# Patient Record
Sex: MALE | Race: WHITE | NOT HISPANIC OR LATINO | URBAN - METROPOLITAN AREA
[De-identification: names, ages, dates, MRNs, and addresses within clinical notes are randomized per-mention and may not be internally consistent; named-entity substitution may affect disease eponyms.]

---

## 2018-09-18 ENCOUNTER — EMERGENCY (EMERGENCY)
Facility: HOSPITAL | Age: 83
LOS: 1 days | Discharge: HOME | End: 2018-09-18
Attending: EMERGENCY MEDICINE | Admitting: EMERGENCY MEDICINE
Payer: MEDICARE

## 2018-09-18 VITALS
SYSTOLIC BLOOD PRESSURE: 151 MMHG | OXYGEN SATURATION: 98 % | HEART RATE: 100 BPM | RESPIRATION RATE: 18 BRPM | DIASTOLIC BLOOD PRESSURE: 87 MMHG | TEMPERATURE: 98 F

## 2018-09-18 LAB
ALBUMIN SERPL ELPH-MCNC: 3.4 G/DL — SIGNIFICANT CHANGE UP (ref 3.3–5)
ALP SERPL-CCNC: 74 U/L — SIGNIFICANT CHANGE UP (ref 40–120)
ALT FLD-CCNC: 18 U/L — SIGNIFICANT CHANGE UP (ref 4–41)
APTT BLD: 28.9 SEC — SIGNIFICANT CHANGE UP (ref 27.5–37.4)
AST SERPL-CCNC: 13 U/L — SIGNIFICANT CHANGE UP (ref 4–40)
BASOPHILS # BLD AUTO: 0.06 K/UL — SIGNIFICANT CHANGE UP (ref 0–0.2)
BASOPHILS NFR BLD AUTO: 0.6 % — SIGNIFICANT CHANGE UP (ref 0–2)
BILIRUB SERPL-MCNC: 0.7 MG/DL — SIGNIFICANT CHANGE UP (ref 0.2–1.2)
BUN SERPL-MCNC: 21 MG/DL — SIGNIFICANT CHANGE UP (ref 7–23)
CALCIUM SERPL-MCNC: 8.8 MG/DL — SIGNIFICANT CHANGE UP (ref 8.4–10.5)
CHLORIDE SERPL-SCNC: 100 MMOL/L — SIGNIFICANT CHANGE UP (ref 98–107)
CO2 SERPL-SCNC: 24 MMOL/L — SIGNIFICANT CHANGE UP (ref 22–31)
CREAT SERPL-MCNC: 1.01 MG/DL — SIGNIFICANT CHANGE UP (ref 0.5–1.3)
EOSINOPHIL # BLD AUTO: 0.28 K/UL — SIGNIFICANT CHANGE UP (ref 0–0.5)
EOSINOPHIL NFR BLD AUTO: 2.8 % — SIGNIFICANT CHANGE UP (ref 0–6)
GLUCOSE SERPL-MCNC: 318 MG/DL — HIGH (ref 70–99)
HBA1C BLD-MCNC: 8.2 % — HIGH (ref 4–5.6)
HCT VFR BLD CALC: 42.2 % — SIGNIFICANT CHANGE UP (ref 39–50)
HGB BLD-MCNC: 14.7 G/DL — SIGNIFICANT CHANGE UP (ref 13–17)
IMM GRANULOCYTES # BLD AUTO: 0.04 # — SIGNIFICANT CHANGE UP
IMM GRANULOCYTES NFR BLD AUTO: 0.4 % — SIGNIFICANT CHANGE UP (ref 0–1.5)
INR BLD: 1.1 — SIGNIFICANT CHANGE UP (ref 0.88–1.17)
LYMPHOCYTES # BLD AUTO: 1.76 K/UL — SIGNIFICANT CHANGE UP (ref 1–3.3)
LYMPHOCYTES # BLD AUTO: 17.4 % — SIGNIFICANT CHANGE UP (ref 13–44)
MCHC RBC-ENTMCNC: 31.1 PG — SIGNIFICANT CHANGE UP (ref 27–34)
MCHC RBC-ENTMCNC: 34.8 % — SIGNIFICANT CHANGE UP (ref 32–36)
MCV RBC AUTO: 89.4 FL — SIGNIFICANT CHANGE UP (ref 80–100)
MONOCYTES # BLD AUTO: 0.99 K/UL — HIGH (ref 0–0.9)
MONOCYTES NFR BLD AUTO: 9.8 % — SIGNIFICANT CHANGE UP (ref 2–14)
NEUTROPHILS # BLD AUTO: 7.01 K/UL — SIGNIFICANT CHANGE UP (ref 1.8–7.4)
NEUTROPHILS NFR BLD AUTO: 69 % — SIGNIFICANT CHANGE UP (ref 43–77)
NRBC # FLD: 0 — SIGNIFICANT CHANGE UP
PLATELET # BLD AUTO: 188 K/UL — SIGNIFICANT CHANGE UP (ref 150–400)
PMV BLD: 10.6 FL — SIGNIFICANT CHANGE UP (ref 7–13)
POTASSIUM SERPL-MCNC: 4.2 MMOL/L — SIGNIFICANT CHANGE UP (ref 3.5–5.3)
POTASSIUM SERPL-SCNC: 4.2 MMOL/L — SIGNIFICANT CHANGE UP (ref 3.5–5.3)
PROT SERPL-MCNC: 6.7 G/DL — SIGNIFICANT CHANGE UP (ref 6–8.3)
PROTHROM AB SERPL-ACNC: 12.2 SEC — SIGNIFICANT CHANGE UP (ref 9.8–13.1)
RBC # BLD: 4.72 M/UL — SIGNIFICANT CHANGE UP (ref 4.2–5.8)
RBC # FLD: 12 % — SIGNIFICANT CHANGE UP (ref 10.3–14.5)
SODIUM SERPL-SCNC: 138 MMOL/L — SIGNIFICANT CHANGE UP (ref 135–145)
TROPONIN T, HIGH SENSITIVITY: 26 NG/L — SIGNIFICANT CHANGE UP (ref ?–14)
WBC # BLD: 10.14 K/UL — SIGNIFICANT CHANGE UP (ref 3.8–10.5)
WBC # FLD AUTO: 10.14 K/UL — SIGNIFICANT CHANGE UP (ref 3.8–10.5)

## 2018-09-18 PROCEDURE — 99285 EMERGENCY DEPT VISIT HI MDM: CPT | Mod: GC

## 2018-09-18 PROCEDURE — 99220: CPT | Mod: 25,GC

## 2018-09-18 PROCEDURE — 70450 CT HEAD/BRAIN W/O DYE: CPT | Mod: 26

## 2018-09-18 PROCEDURE — 93010 ELECTROCARDIOGRAM REPORT: CPT | Mod: 59

## 2018-09-18 RX ORDER — ASPIRIN/CALCIUM CARB/MAGNESIUM 324 MG
325 TABLET ORAL ONCE
Qty: 0 | Refills: 0 | Status: COMPLETED | OUTPATIENT
Start: 2018-09-18 | End: 2018-09-18

## 2018-09-18 RX ADMIN — Medication 325 MILLIGRAM(S): at 21:20

## 2018-09-18 NOTE — ED CDU PROVIDER INITIAL DAY NOTE - ATTENDING CONTRIBUTION TO CARE
I, Jennifer Cabot, MD, have performed a history and physical exam of the patient and discussed their management with the ACP.  I reviewed the PA's note and agree with the documented findings and plan of care. My medical decision making and observations are found above.    Cabot: 82M with PMH of CAD, MI, and 4 stents who comes in with 45 minutes of word finding difficulty, RUE tingling, and gait abnormality.  Symptoms resolved while in the ED.  Denies HA, CP, SOB, palps, F/C/N/V/D/urinary sx.  Code stroke was called.  CT head neg.  MRI/MRA recommended.  Exam normal.  Will admit to CDU for monitoring, serial trops, MRI/MRA.

## 2018-09-18 NOTE — ED PROVIDER NOTE - PHYSICAL EXAMINATION
HDS, NAD, MMM, eyes clear, lungs CTAB, heart sounds normal, abd soft, NT, ND, no CVAT, LEs without edema, wwp, skin normal temperature and color, AAOx3, PERRLA, EOMIB, CN 2-12 intact, 5/5 strength, SILT, normal gait, no drift

## 2018-09-18 NOTE — ED CDU PROVIDER INITIAL DAY NOTE - CONSTITUTIONAL, MLM
normal... Well appearing, well nourished, awake, alert, oriented to person, place, time/situation and in no apparent distress.  Pt. verbalizing in full, clear, effortless sentences.  Pt. ambulates with stable gait.

## 2018-09-18 NOTE — ED CDU PROVIDER INITIAL DAY NOTE - MUSCULOSKELETAL, MLM
Range of motion is not limited, no edema to lower extremities; LE symmetrical in appearance and size.

## 2018-09-18 NOTE — ED CDU PROVIDER INITIAL DAY NOTE - PRIOR EKG STATUS
artifact on EKG (unable to obtain EKG without artifact); EKG appears unchanged from prior; no acute issues noted (EKG reviewed w/ attending)

## 2018-09-18 NOTE — ED CDU PROVIDER INITIAL DAY NOTE - OBJECTIVE STATEMENT
82M with PMH of CAD, MI, and 4 stents who comes in with 45 minutes of word finding difficulty, RUE tingling, and gait abnormality.  Symptoms resolved while in the ED.  Denies HA, CP, SOB, palps, F/C/N/V/D/urinary sx.  Code stroke was called.  CT head neg.  MRI/MRA recommended.    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  Pt. presented to the ED s/p episode of slurred speech, word-finding difficulty, and right hand NUMBNESS (denies tingling/cramping/pain) which resolved.  Pt. was evaluated in the ED; CT head did not show any acute pathology; Neuro was consulted, and pt was dispo'd to CDU for plan:  Cardiac tele monitoring, neuro checks, MRI/MRA per orders, echo, Neuro team reassessment, general observation care and monitoring.  On CDU arrival, pt states he has not had any recurrence of above symptoms; currently asymptomatic and pt denies any pain/dizziness/dyspnea/other c/o.   Of incidental note, on ED labs, serum glucose was 318; HbA1c was added; result: 8.2.  Pt. denies past hx/o diagnosis of diabetes, but does state that his card/PMD (Dr. Reginaldo Syed) told him recently that his blood glucose level was in the 170's (pt states he was never prescribed any meds for hyperglycemia).  [Case discussed with Endocrine fellow Dr. Marcus; recs given to start weight-based regimen using 0.3 conversion; advised Lantus 9 units tonight, Humalog 3 units TID with meals and low sliding scale AC/HS, and gentle hydration with IV NS @ 75 mL/hr.]  CDU care plan in full was discussed with pt who verbalizes agreement with plan. 82M with PMH of CAD, MI, and 4 stents who comes in with 45 minutes of word finding difficulty, RUE tingling, and gait abnormality.  Symptoms resolved while in the ED.  Denies HA, CP, SOB, palps, F/C/N/V/D/urinary sx.  Code stroke was called.  CT head neg.  MRI/MRA recommended.    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  Pt. presented to the ED s/p episode of slurred speech, word-finding difficulty, and right hand NUMBNESS (denies tingling/cramping/pain) which resolved.  Pt. was evaluated in the ED; CT head did not show any acute pathology; Neuro was consulted, and pt was dispo'd to CDU for plan:  Cardiac tele monitoring, neuro checks, MRI/MRA per orders, echo, Neuro team reassessment, general observation care and monitoring.  On CDU arrival, pt states he has not had any recurrence of above symptoms; currently asymptomatic and pt denies any pain/dizziness/dyspnea/other c/o.   Of incidental note, on ED labs, serum glucose was 318; HbA1c was added; result: 8.2.  Pt. denies past hx/o diagnosis of diabetes, but does state that his card/PMD (Dr. Reginaldo Syed) told him recently that his blood glucose level was in the 170's (pt states he was never prescribed any meds for hyperglycemia).  [Case discussed with Endocrine fellow Dr. Marcus; recs given to start weight-based regimen using 0.3 conversion; advised Lantus 9 units tonight, Humalog 3 units TID with meals and low sliding scale AC/HS, and gentle hydration with IV NS @ 75 mL/hr.]  CDU care plan in full was discussed with pt who verbalizes agreement with plan.  Pt. denies hx/o recent illness

## 2018-09-18 NOTE — ED PROVIDER NOTE - OBJECTIVE STATEMENT
82M with PMH of CAD, MI, and 4 stents who comes in with 45 minutes of word finding difficulty, RUE tingling, and gait abnormality.  Symptoms resolved while in the ED.  Denies HA, CP, SOB, palps, F/C/N/V/D/urinary sx.  Code stroke was called.  CT head neg.  MRI/MRA recommended.

## 2018-09-18 NOTE — ED ADULT TRIAGE NOTE - CHIEF COMPLAINT QUOTE
A&Ox3 was sitting on cough when he started experiencing slurred speech and right hand numbness pt states he was having troubling making sentences out, occured about 30 minutes ago as per wife no facial droop or drooling  noted PMH MI (2003) has 4 stents pt took Advil and ASA before arriving to Ed denies n/v/ blurry vision headache FIT MD Andrea called A&Ox3 was sitting on cough when he started experiencing slurred speech and right hand numbness pt states he was having troubling making sentences out, occured about 30 minutes ago as per wife no facial droop or drooling  noted PMH MI (2003) has 4 stents pt took Advil and ASA before arriving to Ed denies n/v/ blurry vision headache FIT MD Andrea called CODE STROKE called pt sent to ct scan

## 2018-09-18 NOTE — ED CDU PROVIDER INITIAL DAY NOTE - CROS ED NEURO POS
feeling slightly "off" with gait (no hx/o fall), word-finding difficulty, speech felt "off", right hand numbness (see HPI)

## 2018-09-18 NOTE — ED CDU PROVIDER INITIAL DAY NOTE - PMH
CAD (coronary artery disease) CAD (coronary artery disease)  (4 stents, non-adherent to aspirin)  Diabetes mellitus, new onset  (diagnosed during 9/18/18 ED visit; HbA1c was 8.2)  Single kidney  (hx/o LEFT nephrectomy at age 16) Arthritis    CAD (coronary artery disease)  (4 stents, non-adherent to aspirin)  Diabetes mellitus, new onset  (diagnosed during 9/18/18 ED visit; HbA1c was 8.2)  Hard of hearing    Myocardial infarction  (2003)  Single kidney  (hx/o LEFT nephrectomy at age 16; pt states due to a ureter"blockage" causing "infection"; pt denies hx/o renal dysfunction)

## 2018-09-18 NOTE — ED CDU PROVIDER INITIAL DAY NOTE - PROGRESS NOTE DETAILS
Cabot: 82M with PMH of CAD, MI, and 4 stents who comes in with 45 minutes of word finding difficulty, RUE tingling, and gait abnormality.  Symptoms resolved while in the ED.  Denies HA, CP, SOB, palps, F/C/N/V/D/urinary sx.  Code stroke was called.  CT head neg.  MRI/MRA recommended.  Exam normal.  Will admit to CDU for monitoring, serial trops, MRI/MRA. Addendum to above plan of care (as in MDM):  CDU ROMERO Sen Addendum-----  On ED labs, serum glucose was 318; HbA1c 8.2.  CDU plan expanded to include Endocrine consult and Diabetic Education as per usual process; initial recs discussed with Endo fellow (see Consult section).

## 2018-09-18 NOTE — ED CDU PROVIDER INITIAL DAY NOTE - NOTES
Case discussed with Endocrine fellow Dr. Marcus; recs given to start weight-based regimen using 0.3 conversion; advised Lantus 9 units tonight, Humalog 3 units TID with meals and low sliding scale AC/HS, and gentle hydration with IV NS @ 75 mL/hr.

## 2018-09-18 NOTE — CONSULT NOTE ADULT - SUBJECTIVE AND OBJECTIVE BOX
HPI:    82 Y White Male presented to ED for evaluation of word finding difficulties. His last seen normal was 18:30 pm. He was with his wife. Wife noticed that he had word finding difficulties and patient also felt the same. In addition, patient also had right arm tingling and gait abnormality. His symptoms resolved after he arrived to ED. tPA was not given as he had no deficits. His nihss is 0 and MRS is 0. He denied any weakness, numbness, change in his speech and vision, vertigo, headache currently.         MEDICATIONS  (STANDING):  aspirin 325 milliGRAM(s) Oral Once    MEDICATIONS  (PRN):      PAST MEDICAL & SURGICAL HISTORY:  CAD (coronary artery disease)      FAMILY HISTORY:      Allergies    penicillin (Rash)    Intolerances          SHx - No smoking, No ETOH, No drug abuse      Review of Systems:  CONSTITUTIONAL:  No weight loss, fever, chills, weakness or fatigue.  HEENT:  Eyes:  No visual loss, blurred vision, double vision or yellow sclerae. Ears, Nose, Throat:  No hearing loss, sneezing, congestion, runny nose or sore throat.  SKIN:  No rash or itching.  CARDIOVASCULAR:  No chest pain, chest pressure or chest discomfort. No palpitations or edema.  RESPIRATORY:  No shortness of breath, cough or sputum.  GASTROINTESTINAL:  No anorexia, nausea, vomiting or diarrhea. No abdominal pain or blood.  GENITOURINARY:  NO Burning on urination.   NEUROLOGICAL: See HPI  MUSCULOSKELETAL:  No muscle, back pain, joint pain or stiffness.  HEMATOLOGIC:  No anemia, bleeding or bruising.  LYMPHATICS:  No enlarged nodes. No history of splenectomy.  PSYCHIATRIC:  No history of depression or anxiety.  ENDOCRINOLOGIC:  No reports of sweating, cold or heat intolerance. No polyuria or polydipsia.  ALLERGIES:  No history of asthma, hives, eczema or rhinitis.        Vital Signs Last 24 Hrs  T(C): 36.7 (18 Sep 2018 19:39), Max: 36.7 (18 Sep 2018 19:39)  T(F): 98 (18 Sep 2018 19:39), Max: 98 (18 Sep 2018 19:39)  HR: 100 (18 Sep 2018 19:39) (100 - 100)  BP: 151/87 (18 Sep 2018 19:39) (151/87 - 151/87)  BP(mean): --  RR: 18 (18 Sep 2018 19:39) (18 - 18)  SpO2: 98% (18 Sep 2018 19:39) (98% - 98%)    General Exam:   General appearance: No acute distress                   Neurological Exam:    Mental Status: Orientated to self, date and place.  Attention intact.  No dysarthria, aphasia or neglect.  Cranial Nerves: PERRL, EOMI, CN V1-3 intact to light touch and pinprick.  No facial asymmetry, Tongue, uvula and palate midline.    Motor:   Tone: normal.                  Strength:  no drifts in all four extremities              Dysmetria: None to finger-nose-finger or heel-shin-heel  No truncal ataxia.    Tremor: No resting, postural or action tremor.  No myoclonus.  Sensation: intact to light touch  Gait: normal and stable.      Other:                                      14.7   10.14 )-----------( 188      ( 18 Sep 2018 19:50 )             42.2       Radiology    CT head     < from: CT Brain Stroke Protocol (09.18.18 @ 20:02) >  No CT evidence of acute intracranial hemorrhage, brain edema, mass effect   or acute territorial infarct. Follow-up MRI can be obtained as clinically   warranted, provided there are no MRI contraindications.    < end of copied text >

## 2018-09-18 NOTE — ED CDU PROVIDER INITIAL DAY NOTE - PSH
History of nephrectomy, unilateral  (hx/o LEFT nephrectomy at age 16)  Stented coronary artery  (4 stents)

## 2018-09-18 NOTE — ED CDU PROVIDER INITIAL DAY NOTE - MEDICAL DECISION MAKING DETAILS
Cabot: 82M with PMH of CAD, MI, and 4 stents who comes in with 45 minutes of word finding difficulty, RUE tingling, and gait abnormality.  Symptoms resolved while in the ED.  Denies HA, CP, SOB, palps, F/C/N/V/D/urinary sx.  Code stroke was called.  CT head neg.  MRI/MRA recommended.  Exam normal.  Will admit to CDU for monitoring, serial trops, MRI/MRA. Cabot: 82M with PMH of CAD, MI, and 4 stents who comes in with 45 minutes of word finding difficulty, RUE tingling, and gait abnormality.  Symptoms resolved while in the ED.  Denies HA, CP, SOB, palps, F/C/N/V/D/urinary sx.  Code stroke was called.  CT head neg.  MRI/MRA recommended.  Exam normal.  Will admit to CDU for monitoring, serial trops, MRI/MRA.    CDU PA Addendum-----  On ED labs, serum glucose was 318; HbA1c 8.2.  CDU plan expanded to include Endocrine consult and Diabetic Education as per usual process; initial recs discussed with Endo fellow (see Consult section). Cabot: 82M with PMH of CAD, MI, and 4 stents who comes in with 45 minutes of word finding difficulty, RUE tingling, and gait abnormality.  Symptoms resolved while in the ED.  Denies HA, CP, SOB, palps, F/C/N/V/D/urinary sx.  Code stroke was called.  CT head neg.  MRI/MRA recommended.  Exam normal.  Will admit to CDU for monitoring, serial trops, MRI/MRA.  CDU ROMERO Sen Addendum-----  On ED labs, serum glucose was 318; HbA1c 8.2.  CDU plan expanded to include Endocrine consult and Diabetic Education as per usual process; initial recs discussed with Endo fellow (see Consult section).

## 2018-09-18 NOTE — ED ADULT NURSE REASSESSMENT NOTE - NS ED NURSE REASSESS COMMENT FT1
received rpt from domi nguyen pt comes to ED for slurred speech pt was a code stroke. pt states slurred speech is better at this time. pt resting comfortably VSS NAD Will continue to monitor the pt

## 2018-09-18 NOTE — ED PROVIDER NOTE - PROGRESS NOTE DETAILS
cabot: Patient's PMD is Dr. Reginaldo Syed, who does not admit to [facility].  Will admit to CDU for MRI/MRA. cabot: Patient's PMD is Dr. Reginaldo Syed, who does not admit to [facility].  Will admit to CDU for MRI/MRA.  Patient's PMD's team is aware and agrees with the plan.

## 2018-09-18 NOTE — ED PROVIDER NOTE - MEDICAL DECISION MAKING DETAILS
Cabot: 82M with PMH of CAD, MI, and 4 stents who comes in with 45 minutes of word finding difficulty, RUE tingling, and gait abnormality.  Symptoms resolved while in the ED.  Denies HA, CP, SOB, palps, F/C/N/V/D/urinary sx.  Code stroke was called.  CT head neg.  MRI/MRA recommended.  Exam normal.  Will admit to CDU for MRI/MRA, monitoring.

## 2018-09-18 NOTE — ED CDU PROVIDER INITIAL DAY NOTE - ENMT NEGATIVE STATEMENT, MLM
Ears: no ear pain and no new hearing problems. Nose: no nasal congestion and no nasal drainage. Mouth/Throat: no dysphagia, no hoarseness and no throat pain.Neck: no lumps, no pain, no stiffness and no swollen glands

## 2018-09-19 VITALS
TEMPERATURE: 99 F | DIASTOLIC BLOOD PRESSURE: 78 MMHG | OXYGEN SATURATION: 98 % | SYSTOLIC BLOOD PRESSURE: 167 MMHG | RESPIRATION RATE: 16 BRPM | HEART RATE: 79 BPM

## 2018-09-19 DIAGNOSIS — E11.9 TYPE 2 DIABETES MELLITUS WITHOUT COMPLICATIONS: ICD-10-CM

## 2018-09-19 DIAGNOSIS — I25.118 ATHEROSCLEROTIC HEART DISEASE OF NATIVE CORONARY ARTERY WITH OTHER FORMS OF ANGINA PECTORIS: ICD-10-CM

## 2018-09-19 DIAGNOSIS — Z95.5 PRESENCE OF CORONARY ANGIOPLASTY IMPLANT AND GRAFT: Chronic | ICD-10-CM

## 2018-09-19 DIAGNOSIS — Z90.5 ACQUIRED ABSENCE OF KIDNEY: Chronic | ICD-10-CM

## 2018-09-19 LAB
BASOPHILS # BLD AUTO: 0.03 K/UL — SIGNIFICANT CHANGE UP (ref 0–0.2)
BASOPHILS NFR BLD AUTO: 0.4 % — SIGNIFICANT CHANGE UP (ref 0–2)
BUN SERPL-MCNC: 18 MG/DL — SIGNIFICANT CHANGE UP (ref 7–23)
CALCIUM SERPL-MCNC: 8.6 MG/DL — SIGNIFICANT CHANGE UP (ref 8.4–10.5)
CHLORIDE SERPL-SCNC: 99 MMOL/L — SIGNIFICANT CHANGE UP (ref 98–107)
CHOLEST SERPL-MCNC: 143 MG/DL — SIGNIFICANT CHANGE UP (ref 120–199)
CO2 SERPL-SCNC: 27 MMOL/L — SIGNIFICANT CHANGE UP (ref 22–31)
CREAT SERPL-MCNC: 0.84 MG/DL — SIGNIFICANT CHANGE UP (ref 0.5–1.3)
EOSINOPHIL # BLD AUTO: 0.27 K/UL — SIGNIFICANT CHANGE UP (ref 0–0.5)
EOSINOPHIL NFR BLD AUTO: 3.3 % — SIGNIFICANT CHANGE UP (ref 0–6)
GLUCOSE SERPL-MCNC: 256 MG/DL — HIGH (ref 70–99)
HCT VFR BLD CALC: 40.2 % — SIGNIFICANT CHANGE UP (ref 39–50)
HDLC SERPL-MCNC: 40 MG/DL — SIGNIFICANT CHANGE UP (ref 35–55)
HGB BLD-MCNC: 14 G/DL — SIGNIFICANT CHANGE UP (ref 13–17)
IMM GRANULOCYTES # BLD AUTO: 0.02 # — SIGNIFICANT CHANGE UP
IMM GRANULOCYTES NFR BLD AUTO: 0.2 % — SIGNIFICANT CHANGE UP (ref 0–1.5)
LIPID PNL WITH DIRECT LDL SERPL: 89 MG/DL — SIGNIFICANT CHANGE UP
LYMPHOCYTES # BLD AUTO: 1.07 K/UL — SIGNIFICANT CHANGE UP (ref 1–3.3)
LYMPHOCYTES # BLD AUTO: 13.1 % — SIGNIFICANT CHANGE UP (ref 13–44)
MCHC RBC-ENTMCNC: 30.2 PG — SIGNIFICANT CHANGE UP (ref 27–34)
MCHC RBC-ENTMCNC: 34.8 % — SIGNIFICANT CHANGE UP (ref 32–36)
MCV RBC AUTO: 86.8 FL — SIGNIFICANT CHANGE UP (ref 80–100)
MONOCYTES # BLD AUTO: 0.79 K/UL — SIGNIFICANT CHANGE UP (ref 0–0.9)
MONOCYTES NFR BLD AUTO: 9.6 % — SIGNIFICANT CHANGE UP (ref 2–14)
NEUTROPHILS # BLD AUTO: 6.01 K/UL — SIGNIFICANT CHANGE UP (ref 1.8–7.4)
NEUTROPHILS NFR BLD AUTO: 73.4 % — SIGNIFICANT CHANGE UP (ref 43–77)
NRBC # FLD: 0 — SIGNIFICANT CHANGE UP
PLATELET # BLD AUTO: 161 K/UL — SIGNIFICANT CHANGE UP (ref 150–400)
PMV BLD: 10.4 FL — SIGNIFICANT CHANGE UP (ref 7–13)
POTASSIUM SERPL-MCNC: 4.1 MMOL/L — SIGNIFICANT CHANGE UP (ref 3.5–5.3)
POTASSIUM SERPL-SCNC: 4.1 MMOL/L — SIGNIFICANT CHANGE UP (ref 3.5–5.3)
RBC # BLD: 4.63 M/UL — SIGNIFICANT CHANGE UP (ref 4.2–5.8)
RBC # FLD: 12 % — SIGNIFICANT CHANGE UP (ref 10.3–14.5)
SODIUM SERPL-SCNC: 137 MMOL/L — SIGNIFICANT CHANGE UP (ref 135–145)
TRIGL SERPL-MCNC: 140 MG/DL — SIGNIFICANT CHANGE UP (ref 10–149)
TROPONIN T, HIGH SENSITIVITY: 21 NG/L — SIGNIFICANT CHANGE UP (ref ?–14)
WBC # BLD: 8.19 K/UL — SIGNIFICANT CHANGE UP (ref 3.8–10.5)
WBC # FLD AUTO: 8.19 K/UL — SIGNIFICANT CHANGE UP (ref 3.8–10.5)

## 2018-09-19 PROCEDURE — 70551 MRI BRAIN STEM W/O DYE: CPT | Mod: 26

## 2018-09-19 PROCEDURE — 70548 MR ANGIOGRAPHY NECK W/DYE: CPT | Mod: 26

## 2018-09-19 PROCEDURE — 70544 MR ANGIOGRAPHY HEAD W/O DYE: CPT | Mod: 26,59

## 2018-09-19 PROCEDURE — 93306 TTE W/DOPPLER COMPLETE: CPT | Mod: 26

## 2018-09-19 PROCEDURE — 99282 EMERGENCY DEPT VISIT SF MDM: CPT

## 2018-09-19 PROCEDURE — 99217: CPT | Mod: GC

## 2018-09-19 RX ORDER — SODIUM CHLORIDE 9 MG/ML
1000 INJECTION, SOLUTION INTRAVENOUS
Qty: 0 | Refills: 0 | Status: DISCONTINUED | OUTPATIENT
Start: 2018-09-19 | End: 2018-09-22

## 2018-09-19 RX ORDER — ATORVASTATIN CALCIUM 80 MG/1
80 TABLET, FILM COATED ORAL DAILY
Qty: 0 | Refills: 0 | Status: DISCONTINUED | OUTPATIENT
Start: 2018-09-19 | End: 2018-09-22

## 2018-09-19 RX ORDER — SODIUM CHLORIDE 9 MG/ML
1000 INJECTION INTRAMUSCULAR; INTRAVENOUS; SUBCUTANEOUS
Qty: 0 | Refills: 0 | Status: DISCONTINUED | OUTPATIENT
Start: 2018-09-19 | End: 2018-09-22

## 2018-09-19 RX ORDER — DEXTROSE 50 % IN WATER 50 %
12.5 SYRINGE (ML) INTRAVENOUS ONCE
Qty: 0 | Refills: 0 | Status: DISCONTINUED | OUTPATIENT
Start: 2018-09-19 | End: 2018-09-22

## 2018-09-19 RX ORDER — INSULIN LISPRO 100/ML
3 VIAL (ML) SUBCUTANEOUS
Qty: 0 | Refills: 0 | Status: DISCONTINUED | OUTPATIENT
Start: 2018-09-19 | End: 2018-09-22

## 2018-09-19 RX ORDER — INSULIN LISPRO 100/ML
VIAL (ML) SUBCUTANEOUS AT BEDTIME
Qty: 0 | Refills: 0 | Status: DISCONTINUED | OUTPATIENT
Start: 2018-09-19 | End: 2018-09-22

## 2018-09-19 RX ORDER — INSULIN GLARGINE 100 [IU]/ML
9 INJECTION, SOLUTION SUBCUTANEOUS ONCE
Qty: 0 | Refills: 0 | Status: COMPLETED | OUTPATIENT
Start: 2018-09-19 | End: 2018-09-19

## 2018-09-19 RX ORDER — DEXTROSE 50 % IN WATER 50 %
25 SYRINGE (ML) INTRAVENOUS ONCE
Qty: 0 | Refills: 0 | Status: DISCONTINUED | OUTPATIENT
Start: 2018-09-19 | End: 2018-09-22

## 2018-09-19 RX ORDER — INSULIN LISPRO 100/ML
VIAL (ML) SUBCUTANEOUS
Qty: 0 | Refills: 0 | Status: DISCONTINUED | OUTPATIENT
Start: 2018-09-19 | End: 2018-09-22

## 2018-09-19 RX ORDER — ASPIRIN/CALCIUM CARB/MAGNESIUM 324 MG
81 TABLET ORAL DAILY
Qty: 0 | Refills: 0 | Status: DISCONTINUED | OUTPATIENT
Start: 2018-09-19 | End: 2018-09-22

## 2018-09-19 RX ORDER — GLUCAGON INJECTION, SOLUTION 0.5 MG/.1ML
1 INJECTION, SOLUTION SUBCUTANEOUS ONCE
Qty: 0 | Refills: 0 | Status: DISCONTINUED | OUTPATIENT
Start: 2018-09-19 | End: 2018-09-22

## 2018-09-19 RX ORDER — DEXTROSE 50 % IN WATER 50 %
15 SYRINGE (ML) INTRAVENOUS ONCE
Qty: 0 | Refills: 0 | Status: DISCONTINUED | OUTPATIENT
Start: 2018-09-19 | End: 2018-09-22

## 2018-09-19 RX ORDER — METFORMIN HYDROCHLORIDE 850 MG/1
1 TABLET ORAL
Qty: 60 | Refills: 0
Start: 2018-09-19 | End: 2018-10-18

## 2018-09-19 RX ADMIN — Medication 1: at 13:26

## 2018-09-19 RX ADMIN — SODIUM CHLORIDE 75 MILLILITER(S): 9 INJECTION INTRAMUSCULAR; INTRAVENOUS; SUBCUTANEOUS at 00:40

## 2018-09-19 RX ADMIN — Medication 81 MILLIGRAM(S): at 13:25

## 2018-09-19 RX ADMIN — Medication 3 UNIT(S): at 13:27

## 2018-09-19 RX ADMIN — Medication 3 UNIT(S): at 09:45

## 2018-09-19 RX ADMIN — ATORVASTATIN CALCIUM 80 MILLIGRAM(S): 80 TABLET, FILM COATED ORAL at 13:25

## 2018-09-19 RX ADMIN — Medication 2: at 09:46

## 2018-09-19 RX ADMIN — INSULIN GLARGINE 9 UNIT(S): 100 INJECTION, SOLUTION SUBCUTANEOUS at 01:12

## 2018-09-19 NOTE — ED CDU PROVIDER SUBSEQUENT DAY NOTE - HISTORY
82 year old male with a PMhx of CAD s/p 4 stents, pre DM (newly diagnosed as DM with an A1c of 8.2) pw slurred speech, ataphasia and right hand numbness that occurred for 45 minutes which resolved before coming to the ED. Pt had negative CT. Sent to the CDU for MRI, TTE, carotid duplex and endocrine consult.

## 2018-09-19 NOTE — ED CDU PROVIDER DISPOSITION NOTE - CLINICAL COURSE
Roman: 82 yr old male with hx of cad stent x 4, solitary kidney presents to ed for slurred speech, RUE tingling and weakness that since has resolved upon arrival to ed.  sent to CDU for MRI, and echo.  pt was also found to be hyperglycemic. MRI no acute abnormality and endocrine saw you which they started you on metformin to control DM.  you remained neurologically intact    dx tia. continue with meds. Roman: 82 yr old male with hx of cad stent x 4, solitary kidney presents to ed for slurred speech, RUE tingling and weakness that since has resolved upon arrival to ed.  sent to CDU for MRI, and echo.  pt was also found to be hyperglycemic. MRI no acute abnormality (mild narrowing of the proximal left internal carotid artery) and endocrine saw you which they started you on metformin to control DM.  you remained neurologically intact    dx tia. continue with meds. started on metformin.  dietary changes.

## 2018-09-19 NOTE — ED CDU PROVIDER SUBSEQUENT DAY NOTE - PMH
Arthritis    CAD (coronary artery disease)  (4 stents, non-adherent to aspirin)  Diabetes mellitus, new onset  (diagnosed during 9/18/18 ED visit; HbA1c was 8.2)  Hard of hearing    Myocardial infarction  (2003)  Single kidney  (hx/o LEFT nephrectomy at age 16; pt states due to a ureter"blockage" causing "infection"; pt denies hx/o renal dysfunction)

## 2018-09-19 NOTE — ED ADULT NURSE NOTE - CHIEF COMPLAINT QUOTE
A&Ox3 was sitting on cough when he started experiencing slurred speech and right hand numbness pt states he was having troubling making sentences out, occured about 30 minutes ago as per wife no facial droop or drooling  noted PMH MI (2003) has 4 stents pt took Advil and ASA before arriving to Ed denies n/v/ blurry vision headache FIT MD Andrea called CODE STROKE called pt sent to ct scan

## 2018-09-19 NOTE — CONSULT NOTE ADULT - ATTENDING COMMENTS
Impression: Left hemispheric TIA  I spoke with the patient in detail regarding MRI brain that does not show any evidence of acute ischemia or infarction. He will get an echocardiogram and carotid Doppler performed during this admission.  Aggressive risk factor modification should be continued for secondary stroke prevention, consisting of antithrombotic therapy , intensive blood pressure control and lipid lowering therapy.I encouraged the patient to discuss these important issues with her primary care doctor.I have reviewed the goals of stroke risk factor modification. I counseled the patient on measures to reduce stroke risk, including the importance of medication compliance, risk factor control, exercise, healthy diet and avoidance of smoking. I reviewed stroke warning signs and symptoms and appropriate actions to take if such occur.
Agree with assessment stated above.  The patient's A1c is not completely uncontrolled and his target A1c is 7-8%.  For now, if the patient stays in the hospital he can stay on Lantus 9 and humalog 3 TID with meals. For discharge metformin 500 mg BID for one week then titrate up to 1000 mg BID.  Continue with statin for CAD

## 2018-09-19 NOTE — ED CDU PROVIDER SUBSEQUENT DAY NOTE - ATTENDING CONTRIBUTION TO CARE
Shared visit with CDU PA    82 yr old male with hx of cad stent x 4, solitary kidney presents to ed for slurred speech, RUE tingling and weakness that since has resolved upon arrival to ed.  sent to CDU for MRI, and echo.  pt was also found to be hyperglycemic.  pending MRI, echo, endocrine.  pt had no overnight events.    *GEN:   comfortable, in no apparent distress, AOx3  *EYES:   PERRL, extra-occular movements intact  *HEENT:   airway patent, moist mucosal membranes, uvula midline  *CV:   regular rate and rhythm, normal S1/S2, no murmur  *RESP:   clear to auscultation bilaterally, non-labored, speaking in full sentences  *ABD:   soft, non tender, no guarding  *MSK:   no musculoskeletal tenderness, 5/5 strength, moving all extremity  *SKIN:   dry, intact, no rash  *NEURO:   AOx3, no focal weakness or loss of sensation, gait normal, GCS 15    a/p: possible TIA vs hyperglycemia vs arrhythmia.  pending MRI, echo and endocrine.  neuro will see again and give recs.  pending dispo

## 2018-09-19 NOTE — ED CDU PROVIDER SUBSEQUENT DAY NOTE - PROGRESS NOTE DETAILS
Pt objectively noted to be resting comfortably in the interim; no issues or c/o thus far.  MRI/A performed; awaiting results.  Will continue to monitor.  Pt. will be signed out to day CDU ROMERO Richard and attending Dr. Roman at 0700 hrs. ROMERO Richard: pt NAD, VSS, no acute complaints. No events on tele. PE: cardiac: RRR, Lungs: CTA, neuro - CN1-12 WNL, strength 5/5 in all extremities, sensation WNL. Discussed the results of the labs and imaging with the patient. MRI - mild narrowing of right internal carotid otherwise unremarkable - pt cleared by neurology. Pending Echo results. Will continue to monitor.

## 2018-09-19 NOTE — ED CDU PROVIDER DISPOSITION NOTE - PLAN OF CARE
Take metformin 500 mg twice daily for one week then titrate up to 1000 mg twice daily. Follow up with your primary care physician or at endocrine clinic (refer to list provided) within 1-2 weeks discharge - Repeat A1c in 3 months and adjust medications accordingly. Follow up with neurology Dr. Mosqueda - list provided within1 week for further evaluation. Continue taking your home medications as prescribed. Return to the Emergency Department for any new, worsening or concerning symptoms.

## 2018-09-19 NOTE — CONSULT NOTE ADULT - ASSESSMENT
82 Y White Male presented to ED for evaluation of word finding difficulties. His last seen normal was 18:30 pm. He was with his wife. Wife noticed that he had word finding difficulties and patient also felt the same. In addition, patient also had right arm tingling and gait abnormality. His symptoms resolved after he arrived to ED. tPA was not given as he had no deficits. His nihss is 0 and MRS is 0. He denied any weakness, numbness, change in his speech and vision, vertigo, headache currently. Neurological examination was unremarkable. CT head was unremarkable.     Impression     left hemispheric dysfunction from TIA vs infarction     Plan     Permissive HTN x 24hrs goal 220/120  MRI brain w/o cont  MRA brain w/o cont   MRA neck w/ cont  HgA1c  Lipid profile  ASA 81mg  Atorvastatin 80   TTE   Telemetry monitoring  NPO until passes dysphagia screen  no PT/OT indicated at this point. (gait is normal)
83 yo M with a PMHx of prediabetes, CAD s/p 4 stents in 2003 (self discontinued Aspirin last year), Left nephrectomy at age 16, arthritis, who presented with c/o difficulty finding words at home, admitted for TIA vs. (Stroke work up NIHSS 0 and MRS 0 with normal MRI normal), now found to have new-onset diabetes with A1c 8.2.

## 2018-09-19 NOTE — CONSULT NOTE ADULT - SUBJECTIVE AND OBJECTIVE BOX
HPI:      PAST MEDICAL & SURGICAL HISTORY:  Hard of hearing  Myocardial infarction: (2003)  Arthritis  Single kidney: (hx/o LEFT nephrectomy at age 16; pt states due to a ureter&quot;blockage&quot; causing &quot;infection&quot;; pt denies hx/o renal dysfunction)  Diabetes mellitus, new onset: (diagnosed during 9/18/18 ED visit; HbA1c was 8.2)  CAD (coronary artery disease): (4 stents, non-adherent to aspirin)  Stented coronary artery: (4 stents)  History of nephrectomy, unilateral: (hx/o LEFT nephrectomy at age 16)      FAMILY HISTORY:  Family history of diabetes mellitus in mother (Mother): (Pt states his mother developed DM in her 70&#x27;s)      Social History:    Outpatient Medications:    MEDICATIONS  (STANDING):  aspirin enteric coated 81 milliGRAM(s) Oral daily  atorvastatin 80 milliGRAM(s) Oral daily  dextrose 5%. 1000 milliLiter(s) (50 mL/Hr) IV Continuous <Continuous>  dextrose 50% Injectable 12.5 Gram(s) IV Push once  dextrose 50% Injectable 25 Gram(s) IV Push once  dextrose 50% Injectable 25 Gram(s) IV Push once  insulin lispro (HumaLOG) corrective regimen sliding scale   SubCutaneous three times a day before meals  insulin lispro (HumaLOG) corrective regimen sliding scale   SubCutaneous at bedtime  insulin lispro Injectable (HumaLOG) 3 Unit(s) SubCutaneous three times a day before meals  sodium chloride 0.9%. 1000 milliLiter(s) (75 mL/Hr) IV Continuous <Continuous>    MEDICATIONS  (PRN):  dextrose 40% Gel 15 Gram(s) Oral once PRN Blood Glucose LESS THAN 70 milliGRAM(s)/deciliter  glucagon  Injectable 1 milliGRAM(s) IntraMuscular once PRN Glucose LESS THAN 70 milligrams/deciliter    Home Medications:  aspirin: (Pt states intermittent adherence to aspirin.) (19 Sep 2018 03:56)  simvastatin: (Unknown dosage), once a day, taken in the morning-time. (19 Sep 2018 03:56)    Allergies    penicillin (Rash)    Intolerances      Review of Systems:  Constitutional: No fever  Eyes: No blurry vision  Neuro: No tremors  HEENT: No pain  Cardiovascular: No chest pain, palpitations  Respiratory: No SOB, no cough  GI: No nausea, vomiting, abdominal pain  : No dysuria  Skin: no rash  Psych: no depression  Endocrine: no polyuria, polydipsia  Hem/lymph: no swelling  Osteoporosis: no fractures    ALL OTHER SYSTEMS REVIEWED AND NEGATIVE    UNABLE TO OBTAIN    PHYSICAL EXAM:  VITALS: T(C): 36.5 (09-19-18 @ 09:38)  T(F): 97.7 (09-19-18 @ 09:38), Max: 98.3 (09-19-18 @ 06:25)  HR: 79 (09-19-18 @ 09:38) (75 - 100)  BP: 166/71 (09-19-18 @ 09:38) (151/87 - 177/86)  RR:  (18 - 18)  SpO2:  (97% - 99%)  Wt(kg): --  GENERAL: NAD, well-groomed, well-developed  EYES: No proptosis, no lid lag, anicteric  HEENT:  Atraumatic, Normocephalic, moist mucous membranes  THYROID: Normal size, no palpable nodules  RESPIRATORY: Clear to auscultation bilaterally; No rales, rhonchi, wheezing  CARDIOVASCULAR: Regular rate and rhythm; No murmurs; no peripheral edema  GI: Soft, nontender, non distended, normal bowel sounds  SKIN: Dry, intact, No rashes or lesions  MUSCULOSKELETAL: Full range of motion, normal strength  NEURO: sensation intact, extraocular movements intact, no tremor  PSYCH: Alert and oriented x 3, normal affect, normal mood  CUSHING'S SIGNS: no striae      CAPILLARY BLOOD GLUCOSE    POCT Blood Glucose.: 220 mg/dL (19 Sep 2018 09:42)  POCT Blood Glucose.: 215 mg/dL (19 Sep 2018 00:23)                            14.0   8.19  )-----------( 161      ( 19 Sep 2018 09:03 )             40.2       09-19    137  |  99  |  18  ----------------------------<  256<H>  4.1   |  27  |  0.84    EGFR if : 95  EGFR if non : 82    Ca    8.6      09-19    TPro  6.7  /  Alb  3.4  /  TBili  0.7  /  DBili  x   /  AST  13  /  ALT  18  /  AlkPhos  74  09-18      < from: CT Brain Stroke Protocol (09.18.18 @ 20:02) >  IMPRESSION:     No CT evidence of acute intracranial hemorrhage, brain edema, mass effect   or acute territorial infarct. Follow-up MRI can be obtained as clinically   warranted, provided there are no MRI contraindications.    Findings were discussed with Dr. Yu by Dr. Edmond on 9/18/2018 at 8:08 PM   with read back.       < end of copied text >  Thyroid Function Tests:      Hemoglobin A1C, Whole Blood: 8.2 % <H> [4.0 - 5.6] (09-18-18 @ 19:56)      09-19 Chol 143 LDL 89 HDL 40 Trig 140    Radiology: HPI:   81 yo M with a PMHx CAD s/p 4 stents in 2003 (self discontinued Aspirin last year), Left nephrectomy at age 16, arthritis, who presented with c/o difficulty finding words at home that had resolved by his arrival to ED, admitted for TIA vs. Stroke work up. NIHSS 0 and MRS 0. MRI normal.   We were consulted because is Hgb A1c on admission was 8.2. Per pt, he was told by his PMD that his fasting glucose was elevated in the 170s. At the time he advised to change his lifestyle but not started on any anti-diabetic agent. He reports cutting down on desserts and exercising 3 times a week, but has been eating out a lot lately and eating a lot of bread. Strong family hx with Mother and daughter with T2DM.     He denies CP, SOB, HA, dizziness. He also denies polyuria, polydipsia, or dysuria, but endorsed more feeling more fatigue in past few weeks and some intermittent vision changes in the past week. Denies hx of HTN.     PAST MEDICAL & SURGICAL HISTORY:  Hard of hearing  Myocardial infarction: (2003)  Arthritis  Single kidney: (hx/o LEFT nephrectomy at age 16; pt states due to a ureter&quot;blockage&quot; causing &quot;infection&quot;; pt denies hx/o renal dysfunction)  Diabetes mellitus, new onset: (diagnosed during 9/18/18 ED visit; HbA1c was 8.2)  CAD (coronary artery disease): (4 stents, non-adherent to aspirin)  Stented coronary artery: (4 stents)  History of nephrectomy, unilateral: (hx/o LEFT nephrectomy at age 16)      FAMILY HISTORY:  Family history of diabetes mellitus in mother (Mother): (Pt states his mother developed DM in her 70&#x27;s)  Family history of diabetes mellitus in daugther     Social History: Retired, used to work for [employer]. Part-time  for his grandkids    Home Medications:  aspirin: (Pt states intermittent adherence to aspirin.) (19 Sep 2018 03:56)  simvastatin: 80mg, once a day, taken in the morning-time. (19 Sep 2018 03:56)    MEDICATIONS  (STANDING):  aspirin enteric coated 81 milliGRAM(s) Oral daily  atorvastatin 80 milliGRAM(s) Oral daily  dextrose 5%. 1000 milliLiter(s) (50 mL/Hr) IV Continuous <Continuous>  dextrose 50% Injectable 12.5 Gram(s) IV Push once  dextrose 50% Injectable 25 Gram(s) IV Push once  dextrose 50% Injectable 25 Gram(s) IV Push once  insulin lispro (HumaLOG) corrective regimen sliding scale   SubCutaneous three times a day before meals  insulin lispro (HumaLOG) corrective regimen sliding scale   SubCutaneous at bedtime  insulin lispro Injectable (HumaLOG) 3 Unit(s) SubCutaneous three times a day before meals  sodium chloride 0.9%. 1000 milliLiter(s) (75 mL/Hr) IV Continuous <Continuous>    MEDICATIONS  (PRN):  dextrose 40% Gel 15 Gram(s) Oral once PRN Blood Glucose LESS THAN 70 milliGRAM(s)/deciliter  glucagon  Injectable 1 milliGRAM(s) IntraMuscular once PRN Glucose LESS THAN 70 milligrams/deciliter    Allergies: penicillin (Rash    Review of Systems:  Constitutional: No fever  Eyes: No blurry vision  Neuro: No tremors  HEENT: No pain  Cardiovascular: No chest pain, palpitations  Respiratory: No SOB, no cough  GI: No nausea, vomiting, abdominal pain  : No dysuria  Skin: no rash  Psych: no depression  Endocrine: no polyuria, polydipsia  Hem/lymph: no swelling  Osteoporosis: no fractures    ALL OTHER SYSTEMS REVIEWED AND NEGATIVE    PHYSICAL EXAM:  VITALS: T(C): 36.5 (09-19-18 @ 09:38)  T(F): 97.7 (09-19-18 @ 09:38), Max: 98.3 (09-19-18 @ 06:25)  HR: 79 (09-19-18 @ 09:38) (75 - 100)  BP: 166/71 (09-19-18 @ 09:38) (151/87 - 177/86)  RR:  (18 - 18)  SpO2:  (97% - 99%)    GENERAL: NAD, well-groomed, well-developed  EYES: No proptosis, no lid lag, anicteric  HEENT:  Atraumatic, Normocephalic, moist mucous membranes  RESPIRATORY: Clear to auscultation bilaterally; No rales, rhonchi, wheezing  CARDIOVASCULAR: Irregular rhythm, normal rate; No murmurs; no peripheral edema  GI: Soft, nontender, non distended, normal bowel sounds  SKIN: Dry, intact, No rashes or lesions  MUSCULOSKELETAL: Full range of motion, normal strength  NEURO: sensation intact, extraocular movements intact, no tremor  PSYCH: Alert and oriented x 3, normal affect, normal mood      CAPILLARY BLOOD GLUCOSE    POCT Blood Glucose.: 220 mg/dL (19 Sep 2018 09:42)  POCT Blood Glucose.: 215 mg/dL (19 Sep 2018 00:23)                         14.0   8.19  )-----------( 161      ( 19 Sep 2018 09:03 )             40.2       09-19    137  |  99  |  18  ----------------------------<  256<H>  4.1   |  27  |  0.84    EGFR if : 95  EGFR if non : 82    Ca    8.6      09-19    TPro  6.7  /  Alb  3.4  /  TBili  0.7  /  DBili  x   /  AST  13  /  ALT  18  /  AlkPhos  74  09-18      Radiology:   < from: CT Brain Stroke Protocol (09.18.18 @ 20:02) >  IMPRESSION:     No CT evidence of acute intracranial hemorrhage, brain edema, mass effect   or acute territorial infarct. Follow-up MRI can be obtained as clinically   warranted, provided there are no MRI contraindications.    Findings were discussed with Dr. Yu by Dr. Edmond on 9/18/2018 at 8:08 PM   with read back.       < end of copied text >  Thyroid Function Tests:      Hemoglobin A1C, Whole Blood: 8.2 % <H> [4.0 - 5.6] (09-18-18 @ 19:56)      09-19 Chol 143 LDL 89 HDL 40 Trig 140 HPI:   83 yo M with a PMHx CAD s/p 4 stents in 2003 (self discontinued Aspirin last year), Left nephrectomy at age 16, arthritis, who presented with c/o difficulty finding words at home that had resolved by his arrival to ED, admitted for TIA vs. Stroke work up. NIHSS 0 and MRS 0. MRI normal.   We were consulted because is Hgb A1c on admission was 8.2. Per pt, he was told by his PMD that his fasting glucose was elevated in the 170s. At the time he advised to change his lifestyle but not started on any anti-diabetic agent. He reports cutting down on desserts and exercising 3 times a week, but has been eating out a lot lately and eating a lot of bread. Strong family hx with Mother and daughter with T2DM.     He denies CP, SOB, HA, dizziness. He also denies polyuria, polydipsia, or dysuria, but endorsed more feeling more fatigue in past few weeks and some intermittent vision changes in the past week. Denies hx of HTN.     PAST MEDICAL & SURGICAL HISTORY:  Hard of hearing  Myocardial infarction: (2003)  Arthritis  Single kidney: (hx/o LEFT nephrectomy at age 16; pt states due to a ureter&quot;blockage&quot; causing &quot;infection&quot;; pt denies hx/o renal dysfunction)  Diabetes mellitus, new onset: (diagnosed during 9/18/18 ED visit; HbA1c was 8.2)  CAD (coronary artery disease): (4 stents, non-adherent to aspirin)  Stented coronary artery: (4 stents)  History of nephrectomy, unilateral: (hx/o LEFT nephrectomy at age 16)      FAMILY HISTORY:  Family history of diabetes mellitus in mother (Mother): (Pt states his mother developed DM in her 70&#x27;s)  Family history of diabetes mellitus in daugther     Social History:   Retired, used to work for [employer]. Part-time  for his grandkids  Glass of red wine daily  Non-smoker    Home Medications:  aspirin: (Pt states intermittent adherence to aspirin.) (19 Sep 2018 03:56)  simvastatin: 80mg, once a day, taken in the morning-time. (19 Sep 2018 03:56)    MEDICATIONS  (STANDING):  aspirin enteric coated 81 milliGRAM(s) Oral daily  atorvastatin 80 milliGRAM(s) Oral daily  dextrose 5%. 1000 milliLiter(s) (50 mL/Hr) IV Continuous <Continuous>  dextrose 50% Injectable 12.5 Gram(s) IV Push once  dextrose 50% Injectable 25 Gram(s) IV Push once  dextrose 50% Injectable 25 Gram(s) IV Push once  insulin lispro (HumaLOG) corrective regimen sliding scale   SubCutaneous three times a day before meals  insulin lispro (HumaLOG) corrective regimen sliding scale   SubCutaneous at bedtime  insulin lispro Injectable (HumaLOG) 3 Unit(s) SubCutaneous three times a day before meals  sodium chloride 0.9%. 1000 milliLiter(s) (75 mL/Hr) IV Continuous <Continuous>    MEDICATIONS  (PRN):  dextrose 40% Gel 15 Gram(s) Oral once PRN Blood Glucose LESS THAN 70 milliGRAM(s)/deciliter  glucagon  Injectable 1 milliGRAM(s) IntraMuscular once PRN Glucose LESS THAN 70 milligrams/deciliter    Allergies: penicillin (Rash    Review of Systems:  Constitutional: No fever  Eyes: No blurry vision  Neuro: No tremors  HEENT: No pain  Cardiovascular: No chest pain, palpitations  Respiratory: No SOB, no cough  GI: No nausea, vomiting, abdominal pain  : No dysuria  Skin: no rash  Psych: no depression  Endocrine: no polyuria, polydipsia  Hem/lymph: no swelling  Osteoporosis: no fractures  ALL OTHER SYSTEMS REVIEWED AND NEGATIVE    PHYSICAL EXAM:  VITALS: T(C): 36.5 (09-19-18 @ 09:38)  T(F): 97.7 (09-19-18 @ 09:38), Max: 98.3 (09-19-18 @ 06:25)  HR: 79 (09-19-18 @ 09:38) (75 - 100)  BP: 166/71 (09-19-18 @ 09:38) (151/87 - 177/86)  RR:  (18 - 18)  SpO2:  (97% - 99%)    GENERAL: NAD, well-groomed, well-developed  EYES: No proptosis, no lid lag, anicteric  HEENT:  Atraumatic, Normocephalic, moist mucous membranes  RESPIRATORY: Clear to auscultation bilaterally; No rales, rhonchi, wheezing  CARDIOVASCULAR: Irregular rhythm, normal rate; No murmurs; no peripheral edema  GI: Soft, nontender, non distended, normal bowel sounds  SKIN: Dry, intact, No rashes or lesions  MUSCULOSKELETAL: Full range of motion, normal strength  NEURO: sensation intact, extraocular movements intact, no tremor  PSYCH: Alert and oriented x 3, normal affect, normal mood      CAPILLARY BLOOD GLUCOSE  POCT 186  Humalog 3+1  POCT Blood Glucose.: 220 mg/dL (19 Sep 2018 09:42)  Humalog 3+2  POCT Blood Glucose.: 215 mg/dL (19 Sep 2018 00:23)  Lantus 9                         14.0   8.19  )-----------( 161      ( 19 Sep 2018 09:03 )             40.2       09-19    137  |  99  |  18  ----------------------------<  256<H>  4.1   |  27  |  0.84    EGFR if : 95  EGFR if non : 82    Ca    8.6      09-19    TPro  6.7  /  Alb  3.4  /  TBili  0.7  /  DBili  x   /  AST  13  /  ALT  18  /  AlkPhos  74  09-18      Radiology:   < from: CT Brain Stroke Protocol (09.18.18 @ 20:02) >  IMPRESSION:     No CT evidence of acute intracranial hemorrhage, brain edema, mass effect   or acute territorial infarct. Follow-up MRI can be obtained as clinically   warranted, provided there are no MRI contraindications.    Findings were discussed with Dr. Yu by Dr. Edmond on 9/18/2018 at 8:08 PM   with read back.   Hemoglobin A1C, Whole Blood: 8.2 % <H> [4.0 - 5.6] (09-18-18 @ 19:56)    09-19 Chol 143 LDL 89 HDL 40 Trig 140

## 2018-09-19 NOTE — CONSULT NOTE ADULT - PROBLEM SELECTOR RECOMMENDATION 9
Patient has been pre-diabetic for at least a year and has failed lifestyle modification. He has normal renal function and can be started on Metformin.   - can start on low ISS while inpatient  - can discharge on  Metformin 500mg qd and titrate up to 500mg BID after one week if tolerated  - Encourage lifestyle modifications and recommend diabetic counseling   - Close follow up with internist or at endocrine clinic within 1-2 weeks discharge  - Repeat A1c in 3 months and adjust medications accordingly Patient has been pre-diabetic for at least a year and has not had success with behavioral modifications  - can start on low ISS while inpatient  - can discharge on Metformin 500 mg BID after one week then optimize to 1000 mg BID  - Encourage lifestyle modifications and recommend diabetic counseling   - Close follow up with internist or at endocrine clinic within 1-2 weeks discharge  - Repeat A1c in 3 months and adjust medications accordingly  -patient requires glucometer teaching and will need Rx for supplies such as meter and testing strip  -if the patient stays and is not discharged, recommend Lantus 9 and humalog 3 TID with meals

## 2018-09-19 NOTE — ED CDU PROVIDER SUBSEQUENT DAY NOTE - MEDICAL DECISION MAKING DETAILS
82 year old male with a PMhx of CAD s/p 4 stents, pre DM (newly diagnosed as DM with an A1c of 8.2) pw slurred speech, ataphasia and right hand numbness that occurred for 45 minutes which resolved before coming to the ED.   Plan: MRI, TTE, carotid doppler, endo consult

## 2020-09-09 PROBLEM — Z90.5 ACQUIRED ABSENCE OF KIDNEY: Chronic | Status: ACTIVE | Noted: 2018-09-19

## 2020-09-09 PROBLEM — M19.90 UNSPECIFIED OSTEOARTHRITIS, UNSPECIFIED SITE: Chronic | Status: ACTIVE | Noted: 2018-09-19

## 2020-09-09 PROBLEM — H91.90 UNSPECIFIED HEARING LOSS, UNSPECIFIED EAR: Chronic | Status: ACTIVE | Noted: 2018-09-19

## 2020-09-09 PROBLEM — I21.9 ACUTE MYOCARDIAL INFARCTION, UNSPECIFIED: Chronic | Status: ACTIVE | Noted: 2018-09-19

## 2020-09-22 ENCOUNTER — APPOINTMENT (OUTPATIENT)
Dept: UROLOGY | Facility: CLINIC | Age: 85
End: 2020-09-22
Payer: MEDICARE

## 2020-09-22 DIAGNOSIS — I63.9 CEREBRAL INFARCTION, UNSPECIFIED: ICD-10-CM

## 2020-09-22 DIAGNOSIS — Z87.39 PERSONAL HISTORY OF OTHER DISEASES OF THE MUSCULOSKELETAL SYSTEM AND CONNECTIVE TISSUE: ICD-10-CM

## 2020-09-22 DIAGNOSIS — Z87.891 PERSONAL HISTORY OF NICOTINE DEPENDENCE: ICD-10-CM

## 2020-09-22 DIAGNOSIS — Z80.52 FAMILY HISTORY OF MALIGNANT NEOPLASM OF BLADDER: ICD-10-CM

## 2020-09-22 PROCEDURE — 99203 OFFICE O/P NEW LOW 30 MIN: CPT

## 2020-09-22 NOTE — REVIEW OF SYSTEMS
[Recent Weight Gain (___ Lbs)] : recent [unfilled] ~Ulb weight gain [Dry Eyes] : dryness of the eyes [Palpitations] : palpitations [Shortness Of Breath] : shortness of breath [Diarrhea] : diarrhea [No erections] : no erections [Blood in urine that you can see] : blood visible in urine [Told you have blood in urine on a urine test] : told blood was present in a urine test [Wake up at night to urinate  How many times?  ___] : wakes up to urinate [unfilled] times during the night [Wait a long time to urinate] : waits a long time to urinate [Slow urine stream] : slow urine stream [Interrupted urine stream] : interrupted urine stream [Joint Pain] : joint pain [Joint Swelling] : joint swelling [Skin Lesions] : skin lesion [Difficulty Walking] : difficulty walking [Easy Bleeding] : a tendency for easy bleeding [Easy Bruising] : a tendency for easy bruising [Negative] : Endocrine

## 2020-09-23 PROBLEM — Z87.891 FORMER SMOKER: Status: ACTIVE | Noted: 2020-09-23

## 2020-09-23 PROBLEM — Z80.52 FAMILY HISTORY OF MALIGNANT NEOPLASM OF URINARY BLADDER: Status: ACTIVE | Noted: 2020-09-23

## 2020-09-23 LAB
ANION GAP SERPL CALC-SCNC: 11 MMOL/L
APPEARANCE: CLEAR
BACTERIA: ABNORMAL
BILIRUBIN URINE: NEGATIVE
BLOOD URINE: NEGATIVE
BUN SERPL-MCNC: 27 MG/DL
CALCIUM SERPL-MCNC: 9.3 MG/DL
CHLORIDE SERPL-SCNC: 105 MMOL/L
CO2 SERPL-SCNC: 26 MMOL/L
COLOR: YELLOW
CREAT SERPL-MCNC: 1.05 MG/DL
GLUCOSE QUALITATIVE U: ABNORMAL
GLUCOSE SERPL-MCNC: 149 MG/DL
HYALINE CASTS: 0 /LPF
KETONES URINE: NEGATIVE
LEUKOCYTE ESTERASE URINE: NEGATIVE
MICROSCOPIC-UA: NORMAL
NITRITE URINE: NEGATIVE
PH URINE: 6
POTASSIUM SERPL-SCNC: 4.6 MMOL/L
PROTEIN URINE: ABNORMAL
RED BLOOD CELLS URINE: 0 /HPF
SODIUM SERPL-SCNC: 142 MMOL/L
SPECIFIC GRAVITY URINE: >=1.03
SQUAMOUS EPITHELIAL CELLS: 0 /HPF
UROBILINOGEN URINE: NORMAL
WHITE BLOOD CELLS URINE: 1 /HPF

## 2020-09-23 RX ORDER — CEPHALEXIN 500 MG/1
500 CAPSULE ORAL
Qty: 40 | Refills: 0 | Status: COMPLETED | COMMUNITY
Start: 2020-06-09

## 2020-09-23 RX ORDER — METHYLPREDNISOLONE 4 MG/1
4 TABLET ORAL
Qty: 21 | Refills: 0 | Status: COMPLETED | COMMUNITY
Start: 2020-06-11

## 2020-09-23 NOTE — HISTORY OF PRESENT ILLNESS
[FreeTextEntry1] : Here for evaluation of hematuria.\par He noted dark - almost coffee colored urine for a day. No redness or clots. Saw his PCP who checked urine and told him he had significant microscopic hematuria. No associated back or flank pain. Likewise no dysuria, increased frequency or urgency or other voiding symptoms. 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 \par

## 2020-09-29 LAB
BACTERIA UR CULT: NORMAL
PSA SERPL-MCNC: 2.14 NG/ML
URINE CYTOLOGY: NORMAL

## 2020-10-02 ENCOUNTER — APPOINTMENT (OUTPATIENT)
Dept: CT IMAGING | Facility: IMAGING CENTER | Age: 85
End: 2020-10-02
Payer: MEDICARE

## 2020-10-02 ENCOUNTER — OUTPATIENT (OUTPATIENT)
Dept: OUTPATIENT SERVICES | Facility: HOSPITAL | Age: 85
LOS: 1 days | End: 2020-10-02
Payer: MEDICARE

## 2020-10-02 DIAGNOSIS — Z90.5 ACQUIRED ABSENCE OF KIDNEY: Chronic | ICD-10-CM

## 2020-10-02 DIAGNOSIS — Z95.5 PRESENCE OF CORONARY ANGIOPLASTY IMPLANT AND GRAFT: Chronic | ICD-10-CM

## 2020-10-02 DIAGNOSIS — R31.0 GROSS HEMATURIA: ICD-10-CM

## 2020-10-02 DIAGNOSIS — Q60.0 RENAL AGENESIS, UNILATERAL: ICD-10-CM

## 2020-10-02 PROCEDURE — 74178 CT ABD&PLV WO CNTR FLWD CNTR: CPT | Mod: 26

## 2020-10-02 PROCEDURE — 74178 CT ABD&PLV WO CNTR FLWD CNTR: CPT

## 2020-10-09 ENCOUNTER — APPOINTMENT (OUTPATIENT)
Dept: UROLOGY | Facility: CLINIC | Age: 85
End: 2020-10-09
Payer: MEDICARE

## 2020-10-09 ENCOUNTER — OUTPATIENT (OUTPATIENT)
Dept: OUTPATIENT SERVICES | Facility: HOSPITAL | Age: 85
LOS: 1 days | End: 2020-10-09
Payer: MEDICARE

## 2020-10-09 VITALS — DIASTOLIC BLOOD PRESSURE: 78 MMHG | HEART RATE: 68 BPM | SYSTOLIC BLOOD PRESSURE: 127 MMHG | RESPIRATION RATE: 16 BRPM

## 2020-10-09 VITALS — TEMPERATURE: 96.6 F

## 2020-10-09 DIAGNOSIS — Z95.5 PRESENCE OF CORONARY ANGIOPLASTY IMPLANT AND GRAFT: Chronic | ICD-10-CM

## 2020-10-09 DIAGNOSIS — R35.0 FREQUENCY OF MICTURITION: ICD-10-CM

## 2020-10-09 DIAGNOSIS — Z90.5 ACQUIRED ABSENCE OF KIDNEY: Chronic | ICD-10-CM

## 2020-10-09 PROCEDURE — 52000 CYSTOURETHROSCOPY: CPT

## 2020-10-14 DIAGNOSIS — R31.0 GROSS HEMATURIA: ICD-10-CM

## 2020-10-14 DIAGNOSIS — D49.4 NEOPLASM OF UNSPECIFIED BEHAVIOR OF BLADDER: ICD-10-CM

## 2020-10-29 ENCOUNTER — APPOINTMENT (OUTPATIENT)
Dept: UROLOGY | Facility: HOSPITAL | Age: 85
End: 2020-10-29

## 2020-11-30 ENCOUNTER — APPOINTMENT (OUTPATIENT)
Dept: UROLOGY | Facility: CLINIC | Age: 85
End: 2020-11-30
Payer: MEDICARE

## 2020-11-30 PROCEDURE — 99214 OFFICE O/P EST MOD 30 MIN: CPT

## 2020-12-01 LAB
APPEARANCE: CLEAR
BACTERIA UR CULT: NORMAL
BACTERIA: NEGATIVE
BILIRUBIN URINE: NEGATIVE
BLOOD URINE: ABNORMAL
COLOR: NORMAL
GLUCOSE QUALITATIVE U: NEGATIVE
HYALINE CASTS: 0 /LPF
KETONES URINE: NEGATIVE
LEUKOCYTE ESTERASE URINE: NEGATIVE
MICROSCOPIC-UA: NORMAL
NITRITE URINE: NEGATIVE
PH URINE: 6
PROTEIN URINE: ABNORMAL
RED BLOOD CELLS URINE: 10 /HPF
SPECIFIC GRAVITY URINE: 1.02
SQUAMOUS EPITHELIAL CELLS: 5 /HPF
UROBILINOGEN URINE: NORMAL
WHITE BLOOD CELLS URINE: 7 /HPF

## 2020-12-04 ENCOUNTER — OUTPATIENT (OUTPATIENT)
Dept: OUTPATIENT SERVICES | Facility: HOSPITAL | Age: 85
LOS: 1 days | End: 2020-12-04

## 2020-12-04 VITALS
HEART RATE: 66 BPM | RESPIRATION RATE: 18 BRPM | SYSTOLIC BLOOD PRESSURE: 132 MMHG | DIASTOLIC BLOOD PRESSURE: 88 MMHG | OXYGEN SATURATION: 99 % | HEIGHT: 66.5 IN | TEMPERATURE: 97 F | WEIGHT: 130.07 LBS

## 2020-12-04 DIAGNOSIS — C67.9 MALIGNANT NEOPLASM OF BLADDER, UNSPECIFIED: ICD-10-CM

## 2020-12-04 DIAGNOSIS — Z95.5 PRESENCE OF CORONARY ANGIOPLASTY IMPLANT AND GRAFT: Chronic | ICD-10-CM

## 2020-12-04 DIAGNOSIS — D49.4 NEOPLASM OF UNSPECIFIED BEHAVIOR OF BLADDER: ICD-10-CM

## 2020-12-04 DIAGNOSIS — Z90.5 ACQUIRED ABSENCE OF KIDNEY: Chronic | ICD-10-CM

## 2020-12-04 DIAGNOSIS — Z98.41 CATARACT EXTRACTION STATUS, RIGHT EYE: Chronic | ICD-10-CM

## 2020-12-04 DIAGNOSIS — E78.5 HYPERLIPIDEMIA, UNSPECIFIED: ICD-10-CM

## 2020-12-04 DIAGNOSIS — I48.91 UNSPECIFIED ATRIAL FIBRILLATION: ICD-10-CM

## 2020-12-04 DIAGNOSIS — I10 ESSENTIAL (PRIMARY) HYPERTENSION: ICD-10-CM

## 2020-12-04 DIAGNOSIS — E11.9 TYPE 2 DIABETES MELLITUS WITHOUT COMPLICATIONS: ICD-10-CM

## 2020-12-04 LAB — HBA1C BLD-MCNC: 7.3 % — HIGH (ref 4–5.6)

## 2020-12-04 RX ORDER — SIMVASTATIN 20 MG/1
0 TABLET, FILM COATED ORAL
Qty: 0 | Refills: 0 | DISCHARGE

## 2020-12-04 RX ORDER — ASPIRIN/CALCIUM CARB/MAGNESIUM 324 MG
0 TABLET ORAL
Qty: 0 | Refills: 0 | DISCHARGE

## 2020-12-04 NOTE — H&P PST ADULT - NEGATIVE OPHTHALMOLOGIC SYMPTOMS
no pain L/no diplopia/no blurred vision L/wears reading glasses/no blurred vision R/no pain R/no photophobia

## 2020-12-04 NOTE — H&P PST ADULT - RS GEN PE MLT RESP DETAILS PC
no wheezes/airway patent/respirations non-labored/clear to auscultation bilaterally/breath sounds equal/good air movement

## 2020-12-04 NOTE — H&P PST ADULT - NSICDXPASTMEDICALHX_GEN_ALL_CORE_FT
PAST MEDICAL HISTORY:  Arthritis     Atrial fibrillation     CAD (coronary artery disease) (4 stents, non-adherent to aspirin)    Diabetes mellitus, new onset     Hard of hearing     Hyperlipidemia     Hypertension     Myocardial infarction (2003)    Single kidney (hx/o LEFT nephrectomy at age 16; pt states due to a ureter"blockage" causing "infection"; pt denies hx/o renal dysfunction)     PAST MEDICAL HISTORY:  Arthritis     Atrial fibrillation     CAD (coronary artery disease) (4 stents, non-adherent to aspirin)    Diabetes mellitus, new onset     Hard of hearing     History of bradycardia     History of TIAs Last 2018    Hyperlipidemia     Hypertension     Myocardial infarction (2003)    Single kidney (hx/o LEFT nephrectomy at age 16; pt states due to a ureter"blockage" causing "infection"; pt denies hx/o renal dysfunction)

## 2020-12-04 NOTE — H&P PST ADULT - NSANTHOSAYNRD_GEN_A_CORE
No. VIC screening performed.  STOP BANG Legend: 0-2 = LOW Risk; 3-4 = INTERMEDIATE Risk; 5-8 = HIGH Risk

## 2020-12-04 NOTE — H&P PST ADULT - NSICDXPASTSURGICALHX_GEN_ALL_CORE_FT
PAST SURGICAL HISTORY:  History of nephrectomy, unilateral (hx/o LEFT nephrectomy at age 16)    S/P bilateral cataract extraction     Stented coronary artery (4 stents)

## 2020-12-04 NOTE — H&P PST ADULT - NSICDXPROBLEM_GEN_ALL_CORE_FT
PROBLEM DIAGNOSES  Problem: Neoplasm of bladder  Assessment and Plan:  Scheduled for transurethral resection of bladder tumor with Dr. Lim on 12/10/2020.  Verbal and written pre-op instructions provided to patient. Patient verbalized understanding and will call surgeons office for revised instructions if surgery is rescheduled.   Pepcid for GI prophylaxis provided.   Patient aware of need for COVID testing prior to  procedure and advised to coordinate with surgeon.     Problem: Hyperlipidemia  Assessment and Plan: Pt. instructed to continue medications as prescribed.     Problem: HTN (hypertension)  Assessment and Plan: Pt. instructed to continue medications as prescribed.     Problem: Type 2 diabetes mellitus  Assessment and Plan: Pt. instructed to hold Metformin the night before and morning of procedure. Accucheck DOS. OR booking notified of DM2   Hold Repaglinide AM of surgery.     Problem: Atrial fibrillation  Assessment and Plan: Last dose of Eliquis 12/7.  Cardiac eval in chart.

## 2020-12-04 NOTE — H&P PST ADULT - ASSESSMENT
83 yo male with history of Afib -on Eliquis, CAD, HTN, DM2, HLD presents to PST unit with pre-op diagnosis of neoplasm of unspecified behavior of bladder scheduled for transurethral resection of bladder tumor with Dr. Lim.

## 2020-12-04 NOTE — H&P PST ADULT - NEGATIVE ENMT SYMPTOMS
no nasal congestion/no dysphagia/no vertigo/no sinus symptoms/no ear pain/no tinnitus/no hearing difficulty/no throat pain

## 2020-12-07 ENCOUNTER — APPOINTMENT (OUTPATIENT)
Dept: DISASTER EMERGENCY | Facility: CLINIC | Age: 85
End: 2020-12-07

## 2020-12-08 LAB — SARS-COV-2 N GENE NPH QL NAA+PROBE: NOT DETECTED

## 2020-12-09 ENCOUNTER — TRANSCRIPTION ENCOUNTER (OUTPATIENT)
Age: 85
End: 2020-12-09

## 2020-12-09 NOTE — ASU PATIENT PROFILE, ADULT - PMH
Arthritis    Atrial fibrillation    CAD (coronary artery disease)  (4 stents, non-adherent to aspirin)  Diabetes mellitus, new onset    Hard of hearing    History of bradycardia    History of TIAs  Last 2018  Hyperlipidemia    Hypertension    Myocardial infarction  (2003)  Single kidney  (hx/o LEFT nephrectomy at age 16; pt states due to a ureter"blockage" causing "infection"; pt denies hx/o renal dysfunction)

## 2020-12-09 NOTE — ASU PATIENT PROFILE, ADULT - PSH
History of nephrectomy, unilateral  (hx/o LEFT nephrectomy at age 16)  S/P bilateral cataract extraction    Stented coronary artery  (4 stents)

## 2020-12-10 ENCOUNTER — INPATIENT (INPATIENT)
Facility: HOSPITAL | Age: 85
LOS: 0 days | Discharge: HOME | End: 2020-12-10
Attending: SPECIALIST | Admitting: SPECIALIST
Payer: MEDICARE

## 2020-12-10 ENCOUNTER — NON-APPOINTMENT (OUTPATIENT)
Age: 85
End: 2020-12-10

## 2020-12-10 ENCOUNTER — RESULT REVIEW (OUTPATIENT)
Age: 85
End: 2020-12-10

## 2020-12-10 ENCOUNTER — APPOINTMENT (OUTPATIENT)
Dept: UROLOGY | Facility: HOSPITAL | Age: 85
End: 2020-12-10

## 2020-12-10 VITALS
HEIGHT: 66.5 IN | TEMPERATURE: 98 F | WEIGHT: 130.07 LBS | DIASTOLIC BLOOD PRESSURE: 78 MMHG | RESPIRATION RATE: 16 BRPM | HEART RATE: 95 BPM | OXYGEN SATURATION: 99 % | SYSTOLIC BLOOD PRESSURE: 151 MMHG

## 2020-12-10 VITALS
OXYGEN SATURATION: 100 % | SYSTOLIC BLOOD PRESSURE: 154 MMHG | DIASTOLIC BLOOD PRESSURE: 65 MMHG | HEART RATE: 77 BPM | RESPIRATION RATE: 16 BRPM

## 2020-12-10 DIAGNOSIS — Z95.5 PRESENCE OF CORONARY ANGIOPLASTY IMPLANT AND GRAFT: Chronic | ICD-10-CM

## 2020-12-10 DIAGNOSIS — D49.4 NEOPLASM OF UNSPECIFIED BEHAVIOR OF BLADDER: ICD-10-CM

## 2020-12-10 DIAGNOSIS — Z98.41 CATARACT EXTRACTION STATUS, RIGHT EYE: Chronic | ICD-10-CM

## 2020-12-10 DIAGNOSIS — Z90.5 ACQUIRED ABSENCE OF KIDNEY: Chronic | ICD-10-CM

## 2020-12-10 LAB — GLUCOSE BLDC GLUCOMTR-MCNC: 158 MG/DL — HIGH (ref 70–99)

## 2020-12-10 PROCEDURE — 88307 TISSUE EXAM BY PATHOLOGIST: CPT | Mod: 26

## 2020-12-10 PROCEDURE — 52224 CYSTOSCOPY AND TREATMENT: CPT

## 2020-12-10 RX ORDER — FENTANYL CITRATE 50 UG/ML
25 INJECTION INTRAVENOUS
Refills: 0 | Status: ACTIVE | OUTPATIENT
Start: 2020-12-10 | End: 2020-12-17

## 2020-12-10 RX ORDER — SODIUM CHLORIDE 9 MG/ML
1000 INJECTION, SOLUTION INTRAVENOUS
Refills: 0 | Status: DISCONTINUED | OUTPATIENT
Start: 2020-12-10 | End: 2020-12-10

## 2020-12-10 RX ORDER — ONDANSETRON 8 MG/1
4 TABLET, FILM COATED ORAL ONCE
Refills: 0 | Status: ACTIVE | OUTPATIENT
Start: 2020-12-10 | End: 2021-11-08

## 2020-12-10 RX ORDER — SODIUM CHLORIDE 9 MG/ML
1000 INJECTION, SOLUTION INTRAVENOUS
Refills: 0 | Status: ACTIVE | OUTPATIENT
Start: 2020-12-10 | End: 2021-11-08

## 2020-12-10 RX ORDER — AZTREONAM 2 G
1 VIAL (EA) INJECTION
Qty: 6 | Refills: 0
Start: 2020-12-10 | End: 2020-12-12

## 2020-12-10 NOTE — ASU DISCHARGE PLAN (ADULT/PEDIATRIC) - ASU DC SPECIAL INSTRUCTIONSFT
Please follow-up with Dr. Lim in ~4 weeks.    We have sent bactrim to your pharmacy - please take as prescribed. Please follow-up with Dr. Lim in ~4 weeks.    We have sent bactrim to your pharmacy - please take as prescribed.    Resume your Eliquis in 2 days

## 2020-12-10 NOTE — ASU DISCHARGE PLAN (ADULT/PEDIATRIC) - CALL YOUR DOCTOR IF YOU HAVE ANY OF THE FOLLOWING:
Fever greater than (need to indicate Fahrenheit or Celsius)/Bleeding that does not stop Inability to tolerate liquids or foods/Nausea and vomiting that does not stop/Bleeding that does not stop/Pain not relieved by Medications/Fever greater than (need to indicate Fahrenheit or Celsius)/Unable to urinate

## 2020-12-10 NOTE — ASU DISCHARGE PLAN (ADULT/PEDIATRIC) - CARE PROVIDER_API CALL
Shahriar Lim  Urology  [address] [place] [address]  Phone: [phone]  Fax: [phone]  Follow Up Time:

## 2020-12-10 NOTE — ASU PREOP CHECKLIST - SITE MARKED BY ANESTHESIOLOGIST
Spoke with pt today when he called to tell me he is doing very well and his whole stay/surgery at our facility was perfect !  He is feeling fine, expects to follow up with Dr. Samantha Tobin who will review all in a week or two. Pt stating that Dr. Vallejo plans to call Dr. Tobin and update her on pt's status so she will be informed at clinic visit. Plan is for pt to RTC with Dr. Vallejo only if Dr. Tobin thinks this is necessary.  I explained to pt that in 4 - 6 weeks we can look at him being listed on ACTIVE status if all goes well. Instructed pt to call me again then. Pt expressed good understanding of all and was in good agreement with the plan.   
n/a

## 2020-12-10 NOTE — ASU PREOP CHECKLIST - NS PREOP CHK MONITOR ANESTHESIA CONSENT
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)  Unstable  With somewhat low blood sugars 1st thing in the morning  Decrease Lantus to 18 units every evening and continue to follow blood sugars  Continue Humalog 10 units with meals  Lymphadenopathy, retroperitoneal  Concerning for possible new or recurrent cancer  I called and left information for Dr Rosalie Smith at [facility] to reach me and discuss options  As WBC is resolved and creatinine low, may consider CT with contrast now  CHIEF COMPLAINT:  Seen today for follow up at [facility]:  Patient today reports cough, postnasal drip, and sore throat for the last 2 or 3 days  She denies fevers or chills  She reports that overall she has been doing well with a good appetite  She does report some loose stool  She denies shortness of breath or abdominal pain  REVIEW OF SYSTEMS:    Per history of present illness, all other systems reviewed and negative  HISTORY:  Past Medical History:   Diagnosis Date    Cancer [facility])     Breast; Last Assessed: 8/29/2016    Carpal tunnel syndrome     unspecified laterality;  Onset: 4/23/2012    Cellulitis     Last Assessesd: 8/30/2012    Diabetes mellitus out of control ([facility] [address] )     Last Assessed: 11/3/2014    Fatigue     Last Assessed: 2/11/014    Fracture of toe     Last Assessed: 11/17/2014    Ovarian cancer [facility])     Last Assessed: 1/7/2016    Pancreatic insufficiency 2/21/2019    Rectal cancer ([facility] [address] )      Family History   Problem Relation Age of Onset    Melanoma Mother     Stroke Father      Social History     Socioeconomic History    Marital status:      Spouse name: None    Number of children: None    Years of education: None    Highest education level: None   Occupational History    None   Social Needs    Financial resource strain: None    Food insecurity:     Worry: None     Inability: None    Transportation needs:     Medical: None     Non-medical: None   Tobacco Use    Smoking status: Never Smoker    Smokeless tobacco: Never Used   Substance and Sexual Activity    Alcohol use: No    Drug use: No    Sexual activity: None   Lifestyle    Physical activity:     Days per week: None     Minutes per session: None    Stress: None   Relationships    Social connections:     Talks on phone: None     Gets together: None     Attends Voodoo service: None     Active member of club or organization: None     Attends meetings of clubs or organizations: None     Relationship status: None    Intimate partner violence:     Fear of current or ex partner: None     Emotionally abused: None     Physically abused: None     Forced sexual activity: None   Other Topics Concern    None   Social History Narrative    Always uses seat belt    Feels safe at home       ALLERGIES:  Allergies   Allergen Reactions    Gadolinium Derivatives Anaphylaxis     MRI dye- hot flashes/ flushing    Iodine Anaphylaxis     ? ??SHORTNESS OF BREATH    Acetaminophen-Codeine Edema    Aspirin Hives     RASH    Cephalexin      Annotation - [id number]: rash    Nickel Hives    Penicillins Hives    Sulfa Antibiotics Hives    Sulfamethoxazole-Trimethoprim Diarrhea       PHYSICAL EXAM:  Vital Signs: as below  General: NAD, bitemporal wasting  Eye Exam: anicteric sclera, EOMI, PERRL  Oral Exam: moist mucous membranes  Neck Exam: no anterior cervical lymphadenopathy noted, neck supple  Cardiovascular:  Irregularly regular rate and rhythm, no murmurs, rubs, or gallops  Pulmonary: clear to auscultation bilaterally  Abdominal: soft, nontender, nondistended, bowel sounds audible, drain present with small amount of fluid present in the bulb  Extremities and skin:  2+ pitting edema of lower extremities bilaterally, no rashes  Neurological: alert and responsive, moving all 4 extremities symmetrically  Psychiatric: euthymic, with intact insight and judgement    PERTINENT LABS/IMAGING DATA:  02/25/2019:  Hemoglobin 8 2, WBC 6 8, platelet 749, glucose 107, creatinine 0 86, potassium 4 5, alk-phos 593, albumin 2 1, protein 4 7, AST 22, ALT 22    CURRENT MEDICATIONS:  All medications reviewed and updated in [address] [place] [address]      Josselyn Wilkerson MD MPH done

## 2020-12-11 ENCOUNTER — TRANSCRIPTION ENCOUNTER (OUTPATIENT)
Age: 85
End: 2020-12-11

## 2020-12-12 ENCOUNTER — NON-APPOINTMENT (OUTPATIENT)
Age: 85
End: 2020-12-12

## 2020-12-12 ENCOUNTER — INPATIENT (INPATIENT)
Facility: HOSPITAL | Age: 85
LOS: 3 days | Discharge: HOME HEALTH | End: 2020-12-16
Attending: SPECIALIST | Admitting: SPECIALIST
Payer: MEDICARE

## 2020-12-12 VITALS
TEMPERATURE: 100 F | HEIGHT: 66.5 IN | RESPIRATION RATE: 16 BRPM | SYSTOLIC BLOOD PRESSURE: 151 MMHG | DIASTOLIC BLOOD PRESSURE: 63 MMHG | OXYGEN SATURATION: 99 % | HEART RATE: 80 BPM

## 2020-12-12 DIAGNOSIS — Z95.5 PRESENCE OF CORONARY ANGIOPLASTY IMPLANT AND GRAFT: Chronic | ICD-10-CM

## 2020-12-12 DIAGNOSIS — N13.30 UNSPECIFIED HYDRONEPHROSIS: ICD-10-CM

## 2020-12-12 DIAGNOSIS — Z90.5 ACQUIRED ABSENCE OF KIDNEY: Chronic | ICD-10-CM

## 2020-12-12 DIAGNOSIS — Z98.41 CATARACT EXTRACTION STATUS, RIGHT EYE: Chronic | ICD-10-CM

## 2020-12-12 LAB
-  AMIKACIN: SIGNIFICANT CHANGE UP
-  AMOXICILLIN/CLAVULANIC ACID: SIGNIFICANT CHANGE UP
-  AMPICILLIN/SULBACTAM: SIGNIFICANT CHANGE UP
-  AMPICILLIN: SIGNIFICANT CHANGE UP
-  AZTREONAM: SIGNIFICANT CHANGE UP
-  CEFAZOLIN: SIGNIFICANT CHANGE UP
-  CEFEPIME: SIGNIFICANT CHANGE UP
-  CEFOXITIN: SIGNIFICANT CHANGE UP
-  CEFTRIAXONE: SIGNIFICANT CHANGE UP
-  CIPROFLOXACIN: SIGNIFICANT CHANGE UP
-  ERTAPENEM: SIGNIFICANT CHANGE UP
-  GENTAMICIN: SIGNIFICANT CHANGE UP
-  IMIPENEM: SIGNIFICANT CHANGE UP
-  LEVOFLOXACIN: SIGNIFICANT CHANGE UP
-  MEROPENEM: SIGNIFICANT CHANGE UP
-  NITROFURANTOIN: SIGNIFICANT CHANGE UP
-  PIPERACILLIN/TAZOBACTAM: SIGNIFICANT CHANGE UP
-  TIGECYCLINE: SIGNIFICANT CHANGE UP
-  TOBRAMYCIN: SIGNIFICANT CHANGE UP
-  TRIMETHOPRIM/SULFAMETHOXAZOLE: SIGNIFICANT CHANGE UP
ALBUMIN SERPL ELPH-MCNC: 2.9 G/DL — LOW (ref 3.3–5)
ALP SERPL-CCNC: 85 U/L — SIGNIFICANT CHANGE UP (ref 40–120)
ALT FLD-CCNC: 35 U/L — SIGNIFICANT CHANGE UP (ref 4–41)
ANION GAP SERPL CALC-SCNC: 17 MMOL/L — HIGH (ref 7–14)
APPEARANCE UR: ABNORMAL
APTT BLD: 31.4 SEC — SIGNIFICANT CHANGE UP (ref 27–36.3)
AST SERPL-CCNC: 33 U/L — SIGNIFICANT CHANGE UP (ref 4–40)
BASOPHILS # BLD AUTO: 0 K/UL — SIGNIFICANT CHANGE UP (ref 0–0.2)
BASOPHILS NFR BLD AUTO: 0 % — SIGNIFICANT CHANGE UP (ref 0–2)
BILIRUB SERPL-MCNC: 0.2 MG/DL — SIGNIFICANT CHANGE UP (ref 0.2–1.2)
BILIRUB UR-MCNC: NEGATIVE — SIGNIFICANT CHANGE UP
BLD GP AB SCN SERPL QL: NEGATIVE — SIGNIFICANT CHANGE UP
BUN SERPL-MCNC: 63 MG/DL — HIGH (ref 7–23)
CALCIUM SERPL-MCNC: 8.3 MG/DL — LOW (ref 8.4–10.5)
CHLORIDE SERPL-SCNC: 90 MMOL/L — LOW (ref 98–107)
CO2 SERPL-SCNC: 14 MMOL/L — LOW (ref 22–31)
COLOR SPEC: COLORLESS — SIGNIFICANT CHANGE UP
CREAT SERPL-MCNC: 4.9 MG/DL — HIGH (ref 0.5–1.3)
CULTURE RESULTS: SIGNIFICANT CHANGE UP
DIFF PNL FLD: ABNORMAL
EOSINOPHIL # BLD AUTO: 0 K/UL — SIGNIFICANT CHANGE UP (ref 0–0.5)
EOSINOPHIL NFR BLD AUTO: 0 % — SIGNIFICANT CHANGE UP (ref 0–6)
GLUCOSE SERPL-MCNC: 67 MG/DL — LOW (ref 70–99)
GLUCOSE UR QL: NEGATIVE — SIGNIFICANT CHANGE UP
HCT VFR BLD CALC: 30.6 % — LOW (ref 39–50)
HGB BLD-MCNC: 9.9 G/DL — LOW (ref 13–17)
IANC: 12.9 K/UL — HIGH (ref 1.5–8.5)
INR BLD: 1.31 RATIO — HIGH (ref 0.88–1.17)
KETONES UR-MCNC: NEGATIVE — SIGNIFICANT CHANGE UP
LEUKOCYTE ESTERASE UR-ACNC: ABNORMAL
LYMPHOCYTES # BLD AUTO: 0.28 K/UL — LOW (ref 1–3.3)
LYMPHOCYTES # BLD AUTO: 1.8 % — LOW (ref 13–44)
MCHC RBC-ENTMCNC: 29.7 PG — SIGNIFICANT CHANGE UP (ref 27–34)
MCHC RBC-ENTMCNC: 32.4 GM/DL — SIGNIFICANT CHANGE UP (ref 32–36)
MCV RBC AUTO: 91.9 FL — SIGNIFICANT CHANGE UP (ref 80–100)
METHOD TYPE: SIGNIFICANT CHANGE UP
MONOCYTES # BLD AUTO: 1.51 K/UL — HIGH (ref 0–0.9)
MONOCYTES NFR BLD AUTO: 9.6 % — SIGNIFICANT CHANGE UP (ref 2–14)
NEUTROPHILS # BLD AUTO: 13.62 K/UL — HIGH (ref 1.8–7.4)
NEUTROPHILS NFR BLD AUTO: 86.8 % — HIGH (ref 43–77)
NITRITE UR-MCNC: NEGATIVE — SIGNIFICANT CHANGE UP
ORGANISM # SPEC MICROSCOPIC CNT: SIGNIFICANT CHANGE UP
ORGANISM # SPEC MICROSCOPIC CNT: SIGNIFICANT CHANGE UP
PH UR: 6.5 — SIGNIFICANT CHANGE UP (ref 5–8)
PLATELET # BLD AUTO: 152 K/UL — SIGNIFICANT CHANGE UP (ref 150–400)
POTASSIUM SERPL-MCNC: 5.3 MMOL/L — SIGNIFICANT CHANGE UP (ref 3.5–5.3)
POTASSIUM SERPL-SCNC: 5.3 MMOL/L — SIGNIFICANT CHANGE UP (ref 3.5–5.3)
PROT SERPL-MCNC: 6.3 G/DL — SIGNIFICANT CHANGE UP (ref 6–8.3)
PROT UR-MCNC: ABNORMAL
PROTHROM AB SERPL-ACNC: 14.9 SEC — HIGH (ref 9.8–13.1)
RBC # BLD: 3.33 M/UL — LOW (ref 4.2–5.8)
RBC # FLD: 12.7 % — SIGNIFICANT CHANGE UP (ref 10.3–14.5)
RH IG SCN BLD-IMP: POSITIVE — SIGNIFICANT CHANGE UP
SODIUM SERPL-SCNC: 121 MMOL/L — LOW (ref 135–145)
SP GR SPEC: 1.01 — LOW (ref 1.01–1.02)
SPECIMEN SOURCE: SIGNIFICANT CHANGE UP
UROBILINOGEN FLD QL: SIGNIFICANT CHANGE UP
WBC # BLD: 15.69 K/UL — HIGH (ref 3.8–10.5)
WBC # FLD AUTO: 15.69 K/UL — HIGH (ref 3.8–10.5)

## 2020-12-12 PROCEDURE — 74176 CT ABD & PELVIS W/O CONTRAST: CPT | Mod: 26

## 2020-12-12 PROCEDURE — 99285 EMERGENCY DEPT VISIT HI MDM: CPT

## 2020-12-12 RX ORDER — PIPERACILLIN AND TAZOBACTAM 4; .5 G/20ML; G/20ML
3.38 INJECTION, POWDER, LYOPHILIZED, FOR SOLUTION INTRAVENOUS ONCE
Refills: 0 | Status: COMPLETED | OUTPATIENT
Start: 2020-12-12 | End: 2020-12-12

## 2020-12-12 RX ORDER — ATORVASTATIN CALCIUM 80 MG/1
80 TABLET, FILM COATED ORAL AT BEDTIME
Refills: 0 | Status: DISCONTINUED | OUTPATIENT
Start: 2020-12-12 | End: 2020-12-16

## 2020-12-12 RX ORDER — INSULIN LISPRO 100/ML
VIAL (ML) SUBCUTANEOUS
Refills: 0 | Status: DISCONTINUED | OUTPATIENT
Start: 2020-12-12 | End: 2020-12-16

## 2020-12-12 RX ORDER — METOPROLOL TARTRATE 50 MG
25 TABLET ORAL DAILY
Refills: 0 | Status: DISCONTINUED | OUTPATIENT
Start: 2020-12-12 | End: 2020-12-16

## 2020-12-12 RX ORDER — SODIUM CHLORIDE 9 MG/ML
1000 INJECTION, SOLUTION INTRAVENOUS ONCE
Refills: 0 | Status: COMPLETED | OUTPATIENT
Start: 2020-12-12 | End: 2020-12-12

## 2020-12-12 RX ORDER — PIPERACILLIN AND TAZOBACTAM 4; .5 G/20ML; G/20ML
3.38 INJECTION, POWDER, LYOPHILIZED, FOR SOLUTION INTRAVENOUS ONCE
Refills: 0 | Status: DISCONTINUED | OUTPATIENT
Start: 2020-12-12 | End: 2020-12-12

## 2020-12-12 RX ORDER — LOSARTAN POTASSIUM 100 MG/1
50 TABLET, FILM COATED ORAL DAILY
Refills: 0 | Status: DISCONTINUED | OUTPATIENT
Start: 2020-12-12 | End: 2020-12-13

## 2020-12-12 RX ORDER — PIPERACILLIN AND TAZOBACTAM 4; .5 G/20ML; G/20ML
3.38 INJECTION, POWDER, LYOPHILIZED, FOR SOLUTION INTRAVENOUS EVERY 12 HOURS
Refills: 0 | Status: DISCONTINUED | OUTPATIENT
Start: 2020-12-13 | End: 2020-12-14

## 2020-12-12 RX ORDER — HEPARIN SODIUM 5000 [USP'U]/ML
5000 INJECTION INTRAVENOUS; SUBCUTANEOUS EVERY 8 HOURS
Refills: 0 | Status: DISCONTINUED | OUTPATIENT
Start: 2020-12-12 | End: 2020-12-16

## 2020-12-12 RX ORDER — INSULIN LISPRO 100/ML
VIAL (ML) SUBCUTANEOUS AT BEDTIME
Refills: 0 | Status: DISCONTINUED | OUTPATIENT
Start: 2020-12-12 | End: 2020-12-16

## 2020-12-12 RX ORDER — VANCOMYCIN HCL 1 G
1000 VIAL (EA) INTRAVENOUS ONCE
Refills: 0 | Status: COMPLETED | OUTPATIENT
Start: 2020-12-12 | End: 2020-12-12

## 2020-12-12 RX ADMIN — Medication 250 MILLIGRAM(S): at 23:49

## 2020-12-12 RX ADMIN — SODIUM CHLORIDE 1000 MILLILITER(S): 9 INJECTION, SOLUTION INTRAVENOUS at 22:26

## 2020-12-12 RX ADMIN — PIPERACILLIN AND TAZOBACTAM 200 GRAM(S): 4; .5 INJECTION, POWDER, LYOPHILIZED, FOR SOLUTION INTRAVENOUS at 22:26

## 2020-12-12 NOTE — ED PROVIDER NOTE - PHYSICAL EXAMINATION
Vital signs reviewed  GENERAL: Patient nontoxic appearing, NAD  HEAD: NCAT  EYES: Anicteric  ENT: MMM  NECK: Supple, non tender  RESPIRATORY: Normal respiratory effort. CTA B/L. No wheezing, rales, rhonchi  CARDIOVASCULAR: Regular rate and rhythm  ABDOMEN: Soft. Nontender. No guarding or rebound. No CVA tenderness. mildly distended suprapubic   MUSCULOSKELETAL/EXTREMITIES: Brisk cap refill. 2+ radial pulses. No leg edema.  SKIN:  Warm and dry  NEURO: AAOx3. No gross FND.  PSYCHIATRIC: Cooperative. Affect appropriate.

## 2020-12-12 NOTE — ED ADULT TRIAGE NOTE - CHIEF COMPLAINT QUOTE
Pt arrives c/o unable to urinate since yesterday morning. States he had bladder surgery on 12/10 d/t tumors found in bladder. +Fever yesterday & hematuria on Friday s/p surgery. +blood thinner use.  Denies abdominal pain, N/V/D. PMHx CAD 2 stents, A. Fib, MI, HTN, DM

## 2020-12-12 NOTE — ED PROVIDER NOTE - CARE PLAN
Principal Discharge DX:	Hydronephrosis   Principal Discharge DX:	Hydronephrosis  Goal:	R  Secondary Diagnosis:	Ureteral stone  Secondary Diagnosis:	Acute kidney injury

## 2020-12-12 NOTE — H&P ADULT - NSICDXPASTMEDICALHX_GEN_ALL_CORE_FT
PAST MEDICAL HISTORY:  Arthritis     Atrial fibrillation     CAD (coronary artery disease) (4 stents, non-adherent to aspirin)    Diabetes mellitus, new onset     Hard of hearing     History of bradycardia     History of TIAs Last 2018    Hyperlipidemia     Hypertension     Myocardial infarction (2003)    Single kidney (hx/o LEFT nephrectomy at age 16; pt states due to a ureter"blockage" causing "infection"; pt denies hx/o renal dysfunction)

## 2020-12-12 NOTE — ED ADULT NURSE NOTE - OBJECTIVE STATEMENT
Receive pt. in ER room 12 alert and oriented x 4, presenting to the ER with complaints of inability to urinate. Pt. have a history of DM, HTN, A-fib. Pt. stated " I had bladder surgery on December10th for bladder tumors and I have not pee since yesterday, I only pee once yesterday". No c/o pain , bladder with distension, waiting to be seen by ER attending.

## 2020-12-12 NOTE — ED PROVIDER NOTE - ATTENDING CONTRIBUTION TO CARE
Attending MD Gaines.  Agree with above.  Pt is an 83 yo male with pmhx of bradycardia, TIA's, HLD, HTN, afib, MI, arthritis and single R kidney s/p nephrectomy of L kidney remotely, CAD and bladder tumor for which he had a transurethral resection 2 days ago.  Pt states that he has now passed no urine x 24 hrs despite recurrent feeling of need to void.  Pt endorses a small amount of 'thick' fluid that came out yesterday prior to onset of presumed retention.  Pt denies suprapubic discomfort, flank pain or abdominal distention/pain.  Denies fevers though states he may have had a transient temp elevation last night.  Denies n/v/d/SOB/CP.  No sig urine noted on bladder scan in Ed.  No CVA TTP.  Abdomen soft, non-tender.  Planned labs, urology consult 2/2 post-op complication.  Pt hemodynamically stable. Attending MD Gaines.  Agree with above.  Pt is an 83 yo male with pmhx of bradycardia, TIA's, HLD, HTN, afib, MI, arthritis and single R kidney s/p nephrectomy of L kidney remotely, CAD and bladder tumor for which he had a transurethral resection 2 days ago.  Pt states that he has now passed no urine x 24 hrs despite recurrent feeling of need to void.  Pt endorses a small amount of 'thick' fluid that came out yesterday prior to onset of presumed retention.  Pt denies suprapubic discomfort, flank pain or abdominal distention/pain.  Denies fevers though states he may have had a transient temp elevation last night.  Denies n/v/d/SOB/CP.  No sig urine noted on bladder scan in Ed.  No CVA TTP.  Abdomen soft, non-tender.  Planned labs, urology consult 2/2 post-op complication.  Pt hemodynamically stable.    Pt's CT c/w obstructive R prox ureteral stone with moderate hydro.  Pt's Cr elevated.  Urology accepting to their service for probable stenting.  Hemodynamically stable for admission to urology.

## 2020-12-12 NOTE — H&P ADULT - NSICDXFAMILYHX_GEN_ALL_CORE_FT
FAMILY HISTORY:  Mother  Still living? Unknown  Family history of diabetes mellitus in mother, Age at diagnosis: Age Unknown

## 2020-12-12 NOTE — ED PROVIDER NOTE - OBJECTIVE STATEMENT
84M presenting w/ feelings of urinary retention. Had bladder transurethral procedure for bladder tumor w/ Dr Lim 2d ago, Has had some chills no measured fever. Denies hx of retaining urine. Had small amt of "thick" fluid that came out yesterday, but since then has not urinated any thing at all; has an urge to urinate. Denies n/v/d, cp/sob.

## 2020-12-12 NOTE — ED PROVIDER NOTE - CLINICAL SUMMARY MEDICAL DECISION MAKING FREE TEXT BOX
84M presenting w/ feelings of urinary retention. vitals wnl, on exam mildly distended, will consult uro about recs. no hematuria, dysuria reported. otherwise well.

## 2020-12-12 NOTE — ED PROVIDER NOTE - PROGRESS NOTE DETAILS
Spoke with urology, urinary cath placed, no urine drained. Nephro consulted, CT positive for obstructing stone 7mm with hydro. urology will be admitting under Dr morelos. Pt resting comfortably, no acute distress, no complaints besides not urinating

## 2020-12-12 NOTE — H&P ADULT - NSHPPHYSICALEXAM_GEN_ALL_CORE
Gen NAD  Abd soft NT ND  Pulm no accessory muscle use  CV no edema   pickering draining scant thick urine, no CVAT  Extrem moving without difficulty

## 2020-12-12 NOTE — H&P ADULT - HISTORY OF PRESENT ILLNESS
84 year old man with history of HTN, HLD, DM, CAD (MI 2003, 4 stents), Afib on eliquis, h/o L nephrectomy in distant past (unclear etiology but not malignancy), recently found to have a small bladder mass discovered on gross hematuria workup, s/p cystoscopic biopsy on 12/10, returned to ED today after not urinating over the past 24 hours despite conscious oral hydration. Otherwise feeling well, denying pain, dysuria, frequency, urgency. Slightly blood tinged urine the day after biopsy. Has been taking bactrim since surgery for Enterococcus faecalis.  Found to be in acute renal failure with SCr 4.9 and Na 121.  Temple placed with minimal output.  CT A/P showing 7mm proximal ureteral stone with moderate hydronephrosis.

## 2020-12-12 NOTE — H&P ADULT - ASSESSMENT
84M with obstructing 4mm stone in a solitary right kidney.    - NPO/IV fluids  - Home meds started, holding eliquis for now given renal failure  - S/p Zosyn x1 in ED  - Added on for emergent right ureteral stent placement

## 2020-12-12 NOTE — ED PROVIDER NOTE - NS ED ROS FT
Constitutional: No fever, chills.  Eyes:  No visual changes  ENMT:  No neck pain  Cardiac:  No chest pain  Respiratory:  No cough, SOB  GI:  No nausea, vomiting, diarrhea, abdominal pain.  :  No dysuria, hematuria  MS:  No back pain.  Neuro:  No headache or lightheadedness  Skin:  No skin rash  Endocrine: No history of thyroid disease or diabetes.  Except as documented in the HPI,  all other systems are negative.

## 2020-12-12 NOTE — H&P ADULT - NSHPLABSRESULTS_GEN_ALL_CORE
Vital Signs Last 24 Hrs  T(C): 37.2 (12 Dec 2020 23:15), Max: 37.6 (12 Dec 2020 19:00)  T(F): 99 (12 Dec 2020 23:15), Max: 99.6 (12 Dec 2020 19:00)  HR: 87 (12 Dec 2020 23:15) (72 - 87)  BP: 149/58 (12 Dec 2020 23:15) (149/58 - 157/56)  BP(mean): --  RR: 21 (12 Dec 2020 23:15) (16 - 21)  SpO2: 98% (12 Dec 2020 23:15) (98% - 100%)    I&O's Detail                            9.9    15.69 )-----------( 152      ( 12 Dec 2020 20:31 )             30.6       12-12    121<L>  |  90<L>  |  63<H>  ----------------------------<  67<L>  5.3   |  14<L>  |  4.90<H>    Ca    8.3<L>      12 Dec 2020 20:31    TPro  6.3  /  Alb  2.9<L>  /  TBili  0.2  /  DBili  x   /  AST  33  /  ALT  35  /  AlkPhos  85  12-12      CAPILLARY BLOOD GLUCOSE      POCT Blood Glucose.: 106 mg/dL (12 Dec 2020 18:18)      LIVER FUNCTIONS - ( 12 Dec 2020 20:31 )  Alb: 2.9 g/dL / Pro: 6.3 g/dL / ALK PHOS: 85 U/L / ALT: 35 U/L / AST: 33 U/L / GGT: x             PT/INR - ( 12 Dec 2020 22:22 )   PT: 14.9 sec;   INR: 1.31 ratio         PTT - ( 12 Dec 2020 22:22 )  PTT:31.4 sec

## 2020-12-13 DIAGNOSIS — E11.9 TYPE 2 DIABETES MELLITUS WITHOUT COMPLICATIONS: ICD-10-CM

## 2020-12-13 DIAGNOSIS — I48.91 UNSPECIFIED ATRIAL FIBRILLATION: ICD-10-CM

## 2020-12-13 DIAGNOSIS — E78.5 HYPERLIPIDEMIA, UNSPECIFIED: ICD-10-CM

## 2020-12-13 DIAGNOSIS — I25.10 ATHEROSCLEROTIC HEART DISEASE OF NATIVE CORONARY ARTERY WITHOUT ANGINA PECTORIS: ICD-10-CM

## 2020-12-13 DIAGNOSIS — E87.5 HYPERKALEMIA: ICD-10-CM

## 2020-12-13 DIAGNOSIS — E87.1 HYPO-OSMOLALITY AND HYPONATREMIA: ICD-10-CM

## 2020-12-13 DIAGNOSIS — I10 ESSENTIAL (PRIMARY) HYPERTENSION: ICD-10-CM

## 2020-12-13 DIAGNOSIS — N17.9 ACUTE KIDNEY FAILURE, UNSPECIFIED: ICD-10-CM

## 2020-12-13 DIAGNOSIS — N20.1 CALCULUS OF URETER: ICD-10-CM

## 2020-12-13 PROBLEM — Z87.898 PERSONAL HISTORY OF OTHER SPECIFIED CONDITIONS: Chronic | Status: ACTIVE | Noted: 2020-12-04

## 2020-12-13 PROBLEM — Z86.73 PERSONAL HISTORY OF TRANSIENT ISCHEMIC ATTACK (TIA), AND CEREBRAL INFARCTION WITHOUT RESIDUAL DEFICITS: Chronic | Status: ACTIVE | Noted: 2020-12-04

## 2020-12-13 LAB
ANION GAP SERPL CALC-SCNC: 14 MMOL/L — SIGNIFICANT CHANGE UP (ref 7–14)
ANION GAP SERPL CALC-SCNC: 15 MMOL/L — HIGH (ref 7–14)
ANION GAP SERPL CALC-SCNC: 15 MMOL/L — HIGH (ref 7–14)
ANION GAP SERPL CALC-SCNC: 19 MMOL/L — HIGH (ref 7–14)
B PERT DNA SPEC QL NAA+PROBE: SIGNIFICANT CHANGE UP
BUN SERPL-MCNC: 60 MG/DL — HIGH (ref 7–23)
BUN SERPL-MCNC: 60 MG/DL — HIGH (ref 7–23)
BUN SERPL-MCNC: 61 MG/DL — HIGH (ref 7–23)
BUN SERPL-MCNC: 62 MG/DL — HIGH (ref 7–23)
C PNEUM DNA SPEC QL NAA+PROBE: SIGNIFICANT CHANGE UP
CALCIUM SERPL-MCNC: 7.8 MG/DL — LOW (ref 8.4–10.5)
CALCIUM SERPL-MCNC: 8.1 MG/DL — LOW (ref 8.4–10.5)
CALCIUM SERPL-MCNC: 8.1 MG/DL — LOW (ref 8.4–10.5)
CALCIUM SERPL-MCNC: 8.5 MG/DL — SIGNIFICANT CHANGE UP (ref 8.4–10.5)
CHLORIDE SERPL-SCNC: 100 MMOL/L — SIGNIFICANT CHANGE UP (ref 98–107)
CHLORIDE SERPL-SCNC: 88 MMOL/L — LOW (ref 98–107)
CHLORIDE SERPL-SCNC: 92 MMOL/L — LOW (ref 98–107)
CHLORIDE SERPL-SCNC: 94 MMOL/L — LOW (ref 98–107)
CO2 SERPL-SCNC: 14 MMOL/L — LOW (ref 22–31)
CO2 SERPL-SCNC: 15 MMOL/L — LOW (ref 22–31)
CO2 SERPL-SCNC: 16 MMOL/L — LOW (ref 22–31)
CO2 SERPL-SCNC: 16 MMOL/L — LOW (ref 22–31)
CREAT ?TM UR-MCNC: 18 MG/DL — SIGNIFICANT CHANGE UP
CREAT SERPL-MCNC: 3.76 MG/DL — HIGH (ref 0.5–1.3)
CREAT SERPL-MCNC: 4.04 MG/DL — HIGH (ref 0.5–1.3)
CREAT SERPL-MCNC: 4.56 MG/DL — HIGH (ref 0.5–1.3)
CREAT SERPL-MCNC: 4.85 MG/DL — HIGH (ref 0.5–1.3)
CULTURE RESULTS: SIGNIFICANT CHANGE UP
FLUAV H1 2009 PAND RNA SPEC QL NAA+PROBE: SIGNIFICANT CHANGE UP
FLUAV H1 RNA SPEC QL NAA+PROBE: SIGNIFICANT CHANGE UP
FLUAV H3 RNA SPEC QL NAA+PROBE: SIGNIFICANT CHANGE UP
FLUAV SUBTYP SPEC NAA+PROBE: SIGNIFICANT CHANGE UP
FLUBV RNA SPEC QL NAA+PROBE: SIGNIFICANT CHANGE UP
GLUCOSE BLDC GLUCOMTR-MCNC: 129 MG/DL — HIGH (ref 70–99)
GLUCOSE BLDC GLUCOMTR-MCNC: 152 MG/DL — HIGH (ref 70–99)
GLUCOSE BLDC GLUCOMTR-MCNC: 197 MG/DL — HIGH (ref 70–99)
GLUCOSE BLDC GLUCOMTR-MCNC: 204 MG/DL — HIGH (ref 70–99)
GLUCOSE BLDC GLUCOMTR-MCNC: 72 MG/DL — SIGNIFICANT CHANGE UP (ref 70–99)
GLUCOSE BLDC GLUCOMTR-MCNC: 73 MG/DL — SIGNIFICANT CHANGE UP (ref 70–99)
GLUCOSE SERPL-MCNC: 106 MG/DL — HIGH (ref 70–99)
GLUCOSE SERPL-MCNC: 148 MG/DL — HIGH (ref 70–99)
GLUCOSE SERPL-MCNC: 174 MG/DL — HIGH (ref 70–99)
GLUCOSE SERPL-MCNC: 65 MG/DL — LOW (ref 70–99)
HADV DNA SPEC QL NAA+PROBE: SIGNIFICANT CHANGE UP
HCOV PNL SPEC NAA+PROBE: SIGNIFICANT CHANGE UP
HCT VFR BLD CALC: 28 % — LOW (ref 39–50)
HGB BLD-MCNC: 9.4 G/DL — LOW (ref 13–17)
HMPV RNA SPEC QL NAA+PROBE: SIGNIFICANT CHANGE UP
HPIV1 RNA SPEC QL NAA+PROBE: SIGNIFICANT CHANGE UP
HPIV2 RNA SPEC QL NAA+PROBE: SIGNIFICANT CHANGE UP
HPIV3 RNA SPEC QL NAA+PROBE: SIGNIFICANT CHANGE UP
HPIV4 RNA SPEC QL NAA+PROBE: SIGNIFICANT CHANGE UP
MCHC RBC-ENTMCNC: 29.8 PG — SIGNIFICANT CHANGE UP (ref 27–34)
MCHC RBC-ENTMCNC: 33.6 GM/DL — SIGNIFICANT CHANGE UP (ref 32–36)
MCV RBC AUTO: 88.9 FL — SIGNIFICANT CHANGE UP (ref 80–100)
NRBC # BLD: 0 /100 WBCS — SIGNIFICANT CHANGE UP
NRBC # FLD: 0 K/UL — SIGNIFICANT CHANGE UP
OSMOLALITY UR: 169 MOSM/KG — SIGNIFICANT CHANGE UP (ref 50–1200)
PLATELET # BLD AUTO: 138 K/UL — LOW (ref 150–400)
POTASSIUM SERPL-MCNC: 4.9 MMOL/L — SIGNIFICANT CHANGE UP (ref 3.5–5.3)
POTASSIUM SERPL-MCNC: 5 MMOL/L — SIGNIFICANT CHANGE UP (ref 3.5–5.3)
POTASSIUM SERPL-MCNC: 5.4 MMOL/L — HIGH (ref 3.5–5.3)
POTASSIUM SERPL-MCNC: 5.5 MMOL/L — HIGH (ref 3.5–5.3)
POTASSIUM SERPL-SCNC: 4.9 MMOL/L — SIGNIFICANT CHANGE UP (ref 3.5–5.3)
POTASSIUM SERPL-SCNC: 5 MMOL/L — SIGNIFICANT CHANGE UP (ref 3.5–5.3)
POTASSIUM SERPL-SCNC: 5.4 MMOL/L — HIGH (ref 3.5–5.3)
POTASSIUM SERPL-SCNC: 5.5 MMOL/L — HIGH (ref 3.5–5.3)
RAPID RVP RESULT: SIGNIFICANT CHANGE UP
RBC # BLD: 3.15 M/UL — LOW (ref 4.2–5.8)
RBC # FLD: 12.7 % — SIGNIFICANT CHANGE UP (ref 10.3–14.5)
RSV RNA SPEC QL NAA+PROBE: SIGNIFICANT CHANGE UP
RV+EV RNA SPEC QL NAA+PROBE: SIGNIFICANT CHANGE UP
SARS-COV-2 RNA SPEC QL NAA+PROBE: SIGNIFICANT CHANGE UP
SARS-COV-2 RNA SPEC QL NAA+PROBE: SIGNIFICANT CHANGE UP
SODIUM SERPL-SCNC: 121 MMOL/L — LOW (ref 135–145)
SODIUM SERPL-SCNC: 122 MMOL/L — LOW (ref 135–145)
SODIUM SERPL-SCNC: 125 MMOL/L — LOW (ref 135–145)
SODIUM SERPL-SCNC: 130 MMOL/L — LOW (ref 135–145)
SODIUM UR-SCNC: 37 MMOL/L — SIGNIFICANT CHANGE UP
SPECIMEN SOURCE: SIGNIFICANT CHANGE UP
WBC # BLD: 12.63 K/UL — HIGH (ref 3.8–10.5)
WBC # FLD AUTO: 12.63 K/UL — HIGH (ref 3.8–10.5)

## 2020-12-13 PROCEDURE — 99223 1ST HOSP IP/OBS HIGH 75: CPT | Mod: GC

## 2020-12-13 PROCEDURE — 52332 CYSTOSCOPY AND TREATMENT: CPT | Mod: RT

## 2020-12-13 PROCEDURE — 99223 1ST HOSP IP/OBS HIGH 75: CPT

## 2020-12-13 RX ORDER — SODIUM ZIRCONIUM CYCLOSILICATE 10 G/10G
10 POWDER, FOR SUSPENSION ORAL THREE TIMES A DAY
Refills: 0 | Status: DISCONTINUED | OUTPATIENT
Start: 2020-12-13 | End: 2020-12-14

## 2020-12-13 RX ORDER — SODIUM CHLORIDE 9 MG/ML
1000 INJECTION INTRAMUSCULAR; INTRAVENOUS; SUBCUTANEOUS
Refills: 0 | Status: DISCONTINUED | OUTPATIENT
Start: 2020-12-13 | End: 2020-12-13

## 2020-12-13 RX ORDER — ACETAMINOPHEN 500 MG
650 TABLET ORAL EVERY 6 HOURS
Refills: 0 | Status: DISCONTINUED | OUTPATIENT
Start: 2020-12-13 | End: 2020-12-16

## 2020-12-13 RX ORDER — ONDANSETRON 8 MG/1
4 TABLET, FILM COATED ORAL ONCE
Refills: 0 | Status: DISCONTINUED | OUTPATIENT
Start: 2020-12-13 | End: 2020-12-13

## 2020-12-13 RX ORDER — OXYCODONE HYDROCHLORIDE 5 MG/1
5 TABLET ORAL ONCE
Refills: 0 | Status: DISCONTINUED | OUTPATIENT
Start: 2020-12-13 | End: 2020-12-13

## 2020-12-13 RX ORDER — SODIUM CHLORIDE 9 MG/ML
1000 INJECTION, SOLUTION INTRAVENOUS
Refills: 0 | Status: DISCONTINUED | OUTPATIENT
Start: 2020-12-13 | End: 2020-12-13

## 2020-12-13 RX ORDER — FENTANYL CITRATE 50 UG/ML
25 INJECTION INTRAVENOUS
Refills: 0 | Status: DISCONTINUED | OUTPATIENT
Start: 2020-12-13 | End: 2020-12-13

## 2020-12-13 RX ADMIN — Medication 25 MILLIGRAM(S): at 05:36

## 2020-12-13 RX ADMIN — HEPARIN SODIUM 5000 UNIT(S): 5000 INJECTION INTRAVENOUS; SUBCUTANEOUS at 22:34

## 2020-12-13 RX ADMIN — Medication 1 TABLET(S): at 13:07

## 2020-12-13 RX ADMIN — ATORVASTATIN CALCIUM 80 MILLIGRAM(S): 80 TABLET, FILM COATED ORAL at 22:34

## 2020-12-13 RX ADMIN — PIPERACILLIN AND TAZOBACTAM 25 GRAM(S): 4; .5 INJECTION, POWDER, LYOPHILIZED, FOR SOLUTION INTRAVENOUS at 10:05

## 2020-12-13 RX ADMIN — LOSARTAN POTASSIUM 50 MILLIGRAM(S): 100 TABLET, FILM COATED ORAL at 05:36

## 2020-12-13 RX ADMIN — Medication 2: at 12:40

## 2020-12-13 RX ADMIN — HEPARIN SODIUM 5000 UNIT(S): 5000 INJECTION INTRAVENOUS; SUBCUTANEOUS at 05:36

## 2020-12-13 RX ADMIN — HEPARIN SODIUM 5000 UNIT(S): 5000 INJECTION INTRAVENOUS; SUBCUTANEOUS at 13:07

## 2020-12-13 RX ADMIN — PIPERACILLIN AND TAZOBACTAM 25 GRAM(S): 4; .5 INJECTION, POWDER, LYOPHILIZED, FOR SOLUTION INTRAVENOUS at 22:35

## 2020-12-13 RX ADMIN — Medication 1: at 17:43

## 2020-12-13 NOTE — CONSULT NOTE ADULT - PROBLEM SELECTOR RECOMMENDATION 9
-Post-obstructive uropathy. S/p ureteral stent.   -Trend labs. Avoid nephrotoxins.  -Holding losartan for now. -Stable. Statin.

## 2020-12-13 NOTE — CONSULT NOTE ADULT - SUBJECTIVE AND OBJECTIVE BOX
HPI  This is a 84M with PMH of HTN, HLD, DM2, CAD s/p stents x4, AF on Eliquis, remote hx of left nephrectomy (per chart - due to infection related to ureteral obstruction), s/p recent cysto (12/10) during w/u for hematuria who originally came due to urination x24h. He was found to have an obstructing right ureteral stone requiring emergent stent placement. The patient feels well. He denies any f/c, NV, SOB, CP, abd pain. His chronic conditions have remained stable. No recent changes to meds.     ROS:  All systems negative except as noted above.     PAST MEDICAL & SURGICAL HISTORY:  History of bradycardia    History of TIAs  Last 2018    Hyperlipidemia    Hypertension    Atrial fibrillation    Hard of hearing    Myocardial infarction  (2003)    Arthritis    Single kidney  (hx/o LEFT nephrectomy at age 16; pt states due to a ureter&quot;blockage&quot; causing &quot;infection&quot;; pt denies hx/o renal dysfunction)    Diabetes mellitus, new onset    CAD (coronary artery disease)  (4 stents, non-adherent to aspirin)    S/P bilateral cataract extraction    Stented coronary artery  (4 stents)    History of nephrectomy, unilateral  (hx/o LEFT nephrectomy at age 16)          Allergies:   ciprofloxacin (Joint Pain)  penicillin (Rash)        Meds:  MEDICATIONS  (STANDING):  atorvastatin 80 milliGRAM(s) Oral at bedtime  heparin   Injectable 5000 Unit(s) SubCutaneous every 8 hours  insulin lispro (ADMELOG) corrective regimen sliding scale   SubCutaneous three times a day before meals  insulin lispro (ADMELOG) corrective regimen sliding scale   SubCutaneous at bedtime  losartan 50 milliGRAM(s) Oral daily  metoprolol tartrate 25 milliGRAM(s) Oral daily  multivitamin 1 Tablet(s) Oral daily  piperacillin/tazobactam IVPB.. 3.375 Gram(s) IV Intermittent every 12 hours  sodium chloride 0.9%. 1000 milliLiter(s) (50 mL/Hr) IV Continuous <Continuous>    MEDICATIONS  (PRN):  acetaminophen   Tablet .. 650 milliGRAM(s) Oral every 6 hours PRN Mild Pain (1 - 3), Moderate Pain (4 - 6)    Home Medications:  Eliquis 2.5 mg oral tablet: once/day (10 Dec 2020 06:16)  losartan 50 mg oral tablet: 1 tab(s) orally once a day (10 Dec 2020 06:16)  metFORMIN 500 mg oral tablet: 1 tab(s) orally 2 times a day (10 Dec 2020 06:16)  metoprolol tartrate 25 mg oral tablet: once/day (10 Dec 2020 06:16)  multivitamin one tablet orally daily: last dose 12-4-20 (10 Dec 2020 06:16)  OTC tumeric one tablet orally daily: last dose 12-4-20 (10 Dec 2020 06:16)  repaglinide 0.5 mg oral tablet: once/day (10 Dec 2020 06:16)  rosuvastatin 40 mg oral tablet: 1 tab(s) orally once a day (10 Dec 2020 06:16)  vitamin d one tablet orallu bk;u:  (10 Dec 2020 06:16)    SHx;   Remote tobacco use hx.  ETOH - social use.   No drugs.   Lives w/ wife.     FHx:  HTN/DM.    Physical Exam:  Vital Signs Last 24 Hrs  T(C): 36.9 (13 Dec 2020 13:06), Max: 37.6 (12 Dec 2020 19:00)  T(F): 98.5 (13 Dec 2020 13:06), Max: 99.6 (12 Dec 2020 19:00)  HR: 80 (13 Dec 2020 13:06) (68 - 87)  BP: 114/54 (13 Dec 2020 13:06) (104/52 - 157/56)  BP(mean): 58 (13 Dec 2020 04:00) (58 - 77)  RR: 16 (13 Dec 2020 13:06) (16 - 22)  SpO2: 98% (13 Dec 2020 13:06) (95% - 100%)    Gen- In bed, comfortable, NAD  Eyes- EOMI, PERRLA, nonicteric.  EMNT- Fair dentition. MMM. No tonsilar exudates. No posterior pharynx erythema.  Neck- Supple. No masses. No tracheal deviation.  Resp- CTAB, good effort. No r/r/w. No accessory muscle use.  CVS- RRR, S1S2, no g/r/m. No LE edema.  GI- Soft abd, NT, ND, +BSx4. No HSM.  MSK- No C/C. ROM intact. No crepitus.  Neuro- CN II-XII intact. Speech fluent/face symmetric. Sensation intact.  Skin- No rashes/ulcers. Warm/moist.  Psych- AAOx3. Appropriate mood/affect.      Labs:  CBC Full  -  ( 13 Dec 2020 08:37 )  WBC Count : 12.63 K/uL  RBC Count : 3.15 M/uL  Hemoglobin : 9.4 g/dL  Hematocrit : 28.0 %  Platelet Count - Automated : 138 K/uL  Mean Cell Volume : 88.9 fL  Mean Cell Hemoglobin : 29.8 pg  Mean Cell Hemoglobin Concentration : 33.6 gm/dL  Auto Neutrophil # : x  Auto Lymphocyte # : x  Auto Monocyte # : x  Auto Eosinophil # : x  Auto Basophil # : x  Auto Neutrophil % : x  Auto Lymphocyte % : x  Auto Monocyte % : x  Auto Eosinophil % : x  Auto Basophil % : x    12-13    122<L>  |  92<L>  |  60<H>  ----------------------------<  106<H>  5.5<H>   |  15<L>  |  4.56<H>    Ca    8.1<L>      13 Dec 2020 08:37    TPro  6.3  /  Alb  2.9<L>  /  TBili  0.2  /  DBili  x   /  AST  33  /  ALT  35  /  AlkPhos  85  12-12    PT/INR - ( 12 Dec 2020 22:22 )   PT: 14.9 sec;   INR: 1.31 ratio         PTT - ( 12 Dec 2020 22:22 )  PTT:31.4 sec          Imaging:  CT A/P reviewed. Right hydro and stone appreciated.

## 2020-12-13 NOTE — CONSULT NOTE ADULT - SUBJECTIVE AND OBJECTIVE BOX
[facility] DIVISION OF KIDNEY DISEASES AND HYPERTENSION -- [phone]  -- INITIAL CONSULT NOTE  --------------------------------------------------------------------------------  HPI: 84 year old man with history of solitary right kidney, HTN, DM, CAD s/p PCI, and Atrial fibrillation admitted to [facility] on 12/12/20 after presenting with inability to urinate over the past 24 hours. Admission labs relevant for acute renal failure with an elevated Scr of 4.90 for which Nephrology consultation was requested. Of note, patient underwent an outpatient cystoscopy with biopsy on 12/10 for hematuria and has had trouble urinating since the procedure. Pt. has a history of left nephrectomy in the setting of ? urosepsis secondary to an obstructive stone at the age of 16. Pt. does not have underlying history of CKD. Upon review of labs on [facility] Scr was WNL at 1.05 on 9/22/20. CT abd and pelvis obtained in the ER showed a 7mm proximal ureteral stone with moderate right hydronephrosis. Pt. subsequently underwent emergent cystoscopy with stent placement. Pt. with good UOP since stent placement with ~1.6L in 12 hours.    Pt. was seen and examined at bedside today. Pt. was having breakfast and denied any complaints. Pt. denied any dysuria, pain, SOB, N/V or diarrhea.     PAST HISTORY  --------------------------------------------------------------------------------  PAST MEDICAL & SURGICAL HISTORY:  History of bradycardia  History of TIAs  Last 2018  Hyperlipidemia  Hypertension  Atrial fibrillation  Hard of hearing  Myocardial infarction  Arthritis  (hx/o LEFT nephrectomy at age 16; pt states due to an infection)  Diabetes mellitus, new onset  CAD (coronary artery disease)  S/P bilateral cataract extraction  Stented coronary artery  History of nephrectomy, unilateral    FAMILY HISTORY:  Family history of diabetes mellitus in mother (Mother)  (Pt states his mother developed DM in her 70&#x27;s)    PAST SOCIAL HISTORY: Denies current alcohol use    ALLERGIES & MEDICATIONS  --------------------------------------------------------------------------------  Allergies    ciprofloxacin (Joint Pain)  penicillin (Rash)    Intolerances    Standing Inpatient Medications  atorvastatin 80 milliGRAM(s) Oral at bedtime  heparin   Injectable 5000 Unit(s) SubCutaneous every 8 hours  insulin lispro (ADMELOG) corrective regimen sliding scale   SubCutaneous three times a day before meals  insulin lispro (ADMELOG) corrective regimen sliding scale   SubCutaneous at bedtime  metoprolol tartrate 25 milliGRAM(s) Oral daily  multivitamin 1 Tablet(s) Oral daily  piperacillin/tazobactam IVPB.. 3.375 Gram(s) IV Intermittent every 12 hours  sodium zirconium cyclosilicate 10 Gram(s) Oral three times a day    REVIEW OF SYSTEMS  --------------------------------------------------------------------------------  Gen: + fatigue  Respiratory: No dyspnea  CV: No chest pain  GI: No abdominal pain  MSK: No LE edema  Neuro: No dizziness    All other systems were reviewed and are negative, except as noted.    VITALS/PHYSICAL EXAM  --------------------------------------------------------------------------------  T(C): 36.9 (12-13-20 @ 13:06), Max: 37.6 (12-12-20 @ 19:00)  HR: 80 (12-13-20 @ 13:06) (68 - 87)  BP: 114/54 (12-13-20 @ 13:06) (104/52 - 157/56)  RR: 16 (12-13-20 @ 13:06) (16 - 22)  SpO2: 98% (12-13-20 @ 13:06) (95% - 100%)  Wt(kg): --  Height (cm): 175.3 (12-12-20 @ 23:49)  Weight (kg): 56.2 (12-12-20 @ 23:49)  BMI (kg/m2): 18.3 (12-12-20 @ 23:49)  BSA (m2): 1.69 (12-12-20 @ 23:49)    12-12-20 @ 07:01  -  12-13-20 @ 07:00  --------------------------------------------------------  IN: 830 mL / OUT: 800 mL / NET: 30 mL    12-13-20 @ 07:01  -  12-13-20 @ 14:58  --------------------------------------------------------  IN: 0 mL / OUT: 1600 mL / NET: -1600 mL    Physical Exam:  	Gen: Elderly male in NAD  	HEENT: No JVD  	Pulm: CTA B/L  	CV: S1S2  	Abd: Soft, +BS   	Ext: No LE edema B/L  	Neuro: Awake  	Skin: Warm and dry    LABS/STUDIES  --------------------------------------------------------------------------------              9.4    12.63 >-----------<  138      [12-13-20 @ 08:37]              28.0     122  |  92  |  60  ----------------------------<  106      [12-13-20 @ 08:37]  5.5   |  15  |  4.56        Ca     8.1     [12-13-20 @ 08:37]    Creatinine Trend:  SCr 4.56 [12-13 @ 08:37]  SCr 4.85 [12-13 @ 02:11]  SCr 4.90 [12-12 @ 20:31]    Urinalysis - [12-12-20 @ 23:07]      Color Colorless / Appearance Slightly Turbid / SG 1.006 / pH 6.5      Gluc Negative / Ketone Negative  / Bili Negative / Urobili <2 mg/dL       Blood Moderate / Protein Trace / Leuk Est Large / Nitrite Negative      RBC 10-15 / WBC >50 / Hyaline 1 / Gran  / Sq Epi  / Non Sq Epi 0 / Bacteria Negative [facility] DIVISION OF KIDNEY DISEASES AND HYPERTENSION -- [phone]  -- INITIAL CONSULT NOTE  --------------------------------------------------------------------------------  HPI: 84 year old man with history of solitary right kidney, HTN, DM, CAD s/p PCI, and Atrial fibrillation admitted to [facility] on 12/12/20 after presenting with inability to urinate over the past 24 hours. Admission labs relevant for acute renal failure with an elevated Scr of 4.90 for which Nephrology consultation was requested. Of note, patient underwent an outpatient cystoscopy with biopsy on 12/10 for hematuria and has had trouble urinating since the procedure. Pt. has a history of left nephrectomy in the setting of ? urosepsis secondary to an obstructive stone at the age of 16. Pt. does not have underlying history of CKD. Upon review of labs on [facility] Scr was WNL at 1.05 on 9/22/20. CT abd and pelvis obtained in the ER showed a 7mm proximal ureteral stone with moderate right hydronephrosis. Pt. subsequently underwent emergent cystoscopy with stent placement. Pt. with good UOP since stent placement with ~1.6L in 12 hours.    Pt. was seen and examined at bedside today. Pt. was having breakfast and denied any complaints. Pt. denied any dysuria, pain, SOB, N/V or diarrhea.     PAST HISTORY  --------------------------------------------------------------------------------  PAST MEDICAL & SURGICAL HISTORY:  History of bradycardia  History of TIAs  Last 2018  Hyperlipidemia  Hypertension  Atrial fibrillation  Hard of hearing  Myocardial infarction  Arthritis  (hx/o LEFT nephrectomy at age 16; pt states due to an infection)  Diabetes mellitus, new onset  CAD (coronary artery disease)  S/P bilateral cataract extraction  Stented coronary artery  History of nephrectomy, unilateral    FAMILY HISTORY:  Family history of diabetes mellitus in mother (Mother)  (Pt states his mother developed DM in her 70&#x27;s)    PAST SOCIAL HISTORY: Denies current alcohol use    ALLERGIES & MEDICATIONS  --------------------------------------------------------------------------------  Allergies    ciprofloxacin (Joint Pain)  penicillin (Rash)    Intolerances    Standing Inpatient Medications  atorvastatin 80 milliGRAM(s) Oral at bedtime  heparin   Injectable 5000 Unit(s) SubCutaneous every 8 hours  insulin lispro (ADMELOG) corrective regimen sliding scale   SubCutaneous three times a day before meals  insulin lispro (ADMELOG) corrective regimen sliding scale   SubCutaneous at bedtime  metoprolol tartrate 25 milliGRAM(s) Oral daily  multivitamin 1 Tablet(s) Oral daily  piperacillin/tazobactam IVPB.. 3.375 Gram(s) IV Intermittent every 12 hours  sodium zirconium cyclosilicate 10 Gram(s) Oral three times a day    REVIEW OF SYSTEMS  --------------------------------------------------------------------------------  Gen: + fatigue  Respiratory: No dyspnea  CV: No chest pain  GI: No abdominal pain  MSK: No LE edema  Neuro: No dizziness    All other systems were reviewed and are negative, except as noted.    VITALS/PHYSICAL EXAM  --------------------------------------------------------------------------------  T(C): 36.9 (12-13-20 @ 13:06), Max: 37.6 (12-12-20 @ 19:00)  HR: 80 (12-13-20 @ 13:06) (68 - 87)  BP: 114/54 (12-13-20 @ 13:06) (104/52 - 157/56)  RR: 16 (12-13-20 @ 13:06) (16 - 22)  SpO2: 98% (12-13-20 @ 13:06) (95% - 100%)  Wt(kg): --  Height (cm): 175.3 (12-12-20 @ 23:49)  Weight (kg): 56.2 (12-12-20 @ 23:49)  BMI (kg/m2): 18.3 (12-12-20 @ 23:49)  BSA (m2): 1.69 (12-12-20 @ 23:49)    12-12-20 @ 07:01  -  12-13-20 @ 07:00  --------------------------------------------------------  IN: 830 mL / OUT: 800 mL / NET: 30 mL    12-13-20 @ 07:01  -  12-13-20 @ 14:58  --------------------------------------------------------  IN: 0 mL / OUT: 1600 mL / NET: -1600 mL    Physical Exam:  	Gen: Elderly male in NAD  	HEENT: No JVD  	Pulm: CTA B/L  	CV: S1S2  	Abd: Soft, +BS   	Ext: No LE edema B/L  	Neuro: Awake  	Skin: Warm and dry    LABS/STUDIES  --------------------------------------------------------------------------------              9.4    12.63 >-----------<  138      [12-13-20 @ 08:37]              28.0     122  |  92  |  60  ----------------------------<  106      [12-13-20 @ 08:37]  5.5   |  15  |  4.56        Ca     8.1     [12-13-20 @ 08:37]    12-13    125<L>  |  94<L>  |  60<H>  ----------------------------<  174<H>  5.0   |  16<L>  |  4.04<H>    Ca    8.1<L>      13 Dec 2020 15:13    TPro  6.3  /  Alb  2.9<L>  /  TBili  0.2  /  DBili  x   /  AST  33  /  ALT  35  /  AlkPhos  85  12-12    Creatinine Trend:  SCr 4.56 [12-13 @ 08:37]  SCr 4.85 [12-13 @ 02:11]  SCr 4.90 [12-12 @ 20:31]    Urinalysis - [12-12-20 @ 23:07]      Color Colorless / Appearance Slightly Turbid / SG 1.006 / pH 6.5      Gluc Negative / Ketone Negative  / Bili Negative / Urobili <2 mg/dL       Blood Moderate / Protein Trace / Leuk Est Large / Nitrite Negative      RBC 10-15 / WBC >50 / Hyaline 1 / Gran  / Sq Epi  / Non Sq Epi 0 / Bacteria Negative

## 2020-12-13 NOTE — CONSULT NOTE ADULT - PROBLEM SELECTOR RECOMMENDATION 9
Pt. with LILIAN in the setting of obstructive hydronephrosis and UTI. Scr elevated at 4.90 on admission with CT showing a right sided obstructing stone. Pt. also has history of solitary right kidney and underwent left nephrectomy at age 16 for ? sepsis related to kidney stones. Pt. s/p emergent cystoscopy and stent placement on 12/13/20. UOP since procedure ~ 1.6L. Scr elevated/stable at 4.56 today. Pt. with mild hyperkalemia at 5.5 on labs today in the setting of LILIAN. Also noted asymptomatic hyponatremia at 122 likely secondary to impaired free water clearance and inappropriate ADH release. Recommend close monitoring of UOP. Pt. likely with post-obstructive diuresis. Recommend one dose of Lokelma 10GM. Start IVF with 1/2NS at 75 cc/hr. Please obtain repeat BMP at 1700 today to monitor Scr, SNa and serum potassium. Dose medications and ABx as per eGR.    If you have any questions, please feel free to contact me  El Ho  Nephrology Fellow  [phone]  (After 5pm or on weekends please page the on-call fellow) Pt. with LILIAN in the setting of obstructive uropathy/hydronephrosis and UTI. Scr elevated at 4.90 on admission with CT scan showing a right sided hydronephrosis from stone located in right proximal ureter. Pt. 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 Pt. underwent emergent cystoscopy and stent placement on 12/13/20. UOP since procedure ~ 1.6L. Scr decreased to 4.04 today. Pt. with mild hyperkalemia at 5.5 on AM labs done today. Serum potassium WNL at 5 on PM labs done today. Pt. also noted to have  asymptomatic hyponatremia (SNa 122 on AM labs) likely secondary to impaired free water clearance and inappropriate ADH release. SNa improved to 125 on PM labs today. Monitor labs and urine output (for post-obstructive diuresis). Avoid any potential nephrotoxins. Dose medications as per eGFR.     If you have any questions, please feel free to contact me  El Ho  Nephrology Fellow  [phone]  (After 5pm or on weekends please page the on-call fellow)

## 2020-12-13 NOTE — BRIEF OPERATIVE NOTE - NSICDXBRIEFPROCEDURE_GEN_ALL_CORE_FT
PROCEDURES:  Cystoscopy with retrograde pyelography with insertion of right ureteral stent 13-Dec-2020 01:09:14 no retrograde Papito Sarmiento

## 2020-12-13 NOTE — PROGRESS NOTE ADULT - SUBJECTIVE AND OBJECTIVE BOX
Interval Events:    S/p stent  High UOP overnight    S: Patient doing well, denies fevers, chills, nausea, emesis, chest pain, SOB.  Pain is well controlled. Tolerating PO w/o N/V.  +/+ F/BM.    O: Vital Signs Last 24 Hrs  T(C): 36.7 (13 Dec 2020 05:21), Max: 37.6 (12 Dec 2020 19:00)  T(F): 98 (13 Dec 2020 05:21), Max: 99.6 (12 Dec 2020 19:00)  HR: 76 (13 Dec 2020 05:21) (68 - 87)  BP: 131/50 (13 Dec 2020 05:21) (104/52 - 157/56)  BP(mean): 58 (13 Dec 2020 04:00) (58 - 77)  RR: 17 (13 Dec 2020 05:21) (16 - 22)  SpO2: 99% (13 Dec 2020 05:21) (95% - 100%)      12 Dec 2020 07:01  -  13 Dec 2020 07:00  --------------------------------------------------------  IN:    Oral Fluid: 700 mL    sodium chloride 0.9%: 130 mL  Total IN: 830 mL    OUT:    Indwelling Catheter - Urethral (mL): 800 mL  Total OUT: 800 mL    Total NET: 30 mL          Physical Exam:    Gen: Well-developed, well-nourished in no acute distress  Resp: No additional work of breathing   GI: Soft, non-tender, non-distended, with normoactive bowel sounds.  No masses.  : Temple with clear yellow urine  MSK: Moves all extremities equally  Skin: No rashes    Labs:                        9.9    15.69 )-----------( 152      ( 12 Dec 2020 20:31 )             30.6     13 Dec 2020 02:11    121    |  88     |  62     ----------------------------<  65     5.4     |  14     |  4.85     Ca    7.8        13 Dec 2020 02:11    TPro  6.3    /  Alb  2.9    /  TBili  0.2    /  DBili  x      /  AST  33     /  ALT  35     /  AlkPhos  85     12 Dec 2020 20:31    PT/INR - ( 12 Dec 2020 22:22 )   PT: 14.9 sec;   INR: 1.31 ratio         PTT - ( 12 Dec 2020 22:22 )  PTT:31.4 sec  CAPILLARY BLOOD GLUCOSE      POCT Blood Glucose.: 129 mg/dL (13 Dec 2020 07:27)  POCT Blood Glucose.: 72 mg/dL (13 Dec 2020 04:01)  POCT Blood Glucose.: 73 mg/dL (13 Dec 2020 02:02)  POCT Blood Glucose.: 106 mg/dL (12 Dec 2020 18:18)        LIVER FUNCTIONS - ( 12 Dec 2020 20:31 )  Alb: 2.9 g/dL / Pro: 6.3 g/dL / ALK PHOS: 85 U/L / ALT: 35 U/L / AST: 33 U/L / GGT: x             Culture - Urine (collected 10 Dec 2020 11:07)  Source: .Urine cup  Final Report (12 Dec 2020 17:01):    >100,000 CFU/ml Enterobacter cloacae complex  Organism: Enterobacter cloacae complex (12 Dec 2020 17:01)  Organism: Enterobacter cloacae complex (12 Dec 2020 17:01)      Urinalysis Basic - ( 12 Dec 2020 23:07 )    Color: Colorless / Appearance: Slightly Turbid / S.006 / pH: x  Gluc: x / Ketone: Negative  / Bili: Negative / Urobili: <2 mg/dL   Blood: x / Protein: Trace / Nitrite: Negative   Leuk Esterase: Large / RBC: 10-15 /HPF / WBC >50 /HPF   Sq Epi: x / Non Sq Epi: 0 /HPF / Bacteria: Negative        MEDICATIONS  (STANDING):  atorvastatin 80 milliGRAM(s) Oral at bedtime  heparin   Injectable 5000 Unit(s) SubCutaneous every 8 hours  insulin lispro (ADMELOG) corrective regimen sliding scale   SubCutaneous three times a day before meals  insulin lispro (ADMELOG) corrective regimen sliding scale   SubCutaneous at bedtime  losartan 50 milliGRAM(s) Oral daily  metoprolol tartrate 25 milliGRAM(s) Oral daily  multivitamin 1 Tablet(s) Oral daily  piperacillin/tazobactam IVPB.. 3.375 Gram(s) IV Intermittent every 12 hours  sodium chloride 0.9%. 1000 milliLiter(s) (50 mL/Hr) IV Continuous <Continuous>    MEDICATIONS  (PRN):  acetaminophen   Tablet .. 650 milliGRAM(s) Oral every 6 hours PRN Mild Pain (1 - 3), Moderate Pain (4 - 6)

## 2020-12-13 NOTE — CONSULT NOTE ADULT - ASSESSMENT
This is a 84M with PMH of HTN, HLD, DM2, CAD s/p stents x4, AF on Eliquis, remote hx of left nephrectomy (per chart - due to infection related to ureteral obstruction), s/p recent cysto (12/10) during w/u for hematuria who originally came due to urination x24h. He was found to have an obstructing right ureteral stone requiring emergent stent placement. The patient has pickering placed and increased UOP was noted. He has been admitted for further management.

## 2020-12-13 NOTE — CONSULT NOTE ADULT - PROBLEM SELECTOR RECOMMENDATION 2
-Likely hypovolemic hyponatremia further exacerbated by post-obstructive diuresis.  -Pt is asymptomatic.   -Will check serum osm, repeat BMP now, urine electrolytes.  -Drink to thirst. Would expect Na to correct.

## 2020-12-14 LAB
ANION GAP SERPL CALC-SCNC: 15 MMOL/L — HIGH (ref 7–14)
BUN SERPL-MCNC: 57 MG/DL — HIGH (ref 7–23)
CALCIUM SERPL-MCNC: 8.6 MG/DL — SIGNIFICANT CHANGE UP (ref 8.4–10.5)
CHLORIDE SERPL-SCNC: 104 MMOL/L — SIGNIFICANT CHANGE UP (ref 98–107)
CO2 SERPL-SCNC: 16 MMOL/L — LOW (ref 22–31)
CREAT SERPL-MCNC: 3.48 MG/DL — HIGH (ref 0.5–1.3)
GLUCOSE BLDC GLUCOMTR-MCNC: 115 MG/DL — HIGH (ref 70–99)
GLUCOSE BLDC GLUCOMTR-MCNC: 127 MG/DL — HIGH (ref 70–99)
GLUCOSE BLDC GLUCOMTR-MCNC: 154 MG/DL — HIGH (ref 70–99)
GLUCOSE BLDC GLUCOMTR-MCNC: 168 MG/DL — HIGH (ref 70–99)
GLUCOSE SERPL-MCNC: 124 MG/DL — HIGH (ref 70–99)
POTASSIUM SERPL-MCNC: 4.5 MMOL/L — SIGNIFICANT CHANGE UP (ref 3.5–5.3)
POTASSIUM SERPL-SCNC: 4.5 MMOL/L — SIGNIFICANT CHANGE UP (ref 3.5–5.3)
SODIUM SERPL-SCNC: 135 MMOL/L — SIGNIFICANT CHANGE UP (ref 135–145)

## 2020-12-14 PROCEDURE — 99233 SBSQ HOSP IP/OBS HIGH 50: CPT

## 2020-12-14 PROCEDURE — 99232 SBSQ HOSP IP/OBS MODERATE 35: CPT | Mod: GC

## 2020-12-14 PROCEDURE — 99231 SBSQ HOSP IP/OBS SF/LOW 25: CPT

## 2020-12-14 RX ORDER — THIAMINE MONONITRATE (VIT B1) 100 MG
500 TABLET ORAL THREE TIMES A DAY
Refills: 0 | Status: DISCONTINUED | OUTPATIENT
Start: 2020-12-14 | End: 2020-12-15

## 2020-12-14 RX ADMIN — PIPERACILLIN AND TAZOBACTAM 25 GRAM(S): 4; .5 INJECTION, POWDER, LYOPHILIZED, FOR SOLUTION INTRAVENOUS at 10:36

## 2020-12-14 RX ADMIN — Medication 1 TABLET(S): at 14:41

## 2020-12-14 RX ADMIN — HEPARIN SODIUM 5000 UNIT(S): 5000 INJECTION INTRAVENOUS; SUBCUTANEOUS at 05:35

## 2020-12-14 RX ADMIN — Medication 1: at 12:02

## 2020-12-14 RX ADMIN — HEPARIN SODIUM 5000 UNIT(S): 5000 INJECTION INTRAVENOUS; SUBCUTANEOUS at 14:41

## 2020-12-14 RX ADMIN — SODIUM ZIRCONIUM CYCLOSILICATE 10 GRAM(S): 10 POWDER, FOR SUSPENSION ORAL at 00:17

## 2020-12-14 RX ADMIN — Medication 105 MILLIGRAM(S): at 22:27

## 2020-12-14 RX ADMIN — HEPARIN SODIUM 5000 UNIT(S): 5000 INJECTION INTRAVENOUS; SUBCUTANEOUS at 22:27

## 2020-12-14 RX ADMIN — Medication 1: at 17:38

## 2020-12-14 RX ADMIN — ATORVASTATIN CALCIUM 80 MILLIGRAM(S): 80 TABLET, FILM COATED ORAL at 22:27

## 2020-12-14 RX ADMIN — SODIUM ZIRCONIUM CYCLOSILICATE 10 GRAM(S): 10 POWDER, FOR SUSPENSION ORAL at 07:37

## 2020-12-14 RX ADMIN — Medication 25 MILLIGRAM(S): at 05:35

## 2020-12-14 RX ADMIN — Medication 105 MILLIGRAM(S): at 14:41

## 2020-12-14 NOTE — PROGRESS NOTE ADULT - PROBLEM SELECTOR PLAN 1
Pt. with LILIAN in the setting of obstructive uropathy/hydronephrosis and UTI. Scr elevated at 4.90 on admission with CT scan showing a right sided hydronephrosis from stone located in right proximal ureter. Pt. 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 Pt. underwent emergent cystoscopy and stent placement on 12/13/20. Scr has improved to 3.84 today. UOP 5L in last 24 hours. Encourage PO intake. Monitor labs and urine output. Avoid any potential nephrotoxins. Dose medications as per eGFR.    If any questions, please feel free to contact me  Maximus Hobson   Nephrology Fellow  [phone]  (After 5 pm or on weekends please page the on-call fellow)

## 2020-12-14 NOTE — PROGRESS NOTE ADULT - SUBJECTIVE AND OBJECTIVE BOX
ANESTHESIA POSTOP CHECK    84y Male POSTOP DAY 1 S/P   [x ] General Anesthesia  [ ] Emmanuel Anesthesia  [ ] MAC    Vital Signs Last 24 Hrs  T(C): 36.2 (14 Dec 2020 09:45), Max: 37.2 (14 Dec 2020 01:34)  T(F): 97.1 (14 Dec 2020 09:45), Max: 98.9 (14 Dec 2020 01:34)  HR: 92 (14 Dec 2020 09:45) (68 - 92)  BP: 130/47 (14 Dec 2020 09:45) (111/56 - 130/47)  BP(mean): --  RR: 18 (14 Dec 2020 09:45) (16 - 18)  SpO2: 98% (14 Dec 2020 09:45) (98% - 100%)  I&O's Summary    13 Dec 2020 07:01  -  14 Dec 2020 07:00  --------------------------------------------------------  IN: 0 mL / OUT: 5450 mL / NET: -5450 mL    14 Dec 2020 07:01  -  14 Dec 2020 10:00  --------------------------------------------------------  IN: 0 mL / OUT: 700 mL / NET: -700 mL        [x ] NO APPARENT ANESTHESIA COMPLICATIONS      Comments:

## 2020-12-14 NOTE — PROGRESS NOTE ADULT - SUBJECTIVE AND OBJECTIVE BOX
CHIEF COMPLAINT: f/u     SUBJECTIVE / OVERNIGHT EVENTS: Patient seen and examined. No acute events overnight. Pain well controlled and patient without any complaints.    MEDICATIONS  (STANDING):  atorvastatin 80 milliGRAM(s) Oral at bedtime  heparin   Injectable 5000 Unit(s) SubCutaneous every 8 hours  insulin lispro (ADMELOG) corrective regimen sliding scale   SubCutaneous three times a day before meals  insulin lispro (ADMELOG) corrective regimen sliding scale   SubCutaneous at bedtime  metoprolol tartrate 25 milliGRAM(s) Oral daily  multivitamin 1 Tablet(s) Oral daily  piperacillin/tazobactam IVPB.. 3.375 Gram(s) IV Intermittent every 12 hours  sodium zirconium cyclosilicate 10 Gram(s) Oral three times a day    MEDICATIONS  (PRN):  acetaminophen   Tablet .. 650 milliGRAM(s) Oral every 6 hours PRN Mild Pain (1 - 3), Moderate Pain (4 - 6)      VITALS:  T(F): 98.7 (20 @ 05:31), Max: 98.9 (20 @ 01:34)  HR: 88 (20 @ 05:31) (68 - 91)  BP: 111/56 (20 @ 05:31) (111/56 - 126/52)  RR: 17 (20 @ 05:31) (16 - 17)  SpO2: 100% (20 @ 05:31)  Wt(kg): --      CAPILLARY BLOOD GLUCOSE      PHYSICAL EXAM:  GENERAL: NAD, well-developed  HEAD:  Atraumatic, Normocephalic  EYES: EOMI, PERRLA, conjunctiva and sclera clear  NECK: Supple, No JVD  CHEST/LUNG: Clear to auscultation bilaterally; No wheeze  HEART: Regular rate and rhythm; No murmurs, rubs, or gallops  ABDOMEN: Soft, Nontender, Nondistended; Bowel sounds present  EXTREMITIES:  2+ Peripheral Pulses, No clubbing, cyanosis, or edema  PSYCH: AAOx3  NEUROLOGY: non-focal  SKIN: No rashes or lesions    LABS:              x                    135  | 16   | 57           x     >-----------< x       ------------------------< 124                   x                    4.5  | 104  | 3.48                                         Ca 8.6   Mg x     Ph x           TPro  6.3  /  Alb  2.9      TBili  0.2  /  DBili  x         AST  33  /  ALT  35            AlkPhos  85      INR: 1.31 ratio<H>;    PT: 14.9 sec<H>;    PTT: 31.4 sec        Urinalysis Basic - ( 12 Dec 2020 23:07 )    Color: Colorless / Appearance: Slightly Turbid / S.006 / pH: x  Gluc: x / Ketone: Negative  / Bili: Negative / Urobili: <2 mg/dL   Blood: x / Protein: Trace / Nitrite: Negative   Leuk Esterase: Large / RBC: 10-15 /HPF / WBC >50 /HPF   Sq Epi: x / Non Sq Epi: 0 /HPF / Bacteria: Negative            MICROBIOLOGY:        RADIOLOGY & ADDITIONAL TESTS:  Imaging Personally Reviewed: [ ] Yes    [ ] Consultant(s) Notes Reviewed:  [x] Care Discussed with Consultants/Other Providers: Urology PA - discussed   CHIEF COMPLAINT: f/u     SUBJECTIVE / OVERNIGHT EVENTS: Patient seen and examined. No acute events overnight.   Denies pain, but reporting visual hallucinations that began this morning, states seeing a polar bear on the ceiling and a reyna tree waving out side his window.   Otherwise is alert and oriented x2, thinks its 12/15/2020.     MEDICATIONS  (STANDING):  atorvastatin 80 milliGRAM(s) Oral at bedtime  heparin   Injectable 5000 Unit(s) SubCutaneous every 8 hours  insulin lispro (ADMELOG) corrective regimen sliding scale   SubCutaneous three times a day before meals  insulin lispro (ADMELOG) corrective regimen sliding scale   SubCutaneous at bedtime  metoprolol tartrate 25 milliGRAM(s) Oral daily  multivitamin 1 Tablet(s) Oral daily  piperacillin/tazobactam IVPB.. 3.375 Gram(s) IV Intermittent every 12 hours  sodium zirconium cyclosilicate 10 Gram(s) Oral three times a day    MEDICATIONS  (PRN):  acetaminophen   Tablet .. 650 milliGRAM(s) Oral every 6 hours PRN Mild Pain (1 - 3), Moderate Pain (4 - 6)      VITALS:  T(F): 98.7 (20 @ 05:31), Max: 98.9 (20 @ 01:34)  HR: 88 (20 @ 05:31) (68 - 91)  BP: 111/56 (20 @ 05:31) (111/56 - 126/52)  RR: 17 (20 @ 05:31) (16 - 17)  SpO2: 100% (20 @ 05:31)  Wt(kg): --      CAPILLARY BLOOD GLUCOSE      PHYSICAL EXAM:  GENERAL: NAD, well-developed  HEAD:  Atraumatic, Normocephalic  EYES: EOMI, conjunctiva and sclera clear, gross visual fields intact   NECK: Supple  CHEST/LUNG: Clear to auscultation bilaterally; No wheeze  HEART: Regular rate and rhythm; No murmurs, rubs, or gallops  ABDOMEN: Soft, Nontender, Nondistended; Bowel sounds present  : Temple w/clear urine   EXTREMITIES:  2+ Peripheral Pulses, No clubbing, cyanosis, or edema  PSYCH: AAOx2-3, thinks it's 12/15/2020, calm, cooperative   NEUROLOGY: +visual hallucinations, no vision loss, non-focal exam otherwise, sensation intact   SKIN: No rashes or lesions    LABS:              x                    135  | 16   | 57           x     >-----------< x       ------------------------< 124                   x                    4.5  | 104  | 3.48                                         Ca 8.6   Mg x     Ph x           TPro  6.3  /  Alb  2.9      TBili  0.2  /  DBili  x         AST  33  /  ALT  35            AlkPhos  85      INR: 1.31 ratio<H>;    PT: 14.9 sec<H>;    PTT: 31.4 sec        Urinalysis Basic - ( 12 Dec 2020 23:07 )    Color: Colorless / Appearance: Slightly Turbid / S.006 / pH: x  Gluc: x / Ketone: Negative  / Bili: Negative / Urobili: <2 mg/dL   Blood: x / Protein: Trace / Nitrite: Negative   Leuk Esterase: Large / RBC: 10-15 /HPF / WBC >50 /HPF   Sq Epi: x / Non Sq Epi: 0 /HPF / Bacteria: Negative            MICROBIOLOGY:        RADIOLOGY & ADDITIONAL TESTS:  Imaging Personally Reviewed: [ ] Yes    [ ] Consultant(s) Notes Reviewed:  [x] Care Discussed with Consultants/Other Providers: Urology PA - discussed

## 2020-12-14 NOTE — PROGRESS NOTE ADULT - SUBJECTIVE AND OBJECTIVE BOX
[facility] DIVISION OF KIDNEY DISEASES AND HYPERTENSION -- FOLLOW UP NOTE  --------------------------------------------------------------------------------  HPI: 84-year-old man with history of solitary right kidney, HTN, DM, CAD s/p PCI, and Atrial fibrillation admitted to [facility] on 12/12/20 after presenting with inability to urinate over the past 24 hours. Nephrology consulted for elevated serum creatinine. Of note, patient underwent an outpatient cystoscopy with biopsy on 12/10 for hematuria and has had trouble urinating since the procedure. Pt. has a history of left nephrectomy in the setting of ? urosepsis secondary to an obstructive stone at the age of 16. CT abd and pelvis obtained in the ER showed a 7mm proximal ureteral stone with moderate right hydronephrosis. Pt. subsequently underwent emergent cystoscopy with stent placement. Upon review of labs on [facility] HIE/[employer] Scr was WNL at 1.05 on 9/22/20. Scr on admission was 4.90. Scr has improved to 3.48 today. Pt. with good UOP since stent placement with ~5L in 12 hours.    Pt. was seen and examined at bedside today. Laying comfortably, in no acute distress.    PAST HISTORY  --------------------------------------------------------------------------------  No significant changes to PMH, PSH, FHx, SHx, unless otherwise noted    ALLERGIES & MEDICATIONS  --------------------------------------------------------------------------------  Allergies    ciprofloxacin (Joint Pain)  penicillin (Rash)    Intolerances    Standing Inpatient Medications  atorvastatin 80 milliGRAM(s) Oral at bedtime  heparin   Injectable 5000 Unit(s) SubCutaneous every 8 hours  insulin lispro (ADMELOG) corrective regimen sliding scale   SubCutaneous three times a day before meals  insulin lispro (ADMELOG) corrective regimen sliding scale   SubCutaneous at bedtime  metoprolol tartrate 25 milliGRAM(s) Oral daily  multivitamin 1 Tablet(s) Oral daily  thiamine IVPB 500 milliGRAM(s) IV Intermittent three times a day  trimethoprim   80 mG/sulfamethoxazole 400 mG 1 Tablet(s) Oral daily    REVIEW OF SYSTEMS  --------------------------------------------------------------------------------  Gen: no fatigue  Respiratory: No dyspnea  CV: No chest pain  GI: No abdominal pain  MSK: no LE edema  Neuro: No dizziness  Heme: No bleeding    All other systems were reviewed and are negative, except as noted.    VITALS/PHYSICAL EXAM  --------------------------------------------------------------------------------  T(C): 36.2 (12-14-20 @ 09:45), Max: 37.2 (12-14-20 @ 01:34)  HR: 92 (12-14-20 @ 09:45) (68 - 92)  BP: 130/47 (12-14-20 @ 09:45) (111/56 - 130/47)  RR: 18 (12-14-20 @ 09:45) (16 - 18)  SpO2: 98% (12-14-20 @ 09:45) (98% - 100%)  Wt(kg): --  Height (cm): 175.3 (12-12-20 @ 23:49)  Weight (kg): 56.2 (12-12-20 @ 23:49)  BMI (kg/m2): 18.3 (12-12-20 @ 23:49)  BSA (m2): 1.69 (12-12-20 @ 23:49)    12-13-20 @ 07:01  -  12-14-20 @ 07:00  --------------------------------------------------------  IN: 0 mL / OUT: 5450 mL / NET: -5450 mL    12-14-20 @ 07:01  -  12-14-20 @ 13:03  --------------------------------------------------------  IN: 0 mL / OUT: 700 mL / NET: -700 mL    Physical Exam:  	Gen: Elderly male in NAD  	HEENT: No JVD  	Pulm: CTA B/L  	CV: S1S2  	Abd: Soft, +BS   	Ext: No LE edema B/L  	Neuro: Awake  	Skin: Warm and dry    LABS/STUDIES  --------------------------------------------------------------------------------              9.4    12.63 >-----------<  138      [12-13-20 @ 08:37]              28.0     135  |  104  |  57  ----------------------------<  124      [12-14-20 @ 06:28]  4.5   |  16  |  3.48        Ca     8.6     [12-14-20 @ 06:28]    Creatinine Trend:  SCr 3.48 [12-14 @ 06:28]  SCr 3.76 [12-13 @ 21:18]  SCr 4.04 [12-13 @ 15:13]  SCr 4.56 [12-13 @ 08:37]  SCr 4.85 [12-13 @ 02:11]    Urinalysis - [12-12-20 @ 23:07]      Color Colorless / Appearance Slightly Turbid / SG 1.006 / pH 6.5      Gluc Negative / Ketone Negative  / Bili Negative / Urobili <2 mg/dL       Blood Moderate / Protein Trace / Leuk Est Large / Nitrite Negative      RBC 10-15 / WBC >50 / Hyaline 1 / Gran  / Sq Epi  / Non Sq Epi 0 / Bacteria Negative    Urine Creatinine 18      [12-13-20 @ 14:57]  Urine Sodium 37      [12-13-20 @ 14:57]  Urine Osmolality 169      [12-13-20 @ 14:57]    HbA1c 8.2      [09-18-18 @ 19:56]   [facility] DIVISION OF KIDNEY DISEASES AND HYPERTENSION -- FOLLOW UP NOTE  --------------------------------------------------------------------------------  HPI: 84-year-old man with history of solitary right kidney, HTN, DM, CAD s/p PCI, and Atrial fibrillation admitted to [facility] on 12/12/20 after presenting with inability to urinate over the past 24 hours. Nephrology consulted for elevated serum creatinine. Of note, patient underwent an outpatient cystoscopy with biopsy on 12/10 for hematuria and has had trouble urinating since the procedure. Pt. has a history of left nephrectomy in the setting of ? urosepsis secondary to an obstructive stone at the age of 16. CT abd and pelvis obtained in the ER showed a 7mm proximal ureteral stone with moderate right hydronephrosis. Pt. subsequently underwent emergent cystoscopy with stent placement. Upon review of labs on [facility] HIE/[employer] Scr was WNL at 1.05 on 9/22/20. Scr on admission was 4.90. Scr has improved to 3.48 today. Pt. with good UOP since stent placement with ~5L in 12 hours.    Pt. was seen and examined at bedside today. Laying comfortably, in no acute distress.    PAST HISTORY  --------------------------------------------------------------------------------  No significant changes to PMH, PSH, FHx, SHx, unless otherwise noted    ALLERGIES & MEDICATIONS  --------------------------------------------------------------------------------  Allergies    ciprofloxacin (Joint Pain)  penicillin (Rash)    Intolerances    Standing Inpatient Medications  atorvastatin 80 milliGRAM(s) Oral at bedtime  heparin   Injectable 5000 Unit(s) SubCutaneous every 8 hours  insulin lispro (ADMELOG) corrective regimen sliding scale   SubCutaneous three times a day before meals  insulin lispro (ADMELOG) corrective regimen sliding scale   SubCutaneous at bedtime  metoprolol tartrate 25 milliGRAM(s) Oral daily  multivitamin 1 Tablet(s) Oral daily  thiamine IVPB 500 milliGRAM(s) IV Intermittent three times a day  trimethoprim   80 mG/sulfamethoxazole 400 mG 1 Tablet(s) Oral daily    REVIEW OF SYSTEMS  --------------------------------------------------------------------------------  Gen: no fatigue  Respiratory: No dyspnea  CV: No chest pain  GI: No abdominal pain  MSK: no LE edema  Neuro: No dizziness  Heme: No bleeding    All other systems were reviewed and are negative, except as noted.    VITALS/PHYSICAL EXAM  --------------------------------------------------------------------------------  T(C): 36.2 (12-14-20 @ 09:45), Max: 37.2 (12-14-20 @ 01:34)  HR: 92 (12-14-20 @ 09:45) (68 - 92)  BP: 130/47 (12-14-20 @ 09:45) (111/56 - 130/47)  RR: 18 (12-14-20 @ 09:45) (16 - 18)  SpO2: 98% (12-14-20 @ 09:45) (98% - 100%)  Wt(kg): --  Height (cm): 175.3 (12-12-20 @ 23:49)  Weight (kg): 56.2 (12-12-20 @ 23:49)  BMI (kg/m2): 18.3 (12-12-20 @ 23:49)  BSA (m2): 1.69 (12-12-20 @ 23:49)    12-13-20 @ 07:01  -  12-14-20 @ 07:00  --------------------------------------------------------  IN: 0 mL / OUT: 5450 mL / NET: -5450 mL    12-14-20 @ 07:01  -  12-14-20 @ 13:03  --------------------------------------------------------  IN: 0 mL / OUT: 700 mL / NET: -700 mL    Physical Exam:  	Gen: Elderly male in NAD  	HEENT: No JVD  	Pulm: CTA B/L  	CV: S1S2  	Abd: Soft, +BS   	Ext: No LE edema B/L  	Neuro: Awake  	Skin: Warm and dry    LABS/STUDIES  --------------------------------------------------------------------------------              9.4    12.63 >-----------<  138      [12-13-20 @ 08:37]              28.0     135  |  104  |  57  ----------------------------<  124      [12-14-20 @ 06:28]  4.5   |  16  |  3.48        Ca     8.6     [12-14-20 @ 06:28]    Creatinine Trend:  SCr 3.48 [12-14 @ 06:28]  SCr 3.76 [12-13 @ 21:18]  SCr 4.04 [12-13 @ 15:13]  SCr 4.56 [12-13 @ 08:37]  SCr 4.85 [12-13 @ 02:11]    Urinalysis - [12-12-20 @ 23:07]      Color Colorless / Appearance Slightly Turbid / SG 1.006 / pH 6.5      Gluc Negative / Ketone Negative  / Bili Negative / Urobili <2 mg/dL       Blood Moderate / Protein Trace / Leuk Est Large / Nitrite Negative      RBC 10-15 / WBC >50 / Hyaline 1 / Gran  / Sq Epi  / Non Sq Epi 0 / Bacteria Negative    Urine Creatinine 18      [12-13-20 @ 14:57]  Urine Sodium 37      [12-13-20 @ 14:57]  Urine Osmolality 169      [12-13-20 @ 14:57]

## 2020-12-15 LAB
ANION GAP SERPL CALC-SCNC: 12 MMOL/L — SIGNIFICANT CHANGE UP (ref 7–14)
BUN SERPL-MCNC: 47 MG/DL — HIGH (ref 7–23)
CALCIUM SERPL-MCNC: 8.8 MG/DL — SIGNIFICANT CHANGE UP (ref 8.4–10.5)
CHLORIDE SERPL-SCNC: 108 MMOL/L — HIGH (ref 98–107)
CO2 SERPL-SCNC: 18 MMOL/L — LOW (ref 22–31)
CREAT SERPL-MCNC: 2.52 MG/DL — HIGH (ref 0.5–1.3)
GLUCOSE BLDC GLUCOMTR-MCNC: 120 MG/DL — HIGH (ref 70–99)
GLUCOSE BLDC GLUCOMTR-MCNC: 131 MG/DL — HIGH (ref 70–99)
GLUCOSE BLDC GLUCOMTR-MCNC: 138 MG/DL — HIGH (ref 70–99)
GLUCOSE BLDC GLUCOMTR-MCNC: 143 MG/DL — HIGH (ref 70–99)
GLUCOSE SERPL-MCNC: 99 MG/DL — SIGNIFICANT CHANGE UP (ref 70–99)
POTASSIUM SERPL-MCNC: 3.7 MMOL/L — SIGNIFICANT CHANGE UP (ref 3.5–5.3)
POTASSIUM SERPL-SCNC: 3.7 MMOL/L — SIGNIFICANT CHANGE UP (ref 3.5–5.3)
SODIUM SERPL-SCNC: 138 MMOL/L — SIGNIFICANT CHANGE UP (ref 135–145)
SURGICAL PATHOLOGY STUDY: SIGNIFICANT CHANGE UP

## 2020-12-15 PROCEDURE — 99231 SBSQ HOSP IP/OBS SF/LOW 25: CPT

## 2020-12-15 PROCEDURE — 99233 SBSQ HOSP IP/OBS HIGH 50: CPT

## 2020-12-15 RX ADMIN — HEPARIN SODIUM 5000 UNIT(S): 5000 INJECTION INTRAVENOUS; SUBCUTANEOUS at 21:14

## 2020-12-15 RX ADMIN — ATORVASTATIN CALCIUM 80 MILLIGRAM(S): 80 TABLET, FILM COATED ORAL at 21:14

## 2020-12-15 RX ADMIN — Medication 650 MILLIGRAM(S): at 21:19

## 2020-12-15 RX ADMIN — Medication 1 TABLET(S): at 14:20

## 2020-12-15 RX ADMIN — HEPARIN SODIUM 5000 UNIT(S): 5000 INJECTION INTRAVENOUS; SUBCUTANEOUS at 14:20

## 2020-12-15 RX ADMIN — Medication 650 MILLIGRAM(S): at 22:00

## 2020-12-15 RX ADMIN — Medication 105 MILLIGRAM(S): at 06:03

## 2020-12-15 RX ADMIN — HEPARIN SODIUM 5000 UNIT(S): 5000 INJECTION INTRAVENOUS; SUBCUTANEOUS at 06:03

## 2020-12-15 RX ADMIN — Medication 25 MILLIGRAM(S): at 06:03

## 2020-12-15 NOTE — PROVIDER CONTACT NOTE (OTHER) - ASSESSMENT
VS stable except HR with varying findings. On NIBP, . HR auscultated 58, apical pulse irregular. Noted hx of afib. Pt received AM dose of lopressor. Denies palpitations. Pt does not appear in distress.

## 2020-12-15 NOTE — PROVIDER CONTACT NOTE (OTHER) - ACTION/TREATMENT ORDERED:
PA notified. States will come to assess pt. No interventions at this time. Will continue to monitor.

## 2020-12-15 NOTE — PHYSICAL THERAPY INITIAL EVALUATION ADULT - PERTINENT HX OF CURRENT PROBLEM, REHAB EVAL
Pt is a 84 year old male s/p Cystoscopy with retrograde pyelography with insertion of right ureteral stent. PMH: arthritis, single kidney, HTN, atrial fibrillation, TIA.

## 2020-12-15 NOTE — PROVIDER CONTACT NOTE (OTHER) - BACKGROUND
children: N/A    Years of education: N/A     Occupational History    Not on file. Social History Main Topics    Smoking status: Never Smoker    Smokeless tobacco: Never Used    Alcohol use 0.0 oz/week      Comment: Socially    Drug use: No    Sexual activity: Yes     Partners: Male     Birth control/ protection: Surgical      Comment: TL     Other Topics Concern    Not on file     Social History Narrative    No narrative on file       Current Medications:  Current Outpatient Prescriptions   Medication Sig Dispense Refill    Liraglutide (VICTOZA) 18 MG/3ML SOPN SC injection Inject 1.8 mg into the skin daily Dose increased 10/3/2018 18 pen 3    Insulin Pen Needle 31G X 5 MM MISC 1 each by Does not apply route daily TO BE USED with VICTOZA, DAILY 100 each 0    Lancets 30G MISC 1 each by Does not apply route daily 100 each 3    Alcohol Swabs PADS Please dispense according to patients insurance/device. Testing once a day 100 each 3    blood glucose monitor kit and supplies 1 kit by Other route daily Test 1 time a day fasting & as needed for symptoms of irregular blood glucose. 1 kit 0    blood glucose test strips (ASCENSIA AUTODISC VI;ONE TOUCH ULTRA TEST VI) strip Please supply patient with testing strips that insurance will cover. With testing daily and PRN. 100 strip 3     No current facility-administered medications for this visit. Vital Signs:  /72 (Site: Left Upper Arm, Position: Sitting, Cuff Size: Large Adult)   Pulse 74   Resp 20   Ht 5' (1.524 m)   Wt 194 lb 6.4 oz (88.2 kg)   BMI 37.97 kg/m²     BMI/Height/Weight:  Body mass index is 37.97 kg/m². Waist Circumference 40\"          Review of Systems - A review of systems was performed. All was negative unless otherwise documented in HPI. Constitutional: Negative for fever, chills and diaphoresis. HENT: Negative for hearing loss and trouble swallowing. Eyes: Negative for photophobia and visual disturbance. Pt admitted with cysto & right ureter stent placement POD #2

## 2020-12-15 NOTE — PROGRESS NOTE ADULT - SUBJECTIVE AND OBJECTIVE BOX
POD #3  Afeb 113/64 86 98%RA    Pt has no c/o; A&O X 3; no hallucinations  Abd- soft NT ND   Temple 600

## 2020-12-15 NOTE — PROGRESS NOTE ADULT - SUBJECTIVE AND OBJECTIVE BOX
CHIEF COMPLAINT: f/u     SUBJECTIVE / OVERNIGHT EVENTS: Patient seen and examined. No acute events overnight. Pain well controlled and patient without any complaints.  Denies hallucinations today, overall feels better     MEDICATIONS  (STANDING):  atorvastatin 80 milliGRAM(s) Oral at bedtime  heparin   Injectable 5000 Unit(s) SubCutaneous every 8 hours  insulin lispro (ADMELOG) corrective regimen sliding scale   SubCutaneous three times a day before meals  insulin lispro (ADMELOG) corrective regimen sliding scale   SubCutaneous at bedtime  metoprolol tartrate 25 milliGRAM(s) Oral daily  multivitamin 1 Tablet(s) Oral daily  trimethoprim   80 mG/sulfamethoxazole 400 mG 1 Tablet(s) Oral daily    MEDICATIONS  (PRN):  acetaminophen   Tablet .. 650 milliGRAM(s) Oral every 6 hours PRN Mild Pain (1 - 3), Moderate Pain (4 - 6)      VITALS:  T(F): 98.8 (12-15-20 @ 09:36), Max: 98.8 (12-14-20 @ 14:30)  HR: 88 (12-15-20 @ 09:36) (86 - 96)  BP: 132/63 (12-15-20 @ 09:36) (113/64 - 138/69)  RR: 16 (12-15-20 @ 09:36) (14 - 17)  SpO2: 96% (12-15-20 @ 09:36)  Wt(kg): --      CAPILLARY BLOOD GLUCOSE      PHYSICAL EXAM:  GENERAL: NAD, elderly   EYES: EOMI, conjunctiva and sclera clear  NECK: Supple  CHEST/LUNG: Clear to auscultation bilaterally; No wheeze  HEART: Regular rate and rhythm; No murmurs, rubs, or gallops  ABDOMEN: Soft, Nontender, Nondistended; Bowel sounds present  : no pickering   EXTREMITIES: wwp, No clubbing, cyanosis, or edema  PSYCH: calm, cooperative, no hallucinations   NEUROLOGY: alert and oriented x3, nonfocal exam   SKIN: No rashes or lesions    LABS:              x                    138  | 18   | 47           x     >-----------< x       ------------------------< 99                    x                    3.7  | 108  | 2.52                                         Ca 8.8   Mg x     Ph x                          MICROBIOLOGY:        RADIOLOGY & ADDITIONAL TESTS:  Imaging Personally Reviewed: [ ] Yes    [ ] Consultant(s) Notes Reviewed:  [x] Care Discussed with Consultants/Other Providers: Urology PA - discussed

## 2020-12-15 NOTE — PHYSICAL THERAPY INITIAL EVALUATION ADULT - MANUAL MUSCLE TESTING RESULTS, REHAB EVAL
Bilateral UE muscle strength grossly 3/5 Throughout; right LE strength grossly 3-/5 throughout, Left LE muscle strength grossly 3/5 Throughout.

## 2020-12-15 NOTE — PHYSICAL THERAPY INITIAL EVALUATION ADULT - ADDITIONAL COMMENTS
Pt. left supine in bed with all tubes/lines intact, call bell in reach and in NAD.     Pt. reports he has steps to negotiate at home however, he has 1st floor living accommodations

## 2020-12-16 ENCOUNTER — TRANSCRIPTION ENCOUNTER (OUTPATIENT)
Age: 85
End: 2020-12-16

## 2020-12-16 VITALS
SYSTOLIC BLOOD PRESSURE: 138 MMHG | OXYGEN SATURATION: 97 % | HEART RATE: 95 BPM | RESPIRATION RATE: 18 BRPM | DIASTOLIC BLOOD PRESSURE: 69 MMHG | TEMPERATURE: 99 F

## 2020-12-16 LAB
ANION GAP SERPL CALC-SCNC: 14 MMOL/L — SIGNIFICANT CHANGE UP (ref 7–14)
BUN SERPL-MCNC: 38 MG/DL — HIGH (ref 7–23)
CALCIUM SERPL-MCNC: 8.7 MG/DL — SIGNIFICANT CHANGE UP (ref 8.4–10.5)
CHLORIDE SERPL-SCNC: 104 MMOL/L — SIGNIFICANT CHANGE UP (ref 98–107)
CO2 SERPL-SCNC: 18 MMOL/L — LOW (ref 22–31)
CREAT SERPL-MCNC: 1.82 MG/DL — HIGH (ref 0.5–1.3)
GLUCOSE BLDC GLUCOMTR-MCNC: 121 MG/DL — HIGH (ref 70–99)
GLUCOSE SERPL-MCNC: 110 MG/DL — HIGH (ref 70–99)
POTASSIUM SERPL-MCNC: 3.4 MMOL/L — LOW (ref 3.5–5.3)
POTASSIUM SERPL-SCNC: 3.4 MMOL/L — LOW (ref 3.5–5.3)
SODIUM SERPL-SCNC: 136 MMOL/L — SIGNIFICANT CHANGE UP (ref 135–145)

## 2020-12-16 PROCEDURE — 99232 SBSQ HOSP IP/OBS MODERATE 35: CPT

## 2020-12-16 PROCEDURE — 99233 SBSQ HOSP IP/OBS HIGH 50: CPT

## 2020-12-16 RX ORDER — ACETAMINOPHEN 500 MG
2 TABLET ORAL
Qty: 0 | Refills: 0 | DISCHARGE
Start: 2020-12-16

## 2020-12-16 RX ADMIN — HEPARIN SODIUM 5000 UNIT(S): 5000 INJECTION INTRAVENOUS; SUBCUTANEOUS at 05:31

## 2020-12-16 RX ADMIN — Medication 25 MILLIGRAM(S): at 05:31

## 2020-12-16 NOTE — DISCHARGE NOTE PROVIDER - NSDCCPCAREPLAN_GEN_ALL_CORE_FT
PRINCIPAL DISCHARGE DIAGNOSIS  Diagnosis: Hydronephrosis  Assessment and Plan of Treatment: Drink plenty of fluids; f/u with Dr. Lim to have stone and stent removed; f/u with nephrology for kidney function  Call office for fever over 101      SECONDARY DISCHARGE DIAGNOSES  Diagnosis: Acute kidney injury  Assessment and Plan of Treatment:     Diagnosis: Ureteral stone  Assessment and Plan of Treatment:

## 2020-12-16 NOTE — PROGRESS NOTE ADULT - SUBJECTIVE AND OBJECTIVE BOX
CHIEF COMPLAINT: f/u     SUBJECTIVE / OVERNIGHT EVENTS: Patient seen and examined. No acute events overnight. Pain well controlled and patient without any complaints.  Says he feels weak d/t chronic knee pains, but ok with going home.     MEDICATIONS  (STANDING):  atorvastatin 80 milliGRAM(s) Oral at bedtime  heparin   Injectable 5000 Unit(s) SubCutaneous every 8 hours  insulin lispro (ADMELOG) corrective regimen sliding scale   SubCutaneous three times a day before meals  insulin lispro (ADMELOG) corrective regimen sliding scale   SubCutaneous at bedtime  metoprolol tartrate 25 milliGRAM(s) Oral daily  multivitamin 1 Tablet(s) Oral daily  trimethoprim   80 mG/sulfamethoxazole 400 mG 1 Tablet(s) Oral daily    MEDICATIONS  (PRN):  acetaminophen   Tablet .. 650 milliGRAM(s) Oral every 6 hours PRN Mild Pain (1 - 3), Moderate Pain (4 - 6)      VITALS:  T(F): 98.9 (12-16-20 @ 09:34), Max: 99 (12-15-20 @ 14:16)  HR: 95 (12-16-20 @ 09:34) (58 - 107)  BP: 138/69 (12-16-20 @ 09:34) (132/79 - 146/65)  RR: 18 (12-16-20 @ 09:34) (16 - 18)  SpO2: 97% (12-16-20 @ 09:34)  Wt(kg): --      CAPILLARY BLOOD GLUCOSE    PHYSICAL EXAM:  GENERAL: NAD, elderly   EYES: EOMI, conjunctiva and sclera clear  NECK: Supple  CHEST/LUNG: Clear to auscultation bilaterally; No wheeze  HEART: Regular rate and rhythm; No murmurs, rubs, or gallops  ABDOMEN: Soft, Nontender, Nondistended; Bowel sounds present  : no pickering   EXTREMITIES: wwp, No clubbing, cyanosis, or edema  PSYCH: calm, cooperative, no hallucinations   NEUROLOGY: alert and oriented x3, nonfocal exam   SKIN: No rashes or lesions    LABS:              x                    136  | 18   | 38           x     >-----------< x       ------------------------< 110                   x                    3.4  | 104  | 1.82                                         Ca 8.7   Mg x     Ph x                          MICROBIOLOGY:        RADIOLOGY & ADDITIONAL TESTS:  Imaging Personally Reviewed: [ ] Yes    [ ] Consultant(s) Notes Reviewed:  [x] Care Discussed with Consultants/Other Providers: Urology PA - discussed

## 2020-12-16 NOTE — DISCHARGE NOTE PROVIDER - CARE PROVIDER_API CALL
Shahriar Lim  Urology  [address] [place] [address], [place] [address]  Phone: [phone]  Fax: [phone]  Follow Up Time:     Edvin Arroyo  NEPHROLOGY  [address] [place] [address], [address] [place] [address]  Phone: [phone]  Fax: [phone]  Follow Up Time: 2 weeks

## 2020-12-16 NOTE — DISCHARGE NOTE PROVIDER - CARE PROVIDERS DIRECT ADDRESSES
,[email or web address]

## 2020-12-16 NOTE — PROGRESS NOTE ADULT - SUBJECTIVE AND OBJECTIVE BOX
[facility] DIVISION OF KIDNEY DISEASES AND HYPERTENSION -- FOLLOW UP NOTE  --------------------------------------------------------------------------------  HPI: 84-year-old man with history of solitary right kidney, HTN, DM, CAD s/p PCI, and Atrial fibrillation admitted to [facility] on 12/12/20 after presenting with inability to urinate over the past 24 hours. Nephrology consulted for elevated serum creatinine. Of note, patient underwent an outpatient cystoscopy with biopsy on 12/10 for hematuria and has had trouble urinating since the procedure. Pt. has a history of left nephrectomy in the setting of ? urosepsis secondary to an obstructive stone at the age of 16. CT abd and pelvis obtained in the ER showed a 7mm proximal ureteral stone with moderate right hydronephrosis. Pt. subsequently underwent emergent cystoscopy with stent placement. Upon review of labs on [facility] HIE/[employer] Scr was WNL at 1.05 on 9/22/20. Scr on admission was 4.90. Scr has improved to 1.82 today. Pt. with good UOP since stent placement with ~1.4L in last 24 hours.    Pt. was seen and examined at bedside today. Laying comfortably, in no acute distress.    PAST HISTORY  --------------------------------------------------------------------------------  No significant changes to PMH, PSH, FHx, SHx, unless otherwise noted    ALLERGIES & MEDICATIONS  --------------------------------------------------------------------------------  Allergies    ciprofloxacin (Joint Pain)  penicillin (Rash)    Intolerances    Standing Inpatient Medications  atorvastatin 80 milliGRAM(s) Oral at bedtime  heparin   Injectable 5000 Unit(s) SubCutaneous every 8 hours  insulin lispro (ADMELOG) corrective regimen sliding scale   SubCutaneous three times a day before meals  insulin lispro (ADMELOG) corrective regimen sliding scale   SubCutaneous at bedtime  metoprolol tartrate 25 milliGRAM(s) Oral daily  multivitamin 1 Tablet(s) Oral daily  trimethoprim   80 mG/sulfamethoxazole 400 mG 1 Tablet(s) Oral daily    REVIEW OF SYSTEMS  --------------------------------------------------------------------------------  Gen: weakness +  Respiratory: No dyspnea  CV: No chest pain  GI: No abdominal pain  MSK: no LE edema  Neuro: No dizziness  Heme: No bleeding    All other systems were reviewed and are negative, except as noted.    VITALS/PHYSICAL EXAM  --------------------------------------------------------------------------------  T(C): 37.2 (12-16-20 @ 09:34), Max: 37.2 (12-15-20 @ 14:16)  HR: 95 (12-16-20 @ 09:34) (58 - 107)  BP: 138/69 (12-16-20 @ 09:34) (132/79 - 146/65)  RR: 18 (12-16-20 @ 09:34) (16 - 18)  SpO2: 97% (12-16-20 @ 09:34) (97% - 100%)  Wt(kg): --    12-15-20 @ 07:01  -  12-16-20 @ 07:00  --------------------------------------------------------  IN: 0 mL / OUT: 1475 mL / NET: -1475 mL    12-16-20 @ 07:01  -  12-16-20 @ 09:53  --------------------------------------------------------  IN: 0 mL / OUT: 150 mL / NET: -150 mL    Physical Exam:  	Gen: Elderly male in NAD  	HEENT: No JVD  	Pulm: CTA B/L  	CV: S1S2  	Abd: Soft, +BS   	Ext: No LE edema B/L  	Neuro: Awake  	Skin: Warm and dry    LABS/STUDIES  --------------------------------------------------------------------------------    136  |  104  |  38  ----------------------------<  110      [12-16-20 @ 06:58]  3.4   |  18  |  1.82        Ca     8.7     [12-16-20 @ 06:58]    Creatinine Trend:  SCr 1.82 [12-16 @ 06:58]  SCr 2.52 [12-15 @ 05:59]  SCr 3.48 [12-14 @ 06:28]  SCr 3.76 [12-13 @ 21:18]  SCr 4.04 [12-13 @ 15:13]

## 2020-12-16 NOTE — DISCHARGE NOTE NURSING/CASE MANAGEMENT/SOCIAL WORK - NSDCPNINST_GEN_ALL_CORE
Call MD with any signs of infection ie. fever/shaking chills, cloudy foul-smelling urine, or with signs of bleeding, or persistent nausea, or with increased unrelieved flank pain. Continue to drink plenty of fluids and avoid straining as well as constipation which may be a side effect from taking narcotic pain meds. Follow-up with MD in office for post-op check as well as with PMD as advised for continuity of care.  Schedule follow-up appointment(s) as instructed for stent removal in office. Notify physician for signs/symptoms of infection, including chills and/or fever greater than 101 deg F; nausea, vomiting, and/or diarrhea; increased pain and/or pain not relieved by medications  You may have intermittent blood tinged urine and slight flank pain when you urinate. This is normal and due to the stent in your ureter. If your urine becomes bright red or with clots, please call the office. No heavy lifting or straining for 2 to 4 weeks. Drink plenty of fluids and take over-the-counter stool softeners to prevent constipation, which can be a side effect of some pain medications. You may have some leakage from your back for the next few days, change bandage daily as needed.  Continue Diabetes management, diet and glucose monitoring, know your A1C blood level and follow up with PMD for continuity of care. Remember hand hygiene, skin inspection for prevention of infection.

## 2020-12-16 NOTE — DISCHARGE NOTE NURSING/CASE MANAGEMENT/SOCIAL WORK - NSDCPECAREGIVERED_GEN_ALL_CORE
Medline and carenotes for surgical procedure Cysto, stent placement, diabetes, as well as DC Medications and side effects literature for patient reference./Yes

## 2020-12-16 NOTE — DISCHARGE NOTE NURSING/CASE MANAGEMENT/SOCIAL WORK - NSDPDISTO_GEN_ALL_CORE
Pt  with incisions CDI steri strips to scope sites, VS stable Afebrile. pt with positive bowel sounds tyesha po diet. voiding. Seen by MD and cleared for dc to home with family as per safe Dc plan./Home

## 2020-12-16 NOTE — DISCHARGE NOTE PROVIDER - NSDCMRMEDTOKEN_GEN_ALL_CORE_FT
acetaminophen 325 mg oral tablet: 2 tab(s) orally every 6 hours, As needed, Mild Pain (1 - 3), Moderate Pain (4 - 6)  Bactrim  mg-160 mg oral tablet: 1 milligram(s) orally 2 times a day   Eliquis 2.5 mg oral tablet: once/day  losartan 50 mg oral tablet: 1 tab(s) orally once a day  metFORMIN 500 mg oral tablet: 1 tab(s) orally 2 times a day  metoprolol tartrate 25 mg oral tablet: once/day  Multiple Vitamins oral tablet: 1 tab(s) orally once a day  multivitamin one tablet orally daily: last dose 12-4-20  OTC tumeric one tablet orally daily: last dose 12-4-20  repaglinide 0.5 mg oral tablet: once/day  rosuvastatin 40 mg oral tablet: 1 tab(s) orally once a day  vitamin d one tablet orallu bk;u:    acetaminophen 325 mg oral tablet: 2 tab(s) orally every 6 hours, As needed, Mild Pain (1 - 3), Moderate Pain (4 - 6)  Bactrim 400 mg-80 mg oral tablet: 160 milligram(s) orally every 12 hours   Eliquis 2.5 mg oral tablet: once/day  losartan 50 mg oral tablet: 1 tab(s) orally once a day  metFORMIN 500 mg oral tablet: 1 tab(s) orally 2 times a day  metoprolol tartrate 25 mg oral tablet: once/day  Multiple Vitamins oral tablet: 1 tab(s) orally once a day  multivitamin one tablet orally daily: last dose 12-4-20  OTC tumeric one tablet orally daily: last dose 12-4-20  repaglinide 0.5 mg oral tablet: once/day  rosuvastatin 40 mg oral tablet: 1 tab(s) orally once a day  vitamin d one tablet orallu bk;u:

## 2020-12-16 NOTE — PROGRESS NOTE ADULT - REASON FOR ADMISSION
right ureteral stone, acute renal failure

## 2020-12-16 NOTE — PROGRESS NOTE ADULT - ASSESSMENT
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    #LILIAN 2/2 post-obstructive uropathy 2/2 ureteral stone: s/p ureteral stent on 12/13  -Renal function continues to improve, no longer in postobstructive diuresis UOP appropriate  -Primary team to check with pharmacy for appropriate Bactrim dosing   -Would hold losartan for now and resume as outpt, discussed with pt     #Afib: c/w bb, renal function improved, can c/w Eliquis     #HTN: c/w bb, monitor BP, hold ARB upon dc, should f/u with PCP for repeat BMP prior to resuming arb.     #DM2: hold metformin upon dc as renal clearance borderline, can f/u with PCP to resume     #Visual hallucinations: likely 2/2 general anesthesia, now resolved 
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    #LILIAN 2/2 post-obstructive uropathy: s/p ureteral stent on 12/13  -C/w Bactrim per last UCx +Enterobactor, may need dose adjustment if Cr continues to improve.   -Would hold losartan for now and resume as outpt     #Ureteral stone: s/p stent with urology, s/p Zosyn, now on Bactrim     #Afib: c/w bb, LILIAN resolving, Eliquis held in setting of LILIAN (calculated CrCl 17 today), would check with renal if ok to resume prior to DC and confirm dosing with   pts cardiologist/PCP.     #HTN: c/w bb, monitor BP, hold ARB upon dc, should f/u with PCP for repeat BMP prior to resuming arb.     #Visual hallucinations: likely 2/2 general anesthesia, now resolved       
84M with obstructing 4mm stone in a solitary right kidney.  High UOP, likely post-obstructive diuresis.    -Reg diet/IVF  - Drink to thirst  - Home meds started, holding eliquis for now given renal failure  - Continue zosyn  - Keep pickering for now  
84M with obstructing 4mm stone in a solitary right kidney.  High UOP, likely post-obstructive diuresis.  Plan:  -Reg diet/IVF  - Drink to thirst  - Home meds started, holding eliquis for now given renal failure  - Continue zosyn  - Keep pickering for now  - neph f/u  - labs  
84M with obstructing 4mm stone in a solitary right kidney; stent placed;   High UOP, likely post-obstructive diuresis.; now urine output stabilized  Plan:  - d/c pickering  - monitor creat  - P.T. eval  - Drink to thirst  - Home meds started, holding eliquis for now given renal failure  - Bactrim  - neph/ med  f/u  
84M with obstructing 4mm stone in a solitary right kidney; stent placed;   High UOP, likely post-obstructive diuresis.; now urine output stabilized; creat improved  Plan:  - trend creat for eliquis plan  - home today  
Pt. with LILIAN in setting of obstructive uropathy.
Pt. with LILIAN in setting of obstructive uropathy. 
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    #Visual hallucinations: started 12/14, overall alert and oriented, neurologically intact  -Although no hx of adverse effects from anesthesia in the past, suspect 2/2 anesthesia as pt received general on 12/13 and previously 12/10 vs LILIAN vs infection?   -Pt reports daily etoh use, but last drink was last Tuesday per pt and confirmed by wife, no hx of withdrawals and out of window for alcoholic hallucinosis or DTs. Can do MVI and IV Thiamine for now.   -Monitor closely, discussed with pt and wife     #LILIAN 2/2 Post-obstructive uropathy, s/p ureteral stent on 12/13  -s/p Zosyn per urology, transitioned to Bactrim   -UCx neg, last cx w/Enterobactor   -Holding losartan for now.    #Electrolyte abnormalities 2/2 LILIAN: resolved w/improving renal function   #Ureteral stone: s/p stent with urology, s/p Zosyn, now on Bactrim   #Afib: c/w bb, holding Eliquis in setting of LILIAN  #HTN: c/w bb, monitor BP, hold Arb

## 2020-12-16 NOTE — PROGRESS NOTE ADULT - PROBLEM SELECTOR PLAN 1
Pt. with LILIAN in the setting of obstructive uropathy/hydronephrosis and UTI. Scr elevated at 4.90 on admission with CT scan showing a right sided hydronephrosis from stone located in right proximal ureter. Pt. 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 Pt. underwent emergent cystoscopy and stent placement on 12/13/20. Scr has improved to 1.89 today. UOP 1.4L in last 24 hours. Encourage PO intake. Monitor labs and urine output. Avoid any potential nephrotoxins. Dose medications as per eGFR.    Please make follow up appointment with Dr. Colvin ([facility]) @ [address] [place] [address] ([phone]).    Will sign-off pt. at this time. Please reconsult as needed.  If any questions, please feel free to contact me  Maximus Hobson   Nephrology Fellow  [phone]  (After 5 pm or on weekends please page the on-call fellow)

## 2020-12-16 NOTE — DISCHARGE NOTE PROVIDER - HOSPITAL COURSE
Pt admitted with R. prox stone in solitary kidney; high creatinine; stent placed, creat down to 1.8; may restart eliquis; home on bactrim; f/u renal and urology; pain is controlled; will have home P.T. (refuses rehab).

## 2020-12-16 NOTE — DISCHARGE NOTE NURSING/CASE MANAGEMENT/SOCIAL WORK - PATIENT PORTAL LINK FT
You can access the FollowMyHealth Patient Portal offered by [facility] by registering at the following website: [email or web address]. By joining [employer]’s FollowMyHealth portal, you will also be able to view your health information using other applications (apps) compatible with our system.

## 2020-12-18 LAB
CULTURE RESULTS: SIGNIFICANT CHANGE UP
CULTURE RESULTS: SIGNIFICANT CHANGE UP
SPECIMEN SOURCE: SIGNIFICANT CHANGE UP
SPECIMEN SOURCE: SIGNIFICANT CHANGE UP

## 2020-12-20 ENCOUNTER — EMERGENCY (EMERGENCY)
Facility: HOSPITAL | Age: 85
LOS: 1 days | Discharge: HOME | End: 2020-12-20
Attending: EMERGENCY MEDICINE | Admitting: EMERGENCY MEDICINE
Payer: MEDICARE

## 2020-12-20 ENCOUNTER — NON-APPOINTMENT (OUTPATIENT)
Age: 85
End: 2020-12-20

## 2020-12-20 VITALS
HEART RATE: 96 BPM | RESPIRATION RATE: 16 BRPM | SYSTOLIC BLOOD PRESSURE: 152 MMHG | DIASTOLIC BLOOD PRESSURE: 61 MMHG | OXYGEN SATURATION: 100 %

## 2020-12-20 VITALS
DIASTOLIC BLOOD PRESSURE: 78 MMHG | OXYGEN SATURATION: 100 % | TEMPERATURE: 98 F | HEART RATE: 112 BPM | RESPIRATION RATE: 16 BRPM | HEIGHT: 69 IN | SYSTOLIC BLOOD PRESSURE: 154 MMHG

## 2020-12-20 DIAGNOSIS — Z90.5 ACQUIRED ABSENCE OF KIDNEY: Chronic | ICD-10-CM

## 2020-12-20 DIAGNOSIS — Z95.5 PRESENCE OF CORONARY ANGIOPLASTY IMPLANT AND GRAFT: Chronic | ICD-10-CM

## 2020-12-20 DIAGNOSIS — Z98.41 CATARACT EXTRACTION STATUS, RIGHT EYE: Chronic | ICD-10-CM

## 2020-12-20 LAB
ANION GAP SERPL CALC-SCNC: 14 MMOL/L — SIGNIFICANT CHANGE UP (ref 7–14)
APPEARANCE UR: ABNORMAL
BACTERIA # UR AUTO: ABNORMAL
BASOPHILS # BLD AUTO: 0.03 K/UL — SIGNIFICANT CHANGE UP (ref 0–0.2)
BASOPHILS NFR BLD AUTO: 0.3 % — SIGNIFICANT CHANGE UP (ref 0–2)
BILIRUB UR-MCNC: NEGATIVE — SIGNIFICANT CHANGE UP
BUN SERPL-MCNC: 37 MG/DL — HIGH (ref 7–23)
CALCIUM SERPL-MCNC: 8.9 MG/DL — SIGNIFICANT CHANGE UP (ref 8.4–10.5)
CHLORIDE SERPL-SCNC: 107 MMOL/L — SIGNIFICANT CHANGE UP (ref 98–107)
CO2 SERPL-SCNC: 17 MMOL/L — LOW (ref 22–31)
COLOR SPEC: ABNORMAL
CREAT SERPL-MCNC: 1.69 MG/DL — HIGH (ref 0.5–1.3)
DIFF PNL FLD: ABNORMAL
EOSINOPHIL # BLD AUTO: 0.09 K/UL — SIGNIFICANT CHANGE UP (ref 0–0.5)
EOSINOPHIL NFR BLD AUTO: 0.8 % — SIGNIFICANT CHANGE UP (ref 0–6)
EPI CELLS # UR: 2 /HPF — SIGNIFICANT CHANGE UP (ref 0–5)
GLUCOSE SERPL-MCNC: 136 MG/DL — HIGH (ref 70–99)
GLUCOSE UR QL: NEGATIVE — SIGNIFICANT CHANGE UP
HCT VFR BLD CALC: 32 % — LOW (ref 39–50)
HGB BLD-MCNC: 10.1 G/DL — LOW (ref 13–17)
HYALINE CASTS # UR AUTO: 2 /LPF — SIGNIFICANT CHANGE UP (ref 0–7)
IANC: 8.57 K/UL — HIGH (ref 1.5–8.5)
IMM GRANULOCYTES NFR BLD AUTO: 1.3 % — SIGNIFICANT CHANGE UP (ref 0–1.5)
KETONES UR-MCNC: NEGATIVE — SIGNIFICANT CHANGE UP
LEUKOCYTE ESTERASE UR-ACNC: NEGATIVE — SIGNIFICANT CHANGE UP
LYMPHOCYTES # BLD AUTO: 1.5 K/UL — SIGNIFICANT CHANGE UP (ref 1–3.3)
LYMPHOCYTES # BLD AUTO: 13.5 % — SIGNIFICANT CHANGE UP (ref 13–44)
MCHC RBC-ENTMCNC: 29.7 PG — SIGNIFICANT CHANGE UP (ref 27–34)
MCHC RBC-ENTMCNC: 31.6 GM/DL — LOW (ref 32–36)
MCV RBC AUTO: 94.1 FL — SIGNIFICANT CHANGE UP (ref 80–100)
MONOCYTES # BLD AUTO: 0.76 K/UL — SIGNIFICANT CHANGE UP (ref 0–0.9)
MONOCYTES NFR BLD AUTO: 6.9 % — SIGNIFICANT CHANGE UP (ref 2–14)
NEUTROPHILS # BLD AUTO: 8.57 K/UL — HIGH (ref 1.8–7.4)
NEUTROPHILS NFR BLD AUTO: 77.2 % — HIGH (ref 43–77)
NITRITE UR-MCNC: NEGATIVE — SIGNIFICANT CHANGE UP
NRBC # BLD: 0 /100 WBCS — SIGNIFICANT CHANGE UP
NRBC # FLD: 0 K/UL — SIGNIFICANT CHANGE UP
PH UR: 6 — SIGNIFICANT CHANGE UP (ref 5–8)
PLATELET # BLD AUTO: 331 K/UL — SIGNIFICANT CHANGE UP (ref 150–400)
POTASSIUM SERPL-MCNC: 4.4 MMOL/L — SIGNIFICANT CHANGE UP (ref 3.5–5.3)
POTASSIUM SERPL-SCNC: 4.4 MMOL/L — SIGNIFICANT CHANGE UP (ref 3.5–5.3)
PROT UR-MCNC: ABNORMAL
RBC # BLD: 3.4 M/UL — LOW (ref 4.2–5.8)
RBC # FLD: 13.3 % — SIGNIFICANT CHANGE UP (ref 10.3–14.5)
RBC CASTS # UR COMP ASSIST: >720 /HPF — HIGH (ref 0–4)
SODIUM SERPL-SCNC: 138 MMOL/L — SIGNIFICANT CHANGE UP (ref 135–145)
SP GR SPEC: 1.01 — SIGNIFICANT CHANGE UP (ref 1.01–1.02)
UROBILINOGEN FLD QL: SIGNIFICANT CHANGE UP
WBC # BLD: 11.09 K/UL — HIGH (ref 3.8–10.5)
WBC # FLD AUTO: 11.09 K/UL — HIGH (ref 3.8–10.5)
WBC UR QL: 13 /HPF — HIGH (ref 0–5)

## 2020-12-20 PROCEDURE — 99283 EMERGENCY DEPT VISIT LOW MDM: CPT | Mod: 25

## 2020-12-20 RX ORDER — SODIUM CHLORIDE 9 MG/ML
1000 INJECTION INTRAMUSCULAR; INTRAVENOUS; SUBCUTANEOUS ONCE
Refills: 0 | Status: COMPLETED | OUTPATIENT
Start: 2020-12-20 | End: 2020-12-20

## 2020-12-20 RX ADMIN — SODIUM CHLORIDE 1000 MILLILITER(S): 9 INJECTION INTRAMUSCULAR; INTRAVENOUS; SUBCUTANEOUS at 12:27

## 2020-12-20 NOTE — ED PROVIDER NOTE - BIRTH SEX
COVID-19 Screening:    Does the patient or patient’s household members have any of the following symptoms?    • Temperature: Fever >100.4°F or >38.0°C?    • Respiratory Symptoms: New or worsening cough, shortness of breath, or sore throat?   • GI Symptoms: New onset of nausea, vomiting or diarrhea?    • Miscellaneous: New onset of loss of taste or smell?    Chills, repeated shaking with chills, muscle pain, and headache?    Have you or a household member tested positive for COVID-19 in the last 14 days?      Patient answered “NO” to all questions.    Brianne Feldman, RTT  
Male

## 2020-12-20 NOTE — ED PROCEDURE NOTE - ATTENDING CONTRIBUTION TO CARE
TOÑO Pimentel: I have personally performed a face to face bedside history and physical examination of this patient. I have discussed the history, examination, review of systems, assessment and plan of management with the resident. I have reviewed the electronic medical record and amended it to reflect my history, review of systems, physical exam, assessment and plan.
I personally supervised this procedure performed by the resident and I agree with the above documentation.

## 2020-12-20 NOTE — ED PROVIDER NOTE - PROGRESS NOTE DETAILS
hr normalized, now asymptomatic, feeling better, cr continued to trend down, d/w urology-they will f/u with him in office in next few d.

## 2020-12-20 NOTE — ED PROVIDER NOTE - NSFOLLOWUPINSTRUCTIONS_ED_ALL_ED_FT
Please follow up with Dr. Lim in 3-5 days.    Return immediately if you develop fevers, chills, flank pain, notice blood in leg bag, or develop any new or concerning symptoms.    Please review pickering catheter care instructions.

## 2020-12-20 NOTE — ED PROVIDER NOTE - OBJECTIVE STATEMENT
84 yo male with a PMHx of HTN, HLD, CAD (s/p 4 stents), afib (on eliquis), T2DM (on oral agents), left nephrectomy, and recently found bladder mass s/p bx 12/10 presenting for urinary retention x 1 day. Of note, patient recently admitted with urinary retention found to have right moderate hydronephrosis iso 7mm right ureteral stone s/p stenting. 86 yo male with a PMHx of HTN, HLD, CAD (s/p 4 stents), afib (on eliquis), T2DM (on oral agents), s/p left nephrectomy, and recently found bladder mass s/p bx 12/10 presenting for urinary retention x 1 day. Last urinated yesterday morning. Denies any pain, hematuria, dysuria, fevers. Of note, patient recently admitted with urinary retention found to have right moderate hydronephrosis 2/2 to 7mm right ureteral stone s/p stenting.

## 2020-12-20 NOTE — ED PROVIDER NOTE - ATTENDING CONTRIBUTION TO CARE
agree with above hpi  on my exam  vital signs: T(C): 36.7 (12-20-20 @ 11:19), Max: 36.7 (12-20-20 @ 11:19)  HR: 112 (12-20-20 @ 11:19) (112 - 112)  BP: 154/78 (12-20-20 @ 11:19) (154/78 - 154/78)  BP(mean): --  RR: 16 (12-20-20 @ 11:19) (16 - 16)  SpO2: 100% (12-20-20 @ 11:19) (100% - 100%)  GEN - NAD; well appearing; A+O x3   HEAD - NC/AT   EYES- PERRL, EOMI  ENT: Airway patent, mmm, Oral cavity and pharynx normal. No inflammation, swelling, exudate, or lesions.  NECK: Neck supple, non-tender without lymphadenopathy, no masses.  PULMONARY - CTA b/l, symmetric breath sounds.   CARDIAC -s1s2, RRR, no M,G,R  ABDOMEN - +BS, +suprapubic distention, NT, soft, no guarding, no rebound, no masses   BACK - no CVA tenderness, Normal  spine   EXTREMITIES - FROM, symmetric pulses, capillary refill < 2 seconds, no edema   SKIN - no rash or bruising   NEUROLOGIC - alert, speech clear, no focal deficits  PSYCH -nl mood/affect, nl insight.  Patient presents to the ed with urinary retention since yesterday, last week had stent placed for hydro 2/2 kidney stone, no hematuria/fevers/chill/cp/sob/abd pain/vomiting, diarrhea/dysuria. States bladder feels bloated. Noted urine in bladder on pocus bedside us-pickering to be placed for retention, reass vs when pickering placed as no other complaints.

## 2020-12-20 NOTE — ED ADULT NURSE NOTE - OBJECTIVE STATEMENT
Pt received AOX4, ambulatory with assistance at baseline presents to the ED with chief complaint of difficulty urination since yesterday. PMHX of HTN, DM, Afib (Eliquis), left nephrectomy, CAD s/p stents. Pt was admitted at this hospital on 12/12 and underwent a cystoscopy and stent placement due do an obstructive urethral stone. Pt denies chills, fever, cough, NVD, flank pain at the moment. Pt breathing even and unlabored, saturating 100% on RA. Pt received AOX4, ambulatory with assistance at baseline presents to the ED with chief complaint of difficulty urination since yesterday. PMHX of HTN, DM, Afib (Eliquis), left nephrectomy, CAD s/p stents. Pt was admitted at this hospital on 12/12 and underwent a cystoscopy and stent placement due do an obstructive urethral stone. Pt denies chills, fever, cough, NVD, flank pain at the moment. Pt breathing even and unlabored, saturating 100% on RA. Vs as noted. 20G RAC. Nacl infusion in progress.

## 2020-12-20 NOTE — ED PROVIDER NOTE - CLINICAL SUMMARY MEDICAL DECISION MAKING FREE TEXT BOX
DO Dina PGY-2: 84 y/o M with PMH of HTN, HLD, DM, CAD s/p stent, afib on eliquis, recently admitted for obstructing kidney stone of solitary r kidney s/p stenting on 12/13. Patient comfortable, no pain. POCUS showing appx 250 cc of urine, will place pickering. Uro c/s for further recs given recent stenting

## 2020-12-20 NOTE — ED PROVIDER NOTE - NS ED ROS FT
CONSTITUTIONAL: No fevers, no chills, no lightheadedness, no dizziness  Eyes: no visual changes  Ears: no ear drainage, no ear pain  Nose: no nasal congestion  Mouth/Throat: no sore throat  CV: No chest pain, no palpitations  PULM: No SOB, no cough  GI: No n/v/d, no abd pain  : +retention, no dysuria, no hematuria  SKIN: no rashes.  NEURO: no headache, no focal weakness or numbness  LYMPH/VASC: no LE swelling

## 2020-12-20 NOTE — ED PROCEDURE NOTE - PROCEDURE ADDITIONAL DETAILS
Emergency Department Focused Ultrasound performed at patient's bedside for educational purposes. The study will have a follow up study performed or was performed in the direct supervision of an ultrasound trained attending. Emergency Department Focused Ultrasound performed at patient's bedside for educational purposes.

## 2020-12-20 NOTE — ED PROVIDER NOTE - CARE PROVIDER_API CALL
Shahriar Lim  Urology  [address] [place] [address]  Phone: [phone]  Fax: [phone]  Follow Up Time: 4-6 Days

## 2020-12-20 NOTE — ED ADULT TRIAGE NOTE - CHIEF COMPLAINT QUOTE
states urinating small amts only since 1 pm yesterday. denies pains. d/lashawn lij bladder tumor.stents,kidney stones

## 2020-12-20 NOTE — ED PROVIDER NOTE - PATIENT PORTAL LINK FT
You can access the FollowMyHealth Patient Portal offered by [facility] by registering at the following website: [email or web address]. By joining [employer]’s FollowMyHealth portal, you will also be able to view your health information using other applications (apps) compatible with our system.

## 2020-12-20 NOTE — ED PROVIDER NOTE - PHYSICAL EXAMINATION
gen: well appearing  Mentation: AAO x 3  psych: mood appropriate  ENT: airway patent  Eyes: conjunctivae clear bilaterally  Cardio: RRR, no m/r/g  Resp: normal BS b/l  GI: s/nt  : no CVA tenderness  Neuro: sensation and motor function intact  Skin: +large left flank scar  MSK: normal movement of all extremities  Lymph/Vasc: no LE edema

## 2020-12-20 NOTE — ED ADULT NURSE NOTE - INTERVENTIONS DEFINITIONS
Christy Gore contacted Barry on 09/20/19 and left a message. If patient calls back please schedule appointment as soon as possible med check.      .Christy MONIQUE    [facility]       [place] to call system/Call bell, personal items and telephone within reach/Instruct patient to call for assistance/Room bathroom lighting operational/Non-slip footwear when patient is off stretcher/Physically safe environment: no spills, clutter or unnecessary equipment/Stretcher in lowest position, wheels locked, appropriate side rails in place/Monitor gait and stability/Monitor for mental status changes and reorient to person, place, and time/Review medications for side effects contributing to fall risk/Reinforce activity limits and safety measures with patient and family

## 2020-12-21 ENCOUNTER — NON-APPOINTMENT (OUTPATIENT)
Age: 85
End: 2020-12-21

## 2020-12-21 LAB
CULTURE RESULTS: NO GROWTH — SIGNIFICANT CHANGE UP
SPECIMEN SOURCE: SIGNIFICANT CHANGE UP

## 2020-12-23 ENCOUNTER — APPOINTMENT (OUTPATIENT)
Dept: UROLOGY | Facility: CLINIC | Age: 85
End: 2020-12-23
Payer: MEDICARE

## 2020-12-23 VITALS
DIASTOLIC BLOOD PRESSURE: 76 MMHG | TEMPERATURE: 97 F | SYSTOLIC BLOOD PRESSURE: 146 MMHG | HEART RATE: 109 BPM | RESPIRATION RATE: 16 BRPM

## 2020-12-23 DIAGNOSIS — R33.9 RETENTION OF URINE, UNSPECIFIED: ICD-10-CM

## 2020-12-23 PROCEDURE — 99213 OFFICE O/P EST LOW 20 MIN: CPT

## 2020-12-23 NOTE — ASSESSMENT
[FreeTextEntry1] : passed TOV\par take flomax until after the ureteroscopy and stent removal next week

## 2020-12-23 NOTE — HISTORY OF PRESENT ILLNESS
[FreeTextEntry1] : Here for evaluation of hematuria.\par He noted dark - almost coffee colored urine for a day. No redness or clots. Saw his PCP who checked urine and told him he had significant microscopic hematuria. No associated back or flank pain. Likewise no dysuria, increased frequency or urgency or other voiding symptoms. 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 \par \par S/p BT - complicated by returning anuric. Noted to have a stone in mid urter so stented emergently. Once his obstructive uropathy resolved went home. Dis ok for a few days but ended back in ER in retention - 300cc PVR.\par no retention before. \par

## 2021-01-01 ENCOUNTER — APPOINTMENT (OUTPATIENT)
Dept: DISASTER EMERGENCY | Facility: CLINIC | Age: 86
End: 2021-01-01

## 2021-01-02 LAB — SARS-COV-2 N GENE NPH QL NAA+PROBE: NOT DETECTED

## 2021-01-03 ENCOUNTER — TRANSCRIPTION ENCOUNTER (OUTPATIENT)
Age: 86
End: 2021-01-03

## 2021-01-04 ENCOUNTER — OUTPATIENT (OUTPATIENT)
Dept: OUTPATIENT SERVICES | Facility: HOSPITAL | Age: 86
LOS: 1 days | Discharge: HOME | End: 2021-01-04
Payer: MEDICARE

## 2021-01-04 ENCOUNTER — APPOINTMENT (OUTPATIENT)
Dept: UROLOGY | Facility: HOSPITAL | Age: 86
End: 2021-01-04

## 2021-01-04 VITALS
RESPIRATION RATE: 16 BRPM | TEMPERATURE: 98 F | WEIGHT: 130.07 LBS | DIASTOLIC BLOOD PRESSURE: 92 MMHG | SYSTOLIC BLOOD PRESSURE: 146 MMHG | OXYGEN SATURATION: 100 % | HEART RATE: 108 BPM | HEIGHT: 66.5 IN

## 2021-01-04 VITALS — TEMPERATURE: 98 F

## 2021-01-04 DIAGNOSIS — Z98.41 CATARACT EXTRACTION STATUS, RIGHT EYE: Chronic | ICD-10-CM

## 2021-01-04 DIAGNOSIS — N20.1 CALCULUS OF URETER: ICD-10-CM

## 2021-01-04 DIAGNOSIS — Z90.5 ACQUIRED ABSENCE OF KIDNEY: Chronic | ICD-10-CM

## 2021-01-04 DIAGNOSIS — Z95.5 PRESENCE OF CORONARY ANGIOPLASTY IMPLANT AND GRAFT: Chronic | ICD-10-CM

## 2021-01-04 PROCEDURE — 52356 CYSTO/URETERO W/LITHOTRIPSY: CPT | Mod: RT

## 2021-01-04 RX ORDER — SODIUM CHLORIDE 9 MG/ML
1000 INJECTION, SOLUTION INTRAVENOUS
Refills: 0 | Status: DISCONTINUED | OUTPATIENT
Start: 2021-01-04 | End: 2021-01-19

## 2021-01-04 RX ORDER — ONDANSETRON 8 MG/1
4 TABLET, FILM COATED ORAL ONCE
Refills: 0 | Status: DISCONTINUED | OUTPATIENT
Start: 2021-01-04 | End: 2021-01-19

## 2021-01-04 RX ORDER — APIXABAN 2.5 MG/1
0 TABLET, FILM COATED ORAL
Qty: 0 | Refills: 0 | DISCHARGE

## 2021-01-04 RX ORDER — CEFTRIAXONE 500 MG/1
1000 INJECTION, POWDER, FOR SOLUTION INTRAMUSCULAR; INTRAVENOUS ONCE
Refills: 0 | Status: COMPLETED | OUTPATIENT
Start: 2021-01-04 | End: 2021-01-04

## 2021-01-04 RX ORDER — AZTREONAM 2 G
1 VIAL (EA) INJECTION
Qty: 6 | Refills: 0
Start: 2021-01-04 | End: 2021-01-06

## 2021-01-04 RX ORDER — HYDROMORPHONE HYDROCHLORIDE 2 MG/ML
0.25 INJECTION INTRAMUSCULAR; INTRAVENOUS; SUBCUTANEOUS
Refills: 0 | Status: DISCONTINUED | OUTPATIENT
Start: 2021-01-04 | End: 2021-01-04

## 2021-01-04 RX ADMIN — HYDROMORPHONE HYDROCHLORIDE 0.25 MILLIGRAM(S): 2 INJECTION INTRAMUSCULAR; INTRAVENOUS; SUBCUTANEOUS at 18:30

## 2021-01-04 RX ADMIN — HYDROMORPHONE HYDROCHLORIDE 0.25 MILLIGRAM(S): 2 INJECTION INTRAMUSCULAR; INTRAVENOUS; SUBCUTANEOUS at 18:45

## 2021-01-04 RX ADMIN — SODIUM CHLORIDE 30 MILLILITER(S): 9 INJECTION, SOLUTION INTRAVENOUS at 15:43

## 2021-01-04 NOTE — BRIEF OPERATIVE NOTE - NSICDXBRIEFPROCEDURE_GEN_ALL_CORE_FT
PROCEDURES:  Cystourethroscopy with right ureteroscopy with lithotripsy using holmium laser 04-Jan-2021 17:44:52  Perez Gage

## 2021-01-04 NOTE — ASU DISCHARGE PLAN (ADULT/PEDIATRIC) - FOLLOW UP APPOINTMENTS
911 or go to the nearest Emergency Room may also call Recovery Room (PACU) 24/7 @ [phone]/[facility] ASU (Adult):

## 2021-01-04 NOTE — ASU DISCHARGE PLAN (ADULT/PEDIATRIC) - CALL YOUR DOCTOR IF YOU HAVE ANY OF THE FOLLOWING:
Bleeding that does not stop/Pain not relieved by Medications/Fever greater than (need to indicate Fahrenheit or Celsius)/Unable to urinate Bleeding that does not stop/Pain not relieved by Medications/Fever greater than (need to indicate Fahrenheit or Celsius)/Wound/Surgical Site with redness, or foul smelling discharge or pus/Nausea and vomiting that does not stop/Unable to urinate

## 2021-01-04 NOTE — PROVIDER CONTACT NOTE (OTHER) - ACTION/TREATMENT ORDERED:
As per ROMERO Mcnulty, pt will resume xarleto when instructed to by Dr Lim when patient follows up to have stent resumed

## 2021-01-04 NOTE — ASU DISCHARGE PLAN (ADULT/PEDIATRIC) - CARE PROVIDER_API CALL
Shahriar Lim)  Urology  [address] [place] [address]  Phone: [phone]  Fax: [phone]  Follow Up Time:

## 2021-01-04 NOTE — ASU DISCHARGE PLAN (ADULT/PEDIATRIC) - ASU DC SPECIAL INSTRUCTIONSFT
You may take over the counter pain medicine as needed for pain.     Please complete course of antibiotics as prescribed.    You may resume eliquis on Wednesday.    You may have intermittent blood tinged urine and slight flank pain when you urinate.  This is normal and due to the stent in your ureter.   If your urine becomes bright red or with clots, please call the office.     Please followup with Dr. Lim on Friday for stent removal. Please call to schedule an appointment.

## 2021-01-04 NOTE — ASU DISCHARGE PLAN (ADULT/PEDIATRIC) - NURSING INSTRUCTIONS
Your urine color should improve from bloody color to a lighter cranberry as you increase your fluids.  If it doesn't call MD.  If you don't have any urine output for more than 1 hour, drink 2 glasses of fluids and attempt void.  If unable, call MD.  Do not pull on or manipulate string attached to penis  DO NOT take any Tylenol (Acetaminophen) or narcotics containing Tylenol until after  11pm on 1/4/21. You received Tylenol during your operation and it can cause damage to your liver if too much is taken within a 24 hour time period.

## 2021-01-04 NOTE — H&P ADULT - HISTORY OF PRESENT ILLNESS
HPI  86yo M hx of f HTN, HLD, DM, CAD (MI 2003, 4 stents), Afib on eliquis, h/o L nephrectomy in distant past (unclear etiology) now w/ solitary kidney here for R URS, laser lithotripsy.       PAST MEDICAL & SURGICAL HISTORY:  History of bradycardia    History of TIAs  Last 2018    Hyperlipidemia    Hypertension    Atrial fibrillation    Hard of hearing    Myocardial infarction  (2003)    Arthritis    Single kidney  (hx/o LEFT nephrectomy at age 16; pt states due to a ureter&quot;blockage&quot; causing &quot;infection&quot;; pt denies hx/o renal dysfunction)    Diabetes mellitus, new onset    CAD (coronary artery disease)  (4 stents, non-adherent to aspirin)    S/P bilateral cataract extraction    Stented coronary artery  (4 stents)    History of nephrectomy, unilateral  (hx/o LEFT nephrectomy at age 16)        MEDICATIONS  (STANDING):    MEDICATIONS  (PRN):      FAMILY HISTORY:  Family history of diabetes mellitus in mother (Mother)  (Pt states his mother developed DM in her 70&#x27;s)        Allergies    ciprofloxacin (Joint Pain)  penicillin (Rash)    Intolerances        SOCIAL HISTORY:    REVIEW OF SYSTEMS: Otherwise negative as stated in HPI    Physical Exam  Vital signs  T(C): --  HR: --  BP: --  SpO2: --  Wt(kg): --    Output      Gen:  NAD    Pulm:  No respiratory distress  	  CV:  RRR    GI:  S/ND/NT

## 2021-01-05 LAB — GLUCOSE BLDC GLUCOMTR-MCNC: 95 MG/DL — SIGNIFICANT CHANGE UP (ref 70–99)

## 2021-01-06 LAB
CULTURE RESULTS: SIGNIFICANT CHANGE UP
SPECIMEN SOURCE: SIGNIFICANT CHANGE UP

## 2021-01-08 ENCOUNTER — APPOINTMENT (OUTPATIENT)
Dept: UROLOGY | Facility: CLINIC | Age: 86
End: 2021-01-08
Payer: MEDICARE

## 2021-01-08 DIAGNOSIS — Q60.0 RENAL AGENESIS, UNILATERAL: ICD-10-CM

## 2021-01-08 PROCEDURE — 99212 OFFICE O/P EST SF 10 MIN: CPT

## 2021-01-09 NOTE — HISTORY OF PRESENT ILLNESS
[FreeTextEntry1] : Here for evaluation of hematuria.\par He noted dark - almost coffee colored urine for a day. No redness or clots. Saw his PCP who checked urine and told him he had significant microscopic hematuria. No associated back or flank pain. Likewise no dysuria, increased frequency or urgency or other voiding symptoms. 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 \par \par S/p BT - complicated by returning anuric. Noted to have a stone in mid ureter so stented emergently. Once his obstructive uropathy resolved went home. Did ok for a few days but ended back in ER in retention - 300cc PVR.\par no retention before. had him stay on tamsulosin and passed TOV.\par \par now s/p ureteroscopy laser lithotripsy of the stone - stent on string.\par voiding baseline on the Flomax. \par

## 2021-03-16 ENCOUNTER — APPOINTMENT (OUTPATIENT)
Dept: UROLOGY | Facility: CLINIC | Age: 86
End: 2021-03-16
Payer: MEDICARE

## 2021-03-16 ENCOUNTER — OUTPATIENT (OUTPATIENT)
Dept: OUTPATIENT SERVICES | Facility: HOSPITAL | Age: 86
LOS: 1 days | End: 2021-03-16
Payer: MEDICARE

## 2021-03-16 DIAGNOSIS — Z87.448 PERSONAL HISTORY OF OTHER DISEASES OF URINARY SYSTEM: ICD-10-CM

## 2021-03-16 DIAGNOSIS — Z95.5 PRESENCE OF CORONARY ANGIOPLASTY IMPLANT AND GRAFT: Chronic | ICD-10-CM

## 2021-03-16 DIAGNOSIS — Z98.41 CATARACT EXTRACTION STATUS, RIGHT EYE: Chronic | ICD-10-CM

## 2021-03-16 DIAGNOSIS — N20.1 CALCULUS OF URETER: ICD-10-CM

## 2021-03-16 DIAGNOSIS — Z90.5 ACQUIRED ABSENCE OF KIDNEY: Chronic | ICD-10-CM

## 2021-03-16 DIAGNOSIS — R35.0 FREQUENCY OF MICTURITION: ICD-10-CM

## 2021-03-16 PROCEDURE — 99212 OFFICE O/P EST SF 10 MIN: CPT | Mod: 25

## 2021-03-16 PROCEDURE — 76775 US EXAM ABDO BACK WALL LIM: CPT | Mod: 26

## 2021-03-16 PROCEDURE — 76775 US EXAM ABDO BACK WALL LIM: CPT

## 2021-03-16 NOTE — HISTORY OF PRESENT ILLNESS
[FreeTextEntry1] : Seen initially for evaluation of hematuria. He noted dark - almost coffee colored urine for a day. No redness or clots. Saw his PCP who checked urine and told him he had significant microscopic hematuria. No associated back or flank pain. Likewise no dysuria, increased frequency or urgency or other voiding symptoms. 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 \par \par S/p BT on small tumor. - complicated by returning anuric. Noted to have a stone in mid ureter so stented emergently. Once his obstructive uropathy resolved went home. Did ok for a few days but ended back in ER in retention - 300cc PVR.\par no retention before. had him stay on tamsulosin and passed TOV.\par \par now s/p ureteroscopy laser lithotripsy of the stone - stent on string.\par voiding baseline on the Flomax so stopped.\par doing well - in fact voiding better than better.\par ULS today no stones or hydro \par

## 2021-03-17 DIAGNOSIS — Z87.448 PERSONAL HISTORY OF OTHER DISEASES OF URINARY SYSTEM: ICD-10-CM

## 2021-03-17 DIAGNOSIS — D49.4 NEOPLASM OF UNSPECIFIED BEHAVIOR OF BLADDER: ICD-10-CM

## 2021-03-17 DIAGNOSIS — N20.1 CALCULUS OF URETER: ICD-10-CM

## 2021-03-17 LAB — BACTERIA UR CULT: NORMAL

## 2021-06-04 ENCOUNTER — APPOINTMENT (OUTPATIENT)
Dept: UROLOGY | Facility: CLINIC | Age: 86
End: 2021-06-04

## 2021-08-13 ENCOUNTER — INPATIENT (INPATIENT)
Facility: HOSPITAL | Age: 86
LOS: 3 days | Discharge: HOME | End: 2021-08-17
Attending: STUDENT IN AN ORGANIZED HEALTH CARE EDUCATION/TRAINING PROGRAM | Admitting: STUDENT IN AN ORGANIZED HEALTH CARE EDUCATION/TRAINING PROGRAM
Payer: MEDICARE

## 2021-08-13 VITALS
SYSTOLIC BLOOD PRESSURE: 136 MMHG | RESPIRATION RATE: 16 BRPM | HEART RATE: 103 BPM | OXYGEN SATURATION: 100 % | TEMPERATURE: 103 F | DIASTOLIC BLOOD PRESSURE: 88 MMHG | HEIGHT: 66.5 IN

## 2021-08-13 DIAGNOSIS — Z95.5 PRESENCE OF CORONARY ANGIOPLASTY IMPLANT AND GRAFT: Chronic | ICD-10-CM

## 2021-08-13 DIAGNOSIS — Z98.41 CATARACT EXTRACTION STATUS, RIGHT EYE: Chronic | ICD-10-CM

## 2021-08-13 DIAGNOSIS — Z90.5 ACQUIRED ABSENCE OF KIDNEY: Chronic | ICD-10-CM

## 2021-08-13 LAB
ALBUMIN SERPL ELPH-MCNC: 3.8 G/DL — SIGNIFICANT CHANGE UP (ref 3.3–5)
ALP SERPL-CCNC: 82 U/L — SIGNIFICANT CHANGE UP (ref 40–120)
ALT FLD-CCNC: 35 U/L — SIGNIFICANT CHANGE UP (ref 4–41)
ANION GAP SERPL CALC-SCNC: 13 MMOL/L — SIGNIFICANT CHANGE UP (ref 7–14)
APPEARANCE UR: ABNORMAL
AST SERPL-CCNC: 30 U/L — SIGNIFICANT CHANGE UP (ref 4–40)
BASOPHILS # BLD AUTO: 0.04 K/UL — SIGNIFICANT CHANGE UP (ref 0–0.2)
BASOPHILS NFR BLD AUTO: 0.4 % — SIGNIFICANT CHANGE UP (ref 0–2)
BILIRUB SERPL-MCNC: 0.5 MG/DL — SIGNIFICANT CHANGE UP (ref 0.2–1.2)
BILIRUB UR-MCNC: NEGATIVE — SIGNIFICANT CHANGE UP
BUN SERPL-MCNC: 53 MG/DL — HIGH (ref 7–23)
CALCIUM SERPL-MCNC: 9.4 MG/DL — SIGNIFICANT CHANGE UP (ref 8.4–10.5)
CHLORIDE SERPL-SCNC: 107 MMOL/L — SIGNIFICANT CHANGE UP (ref 98–107)
CO2 SERPL-SCNC: 16 MMOL/L — LOW (ref 22–31)
COLOR SPEC: YELLOW — SIGNIFICANT CHANGE UP
CREAT SERPL-MCNC: 2.63 MG/DL — HIGH (ref 0.5–1.3)
DIFF PNL FLD: ABNORMAL
EOSINOPHIL # BLD AUTO: 0.26 K/UL — SIGNIFICANT CHANGE UP (ref 0–0.5)
EOSINOPHIL NFR BLD AUTO: 2.5 % — SIGNIFICANT CHANGE UP (ref 0–6)
GLUCOSE SERPL-MCNC: 159 MG/DL — HIGH (ref 70–99)
GLUCOSE UR QL: NEGATIVE — SIGNIFICANT CHANGE UP
HCT VFR BLD CALC: 33.3 % — LOW (ref 39–50)
HGB BLD-MCNC: 11.1 G/DL — LOW (ref 13–17)
IANC: 8.27 K/UL — SIGNIFICANT CHANGE UP (ref 1.5–8.5)
IMM GRANULOCYTES NFR BLD AUTO: 0.6 % — SIGNIFICANT CHANGE UP (ref 0–1.5)
INR BLD: 1.61 RATIO — HIGH (ref 0.88–1.16)
KETONES UR-MCNC: NEGATIVE — SIGNIFICANT CHANGE UP
LACTATE BLDV-MCNC: 0.7 MMOL/L — SIGNIFICANT CHANGE UP (ref 0.5–2)
LEUKOCYTE ESTERASE UR-ACNC: ABNORMAL
LYMPHOCYTES # BLD AUTO: 0.55 K/UL — LOW (ref 1–3.3)
LYMPHOCYTES # BLD AUTO: 5.2 % — LOW (ref 13–44)
MCHC RBC-ENTMCNC: 30.9 PG — SIGNIFICANT CHANGE UP (ref 27–34)
MCHC RBC-ENTMCNC: 33.3 GM/DL — SIGNIFICANT CHANGE UP (ref 32–36)
MCV RBC AUTO: 92.8 FL — SIGNIFICANT CHANGE UP (ref 80–100)
MONOCYTES # BLD AUTO: 1.31 K/UL — HIGH (ref 0–0.9)
MONOCYTES NFR BLD AUTO: 12.5 % — SIGNIFICANT CHANGE UP (ref 2–14)
NEUTROPHILS # BLD AUTO: 8.27 K/UL — HIGH (ref 1.8–7.4)
NEUTROPHILS NFR BLD AUTO: 78.8 % — HIGH (ref 43–77)
NITRITE UR-MCNC: NEGATIVE — SIGNIFICANT CHANGE UP
NRBC # BLD: 0 /100 WBCS — SIGNIFICANT CHANGE UP
NRBC # FLD: 0 K/UL — SIGNIFICANT CHANGE UP
PH UR: 6 — SIGNIFICANT CHANGE UP (ref 5–8)
PLATELET # BLD AUTO: 134 K/UL — LOW (ref 150–400)
POTASSIUM SERPL-MCNC: 4.4 MMOL/L — SIGNIFICANT CHANGE UP (ref 3.5–5.3)
POTASSIUM SERPL-SCNC: 4.4 MMOL/L — SIGNIFICANT CHANGE UP (ref 3.5–5.3)
PROT SERPL-MCNC: 7.3 G/DL — SIGNIFICANT CHANGE UP (ref 6–8.3)
PROT UR-MCNC: ABNORMAL
PROTHROM AB SERPL-ACNC: 17.9 SEC — HIGH (ref 10.6–13.6)
RBC # BLD: 3.59 M/UL — LOW (ref 4.2–5.8)
RBC # FLD: 12.1 % — SIGNIFICANT CHANGE UP (ref 10.3–14.5)
SARS-COV-2 RNA SPEC QL NAA+PROBE: SIGNIFICANT CHANGE UP
SODIUM SERPL-SCNC: 136 MMOL/L — SIGNIFICANT CHANGE UP (ref 135–145)
SP GR SPEC: 1.02 — SIGNIFICANT CHANGE UP (ref 1.01–1.02)
UROBILINOGEN FLD QL: SIGNIFICANT CHANGE UP
WBC # BLD: 10.49 K/UL — SIGNIFICANT CHANGE UP (ref 3.8–10.5)
WBC # FLD AUTO: 10.49 K/UL — SIGNIFICANT CHANGE UP (ref 3.8–10.5)

## 2021-08-13 PROCEDURE — 74176 CT ABD & PELVIS W/O CONTRAST: CPT | Mod: 26

## 2021-08-13 PROCEDURE — 99285 EMERGENCY DEPT VISIT HI MDM: CPT | Mod: CS,GC

## 2021-08-13 PROCEDURE — 71045 X-RAY EXAM CHEST 1 VIEW: CPT | Mod: 26

## 2021-08-13 RX ORDER — PIPERACILLIN AND TAZOBACTAM 4; .5 G/20ML; G/20ML
3.38 INJECTION, POWDER, LYOPHILIZED, FOR SOLUTION INTRAVENOUS ONCE
Refills: 0 | Status: DISCONTINUED | OUTPATIENT
Start: 2021-08-13 | End: 2021-08-13

## 2021-08-13 RX ORDER — VANCOMYCIN HCL 1 G
1000 VIAL (EA) INTRAVENOUS ONCE
Refills: 0 | Status: COMPLETED | OUTPATIENT
Start: 2021-08-13 | End: 2021-08-13

## 2021-08-13 RX ORDER — PIPERACILLIN AND TAZOBACTAM 4; .5 G/20ML; G/20ML
3.38 INJECTION, POWDER, LYOPHILIZED, FOR SOLUTION INTRAVENOUS EVERY 8 HOURS
Refills: 0 | Status: DISCONTINUED | OUTPATIENT
Start: 2021-08-13 | End: 2021-08-13

## 2021-08-13 RX ORDER — CEFEPIME 1 G/1
1000 INJECTION, POWDER, FOR SOLUTION INTRAMUSCULAR; INTRAVENOUS ONCE
Refills: 0 | Status: COMPLETED | OUTPATIENT
Start: 2021-08-13 | End: 2021-08-13

## 2021-08-13 RX ORDER — ACETAMINOPHEN 500 MG
650 TABLET ORAL ONCE
Refills: 0 | Status: COMPLETED | OUTPATIENT
Start: 2021-08-13 | End: 2021-08-13

## 2021-08-13 RX ORDER — SODIUM CHLORIDE 9 MG/ML
2000 INJECTION, SOLUTION INTRAVENOUS ONCE
Refills: 0 | Status: COMPLETED | OUTPATIENT
Start: 2021-08-13 | End: 2021-08-13

## 2021-08-13 RX ADMIN — CEFEPIME 100 MILLIGRAM(S): 1 INJECTION, POWDER, FOR SOLUTION INTRAMUSCULAR; INTRAVENOUS at 19:39

## 2021-08-13 RX ADMIN — Medication 650 MILLIGRAM(S): at 19:39

## 2021-08-13 RX ADMIN — Medication 250 MILLIGRAM(S): at 21:04

## 2021-08-13 RX ADMIN — SODIUM CHLORIDE 2000 MILLILITER(S): 9 INJECTION, SOLUTION INTRAVENOUS at 19:29

## 2021-08-13 NOTE — ED ADULT TRIAGE NOTE - CHIEF COMPLAINT QUOTE
Pt presents to ED for fevers/ chills since this morning and dysuria and urinary frequency x 3months. Pt states "I have been given 3 rounds of antibiotics but I keep having these problems. Sometimes I also see blood in my urine." Pmhx of AFIB, HTN, CAD on eliquis, left nephrectomy at age 16.

## 2021-08-13 NOTE — ED PROVIDER NOTE - OBJECTIVE STATEMENT
Alexys: In 12/20, had renal stone. Had ureteral stent; placed b/c not making urine; removed b/c stone was no longer there. On Cefdinir x 1 month for persistent infection. Supposed to have cystoscopy Monday. P/w F after stopping Cefdinir a few days ago. No N/V/D.    Had allergic reaction to Bactrim. Alexys: In 12/20, had renal stone. Had ureteral stent; placed b/c not making urine; removed b/c stone was no longer there. On Cefdinir x 1 month for persistent infection. Supposed to have cystoscopy Monday. P/w F after stopping Cefdinir a few days ago. No N/V/D.    Had allergic reaction to Bactrim.    Meds:  Metop  Losartan  Eliquis  Repaglinide  Statin

## 2021-08-13 NOTE — ED PROVIDER NOTE - CLINICAL SUMMARY MEDICAL DECISION MAKING FREE TEXT BOX
Alexys: Has only a R kidney (unclear why L was removed). In 12/20, had 7 mm stone in the proximal right ureter with associated moderate hydronephrosis and mild perinephric stranding. Recurrent UTI (requiring IV Abx). Was on Abx recently; when stopped, developed F and dysuria. No CVAT. Concern for infected hardware. Check Cx, lab. Give IVF, Abx. CT abd (? infected stone). Admit.

## 2021-08-13 NOTE — ED PROVIDER NOTE - PHYSICAL EXAMINATION
Well appearing, well nourished, awake, alert, oriented to person, place, time/situation and in no apparent distress.    Airway patent    Eyes without scleral injection. No jaundice.    Strong pulse.    Respirations unlabored.    Abdomen soft, non-tender, no guarding.    Spine appears normal, range of motion is not limited, no muscle or joint tenderness. No CVAT.    Alert and oriented, no gross motor or sensory deficits.    Skin normal color for race, warm, dry and intact. No evidence of rash.    No SI/HI.

## 2021-08-13 NOTE — ED PROVIDER NOTE - PROGRESS NOTE DETAILS
Discussed CT scan results and plan for admission with patient, expressed understanding and agrees with the plan.

## 2021-08-13 NOTE — ED PROVIDER NOTE - ATTENDING CONTRIBUTION TO CARE
I performed a face-to-face evaluation of the patient and performed a history and physical examination. I agree with the history and physical examination.    Alexys: Has only a R kidney (unclear why L was removed). In 12/20, had 7 mm stone in the proximal right ureter with associated moderate hydronephrosis and mild perinephric stranding. Recurrent UTI (requiring IV Abx). Was on Abx recently; when stopped, developed F and dysuria. No CVAT. Concern for infected hardware. Check Cx, lab. Give IVF, Abx. CT abd (? infected stone). Admit.

## 2021-08-13 NOTE — ED ADULT NURSE NOTE - OBJECTIVE STATEMENT
Receive report from RN, pt is alert and oriented x3 c/o fever, dysuria, chills.  Pt with recurring UTI's.  Denies chest pain, sob, dizziness.  Pt is vaccinated.  Resp even and unlabored.  Skin intact. V 20 G RFA placed. Labs sent.  LR in progress.  Meds initiated

## 2021-08-13 NOTE — ED ADULT NURSE REASSESSMENT NOTE - NS ED NURSE REASSESS COMMENT FT1
Patient received from day RN. Patient resting quietly in bed, breathing even and nonlabored. No acute distress. Cardiac monitor in place- sinus rhythm. Pulsox 100% on RA. Patient reports he recently urinated and no longer had pain. Patient to be medicated as ordered. Safety maintained. Patient stable upon exiting the room.

## 2021-08-14 DIAGNOSIS — Z29.9 ENCOUNTER FOR PROPHYLACTIC MEASURES, UNSPECIFIED: ICD-10-CM

## 2021-08-14 DIAGNOSIS — N17.9 ACUTE KIDNEY FAILURE, UNSPECIFIED: ICD-10-CM

## 2021-08-14 DIAGNOSIS — E11.9 TYPE 2 DIABETES MELLITUS WITHOUT COMPLICATIONS: ICD-10-CM

## 2021-08-14 DIAGNOSIS — E78.5 HYPERLIPIDEMIA, UNSPECIFIED: ICD-10-CM

## 2021-08-14 DIAGNOSIS — I10 ESSENTIAL (PRIMARY) HYPERTENSION: ICD-10-CM

## 2021-08-14 DIAGNOSIS — I48.91 UNSPECIFIED ATRIAL FIBRILLATION: ICD-10-CM

## 2021-08-14 DIAGNOSIS — N39.0 URINARY TRACT INFECTION, SITE NOT SPECIFIED: ICD-10-CM

## 2021-08-14 DIAGNOSIS — A41.9 SEPSIS, UNSPECIFIED ORGANISM: ICD-10-CM

## 2021-08-14 LAB
A1C WITH ESTIMATED AVERAGE GLUCOSE RESULT: 5.9 % — HIGH (ref 4–5.6)
ANION GAP SERPL CALC-SCNC: 13 MMOL/L — SIGNIFICANT CHANGE UP (ref 7–14)
BUN SERPL-MCNC: 43 MG/DL — HIGH (ref 7–23)
CALCIUM SERPL-MCNC: 9.3 MG/DL — SIGNIFICANT CHANGE UP (ref 8.4–10.5)
CHLORIDE SERPL-SCNC: 103 MMOL/L — SIGNIFICANT CHANGE UP (ref 98–107)
CO2 SERPL-SCNC: 17 MMOL/L — LOW (ref 22–31)
CREAT SERPL-MCNC: 2.19 MG/DL — HIGH (ref 0.5–1.3)
ESTIMATED AVERAGE GLUCOSE: 123 — SIGNIFICANT CHANGE UP
GLUCOSE BLDC GLUCOMTR-MCNC: 130 MG/DL — HIGH (ref 70–99)
GLUCOSE BLDC GLUCOMTR-MCNC: 152 MG/DL — HIGH (ref 70–99)
GLUCOSE BLDC GLUCOMTR-MCNC: 169 MG/DL — HIGH (ref 70–99)
GLUCOSE BLDC GLUCOMTR-MCNC: 176 MG/DL — HIGH (ref 70–99)
GLUCOSE BLDC GLUCOMTR-MCNC: 178 MG/DL — HIGH (ref 70–99)
GLUCOSE SERPL-MCNC: 174 MG/DL — HIGH (ref 70–99)
MAGNESIUM SERPL-MCNC: 1.4 MG/DL — LOW (ref 1.6–2.6)
OSMOLALITY SERPL: 294 MOSM/KG — SIGNIFICANT CHANGE UP (ref 275–295)
PHOSPHATE SERPL-MCNC: 2.3 MG/DL — LOW (ref 2.5–4.5)
POTASSIUM SERPL-MCNC: 3.9 MMOL/L — SIGNIFICANT CHANGE UP (ref 3.5–5.3)
POTASSIUM SERPL-SCNC: 3.9 MMOL/L — SIGNIFICANT CHANGE UP (ref 3.5–5.3)
SODIUM SERPL-SCNC: 133 MMOL/L — LOW (ref 135–145)
SODIUM UR-SCNC: 81 MMOL/L — SIGNIFICANT CHANGE UP

## 2021-08-14 PROCEDURE — 12345: CPT | Mod: NC

## 2021-08-14 PROCEDURE — 99223 1ST HOSP IP/OBS HIGH 75: CPT

## 2021-08-14 PROCEDURE — 93010 ELECTROCARDIOGRAM REPORT: CPT

## 2021-08-14 RX ORDER — INSULIN LISPRO 100/ML
VIAL (ML) SUBCUTANEOUS
Refills: 0 | Status: DISCONTINUED | OUTPATIENT
Start: 2021-08-14 | End: 2021-08-15

## 2021-08-14 RX ORDER — METFORMIN HYDROCHLORIDE 850 MG/1
1 TABLET ORAL
Qty: 0 | Refills: 0 | DISCHARGE

## 2021-08-14 RX ORDER — SODIUM CHLORIDE 9 MG/ML
1000 INJECTION, SOLUTION INTRAVENOUS
Refills: 0 | Status: DISCONTINUED | OUTPATIENT
Start: 2021-08-14 | End: 2021-08-15

## 2021-08-14 RX ORDER — ACETAMINOPHEN 500 MG
650 TABLET ORAL EVERY 6 HOURS
Refills: 0 | Status: DISCONTINUED | OUTPATIENT
Start: 2021-08-14 | End: 2021-08-17

## 2021-08-14 RX ORDER — SODIUM,POTASSIUM PHOSPHATES 278-250MG
1 POWDER IN PACKET (EA) ORAL THREE TIMES A DAY
Refills: 0 | Status: COMPLETED | OUTPATIENT
Start: 2021-08-14 | End: 2021-08-15

## 2021-08-14 RX ORDER — ONDANSETRON 8 MG/1
4 TABLET, FILM COATED ORAL EVERY 8 HOURS
Refills: 0 | Status: DISCONTINUED | OUTPATIENT
Start: 2021-08-14 | End: 2021-08-15

## 2021-08-14 RX ORDER — CEFEPIME 1 G/1
INJECTION, POWDER, FOR SOLUTION INTRAMUSCULAR; INTRAVENOUS
Refills: 0 | Status: DISCONTINUED | OUTPATIENT
Start: 2021-08-14 | End: 2021-08-14

## 2021-08-14 RX ORDER — LANOLIN ALCOHOL/MO/W.PET/CERES
3 CREAM (GRAM) TOPICAL AT BEDTIME
Refills: 0 | Status: DISCONTINUED | OUTPATIENT
Start: 2021-08-14 | End: 2021-08-17

## 2021-08-14 RX ORDER — GLUCAGON INJECTION, SOLUTION 0.5 MG/.1ML
1 INJECTION, SOLUTION SUBCUTANEOUS ONCE
Refills: 0 | Status: DISCONTINUED | OUTPATIENT
Start: 2021-08-14 | End: 2021-08-15

## 2021-08-14 RX ORDER — DEXTROSE 50 % IN WATER 50 %
25 SYRINGE (ML) INTRAVENOUS ONCE
Refills: 0 | Status: DISCONTINUED | OUTPATIENT
Start: 2021-08-14 | End: 2021-08-15

## 2021-08-14 RX ORDER — METOPROLOL TARTRATE 50 MG
0 TABLET ORAL
Qty: 0 | Refills: 0 | DISCHARGE

## 2021-08-14 RX ORDER — METOPROLOL TARTRATE 50 MG
25 TABLET ORAL DAILY
Refills: 0 | Status: DISCONTINUED | OUTPATIENT
Start: 2021-08-14 | End: 2021-08-17

## 2021-08-14 RX ORDER — ATORVASTATIN CALCIUM 80 MG/1
80 TABLET, FILM COATED ORAL AT BEDTIME
Refills: 0 | Status: DISCONTINUED | OUTPATIENT
Start: 2021-08-14 | End: 2021-08-17

## 2021-08-14 RX ORDER — APIXABAN 2.5 MG/1
2.5 TABLET, FILM COATED ORAL
Refills: 0 | Status: DISCONTINUED | OUTPATIENT
Start: 2021-08-14 | End: 2021-08-17

## 2021-08-14 RX ORDER — MAGNESIUM SULFATE 500 MG/ML
2 VIAL (ML) INJECTION ONCE
Refills: 0 | Status: COMPLETED | OUTPATIENT
Start: 2021-08-14 | End: 2021-08-14

## 2021-08-14 RX ORDER — HEPARIN SODIUM 5000 [USP'U]/ML
5000 INJECTION INTRAVENOUS; SUBCUTANEOUS EVERY 12 HOURS
Refills: 0 | Status: DISCONTINUED | OUTPATIENT
Start: 2021-08-14 | End: 2021-08-14

## 2021-08-14 RX ORDER — CEFEPIME 1 G/1
1000 INJECTION, POWDER, FOR SOLUTION INTRAMUSCULAR; INTRAVENOUS EVERY 24 HOURS
Refills: 0 | Status: DISCONTINUED | OUTPATIENT
Start: 2021-08-14 | End: 2021-08-17

## 2021-08-14 RX ORDER — DEXTROSE 50 % IN WATER 50 %
15 SYRINGE (ML) INTRAVENOUS ONCE
Refills: 0 | Status: DISCONTINUED | OUTPATIENT
Start: 2021-08-14 | End: 2021-08-15

## 2021-08-14 RX ADMIN — ATORVASTATIN CALCIUM 80 MILLIGRAM(S): 80 TABLET, FILM COATED ORAL at 21:26

## 2021-08-14 RX ADMIN — APIXABAN 2.5 MILLIGRAM(S): 2.5 TABLET, FILM COATED ORAL at 18:09

## 2021-08-14 RX ADMIN — Medication 50 GRAM(S): at 15:37

## 2021-08-14 RX ADMIN — Medication 1: at 08:03

## 2021-08-14 RX ADMIN — Medication 25 MILLIGRAM(S): at 06:22

## 2021-08-14 RX ADMIN — Medication 1 PACKET(S): at 15:37

## 2021-08-14 RX ADMIN — Medication 1: at 12:34

## 2021-08-14 RX ADMIN — CEFEPIME 100 MILLIGRAM(S): 1 INJECTION, POWDER, FOR SOLUTION INTRAMUSCULAR; INTRAVENOUS at 18:10

## 2021-08-14 RX ADMIN — Medication 1 PACKET(S): at 21:26

## 2021-08-14 RX ADMIN — APIXABAN 2.5 MILLIGRAM(S): 2.5 TABLET, FILM COATED ORAL at 06:22

## 2021-08-14 NOTE — PROGRESS NOTE ADULT - PROBLEM SELECTOR PLAN 2
BUN/Cr 53/2.63 on admission. Baseline ~1.8 in 12/2020. Likely in the setting of pyelo vs obstruction.   -CT AP: Asymmetric urinary bladder wall thickening which could be due to a cystitis; however, an underlying mass is not excluded. Nonspecific right perinephric fat stranding.   -Trend BMP, Cr improving   -c/w abx   -Hold home losartan   -Avoid nephrotoxic agents  -Renally dose meds    #Hypomag/hypophos- repletion ordered

## 2021-08-14 NOTE — ED ADULT NURSE REASSESSMENT NOTE - NS ED NURSE REASSESS COMMENT FT1
Patient received back from break RN. Patient resting quietly in bed, breathing even and nonlabored. No acute distress. Cardiac monitor in place- sinus rhythm. Pulsox 100% on RA. Safety maintained. Patient stable upon exiting the room.

## 2021-08-14 NOTE — H&P ADULT - ASSESSMENT
85-year-old male former smoker HTN, HLD, T2DM, CAD (MI 2003, s/p 4 stents), Afib on eliquis, h/o R nephrolithiasis s/p ureteral stent (removed 1/2021), recurrent UTIs now presenting with fevers/chills x 1d associated with dysuria and increased urinary frequency x 2d. A/w sepsis in the setting of pyelo and LILIAN.

## 2021-08-14 NOTE — PROGRESS NOTE ADULT - ASSESSMENT
85M w/HTN, HLD, T2DM, CAD (MI 2003, s/p 4 stents), Afib on eliquis, h/o R nephrolithiasis s/p ureteral stent (removed 1/2021), recurrent UTIs now presenting with fevers/chills x 1d associated with dysuria and increased urinary frequency x 2d. A/w sepsis in the setting of pyelo and LILIAN.

## 2021-08-14 NOTE — H&P ADULT - HISTORY OF PRESENT ILLNESS
85-year-old male former smoker HTN, HLD, T2DM, CAD (MI 2003, s/p 4 stents), Afib on eliquis, h/o R nephrolithiasis s/p ureteral stent (removed 1/2021), recurrent UTIs now presenting with fevers/chills x 1d. Pt states that since having the ureteral stent removed in January, he's been having recurrent UTI's. Most recently, he was prescribed cefdinir 3 weeks ago and took his last dose yesterday. However, 2 days prior he started to have dysuria and increased frequency of urination. Yesterday he developed chills. Pt was planned to have a cystoscopy on Monday. He denies lightheadedness/dizziness, CP, palpitations, SOB, n/v/d, abdominal pain, back pain.     In the ED, vitals Tmax 102.7, HR , //56, RR 16 (satting 100% on RA). Labs significant for BUN/Cr 53/2.63 (baseline ~1.8 in 12/2020). UA +bacteria. COVID neg. CXR clear lungs. CT AP with R renal cyst, perinephric fat strandings, mild R hydronephrosis, no obstructing stone, asymmetric bladder wall thickening.

## 2021-08-14 NOTE — H&P ADULT - PROBLEM SELECTOR PLAN 2
BUN/Cr 53/2.63 on admission. Baseline ~1.8 in 12/2020. Likely in the setting of pyelo vs obstruction.   -CT AP: Asymmetric urinary bladder wall thickening which could be due to a cystitis; however, an underlying mass is not excluded. Nonspecific right perinephric fat stranding.   -Mgmt of pyelo as above   -S/p 2L LR in ED   -PVR bladder scan  -Urine studies ordered  -Hold home losartan   -Avoid nephrotoxic agents  -Renally dose meds

## 2021-08-14 NOTE — PROGRESS NOTE ADULT - PROBLEM SELECTOR PLAN 1
severe sepsis 2/2 pyelonephritis c/b LILIAN. Pt with recurrent UTI's since removal of ureteral stent in January 2021. M/r completed 3 week course of Cefdinir yesterday but developed dysuria/urinary frequency prior to completing course indicating failed treatment.    -Obtain records from outpatient Urologist   -CT AP: Asymmetric urinary bladder wall thickening which could be due to a cystitis; however, an underlying mass is not excluded. Nonspecific right perinephric fat stranding.   -f/u Bcx/Ucx   -c/w Cefepime

## 2021-08-14 NOTE — H&P ADULT - ATTENDING COMMENTS
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 He started having dysuria 1 month ago and saw his urologist who gave him 1 month of cefdinir. 3 days ago, he finished his antibiotics and started having fever again. In the ED, fever to 102.7 . Labs with mild leukocytosis and LILIAN with cr 2.6 (baseline 1.7). CT shows mild hydronephrosis with fat stranding, bladder thickened. EKG pending. Physical: NAD. CV/lung: irregularly irregular, no wheezing. No lower extremity edema. : mild discomfort with RUQ deep percussion.  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 For sepsis, since patient has recently been on Cefdinir, has acute renal failure, penicillin allergy and considering age, will try Cefepime for now. Levaquin, Zosyn, Bactrim are not good options at this point. If patient spikes fever or clinically decomp, will escalate to Merem/Ertapenem as patient does not have pseudomonas risk factors. For post-obstructive renal failure, will consult urology. Continue Eliquis for Afib unless urology wants to do procedures. Bladder scan pending. Hold losartan  Rest per PGY2 above

## 2021-08-14 NOTE — H&P ADULT - PROBLEM SELECTOR PLAN 4
Pt was previously on Metformin, recently discontinued. Now on repaglinide 0.5mg qday.   -A1c in AM   -ISS for now   -CC diet

## 2021-08-14 NOTE — H&P ADULT - NSHPPHYSICALEXAM_GEN_ALL_CORE
Vital Signs Last 24 Hrs  T(C): 37.3 (14 Aug 2021 02:56), Max: 39.3 (13 Aug 2021 17:51)  T(F): 99.2 (14 Aug 2021 02:56), Max: 102.7 (13 Aug 2021 17:51)  HR: 105 (14 Aug 2021 02:56) (85 - 105)  BP: 164/72 (14 Aug 2021 02:56) (136/88 - 164/72)  RR: 18 (14 Aug 2021 02:56) (16 - 18)  SpO2: 95% (14 Aug 2021 02:56) (95% - 100%)    CONSTITUTIONAL: Well-groomed elderly male, in no apparent distress  EYES: No conjunctival or scleral injection, non-icteric; PERRLA and symmetric  NECK: Trachea midline without palpable neck mass  RESPIRATORY: Breathing comfortably; lungs CTA without wheeze/rhonchi/rales  CARDIOVASCULAR: +S1S2, RRR, no M/G/R; pedal pulses full and symmetric; no lower extremity edema  GASTROINTESTINAL: No palpable masses or tenderness, +BS throughout, no rebound/guarding; no hepatosplenomegaly  MUSCULOSKELETAL: normal strength and tone of extremities  SKIN: No rashes or ulcers noted; no subcutaneous nodules or induration palpable  NEUROLOGIC: CN II-XII intact; sensation intact in LEs b/l to light touch  PSYCHIATRIC: A+O x 3; mood and affect appropriate; appropriate insight and judgment

## 2021-08-14 NOTE — PROGRESS NOTE ADULT - SUBJECTIVE AND OBJECTIVE BOX
[facility] Division of Hospital Medicine  Jasminnormadaphnie YuDO  Pager (M-F, 8A-5P): [phone]      Patient is a 85y old  Male who presents with a chief complaint of Sepsis/Pyelo (14 Aug 2021 09:36)      SUBJECTIVE / OVERNIGHT EVENTS: Pt seen at bedside, comfortable in bed, reports frequency, but improving.   ADDITIONAL REVIEW OF SYSTEMS: no fevers, chills, cp/sob     MEDICATIONS  (STANDING):  apixaban 2.5 milliGRAM(s) Oral two times a day  atorvastatin 80 milliGRAM(s) Oral at bedtime  cefepime   IVPB 1000 milliGRAM(s) IV Intermittent every 24 hours  dextrose 40% Gel 15 Gram(s) Oral once  dextrose 5%. 1000 milliLiter(s) (50 mL/Hr) IV Continuous <Continuous>  dextrose 50% Injectable 25 Gram(s) IV Push once  glucagon  Injectable 1 milliGRAM(s) IntraMuscular once  insulin lispro (ADMELOG) corrective regimen sliding scale   SubCutaneous three times a day before meals  metoprolol succinate ER 25 milliGRAM(s) Oral daily    MEDICATIONS  (PRN):  acetaminophen   Tablet .. 650 milliGRAM(s) Oral every 6 hours PRN Temp greater or equal to 38.5C (101.3F), Mild Pain (1 - 3)  aluminum hydroxide/magnesium hydroxide/simethicone Suspension 30 milliLiter(s) Oral every 4 hours PRN Dyspepsia  melatonin 3 milliGRAM(s) Oral at bedtime PRN Insomnia  ondansetron Injectable 4 milliGRAM(s) IV Push every 8 hours PRN Nausea and/or Vomiting      CAPILLARY BLOOD GLUCOSE      POCT Blood Glucose.: 169 mg/dL (14 Aug 2021 12:21)  POCT Blood Glucose.: 178 mg/dL (14 Aug 2021 07:56)  POCT Blood Glucose.: 176 mg/dL (14 Aug 2021 06:11)    I&O's Summary    13 Aug 2021 07:01  -  14 Aug 2021 07:00  --------------------------------------------------------  IN: 360 mL / OUT: 550 mL / NET: -190 mL    14 Aug 2021 07:01  -  14 Aug 2021 13:58  --------------------------------------------------------  IN: 250 mL / OUT: 300 mL / NET: -50 mL        PHYSICAL EXAM:  Vital Signs Last 24 Hrs  T(C): 36.9 (14 Aug 2021 10:00), Max: 39.3 (13 Aug 2021 17:51)  T(F): 98.4 (14 Aug 2021 10:00), Max: 102.7 (13 Aug 2021 17:51)  HR: 78 (14 Aug 2021 10:00) (78 - 105)  BP: 139/59 (14 Aug 2021 10:00) (136/88 - 164/72)  BP(mean): --  RR: 20 (14 Aug 2021 10:00) (16 - 20)  SpO2: 100% (14 Aug 2021 10:00) (95% - 100%)  CONSTITUTIONAL: NAD, elderly, frail, but well appearing   EYES: EOMI; conjunctiva and sclera clear  ENMT: Moist oral mucosa; normal dentition  NECK: Supple  RESPIRATORY: Normal respiratory effort; lungs are clear to auscultation bilaterally  CARDIOVASCULAR: Regular rate and rhythm, normal S1 and S2, no murmur/rub/gallop  ABDOMEN: soft, Nontender to palpation, normoactive bowel sounds  MUSKULOSKELETAL:  no clubbing or cyanosis of digits; no joint swelling or tenderness to palpation  PSYCH: calm, cooperative, affect appropriate  NEUROLOGY: CN 2-12 are intact and symmetric; no gross sensory deficits  SKIN: No rashes; no palpable lesions    LABS:                        11.1   10.49 )-----------( 134      ( 13 Aug 2021 19:29 )             33.3     08-14    133<L>  |  103  |  43<H>  ----------------------------<  174<H>  3.9   |  17<L>  |  2.19<H>    Ca    9.3      14 Aug 2021 07:05  Phos  2.3     08-14  Mg     1.40     08-14    TPro  7.3  /  Alb  3.8  /  TBili  0.5  /  DBili  x   /  AST  30  /  ALT  35  /  AlkPhos  82  08-13    PT/INR - ( 13 Aug 2021 19:29 )   PT: 17.9 sec;   INR: 1.61 ratio               Urinalysis Basic - ( 13 Aug 2021 19:29 )    Color: Yellow / Appearance: Turbid / S.017 / pH: x  Gluc: x / Ketone: Negative  / Bili: Negative / Urobili: <2 mg/dL   Blood: x / Protein: 300 mg/dL / Nitrite: Negative   Leuk Esterase: Large / RBC: >10 /HPF / WBC MANY /HPF   Sq Epi: x / Non Sq Epi: x / Bacteria: Occasional          RADIOLOGY & ADDITIONAL TESTS:  Results Reviewed:   Imaging Personally Reviewed:  Electrocardiogram Personally Reviewed:    COORDINATION OF CARE:  Care Discussed with Consultants/Other Providers [Y/N]: Y  Prior or Outpatient Records Reviewed [Y/N]: Y

## 2021-08-14 NOTE — H&P ADULT - NSHPLABSRESULTS_GEN_ALL_CORE
Labs:                         11.1   10.49 )-----------( 134      ( 13 Aug 2021 19:29 )             33.3           136  |  107  |  53<H>  ----------------------------<  159<H>  4.4   |  16<L>  |  2.63<H>    Ca    9.4      13 Aug 2021 19:29    TPro  7.3  /  Alb  3.8  /  TBili  0.5  /  DBili  x   /  AST  30  /  ALT  35  /  AlkPhos  82            Urinalysis Basic - ( 13 Aug 2021 19:29 )    Color: Yellow / Appearance: Turbid / S.017 / pH: x  Gluc: x / Ketone: Negative  / Bili: Negative / Urobili: <2 mg/dL   Blood: x / Protein: 300 mg/dL / Nitrite: Negative   Leuk Esterase: Large / RBC: >10 /HPF / WBC MANY /HPF   Sq Epi: x / Non Sq Epi: x / Bacteria: Occasional      PT/INR - ( 13 Aug 2021 19:29 )   PT: 17.9 sec;   INR: 1.61 ratio      < from: CT Abdomen and Pelvis No Cont (21 @ 21:53) >    IMPRESSION:  1. Asymmetric urinary bladder wall thickening which could be due to a cystitis; however, an underlying mass is not excluded. Correlate with urinalysis.  2. Nonspecific right perinephric fat stranding. Evaluation for pyelonephritis is limited without intravenous contrast. Mild right hydronephrosis without an obstructing calculus.    < end of copied text >    < from: Xray Chest 1 View- PORTABLE-Urgent (21 @ 18:37) >    ******PRELIMINARY REPORT******            INTERPRETATION:  clear lungs    < end of copied text >

## 2021-08-14 NOTE — PATIENT PROFILE ADULT - FALL HARM RISK TYPE OF ASSESSMENT
Verbal shift change report given to Génesis Hilton RN (oncoming nurse) by Marty Dupree RN and Esthela Gillis (student) (offgoing nurse). Report included the following information SBAR, ED Summary, MAR and Recent Results. admission

## 2021-08-14 NOTE — PROGRESS NOTE ADULT - SUBJECTIVE AND OBJECTIVE BOX
Call received from pharmacy regarding cefepime dosing. based on renal clearance and age of patient recommendation is for cefepime 1000 mg daily  Dose adjusted based on recommendations

## 2021-08-14 NOTE — H&P ADULT - PROBLEM SELECTOR PLAN 1
Fevers/chills and tachycardia on admission. Sepsis likely in setting of pyelonephritis. Pt with recurrent UTI's since removal of ureteral stent in January 2021. M/r completed 3 week course of Cefdinir yesterday but developed dysuria/urinary frequency prior to completing course indicating failed treatment.    -Obtain records from outpatient Urologist   -Urology consult in AM   -CT AP: Asymmetric urinary bladder wall thickening which could be due to a cystitis; however, an underlying mass is not excluded. Nonspecific right perinephric fat stranding.   -Bcx/Ucx collected  -S/p Vanc/cefepime in ED   -C/w Zosyn for now Fevers/chills and tachycardia on admission. Sepsis likely in setting of pyelonephritis. Pt with recurrent UTI's since removal of ureteral stent in January 2021. M/r completed 3 week course of Cefdinir yesterday but developed dysuria/urinary frequency prior to completing course indicating failed treatment.    -Obtain records from outpatient Urologist   -Urology consult in AM   -CT AP: Asymmetric urinary bladder wall thickening which could be due to a cystitis; however, an underlying mass is not excluded. Nonspecific right perinephric fat stranding.   -Bcx/Ucx collected  -S/p Vanc/cefepime in ED   -C/w Cefepime for now.

## 2021-08-14 NOTE — H&P ADULT - NSHPSOCIALHISTORY_GEN_ALL_CORE
Lives with wife. Independent with ADL/IADLs. Ambulates without assistance. 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 Former smoker from age [age]. Drinks 1 glass wine daily. Denies illicit drugs.

## 2021-08-14 NOTE — H&P ADULT - NSHPREVIEWOFSYSTEMS_GEN_ALL_CORE
Review of Systems:     Constitutional: No weakness +fever/chills     Eyes: No blindness, no eye pain    ENT: No throat pain, no rhinorrhea     Neck: No pain or stiffness     Respiratory: No cough, no shortness of breath     Cardiovascular: No chest pain, no palpitations     Gastrointestinal: No abdominal or epigastric pain. No nausea, vomiting, or diarrhea     Genitourinary: No hematuria +dysuria, increased frequency     Neurological: No numbness or weakness      Skin: No itching, burning, rashes, or lesions     Psych: No depression or anxiety

## 2021-08-15 LAB
ANION GAP SERPL CALC-SCNC: 15 MMOL/L — HIGH (ref 7–14)
BUN SERPL-MCNC: 43 MG/DL — HIGH (ref 7–23)
CALCIUM SERPL-MCNC: 9.4 MG/DL — SIGNIFICANT CHANGE UP (ref 8.4–10.5)
CHLORIDE SERPL-SCNC: 103 MMOL/L — SIGNIFICANT CHANGE UP (ref 98–107)
CO2 SERPL-SCNC: 17 MMOL/L — LOW (ref 22–31)
COVID-19 SPIKE DOMAIN AB INTERP: POSITIVE
COVID-19 SPIKE DOMAIN ANTIBODY RESULT: >250 U/ML — HIGH
CREAT SERPL-MCNC: 2.05 MG/DL — HIGH (ref 0.5–1.3)
CULTURE RESULTS: NO GROWTH — SIGNIFICANT CHANGE UP
GLUCOSE BLDC GLUCOMTR-MCNC: 157 MG/DL — HIGH (ref 70–99)
GLUCOSE BLDC GLUCOMTR-MCNC: 185 MG/DL — HIGH (ref 70–99)
GLUCOSE BLDC GLUCOMTR-MCNC: 196 MG/DL — HIGH (ref 70–99)
GLUCOSE SERPL-MCNC: 148 MG/DL — HIGH (ref 70–99)
HCT VFR BLD CALC: 32.8 % — LOW (ref 39–50)
HGB BLD-MCNC: 11 G/DL — LOW (ref 13–17)
MAGNESIUM SERPL-MCNC: 2 MG/DL — SIGNIFICANT CHANGE UP (ref 1.6–2.6)
MCHC RBC-ENTMCNC: 31.6 PG — SIGNIFICANT CHANGE UP (ref 27–34)
MCHC RBC-ENTMCNC: 33.5 GM/DL — SIGNIFICANT CHANGE UP (ref 32–36)
MCV RBC AUTO: 94.3 FL — SIGNIFICANT CHANGE UP (ref 80–100)
NRBC # BLD: 0 /100 WBCS — SIGNIFICANT CHANGE UP
NRBC # FLD: 0 K/UL — SIGNIFICANT CHANGE UP
PHOSPHATE SERPL-MCNC: 3.3 MG/DL — SIGNIFICANT CHANGE UP (ref 2.5–4.5)
PLATELET # BLD AUTO: 132 K/UL — LOW (ref 150–400)
POTASSIUM SERPL-MCNC: 3.7 MMOL/L — SIGNIFICANT CHANGE UP (ref 3.5–5.3)
POTASSIUM SERPL-SCNC: 3.7 MMOL/L — SIGNIFICANT CHANGE UP (ref 3.5–5.3)
RBC # BLD: 3.48 M/UL — LOW (ref 4.2–5.8)
RBC # FLD: 12 % — SIGNIFICANT CHANGE UP (ref 10.3–14.5)
SARS-COV-2 IGG+IGM SERPL QL IA: >250 U/ML — HIGH
SARS-COV-2 IGG+IGM SERPL QL IA: POSITIVE
SODIUM SERPL-SCNC: 135 MMOL/L — SIGNIFICANT CHANGE UP (ref 135–145)
SPECIMEN SOURCE: SIGNIFICANT CHANGE UP
WBC # BLD: 8.33 K/UL — SIGNIFICANT CHANGE UP (ref 3.8–10.5)
WBC # FLD AUTO: 8.33 K/UL — SIGNIFICANT CHANGE UP (ref 3.8–10.5)

## 2021-08-15 PROCEDURE — 99233 SBSQ HOSP IP/OBS HIGH 50: CPT

## 2021-08-15 RX ADMIN — Medication 25 MILLIGRAM(S): at 06:13

## 2021-08-15 RX ADMIN — CEFEPIME 100 MILLIGRAM(S): 1 INJECTION, POWDER, FOR SOLUTION INTRAMUSCULAR; INTRAVENOUS at 17:45

## 2021-08-15 RX ADMIN — APIXABAN 2.5 MILLIGRAM(S): 2.5 TABLET, FILM COATED ORAL at 17:45

## 2021-08-15 RX ADMIN — Medication 1 PACKET(S): at 06:13

## 2021-08-15 RX ADMIN — APIXABAN 2.5 MILLIGRAM(S): 2.5 TABLET, FILM COATED ORAL at 06:13

## 2021-08-15 RX ADMIN — ATORVASTATIN CALCIUM 80 MILLIGRAM(S): 80 TABLET, FILM COATED ORAL at 21:51

## 2021-08-15 RX ADMIN — Medication 1: at 07:55

## 2021-08-15 NOTE — PROGRESS NOTE ADULT - PROBLEM SELECTOR PLAN 1
severe sepsis 2/2 pyelonephritis c/b LILIAN. Pt with recurrent UTI's since removal of ureteral stent in January 2021. M/r completed 3 week course of Cefdinir yesterday but developed dysuria/urinary frequency prior to completing course indicating failed treatment.    -Obtain records from outpatient Urologist   -CT AP: Asymmetric urinary bladder wall thickening which could be due to a cystitis; however, an underlying mass is not excluded. Nonspecific right perinephric fat stranding.   -Bld Cx NGTD  -UCx NGTD (pt was on PO abx prior)   -c/w Cefepime severe sepsis 2/2 pyelonephritis c/b LILIAN. Pt with recurrent UTI's since removal of ureteral stent in January 2021. M/r completed 3 week course of Cefdinir yesterday but developed dysuria/urinary frequency prior to completing course indicating failed treatment.    -Obtain collateral from Dr. Douglas Maria's office regarding cysto? and previous urine culture. (Pt previously used to follow with Dr. Lim, however family does not want to see him anymore)   -CT AP: Asymmetric urinary bladder wall thickening which could be due to a cystitis; however, an underlying mass is not excluded. Nonspecific right perinephric fat stranding.   -Bld Cx NGTD  -UCx NGTD (pt was on PO abx prior)   -c/w Cefepime

## 2021-08-15 NOTE — PROGRESS NOTE ADULT - PROBLEM SELECTOR PLAN 2
BUN/Cr 53/2.63 on admission. Baseline ~1.8 in 12/2020. Likely in the setting of pyelo vs obstruction.   -CT AP: Asymmetric urinary bladder wall thickening which could be due to a cystitis; however, an underlying mass is not excluded. Nonspecific right perinephric fat stranding.   -Cr 2.0, almost at baseline   -c/w abx   -Hold home losartan   -Avoid nephrotoxic agents  -Renally dose meds

## 2021-08-15 NOTE — PHYSICAL THERAPY INITIAL EVALUATION ADULT - PERTINENT HX OF CURRENT PROBLEM, REHAB EVAL
Patient is 85 year old male admitted with history of HTN, HLD, T2DM, CAD (MI 2003, s/p 4 stents), Afib on eliquis, right nephrolithiasis s/p ureteral stent (removed 1/2021), recurrent UTIs now presenting with fevers/chills

## 2021-08-15 NOTE — PHYSICAL THERAPY INITIAL EVALUATION ADULT - ADDITIONAL COMMENTS
Patient report lives with wife in house, 1 flight of stairs, ambulated with out assistive device. Patient sates he owns a walker and cane.

## 2021-08-15 NOTE — PROGRESS NOTE ADULT - SUBJECTIVE AND OBJECTIVE BOX
[facility]  Gonsalo Yu DO  Pager (M-F, 8A-5P): [phone]      Patient is a 85y old  Male who presents with a chief complaint of Sepsis/Pyelo (14 Aug 2021 13:57)      SUBJECTIVE / OVERNIGHT EVENTS:  ADDITIONAL REVIEW OF SYSTEMS:    MEDICATIONS  (STANDING):  apixaban 2.5 milliGRAM(s) Oral two times a day  atorvastatin 80 milliGRAM(s) Oral at bedtime  cefepime   IVPB 1000 milliGRAM(s) IV Intermittent every 24 hours  dextrose 40% Gel 15 Gram(s) Oral once  dextrose 5%. 1000 milliLiter(s) (50 mL/Hr) IV Continuous <Continuous>  dextrose 50% Injectable 25 Gram(s) IV Push once  glucagon  Injectable 1 milliGRAM(s) IntraMuscular once  insulin lispro (ADMELOG) corrective regimen sliding scale   SubCutaneous three times a day before meals  metoprolol succinate ER 25 milliGRAM(s) Oral daily    MEDICATIONS  (PRN):  acetaminophen   Tablet .. 650 milliGRAM(s) Oral every 6 hours PRN Temp greater or equal to 38.5C (101.3F), Mild Pain (1 - 3)  aluminum hydroxide/magnesium hydroxide/simethicone Suspension 30 milliLiter(s) Oral every 4 hours PRN Dyspepsia  melatonin 3 milliGRAM(s) Oral at bedtime PRN Insomnia  ondansetron Injectable 4 milliGRAM(s) IV Push every 8 hours PRN Nausea and/or Vomiting      CAPILLARY BLOOD GLUCOSE      POCT Blood Glucose.: 157 mg/dL (15 Aug 2021 07:36)  POCT Blood Glucose.: 152 mg/dL (14 Aug 2021 22:03)  POCT Blood Glucose.: 130 mg/dL (14 Aug 2021 17:27)  POCT Blood Glucose.: 169 mg/dL (14 Aug 2021 12:21)    I&O's Summary    14 Aug 2021 07:01  -  15 Aug 2021 07:00  --------------------------------------------------------  IN: 850 mL / OUT: 2300 mL / NET: -1450 mL        PHYSICAL EXAM:  Vital Signs Last 24 Hrs  T(C): 36.6 (15 Aug 2021 05:00), Max: 36.9 (14 Aug 2021 10:00)  T(F): 97.9 (15 Aug 2021 05:00), Max: 98.5 (14 Aug 2021 22:02)  HR: 75 (15 Aug 2021 05:00) (74 - 80)  BP: 111/50 (15 Aug 2021 05:00) (111/50 - 147/63)  BP(mean): --  RR: 18 (15 Aug 2021 05:) (18 - 20)  SpO2: 98% (15 Aug 2021 05:00) (98% - 100%)  CONSTITUTIONAL: NAD, well-developed, well-groomed  EYES: EOMI; conjunctiva and sclera clear  ENMT: Moist oral mucosa, no pharyngeal injection or exudates; normal dentition  NECK: Supple, no palpable masses; no thyromegaly  RESPIRATORY: Normal respiratory effort; lungs are clear to auscultation bilaterally  CARDIOVASCULAR: Regular rate and rhythm, normal S1 and S2, no murmur/rub/gallop; No lower extremity edema; Peripheral pulses are 2+ bilaterally  ABDOMEN: Nontender to palpation, normoactive bowel sounds, no rebound/guarding; No hepatosplenomegaly  MUSKULOSKELETAL:  no clubbing or cyanosis of digits; no joint swelling or tenderness to palpation  PSYCH: A+O to person, place, and time; affect appropriate  NEUROLOGY: CN 2-12 are intact and symmetric; no gross sensory deficits;   SKIN: No rashes; no palpable lesions    LABS:                        11.0   8.33  )-----------( 132      ( 15 Aug 2021 07:13 )             32.8     08-15    135  |  103  |  43<H>  ----------------------------<  148<H>  3.7   |  17<L>  |  2.05<H>    Ca    9.4      15 Aug 2021 07:13  Phos  2.3     08-14  Mg     1.40     08-14    TPro  7.3  /  Alb  3.8  /  TBili  0.5  /  DBili  x   /  AST  30  /  ALT  35  /  AlkPhos  82  08-13    PT/INR - ( 13 Aug 2021 19:29 )   PT: 17.9 sec;   INR: 1.61 ratio               Urinalysis Basic - ( 13 Aug 2021 19:29 )    Color: Yellow / Appearance: Turbid / S.017 / pH: x  Gluc: x / Ketone: Negative  / Bili: Negative / Urobili: <2 mg/dL   Blood: x / Protein: 300 mg/dL / Nitrite: Negative   Leuk Esterase: Large / RBC: >10 /HPF / WBC MANY /HPF   Sq Epi: x / Non Sq Epi: x / Bacteria: Occasional        Culture - Blood (collected 14 Aug 2021 02:46)  Source: .Blood Blood-Peripheral 2 bactec bottles recieved.  Preliminary Report (15 Aug 2021 03:01):    No growth to date.    Culture - Blood (collected 14 Aug 2021 02:40)  Source: .Blood Blood-Peripheral  Preliminary Report (15 Aug 2021 03:01):    No growth to date.    Culture - Urine (collected 13 Aug 2021 23:11)  Source: Clean Catch Clean Catch (Midstream)  Final Report (15 Aug 2021 03:19):    No growth        RADIOLOGY & ADDITIONAL TESTS:  Results Reviewed:   Imaging Personally Reviewed:  Electrocardiogram Personally Reviewed:    COORDINATION OF CARE:  Care Discussed with Consultants/Other Providers [Y/N]: Y  Prior or Outpatient Records Reviewed [Y/N]: Y   [facility] Division of Hospital Medicine  Jasminabhinav MontemayorelDO  Pager (M-F, 8A-5P): [phone]      Patient is a 85y old  Male who presents with a chief complaint of Sepsis/Pyelo (14 Aug 2021 13:57)      SUBJECTIVE / OVERNIGHT EVENTS: Pt seen at bedside, wife and son present.   Overall, feels well, has been afebrile, has some frequency, but less today.   ADDITIONAL REVIEW OF SYSTEMS: no cp/sob     MEDICATIONS  (STANDING):  apixaban 2.5 milliGRAM(s) Oral two times a day  atorvastatin 80 milliGRAM(s) Oral at bedtime  cefepime   IVPB 1000 milliGRAM(s) IV Intermittent every 24 hours  dextrose 40% Gel 15 Gram(s) Oral once  dextrose 5%. 1000 milliLiter(s) (50 mL/Hr) IV Continuous <Continuous>  dextrose 50% Injectable 25 Gram(s) IV Push once  glucagon  Injectable 1 milliGRAM(s) IntraMuscular once  insulin lispro (ADMELOG) corrective regimen sliding scale   SubCutaneous three times a day before meals  metoprolol succinate ER 25 milliGRAM(s) Oral daily    MEDICATIONS  (PRN):  acetaminophen   Tablet .. 650 milliGRAM(s) Oral every 6 hours PRN Temp greater or equal to 38.5C (101.3F), Mild Pain (1 - 3)  aluminum hydroxide/magnesium hydroxide/simethicone Suspension 30 milliLiter(s) Oral every 4 hours PRN Dyspepsia  melatonin 3 milliGRAM(s) Oral at bedtime PRN Insomnia  ondansetron Injectable 4 milliGRAM(s) IV Push every 8 hours PRN Nausea and/or Vomiting      CAPILLARY BLOOD GLUCOSE      POCT Blood Glucose.: 157 mg/dL (15 Aug 2021 07:36)  POCT Blood Glucose.: 152 mg/dL (14 Aug 2021 22:03)  POCT Blood Glucose.: 130 mg/dL (14 Aug 2021 17:27)  POCT Blood Glucose.: 169 mg/dL (14 Aug 2021 12:21)    I&O's Summary    14 Aug 2021 07:01  -  15 Aug 2021 07:00  --------------------------------------------------------  IN: 850 mL / OUT: 2300 mL / NET: -1450 mL        PHYSICAL EXAM:  Vital Signs Last 24 Hrs  T(C): 36.6 (15 Aug 2021 05:00), Max: 36.9 (14 Aug 2021 10:00)  T(F): 97.9 (15 Aug 2021 05:00), Max: 98.5 (14 Aug 2021 22:02)  HR: 75 (15 Aug 2021 05:00) (74 - 80)  BP: 111/50 (15 Aug 2021 05:00) (111/50 - 147/63)  BP(mean): --  RR: 18 (15 Aug 2021 05:00) (18 - 20)  SpO2: 98% (15 Aug 2021 05:00) (98% - 100%)  CONSTITUTIONAL: NAD, elderly, well appearing   EYES: EOMI; conjunctiva and sclera clear  ENMT: Moist oral mucosa; normal dentition  NECK: Supple  RESPIRATORY: Normal respiratory effort; lungs are clear to auscultation bilaterally  CARDIOVASCULAR: Regular rate and rhythm, normal S1 and S2, no murmur/rub/gallop  ABDOMEN: soft, Nontender to palpation, normoactive bowel sounds  MUSKULOSKELETAL:  no clubbing or cyanosis of digits; no joint swelling or tenderness to palpation  PSYCH: calm, cooperative, affect appropriate  NEUROLOGY: CN 2-12 are intact and symmetric; no gross sensory deficits  SKIN: No rashes; no palpable lesions  LABS:                        11.0   8.33  )-----------( 132      ( 15 Aug 2021 07:13 )             32.8     08-15    135  |  103  |  43<H>  ----------------------------<  148<H>  3.7   |  17<L>  |  2.05<H>    Ca    9.4      15 Aug 2021 07:13  Phos  2.3     08-14  Mg     1.40     08-14    TPro  7.3  /  Alb  3.8  /  TBili  0.5  /  DBili  x   /  AST  30  /  ALT  35  /  AlkPhos  82  08-13    PT/INR - ( 13 Aug 2021 19:29 )   PT: 17.9 sec;   INR: 1.61 ratio               Urinalysis Basic - ( 13 Aug 2021 19:29 )    Color: Yellow / Appearance: Turbid / S.017 / pH: x  Gluc: x / Ketone: Negative  / Bili: Negative / Urobili: <2 mg/dL   Blood: x / Protein: 300 mg/dL / Nitrite: Negative   Leuk Esterase: Large / RBC: >10 /HPF / WBC MANY /HPF   Sq Epi: x / Non Sq Epi: x / Bacteria: Occasional        Culture - Blood (collected 14 Aug 2021 02:46)  Source: .Blood Blood-Peripheral 2 bactec bottles recieved.  Preliminary Report (15 Aug 2021 03:01):    No growth to date.    Culture - Blood (collected 14 Aug 2021 02:40)  Source: .Blood Blood-Peripheral  Preliminary Report (15 Aug 2021 03:01):    No growth to date.    Culture - Urine (collected 13 Aug 2021 23:11)  Source: Clean Catch Clean Catch (Midstream)  Final Report (15 Aug 2021 03:19):    No growth        RADIOLOGY & ADDITIONAL TESTS:  Results Reviewed:   Imaging Personally Reviewed:  Electrocardiogram Personally Reviewed:    COORDINATION OF CARE:  Care Discussed with Consultants/Other Providers [Y/N]: Y  Prior or Outpatient Records Reviewed [Y/N]: Y

## 2021-08-16 ENCOUNTER — TRANSCRIPTION ENCOUNTER (OUTPATIENT)
Age: 86
End: 2021-08-16

## 2021-08-16 LAB
ANION GAP SERPL CALC-SCNC: 14 MMOL/L — SIGNIFICANT CHANGE UP (ref 7–14)
BUN SERPL-MCNC: 51 MG/DL — HIGH (ref 7–23)
CALCIUM SERPL-MCNC: 9.2 MG/DL — SIGNIFICANT CHANGE UP (ref 8.4–10.5)
CHLORIDE SERPL-SCNC: 104 MMOL/L — SIGNIFICANT CHANGE UP (ref 98–107)
CO2 SERPL-SCNC: 18 MMOL/L — LOW (ref 22–31)
CREAT SERPL-MCNC: 1.98 MG/DL — HIGH (ref 0.5–1.3)
GLUCOSE BLDC GLUCOMTR-MCNC: 174 MG/DL — HIGH (ref 70–99)
GLUCOSE SERPL-MCNC: 171 MG/DL — HIGH (ref 70–99)
HCT VFR BLD CALC: 32.7 % — LOW (ref 39–50)
HGB BLD-MCNC: 10.7 G/DL — LOW (ref 13–17)
MAGNESIUM SERPL-MCNC: 2 MG/DL — SIGNIFICANT CHANGE UP (ref 1.6–2.6)
MCHC RBC-ENTMCNC: 30.7 PG — SIGNIFICANT CHANGE UP (ref 27–34)
MCHC RBC-ENTMCNC: 32.7 GM/DL — SIGNIFICANT CHANGE UP (ref 32–36)
MCV RBC AUTO: 93.7 FL — SIGNIFICANT CHANGE UP (ref 80–100)
NRBC # BLD: 0 /100 WBCS — SIGNIFICANT CHANGE UP
NRBC # FLD: 0 K/UL — SIGNIFICANT CHANGE UP
PHOSPHATE SERPL-MCNC: 3.9 MG/DL — SIGNIFICANT CHANGE UP (ref 2.5–4.5)
PLATELET # BLD AUTO: 162 K/UL — SIGNIFICANT CHANGE UP (ref 150–400)
POTASSIUM SERPL-MCNC: 3.9 MMOL/L — SIGNIFICANT CHANGE UP (ref 3.5–5.3)
POTASSIUM SERPL-SCNC: 3.9 MMOL/L — SIGNIFICANT CHANGE UP (ref 3.5–5.3)
RBC # BLD: 3.49 M/UL — LOW (ref 4.2–5.8)
RBC # FLD: 12 % — SIGNIFICANT CHANGE UP (ref 10.3–14.5)
SODIUM SERPL-SCNC: 136 MMOL/L — SIGNIFICANT CHANGE UP (ref 135–145)
WBC # BLD: 7.56 K/UL — SIGNIFICANT CHANGE UP (ref 3.8–10.5)
WBC # FLD AUTO: 7.56 K/UL — SIGNIFICANT CHANGE UP (ref 3.8–10.5)

## 2021-08-16 PROCEDURE — 99233 SBSQ HOSP IP/OBS HIGH 50: CPT

## 2021-08-16 RX ORDER — SODIUM CHLORIDE 9 MG/ML
1000 INJECTION, SOLUTION INTRAVENOUS
Refills: 0 | Status: DISCONTINUED | OUTPATIENT
Start: 2021-08-16 | End: 2021-08-17

## 2021-08-16 RX ADMIN — APIXABAN 2.5 MILLIGRAM(S): 2.5 TABLET, FILM COATED ORAL at 06:18

## 2021-08-16 RX ADMIN — SODIUM CHLORIDE 75 MILLILITER(S): 9 INJECTION, SOLUTION INTRAVENOUS at 16:42

## 2021-08-16 RX ADMIN — APIXABAN 2.5 MILLIGRAM(S): 2.5 TABLET, FILM COATED ORAL at 17:51

## 2021-08-16 RX ADMIN — Medication 25 MILLIGRAM(S): at 06:19

## 2021-08-16 RX ADMIN — ATORVASTATIN CALCIUM 80 MILLIGRAM(S): 80 TABLET, FILM COATED ORAL at 21:50

## 2021-08-16 RX ADMIN — CEFEPIME 100 MILLIGRAM(S): 1 INJECTION, POWDER, FOR SOLUTION INTRAMUSCULAR; INTRAVENOUS at 17:51

## 2021-08-16 NOTE — DISCHARGE NOTE PROVIDER - HOSPITAL COURSE
85M w/HTN, HLD, T2DM, CAD ([place] 2003, s/p 4 stents), Afib on eliquis, h/o R nephrolithiasis s/p ureteral stent (removed 1/2021), recurrent UTIs now presenting with fevers/chills x 1d associated with dysuria and increased urinary frequency x 2d. A/w sepsis in the setting of pyelo and LILIAN.      Problem/Plan - 1:  ·  Problem: Sepsis due to urinary tract infection.  Plan: severe sepsis 2/2 pyelonephritis c/b LILIAN. Pt with recurrent UTI's since removal of ureteral stent in January 2021. M/r completed 3 week course of Cefdinir yesterday but developed dysuria/urinary frequency prior to completing course indicating failed treatment.    -Obtain collateral from Dr. Douglas Maria's office regarding cysto? and previous urine culture. (Pt previously used to follow with Dr. Lim, however family does not want to see him anymore)   -CT AP: Asymmetric urinary bladder wall thickening which could be due to a cystitis; however, an underlying mass is not excluded. Nonspecific right perinephric fat stranding.   -Bld Cx NGTD  -UCx NGTD (pt was on PO abx prior)   -c/w Cefepime.       Problem/Plan - 2:  ·  Problem: LILIAN (acute kidney injury).  Plan: BUN/Cr 53/2.63 on admission. Baseline ~1.8 in 12/2020. Likely in the setting of pyelo vs obstruction.   -CT AP: Asymmetric urinary bladder wall thickening which could be due to a cystitis; however, an underlying mass is not excluded. Nonspecific right perinephric fat stranding.   -Cr 2.0, almost at baseline   -c/w abx   -Hold home losartan   -Avoid nephrotoxic agents  -Renally dose meds.     Atrial fibrillation.  Plan: Chronic, stable  -C/w home Toprol 25mg qday and Eliquis 2.5mg BID.     Type II diabetes mellitus.  Plan: Pt was previously on Metformin, recently discontinued. Now on repaglinide 0.5mg qday.   -A1c 5.9   -FS <180, discontinued ISS   -CC diet    \Hypertension.  Plan: BP stable   -C/w home Toprol 25mg qday   -Hold home losartan 50mg in setting of LILIAN, restart as tolerated.     HLD (hyperlipidemia).  - home statin continued  - Pt to follow up with PCP for further management as outpatient      Problem/Plan - 7:  ·  Problem: Prophylactic measure.  Plan: -DVT ppx: Eliquis  -Diet: CC/DASH  -Dispo: pending clinical improvement and abx duration   PT eval pending.     On_________, discussed with __________, patient is medically cleared and optimized for discharge today. All medications were reviewed with attending, and sent to mutually agreed upon pharmacy.   85M w/HTN, HLD, T2DM, CAD ([place] 2003, s/p 4 stents), Afib on eliquis, h/o R nephrolithiasis s/p ureteral stent (removed 1/2021), recurrent UTIs now presenting with fevers/chills x 1d associated with dysuria and increased urinary frequency x 2d. A/w sepsis in the setting of pyelo and LILIAN.     Severe sepsis 2/2 pyelonephritis c/b LILIAN.  -  Pt with recurrent UTI's since removal of ureteral stent in January 2021.   - M/r completed 3 week course of Cefdinir yesterday but developed dysuria/urinary frequency prior to completing course indicating failed treatment.    - Collateral obtained from Dr. Maria's office, >>>>>>>>>>>>>>>  - CT AP: Asymmetric urinary bladder wall thickening which could be due to a cystitis; however, an underlying mass is not excluded. Nonspecific right perinephric fat stranding.   - Bld Cx NGTD  - UCx NGTD (pt was on PO abx prior)   - Cefepime continued  - Pt to follow up with Dr. Maria for further monitoring     LILIAN (acute kidney injury).  - BUN/Cr 53/2.63 on admission. Baseline ~1.8 in 12/2020. Likely in the setting of pyelo vs obstruction.   - CT AP: Asymmetric urinary bladder wall thickening which could be due to a cystitis; however, an underlying mass is not excluded. Nonspecific right perinephric fat stranding.   -Cr 2.0, almost at baseline   - Antibiotics continued   - Home losartan held  - Nephrotoxic agents avoided  - Medications renally dosed  - Pt to follow up with  PCP as outpatient for further monitoring of kidney function as outpatient     Atrial fibrillation.    - Chronic, stable  -  home Toprol 25mg qday and Eliquis 2.5mg BID continued     Type II diabetes mellitus.    - Pt was previously on Metformin, recently discontinued. Now on repaglinide 0.5mg qday.   -A1c 5.9   -FS <180, discontinued ISS   -CC diet  - Pt to follow up with PCP as outpatient for further management    Hypertension.  Plan: BP stable   -C/w home Toprol 25mg qday   -Hold home losartan 50mg in setting of LILIAN, restart as tolerated.     HLD (hyperlipidemia).  - home statin continued  - Pt to follow up with PCP for further management as outpatient     Prophylactic measure.    -DVT ppx: Eliquis  -Diet: CC/DASH  PT home no needs    On_________, discussed with __________, patient is medically cleared and optimized for discharge today. All medications were reviewed with attending, and sent to mutually agreed upon pharmacy.   85M w/HTN, HLD, T2DM, CAD (MI 2003, s/p 4 stents), Afib on eliquis, h/o R nephrolithiasis s/p ureteral stent (removed 1/2021), recurrent UTIs now presenting with fevers/chills x 1d associated with dysuria and increased urinary frequency x 2d. A/w sepsis in the setting of pyelo and LILIAN.     Severe sepsis 2/2 pyelonephritis c/b LILIAN.  -  Pt with recurrent UTI's since removal of ureteral stent in January 2021.   - M/r completed 3 week course of Cefdinir yesterday but developed dysuria/urinary frequency prior to completing course indicating failed treatment.    - Collateral obtained from Dr. Maria's office,  patient last Ucx on 8/2 with negative growth, he had recurrent urinary symptoms. NGS from urine was +Pseudomonas and Staph aureus.   - CT AP: Asymmetric urinary bladder wall thickening which could be due to a cystitis; however, an underlying mass is not excluded. Nonspecific right perinephric fat stranding.   - Bld Cx NGTD  - UCx NGTD (pt was on PO abx prior)   - Cefepime continued  - Pt to continue with levaquin 250mg x 3 more days as outpatient until 8/20   - Pt to follow up with Dr. Maria for further management    LILIAN (acute kidney injury).  - BUN/Cr 53/2.63 on admission. Baseline ~1.8 in 12/2020. Likely in the setting of pyelo vs obstruction.   - CT AP: Asymmetric urinary bladder wall thickening which could be due to a cystitis; however, an underlying mass is not excluded. Nonspecific right perinephric fat stranding.   -Cr 2.0, almost at baseline   - Antibiotics continued   - Home losartan held  - Nephrotoxic agents avoided  - Medications renally dosed  - Pt to follow up with  PCP as outpatient for further monitoring of kidney function as outpatient     Atrial fibrillation.    - Chronic, stable  -  home Toprol 25mg qday and Eliquis 2.5mg BID continued     Type II diabetes mellitus.    - Pt was previously on Metformin, recently discontinued. Now on repaglinide 0.5mg qday.   -A1c 5.9   -FS <180, discontinued ISS   -CC diet  - Pt to follow up with PCP as outpatient for further management    Hypertension.  Plan: BP stable   -C/w home Toprol 25mg qday   -Hold home losartan 50mg in setting of LILIAN, restart as tolerated.     HLD (hyperlipidemia).  - home statin continued  - Pt to follow up with PCP for further management as outpatient     Prophylactic measure.    -DVT ppx: Eliquis  -Diet: CC/DASH  PT home no needs    On 8/17/2021, discussed with Dr. Dick, patient is medically cleared and optimized for discharge today. All medications were reviewed with attending, and sent to mutually agreed upon pharmacy.   85M w/HTN, HLD, T2DM, CAD (MI 2003, s/p 4 stents), Afib on eliquis, h/o R nephrolithiasis s/p ureteral stent (removed 1/2021), recurrent UTIs now presenting with fevers/chills x 1d associated with dysuria and increased urinary frequency x 2d. A/w sepsis in the setting of pyelo and LILIAN.     Severe sepsis 2/2 pyelonephritis c/b LILIAN.  -  Pt with recurrent UTI's since removal of ureteral stent in January 2021.   - M/r completed 3 week course of Cefdinir yesterday but developed dysuria/urinary frequency prior to completing course indicating failed treatment.    - Collateral obtained from Dr. Maria's office,  patient last Ucx on 8/2 with negative growth, he had recurrent urinary symptoms. NGS from urine was +Pseudomonas and Staph aureus.   - CT AP: Asymmetric urinary bladder wall thickening which could be due to a cystitis; however, an underlying mass is not excluded. Nonspecific right perinephric fat stranding.   - Bld Cx NGTD  - UCx NGTD (pt was on PO abx prior)   - Cefepime continued  - Pt to continue with cefepime x 3 more days as outpatient until 8/20   - Pt to follow up with Dr. Maria for further management    LILIAN (acute kidney injury).  - BUN/Cr 53/2.63 on admission. Baseline ~1.8 in 12/2020. Likely in the setting of pyelo vs obstruction.   - CT AP: Asymmetric urinary bladder wall thickening which could be due to a cystitis; however, an underlying mass is not excluded. Nonspecific right perinephric fat stranding.   -Cr 2.0, almost at baseline   - Antibiotics continued   - Home losartan held  - Nephrotoxic agents avoided  - Medications renally dosed  - Pt to follow up with  PCP as outpatient for further monitoring of kidney function as outpatient     Atrial fibrillation.    - Chronic, stable  -  home Toprol 25mg qday and Eliquis 2.5mg BID continued     Type II diabetes mellitus.    - Pt was previously on Metformin, recently discontinued. Now on repaglinide 0.5mg qday.   -A1c 5.9   -FS <180, discontinued ISS   -CC diet  - Pt to follow up with PCP as outpatient for further management    Hypertension.  Plan: BP stable   -C/w home Toprol 25mg qday   -Hold home losartan 50mg in setting of LILIAN, restart as tolerated.     HLD (hyperlipidemia).  - home statin continued  - Pt to follow up with PCP for further management as outpatient     Prophylactic measure.    -DVT ppx: Eliquis  -Diet: CC/DASH  PT home no needs    On 8/17/2021, discussed with Dr. Dick, patient is medically cleared and optimized for discharge today. All medications were reviewed with attending, and sent to mutually agreed upon pharmacy.   85M w/HTN, HLD, T2DM, CAD (MI 2003, s/p 4 stents), Afib on eliquis, h/o R nephrolithiasis s/p ureteral stent (removed 1/2021), recurrent UTIs now presenting with fevers/chills x 1d associated with dysuria and increased urinary frequency x 2d. A/w sepsis in the setting of pyelo and LILIAN.     Severe sepsis 2/2 pyelonephritis c/b LILIAN.  -  Pt with recurrent UTI's since removal of ureteral stent in January 2021.   - M/r completed 3 week course of Cefdinir yesterday but developed dysuria/urinary frequency prior to completing course indicating failed treatment.    - Collateral obtained from Dr. Maria's office,  patient last Ucx on 8/2 with negative growth, he had recurrent urinary symptoms. NGS from urine was +Pseudomonas and Staph aureus.   - CT AP: Asymmetric urinary bladder wall thickening which could be due to a cystitis; however, an underlying mass is not excluded. Nonspecific right perinephric fat stranding.   - Bld Cx NGTD  - UCx NGTD (pt was on PO abx prior)   - Cefepime continued  - Pt to continue with cefepime x 3 more days as outpatient until 8/23  - Pt to follow up with Dr. Maria for further management    LILIAN (acute kidney injury).  - BUN/Cr 53/2.63 on admission. Baseline ~1.8 in 12/2020. Likely in the setting of pyelo vs obstruction.   - CT AP: Asymmetric urinary bladder wall thickening which could be due to a cystitis; however, an underlying mass is not excluded. Nonspecific right perinephric fat stranding.   -Cr 2.0, almost at baseline   - Antibiotics continued   - Home losartan held  - Nephrotoxic agents avoided  - Medications renally dosed  - Pt to follow up with  PCP as outpatient for further monitoring of kidney function as outpatient     Atrial fibrillation.    - Chronic, stable  -  home Toprol 25mg qday and Eliquis 2.5mg BID continued     Type II diabetes mellitus.    - Pt was previously on Metformin, recently discontinued. Now on repaglinide 0.5mg qday.   -A1c 5.9   -FS <180, discontinued ISS   -CC diet  - Pt to follow up with PCP as outpatient for further management    Hypertension.  Plan: BP stable   -C/w home Toprol 25mg qday   -Hold home losartan 50mg in setting of LILIAN, restart as tolerated.     HLD (hyperlipidemia).  - home statin continued  - Pt to follow up with PCP for further management as outpatient     Prophylactic measure.    -DVT ppx: Eliquis  -Diet: CC/DASH  PT home no needs    On 8/17/2021, discussed with Dr. Dick, patient is medically cleared and optimized for discharge today. All medications were reviewed with attending, and sent to mutually agreed upon pharmacy.   85M w/HTN, HLD, T2DM, CAD (MI 2003, s/p 4 stents), Afib on eliquis, h/o R nephrolithiasis s/p ureteral stent (removed 1/2021), recurrent UTIs now presenting with fevers/chills x 1d associated with dysuria and increased urinary frequency x 2d. A/w sepsis in the setting of pyelo and LILIAN.     Severe sepsis 2/2 pyelonephritis c/b LILIAN.  -  Pt with recurrent UTI's since removal of ureteral stent in January 2021.   - M/r completed 3 week course of Cefdinir yesterday but developed dysuria/urinary frequency prior to completing course indicating failed treatment.    - Collateral obtained from Dr. Maria's office,  patient last Ucx on 8/2 with negative growth, he had recurrent urinary symptoms. NGS from urine was +Pseudomonas and Staph aureus.   - CT AP: Asymmetric urinary bladder wall thickening which could be due to a cystitis; however, an underlying mass is not excluded. Nonspecific right perinephric fat stranding.   - Bld Cx NGTD  - UCx NGTD (pt was on PO abx prior)   - Cefepime continued  - Pt to continue with cefepime as outpatient until 8/23  - Pt to follow up with Dr. Maria for further management    LILIAN (acute kidney injury).  - BUN/Cr 53/2.63 on admission. Baseline ~1.8 in 12/2020. Likely in the setting of pyelo vs obstruction.   - CT AP: Asymmetric urinary bladder wall thickening which could be due to a cystitis; however, an underlying mass is not excluded. Nonspecific right perinephric fat stranding.   -Cr 2.0, almost at baseline   - Antibiotics continued   - Home losartan held  - Nephrotoxic agents avoided  - Medications renally dosed  - Pt to follow up with  PCP as outpatient for further monitoring of kidney function as outpatient     Atrial fibrillation.    - Chronic, stable  -  home Toprol 25mg qday and Eliquis 2.5mg BID continued     Type II diabetes mellitus.    - Pt was previously on Metformin, recently discontinued. Now on repaglinide 0.5mg qday.   -A1c 5.9   -FS <180, discontinued ISS   -CC diet  - Pt to follow up with PCP as outpatient for further management    Hypertension.  Plan: BP stable   -C/w home Toprol 25mg qday   -Hold home losartan 50mg in setting of LILIAN, restart as tolerated.     HLD (hyperlipidemia).  - home statin continued  - Pt to follow up with PCP for further management as outpatient     Prophylactic measure.    -DVT ppx: Eliquis  -Diet: CC/DASH  PT home no needs    On 8/17/2021, discussed with Dr. Dick, patient is medically cleared and optimized for discharge today. All medications were reviewed with attending, and sent to mutually agreed upon pharmacy.

## 2021-08-16 NOTE — DISCHARGE NOTE PROVIDER - NSDCCPCAREPLAN_GEN_ALL_CORE_FT
PRINCIPAL DISCHARGE DIAGNOSIS  Diagnosis: Sepsis due to urinary tract infection  Assessment and Plan of Treatment: You were found to have Urinary tract infection. You were treated with IV antibiotics. You are to follow up with Dr. Maria for further monitoring.      SECONDARY DISCHARGE DIAGNOSES  Diagnosis: LILIAN (acute kidney injury)  Assessment and Plan of Treatment: Your kidney function was found to be abnormal. You were treated with anitbiotics. You are to follow up with  PCP as outpatient for further monitoring of kidney function as outpatient.    Diagnosis: HLD (hyperlipidemia)  Assessment and Plan of Treatment: Low fat diet, exercise daily and continue current medications. Follow up with primary care physician and cardiologist for management.      Diagnosis: Type II diabetes mellitus  Assessment and Plan of Treatment: A1c 5.8. Monitor finger sticks pre-meal and bedtime, low salt, fat and carbohydrate diet, minimize glucose intake.  Exercise daily for at least 30 minutes and weight loss.  Follow up with primary care physician and endocrinologist for routine Hemoglobin A1C checks and management.  Follow up with your ophthalmologist for routine yearly vision exams.    Diagnosis: Hypertension  Assessment and Plan of Treatment: Low sodium and fat diet, continue anti-hypertensive medications, and follow up with primary care physician. HOLD HOME LOSARTAN?    Diagnosis: Atrial fibrillation  Assessment and Plan of Treatment: Your  home Toprol 25mg qday and Eliquis 2.5mg BID was continued.     PRINCIPAL DISCHARGE DIAGNOSIS  Diagnosis: Sepsis due to urinary tract infection  Assessment and Plan of Treatment: You were found to have Urinary tract infection. You were treated with IV antibiotics. You are to follow up with Dr. Maria for further monitoring.      SECONDARY DISCHARGE DIAGNOSES  Diagnosis: LILIAN (acute kidney injury)  Assessment and Plan of Treatment: Your kidney function was found to be abnormal. You were treated with anitbiotics. You are to follow up with  PCP as outpatient for further monitoring of kidney function as outpatient.    Diagnosis: HLD (hyperlipidemia)  Assessment and Plan of Treatment: Low fat diet, exercise daily and continue current medications. Follow up with primary care physician and cardiologist for management.      Diagnosis: Type II diabetes mellitus  Assessment and Plan of Treatment: A1c 5.8. Monitor finger sticks pre-meal and bedtime, low salt, fat and carbohydrate diet, minimize glucose intake.  Exercise daily for at least 30 minutes and weight loss.  Follow up with primary care physician and endocrinologist for routine Hemoglobin A1C checks and management.  Follow up with your ophthalmologist for routine yearly vision exams.    Diagnosis: Hypertension  Assessment and Plan of Treatment: Low sodium and fat diet, continue anti-hypertensive medications, and follow up with primary care physician. HOLD HOME LOSARATAN UNTIL FOLLOW UP WITH PCP    Diagnosis: Atrial fibrillation  Assessment and Plan of Treatment: Your  home Toprol 25mg qday and Eliquis 2.5mg BID was continued.

## 2021-08-16 NOTE — PROGRESS NOTE ADULT - PROBLEM SELECTOR PLAN 1
severe sepsis 2/2 pyelonephritis c/b LILIAN. Pt with recurrent UTI's since removal of ureteral stent in January 2021. M/r completed 3 week course of Cefdinir yesterday but developed dysuria/urinary frequency prior to completing course indicating failed treatment.    -Obtain collateral from Dr. Douglas Maria's office regarding cysto? and previous urine culture. (Pt previously used to follow with Dr. Lim, however family does not want to see him anymore)   -CT AP: Asymmetric urinary bladder wall thickening which could be due to a cystitis; however, an underlying mass is not excluded. Nonspecific right perinephric fat stranding.   -Bld Cx NGTD  -UCx NGTD (pt was on PO abx prior)   -c/w Cefepime, likely transition to PO Levaquin on discharge. severe sepsis 2/2 pyelonephritis c/b LILIAN. Pt with recurrent UTI's since removal of ureteral stent in January 2021. M/r completed 3 week course of Cefdinir yesterday but developed dysuria/urinary frequency prior to completing course indicating failed treatment.    -Obtain collateral from Dr. Douglas Maria's office regarding cysto? and previous urine culture. (Pt previously used to follow with Dr. Lim, however family does not want to see him anymore)   -CT AP: Asymmetric urinary bladder wall thickening which could be due to a cystitis; however, an underlying mass is not excluded. Nonspecific right perinephric fat stranding.   -Bld Cx NGTD  -UCx NGTD (pt was on PO abx prior)   -c/w Cefepime, likely transition to PO Levaquin on discharge.  - per  at Dr. Maria office, patient last Ucx on 8/2 with negative growth, he had recurrent urinary symptomats. NGS from urine was +Pseudomonas and Staph aureus. will fax over reports.

## 2021-08-16 NOTE — DISCHARGE NOTE PROVIDER - NSDCFUADDAPPT_GEN_ALL_CORE_FT
Follow up with your primary care physician for further monitoring in 1-2 weeks. Please call to arrange appointment.  Follow up with urology within 1-2 weeks of discharge.

## 2021-08-16 NOTE — PROGRESS NOTE ADULT - SUBJECTIVE AND OBJECTIVE BOX
[facility] Division of Hospital Medicine  Douglas Dick MD  In House Pager [phone]    Patient is a 85y old  Male who presents with a chief complaint of Sepsis/Pyelo (16 Aug 2021 13:46)      SUBJECTIVE / OVERNIGHT EVENTS:  No overnight events. Labs and vitals reviewed.  Patient seen and examined at bedside, no acute complaints. reports his urinary urgency and dysuria has resolved.   No fever, no chills, no SOB, no CP, no n/v/d, no abd pain, no dysuria      MEDICATIONS  (STANDING):  apixaban 2.5 milliGRAM(s) Oral two times a day  atorvastatin 80 milliGRAM(s) Oral at bedtime  cefepime   IVPB 1000 milliGRAM(s) IV Intermittent every 24 hours  metoprolol succinate ER 25 milliGRAM(s) Oral daily    MEDICATIONS  (PRN):  acetaminophen   Tablet .. 650 milliGRAM(s) Oral every 6 hours PRN Temp greater or equal to 38.5C (101.3F), Mild Pain (1 - 3)  aluminum hydroxide/magnesium hydroxide/simethicone Suspension 30 milliLiter(s) Oral every 4 hours PRN Dyspepsia  melatonin 3 milliGRAM(s) Oral at bedtime PRN Insomnia    CAPILLARY BLOOD GLUCOSE      POCT Blood Glucose.: 174 mg/dL (16 Aug 2021 07:00)  POCT Blood Glucose.: 196 mg/dL (15 Aug 2021 16:44)    I&O's Summary    15 Aug 2021 07:01  -  16 Aug 2021 07:00  --------------------------------------------------------  IN: 850 mL / OUT: 450 mL / NET: 400 mL    16 Aug 2021 07:01  -  16 Aug 2021 15:21  --------------------------------------------------------  IN: 0 mL / OUT: 550 mL / NET: -550 mL        PHYSICAL EXAM:  Vital Signs Last 24 Hrs  T(C): 36.5 (16 Aug 2021 10:17), Max: 37 (16 Aug 2021 06:18)  T(F): 97.7 (16 Aug 2021 10:17), Max: 98.6 (16 Aug 2021 06:18)  HR: 63 (16 Aug 2021 10:17) (60 - 75)  BP: 106/49 (16 Aug 2021 10:17) (105/67 - 133/59)  BP(mean): --  RR: 17 (16 Aug 2021 10:17) (17 - 18)  SpO2: 100% (16 Aug 2021 10:17) (97% - 100%)    Gen: NAD; resting in bed.  Pulm: no respiratory distress; CTA b/l; no wheezing  Cards: RRR, nl S1/S2; no obvious murmurs  Abd: soft; NT on exam  Ext: no cyanosis; no edema  Skin: no rash; no cyanosis    LABS:                        10.7   7.56  )-----------( 162      ( 16 Aug 2021 07:26 )             32.7     08-16    136  |  104  |  51<H>  ----------------------------<  171<H>  3.9   |  18<L>  |  1.98<H>    Ca    9.2      16 Aug 2021 07:26  Phos  3.9     08-16  Mg     2.00     08-16                Culture - Blood (collected 14 Aug 2021 02:46)  Source: .Blood Blood-Peripheral 2 bactec bottles recieved.  Preliminary Report (15 Aug 2021 03:01):    No growth to date.    Culture - Blood (collected 14 Aug 2021 02:40)  Source: .Blood Blood-Peripheral  Preliminary Report (15 Aug 2021 03:01):    No growth to date.    Culture - Urine (collected 13 Aug 2021 23:11)  Source: Clean Catch Clean Catch (Midstream)  Final Report (15 Aug 2021 03:19):    No growth        RADIOLOGY & ADDITIONAL TESTS:  Results Reviewed: Y  Imaging Personally Reviewed: Y  Electrocardiogram Personally Reviewed: Y    COORDINATION OF CARE:  Care Discussed with Consultants/Other Providers [Y/N]: Y  Prior or Outpatient Records Reviewed [Y/N]: LE

## 2021-08-16 NOTE — DISCHARGE NOTE PROVIDER - NSDCMRMEDTOKEN_GEN_ALL_CORE_FT
Eliquis 2.5 mg oral tablet: 1 tab(s) orally 2 times a day  losartan 50 mg oral tablet: 1 tab(s) orally once a day  metoprolol succinate 25 mg oral tablet, extended release: 1 tab(s) orally once a day  repaglinide 0.5 mg oral tablet: once/day  rosuvastatin 40 mg oral tablet: 1 tab(s) orally once a day   Eliquis 2.5 mg oral tablet: 1 tab(s) orally 2 times a day  metoprolol succinate 25 mg oral tablet, extended release: 1 tab(s) orally once a day  repaglinide 0.5 mg oral tablet: once/day  rosuvastatin 40 mg oral tablet: 1 tab(s) orally once a day   cefepime 1 g intravenous injection: 1 gram(s) intravenous every 24 hours  Eliquis 2.5 mg oral tablet: 1 tab(s) orally 2 times a day  metoprolol succinate 25 mg oral tablet, extended release: 1 tab(s) orally once a day  repaglinide 0.5 mg oral tablet: once/day  rosuvastatin 40 mg oral tablet: 1 tab(s) orally once a day

## 2021-08-16 NOTE — PROGRESS NOTE ADULT - PROBLEM SELECTOR PLAN 2
BUN/Cr 53/2.63 on admission. Baseline ~1.8 in 12/2020. Likely in the setting of pyelo vs obstruction.   -CT AP: Asymmetric urinary bladder wall thickening which could be due to a cystitis; however, an underlying mass is not excluded. Nonspecific right perinephric fat stranding.   -Cr 2.0, almost at baseline   -c/w abx   -Hold home losartan   -Avoid nephrotoxic agents  -Renally dose meds BUN/Cr 53/2.63 on admission. Baseline ~1.8 in 12/2020. Likely in the setting of pyelo vs obstruction.   -CT AP: Asymmetric urinary bladder wall thickening which could be due to a cystitis; however, an underlying mass is not excluded. Nonspecific right perinephric fat stranding.   -Cr 2.0, stable but was prior to admission for prior LILIAN and obsturction.   -c/w abx   -Hold home losartan   -Avoid nephrotoxic agents  -Renally dose meds  - bladder scan to rule-out retention. gentle IVF.

## 2021-08-16 NOTE — PROGRESS NOTE ADULT - ASSESSMENT
85M w/HTN, HLD, T2DM, CAD (MI 2003, s/p 4 stents), Afib on eliquis, h/o R nephrolithiasis s/p ureteral stent (removed 1/2021), recurrent UTIs now presenting with fevers/chills x 1d associated with dysuria and increased urinary frequency x 2d. A/w sepsis in the setting of pyelo and LILIAN. Clinically improving with abx.

## 2021-08-17 ENCOUNTER — TRANSCRIPTION ENCOUNTER (OUTPATIENT)
Age: 86
End: 2021-08-17

## 2021-08-17 VITALS
DIASTOLIC BLOOD PRESSURE: 63 MMHG | OXYGEN SATURATION: 100 % | HEART RATE: 72 BPM | TEMPERATURE: 98 F | SYSTOLIC BLOOD PRESSURE: 155 MMHG | RESPIRATION RATE: 18 BRPM

## 2021-08-17 LAB
ANION GAP SERPL CALC-SCNC: 12 MMOL/L — SIGNIFICANT CHANGE UP (ref 7–14)
BUN SERPL-MCNC: 46 MG/DL — HIGH (ref 7–23)
CALCIUM SERPL-MCNC: 9.2 MG/DL — SIGNIFICANT CHANGE UP (ref 8.4–10.5)
CHLORIDE SERPL-SCNC: 106 MMOL/L — SIGNIFICANT CHANGE UP (ref 98–107)
CO2 SERPL-SCNC: 19 MMOL/L — LOW (ref 22–31)
CREAT SERPL-MCNC: 1.83 MG/DL — HIGH (ref 0.5–1.3)
GLUCOSE SERPL-MCNC: 179 MG/DL — HIGH (ref 70–99)
HCT VFR BLD CALC: 33 % — LOW (ref 39–50)
HGB BLD-MCNC: 10.8 G/DL — LOW (ref 13–17)
MAGNESIUM SERPL-MCNC: 1.9 MG/DL — SIGNIFICANT CHANGE UP (ref 1.6–2.6)
MCHC RBC-ENTMCNC: 31 PG — SIGNIFICANT CHANGE UP (ref 27–34)
MCHC RBC-ENTMCNC: 32.7 GM/DL — SIGNIFICANT CHANGE UP (ref 32–36)
MCV RBC AUTO: 94.8 FL — SIGNIFICANT CHANGE UP (ref 80–100)
NRBC # BLD: 0 /100 WBCS — SIGNIFICANT CHANGE UP
NRBC # FLD: 0 K/UL — SIGNIFICANT CHANGE UP
PHOSPHATE SERPL-MCNC: 3.3 MG/DL — SIGNIFICANT CHANGE UP (ref 2.5–4.5)
PLATELET # BLD AUTO: 177 K/UL — SIGNIFICANT CHANGE UP (ref 150–400)
POTASSIUM SERPL-MCNC: 3.7 MMOL/L — SIGNIFICANT CHANGE UP (ref 3.5–5.3)
POTASSIUM SERPL-SCNC: 3.7 MMOL/L — SIGNIFICANT CHANGE UP (ref 3.5–5.3)
RBC # BLD: 3.48 M/UL — LOW (ref 4.2–5.8)
RBC # FLD: 11.9 % — SIGNIFICANT CHANGE UP (ref 10.3–14.5)
SODIUM SERPL-SCNC: 137 MMOL/L — SIGNIFICANT CHANGE UP (ref 135–145)
WBC # BLD: 7.23 K/UL — SIGNIFICANT CHANGE UP (ref 3.8–10.5)
WBC # FLD AUTO: 7.23 K/UL — SIGNIFICANT CHANGE UP (ref 3.8–10.5)

## 2021-08-17 PROCEDURE — 99239 HOSP IP/OBS DSCHRG MGMT >30: CPT

## 2021-08-17 RX ORDER — CEFEPIME 1 G/1
1 INJECTION, POWDER, FOR SOLUTION INTRAMUSCULAR; INTRAVENOUS
Qty: 3 | Refills: 0
Start: 2021-08-17 | End: 2021-08-19

## 2021-08-17 RX ORDER — LOSARTAN POTASSIUM 100 MG/1
1 TABLET, FILM COATED ORAL
Qty: 0 | Refills: 0 | DISCHARGE

## 2021-08-17 RX ORDER — CEFEPIME 1 G/1
1 INJECTION, POWDER, FOR SOLUTION INTRAMUSCULAR; INTRAVENOUS
Qty: 6 | Refills: 0
Start: 2021-08-17 | End: 2021-08-22

## 2021-08-17 RX ADMIN — Medication 25 MILLIGRAM(S): at 06:10

## 2021-08-17 RX ADMIN — CEFEPIME 100 MILLIGRAM(S): 1 INJECTION, POWDER, FOR SOLUTION INTRAMUSCULAR; INTRAVENOUS at 17:31

## 2021-08-17 RX ADMIN — APIXABAN 2.5 MILLIGRAM(S): 2.5 TABLET, FILM COATED ORAL at 06:10

## 2021-08-17 RX ADMIN — APIXABAN 2.5 MILLIGRAM(S): 2.5 TABLET, FILM COATED ORAL at 17:31

## 2021-08-17 NOTE — PROVIDER CONTACT NOTE (OTHER) - ACTION/TREATMENT ORDERED:
benefits outweigh risks. patients INR and platelets are good. LILIAN in setting of pyelo no concern for HD.

## 2021-08-17 NOTE — PROGRESS NOTE ADULT - PROBLEM SELECTOR PLAN 1
severe sepsis 2/2 pyelonephritis c/b LILIAN. Pt with recurrent UTI's since removal of ureteral stent in January 2021. - per  at Dr. Maria office, patient last Ucx on 8/2 with negative growth, he had recurrent urinary symptomats. NGS from urine was +Pseudomonas and Staph aureus. will fax over reports.  inpatient cultures were non-yielding.   had adverse reaction to cipro outpatient  - will need to complete 10 days of cefepime. midline placement for home infusion, wife (sukumar) agrees with plan.

## 2021-08-17 NOTE — DISCHARGE NOTE NURSING/CASE MANAGEMENT/SOCIAL WORK - PATIENT PORTAL LINK FT
You can access the FollowMyHealth Patient Portal offered by [facility] by registering at the following website: [email or web address]. By joining "[employer]’s FollowMyHealth portal, you will also be able to view your health information using other applications (apps) compatible with our system.

## 2021-08-17 NOTE — PROGRESS NOTE ADULT - PROBLEM SELECTOR PROBLEM 2
LILIAN (acute kidney injury)

## 2021-08-17 NOTE — PROGRESS NOTE ADULT - PROBLEM SELECTOR PLAN 4
Pt was previously on Metformin, recently discontinued. Now on repaglinide 0.5mg qday.   -A1c 5.9   -FS <180, discontinued ISS   -CC diet  -Resumed home meds upon DC
Pt was previously on Metformin, recently discontinued. Now on repaglinide 0.5mg qday.   -A1c 5.9   -FS <180, discontinued ISS   -CC diet  -Resumed home meds upon DC
Pt was previously on Metformin, recently discontinued. Now on repaglinide 0.5mg qday.   -A1c 5.9   -ISS for now   -CC diet
Pt was previously on Metformin, recently discontinued. Now on repaglinide 0.5mg qday.   -A1c 5.9   -FS <180, discontinued ISS   -CC diet  -Resumed home meds upon DC

## 2021-08-17 NOTE — ADVANCED PRACTICE NURSE CONSULT - ASSESSMENT
Patient is aware and alert. Midline insertion along with risks, benefits, possible complications and infection prevention explained to patient who verbalized understanding. All questions addressed and patient gave verbal consent to place midline. Left arm cleansed with CHG. Using sterile technique under ultra sound guidance, placed PowerGlide Pro Midline 20G /10cm into Left Basilic vein. Brisk blood return and flushed with 20Mls of normal saline. Minimal blood loss and patient tolerated procedure well. CHG dressing placed. All sharps accounted for. Report given to district RN and ordering provider. LOT#: [id number], REF#: [id number]

## 2021-08-17 NOTE — PROGRESS NOTE ADULT - PROBLEM SELECTOR PLAN 3
Chronic, stable  -C/w home Toprol 25mg qday and Eliquis 2.5mg BID

## 2021-08-17 NOTE — PROGRESS NOTE ADULT - PROBLEM SELECTOR PLAN 5
-C/w home Toprol 25mg qday   -Hold home losartan 50mg in setting of LILIAN, restart as tolerated.
BP stable   -C/w home Toprol 25mg qday   -Hold home losartan 50mg in setting of LILIAN, restart as tolerated.
No

## 2021-08-17 NOTE — PROGRESS NOTE ADULT - PROBLEM SELECTOR PLAN 2
BUN/Cr 53/2.63 on admission. Baseline ~1.8 in 12/2020. Likely in the setting of pyelo vs obstruction.   -CT AP: Asymmetric urinary bladder wall thickening which could be due to a cystitis; however, an underlying mass is not excluded. Nonspecific right perinephric fat stranding.   -Cr 2.0, stable but was prior to admission for prior LILIAN and obsturction, bladder scan w/o retention.   -c/t Hold home losartan on discharge.  -Avoid nephrotoxic agents  -Renally dose meds

## 2021-08-17 NOTE — PROGRESS NOTE ADULT - ASSESSMENT
85M w/HTN, HLD, T2DM, CAD (MI 2003, s/p 4 stents), Afib on eliquis, h/o R nephrolithiasis s/p ureteral stent (removed 1/2021), recurrent UTIs now presenting with fevers/chills x 1d associated with dysuria and increased urinary frequency x 2d. A/w sepsis in the setting of pyelo and LILIAN. Clinically improving with abx. Will need coverage for pseudomonas given failed cefdinir and outpatient urine study with pseudomonas. Unfortunately, due to AE from cipro (achilles tendinitis) will need IV cefepime for total of 10 days course.  85M w/HTN, HLD, T2DM, CAD (MI 2003, s/p 4 stents), Afib on eliquis, h/o R nephrolithiasis s/p ureteral stent (removed 1/2021), recurrent UTIs now presenting with fevers/chills x 1d associated with dysuria and increased urinary frequency x 2d. A/w sepsis in the setting of pyelo and LILIAN. Clinically improving with abx. Will need coverage for pseudomonas given failed cefdinir and outpatient urine study with pseudomonas. Unfortunately, due to AE from cipro (achilles tendinitis) will need IV cefepime for total of 10 days course.     65 minutes spent coordinating discharge

## 2021-08-17 NOTE — PROGRESS NOTE ADULT - SUBJECTIVE AND OBJECTIVE BOX
[facility] Division of Hospital Medicine  Douglas Dick MD  In House Pager [phone]    Patient is a 85y old  Male who presents with a chief complaint of Sepsis/Pyelo (16 Aug 2021 15:17)      SUBJECTIVE / OVERNIGHT EVENTS:  No overnight events. Labs and vitals reviewed.   Patient seen and examined at bedside, no acute complaints.   No fever, no chills, no SOB, no CP, no n/v/d, no abd pain, no dysuria      MEDICATIONS  (STANDING):  apixaban 2.5 milliGRAM(s) Oral two times a day  atorvastatin 80 milliGRAM(s) Oral at bedtime  cefepime   IVPB 1000 milliGRAM(s) IV Intermittent every 24 hours  lactated ringers. 1000 milliLiter(s) (75 mL/Hr) IV Continuous <Continuous>  metoprolol succinate ER 25 milliGRAM(s) Oral daily    MEDICATIONS  (PRN):  acetaminophen   Tablet .. 650 milliGRAM(s) Oral every 6 hours PRN Temp greater or equal to 38.5C (101.3F), Mild Pain (1 - 3)  aluminum hydroxide/magnesium hydroxide/simethicone Suspension 30 milliLiter(s) Oral every 4 hours PRN Dyspepsia  melatonin 3 milliGRAM(s) Oral at bedtime PRN Insomnia    CAPILLARY BLOOD GLUCOSE        I&O's Summary    16 Aug 2021 07:01  -  17 Aug 2021 07:00  --------------------------------------------------------  IN: 0 mL / OUT: 1750 mL / NET: -1750 mL    17 Aug 2021 07:01  -  17 Aug 2021 14:58  --------------------------------------------------------  IN: 0 mL / OUT: 700 mL / NET: -700 mL        PHYSICAL EXAM:  Vital Signs Last 24 Hrs  T(C): 36.7 (17 Aug 2021 10:40), Max: 36.7 (17 Aug 2021 10:40)  T(F): 98 (17 Aug 2021 10:40), Max: 98 (17 Aug 2021 10:40)  HR: 75 (17 Aug 2021 10:40) (65 - 77)  BP: 126/62 (17 Aug 2021 10:40) (124/59 - 143/63)  BP(mean): --  RR: 18 (17 Aug 2021 10:40) (16 - 18)  SpO2: 100% (17 Aug 2021 10:40) (96% - 100%)    Gen: NAD; resting in bed.  Pulm: no respiratory distress; CTA b/l; no wheezing  Cards: RRR, nl S1/S2; no obvious murmurs  Abd: soft; NT on exam  Ext: no cyanosis; no edema  Skin: no rash; no cyanosis    LABS:                        10.8   7.23  )-----------( 177      ( 17 Aug 2021 07:00 )             33.0     08-17    137  |  106  |  46<H>  ----------------------------<  179<H>  3.7   |  19<L>  |  1.83<H>    Ca    9.2      17 Aug 2021 07:00  Phos  3.3     08-17  Mg     1.90     08-17                  RADIOLOGY & ADDITIONAL TESTS:  Results Reviewed: Y  Imaging Personally Reviewed: Y  Electrocardiogram Personally Reviewed: Y    COORDINATION OF CARE:  Care Discussed with Consultants/Other Providers [Y/N]: Y  Prior or Outpatient Records Reviewed [Y/N]: LE

## 2021-09-08 ENCOUNTER — OUTPATIENT (OUTPATIENT)
Dept: OUTPATIENT SERVICES | Facility: HOSPITAL | Age: 86
LOS: 1 days | End: 2021-09-08
Payer: MEDICARE

## 2021-09-08 VITALS
RESPIRATION RATE: 14 BRPM | TEMPERATURE: 97 F | SYSTOLIC BLOOD PRESSURE: 157 MMHG | OXYGEN SATURATION: 99 % | HEIGHT: 67 IN | HEART RATE: 59 BPM | WEIGHT: 126.99 LBS | DIASTOLIC BLOOD PRESSURE: 89 MMHG

## 2021-09-08 DIAGNOSIS — C67.9 MALIGNANT NEOPLASM OF BLADDER, UNSPECIFIED: ICD-10-CM

## 2021-09-08 DIAGNOSIS — Z95.5 PRESENCE OF CORONARY ANGIOPLASTY IMPLANT AND GRAFT: Chronic | ICD-10-CM

## 2021-09-08 DIAGNOSIS — Z01.818 ENCOUNTER FOR OTHER PREPROCEDURAL EXAMINATION: ICD-10-CM

## 2021-09-08 DIAGNOSIS — E11.9 TYPE 2 DIABETES MELLITUS WITHOUT COMPLICATIONS: ICD-10-CM

## 2021-09-08 DIAGNOSIS — I48.20 CHRONIC ATRIAL FIBRILLATION, UNSPECIFIED: ICD-10-CM

## 2021-09-08 DIAGNOSIS — Z98.41 CATARACT EXTRACTION STATUS, RIGHT EYE: Chronic | ICD-10-CM

## 2021-09-08 DIAGNOSIS — Z90.5 ACQUIRED ABSENCE OF KIDNEY: Chronic | ICD-10-CM

## 2021-09-08 LAB
ANION GAP SERPL CALC-SCNC: 15 MMOL/L — SIGNIFICANT CHANGE UP (ref 5–17)
BUN SERPL-MCNC: 71 MG/DL — HIGH (ref 7–23)
CALCIUM SERPL-MCNC: 9.7 MG/DL — SIGNIFICANT CHANGE UP (ref 8.4–10.5)
CHLORIDE SERPL-SCNC: 108 MMOL/L — SIGNIFICANT CHANGE UP (ref 96–108)
CO2 SERPL-SCNC: 19 MMOL/L — LOW (ref 22–31)
CREAT SERPL-MCNC: 2.8 MG/DL — HIGH (ref 0.5–1.3)
GLUCOSE SERPL-MCNC: 194 MG/DL — HIGH (ref 70–99)
HCT VFR BLD CALC: 33.6 % — LOW (ref 39–50)
HGB BLD-MCNC: 11.1 G/DL — LOW (ref 13–17)
MCHC RBC-ENTMCNC: 31.3 PG — SIGNIFICANT CHANGE UP (ref 27–34)
MCHC RBC-ENTMCNC: 33 GM/DL — SIGNIFICANT CHANGE UP (ref 32–36)
MCV RBC AUTO: 94.6 FL — SIGNIFICANT CHANGE UP (ref 80–100)
NRBC # BLD: 0 /100 WBCS — SIGNIFICANT CHANGE UP (ref 0–0)
PLATELET # BLD AUTO: 188 K/UL — SIGNIFICANT CHANGE UP (ref 150–400)
POTASSIUM SERPL-MCNC: 4.6 MMOL/L — SIGNIFICANT CHANGE UP (ref 3.5–5.3)
POTASSIUM SERPL-SCNC: 4.6 MMOL/L — SIGNIFICANT CHANGE UP (ref 3.5–5.3)
RBC # BLD: 3.55 M/UL — LOW (ref 4.2–5.8)
RBC # FLD: 11.9 % — SIGNIFICANT CHANGE UP (ref 10.3–14.5)
SODIUM SERPL-SCNC: 142 MMOL/L — SIGNIFICANT CHANGE UP (ref 135–145)
WBC # BLD: 6.11 K/UL — SIGNIFICANT CHANGE UP (ref 3.8–10.5)
WBC # FLD AUTO: 6.11 K/UL — SIGNIFICANT CHANGE UP (ref 3.8–10.5)

## 2021-09-08 PROCEDURE — 87086 URINE CULTURE/COLONY COUNT: CPT

## 2021-09-08 PROCEDURE — 80048 BASIC METABOLIC PNL TOTAL CA: CPT

## 2021-09-08 PROCEDURE — G0463: CPT

## 2021-09-08 PROCEDURE — 85027 COMPLETE CBC AUTOMATED: CPT

## 2021-09-08 RX ORDER — METOPROLOL TARTRATE 50 MG
1 TABLET ORAL
Qty: 0 | Refills: 0 | DISCHARGE

## 2021-09-08 RX ORDER — SODIUM CHLORIDE 9 MG/ML
3 INJECTION INTRAMUSCULAR; INTRAVENOUS; SUBCUTANEOUS EVERY 8 HOURS
Refills: 0 | Status: DISCONTINUED | OUTPATIENT
Start: 2021-09-13 | End: 2021-09-28

## 2021-09-08 RX ORDER — LIDOCAINE HCL 20 MG/ML
0.2 VIAL (ML) INJECTION ONCE
Refills: 0 | Status: DISCONTINUED | OUTPATIENT
Start: 2021-09-13 | End: 2021-09-28

## 2021-09-08 NOTE — H&P PST ADULT - PROBLEM SELECTOR PLAN 1
Scheduled for cystoscopy, bilateral retrogrades, bladder biopsy on 9/13/2021  Preop instructions given to continue BP medications, will take DOS, no Eliquis X48hrs, no DM medication DOS  CBC, BMP and urine cx pending results.  Medical/Cardio eval done today 9/8 by Dr. Syed

## 2021-09-08 NOTE — H&P PST ADULT - HISTORY OF PRESENT ILLNESS
Mr. Caballero is an 85 year old man with PMH CAD s/p stents, Afib on Eliquis, HTN, HLD, T2DM, [place] uses hearing aides, TIA 2018, OA, former smoker, solitary kidney s/p nephrectomy at age 16 who had a recent admission to [facility] in August of this year with UTI, pyelonephritis and LILIAN imaging noted thickening of bladder concerning for malignancy and is now scheduled for cystoscopy, bilateral retrogrades and bladder biopsy on 9/13/2021.    Denies s/s of COVID, was noted to have COVID antibodies during recent admission in August but COVID PCR at that time was negative.  COVID vaccine Pfizer done 4/2021    COVID PCR scheduled for 9/10/2021.

## 2021-09-08 NOTE — H&P PST ADULT - ENMT COMMENTS
able to hear finger rub bilaterally without hearing aides in place, no redness/inflammation of mouth/pharynx has hearing aides

## 2021-09-08 NOTE — H&P PST ADULT - PROBLEM SELECTOR PLAN 2
To stop Eliquis x48 hours, last day am dose on Saturday 9/11.  Had medical/cardio eval today, will call for note and recent studies.

## 2021-09-08 NOTE — H&P PST ADULT - ATTENDING COMMENTS
OR today for random bladder biopsies, retrograde pyelograms.    Roney Coe MD  [facility]  [address] [address], [place] [address]  Office: [phone] Fax: [phone]

## 2021-09-08 NOTE — H&P PST ADULT - PROBLEM SELECTOR PLAN 3
a1c done 8/14/2021 was 5.9  FS upon admission to [facility]  BMP drawn at [facility], pending results

## 2021-09-10 ENCOUNTER — APPOINTMENT (OUTPATIENT)
Dept: DISASTER EMERGENCY | Facility: CLINIC | Age: 86
End: 2021-09-10

## 2021-09-10 LAB
CULTURE RESULTS: NO GROWTH — SIGNIFICANT CHANGE UP
SPECIMEN SOURCE: SIGNIFICANT CHANGE UP

## 2021-09-11 LAB — SARS-COV-2 N GENE NPH QL NAA+PROBE: NOT DETECTED

## 2021-09-12 ENCOUNTER — TRANSCRIPTION ENCOUNTER (OUTPATIENT)
Age: 86
End: 2021-09-12

## 2021-09-13 ENCOUNTER — OUTPATIENT (OUTPATIENT)
Dept: OUTPATIENT SERVICES | Facility: HOSPITAL | Age: 86
LOS: 1 days | End: 2021-09-13
Payer: MEDICARE

## 2021-09-13 ENCOUNTER — RESULT REVIEW (OUTPATIENT)
Age: 86
End: 2021-09-13

## 2021-09-13 VITALS
HEART RATE: 67 BPM | HEIGHT: 67 IN | SYSTOLIC BLOOD PRESSURE: 135 MMHG | TEMPERATURE: 97 F | DIASTOLIC BLOOD PRESSURE: 68 MMHG | RESPIRATION RATE: 16 BRPM | OXYGEN SATURATION: 99 % | WEIGHT: 126.99 LBS

## 2021-09-13 VITALS
RESPIRATION RATE: 16 BRPM | HEART RATE: 62 BPM | SYSTOLIC BLOOD PRESSURE: 147 MMHG | DIASTOLIC BLOOD PRESSURE: 65 MMHG | OXYGEN SATURATION: 99 % | TEMPERATURE: 98 F

## 2021-09-13 DIAGNOSIS — C67.9 MALIGNANT NEOPLASM OF BLADDER, UNSPECIFIED: ICD-10-CM

## 2021-09-13 DIAGNOSIS — R82.81 PYURIA: ICD-10-CM

## 2021-09-13 DIAGNOSIS — Z95.5 PRESENCE OF CORONARY ANGIOPLASTY IMPLANT AND GRAFT: Chronic | ICD-10-CM

## 2021-09-13 DIAGNOSIS — Z98.41 CATARACT EXTRACTION STATUS, RIGHT EYE: Chronic | ICD-10-CM

## 2021-09-13 DIAGNOSIS — Z90.5 ACQUIRED ABSENCE OF KIDNEY: Chronic | ICD-10-CM

## 2021-09-13 LAB — GLUCOSE BLDC GLUCOMTR-MCNC: 109 MG/DL — HIGH (ref 70–99)

## 2021-09-13 PROCEDURE — 52354 CYSTOURETERO W/BIOPSY: CPT | Mod: 50

## 2021-09-13 PROCEDURE — 52224 CYSTOSCOPY AND TREATMENT: CPT

## 2021-09-13 PROCEDURE — 82962 GLUCOSE BLOOD TEST: CPT

## 2021-09-13 PROCEDURE — C1769: CPT

## 2021-09-13 PROCEDURE — C1758: CPT

## 2021-09-13 PROCEDURE — 88305 TISSUE EXAM BY PATHOLOGIST: CPT

## 2021-09-13 PROCEDURE — 88305 TISSUE EXAM BY PATHOLOGIST: CPT | Mod: 26

## 2021-09-13 PROCEDURE — 76000 FLUOROSCOPY <1 HR PHYS/QHP: CPT

## 2021-09-13 RX ORDER — ONDANSETRON 8 MG/1
4 TABLET, FILM COATED ORAL ONCE
Refills: 0 | Status: DISCONTINUED | OUTPATIENT
Start: 2021-09-13 | End: 2021-09-28

## 2021-09-13 RX ORDER — ROSUVASTATIN CALCIUM 5 MG/1
1 TABLET ORAL
Qty: 0 | Refills: 0 | DISCHARGE

## 2021-09-13 RX ORDER — REPAGLINIDE 1 MG/1
0 TABLET ORAL
Qty: 0 | Refills: 0 | DISCHARGE

## 2021-09-13 RX ORDER — LOSARTAN POTASSIUM 100 MG/1
1 TABLET, FILM COATED ORAL
Qty: 0 | Refills: 0 | DISCHARGE

## 2021-09-13 RX ORDER — APIXABAN 2.5 MG/1
1 TABLET, FILM COATED ORAL
Qty: 0 | Refills: 0 | DISCHARGE

## 2021-09-13 RX ORDER — METOPROLOL TARTRATE 50 MG
0.5 TABLET ORAL
Qty: 0 | Refills: 0 | DISCHARGE

## 2021-09-13 RX ORDER — SODIUM CHLORIDE 9 MG/ML
1000 INJECTION, SOLUTION INTRAVENOUS
Refills: 0 | Status: DISCONTINUED | OUTPATIENT
Start: 2021-09-13 | End: 2021-09-28

## 2021-09-13 RX ADMIN — SODIUM CHLORIDE 3 MILLILITER(S): 9 INJECTION INTRAMUSCULAR; INTRAVENOUS; SUBCUTANEOUS at 13:57

## 2021-09-13 NOTE — ASU DISCHARGE PLAN (ADULT/PEDIATRIC) - PROCEDURE
cystoscopy, bilateral retrograde pyelogram, bilateral ureteroscopy with biopsies, bladder biopsies and fulguration, urethral dilation

## 2021-09-13 NOTE — PRE-ANESTHESIA EVALUATION ADULT - NSANTHPMHFT_GEN_ALL_CORE
no recent CP, SOB or syncope  visits regularly with cardiologist  medical eval in chart  one episode of lightheadedness attributed to not eating  Hb 11.1

## 2021-09-13 NOTE — BRIEF OPERATIVE NOTE - SPECIMENS
left ureteral biopsy, right ureteral biopsy, anterior, posterior, right and left lateral bladder walls, bladder dome and prostatic urethra

## 2021-09-13 NOTE — PRE-ANESTHESIA EVALUATION ADULT - NSANTHDISPORD_GEN_ALL_CORE
HPI patient says he is had some soreness in his right ear for several weeks. Has been washing his ear thinking that the symptoms were due to excessive earwax. He presents emergency room today complaining of 24 hours of worsening pain and swelling in the. He is also noticed a slight drainage from the ear. Additionally, patient requests referral to a spine clinic or spine physician. He says he has a past history of lumbar disc disease and feels like symptoms have been getting worse over the past 6 to 8 months and would like a follow-up for referral to this. No other complaints given at this time. Past Medical History:   Diagnosis Date    Mood disorder ([facility] [place] [address]     depression with history of psychosis       History reviewed. No pertinent surgical history.       Family History:   Problem Relation Age of Onset    Hypertension Mother     High Cholesterol Father        Social History     Socioeconomic History    Marital status: SINGLE     Spouse name: Not on file    Number of children: Not on file    Years of education: Not on file    Highest education level: Not on file   Occupational History    Not on file   Social Needs    Financial resource strain: Not on file    Food insecurity     Worry: Not on file     Inability: Not on file    Transportation needs     Medical: Not on file     Non-medical: Not on file   Tobacco Use    Smoking status: Never Smoker    Smokeless tobacco: Never Used   Substance and Sexual Activity    Alcohol use: No    Drug use: No    Sexual activity: Never   Lifestyle    Physical activity     Days per week: Not on file     Minutes per session: Not on file    Stress: Not on file   Relationships    Social connections     Talks on phone: Not on file     Gets together: Not on file     Attends Christian service: Not on file     Active member of club or organization: Not on file     Attends meetings of clubs or organizations: Not on file     Relationship status: Not on file   Isma Intimate partner violence     Fear of current or ex partner: Not on file     Emotionally abused: Not on file     Physically abused: Not on file     Forced sexual activity: Not on file   Other Topics Concern    Not on file   Social History Narrative    Not on file         ALLERGIES: Patient has no known allergies. Review of Systems   Constitutional: Negative. HENT: Positive for ear discharge and ear pain. Eyes: Negative. Respiratory: Negative. Cardiovascular: Negative. Gastrointestinal: Negative. Genitourinary: Negative. Skin: Negative. Neurological: Negative. Psychiatric/Behavioral: Negative. Vitals:    04/22/20 0954   BP: 128/90   Pulse: 88   Resp: 18   Temp: 98.4 °F (36.9 °C)   SpO2: 100%            Physical Exam  Vitals signs and nursing note reviewed. Constitutional:       Appearance: He is well-developed. HENT:      Head: Normocephalic and atraumatic. Left Ear: Tympanic membrane and ear canal normal.      Ears:      Comments: Right tympanic membrane is normal with no effusion. The right external ear canal is a wet white fluffy discharge consistent with otitis externa. Eyes:      Pupils: Pupils are equal, round, and reactive to light. Neck:      Musculoskeletal: Neck supple. Cardiovascular:      Rate and Rhythm: Normal rate and regular rhythm. Pulmonary:      Effort: Pulmonary effort is normal.      Breath sounds: Normal breath sounds. Musculoskeletal: Normal range of motion. Skin:     General: Skin is warm and dry. Neurological:      Mental Status: He is alert and oriented to person, place, and time. MDM   Patient understands and agrees with the disposition and follow-up plan.   Jess Hines MD 10:29 AM    Procedures PACU

## 2021-09-13 NOTE — ASU PATIENT PROFILE, ADULT - TEACHING/LEARNING FACTORS IMPACT ABILITY TO LEARN
Called to reschedule patient for ER f/u. Patient was scheduled on Dr. Perez schedule. Patient denies appointment then hung up phone.  
anxiety

## 2021-09-13 NOTE — BRIEF OPERATIVE NOTE - NSICDXBRIEFPROCEDURE_GEN_ALL_CORE_FT
PROCEDURES:  Cystoscopy with ureteroscopy of both ureters with biopsy 13-Sep-2021 16:57:02  Roney Coe  Cystoscopy with retrograde pyelography 13-Sep-2021 16:58:48  Roney Coe  Cystoscopy, with biopsy and fulguration, bladder 13-Sep-2021 16:59:04  Roney Coe

## 2021-09-13 NOTE — ASU DISCHARGE PLAN (ADULT/PEDIATRIC) - CARE PROVIDER_API CALL
Roney Coe (MD)  Urology  [address], [address]  [place] [address]  Phone: [phone]  Fax: [phone]  Established Patient  Scheduled Appointment: 09/15/2021 08:15 AM

## 2021-09-13 NOTE — ASU DISCHARGE PLAN (ADULT/PEDIATRIC) - NURSING INSTRUCTIONS
You may start taking tylenol after 915 pm tonight as needed for pain.  Follow instructions for pickering catheter care if necessary.

## 2021-09-13 NOTE — BRIEF OPERATIVE NOTE - OPERATION/FINDINGS
left ureteral filling defect, edematous friable tissue extending length of left ureter. Ureteroscopy performed to biopsy. Right hydronephrosis, ureteroscopy performed.  Significant debris within bladder

## 2021-09-13 NOTE — ASU DISCHARGE PLAN (ADULT/PEDIATRIC) - PROVIDER TOKENS
PROVIDER:[TOKEN:[[id number]],SCHEDULEDAPPT:[09/15/2021],SCHEDULEDAPPTTIME:[08:15 AM],ESTABLISHEDPATIENT:[T]]

## 2021-09-16 LAB — SURGICAL PATHOLOGY STUDY: SIGNIFICANT CHANGE UP

## 2021-11-09 ENCOUNTER — INPATIENT (INPATIENT)
Facility: HOSPITAL | Age: 86
LOS: 6 days | Discharge: HOME HEALTH | End: 2021-11-16
Attending: STUDENT IN AN ORGANIZED HEALTH CARE EDUCATION/TRAINING PROGRAM | Admitting: STUDENT IN AN ORGANIZED HEALTH CARE EDUCATION/TRAINING PROGRAM
Payer: MEDICARE

## 2021-11-09 VITALS
HEART RATE: 105 BPM | RESPIRATION RATE: 18 BRPM | TEMPERATURE: 98 F | HEIGHT: 67 IN | OXYGEN SATURATION: 98 % | DIASTOLIC BLOOD PRESSURE: 60 MMHG | SYSTOLIC BLOOD PRESSURE: 96 MMHG

## 2021-11-09 DIAGNOSIS — Z95.5 PRESENCE OF CORONARY ANGIOPLASTY IMPLANT AND GRAFT: Chronic | ICD-10-CM

## 2021-11-09 DIAGNOSIS — Z98.41 CATARACT EXTRACTION STATUS, RIGHT EYE: Chronic | ICD-10-CM

## 2021-11-09 DIAGNOSIS — R74.01 ELEVATION OF LEVELS OF LIVER TRANSAMINASE LEVELS: ICD-10-CM

## 2021-11-09 DIAGNOSIS — E11.9 TYPE 2 DIABETES MELLITUS WITHOUT COMPLICATIONS: ICD-10-CM

## 2021-11-09 DIAGNOSIS — I48.91 UNSPECIFIED ATRIAL FIBRILLATION: ICD-10-CM

## 2021-11-09 DIAGNOSIS — Z29.9 ENCOUNTER FOR PROPHYLACTIC MEASURES, UNSPECIFIED: ICD-10-CM

## 2021-11-09 DIAGNOSIS — E78.5 HYPERLIPIDEMIA, UNSPECIFIED: ICD-10-CM

## 2021-11-09 DIAGNOSIS — I50.9 HEART FAILURE, UNSPECIFIED: ICD-10-CM

## 2021-11-09 DIAGNOSIS — N17.9 ACUTE KIDNEY FAILURE, UNSPECIFIED: ICD-10-CM

## 2021-11-09 DIAGNOSIS — I25.10 ATHEROSCLEROTIC HEART DISEASE OF NATIVE CORONARY ARTERY WITHOUT ANGINA PECTORIS: ICD-10-CM

## 2021-11-09 DIAGNOSIS — Z90.5 ACQUIRED ABSENCE OF KIDNEY: Chronic | ICD-10-CM

## 2021-11-09 LAB
ALBUMIN SERPL ELPH-MCNC: 3.6 G/DL — SIGNIFICANT CHANGE UP (ref 3.3–5)
ALP SERPL-CCNC: 172 U/L — HIGH (ref 40–120)
ALT FLD-CCNC: 234 U/L — HIGH (ref 4–41)
ANION GAP SERPL CALC-SCNC: 18 MMOL/L — HIGH (ref 7–14)
APPEARANCE UR: ABNORMAL
AST SERPL-CCNC: 117 U/L — HIGH (ref 4–40)
B PERT DNA SPEC QL NAA+PROBE: SIGNIFICANT CHANGE UP
B PERT+PARAPERT DNA PNL SPEC NAA+PROBE: SIGNIFICANT CHANGE UP
BASOPHILS # BLD AUTO: 0.08 K/UL — SIGNIFICANT CHANGE UP (ref 0–0.2)
BASOPHILS NFR BLD AUTO: 1 % — SIGNIFICANT CHANGE UP (ref 0–2)
BILIRUB SERPL-MCNC: 0.5 MG/DL — SIGNIFICANT CHANGE UP (ref 0.2–1.2)
BILIRUB UR-MCNC: NEGATIVE — SIGNIFICANT CHANGE UP
BLOOD GAS VENOUS COMPREHENSIVE RESULT: SIGNIFICANT CHANGE UP
BORDETELLA PARAPERTUSSIS (RAPRVP): SIGNIFICANT CHANGE UP
BUN SERPL-MCNC: 125 MG/DL — HIGH (ref 7–23)
C PNEUM DNA SPEC QL NAA+PROBE: SIGNIFICANT CHANGE UP
CALCIUM SERPL-MCNC: 9.2 MG/DL — SIGNIFICANT CHANGE UP (ref 8.4–10.5)
CHLORIDE SERPL-SCNC: 100 MMOL/L — SIGNIFICANT CHANGE UP (ref 98–107)
CO2 SERPL-SCNC: 18 MMOL/L — LOW (ref 22–31)
COLOR SPEC: SIGNIFICANT CHANGE UP
CREAT ?TM UR-MCNC: 60 MG/DL — SIGNIFICANT CHANGE UP
CREAT SERPL-MCNC: 5.33 MG/DL — HIGH (ref 0.5–1.3)
DIFF PNL FLD: ABNORMAL
EOSINOPHIL # BLD AUTO: 0 K/UL — SIGNIFICANT CHANGE UP (ref 0–0.5)
EOSINOPHIL NFR BLD AUTO: 0 % — SIGNIFICANT CHANGE UP (ref 0–6)
FLUAV SUBTYP SPEC NAA+PROBE: SIGNIFICANT CHANGE UP
FLUBV RNA SPEC QL NAA+PROBE: SIGNIFICANT CHANGE UP
GLUCOSE BLDC GLUCOMTR-MCNC: 161 MG/DL — HIGH (ref 70–99)
GLUCOSE SERPL-MCNC: 234 MG/DL — HIGH (ref 70–99)
GLUCOSE UR QL: NEGATIVE — SIGNIFICANT CHANGE UP
HADV DNA SPEC QL NAA+PROBE: SIGNIFICANT CHANGE UP
HCOV 229E RNA SPEC QL NAA+PROBE: SIGNIFICANT CHANGE UP
HCOV HKU1 RNA SPEC QL NAA+PROBE: SIGNIFICANT CHANGE UP
HCOV NL63 RNA SPEC QL NAA+PROBE: SIGNIFICANT CHANGE UP
HCOV OC43 RNA SPEC QL NAA+PROBE: SIGNIFICANT CHANGE UP
HCT VFR BLD CALC: 27.8 % — LOW (ref 39–50)
HGB BLD-MCNC: 9.3 G/DL — LOW (ref 13–17)
HMPV RNA SPEC QL NAA+PROBE: SIGNIFICANT CHANGE UP
HPIV1 RNA SPEC QL NAA+PROBE: SIGNIFICANT CHANGE UP
HPIV2 RNA SPEC QL NAA+PROBE: SIGNIFICANT CHANGE UP
HPIV3 RNA SPEC QL NAA+PROBE: SIGNIFICANT CHANGE UP
HPIV4 RNA SPEC QL NAA+PROBE: SIGNIFICANT CHANGE UP
IANC: 5.22 K/UL — SIGNIFICANT CHANGE UP (ref 1.5–8.5)
KETONES UR-MCNC: NEGATIVE — SIGNIFICANT CHANGE UP
LEUKOCYTE ESTERASE UR-ACNC: ABNORMAL
LYMPHOCYTES # BLD AUTO: 0.5 K/UL — LOW (ref 1–3.3)
LYMPHOCYTES # BLD AUTO: 6 % — LOW (ref 13–44)
M PNEUMO DNA SPEC QL NAA+PROBE: SIGNIFICANT CHANGE UP
MAGNESIUM SERPL-MCNC: 2.6 MG/DL — SIGNIFICANT CHANGE UP (ref 1.6–2.6)
MCHC RBC-ENTMCNC: 31.1 PG — SIGNIFICANT CHANGE UP (ref 27–34)
MCHC RBC-ENTMCNC: 33.5 GM/DL — SIGNIFICANT CHANGE UP (ref 32–36)
MCV RBC AUTO: 93 FL — SIGNIFICANT CHANGE UP (ref 80–100)
MONOCYTES # BLD AUTO: 0.83 K/UL — SIGNIFICANT CHANGE UP (ref 0–0.9)
MONOCYTES NFR BLD AUTO: 10 % — SIGNIFICANT CHANGE UP (ref 2–14)
NEUTROPHILS # BLD AUTO: 6.68 K/UL — SIGNIFICANT CHANGE UP (ref 1.8–7.4)
NEUTROPHILS NFR BLD AUTO: 80 % — HIGH (ref 43–77)
NITRITE UR-MCNC: NEGATIVE — SIGNIFICANT CHANGE UP
NT-PROBNP SERPL-SCNC: HIGH PG/ML
PH UR: 6 — SIGNIFICANT CHANGE UP (ref 5–8)
PHOSPHATE SERPL-MCNC: 3.7 MG/DL — SIGNIFICANT CHANGE UP (ref 2.5–4.5)
PLATELET # BLD AUTO: 469 K/UL — HIGH (ref 150–400)
POTASSIUM SERPL-MCNC: 4.2 MMOL/L — SIGNIFICANT CHANGE UP (ref 3.5–5.3)
POTASSIUM SERPL-SCNC: 4.2 MMOL/L — SIGNIFICANT CHANGE UP (ref 3.5–5.3)
PROT SERPL-MCNC: 7.8 G/DL — SIGNIFICANT CHANGE UP (ref 6–8.3)
PROT UR-MCNC: ABNORMAL
RAPID RVP RESULT: SIGNIFICANT CHANGE UP
RBC # BLD: 2.99 M/UL — LOW (ref 4.2–5.8)
RBC # FLD: 12.3 % — SIGNIFICANT CHANGE UP (ref 10.3–14.5)
RSV RNA SPEC QL NAA+PROBE: SIGNIFICANT CHANGE UP
RV+EV RNA SPEC QL NAA+PROBE: SIGNIFICANT CHANGE UP
SARS-COV-2 RNA SPEC QL NAA+PROBE: SIGNIFICANT CHANGE UP
SODIUM SERPL-SCNC: 136 MMOL/L — SIGNIFICANT CHANGE UP (ref 135–145)
SODIUM UR-SCNC: 63 MMOL/L — SIGNIFICANT CHANGE UP
SP GR SPEC: 1.01 — SIGNIFICANT CHANGE UP (ref 1–1.05)
TROPONIN T, HIGH SENSITIVITY RESULT: 85 NG/L — CRITICAL HIGH
TROPONIN T, HIGH SENSITIVITY RESULT: 97 NG/L — CRITICAL HIGH
UROBILINOGEN FLD QL: SIGNIFICANT CHANGE UP
UUN UR-MCNC: 577 MG/DL — SIGNIFICANT CHANGE UP
WBC # BLD: 8.25 K/UL — SIGNIFICANT CHANGE UP (ref 3.8–10.5)
WBC # FLD AUTO: 8.25 K/UL — SIGNIFICANT CHANGE UP (ref 3.8–10.5)

## 2021-11-09 PROCEDURE — 71045 X-RAY EXAM CHEST 1 VIEW: CPT | Mod: 26

## 2021-11-09 PROCEDURE — 99291 CRITICAL CARE FIRST HOUR: CPT

## 2021-11-09 PROCEDURE — 99223 1ST HOSP IP/OBS HIGH 75: CPT | Mod: GC

## 2021-11-09 PROCEDURE — 99233 SBSQ HOSP IP/OBS HIGH 50: CPT

## 2021-11-09 RX ORDER — DEXTROSE 50 % IN WATER 50 %
25 SYRINGE (ML) INTRAVENOUS ONCE
Refills: 0 | Status: DISCONTINUED | OUTPATIENT
Start: 2021-11-09 | End: 2021-11-16

## 2021-11-09 RX ORDER — APIXABAN 2.5 MG/1
2.5 TABLET, FILM COATED ORAL
Refills: 0 | Status: DISCONTINUED | OUTPATIENT
Start: 2021-11-09 | End: 2021-11-09

## 2021-11-09 RX ORDER — DEXTROSE 50 % IN WATER 50 %
12.5 SYRINGE (ML) INTRAVENOUS ONCE
Refills: 0 | Status: DISCONTINUED | OUTPATIENT
Start: 2021-11-09 | End: 2021-11-16

## 2021-11-09 RX ORDER — SODIUM CHLORIDE 9 MG/ML
1000 INJECTION, SOLUTION INTRAVENOUS
Refills: 0 | Status: DISCONTINUED | OUTPATIENT
Start: 2021-11-09 | End: 2021-11-16

## 2021-11-09 RX ORDER — HEPARIN SODIUM 5000 [USP'U]/ML
INJECTION INTRAVENOUS; SUBCUTANEOUS
Qty: 25000 | Refills: 0 | Status: DISCONTINUED | OUTPATIENT
Start: 2021-11-09 | End: 2021-11-10

## 2021-11-09 RX ORDER — TAMSULOSIN HYDROCHLORIDE 0.4 MG/1
0.4 CAPSULE ORAL AT BEDTIME
Refills: 0 | Status: DISCONTINUED | OUTPATIENT
Start: 2021-11-09 | End: 2021-11-16

## 2021-11-09 RX ORDER — HEPARIN SODIUM 5000 [USP'U]/ML
2000 INJECTION INTRAVENOUS; SUBCUTANEOUS EVERY 6 HOURS
Refills: 0 | Status: DISCONTINUED | OUTPATIENT
Start: 2021-11-09 | End: 2021-11-10

## 2021-11-09 RX ORDER — METOPROLOL TARTRATE 50 MG
25 TABLET ORAL
Refills: 0 | Status: DISCONTINUED | OUTPATIENT
Start: 2021-11-09 | End: 2021-11-10

## 2021-11-09 RX ORDER — SODIUM CHLORIDE 9 MG/ML
500 INJECTION INTRAMUSCULAR; INTRAVENOUS; SUBCUTANEOUS ONCE
Refills: 0 | Status: COMPLETED | OUTPATIENT
Start: 2021-11-09 | End: 2021-11-09

## 2021-11-09 RX ORDER — DEXTROSE 50 % IN WATER 50 %
15 SYRINGE (ML) INTRAVENOUS ONCE
Refills: 0 | Status: DISCONTINUED | OUTPATIENT
Start: 2021-11-09 | End: 2021-11-16

## 2021-11-09 RX ORDER — INSULIN LISPRO 100/ML
VIAL (ML) SUBCUTANEOUS
Refills: 0 | Status: DISCONTINUED | OUTPATIENT
Start: 2021-11-09 | End: 2021-11-16

## 2021-11-09 RX ORDER — METOPROLOL TARTRATE 50 MG
5 TABLET ORAL ONCE
Refills: 0 | Status: COMPLETED | OUTPATIENT
Start: 2021-11-09 | End: 2021-11-09

## 2021-11-09 RX ORDER — CHLORHEXIDINE GLUCONATE 213 G/1000ML
1 SOLUTION TOPICAL
Refills: 0 | Status: DISCONTINUED | OUTPATIENT
Start: 2021-11-09 | End: 2021-11-16

## 2021-11-09 RX ORDER — REPAGLINIDE 1 MG/1
1 TABLET ORAL
Qty: 0 | Refills: 0 | DISCHARGE

## 2021-11-09 RX ORDER — METOPROLOL TARTRATE 50 MG
5 TABLET ORAL ONCE
Refills: 0 | Status: DISCONTINUED | OUTPATIENT
Start: 2021-11-09 | End: 2021-11-09

## 2021-11-09 RX ORDER — AMIODARONE HYDROCHLORIDE 400 MG/1
150 TABLET ORAL ONCE
Refills: 0 | Status: COMPLETED | OUTPATIENT
Start: 2021-11-09 | End: 2021-11-09

## 2021-11-09 RX ORDER — FUROSEMIDE 40 MG
40 TABLET ORAL ONCE
Refills: 0 | Status: COMPLETED | OUTPATIENT
Start: 2021-11-09 | End: 2021-11-09

## 2021-11-09 RX ORDER — DILTIAZEM HCL 120 MG
10 CAPSULE, EXT RELEASE 24 HR ORAL ONCE
Refills: 0 | Status: COMPLETED | OUTPATIENT
Start: 2021-11-09 | End: 2021-11-09

## 2021-11-09 RX ORDER — GLUCAGON INJECTION, SOLUTION 0.5 MG/.1ML
1 INJECTION, SOLUTION SUBCUTANEOUS ONCE
Refills: 0 | Status: DISCONTINUED | OUTPATIENT
Start: 2021-11-09 | End: 2021-11-16

## 2021-11-09 RX ORDER — HEPARIN SODIUM 5000 [USP'U]/ML
4500 INJECTION INTRAVENOUS; SUBCUTANEOUS EVERY 6 HOURS
Refills: 0 | Status: DISCONTINUED | OUTPATIENT
Start: 2021-11-09 | End: 2021-11-10

## 2021-11-09 RX ORDER — INSULIN LISPRO 100/ML
VIAL (ML) SUBCUTANEOUS AT BEDTIME
Refills: 0 | Status: DISCONTINUED | OUTPATIENT
Start: 2021-11-09 | End: 2021-11-16

## 2021-11-09 RX ADMIN — HEPARIN SODIUM 1100 UNIT(S)/HR: 5000 INJECTION INTRAVENOUS; SUBCUTANEOUS at 18:59

## 2021-11-09 RX ADMIN — Medication 5 MILLIGRAM(S): at 17:02

## 2021-11-09 RX ADMIN — CHLORHEXIDINE GLUCONATE 1 APPLICATION(S): 213 SOLUTION TOPICAL at 22:16

## 2021-11-09 RX ADMIN — Medication 10 MILLIGRAM(S): at 15:51

## 2021-11-09 RX ADMIN — TAMSULOSIN HYDROCHLORIDE 0.4 MILLIGRAM(S): 0.4 CAPSULE ORAL at 22:24

## 2021-11-09 RX ADMIN — SODIUM CHLORIDE 500 MILLILITER(S): 9 INJECTION INTRAMUSCULAR; INTRAVENOUS; SUBCUTANEOUS at 16:59

## 2021-11-09 RX ADMIN — SODIUM CHLORIDE 500 MILLILITER(S): 9 INJECTION INTRAMUSCULAR; INTRAVENOUS; SUBCUTANEOUS at 15:52

## 2021-11-09 RX ADMIN — AMIODARONE HYDROCHLORIDE 618 MILLIGRAM(S): 400 TABLET ORAL at 20:39

## 2021-11-09 NOTE — H&P ADULT - PROBLEM SELECTOR PLAN 6
Last A1C in September 5.9% suggestive of well controlled DMT2  -Low ISS for premeal and bedtime  -Consistent carb, renal, DASH diet

## 2021-11-09 NOTE — H&P ADULT - NSHPPHYSICALEXAM_GEN_ALL_CORE
PHYSICAL EXAM:  GENERAL: NAD, lying in bed comfortably  HEAD:  Atraumatic, Normocephalic  EYES: EOMI, PERRLA, conjunctiva and sclera clear  ENT: Moist mucous membranes  NECK: Supple, No JVD  CHEST/LUNG: Clear to auscultation bilaterally; No rales, rhonchi, wheezing, or rubs. Unlabored respirations  HEART: Regular rate and rhythm; No murmurs, rubs, or gallops  ABDOMEN: Bowel sounds present; Soft, Nontender, Nondistended. No hepatomegally  EXTREMITIES:  2+ Peripheral Pulses, brisk capillary refill. No clubbing, cyanosis, or edema  NERVOUS SYSTEM:  Alert & Oriented X3, speech clear. No deficits   MSK: FROM all 4 extremities, full and equal strength  SKIN: No rashes or lesions PHYSICAL EXAM:  GENERAL: NAD, lying in bed comfortably  HEAD:  Atraumatic, Normocephalic  EYES: Sclera clear  ENT: Moist mucous membranes  NECK: No JVD  CHEST/LUNG: Clear to auscultation bilaterally; No rales, rhonchi, wheezing, or rubs. Unlabored respirations  HEART: irregular irregular rate and rhythm; No murmurs, rubs, or gallops  ABDOMEN: Soft, Nontender, Nondistended  EXTREMITIES:  +2 LE pitting edema up to knees  NERVOUS SYSTEM:  Alert & Oriented X3, speech clear   SKIN: No rashes or lesions

## 2021-11-09 NOTE — CONSULT NOTE ADULT - ASSESSMENT
86 y/o M w/ PMH HTN, HLD, T2DM, CAD (MI 2003, s/p 4 stents), Afib on Eliquis, h/o R nephrolithiasis s/p ureteral stent (removed 1/2021) sent in by cardiologist Dr. Reginaldo Syed for AF w/ RVR. Telemetry shows atrial fibrillation with VR as high as 170s in the setting of decompensated HF with pro BNP >67900. He received Cardizem 10mg IV x1 and Lopressor 5mg IV x1. He is hypotensive with SBP 80s. Hb/Hct 9.3/27.8. He is on apixaban for anticoagulation. [id number] score=8; Age, HF, HTN, DM, TIA, CAD  - Continue to monitor on telemetry  - Maintain K+~4.0 and Mg++~2.0  - Start metoprolol 25 mg BID. Hold for SBP <90 and HR <50  - Continue apixaban 2.5 mg BID for anticoagulation if not contraindicated  - CCU consult as patient is hypotensive in rapid afib and decompensated HF  - Recommend Nephrology consult and HF consult  - Will follow up

## 2021-11-09 NOTE — CHART NOTE - NSCHARTNOTEFT_GEN_A_CORE
Reason for CCU Consult:   Acute decompensated HF in setting of AFIB with RVR    HISTORY OF PRESENT ILLNESS:  Patient is a 85y old  Male who presents with a chief complaint of Afib (2021 17:36)  86 y/o M w/ PMH HTN, HLD, T2DM, CAD (MI , s/p 4 stents), Afib on Eliquis, h/o R nephrolithiasis s/p ureteral stent (removed 2021) sent in by cardiologist Dr. Reginaldo Syed for AF w/ RVR. Patient reports going to the cardiologist's office for a regular visit today. He underwent stress test at the doctor's office and developed atrial fibrillation with RVR. Echocardiogram reported to have revealed severe LV dysfunction with EF 25% while in afib with RVR. EKG in ED revealed atrial fibrillation with VR 160s- 170s. Patient received Cardizem 10 mg IV x1 and VR down to 120s. Lab work shows elevated LFTs, elevated pro BNP to >20,000 and creatinine >5.3. He reports being admitted to [facility] last week with fever and URI symptoms. Denies history of AF but reported in prior documentation. Patient is on apixaban for anticoagulation. He is hypotensive with VR 160s again and received Lopressor 5mg IV x1. Telemetry shows atrial fibrillation with VR 130s-160s and SBP 80s. Currently patient denies any chest pain, SOB, palpitations or dizziness. He has c/o chills and denies any fever. WBC normal    PAST MEDICAL & SURGICAL HISTORY:  CAD (coronary artery disease)  (4 stents, non-adherent to aspirin)  Diabetes mellitus, new onset  Single kidney  (hx/o LEFT nephrectomy at age 16; pt states due to a ureter&quot;blockage&quot; causing &quot;infection&quot;; pt denies hx/o renal dysfunction)  Arthritis  Myocardial infarction  ()  Hard of hearing  Atrial fibrillation  Hypertension  Hyperlipidemia  History of TIAs  Last 2018  History of bradycardia  History of nephrectomy, unilateral  (hx/o LEFT nephrectomy at age 16)  Stented coronary artery  (4 stents)  S/P bilateral cataract extraction    Allergies: PCN and CIPRO      MEDICATIONS  (STANDING):  dextrose 40% Gel 15 Gram(s) Oral once  dextrose 5%. 1000 milliLiter(s) (50 mL/Hr) IV Continuous <Continuous>  dextrose 5%. 1000 milliLiter(s) (100 mL/Hr) IV Continuous <Continuous>  dextrose 50% Injectable 25 Gram(s) IV Push once  dextrose 50% Injectable 12.5 Gram(s) IV Push once  dextrose 50% Injectable 25 Gram(s) IV Push once  furosemide   Injectable 40 milliGRAM(s) IV Push Once  glucagon  Injectable 1 milliGRAM(s) IntraMuscular once  heparin  Infusion.  Unit(s)/Hr (11 mL/Hr) IV Continuous <Continuous>  insulin lispro (ADMELOG) corrective regimen sliding scale   SubCutaneous three times a day before meals  insulin lispro (ADMELOG) corrective regimen sliding scale   SubCutaneous at bedtime  metoprolol tartrate 25 milliGRAM(s) Oral two times a day  tamsulosin 0.4 milliGRAM(s) Oral at bedtime    MEDICATIONS  (PRN):  heparin   Injectable 4500 Unit(s) IV Push every 6 hours PRN For aPTT less than 40  heparin   Injectable 2000 Unit(s) IV Push every 6 hours PRN For aPTT between 40 - 57      Drug Dosing Weight  Height (cm): 170.2 (2021 15:12)  Weight (kg): 57 (2021 17:32)  BMI (kg/m2): 19.7 (2021 17:32)  BSA (m2): 1.66 (2021 17:32)    FAMILY HISTORY:  Family history of diabetes mellitus in mother (Mother)  (Pt states his mother developed DM in her 70&#x27;s)      ADVANCE DIRECTIVES:    CONSTITUTIONAL: endorses weakness  EYES: No vision changes   ENT: No congestion, No ear pain, No sore throat.  NECK: No pain, No stiffness  RESPIRATORY: endorses shortness of breath  CARDIOVASCULAR: endorses chest pain and palpitations  GASTROINTESTINAL: No abdominal pain, No nausea, No vomiting, No hematemesis, No diarrhea No constipation. No melena  GENITOURINARY: No dysuria, No frequency, No incontinence, No hematuria  NEUROLOGICAL: No dizziness, No lightheadedness, No syncope, No LOC, No headache, No numbness or weakness  MUSCULOSKELETAL: No Edema, No joint pain, No joint swelling.  PSYCHIATRIC: No anxiety, No depression  DERMATOLOGY: No diaphoresis. No itching, No rashes, No pressure ulcers  HEME/LYMPH: No easy bruising, or bleeding gums    All other review of systems is negative unless indicated above.    PHYSICAL EXAM:    GENERAL: NAD, well-developed  HEAD:  Atraumatic, Normocephalic  EYES: EOMI, PERRLA, conjunctiva and sclera clear  NECK: Supple, No JVD  CHEST/LUNG: crackles bibasilar  HEART: irregular rate and rhythm  ABDOMEN: Soft, Nontender, Nondistended; Normoactive bowel sounds  EXTREMITIES:  2+ Peripheral Pulses, No clubbing, cyanosis, or edema  PSYCH: A&Ox3  NEUROLOGY: non-focal  SKIN: No rashes or lesions  ICU Vital Signs Last 24 Hrs  T(C): 36.4 (2021 15:19), Max: 36.5 (2021 15:12)  T(F): 97.6 (2021 15:19), Max: 97.7 (2021 15:12)  HR: 123 (2021 17:32) (105 - 167)  BP: 92/55 (2021 17:32) (80/62 - 119/76)  BP(mean): --  ABP: --  ABP(mean): --  RR: 16 (2021 17:32) (16 - 18)  SpO2: 100% (2021 17:32) (98% - 100%)          LABS:  CBC Full  -  ( 2021 15:37 )  WBC Count : 8.25 K/uL  RBC Count : 2.99 M/uL  Hemoglobin : 9.3 g/dL  Hematocrit : 27.8 %  Platelet Count - Automated : 469 K/uL  Mean Cell Volume : 93.0 fL  Mean Cell Hemoglobin : 31.1 pg  Mean Cell Hemoglobin Concentration : 33.5 gm/dL  Auto Neutrophil # : 6.68 K/uL  Auto Lymphocyte # : 0.50 K/uL  Auto Monocyte # : 0.83 K/uL  Auto Eosinophil # : 0.00 K/uL  Auto Basophil # : 0.08 K/uL  Auto Neutrophil % : 80.0 %  Auto Lymphocyte % : 6.0 %  Auto Monocyte % : 10.0 %  Auto Eosinophil % : 0.0 %  Auto Basophil % : 1.0 %        136  |  100  |  125<H>  ----------------------------<  234<H>  4.2   |  18<L>  |  5.33<H>    Ca    9.2      2021 15:37  Phos  3.7       Mg     2.60         TPro  7.8  /  Alb  3.6  /  TBili  0.5  /  DBili  x   /  AST  117<H>  /  ALT  234<H>  /  AlkPhos  172<H>          CAPILLARY BLOOD GLUCOSE          Urinalysis Basic - ( 2021 18:06 )    Color: Light Yellow / Appearance: Slightly Turbid / S.014 / pH: x  Gluc: x / Ketone: Negative  / Bili: Negative / Urobili: <2 mg/dL   Blood: x / Protein: 100 mg/dL / Nitrite: Negative   Leuk Esterase: Large / RBC: 2 /HPF /  /HPF   Sq Epi: x / Non Sq Epi: 0 /HPF / Bacteria: Negative  < from: Transthoracic Echocardiogram (18 @ 08:39) >      Patient name: RACHEL COMBS  YOB: 1935   Age: 82 (M)   MR#: [id number]  Study Date: 2018  Location: O/PSonographer: JOSSIE Lester  Study quality: Technically good  Referring Physician: Not Available Doctor, MD  Blood Pressure: 178/82 mmHg  Height: 175 cm  Weight: 68 kg  BSA: 1.8 m2  ------------------------------------------------------------------------  PROCEDURE: Transthoracic echocardiogram with 2-D, M-Mode  and complete spectral and color flow Doppler.  INDICATION: Cerebral infarction, unspecified (I63.9)  ------------------------------------------------------------------------  DIMENSIONS:  Dimensions:     Normal Values:  LA:     3.4 cm    2.0 - 4.0 cm  Ao:     3.4 cm    2.0 - 3.8 cm  SEPTUM: 0.9 cm    0.6- 1.2 cm  PWT:    0.9 cm    0.6 - 1.1 cm  LVIDd:  4.8 cm    3.0 - 5.6 cm  LVIDs:  3.5 cm    1.8 - 4.0 cm  Derived Variables:  LVMI: 81 g/m2  RWT: 0.37  Fractional short: 27 %  Ejection Fraction (Teicholtz): 53 %  ------------------------------------------------------------------------  OBSERVATIONS:  Mitral Valve: Mitral annular calcification, otherwise  normal mitral valve. Minimal mitral regurgitation.  Aortic Root: Normal aortic root.  Aortic Valve: Calcified trileaflet aortic valve with normal  opening. Minimal aortic regurgitation.  Left Atrium: Normal left atrium.  LA volume index = 20  cc/m2.  Left Ventricle: Normal left ventricular systolic function.  No segmental wall motion abnormalities. Normal left  ventricular internal dimensionsand wall thicknesses.  Right Heart: Normal right atrium. The right ventricle is  not well visualized. Normal tricuspid valve. No tricuspid  regurgitation. Normal pulmonic valve.  Pericardium/PleuraNormal pericardium with no pericardial  effusion.  ------------------------------------------------------------------------  CONCLUSIONS:  1. Mitral annular calcification, otherwise normal mitral  valve. Minimal mitral regurgitation.  2. Calcified trileaflet aortic valve with normal opening.  Minimal aortic regurgitation.  3. Normal left ventricular systolic function. No segmental  wall motion abnormalities.  4. The right ventricle is not well visualized.  ------------------------------------------------------------------------  Confirmed on  2018 - 15:55:00 by Lb Pacheco M.D. RPVI  ------------------------------------------------------------------------      ASSESSMENT  86 y/o M w/ PMH HTN, HLD, T2DM, CAD (MI , s/p 4 stents), Afib on Eliquis, h/o R nephrolithiasis s/p ureteral stent (removed 2021) sent in by cardiologist Dr. Reginaldo Syed for AF w/ RVR. Patient reports going to the cardiologist's office for a regular visit today. He underwent stress test at the doctor's office and developed atrial fibrillation with RVR. Echocardiogram reported to have revealed severe LV dysfunction with EF 25% while in afib with RVR. EKG in ED revealed atrial fibrillation with VR 160s- 170s. Patient received Cardizem 10 mg IV x1 and VR down to 120s. Lab work shows elevated LFTs, elevated pro BNP to >20,000 and creatinine >5.3. He reports being admitted to [facility] last week with fever and URI symptoms. Denies history of AF but reported in prior documentation. Patient is on apixaban for anticoagulation. He is hypotensive with VR 160s again and received Lopressor 5mg IV x1. Telemetry shows atrial fibrillation with VR 130s-160s and SBP 80s. Currently patient denies any chest pain, SOB, palpitations or dizziness. He has c/o chills and denies any fever. WBC normal      PLAN: Admit to CCU for acute decompensated heart failure managament and optimization in setting of AFIB with RVR  NEURO:  Alert and oriented x 3  RESP:  SOB:  -continue BIPAP  CARDS:  AFIB W/RVR  -Continue to monitor on telemetry  - Maintain K+~4.0 and Mg++~2.0  - Start metoprolol 25 mg BID. Hold for SBP <90 and HR <50  -initiate heparin gtt  - hold home dose of apixaban 2.5 mg BID, resume prior to dc  -EP is following    CAD:  -troponin slightly elevated, likeley in setting of demand on the heart  -s/p stents in   -unlikely ACS at this time, will continue to closely monitor EKGS and treat underlying HF    ISCHEMIC CM:  -likely 2/2 to AFIB with RVR    GI:  :  LILIAN on CKD:  -monitor urine output  -consult nephrology, may need urgent HD   ENDO:  -HGBA1C 5.9  -may not need FS and ISS    ID:        Case discussed with Cardiology fellow

## 2021-11-09 NOTE — PHARMACOTHERAPY INTERVENTION NOTE - COMMENTS
Medication history is complete. Medication list updated in Outpatient Medication Record (OM[facility]). Please call spectra [phone] if you have any questions.

## 2021-11-09 NOTE — H&P ADULT - PROBLEM SELECTOR PLAN 2
EKG indicates afib w/ RVR; Pro BNP ~28601 with unclear baseline; troponin elevated  -admit to floor with telemetry  -CTM vital signs q8hrs  - trend troponin  - Cardiology consulted and is following; appreciate recs

## 2021-11-09 NOTE — H&P ADULT - NSHPREVIEWOFSYSTEMS_GEN_ALL_CORE
REVIEW OF SYSTEMS: As indicated above; otherwise negative    CONSTITUTIONAL: No weakness, fevers or chills  EYES/ENT: No visual changes; no vertigo or throat pain   NECK: No pain or stiffness  RESPIRATORY: No cough, wheezing, hemoptysis; No shortness of breath  CARDIOVASCULAR: No chest pain or palpitations  GASTROINTESTINAL: No abdominal or epigastric pain. No nausea, vomiting, or hematemesis; no diarrhea or constipation; no melena or hematochezia.  GENITOURINARY: No dysuria, frequency or hematuria  NEUROLOGICAL: No numbness or weakness  SKIN: No itching, rashes

## 2021-11-09 NOTE — H&P ADULT - PROBLEM SELECTOR PLAN 1
Decompensated HF with pro BNP >66800 in setting of Afib RVR. S/p Cardizem 10mg IV x1 and Lopressor 5mg IV x1. He is hypotensive with SBP 90s. IMHTs2hmyt score=8; Age, HF, HTN, DM, TIA, CAD  - Continue to monitor on telemetry  - Maintain K+~4.0 and Mg++~2.0  - Start metoprolol 25 mg BID. Hold for SBP <90 and HR <50  - Stopped his home apaxiban and started heparin gtt  - CCU consult as patient is hypotensive in rapid afib and decompensated HF  - Consider HF

## 2021-11-09 NOTE — H&P ADULT - PROBLEM SELECTOR PLAN 5
On heparin infusion  -will attempt to retrieve results of last nuclear stress and echo from patient's cardiologist (refer HPI)

## 2021-11-09 NOTE — H&P ADULT - HISTORY OF PRESENT ILLNESS
86 y/o M w/ PMH HTN, HLD, T2DM, CAD (MI 2003, s/p 4 stents), Afib on Eliquis h/o R nephrolithiasis s/p ureteral stent (removed 1/2021) sent in by cardiologist Dr. Reginaldo Syed for atrial fibrillation w/ RVR onset during nuclear stress testing today. However, he says that he did not feel any palpitations during his stress testing. Pt reports that he believes he completed most of stress testing today, and feels just "not right". Currently, he reports absence of any chest pain, palpitations, lightheadedness/fainting, dyspnea, abd pain, nausea, vomiting, diarrhea, bloody stools, melena, or any urinary symptoms (hematuria or dysuria). Of note, Pt shares that he was hospitalized for four days at [place] recently discharged yesterday for fever and chills, but states these have resolved, and that no etiology was found to account for his symptoms.

## 2021-11-09 NOTE — H&P ADULT - PROBLEM SELECTOR PLAN 3
SCr 5.63 which is markedly increased from baseline SCr~2; likely rep-renal LILIAN in setting of likely HF exacerbation   -trend BMP  -gentle hydration with diuretic   -avoid nephrotoxic medications  -consider nephrology consult

## 2021-11-09 NOTE — CONSULT NOTE ADULT - SUBJECTIVE AND OBJECTIVE BOX
CHIEF COMPLAINT:  Called to evaluate patient presented for atrial fibrillation with RVR    HISTORY OF PRESENT ILLNESS:    PAST MEDICAL & SURGICAL HISTORY:  CAD (coronary artery disease)  (4 stents, non-adherent to aspirin)  Diabetes mellitus, new onset  Single kidney  (hx/o LEFT nephrectomy at age 16; pt states due to a ureter&quot;blockage&quot; causing &quot;infection&quot;; pt denies hx/o renal dysfunction)  Arthritis  Myocardial infarction  (2003)  Hard of hearing  Atrial fibrillation  Hypertension  Hyperlipidemia  History of TIAs  Last 2018  History of bradycardia  History of nephrectomy, unilateral  (hx/o LEFT nephrectomy at age 16)  Stented coronary artery  (4 stents)  S/P bilateral cataract extraction    PREVIOUS DIAGNOSTIC TESTING:    Echocardiogram:  pending  Echo at cardiologist's office showed EF 25 % when in afib w/RVR  Stress Test:     MEDICATIONS:  Metoprolol  Apixaban    sodium chloride 0.9% Bolus 500 milliLiter(s) IV Bolus once      FAMILY HISTORY:  Family history of diabetes mellitus in mother (Mother)  (Pt states his mother developed DM in her 70&#x27;s)        SOCIAL HISTORY:    Lives with wife    [-] Smoker  [-] Alcohol  [-] Drugs    Allergies    penicillin (Rash)    Intolerances    Cipro (Joint Pain)  	    REVIEW OF SYSTEMS:  CONSTITUTIONAL: No fever, weight loss, or fatigue  EYES: No eye pain, visual disturbances, or discharge  ENMT:  No difficulty hearing, tinnitus, vertigo; No sinus or throat pain  NECK: No pain or stiffness  RESPIRATORY: No cough, wheezing, chills or hemoptysis; No Shortness of Breath  CARDIOVASCULAR: No chest pain, palpitations, passing out, dizziness, or leg swelling  GASTROINTESTINAL: No abdominal or epigastric pain. No nausea, vomiting, or hematemesis; No diarrhea or constipation. No melena or hematochezia.  GENITOURINARY: No dysuria, frequency, hematuria, or incontinence  NEUROLOGICAL: No headaches, memory loss, loss of strength, numbness, or tremors  SKIN: No itching, burning, rashes, or lesions   LYMPH Nodes: No enlarged glands  ENDOCRINE: No heat or cold intolerance; No hair loss  MUSCULOSKELETAL: No joint pain or swelling; No muscle, back, or extremity pain  PSYCHIATRIC: No depression, anxiety, mood swings, or difficulty sleeping  HEME/LYMPH: No easy bruising, or bleeding gums  ALLERY AND IMMUNOLOGIC: No hives or eczema	    [X] All others negative	  [ ] Unable to obtain    PHYSICAL EXAM:  T(C): 36.4 (11-09-21 @ 15:19), Max: 36.5 (11-09-21 @ 15:12)  HR: 139 (11-09-21 @ 15:52) (105 - 167)  BP: 105/92 (11-09-21 @ 15:52) (80/62 - 119/76)  RR: 18 (11-09-21 @ 15:52) (18 - 18)  SpO2: 100% (11-09-21 @ 15:52) (98% - 100%)  Wt(kg): --  I&O's Summary      Appearance: Normal	  HEENT:   Normal oral mucosa, PERRL, EOMI	  Lymphatic: No lymphadenopathy  Cardiovascular: Irregular rate and rhythm,  Respiratory: Lungs clear to auscultation	  Psychiatry: A & O x 3, Mood & affect appropriate  Gastrointestinal:  Soft, Non-tender, + BS	  Skin: No rashes, No ecchymoses, No cyanosis	  Neurologic: Non-focal  Extremities: Normal range of motion, No clubbing, cyanosis or edema  Vascular: Peripheral pulses palpable 2+ bilaterally    TELEMETRY: 	Atrial fibrillation with VR 130s-160s    ECG:  Atrial fibrillation with  bpm	  RADIOLOGY:  OTHER: 	  	  LABS:	 	    CARDIAC MARKERS:                          9.3    8.25  )-----------( 469      ( 09 Nov 2021 15:37 )             27.8     11-09    136  |  100  |  125<H>  ----------------------------<  234<H>  4.2   |  18<L>  |  5.33<H>    Ca    9.2      09 Nov 2021 15:37  Phos  3.7     11-09  Mg     2.60     11-09    TPro  7.8  /  Alb  3.6  /  TBili  0.5  /  DBili  x   /  AST  117<H>  /  ALT  234<H>  /  AlkPhos  172<H>  11-09    proBNP: Serum Pro-Brain Natriuretic Peptide: 85395 pg/mL (11-09 @ 15:37)    TSH: pending         CHIEF COMPLAINT:  Called to evaluate patient presented for atrial fibrillation with RVR    HISTORY OF PRESENT ILLNESS:  84 y/o M w/ PMH HTN, HLD, T2DM, CAD (MI 2003, s/p 4 stents), Afib on Eliquis, h/o R nephrolithiasis s/p ureteral stent (removed 1/2021) sent in by cardiologist Dr. Reginaldo Syed for AF w/ RVR. Patient reports going to the cardiologist's office for a regular visit today. He underwent stress test at the doctor's office and developed atrial fibrillation with RVR. Echocardiogram reported to have revealed severe LV dysfunction with EF 25% while in afib with RVR. EKG in ED revealed atrial fibrillation with VR 160s- 170s. Patient received Cardizem 10 mg IV x1 and VR down to 120s. Lab work shows elevated LFTs, elevated pro BNP to >20,000 and creatinine >5.3. He reports being admitted to [facility] last week with fever and URI symptoms. Denies history of AF but reported in prior documentation. Patient is on apixaban for anticoagulation. He is hypotensive with VR 160s again and received Lopressor 5mg IV x1. Telemetry shows atrial fibrillation with VR 130s-160s and SBP 80s. Currently patient denies any chest pain, SOB, palpitations or dizziness. He has c/o chills and denies any fever. WBC normal  PAST MEDICAL & SURGICAL HISTORY:  CAD (coronary artery disease)  (4 stents, non-adherent to aspirin)  Diabetes mellitus, new onset  Single kidney  (hx/o LEFT nephrectomy at age 16; pt states due to a ureter&quot;blockage&quot; causing &quot;infection&quot;; pt denies hx/o renal dysfunction)  Arthritis  Myocardial infarction  (2003)  Hard of hearing  Atrial fibrillation  Hypertension  Hyperlipidemia  History of TIAs  Last 2018  History of bradycardia  History of nephrectomy, unilateral  (hx/o LEFT nephrectomy at age 16)  Stented coronary artery  (4 stents)  S/P bilateral cataract extraction    PREVIOUS DIAGNOSTIC TESTING:    Echocardiogram:  pending  Echo at cardiologist's office showed EF 25 % when in afib w/RVR      MEDICATIONS:  Metoprolol  Apixaban    sodium chloride 0.9% Bolus 500 milliLiter(s) IV Bolus once      FAMILY HISTORY:  Family history of diabetes mellitus in mother (Mother)  (Pt states his mother developed DM in her 70&#x27;s)        SOCIAL HISTORY:    Lives with wife    [-] Smoker  [-] Alcohol  [-] Drugs    Allergies    penicillin (Rash)    Intolerances    Cipro (Joint Pain)  	    REVIEW OF SYSTEMS:  CONSTITUTIONAL: No fever, weight loss, or fatigue  EYES: No eye pain, visual disturbances, or discharge  ENMT:  No difficulty hearing, tinnitus, vertigo; No sinus or throat pain  NECK: No pain or stiffness  RESPIRATORY: No cough, wheezing, chills or hemoptysis; No Shortness of Breath  CARDIOVASCULAR: No chest pain, palpitations, passing out, dizziness, or leg swelling  GASTROINTESTINAL: No abdominal or epigastric pain. No nausea, vomiting, or hematemesis; No diarrhea or constipation. No melena or hematochezia.  GENITOURINARY: No dysuria, frequency, hematuria, or incontinence  NEUROLOGICAL: No headaches, memory loss, loss of strength, numbness, or tremors  SKIN: No itching, burning, rashes, or lesions   LYMPH Nodes: No enlarged glands  ENDOCRINE: No heat or cold intolerance; No hair loss  MUSCULOSKELETAL: No joint pain or swelling; No muscle, back, or extremity pain  PSYCHIATRIC: No depression, anxiety, mood swings, or difficulty sleeping  HEME/LYMPH: No easy bruising, or bleeding gums  ALLERY AND IMMUNOLOGIC: No hives or eczema	    [X] All others negative	  [ ] Unable to obtain    PHYSICAL EXAM:  T(C): 36.4 (11-09-21 @ 15:19), Max: 36.5 (11-09-21 @ 15:12)  HR: 139 (11-09-21 @ 15:52) (105 - 167)  BP: 105/92 (11-09-21 @ 15:52) (80/62 - 119/76)  RR: 18 (11-09-21 @ 15:52) (18 - 18)  SpO2: 100% (11-09-21 @ 15:52) (98% - 100%)  Wt(kg): --  I&O's Summary      Appearance: Normal	  HEENT:   Normal oral mucosa, PERRL, EOMI	  Lymphatic: No lymphadenopathy  Cardiovascular: Irregular rate and rhythm,  Respiratory: Lungs clear to auscultation	  Psychiatry: A & O x 3, Mood & affect appropriate  Gastrointestinal:  Soft, Non-tender, + BS	  Skin: No rashes, No ecchymoses, No cyanosis	  Neurologic: Non-focal  Extremities: Normal range of motion, No clubbing, cyanosis or edema  Vascular: Peripheral pulses palpable 2+ bilaterally    TELEMETRY: 	Atrial fibrillation with VR 130s-160s    ECG:  Atrial fibrillation with  bpm	  RADIOLOGY:  OTHER: 	  	  LABS:	 	    CARDIAC MARKERS:                          9.3    8.25  )-----------( 469      ( 09 Nov 2021 15:37 )             27.8     11-09    136  |  100  |  125<H>  ----------------------------<  234<H>  4.2   |  18<L>  |  5.33<H>    Ca    9.2      09 Nov 2021 15:37  Phos  3.7     11-09  Mg     2.60     11-09    TPro  7.8  /  Alb  3.6  /  TBili  0.5  /  DBili  x   /  AST  117<H>  /  ALT  234<H>  /  AlkPhos  172<H>  11-09    proBNP: Serum Pro-Brain Natriuretic Peptide: 97598 pg/mL (11-09 @ 15:37)    TSH: pending

## 2021-11-09 NOTE — ED PROVIDER NOTE - NS ED ROS FT
Constitutional: no fever and no chills, feels "not right"  Eyes: no discharge, no pain, no visual changes  ENMT: no ear pain, no dysphagia or throat pain  Neck: no pain, no stiffness  CV: no chest pain, no palpitations, no edema  Resp: no cough, no shortness of breath  Abd: no abdominal pain, no nausea or vomiting, no diarrhea  : no dysuria, no hematuria  MSK: no back pain, no neck pain, no joint pain  Neuro: no LOC, no gait abnormality, no headache, no sensory deficits, no weakness  Skin: no rashes, no lacerations

## 2021-11-09 NOTE — H&P ADULT - ATTENDING COMMENTS
85M CAD Afib on eliquis DM2 sent by cardiologist for afib with RVR that started during stress test, with LILIAN  --appreciate cardiology recs...BP soft and requires close monitoring for rate control and BP management  --plan is accept to CCU 85M CAD Afib on eliquis DM2 CKD3 sent by cardiologist for afib with RVR that started during stress test, with LILIAN on CKD  --appreciate cardiology recs...BP tenuous and requires close monitoring for rate control, BP management, workup for LILIAN likely cardiorenal  --plan is accept to CCU

## 2021-11-09 NOTE — ED PROVIDER NOTE - OBJECTIVE STATEMENT
86 y/o M w/ PMH HTN, HLD, T2DM, CAD (MI 2003, s/p 4 stents), Afib on eliquis, h/o R nephrolithiasis s/p ureteral stent (removed 1/2021) sent in by cardiologist Dr. Reginaldo Syed for AF w/ RVR onset during stress testing today. Pt reports he believes he completed most of stress testing today, and feels just "not right" but denies any chest pain, palpitations, lightheadedness/fainting, SOB, abd pain, n/v. Denies history of AF but reported in prior documentation. Pt notes he was hospitalized at [facility] recently discharged yesterday for fever and URI sxs, but states these have resolved.

## 2021-11-09 NOTE — ED PROVIDER NOTE - CLINICAL SUMMARY MEDICAL DECISION MAKING FREE TEXT BOX
86 y/o M w/ PMH HTN, HLD, T2DM, CAD (MI 2003, s/p 4 stents), Afib on eliquis, h/o R nephrolithiasis s/p ureteral stent (removed 1/2021) sent in by cardiologist Dr. Reginaldo Syed for AF w/ RVR onset during stress testing today. HR 150s-160s on arrival, BP 100s/60s, mentating well. Completed stress test today. WIll obtain basic labs, trop, VBG, EKG, CXR, proceed w/ dilt 10mg IV x1, speak with Dr. Syed and reassess.

## 2021-11-09 NOTE — H&P ADULT - NSHPLABSRESULTS_GEN_ALL_CORE
LABS:                        9.3    8.25  )-----------( 469      ( 09 Nov 2021 15:37 )             27.8     11-09    136  |  100  |  125<H>  ----------------------------<  234<H>  4.2   |  18<L>  |  5.33<H>    Ca    9.2      09 Nov 2021 15:37  Phos  3.7     11-09  Mg     2.60     11-09    TPro  7.8  /  Alb  3.6  /  TBili  0.5  /  DBili  x   /  AST  117<H>  /  ALT  234<H>  /  AlkPhos  172<H>  11-09

## 2021-11-09 NOTE — H&P ADULT - PROBLEM SELECTOR PLAN 4
/  /  which is elevated from baseline unremarkable liver enzymes  -trend CMP daily  -holding Crestor

## 2021-11-09 NOTE — ED ADULT NURSE NOTE - OBJECTIVE STATEMENT
Patient presenting to tr-c. Pt AAOX4.History of bladder CA, atrial fib DM, and hypertension. Patient sent by PCP for further work up of SOB and tachycardia. Patient explains that he wasn't "feeling like himself." HR found to be in the 160s. Patient denies chest pain. Breathing even and unlabored. Lung sounds clear on auscultation. Oxygen saturating at 100%. No presence of fever, rectal temperature 97.1. #20g started to right wrist and #18g started to left forearm. Labs drawn and sent as per MD order. Medications administered as per MD order. V/S being monitored as per hospital policy. Patient placed on zoll and cardiac monitor. Will continue to monitor.

## 2021-11-09 NOTE — ED PROVIDER NOTE - PROGRESS NOTE DETAILS
Matilde EM/IM PGY4: Spoke with cardiologist Dr. Syed who reports pt completed stress test today, noted ot be in AF w/ RVR, echo in office EF appeared 25% but likely falsely lower due to HR. Recommends EP consult w/ Dr. Pan and admission to hospitalist. EP consulted, will see patient in the ER. Fama EM/IM PGY4: Pt seen by cards, agree w/ beta blocker. HR improved to 100s-110s after lorpessor 5mg IV, BP soft 90s/60s but mentating well. Endorsed to hospitalist for admission on tele. Endorsed case to MAR.

## 2021-11-09 NOTE — ED PROVIDER NOTE - ATTENDING CONTRIBUTION TO CARE
Attending note:   After face to face evaluation of this patient, I concur with above noted hx, pe, and care plan for this patient.  Arroyo: 85 yom with recent admission to [facility] for fever for four days and discharged yesterday. Pt has hx of Afib on Elequis as per chart but pt initially denied. Pt was at cards office getting stress test when he started to "not feel well." States "I feel off" but denies cp, sob, palpitations, nausea, lightheaded, sweating. Pt noted to have rapid afib. Initially BP 120s, HR 160s afib on EKG, clear lungs, no JVD, trace pitting edema, no murmurs heard, abd soft and non tender, and pt does not appear to be in distress.   Cardizem given initially with bolus of fluids but pt's BP decreased to 90s and HR to 130s.   Pt is on home beta blocker so will try that after BP improved slightly. EP consult requested and pt may require higher level of care. Amiodarone and Dig also options for rate control if needed and beta blocker is unsuccessful. If BP drops and mental status change, discussed cardioversion with patient.

## 2021-11-09 NOTE — ED PROVIDER NOTE - PHYSICAL EXAMINATION
PHYSICAL EXAM:  GENERAL: Sitting comfortable in bed, in no acute distress  HENMT: Atraumatic, moist mucous membranes  EYES: Clear bilaterally, PERRL, EOMs intact b/l  HEART: tachycardic, irregular rhythm, nl S1/S2, no murmurs noted  RESPIRATORY: Clear to auscultation bilaterally, no wheezes/rhonchi/rales  ABDOMEN: +BS, soft, nontender, nondistended  EXTREMITIES: Trace pitting edema b/l, +2 radial pulses b/l  NEURO:  A&Ox4, no focal motor deficits or sensory deficits   Heme/LYMPH: No ecchymosis or bruising, no anterior/posterior cervical or supraclavicular LAD  SKIN:  Skin warm, dry and intact. No evidence of rash.

## 2021-11-09 NOTE — H&P ADULT - NSHPSOCIALHISTORY_GEN_ALL_CORE
Stopped smoking at age 59; consumes 1 cup daily of red wine. Reports absence of EtOH binging. 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 Lives with wife.

## 2021-11-09 NOTE — ED ADULT NURSE NOTE - NSIMPLEMENTINTERV_GEN_ALL_ED
Implemented All Universal Safety Interventions:  [place] to call system. Call bell, personal items and telephone within reach. Instruct patient to call for assistance. Room bathroom lighting operational. Non-slip footwear when patient is off stretcher. Physically safe environment: no spills, clutter or unnecessary equipment. Stretcher in lowest position, wheels locked, appropriate side rails in place.

## 2021-11-10 LAB
ALBUMIN SERPL ELPH-MCNC: 2.8 G/DL — LOW (ref 3.3–5)
ALP SERPL-CCNC: 137 U/L — HIGH (ref 40–120)
ALT FLD-CCNC: 178 U/L — HIGH (ref 4–41)
ANION GAP SERPL CALC-SCNC: 13 MMOL/L — SIGNIFICANT CHANGE UP (ref 7–14)
APTT BLD: 51.1 SEC — HIGH (ref 27–36.3)
APTT BLD: 62.9 SEC — HIGH (ref 27–36.3)
APTT BLD: 79.4 SEC — HIGH (ref 27–36.3)
AST SERPL-CCNC: 82 U/L — HIGH (ref 4–40)
BASE EXCESS BLDV CALC-SCNC: -3.3 MMOL/L — LOW (ref -2–3)
BILIRUB SERPL-MCNC: 0.6 MG/DL — SIGNIFICANT CHANGE UP (ref 0.2–1.2)
BLD GP AB SCN SERPL QL: NEGATIVE — SIGNIFICANT CHANGE UP
BLOOD GAS VENOUS COMPREHENSIVE RESULT: SIGNIFICANT CHANGE UP
BUN SERPL-MCNC: 112 MG/DL — HIGH (ref 7–23)
CALCIUM SERPL-MCNC: 8.2 MG/DL — LOW (ref 8.4–10.5)
CHLORIDE BLDV-SCNC: 108 MMOL/L — SIGNIFICANT CHANGE UP (ref 96–108)
CHLORIDE SERPL-SCNC: 104 MMOL/L — SIGNIFICANT CHANGE UP (ref 98–107)
CO2 BLDV-SCNC: 21.6 MMOL/L — LOW (ref 22–26)
CO2 SERPL-SCNC: 20 MMOL/L — LOW (ref 22–31)
COVID-19 NUCLEOCAPSID GAM AB INTERP: NEGATIVE — SIGNIFICANT CHANGE UP
COVID-19 NUCLEOCAPSID TOTAL GAM ANTIBODY RESULT: 0.09 INDEX — SIGNIFICANT CHANGE UP
COVID-19 SPIKE DOMAIN AB INTERP: POSITIVE
COVID-19 SPIKE DOMAIN ANTIBODY RESULT: >250 U/ML — HIGH
CREAT SERPL-MCNC: 4.75 MG/DL — HIGH (ref 0.5–1.3)
GAS PNL BLDV: 135 MMOL/L — LOW (ref 136–145)
GAS PNL BLDV: SIGNIFICANT CHANGE UP
GLUCOSE BLDC GLUCOMTR-MCNC: 127 MG/DL — HIGH (ref 70–99)
GLUCOSE BLDC GLUCOMTR-MCNC: 145 MG/DL — HIGH (ref 70–99)
GLUCOSE BLDC GLUCOMTR-MCNC: 150 MG/DL — HIGH (ref 70–99)
GLUCOSE BLDC GLUCOMTR-MCNC: 252 MG/DL — HIGH (ref 70–99)
GLUCOSE BLDC GLUCOMTR-MCNC: 284 MG/DL — HIGH (ref 70–99)
GLUCOSE BLDV-MCNC: 207 MG/DL — HIGH (ref 70–99)
GLUCOSE SERPL-MCNC: 213 MG/DL — HIGH (ref 70–99)
HCO3 BLDV-SCNC: 21 MMOL/L — LOW (ref 22–29)
HCT VFR BLD CALC: 22 % — LOW (ref 39–50)
HCT VFR BLD CALC: 23.8 % — LOW (ref 39–50)
HCT VFR BLD CALC: 25 % — LOW (ref 39–50)
HCT VFR BLDA CALC: 22 % — LOW (ref 39–51)
HGB BLD CALC-MCNC: 7.2 G/DL — LOW (ref 13–17)
HGB BLD-MCNC: 7.2 G/DL — LOW (ref 13–17)
HGB BLD-MCNC: 7.8 G/DL — LOW (ref 13–17)
HGB BLD-MCNC: 8.2 G/DL — LOW (ref 13–17)
LACTATE BLDV-MCNC: 0.9 MMOL/L — SIGNIFICANT CHANGE UP (ref 0.5–2)
MAGNESIUM SERPL-MCNC: 2.3 MG/DL — SIGNIFICANT CHANGE UP (ref 1.6–2.6)
MCHC RBC-ENTMCNC: 30.6 PG — SIGNIFICANT CHANGE UP (ref 27–34)
MCHC RBC-ENTMCNC: 30.7 PG — SIGNIFICANT CHANGE UP (ref 27–34)
MCHC RBC-ENTMCNC: 31 PG — SIGNIFICANT CHANGE UP (ref 27–34)
MCHC RBC-ENTMCNC: 32.7 GM/DL — SIGNIFICANT CHANGE UP (ref 32–36)
MCHC RBC-ENTMCNC: 32.8 GM/DL — SIGNIFICANT CHANGE UP (ref 32–36)
MCHC RBC-ENTMCNC: 32.8 GM/DL — SIGNIFICANT CHANGE UP (ref 32–36)
MCV RBC AUTO: 93.6 FL — SIGNIFICANT CHANGE UP (ref 80–100)
MCV RBC AUTO: 93.6 FL — SIGNIFICANT CHANGE UP (ref 80–100)
MCV RBC AUTO: 94.4 FL — SIGNIFICANT CHANGE UP (ref 80–100)
NRBC # BLD: 0 /100 WBCS — SIGNIFICANT CHANGE UP
NRBC # BLD: 0 /100 WBCS — SIGNIFICANT CHANGE UP
NRBC # BLD: 1 /100 WBCS — SIGNIFICANT CHANGE UP
NRBC # FLD: 0.02 K/UL — HIGH
NRBC # FLD: 0.03 K/UL — HIGH
NRBC # FLD: 0.1 K/UL — HIGH
PCO2 BLDV: 31 MMHG — LOW (ref 42–55)
PH BLDV: 7.43 — SIGNIFICANT CHANGE UP (ref 7.32–7.43)
PHOSPHATE SERPL-MCNC: 3.5 MG/DL — SIGNIFICANT CHANGE UP (ref 2.5–4.5)
PLATELET # BLD AUTO: 412 K/UL — HIGH (ref 150–400)
PLATELET # BLD AUTO: 451 K/UL — HIGH (ref 150–400)
PLATELET # BLD AUTO: 498 K/UL — HIGH (ref 150–400)
PO2 BLDV: 120 MMHG — SIGNIFICANT CHANGE UP
POTASSIUM BLDV-SCNC: 3.7 MMOL/L — SIGNIFICANT CHANGE UP (ref 3.5–5.1)
POTASSIUM SERPL-MCNC: 3.8 MMOL/L — SIGNIFICANT CHANGE UP (ref 3.5–5.3)
POTASSIUM SERPL-SCNC: 3.8 MMOL/L — SIGNIFICANT CHANGE UP (ref 3.5–5.3)
PROT SERPL-MCNC: 5.9 G/DL — LOW (ref 6–8.3)
RBC # BLD: 2.35 M/UL — LOW (ref 4.2–5.8)
RBC # BLD: 2.52 M/UL — LOW (ref 4.2–5.8)
RBC # BLD: 2.67 M/UL — LOW (ref 4.2–5.8)
RBC # FLD: 12.3 % — SIGNIFICANT CHANGE UP (ref 10.3–14.5)
RH IG SCN BLD-IMP: POSITIVE — SIGNIFICANT CHANGE UP
SAO2 % BLDV: 98.2 % — SIGNIFICANT CHANGE UP
SARS-COV-2 IGG+IGM SERPL QL IA: 0.09 INDEX — SIGNIFICANT CHANGE UP
SARS-COV-2 IGG+IGM SERPL QL IA: >250 U/ML — HIGH
SARS-COV-2 IGG+IGM SERPL QL IA: NEGATIVE — SIGNIFICANT CHANGE UP
SARS-COV-2 IGG+IGM SERPL QL IA: POSITIVE
SODIUM SERPL-SCNC: 137 MMOL/L — SIGNIFICANT CHANGE UP (ref 135–145)
WBC # BLD: 5.96 K/UL — SIGNIFICANT CHANGE UP (ref 3.8–10.5)
WBC # BLD: 7.05 K/UL — SIGNIFICANT CHANGE UP (ref 3.8–10.5)
WBC # BLD: 8.35 K/UL — SIGNIFICANT CHANGE UP (ref 3.8–10.5)
WBC # FLD AUTO: 5.96 K/UL — SIGNIFICANT CHANGE UP (ref 3.8–10.5)
WBC # FLD AUTO: 7.05 K/UL — SIGNIFICANT CHANGE UP (ref 3.8–10.5)
WBC # FLD AUTO: 8.35 K/UL — SIGNIFICANT CHANGE UP (ref 3.8–10.5)

## 2021-11-10 PROCEDURE — 99232 SBSQ HOSP IP/OBS MODERATE 35: CPT

## 2021-11-10 PROCEDURE — 93306 TTE W/DOPPLER COMPLETE: CPT | Mod: 26

## 2021-11-10 PROCEDURE — 99223 1ST HOSP IP/OBS HIGH 75: CPT

## 2021-11-10 RX ORDER — POTASSIUM CHLORIDE 20 MEQ
40 PACKET (EA) ORAL ONCE
Refills: 0 | Status: COMPLETED | OUTPATIENT
Start: 2021-11-10 | End: 2021-11-10

## 2021-11-10 RX ORDER — DILTIAZEM HCL 120 MG
5 CAPSULE, EXT RELEASE 24 HR ORAL
Qty: 125 | Refills: 0 | Status: DISCONTINUED | OUTPATIENT
Start: 2021-11-10 | End: 2021-11-11

## 2021-11-10 RX ORDER — HEPARIN SODIUM 5000 [USP'U]/ML
1200 INJECTION INTRAVENOUS; SUBCUTANEOUS
Qty: 25000 | Refills: 0 | Status: DISCONTINUED | OUTPATIENT
Start: 2021-11-10 | End: 2021-11-13

## 2021-11-10 RX ORDER — HEPARIN SODIUM 5000 [USP'U]/ML
1100 INJECTION INTRAVENOUS; SUBCUTANEOUS
Qty: 25000 | Refills: 0 | Status: DISCONTINUED | OUTPATIENT
Start: 2021-11-10 | End: 2021-11-10

## 2021-11-10 RX ORDER — DILTIAZEM HCL 120 MG
10 CAPSULE, EXT RELEASE 24 HR ORAL ONCE
Refills: 0 | Status: COMPLETED | OUTPATIENT
Start: 2021-11-10 | End: 2021-11-10

## 2021-11-10 RX ORDER — METOPROLOL TARTRATE 50 MG
25 TABLET ORAL THREE TIMES A DAY
Refills: 0 | Status: DISCONTINUED | OUTPATIENT
Start: 2021-11-10 | End: 2021-11-12

## 2021-11-10 RX ADMIN — CHLORHEXIDINE GLUCONATE 1 APPLICATION(S): 213 SOLUTION TOPICAL at 08:28

## 2021-11-10 RX ADMIN — Medication 10 MILLIGRAM(S): at 14:19

## 2021-11-10 RX ADMIN — TAMSULOSIN HYDROCHLORIDE 0.4 MILLIGRAM(S): 0.4 CAPSULE ORAL at 21:44

## 2021-11-10 RX ADMIN — Medication 5 MG/HR: at 14:30

## 2021-11-10 RX ADMIN — HEPARIN SODIUM 12 UNIT(S)/HR: 5000 INJECTION INTRAVENOUS; SUBCUTANEOUS at 13:04

## 2021-11-10 RX ADMIN — Medication 40 MILLIEQUIVALENT(S): at 05:45

## 2021-11-10 RX ADMIN — Medication 5 MG/HR: at 20:39

## 2021-11-10 RX ADMIN — Medication 3: at 11:54

## 2021-11-10 RX ADMIN — HEPARIN SODIUM 12 UNIT(S)/HR: 5000 INJECTION INTRAVENOUS; SUBCUTANEOUS at 20:39

## 2021-11-10 RX ADMIN — Medication 25 MILLIGRAM(S): at 05:44

## 2021-11-10 RX ADMIN — HEPARIN SODIUM 1200 UNIT(S)/HR: 5000 INJECTION INTRAVENOUS; SUBCUTANEOUS at 02:02

## 2021-11-10 RX ADMIN — Medication 25 MILLIGRAM(S): at 14:10

## 2021-11-10 NOTE — PROGRESS NOTE ADULT - SUBJECTIVE AND OBJECTIVE BOX
PATIENT: RACHEL COMBS, MRN: [id number]    CHIEF COMPLAINT: Patient is a 85y old  Male who presents with a chief complaint of Afib (2021 17:36)      INTERVAL HISTORY/OVERNIGHT EVENTS: No overnight events. At bedside, patient has been sleeping and eating well. Denies N/V/D/C. Last BM x1 regular yesterday. Denies abdominal pain. Denies chest pain or SOB, cough. Has been ambulating with assistance. Oriented to person, place, and time. Breathing comfortably on room air.      MEDICATIONS:  MEDICATIONS  (STANDING):  chlorhexidine 4% Liquid 1 Application(s) Topical <User Schedule>  dextrose 40% Gel 15 Gram(s) Oral once  dextrose 5%. 1000 milliLiter(s) (50 mL/Hr) IV Continuous <Continuous>  dextrose 5%. 1000 milliLiter(s) (100 mL/Hr) IV Continuous <Continuous>  dextrose 50% Injectable 25 Gram(s) IV Push once  dextrose 50% Injectable 12.5 Gram(s) IV Push once  dextrose 50% Injectable 25 Gram(s) IV Push once  glucagon  Injectable 1 milliGRAM(s) IntraMuscular once  heparin  Infusion.  Unit(s)/Hr (11 mL/Hr) IV Continuous <Continuous>  insulin lispro (ADMELOG) corrective regimen sliding scale   SubCutaneous three times a day before meals  insulin lispro (ADMELOG) corrective regimen sliding scale   SubCutaneous at bedtime  metoprolol tartrate 25 milliGRAM(s) Oral two times a day  tamsulosin 0.4 milliGRAM(s) Oral at bedtime    MEDICATIONS  (PRN):  heparin   Injectable 4500 Unit(s) IV Push every 6 hours PRN For aPTT less than 40  heparin   Injectable 2000 Unit(s) IV Push every 6 hours PRN For aPTT between 40 - 57      ALLERGIES: Allergies    penicillin (Rash)    Intolerances    Cipro (Joint Pain)      OBJECTIVE:  ICU Vital Signs Last 24 Hrs  T(C): 36.5 (10 Nov 2021 05:00), Max: 36.7 (2021 21:50)  T(F): 97.7 (10 Nov 2021 05:00), Max: 98 (2021 21:50)  HR: 123 (10 Nov 2021 07:00) (105 - 167)  BP: 96/63 (10 Nov 2021 07:00) (80/62 - 119/76)  BP(mean): 73 (10 Nov 2021 07:00) (70 - 87)  RR: 20 (10 Nov 2021 07:00) (14 - 23)  SpO2: 99% (10 Nov 2021 07:00) (98% - 100%)    POCT Blood Glucose.: 161 mg/dL (2021 22:26)    CAPILLARY BLOOD GLUCOSE    POCT Blood Glucose.: 161 mg/dL (2021 22:26)    I&O's Summary    2021 07:01  -  10 Nov 2021 07:00  --------------------------------------------------------  IN: 424 mL / OUT: 500 mL / NET: -76 mL    Daily Height in cm: 170.2 (2021 21:50)    Daily Weight in k (10 Nov 2021 04:00)    PHYSICAL EXAMINATION:  General: Comfortable, no acute distress, cooperative with exam.  HEENT: PERRLA, EOMI, moist mucous membranes.  Respiratory: bibasilar crackles  CV: irregularly irregular   Abdominal: Soft, nontender, nondistended, no rebound or guarding, normal bowel sounds.  Neurology: AOx3, no focal neuro defects, ARANGO x 4.  Extremities: No pitting edema, + Peripheral pulses.  Incisions:   Tubes:    LABS:                          7.8    7.05  )-----------( 451      ( 10 Nov 2021 05:20 )             23.8     11-10    137  |  104  |  112<H>  ----------------------------<  213<H>  3.8   |  20<L>  |  4.75<H>    Ca    8.2<L>      10 Nov 2021 01:32  Phos  3.5     11-10  Mg     2.30     -10    TPro  5.9<L>  /  Alb  2.8<L>  /  TBili  0.6  /  DBili  x   /  AST  82<H>  /  ALT  178<H>  /  AlkPhos  137<H>  11-10    LIVER FUNCTIONS - ( 10 Nov 2021 01:32 )  Alb: 2.8 g/dL / Pro: 5.9 g/dL / ALK PHOS: 137 U/L / ALT: 178 U/L / AST: 82 U/L / GGT: x           PTT - ( 10 Nov 2021 01:32 )  PTT:51.1 sec        Urinalysis Basic - ( 2021 18:06 )    Color: Light Yellow / Appearance: Slightly Turbid / S.014 / pH: x  Gluc: x / Ketone: Negative  / Bili: Negative / Urobili: <2 mg/dL   Blood: x / Protein: 100 mg/dL / Nitrite: Negative   Leuk Esterase: Large / RBC: 2 /HPF /  /HPF   Sq Epi: x / Non Sq Epi: 0 /HPF / Bacteria: Negative        TELEMETRY:     EKG:     IMAGING:

## 2021-11-10 NOTE — PROGRESS NOTE ADULT - ATTENDING COMMENTS
84 y/o M w/ PMH HTN, HLD, T2DM, CAD (MI 2003, s/p 4 stents), Afib on Eliquis, h/o R nephrolithiasis s/p ureteral stent (removed 1/2021) sent in by cardiologist Dr. Reginaldo Syed for AF w/ RVR. Telemetry shows atrial fibrillation with VR as high as 140s in the setting of decompensated HF. He is on low dose BB at this time. On IV Heparin drip for anticoagulation. Hb 7.2-7.8 in the setting of LILIAN on CKD down from 9.3 yesterday and creatinine trending down. FU TTE and H/H. If stable, consider MARCELLA/DCCV. Will follow.

## 2021-11-10 NOTE — PROGRESS NOTE ADULT - ATTENDING COMMENTS
85 year old man with history of CAD (MI in 2003), AF on Eliquis who presented with AF with RVR during routine stress testing. Now with elevated Cr.    Meds:  Heparin gtt  Metoprolol 25 mg BID    #Neuro- No active issues  #Pulm- No active issues  #CV- AF with RVR  Continue Metoprolol--can spread to TID  Check TTE  Continue Heparin gtt  EP followup  #Renal- Nephro consult  Monitor Cr

## 2021-11-10 NOTE — CONSULT NOTE ADULT - ASSESSMENT
86 y/o M w/ PMH HTN, HLD, T2DM, CAD (MI 2003, s/p 4 stents), Afib on Eliquis h/o R nephrolithiasis s/p ureteral stent (removed 1/2021) sent in by cardiologist and found to have rapid afib with hypotension   LILIAN that is all hemodynamic in nature   Hepatitis   CKD stage 4 at baseline and reduced nephron mass     1 Renal- At present goal should be to help control the heart rate which will improve hemodynamics and resultant BP  Renal sono when able   2 CVS-Echo was just done and he will eventually have heart rate control (per CCU)  3 -Bladder instillation of chemo as an outpt to resume   4 Anemia-Check iron stores;  No retacrit for now     Sayed [facility]   [phone]

## 2021-11-10 NOTE — PROGRESS NOTE ADULT - PROBLEM SELECTOR PLAN 2
EKG indicates afib w/ RVR; Pro BNP ~74706 with unclear baseline; troponin elevated  -admit to floor with telemetry  -CTM vital signs q8hrs  - trend troponin  - Cardiology consulted and is following; appreciate recs

## 2021-11-10 NOTE — PROGRESS NOTE ADULT - ASSESSMENT
86 y/o M w/ PMH HTN, HLD, T2DM, CAD (MI 2003, s/p 4 stents), Afib on Eliquis, h/o R nephrolithiasis s/p ureteral stent (removed 1/2021) found to be in AF w/ RVR (asymptomatic) with a Cr of 4.75.    NEURO:  Alert and oriented x 3    RESP:  SOB:  -continue BIPAP    CARDS:  AFIB W/RVR  -Continue to monitor on telemetry  - Maintain K+~4.0 and Mg++~2.0  - Start metoprolol 25 mg BID. Hold for SBP <90 and HR <50  -initiate heparin gtt  - hold home dose of apixaban 2.5 mg BID, resume prior to dc  -EP is following    CAD:  -troponin slightly elevated, likely in setting of demand on the heart  -s/p stents in 2003  -unlikely ACS at this time, will continue to closely monitor EKGS and treat underlying HF    ISCHEMIC CM:  -likely 2/2 to AFIB with RVR    GI: no active issues  :  LILIAN on CKD:  -monitor urine output  -consult nephrology, may need urgent HD     ENDO:  -HGBA1C 5.9  -may not need FS and ISS    ID: no active issues

## 2021-11-10 NOTE — PROGRESS NOTE ADULT - ASSESSMENT
84 y/o M w/ PMH HTN, HLD, T2DM, CAD (MI 2003, s/p 4 stents), Afib on Eliquis, h/o R nephrolithiasis s/p ureteral stent (removed 1/2021) sent in by cardiologist Dr. Reginaldo Syed for AF w/ RVR. Telemetry shows atrial fibrillation with VR as high as 140s in the setting of decompensated HF. He is on low dose BB at this time. On IV Heparin drip for anticoagulation. Hb 7.2-7.8 in the setting of LILIAN on CKD down from 9.3 yesterday and creatinine trending down  . [id number] score=8; Age, HF, HTN, DM, TIA, CAD  - Continue to monitor on telemetry  - Maintain K+~4.0 and Mg++~2.0  - Continue metoprolol  and increase dose as BP tolerates. Hold for SBP <90 and HR <50  - Continue anticoagulation of no contraindications  - Recommend Nephrology consult and HF consult  - Rhythm control strategy: MARCELLA/DCCV when patient is euvolemic and can be on uninterrupted AC for at least 30 days. Risks/benefits and alternatives of DCCV discussed with patient and all questions answered. He consented for the procedure  - Will follow up

## 2021-11-10 NOTE — PROGRESS NOTE ADULT - SUBJECTIVE AND OBJECTIVE BOX
Patient is seen and examined. Denies any chest pain, SIOB, palpitations or dizziness. No acute distress noted   PAST MEDICAL & SURGICAL HISTORY:  CAD (coronary artery disease)  (4 stents, non-adherent to aspirin)    Diabetes mellitus, new onset    Single kidney  (hx/o LEFT nephrectomy at age 16; pt states due to a ureter&quot;blockage&quot; causing &quot;infection&quot;; pt denies hx/o renal dysfunction)    Arthritis    Myocardial infarction  ()    Hard of hearing    Atrial fibrillation    Hypertension    Hyperlipidemia    History of TIAs  Last 2018    History of bradycardia    History of nephrectomy, unilateral  (hx/o LEFT nephrectomy at age 16)    Stented coronary artery  (4 stents)    S/P bilateral cataract extraction        MEDICATIONS  (STANDING):  chlorhexidine 4% Liquid 1 Application(s) Topical <User Schedule>  dextrose 40% Gel 15 Gram(s) Oral once  dextrose 5%. 1000 milliLiter(s) (50 mL/Hr) IV Continuous <Continuous>  dextrose 5%. 1000 milliLiter(s) (100 mL/Hr) IV Continuous <Continuous>  dextrose 50% Injectable 25 Gram(s) IV Push once  dextrose 50% Injectable 12.5 Gram(s) IV Push once  dextrose 50% Injectable 25 Gram(s) IV Push once  glucagon  Injectable 1 milliGRAM(s) IntraMuscular once  heparin  Infusion 1200 Unit(s)/Hr (12 mL/Hr) IV Continuous <Continuous>  insulin lispro (ADMELOG) corrective regimen sliding scale   SubCutaneous three times a day before meals  insulin lispro (ADMELOG) corrective regimen sliding scale   SubCutaneous at bedtime  metoprolol tartrate 25 milliGRAM(s) Oral three times a day  tamsulosin 0.4 milliGRAM(s) Oral at bedtime    MEDICATIONS  (PRN):  Vital Signs Last 24 Hrs  T(C): 36.2 (10 Nov 2021 08:00), Max: 36.7 (2021 21:50)  T(F): 97.1 (10 Nov 2021 08:00), Max: 98 (2021 21:50)  HR: 144 (10 Nov 2021 10:00) (105 - 167)  BP: 94/61 (10 Nov 2021 10:00) (80/62 - 119/76)  BP(mean): 67 (10 Nov 2021 10:00) (67 - 87)  RR: 20 (10 Nov 2021 10:00) (14 - 23)  SpO2: 100% (10 Nov 2021 10:00) (98% - 100%)    INTERPRETATION OF TELEMETRY: Atrial fibrillation with VR 120s-140s occ. PVCs    LABS:                        7.8    7.05  )-----------( 451      ( 10 Nov 2021 05:20 )             23.8     11-10    137  |  104  |  112<H>  ----------------------------<  213<H>  3.8   |  20<L>  |  4.75<H>    Ca    8.2<L>      10 Nov 2021 01:32  Phos  3.5     11-10  Mg     2.30     11-10    TPro  5.9<L>  /  Alb  2.8<L>  /  TBili  0.6  /  DBili  x   /  AST  82<H>  /  ALT  178<H>  /  AlkPhos  137<H>  1110        PTT - ( 10 Nov 2021 08:53 )  PTT:79.4 sec  Urinalysis Basic - ( 2021 18:06 )    Color: Light Yellow / Appearance: Slightly Turbid / S.014 / pH: x  Gluc: x / Ketone: Negative  / Bili: Negative / Urobili: <2 mg/dL   Blood: x / Protein: 100 mg/dL / Nitrite: Negative   Leuk Esterase: Large / RBC: 2 /HPF /  /HPF   Sq Epi: x / Non Sq Epi: 0 /HPF / Bacteria: Negative      I&O's Summary    2021 07:  -  10 Nov 2021 07:00  --------------------------------------------------------  IN: 424 mL / OUT: 500 mL / NET: -76 mL    10 Nov 2021 07:01  -  10 Nov 2021 11:01  --------------------------------------------------------  IN: 276 mL / OUT: 100 mL / NET: 176 mL      BNPSerum Pro-Brain Natriuretic Peptide: 91842 pg/mL ( @ 15:37)    RADIOLOGY & ADDITIONAL STUDIES:      PHYSICAL EXAM:    GENERAL: In no apparent distress, well nourished, and hydrated.  HEART: Irregular rate and rhythm; No murmurs, rubs, or gallops.  PULMONARY: Clear to auscultation and percussion.  No rales, wheezing, or rhonchi bilaterally.  ABDOMEN: Soft, Nontender, Nondistended; Bowel sounds present  EXTREMITIES:  2+ Peripheral Pulses, No clubbing, cyanosis, or edema

## 2021-11-10 NOTE — PROGRESS NOTE ADULT - ASSESSMENT
85 year-old male PMH T2DM, CAD (MI 2003, s/p 4 stents), Afib on Eliquis presenting with asymptomatic atrial fibrillation w/ RVR with onset during nuclear stress testing today. Physical exam with LE edema. Hypotensive SBP 90's. Labs significant for mild transaminitis, mildly elevated troponin, elevated BNP w/o known baseline; EKG c/w Afib RVR. Ddx most likely worsening HF exacerbation.

## 2021-11-10 NOTE — PROGRESS NOTE ADULT - PROBLEM SELECTOR PLAN 1
Decompensated HF with pro BNP >81087 in setting of Afib RVR. S/p Cardizem 10mg IV x1 and Lopressor 5mg IV x1. He is hypotensive with SBP 90s. [id number] score=8; Age, HF, HTN, DM, TIA, CAD  - Continue to monitor on telemetry  - Maintain K+~4.0 and Mg++~2.0  - Start metoprolol 25 mg BID. Hold for SBP <90 and HR <50  - Stopped his home apaxiban and started heparin gtt  - CCU consult as patient is hypotensive in rapid afib and decompensated HF  - Consider HF

## 2021-11-10 NOTE — CONSULT NOTE ADULT - SUBJECTIVE AND OBJECTIVE BOX
NEPHROLOGY - NSN    Patient seen and examined.    HPI:  84 y/o M w/ PMH HTN, HLD, T2DM, CAD ([place] , s/p 4 stents), Afib on Eliquis h/o R nephrolithiasis s/p ureteral stent (removed 2021) sent in by cardiologist Dr. Reginaldo Syed for atrial fibrillation w/ RVR onset during nuclear stress testing today. However, he says that he did not feel any palpitations during his stress testing. Pt reports that he believes he completed most of stress testing today, and feels just "not right". Currently, he reports absence of any chest pain, palpitations, lightheadedness/fainting, dyspnea, abd pain, nausea, vomiting, diarrhea, bloody stools, melena, or any urinary symptoms (hematuria or dysuria). Of note, Pt shares that he was hospitalized for four days at [place] recently discharged yesterday for fever and chills, but states these have resolved, and that no etiology was found to account for his symptoms.    (2021 17:36)  At present in the CCU for afib   Chemo recent x 3 for bladder cancer   There is no hematuria or bubbles in the urine.  No history of NSAIDS or nephrolithisis.  The patient urinates once or twice in the night and there is no incontinence.  No family hx or renal disease or back pain.    No recent abx use.  No alleviating or aggravating factors with respect to the kidneys.   Baseline creatinine       PAST MEDICAL & SURGICAL HISTORY:  CAD (coronary artery disease)  (4 stents, non-adherent to aspirin)    Diabetes mellitus, new onset    Single kidney  (hx/o LEFT nephrectomy at age 16; pt states due to a ureter&quot;blockage&quot; causing &quot;infection&quot;; pt denies hx/o renal dysfunction)    Arthritis    Myocardial infarction  ()    Hard of hearing    Atrial fibrillation    Hypertension    Hyperlipidemia    History of TIAs  Last 2018    History of bradycardia    History of nephrectomy, unilateral  (hx/o LEFT nephrectomy at age 16)    Stented coronary artery  (4 stents)    S/P bilateral cataract extraction        MEDICATIONS  (STANDING):  chlorhexidine 4% Liquid 1 Application(s) Topical <User Schedule>  dextrose 40% Gel 15 Gram(s) Oral once  dextrose 5%. 1000 milliLiter(s) (50 mL/Hr) IV Continuous <Continuous>  dextrose 5%. 1000 milliLiter(s) (100 mL/Hr) IV Continuous <Continuous>  dextrose 50% Injectable 25 Gram(s) IV Push once  dextrose 50% Injectable 12.5 Gram(s) IV Push once  dextrose 50% Injectable 25 Gram(s) IV Push once  glucagon  Injectable 1 milliGRAM(s) IntraMuscular once  heparin  Infusion 1200 Unit(s)/Hr (12 mL/Hr) IV Continuous <Continuous>  insulin lispro (ADMELOG) corrective regimen sliding scale   SubCutaneous three times a day before meals  insulin lispro (ADMELOG) corrective regimen sliding scale   SubCutaneous at bedtime  metoprolol tartrate 25 milliGRAM(s) Oral three times a day  tamsulosin 0.4 milliGRAM(s) Oral at bedtime      Allergies    penicillin (Rash)    Intolerances    Cipro (Joint Pain)      SOCIAL HISTORY:  Denies alcohol abuse, drug abuse or tobacco usage.     FAMILY HISTORY:  Family history of diabetes mellitus in mother (Mother)  (Pt states his mother developed DM in her 70&#x27;s)        VITALS:  T(C): 36.2 (11-10-21 @ 08:00), Max: 36.7 (21 @ 21:50)  HR: 161 (11-10-21 @ 11:00) (105 - 167)  BP: 91/68 (11-10-21 @ 11:00) (80/62 - 119/76)  RR: 21 (11-10-21 @ 11:00) (14 - 23)  SpO2: 99% (11-10-21 @ 11:00) (98% - 100%)    REVIEW OF SYSTEMS:  Denies any nausea, vomiting, diarrhea, fever or chills. Denies chest pain, SOB, focal weakness, hematuria or dysuria. Good oral intake and denies fatigue or weakness. All other pertinent systems are reviewed and are negative.    PHYSICAL EXAM:  Constitutional: NAD  HEENT: EOMI  Neck:  No JVD, supple   Respiratory: CTA B/L  Cardiovascular: S1 and S2, RRR  Gastrointestinal: + BS, soft, NT, ND  Extremities: No peripheral edema, + peripheral pulses  Neurological: A/O x 3, CN2-12 intact  Psychiatric: Normal mood, normal affect  : No Temple  Skin: No rashes, C/D/I  Access: Not applicable    I and O's:     @ 07:01  -  11-10 @ 07:00  --------------------------------------------------------  IN: 424 mL / OUT: 500 mL / NET: -76 mL    11-10 @ 07:01  -  11-10 @ 11:25  --------------------------------------------------------  IN: 276 mL / OUT: 100 mL / NET: 176 mL      Height (cm): 170.2 ( 21:50)  Weight (kg): 55.4 ( 21:50)  BMI (kg/m2): 19.1 ( 21:50)  BSA (m2): 1.64 ( 21:50)    LABS:                        7.8    7.05  )-----------( 451      ( 10 Nov 2021 05:20 )             23.8     11-10    137  |  104  |  112<H>  ----------------------------<  213<H>  3.8   |  20<L>  |  4.75<H>    Ca    8.2<L>      10 Nov 2021 01:32  Phos  3.5     11-10  Mg     2.30     11-10    TPro  5.9<L>  /  Alb  2.8<L>  /  TBili  0.6  /  DBili  x   /  AST  82<H>  /  ALT  178<H>  /  AlkPhos  137<H>  11-10      URINE:  Urinalysis Basic - ( 2021 18:06 )    Color: Light Yellow / Appearance: Slightly Turbid / S.014 / pH: x  Gluc: x / Ketone: Negative  / Bili: Negative / Urobili: <2 mg/dL   Blood: x / Protein: 100 mg/dL / Nitrite: Negative   Leuk Esterase: Large / RBC: 2 /HPF /  /HPF   Sq Epi: x / Non Sq Epi: 0 /HPF / Bacteria: Negative      Sodium, Random Urine: 63 mmol/L ( @ 18:06)  Creatinine, Random Urine: 60 mg/dL ( @ 18:06)    RADIOLOGY & ADDITIONAL STUDIES:     NEPHROLOGY - NSN    Patient seen and examined.    HPI:  86 y/o M w/ PMH HTN, HLD, T2DM, CAD (MI , s/p 4 stents), Afib on Eliquis h/o R nephrolithiasis s/p ureteral stent (removed 2021) sent in by cardiologist Dr. Calvin Syed for atrial fibrillation w/ RVR onset during nuclear stress testing today. However, he says that he did not feel any palpitations during his stress testing. Pt reports that he believes he completed most of stress testing today, and feels just "not right". Currently, he reports absence of any chest pain, palpitations, lightheadedness/fainting, dyspnea, abd pain, nausea, vomiting, diarrhea, bloody stools, melena, or any urinary symptoms (hematuria or dysuria). Of note, Pt shares that he was hospitalized for four days at [place] recently discharged yesterday for fever and chills, but states these have resolved, and that no etiology was found to account for his symptoms.    (2021 17:36)  At present in the CCU for afib   Chemo recent x 3 for bladder cancer   There is no hematuria or bubbles in the urine.  No history of NSAIDS or nephrolithisis.  The patient urinates once or twice in the night and there is no incontinence.  No family hx or renal disease or back pain.    No recent abx use.  No alleviating or aggravating factors with respect to the kidneys.   Baseline creatinine       PAST MEDICAL & SURGICAL HISTORY:  CAD (coronary artery disease)  (4 stents, non-adherent to aspirin)    Diabetes mellitus, new onset    Single kidney  (hx/o LEFT nephrectomy at age 16; pt states due to a ureter&quot;blockage&quot; causing &quot;infection&quot;; pt denies hx/o renal dysfunction)    Arthritis    Myocardial infarction  ()    Hard of hearing    Atrial fibrillation    Hypertension    Hyperlipidemia    History of TIAs  Last 2018    History of bradycardia    History of nephrectomy, unilateral  (hx/o LEFT nephrectomy at age 16)    Stented coronary artery  (4 stents)    S/P bilateral cataract extraction        MEDICATIONS  (STANDING):  chlorhexidine 4% Liquid 1 Application(s) Topical <User Schedule>  dextrose 40% Gel 15 Gram(s) Oral once  dextrose 5%. 1000 milliLiter(s) (50 mL/Hr) IV Continuous <Continuous>  dextrose 5%. 1000 milliLiter(s) (100 mL/Hr) IV Continuous <Continuous>  dextrose 50% Injectable 25 Gram(s) IV Push once  dextrose 50% Injectable 12.5 Gram(s) IV Push once  dextrose 50% Injectable 25 Gram(s) IV Push once  glucagon  Injectable 1 milliGRAM(s) IntraMuscular once  heparin  Infusion 1200 Unit(s)/Hr (12 mL/Hr) IV Continuous <Continuous>  insulin lispro (ADMELOG) corrective regimen sliding scale   SubCutaneous three times a day before meals  insulin lispro (ADMELOG) corrective regimen sliding scale   SubCutaneous at bedtime  metoprolol tartrate 25 milliGRAM(s) Oral three times a day  tamsulosin 0.4 milliGRAM(s) Oral at bedtime      Allergies    penicillin (Rash)    Intolerances    Cipro (Joint Pain)      SOCIAL HISTORY:  Denies alcohol abuse, drug abuse or tobacco usage.     FAMILY HISTORY:  Family history of diabetes mellitus in mother (Mother)  (Pt states his mother developed DM in her 70&#x27;s)        VITALS:  T(C): 36.2 (11-10-21 @ 08:00), Max: 36.7 (21 @ 21:50)  HR: 161 (11-10-21 @ 11:00) (105 - 167)  BP: 91/68 (11-10-21 @ 11:00) (80/62 - 119/76)  RR: 21 (11-10-21 @ 11:00) (14 - 23)  SpO2: 99% (11-10-21 @ 11:00) (98% - 100%)    REVIEW OF SYSTEMS:  Denies any nausea, vomiting, diarrhea, fever or chills. Denies chest pain, SOB, focal weakness, hematuria or dysuria. Good oral intake and denies fatigue or weakness. All other pertinent systems are reviewed and are negative.    PHYSICAL EXAM:  Constitutional: NAD  HEENT: EOMI  Neck:  No JVD, supple   Respiratory: CTA B/L  Cardiovascular: S1 and S2, RRR tachycardic   Gastrointestinal: + BS, soft, NT, ND  Extremities: No peripheral edema, + peripheral pulses  Neurological: A/O x 3, CN2-12 intact  Psychiatric: Normal mood, normal affect  : No Temple  Skin: No rashes, C/D/I  Access: Not applicable    I and O's:     @ 07:01  -  11-10 @ 07:00  --------------------------------------------------------  IN: 424 mL / OUT: 500 mL / NET: -76 mL    11-10 @ 07:01  -  11-10 @ 11:25  --------------------------------------------------------  IN: 276 mL / OUT: 100 mL / NET: 176 mL      Height (cm): 170.2 ( 21:50)  Weight (kg): 55.4 ( 21:50)  BMI (kg/m2): 19.1 ( 21:50)  BSA (m2): 1.64 ( 21:50)    LABS:                        7.8    7.05  )-----------( 451      ( 10 Nov 2021 05:20 )             23.8     11-10    137  |  104  |  112<H>  ----------------------------<  213<H>  3.8   |  20<L>  |  4.75<H>    Ca    8.2<L>      10 Nov 2021 01:32  Phos  3.5     11-10  Mg     2.30     11-10    TPro  5.9<L>  /  Alb  2.8<L>  /  TBili  0.6  /  DBili  x   /  AST  82<H>  /  ALT  178<H>  /  AlkPhos  137<H>  11-10      URINE:  Urinalysis Basic - ( 2021 18:06 )    Color: Light Yellow / Appearance: Slightly Turbid / S.014 / pH: x  Gluc: x / Ketone: Negative  / Bili: Negative / Urobili: <2 mg/dL   Blood: x / Protein: 100 mg/dL / Nitrite: Negative   Leuk Esterase: Large / RBC: 2 /HPF /  /HPF   Sq Epi: x / Non Sq Epi: 0 /HPF / Bacteria: Negative      Sodium, Random Urine: 63 mmol/L ( @ 18:06)  Creatinine, Random Urine: 60 mg/dL ( @ 18:06)    RADIOLOGY & ADDITIONAL STUDIES:    < from: Xray Chest 1 View- PORTABLE-Urgent (21 @ 16:51) >    EXAM:  XR CHEST PORTABLE URGENT 1V        PROCEDURE DATE:  2021         INTERPRETATION:  CLINICAL INFORMATION: Chest pain.    TECHNIQUE: Frontal radiograph of the chest.    COMPARISON: Chest x-ray 2021.    FINDINGS:    LUNGS: The lungs are clear.    PLEURA: No pleural effusion or pneumothorax.    HEART AND MEDIASTINUM: Cardiomediastinal silhouette size is within normal limits.    SKELETON: No acute abnormality.      IMPRESSION:    Clear lungs.    --- End of Report ---            TANO VALENZUELA MD; Resident Radiology  This document has been electronically signed.  CALVIN HUSAIN MD; Attending Radiologist  This document has been electronically signed. 2021  7:14P    < end of copied text >  < from: CT Abdomen and Pelvis No Cont (21 @ 21:53) >    EXAM:  CT ABDOMEN AND PELVIS        PROCEDURE DATE:  Aug 13 2021         INTERPRETATION:  CLINICAL INFORMATION: Flank pain. Urinary issues. Evaluate for pyonephritis.    COMPARISON: CT abdomen and pelvis 2020.    CONTRAST/COMPLICATIONS:  IV Contrast: None  Evaluation of the visceral organs is limited without intravenous contrast  Oral Contrast: None  Complications: None reported.    PROCEDURE:  CT of the Abdomen and Pelvis was performed.  Sagittal and coronal reformats were performed.    FINDINGS:  LOWER CHEST: Atherosclerotic and coronary artery calcifications. Right lower lobe and lingular linear atelectasis. Mild bilateral gynecomastia.    LIVER: Course parenchymal calcifications likely due to prior granulomatous disease.  BILE DUCTS: Normal caliber.  GALLBLADDER: Contracted, cholelithiasis.  SPLEEN: Within normal limits.  PANCREAS: Nonspecific calcification in the pancreatic tail.  ADRENAL: Normal.  KIDNEYS/URETERS: Left nephrectomy. Right-sided renal cyst. Nonspecific right perinephric fat stranding. Mild right hydronephrosis without an obstructing calculus.    BLADDER: Asymmetric urinary bladder wall thickening.  REPRODUCTIVE ORGANS: Prostate is enlarged measuring up to 5.7 cm.    BOWEL: No bowel obstruction or inflammation. Appendix is normal.  PERITONEUM: No ascites.  VESSELS: Within normal limits.  RETROPERITONEUM/LYMPH NODES: Redemonstration of posterior mediastinal lymphadenopathy adjacent to the upper thoracic aorta measuring up to 1.5 cm x 1.2 cm.  ABDOMINAL WALL: Tiny fat-containing umbilical hernia. Small bilateral fat-containing inguinal hernias.  BONES: Degenerative changes of the spine. Unchanged sacral sclerotic lesion likely representing bone island. Resection of the posterior left 12th rib.    IMPRESSION:  1. Asymmetric urinary bladder wall thickening which could be due to a cystitis; however, an underlying mass is not excluded. Correlate with urinalysis.  2. Nonspecific right perinephric fat stranding. Evaluation for pyelonephritis is limited without intravenous contrast. Mild right hydronephrosis without an obstructing calculus.    --- End of Report ---            TANO VALENZUELA MD; Resident Radiology  This document has been electronically signed.  EVIE MUÑIZ MD; Attending Radiologist  This document has been electronically signed. Aug 13 2021 11:44PM    < end of copied text >

## 2021-11-11 LAB
ALBUMIN SERPL ELPH-MCNC: 2.9 G/DL — LOW (ref 3.3–5)
ALP SERPL-CCNC: 150 U/L — HIGH (ref 40–120)
ALT FLD-CCNC: 165 U/L — HIGH (ref 4–41)
ANION GAP SERPL CALC-SCNC: 13 MMOL/L — SIGNIFICANT CHANGE UP (ref 7–14)
APTT BLD: 93.5 SEC — HIGH (ref 27–36.3)
AST SERPL-CCNC: 82 U/L — HIGH (ref 4–40)
BILIRUB SERPL-MCNC: 0.8 MG/DL — SIGNIFICANT CHANGE UP (ref 0.2–1.2)
BUN SERPL-MCNC: 97 MG/DL — HIGH (ref 7–23)
CALCIUM SERPL-MCNC: 8.8 MG/DL — SIGNIFICANT CHANGE UP (ref 8.4–10.5)
CHLORIDE SERPL-SCNC: 102 MMOL/L — SIGNIFICANT CHANGE UP (ref 98–107)
CO2 SERPL-SCNC: 20 MMOL/L — LOW (ref 22–31)
CREAT SERPL-MCNC: 3.82 MG/DL — HIGH (ref 0.5–1.3)
FERRITIN SERPL-MCNC: 2318 NG/ML — HIGH (ref 30–400)
FOLATE SERPL-MCNC: 12.2 NG/ML — SIGNIFICANT CHANGE UP (ref 3.1–17.5)
GLUCOSE BLDC GLUCOMTR-MCNC: 185 MG/DL — HIGH (ref 70–99)
GLUCOSE BLDC GLUCOMTR-MCNC: 188 MG/DL — HIGH (ref 70–99)
GLUCOSE BLDC GLUCOMTR-MCNC: 215 MG/DL — HIGH (ref 70–99)
GLUCOSE BLDC GLUCOMTR-MCNC: 274 MG/DL — HIGH (ref 70–99)
GLUCOSE SERPL-MCNC: 212 MG/DL — HIGH (ref 70–99)
HCT VFR BLD CALC: 23 % — LOW (ref 39–50)
HGB BLD-MCNC: 7.6 G/DL — LOW (ref 13–17)
IRON SATN MFR SERPL: 31 % — SIGNIFICANT CHANGE UP (ref 14–50)
IRON SATN MFR SERPL: 53 UG/DL — SIGNIFICANT CHANGE UP (ref 45–165)
MAGNESIUM SERPL-MCNC: 2 MG/DL — SIGNIFICANT CHANGE UP (ref 1.6–2.6)
MCHC RBC-ENTMCNC: 31.7 PG — SIGNIFICANT CHANGE UP (ref 27–34)
MCHC RBC-ENTMCNC: 33 GM/DL — SIGNIFICANT CHANGE UP (ref 32–36)
MCV RBC AUTO: 95.8 FL — SIGNIFICANT CHANGE UP (ref 80–100)
NRBC # BLD: 0 /100 WBCS — SIGNIFICANT CHANGE UP
NRBC # FLD: 0.04 K/UL — HIGH
PHOSPHATE SERPL-MCNC: 3.4 MG/DL — SIGNIFICANT CHANGE UP (ref 2.5–4.5)
PLATELET # BLD AUTO: 540 K/UL — HIGH (ref 150–400)
POTASSIUM SERPL-MCNC: 4.2 MMOL/L — SIGNIFICANT CHANGE UP (ref 3.5–5.3)
POTASSIUM SERPL-SCNC: 4.2 MMOL/L — SIGNIFICANT CHANGE UP (ref 3.5–5.3)
PROT SERPL-MCNC: 6.3 G/DL — SIGNIFICANT CHANGE UP (ref 6–8.3)
RBC # BLD: 2.4 M/UL — LOW (ref 4.2–5.8)
RBC # BLD: 2.53 M/UL — LOW (ref 4.2–5.8)
RBC # FLD: 12.5 % — SIGNIFICANT CHANGE UP (ref 10.3–14.5)
RETICS #: 49.6 K/UL — SIGNIFICANT CHANGE UP (ref 25–125)
RETICS/RBC NFR: 2 % — SIGNIFICANT CHANGE UP (ref 0.5–2.5)
SODIUM SERPL-SCNC: 135 MMOL/L — SIGNIFICANT CHANGE UP (ref 135–145)
TIBC SERPL-MCNC: 170 UG/DL — LOW (ref 220–430)
UIBC SERPL-MCNC: 117 UG/DL — SIGNIFICANT CHANGE UP (ref 110–370)
VIT B12 SERPL-MCNC: 1342 PG/ML — HIGH (ref 200–900)
WBC # BLD: 8.56 K/UL — SIGNIFICANT CHANGE UP (ref 3.8–10.5)
WBC # FLD AUTO: 8.56 K/UL — SIGNIFICANT CHANGE UP (ref 3.8–10.5)

## 2021-11-11 PROCEDURE — 99232 SBSQ HOSP IP/OBS MODERATE 35: CPT

## 2021-11-11 PROCEDURE — 99222 1ST HOSP IP/OBS MODERATE 55: CPT | Mod: GC

## 2021-11-11 PROCEDURE — 99233 SBSQ HOSP IP/OBS HIGH 50: CPT

## 2021-11-11 RX ORDER — AMIODARONE HYDROCHLORIDE 400 MG/1
400 TABLET ORAL EVERY 8 HOURS
Refills: 0 | Status: COMPLETED | OUTPATIENT
Start: 2021-11-11 | End: 2021-11-15

## 2021-11-11 RX ORDER — PANTOPRAZOLE SODIUM 20 MG/1
40 TABLET, DELAYED RELEASE ORAL
Refills: 0 | Status: DISCONTINUED | OUTPATIENT
Start: 2021-11-11 | End: 2021-11-16

## 2021-11-11 RX ORDER — AMIODARONE HYDROCHLORIDE 400 MG/1
TABLET ORAL
Refills: 0 | Status: DISCONTINUED | OUTPATIENT
Start: 2021-11-11 | End: 2021-11-16

## 2021-11-11 RX ORDER — POLYETHYLENE GLYCOL 3350 17 G/17G
17 POWDER, FOR SOLUTION ORAL DAILY
Refills: 0 | Status: DISCONTINUED | OUTPATIENT
Start: 2021-11-11 | End: 2021-11-16

## 2021-11-11 RX ORDER — AMIODARONE HYDROCHLORIDE 400 MG/1
200 TABLET ORAL DAILY
Refills: 0 | Status: DISCONTINUED | OUTPATIENT
Start: 2021-11-15 | End: 2021-11-16

## 2021-11-11 RX ORDER — POLYETHYLENE GLYCOL 3350 17 G/17G
17 POWDER, FOR SOLUTION ORAL ONCE
Refills: 0 | Status: COMPLETED | OUTPATIENT
Start: 2021-11-11 | End: 2021-11-11

## 2021-11-11 RX ADMIN — AMIODARONE HYDROCHLORIDE 400 MILLIGRAM(S): 400 TABLET ORAL at 13:44

## 2021-11-11 RX ADMIN — Medication 25 MILLIGRAM(S): at 13:44

## 2021-11-11 RX ADMIN — POLYETHYLENE GLYCOL 3350 17 GRAM(S): 17 POWDER, FOR SOLUTION ORAL at 12:00

## 2021-11-11 RX ADMIN — Medication 1: at 08:15

## 2021-11-11 RX ADMIN — Medication 25 MILLIGRAM(S): at 05:28

## 2021-11-11 RX ADMIN — Medication 5 MG/HR: at 04:28

## 2021-11-11 RX ADMIN — AMIODARONE HYDROCHLORIDE 400 MILLIGRAM(S): 400 TABLET ORAL at 21:08

## 2021-11-11 RX ADMIN — Medication 25 MILLIGRAM(S): at 21:08

## 2021-11-11 RX ADMIN — Medication 1: at 11:57

## 2021-11-11 RX ADMIN — CHLORHEXIDINE GLUCONATE 1 APPLICATION(S): 213 SOLUTION TOPICAL at 08:17

## 2021-11-11 RX ADMIN — Medication 3: at 16:54

## 2021-11-11 RX ADMIN — TAMSULOSIN HYDROCHLORIDE 0.4 MILLIGRAM(S): 0.4 CAPSULE ORAL at 21:08

## 2021-11-11 NOTE — PHYSICAL THERAPY INITIAL EVALUATION ADULT - PERTINENT HX OF CURRENT PROBLEM, REHAB EVAL
This is an 84 y/o M w/ PMH Afib, h/o R nephrolithiasis s/p ureteral stent found to be in AF w/ RVR  with a Cr of 4.75.

## 2021-11-11 NOTE — CONSULT NOTE ADULT - SUBJECTIVE AND OBJECTIVE BOX
Chief Complaint:  Patient is a 85y old  Male who presents with a chief complaint of Afib (11 Nov 2021 12:26)      HPI:RACHEL COMBS is a 85y Male    PMHX/PSHX:  CAD (coronary artery disease)    Diabetes mellitus, new onset    Single kidney    Arthritis    Myocardial infarction    Hard of hearing    Atrial fibrillation    Hypertension    Hyperlipidemia    History of TIAs    History of bradycardia    History of nephrectomy, unilateral    Stented coronary artery    S/P bilateral cataract extraction      Allergies:  Cipro (Joint Pain)  penicillin (Rash)      Home Medications: reviewed  Hospital Medications:  aMIOdarone    Tablet   Oral   aMIOdarone    Tablet 400 milliGRAM(s) Oral every 8 hours  chlorhexidine 4% Liquid 1 Application(s) Topical <User Schedule>  dextrose 40% Gel 15 Gram(s) Oral once  dextrose 5%. 1000 milliLiter(s) IV Continuous <Continuous>  dextrose 5%. 1000 milliLiter(s) IV Continuous <Continuous>  dextrose 50% Injectable 25 Gram(s) IV Push once  dextrose 50% Injectable 12.5 Gram(s) IV Push once  dextrose 50% Injectable 25 Gram(s) IV Push once  diltiazem Infusion 5 mG/Hr IV Continuous <Continuous>  glucagon  Injectable 1 milliGRAM(s) IntraMuscular once  heparin  Infusion 1200 Unit(s)/Hr IV Continuous <Continuous>  insulin lispro (ADMELOG) corrective regimen sliding scale   SubCutaneous three times a day before meals  insulin lispro (ADMELOG) corrective regimen sliding scale   SubCutaneous at bedtime  metoprolol tartrate 25 milliGRAM(s) Oral three times a day  tamsulosin 0.4 milliGRAM(s) Oral at bedtime      Social History:   Tob: Denies  EtOH: Denies  Illicit Drugs: Denies    Family history:  Family history of diabetes mellitus in mother (Mother)      Denies family history of colon cancer/polyps, stomach cancer/polyps, pancreatic cancer/masses, liver cancer/disease, ovarian cancer and endometrial cancer.    ROS:   General:  No  fevers, chills, night sweats, fatigue  Eyes:  Good vision, no reported pain  ENT:  No sore throat, pain, runny nose  CV:  No pain, palpitations  Pulm:  No dyspnea, cough  GI:  See HPI, otherwise negative  :  No  incontinence, nocturia  Muscle:  No pain, weakness  Neuro:  No memory problems  Psych:  No insomnia, mood problems, depression  Endocrine:  No polyuria, polydipsia, cold/heat intolerance  Heme:  No petechiae, ecchymosis, easy bruisability  Skin:  No rash    PHYSICAL EXAM:   Vital Signs:  Vital Signs Last 24 Hrs  T(C): 36.8 (11 Nov 2021 12:00), Max: 36.8 (10 Nov 2021 16:00)  T(F): 98.2 (11 Nov 2021 12:00), Max: 98.2 (10 Nov 2021 16:00)  HR: 92 (11 Nov 2021 14:30) (75 - 140)  BP: 80/55 (11 Nov 2021 14:00) (75/51 - 107/73)  BP(mean): 64 (11 Nov 2021 14:00) (57 - 89)  RR: 18 (11 Nov 2021 14:30) (11 - 21)  SpO2: 100% (11 Nov 2021 14:30) (92% - 100%)  Daily     Daily     GENERAL: no acute distress  NEURO: alert  HEENT: anicteric sclera, no conjunctival pallor appreciated  CHEST: no respiratory distress, no accessory muscle use  CARDIAC: regular rate, rhythm  ABDOMEN: soft, non-tender, non-distended, no rebound or guarding  EXTREMITIES: warm, well perfused, no edema  SKIN: no lesions noted    LABS: reviewed                        7.6    8.56  )-----------( 540      ( 11 Nov 2021 05:06 )             23.0     11-11    135  |  102  |  97<H>  ----------------------------<  212<H>  4.2   |  20<L>  |  3.82<H>    Ca    8.8      11 Nov 2021 05:06  Phos  3.4     11-11  Mg     2.00     11-11    TPro  6.3  /  Alb  2.9<L>  /  TBili  0.8  /  DBili  x   /  AST  82<H>  /  ALT  165<H>  /  AlkPhos  150<H>  11-11    LIVER FUNCTIONS - ( 11 Nov 2021 05:06 )  Alb: 2.9 g/dL / Pro: 6.3 g/dL / ALK PHOS: 150 U/L / ALT: 165 U/L / AST: 82 U/L / GGT: x               Diagnostic Studies: see sunrise for full report         Chief Complaint:  Patient is a 85y old  Male who presents with a chief complaint of Afib (11 Nov 2021 12:26)      HPI:RACHEL COMBS is a 85y Male with HTN, HLD, T2DM, CAD (MI 2003, s/p 4 stents), Afib on Eliquis h/o R nephrolithiasis s/p ureteral stent (removed 1/2021), bladder cancer on chemo (most recent dose ~2 weeks PTA), who initially presented from his cardiologist after onset of afib/rvr during a planned nuclear stress testing, noted to have Hgb 9.3 on admission, dropped to 7.2 after hep started, GI consulted for anemia in setting of cardiac evaluation with plans for at least 30 days of AC after MARCELLA/DCCV.    Patient denying any h/o bloody/black stools, vomiting, no abdominal pain, or discomfort. No diarrhea, but complaining of constipation for 3-5 days. No NSAID use, drugs, or alcohol. No FHx of GI disease or fhx of malignancy. No h/o EGD. Says last colonoscopy about 10 years prior, says it was fine. Says he intermittently has blood in his urine. Of note, he was recently hospitalized at [place] [facility] and discharged 1 day PTA for fevers/chills, denies any clear reason for sx.     Patient with afib/rvr with SBP in 70s-80s on exam, HR 90s-120s and resting comfortably in chair, unable to ambulate significantly due to concern of BP/HR. Hgb 7.6 from 8.2 (11.1 on 9/8/21).  TIBC 170, ferritin 2318 and B12 1342, iron and percent saturation/TIBC wnl.  BUN 97/Cr 3.82 from  11/9 and 71 on 9/8. Cr 9/8 was 2.8 and Cr 11/9 was 5.3. Patient remains on hep gtt. Cards planning for MARCELLA/DCCV if able to be on AC for 30 days after. GI consulted for further evaluation. COREY with yellow stool, no BRB/black stools.       PMHX/PSHX:  CAD (coronary artery disease)    Diabetes mellitus, new onset    Single kidney    Arthritis    Myocardial infarction    Hard of hearing    Atrial fibrillation    Hypertension    Hyperlipidemia    History of TIAs    History of bradycardia    History of nephrectomy, unilateral    Stented coronary artery    S/P bilateral cataract extraction      Allergies:  Cipro (Joint Pain)  penicillin (Rash)      Home Medications: reviewed  Hospital Medications:  aMIOdarone    Tablet   Oral   aMIOdarone    Tablet 400 milliGRAM(s) Oral every 8 hours  chlorhexidine 4% Liquid 1 Application(s) Topical <User Schedule>  dextrose 40% Gel 15 Gram(s) Oral once  dextrose 5%. 1000 milliLiter(s) IV Continuous <Continuous>  dextrose 5%. 1000 milliLiter(s) IV Continuous <Continuous>  dextrose 50% Injectable 25 Gram(s) IV Push once  dextrose 50% Injectable 12.5 Gram(s) IV Push once  dextrose 50% Injectable 25 Gram(s) IV Push once  diltiazem Infusion 5 mG/Hr IV Continuous <Continuous>  glucagon  Injectable 1 milliGRAM(s) IntraMuscular once  heparin  Infusion 1200 Unit(s)/Hr IV Continuous <Continuous>  insulin lispro (ADMELOG) corrective regimen sliding scale   SubCutaneous three times a day before meals  insulin lispro (ADMELOG) corrective regimen sliding scale   SubCutaneous at bedtime  metoprolol tartrate 25 milliGRAM(s) Oral three times a day  tamsulosin 0.4 milliGRAM(s) Oral at bedtime      Social History:   Tob: Denies  EtOH: Denies  Illicit Drugs: Denies    Family history:  Family history of diabetes mellitus in mother (Mother)      Denies family history of colon cancer/polyps, stomach cancer/polyps, pancreatic cancer/masses, liver cancer/disease, ovarian cancer and endometrial cancer.    ROS:       PHYSICAL EXAM:   Vital Signs:  Vital Signs Last 24 Hrs  T(C): 36.8 (11 Nov 2021 12:00), Max: 36.8 (10 Nov 2021 16:00)  T(F): 98.2 (11 Nov 2021 12:00), Max: 98.2 (10 Nov 2021 16:00)  HR: 92 (11 Nov 2021 14:30) (75 - 140)  BP: 80/55 (11 Nov 2021 14:00) (75/51 - 107/73)  BP(mean): 64 (11 Nov 2021 14:00) (57 - 89)  RR: 18 (11 Nov 2021 14:30) (11 - 21)  SpO2: 100% (11 Nov 2021 14:30) (92% - 100%)  Daily     Daily     GENERAL: no acute distress  NEURO: alert  HEENT: anicteric sclera, no conjunctival pallor appreciated  CHEST: no respiratory distress, no accessory muscle use  CARDIAC: regular rate, rhythm  ABDOMEN: soft, non-tender, non-distended, no rebound or guarding  COREY: yellow stool, non bloody/non black  EXTREMITIES: warm, well perfused, no edema  SKIN: no lesions noted    LABS: reviewed                        7.6    8.56  )-----------( 540      ( 11 Nov 2021 05:06 )             23.0     11-11    135  |  102  |  97<H>  ----------------------------<  212<H>  4.2   |  20<L>  |  3.82<H>    Ca    8.8      11 Nov 2021 05:06  Phos  3.4     11-11  Mg     2.00     11-11    TPro  6.3  /  Alb  2.9<L>  /  TBili  0.8  /  DBili  x   /  AST  82<H>  /  ALT  165<H>  /  AlkPhos  150<H>  11-11    LIVER FUNCTIONS - ( 11 Nov 2021 05:06 )  Alb: 2.9 g/dL / Pro: 6.3 g/dL / ALK PHOS: 150 U/L / ALT: 165 U/L / AST: 82 U/L / GGT: x               Diagnostic Studies: see sunrise for full report         Chief Complaint:  Patient is a 85y old  Male who presents with a chief complaint of Afib (11 Nov 2021 12:26)      HPI:RACHEL COMBS is a 85y Male with HTN, HLD, T2DM, CAD (MI 2003, s/p 4 stents), Afib on Eliquis h/o R nephrolithiasis s/p ureteral stent (removed 1/2021), bladder cancer on chemo (most recent dose ~2 weeks PTA), who initially presented from his cardiologist after onset of afib/rvr during a planned nuclear stress testing, noted to have Hgb 9.3 on admission, dropped to 7.2 after hep started, GI consulted for anemia in setting of cardiac evaluation with plans for at least 30 days of AC after MARCELLA/DCCV.    Patient denying any h/o bloody/black stools, vomiting, no abdominal pain, or discomfort. No diarrhea, but complaining of constipation for 3-5 days. No NSAID use, drugs, or alcohol. No FHx of GI disease or fhx of malignancy. No h/o EGD. Says last colonoscopy about 10 years prior, says it was fine. Says he intermittently has blood in his urine. Of note, he was recently hospitalized at [place] and discharged 1 day PTA for fevers/chills, denies any clear reason for sx.     Patient with afib/rvr with SBP in 70s-80s on exam, HR 90s-120s and resting comfortably in chair, unable to ambulate significantly due to concern of BP/HR. Hgb 7.6 from 8.2 (11.1 on 9/8/21).  TIBC 170, ferritin 2318 and B12 1342, iron and percent saturationwnl.  BUN 97/Cr 3.82 from  11/9 and 71 on 9/8. Cr 9/8 was 2.8 and Cr 11/9 was 5.3.  from 137. AST 82, , T bili 0.8. Patient remains on hep gtt. Cards planning for MARCELLA/DCCV if able to be on AC for 30 days after. GI consulted for further evaluation. COREY with yellow stool, no BRB/black stools.    PMHX/PSHX:  CAD (coronary artery disease)    Diabetes mellitus, new onset    Single kidney    Arthritis    Myocardial infarction    Hard of hearing    Atrial fibrillation    Hypertension    Hyperlipidemia    History of TIAs    History of bradycardia    History of nephrectomy, unilateral    Stented coronary artery    S/P bilateral cataract extraction      Allergies:  Cipro (Joint Pain)  penicillin (Rash)      Home Medications: reviewed  Hospital Medications:  aMIOdarone    Tablet   Oral   aMIOdarone    Tablet 400 milliGRAM(s) Oral every 8 hours  chlorhexidine 4% Liquid 1 Application(s) Topical <User Schedule>  dextrose 40% Gel 15 Gram(s) Oral once  dextrose 5%. 1000 milliLiter(s) IV Continuous <Continuous>  dextrose 5%. 1000 milliLiter(s) IV Continuous <Continuous>  dextrose 50% Injectable 25 Gram(s) IV Push once  dextrose 50% Injectable 12.5 Gram(s) IV Push once  dextrose 50% Injectable 25 Gram(s) IV Push once  diltiazem Infusion 5 mG/Hr IV Continuous <Continuous>  glucagon  Injectable 1 milliGRAM(s) IntraMuscular once  heparin  Infusion 1200 Unit(s)/Hr IV Continuous <Continuous>  insulin lispro (ADMELOG) corrective regimen sliding scale   SubCutaneous three times a day before meals  insulin lispro (ADMELOG) corrective regimen sliding scale   SubCutaneous at bedtime  metoprolol tartrate 25 milliGRAM(s) Oral three times a day  tamsulosin 0.4 milliGRAM(s) Oral at bedtime      Social History:   Tob: Denies  EtOH: Denies  Illicit Drugs: Denies    Family history:  Family history of diabetes mellitus in mother (Mother)      Denies family history of colon cancer/polyps, stomach cancer/polyps, pancreatic cancer/masses, liver cancer/disease, ovarian cancer and endometrial cancer.    ROS:       PHYSICAL EXAM:   Vital Signs:  Vital Signs Last 24 Hrs  T(C): 36.8 (11 Nov 2021 12:00), Max: 36.8 (10 Nov 2021 16:00)  T(F): 98.2 (11 Nov 2021 12:00), Max: 98.2 (10 Nov 2021 16:00)  HR: 92 (11 Nov 2021 14:30) (75 - 140)  BP: 80/55 (11 Nov 2021 14:00) (75/51 - 107/73)  BP(mean): 64 (11 Nov 2021 14:00) (57 - 89)  RR: 18 (11 Nov 2021 14:30) (11 - 21)  SpO2: 100% (11 Nov 2021 14:30) (92% - 100%)  Daily     Daily     GENERAL: no acute distress  NEURO: alert  HEENT: anicteric sclera, no conjunctival pallor appreciated  CHEST: no respiratory distress, no accessory muscle use  CARDIAC: regular rate, rhythm  ABDOMEN: soft, non-tender, non-distended, no rebound or guarding  COREY: yellow stool, non bloody/non black  EXTREMITIES: warm, well perfused, no edema  SKIN: no lesions noted    LABS: reviewed                        7.6    8.56  )-----------( 540      ( 11 Nov 2021 05:06 )             23.0     11-11    135  |  102  |  97<H>  ----------------------------<  212<H>  4.2   |  20<L>  |  3.82<H>    Ca    8.8      11 Nov 2021 05:06  Phos  3.4     11-11  Mg     2.00     11-11    TPro  6.3  /  Alb  2.9<L>  /  TBili  0.8  /  DBili  x   /  AST  82<H>  /  ALT  165<H>  /  AlkPhos  150<H>  11-11    LIVER FUNCTIONS - ( 11 Nov 2021 05:06 )  Alb: 2.9 g/dL / Pro: 6.3 g/dL / ALK PHOS: 150 U/L / ALT: 165 U/L / AST: 82 U/L / GGT: x               Diagnostic Studies: see sunrise for full report

## 2021-11-11 NOTE — PROGRESS NOTE ADULT - SUBJECTIVE AND OBJECTIVE BOX
NEPHROLOGY-[facility] [phone]        Patient seen and examined in the chair. On cardizem gtt at present         MEDICATIONS  (STANDING):  aMIOdarone    Tablet   Oral   aMIOdarone    Tablet 400 milliGRAM(s) Oral every 8 hours  chlorhexidine 4% Liquid 1 Application(s) Topical <User Schedule>  dextrose 40% Gel 15 Gram(s) Oral once  dextrose 5%. 1000 milliLiter(s) (50 mL/Hr) IV Continuous <Continuous>  dextrose 5%. 1000 milliLiter(s) (100 mL/Hr) IV Continuous <Continuous>  dextrose 50% Injectable 25 Gram(s) IV Push once  dextrose 50% Injectable 12.5 Gram(s) IV Push once  dextrose 50% Injectable 25 Gram(s) IV Push once  diltiazem Infusion 5 mG/Hr (5 mL/Hr) IV Continuous <Continuous>  glucagon  Injectable 1 milliGRAM(s) IntraMuscular once  heparin  Infusion 1200 Unit(s)/Hr (12 mL/Hr) IV Continuous <Continuous>  insulin lispro (ADMELOG) corrective regimen sliding scale   SubCutaneous three times a day before meals  insulin lispro (ADMELOG) corrective regimen sliding scale   SubCutaneous at bedtime  metoprolol tartrate 25 milliGRAM(s) Oral three times a day  tamsulosin 0.4 milliGRAM(s) Oral at bedtime      VITAL:  T(C): , Max: 36.8 (11-10-21 @ 16:00)  T(F): , Max: 98.2 (11-10-21 @ 16:00)  HR: 120 (21 @ 12:00)  BP: 87/58 (21 @ 12:00)  BP(mean): 70 (21 @ 12:00)  RR: 18 (21 @ 12:00)  SpO2: 96% (21 @ 12:00)  Wt(kg): --    I and O's:    11-10 @ 07:01  -   @ 07:00  --------------------------------------------------------  IN: 1158 mL / OUT: 1275 mL / NET: -117 mL     @ 07:01  -   @ 12:26  --------------------------------------------------------  IN: 85 mL / OUT: 400 mL / NET: -315 mL          PHYSICAL EXAM:    Constitutional: NAD  Neck:  No JVD  Respiratory: CTAB/L  Cardiovascular: S1 and S2 tachycardia   Gastrointestinal: BS+, soft, NT/ND  Extremities: No peripheral edema  Neurological: A/O x 3, no focal deficits  Psychiatric: Normal mood, normal affect  : No [place]  Skin: No rashes  Access: Not applicable    LABS:                        7.6    8.56  )-----------( 540      ( 2021 05:06 )             23.0         135  |  102  |  97<H>  ----------------------------<  212<H>  4.2   |  20<L>  |  3.82<H>    Ca    8.8      2021 05:06  Phos  3.4       Mg     2.00         TPro  6.3  /  Alb  2.9<L>  /  TBili  0.8  /  DBili  x   /  AST  82<H>  /  ALT  165<H>  /  AlkPhos  150<H>            Urine Studies:  Urinalysis Basic - ( 2021 18:06 )    Color: Light Yellow / Appearance: Slightly Turbid / S.014 / pH: x  Gluc: x / Ketone: Negative  / Bili: Negative / Urobili: <2 mg/dL   Blood: x / Protein: 100 mg/dL / Nitrite: Negative   Leuk Esterase: Large / RBC: 2 /HPF /  /HPF   Sq Epi: x / Non Sq Epi: 0 /HPF / Bacteria: Negative      Sodium, Random Urine: 63 mmol/L ( @ 18:06)  Creatinine, Random Urine: 60 mg/dL ( @ 18:06)        RADIOLOGY & ADDITIONAL STUDIES:

## 2021-11-11 NOTE — PROGRESS NOTE ADULT - ASSESSMENT
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 Reginaldo Syed for AF w/ RVR with CM. Telemetry shows atrial fibrillation with VR as high as 140s in the setting of decompensated HF. Noted down trending H/H on Heparin gtt.  Patient remains borderline hypotensive on AV mike agents for rate control.  Amiodarone 5 gm load ordered this am.  Echo done on this admission showed mildly reduced LVEF.    -IV Heparin drip for anticoagulation for now ([id number] score=8)  -Discussed with Dr. Pantoja, concerning potential interruption of AC given his low H/H, will postpone MARCELLA/DCCV today until GI clearance for AC therapy  -Continue rate control with metoprolol and Amiodarone load, consider d/c Diltiazem   -Follow up Nephrology team  -Will follow up for MARCELLA/DCCV  -Continue care per CCU

## 2021-11-11 NOTE — CONSULT NOTE ADULT - ASSESSMENT
RACHEL COMBS is a 85y Male with HTN, HLD, T2DM, CAD (MI 2003, s/p 4 stents), Afib on Eliquis h/o R nephrolithiasis s/p ureteral stent (removed 1/2021), bladder cancer on chemo (most recent dose ~2 weeks PTA), who initially presented from his cardiologist after onset of afib/rvr during a planned nuclear stress testing, noted to have Hgb 9.3 on admission, dropped to 7.2 after hep started, GI consulted for anemia in setting of cardiac evaluation with plans for at least 30 days of AC after MARCELLA/DCCV.    #. Anemia, unclear etiology c/b bladder cancer and intermittent hematuria - patient may benefit from endoscopic evaluation for further evaluation, however, he is not medically optimized and is not certain that he even wants to proceed with endoscopic evaluation and would like to think about it first. No overt bleeding has been noted inpatient/outpatient and COREY revealed yellow stool.   #. Constipation  #. Transaminemia    Recommendations:  - Not medically optimized for non-emergent endoscopic evaluation and patient is undecided   - Okay to c/w hep gtt w/o evidence of active GIB  - Trend Hgb, transfuse if Hgb < 8 in setting of active cardiac issues  - Maintain active type and screen  - PPI IV BID  - Miralax daily  - Consider SMOG enema  - Consider CTAP to evaluate for signs of bleeding  - Check hepatitis panel    Thank you for involving us in this patient's care.    Sharyn Reyes MD  Gastroenterology/Hepatology Fellow, PGY-V    NON-URGENT CONSULTS:  Please email [email or web address]  [email or web address]  AT NIGHT AND ON WEEKENDS:  Contact on-call GI fellow via answering service ([phone]) from 5pm-8am and on weekends/holidays  MONDAY-FRIDAY 8AM-5PM:  Pager# [phone] ([facility])  GI Phone# [phone] ([facility])     RACHEL COMBS is a 85y Male with HTN, HLD, T2DM, CAD (MI 2003, s/p 4 stents), Afib on Eliquis h/o R nephrolithiasis s/p ureteral stent (removed 1/2021), bladder cancer on chemo (most recent dose ~2 weeks PTA), who initially presented from his cardiologist after onset of afib/rvr during a planned nuclear stress testing, noted to have Hgb 9.3 on admission, dropped to 7.2 after hep started, GI consulted for anemia in setting of cardiac evaluation with plans for at least 30 days of AC after MARCELLA/DCCV.    #. Anemia, unclear etiology c/b bladder cancer and intermittent hematuria - patient may benefit from endoscopic evaluation for further evaluation, however, he is not medically optimized with afib/rvr, soft BP, labile HR, and physical condition and is not certain that he even wants to proceed with endoscopic evaluation and would like to think about it first. No overt bleeding has been noted inpatient/outpatient and COREY revealed yellow stool. Has not had overt bleeding while on therapeutic hep gtt and necessitating cardioversion. Risk vs benefit may be in patient's favor to proceed with cardioversion/AC and if develops bleeding after AC, GI will reassess for endoscopic investigation.  #. Constipation  #. Transaminemia    Recommendations:  - Not medically optimized for non-emergent endoscopic evaluation and patient is undecided   - Consider proceeding with medically necessary cardioversion/AC and contact GI if develops overt bleeding for reassessment   - Okay to c/w hep gtt w/o evidence of active GIB  - Trend Hgb, transfuse if Hgb < 8 in setting of active cardiac issues  - Maintain active type and screen  - PPI IV BID  - Miralax daily  - Consider SMOG enema  - RUQ abdominal US  - Check hepatitis panel  - Monitor liver chemistries  - Check INR and monitor    Thank you for involving us in this patient's care. Please contact GI with any further questions or concerns.     Case discussed with Attending, Dr. José Miguel Braden.     Sharyn Reyes MD  Gastroenterology/Hepatology Fellow, PGY-V    NON-URGENT CONSULTS:  Please email [email or web address]  AT NIGHT AND ON WEEKENDS:  Contact on-call GI fellow via answering service ([phone]) from 5pm-8am and on weekends/holidays  MONDAY-FRIDAY 8AM-5PM:  Pager# [phone] ([facility])  GI Phone# [phone] ([facility])

## 2021-11-11 NOTE — PROGRESS NOTE ADULT - ATTENDING COMMENTS
85 year old man with history of CAD (MI in 2003), AF on Eliquis who presented with AF with RVR during routine stress testing. Now with elevated Cr.  Started on Diltiazem 5 mg/hr and HR better  EP saw and ?MARCELLA/DCCV    TTE: 11/10/21  1. Mitral annular calcification, otherwise normal mitral  valve. Mild-moderate mitral regurgitation.  2. Moderately dilated left atrium.  LA volume index = 48  cc/m2  3. Normal left ventricular internal dimensions and wall  thicknesses.  4. Mild global left ventricular systolic dysfunction.  5. The right ventricle is not well visualized; grossly  normal right ventricular systolic function.    Meds:  Heparin gtt  Metoprolol 25 mg TID  Diltiazem 5 mg/hr    #Neuro- No active issues  #Pulm- No active issues  #CV- AF with RVR  Continue Metoprolol  Diltiazem gtt at 5 mg/hr  Continue Heparin gtt  EP followup  #GI- Will call consult per GI  #Renal- Nephro consult appreciated  Monitor Cr- currently downtrending

## 2021-11-11 NOTE — PROGRESS NOTE ADULT - SUBJECTIVE AND OBJECTIVE BOX
Patient is a 85y old  Male who presents with a chief complaint of Afib (2021 08:20)  Patient denies CP, SOB or palpitations.  Denies dizziness with SBP in low 80's this am.  Denies hematuria.      PAST MEDICAL & SURGICAL HISTORY:  CAD (coronary artery disease)  (4 stents, non-adherent to aspirin)    Diabetes mellitus, new onset    Single kidney  (hx/o LEFT nephrectomy at age 16; pt states due to a ureter&quot;blockage&quot; causing &quot;infection&quot;; pt denies hx/o renal dysfunction)    Arthritis    Myocardial infarction  ()    Hard of hearing    Atrial fibrillation    Hypertension    Hyperlipidemia    History of TIAs  Last 2018    History of bradycardia    History of nephrectomy, unilateral  (hx/o LEFT nephrectomy at age 16)    Stented coronary artery  (4 stents)    S/P bilateral cataract extraction        MEDICATIONS  (STANDING):  aMIOdarone    Tablet   Oral   aMIOdarone    Tablet 400 milliGRAM(s) Oral every 8 hours  chlorhexidine 4% Liquid 1 Application(s) Topical <User Schedule>  dextrose 40% Gel 15 Gram(s) Oral once  dextrose 5%. 1000 milliLiter(s) (50 mL/Hr) IV Continuous <Continuous>  dextrose 5%. 1000 milliLiter(s) (100 mL/Hr) IV Continuous <Continuous>  dextrose 50% Injectable 25 Gram(s) IV Push once  dextrose 50% Injectable 12.5 Gram(s) IV Push once  dextrose 50% Injectable 25 Gram(s) IV Push once  diltiazem Infusion 5 mG/Hr (5 mL/Hr) IV Continuous <Continuous>  glucagon  Injectable 1 milliGRAM(s) IntraMuscular once  heparin  Infusion 1200 Unit(s)/Hr (12 mL/Hr) IV Continuous <Continuous>  insulin lispro (ADMELOG) corrective regimen sliding scale   SubCutaneous three times a day before meals  insulin lispro (ADMELOG) corrective regimen sliding scale   SubCutaneous at bedtime  metoprolol tartrate 25 milliGRAM(s) Oral three times a day  polyethylene glycol 3350 17 Gram(s) Oral once  tamsulosin 0.4 milliGRAM(s) Oral at bedtime    MEDICATIONS  (PRN):            Vital Signs Last 24 Hrs  T(C): 36.7 (2021 08:00), Max: 37 (10 Nov 2021 12:05)  T(F): 98 (2021 08:00), Max: 98.6 (10 Nov 2021 12:05)  HR: 110 (2021 11:00) (75 - 140)  BP: 103/49 (2021 11:00) (75/51 - 108/55)  BP(mean): 66 (2021 11:00) (57 - 89)  RR: 21 (2021 11:00) (11 - 22)  SpO2: 100% (2021 11:00) (92% - 100%)            INTERPRETATION OF TELEMETRY:  HL69-025 's bpm; occasional PVC's/V couplets    ECG:        LABS:                        7.6    8.56  )-----------( 540      ( 2021 05:06 )             23.0         135  |  102  |  97<H>  ----------------------------<  212<H>  4.2   |  20<L>  |  3.82<H>    Ca    8.8      2021 05:06  Phos  3.4       Mg     2.00         TPro  6.3  /  Alb  2.9<L>  /  TBili  0.8  /  DBili  x   /  AST  82<H>  /  ALT  165<H>  /  AlkPhos  150<H>          PTT - ( 2021 05:06 )  PTT:93.5 sec  Urinalysis Basic - ( 2021 18:06 )    Color: Light Yellow / Appearance: Slightly Turbid / S.014 / pH: x  Gluc: x / Ketone: Negative  / Bili: Negative / Urobili: <2 mg/dL   Blood: x / Protein: 100 mg/dL / Nitrite: Negative   Leuk Esterase: Large / RBC: 2 /HPF /  /HPF   Sq Epi: x / Non Sq Epi: 0 /HPF / Bacteria: Negative        BNP  RADIOLOGY & ADDITIONAL STUDIES:    DIMENSIONS:  Dimensions:     Normal Values:  LA:     3.4 cm    2.0 - 4.0 cm  Ao:     3.0 cm    2.0 - 3.8 cm  SEPTUM: 0.7 cm    0.6 - 1.2 cm  PWT:    0.7 cm    0.6 - 1.1 cm  LVIDd:  5.0 cm    3.0 - 5.6 cm  LVIDs:  3.7 cm    1.8 - 4.0 cm  Derived Variables:  LVMI: 70 g/m2  RWT: 0.28  Fractional short: 26 %  Ejection Fraction (Teicholtz): 51 %  ------------------------------------------------------------------------  OBSERVATIONS:  Mitral Valve: Mitral annular calcification, otherwise  normal mitral valve. Mild-moderate mitral regurgitation.  Aortic Root: Normal aortic root.  Aortic Valve: Aortic valve leaflet morphology not well  visualized.  Left Atrium: Moderately dilated left atrium.  LA volume  index = 48 cc/m2.  Left Ventricle: Mild global left ventricular systolic  dysfunction. Normal left ventricular internal dimensions  and wall thicknesses. (DT:222 ms).  Right Heart: Normal right atrium. The right ventricle is  not well visualized; grossly normal right ventricular  systolic function. Normal tricuspid valve. Minimal  tricuspid regurgitation. Normal pulmonic valve.  Pericardium/PleuraNormal pericardium with no pericardial  effusion.  ------------------------------------------------------------------------  CONCLUSIONS:  1. Mitral annular calcification, otherwise normal mitral  valve. Mild-moderate mitral regurgitation.  2. Moderately dilated left atrium.  LA volume index = 48  cc/m2.  3. Normal left ventricular internal dimensions and wall  thicknesses.  4. Mild global left ventricular systolic dysfunction.  5. The right ventricle is not well visualized; grossly  normal right ventricular systolic function.  ------------------------------------------------------------------------  Confirmed on  11/10/2021 - 14:00:46 by Bernardino Lauren M.D.,      PHYSICAL EXAM:    GENERAL: In no apparent distress, well nourished, and hydrated.  NECK: Supple and normal thyroid.  No JVD or carotid bruit.  Carotid pulse is 2+ bilaterally.  HEART: S1S2 irregularly irregular tachy; No murmurs, rubs, or gallops.  PULMONARY: Clear to auscultation and perfusion.  No rales, wheezing, or rhonchi bilaterally.  ABDOMEN: Soft, Nontender, Nondistended; Bowel sounds present  EXTREMITIES:  2+ Peripheral Pulses, No clubbing, cyanosis, or edema  NEUROLOGICAL: Grossly nonfocal

## 2021-11-11 NOTE — PROGRESS NOTE ADULT - ASSESSMENT
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  Is the ferritin an acute phase reactant?  4 GI-Trend LFT's     Sayed Ali   [facility]   [phone]

## 2021-11-11 NOTE — PROGRESS NOTE ADULT - ASSESSMENT
86 y/o M w/ PMH HTN, HLD, T2DM, CAD (MI 2003, s/p 4 stents), Afib on Eliquis, h/o R nephrolithiasis s/p ureteral stent (removed 1/2021) found to be in AF w/ RVR (asymptomatic) with a Cr of 4.75.    NEURO:  Alert and oriented x 3    RESP:  Breathing well on RA    CARDS:  AFIB W/RVR  -Continue to monitor on telemetry  - Maintain K+~4.0 and Mg++~2.0  - c/w metoprolol 25 mg TID. Hold for SBP <90 and HR <50  - continue to titrate diltiazem drip  - hold home dose of apixaban 2.5 mg BID, resume prior to dc      CAD:  -troponin slightly elevated, likely in setting of demand on the heart  -s/p stents in 2003  -unlikely ACS at this time, will continue to closely monitor EKGS and treat underlying HF    ISCHEMIC CM:  -likely 2/2 to AFIB with RVR    GI: no active issues  :   -     ENDO:  -HGBA1C 5.9  -may not need FS and ISS    ID: no active issues     86 y/o M w/ PMH HTN, HLD, T2DM, CAD (MI 2003, s/p 4 stents), Afib on Eliquis, h/o R nephrolithiasis s/p ureteral stent (removed 1/2021) found to be in AF w/ RVR (asymptomatic) with a Cr of 4.75.    NEURO:  Alert and oriented x 3    RESP:  Breathing well on RA    CARDS:  AFIB W/RVR  -Continue to monitor on telemetry  - Maintain K+~4.0 and Mg++~2.0  - c/w metoprolol 25 mg TID. Hold for SBP <90 and HR <50  - continue to titrate diltiazem drip  - hold home dose of apixaban 2.5 mg BID, resume prior to dc      CAD:  -troponin slightly elevated, likely in setting of demand on the heart  -s/p stents in 2003  -unlikely ACS at this time, will continue to closely monitor EKGS and treat underlying HF    ISCHEMIC CM:  -likely 2/2 to AFIB with RVR    GI: last BM normal on 11/9. Started miralax 11/11   - waiting for FO test.   :   - no active issues    ENDO:  -HGBA1C 5.9  -may not need FS and ISS    ID: no active issues    Heme:   -Hb 7.6 this am. 9.3 -> 7.2 ->  7.8 -> 8.2   - f/u iron studies  - f/u fo test

## 2021-11-11 NOTE — PHYSICAL THERAPY INITIAL EVALUATION ADULT - GENERAL OBSERVATIONS, REHAB EVAL
Patient received seated OOB in a chair , (+) tele , (+) IV ,(+) [place] , pt in no apparent distress.

## 2021-11-11 NOTE — PROGRESS NOTE ADULT - SUBJECTIVE AND OBJECTIVE BOX
PATIENT: RACHEL COMBS, MRN: [id number]    CHIEF COMPLAINT: Patient is a 85y old  Male who presents with a chief complaint of Afib (10 Nov 2021 11:24)      INTERVAL HISTORY/OVERNIGHT EVENTS: No overnight events. At bedside, patient has been sleeping and eating well. Denies N/V/D/C. Last BM x1 regular yesterday. Denies abdominal pain. Denies chest pain or SOB, cough. Has been ambulating with assistance. Oriented to person, place, and time. Breathing comfortably on room air.      MEDICATIONS:  MEDICATIONS  (STANDING):  chlorhexidine 4% Liquid 1 Application(s) Topical <User Schedule>  dextrose 40% Gel 15 Gram(s) Oral once  dextrose 5%. 1000 milliLiter(s) (50 mL/Hr) IV Continuous <Continuous>  dextrose 5%. 1000 milliLiter(s) (100 mL/Hr) IV Continuous <Continuous>  dextrose 50% Injectable 25 Gram(s) IV Push once  dextrose 50% Injectable 12.5 Gram(s) IV Push once  dextrose 50% Injectable 25 Gram(s) IV Push once  diltiazem Infusion 5 mG/Hr (5 mL/Hr) IV Continuous <Continuous>  glucagon  Injectable 1 milliGRAM(s) IntraMuscular once  heparin  Infusion 1200 Unit(s)/Hr (12 mL/Hr) IV Continuous <Continuous>  insulin lispro (ADMELOG) corrective regimen sliding scale   SubCutaneous three times a day before meals  insulin lispro (ADMELOG) corrective regimen sliding scale   SubCutaneous at bedtime  metoprolol tartrate 25 milliGRAM(s) Oral three times a day  tamsulosin 0.4 milliGRAM(s) Oral at bedtime    MEDICATIONS  (PRN):      ALLERGIES: Allergies    penicillin (Rash)    Intolerances    Cipro (Joint Pain)      OBJECTIVE:  ICU Vital Signs Last 24 Hrs  T(C): 36.7 (2021 08:00), Max: 37 (10 Nov 2021 12:05)  T(F): 98 (2021 08:00), Max: 98.6 (10 Nov 2021 12:05)  HR: 110 (2021 06:30) (92 - 161)  BP: 84/45 (2021 06:30) (81/46 - 108/55)  BP(mean): 59 (2021 06:30) (57 - 89)  ABP: --  ABP(mean): --  RR: 15 (2021 06:30) (11 - 22)  SpO2: 100% (2021 06:30) (92% - 100%)      Adult Advanced Hemodynamics Last 24 Hrs  CVP(mm Hg): --  CVP(cm H2O): --  CO: --  CI: --  PA: --  PA(mean): --  PCWP: --  SVR: --  SVRI: --  PVR: --  PVRI: --  CAPILLARY BLOOD GLUCOSE      POCT Blood Glucose.: 188 mg/dL (2021 07:29)  POCT Blood Glucose.: 145 mg/dL (10 Nov 2021 21:43)  POCT Blood Glucose.: 127 mg/dL (10 Nov 2021 16:53)  POCT Blood Glucose.: 284 mg/dL (10 Nov 2021 11:51)  POCT Blood Glucose.: 252 mg/dL (10 Nov 2021 11:48)    CAPILLARY BLOOD GLUCOSE      POCT Blood Glucose.: 188 mg/dL (2021 07:29)    I&O's Summary    10 Nov 2021 07:01  -  2021 07:00  --------------------------------------------------------  IN: 1153 mL / OUT: 1275 mL / NET: -122 mL      Daily     Daily     PHYSICAL EXAMINATION:  General: Comfortable, no acute distress, cooperative with exam.  HEENT: PERRLA, EOMI, moist mucous membranes.  Respiratory: CTAB, normal respiratory effort, no coughing, wheezes, crackles, or rales.  CV: RRR, S1S2, no murmurs, rubs or gallops. No JVD. Distal pulses intact.  Abdominal: Soft, nontender, nondistended, no rebound or guarding, normal bowel sounds.  Neurology: AOx3, no focal neuro defects, ARANGO x 4.  Extremities: No pitting edema, + Peripheral pulses.  Incisions:   Tubes:    LABS:                          7.6    8.56  )-----------( 540      ( 2021 05:06 )             23.0         135  |  102  |  97<H>  ----------------------------<  212<H>  4.2   |  20<L>  |  3.82<H>    Ca    8.8      2021 05:06  Phos  3.4       Mg     2.00         TPro  6.3  /  Alb  2.9<L>  /  TBili  0.8  /  DBili  x   /  AST  82<H>  /  ALT  165<H>  /  AlkPhos  150<H>      LIVER FUNCTIONS - ( 2021 05:06 )  Alb: 2.9 g/dL / Pro: 6.3 g/dL / ALK PHOS: 150 U/L / ALT: 165 U/L / AST: 82 U/L / GGT: x           PTT - ( 2021 05:06 )  PTT:93.5 sec        Urinalysis Basic - ( 2021 18:06 )    Color: Light Yellow / Appearance: Slightly Turbid / S.014 / pH: x  Gluc: x / Ketone: Negative  / Bili: Negative / Urobili: <2 mg/dL   Blood: x / Protein: 100 mg/dL / Nitrite: Negative   Leuk Esterase: Large / RBC: 2 /HPF /  /HPF   Sq Epi: x / Non Sq Epi: 0 /HPF / Bacteria: Negative        TELEMETRY:     EKG:     IMAGING:  PATIENT: RACHEL COMBS, MRN: [id number]    CHIEF COMPLAINT: Patient is a 85y old  Male who presents with a chief complaint of Afib (10 Nov 2021 11:24)      INTERVAL HISTORY/OVERNIGHT EVENTS: No overnight events. At bedside, patient has been sleeping and eating well. Denies N/V/D/C. Last BM x1 regular . Denies abdominal pain. Denies chest pain or SOB, cough. Oriented to person, place, and time. Breathing comfortably on room air. Patient would like to start moving around today.      MEDICATIONS:  MEDICATIONS  (STANDING):  chlorhexidine 4% Liquid 1 Application(s) Topical <User Schedule>  dextrose 40% Gel 15 Gram(s) Oral once  dextrose 5%. 1000 milliLiter(s) (50 mL/Hr) IV Continuous <Continuous>  dextrose 5%. 1000 milliLiter(s) (100 mL/Hr) IV Continuous <Continuous>  dextrose 50% Injectable 25 Gram(s) IV Push once  dextrose 50% Injectable 12.5 Gram(s) IV Push once  dextrose 50% Injectable 25 Gram(s) IV Push once  diltiazem Infusion 5 mG/Hr (5 mL/Hr) IV Continuous <Continuous>  glucagon  Injectable 1 milliGRAM(s) IntraMuscular once  heparin  Infusion 1200 Unit(s)/Hr (12 mL/Hr) IV Continuous <Continuous>  insulin lispro (ADMELOG) corrective regimen sliding scale   SubCutaneous three times a day before meals  insulin lispro (ADMELOG) corrective regimen sliding scale   SubCutaneous at bedtime  metoprolol tartrate 25 milliGRAM(s) Oral three times a day  tamsulosin 0.4 milliGRAM(s) Oral at bedtime    MEDICATIONS  (PRN):      ALLERGIES: Allergies    penicillin (Rash)    Intolerances    Cipro (Joint Pain)      OBJECTIVE:  ICU Vital Signs Last 24 Hrs  T(C): 36.7 (2021 08:00), Max: 37 (10 Nov 2021 12:05)  T(F): 98 (2021 08:00), Max: 98.6 (10 Nov 2021 12:05)  HR: 110 (2021 06:30) (92 - 161)  BP: 84/45 (2021 06:30) (81/46 - 108/55)  BP(mean): 59 (2021 06:30) (57 - 89)  ABP: --  ABP(mean): --  RR: 15 (2021 06:30) (11 - 22)  SpO2: 100% (2021 06:30) (92% - 100%)      Adult Advanced Hemodynamics Last 24 Hrs  CVP(mm Hg): --  CVP(cm H2O): --  CO: --  CI: --  PA: --  PA(mean): --  PCWP: --  SVR: --  SVRI: --  PVR: --  PVRI: --  CAPILLARY BLOOD GLUCOSE      POCT Blood Glucose.: 188 mg/dL (2021 07:29)  POCT Blood Glucose.: 145 mg/dL (10 Nov 2021 21:43)  POCT Blood Glucose.: 127 mg/dL (10 Nov 2021 16:53)  POCT Blood Glucose.: 284 mg/dL (10 Nov 2021 11:51)  POCT Blood Glucose.: 252 mg/dL (10 Nov 2021 11:48)    CAPILLARY BLOOD GLUCOSE      POCT Blood Glucose.: 188 mg/dL (2021 07:29)    I&O's Summary    10 Nov 2021 07:01  -  2021 07:00  --------------------------------------------------------  IN: 1153 mL / OUT: 1275 mL / NET: -122 mL      Daily     Daily     PHYSICAL EXAMINATION:  General: Comfortable, no acute distress, cooperative with exam.  HEENT: PERRLA, EOMI, moist mucous membranes.  Respiratory: CTAB, normal respiratory effort, no coughing, wheezes, crackles, or rales.  CV: irregularly irregular, no murmurs appreciated   Abdominal: Soft, nontender, nondistended, no rebound or guarding, normal bowel sounds.  Neurology: AOx3, no focal neuro defects, ARANGO x 4.  Extremities: No pitting edema, + Peripheral pulses.  Incisions:   Tubes:    LABS:                          7.6    8.56  )-----------( 540      ( 2021 05:06 )             23.0         135  |  102  |  97<H>  ----------------------------<  212<H>  4.2   |  20<L>  |  3.82<H>    Ca    8.8      2021 05:06  Phos  3.4       Mg     2.00         TPro  6.3  /  Alb  2.9<L>  /  TBili  0.8  /  DBili  x   /  AST  82<H>  /  ALT  165<H>  /  AlkPhos  150<H>      LIVER FUNCTIONS - ( 2021 05:06 )  Alb: 2.9 g/dL / Pro: 6.3 g/dL / ALK PHOS: 150 U/L / ALT: 165 U/L / AST: 82 U/L / GGT: x           PTT - ( 2021 05:06 )  PTT:93.5 sec        Urinalysis Basic - ( 2021 18:06 )    Color: Light Yellow / Appearance: Slightly Turbid / S.014 / pH: x  Gluc: x / Ketone: Negative  / Bili: Negative / Urobili: <2 mg/dL   Blood: x / Protein: 100 mg/dL / Nitrite: Negative   Leuk Esterase: Large / RBC: 2 /HPF /  /HPF   Sq Epi: x / Non Sq Epi: 0 /HPF / Bacteria: Negative        TELEMETRY:     EKG:     IMAGING:

## 2021-11-11 NOTE — PROGRESS NOTE ADULT - ATTENDING COMMENTS
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 Reginaldo Syed for AF w/ RVR with CM. Telemetry shows atrial fibrillation with VR as high as 140s in the setting of decompensated HF. Noted down trending H/H on Heparin gtt.  Patient remains borderline hypotensive on AV mike agents for rate control.  Amiodarone 5 gm load ordered this am.  Echo done on this admission showed mildly reduced LVEF.    -IV Heparin drip for anticoagulation for now ([id number] score=8). Plan for amio for rate control, GI carl for low H/H. Will follow.

## 2021-11-11 NOTE — PHYSICAL THERAPY INITIAL EVALUATION ADULT - PATIENT PROFILE REVIEW, REHAB EVAL
activity order -OOB to chair , increase as tolerated, ambulate with assistance , ok to perform OOB activities/ ambulation as per JAMAICA Carpenter/yes

## 2021-11-12 ENCOUNTER — TRANSCRIPTION ENCOUNTER (OUTPATIENT)
Age: 86
End: 2021-11-12

## 2021-11-12 LAB
ALBUMIN SERPL ELPH-MCNC: 2.8 G/DL — LOW (ref 3.3–5)
ALP SERPL-CCNC: 158 U/L — HIGH (ref 40–120)
ALT FLD-CCNC: 163 U/L — HIGH (ref 4–41)
ANION GAP SERPL CALC-SCNC: 14 MMOL/L — SIGNIFICANT CHANGE UP (ref 7–14)
APTT BLD: 68.7 SEC — HIGH (ref 27–36.3)
AST SERPL-CCNC: 87 U/L — HIGH (ref 4–40)
BILIRUB SERPL-MCNC: 0.7 MG/DL — SIGNIFICANT CHANGE UP (ref 0.2–1.2)
BLD GP AB SCN SERPL QL: NEGATIVE — SIGNIFICANT CHANGE UP
BUN SERPL-MCNC: 93 MG/DL — HIGH (ref 7–23)
CALCIUM SERPL-MCNC: 8.7 MG/DL — SIGNIFICANT CHANGE UP (ref 8.4–10.5)
CHLORIDE SERPL-SCNC: 101 MMOL/L — SIGNIFICANT CHANGE UP (ref 98–107)
CO2 SERPL-SCNC: 20 MMOL/L — LOW (ref 22–31)
CREAT SERPL-MCNC: 3.59 MG/DL — HIGH (ref 0.5–1.3)
GLUCOSE BLDC GLUCOMTR-MCNC: 134 MG/DL — HIGH (ref 70–99)
GLUCOSE BLDC GLUCOMTR-MCNC: 184 MG/DL — HIGH (ref 70–99)
GLUCOSE BLDC GLUCOMTR-MCNC: 203 MG/DL — HIGH (ref 70–99)
GLUCOSE BLDC GLUCOMTR-MCNC: 228 MG/DL — HIGH (ref 70–99)
GLUCOSE BLDC GLUCOMTR-MCNC: 249 MG/DL — HIGH (ref 70–99)
GLUCOSE SERPL-MCNC: 168 MG/DL — HIGH (ref 70–99)
HCT VFR BLD CALC: 21.9 % — LOW (ref 39–50)
HGB BLD-MCNC: 7.1 G/DL — LOW (ref 13–17)
MAGNESIUM SERPL-MCNC: 1.7 MG/DL — SIGNIFICANT CHANGE UP (ref 1.6–2.6)
MCHC RBC-ENTMCNC: 30.7 PG — SIGNIFICANT CHANGE UP (ref 27–34)
MCHC RBC-ENTMCNC: 32.4 GM/DL — SIGNIFICANT CHANGE UP (ref 32–36)
MCV RBC AUTO: 94.8 FL — SIGNIFICANT CHANGE UP (ref 80–100)
NRBC # BLD: 0 /100 WBCS — SIGNIFICANT CHANGE UP
NRBC # FLD: 0.05 K/UL — HIGH
OB PNL STL: POSITIVE
PHOSPHATE SERPL-MCNC: 3.7 MG/DL — SIGNIFICANT CHANGE UP (ref 2.5–4.5)
PLATELET # BLD AUTO: 615 K/UL — HIGH (ref 150–400)
POTASSIUM SERPL-MCNC: 4.2 MMOL/L — SIGNIFICANT CHANGE UP (ref 3.5–5.3)
POTASSIUM SERPL-SCNC: 4.2 MMOL/L — SIGNIFICANT CHANGE UP (ref 3.5–5.3)
PROT SERPL-MCNC: 6.2 G/DL — SIGNIFICANT CHANGE UP (ref 6–8.3)
RBC # BLD: 2.31 M/UL — LOW (ref 4.2–5.8)
RBC # FLD: 12.6 % — SIGNIFICANT CHANGE UP (ref 10.3–14.5)
RH IG SCN BLD-IMP: POSITIVE — SIGNIFICANT CHANGE UP
SODIUM SERPL-SCNC: 135 MMOL/L — SIGNIFICANT CHANGE UP (ref 135–145)
WBC # BLD: 11.42 K/UL — HIGH (ref 3.8–10.5)
WBC # FLD AUTO: 11.42 K/UL — HIGH (ref 3.8–10.5)

## 2021-11-12 PROCEDURE — 99222 1ST HOSP IP/OBS MODERATE 55: CPT | Mod: GC

## 2021-11-12 PROCEDURE — 99232 SBSQ HOSP IP/OBS MODERATE 35: CPT

## 2021-11-12 PROCEDURE — 99233 SBSQ HOSP IP/OBS HIGH 50: CPT

## 2021-11-12 PROCEDURE — 99232 SBSQ HOSP IP/OBS MODERATE 35: CPT | Mod: GC

## 2021-11-12 PROCEDURE — 76705 ECHO EXAM OF ABDOMEN: CPT | Mod: 26

## 2021-11-12 RX ORDER — METOPROLOL TARTRATE 50 MG
25 TABLET ORAL
Refills: 0 | Status: DISCONTINUED | OUTPATIENT
Start: 2021-11-12 | End: 2021-11-16

## 2021-11-12 RX ORDER — MAGNESIUM SULFATE 500 MG/ML
2 VIAL (ML) INJECTION ONCE
Refills: 0 | Status: COMPLETED | OUTPATIENT
Start: 2021-11-12 | End: 2021-11-12

## 2021-11-12 RX ADMIN — PANTOPRAZOLE SODIUM 40 MILLIGRAM(S): 20 TABLET, DELAYED RELEASE ORAL at 18:14

## 2021-11-12 RX ADMIN — HEPARIN SODIUM 12 UNIT(S)/HR: 5000 INJECTION INTRAVENOUS; SUBCUTANEOUS at 04:54

## 2021-11-12 RX ADMIN — TAMSULOSIN HYDROCHLORIDE 0.4 MILLIGRAM(S): 0.4 CAPSULE ORAL at 22:14

## 2021-11-12 RX ADMIN — AMIODARONE HYDROCHLORIDE 400 MILLIGRAM(S): 400 TABLET ORAL at 14:09

## 2021-11-12 RX ADMIN — Medication 2: at 12:00

## 2021-11-12 RX ADMIN — CHLORHEXIDINE GLUCONATE 1 APPLICATION(S): 213 SOLUTION TOPICAL at 06:13

## 2021-11-12 RX ADMIN — Medication 25 MILLIGRAM(S): at 17:45

## 2021-11-12 RX ADMIN — Medication 1: at 08:25

## 2021-11-12 RX ADMIN — Medication 50 GRAM(S): at 06:34

## 2021-11-12 RX ADMIN — AMIODARONE HYDROCHLORIDE 400 MILLIGRAM(S): 400 TABLET ORAL at 05:01

## 2021-11-12 RX ADMIN — AMIODARONE HYDROCHLORIDE 400 MILLIGRAM(S): 400 TABLET ORAL at 22:14

## 2021-11-12 RX ADMIN — Medication 25 MILLIGRAM(S): at 05:02

## 2021-11-12 RX ADMIN — HEPARIN SODIUM 12 UNIT(S)/HR: 5000 INJECTION INTRAVENOUS; SUBCUTANEOUS at 22:13

## 2021-11-12 RX ADMIN — POLYETHYLENE GLYCOL 3350 17 GRAM(S): 17 POWDER, FOR SOLUTION ORAL at 12:00

## 2021-11-12 RX ADMIN — PANTOPRAZOLE SODIUM 40 MILLIGRAM(S): 20 TABLET, DELAYED RELEASE ORAL at 05:02

## 2021-11-12 NOTE — PROGRESS NOTE ADULT - ASSESSMENT
84 y/o M w/ PMH HTN, HLD, T2DM, CAD (MI 2003, s/p 4 stents), Afib on Eliquis, hx of L nephrectomy at age of 16, h/o R nephrolithiasis s/p ureteral stent (removed 1/2021), LILIAN on CKD and UTI in August  sent in by cardiologist Dr. Reginaldo Syed for AF w/ RVR with CM. Telemetry shows atrial fibrillation with VR as high as 140s in the setting of decompensated HF. Noted down trending H/H on Heparin gtt.  Patient was noted hypotensive on CCB for AF rate control.  Patient self converted from PAF to SR on Amiodarone 5 gm load started on 11/11 (so far 3/12 doses).  Echo done on this admission showed mildly reduced LVEF 51%.   -IV Heparin drip for anticoagulation for now ([id number] score=8)  -Canceled MARCELLA/DCCV now as patient self converted to SR   -Continue metoprolol and Amiodarone 400mg Q8 x 12 doses then 200mg daily, rec monitor LFT/TFT's Q3 -6 months and PFT/ ophthalmology exam yearly on Amio therapy   -GI following for anemic workups, PRBC today   -Continue care per CCU/nephology

## 2021-11-12 NOTE — CHART NOTE - NSCHARTNOTEFT_GEN_A_CORE
Spoke with [facility]rologist at [facility], coworker of Dr. Douglas Maria. Patient was receiving intravesicular chemotherapy. Should not have any effect systemically.

## 2021-11-12 NOTE — PROGRESS NOTE ADULT - SUBJECTIVE AND OBJECTIVE BOX
Patient is a 85y old  Male who presents with a chief complaint of Afib (12 Nov 2021 09:06)  Endorses  "fatigue".  Denies lightheadedness or dizziness or palpitations.  Low H/H with pending PRBC.   Tele:  AF self converted to SR with stable BP.      PAST MEDICAL & SURGICAL HISTORY:  CAD (coronary artery disease)  (4 stents, non-adherent to aspirin)    Diabetes mellitus, new onset    Single kidney  (hx/o LEFT nephrectomy at age 16; pt states due to a ureter&quot;blockage&quot; causing &quot;infection&quot;; pt denies hx/o renal dysfunction)    Arthritis    Myocardial infarction  (2003)    Hard of hearing    Atrial fibrillation    Hypertension    Hyperlipidemia    History of TIAs  Last 2018    History of bradycardia    History of nephrectomy, unilateral  (hx/o LEFT nephrectomy at age 16)    Stented coronary artery  (4 stents)    S/P bilateral cataract extraction        MEDICATIONS  (STANDING):  aMIOdarone    Tablet   Oral   aMIOdarone    Tablet 400 milliGRAM(s) Oral every 8 hours  chlorhexidine 4% Liquid 1 Application(s) Topical <User Schedule>  dextrose 40% Gel 15 Gram(s) Oral once  dextrose 5%. 1000 milliLiter(s) (50 mL/Hr) IV Continuous <Continuous>  dextrose 5%. 1000 milliLiter(s) (100 mL/Hr) IV Continuous <Continuous>  dextrose 50% Injectable 25 Gram(s) IV Push once  dextrose 50% Injectable 12.5 Gram(s) IV Push once  dextrose 50% Injectable 25 Gram(s) IV Push once  glucagon  Injectable 1 milliGRAM(s) IntraMuscular once  heparin  Infusion 1200 Unit(s)/Hr (12 mL/Hr) IV Continuous <Continuous>  insulin lispro (ADMELOG) corrective regimen sliding scale   SubCutaneous three times a day before meals  insulin lispro (ADMELOG) corrective regimen sliding scale   SubCutaneous at bedtime  metoprolol tartrate 25 milliGRAM(s) Oral two times a day  pantoprazole  Injectable 40 milliGRAM(s) IV Push two times a day  polyethylene glycol 3350 17 Gram(s) Oral daily  tamsulosin 0.4 milliGRAM(s) Oral at bedtime    MEDICATIONS  (PRN):            Vital Signs Last 24 Hrs  T(C): 36.4 (12 Nov 2021 09:00), Max: 36.8 (11 Nov 2021 12:00)  T(F): 97.5 (12 Nov 2021 09:00), Max: 98.3 (11 Nov 2021 16:05)  HR: 68 (12 Nov 2021 10:00) (64 - 128)  BP: 96/49 (12 Nov 2021 10:00) (78/58 - 126/102)  BP(mean): 64 (12 Nov 2021 10:00) (63 - 110)  RR: 14 (12 Nov 2021 10:00) (14 - 26)  SpO2: 100% (12 Nov 2021 10:00) (96% - 100%)            INTERPRETATION OF TELEMETRY:  PAF-->self converted to SR 60's -80's with occasional PVC's overnight     ECG:        LABS:                        7.1    11.42 )-----------( 615      ( 12 Nov 2021 04:28 )             21.9     11-12    135  |  101  |  93<H>  ----------------------------<  168<H>  4.2   |  20<L>  |  3.59<H>    Ca    8.7      12 Nov 2021 04:28  Phos  3.7     11-12  Mg     1.70     11-12    TPro  6.2  /  Alb  2.8<L>  /  TBili  0.7  /  DBili  x   /  AST  87<H>  /  ALT  163<H>  /  AlkPhos  158<H>  11-12        PTT - ( 12 Nov 2021 04:28 )  PTT:68.7 sec      BNP  RADIOLOGY & ADDITIONAL STUDIES:  CONCLUSIONS:  1. Mitral annular calcification, otherwise normal mitral  valve. Mild-moderate mitral regurgitation.  2. Moderately dilated left atrium.  LA volume index = 48  cc/m2.  3. Normal left ventricular internal dimensions and wall  thicknesses.  4. Mild global left ventricular systolic dysfunction.  5. The right ventricle is not well visualized; grossly  normal right ventricular systolic function.  ------------------------------------------------------------------------  Confirmed on  11/10/2021 - 14:00:46 by Bernardino Lauren M.D.,  [facility], [facility]        PHYSICAL EXAM:    GENERAL: In no apparent distress, well nourished, and hydrated.  NECK: Supple and normal thyroid.  No JVD or carotid bruit.  Carotid pulse is 2+ bilaterally.  HEART: Regular rate and rhythm; No murmurs, rubs, or gallops.  PULMONARY: Clear to auscultation and perfusion.  No rales, wheezing, or rhonchi bilaterally.  ABDOMEN: Soft, Nontender, Nondistended; Bowel sounds present  EXTREMITIES:  2+ Peripheral Pulses, No clubbing, cyanosis, or edema  NEUROLOGICAL: Grossly nonfocal

## 2021-11-12 NOTE — DISCHARGE NOTE PROVIDER - PROVIDER TOKENS
PROVIDER:[TOKEN:[[id number]]],PROVIDER:[TOKEN:[[id number]]],PROVIDER:[TOKEN:[[id number]]],PROVIDER:[TOKEN:[[id number]]]

## 2021-11-12 NOTE — DISCHARGE NOTE PROVIDER - DETAILS OF MALNUTRITION DIAGNOSIS/DIAGNOSES
This patient has been assessed with a concern for Malnutrition and was treated during this hospitalization for the following Nutrition diagnosis/diagnoses:     -  11/16/2021: Moderate protein-calorie malnutrition

## 2021-11-12 NOTE — DISCHARGE NOTE PROVIDER - CARE PROVIDERS DIRECT ADDRESSES
,DirectAddress_Unknown,DirectAddress_Unknown,[email or web address]

## 2021-11-12 NOTE — PROGRESS NOTE ADULT - ASSESSMENT
84 y/o M w/ PMH HTN, HLD, T2DM, CAD (MI 2003, s/p 4 stents), Afib on Eliquis, h/o R nephrolithiasis s/p ureteral stent (removed 1/2021) found to be in AF w/ RVR (asymptomatic) with a Cr of 4.75. Now found to be anemic with Hb of 7.1    NEURO:  Alert and oriented x 3    RESP:  Breathing well on RA    CARDS:  AFIB W/RVR  -Continue to monitor on telemetry  - Maintain K+~4.0 and Mg++~2.0  - c/w metoprolol 25 mg TID. 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 Started miralax 11/11   - FO positive.     Appreciate GI recs   - PPI IV BID   - RUQ US  :   - no active issues    ENDO:  -HGBA1C 5.9  -ISS    ID: no active issues    Heme:   -Hb 7.1 this am. will give 1u pRBC         84 y/o M w/ PMH HTN, HLD, T2DM, CAD (MI 2003, s/p 4 stents), Afib on Eliquis, h/o R nephrolithiasis s/p ureteral stent (removed 1/2021) found to be in AF w/ RVR (asymptomatic) with a Cr of 4.75. Now found to be anemic with Hb of 7.1    NEURO:  Alert and oriented x 3    RESP:  Breathing well on RA    CARDS:  AFIB W/RVR  -Continue to monitor on telemetry  - Maintain K+~4.0 and Mg++~2.0  - c/w metoprolol 25 mg TID. 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 Started miralax 11/11   - FO positive.     Appreciate GI recs   - PPI IV BID   - RUQ US  :   - no active issues    ENDO:  -HGBA1C 5.9  -ISS    ID: no active issues    Heme:   -Hb 7.1 this am. will give 1u pRBC   - F/u EPO   - F/u heme recs

## 2021-11-12 NOTE — CONSULT NOTE ADULT - ATTENDING COMMENTS
86 y/o M w/ PMH HTN, HLD, T2DM, CAD (MI 2003, s/p 4 stents), Afib on Eliquis, h/o R nephrolithiasis s/p ureteral stent (removed 1/2021) sent in by cardiologist Dr. Reginaldo Syed for AF w/ RVR. Telemetry shows atrial fibrillation with VR as high as 170s in the setting of decompensated HF with pro BNP >62732. He received Cardizem 10mg IV x1 and Lopressor 5mg IV x1. He is hypotensive with SBP 80s. Hb/Hct 9.3/27.8. He is on apixaban for anticoagulation. Start metop and lasix. HF and Renal consult. Will follow for potential MARCELLA/DCCV when euvolemic.
85 year old man w/ PMH HTN, HLD, T2DM, CAD (MI 2003, s/p 4 stents), Afib on Eliquis   h/o R nephrolithiasis s/p ureteral stent (removed 1/2021)   admitted for atrial fibrillation w/ RVR onset during nuclear stress testing   hematology consulted for worsening anemia and thrombocytosis in the setting of possible GIB.   Hematology consulted for anemia and thrombocytosis.  iron studies consistent with anemia of chronic disease superimposed now with potential GIB.  rec to check LDH, hapto, retic, Kasandra  FOBT positive, pending GI eval  peripheral smear reviewed - transfused RBCs and spherocytes  Plts Normal in 09/2021  Potentially reactive in the setting of anemia and positive FOBT; GI recs appreciate  FU JAK2 test  rec to check Daily CBC
As above    Impression:    #1.  Consulted for normocytic anemia with yellow stool on digital rectal exam, without overt GI bleeding    #2.  Atrial fibrillation with RVR and hypotension, on heparin gtt     #3.  Abnormal LFTs, with mild transaminitis.    #4.  Bilateral venous thrombosis    Recommendations:    #1.  Follow CBC    #2.  May continue therapeutic anticoagulation with heparin gtt and observe for GI bleeding.    #3.  Ultrasound of the liver with dopplers    #4.  Cardiology test/intervention as needed.

## 2021-11-12 NOTE — CONSULT NOTE ADULT - ASSESSMENT
85 year old man w/ PMH HTN, HLD, T2DM, CAD (MI 2003, s/p 4 stents), Afib on Eliquis h/o R nephrolithiasis s/p ureteral stent (removed 1/2021) sent in by cardiologist Dr. Reginaldo Syed for atrial fibrillation w/ RVR onset during nuclear stress testing today. Pt with worsening anemia and thrombocytosis in the setting of possible GIB. Hematology consulted for anemia and thrombocytosis.    #Anemia  -Baseline appears to be 9-11s  -Potentially anemia of chronic disease superimposed now with GIB.  -Iron WNL, Ferritin elevated, TIBC low, Vit B12 and Folate WNL  -Please obtain LDH, hapto, and retic,  -FOBT positive, pending GI eval  -Will review smear  Transfuse hgb <7    #thrombocytosis  -Potentially reactive in the setting of anemia and positive FOBT  -GI recs appreciate  -Daily CBC  -transfuse for plts <10K and <20K with fevers    Please page with questions or concerns. Will Follow with you.      Manuel Galvan M.D.  Hematology/Oncology Fellow PGY4  Pager [phone]  After 5pm, please contact on-call team.   85 year old man w/ PMH HTN, HLD, T2DM, CAD (MI 2003, s/p 4 stents), Afib on Eliquis h/o R nephrolithiasis s/p ureteral stent (removed 1/2021) sent in by cardiologist Dr. Reginaldo Syed for atrial fibrillation w/ RVR onset during nuclear stress testing today. Pt with worsening anemia and thrombocytosis in the setting of possible GIB. Hematology consulted for anemia and thrombocytosis.    #Anemia  -Baseline appears to be 9-11s  -Potentially anemia of chronic disease superimposed now with GIB.  -Iron WNL, Ferritin elevated, TIBC low, Vit B12 and Folate WNL  -Please obtain LDH, hapto, retic, Kasandra  -FOBT positive, pending GI eval  -Smear shows transfused RBCs and spherocytes  Transfuse hgb <7    #thrombocytosis  -Potentially reactive in the setting of anemia and positive FOBT  -GI recs appreciate  -Daily CBC  -transfuse for plts <10K and <20K with fevers    Please page with questions or concerns. Will Follow with you.      Manuel Galvan M.D.  Hematology/Oncology Fellow PGY4  Pager [phone]  After 5pm, please contact on-call team.   85 year old man w/ PMH HTN, HLD, T2DM, CAD (MI 2003, s/p 4 stents), Afib on Eliquis h/o R nephrolithiasis s/p ureteral stent (removed 1/2021) sent in by cardiologist Dr. Reginaldo Syed for atrial fibrillation w/ RVR onset during nuclear stress testing today. Pt with worsening anemia and thrombocytosis in the setting of possible GIB. Hematology consulted for anemia and thrombocytosis.    #Anemia  -Baseline appears to be 9-11s  -Potentially anemia of chronic disease superimposed now with GIB.  -Iron WNL, Ferritin elevated, TIBC low, Vit B12 and Folate WNL  -Please obtain LDH, hapto, retic, Kasandra  -FOBT positive, pending GI eval  -Smear shows transfused RBCs and spherocytes  Transfuse hgb <7    #thrombocytosis  -Normal in 09/2021  -Potentially reactive in the setting of anemia and positive FOBT  -GI recs appreciate  -Daily CBC  -f/u JAK2  -transfuse for plts <10K and <20K with fevers    Please page with questions or concerns. Will Follow with you.      Manuel Galvan M.D.  Hematology/Oncology Fellow PGY4  Pager [phone]  After 5pm, please contact on-call team.   85 year old man w/ PMH HTN, HLD, T2DM, CAD (MI 2003, s/p 4 stents), Afib on Eliquis h/o R nephrolithiasis s/p ureteral stent (removed 1/2021) sent in by cardiologist Dr. Reginaldo Syed for atrial fibrillation w/ RVR onset during nuclear stress testing today. Pt with worsening anemia and thrombocytosis in the setting of possible GIB. Hematology consulted for anemia and thrombocytosis.    #Anemia  -Baseline appears to be 9-11s  -Potentially anemia of chronic disease superimposed now with potential GIB.  -Iron WNL, Ferritin elevated, TIBC low, Vit B12 and Folate WNL  -Please obtain LDH, hapto, retic, Kasandra  -FOBT positive, pending GI eval  -Smear shows transfused RBCs and spherocytes  Transfuse hgb <7    #thrombocytosis  -Normal in 09/2021  -Potentially reactive in the setting of anemia and positive FOBT  -GI recs appreciate  -Daily CBC  -f/u JAK2  -transfuse for plts <10K and <20K with fevers    Please page with questions or concerns. Will Follow with you.      Manuel Galvan M.D.  Hematology/Oncology Fellow PGY4  Pager [phone]  After 5pm, please contact on-call team.

## 2021-11-12 NOTE — PROGRESS NOTE ADULT - ATTENDING COMMENTS
85 year old man with history of CAD (MI in 2003), AF on Eliquis who presented with AF with RVR during routine stress testing. Now with elevated Cr.  Started on Diltiazem 5 mg/hr and HR better  EP saw and ?MARCELLA/DCCV    TTE: 11/10/21  1. Mitral annular calcification, otherwise normal mitral  valve. Mild-moderate mitral regurgitation.  2. Moderately dilated left atrium.  LA volume index = 48  cc/m2  3. Normal left ventricular internal dimensions and wall  thicknesses.  4. Mild global left ventricular systolic dysfunction.  5. The right ventricle is not well visualized; grossly  normal right ventricular systolic function.    Meds:  Heparin gtt  Metoprolol 25 mg BID  Amio 400 mg TID    #Neuro- No active issues  #Pulm- No active issues  #CV- AF with RVR  Continue Metoprolol  Amiodarone 400 mg TID  Continue Heparin gtt  EP followup  #GI- Input appreciated; will followup recommendations  #Renal- Nephro consult appreciated  Monitor Cr- currently [id number] year old man with history of CAD (MI in 2003), AF on Eliquis who presented with AF with RVR during routine stress testing. Now with elevated Cr.  Started on Diltiazem 5 mg/hr and HR better  EP saw and ?MARCELLA/DCCV    TTE: 11/10/21  1. Mitral annular calcification, otherwise normal mitral  valve. Mild-moderate mitral regurgitation.  2. Moderately dilated left atrium.  LA volume index = 48  cc/m2  3. Normal left ventricular internal dimensions and wall  thicknesses.  4. Mild global left ventricular systolic dysfunction.  5. The right ventricle is not well visualized; grossly  normal right ventricular systolic function.    Meds:  Heparin gtt  Metoprolol 25 mg BID  Amio 400 mg TID    #Neuro- No active issues  #Pulm- No active issues  #CV- AF with RVR  Continue Metoprolol  Amiodarone 400 mg TID  Continue Heparin gtt  EP followup  #GI- Input appreciated; will followup recommendations  #Renal- Nephro consult appreciated  Monitor Cr- currently downtrending

## 2021-11-12 NOTE — PROGRESS NOTE ADULT - SUBJECTIVE AND OBJECTIVE BOX
Chief Complaint:  Patient is a 85y old  Male who presents with a chief complaint of Afib (12 Nov 2021 10:46)      Reason for consult:    Interval Events:     Hospital Medications:  aMIOdarone    Tablet   Oral   aMIOdarone    Tablet 400 milliGRAM(s) Oral every 8 hours  chlorhexidine 4% Liquid 1 Application(s) Topical <User Schedule>  dextrose 40% Gel 15 Gram(s) Oral once  dextrose 5%. 1000 milliLiter(s) IV Continuous <Continuous>  dextrose 5%. 1000 milliLiter(s) IV Continuous <Continuous>  dextrose 50% Injectable 25 Gram(s) IV Push once  dextrose 50% Injectable 12.5 Gram(s) IV Push once  dextrose 50% Injectable 25 Gram(s) IV Push once  glucagon  Injectable 1 milliGRAM(s) IntraMuscular once  heparin  Infusion 1200 Unit(s)/Hr IV Continuous <Continuous>  insulin lispro (ADMELOG) corrective regimen sliding scale   SubCutaneous three times a day before meals  insulin lispro (ADMELOG) corrective regimen sliding scale   SubCutaneous at bedtime  metoprolol tartrate 25 milliGRAM(s) Oral two times a day  pantoprazole  Injectable 40 milliGRAM(s) IV Push two times a day  polyethylene glycol 3350 17 Gram(s) Oral daily  tamsulosin 0.4 milliGRAM(s) Oral at bedtime      ROS:   General:  No  fevers, chills, night sweats, fatigue  Eyes:  Good vision, no reported pain  ENT:  No sore throat, pain, runny nose  CV:  No pain, palpitations  Pulm:  No dyspnea, cough  GI:  See HPI, otherwise negative  :  No  incontinence, nocturia  Muscle:  No pain, weakness  Neuro:  No memory problems  Psych:  No insomnia, mood problems, depression  Endocrine:  No polyuria, polydipsia, cold/heat intolerance  Heme:  No petechiae, ecchymosis, easy bruisability  Skin:  No rash    PHYSICAL EXAM:   Vital Signs:  Vital Signs Last 24 Hrs  T(C): 36.6 (12 Nov 2021 12:00), Max: 36.8 (11 Nov 2021 16:05)  T(F): 97.9 (12 Nov 2021 12:00), Max: 98.3 (11 Nov 2021 16:05)  HR: 72 (12 Nov 2021 11:00) (64 - 128)  BP: 96/63 (12 Nov 2021 11:00) (78/58 - 126/102)  BP(mean): 70 (12 Nov 2021 11:00) (63 - 110)  RR: 16 (12 Nov 2021 11:00) (14 - 26)  SpO2: 100% (12 Nov 2021 11:00) (98% - 100%)  Daily     Daily     GENERAL: no acute distress  NEURO: alert  HEENT: anicteric sclera, no conjunctival pallor appreciated  CHEST: no respiratory distress, no accessory muscle use  CARDIAC: regular rate, rhythm  ABDOMEN: soft, non-tender, non-distended, no rebound or guarding  EXTREMITIES: warm, well perfused, no edema  SKIN: no lesions noted    LABS: reviewed                        7.1    11.42 )-----------( 615      ( 12 Nov 2021 04:28 )             21.9     11-12    135  |  101  |  93<H>  ----------------------------<  168<H>  4.2   |  20<L>  |  3.59<H>    Ca    8.7      12 Nov 2021 04:28  Phos  3.7     11-12  Mg     1.70     11-12    TPro  6.2  /  Alb  2.8<L>  /  TBili  0.7  /  DBili  x   /  AST  87<H>  /  ALT  163<H>  /  AlkPhos  158<H>  11-12    LIVER FUNCTIONS - ( 12 Nov 2021 04:28 )  Alb: 2.8 g/dL / Pro: 6.2 g/dL / ALK PHOS: 158 U/L / ALT: 163 U/L / AST: 87 U/L / GGT: x             Interval Diagnostic Studies: see sunrise for full report   Chief Complaint:  Patient is a 85y old  Male who presents with a chief complaint of Afib (12 Nov 2021 10:46)    Interval Events:   No plans for cardioversion as patient self-converted to SR  Hgb 7.1 from 7.6    Hospital Medications:  aMIOdarone    Tablet   Oral   aMIOdarone    Tablet 400 milliGRAM(s) Oral every 8 hours  chlorhexidine 4% Liquid 1 Application(s) Topical <User Schedule>  dextrose 40% Gel 15 Gram(s) Oral once  dextrose 5%. 1000 milliLiter(s) IV Continuous <Continuous>  dextrose 5%. 1000 milliLiter(s) IV Continuous <Continuous>  dextrose 50% Injectable 25 Gram(s) IV Push once  dextrose 50% Injectable 12.5 Gram(s) IV Push once  dextrose 50% Injectable 25 Gram(s) IV Push once  glucagon  Injectable 1 milliGRAM(s) IntraMuscular once  heparin  Infusion 1200 Unit(s)/Hr IV Continuous <Continuous>  insulin lispro (ADMELOG) corrective regimen sliding scale   SubCutaneous three times a day before meals  insulin lispro (ADMELOG) corrective regimen sliding scale   SubCutaneous at bedtime  metoprolol tartrate 25 milliGRAM(s) Oral two times a day  pantoprazole  Injectable 40 milliGRAM(s) IV Push two times a day  polyethylene glycol 3350 17 Gram(s) Oral daily  tamsulosin 0.4 milliGRAM(s) Oral at bedtime      ROS:   Complete and normal except as mentioned above.    PHYSICAL EXAM:   Vital Signs:  Vital Signs Last 24 Hrs  T(C): 36.6 (12 Nov 2021 12:00), Max: 36.8 (11 Nov 2021 16:05)  T(F): 97.9 (12 Nov 2021 12:00), Max: 98.3 (11 Nov 2021 16:05)  HR: 72 (12 Nov 2021 11:00) (64 - 128)  BP: 96/63 (12 Nov 2021 11:00) (78/58 - 126/102)  BP(mean): 70 (12 Nov 2021 11:00) (63 - 110)  RR: 16 (12 Nov 2021 11:00) (14 - 26)  SpO2: 100% (12 Nov 2021 11:00) (98% - 100%)  Daily     Daily     GENERAL: no acute distress  NEURO: alert  HEENT: anicteric sclera, no conjunctival pallor appreciated  CHEST: no respiratory distress, no accessory muscle use  CARDIAC: regular rate, rhythm  ABDOMEN: soft, non-tender, non-distended, no rebound or guarding  EXTREMITIES: warm, well perfused, no edema  SKIN: no lesions noted    LABS: reviewed                        7.1    11.42 )-----------( 615      ( 12 Nov 2021 04:28 )             21.9     11-12    135  |  101  |  93<H>  ----------------------------<  168<H>  4.2   |  20<L>  |  3.59<H>    Ca    8.7      12 Nov 2021 04:28  Phos  3.7     11-12  Mg     1.70     11-12    TPro  6.2  /  Alb  2.8<L>  /  TBili  0.7  /  DBili  x   /  AST  87<H>  /  ALT  163<H>  /  AlkPhos  158<H>  11-12    LIVER FUNCTIONS - ( 12 Nov 2021 04:28 )  Alb: 2.8 g/dL / Pro: 6.2 g/dL / ALK PHOS: 158 U/L / ALT: 163 U/L / AST: 87 U/L / GGT: x             Interval Diagnostic Studies: see sunrise for full report

## 2021-11-12 NOTE — DISCHARGE NOTE PROVIDER - HOSPITAL COURSE
84 y/o M w/ PMH HTN, HLD, T2DM, CAD (MI 2003, s/p 4 stents), Afib on Eliquis h/o R nephrolithiasis s/p ureteral stent (removed 1/2021) sent in by cardiologist Dr. Reginaldo Syed for atrial fibrillation w/ RVR onset during nuclear stress testing today. However, he says that he did not feel any palpitations during his stress testing. Pt reports that he believes he completed most of stress testing today, and feels just "not right". Currently, he reports absence of any chest pain, palpitations, lightheadedness/fainting, dyspnea, abd pain, nausea, vomiting, diarrhea, bloody stools, melena, or any urinary symptoms (hematuria or dysuria). Of note, Pt shares that he was hospitalized for four days at [place] recently discharged yesterday for fever and chills, but states these have resolved, and that no etiology was found to account for his symptoms.     Patient was transferred to the CCU for rate control of his Afib. He was started on a diltiazem drip and metoprolol was increased to three times a day. After rate was controlled, patient was started on amiodarone and titrated off diltiazem. Patient is now off diltiazem and is rate and rhythm controlled with a HR of 75-85. Of note, Hospital stay complicated by anemia. Patients Hb was 9.3 on admission, from baseline of 11 in august. Hb now down to 7.1. 86 y/o M w/ PMH HTN, HLD, T2DM, CAD (MI 2003, s/p 4 stents), Afib on Eliquis h/o R nephrolithiasis s/p ureteral stent (removed 1/2021) sent in by cardiologist Dr. Reginaldo Syed for atrial fibrillation w/ RVR onset during nuclear stress testing today. However, he says that he did not feel any palpitations during his stress testing. Pt reports that he believes he completed most of stress testing today, and feels just "not right". Currently, he reports absence of any chest pain, palpitations, lightheadedness/fainting, dyspnea, abd pain, nausea, vomiting, diarrhea, bloody stools, melena, or any urinary symptoms (hematuria or dysuria). Of note, Pt shares that he was hospitalized for four days at [place] recently discharged yesterday for fever and chills, but states these have resolved, and that no etiology was found to account for his symptoms.     Atrial fibrillation.   - on amio and metoprolol, currently rate controlled  - Hgb stable 8.9, change to eliquis today.    LILIAN (acute kidney injury).   - nephro following  - elevated WBC in urine, likely ATN 2/2 hypotension 2/2 afib RVR  - Cr improving with management of RVR  - c/w management for RVR  - continues to downtrend  - avoid nephrotoxins.    CAD (coronary artery disease).   - c/w metoprolol  - Hgb stable, can restart ASA on d/c  - holding ACE ISO LILIAN on CKD, reinstate PRN  - echo: < from: Transthoracic Echocardiogram (11.10.21 @ 10:42) >  1. Mitral annular calcification, otherwise normal mitral  valve. Mild-moderate mitral regurgitation.  2. Moderately dilated left atrium.  LA volume index = 48  cc/m2.  3. Normal left ventricular internal dimensions and wall  thicknesses.  4. Mild global left ventricular systolic dysfunction    Anemia of chronic disease.    - s/p 1 pRBC for hb 7.1  - maintain hgb >7, currently improved to 8s  - checking labs per heme/onc, LDH, david, reticulocyte. David + retic slightly elevated will have to touch base w/ heme/onc  - FOBT +: f/u gastroenterology recs regarding need for endoscopic workup inpatient vs. outpatient     #Leukcoytosis  - neutrophil predominant w/ thrombocytosis, no clear infectious source identified, no symptoms at this time, will send flow cytometry in AM and trend.     Type 2 diabetes mellitus.   ·  Plan: - a1c 5.9 at goal  - FS premeal and qHS  - AISS PRN  - added lantus 5 for moderate hyperglycemia; adjust PRN.    DVT  -on eliquis     PT recommended Rehab: Pt and family refused : CM setup home with home care     On 11/16/2021, discussed with   patient is medically cleared and optimized for discharge today. All medications were reviewed with attending, and sent to mutually agreed upon pharmacy.

## 2021-11-12 NOTE — PROGRESS NOTE ADULT - ASSESSMENT
86 y/o M w/ PMH HTN, HLD, T2DM, CAD (MI 2003, s/p 4 stents), Afib on Eliquis h/o R nephrolithiasis s/p ureteral stent (removed 1/2021) sent in by cardiologist and found to have rapid afib with hypotension   LILIAN that is all hemodynamic in nature   Hepatitis   CKD stage 4 at baseline and reduced nephron mass     1 Renal- At present goal should be to help control the heart rate which will improve hemodynamics and resultant BP;  Renal function improving   Renal sono when able   2 CVS-Amio loaded at present;  Trend LFT's    3 -Bladder instillation of chemo as an outpt to resume.  Is the ferritin an acute phase reactant?  4 Anemia- Would blood xfusion help?    Sayed [facility]   [phone]

## 2021-11-12 NOTE — PROGRESS NOTE ADULT - SUBJECTIVE AND OBJECTIVE BOX
PATIENT: RACHEL COMBS, MRN: [id number]    CHIEF COMPLAINT: Patient is a 85y old  Male who presents with a chief complaint of Afib (12 Nov 2021 07:53)      INTERVAL HISTORY/OVERNIGHT EVENTS: No overnight events. At bedside, patient has been sleeping and eating well. Feels a bit more tired this morning. Denies N/V/D/C.  Denies abdominal pain. Denies chest pain or SOB, cough. Has been ambulating with assistance and has been OOB to chair. Oriented to person, place, and time. Breathing comfortably on room air.      MEDICATIONS:  MEDICATIONS  (STANDING):  aMIOdarone    Tablet   Oral   aMIOdarone    Tablet 400 milliGRAM(s) Oral every 8 hours  chlorhexidine 4% Liquid 1 Application(s) Topical <User Schedule>  dextrose 40% Gel 15 Gram(s) Oral once  dextrose 5%. 1000 milliLiter(s) (50 mL/Hr) IV Continuous <Continuous>  dextrose 5%. 1000 milliLiter(s) (100 mL/Hr) IV Continuous <Continuous>  dextrose 50% Injectable 25 Gram(s) IV Push once  dextrose 50% Injectable 12.5 Gram(s) IV Push once  dextrose 50% Injectable 25 Gram(s) IV Push once  glucagon  Injectable 1 milliGRAM(s) IntraMuscular once  heparin  Infusion 1200 Unit(s)/Hr (12 mL/Hr) IV Continuous <Continuous>  insulin lispro (ADMELOG) corrective regimen sliding scale   SubCutaneous three times a day before meals  insulin lispro (ADMELOG) corrective regimen sliding scale   SubCutaneous at bedtime  metoprolol tartrate 25 milliGRAM(s) Oral three times a day  pantoprazole  Injectable 40 milliGRAM(s) IV Push two times a day  polyethylene glycol 3350 17 Gram(s) Oral daily  tamsulosin 0.4 milliGRAM(s) Oral at bedtime    MEDICATIONS  (PRN):      ALLERGIES: Allergies    penicillin (Rash)    Intolerances    Cipro (Joint Pain)      OBJECTIVE:  ICU Vital Signs Last 24 Hrs  T(C): 36.6 (12 Nov 2021 07:53), Max: 36.8 (11 Nov 2021 12:00)  T(F): 97.8 (12 Nov 2021 07:53), Max: 98.3 (11 Nov 2021 16:05)  HR: 64 (12 Nov 2021 07:00) (64 - 128)  BP: 98/55 (12 Nov 2021 07:00) (75/51 - 104/66)  BP(mean): 67 (12 Nov 2021 07:00) (61 - 79)  ABP: --  ABP(mean): --  RR: 18 (12 Nov 2021 07:00) (14 - 26)  SpO2: 100% (12 Nov 2021 07:00) (96% - 100%)      Adult Advanced Hemodynamics Last 24 Hrs    CAPILLARY BLOOD GLUCOSE      POCT Blood Glucose.: 184 mg/dL (12 Nov 2021 07:57)  POCT Blood Glucose.: 215 mg/dL (11 Nov 2021 21:13)  POCT Blood Glucose.: 274 mg/dL (11 Nov 2021 16:50)  POCT Blood Glucose.: 185 mg/dL (11 Nov 2021 11:27)    CAPILLARY BLOOD GLUCOSE      POCT Blood Glucose.: 184 mg/dL (12 Nov 2021 07:57)    I&O's Summary    11 Nov 2021 07:01  -  12 Nov 2021 07:00  --------------------------------------------------------  IN: 316 mL / OUT: 1100 mL / NET: -784 mL      Daily     Daily     PHYSICAL EXAMINATION:  General: Comfortable, no acute distress, cooperative with exam.  HEENT: PERRLA, EOMI, moist mucous membranes.  Respiratory: CTAB, normal respiratory effort, no coughing, wheezes, crackles, or rales.  CV: RRR, S1S2, no murmurs, rubs or gallops. No JVD. Distal pulses intact.  Abdominal: Soft, nontender, nondistended, no rebound or guarding, normal bowel sounds.  Neurology: AOx3, no focal neuro defects, ARANGO x 4.  Extremities: No pitting edema, + Peripheral pulses.  Incisions:   Tubes:    LABS:                          7.1    11.42 )-----------( 615      ( 12 Nov 2021 04:28 )             21.9     11-12    135  |  101  |  93<H>  ----------------------------<  168<H>  4.2   |  20<L>  |  3.59<H>    Ca    8.7      12 Nov 2021 04:28  Phos  3.7     11-12  Mg     1.70     11-12    TPro  6.2  /  Alb  2.8<L>  /  TBili  0.7  /  DBili  x   /  AST  87<H>  /  ALT  163<H>  /  AlkPhos  158<H>  11-12    LIVER FUNCTIONS - ( 12 Nov 2021 04:28 )  Alb: 2.8 g/dL / Pro: 6.2 g/dL / ALK PHOS: 158 U/L / ALT: 163 U/L / AST: 87 U/L / GGT: x           PTT - ( 12 Nov 2021 04:28 )  PTT:68.7 sec    TELEMETRY:     EKG:     IMAGING:

## 2021-11-12 NOTE — PROGRESS NOTE ADULT - ATTENDING COMMENTS
As above    Impression:    #1.  Following for normocytic anemia with yellow stool on digital rectal exam, without overt GI bleeding    #2.  Atrial fibrillation with RVR and hypotension, on heparin gtt     #3.  Abnormal LFTs, with mild transaminitis.    #4.  Bilateral venous thrombosis    Recommendations:    #1.  Follow CBC    #2.  May continue therapeutic anticoagulation with heparin gtt and observe for GI bleeding.    #3.  Ultrasound of the liver with dopplers    #4.  Cardiology test/intervention as needed.    #5.  Appreciate hematology input. As above    Impression:    #1.  Following for normocytic anemia with yellow stool on digital rectal exam, without overt GI bleeding.  FOBT positive.    #2.  Atrial fibrillation with RVR and hypotension, on heparin gtt, spontaneously converted to NSR.    #3.  Abnormal LFTs, with mild transaminitis.    #4.  Bilateral venous thrombosis    Recommendations:    #1.  Follow CBC    #2.  May continue therapeutic anticoagulation with heparin gtt and observe for GI bleeding.    #3.  Ultrasound of the liver with dopplers    #4.  Cardiology test/intervention as needed.    #5.  Will reevaluate for EGD/colonoscopy after above.    #5.  Appreciate hematology input. As above    Pt seen/examined on 11/12/21 in the mid afternoon.    Impression:    #1.  Following for normocytic anemia with yellow stool on digital rectal exam, without overt GI bleeding.  FOBT positive.    #2.  Atrial fibrillation with RVR and hypotension, on heparin gtt, spontaneously converted to NSR.    #3.  Abnormal LFTs, with mild transaminitis.    #4.  Bilateral venous thrombosis    Recommendations:    #1.  Follow CBC    #2.  May continue therapeutic anticoagulation with heparin gtt and observe for GI bleeding.    #3.  Ultrasound of the liver with dopplers    #4.  Cardiology test/intervention as needed.    #5.  Will reevaluate for EGD/colonoscopy after above.    #5.  Appreciate hematology input.

## 2021-11-12 NOTE — DISCHARGE NOTE PROVIDER - NSDCMRMEDTOKEN_GEN_ALL_CORE_FT
apixaban 2.5 mg oral tablet: 1 tab(s) orally 2 times a day  Crestor 40 mg oral tablet: 1 tab(s) orally once a day  Flomax 0.4 mg oral capsule: 1 cap(s) orally once a day  losartan 50 mg oral tablet: 1 tab(s) orally once a day  Metoprolol Succinate ER 25 mg oral tablet, extended release: 1 tab(s) orally once a day   amiodarone 200 mg oral tablet: 1 tab(s) orally once a day   apixaban 2.5 mg oral tablet: 1 tab(s) orally 2 times a day  Aspirin Enteric Coated 81 mg oral delayed release tablet: 1 tab(s) orally once a day   Flomax 0.4 mg oral capsule: 1 cap(s) orally once a day  magnesium oxide 400 mg oral tablet: 1 tab(s) orally 2 times a day (with meals)  Metoprolol Succinate ER 25 mg oral tablet, extended release: 1 tab(s) orally once a day  polyethylene glycol 3350 oral powder for reconstitution: 17 gram(s) orally once a day

## 2021-11-12 NOTE — DISCHARGE NOTE PROVIDER - NSDCCPCAREPLAN_GEN_ALL_CORE_FT
PRINCIPAL DISCHARGE DIAGNOSIS  Diagnosis: Atrial fibrillation with rapid ventricular response  Assessment and Plan of Treatment: Please follow up with your pcp/cardiologist/electrophysiologist  within 1 week of dishcarge.  Please call to make an appointment within 1 week of discharge. You are status post amiodorone load.  continue standing amiodorone as prescribed. Continue eliquis as prescribed.   -Continue maintenance dose Amiodarone  200mg daily, recommend monitor liver function test and throid function test  every 3 -6 months and Pulmonary function test/ ophthalmology exam yearly on Amiodorone therapy         SECONDARY DISCHARGE DIAGNOSES  Diagnosis: Transaminitis  Assessment and Plan of Treatment: Please follow up with your pcp within 1 week of discharge.  Please call to make an appointment within 1 week of discharge.  Please repeat liver function test within 1 week of discharge.  You were evaluated by gastroenterology while you were admitted to the hospital. You had an ultrasound of your abdomen which showed - RUQ abdominal US:Cholelithiasis without sonographic evidence of acute cholecystitis.. Acute hepatitis panel is negative.        Diagnosis: CAD (coronary artery disease)  Assessment and Plan of Treatment: Please follow up with your pcp within 1 week of discharge.  Please call to make an appointment within 1 week of discharge.   - continue your medications as prescribed.     - Hgb stable  - holding ACE ISO LILIAN on CKD, reinstate PRN  - You had a sonogram of your heart which showed   Mild-moderate mitral regurgitation.  Mild global left ventricular systolic dysfunction    Diagnosis: Hyperlipidemia  Assessment and Plan of Treatment: Your cholesterol medication was held secondary to elevated liver function test.   Continue DASH diet. Follow up with your PCP within 1 week of discharge for further management and monitoring of lipid and cholesterol panels.    Diagnosis: Type 2 diabetes mellitus  Assessment and Plan of Treatment: Continue consistent carbohydrate diet (Meaning eating the same amount of carbohydrates at the same time each day). Monitor blood glucose levels throughout the day before meals and at bedtime. Record blood sugars and bring to outpatient providers appointment in order to be reviewed by your doctor for management modifications. If your sugars are more than 400 or less than 70 you should contact your PCP immediately. Monitor for signs/symptoms of low blood glucose, such as, dizziness, altered mental status, or cool/clammy skin. In addition, monitor for signs/symptoms of high blood glucose, such as, feeling hot, dry, fatigued, or with increased thirst/urination. Make regular podiatry appointments in order to have feet checked for wounds and uncontrolled toe nail growth to prevent infections, as well as, appointments with an ophthalmologist to monitor your vision.    Diagnosis: LILIAN (acute kidney injury)  Assessment and Plan of Treatment: Please follow up with your pcp and nephrology within 1 week of discharge.  Please call to make an appointment within 1 week of discharge.     -You were seen and evaluated by the kidney doctrors while you were admitted ot the hospital.    - avoid nephrotoxins.  Please continue to moniotr your creatinine as an outpatient    Diagnosis: Anemia of chronic disease  Assessment and Plan of Treatment: You were diagnosed with anemia and thrombocytosis with positive occult blood No overt Gastrointestinal bleed plan for outpatient endoscopy.  Iron studies normal, Ferritin elevated to 2318 in the setting of anemia of chroinc disease. vitamin B12 and folate normal. Kasandra positive but eluate was negative and hemolysis labs were also negative therefore hemolytic process is unlikely.   No objection from heme standpoint for discharge, please have pt repeat CBC with his PCP or cardiologist within a week.       Diagnosis: Occult blood positive stool  Assessment and Plan of Treatment: Please follow up with your pcp within 1 week of discharge.  Please call to make an appointment within 1 week of discharge.  Follow-up with your outpatient provider for further care/recommendations. Monitor for signs/symptoms indicating worsening of disease, such as, easy bleeding/bruising, pale skin, fatigue, dizziness, increased heart rate, or chest pain. of chronic disease.    -You received 1 unit packed red blood cells while you were admitted tot he hospital.   you were seen and evaluated by hematology.  -Please follow up with your gastroenterologist for endoscopy/colonoscopy.    Diagnosis: Leukocytosis  Assessment and Plan of Treatment: Given leukocytosis in the absence of infectious signs or symptoms, flow cytometry was sent and resulted today- negative. Therefore unlikely for an underlying heme process to be causing the leukocytosis.   Please follow up with your pcp within 1 week of discharge.  Please call to make an appointment within 1 week of discharge.

## 2021-11-12 NOTE — DISCHARGE NOTE PROVIDER - CARE PROVIDER_API CALL
Reginaldo Syed  CARDIOVASCULAR DISEASE  [address], [place] [id number]  Phone: [phone]  Fax: [phone]  Follow Up Time:     ROSALINA SEGOVIA  Internal Medicine  [address] [place] [address]  Phone: [phone]  Fax: [phone]  Follow Up Time:     Ryan Pan)  Cardiac Electrophysiology; Cardiovascular Disease; Internal Medicine  [address] [place] [address], [address]  [place] [address]  Phone: [phone]  Fax: [phone]  Follow Up Time:     Monroe Maria)  Gastroenterology; Internal Medicine  [address] [place] [address]  Phone: [phone]  Fax: [phone]  Follow Up Time:

## 2021-11-12 NOTE — PROGRESS NOTE ADULT - ATTENDING COMMENTS
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 Reginaldo Syed for AF w/ RVR with CM. Telemetry shows atrial fibrillation with VR as high as 140s in the setting of decompensated HF. Noted down trending H/H on Heparin gtt.  Patient remains borderline hypotensive on AV mike agents for rate control.  Amiodarone 5 gm load ordered this am.  Echo done on this admission showed mildly reduced LVEF.    -IV Heparin drip for anticoagulation for now ([id number] score=8). Plan for amio for rate control, GI carl for low H/H. Will follow.

## 2021-11-12 NOTE — CHART NOTE - NSCHARTNOTEFT_GEN_A_CORE
MICU Transfer Note    Transfer from: CCU  Transfer to:  ( X) Medicine    (  ) Telemetry    (  ) RCU    (  ) Palliative    (  ) Stroke Unit    (  ) _______________  Accepting physician: Dr. Hdez    MEDICATIONS:  STANDING MEDICATIONS  aMIOdarone    Tablet   Oral   aMIOdarone    Tablet 400 milliGRAM(s) Oral every 8 hours  chlorhexidine 4% Liquid 1 Application(s) Topical <User Schedule>  dextrose 40% Gel 15 Gram(s) Oral once  dextrose 5%. 1000 milliLiter(s) IV Continuous <Continuous>  dextrose 5%. 1000 milliLiter(s) IV Continuous <Continuous>  dextrose 50% Injectable 25 Gram(s) IV Push once  dextrose 50% Injectable 12.5 Gram(s) IV Push once  dextrose 50% Injectable 25 Gram(s) IV Push once  glucagon  Injectable 1 milliGRAM(s) IntraMuscular once  heparin  Infusion 1200 Unit(s)/Hr IV Continuous <Continuous>  insulin lispro (ADMELOG) corrective regimen sliding scale   SubCutaneous three times a day before meals  insulin lispro (ADMELOG) corrective regimen sliding scale   SubCutaneous at bedtime  metoprolol tartrate 25 milliGRAM(s) Oral two times a day  pantoprazole  Injectable 40 milliGRAM(s) IV Push two times a day  polyethylene glycol 3350 17 Gram(s) Oral daily  tamsulosin 0.4 milliGRAM(s) Oral at bedtime    PRN MEDICATIONS      VITAL SIGNS: Last 24 Hours  T(C): 36.4 (12 Nov 2021 15:55), Max: 36.8 (11 Nov 2021 20:00)  T(F): 97.5 (12 Nov 2021 15:55), Max: 98.2 (11 Nov 2021 20:00)  HR: 77 (12 Nov 2021 17:00) (64 - 123)  BP: 108/62 (12 Nov 2021 17:00) (94/53 - 126/102)  BP(mean): 76 (12 Nov 2021 17:00) (63 - 110)  RR: 18 (12 Nov 2021 17:00) (14 - 26)  SpO2: 100% (12 Nov 2021 17:00) (98% - 100%)    LABS:                        7.1    11.42 )-----------( 615      ( 12 Nov 2021 04:28 )             21.9     11-12    135  |  101  |  93<H>  ----------------------------<  168<H>  4.2   |  20<L>  |  3.59<H>    Ca    8.7      12 Nov 2021 04:28  Phos  3.7     11-12  Mg     1.70     11-12    TPro  6.2  /  Alb  2.8<L>  /  TBili  0.7  /  DBili  x   /  AST  87<H>  /  ALT  163<H>  /  AlkPhos  158<H>  11-12    PTT - ( 12 Nov 2021 04:28 )  PTT:68.7 sec        Culture - Blood (collected 11 Nov 2021 06:44)  Source: .Blood Blood-Peripheral  Preliminary Report (12 Nov 2021 07:02):    No growth to date.          CCU COURSE:   patient was admitted to the CCU with afib w/RVR. His metoprolol was increased to TID and he was started on diltiazem drip. His rate was controlled. He was then started on amnio and weaned off the diltiazem drip. his stay was complicated by normocytic anemia of unknown cause. His heart rate was less than 120 and sinus on transfer 11/12 evening.             ASSESSMENT & PLAN:     86 y/o M w/ PMH HTN, HLD, T2DM, CAD (MI 2003, s/p 4 stents), Afib on Eliquis, h/o R nephrolithiasis s/p ureteral stent (removed 1/2021) found to be in AF w/ RVR (asymptomatic) with a Cr of 4.75. Now found to be anemic with Hb of 7.1    NEURO:  Alert and oriented x 3    RESP:  Breathing well on RA    CARDS:  AFIB W/RVR  -Continue to monitor on telemetry  - Maintain K+~4.0 and Mg++~2.0  - c/w metoprolol 25 mg TID. 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 Started miralax 11/11   - FO positive.     Appreciate GI recs   - PPI IV BID   - RUQ US  :   - no active issues    ENDO:  -HGBA1C 5.9  -ISS    ID: no active issues    Heme:   -Hb 7.1 this am. will give 1u pRBC   - F/u EPO   - F/u heme recs      For Follow-Up:    Todos:  [ ] f/u anemia labs  [ ] Post transfusion CBC MICU Transfer Note    Transfer from: CCU  Transfer to:  ( X) Medicine    (  ) Telemetry    (  ) RCU    (  ) Palliative    (  ) Stroke Unit    (  ) _______________  Accepting physician: Dr. Hdez    MEDICATIONS:  STANDING MEDICATIONS  aMIOdarone    Tablet   Oral   aMIOdarone    Tablet 400 milliGRAM(s) Oral every 8 hours  chlorhexidine 4% Liquid 1 Application(s) Topical <User Schedule>  dextrose 40% Gel 15 Gram(s) Oral once  dextrose 5%. 1000 milliLiter(s) IV Continuous <Continuous>  dextrose 5%. 1000 milliLiter(s) IV Continuous <Continuous>  dextrose 50% Injectable 25 Gram(s) IV Push once  dextrose 50% Injectable 12.5 Gram(s) IV Push once  dextrose 50% Injectable 25 Gram(s) IV Push once  glucagon  Injectable 1 milliGRAM(s) IntraMuscular once  heparin  Infusion 1200 Unit(s)/Hr IV Continuous <Continuous>  insulin lispro (ADMELOG) corrective regimen sliding scale   SubCutaneous three times a day before meals  insulin lispro (ADMELOG) corrective regimen sliding scale   SubCutaneous at bedtime  metoprolol tartrate 25 milliGRAM(s) Oral two times a day  pantoprazole  Injectable 40 milliGRAM(s) IV Push two times a day  polyethylene glycol 3350 17 Gram(s) Oral daily  tamsulosin 0.4 milliGRAM(s) Oral at bedtime    PRN MEDICATIONS      VITAL SIGNS: Last 24 Hours  T(C): 36.4 (12 Nov 2021 15:55), Max: 36.8 (11 Nov 2021 20:00)  T(F): 97.5 (12 Nov 2021 15:55), Max: 98.2 (11 Nov 2021 20:00)  HR: 77 (12 Nov 2021 17:00) (64 - 123)  BP: 108/62 (12 Nov 2021 17:00) (94/53 - 126/102)  BP(mean): 76 (12 Nov 2021 17:00) (63 - 110)  RR: 18 (12 Nov 2021 17:00) (14 - 26)  SpO2: 100% (12 Nov 2021 17:00) (98% - 100%)    LABS:                        7.1    11.42 )-----------( 615      ( 12 Nov 2021 04:28 )             21.9     11-12    135  |  101  |  93<H>  ----------------------------<  168<H>  4.2   |  20<L>  |  3.59<H>    Ca    8.7      12 Nov 2021 04:28  Phos  3.7     11-12  Mg     1.70     11-12    TPro  6.2  /  Alb  2.8<L>  /  TBili  0.7  /  DBili  x   /  AST  87<H>  /  ALT  163<H>  /  AlkPhos  158<H>  11-12    PTT - ( 12 Nov 2021 04:28 )  PTT:68.7 sec        Culture - Blood (collected 11 Nov 2021 06:44)  Source: .Blood Blood-Peripheral  Preliminary Report (12 Nov 2021 07:02):    No growth to date.          CCU COURSE:   patient was admitted to the CCU with afib w/RVR. His metoprolol was increased to TID and he was started on diltiazem drip. His rate was controlled. He was then started on amnio and weaned off the diltiazem drip. his stay was complicated by normocytic anemia of unknown cause. His heart rate was less than 120 and sinus on transfer 11/12 evening.             ASSESSMENT & PLAN:     84 y/o M w/ PMH HTN, HLD, T2DM, CAD (MI 2003, s/p 4 stents), Afib on Eliquis, h/o R nephrolithiasis s/p ureteral stent (removed 1/2021) found to be in AF w/ RVR (asymptomatic) with a Cr of 4.75. Now found to be anemic with Hb of 7.1    NEURO:  Alert and oriented x 3    RESP:  Breathing well on RA    CARDS:  AFIB W/RVR  -Continue to monitor on telemetry  - Maintain K+~4.0 and Mg++~2.0  - c/w metoprolol 25 mg TID. 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 Started miralax 11/11   - FO positive.     Appreciate GI recs   - PPI IV BID   - RUQ US  :   - no active issues    ENDO:  -HGBA1C 5.9  -ISS    ID: no active issues    Heme:   -Hb 7.1 this am. will give 1u pRBC   - F/u EPO   - F/u heme recs      For Follow-Up:    Todos:  [ ] f/u anemia labs  [ ] Post transfusion CBC  [ ] Wife would like second opinion on bladder cancer currently treated by Dr. Douglas Maria.

## 2021-11-12 NOTE — CONSULT NOTE ADULT - SUBJECTIVE AND OBJECTIVE BOX
Hematology/Oncology Consult Note    HPI:  86 y/o M w/ PMH HTN, HLD, T2DM, CAD (MI 2003, s/p 4 stents), Afib on Eliquis h/o R nephrolithiasis s/p ureteral stent (removed 1/2021) sent in by cardiologist Dr. Reginaldo Syed for atrial fibrillation w/ RVR onset during nuclear stress testing today. However, he says that he did not feel any palpitations during his stress testing. Pt reports that he believes he completed most of stress testing today, and feels just "not right". Currently, he reports absence of any chest pain, palpitations, lightheadedness/fainting, dyspnea, abd pain, nausea, vomiting, diarrhea, bloody stools, melena, or any urinary symptoms (hematuria or dysuria). Of note, Pt shares that he was hospitalized for four days at [place] recently discharged yesterday for fever and chills, but states these have resolved, and that no etiology was found to account for his symptoms.    (09 Nov 2021 17:36)      Allergies    penicillin (Rash)    Intolerances    Cipro (Joint Pain)      MEDICATIONS  (STANDING):  aMIOdarone    Tablet   Oral   aMIOdarone    Tablet 400 milliGRAM(s) Oral every 8 hours  chlorhexidine 4% Liquid 1 Application(s) Topical <User Schedule>  dextrose 40% Gel 15 Gram(s) Oral once  dextrose 5%. 1000 milliLiter(s) (50 mL/Hr) IV Continuous <Continuous>  dextrose 5%. 1000 milliLiter(s) (100 mL/Hr) IV Continuous <Continuous>  dextrose 50% Injectable 25 Gram(s) IV Push once  dextrose 50% Injectable 12.5 Gram(s) IV Push once  dextrose 50% Injectable 25 Gram(s) IV Push once  glucagon  Injectable 1 milliGRAM(s) IntraMuscular once  heparin  Infusion 1200 Unit(s)/Hr (12 mL/Hr) IV Continuous <Continuous>  insulin lispro (ADMELOG) corrective regimen sliding scale   SubCutaneous three times a day before meals  insulin lispro (ADMELOG) corrective regimen sliding scale   SubCutaneous at bedtime  metoprolol tartrate 25 milliGRAM(s) Oral two times a day  pantoprazole  Injectable 40 milliGRAM(s) IV Push two times a day  polyethylene glycol 3350 17 Gram(s) Oral daily  tamsulosin 0.4 milliGRAM(s) Oral at bedtime    MEDICATIONS  (PRN):      PAST MEDICAL & SURGICAL HISTORY:  CAD (coronary artery disease)  (4 stents, non-adherent to aspirin)    Diabetes mellitus, new onset    Single kidney  (hx/o LEFT nephrectomy at age 16; pt states due to a ureter&quot;blockage&quot; causing &quot;infection&quot;; pt denies hx/o renal dysfunction)    Arthritis    Myocardial infarction  (2003)    Hard of hearing    Atrial fibrillation    Hypertension    Hyperlipidemia    History of TIAs  Last 2018    History of bradycardia    History of nephrectomy, unilateral  (hx/o LEFT nephrectomy at age 16)    Stented coronary artery  (4 stents)    S/P bilateral cataract extraction        FAMILY HISTORY:  Family history of diabetes mellitus in mother (Mother)  (Pt states his mother developed DM in her 70&#x27;s)        SOCIAL HISTORY: No EtOH, no tobacco    REVIEW OF SYSTEMS:    CONSTITUTIONAL: No weakness, fevers or chills  EYES/ENT: No visual changes;  No vertigo or throat pain   NECK: No pain or stiffness  RESPIRATORY: No cough, wheezing, hemoptysis; No shortness of breath  CARDIOVASCULAR: No chest pain or palpitations  GASTROINTESTINAL: No abdominal or epigastric pain. No nausea, vomiting, or hematemesis; No diarrhea or constipation. No melena or hematochezia.  GENITOURINARY: No dysuria, frequency or hematuria  NEUROLOGICAL: No numbness or weakness  SKIN: No itching, burning, rashes, or lesions   All other review of systems is negative unless indicated above.        T(F): 97.5 (11-12-21 @ 09:00), Max: 98.3 (11-11-21 @ 16:05)  HR: 68 (11-12-21 @ 10:00) (64 - 128)  BP: 96/49 (11-12-21 @ 10:00)  RR: 14 (11-12-21 @ 10:00)  SpO2: 100% (11-12-21 @ 10:00) (96% - 100%)    Physical Exam  GENERAL: NAD, well-developed  HEAD:  Atraumatic, Normocephalic  EYES: EOMI, PERRLA, conjunctiva and sclera clear  NECK: Supple, No JVD  CHEST/LUNG: Clear to auscultation bilaterally; No wheeze  HEART: Regular rate and rhythm; No murmurs, rubs, or gallops  ABDOMEN: Soft, Nontender, Nondistended; Bowel sounds present  EXTREMITIES:  2+ Peripheral Pulses, No clubbing, cyanosis, or edema  NEUROLOGY: non-focal  SKIN: No rashes or lesions                          7.1    11.42 )-----------( 615      ( 12 Nov 2021 04:28 )             21.9       11-12    135  |  101  |  93<H>  ----------------------------<  168<H>  4.2   |  20<L>  |  3.59<H>    Ca    8.7      12 Nov 2021 04:28  Phos  3.7     11-12  Mg     1.70     11-12    TPro  6.2  /  Alb  2.8<L>  /  TBili  0.7  /  DBili  x   /  AST  87<H>  /  ALT  163<H>  /  AlkPhos  158<H>  11-12      Magnesium, Serum: 1.70 mg/dL (11-12 @ 04:28)  Phosphorus Level, Serum: 3.7 mg/dL (11-12 @ 04:28)      Iron Total, Serum: 53 ug/dL (11-11-21 @ 10:54)  Unsaturated Iron Binding Capacity: 117 ug/dL (11-11-21 @ 10:54)  Iron - Total Binding Capacity.: 170 ug/dL (11-11-21 @ 10:54)  % Saturation, Iron: 31 % (11-11-21 @ 10:54)  Ferritin, Serum: 2318 ng/mL (11-11-21 @ 10:54)  Vitamin B12, Serum: 1342 pg/mL (11-11-21 @ 10:54)  Folate, Serum: 12.2 ng/mL (11-11-21 @ 10:54)      Imaging: Hematology/Oncology Consult Note  Consult for anemia and thrombocytosis    HPI:  84 y/o M w/ PMH HTN, HLD, T2DM, CAD (MI 2003, s/p 4 stents), Afib on Eliquis h/o R nephrolithiasis s/p ureteral stent (removed 1/2021) sent in by cardiologist Dr. Reginaldo Syed for atrial fibrillation w/ RVR onset during nuclear stress testing today. However, he says that he did not feel any palpitations during his stress testing. Pt reports that he believes he completed most of stress testing today, and feels just "not right". Currently, he reports absence of any chest pain, palpitations, lightheadedness/fainting, dyspnea, abd pain, nausea, vomiting, diarrhea, bloody stools, melena, or any urinary symptoms (hematuria or dysuria). Of note, Pt shares that he was hospitalized for four days at [place] recently discharged yesterday for fever and chills, but states these have resolved, and that no etiology was found to account for his symptoms.    (09 Nov 2021 17:36)      Allergies    penicillin (Rash)    Intolerances    Cipro (Joint Pain)      MEDICATIONS  (STANDING):  aMIOdarone    Tablet   Oral   aMIOdarone    Tablet 400 milliGRAM(s) Oral every 8 hours  chlorhexidine 4% Liquid 1 Application(s) Topical <User Schedule>  dextrose 40% Gel 15 Gram(s) Oral once  dextrose 5%. 1000 milliLiter(s) (50 mL/Hr) IV Continuous <Continuous>  dextrose 5%. 1000 milliLiter(s) (100 mL/Hr) IV Continuous <Continuous>  dextrose 50% Injectable 25 Gram(s) IV Push once  dextrose 50% Injectable 12.5 Gram(s) IV Push once  dextrose 50% Injectable 25 Gram(s) IV Push once  glucagon  Injectable 1 milliGRAM(s) IntraMuscular once  heparin  Infusion 1200 Unit(s)/Hr (12 mL/Hr) IV Continuous <Continuous>  insulin lispro (ADMELOG) corrective regimen sliding scale   SubCutaneous three times a day before meals  insulin lispro (ADMELOG) corrective regimen sliding scale   SubCutaneous at bedtime  metoprolol tartrate 25 milliGRAM(s) Oral two times a day  pantoprazole  Injectable 40 milliGRAM(s) IV Push two times a day  polyethylene glycol 3350 17 Gram(s) Oral daily  tamsulosin 0.4 milliGRAM(s) Oral at bedtime    MEDICATIONS  (PRN):      PAST MEDICAL & SURGICAL HISTORY:  CAD (coronary artery disease)  (4 stents, non-adherent to aspirin)    Diabetes mellitus, new onset    Single kidney  (hx/o LEFT nephrectomy at age 16; pt states due to a ureter&quot;blockage&quot; causing &quot;infection&quot;; pt denies hx/o renal dysfunction)    Arthritis    Myocardial infarction  (2003)    Hard of hearing    Atrial fibrillation    Hypertension    Hyperlipidemia    History of TIAs  Last 2018    History of bradycardia    History of nephrectomy, unilateral  (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 LEFT nephrectomy at age 16)    Stented coronary artery  (4 stents)    S/P bilateral cataract extraction        FAMILY HISTORY:  Family history of diabetes mellitus in mother (Mother)  (Pt states his mother developed DM in her 70&#x27;s)        SOCIAL HISTORY: No EtOH, no tobacco    REVIEW OF SYSTEMS:    CONSTITUTIONAL: No weakness, fevers or chills  EYES/ENT: No visual changes;  No vertigo or throat pain   NECK: No pain or stiffness  RESPIRATORY: No cough, wheezing, hemoptysis; No shortness of breath  CARDIOVASCULAR: No chest pain or palpitations  GASTROINTESTINAL: No abdominal or epigastric pain. No nausea, vomiting, or hematemesis; No diarrhea or constipation. No melena or hematochezia.  GENITOURINARY: No dysuria, frequency or hematuria  NEUROLOGICAL: No numbness or weakness  SKIN: No itching, burning, rashes, or lesions   All other review of systems is negative unless indicated above.        T(F): 97.5 (11-12-21 @ 09:00), Max: 98.3 (11-11-21 @ 16:05)  HR: 68 (11-12-21 @ 10:00) (64 - 128)  BP: 96/49 (11-12-21 @ 10:00)  RR: 14 (11-12-21 @ 10:00)  SpO2: 100% (11-12-21 @ 10:00) (96% - 100%)    Physical Exam  GENERAL: NAD, well-developed  HEAD:  Atraumatic, Normocephalic  EYES: EOMI, PERRLA, conjunctiva and sclera clear  NECK: Supple, No JVD  CHEST/LUNG: Clear to auscultation bilaterally; No wheeze  HEART: Regular rate and rhythm; No murmurs, rubs, or gallops  ABDOMEN: Soft, Nontender, Nondistended; Bowel sounds present  EXTREMITIES:  2+ Peripheral Pulses, No clubbing, cyanosis, or edema  NEUROLOGY: non-focal  SKIN: No rashes or lesions                          7.1    11.42 )-----------( 615      ( 12 Nov 2021 04:28 )             21.9       11-12    135  |  101  |  93<H>  ----------------------------<  168<H>  4.2   |  20<L>  |  3.59<H>    Ca    8.7      12 Nov 2021 04:28  Phos  3.7     11-12  Mg     1.70     11-12    TPro  6.2  /  Alb  2.8<L>  /  TBili  0.7  /  DBili  x   /  AST  87<H>  /  ALT  163<H>  /  AlkPhos  158<H>  11-12      Magnesium, Serum: 1.70 mg/dL (11-12 @ 04:28)  Phosphorus Level, Serum: 3.7 mg/dL (11-12 @ 04:28)      Iron Total, Serum: 53 ug/dL (11-11-21 @ 10:54)  Unsaturated Iron Binding Capacity: 117 ug/dL (11-11-21 @ 10:54)  Iron - Total Binding Capacity.: 170 ug/dL (11-11-21 @ 10:54)  % Saturation, Iron: 31 % (11-11-21 @ 10:54)  Ferritin, Serum: 2318 ng/mL (11-11-21 @ 10:54)  Vitamin B12, Serum: 1342 pg/mL (11-11-21 @ 10:54)  Folate, Serum: 12.2 ng/mL (11-11-21 @ 10:54)      Imaging:  < from: US Abdomen Upper Quadrant Right (11.12.21 @ 15:00) >  IMPRESSION:    Cholelithiasis without sonographic evidence of acute cholecystitis.    < end of copied text >  < from: Xray Chest 1 View- PORTABLE-Urgent (11.09.21 @ 16:51) >  IMPRESSION:    Clear lungs.    < end of copied text >

## 2021-11-12 NOTE — CHART NOTE - NSCHARTNOTEFT_GEN_A_CORE
HPI:  86 y/o M w/ PMH HTN, HLD, T2DM, CAD (MI 2003, s/p 4 stents), Afib on Eliquis h/o R nephrolithiasis s/p ureteral stent (removed 1/2021) sent in by cardiologist Dr. Reginaldo Syed for atrial fibrillation w/ RVR onset during nuclear stress testing today. However, he says that he did not feel any palpitations during his stress testing. Pt reports that he believes he completed most of stress testing today, and feels just "not right". Currently, he reports absence of any chest pain, palpitations, lightheadedness/fainting, dyspnea, abd pain, nausea, vomiting, diarrhea, bloody stools, melena, or any urinary symptoms (hematuria or dysuria). Of note, Pt shares that he was hospitalized for four days at [place] recently discharged yesterday for fever and chills, but states these have resolved, and that no etiology was found to account for his symptoms.    (09 Nov 2021 17:36)    CCU COURSE:  Patient was admitted to the CCU with afib w/RVR. His metoprolol was increased to TID and he was started on diltiazem drip. His rate was controlled. He was then started on amnio and weaned off the diltiazem drip. his stay was complicated by normocytic anemia of unknown cause. His heart rate was less than 120 and sinus on transfer 11/12 evening.     MEDICATIONS:  STANDING MEDICATIONS  aMIOdarone    Tablet   Oral   aMIOdarone    Tablet 400 milliGRAM(s) Oral every 8 hours  chlorhexidine 4% Liquid 1 Application(s) Topical <User Schedule>  dextrose 40% Gel 15 Gram(s) Oral once  dextrose 5%. 1000 milliLiter(s) IV Continuous <Continuous>  dextrose 5%. 1000 milliLiter(s) IV Continuous <Continuous>  dextrose 50% Injectable 25 Gram(s) IV Push once  dextrose 50% Injectable 12.5 Gram(s) IV Push once  dextrose 50% Injectable 25 Gram(s) IV Push once  glucagon  Injectable 1 milliGRAM(s) IntraMuscular once  heparin  Infusion 1200 Unit(s)/Hr IV Continuous <Continuous>  insulin lispro (ADMELOG) corrective regimen sliding scale   SubCutaneous three times a day before meals  insulin lispro (ADMELOG) corrective regimen sliding scale   SubCutaneous at bedtime  metoprolol tartrate 25 milliGRAM(s) Oral two times a day  pantoprazole  Injectable 40 milliGRAM(s) IV Push two times a day  polyethylene glycol 3350 17 Gram(s) Oral daily  tamsulosin 0.4 milliGRAM(s) Oral at bedtime    PRN MEDICATIONS      VITAL SIGNS: Last 24 Hours  T(C): 36.4 (12 Nov 2021 15:55), Max: 36.8 (11 Nov 2021 20:00)  T(F): 97.5 (12 Nov 2021 15:55), Max: 98.2 (11 Nov 2021 20:00)  HR: 77 (12 Nov 2021 17:00) (64 - 123)  BP: 108/62 (12 Nov 2021 17:00) (94/53 - 126/102)  BP(mean): 76 (12 Nov 2021 17:00) (63 - 110)  RR: 18 (12 Nov 2021 17:00) (14 - 26)  SpO2: 100% (12 Nov 2021 17:00) (98% - 100%)    LABS:                        7.1    11.42 )-----------( 615      ( 12 Nov 2021 04:28 )             21.9     11-12    135  |  101  |  93<H>  ----------------------------<  168<H>  4.2   |  20<L>  |  3.59<H>    Ca    8.7      12 Nov 2021 04:28  Phos  3.7     11-12  Mg     1.70     11-12    TPro  6.2  /  Alb  2.8<L>  /  TBili  0.7  /  DBili  x   /  AST  87<H>  /  ALT  163<H>  /  AlkPhos  158<H>  11-12    PTT - ( 12 Nov 2021 04:28 )  PTT:68.7 sec    Culture - Blood (collected 11 Nov 2021 06:44)  Source: .Blood Blood-Peripheral  Preliminary Report (12 Nov 2021 07:02):    No growth to date.    ASSESSMENT & PLAN:     86 y/o M w/ PMH HTN, HLD, T2DM, CAD (MI 2003, s/p 4 stents), Afib on Eliquis, h/o R nephrolithiasis s/p ureteral stent (removed 1/2021) found to be in AF w/ RVR (asymptomatic) with a Cr of 4.75. Now found to be anemic with Hb of 7.1    NEURO:  Alert and oriented x 3    RESP:  Breathing well on RA    CARDS:  AFIB W/RVR  -Continue to monitor on telemetry  - Maintain K+~4.0 and Mg++~2.0  - c/w metoprolol 25 mg TID. 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 Started miralax 11/11   - FO positive.     Appreciate GI recs   - PPI IV BID   - RUQ US  :   - no active issues    ENDO:  -HGBA1C 5.9  -ISS    ID: no active issues    Heme:   -Hb 7.1 this am. will give 1u pRBC   - F/u EPO   - F/u heme recs    For Follow-Up:    Todos:  [ ] f/u anemia labs  [ ] f/u post transfusion CBC, transfuse hgb > 7  [ ] f/u GI, heme recs  [ ] c/w amiodarone per cardiology  [ ] c/w heparin gtt, will need appropriate oral AC when appropriate

## 2021-11-12 NOTE — PROGRESS NOTE ADULT - ASSESSMENT
RACHEL COMBS is a 85y Male with HTN, HLD, T2DM, CAD (MI 2003, s/p 4 stents), Afib on Eliquis h/o R nephrolithiasis s/p ureteral stent (removed 1/2021), bladder cancer on chemo (most recent dose ~2 weeks PTA), who initially presented from his cardiologist after onset of afib/rvr during a planned nuclear stress testing, noted to have Hgb 9.3 on admission, dropped to 7.2 after hep started, GI consulted for anemia in setting of cardiac evaluation with plans for at least 30 days of AC after MARCELLA/DCCV.    #. Anemia, unclear etiology c/b bladder cancer and intermittent hematuria - emergent endoscopy is not warranted at this time and patient w/o overt GIB on hep gtt. May consider outpatient evaluation of anemia after medically optimized with improved condition.  #. Constipation  #. Transaminemia    Recommendations:  - Consider outpatient EGD/colon to evaluate anemia  - Follow up GI outpatient  - Okay to c/w hep gtt w/o evidence of active GIB  - AC per primary/cards  - Monitor Hgb, transfuse if Hgb < 8 in setting of active cardiac issue  - PPI IV BID, transition to po upon discharge  - Miralax daily  - Consider SMOG enema  - RUQ abdominal US  - Check hepatitis panel  - Check INR and monitor  - Please provide patient with Gastroenterology Clinic to confirm appointment/for outpatient follow up; [phone] ([facility] at [address] [place] [address]) or [phone] ([facility] at [address] [place] [address])     Thank you for involving us in this patient's care. Please contact GI with any further questions or concerns. Signing off.    Case discussed with Attending, Dr. José Miguel Braden.     Sharyn Reyes MD  Gastroenterology/Hepatology Fellow, PGY-V    NON-URGENT CONSULTS:  Please email [email or web address] OR  [email or web address]  AT NIGHT AND ON WEEKENDS:  Contact on-call GI fellow via answering service ([phone]) from 5pm-8am and on weekends/holidays  MONDAY-FRIDAY 8AM-5PM:  Pager# [phone] ([facility])  GI Phone# [phone] ([facility])     RACHEL COMBS is a 85y Male with HTN, HLD, T2DM, CAD (MI 2003, s/p 4 stents), Afib on Eliquis h/o R nephrolithiasis s/p ureteral stent (removed 1/2021), bladder cancer on chemo (most recent dose ~2 weeks PTA), who initially presented from his cardiologist after onset of afib/rvr during a planned nuclear stress testing, noted to have Hgb 9.3 on admission, dropped to 7.2 after hep started, GI consulted for anemia in setting of cardiac evaluation with plans for at least 30 days of AC after MARCELLA/DCCV.    #. Anemia, unclear etiology c/b bladder cancer and intermittent hematuria - emergent endoscopy is not warranted at this time and patient w/o overt GIB on hep gtt.    #. Constipation  #. Transaminemia    Recommendations:  - Will monitor Hgb over the weekend and re-evaluate for endoscopic evaluation  - No overt GIB noted  - Consider evaluation for other sources of anemia  - Heme on board  - Okay to c/w hep gtt w/o evidence of active GIB  - AC per primary/cards  - Monitor Hgb, transfuse if Hgb < 8 in setting of active cardiac issue  - PPI IV BID, transition to po upon discharge  - Miralax daily  - Consider SMOG enema  - RUQ abdominal US  - Check hepatitis panel  - Check INR and monitor    Thank you for involving us in this patient's care. Please contact GI with any further questions or concerns.    Case discussed with Attending, Dr. José Miguel Braden.     Sharyn Reyes MD  Gastroenterology/Hepatology Fellow, PGY-V    NON-URGENT CONSULTS:  Please email [email or web address]  AT NIGHT AND ON WEEKENDS:  Contact on-call GI fellow via answering service ([phone]) from 5pm-8am and on weekends/holidays  MONDAY-FRIDAY 8AM-5PM:  Pager# [phone] ([facility])  GI Phone# [phone] ([facility])

## 2021-11-12 NOTE — PROGRESS NOTE ADULT - SUBJECTIVE AND OBJECTIVE BOX
NEPHROLOGY-NSN [phone]        Patient seen and examined in bed.  He was in good spirits         MEDICATIONS  (STANDING):  aMIOdarone    Tablet   Oral   aMIOdarone    Tablet 400 milliGRAM(s) Oral every 8 hours  chlorhexidine 4% Liquid 1 Application(s) Topical <User Schedule>  dextrose 40% Gel 15 Gram(s) Oral once  dextrose 5%. 1000 milliLiter(s) (50 mL/Hr) IV Continuous <Continuous>  dextrose 5%. 1000 milliLiter(s) (100 mL/Hr) IV Continuous <Continuous>  dextrose 50% Injectable 25 Gram(s) IV Push once  dextrose 50% Injectable 12.5 Gram(s) IV Push once  dextrose 50% Injectable 25 Gram(s) IV Push once  glucagon  Injectable 1 milliGRAM(s) IntraMuscular once  heparin  Infusion 1200 Unit(s)/Hr (12 mL/Hr) IV Continuous <Continuous>  insulin lispro (ADMELOG) corrective regimen sliding scale   SubCutaneous three times a day before meals  insulin lispro (ADMELOG) corrective regimen sliding scale   SubCutaneous at bedtime  metoprolol tartrate 25 milliGRAM(s) Oral three times a day  pantoprazole  Injectable 40 milliGRAM(s) IV Push two times a day  polyethylene glycol 3350 17 Gram(s) Oral daily  tamsulosin 0.4 milliGRAM(s) Oral at bedtime      VITAL:  T(C): , Max: 36.8 (11-11-21 @ 12:00)  T(F): , Max: 98.3 (11-11-21 @ 16:05)  HR: 64 (11-12-21 @ 07:00)  BP: 98/55 (11-12-21 @ 07:00)  BP(mean): 67 (11-12-21 @ 07:00)  RR: 18 (11-12-21 @ 07:00)  SpO2: 100% (11-12-21 @ 07:00)  Wt(kg): --    I and O's:    11-11 @ 07:01  -  11-12 @ 07:00  --------------------------------------------------------  IN: 316 mL / OUT: 1100 mL / NET: -784 mL          PHYSICAL EXAM:    Constitutional: NAD  Neck:  No JVD  Respiratory: CTAB/L  Cardiovascular: S1 and S2  Gastrointestinal: BS+, soft, NT/ND  Extremities: No peripheral edema  Neurological: A/O x 3, no focal deficits  Psychiatric: Normal mood, normal affect  : No [place]  Skin: No rashes  Access: Not applicable    LABS:                        7.1    11.42 )-----------( 615      ( 12 Nov 2021 04:28 )             21.9     11-12    135  |  101  |  93<H>  ----------------------------<  168<H>  4.2   |  20<L>  |  3.59<H>    Ca    8.7      12 Nov 2021 04:28  Phos  3.7     11-12  Mg     1.70     11-12    TPro  6.2  /  Alb  2.8<L>  /  TBili  0.7  /  DBili  x   /  AST  87<H>  /  ALT  163<H>  /  AlkPhos  158<H>  11-12          Urine Studies:          RADIOLOGY & ADDITIONAL STUDIES:

## 2021-11-13 DIAGNOSIS — D63.8 ANEMIA IN OTHER CHRONIC DISEASES CLASSIFIED ELSEWHERE: ICD-10-CM

## 2021-11-13 LAB
ANION GAP SERPL CALC-SCNC: 14 MMOL/L — SIGNIFICANT CHANGE UP (ref 7–14)
APTT BLD: >200 SEC — CRITICAL HIGH (ref 27–36.3)
BUN SERPL-MCNC: 72 MG/DL — HIGH (ref 7–23)
CALCIUM SERPL-MCNC: 8.6 MG/DL — SIGNIFICANT CHANGE UP (ref 8.4–10.5)
CHLORIDE SERPL-SCNC: 105 MMOL/L — SIGNIFICANT CHANGE UP (ref 98–107)
CO2 SERPL-SCNC: 19 MMOL/L — LOW (ref 22–31)
CREAT SERPL-MCNC: 3.13 MG/DL — HIGH (ref 0.5–1.3)
GLUCOSE BLDC GLUCOMTR-MCNC: 152 MG/DL — HIGH (ref 70–99)
GLUCOSE BLDC GLUCOMTR-MCNC: 169 MG/DL — HIGH (ref 70–99)
GLUCOSE BLDC GLUCOMTR-MCNC: 194 MG/DL — HIGH (ref 70–99)
GLUCOSE BLDC GLUCOMTR-MCNC: 231 MG/DL — HIGH (ref 70–99)
GLUCOSE BLDC GLUCOMTR-MCNC: 265 MG/DL — HIGH (ref 70–99)
GLUCOSE BLDC GLUCOMTR-MCNC: 365 MG/DL — HIGH (ref 70–99)
GLUCOSE SERPL-MCNC: 150 MG/DL — HIGH (ref 70–99)
HCT VFR BLD CALC: 27.8 % — LOW (ref 39–50)
HGB BLD-MCNC: 8.8 G/DL — LOW (ref 13–17)
INR BLD: 1.49 RATIO — HIGH (ref 0.88–1.16)
MAGNESIUM SERPL-MCNC: 2 MG/DL — SIGNIFICANT CHANGE UP (ref 1.6–2.6)
MCHC RBC-ENTMCNC: 31.3 PG — SIGNIFICANT CHANGE UP (ref 27–34)
MCHC RBC-ENTMCNC: 31.7 GM/DL — LOW (ref 32–36)
MCV RBC AUTO: 98.9 FL — SIGNIFICANT CHANGE UP (ref 80–100)
NRBC # BLD: 0 /100 WBCS — SIGNIFICANT CHANGE UP
NRBC # FLD: 0.03 K/UL — HIGH
PHOSPHATE SERPL-MCNC: 3.4 MG/DL — SIGNIFICANT CHANGE UP (ref 2.5–4.5)
PLATELET # BLD AUTO: 574 K/UL — HIGH (ref 150–400)
POTASSIUM SERPL-MCNC: 3.9 MMOL/L — SIGNIFICANT CHANGE UP (ref 3.5–5.3)
POTASSIUM SERPL-SCNC: 3.9 MMOL/L — SIGNIFICANT CHANGE UP (ref 3.5–5.3)
PROTHROM AB SERPL-ACNC: 16.8 SEC — HIGH (ref 10.6–13.6)
RBC # BLD: 2.81 M/UL — LOW (ref 4.2–5.8)
RBC # FLD: 13.2 % — SIGNIFICANT CHANGE UP (ref 10.3–14.5)
SODIUM SERPL-SCNC: 138 MMOL/L — SIGNIFICANT CHANGE UP (ref 135–145)
WBC # BLD: 15.3 K/UL — HIGH (ref 3.8–10.5)
WBC # FLD AUTO: 15.3 K/UL — HIGH (ref 3.8–10.5)

## 2021-11-13 PROCEDURE — 99232 SBSQ HOSP IP/OBS MODERATE 35: CPT

## 2021-11-13 PROCEDURE — 99232 SBSQ HOSP IP/OBS MODERATE 35: CPT | Mod: GC

## 2021-11-13 RX ORDER — INSULIN GLARGINE 100 [IU]/ML
5 INJECTION, SOLUTION SUBCUTANEOUS AT BEDTIME
Refills: 0 | Status: DISCONTINUED | OUTPATIENT
Start: 2021-11-13 | End: 2021-11-16

## 2021-11-13 RX ORDER — HEPARIN SODIUM 5000 [USP'U]/ML
1000 INJECTION INTRAVENOUS; SUBCUTANEOUS
Qty: 25000 | Refills: 0 | Status: DISCONTINUED | OUTPATIENT
Start: 2021-11-13 | End: 2021-11-14

## 2021-11-13 RX ADMIN — INSULIN GLARGINE 5 UNIT(S): 100 INJECTION, SOLUTION SUBCUTANEOUS at 21:20

## 2021-11-13 RX ADMIN — Medication 1: at 08:44

## 2021-11-13 RX ADMIN — AMIODARONE HYDROCHLORIDE 400 MILLIGRAM(S): 400 TABLET ORAL at 13:40

## 2021-11-13 RX ADMIN — AMIODARONE HYDROCHLORIDE 400 MILLIGRAM(S): 400 TABLET ORAL at 05:39

## 2021-11-13 RX ADMIN — HEPARIN SODIUM 12 UNIT(S)/HR: 5000 INJECTION INTRAVENOUS; SUBCUTANEOUS at 08:34

## 2021-11-13 RX ADMIN — CHLORHEXIDINE GLUCONATE 1 APPLICATION(S): 213 SOLUTION TOPICAL at 06:40

## 2021-11-13 RX ADMIN — Medication 25 MILLIGRAM(S): at 17:13

## 2021-11-13 RX ADMIN — Medication 2: at 11:34

## 2021-11-13 RX ADMIN — HEPARIN SODIUM 1000 UNIT(S)/HR: 5000 INJECTION INTRAVENOUS; SUBCUTANEOUS at 19:34

## 2021-11-13 RX ADMIN — AMIODARONE HYDROCHLORIDE 400 MILLIGRAM(S): 400 TABLET ORAL at 21:21

## 2021-11-13 RX ADMIN — Medication 25 MILLIGRAM(S): at 05:40

## 2021-11-13 RX ADMIN — Medication 3: at 17:13

## 2021-11-13 RX ADMIN — TAMSULOSIN HYDROCHLORIDE 0.4 MILLIGRAM(S): 0.4 CAPSULE ORAL at 21:21

## 2021-11-13 RX ADMIN — HEPARIN SODIUM 12 UNIT(S)/HR: 5000 INJECTION INTRAVENOUS; SUBCUTANEOUS at 11:10

## 2021-11-13 RX ADMIN — PANTOPRAZOLE SODIUM 40 MILLIGRAM(S): 20 TABLET, DELAYED RELEASE ORAL at 17:13

## 2021-11-13 RX ADMIN — PANTOPRAZOLE SODIUM 40 MILLIGRAM(S): 20 TABLET, DELAYED RELEASE ORAL at 05:39

## 2021-11-13 NOTE — PROGRESS NOTE ADULT - SUBJECTIVE AND OBJECTIVE BOX
Guanakito Rudd MD Hospitalist  Pager: [phone]  After 7: Night Team pager    Patient is a 85y old  Male who presents with a chief complaint of Afib (13 Nov 2021 07:41)      SUBJECTIVE / OVERNIGHT EVENTS: Overnight, no acute events. Patient transferred from CCU to medicine floors. Patient seen and examined at bedside this AM.    MEDICATIONS  (STANDING):  aMIOdarone    Tablet   Oral   aMIOdarone    Tablet 400 milliGRAM(s) Oral every 8 hours  chlorhexidine 4% Liquid 1 Application(s) Topical <User Schedule>  dextrose 40% Gel 15 Gram(s) Oral once  dextrose 5%. 1000 milliLiter(s) (50 mL/Hr) IV Continuous <Continuous>  dextrose 5%. 1000 milliLiter(s) (100 mL/Hr) IV Continuous <Continuous>  dextrose 50% Injectable 25 Gram(s) IV Push once  dextrose 50% Injectable 12.5 Gram(s) IV Push once  dextrose 50% Injectable 25 Gram(s) IV Push once  glucagon  Injectable 1 milliGRAM(s) IntraMuscular once  heparin  Infusion 1200 Unit(s)/Hr (12 mL/Hr) IV Continuous <Continuous>  insulin lispro (ADMELOG) corrective regimen sliding scale   SubCutaneous three times a day before meals  insulin lispro (ADMELOG) corrective regimen sliding scale   SubCutaneous at bedtime  metoprolol tartrate 25 milliGRAM(s) Oral two times a day  pantoprazole  Injectable 40 milliGRAM(s) IV Push two times a day  polyethylene glycol 3350 17 Gram(s) Oral daily  tamsulosin 0.4 milliGRAM(s) Oral at bedtime    MEDICATIONS  (PRN):      Vital Signs Last 24 Hrs  T(C): 36.6 (13 Nov 2021 05:00), Max: 36.7 (12 Nov 2021 13:00)  T(F): 97.8 (13 Nov 2021 05:00), Max: 98.1 (12 Nov 2021 20:00)  HR: 64 (13 Nov 2021 05:00) (62 - 77)  BP: 128/58 (13 Nov 2021 05:00) (96/49 - 128/58)  BP(mean): 67 (12 Nov 2021 18:00) (64 - 76)  RR: 18 (13 Nov 2021 05:00) (12 - 22)  SpO2: 99% (13 Nov 2021 05:00) (99% - 100%)  CAPILLARY BLOOD GLUCOSE      POCT Blood Glucose.: 152 mg/dL (13 Nov 2021 08:37)  POCT Blood Glucose.: 203 mg/dL (12 Nov 2021 22:19)  POCT Blood Glucose.: 228 mg/dL (12 Nov 2021 21:12)  POCT Blood Glucose.: 134 mg/dL (12 Nov 2021 17:08)  POCT Blood Glucose.: 249 mg/dL (12 Nov 2021 11:56)    I&O's Summary    12 Nov 2021 07:01  -  13 Nov 2021 07:00  --------------------------------------------------------  IN: 922 mL / OUT: 800 mL / NET: 122 mL    13 Nov 2021 07:01  -  13 Nov 2021 10:52  --------------------------------------------------------  IN: 276 mL / OUT: 300 mL / NET: -24 mL        PHYSICAL EXAM:  GENERAL: NAD  EYES: EOMI, PERRLA, conjunctiva and sclera clear  NECK:  No JVD  CHEST/LUNG: CTABL ; No wheeze  HEART: RRR; No murmurs  ABDOMEN: Soft, Nontender, Nondistended; Bowel sounds present  : No Temple  EXTREMITIES:  2+ Peripheral Pulses, No edema  PSYCH: AAOx3  NEUROLOGY: alert and oriented  SKIN: No rashes or lesions. No sacral ulcer    LABS:                        8.8    15.30 )-----------( 574      ( 13 Nov 2021 07:48 )             27.8     11-13    138  |  105  |  72<H>  ----------------------------<  150<H>  3.9   |  19<L>  |  3.13<H>    Ca    8.6      13 Nov 2021 07:48  Phos  3.4     11-13  Mg     2.00     11-13    TPro  6.2  /  Alb  2.8<L>  /  TBili  0.7  /  DBili  x   /  AST  87<H>  /  ALT  163<H>  /  AlkPhos  158<H>  11-12    PT/INR - ( 13 Nov 2021 09:39 )   PT: 16.8 sec;   INR: 1.49 ratio         PTT - ( 13 Nov 2021 09:39 )  PTT:>200.0 sec          Microbiology;        RADIOLOGY & ADDITIONAL TESTS:    Imaging Personally Reviewed:          Consultant(s) Notes Reviewed:  cardiology/CCU, nephrology, heme/onc, GI    Care Discussed with Consultants/Other Providers: caridology/CCU, nephrology, heme/onc, GI

## 2021-11-13 NOTE — PROGRESS NOTE ADULT - ATTENDING COMMENTS
No overt bleeding. WBC rising and Hgb stable/increasing.   On heparin drip with PTT > 200.   Will continue to monitor CBC and clinical course to determine appropriate timing of endoscopic evaluation.

## 2021-11-13 NOTE — PROGRESS NOTE ADULT - ASSESSMENT
84 y/o M w/ PMH HTN, HLD, T2DM, CAD (MI 2003, s/p 4 stents), Afib on Eliquis, h/o R nephrolithiasis s/p ureteral stent (removed 1/2021) found to be in AF w/ RVR (asymptomatic) with a Cr of 4.75. Now found to be anemic with Hb of 7.1 with +FOBT.

## 2021-11-13 NOTE — PROGRESS NOTE ADULT - PROBLEM SELECTOR PLAN 6
- DVT: hep gtt for A.FIB  - diet: CC with renal restrictions  - dispo: pending further stabilization PT recs rehab

## 2021-11-13 NOTE — PROGRESS NOTE ADULT - PROBLEM SELECTOR PLAN 1
- initially afib RVR initially c/b hypotension requiring CCU level of care now improved s/p dilt gtt amio gtt now transitioned to oral amio load and taper  - c/w metoprolol 25 BID, amio load and subsequent maintenance dosing  - CCU/cardiology following  - c/w hep gtt for AC while patient is anemic with potential GIB  - f/u echo

## 2021-11-13 NOTE — PROGRESS NOTE ADULT - PROBLEM SELECTOR PLAN 3
- c/w metoprolol  - no ASA in setting of possible GIB c/b anemia reassess for possible need for ASA  - holding ACE ISO LILIAN on CKD, reinstate PRN  - check echo

## 2021-11-13 NOTE — PROGRESS NOTE ADULT - PROBLEM SELECTOR PLAN 4
- s/p 1 pRBC for hb 7.1  - maintain hgb >7, currently improved to 8s  - checking labs per heme/onc, LDH, david, reticulocyte

## 2021-11-13 NOTE — PROGRESS NOTE ADULT - SUBJECTIVE AND OBJECTIVE BOX
*incomplete note, full to follow*      Interval Events:   No plans for cardioversion as patient self-converted to SR  Hgb 7.1 from 7.6    Hospital Medications:  aMIOdarone    Tablet   Oral   aMIOdarone    Tablet 400 milliGRAM(s) Oral every 8 hours  chlorhexidine 4% Liquid 1 Application(s) Topical <User Schedule>  dextrose 40% Gel 15 Gram(s) Oral once  dextrose 5%. 1000 milliLiter(s) IV Continuous <Continuous>  dextrose 5%. 1000 milliLiter(s) IV Continuous <Continuous>  dextrose 50% Injectable 25 Gram(s) IV Push once  dextrose 50% Injectable 12.5 Gram(s) IV Push once  dextrose 50% Injectable 25 Gram(s) IV Push once  glucagon  Injectable 1 milliGRAM(s) IntraMuscular once  heparin  Infusion 1200 Unit(s)/Hr IV Continuous <Continuous>  insulin lispro (ADMELOG) corrective regimen sliding scale   SubCutaneous three times a day before meals  insulin lispro (ADMELOG) corrective regimen sliding scale   SubCutaneous at bedtime  metoprolol tartrate 25 milliGRAM(s) Oral two times a day  pantoprazole  Injectable 40 milliGRAM(s) IV Push two times a day  polyethylene glycol 3350 17 Gram(s) Oral daily  tamsulosin 0.4 milliGRAM(s) Oral at bedtime      ROS:   Complete and normal except as mentioned above.    PHYSICAL EXAM:   Vital Signs Last 24 Hrs  T(C): 36.6 (13 Nov 2021 05:00), Max: 36.7 (12 Nov 2021 13:00)  T(F): 97.8 (13 Nov 2021 05:00), Max: 98.1 (12 Nov 2021 20:00)  HR: 64 (13 Nov 2021 05:00) (62 - 80)  BP: 128/58 (13 Nov 2021 05:00) (96/49 - 128/58)  BP(mean): 67 (12 Nov 2021 18:00) (64 - 110)  RR: 18 (13 Nov 2021 05:00) (12 - 22)  SpO2: 99% (13 Nov 2021 05:00) (99% - 100%)    GENERAL: no acute distress  NEURO: alert  HEENT: anicteric sclera, no conjunctival pallor appreciated  CHEST: no respiratory distress, no accessory muscle use  CARDIAC: regular rate, rhythm  ABDOMEN: soft, non-tender, non-distended, no rebound or guarding  EXTREMITIES: warm, well perfused, no edema  SKIN: no lesions noted    LABS: reviewed                        7.1    11.42 )-----------( 615      ( 12 Nov 2021 04:28 )             21.9     11-12    135  |  101  |  93<H>  ----------------------------<  168<H>  4.2   |  20<L>  |  3.59<H>    Ca    8.7      12 Nov 2021 04:28  Phos  3.7     11-12  Mg     1.70     11-12    TPro  6.2  /  Alb  2.8<L>  /  TBili  0.7  /  DBili  x   /  AST  87<H>  /  ALT  163<H>  /  AlkPhos  158<H>  11-12    LIVER FUNCTIONS - ( 12 Nov 2021 04:28 )  Alb: 2.8 g/dL / Pro: 6.2 g/dL / ALK PHOS: 158 U/L / ALT: 163 U/L / AST: 87 U/L / GGT: x             Interval Diagnostic Studies: see sunrise for full report   *incomplete note, full to follow*      Interval Events:   No plans for cardioversion as patient self-converted to SR  Hgb 8.8 from 7.1    Hospital Medications:  aMIOdarone    Tablet   Oral   aMIOdarone    Tablet 400 milliGRAM(s) Oral every 8 hours  chlorhexidine 4% Liquid 1 Application(s) Topical <User Schedule>  dextrose 40% Gel 15 Gram(s) Oral once  dextrose 5%. 1000 milliLiter(s) IV Continuous <Continuous>  dextrose 5%. 1000 milliLiter(s) IV Continuous <Continuous>  dextrose 50% Injectable 25 Gram(s) IV Push once  dextrose 50% Injectable 12.5 Gram(s) IV Push once  dextrose 50% Injectable 25 Gram(s) IV Push once  glucagon  Injectable 1 milliGRAM(s) IntraMuscular once  heparin  Infusion 1200 Unit(s)/Hr IV Continuous <Continuous>  insulin lispro (ADMELOG) corrective regimen sliding scale   SubCutaneous three times a day before meals  insulin lispro (ADMELOG) corrective regimen sliding scale   SubCutaneous at bedtime  metoprolol tartrate 25 milliGRAM(s) Oral two times a day  pantoprazole  Injectable 40 milliGRAM(s) IV Push two times a day  polyethylene glycol 3350 17 Gram(s) Oral daily  tamsulosin 0.4 milliGRAM(s) Oral at bedtime      ROS:   Complete and normal except as mentioned above.    PHYSICAL EXAM:   Vital Signs Last 24 Hrs  T(C): 36.6 (13 Nov 2021 05:00), Max: 36.7 (12 Nov 2021 13:00)  T(F): 97.8 (13 Nov 2021 05:00), Max: 98.1 (12 Nov 2021 20:00)  HR: 64 (13 Nov 2021 05:00) (62 - 80)  BP: 128/58 (13 Nov 2021 05:00) (96/49 - 128/58)  BP(mean): 67 (12 Nov 2021 18:00) (64 - 110)  RR: 18 (13 Nov 2021 05:00) (12 - 22)  SpO2: 99% (13 Nov 2021 05:00) (99% - 100%)    GENERAL: no acute distress  NEURO: alert  HEENT: anicteric sclera, no conjunctival pallor appreciated  CHEST: no respiratory distress, no accessory muscle use  CARDIAC: regular rate, rhythm  ABDOMEN: soft, non-tender, non-distended, no rebound or guarding  EXTREMITIES: warm, well perfused, no edema  SKIN: no lesions noted    LABS: reviewed                        7.1    11.42 )-----------( 615      ( 12 Nov 2021 04:28 )             21.9     11-12    135  |  101  |  93<H>  ----------------------------<  168<H>  4.2   |  20<L>  |  3.59<H>    Ca    8.7      12 Nov 2021 04:28  Phos  3.7     11-12  Mg     1.70     11-12    TPro  6.2  /  Alb  2.8<L>  /  TBili  0.7  /  DBili  x   /  AST  87<H>  /  ALT  163<H>  /  AlkPhos  158<H>  11-12    LIVER FUNCTIONS - ( 12 Nov 2021 04:28 )  Alb: 2.8 g/dL / Pro: 6.2 g/dL / ALK PHOS: 158 U/L / ALT: 163 U/L / AST: 87 U/L / GGT: x             Interval Diagnostic Studies: see sunrise for full report       Interval Events:   No plans for cardioversion as patient self-converted to SR  Hgb 8.8 from 7.1  WBC rising to 15.3 <-- 11.4  No melena, hematochezia, hematemesis seen      Hospital Medications:  aMIOdarone    Tablet   Oral   aMIOdarone    Tablet 400 milliGRAM(s) Oral every 8 hours  chlorhexidine 4% Liquid 1 Application(s) Topical <User Schedule>  dextrose 40% Gel 15 Gram(s) Oral once  dextrose 5%. 1000 milliLiter(s) IV Continuous <Continuous>  dextrose 5%. 1000 milliLiter(s) IV Continuous <Continuous>  dextrose 50% Injectable 25 Gram(s) IV Push once  dextrose 50% Injectable 12.5 Gram(s) IV Push once  dextrose 50% Injectable 25 Gram(s) IV Push once  glucagon  Injectable 1 milliGRAM(s) IntraMuscular once  heparin  Infusion 1200 Unit(s)/Hr IV Continuous <Continuous>  insulin lispro (ADMELOG) corrective regimen sliding scale   SubCutaneous three times a day before meals  insulin lispro (ADMELOG) corrective regimen sliding scale   SubCutaneous at bedtime  metoprolol tartrate 25 milliGRAM(s) Oral two times a day  pantoprazole  Injectable 40 milliGRAM(s) IV Push two times a day  polyethylene glycol 3350 17 Gram(s) Oral daily  tamsulosin 0.4 milliGRAM(s) Oral at bedtime      ROS:   Complete and normal except as mentioned above.    PHYSICAL EXAM:   Vital Signs Last 24 Hrs  T(C): 36.6 (13 Nov 2021 05:00), Max: 36.7 (12 Nov 2021 13:00)  T(F): 97.8 (13 Nov 2021 05:00), Max: 98.1 (12 Nov 2021 20:00)  HR: 64 (13 Nov 2021 05:00) (62 - 80)  BP: 128/58 (13 Nov 2021 05:00) (96/49 - 128/58)  BP(mean): 67 (12 Nov 2021 18:00) (64 - 110)  RR: 18 (13 Nov 2021 05:00) (12 - 22)  SpO2: 99% (13 Nov 2021 05:00) (99% - 100%)    GENERAL: no acute distress  NEURO: alert  HEENT: anicteric sclera, no conjunctival pallor appreciated  CHEST: no respiratory distress, no accessory muscle use  CARDIAC: regular rate, rhythm  ABDOMEN: soft, non-tender, non-distended, no rebound or guarding  EXTREMITIES: warm, well perfused, no edema  SKIN: no lesions noted    LABS: reviewed                        7.1    11.42 )-----------( 615      ( 12 Nov 2021 04:28 )             21.9     11-12    135  |  101  |  93<H>  ----------------------------<  168<H>  4.2   |  20<L>  |  3.59<H>    Ca    8.7      12 Nov 2021 04:28  Phos  3.7     11-12  Mg     1.70     11-12    TPro  6.2  /  Alb  2.8<L>  /  TBili  0.7  /  DBili  x   /  AST  87<H>  /  ALT  163<H>  /  AlkPhos  158<H>  11-12    LIVER FUNCTIONS - ( 12 Nov 2021 04:28 )  Alb: 2.8 g/dL / Pro: 6.2 g/dL / ALK PHOS: 158 U/L / ALT: 163 U/L / AST: 87 U/L / GGT: x             Interval Diagnostic Studies: see sunrise for full report

## 2021-11-13 NOTE — PROGRESS NOTE ADULT - PROBLEM SELECTOR PLAN 2
- nephro following  - elevated WBC in urine, likely ATN 2/2 hypotension 2/2 afib RVR  - Cr improving with management of RVR  - c/w management for RVR  - trend Cr dialy  - avoid nephrotoxins

## 2021-11-13 NOTE — PROGRESS NOTE ADULT - PROBLEM SELECTOR PLAN 5
- a1c 5.9 at goal  - FS premeal and qHS  - AISS PRN - a1c 5.9 at goal  - FS premeal and qHS  - AISS PRN  - added lantus 5 for moderate hyperglycemia; adjust PRN

## 2021-11-13 NOTE — PROGRESS NOTE ADULT - ASSESSMENT
No pain, no shortness of breath    Review of systems: All 10 points ROS was obtained except as above.     aMIOdarone    Tablet   Oral   aMIOdarone    Tablet 400 milliGRAM(s) Oral every 8 hours  chlorhexidine 4% Liquid 1 Application(s) Topical <User Schedule>  dextrose 40% Gel 15 Gram(s) Oral once  dextrose 5%. 1000 milliLiter(s) IV Continuous <Continuous>  dextrose 5%. 1000 milliLiter(s) IV Continuous <Continuous>  dextrose 50% Injectable 25 Gram(s) IV Push once  dextrose 50% Injectable 12.5 Gram(s) IV Push once  dextrose 50% Injectable 25 Gram(s) IV Push once  glucagon  Injectable 1 milliGRAM(s) IntraMuscular once  heparin  Infusion 1200 Unit(s)/Hr IV Continuous <Continuous>  insulin lispro (ADMELOG) corrective regimen sliding scale   SubCutaneous three times a day before meals  insulin lispro (ADMELOG) corrective regimen sliding scale   SubCutaneous at bedtime  metoprolol tartrate 25 milliGRAM(s) Oral two times a day  pantoprazole  Injectable 40 milliGRAM(s) IV Push two times a day  polyethylene glycol 3350 17 Gram(s) Oral daily  tamsulosin 0.4 milliGRAM(s) Oral at bedtime      VITAL:  T(C): , Max: 36.9 (11-13-21 @ 13:38)  T(F): , Max: 98.4 (11-13-21 @ 13:38)  HR: 68 (11-13-21 @ 13:38)  BP: 113/58 (11-13-21 @ 13:38)  BP(mean): 67 (11-12-21 @ 18:00)  RR: 17 (11-13-21 @ 13:38)  SpO2: 98% (11-13-21 @ 13:38)  Wt(kg): --    11-12-21 @ 07:01  -  11-13-21 @ 07:00  --------------------------------------------------------  IN: 922 mL / OUT: 800 mL / NET: 122 mL    11-13-21 @ 07:01  -  11-13-21 @ 14:34  --------------------------------------------------------  IN: 276 mL / OUT: 300 mL / NET: -24 mL        PHYSICAL EXAM:  Constitutional: NAD  Neck:  No JVD  Respiratory: CTAB/L  Cardiovascular: S1 and S2  Gastrointestinal: BS+, soft, NT/ND  Extremities: No peripheral edema  Neurological: A/O x 3, no focal deficits  Psychiatric: Normal mood, normal affect  : No Temple  Skin: No rashes  Access: Not applicable    LABS:                          8.8    15.30 )-----------( 574      ( 13 Nov 2021 07:48 )             27.8     Na(138)/K(3.9)/Cl(105)/HCO3(19)/BUN(72)/Cr(3.13)Glu(150)/Ca(8.6)/Mg(2.00)/PO4(3.4)    11-13 @ 07:48  Na(135)/K(4.2)/Cl(101)/HCO3(20)/BUN(93)/Cr(3.59)Glu(168)/Ca(8.7)/Mg(1.70)/PO4(3.7)    11-12 @ 04:28  Na(135)/K(4.2)/Cl(102)/HCO3(20)/BUN(97)/Cr(3.82)Glu(212)/Ca(8.8)/Mg(2.00)/PO4(3.4)    11-11 @ 05:06            ASSESSMENT/PLAN  Assessment:  86 y/o M w/ PMH HTN, HLD, T2DM, CAD (MI 2003, s/p 4 stents), Afib on Eliquis h/o R nephrolithiasis s/p ureteral stent (removed 1/2021) sent in by cardiologist and found to have rapid afib with hypotension   LILIAN that is all hemodynamic in nature   Hepatitis   CKD stage 4 at baseline and reduced nephron mass     1 Renal-  renal fxn improving, HR controlled at present  Renal sono when able   2 CVS- HR controlled on Amio at present . BP controlled at present as well ;  Elevated LFTS;  Trend LFT's    3 -Bladder instillation of chemo as an outpt to resume.  Is the ferritin an acute phase reactant?  4 Anemia- hgb improving s/p 1 unit prbcs yesterday    Edward Amaya NP-BC  [employer]  [phone]   on room air  afebrile  denies complaints    aMIOdarone    Tablet   Oral   aMIOdarone    Tablet 400 milliGRAM(s) Oral every 8 hours  chlorhexidine 4% Liquid 1 Application(s) Topical <User Schedule>  dextrose 40% Gel 15 Gram(s) Oral once  dextrose 5%. 1000 milliLiter(s) IV Continuous <Continuous>  dextrose 5%. 1000 milliLiter(s) IV Continuous <Continuous>  dextrose 50% Injectable 25 Gram(s) IV Push once  dextrose 50% Injectable 12.5 Gram(s) IV Push once  dextrose 50% Injectable 25 Gram(s) IV Push once  glucagon  Injectable 1 milliGRAM(s) IntraMuscular once  heparin  Infusion 1200 Unit(s)/Hr IV Continuous <Continuous>  insulin lispro (ADMELOG) corrective regimen sliding scale   SubCutaneous three times a day before meals  insulin lispro (ADMELOG) corrective regimen sliding scale   SubCutaneous at bedtime  metoprolol tartrate 25 milliGRAM(s) Oral two times a day  pantoprazole  Injectable 40 milliGRAM(s) IV Push two times a day  polyethylene glycol 3350 17 Gram(s) Oral daily  tamsulosin 0.4 milliGRAM(s) Oral at bedtime      VITAL:  T(C): , Max: 36.9 (11-13-21 @ 13:38)  T(F): , Max: 98.4 (11-13-21 @ 13:38)  HR: 68 (11-13-21 @ 13:38)  BP: 113/58 (11-13-21 @ 13:38)  BP(mean): 67 (11-12-21 @ 18:00)  RR: 17 (11-13-21 @ 13:38)  SpO2: 98% (11-13-21 @ 13:38)  Wt(kg): --    11-12-21 @ 07:01  -  11-13-21 @ 07:00  --------------------------------------------------------  IN: 922 mL / OUT: 800 mL / NET: 122 mL    11-13-21 @ 07:01  -  11-13-21 @ 14:34  --------------------------------------------------------  IN: 276 mL / OUT: 300 mL / NET: -24 mL        PHYSICAL EXAM:  Constitutional: NAD  Neck:  No JVD  Respiratory: CTAB/L  Cardiovascular: S1 and S2  Gastrointestinal: BS+, soft, NT/ND  Extremities: No peripheral edema  Neurological: A/O x 3, no focal deficits  Psychiatric: Normal mood, normal affect  : No Temple  Skin: No rashes  Access: Not applicable    LABS:                          8.8    15.30 )-----------( 574      ( 13 Nov 2021 07:48 )             27.8     Na(138)/K(3.9)/Cl(105)/HCO3(19)/BUN(72)/Cr(3.13)Glu(150)/Ca(8.6)/Mg(2.00)/PO4(3.4)    11-13 @ 07:48  Na(135)/K(4.2)/Cl(101)/HCO3(20)/BUN(93)/Cr(3.59)Glu(168)/Ca(8.7)/Mg(1.70)/PO4(3.7)    11-12 @ 04:28  Na(135)/K(4.2)/Cl(102)/HCO3(20)/BUN(97)/Cr(3.82)Glu(212)/Ca(8.8)/Mg(2.00)/PO4(3.4)    11-11 @ 05:06            ASSESSMENT/PLAN  Assessment:  86 y/o M w/ PMH HTN, HLD, T2DM, CAD (MI 2003, s/p 4 stents), Afib on Eliquis h/o R nephrolithiasis s/p ureteral stent (removed 1/2021) sent in by cardiologist and found to have rapid afib with hypotension   LILIAN that is all hemodynamic in nature   Hepatitis   CKD stage 4 at baseline and reduced nephron mass     1 Renal-  renal fxn improving, HR controlled at present  Renal sono when able   2 CVS- HR controlled on Amio at present . BP controlled at present as well ;  Elevated LFTS;  Trend LFT's    3 -Bladder instillation of chemo as an outpt to resume.  Is the ferritin an acute phase reactant?  4 Anemia- hgb improving s/p 1 unit prbcs yesterday    Edward Amaya NP-BC  [employer]  [phone]

## 2021-11-13 NOTE — CHART NOTE - NSCHARTNOTEFT_GEN_A_CORE
Brief EP fellow note:  Telemetry reviewed, patient remains in sinus rhythm with infrequent PVCs  - Continue metoprolol and amiodarone 400mg q8h x 12 doses then 200mg daily  - Continue to monitor on telemetry    Allyn Braxton MD  Cardiology Fellow  [phone]  All Cardiology service information can be found 24/7 on [email or web address], password: [email or web address]

## 2021-11-13 NOTE — PROGRESS NOTE ADULT - ASSESSMENT
RACHEL COMBS is a 85y Male with HTN, HLD, T2DM, CAD (MI 2003, s/p 4 stents), Afib on Eliquis h/o R nephrolithiasis s/p ureteral stent (removed 1/2021), bladder cancer on chemo (most recent dose ~2 weeks PTA), who initially presented from his cardiologist after onset of afib/rvr during a planned nuclear stress testing, noted to have Hgb 9.3 on admission, dropped to 7.2 after hep started, GI consulted for anemia in setting of cardiac evaluation with plans for at least 30 days of AC after MARCELLA/DCCV.    #. Anemia, unclear etiology c/b bladder cancer and intermittent hematuria - emergent endoscopy is not warranted at this time and patient w/o overt GIB on hep gtt.    #. Constipation  #. Transaminemia    Recommendations:  - Will monitor Hgb over the weekend and re-evaluate for endoscopic evaluation  - No overt GIB noted  - Consider evaluation for other sources of anemia  - Heme on board  - Okay to c/w hep gtt w/o evidence of active GIB  - AC per primary/cards  - Monitor Hgb, transfuse if Hgb < 8 in setting of active cardiac issue  - PPI IV BID, transition to po upon discharge  - Miralax daily  - Consider SMOG enema  - RUQ abdominal US  - Check hepatitis panel  - Check INR and monitor           85y Male with HTN, HLD, T2DM, CAD (MI 2003, s/p 4 stents), Afib on Eliquis h/o R nephrolithiasis s/p ureteral stent (removed 1/2021), bladder cancer on chemo (most recent dose ~2 weeks PTA), who initially presented from his cardiologist after onset of afib/rvr during a planned nuclear stress testing, noted to have Hgb 9.3 on admission, dropped to 7.2 after hep started, GI consulted for anemia in setting of cardiac evaluation with plans for at least 30 days of AC after MARCELLA/DCCV.    #. Anemia, unclear etiology c/b bladder cancer and intermittent hematuria - emergent endoscopy is not warranted at this time and patient w/o overt GIB on hep gtt.    #. Constipation  #. Transaminemia    Recommendations:  - Pt currently with supratherapeutic INR (increases the likelihood of bleeding)   - Will monitor Hgb over the weekend and re-evaluate for endoscopic evaluation  - No overt GIB noted  - Consider evaluation for other sources of anemia  - Heme on board  - Okay to c/w hep gtt w/o evidence of active GIB  - AC per primary/cards  - Monitor Hgb, transfuse if Hgb < 8 in setting of active cardiac issue  - PPI IV BID, transition to po upon discharge  - Miralax daily  - Consider SMOG enema  - RUQ abdominal US  - Check hepatitis panel  - Check INR and monitor      Thank you for involving us in the care of this patient, please reach out if any further questions.     Smith Khan MD  Gastroenterology Fellow, PGY5    Available on [employer] Teams  [phone] ([facility])  [id number] ([facility])  Please contact on call fellow weekdays after 5pm-7am and weekends: [phone]

## 2021-11-14 LAB
ALBUMIN SERPL ELPH-MCNC: 2.8 G/DL — LOW (ref 3.3–5)
ALP SERPL-CCNC: 169 U/L — HIGH (ref 40–120)
ALT FLD-CCNC: 114 U/L — HIGH (ref 4–41)
ANION GAP SERPL CALC-SCNC: 15 MMOL/L — HIGH (ref 7–14)
APTT BLD: 87.6 SEC — HIGH (ref 27–36.3)
APTT BLD: >200 SEC — CRITICAL HIGH (ref 27–36.3)
APTT BLD: >200 SEC — SIGNIFICANT CHANGE UP (ref 27–36.3)
AST SERPL-CCNC: 60 U/L — HIGH (ref 4–40)
BILIRUB SERPL-MCNC: 0.6 MG/DL — SIGNIFICANT CHANGE UP (ref 0.2–1.2)
BUN SERPL-MCNC: 70 MG/DL — HIGH (ref 7–23)
CALCIUM SERPL-MCNC: 8.4 MG/DL — SIGNIFICANT CHANGE UP (ref 8.4–10.5)
CHLORIDE SERPL-SCNC: 106 MMOL/L — SIGNIFICANT CHANGE UP (ref 98–107)
CO2 SERPL-SCNC: 19 MMOL/L — LOW (ref 22–31)
CREAT SERPL-MCNC: 3.09 MG/DL — HIGH (ref 0.5–1.3)
DAT POLY-SP REAG RBC QL: POSITIVE — SIGNIFICANT CHANGE UP
GLUCOSE BLDC GLUCOMTR-MCNC: 142 MG/DL — HIGH (ref 70–99)
GLUCOSE BLDC GLUCOMTR-MCNC: 167 MG/DL — HIGH (ref 70–99)
GLUCOSE BLDC GLUCOMTR-MCNC: 173 MG/DL — HIGH (ref 70–99)
GLUCOSE BLDC GLUCOMTR-MCNC: 244 MG/DL — HIGH (ref 70–99)
GLUCOSE SERPL-MCNC: 138 MG/DL — HIGH (ref 70–99)
HAPTOGLOB SERPL-MCNC: 144 MG/DL — SIGNIFICANT CHANGE UP (ref 34–200)
HCT VFR BLD CALC: 26.6 % — LOW (ref 39–50)
HCT VFR BLD CALC: 26.7 % — LOW (ref 39–50)
HGB BLD-MCNC: 8.6 G/DL — LOW (ref 13–17)
HGB BLD-MCNC: 9 G/DL — LOW (ref 13–17)
LDH SERPL L TO P-CCNC: 237 U/L — HIGH (ref 135–225)
MAGNESIUM SERPL-MCNC: 1.8 MG/DL — SIGNIFICANT CHANGE UP (ref 1.6–2.6)
MCHC RBC-ENTMCNC: 31 PG — SIGNIFICANT CHANGE UP (ref 27–34)
MCHC RBC-ENTMCNC: 32 PG — SIGNIFICANT CHANGE UP (ref 27–34)
MCHC RBC-ENTMCNC: 32.2 GM/DL — SIGNIFICANT CHANGE UP (ref 32–36)
MCHC RBC-ENTMCNC: 33.8 GM/DL — SIGNIFICANT CHANGE UP (ref 32–36)
MCV RBC AUTO: 94.7 FL — SIGNIFICANT CHANGE UP (ref 80–100)
MCV RBC AUTO: 96.4 FL — SIGNIFICANT CHANGE UP (ref 80–100)
NRBC # BLD: 0 /100 WBCS — SIGNIFICANT CHANGE UP
NRBC # BLD: 0 /100 WBCS — SIGNIFICANT CHANGE UP
NRBC # FLD: 0 K/UL — SIGNIFICANT CHANGE UP
NRBC # FLD: 0 K/UL — SIGNIFICANT CHANGE UP
PHOSPHATE SERPL-MCNC: 3.7 MG/DL — SIGNIFICANT CHANGE UP (ref 2.5–4.5)
PLATELET # BLD AUTO: 580 K/UL — HIGH (ref 150–400)
PLATELET # BLD AUTO: 581 K/UL — HIGH (ref 150–400)
POTASSIUM SERPL-MCNC: 3.9 MMOL/L — SIGNIFICANT CHANGE UP (ref 3.5–5.3)
POTASSIUM SERPL-SCNC: 3.9 MMOL/L — SIGNIFICANT CHANGE UP (ref 3.5–5.3)
PROT SERPL-MCNC: 6.3 G/DL — SIGNIFICANT CHANGE UP (ref 6–8.3)
RBC # BLD: 2.77 M/UL — LOW (ref 4.2–5.8)
RBC # BLD: 2.81 M/UL — LOW (ref 4.2–5.8)
RBC # BLD: 2.81 M/UL — LOW (ref 4.2–5.8)
RBC # FLD: 13.6 % — SIGNIFICANT CHANGE UP (ref 10.3–14.5)
RBC # FLD: 13.9 % — SIGNIFICANT CHANGE UP (ref 10.3–14.5)
RETICS #: 96.4 K/UL — SIGNIFICANT CHANGE UP (ref 25–125)
RETICS/RBC NFR: 3.4 % — HIGH (ref 0.5–2.5)
SODIUM SERPL-SCNC: 140 MMOL/L — SIGNIFICANT CHANGE UP (ref 135–145)
WBC # BLD: 19.68 K/UL — HIGH (ref 3.8–10.5)
WBC # BLD: 19.72 K/UL — HIGH (ref 3.8–10.5)
WBC # FLD AUTO: 19.68 K/UL — HIGH (ref 3.8–10.5)
WBC # FLD AUTO: 19.72 K/UL — HIGH (ref 3.8–10.5)

## 2021-11-14 PROCEDURE — 86077 PHYS BLOOD BANK SERV XMATCH: CPT

## 2021-11-14 PROCEDURE — 99232 SBSQ HOSP IP/OBS MODERATE 35: CPT

## 2021-11-14 RX ORDER — HEPARIN SODIUM 5000 [USP'U]/ML
500 INJECTION INTRAVENOUS; SUBCUTANEOUS
Qty: 25000 | Refills: 0 | Status: DISCONTINUED | OUTPATIENT
Start: 2021-11-14 | End: 2021-11-15

## 2021-11-14 RX ADMIN — TAMSULOSIN HYDROCHLORIDE 0.4 MILLIGRAM(S): 0.4 CAPSULE ORAL at 21:41

## 2021-11-14 RX ADMIN — HEPARIN SODIUM 700 UNIT(S)/HR: 5000 INJECTION INTRAVENOUS; SUBCUTANEOUS at 12:04

## 2021-11-14 RX ADMIN — AMIODARONE HYDROCHLORIDE 400 MILLIGRAM(S): 400 TABLET ORAL at 21:41

## 2021-11-14 RX ADMIN — HEPARIN SODIUM 0 UNIT(S)/HR: 5000 INJECTION INTRAVENOUS; SUBCUTANEOUS at 10:59

## 2021-11-14 RX ADMIN — CHLORHEXIDINE GLUCONATE 1 APPLICATION(S): 213 SOLUTION TOPICAL at 07:34

## 2021-11-14 RX ADMIN — INSULIN GLARGINE 5 UNIT(S): 100 INJECTION, SOLUTION SUBCUTANEOUS at 21:39

## 2021-11-14 RX ADMIN — AMIODARONE HYDROCHLORIDE 400 MILLIGRAM(S): 400 TABLET ORAL at 05:02

## 2021-11-14 RX ADMIN — PANTOPRAZOLE SODIUM 40 MILLIGRAM(S): 20 TABLET, DELAYED RELEASE ORAL at 05:01

## 2021-11-14 RX ADMIN — HEPARIN SODIUM 5 UNIT(S)/HR: 5000 INJECTION INTRAVENOUS; SUBCUTANEOUS at 13:37

## 2021-11-14 RX ADMIN — Medication 1: at 12:04

## 2021-11-14 RX ADMIN — Medication 2: at 17:25

## 2021-11-14 RX ADMIN — PANTOPRAZOLE SODIUM 40 MILLIGRAM(S): 20 TABLET, DELAYED RELEASE ORAL at 17:28

## 2021-11-14 RX ADMIN — Medication 25 MILLIGRAM(S): at 05:02

## 2021-11-14 RX ADMIN — AMIODARONE HYDROCHLORIDE 400 MILLIGRAM(S): 400 TABLET ORAL at 15:01

## 2021-11-14 RX ADMIN — HEPARIN SODIUM 0 UNIT(S)/HR: 5000 INJECTION INTRAVENOUS; SUBCUTANEOUS at 03:25

## 2021-11-14 RX ADMIN — HEPARIN SODIUM 850 UNIT(S)/HR: 5000 INJECTION INTRAVENOUS; SUBCUTANEOUS at 04:31

## 2021-11-14 RX ADMIN — Medication 25 MILLIGRAM(S): at 17:28

## 2021-11-14 NOTE — PROGRESS NOTE ADULT - PROBLEM SELECTOR PLAN 6
- DVT: hep gtt for A.FIB  - diet: CC with renal restrictions  - dispo: pending further stabilization PT recs rehab - DVT: hep gtt for A.FIB in setting of LILIAN, anemia, and possible GI bleed  - diet: CC with renal restrictions  - dispo: pending further stabilization PT recs rehab

## 2021-11-14 NOTE — CHART NOTE - NSCHARTNOTEFT_GEN_A_CORE
Brief EP fellow note:  Telemetry reviewed, patient remains in sinus rhythm with infrequent PVCs  - Continue metoprolol and amiodarone 400mg q8h x 12 doses then 200mg daily  - Continue to monitor on telemetry    Allyn Braxton MD  Cardiology Fellow  [phone]  All Cardiology service information can be found 24/7 on [email or web address], password: [email or web address].

## 2021-11-14 NOTE — CHART NOTE - NSCHARTNOTEFT_GEN_A_CORE
Reviewed elevated wbc with Dr. Rudd pt with no source of infection.  Will continue to trend . pt hemodynamically stable

## 2021-11-14 NOTE — PROGRESS NOTE ADULT - PROBLEM SELECTOR PLAN 5
- a1c 5.9 at goal  - FS premeal and qHS  - AISS PRN  - added lantus 5 for moderate hyperglycemia; adjust PRN

## 2021-11-14 NOTE — CHART NOTE - NSCHARTNOTEFT_GEN_A_CORE
ACP MEDICINE COVERAGE - Medicine Subsequent Hospital Care Note    CC:  HPI/Subjective:    ROS:  Denies fever, chills, diaphoresis, malaise, night sweats, generalized weakness, headache, changes in vision, dizziness, cough, sputum production, wheezing, hemoptysis, chest pain, palpitations, shortness of breath, dyspnea on exertion, PND, dysphagia, new abdominal pain, nausea, vomiting, diarrhea, constipation, melena, hematochezia, dysuria, increased urinary frequency/urgency, hematuria, nocturia, numbness/weakness/tingling, any other complaints.    [  ] I spoke with the attending, nurse, and family to obtain the history.  [  ] Unable to obtain history due to ___________.    Vital Signs Last 24 Hrs  T(C): 36.9 (21 @ 14:50), Max: 36.9 (21 @ 22:19)  T(F): 98.5 (21 @ 14:50), Max: 98.5 (21 @ 22:19)  HR: 65 (21 @ 17:25) (65 - 79)  BP: 107/51 (21 @ 17:25) (107/51 - 121/49)  RR: 16 (21 @ 14:50) (16 - 18)  SpO2: 99% (21 @ 14:50) (98% - 100%) on (O2)    PHYSICAL EXAM:  Constitutional: NAD, well-developed, well-nourished  Ears, nose, Mouth, and Throat: normal external ears and nose, normal hearing, moist oral mucosa  Eyes: normal conjunctiva, EOMI, PEERL  Neck: supple, no JVD  Respiratory: Clear to auscultation bilaterally. No wheezes, rales or rhonchi. Normal respiratory effort  Cardiovascular: regular rate and rhythm, S1 and S2, no murmurs, rubs or gallops, no edema, 2+ peripheral pulses  Gastrointestinal: soft, nontender, nondistended, +bowel sounds, no hernia  Skin: warm, dry, no rash  Neurologic: sensation grossly intact, CN grossly intact, non-focal exam  Musculoskeletal: no clubbing, no cyanosis, no joint swelling  Psychiatric: A&Ox3, appropriate mood, affect    LABS:                        9.0    19.72 )-----------( 581      ( 2021 07:31 )             26.6         140  |  106  |  70<H>  ----------------------------<  138<H>  3.9   |  19<L>  |  3.09<H>    Ca    8.4      2021 07:31  Phos  3.7       Mg     1.80         TPro  6.3  /  Alb  2.8<L>  /  TBili  0.6  /  DBili  x   /  AST  60<H>  /  ALT  114<H>  /  AlkPhos  169<H>      PT/INR: PT: x | INR: x (21 @ 10:22)  PTT: >200.0 sec (21 @ 10:22)  PT/INR: PT: x | INR: x (21 @ 02:37)  PTT: >200.0 sec (21 @ 02:37)  PT/INR: PT: x | INR: x (21 @ 18:41)  PTT: >200.0 sec (21 @ 18:41)  PT/INR: PT: 16.8 sec | INR: 1.49 ratio (21 @ 09:39)  PTT: >200.0 sec (21 @ 09:39)  PT/INR: PT: x | INR: x (21 @ 07:48)  PTT: >200.0 sec (21 @ 07:48)  PT/INR: PT: x | INR: x (21 @ 04:28)  PTT: 68.7 sec (21 @ 04:28)  PT/INR: PT: x | INR: x (21 @ 05:06)  PTT: 93.5 sec (21 @ 05:06)  PT/INR: PT: x | INR: x (11-10-21 @ 16:52)  PTT: 62.9 sec (11-10-21 @ 16:52)  PT/INR: PT: x | INR: x (11-10-21 @ 08:53)  PTT: 79.4 sec (11-10-21 @ 08:53)  PT/INR: PT: x | INR: x (11-10-21 @ 01:32)  PTT: 51.1 sec (11-10-21 @ 01:32)    CARDIAC ENZYMES            Serum Pro-Brain Natriuretic Peptide: 83267 pg/mL (21 @ 15:37)    D-Dimer Assay:     Urinanalysis Basic (21 @ 18:35):  Color: Light Yellow / Appearance: Slightly Turbid / S.014 / pH: --  Gluc: -- / Ketone: Negative / Bili: Negative / Urobili: <2 mg/dL  Blood: -- / Protein: 100 mg/dL / Nitrite: Negative  Leuk Esterase: Large / RBC: 2 / WBC: 444  Sq Epi: -- / Non Sq Epi: -- / Bacteria: Negative      Blood Gas Venous (21 @ 18:35):  pH: 7.43 | HCO3: 21 | pCO2: 31 | pO2: 120 | Lactate: 0.9  pH: 7.35 | HCO3: 21 | pCO2: 38 | pO2: 40 | Lactate: 2.1      Blood Gas Arterial (21 @ 18:35):      CAPILLARY BLOOD GLUCOSE:  POCT Blood Glucose: 244 mg/dL (21 @ 17:12)  POCT Blood Glucose: 173 mg/dL (21 @ 11:21)  POCT Blood Glucose: 142 mg/dL (21 @ 07:40)      COVID PCR:      RADIOLOGY:    MEDICATIONS  (STANDING):  aMIOdarone    Tablet   Oral   aMIOdarone    Tablet 400 milliGRAM(s) Oral every 8 hours  chlorhexidine 4% Liquid 1 Application(s) Topical <User Schedule>  dextrose 40% Gel 15 Gram(s) Oral once  dextrose 5%. 1000 milliLiter(s) (50 mL/Hr) IV Continuous <Continuous>  dextrose 5%. 1000 milliLiter(s) (100 mL/Hr) IV Continuous <Continuous>  dextrose 50% Injectable 25 Gram(s) IV Push once  dextrose 50% Injectable 12.5 Gram(s) IV Push once  dextrose 50% Injectable 25 Gram(s) IV Push once  glucagon  Injectable 1 milliGRAM(s) IntraMuscular once  heparin  Infusion 500 Unit(s)/Hr (5 mL/Hr) IV Continuous <Continuous>  insulin glargine Injectable (LANTUS) 5 Unit(s) SubCutaneous at bedtime  insulin lispro (ADMELOG) corrective regimen sliding scale   SubCutaneous three times a day before meals  insulin lispro (ADMELOG) corrective regimen sliding scale   SubCutaneous at bedtime  metoprolol tartrate 25 milliGRAM(s) Oral two times a day  pantoprazole  Injectable 40 milliGRAM(s) IV Push two times a day  polyethylene glycol 3350 17 Gram(s) Oral daily  tamsulosin 0.4 milliGRAM(s) Oral at bedtime    MEDICATIONS  (PRN):    I&O's Summary    2021 07:  -  2021 07:00  --------------------------------------------------------  IN: 1527.5 mL / OUT: 1550 mL / NET: -22.5 mL    2021 07:  -  2021 18:35  --------------------------------------------------------  IN: 480 mL / OUT: 450 mL / NET: 30 mL      I reviewed the above labs, radiology, medications, tests, telemetry, and EKG interpretation.    ASSESSMENT/PLAN:    - Clinical findings, labs, tests, telemetry, and ekg reviewed with attending. Will monitor patient closely.       Low complexity/risk (30min)  afib cards following: amio load then 200mg daily: moniotr on telemetry   -heparin gtt: consistently over 200: switched to pt specific decreased to 500 u/hr : next PTT 730pm ***********    -elevated lfts: f/u acute hepatitis panel: gi following :- Check INR and monitor  anion gap: monitor----------------  wbc 19: trend   anemia: gi following: poss egd *****************heme following: f/u heme labs: called heme fellow on board will f/u recs   -monitor bm: ?consider smog enema: pt with pos bm   d/w Dr. Rudd medical attending

## 2021-11-14 NOTE — PROGRESS NOTE ADULT - ASSESSMENT
No pain, no shortness of breath    Review of systems: All 10 points ROS was obtained except as above.     aMIOdarone    Tablet   Oral   aMIOdarone    Tablet 400 milliGRAM(s) Oral every 8 hours  chlorhexidine 4% Liquid 1 Application(s) Topical <User Schedule>  dextrose 40% Gel 15 Gram(s) Oral once  dextrose 5%. 1000 milliLiter(s) IV Continuous <Continuous>  dextrose 5%. 1000 milliLiter(s) IV Continuous <Continuous>  dextrose 50% Injectable 25 Gram(s) IV Push once  dextrose 50% Injectable 12.5 Gram(s) IV Push once  dextrose 50% Injectable 25 Gram(s) IV Push once  glucagon  Injectable 1 milliGRAM(s) IntraMuscular once  heparin  Infusion. 1000 Unit(s)/Hr IV Continuous <Continuous>  insulin glargine Injectable (LANTUS) 5 Unit(s) SubCutaneous at bedtime  insulin lispro (ADMELOG) corrective regimen sliding scale   SubCutaneous three times a day before meals  insulin lispro (ADMELOG) corrective regimen sliding scale   SubCutaneous at bedtime  metoprolol tartrate 25 milliGRAM(s) Oral two times a day  pantoprazole  Injectable 40 milliGRAM(s) IV Push two times a day  polyethylene glycol 3350 17 Gram(s) Oral daily  tamsulosin 0.4 milliGRAM(s) Oral at bedtime      VITAL:  T(C): , Max: 36.9 (11-13-21 @ 13:38)  T(F): , Max: 98.5 (11-13-21 @ 22:19)  HR: 72 (11-14-21 @ 06:00)  BP: 121/49 (11-14-21 @ 06:00)  BP(mean): --  RR: 18 (11-14-21 @ 06:00)  SpO2: 100% (11-14-21 @ 06:00)  Wt(kg): --    11-13-21 @ 07:01  -  11-14-21 @ 07:00  --------------------------------------------------------  IN: 1527.5 mL / OUT: 1550 mL / NET: -22.5 mL    11-14-21 @ 07:01  -  11-14-21 @ 09:28  --------------------------------------------------------  IN: 120 mL / OUT: 100 mL / NET: 20 mL        PHYSICAL EXAM:   Constitutional: NAD  Neck:  No JVD  Respiratory: CTAB/L  Cardiovascular: S1 and S2  Gastrointestinal: BS+, soft, NT/ND  Extremities: No peripheral edema  Neurological: A/O x 3, no focal deficits  Psychiatric: Normal mood, normal affect  : No Temple  Skin: No rashes  Access: Not applicable    LABS:                          9.0    19.72 )-----------( 581      ( 14 Nov 2021 07:31 )             26.6     Na(138)/K(3.9)/Cl(105)/HCO3(19)/BUN(72)/Cr(3.13)Glu(150)/Ca(8.6)/Mg(2.00)/PO4(3.4)    11-13 @ 07:48  Na(135)/K(4.2)/Cl(101)/HCO3(20)/BUN(93)/Cr(3.59)Glu(168)/Ca(8.7)/Mg(1.70)/PO4(3.7)    11-12 @ 04:28    Imaging      EXAM:  US ABDOMEN RT UPR QUADRANT    PROCEDURE DATE:  Nov 12 2021     INTERPRETATION:  CLINICAL INFORMATION: Elevated LFTs.  COMPARISON: CT abdomen pelvis 8/13/2021.  TECHNIQUE: Sonography of the right upper quadrant.  IMPRESSION:  Cholelithiasis without sonographic evidence of acute cholecystitis.        ASSESSMENT/PLAN  Assessment:  84 y/o M w/ PMH HTN, HLD, T2DM, CAD (MI 2003, s/p 4 stents), Afib on Eliquis h/o R nephrolithiasis s/p ureteral stent (removed 1/2021) sent in by cardiologist and found to have rapid afib with hypotension   LILIAN that is all hemodynamic in nature   Hepatitis   CKD stage 4 at baseline and reduced nephron mass     1 Renal-  renal fxn improving, HR controlled at present  2 CVS- HR controlled on Amio at present . BP controlled at present as well ;  Elevated LFTS;  Trend LFT's    3 -Bladder instillation of chemo as an outpt to resume.  Is the ferritin an acute phase reactant?  4 Anemia- hgb improving s/p 1 unit prbcs yesterday      Edward Amaya NP-BC  [facility]  [phone]   OOB to chair   on room air  afebrile  denies complaints    aMIOdarone    Tablet   Oral   aMIOdarone    Tablet 400 milliGRAM(s) Oral every 8 hours  chlorhexidine 4% Liquid 1 Application(s) Topical <User Schedule>  dextrose 40% Gel 15 Gram(s) Oral once  dextrose 5%. 1000 milliLiter(s) IV Continuous <Continuous>  dextrose 5%. 1000 milliLiter(s) IV Continuous <Continuous>  dextrose 50% Injectable 25 Gram(s) IV Push once  dextrose 50% Injectable 12.5 Gram(s) IV Push once  dextrose 50% Injectable 25 Gram(s) IV Push once  glucagon  Injectable 1 milliGRAM(s) IntraMuscular once  heparin  Infusion. 1000 Unit(s)/Hr IV Continuous <Continuous>  insulin glargine Injectable (LANTUS) 5 Unit(s) SubCutaneous at bedtime  insulin lispro (ADMELOG) corrective regimen sliding scale   SubCutaneous three times a day before meals  insulin lispro (ADMELOG) corrective regimen sliding scale   SubCutaneous at bedtime  metoprolol tartrate 25 milliGRAM(s) Oral two times a day  pantoprazole  Injectable 40 milliGRAM(s) IV Push two times a day  polyethylene glycol 3350 17 Gram(s) Oral daily  tamsulosin 0.4 milliGRAM(s) Oral at bedtime      VITAL:  T(C): , Max: 36.9 (11-13-21 @ 13:38)  T(F): , Max: 98.5 (11-13-21 @ 22:19)  HR: 72 (11-14-21 @ 06:00)  BP: 121/49 (11-14-21 @ 06:00)  BP(mean): --  RR: 18 (11-14-21 @ 06:00)  SpO2: 100% (11-14-21 @ 06:00)  Wt(kg): --    11-13-21 @ 07:01  -  11-14-21 @ 07:00  --------------------------------------------------------  IN: 1527.5 mL / OUT: 1550 mL / NET: -22.5 mL    11-14-21 @ 07:01  -  11-14-21 @ 09:28  --------------------------------------------------------  IN: 120 mL / OUT: 100 mL / NET: 20 mL        PHYSICAL EXAM:   Constitutional: NAD  Neck:  No JVD  Respiratory: CTAB/L  Cardiovascular: S1 and S2  Gastrointestinal: BS+, soft, NT/ND  Extremities: No peripheral edema  Neurological: A/O x 3, no focal deficits  Psychiatric: Normal mood, normal affect  : No Temple  Skin: No rashes  Access: Not applicable    LABS:                          9.0    19.72 )-----------( 581      ( 14 Nov 2021 07:31 )             26.6     Na(138)/K(3.9)/Cl(105)/HCO3(19)/BUN(72)/Cr(3.13)Glu(150)/Ca(8.6)/Mg(2.00)/PO4(3.4)    11-13 @ 07:48  Na(135)/K(4.2)/Cl(101)/HCO3(20)/BUN(93)/Cr(3.59)Glu(168)/Ca(8.7)/Mg(1.70)/PO4(3.7)    11-12 @ 04:28    Imaging      EXAM:  US ABDOMEN RT UPR QUADRANT    PROCEDURE DATE:  Nov 12 2021     INTERPRETATION:  CLINICAL INFORMATION: Elevated LFTs.  COMPARISON: CT abdomen pelvis 8/13/2021.  TECHNIQUE: Sonography of the right upper quadrant.  IMPRESSION:  Cholelithiasis without sonographic evidence of acute cholecystitis.        ASSESSMENT/PLAN  Assessment:  86 y/o M w/ PMH HTN, HLD, T2DM, CAD (MI 2003, s/p 4 stents), Afib on Eliquis h/o R nephrolithiasis s/p ureteral stent (removed 1/2021) sent in by cardiologist and found to have rapid afib with hypotension   LILIAN that is all hemodynamic in nature   Hepatitis   CKD stage 4 at baseline and reduced nephron mass     1 Renal-  renal fxn improving, HR controlled at present  2 CVS- HR controlled on Amio at present . BP controlled at present as well ;  Alkaline phos trending up, AST/ALT trending down;  Trend LFT's    3 -Bladder instillation of chemo as an outpt to resume.  Is the ferritin an acute phase reactant?  4 Anemia- hgb improving s/p 1 unit prbcs       Edward Amaya NP-BC  [facility]  [phone]

## 2021-11-14 NOTE — PROGRESS NOTE ADULT - PROBLEM SELECTOR PLAN 4
- s/p 1 pRBC for hb 7.1  - maintain hgb >7, currently improved to 8s  - checking labs per heme/onc, LDH, david, reticulocyte. David + retic slightly elevated will have to touch base w/ heme/onc - s/p 1 pRBC for hb 7.1  - maintain hgb >7, currently improved to 8s  - checking labs per heme/onc, LDH, david, reticulocyte. David + retic slightly elevated will have to touch base w/ heme/onc  - FOBT +: f/u gastroenterology recs regarding need for endoscopic workup inpatient vs. outpatient `

## 2021-11-14 NOTE — PROGRESS NOTE ADULT - PROBLEM SELECTOR PLAN 3
- c/w metoprolol  - no ASA in setting of possible GIB c/b anemia reassess for possible need for ASA  - holding ACE ISO LLIIAN on CKD, reinstate PRN  - check echo - c/w metoprolol  - no ASA in setting of possible GIB c/b anemia reassess for possible need for ASA  - holding ACE ISO LILIAN on CKD, reinstate PRN  - echo: < from: Transthoracic Echocardiogram (11.10.21 @ 10:42) >  1. Mitral annular calcification, otherwise normal mitral  valve. Mild-moderate mitral regurgitation.  2. Moderately dilated left atrium.  LA volume index = 48  cc/m2.  3. Normal left ventricular internal dimensions and wall  thicknesses.  4. Mild global left ventricular systolic dysfunction  - f/u cards/EP recs

## 2021-11-14 NOTE — PROGRESS NOTE ADULT - SUBJECTIVE AND OBJECTIVE BOX
Guanakito Rudd MD Hospitalist  Pager: [phone]  After 7: Night Team pager    Patient is a 85y old  Male who presents with a chief complaint of Afib (14 Nov 2021 09:27)      SUBJECTIVE / OVERNIGHT EVENTS: Overnight, no acute events. Patient seen and examined at bedside this AM.    MEDICATIONS  (STANDING):  aMIOdarone    Tablet   Oral   aMIOdarone    Tablet 400 milliGRAM(s) Oral every 8 hours  chlorhexidine 4% Liquid 1 Application(s) Topical <User Schedule>  dextrose 40% Gel 15 Gram(s) Oral once  dextrose 5%. 1000 milliLiter(s) (50 mL/Hr) IV Continuous <Continuous>  dextrose 5%. 1000 milliLiter(s) (100 mL/Hr) IV Continuous <Continuous>  dextrose 50% Injectable 25 Gram(s) IV Push once  dextrose 50% Injectable 12.5 Gram(s) IV Push once  dextrose 50% Injectable 25 Gram(s) IV Push once  glucagon  Injectable 1 milliGRAM(s) IntraMuscular once  heparin  Infusion. 1000 Unit(s)/Hr (10 mL/Hr) IV Continuous <Continuous>  insulin glargine Injectable (LANTUS) 5 Unit(s) SubCutaneous at bedtime  insulin lispro (ADMELOG) corrective regimen sliding scale   SubCutaneous three times a day before meals  insulin lispro (ADMELOG) corrective regimen sliding scale   SubCutaneous at bedtime  metoprolol tartrate 25 milliGRAM(s) Oral two times a day  pantoprazole  Injectable 40 milliGRAM(s) IV Push two times a day  polyethylene glycol 3350 17 Gram(s) Oral daily  tamsulosin 0.4 milliGRAM(s) Oral at bedtime    MEDICATIONS  (PRN):      Vital Signs Last 24 Hrs  T(C): 36.8 (14 Nov 2021 06:00), Max: 36.9 (13 Nov 2021 13:38)  T(F): 98.3 (14 Nov 2021 06:00), Max: 98.5 (13 Nov 2021 22:19)  HR: 72 (14 Nov 2021 06:00) (68 - 79)  BP: 121/49 (14 Nov 2021 06:00) (113/58 - 130/56)  BP(mean): --  RR: 18 (14 Nov 2021 06:00) (17 - 18)  SpO2: 100% (14 Nov 2021 06:00) (98% - 100%)  CAPILLARY BLOOD GLUCOSE      POCT Blood Glucose.: 142 mg/dL (14 Nov 2021 07:40)  POCT Blood Glucose.: 169 mg/dL (13 Nov 2021 23:13)  POCT Blood Glucose.: 365 mg/dL (13 Nov 2021 21:44)  POCT Blood Glucose.: 194 mg/dL (13 Nov 2021 21:18)  POCT Blood Glucose.: 265 mg/dL (13 Nov 2021 16:51)  POCT Blood Glucose.: 231 mg/dL (13 Nov 2021 11:23)    I&O's Summary    13 Nov 2021 07:01  -  14 Nov 2021 07:00  --------------------------------------------------------  IN: 1527.5 mL / OUT: 1550 mL / NET: -22.5 mL    14 Nov 2021 07:01  -  14 Nov 2021 09:50  --------------------------------------------------------  IN: 120 mL / OUT: 250 mL / NET: -130 mL        PHYSICAL EXAM:  GENERAL: NAD  EYES: EOMI, PERRLA, conjunctiva and sclera clear  NECK:  No JVD  CHEST/LUNG: CTABL ; No wheeze  HEART: RRR; No murmurs  ABDOMEN: Soft, Nontender, Nondistended; Bowel sounds present  : No Temple  EXTREMITIES:  2+ Peripheral Pulses, No edema  PSYCH: AAOx3  NEUROLOGY: alert and oriented  SKIN: No rashes or lesions. No sacral ulcer    LABS:                        9.0    19.72 )-----------( 581      ( 14 Nov 2021 07:31 )             26.6     11-14    140  |  106  |  70<H>  ----------------------------<  138<H>  3.9   |  19<L>  |  3.09<H>    Ca    8.4      14 Nov 2021 07:31  Phos  3.7     11-14  Mg     1.80     11-14    TPro  6.3  /  Alb  2.8<L>  /  TBili  0.6  /  DBili  x   /  AST  60<H>  /  ALT  114<H>  /  AlkPhos  169<H>  11-14    PT/INR - ( 13 Nov 2021 09:39 )   PT: 16.8 sec;   INR: 1.49 ratio         PTT - ( 14 Nov 2021 02:37 )  PTT:>200.0 sec          Microbiology;        RADIOLOGY & ADDITIONAL TESTS:    Imaging Personally Reviewed:          Consultant(s) Notes Reviewed: cardiology, nephrology    Care Discussed with Consultants/Other Providers: cardiology, nephrology

## 2021-11-14 NOTE — PROGRESS NOTE ADULT - PROBLEM SELECTOR PLAN 1
- initially afib RVR initially c/b hypotension requiring CCU level of care now improved s/p dilt gtt amio gtt now transitioned to oral amio load and taper  - c/w metoprolol 25 BID, amio load and subsequent maintenance dosing  - CCU/cardiology following  - c/w hep gtt for AC while patient is anemic with potential GIB  - f/u echo - initially afib RVR initially c/b hypotension requiring CCU level of care now improved s/p dilt gtt amio gtt now transitioned to oral amio load and taper  - c/w metoprolol 25 BID, amio load and subsequent maintenance dosing  - CCU/cardiology following  - c/w hep gtt for AC while patient is anemic with potential GIB/anemia; ultimately transition to longer term AC  - f/u echo 11/10/21: mild LV dysfunction, MR, dilated LA  - f/u cards/CCU recs: currently ordered for [facility] - re-assess necessity with EP

## 2021-11-14 NOTE — PROGRESS NOTE ADULT - ASSESSMENT
86 y/o M w/ PMH HTN, HLD, T2DM, CAD (MI 2003, s/p 4 stents), Afib on Eliquis, h/o R nephrolithiasis s/p ureteral stent (removed 1/2021) found to be in AF w/ RVR (asymptomatic) with a Cr of 4.75. Now found to be anemic with Hb of 7.1 with +FOBT.

## 2021-11-14 NOTE — PROGRESS NOTE ADULT - PROBLEM SELECTOR PLAN 2
- nephro following  - elevated WBC in urine, likely ATN 2/2 hypotension 2/2 afib RVR  - Cr improving with management of RVR  - c/w management for RVR  - trend Cr dialy  - avoid nephrotoxins - nephro following  - elevated WBC in urine, likely ATN 2/2 hypotension 2/2 afib RVR  - Cr improving with management of RVR  - c/w management for RVR  - trend Cr daily: stable in 3s  - avoid nephrotoxins

## 2021-11-14 NOTE — PROVIDER CONTACT NOTE (CRITICAL VALUE NOTIFICATION) - BACKGROUND
85 year old male admitted with AFIB. PMH of Bradycardia, TIA, HTN, DM, MI, and CAD.
Patient came in for new onset Atrial Fibrillation

## 2021-11-15 LAB
ALBUMIN SERPL ELPH-MCNC: 3 G/DL — LOW (ref 3.3–5)
ALP SERPL-CCNC: 173 U/L — HIGH (ref 40–120)
ALT FLD-CCNC: 97 U/L — HIGH (ref 4–41)
ANION GAP SERPL CALC-SCNC: 12 MMOL/L — SIGNIFICANT CHANGE UP (ref 7–14)
ANISOCYTOSIS BLD QL: SLIGHT — SIGNIFICANT CHANGE UP
APTT BLD: 79.4 SEC — HIGH (ref 27–36.3)
APTT BLD: 85.3 SEC — HIGH (ref 27–36.3)
AST SERPL-CCNC: 48 U/L — HIGH (ref 4–40)
BASOPHILS # BLD AUTO: 0.37 K/UL — HIGH (ref 0–0.2)
BASOPHILS NFR BLD AUTO: 1.8 % — SIGNIFICANT CHANGE UP (ref 0–2)
BILIRUB SERPL-MCNC: 0.6 MG/DL — SIGNIFICANT CHANGE UP (ref 0.2–1.2)
BUN SERPL-MCNC: 62 MG/DL — HIGH (ref 7–23)
CALCIUM SERPL-MCNC: 8.4 MG/DL — SIGNIFICANT CHANGE UP (ref 8.4–10.5)
CHLORIDE SERPL-SCNC: 105 MMOL/L — SIGNIFICANT CHANGE UP (ref 98–107)
CO2 SERPL-SCNC: 20 MMOL/L — LOW (ref 22–31)
CREAT SERPL-MCNC: 2.97 MG/DL — HIGH (ref 0.5–1.3)
EOSINOPHIL # BLD AUTO: 0.54 K/UL — HIGH (ref 0–0.5)
EOSINOPHIL NFR BLD AUTO: 2.6 % — SIGNIFICANT CHANGE UP (ref 0–6)
GLUCOSE BLDC GLUCOMTR-MCNC: 130 MG/DL — HIGH (ref 70–99)
GLUCOSE BLDC GLUCOMTR-MCNC: 190 MG/DL — HIGH (ref 70–99)
GLUCOSE BLDC GLUCOMTR-MCNC: 293 MG/DL — HIGH (ref 70–99)
GLUCOSE BLDC GLUCOMTR-MCNC: 297 MG/DL — HIGH (ref 70–99)
GLUCOSE SERPL-MCNC: 121 MG/DL — HIGH (ref 70–99)
HAPTOGLOB SERPL-MCNC: 139 MG/DL — SIGNIFICANT CHANGE UP (ref 34–200)
HAV IGM SER-ACNC: SIGNIFICANT CHANGE UP
HBV CORE IGM SER-ACNC: SIGNIFICANT CHANGE UP
HBV SURFACE AG SER-ACNC: SIGNIFICANT CHANGE UP
HCT VFR BLD CALC: 27 % — LOW (ref 39–50)
HCV AB S/CO SERPL IA: 0.19 S/CO — SIGNIFICANT CHANGE UP (ref 0–0.99)
HCV AB SERPL-IMP: SIGNIFICANT CHANGE UP
HGB BLD-MCNC: 8.9 G/DL — LOW (ref 13–17)
IANC: 13.79 K/UL — HIGH (ref 1.5–8.5)
INR BLD: 1.29 RATIO — HIGH (ref 0.88–1.16)
LDH SERPL L TO P-CCNC: 203 U/L — SIGNIFICANT CHANGE UP (ref 135–225)
LYMPHOCYTES # BLD AUTO: 1.82 K/UL — SIGNIFICANT CHANGE UP (ref 1–3.3)
LYMPHOCYTES # BLD AUTO: 8.8 % — LOW (ref 13–44)
MACROCYTES BLD QL: SLIGHT — SIGNIFICANT CHANGE UP
MAGNESIUM SERPL-MCNC: 1.5 MG/DL — LOW (ref 1.6–2.6)
MCHC RBC-ENTMCNC: 32.7 PG — SIGNIFICANT CHANGE UP (ref 27–34)
MCHC RBC-ENTMCNC: 33 GM/DL — SIGNIFICANT CHANGE UP (ref 32–36)
MCV RBC AUTO: 99.3 FL — SIGNIFICANT CHANGE UP (ref 80–100)
METAMYELOCYTES # FLD: 0.9 % — SIGNIFICANT CHANGE UP (ref 0–1)
MONOCYTES # BLD AUTO: 1.47 K/UL — HIGH (ref 0–0.9)
MONOCYTES NFR BLD AUTO: 7.1 % — SIGNIFICANT CHANGE UP (ref 2–14)
MYELOCYTES NFR BLD: 4.4 % — HIGH (ref 0–0)
NEUTROPHILS # BLD AUTO: 15.02 K/UL — HIGH (ref 1.8–7.4)
NEUTROPHILS NFR BLD AUTO: 71.7 % — SIGNIFICANT CHANGE UP (ref 43–77)
NEUTS BAND # BLD: 0.9 % — SIGNIFICANT CHANGE UP (ref 0–6)
NRBC # BLD: 0 /100 WBCS — SIGNIFICANT CHANGE UP
NRBC # FLD: 0 K/UL — SIGNIFICANT CHANGE UP
OVALOCYTES BLD QL SMEAR: SIGNIFICANT CHANGE UP
PHOSPHATE SERPL-MCNC: 3.6 MG/DL — SIGNIFICANT CHANGE UP (ref 2.5–4.5)
PLAT MORPH BLD: NORMAL — SIGNIFICANT CHANGE UP
PLATELET # BLD AUTO: 558 K/UL — HIGH (ref 150–400)
PLATELET COUNT - ESTIMATE: ABNORMAL
POIKILOCYTOSIS BLD QL AUTO: SLIGHT — SIGNIFICANT CHANGE UP
POLYCHROMASIA BLD QL SMEAR: SLIGHT — SIGNIFICANT CHANGE UP
POTASSIUM SERPL-MCNC: 3.9 MMOL/L — SIGNIFICANT CHANGE UP (ref 3.5–5.3)
POTASSIUM SERPL-SCNC: 3.9 MMOL/L — SIGNIFICANT CHANGE UP (ref 3.5–5.3)
PROT SERPL-MCNC: 6.4 G/DL — SIGNIFICANT CHANGE UP (ref 6–8.3)
PROTHROM AB SERPL-ACNC: 14.7 SEC — HIGH (ref 10.6–13.6)
RBC # BLD: 2.72 M/UL — LOW (ref 4.2–5.8)
RBC # FLD: 14.7 % — HIGH (ref 10.3–14.5)
RBC BLD AUTO: ABNORMAL
SODIUM SERPL-SCNC: 137 MMOL/L — SIGNIFICANT CHANGE UP (ref 135–145)
VARIANT LYMPHS # BLD: 1.8 % — SIGNIFICANT CHANGE UP (ref 0–6)
WBC # BLD: 20.69 K/UL — HIGH (ref 3.8–10.5)
WBC # FLD AUTO: 20.69 K/UL — HIGH (ref 3.8–10.5)

## 2021-11-15 PROCEDURE — 99232 SBSQ HOSP IP/OBS MODERATE 35: CPT | Mod: GC

## 2021-11-15 PROCEDURE — 99232 SBSQ HOSP IP/OBS MODERATE 35: CPT

## 2021-11-15 PROCEDURE — G0452: CPT | Mod: 26

## 2021-11-15 RX ORDER — APIXABAN 2.5 MG/1
2.5 TABLET, FILM COATED ORAL EVERY 12 HOURS
Refills: 0 | Status: DISCONTINUED | OUTPATIENT
Start: 2021-11-15 | End: 2021-11-16

## 2021-11-15 RX ORDER — MAGNESIUM SULFATE 500 MG/ML
1 VIAL (ML) INJECTION ONCE
Refills: 0 | Status: COMPLETED | OUTPATIENT
Start: 2021-11-15 | End: 2021-11-15

## 2021-11-15 RX ORDER — MAGNESIUM OXIDE 400 MG ORAL TABLET 241.3 MG
400 TABLET ORAL
Refills: 0 | Status: DISCONTINUED | OUTPATIENT
Start: 2021-11-15 | End: 2021-11-16

## 2021-11-15 RX ADMIN — APIXABAN 2.5 MILLIGRAM(S): 2.5 TABLET, FILM COATED ORAL at 17:16

## 2021-11-15 RX ADMIN — MAGNESIUM OXIDE 400 MG ORAL TABLET 400 MILLIGRAM(S): 241.3 TABLET ORAL at 17:22

## 2021-11-15 RX ADMIN — PANTOPRAZOLE SODIUM 40 MILLIGRAM(S): 20 TABLET, DELAYED RELEASE ORAL at 05:57

## 2021-11-15 RX ADMIN — CHLORHEXIDINE GLUCONATE 1 APPLICATION(S): 213 SOLUTION TOPICAL at 06:47

## 2021-11-15 RX ADMIN — PANTOPRAZOLE SODIUM 40 MILLIGRAM(S): 20 TABLET, DELAYED RELEASE ORAL at 17:17

## 2021-11-15 RX ADMIN — Medication 25 MILLIGRAM(S): at 06:01

## 2021-11-15 RX ADMIN — HEPARIN SODIUM 5 UNIT(S)/HR: 5000 INJECTION INTRAVENOUS; SUBCUTANEOUS at 03:31

## 2021-11-15 RX ADMIN — Medication 1: at 12:25

## 2021-11-15 RX ADMIN — Medication 100 GRAM(S): at 12:26

## 2021-11-15 RX ADMIN — TAMSULOSIN HYDROCHLORIDE 0.4 MILLIGRAM(S): 0.4 CAPSULE ORAL at 21:10

## 2021-11-15 RX ADMIN — INSULIN GLARGINE 5 UNIT(S): 100 INJECTION, SOLUTION SUBCUTANEOUS at 21:12

## 2021-11-15 RX ADMIN — Medication 3: at 17:28

## 2021-11-15 RX ADMIN — AMIODARONE HYDROCHLORIDE 400 MILLIGRAM(S): 400 TABLET ORAL at 05:57

## 2021-11-15 RX ADMIN — Medication 25 MILLIGRAM(S): at 21:10

## 2021-11-15 RX ADMIN — Medication 1: at 21:16

## 2021-11-15 NOTE — PROGRESS NOTE ADULT - SUBJECTIVE AND OBJECTIVE BOX
Chief Complaint:  Patient is a 85y old  Male who presents with a chief complaint of Afib (15 Nov 2021 09:53)      Interval Events:   Hgb stable while on Hep gtt w/o bleeding  Leukocytosis    Hospital Medications:  aMIOdarone    Tablet   Oral   aMIOdarone    Tablet 200 milliGRAM(s) Oral daily  chlorhexidine 4% Liquid 1 Application(s) Topical <User Schedule>  dextrose 40% Gel 15 Gram(s) Oral once  dextrose 5%. 1000 milliLiter(s) IV Continuous <Continuous>  dextrose 5%. 1000 milliLiter(s) IV Continuous <Continuous>  dextrose 50% Injectable 25 Gram(s) IV Push once  dextrose 50% Injectable 12.5 Gram(s) IV Push once  dextrose 50% Injectable 25 Gram(s) IV Push once  glucagon  Injectable 1 milliGRAM(s) IntraMuscular once  heparin  Infusion 500 Unit(s)/Hr IV Continuous <Continuous>  insulin glargine Injectable (LANTUS) 5 Unit(s) SubCutaneous at bedtime  insulin lispro (ADMELOG) corrective regimen sliding scale   SubCutaneous three times a day before meals  insulin lispro (ADMELOG) corrective regimen sliding scale   SubCutaneous at bedtime  magnesium oxide 400 milliGRAM(s) Oral two times a day with meals  metoprolol tartrate 25 milliGRAM(s) Oral two times a day  pantoprazole  Injectable 40 milliGRAM(s) IV Push two times a day  polyethylene glycol 3350 17 Gram(s) Oral daily  tamsulosin 0.4 milliGRAM(s) Oral at bedtime      ROS:   Complete and normal except as mentioned above.    PHYSICAL EXAM:   Vital Signs:  Vital Signs Last 24 Hrs  T(C): 36.8 (15 Nov 2021 06:00), Max: 36.9 (14 Nov 2021 14:50)  T(F): 98.3 (15 Nov 2021 06:00), Max: 98.5 (14 Nov 2021 14:50)  HR: 63 (15 Nov 2021 06:00) (63 - 70)  BP: 114/60 (15 Nov 2021 06:00) (107/51 - 119/59)  BP(mean): --  RR: 17 (15 Nov 2021 06:00) (16 - 17)  SpO2: 99% (15 Nov 2021 06:00) (99% - 99%)  Daily     Daily     GENERAL: no acute distress  NEURO: alert  HEENT: anicteric sclera, no conjunctival pallor appreciated  CHEST: no respiratory distress, no accessory muscle use  CARDIAC: regular rate, rhythm  ABDOMEN: soft, non-tender, non-distended, no rebound or guarding  EXTREMITIES: warm, well perfused, no edema  SKIN: no lesions noted    LABS: reviewed                        8.9    20.69 )-----------( 558      ( 15 Nov 2021 06:44 )             27.0     11-15    137  |  105  |  62<H>  ----------------------------<  121<H>  3.9   |  20<L>  |  2.97<H>    Ca    8.4      15 Nov 2021 06:44  Phos  3.6     11-15  Mg     1.50     11-15    TPro  6.4  /  Alb  3.0<L>  /  TBili  0.6  /  DBili  x   /  AST  48<H>  /  ALT  97<H>  /  AlkPhos  173<H>  11-15    LIVER FUNCTIONS - ( 15 Nov 2021 06:44 )  Alb: 3.0 g/dL / Pro: 6.4 g/dL / ALK PHOS: 173 U/L / ALT: 97 U/L / AST: 48 U/L / GGT: x             Interval Diagnostic Studies: see sunrise for full report   Interval Events:   Hgb stable while on Hep gtt w/o bleeding  Leukocytosis    Hospital Medications:  aMIOdarone    Tablet   Oral   aMIOdarone    Tablet 200 milliGRAM(s) Oral daily  chlorhexidine 4% Liquid 1 Application(s) Topical <User Schedule>  dextrose 40% Gel 15 Gram(s) Oral once  dextrose 5%. 1000 milliLiter(s) IV Continuous <Continuous>  dextrose 5%. 1000 milliLiter(s) IV Continuous <Continuous>  dextrose 50% Injectable 25 Gram(s) IV Push once  dextrose 50% Injectable 12.5 Gram(s) IV Push once  dextrose 50% Injectable 25 Gram(s) IV Push once  glucagon  Injectable 1 milliGRAM(s) IntraMuscular once  heparin  Infusion 500 Unit(s)/Hr IV Continuous <Continuous>  insulin glargine Injectable (LANTUS) 5 Unit(s) SubCutaneous at bedtime  insulin lispro (ADMELOG) corrective regimen sliding scale   SubCutaneous three times a day before meals  insulin lispro (ADMELOG) corrective regimen sliding scale   SubCutaneous at bedtime  magnesium oxide 400 milliGRAM(s) Oral two times a day with meals  metoprolol tartrate 25 milliGRAM(s) Oral two times a day  pantoprazole  Injectable 40 milliGRAM(s) IV Push two times a day  polyethylene glycol 3350 17 Gram(s) Oral daily  tamsulosin 0.4 milliGRAM(s) Oral at bedtime      ROS:   Complete and normal except as mentioned above.    PHYSICAL EXAM:   Vital Signs:  Vital Signs Last 24 Hrs  T(C): 36.8 (15 Nov 2021 06:00), Max: 36.9 (14 Nov 2021 14:50)  T(F): 98.3 (15 Nov 2021 06:00), Max: 98.5 (14 Nov 2021 14:50)  HR: 63 (15 Nov 2021 06:00) (63 - 70)  BP: 114/60 (15 Nov 2021 06:00) (107/51 - 119/59)  BP(mean): --  RR: 17 (15 Nov 2021 06:00) (16 - 17)  SpO2: 99% (15 Nov 2021 06:00) (99% - 99%)  Daily     Daily     GENERAL: no acute distress  NEURO: alert  HEENT: anicteric sclera, no conjunctival pallor appreciated  CHEST: no respiratory distress, no accessory muscle use  CARDIAC: regular rate, rhythm  ABDOMEN: soft, non-tender, non-distended, no rebound or guarding  EXTREMITIES: warm, well perfused, no edema  SKIN: no lesions noted    LABS: reviewed                        8.9    20.69 )-----------( 558      ( 15 Nov 2021 06:44 )             27.0     11-15    137  |  105  |  62<H>  ----------------------------<  121<H>  3.9   |  20<L>  |  2.97<H>    Ca    8.4      15 Nov 2021 06:44  Phos  3.6     11-15  Mg     1.50     11-15    TPro  6.4  /  Alb  3.0<L>  /  TBili  0.6  /  DBili  x   /  AST  48<H>  /  ALT  97<H>  /  AlkPhos  173<H>  11-15    LIVER FUNCTIONS - ( 15 Nov 2021 06:44 )  Alb: 3.0 g/dL / Pro: 6.4 g/dL / ALK PHOS: 173 U/L / ALT: 97 U/L / AST: 48 U/L / GGT: x             Interval Diagnostic Studies: see sunrise for full report

## 2021-11-15 NOTE — PROGRESS NOTE ADULT - ASSESSMENT
86 y/o M w/ PMH HTN, HLD, T2DM, CAD (MI 2003, s/p 4 stents), Afib on Eliquis h/o R nephrolithiasis s/p ureteral stent (removed 1/2021) sent in by cardiologist and found to have rapid afib with hypotension   LILIAN that is all hemodynamic in nature   Hepatitis   CKD stage 4 at baseline and reduced nephron mass   Hypomagnesemia     1 Renal-  renal fxn improving, HR controlled at present and on Amio  Start Mag oxide 400mg po bid x 2 days   2 CVS- HR controlled on Amio at present . BP controlled at present as well ;  Alkaline phos trending up, AST/ALT trending down;  Trend LFT's    3 -Bladder instillation of chemo as an outpt to resume.    4 ID-WBC elevated from ?    [facility]   [phone]

## 2021-11-15 NOTE — CHART NOTE - NSCHARTNOTEFT_GEN_A_CORE
Called by primary team for leukocytosis which has been gradually worsening since 11/11. Unclear etiology, diff on CBC shows neutrophil predominance. Pt does not appear toxic and is afebrile.    Will send flow cytometry today, does not need wait for results but would recommend having pt stay overnight and repeat another CBC in the morning, and have pt followup with PCP with CBC in a week.     Alexandra Carlson MD  Hematology Oncology Fellow, PGY-6  [facility] Pager: [phone]/ [facility] Pager: [phone]

## 2021-11-15 NOTE — PROGRESS NOTE ADULT - SUBJECTIVE AND OBJECTIVE BOX
NEPHROLOGY-NSN [phone]        Patient seen and examined in bed.  He was on a heparin gtt at preswent         MEDICATIONS  (STANDING):  aMIOdarone    Tablet   Oral   aMIOdarone    Tablet 200 milliGRAM(s) Oral daily  chlorhexidine 4% Liquid 1 Application(s) Topical <User Schedule>  dextrose 40% Gel 15 Gram(s) Oral once  dextrose 5%. 1000 milliLiter(s) (50 mL/Hr) IV Continuous <Continuous>  dextrose 5%. 1000 milliLiter(s) (100 mL/Hr) IV Continuous <Continuous>  dextrose 50% Injectable 25 Gram(s) IV Push once  dextrose 50% Injectable 12.5 Gram(s) IV Push once  dextrose 50% Injectable 25 Gram(s) IV Push once  glucagon  Injectable 1 milliGRAM(s) IntraMuscular once  heparin  Infusion 500 Unit(s)/Hr (5 mL/Hr) IV Continuous <Continuous>  insulin glargine Injectable (LANTUS) 5 Unit(s) SubCutaneous at bedtime  insulin lispro (ADMELOG) corrective regimen sliding scale   SubCutaneous three times a day before meals  insulin lispro (ADMELOG) corrective regimen sliding scale   SubCutaneous at bedtime  metoprolol tartrate 25 milliGRAM(s) Oral two times a day  pantoprazole  Injectable 40 milliGRAM(s) IV Push two times a day  polyethylene glycol 3350 17 Gram(s) Oral daily  tamsulosin 0.4 milliGRAM(s) Oral at bedtime      VITAL:  T(C): , Max: 36.9 (11-14-21 @ 14:50)  T(F): , Max: 98.5 (11-14-21 @ 14:50)  HR: 63 (11-15-21 @ 06:00)  BP: 114/60 (11-15-21 @ 06:00)  BP(mean): --  RR: 17 (11-15-21 @ 06:00)  SpO2: 99% (11-15-21 @ 06:00)  Wt(kg): --    I and O's:    11-14 @ 07:01  -  11-15 @ 07:00  --------------------------------------------------------  IN: 480 mL / OUT: 450 mL / NET: 30 mL          PHYSICAL EXAM:    Constitutional: NAD; small  frame male   Neck:  No JVD  Respiratory: CTAB/L  Cardiovascular: S1 and S2  Gastrointestinal: BS+, soft, NT/ND  Extremities: No peripheral edema  Neurological: A/O x 3, no focal deficits  Psychiatric: Normal mood, normal affect  : No [place]  Skin: No rashes  Access: Not applicable    LABS:                        8.9    20.69 )-----------( 558      ( 15 Nov 2021 06:44 )             27.0     11-15    137  |  105  |  62<H>  ----------------------------<  121<H>  3.9   |  20<L>  |  2.97<H>    Ca    8.4      15 Nov 2021 06:44  Phos  3.6     11-15  Mg     1.50     11-15    TPro  6.4  /  Alb  3.0<L>  /  TBili  0.6  /  DBili  x   /  AST  48<H>  /  ALT  97<H>  /  AlkPhos  173<H>  11-15          Urine Studies:          RADIOLOGY & ADDITIONAL STUDIES:

## 2021-11-15 NOTE — PROGRESS NOTE ADULT - PROBLEM SELECTOR PLAN 2
- nephro following  - elevated WBC in urine, likely ATN 2/2 hypotension 2/2 afib RVR  - Cr improving with management of RVR  - c/w management for RVR  - continues to downtrend  - avoid nephrotoxins

## 2021-11-15 NOTE — PROGRESS NOTE ADULT - PROBLEM SELECTOR PLAN 6
- DVT: hep gtt for A.FIB in setting of LILIAN, anemia, and possible GI bleed  - diet: CC with renal restrictions  - dispo: pending further stabilization PT recs rehab

## 2021-11-15 NOTE — PROGRESS NOTE ADULT - PROBLEM SELECTOR PLAN 4
- s/p 1 pRBC for hb 7.1  - maintain hgb >7, currently improved to 8s  - checking labs per heme/onc, LDH, david, reticulocyte. David + retic slightly elevated will have to touch base w/ heme/onc  - FOBT +: f/u gastroenterology recs regarding need for endoscopic workup inpatient vs. outpatient     #Leukcoytosis  - neutrophil predominant w/ thrombocytosis, no clear infectious source identified, no symptoms at this time, will send flow cytometry in AM and trend.

## 2021-11-15 NOTE — PROGRESS NOTE ADULT - ATTENDING COMMENTS
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 Reginaldo Syed for AF w/ RVR with CM. Telemetry shows atrial fibrillation with VR as high as 140s in the setting of decompensated HF. Noted down trending H/H on Heparin gtt.  Patient was noted hypotensive on CCB for AF rate control.  No MARCELLA/DCCV done as patient self converted from PAF to SR on Amiodarone 5 gm load started on 11/11.  Echo done on this admission showed mildly reduced LVEF 51%.  Patient remains in SR s/p 5 gm Amiodarone load.     -On Heparin drip for anticoagulation, consider to switch to Direct Oral anticoagulant if no contraindication  per GI ([id number] score=8)  -Continue maintenance dose Amiodarone  200mg daily, rec monitor LFT/TFT's Q3 -6 months and PFT/ ophthalmology exam yearly on Amio therapy   -GI following, H/H stable so far on Heparin gtt, s/p PRBC last week  -No contraindication for EP standpoint for discharge, follow up with  his cardiologist  -Continue care nephology

## 2021-11-15 NOTE — PROGRESS NOTE ADULT - PROBLEM SELECTOR PLAN 3
- c/w metoprolol  - Hgb stable, can restart ASA on d/c  - holding ACE ISO LILIAN on CKD, reinstate PRN  - echo: < from: Transthoracic Echocardiogram (11.10.21 @ 10:42) >  1. Mitral annular calcification, otherwise normal mitral  valve. Mild-moderate mitral regurgitation.  2. Moderately dilated left atrium.  LA volume index = 48  cc/m2.  3. Normal left ventricular internal dimensions and wall  thicknesses.  4. Mild global left ventricular systolic dysfunction  - f/u cards/EP recs

## 2021-11-15 NOTE — PROGRESS NOTE ADULT - ASSESSMENT
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 Reginaldo Syed for AF w/ RVR with CM. Telemetry shows atrial fibrillation with VR as high as 140s in the setting of decompensated HF. Noted down trending H/H on Heparin gtt.  Patient was noted hypotensive on CCB for AF rate control.  No MARCELLA/DCCV done as patient self converted from PAF to SR on Amiodarone 5 gm load started on 11/11.  Echo done on this admission showed mildly reduced LVEF 51%.  Patient remains in SR s/p 5 gm Amiodarone load.     -On Heparin drip for anticoagulation, consider to switch to Direct Oral anticoagulant if no contraindication  per GI ([id number] score=8)  -Continue maintenance dose Amiodarone  200mg daily, rec monitor LFT/TFT's Q3 -6 months and PFT/ ophthalmology exam yearly on Amio therapy   -GI following, H/H stable so far on Heparin gtt, s/p PRBC last week  -No contraindication for EP standpoint for discharge, follow up with  his cardiologist  -Continue care nephology

## 2021-11-15 NOTE — PROGRESS NOTE ADULT - ATTENDING COMMENTS
No overt bleeding and Hgb remains stable despite A/C.   Patient would prefer outpatient evaluation if needed as he is eager to go home.   As patient has been stable on A/C, would defer inpatient endoscopic evaluation. Can readdress if significant drop and/or overt bleeding.  Reasonable to continue empiric PPI, as above.

## 2021-11-15 NOTE — PROGRESS NOTE ADULT - SUBJECTIVE AND OBJECTIVE BOX
Patient is a 85y old  Male who presents with a chief complaint of Afib (15 Nov 2021 09:53)  Patient denies CP, SOB or palpitations or lightheadedness or dizziness.  Expresses  "like go home"    PAST MEDICAL & SURGICAL HISTORY:  CAD (coronary artery disease)  (4 stents, non-adherent to aspirin)    Diabetes mellitus, new onset    Single kidney  (hx/o LEFT nephrectomy at age 16; pt states due to a ureter&quot;blockage&quot; causing &quot;infection&quot;; pt denies hx/o renal dysfunction)    Arthritis    Myocardial infarction  (2003)    Hard of hearing    Atrial fibrillation    Hypertension    Hyperlipidemia    History of TIAs  Last 2018    History of bradycardia    History of nephrectomy, unilateral  (hx/o LEFT nephrectomy at age 16)    Stented coronary artery  (4 stents)    S/P bilateral cataract extraction        MEDICATIONS  (STANDING):  aMIOdarone    Tablet   Oral   aMIOdarone    Tablet 200 milliGRAM(s) Oral daily  chlorhexidine 4% Liquid 1 Application(s) Topical <User Schedule>  dextrose 40% Gel 15 Gram(s) Oral once  dextrose 5%. 1000 milliLiter(s) (50 mL/Hr) IV Continuous <Continuous>  dextrose 5%. 1000 milliLiter(s) (100 mL/Hr) IV Continuous <Continuous>  dextrose 50% Injectable 25 Gram(s) IV Push once  dextrose 50% Injectable 12.5 Gram(s) IV Push once  dextrose 50% Injectable 25 Gram(s) IV Push once  glucagon  Injectable 1 milliGRAM(s) IntraMuscular once  heparin  Infusion 500 Unit(s)/Hr (5 mL/Hr) IV Continuous <Continuous>  insulin glargine Injectable (LANTUS) 5 Unit(s) SubCutaneous at bedtime  insulin lispro (ADMELOG) corrective regimen sliding scale   SubCutaneous three times a day before meals  insulin lispro (ADMELOG) corrective regimen sliding scale   SubCutaneous at bedtime  magnesium oxide 400 milliGRAM(s) Oral two times a day with meals  magnesium sulfate  IVPB 1 Gram(s) IV Intermittent once  metoprolol tartrate 25 milliGRAM(s) Oral two times a day  pantoprazole  Injectable 40 milliGRAM(s) IV Push two times a day  polyethylene glycol 3350 17 Gram(s) Oral daily  tamsulosin 0.4 milliGRAM(s) Oral at bedtime    MEDICATIONS  (PRN):            Vital Signs Last 24 Hrs  T(C): 36.8 (15 Nov 2021 06:00), Max: 36.9 (14 Nov 2021 14:50)  T(F): 98.3 (15 Nov 2021 06:00), Max: 98.5 (14 Nov 2021 14:50)  HR: 63 (15 Nov 2021 06:00) (63 - 70)  BP: 114/60 (15 Nov 2021 06:00) (107/51 - 119/59)  BP(mean): --  RR: 17 (15 Nov 2021 06:00) (16 - 17)  SpO2: 99% (15 Nov 2021 06:00) (99% - 99%)            INTERPRETATION OF TELEMETRY: SR 60's -80's bpm, occasional PVC's, transient yassine in 40's at times    ECG:        LABS:                        8.9    20.69 )-----------( 558      ( 15 Nov 2021 06:44 )             27.0     11-15    137  |  105  |  62<H>  ----------------------------<  121<H>  3.9   |  20<L>  |  2.97<H>    Ca    8.4      15 Nov 2021 06:44  Phos  3.6     11-15  Mg     1.50     11-15    TPro  6.4  /  Alb  3.0<L>  /  TBili  0.6  /  DBili  x   /  AST  48<H>  /  ALT  97<H>  /  AlkPhos  173<H>  11-15        PT/INR - ( 15 Nov 2021 06:44 )   PT: 14.7 sec;   INR: 1.29 ratio         PTT - ( 15 Nov 2021 08:56 )  PTT:85.3 sec      BNP  RADIOLOGY & ADDITIONAL STUDIES:      PHYSICAL EXAM:    GENERAL: In no apparent distress, well nourished, and hydrated.  NECK: Supple and normal thyroid.  No JVD or carotid bruit.  Carotid pulse is 2+ bilaterally.  HEART: Regular rate and rhythm;  murmurs, rubs, or gallops.  PULMONARY: Clear to auscultation and perfusion.  No rales, wheezing, or rhonchi bilaterally.  ABDOMEN: Soft, Nontender, Nondistended; Bowel sounds present  EXTREMITIES:  2+ Peripheral Pulses, No clubbing, cyanosis, or edema  NEUROLOGICAL: Grossly nonfocal

## 2021-11-15 NOTE — PROGRESS NOTE ADULT - SUBJECTIVE AND OBJECTIVE BOX
Patient is a 85y old  Male who presents with a chief complaint of Afib (15 Nov 2021 11:31)      SUBJECTIVE / OVERNIGHT EVENTS:    Patient seen and examined at bedside. No CP/dyspnea overnight, No bleeding events, No BRBPR/melena, no CP/dyspnea.     MEDICATIONS  (STANDING):  aMIOdarone    Tablet   Oral   aMIOdarone    Tablet 200 milliGRAM(s) Oral daily  apixaban 2.5 milliGRAM(s) Oral every 12 hours  chlorhexidine 4% Liquid 1 Application(s) Topical <User Schedule>  dextrose 40% Gel 15 Gram(s) Oral once  dextrose 5%. 1000 milliLiter(s) (50 mL/Hr) IV Continuous <Continuous>  dextrose 5%. 1000 milliLiter(s) (100 mL/Hr) IV Continuous <Continuous>  dextrose 50% Injectable 25 Gram(s) IV Push once  dextrose 50% Injectable 12.5 Gram(s) IV Push once  dextrose 50% Injectable 25 Gram(s) IV Push once  glucagon  Injectable 1 milliGRAM(s) IntraMuscular once  insulin glargine Injectable (LANTUS) 5 Unit(s) SubCutaneous at bedtime  insulin lispro (ADMELOG) corrective regimen sliding scale   SubCutaneous three times a day before meals  insulin lispro (ADMELOG) corrective regimen sliding scale   SubCutaneous at bedtime  magnesium oxide 400 milliGRAM(s) Oral two times a day with meals  metoprolol tartrate 25 milliGRAM(s) Oral two times a day  pantoprazole  Injectable 40 milliGRAM(s) IV Push two times a day  polyethylene glycol 3350 17 Gram(s) Oral daily  tamsulosin 0.4 milliGRAM(s) Oral at bedtime    MEDICATIONS  (PRN):      Vital Signs Last 24 Hrs  T(C): 36.5 (15 Nov 2021 12:24), Max: 36.8 (14 Nov 2021 21:40)  T(F): 97.7 (15 Nov 2021 12:24), Max: 98.3 (14 Nov 2021 21:40)  HR: 62 (15 Nov 2021 12:24) (62 - 67)  BP: 121/53 (15 Nov 2021 12:24) (107/51 - 121/53)  BP(mean): --  RR: 18 (15 Nov 2021 12:24) (17 - 18)  SpO2: 100% (15 Nov 2021 12:24) (99% - 100%)  CAPILLARY BLOOD GLUCOSE      POCT Blood Glucose.: 190 mg/dL (15 Nov 2021 12:06)  POCT Blood Glucose.: 130 mg/dL (15 Nov 2021 08:30)  POCT Blood Glucose.: 167 mg/dL (14 Nov 2021 21:36)  POCT Blood Glucose.: 244 mg/dL (14 Nov 2021 17:12)    I&O's Summary    14 Nov 2021 07:01  -  15 Nov 2021 07:00  --------------------------------------------------------  IN: 480 mL / OUT: 450 mL / NET: 30 mL    15 Nov 2021 07:01  -  15 Nov 2021 16:55  --------------------------------------------------------  IN: 380 mL / OUT: 650 mL / NET: -270 mL        PHYSICAL EXAM:  Vital Signs Last 24 Hrs  T(C): 36.5 (11-15-21 @ 12:24)  T(F): 97.7 (11-15-21 @ 12:24), Max: 98.3 (11-14-21 @ 21:40)  HR: 62 (11-15-21 @ 12:24) (62 - 67)  BP: 121/53 (11-15-21 @ 12:24)  BP(mean): --  RR: 18 (11-15-21 @ 12:24) (17 - 18)  SpO2: 100% (11-15-21 @ 12:24) (99% - 100%)  Wt(kg): --    11-14 @ 07:01  -  11-15 @ 07:00  --------------------------------------------------------  IN: 480 mL / OUT: 450 mL / NET: 30 mL    11-15 @ 07:01  -  11-15 @ 16:55  --------------------------------------------------------  IN: 380 mL / OUT: 650 mL / NET: -270 mL      Constitutional: NAD, awake and alert  EYES: EOMI  ENT:  Normal Hearing, no tonsillar exudates   Neck: Soft and supple , no thyromegaly   Respiratory: Breath sounds are clear bilaterally, No wheezing, rales or rhonchi  Cardiovascular: S1 and S2, regular rate and rhythm, no Murmurs, gallops or rubs, no JVD,    Gastrointestinal: Bowel Sounds present, soft, nontender, nondistended, no guarding, no rebound  Extremities: No cyanosis or clubbing; warm to touch  Vascular: 2+ peripheral pulses lower ex  Neurological: No focal deficits, CN II-XII intact bilaterally, sensation to light touch intact in all extremities. Pupils are equally reactive to light and symmetrical in size.   Musculoskeletal: 5/5 strength b/l upper and lower extremities; no joint swelling.  Skin: No rashes  Psych: no depression or anhedonia, AAOx3  HEME: no bruises, no nose bleeds      LABS:                        8.9    20.69 )-----------( 558      ( 15 Nov 2021 06:44 )             27.0     11-15    137  |  105  |  62<H>  ----------------------------<  121<H>  3.9   |  20<L>  |  2.97<H>    Ca    8.4      15 Nov 2021 06:44  Phos  3.6     11-15  Mg     1.50     11-15    TPro  6.4  /  Alb  3.0<L>  /  TBili  0.6  /  DBili  x   /  AST  48<H>  /  ALT  97<H>  /  AlkPhos  173<H>  11-15    PT/INR - ( 15 Nov 2021 06:44 )   PT: 14.7 sec;   INR: 1.29 ratio         PTT - ( 15 Nov 2021 08:56 )  PTT:85.3 sec          RADIOLOGY & ADDITIONAL TESTS:    Imaging Personally Reviewed:    Consultant(s) Notes Reviewed:      Care Discussed with Consultants/Other Providers:

## 2021-11-15 NOTE — PROGRESS NOTE ADULT - ASSESSMENT
85y Male with HTN, HLD, T2DM, CAD (MI 2003, s/p 4 stents), Afib on Eliquis h/o R nephrolithiasis s/p ureteral stent (removed 1/2021), bladder cancer on chemo (most recent dose ~2 weeks PTA), who initially presented from his cardiologist after onset of afib/rvr during a planned nuclear stress testing, noted to have Hgb 9.3 on admission, dropped to 7.2 after hep started, GI consulted for anemia in setting of cardiac evaluation with plans for at least 30 days of AC after MARCELLA/DCCV.    #. Anemia, unclear etiology c/b bladder cancer and intermittent hematuria - emergent endoscopy is not warranted at this time and patient w/o overt GIB while on hep gtt, Hgb stable. May obtain endoscopic evaluation outpatient.  #. Constipation   #. Transaminemia  #. Leukocytosis     Recommendations:  - No plan for inpatient endoscopic evaluation unless patient develops overt GIB  - No overt GIB noted  - Consider evaluation for other sources of anemia  - Heme on board  - Okay to c/w hep gtt w/o evidence of active GIB  - AC per primary/cards  - Monitor coags  - Monitor Hgb, transfuse if Hgb < 8 in setting of active cardiac issue  - PPI IV BID, transition to po upon discharge  - Maintain bowel regimen  - Check hepatitis panel  - Rest of care per primary  - Please provide patient with Gastroenterology Clinic for outpatient follow up; [phone] ([facility] at [address] [place] [address])     Thank you for involving us in this patient's care.    Case discussed with Attending, Dr. Monroe Maria.    Sharyn Reyes MD  Gastroenterology/Hepatology Fellow, PGY-V    NON-URGENT CONSULTS:  Please email [email or web address]  AT NIGHT AND ON WEEKENDS:  Contact on-call GI fellow via answering service ([phone]) from 5pm-8am and on weekends/holidays  MONDAY-FRIDAY 8AM-5PM:  Pager# [phone] ([facility])  GI Phone# [phone] ([facility])

## 2021-11-16 ENCOUNTER — TRANSCRIPTION ENCOUNTER (OUTPATIENT)
Age: 86
End: 2021-11-16

## 2021-11-16 VITALS — WEIGHT: 139.99 LBS

## 2021-11-16 LAB
ALBUMIN SERPL ELPH-MCNC: 2.8 G/DL — LOW (ref 3.3–5)
ALP SERPL-CCNC: 177 U/L — HIGH (ref 40–120)
ALT FLD-CCNC: 83 U/L — HIGH (ref 4–41)
ANION GAP SERPL CALC-SCNC: 14 MMOL/L — SIGNIFICANT CHANGE UP (ref 7–14)
APTT BLD: 33.3 SEC — SIGNIFICANT CHANGE UP (ref 27–36.3)
AST SERPL-CCNC: 42 U/L — HIGH (ref 4–40)
BILIRUB SERPL-MCNC: 0.5 MG/DL — SIGNIFICANT CHANGE UP (ref 0.2–1.2)
BUN SERPL-MCNC: 57 MG/DL — HIGH (ref 7–23)
CALCIUM SERPL-MCNC: 8.4 MG/DL — SIGNIFICANT CHANGE UP (ref 8.4–10.5)
CHLORIDE SERPL-SCNC: 106 MMOL/L — SIGNIFICANT CHANGE UP (ref 98–107)
CO2 SERPL-SCNC: 20 MMOL/L — LOW (ref 22–31)
CREAT SERPL-MCNC: 2.94 MG/DL — HIGH (ref 0.5–1.3)
CULTURE RESULTS: SIGNIFICANT CHANGE UP
EPO SERPL-MCNC: 16 MIU/ML — SIGNIFICANT CHANGE UP (ref 2.6–18.5)
GLUCOSE BLDC GLUCOMTR-MCNC: 117 MG/DL — HIGH (ref 70–99)
GLUCOSE BLDC GLUCOMTR-MCNC: 203 MG/DL — HIGH (ref 70–99)
GLUCOSE SERPL-MCNC: 104 MG/DL — HIGH (ref 70–99)
HCT VFR BLD CALC: 28.1 % — LOW (ref 39–50)
HGB BLD-MCNC: 9.1 G/DL — LOW (ref 13–17)
INR BLD: 1.47 RATIO — HIGH (ref 0.88–1.16)
MAGNESIUM SERPL-MCNC: 1.7 MG/DL — SIGNIFICANT CHANGE UP (ref 1.6–2.6)
MCHC RBC-ENTMCNC: 31.8 PG — SIGNIFICANT CHANGE UP (ref 27–34)
MCHC RBC-ENTMCNC: 32.4 GM/DL — SIGNIFICANT CHANGE UP (ref 32–36)
MCV RBC AUTO: 98.3 FL — SIGNIFICANT CHANGE UP (ref 80–100)
NRBC # BLD: 0 /100 WBCS — SIGNIFICANT CHANGE UP
NRBC # FLD: 0 K/UL — SIGNIFICANT CHANGE UP
PHOSPHATE SERPL-MCNC: 3.9 MG/DL — SIGNIFICANT CHANGE UP (ref 2.5–4.5)
PLATELET # BLD AUTO: 534 K/UL — HIGH (ref 150–400)
POTASSIUM SERPL-MCNC: 4.3 MMOL/L — SIGNIFICANT CHANGE UP (ref 3.5–5.3)
POTASSIUM SERPL-SCNC: 4.3 MMOL/L — SIGNIFICANT CHANGE UP (ref 3.5–5.3)
PROT SERPL-MCNC: 6.2 G/DL — SIGNIFICANT CHANGE UP (ref 6–8.3)
PROTHROM AB SERPL-ACNC: 16.5 SEC — HIGH (ref 10.6–13.6)
RBC # BLD: 2.86 M/UL — LOW (ref 4.2–5.8)
RBC # FLD: 15.2 % — HIGH (ref 10.3–14.5)
SARS-COV-2 RNA SPEC QL NAA+PROBE: SIGNIFICANT CHANGE UP
SODIUM SERPL-SCNC: 140 MMOL/L — SIGNIFICANT CHANGE UP (ref 135–145)
SPECIMEN SOURCE: SIGNIFICANT CHANGE UP
TM INTERPRETATION: SIGNIFICANT CHANGE UP
WBC # BLD: 19.9 K/UL — HIGH (ref 3.8–10.5)
WBC # FLD AUTO: 19.9 K/UL — HIGH (ref 3.8–10.5)

## 2021-11-16 PROCEDURE — 99239 HOSP IP/OBS DSCHRG MGMT >30: CPT

## 2021-11-16 RX ORDER — ROSUVASTATIN CALCIUM 5 MG/1
1 TABLET ORAL
Qty: 0 | Refills: 0 | DISCHARGE

## 2021-11-16 RX ORDER — ASPIRIN/CALCIUM CARB/MAGNESIUM 324 MG
1 TABLET ORAL
Qty: 30 | Refills: 0
Start: 2021-11-16 | End: 2021-12-15

## 2021-11-16 RX ORDER — POLYETHYLENE GLYCOL 3350 17 G/17G
17 POWDER, FOR SOLUTION ORAL
Qty: 30 | Refills: 0
Start: 2021-11-16 | End: 2021-12-15

## 2021-11-16 RX ORDER — MAGNESIUM OXIDE 400 MG ORAL TABLET 241.3 MG
1 TABLET ORAL
Qty: 4 | Refills: 0
Start: 2021-11-16 | End: 2021-11-17

## 2021-11-16 RX ORDER — LOSARTAN POTASSIUM 100 MG/1
1 TABLET, FILM COATED ORAL
Qty: 0 | Refills: 0 | DISCHARGE

## 2021-11-16 RX ORDER — AMIODARONE HYDROCHLORIDE 400 MG/1
1 TABLET ORAL
Qty: 30 | Refills: 0
Start: 2021-11-16 | End: 2021-12-15

## 2021-11-16 RX ADMIN — APIXABAN 2.5 MILLIGRAM(S): 2.5 TABLET, FILM COATED ORAL at 05:22

## 2021-11-16 RX ADMIN — Medication 2: at 12:03

## 2021-11-16 RX ADMIN — Medication 25 MILLIGRAM(S): at 12:32

## 2021-11-16 RX ADMIN — MAGNESIUM OXIDE 400 MG ORAL TABLET 400 MILLIGRAM(S): 241.3 TABLET ORAL at 08:23

## 2021-11-16 RX ADMIN — AMIODARONE HYDROCHLORIDE 200 MILLIGRAM(S): 400 TABLET ORAL at 05:22

## 2021-11-16 RX ADMIN — PANTOPRAZOLE SODIUM 40 MILLIGRAM(S): 20 TABLET, DELAYED RELEASE ORAL at 05:22

## 2021-11-16 NOTE — PROGRESS NOTE ADULT - SUBJECTIVE AND OBJECTIVE BOX
NEPHROLOGY-[facility] [phone]        Patient seen and examined in bed.  He was in great spirits         MEDICATIONS  (STANDING):  aMIOdarone    Tablet   Oral   aMIOdarone    Tablet 200 milliGRAM(s) Oral daily  apixaban 2.5 milliGRAM(s) Oral every 12 hours  dextrose 40% Gel 15 Gram(s) Oral once  dextrose 5%. 1000 milliLiter(s) (50 mL/Hr) IV Continuous <Continuous>  dextrose 5%. 1000 milliLiter(s) (100 mL/Hr) IV Continuous <Continuous>  dextrose 50% Injectable 25 Gram(s) IV Push once  dextrose 50% Injectable 12.5 Gram(s) IV Push once  dextrose 50% Injectable 25 Gram(s) IV Push once  glucagon  Injectable 1 milliGRAM(s) IntraMuscular once  insulin glargine Injectable (LANTUS) 5 Unit(s) SubCutaneous at bedtime  insulin lispro (ADMELOG) corrective regimen sliding scale   SubCutaneous three times a day before meals  insulin lispro (ADMELOG) corrective regimen sliding scale   SubCutaneous at bedtime  magnesium oxide 400 milliGRAM(s) Oral two times a day with meals  metoprolol tartrate 25 milliGRAM(s) Oral two times a day  polyethylene glycol 3350 17 Gram(s) Oral daily  tamsulosin 0.4 milliGRAM(s) Oral at bedtime      VITAL:  T(C): , Max: 36.7 (11-16-21 @ 05:20)  T(F): , Max: 98 (11-16-21 @ 05:20)  HR: 76 (11-16-21 @ 12:24)  BP: 117/56 (11-16-21 @ 05:20)  BP(mean): --  RR: 17 (11-16-21 @ 05:20)  SpO2: 100% (11-16-21 @ 05:20)  Wt(kg): --    I and O's:    11-15 @ 07:01  -  11-16 @ 07:00  --------------------------------------------------------  IN: 861 mL / OUT: 1650 mL / NET: -789 mL    11-16 @ 07:01  -  11-16 @ 12:49  --------------------------------------------------------  IN: 120 mL / OUT: 0 mL / NET: 120 mL          PHYSICAL EXAM:    Constitutional: NAD  Neck:  No JVD  Respiratory: CTAB/L  Cardiovascular: S1 and S2  Gastrointestinal: BS+, soft, NT/ND  Extremities: No peripheral edema  Neurological: A/O x 3, no focal deficits  Psychiatric: Normal mood, normal affect  : No Temple  Skin: No rashes  Access: Not applicable    LABS:                        9.1    19.90 )-----------( 534      ( 16 Nov 2021 08:03 )             28.1     11-16    140  |  106  |  57<H>  ----------------------------<  104<H>  4.3   |  20<L>  |  2.94<H>    Ca    8.4      16 Nov 2021 08:03  Phos  3.9     11-16  Mg     1.70     11-16    TPro  6.2  /  Alb  2.8<L>  /  TBili  0.5  /  DBili  x   /  AST  42<H>  /  ALT  83<H>  /  AlkPhos  177<H>  11-16          Urine Studies:          RADIOLOGY & ADDITIONAL STUDIES:

## 2021-11-16 NOTE — PROGRESS NOTE ADULT - PROBLEM SELECTOR PROBLEM 2
LILIAN (acute kidney injury)
Atrial fibrillation

## 2021-11-16 NOTE — PROGRESS NOTE ADULT - REASON FOR ADMISSION
Afib

## 2021-11-16 NOTE — DIETITIAN INITIAL EVALUATION ADULT. - OTHER INFO
Pt 84 yo male with PMHx of HTN, HLD, T2DM, CAD (MI 2003, s/p 4 stents), Afib, h/o R nephrolithiasis s/p ureteral stent (removed 1/2021); Pt with A-Fib - per chart review.     At time of visit, Pt appears alert, oriented. Per Pt his appetite not that well lately; but this morning Pt ate well for breakfast. Pt also, stated he lost weight ~15# in ~2 years 2/2 his sickness. Food preferences discussed; RDN offered PO supplement: Glucerna Shake, but Pt declined. Pt's spouse brings food "sometimes" reported. Pt "fussy eater" reported as well. No report of chewing or swallowing difficulty; no report of nausea, vomiting or diarrhea @ this time. +BM (11/15) - per flow sheet.    Per Pt his height: 67" & his weight: ~140# "two years ago"; Pt's weight PTA: 125#, per Pt. Pt appears thin at time of visit. Nutrition focused physical exam conducted - Pt found with mild subcutaneous fat loss & mild muscle wasting. Will recommend to add appetite stimulant to boost Pt's PO intake/appetite. RDN answered questions/concerns related to diet. RDN remains available, Pt made aware.

## 2021-11-16 NOTE — PROGRESS NOTE ADULT - NUTRITIONAL ASSESSMENT
This patient has been assessed with a concern for Malnutrition and has been determined to have a diagnosis/diagnoses of Moderate protein-calorie malnutrition.    This patient is being managed with:   Diet Consistent Carbohydrate Renal/No Snacks-  DASH/TLC {Sodium & Cholesterol Restricted} (DASH)  Entered: Nov 9 2021  5:41PM

## 2021-11-16 NOTE — PROGRESS NOTE ADULT - PROBLEM SELECTOR PROBLEM 3
LILIAN (acute kidney injury)
CAD (coronary artery disease)

## 2021-11-16 NOTE — PROGRESS NOTE ADULT - PROVIDER SPECIALTY LIST ADULT
CCU
Electrophysiology
Gastroenterology
Gastroenterology
Electrophysiology
Gastroenterology
Nephrology
Nephrology
CCU
CCU
Nephrology
Electrophysiology
Electrophysiology
Hospitalist
Internal Medicine
Hospitalist

## 2021-11-16 NOTE — DIETITIAN INITIAL EVALUATION ADULT. - ORAL INTAKE PTA/DIET HISTORY
Pt avoids regular sugar, Pt avoids salt reported. Pt's spouse is a retired nurse; Pt's spouse watches his diet at home reported

## 2021-11-16 NOTE — PROGRESS NOTE ADULT - PROBLEM SELECTOR PROBLEM 5
Type 2 diabetes mellitus
CAD (coronary artery disease)

## 2021-11-16 NOTE — DIETITIAN NUTRITION RISK NOTIFICATION - TREATMENT: THE FOLLOWING DIET HAS BEEN RECOMMENDED
Diet, Consistent Carbohydrate Renal/No Snacks:   DASH/TLC {Sodium & Cholesterol Restricted} (DASH) (11-09-21 @ 17:44) [Active]

## 2021-11-16 NOTE — PROGRESS NOTE ADULT - ASSESSMENT
84 y/o M w/ PMH HTN, HLD, T2DM, CAD (MI 2003, s/p 4 stents), Afib on Eliquis h/o R nephrolithiasis s/p ureteral stent (removed 1/2021) sent in by cardiologist and found to have rapid afib with hypotension   LILIAN that is all hemodynamic in nature   Hepatitis   CKD stage 4 at baseline and reduced nephron mass   Hypomagnesemia     1 Renal-  renal fxn improving, HR controlled at present and on Amio  2 CVS- HR controlled on Amio at present . BP controlled at present as well   3 -Bladder instillation of chemo as an outpt to resume.    4 Heme-Outpt follow up    DW Wife     Sayed Ali   [facility]   [phone]

## 2021-11-16 NOTE — CHART NOTE - NSCHARTNOTEFT_GEN_A_CORE
Hematology was consulted for anemia in this 85 M w/ PMH HTN, HLD, T2DM, CAD (MI 2003, s/p 4 stents), Afib on Eliquis h/o R nephrolithiasis s/p ureteral stent (removed 1/2021) sent in by cardiologist Dr. Reginaldo Syed for atrial fibrillation w/ RVR onset during nuclear stress testing.     Pt with anemia and thrombocytosis with positive FOBT. No overt GIB, plan for outpatient endoscopy.    Iron studies normal, Ferritin elevated to 2318 in the setting of AOCD, vitamin B12 and folate normal. PS showing partial population of transfused rbcs, some spherocytes- Kasandra positive but eluate was negative and hemolysis labs were also negative therefore hemolytic process is unlikely.     Given leukocytosis in the absence of infectious signs or symptoms, flow cytometry was sent and resulted today- negative. Therefore unlikely for an underlying heme process to be causing the leukocytosis.     No objection from heme standpoint for discharge, please have pt repeat CBC with his PCP or cardiologist within a week.     Heme to sign off as no active heme issue going on.     Alexandra Carlson MD  Hematology Oncology Fellow, PGY-6  [facility] Pager: [phone]/ [facility] Pager: [phone] Hematology was consulted for anemia in this 85 M w/ PMH HTN, HLD, T2DM, CAD (MI 2003, s/p 4 stents), Afib on Eliquis h/o R nephrolithiasis s/p ureteral stent (removed 1/2021) sent in by cardiologist Dr. Reginaldo Syed for atrial fibrillation w/ RVR onset during nuclear stress testing.     Pt with anemia and thrombocytosis with positive FOBT (thrombocytosis likely reactive to anemia). No overt GIB, plan for outpatient endoscopy.    Iron studies normal, Ferritin elevated to 2318 in the setting of AOCD, vitamin B12 and folate normal. PS showing partial population of transfused rbcs, some spherocytes- Kasandra positive but eluate was negative and hemolysis labs were also negative therefore hemolytic process is unlikely.     Given leukocytosis in the absence of infectious signs or symptoms, flow cytometry was sent and resulted today- negative. Therefore unlikely for an underlying heme process to be causing the leukocytosis.     No objection from heme standpoint for discharge, please have pt repeat CBC with his PCP or cardiologist within a week.     Heme to sign off as no active heme issue going on.     Alexandra Carlson MD  Hematology Oncology Fellow, PGY-6  [facility] Pager: [phone]/ [facility] Pager: [phone]

## 2021-11-16 NOTE — DISCHARGE NOTE NURSING/CASE MANAGEMENT/SOCIAL WORK - NSDCPEPTCAREGIVEDUMATLIST _GEN_ALL_CORE
Heart Failure/Diabetes/Influenza Vaccination Heart Failure/Diabetes/Influenza Vaccination/Apixaban/Eliquis

## 2021-11-16 NOTE — PROGRESS NOTE ADULT - PROBLEM SELECTOR PLAN 4
- s/p 1 pRBC for hb 7.1  - maintain hgb >7, currently improved to 8s  - checking labs per heme/onc, LDH, david, reticulocyte. David + retic slightly elevated will have to touch base w/ heme/onc  - FOBT +: f/u gastroenterology recs outpatient f/u    #Leukcoytosis  - stable, flow cytometry pending, outpatient f/u for repeat CBC

## 2021-11-16 NOTE — PROGRESS NOTE ADULT - PROBLEM SELECTOR PLAN 2
- nephro following  - elevated WBC in urine, likely ATN 2/2 hypotension 2/2 afib RVR  - Cr improving with management of RVR  - c/w management for RVR  - continues to downtrend, outpatient nephro f/u  - avoid nephrotoxins

## 2021-11-16 NOTE — DIETITIAN INITIAL EVALUATION ADULT. - ADD RECOMMEND
1. Encourage & assist Pt with meals; Monitor PO diet tolerance; Honor food preferences;     2. Recommend to add appetite stimulant to boost Pt's PO intake/appetite;     3. Add Multivitamins with minerals 1 tab daily for micronutrient coverage;     4. Monitor labs, hydration status;

## 2021-11-16 NOTE — PROGRESS NOTE ADULT - PROBLEM SELECTOR PROBLEM 1
Atrial fibrillation
New onset of congestive heart failure

## 2021-11-16 NOTE — PROGRESS NOTE ADULT - SUBJECTIVE AND OBJECTIVE BOX
Patient is a 85y old  Male who presents with a chief complaint of Afib (16 Nov 2021 12:49)      SUBJECTIVE / OVERNIGHT EVENTS:    Patient seen and examined at bedside. No dyspnea/CP, wants to go home. Cr stable, needs outpatient f/u. Tolerating po intake.     MEDICATIONS  (STANDING):  aMIOdarone    Tablet   Oral   aMIOdarone    Tablet 200 milliGRAM(s) Oral daily  apixaban 2.5 milliGRAM(s) Oral every 12 hours  dextrose 40% Gel 15 Gram(s) Oral once  dextrose 5%. 1000 milliLiter(s) (50 mL/Hr) IV Continuous <Continuous>  dextrose 5%. 1000 milliLiter(s) (100 mL/Hr) IV Continuous <Continuous>  dextrose 50% Injectable 25 Gram(s) IV Push once  dextrose 50% Injectable 12.5 Gram(s) IV Push once  dextrose 50% Injectable 25 Gram(s) IV Push once  glucagon  Injectable 1 milliGRAM(s) IntraMuscular once  insulin glargine Injectable (LANTUS) 5 Unit(s) SubCutaneous at bedtime  insulin lispro (ADMELOG) corrective regimen sliding scale   SubCutaneous three times a day before meals  insulin lispro (ADMELOG) corrective regimen sliding scale   SubCutaneous at bedtime  magnesium oxide 400 milliGRAM(s) Oral two times a day with meals  metoprolol tartrate 25 milliGRAM(s) Oral two times a day  polyethylene glycol 3350 17 Gram(s) Oral daily  tamsulosin 0.4 milliGRAM(s) Oral at bedtime    MEDICATIONS  (PRN):      Vital Signs Last 24 Hrs  T(C): 36.6 (16 Nov 2021 12:24), Max: 36.7 (16 Nov 2021 05:20)  T(F): 97.8 (16 Nov 2021 12:24), Max: 98 (16 Nov 2021 05:20)  HR: 76 (16 Nov 2021 12:24) (62 - 76)  BP: 127/45 (16 Nov 2021 12:24) (104/45 - 128/61)  BP(mean): --  RR: 18 (16 Nov 2021 12:24) (17 - 18)  SpO2: 100% (16 Nov 2021 12:24) (100% - 100%)  CAPILLARY BLOOD GLUCOSE      POCT Blood Glucose.: 203 mg/dL (16 Nov 2021 11:23)  POCT Blood Glucose.: 117 mg/dL (16 Nov 2021 07:43)  POCT Blood Glucose.: 293 mg/dL (15 Nov 2021 20:33)  POCT Blood Glucose.: 297 mg/dL (15 Nov 2021 17:27)    I&O's Summary    15 Nov 2021 07:01  -  16 Nov 2021 07:00  --------------------------------------------------------  IN: 861 mL / OUT: 1650 mL / NET: -789 mL    16 Nov 2021 07:01  -  16 Nov 2021 17:06  --------------------------------------------------------  IN: 120 mL / OUT: 0 mL / NET: 120 mL        PHYSICAL EXAM:  Vital Signs Last 24 Hrs  T(C): 36.6 (11-16-21 @ 12:24)  T(F): 97.8 (11-16-21 @ 12:24), Max: 98 (11-16-21 @ 05:20)  HR: 76 (11-16-21 @ 12:24) (62 - 76)  BP: 127/45 (11-16-21 @ 12:24)  BP(mean): --  RR: 18 (11-16-21 @ 12:24) (17 - 18)  SpO2: 100% (11-16-21 @ 12:24) (100% - 100%)  Wt(kg): --    11-15 @ 07:01  -  11-16 @ 07:00  --------------------------------------------------------  IN: 861 mL / OUT: 1650 mL / NET: -789 mL    11-16 @ 07:01  -  11-16 @ 17:06  --------------------------------------------------------  IN: 120 mL / OUT: 0 mL / NET: 120 mL      Constitutional: NAD, awake and alert  EYES: EOMI  ENT:  Normal Hearing, no tonsillar exudates   Neck: Soft and supple , no thyromegaly   Respiratory: Breath sounds are clear bilaterally, No wheezing, rales or rhonchi  Cardiovascular: S1 and S2, regular rate and rhythm, no Murmurs, gallops or rubs, no JVD,    Gastrointestinal: Bowel Sounds present, soft, nontender, nondistended, no guarding, no rebound  Extremities: No cyanosis or clubbing; warm to touch  Vascular: 2+ peripheral pulses lower ex  Neurological: No focal deficits, CN II-XII intact bilaterally, sensation to light touch intact in all extremities, gait intact. Pupils are equally reactive to light and symmetrical in size.   Musculoskeletal: 5/5 strength b/l upper and lower extremities; no joint swelling.  Skin: No rashes  Psych: no depression or anhedonia, AAOx3  HEME: no bruises, no nose bleeds      LABS:                        9.1    19.90 )-----------( 534      ( 16 Nov 2021 08:03 )             28.1     11-16    140  |  106  |  57<H>  ----------------------------<  104<H>  4.3   |  20<L>  |  2.94<H>    Ca    8.4      16 Nov 2021 08:03  Phos  3.9     11-16  Mg     1.70     11-16    TPro  6.2  /  Alb  2.8<L>  /  TBili  0.5  /  DBili  x   /  AST  42<H>  /  ALT  83<H>  /  AlkPhos  177<H>  11-16    PT/INR - ( 16 Nov 2021 08:03 )   PT: 16.5 sec;   INR: 1.47 ratio         PTT - ( 16 Nov 2021 08:03 )  PTT:33.3 sec          RADIOLOGY & ADDITIONAL TESTS:    Imaging Personally Reviewed:    Consultant(s) Notes Reviewed:      Care Discussed with Consultants/Other Providers:

## 2021-11-16 NOTE — PROGRESS NOTE ADULT - PROBLEM SELECTOR PROBLEM 6
Type 2 diabetes mellitus
Prophylactic measure

## 2021-11-16 NOTE — PROGRESS NOTE ADULT - PROBLEM SELECTOR PLAN 6
- DVT:eliquis  - diet: CC with renal restrictions  - dispo: pending further stabilization PT recs rehab

## 2021-11-16 NOTE — DIETITIAN INITIAL EVALUATION ADULT. - PERTINENT LABORATORY DATA
(11/16) WBC 19.90 H, H/H 9.1/28.1 L,  H, Albumin 2.8 L, AST 42 H,  H, ALT 83 H, BUN 57 H, Creat 2.94 H, Glu 104 H

## 2021-11-16 NOTE — DISCHARGE NOTE NURSING/CASE MANAGEMENT/SOCIAL WORK - PATIENT PORTAL LINK FT
You can access the FollowMyHealth Patient Portal offered by [facility] by registering at the following website: [email or web address]. By joining [facility]’s FollowMyHealth portal, you will also be able to view your health information using other applications (apps) compatible with our system.

## 2021-11-16 NOTE — PROGRESS NOTE ADULT - PROBLEM SELECTOR PROBLEM 4
Anemia of chronic disease
Anemia of chronic disease
Transaminitis
Anemia of chronic disease
Anemia of chronic disease

## 2021-11-17 NOTE — CHART NOTE - NSCHARTNOTEFT_GEN_A_CORE
Patient unable to self- propel to move about in the home. Wheelchair is needed for transportation to and from doctor's appointments. Care giver available, willing and able to provide assistance with wheelchair. Patient unable to perform ADLs with a walker , cane or crutches therefore patient requires wheelchaire. Patient unable to self- propel to move about in the home. Wheelchair is needed for transportation to and from doctor's appointments. Care giver available, willing and able to provide assistance with wheelchair. Patient unable to perform ADLs with a walker , cane or crutches due to debility, deconditioning and weakness (ICD 10 code R53.81). Patient unable to self- propel to move about in the home. Wheelchair is needed for transportation to and from doctor's appointments. Care giver available, willing and able to provide assistance with wheelchair. Patient unable to perform ADLs with a walker , cane or crutches due to debility, deconditioning and weakness. Pt has a diagnosis of Chronic Anemia, CAD, and DM.

## 2021-11-17 NOTE — CHART NOTE - NSCHARTNOTESELECT_GEN_ALL_CORE
Event Note
CCU ACCEPT NOTE/Event Note
Chart note
Event Note
Event Note
Hematology/Event Note
Hematology/Off Service Note
MAR accept/Event Note
Telemetry/Event Note
Telemetry/Event Note
Transfer Note

## 2021-11-18 LAB — CHROM ANALY INTERPHASE BLD FISH-IMP: SIGNIFICANT CHANGE UP

## 2021-11-23 LAB
BCR/ABL BY RT - PCR QUANTITATIVE: SIGNIFICANT CHANGE UP
JAK2 P.V617F BLD/T QL: SIGNIFICANT CHANGE UP

## 2021-12-02 LAB — CHROM ANALY OVERALL INTERP SPEC-IMP: SIGNIFICANT CHANGE UP

## 2021-12-13 ENCOUNTER — APPOINTMENT (OUTPATIENT)
Dept: ELECTROPHYSIOLOGY | Facility: CLINIC | Age: 86
End: 2021-12-13

## 2021-12-17 ENCOUNTER — NON-APPOINTMENT (OUTPATIENT)
Age: 86
End: 2021-12-17

## 2021-12-21 ENCOUNTER — NON-APPOINTMENT (OUTPATIENT)
Age: 86
End: 2021-12-21

## 2022-01-04 ENCOUNTER — APPOINTMENT (OUTPATIENT)
Dept: UROLOGY | Facility: CLINIC | Age: 87
End: 2022-01-04
Payer: MEDICARE

## 2022-01-04 VITALS
DIASTOLIC BLOOD PRESSURE: 70 MMHG | WEIGHT: 119 LBS | HEART RATE: 61 BPM | BODY MASS INDEX: 18.68 KG/M2 | SYSTOLIC BLOOD PRESSURE: 166 MMHG | HEIGHT: 67 IN | TEMPERATURE: 98 F | RESPIRATION RATE: 17 BRPM

## 2022-01-04 PROCEDURE — 99214 OFFICE O/P EST MOD 30 MIN: CPT

## 2022-01-04 RX ORDER — LEVOFLOXACIN 500 MG/1
500 TABLET, FILM COATED ORAL DAILY
Qty: 6 | Refills: 0 | Status: DISCONTINUED | COMMUNITY
Start: 2022-01-04 | End: 2022-01-04

## 2022-01-04 NOTE — ASSESSMENT
[FreeTextEntry1] : 86 year old male presents for bladder cancer\par \par Initially seen for Hematuria by Dr. Lim.  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 \par Noted to have stone in mid ureter so stented emergently Dec 2020.\par Went back into retention and needed to go to ER. 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 \par \par s/p URS and LL of stone Jan 2021\par Repeat sonogram no stones or hydro March 2021.\par \par Last seen by Dr. Lim March 2021\par Wanted a surveillance cysto 6 months afterwards.\par \par Transferred care to Dr. Maria/Saravanan after Dr. 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  Rest of biopsies negative.  b/l RGP, and b/l URS.  \par \par s/p Intravesical Chemotherapy (Gemcitabine) 3 doses, ended up with Afib and Kidney condition? in Nov 2021\par Was in CCU for 3 days, left hospital after 7 days.\par Needs BCG for bladder cancer, Dr. Maria unable to acquire.\par \par -- Will start BCG induction, will need follow up 6 weeks afterwards for cystoscopy.\par -- Urine culture today\par -- Will Rx Levaquin for once after BCG treatment

## 2022-01-04 NOTE — HISTORY OF PRESENT ILLNESS
[FreeTextEntry1] : 86 year old male presents for bladder cancer\par \par Initially seen for Hematuria by Dr. Lim.  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 \par Noted to have stone in mid ureter so stented emergently Dec 2020.\par Went back into retention and needed to go to ER. Stayed on Flomax and passed TOV.\par \par s/p URS and LL of stone Jan 2021\par Repeat sonogram no stones or hydro March 2021.\par \par Last seen by Dr. Lim March 2021\par Wanted a surveillance cysto 6 months afterwards.\par \par Transferred care to Dr. Maria/Saravanan after Dr. 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  Rest of biopsies negative.  b/l RGP, and b/l URS.  \par \par First TURBT 12/2020 -> LG TCC suspicious for T1 invasion. \par \par s/p Intravesical Chemotherapy (Gemcitabine) 3 doses, ended up with Afib and Kidney condition? in Nov 2021\par Was in CCU for 3 days, left hospital after 7 days.\par Needs BCG for bladder cancer, Dr. Maria unable to acquire.\par  [Urinary Incontinence] : no urinary incontinence [Urinary Retention] : no urinary retention [Urinary Urgency] : no urinary urgency [Urinary Frequency] : no urinary frequency [Nocturia] : no nocturia [Straining] : no straining [Dysuria] : no dysuria [Hematuria - Gross] : no gross hematuria [Fever] : no fever [Fatigue] : no fatigue

## 2022-01-04 NOTE — PHYSICAL EXAM
[General Appearance - Well Developed] : well developed [General Appearance - Well Nourished] : well nourished [Normal Appearance] : normal appearance [Well Groomed] : well groomed [General Appearance - In No Acute Distress] : no acute distress [Abdomen Soft] : soft [Abdomen Mass (___ Cm)] : no abdominal mass palpated [Heart Rate And Rhythm] : Heart rate and rhythm were normal [Edema] : no peripheral edema [] : no respiratory distress [Exaggerated Use Of Accessory Muscles For Inspiration] : no accessory muscle use [Oriented To Time, Place, And Person] : oriented to person, place, and time [Normal Station and Gait] : the gait and station were normal for the patient's age [No Focal Deficits] : no focal deficits

## 2022-01-05 LAB — BACTERIA UR CULT: NORMAL

## 2022-01-12 ENCOUNTER — APPOINTMENT (OUTPATIENT)
Dept: UROLOGY | Facility: CLINIC | Age: 87
End: 2022-01-12
Payer: MEDICARE

## 2022-01-12 ENCOUNTER — OUTPATIENT (OUTPATIENT)
Dept: OUTPATIENT SERVICES | Facility: HOSPITAL | Age: 87
LOS: 1 days | End: 2022-01-12
Payer: MEDICARE

## 2022-01-12 DIAGNOSIS — Z98.41 CATARACT EXTRACTION STATUS, RIGHT EYE: Chronic | ICD-10-CM

## 2022-01-12 DIAGNOSIS — R35.0 FREQUENCY OF MICTURITION: ICD-10-CM

## 2022-01-12 DIAGNOSIS — Z95.5 PRESENCE OF CORONARY ANGIOPLASTY IMPLANT AND GRAFT: Chronic | ICD-10-CM

## 2022-01-12 DIAGNOSIS — D49.4 NEOPLASM OF UNSPECIFIED BEHAVIOR OF BLADDER: ICD-10-CM

## 2022-01-12 DIAGNOSIS — Z90.5 ACQUIRED ABSENCE OF KIDNEY: Chronic | ICD-10-CM

## 2022-01-12 PROCEDURE — 51720 TREATMENT OF BLADDER LESION: CPT

## 2022-01-12 RX ORDER — BACILLUS CALMETTE-GUERIN 50 MG/50ML
50 POWDER, FOR SUSPENSION INTRAVESICAL
Qty: 0 | Refills: 0 | Status: COMPLETED | OUTPATIENT
Start: 2022-01-12

## 2022-01-12 RX ORDER — BACILLUS CALMETTE-GUERIN 50 MG/50ML
50 POWDER, FOR SUSPENSION INTRAVESICAL
Qty: 1 | Refills: 0 | Status: COMPLETED | OUTPATIENT
Start: 2022-01-11 | End: 2022-01-12

## 2022-01-12 RX ADMIN — BACILLUS CALMETTE-GUERIN 0 MG: 50 POWDER, FOR SUSPENSION INTRAVESICAL at 00:00

## 2022-01-16 ENCOUNTER — INPATIENT (INPATIENT)
Facility: HOSPITAL | Age: 87
LOS: 3 days | Discharge: HOME HEALTH | End: 2022-01-20
Attending: INTERNAL MEDICINE | Admitting: INTERNAL MEDICINE
Payer: MEDICARE

## 2022-01-16 VITALS
HEIGHT: 67 IN | RESPIRATION RATE: 18 BRPM | DIASTOLIC BLOOD PRESSURE: 60 MMHG | TEMPERATURE: 98 F | HEART RATE: 66 BPM | OXYGEN SATURATION: 100 % | SYSTOLIC BLOOD PRESSURE: 170 MMHG

## 2022-01-16 DIAGNOSIS — Z95.5 PRESENCE OF CORONARY ANGIOPLASTY IMPLANT AND GRAFT: Chronic | ICD-10-CM

## 2022-01-16 DIAGNOSIS — E87.5 HYPERKALEMIA: ICD-10-CM

## 2022-01-16 DIAGNOSIS — Z98.41 CATARACT EXTRACTION STATUS, RIGHT EYE: Chronic | ICD-10-CM

## 2022-01-16 DIAGNOSIS — Z90.5 ACQUIRED ABSENCE OF KIDNEY: Chronic | ICD-10-CM

## 2022-01-16 LAB
ALBUMIN SERPL ELPH-MCNC: 3.9 G/DL — SIGNIFICANT CHANGE UP (ref 3.3–5)
ALP SERPL-CCNC: 86 U/L — SIGNIFICANT CHANGE UP (ref 40–120)
ALT FLD-CCNC: 25 U/L — SIGNIFICANT CHANGE UP (ref 4–41)
ANION GAP SERPL CALC-SCNC: 10 MMOL/L — SIGNIFICANT CHANGE UP (ref 7–14)
ANION GAP SERPL CALC-SCNC: 10 MMOL/L — SIGNIFICANT CHANGE UP (ref 7–14)
APPEARANCE UR: ABNORMAL
APTT BLD: 36.3 SEC — SIGNIFICANT CHANGE UP (ref 27–36.3)
AST SERPL-CCNC: 21 U/L — SIGNIFICANT CHANGE UP (ref 4–40)
BASOPHILS # BLD AUTO: 0.04 K/UL — SIGNIFICANT CHANGE UP (ref 0–0.2)
BASOPHILS NFR BLD AUTO: 0.7 % — SIGNIFICANT CHANGE UP (ref 0–2)
BILIRUB SERPL-MCNC: 0.3 MG/DL — SIGNIFICANT CHANGE UP (ref 0.2–1.2)
BILIRUB UR-MCNC: NEGATIVE — SIGNIFICANT CHANGE UP
BLOOD GAS VENOUS COMPREHENSIVE RESULT: SIGNIFICANT CHANGE UP
BUN SERPL-MCNC: 55 MG/DL — HIGH (ref 7–23)
BUN SERPL-MCNC: 61 MG/DL — HIGH (ref 7–23)
CALCIUM SERPL-MCNC: 9.4 MG/DL — SIGNIFICANT CHANGE UP (ref 8.4–10.5)
CALCIUM SERPL-MCNC: 9.4 MG/DL — SIGNIFICANT CHANGE UP (ref 8.4–10.5)
CHLORIDE SERPL-SCNC: 109 MMOL/L — HIGH (ref 98–107)
CHLORIDE SERPL-SCNC: 113 MMOL/L — HIGH (ref 98–107)
CO2 SERPL-SCNC: 14 MMOL/L — LOW (ref 22–31)
CO2 SERPL-SCNC: 14 MMOL/L — LOW (ref 22–31)
COLOR SPEC: YELLOW — SIGNIFICANT CHANGE UP
CREAT SERPL-MCNC: 1.95 MG/DL — HIGH (ref 0.5–1.3)
CREAT SERPL-MCNC: 2.1 MG/DL — HIGH (ref 0.5–1.3)
DIFF PNL FLD: ABNORMAL
EOSINOPHIL # BLD AUTO: 0.07 K/UL — SIGNIFICANT CHANGE UP (ref 0–0.5)
EOSINOPHIL NFR BLD AUTO: 1.2 % — SIGNIFICANT CHANGE UP (ref 0–6)
GLUCOSE BLDC GLUCOMTR-MCNC: 208 MG/DL — HIGH (ref 70–99)
GLUCOSE SERPL-MCNC: 161 MG/DL — HIGH (ref 70–99)
GLUCOSE SERPL-MCNC: 246 MG/DL — HIGH (ref 70–99)
GLUCOSE UR QL: NEGATIVE — SIGNIFICANT CHANGE UP
HCT VFR BLD CALC: 36.4 % — LOW (ref 39–50)
HGB BLD-MCNC: 11.8 G/DL — LOW (ref 13–17)
IANC: 4.59 K/UL — SIGNIFICANT CHANGE UP (ref 1.5–8.5)
IMM GRANULOCYTES NFR BLD AUTO: 0.5 % — SIGNIFICANT CHANGE UP (ref 0–1.5)
INR BLD: 1.51 RATIO — HIGH (ref 0.88–1.16)
KETONES UR-MCNC: NEGATIVE — SIGNIFICANT CHANGE UP
LEUKOCYTE ESTERASE UR-ACNC: ABNORMAL
LYMPHOCYTES # BLD AUTO: 0.61 K/UL — LOW (ref 1–3.3)
LYMPHOCYTES # BLD AUTO: 10.1 % — LOW (ref 13–44)
MCHC RBC-ENTMCNC: 32.1 PG — SIGNIFICANT CHANGE UP (ref 27–34)
MCHC RBC-ENTMCNC: 32.4 GM/DL — SIGNIFICANT CHANGE UP (ref 32–36)
MCV RBC AUTO: 98.9 FL — SIGNIFICANT CHANGE UP (ref 80–100)
MONOCYTES # BLD AUTO: 0.68 K/UL — SIGNIFICANT CHANGE UP (ref 0–0.9)
MONOCYTES NFR BLD AUTO: 11.3 % — SIGNIFICANT CHANGE UP (ref 2–14)
NEUTROPHILS # BLD AUTO: 4.59 K/UL — SIGNIFICANT CHANGE UP (ref 1.8–7.4)
NEUTROPHILS NFR BLD AUTO: 76.2 % — SIGNIFICANT CHANGE UP (ref 43–77)
NITRITE UR-MCNC: NEGATIVE — SIGNIFICANT CHANGE UP
NRBC # BLD: 0 /100 WBCS — SIGNIFICANT CHANGE UP
NRBC # FLD: 0 K/UL — SIGNIFICANT CHANGE UP
PH UR: 6 — SIGNIFICANT CHANGE UP (ref 5–8)
PLATELET # BLD AUTO: 161 K/UL — SIGNIFICANT CHANGE UP (ref 150–400)
POTASSIUM SERPL-MCNC: 5.6 MMOL/L — HIGH (ref 3.5–5.3)
POTASSIUM SERPL-MCNC: 7.2 MMOL/L — CRITICAL HIGH (ref 3.5–5.3)
POTASSIUM SERPL-SCNC: 5.6 MMOL/L — HIGH (ref 3.5–5.3)
POTASSIUM SERPL-SCNC: 7.2 MMOL/L — CRITICAL HIGH (ref 3.5–5.3)
PROT SERPL-MCNC: 6.9 G/DL — SIGNIFICANT CHANGE UP (ref 6–8.3)
PROT UR-MCNC: ABNORMAL
PROTHROM AB SERPL-ACNC: 17 SEC — HIGH (ref 10.6–13.6)
RBC # BLD: 3.68 M/UL — LOW (ref 4.2–5.8)
RBC # FLD: 13.9 % — SIGNIFICANT CHANGE UP (ref 10.3–14.5)
SARS-COV-2 RNA SPEC QL NAA+PROBE: SIGNIFICANT CHANGE UP
SODIUM SERPL-SCNC: 133 MMOL/L — LOW (ref 135–145)
SODIUM SERPL-SCNC: 137 MMOL/L — SIGNIFICANT CHANGE UP (ref 135–145)
SP GR SPEC: 1.01 — SIGNIFICANT CHANGE UP (ref 1–1.05)
TROPONIN T, HIGH SENSITIVITY RESULT: 36 NG/L — SIGNIFICANT CHANGE UP
TROPONIN T, HIGH SENSITIVITY RESULT: 40 NG/L — SIGNIFICANT CHANGE UP
UROBILINOGEN FLD QL: SIGNIFICANT CHANGE UP
WBC # BLD: 6.02 K/UL — SIGNIFICANT CHANGE UP (ref 3.8–10.5)
WBC # FLD AUTO: 6.02 K/UL — SIGNIFICANT CHANGE UP (ref 3.8–10.5)

## 2022-01-16 PROCEDURE — 71045 X-RAY EXAM CHEST 1 VIEW: CPT | Mod: 26

## 2022-01-16 PROCEDURE — 70450 CT HEAD/BRAIN W/O DYE: CPT | Mod: 26,MA

## 2022-01-16 PROCEDURE — 99223 1ST HOSP IP/OBS HIGH 75: CPT

## 2022-01-16 PROCEDURE — 99291 CRITICAL CARE FIRST HOUR: CPT

## 2022-01-16 RX ORDER — DEXTROSE 50 % IN WATER 50 %
50 SYRINGE (ML) INTRAVENOUS ONCE
Refills: 0 | Status: COMPLETED | OUTPATIENT
Start: 2022-01-16 | End: 2022-01-16

## 2022-01-16 RX ORDER — SODIUM CHLORIDE 9 MG/ML
1000 INJECTION INTRAMUSCULAR; INTRAVENOUS; SUBCUTANEOUS ONCE
Refills: 0 | Status: COMPLETED | OUTPATIENT
Start: 2022-01-16 | End: 2022-01-16

## 2022-01-16 RX ORDER — CEFTRIAXONE 500 MG/1
1000 INJECTION, POWDER, FOR SOLUTION INTRAMUSCULAR; INTRAVENOUS ONCE
Refills: 0 | Status: COMPLETED | OUTPATIENT
Start: 2022-01-16 | End: 2022-01-16

## 2022-01-16 RX ORDER — ACETAMINOPHEN 500 MG
1000 TABLET ORAL ONCE
Refills: 0 | Status: COMPLETED | OUTPATIENT
Start: 2022-01-16 | End: 2022-01-16

## 2022-01-16 RX ORDER — SODIUM ZIRCONIUM CYCLOSILICATE 10 G/10G
10 POWDER, FOR SUSPENSION ORAL ONCE
Refills: 0 | Status: COMPLETED | OUTPATIENT
Start: 2022-01-16 | End: 2022-01-16

## 2022-01-16 RX ORDER — ALBUTEROL 90 UG/1
10 AEROSOL, METERED ORAL ONCE
Refills: 0 | Status: COMPLETED | OUTPATIENT
Start: 2022-01-16 | End: 2022-01-16

## 2022-01-16 RX ORDER — CALCIUM GLUCONATE 100 MG/ML
2 VIAL (ML) INTRAVENOUS ONCE
Refills: 0 | Status: COMPLETED | OUTPATIENT
Start: 2022-01-16 | End: 2022-01-16

## 2022-01-16 RX ORDER — INSULIN HUMAN 100 [IU]/ML
5 INJECTION, SOLUTION SUBCUTANEOUS ONCE
Refills: 0 | Status: COMPLETED | OUTPATIENT
Start: 2022-01-16 | End: 2022-01-16

## 2022-01-16 RX ADMIN — Medication 200 GRAM(S): at 16:36

## 2022-01-16 RX ADMIN — INSULIN HUMAN 5 UNIT(S): 100 INJECTION, SOLUTION SUBCUTANEOUS at 16:25

## 2022-01-16 RX ADMIN — Medication 1000 MILLIGRAM(S): at 23:48

## 2022-01-16 RX ADMIN — ALBUTEROL 10 MILLIGRAM(S): 90 AEROSOL, METERED ORAL at 16:19

## 2022-01-16 RX ADMIN — CEFTRIAXONE 100 MILLIGRAM(S): 500 INJECTION, POWDER, FOR SOLUTION INTRAMUSCULAR; INTRAVENOUS at 17:06

## 2022-01-16 RX ADMIN — SODIUM ZIRCONIUM CYCLOSILICATE 10 GRAM(S): 10 POWDER, FOR SUSPENSION ORAL at 16:19

## 2022-01-16 RX ADMIN — SODIUM CHLORIDE 1000 MILLILITER(S): 9 INJECTION INTRAMUSCULAR; INTRAVENOUS; SUBCUTANEOUS at 15:27

## 2022-01-16 RX ADMIN — Medication 1000 MILLIGRAM(S): at 23:11

## 2022-01-16 RX ADMIN — Medication 50 MILLILITER(S): at 16:20

## 2022-01-16 NOTE — H&P ADULT - HISTORY OF PRESENT ILLNESS
This is an 86M with history of bladder cancer (s/p TURBT 12/2020, s/p intravescicular chemotherapy with gemcitabine c/b AFib with RVR and LILIAN, now s/p BCG instillation 1/12/22 with Dr. Ramsey), s/p L Nephrectomy at age 16, DM2 (not on medications), HTN, HLD, CAD s/p 4 stents (last stent >5 years ago), and AFib on Eliquis who presents to the hospital with complaints of not feeling and generalized shaking. Said that his symptoms initially started on Friday (3 days PTA) when he started to feel weak and noted that he was SOB when taking out the garbage. On Saturday he noted that he was having generalized body shaking and felt unsteady on his feet (though never had syncope, fall, dizziness, chest pain, or palpations) and this continued into Sunday and therefore came to the hospital for evaluation. Said that currently he still feels "off" but is improved from before. Has chronic nocturia, dysuria, and intermittent hematuria 2/2 his bladder tumor but that has not changed. Has a chronic rhinorrhea with chronic post nasal drip/cough that is also unchanged, chronic sputum production (yellow) also unchanged, no hemoptysis, no GI complaints, denies any other episodes of SOB/MEJIA other than the 1 episode on Friday. Had chills at home but no fevers/diaphoresis. Appetite is poor but that is baseline for him.    On arrival to the ED, his vitals were T 98.4, P 66, /60, RR 18, O2 sat 100% RA. His lab work showed anemia (improved from prior), LILIAN vs CKD, hyperkalemia, metabolic acidosis, and pyuria/hematuria but chronic from prior. His imaging did not show any acute findings. His trops were indeterminant but stable. He was given the hyperkalemia medications with good response to his potassium and he was admitted to medicine on telemetry.

## 2022-01-16 NOTE — H&P ADULT - PROBLEM SELECTOR PLAN 5
- c/w amiodarone, eliquis - s/p recent BCG instillation  - Urology eval in AM vs outpatient - s/p recent BCG instillation  - Urology eval in AM vs outpatient  - Concern for possible tumor lysis syndrome in ED given his hyperkalemia but patient with what seems like localized bladder cancer, TLS labs not suggestive of TLS as no signfiicant hyperphasphatemia, hyperurecemia, or hypocalcemia, LDH also wnl, would repeat in AM but low suspicion for TLS

## 2022-01-16 NOTE — H&P ADULT - NSHPLABSRESULTS_GEN_ALL_CORE
LABS and ADDITIONAL STUDIES:                        11.8   6.02  )-----------( 161      ( 2022 15:02 )             36.4       -    137  |  113<H>  |  55<H>  ----------------------------<  246<H>  5.6<H>   |  14<L>  |  1.95<H>    Ca    9.4      2022 19:07  Phos  2.5       Mg     1.80         TPro  6.9  /  Alb  3.9  /  TBili  0.3  /  DBili  x   /  AST  21  /  ALT  25  /  AlkPhos  86              LIVER FUNCTIONS - ( 2022 15:02 )  Alb: 3.9 g/dL / Pro: 6.9 g/dL / ALK PHOS: 86 U/L / ALT: 25 U/L / AST: 21 U/L / GGT: x             PT/INR - ( 2022 15:02 )   PT: 17.0 sec;   INR: 1.51 ratio         PTT - ( 2022 15:02 )  PTT:36.3 sec    Urinalysis Basic - ( 2022 15:22 )    Color: Yellow / Appearance: Turbid / S.010 / pH: x  Gluc: x / Ketone: Negative  / Bili: Negative / Urobili: <2 mg/dL   Blood: x / Protein: 100 mg/dL / Nitrite: Negative   Leuk Esterase: Large / RBC: 5 /HPF / WBC >50 /HPF   Sq Epi: x / Non Sq Epi: 0 /HPF / Bacteria: Negative    < from: CT Head No Cont (22 @ 15:01) >    IMPRESSION:  No CT evidence of acute intracranial hemorrhage, mass effect, or midline   shift. Age-relatedinvolutional and microvascular changes.    < end of copied text >    < from: Xray Chest 1 View- PORTABLE-Urgent (Xray Chest 1 View- PORTABLE-Urgent .) (22 @ 15:35) >    IMPRESSION:  Clear lungs.    < end of copied text > LABS and ADDITIONAL STUDIES:                        11.8   6.02  )-----------( 161      ( 2022 15:02 )             36.4     -    137  |  113<H>  |  55<H>  ----------------------------<  246<H>  5.6<H>   |  14<L>  |  1.95<H>    Ca    9.4      2022 19:07  Phos  2.5       Mg     1.80         TPro  6.9  /  Alb  3.9  /  TBili  0.3  /  DBili  x   /  AST  21  /  ALT  25  /  AlkPhos  86      LIVER FUNCTIONS - ( 2022 15:02 )  Alb: 3.9 g/dL / Pro: 6.9 g/dL / ALK PHOS: 86 U/L / ALT: 25 U/L / AST: 21 U/L / GGT: x           PT/INR - ( 2022 15:02 )   PT: 17.0 sec;   INR: 1.51 ratio    PTT - ( 2022 15:02 )  PTT:36.3 sec    Troponin T, High Sensitivity (22 @ 15:02)   Troponin T, High Sensitivity Result: 40 ng/L   Troponin T, High Sensitivity (22 @ 17:04)   Troponin T, High Sensitivity Result: 36 ng/L     Urinalysis Basic - ( 2022 15:22 )  Color: Yellow / Appearance: Turbid / S.010 / pH: x  Gluc: x / Ketone: Negative  / Bili: Negative / Urobili: <2 mg/dL   Blood: x / Protein: 100 mg/dL / Nitrite: Negative   Leuk Esterase: Large / RBC: 5 /HPF / WBC >50 /HPF   Sq Epi: x / Non Sq Epi: 0 /HPF / Bacteria: Negative    Blood Gas Venous - Lactate (22 @ 20:55)   Blood Gas Venous - Lactate: 3.3    < from: CT Head No Cont (22 @ 15:01) >  IMPRESSION:  No CT evidence of acute intracranial hemorrhage, mass effect, or midline   shift. Age-related involutional and microvascular changes.  < end of copied text >    < from: Xray Chest 1 View- PORTABLE-Urgent (Xray Chest 1 View- PORTABLE-Urgent .) (22 @ 15:35) >  IMPRESSION:  Clear lungs.  < end of copied text >    EKG - NSR at 61, QTc 386, TWI aVL (chronic) otherwise no significant ST-T wave changes

## 2022-01-16 NOTE — H&P ADULT - PROBLEM SELECTOR PLAN 9
DVT ppx - on Eliquis  Diet - DASH/CC regular diet  Activity - Ambulate with assistance - Off meds at home, will place on ISS

## 2022-01-16 NOTE — H&P ADULT - PROBLEM SELECTOR PLAN 1
- Patient with sepsis criteria given temp to 100.5, HR in 90s, and concern for  infection though UA looks close to prior UA  - Given recent BCG instillation int he past week concern for possible BCG related sepsis, spoke with urology overnight who said that BCG related sepsis usually occurs much more acutely (with in 12-24 hrs) and treatment would be deferred to ID, spoke with ID overnight who said that they would check urine for AFBs but would not initialy any antimycobacterial medications yet  - For now would c/w empiric ceftriaxone but patient would likely need official ID and Urology evaluations in AM to assess for possible BCG related sepsis  - Would check BCx x2, UCx in lab  - Monitor fever curve  - Lactate elevated to 3.3, will give additional gentle IVF, recheck lactate

## 2022-01-16 NOTE — ED PROVIDER NOTE - ATTENDING CONTRIBUTION TO CARE
GEN - NAD; A+O x3   HEAD - NC/AT   EYES- PERRL, EOMI  ENT: Airway patent, dry mm, Oral cavity and pharynx normal. No inflammation, swelling, exudate, or lesions.  NECK: Neck supple, non-tender without lymphadenopathy, no masses.  PULMONARY - CTA b/l, symmetric breath sounds.   CARDIAC -s1s2, RRR, no M,G,R  ABDOMEN - +BS, ND, NT, soft, no guarding, no rebound, no masses   BACK - no CVA tenderness, Normal  spine   EXTREMITIES - FROM, symmetric pulses, capillary refill < 2 seconds, no edema   SKIN - thin skin, multiple bruises/hematomas in various stages of healing  NEUROLOGIC - passive and intention tremor present, alert, speech clear, cn2-12 intact, f-n nl, 5/5 strength b/l ue and le, sensation grossly intact, did not assess gait as states he feels off balance/fall risk.  86m presents to the ed with few d of feeling off balance and unsteady gait as well as GEN - NAD; A+O x3   HEAD - NC/AT   EYES- PERRL, EOMI  ENT: Airway patent, dry mm, Oral cavity and pharynx normal. No inflammation, swelling, exudate, or lesions.  NECK: Neck supple, non-tender without lymphadenopathy, no masses.  PULMONARY - CTA b/l, symmetric breath sounds.   CARDIAC -s1s2, RRR, no M,G,R  ABDOMEN - +BS, ND, NT, soft, no guarding, no rebound, no masses   BACK - no CVA tenderness  EXTREMITIES - FROM, symmetric pulses, capillary refill < 2 seconds, no edema   SKIN - thin skin, multiple bruises/hematomas in various stages of healing  NEUROLOGIC - passive and intention tremor present, alert, speech clear, cn2-12 intact, f-n nl, 5/5 strength b/l ue and le, sensation grossly intact, did not assess gait as states he feels off balance/fall risk.  86m presents to the ed with 3 d of feeling off balance and unsteady gait as well as fatigue and episode of dyspnea on exertion few d ago which has since improved.  Has baseline dec appetite and urinary frequency, dysuria and hematuria-none of which is different than usual. NO fevers, ha, cp, no current sob, no abd pain, no vomiting or diarrhea, no edema. Exam with mild tremor noted, otherwise no focal findings, will check labs, ua/cx, ct brain-eval for anemia, elec disturbances, organ dysfunction, mets, monitor, and reass.
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
## 2022-01-16 NOTE — H&P ADULT - PROBLEM SELECTOR PLAN 2
- Unknown baseline recent renal function, had elevated BUN/Cr in November in setting of AFib with RVR and ADHF, was improving but unclear to what level, pt states that he has a follow up appointment with nephrology later this month so doesn't know his baseline renal function  - Currently BUN/Cr elevated but improved from prior, suspect patient likely has some degree of LILIAN on CKD, would c/w IVF for now, monitor BUN/Cr  - Potassium elevated, given patient only with 1 kidney likely elevated 2/2 reduced renal function in the single kidney, improving with medical management, EKG without acute changes, would place on IVF, trend potassium - Unknown baseline recent renal function, had elevated BUN/Cr in November in setting of AFib with RVR and ADHF, was improving but unclear to what level, pt states that he has a follow up appointment with nephrology later this month so doesn't know his baseline renal function  - Currently BUN/Cr elevated but improved from prior, suspect patient likely has some degree of LILIAN on CKD, would c/w IVF for now, monitor BUN/Cr  - Potassium elevated, given patient only with 1 kidney likely elevated 2/2 reduced renal function in the single kidney, improving with medical management, EKG without acute changes, would place on IVF, trend potassium    ADDENDUM:  - Patient repeat BMP overnight showed him to be hyperkalemic to 6.6, not hemolyzed  - Given medical management again, Insulin/D50, Lokelma 10g, Alb neb, spoke with nephrology on call for Dr. Gusman (Dr Alex on call), recommended to start patient on bicarb gtt for 8 hrs and to give lasix 20mg IVP x1. Repeat BMP in 4 hours. Patient reports he is still urinating, no acute changes. - Unknown baseline recent renal function, had elevated BUN/Cr in November in setting of AFib with RVR and ADHF, was improving but unclear to what level, pt states that he has a follow up appointment with nephrology later this month so doesn't know his baseline renal function  - Currently BUN/Cr elevated but improved from prior, suspect patient likely has some degree of LILIAN on CKD, would c/w IVF for now, monitor BUN/Cr  - Potassium elevated, given patient only with 1 kidney likely elevated 2/2 reduced renal function in the single kidney, improving with medical management, EKG without acute changes, would place on IVF, trend potassium    ADDENDUM:  - Patient repeat BMP overnight showed him to be hyperkalemic to 6.6, not hemolyzed  - Given medical management again, Insulin/D50, Lokelma 10g, Alb neb, spoke with nephrology on call for Dr. Gusman (Dr Alex on call), recommended to start patient on bicarb gtt for 8 hrs and to give lasix 20mg IVP x1. Repeat BMP in 4 hours. Patient reports he is still urinating, no acute changes.  - EKG without peaked T waves, ?new onset RBBB though review of prior EKG from Nov 2021 shows he had some RBBB type changes (RsR' in V2, slightly slurring of S wave), so likely chronic but would check repeat trop (add on to 1AM BMP), repeat trop in AM

## 2022-01-16 NOTE — H&P ADULT - NSHPPHYSICALEXAM_GEN_ALL_CORE
Vital Signs Last 24 Hrs  T(C): 37.9 (16 Jan 2022 23:47), Max: 38.1 (16 Jan 2022 23:05)  T(F): 100.3 (16 Jan 2022 23:47), Max: 100.5 (16 Jan 2022 23:05)  HR: 96 (16 Jan 2022 23:47) (66 - 96)  BP: 134/46 (16 Jan 2022 23:47) (134/46 - 184/61)  BP(mean): --  RR: 18 (16 Jan 2022 23:47) (18 - 20)  SpO2: 99% (16 Jan 2022 23:47) (99% - 100%)    GENERAL: No acute distress, well-developed  EYES: EOMI, PERRL, conjunctiva and sclera clear  ENT: Neck supple, No JVD, moist mucosa  CHEST/LUNG: Clear to auscultation bilaterally; No wheeze, equal breath sounds bilaterally   BACK: No spinal tenderness, no CVA TTP  HEART: Regular rate and rhythm; No murmurs, rubs, or gallops  ABDOMEN: Soft, Nontender, Nondistended; Bowel sounds present, no TTP over bladder  EXTREMITIES: +DP/PT/Radial pulses, No LE edema/asymmetry  PSYCH: Nl behavior, nl affect  NEUROLOGY: AAOx3, non-focal, cranial nerves intact  SKIN: Normal color, No rashes or lesions

## 2022-01-16 NOTE — ED ADULT TRIAGE NOTE - CHIEF COMPLAINT QUOTE
tremors  to hands x past wk ,dizziness  x past 5 days. states increased yesterday with loss of balance. denies fever, ch pains. takes eliquis- denies falls  fs 145. states  treated for bladder ca 1st treatment wed..

## 2022-01-16 NOTE — H&P ADULT - PROBLEM SELECTOR PLAN 3
- Likely 2/2 infection, low suspicion for ACS as patient with stable trops and non-ischemic EKG  - Monitor symptoms

## 2022-01-16 NOTE — H&P ADULT - NSHPSOCIALHISTORY_GEN_ALL_CORE
Lives with wife  Ind Ambulator  Former tobacco user, quit around age 59  Social EtOH, drinks 1/2 - 1 glass of wine a day, last use Tuesday ~6 days PTA  No illicit substance use

## 2022-01-16 NOTE — ED PROVIDER NOTE - PROGRESS NOTE DETAILS
patient with elevated potassium (not hemolyzed), h/o ckd, cr-better than previous, ekg in progress, meds ordered to start lowering. patient with elevated potassium (not hemolyzed), h/o ckd, cr-better than previous, ekg in progress, meds ordered to start lowering. pt feels well. Lucks-PGY3: pt received at sign-out, seen and evaluated at bedside.  Pt sitting comfortably in NAD. Potassium downtrending 7.2 -> 5.6 after insulin/dextrose/albuterol/lokelma. Repeat fingerstick ordered showing no evidence of hypoglycemia. Pt reports feeling mildly improved, reports persistent tremulousness. Added TLS labs as pt with creatinine around baseline with hyperkalemia 4 days after intravesicular chemo. EKG NSR, no QRS prolongation, no T wave changes. Will admit for further evaluation/management. Aziza-PGY3: pt received at sign-out, seen and evaluated at bedside.  Pt sitting comfortably in NAD. Potassium downtrending 7.2 -> 5.6 after insulin/dextrose/albuterol/lokelma. Repeat fingerstick ordered showing no evidence of hypoglycemia. Pt reports feeling mildly improved, reports persistent tremulousness. Added TLS labs as pt with creatinine around baseline with hyperkalemia 4 days after intravesicular chemo. EKG NSR, no QRS prolongation, no T wave changes. Bicarb 14 on BMP, ordered VBG to assess for acidosis, anticipate admission for further evaluation/management. Aziza-PGY3: spoke to covering nephrologist at [facility] (covering for Dr. Gusman), discussed lab results, will see pt in AM. Spoke to Dr. Sudheer Arroyo, accepted for admission. Text page was sent to the Tele PA to inform them of the patient's admission. The page included the patient's name, MR number, admitting doctor, as well as diagnosis. My call back number was included for any follow up questions. patient with elevated potassium (not hemolyzed), h/o ckd, cr-better than previous, ekg in progress, meds ordered to start lowering. pt feels well. abx given for ua res

## 2022-01-16 NOTE — H&P ADULT - NSHPADDITIONALINFOADULT_GEN_ALL_CORE
Patient seen and examined on 1/16/22 Patient seen and examined on 1/16/22, Dr. Arroyo or his associates to assume care of patient in AM

## 2022-01-16 NOTE — H&P ADULT - PROBLEM SELECTOR PLAN 4
- s/p recent BCG instillation  - Urology eval in AM vs outpatient - Repeat EKG overnight for hyperkalemia  shows new onset RBBB though review of prior EKG from Nov 2021 shows he had some RBBB type changes (RsR' in V2, slightly slurring of S wave), so likely chronic but would check repeat trop (add on to 1AM BMP), repeat trop in AM, check TTE  - Cards consult as per primary team

## 2022-01-16 NOTE — H&P ADULT - NSHPREVIEWOFSYSTEMS_GEN_ALL_CORE
REVIEW OF SYSTEMS:    CONSTITUTIONAL: +generalized weakness, +chills, +generalized shaking, no fevers/diaphoresis  EYES: No visual changes or eye discharge  ENT: Chronic rhinorrhea with chronic post nasal gtt and cough with yellow sputum production (unchanged), no sore throat  NECK: No pain or stiffness  RESPIRATORY: No cough, wheezing, hemoptysis; No shortness of breath  CARDIOVASCULAR: No chest pain or palpitations; No lower extremity edema  GASTROINTESTINAL: No abdominal or epigastric pain. No nausea, vomiting, or hematemesis; No diarrhea or constipation. No melena or hematochezia.  BACK: No back pain, no flank pain  GENITOURINARY: +Chronic dysuria, chronic intermittent hematuria (none currently), chronic nocturia, no increased frequency  NEUROLOGICAL: No numbness or weakness  SKIN: No itching, burning, rashes, or lesions

## 2022-01-16 NOTE — ED PROVIDER NOTE - OBJECTIVE STATEMENT
pt with h/o HTN HLD T2DM CAD s/p 4 stents had his first BCG infusion for bladder cancer at [place] on 1/12. also notes 2w of new onset B hand tremors and 2d of feeling off balance. no falls no head trauma. denies room spinning sensation fever chills n/v/d headache vision changes numbness/tingling/weakness.

## 2022-01-16 NOTE — ED PROVIDER NOTE - CLINICAL SUMMARY MEDICAL DECISION MAKING FREE TEXT BOX
elderly male with new onset neurologic findings and imbalance without fall or infectious sx. heart failure v elec imbalance v anemia v intracranial process. cardiac monitor labs CTH reassess.

## 2022-01-16 NOTE — H&P ADULT - NSICDXPASTMEDICALHX_GEN_ALL_CORE_FT
PAST MEDICAL HISTORY:  Arthritis     Atrial fibrillation     CAD (coronary artery disease) (4 stents, non-adherent to aspirin)    Diabetes mellitus, new onset     Hard of hearing     History of bradycardia     History of TIAs Last 2018    Hyperlipidemia     Hypertension     Myocardial infarction (2003)    Single kidney (hx/o LEFT nephrectomy at age 16; pt states due to a ureter"blockage" causing "infection"; pt denies hx/o renal dysfunction)    
intact

## 2022-01-16 NOTE — H&P ADULT - NSICDXFAMILYHX_GEN_ALL_CORE_FT
FAMILY HISTORY:  Mother  Still living? Unknown  Family history of diabetes mellitus in mother, Age at diagnosis: Age Unknown    Sibling  Still living? Unknown  FH: bladder cancer, Age at diagnosis: Age Unknown

## 2022-01-16 NOTE — H&P ADULT - ASSESSMENT
This is an 86M with history as above who presents to the hospital with sepsis likely 2/2  etiology.

## 2022-01-16 NOTE — ED PROVIDER NOTE - NS ED ROS FT
Constitutional: no fevers, chills  HEENT: no HA, vision changes, rhinorrhea, sore throat  Cardiac: no chest pain, palpitations  Respiratory: +Harding +chronic cough at baseline no hemoptysis  GI: no n/v/d/c, abd pain, bloody or dark stools  : no dysuria, frequency, +hematuria  MSK: no joint pain, neck pain or back pain  Skin: no rashes, jaundice, pruritis  Neuro: no numbness/tingling, weakness, transient feeling off balance   ros otherwise neg except per hpi

## 2022-01-17 DIAGNOSIS — I45.10 UNSPECIFIED RIGHT BUNDLE-BRANCH BLOCK: ICD-10-CM

## 2022-01-17 DIAGNOSIS — C67.9 MALIGNANT NEOPLASM OF BLADDER, UNSPECIFIED: ICD-10-CM

## 2022-01-17 DIAGNOSIS — Z29.9 ENCOUNTER FOR PROPHYLACTIC MEASURES, UNSPECIFIED: ICD-10-CM

## 2022-01-17 DIAGNOSIS — E11.9 TYPE 2 DIABETES MELLITUS WITHOUT COMPLICATIONS: ICD-10-CM

## 2022-01-17 DIAGNOSIS — A41.9 SEPSIS, UNSPECIFIED ORGANISM: ICD-10-CM

## 2022-01-17 DIAGNOSIS — I10 ESSENTIAL (PRIMARY) HYPERTENSION: ICD-10-CM

## 2022-01-17 DIAGNOSIS — I48.91 UNSPECIFIED ATRIAL FIBRILLATION: ICD-10-CM

## 2022-01-17 DIAGNOSIS — I25.10 ATHEROSCLEROTIC HEART DISEASE OF NATIVE CORONARY ARTERY WITHOUT ANGINA PECTORIS: ICD-10-CM

## 2022-01-17 DIAGNOSIS — R53.1 WEAKNESS: ICD-10-CM

## 2022-01-17 DIAGNOSIS — N17.9 ACUTE KIDNEY FAILURE, UNSPECIFIED: ICD-10-CM

## 2022-01-17 LAB
A1C WITH ESTIMATED AVERAGE GLUCOSE RESULT: 5.4 % — SIGNIFICANT CHANGE UP (ref 4–5.6)
ALBUMIN SERPL ELPH-MCNC: 3.1 G/DL — LOW (ref 3.3–5)
ALP SERPL-CCNC: 63 U/L — SIGNIFICANT CHANGE UP (ref 40–120)
ALT FLD-CCNC: 21 U/L — SIGNIFICANT CHANGE UP (ref 4–41)
ANION GAP SERPL CALC-SCNC: 10 MMOL/L — SIGNIFICANT CHANGE UP (ref 7–14)
ANION GAP SERPL CALC-SCNC: 11 MMOL/L — SIGNIFICANT CHANGE UP (ref 7–14)
ANION GAP SERPL CALC-SCNC: 11 MMOL/L — SIGNIFICANT CHANGE UP (ref 7–14)
ANION GAP SERPL CALC-SCNC: 8 MMOL/L — SIGNIFICANT CHANGE UP (ref 7–14)
ANION GAP SERPL CALC-SCNC: 8 MMOL/L — SIGNIFICANT CHANGE UP (ref 7–14)
AST SERPL-CCNC: 18 U/L — SIGNIFICANT CHANGE UP (ref 4–40)
BASOPHILS # BLD AUTO: 0.02 K/UL — SIGNIFICANT CHANGE UP (ref 0–0.2)
BASOPHILS NFR BLD AUTO: 0.1 % — SIGNIFICANT CHANGE UP (ref 0–2)
BILIRUB SERPL-MCNC: 0.4 MG/DL — SIGNIFICANT CHANGE UP (ref 0.2–1.2)
BLOOD GAS VENOUS COMPREHENSIVE RESULT: SIGNIFICANT CHANGE UP
BLOOD GAS VENOUS COMPREHENSIVE RESULT: SIGNIFICANT CHANGE UP
BUN SERPL-MCNC: 50 MG/DL — HIGH (ref 7–23)
BUN SERPL-MCNC: 53 MG/DL — HIGH (ref 7–23)
BUN SERPL-MCNC: 53 MG/DL — HIGH (ref 7–23)
BUN SERPL-MCNC: 54 MG/DL — HIGH (ref 7–23)
BUN SERPL-MCNC: 55 MG/DL — HIGH (ref 7–23)
CALCIUM SERPL-MCNC: 8.8 MG/DL — SIGNIFICANT CHANGE UP (ref 8.4–10.5)
CALCIUM SERPL-MCNC: 8.8 MG/DL — SIGNIFICANT CHANGE UP (ref 8.4–10.5)
CALCIUM SERPL-MCNC: 8.9 MG/DL — SIGNIFICANT CHANGE UP (ref 8.4–10.5)
CALCIUM SERPL-MCNC: 9 MG/DL — SIGNIFICANT CHANGE UP (ref 8.4–10.5)
CALCIUM SERPL-MCNC: 9 MG/DL — SIGNIFICANT CHANGE UP (ref 8.4–10.5)
CHLORIDE SERPL-SCNC: 108 MMOL/L — HIGH (ref 98–107)
CHLORIDE SERPL-SCNC: 108 MMOL/L — HIGH (ref 98–107)
CHLORIDE SERPL-SCNC: 109 MMOL/L — HIGH (ref 98–107)
CHLORIDE SERPL-SCNC: 109 MMOL/L — HIGH (ref 98–107)
CHLORIDE SERPL-SCNC: 110 MMOL/L — HIGH (ref 98–107)
CHOLEST SERPL-MCNC: 133 MG/DL — SIGNIFICANT CHANGE UP
CO2 SERPL-SCNC: 15 MMOL/L — LOW (ref 22–31)
CO2 SERPL-SCNC: 16 MMOL/L — LOW (ref 22–31)
CO2 SERPL-SCNC: 18 MMOL/L — LOW (ref 22–31)
CO2 SERPL-SCNC: 19 MMOL/L — LOW (ref 22–31)
CO2 SERPL-SCNC: 19 MMOL/L — LOW (ref 22–31)
CREAT SERPL-MCNC: 1.81 MG/DL — HIGH (ref 0.5–1.3)
CREAT SERPL-MCNC: 1.99 MG/DL — HIGH (ref 0.5–1.3)
CREAT SERPL-MCNC: 2.01 MG/DL — HIGH (ref 0.5–1.3)
CREAT SERPL-MCNC: 2.07 MG/DL — HIGH (ref 0.5–1.3)
CREAT SERPL-MCNC: 2.24 MG/DL — HIGH (ref 0.5–1.3)
CULTURE RESULTS: SIGNIFICANT CHANGE UP
EOSINOPHIL # BLD AUTO: 0.02 K/UL — SIGNIFICANT CHANGE UP (ref 0–0.5)
EOSINOPHIL NFR BLD AUTO: 0.1 % — SIGNIFICANT CHANGE UP (ref 0–6)
ESTIMATED AVERAGE GLUCOSE: 108 — SIGNIFICANT CHANGE UP
GLUCOSE BLDC GLUCOMTR-MCNC: 128 MG/DL — HIGH (ref 70–99)
GLUCOSE BLDC GLUCOMTR-MCNC: 154 MG/DL — HIGH (ref 70–99)
GLUCOSE BLDC GLUCOMTR-MCNC: 162 MG/DL — HIGH (ref 70–99)
GLUCOSE BLDC GLUCOMTR-MCNC: 171 MG/DL — HIGH (ref 70–99)
GLUCOSE BLDC GLUCOMTR-MCNC: 181 MG/DL — HIGH (ref 70–99)
GLUCOSE BLDC GLUCOMTR-MCNC: 204 MG/DL — HIGH (ref 70–99)
GLUCOSE SERPL-MCNC: 150 MG/DL — HIGH (ref 70–99)
GLUCOSE SERPL-MCNC: 165 MG/DL — HIGH (ref 70–99)
GLUCOSE SERPL-MCNC: 168 MG/DL — HIGH (ref 70–99)
GLUCOSE SERPL-MCNC: 209 MG/DL — HIGH (ref 70–99)
GLUCOSE SERPL-MCNC: 257 MG/DL — HIGH (ref 70–99)
HCT VFR BLD CALC: 31.7 % — LOW (ref 39–50)
HDLC SERPL-MCNC: 55 MG/DL — SIGNIFICANT CHANGE UP
HGB BLD-MCNC: 10.1 G/DL — LOW (ref 13–17)
IANC: 12.72 K/UL — HIGH (ref 1.5–8.5)
IMM GRANULOCYTES NFR BLD AUTO: 0.4 % — SIGNIFICANT CHANGE UP (ref 0–1.5)
LACTATE SERPL-SCNC: 2.5 MMOL/L — HIGH (ref 0.5–2)
LDH SERPL L TO P-CCNC: 144 U/L — SIGNIFICANT CHANGE UP (ref 135–225)
LIPID PNL WITH DIRECT LDL SERPL: 69 MG/DL — SIGNIFICANT CHANGE UP
LYMPHOCYTES # BLD AUTO: 0.24 K/UL — LOW (ref 1–3.3)
LYMPHOCYTES # BLD AUTO: 1.7 % — LOW (ref 13–44)
MAGNESIUM SERPL-MCNC: 1.5 MG/DL — LOW (ref 1.6–2.6)
MAGNESIUM SERPL-MCNC: 1.6 MG/DL — SIGNIFICANT CHANGE UP (ref 1.6–2.6)
MAGNESIUM SERPL-MCNC: 1.6 MG/DL — SIGNIFICANT CHANGE UP (ref 1.6–2.6)
MAGNESIUM SERPL-MCNC: 2 MG/DL — SIGNIFICANT CHANGE UP (ref 1.6–2.6)
MCHC RBC-ENTMCNC: 31.9 GM/DL — LOW (ref 32–36)
MCHC RBC-ENTMCNC: 32.1 PG — SIGNIFICANT CHANGE UP (ref 27–34)
MCV RBC AUTO: 100.6 FL — HIGH (ref 80–100)
MONOCYTES # BLD AUTO: 0.93 K/UL — HIGH (ref 0–0.9)
MONOCYTES NFR BLD AUTO: 6.6 % — SIGNIFICANT CHANGE UP (ref 2–14)
NEUTROPHILS # BLD AUTO: 12.72 K/UL — HIGH (ref 1.8–7.4)
NEUTROPHILS NFR BLD AUTO: 91.1 % — HIGH (ref 43–77)
NON HDL CHOLESTEROL: 78 MG/DL — SIGNIFICANT CHANGE UP
NRBC # BLD: 0 /100 WBCS — SIGNIFICANT CHANGE UP
NRBC # FLD: 0 K/UL — SIGNIFICANT CHANGE UP
PHOSPHATE SERPL-MCNC: 2.7 MG/DL — SIGNIFICANT CHANGE UP (ref 2.5–4.5)
PHOSPHATE SERPL-MCNC: 3 MG/DL — SIGNIFICANT CHANGE UP (ref 2.5–4.5)
PHOSPHATE SERPL-MCNC: 3.1 MG/DL — SIGNIFICANT CHANGE UP (ref 2.5–4.5)
PHOSPHATE SERPL-MCNC: 3.3 MG/DL — SIGNIFICANT CHANGE UP (ref 2.5–4.5)
PLATELET # BLD AUTO: 141 K/UL — LOW (ref 150–400)
POTASSIUM SERPL-MCNC: 4.9 MMOL/L — SIGNIFICANT CHANGE UP (ref 3.5–5.3)
POTASSIUM SERPL-MCNC: 5.1 MMOL/L — SIGNIFICANT CHANGE UP (ref 3.5–5.3)
POTASSIUM SERPL-MCNC: 5.6 MMOL/L — HIGH (ref 3.5–5.3)
POTASSIUM SERPL-MCNC: 5.9 MMOL/L — HIGH (ref 3.5–5.3)
POTASSIUM SERPL-MCNC: 6 MMOL/L — HIGH (ref 3.5–5.3)
POTASSIUM SERPL-MCNC: 6.6 MMOL/L — CRITICAL HIGH (ref 3.5–5.3)
POTASSIUM SERPL-SCNC: 4.9 MMOL/L — SIGNIFICANT CHANGE UP (ref 3.5–5.3)
POTASSIUM SERPL-SCNC: 5.1 MMOL/L — SIGNIFICANT CHANGE UP (ref 3.5–5.3)
POTASSIUM SERPL-SCNC: 5.6 MMOL/L — HIGH (ref 3.5–5.3)
POTASSIUM SERPL-SCNC: 5.9 MMOL/L — HIGH (ref 3.5–5.3)
POTASSIUM SERPL-SCNC: 6 MMOL/L — HIGH (ref 3.5–5.3)
POTASSIUM SERPL-SCNC: 6.6 MMOL/L — CRITICAL HIGH (ref 3.5–5.3)
PROT SERPL-MCNC: 6.1 G/DL — SIGNIFICANT CHANGE UP (ref 6–8.3)
RBC # BLD: 3.15 M/UL — LOW (ref 4.2–5.8)
RBC # FLD: 14.1 % — SIGNIFICANT CHANGE UP (ref 10.3–14.5)
SODIUM SERPL-SCNC: 135 MMOL/L — SIGNIFICANT CHANGE UP (ref 135–145)
SODIUM SERPL-SCNC: 136 MMOL/L — SIGNIFICANT CHANGE UP (ref 135–145)
SPECIMEN SOURCE: SIGNIFICANT CHANGE UP
TRIGL SERPL-MCNC: 47 MG/DL — SIGNIFICANT CHANGE UP
TROPONIN T, HIGH SENSITIVITY RESULT: 54 NG/L — CRITICAL HIGH
TROPONIN T, HIGH SENSITIVITY RESULT: 57 NG/L — CRITICAL HIGH
TSH SERPL-MCNC: 1.59 UIU/ML — SIGNIFICANT CHANGE UP (ref 0.27–4.2)
URATE SERPL-MCNC: 6 MG/DL — SIGNIFICANT CHANGE UP (ref 3.4–8.8)
WBC # BLD: 13.99 K/UL — HIGH (ref 3.8–10.5)
WBC # FLD AUTO: 13.99 K/UL — HIGH (ref 3.8–10.5)

## 2022-01-17 PROCEDURE — 99223 1ST HOSP IP/OBS HIGH 75: CPT

## 2022-01-17 RX ORDER — DEXTROSE 50 % IN WATER 50 %
25 SYRINGE (ML) INTRAVENOUS ONCE
Refills: 0 | Status: DISCONTINUED | OUTPATIENT
Start: 2022-01-17 | End: 2022-01-20

## 2022-01-17 RX ORDER — ONDANSETRON 8 MG/1
4 TABLET, FILM COATED ORAL EVERY 8 HOURS
Refills: 0 | Status: DISCONTINUED | OUTPATIENT
Start: 2022-01-17 | End: 2022-01-20

## 2022-01-17 RX ORDER — AMIODARONE HYDROCHLORIDE 400 MG/1
200 TABLET ORAL DAILY
Refills: 0 | Status: DISCONTINUED | OUTPATIENT
Start: 2022-01-17 | End: 2022-01-20

## 2022-01-17 RX ORDER — SODIUM ZIRCONIUM CYCLOSILICATE 10 G/10G
10 POWDER, FOR SUSPENSION ORAL ONCE
Refills: 0 | Status: COMPLETED | OUTPATIENT
Start: 2022-01-17 | End: 2022-01-17

## 2022-01-17 RX ORDER — GLUCAGON INJECTION, SOLUTION 0.5 MG/.1ML
1 INJECTION, SOLUTION SUBCUTANEOUS ONCE
Refills: 0 | Status: DISCONTINUED | OUTPATIENT
Start: 2022-01-17 | End: 2022-01-20

## 2022-01-17 RX ORDER — MAGNESIUM SULFATE 500 MG/ML
2 VIAL (ML) INJECTION ONCE
Refills: 0 | Status: COMPLETED | OUTPATIENT
Start: 2022-01-17 | End: 2022-01-17

## 2022-01-17 RX ORDER — SODIUM CHLORIDE 9 MG/ML
1000 INJECTION, SOLUTION INTRAVENOUS
Refills: 0 | Status: DISCONTINUED | OUTPATIENT
Start: 2022-01-17 | End: 2022-01-18

## 2022-01-17 RX ORDER — CEFTRIAXONE 500 MG/1
1000 INJECTION, POWDER, FOR SOLUTION INTRAMUSCULAR; INTRAVENOUS EVERY 24 HOURS
Refills: 0 | Status: DISCONTINUED | OUTPATIENT
Start: 2022-01-17 | End: 2022-01-20

## 2022-01-17 RX ORDER — INSULIN LISPRO 100/ML
VIAL (ML) SUBCUTANEOUS AT BEDTIME
Refills: 0 | Status: DISCONTINUED | OUTPATIENT
Start: 2022-01-17 | End: 2022-01-20

## 2022-01-17 RX ORDER — LANOLIN ALCOHOL/MO/W.PET/CERES
3 CREAM (GRAM) TOPICAL AT BEDTIME
Refills: 0 | Status: DISCONTINUED | OUTPATIENT
Start: 2022-01-17 | End: 2022-01-20

## 2022-01-17 RX ORDER — CALCIUM GLUCONATE 100 MG/ML
2 VIAL (ML) INTRAVENOUS ONCE
Refills: 0 | Status: DISCONTINUED | OUTPATIENT
Start: 2022-01-17 | End: 2022-01-17

## 2022-01-17 RX ORDER — ALBUTEROL 90 UG/1
10 AEROSOL, METERED ORAL ONCE
Refills: 0 | Status: COMPLETED | OUTPATIENT
Start: 2022-01-17 | End: 2022-01-17

## 2022-01-17 RX ORDER — TAMSULOSIN HYDROCHLORIDE 0.4 MG/1
0.4 CAPSULE ORAL AT BEDTIME
Refills: 0 | Status: DISCONTINUED | OUTPATIENT
Start: 2022-01-17 | End: 2022-01-20

## 2022-01-17 RX ORDER — DEXTROSE 50 % IN WATER 50 %
50 SYRINGE (ML) INTRAVENOUS ONCE
Refills: 0 | Status: COMPLETED | OUTPATIENT
Start: 2022-01-17 | End: 2022-01-17

## 2022-01-17 RX ORDER — SODIUM ZIRCONIUM CYCLOSILICATE 10 G/10G
10 POWDER, FOR SUSPENSION ORAL EVERY 8 HOURS
Refills: 0 | Status: DISCONTINUED | OUTPATIENT
Start: 2022-01-17 | End: 2022-01-17

## 2022-01-17 RX ORDER — INSULIN HUMAN 100 [IU]/ML
5 INJECTION, SOLUTION SUBCUTANEOUS ONCE
Refills: 0 | Status: COMPLETED | OUTPATIENT
Start: 2022-01-17 | End: 2022-01-17

## 2022-01-17 RX ORDER — INSULIN LISPRO 100/ML
VIAL (ML) SUBCUTANEOUS
Refills: 0 | Status: DISCONTINUED | OUTPATIENT
Start: 2022-01-17 | End: 2022-01-20

## 2022-01-17 RX ORDER — SODIUM BICARBONATE 1 MEQ/ML
650 SYRINGE (ML) INTRAVENOUS
Refills: 0 | Status: DISCONTINUED | OUTPATIENT
Start: 2022-01-17 | End: 2022-01-20

## 2022-01-17 RX ORDER — DEXTROSE 50 % IN WATER 50 %
12.5 SYRINGE (ML) INTRAVENOUS ONCE
Refills: 0 | Status: DISCONTINUED | OUTPATIENT
Start: 2022-01-17 | End: 2022-01-20

## 2022-01-17 RX ORDER — DEXTROSE 50 % IN WATER 50 %
15 SYRINGE (ML) INTRAVENOUS ONCE
Refills: 0 | Status: DISCONTINUED | OUTPATIENT
Start: 2022-01-17 | End: 2022-01-20

## 2022-01-17 RX ORDER — SODIUM CHLORIDE 9 MG/ML
1000 INJECTION INTRAMUSCULAR; INTRAVENOUS; SUBCUTANEOUS
Refills: 0 | Status: DISCONTINUED | OUTPATIENT
Start: 2022-01-17 | End: 2022-01-17

## 2022-01-17 RX ORDER — CALCIUM GLUCONATE 100 MG/ML
1 VIAL (ML) INTRAVENOUS ONCE
Refills: 0 | Status: COMPLETED | OUTPATIENT
Start: 2022-01-17 | End: 2022-01-17

## 2022-01-17 RX ORDER — ASPIRIN/CALCIUM CARB/MAGNESIUM 324 MG
81 TABLET ORAL DAILY
Refills: 0 | Status: DISCONTINUED | OUTPATIENT
Start: 2022-01-17 | End: 2022-01-20

## 2022-01-17 RX ORDER — ALBUTEROL 90 UG/1
10 AEROSOL, METERED ORAL ONCE
Refills: 0 | Status: DISCONTINUED | OUTPATIENT
Start: 2022-01-17 | End: 2022-01-17

## 2022-01-17 RX ORDER — METOPROLOL TARTRATE 50 MG
1 TABLET ORAL
Qty: 0 | Refills: 0 | DISCHARGE

## 2022-01-17 RX ORDER — SODIUM CHLORIDE 9 MG/ML
1000 INJECTION, SOLUTION INTRAVENOUS
Refills: 0 | Status: DISCONTINUED | OUTPATIENT
Start: 2022-01-17 | End: 2022-01-20

## 2022-01-17 RX ORDER — ACETAMINOPHEN 500 MG
650 TABLET ORAL EVERY 6 HOURS
Refills: 0 | Status: DISCONTINUED | OUTPATIENT
Start: 2022-01-17 | End: 2022-01-20

## 2022-01-17 RX ORDER — FUROSEMIDE 40 MG
20 TABLET ORAL ONCE
Refills: 0 | Status: COMPLETED | OUTPATIENT
Start: 2022-01-17 | End: 2022-01-17

## 2022-01-17 RX ORDER — APIXABAN 2.5 MG/1
2.5 TABLET, FILM COATED ORAL
Refills: 0 | Status: DISCONTINUED | OUTPATIENT
Start: 2022-01-17 | End: 2022-01-20

## 2022-01-17 RX ADMIN — APIXABAN 2.5 MILLIGRAM(S): 2.5 TABLET, FILM COATED ORAL at 05:04

## 2022-01-17 RX ADMIN — ALBUTEROL 10 MILLIGRAM(S): 90 AEROSOL, METERED ORAL at 02:50

## 2022-01-17 RX ADMIN — Medication 1: at 08:04

## 2022-01-17 RX ADMIN — Medication 1: at 11:31

## 2022-01-17 RX ADMIN — ALBUTEROL 10 MILLIGRAM(S): 90 AEROSOL, METERED ORAL at 09:59

## 2022-01-17 RX ADMIN — TAMSULOSIN HYDROCHLORIDE 0.4 MILLIGRAM(S): 0.4 CAPSULE ORAL at 21:50

## 2022-01-17 RX ADMIN — INSULIN HUMAN 5 UNIT(S): 100 INJECTION, SOLUTION SUBCUTANEOUS at 01:53

## 2022-01-17 RX ADMIN — SODIUM ZIRCONIUM CYCLOSILICATE 10 GRAM(S): 10 POWDER, FOR SUSPENSION ORAL at 18:26

## 2022-01-17 RX ADMIN — Medication 50 MILLILITER(S): at 01:54

## 2022-01-17 RX ADMIN — Medication 100 GRAM(S): at 01:54

## 2022-01-17 RX ADMIN — AMIODARONE HYDROCHLORIDE 200 MILLIGRAM(S): 400 TABLET ORAL at 05:04

## 2022-01-17 RX ADMIN — Medication 81 MILLIGRAM(S): at 12:30

## 2022-01-17 RX ADMIN — SODIUM ZIRCONIUM CYCLOSILICATE 10 GRAM(S): 10 POWDER, FOR SUSPENSION ORAL at 01:54

## 2022-01-17 RX ADMIN — CEFTRIAXONE 100 MILLIGRAM(S): 500 INJECTION, POWDER, FOR SOLUTION INTRAMUSCULAR; INTRAVENOUS at 17:56

## 2022-01-17 RX ADMIN — APIXABAN 2.5 MILLIGRAM(S): 2.5 TABLET, FILM COATED ORAL at 18:26

## 2022-01-17 RX ADMIN — SODIUM ZIRCONIUM CYCLOSILICATE 10 GRAM(S): 10 POWDER, FOR SUSPENSION ORAL at 09:58

## 2022-01-17 RX ADMIN — Medication 20 MILLIGRAM(S): at 07:38

## 2022-01-17 RX ADMIN — SODIUM CHLORIDE 50 MILLILITER(S): 9 INJECTION INTRAMUSCULAR; INTRAVENOUS; SUBCUTANEOUS at 00:45

## 2022-01-17 RX ADMIN — Medication 25 GRAM(S): at 14:50

## 2022-01-17 RX ADMIN — Medication 650 MILLIGRAM(S): at 18:27

## 2022-01-17 RX ADMIN — SODIUM CHLORIDE 75 MILLILITER(S): 9 INJECTION, SOLUTION INTRAVENOUS at 05:24

## 2022-01-17 NOTE — ED ADULT NURSE REASSESSMENT NOTE - NS ED NURSE REASSESS COMMENT FT1
Break RN: Pt is AOX4, critical lab value of 6.6 non-hemolyzed, notified ACP. Medications given as ordered. Pt offers no complaints at this time, rpt EKG in progress.
MD Lopez notified of patients /41. Per MD hold IVP Lasix at this time.
Report given to cdu rn. Pt A&Ox4, denies any complaints at this time. Respirations even and unlabored, no accessory muscle use. 20G iv to r arm intact. Pt moved to cdu bed 4
Report received from day shift RN: Pt A&Ox4, resting in bed. Pt has tremors to bilateral hands, states they started 2 days ago. Pt denies chest pain, sob, abd pain, ha, dizziness, n/v/d, fevers/chills at this time. Respirations even and unlabored, no accessory muscle use. 20G to RAC intact.

## 2022-01-17 NOTE — CONSULT NOTE ADULT - SUBJECTIVE AND OBJECTIVE BOX
"HPI:  This is an 86M with history of bladder cancer (s/p TURBT 2020, s/p intravescicular chemotherapy with gemcitabine c/b AFib with RVR and LILIAN, now s/p BCG instillation 22 with Dr. Ramsey), s/p L Nephrectomy at age 16, DM2 (not on medications), HTN, HLD, CAD s/p 4 stents (last stent >5 years ago), and AFib on Eliquis who presents to the hospital with complaints of not feeling and generalized shaking. Said that his symptoms initially started on Friday (3 days PTA) when he started to feel weak and noted that he was SOB when taking out the garbage. On Saturday he noted that he was having generalized body shaking and felt unsteady on his feet (though never had syncope, fall, dizziness, chest pain, or palpations) and this continued into  and therefore came to the hospital for evaluation. Said that currently he still feels "off" but is improved from before. Has chronic nocturia, dysuria, and intermittent hematuria 2/2 his bladder tumor but that has not changed. Has a chronic rhinorrhea with chronic post nasal drip/cough that is also unchanged, chronic sputum production (yellow) also unchanged, no hemoptysis, no GI complaints, denies any other episodes of SOB/MEJIA other than the 1 episode on Friday. Had chills at home but no fevers/diaphoresis. Appetite is poor but that is baseline for him.    On arrival to the ED, his vitals were T 98.4, P 66, /60, RR 18, O2 sat 100% RA. His lab work showed anemia (improved from prior), LILIAN vs CKD, hyperkalemia, metabolic acidosis, and pyuria/hematuria but chronic from prior. His imaging did not show any acute findings. His trops were indeterminant but stable. He was given the hyperkalemia medications with good response to his potassium and he was admitted to medicine on telemetry.  (2022 23:09)"    85 yo M with bladder CA on intravesicular chemo, BCG, initially with shaking and weakness. Here with fever. Prior had dysuria, which he states has improved since admission. No chest pain, no shortness of breath. No abd pain, no diarrhea. Although he states has had dysuria, he also notes this symptom has been present for prolonged period of time. ID called for further eval.    PAST MEDICAL & SURGICAL HISTORY:  CAD (coronary artery disease)  (4 stents, non-adherent to aspirin)    Diabetes mellitus, new onset    Single kidney  (hx/o LEFT nephrectomy at age 16; pt states due to a ureter&quot;blockage&quot; causing &quot;infection&quot;; pt denies hx/o renal dysfunction)    Arthritis    Myocardial infarction  ()    Hard of hearing    Atrial fibrillation    Hypertension    Hyperlipidemia    History of TIAs  Last 2018    History of bradycardia    History of nephrectomy, unilateral  (hx/o LEFT nephrectomy at age 16)    Stented coronary artery  (4 stents)    S/P bilateral cataract extraction    Allergies    penicillin (Rash)    Intolerances    Cipro (Joint Pain)      ANTIMICROBIALS:  cefTRIAXone   IVPB 1000 every 24 hours    OTHER MEDS:  acetaminophen     Tablet .. 650 milliGRAM(s) Oral every 6 hours PRN  aluminum hydroxide/magnesium hydroxide/simethicone Suspension 30 milliLiter(s) Oral every 4 hours PRN  aMIOdarone    Tablet 200 milliGRAM(s) Oral daily  apixaban 2.5 milliGRAM(s) Oral two times a day  aspirin enteric coated 81 milliGRAM(s) Oral daily  dextrose 40% Gel 15 Gram(s) Oral once  dextrose 5%. 1000 milliLiter(s) IV Continuous <Continuous>  dextrose 5%. 1000 milliLiter(s) IV Continuous <Continuous>  dextrose 50% Injectable 25 Gram(s) IV Push once  dextrose 50% Injectable 12.5 Gram(s) IV Push once  dextrose 50% Injectable 25 Gram(s) IV Push once  glucagon  Injectable 1 milliGRAM(s) IntraMuscular once  insulin lispro (ADMELOG) corrective regimen sliding scale   SubCutaneous three times a day before meals  insulin lispro (ADMELOG) corrective regimen sliding scale   SubCutaneous at bedtime  melatonin 3 milliGRAM(s) Oral at bedtime PRN  ondansetron Injectable 4 milliGRAM(s) IV Push every 8 hours PRN  sodium bicarbonate 650 milliGRAM(s) Oral two times a day  sodium chloride 0.45% 1000 milliLiter(s) IV Continuous <Continuous>  sodium zirconium cyclosilicate 10 Gram(s) Oral every 8 hours  tamsulosin 0.4 milliGRAM(s) Oral at bedtime    SOCIAL HISTORY: No tobacco, no alcohol, no illicit drugs    FAMILY HISTORY:  Family history of diabetes mellitus in mother (Mother)  (Pt states his mother developed DM in her 70&#x27;s)    FH: bladder cancer (Sibling)    Drug Dosing Weight  Height (cm): 170.2 (2022 13:35)  Weight (kg): 54.4 (2022 00:16)  BMI (kg/m2): 18.8 (2022 00:16)  BSA (m2): 1.63 (2022 00:16)    PE:    Vital Signs Last 24 Hrs  T(C): 37.2 (2022 09:47), Max: 38.4 (2022 01:05)  T(F): 99 (2022 09:47), Max: 101.1 (2022 01:05)  HR: 66 (2022 09:47) (66 - 96)  BP: 111/45 (2022 09:47) (103/41 - 184/61)  BP(mean): 60 (2022 03:12) (60 - 60)  RR: 22 (2022 09:47) (18 - 22)  SpO2: 100% (2022 09:47) (99% - 100%)    Gen: AOx3, NAD, non-toxic  CV: S1+S2 normal, nontachycardic  Resp: Clear bilat, no resp distress, no crackles/wheezes  Abd: Soft, nontender, +BS  Ext: No LE edema, no wounds  : No [place]  IV/Skin: No thrombophlebitis  Msk: No low back pain, no arthralgias, no joint swelling  Neuro: No sensory deficits, no motor deficits    LABS:                        10.1   13.99 )-----------( 141      ( 2022 05:28 )             31.7     01-17    136  |  110<H>  |  55<H>  ----------------------------<  168<H>  5.9<H>   |  15<L>  |  2.24<H>    Ca    9.0      2022 05:28  Phos  2.7     -  Mg     1.60     17    TPro  6.1  /  Alb  3.1<L>  /  TBili  0.4  /  DBili  x   /  AST  18  /  ALT  21  /  AlkPhos  63  01-17    Urinalysis Basic - ( 2022 15:22 )    Color: Yellow / Appearance: Turbid / S.010 / pH: x  Gluc: x / Ketone: Negative  / Bili: Negative / Urobili: <2 mg/dL   Blood: x / Protein: 100 mg/dL / Nitrite: Negative   Leuk Esterase: Large / RBC: 5 /HPF / WBC >50 /HPF   Sq Epi: x / Non Sq Epi: 0 /HPF / Bacteria: Negative    (otherwise reviewed)    MICROBIOLOGY:    COVID neg    RADIOLOGY:     XR:  FINDINGS:    The heart is normal in size.  The lungs are clear.  There is no pneumothorax or pleural effusion.    IMPRESSION:  Clear lungs.

## 2022-01-17 NOTE — PATIENT PROFILE ADULT - FALL HARM RISK - HARM RISK INTERVENTIONS

## 2022-01-17 NOTE — CONSULT NOTE ADULT - COMMENTS
[X] All other systems negative aside from above.  [  ] ROS unobtainable because: PAST SURGICAL HISTORY:  No significant past surgical history

## 2022-01-17 NOTE — PROGRESS NOTE ADULT - NSPROGADDITIONALINFOA_GEN_ALL_CORE
discussed with patient in detail, expresses understanding of treatment plans.  discussed with covering ACP discussed with patient in detail, expresses understanding of treatment plans.  discussed with covering ACP  encounter 50 min

## 2022-01-17 NOTE — CONSULT NOTE ADULT - SUBJECTIVE AND OBJECTIVE BOX
HPI: Mr. Caballero is an 86 year-old man with history multiple medical issues including hypertension, type 2 diabetes mellitus, coronary artery disease, solitary kidney s/p nephrectomy at age 16, and bladder cancer s/p TURBT 2020 + intravesicular chemo with gemcitabine and recent BCG instillation. He presented to the [facility] ER on 22 with generalized weakness, unsteadiness on his feet, and generalized shaking/tremors, for 1-2 days. He was noted on admission to have azotemia with creatinine of 2.1mg/dL, and hyperkalemia with K of 7.2meq/L. He was treated medically for the hyperkalemia, with Lokelma, Albuterol, D50/Insulin, and IVF with HCO3. Additionally, in light of the azotemia and hyperkalemia, a renal consultation was requested.      PAST MEDICAL & SURGICAL HISTORY:  HTN  HLD  DM2  TIAs  CAD (coronary artery disease) - 4 stents  AFib  Bladder cancer - TURBT 2020/(+)gemcitabine  Single kidney - left nephrectomy age 16 - in setting of ureteral blockage  Arthritis  Hard of hearing  History of bradycardia  S/P bilateral cataract extraction    Allergies/Intolerances  penicillin (Rash)  Cipro (Joint Pain)    SOCIAL HISTORY:  Denies ETOh,Smoking,     FAMILY HISTORY:  Family history of diabetes mellitus in mother (Mother)  FH: bladder cancer (Sibling)    REVIEW OF SYSTEMS:  CONSTITUTIONAL: (+)generalized weakness  EYES/ENT: (+)rhinorrhea (chronic)  NECK: No pain or stiffness  RESPIRATORY: (+)cough (chronic)  CARDIOVASCULAR: No chest pain or palpitations  GASTROINTESTINAL: No abdominal or epigastric pain. No nausea, vomiting, or hematemesis; No diarrhea or constipation. No melena or hematochezia.  GENITOURINARY: (+)nocturia, (+)dysuria, (+)gross hematuria (chronic)  NEUROLOGICAL: (+)shaking  SKIN: No itching, burning, rashes, or lesions   All other review of systems is negative unless indicated above.    VITAL:  T(C): , Max: 38.4 (22 @ 01:05)  T(F): , Max: 101.1 (22 @ 01:05)  HR: 88 (22 @ 05:19)  BP: 108/42 (22 @ 05:19)  BP(mean): 60 (22 @ 03:12)  RR: 18 (22 @ 05:19)  SpO2: 99% (22 @ 05:19)    PHYSICAL EXAM:  Constitutional: NAD, Alert  HEENT: NCAT, MMM  Neck: Supple, No JVD  Respiratory: CTA-b/l  Cardiovascular: RRR s1s2, no m/r/g  Gastrointestinal: BS+, soft, NT/ND  Extremities: No peripheral edema b/l  Neurological: no focal deficits; strength grossly intact  Back: no CVAT b/l  Skin: No rashes, no nevi    LABS:                        10.1   13.99 )-----------( 141      ( 2022 05:28 )             31.7     Na(136)/K(5.9)/Cl(110)/HCO3(15)/BUN(55)/Cr(2.24)Glu(168)/Ca(9.0)/Mg(1.60)/PO4(2.7)     @ 05:28  Na(--)/K(6.0)/Cl(--)/HCO3(--)/BUN(--)/Cr(--)Glu(--)/Ca(--)/Mg(--)/PO4(--)     @ 04:41  Na(136)/K(6.6)/Cl(109)/HCO3(16)/BUN(54)/Cr(1.99)Glu(209)/Ca(9.0)/Mg(--)/PO4(--)     @ 01:19  Na(--)/K(--)/Cl(--)/HCO3(--)/BUN(--)/Cr(--)Glu(--)/Ca(--)/Mg(1.80)/PO4(2.5)     @ 19:11  Na(137)/K(5.6)/Cl(113)/HCO3(14)/BUN(55)/Cr(1.95)Glu(246)/Ca(9.4)/Mg(--)/PO4(--)     @ 19:07  Na(133)/K(7.2)/Cl(109)/HCO3(14)/BUN(61)/Cr(2.10)Glu(161)/Ca(9.4)/Mg(--)/PO4(--)     @ 15:02    Urinalysis Basic - ( 2022 15:22 )  Color: Yellow / Appearance: Turbid / S.010 / pH: x  Gluc: x / Ketone: Negative  / Bili: Negative / Urobili: <2 mg/dL   Blood: x / Protein: 100 mg/dL / Nitrite: Negative   Leuk Esterase: Large / RBC: 5 /HPF / WBC >50 /HPF   Sq Epi: x / Non Sq Epi: 0 /HPF / Bacteria: Negative    (21) - BUN/creatinine: 57/2.94; K 4.3; HCO3 20  (21) - BUN/creatinine: 125/5.33, K 4.2, HCO3 18      IMAGING:  < from: CT Head No Cont (22 @ 15:01) >  No CT evidence of acute intracranial hemorrhage, mass effect, or midline   shift. Age-relatedinvolutional and microvascular changes.      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 Presumably at/near baseline GFR of ~25-30ml/min    (2)Hyperkalemia - surprising level of hyperkalemia here upon admission, given that the renal function appears to be near baseline, and given that he is not on medications that would be expected to drive up the potassium level. K+ is high, but rapidly improving, with medical therapy.    (3)Metabolic acidosis - renally mediated     (4)ID - presumed UTI - on IV Rocephin      RECOMMEND:  (1)Low-K diet  (2)Lokelma 10gm po today  (3)Add NaHCO3 650mg po bid for now  (4)BMP daily; Lokelma 10gm po prn K 5.5 or higher; D50/Insulin/IV Calcium/Albuterol prn K 6.0 or higher  (5)Meds for GFR 25-30ml/min    Thank you for involving [facility] in this patient's care.    With warm regards,    MD [facility]  Office: [phone]  Cell: [phone]               HPI: Mr. Caballero is an 86 year-old man with history multiple medical issues including hypertension, type 2 diabetes mellitus, coronary artery disease, solitary kidney s/p nephrectomy at age 16, and bladder cancer s/p TURBT 2020 + intravesicular chemo with gemcitabine and recent BCG instillation. He presented to the [facility] ER on 22 with generalized weakness, unsteadiness on his feet, and generalized shaking/tremors, for 1-2 days. He was noted on admission to have azotemia with creatinine of 2.1mg/dL, and hyperkalemia with K of 7.2meq/L. He was treated medically for the hyperkalemia, with Lokelma, Albuterol, D50/Insulin, and IVF with HCO3. Additionally, in light of the azotemia and hyperkalemia, a renal consultation was requested.    Mr. Caballero denies NSAID use. He denies taking antihypertensives and/or diuretics at home. He denies notable medication changes. He denies dietary changes of late; he denies significant banana, potato, tomato, or orange juice intake. He denies notable urinary changes. He shares that he was last told that he had a high potassium level ~6years ago by his PCP Dr. Syed.       PAST MEDICAL & SURGICAL HISTORY:  HTN  HLD  DM2  TIAs  CAD (coronary artery disease) - 4 stents  AFib  Bladder cancer - TURBT 2020/(+)gemcitabine  Single kidney - left nephrectomy age 16 - in setting of ureteral blockage  Arthritis  Hard of hearing  History of bradycardia  S/P bilateral cataract extraction    Allergies/Intolerances  penicillin (Rash)  Cipro (Joint Pain)    SOCIAL HISTORY:  Denies ETOh,Smoking,     FAMILY HISTORY:  Family history of diabetes mellitus in mother (Mother)  FH: bladder cancer (Sibling)    REVIEW OF SYSTEMS:  CONSTITUTIONAL: (+)generalized weakness  EYES/ENT: (+)rhinorrhea (chronic)  NECK: No pain or stiffness  RESPIRATORY: (+)cough (chronic)  CARDIOVASCULAR: No chest pain or palpitations  GASTROINTESTINAL: No abdominal or epigastric pain. No nausea, vomiting, or hematemesis; No diarrhea or constipation. No melena or hematochezia.  GENITOURINARY: (+)nocturia, (+)dysuria, (+)gross hematuria (chronic)  NEUROLOGICAL: (+)shaking  SKIN: No itching, burning, rashes, or lesions   All other review of systems is negative unless indicated above.    VITAL:  T(C): , Max: 38.4 (22 @ 01:05)  T(F): , Max: 101.1 (22 @ 01:05)  HR: 88 (22 @ 05:19)  BP: 108/42 (22 @ 05:19)  BP(mean): 60 (22 @ 03:12)  RR: 18 (22 @ 05:19)  SpO2: 99% (22 @ 05:19)    PHYSICAL EXAM:  Constitutional: NAD, frail  HEENT: NCAT, DMM  Neck: Supple, No JVD  Respiratory: CTA-b/l  Cardiovascular: RRR s1s2, no m/r/g  Gastrointestinal: BS+, soft, NT/ND  Extremities: No peripheral edema b/l  Neurological: no focal deficits; strength grossly intact  Back: no CVAT b/l  Skin: No rashes, no nevi    LABS:                        10.1   13.99 )-----------( 141      ( 2022 05:28 )             31.7     Na(136)/K(5.9)/Cl(110)/HCO3(15)/BUN(55)/Cr(2.24)Glu(168)/Ca(9.0)/Mg(1.60)/PO4(2.7)     @ 05:28  Na(--)/K(6.0)/Cl(--)/HCO3(--)/BUN(--)/Cr(--)Glu(--)/Ca(--)/Mg(--)/PO4(--)     @ 04:41  Na(136)/K(6.6)/Cl(109)/HCO3(16)/BUN(54)/Cr(1.99)Glu(209)/Ca(9.0)/Mg(--)/PO4(--)     @ 01:19  Na(--)/K(--)/Cl(--)/HCO3(--)/BUN(--)/Cr(--)Glu(--)/Ca(--)/Mg(1.80)/PO4(2.5)     @ 19:11  Na(137)/K(5.6)/Cl(113)/HCO3(14)/BUN(55)/Cr(1.95)Glu(246)/Ca(9.4)/Mg(--)/PO4(--)     @ 19:07  Na(133)/K(7.2)/Cl(109)/HCO3(14)/BUN(61)/Cr(2.10)Glu(161)/Ca(9.4)/Mg(--)/PO4(--)     @ 15:02    Urinalysis Basic - ( 2022 15:22 )  Color: Yellow / Appearance: Turbid / S.010 / pH: x  Gluc: x / Ketone: Negative  / Bili: Negative / Urobili: <2 mg/dL   Blood: x / Protein: 100 mg/dL / Nitrite: Negative   Leuk Esterase: Large / RBC: 5 /HPF / WBC >50 /HPF   Sq Epi: x / Non Sq Epi: 0 /HPF / Bacteria: Negative    (21) - BUN/creatinine: 57/2.94; K 4.3; HCO3 20  (21) - BUN/creatinine: 125/5.33, K 4.2, HCO3 18      IMAGING:  < from: CT Head No Cont (22 @ 15:01) >  No CT evidence of acute intracranial hemorrhage, mass effect, or midline   shift. Age-relatedinvolutional and microvascular changes.      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 Presumably at/near baseline GFR of ~25-30ml/min    (2)Hyperkalemia - surprising level of hyperkalemia here upon admission, given that the renal function appears to be near baseline, and given that he is not on medications that would be expected to drive up the potassium level. K+ is high, but rapidly improving, with medical therapy.    (3)Metabolic acidosis - renally mediated     (4)ID - presumed UTI - on IV Rocephin      RECOMMEND:  (1)Low-K diet - counseled  (2)Lokelma 10gm po today  (3)Add NaHCO3 650mg po bid for now  (4)BMP daily; Lokelma 10gm po prn K 5.5 or higher; D50/Insulin/IV Calcium/Albuterol prn K 6.0 or higher  (5)Meds for GFR 25-30ml/min    Thank you for involving [facility] in this patient's care.    With warm regards,    Ronnell Bull MD   [facility]  Office: [phone]  Cell: [phone]

## 2022-01-17 NOTE — PROGRESS NOTE ADULT - PROBLEM SELECTOR PLAN 1
likely urinary source  will defer recommendations to ID  continue ceftriaxone IV for now  urine culture and blood cultures pending  doubt BCG bacteremia

## 2022-01-18 LAB
ANION GAP SERPL CALC-SCNC: 10 MMOL/L — SIGNIFICANT CHANGE UP (ref 7–14)
BUN SERPL-MCNC: 44 MG/DL — HIGH (ref 7–23)
CALCIUM SERPL-MCNC: 8.7 MG/DL — SIGNIFICANT CHANGE UP (ref 8.4–10.5)
CHLORIDE SERPL-SCNC: 108 MMOL/L — HIGH (ref 98–107)
CO2 SERPL-SCNC: 17 MMOL/L — LOW (ref 22–31)
CREAT SERPL-MCNC: 1.76 MG/DL — HIGH (ref 0.5–1.3)
GLUCOSE BLDC GLUCOMTR-MCNC: 117 MG/DL — HIGH (ref 70–99)
GLUCOSE BLDC GLUCOMTR-MCNC: 118 MG/DL — HIGH (ref 70–99)
GLUCOSE BLDC GLUCOMTR-MCNC: 165 MG/DL — HIGH (ref 70–99)
GLUCOSE BLDC GLUCOMTR-MCNC: 221 MG/DL — HIGH (ref 70–99)
GLUCOSE SERPL-MCNC: 123 MG/DL — HIGH (ref 70–99)
HCT VFR BLD CALC: 30.7 % — LOW (ref 39–50)
HGB BLD-MCNC: 10.1 G/DL — LOW (ref 13–17)
MAGNESIUM SERPL-MCNC: 1.8 MG/DL — SIGNIFICANT CHANGE UP (ref 1.6–2.6)
MCHC RBC-ENTMCNC: 32.3 PG — SIGNIFICANT CHANGE UP (ref 27–34)
MCHC RBC-ENTMCNC: 32.9 GM/DL — SIGNIFICANT CHANGE UP (ref 32–36)
MCV RBC AUTO: 98.1 FL — SIGNIFICANT CHANGE UP (ref 80–100)
NIGHT BLUE STAIN TISS: SIGNIFICANT CHANGE UP
NRBC # BLD: 0 /100 WBCS — SIGNIFICANT CHANGE UP
NRBC # FLD: 0 K/UL — SIGNIFICANT CHANGE UP
PHOSPHATE SERPL-MCNC: 3.1 MG/DL — SIGNIFICANT CHANGE UP (ref 2.5–4.5)
PLATELET # BLD AUTO: 126 K/UL — LOW (ref 150–400)
POTASSIUM SERPL-MCNC: 4.8 MMOL/L — SIGNIFICANT CHANGE UP (ref 3.5–5.3)
POTASSIUM SERPL-SCNC: 4.8 MMOL/L — SIGNIFICANT CHANGE UP (ref 3.5–5.3)
RBC # BLD: 3.13 M/UL — LOW (ref 4.2–5.8)
RBC # FLD: 14.1 % — SIGNIFICANT CHANGE UP (ref 10.3–14.5)
SODIUM SERPL-SCNC: 135 MMOL/L — SIGNIFICANT CHANGE UP (ref 135–145)
SPECIMEN SOURCE: SIGNIFICANT CHANGE UP
WBC # BLD: 9.31 K/UL — SIGNIFICANT CHANGE UP (ref 3.8–10.5)
WBC # FLD AUTO: 9.31 K/UL — SIGNIFICANT CHANGE UP (ref 3.8–10.5)

## 2022-01-18 PROCEDURE — 93010 ELECTROCARDIOGRAM REPORT: CPT

## 2022-01-18 PROCEDURE — 99232 SBSQ HOSP IP/OBS MODERATE 35: CPT

## 2022-01-18 PROCEDURE — 93306 TTE W/DOPPLER COMPLETE: CPT | Mod: 26

## 2022-01-18 RX ADMIN — APIXABAN 2.5 MILLIGRAM(S): 2.5 TABLET, FILM COATED ORAL at 06:26

## 2022-01-18 RX ADMIN — APIXABAN 2.5 MILLIGRAM(S): 2.5 TABLET, FILM COATED ORAL at 17:18

## 2022-01-18 RX ADMIN — CEFTRIAXONE 100 MILLIGRAM(S): 500 INJECTION, POWDER, FOR SOLUTION INTRAMUSCULAR; INTRAVENOUS at 17:55

## 2022-01-18 RX ADMIN — Medication 650 MILLIGRAM(S): at 17:18

## 2022-01-18 RX ADMIN — Medication 650 MILLIGRAM(S): at 06:17

## 2022-01-18 RX ADMIN — Medication 81 MILLIGRAM(S): at 11:31

## 2022-01-18 RX ADMIN — TAMSULOSIN HYDROCHLORIDE 0.4 MILLIGRAM(S): 0.4 CAPSULE ORAL at 21:22

## 2022-01-18 RX ADMIN — AMIODARONE HYDROCHLORIDE 200 MILLIGRAM(S): 400 TABLET ORAL at 06:26

## 2022-01-18 RX ADMIN — Medication 1: at 11:57

## 2022-01-18 NOTE — PROGRESS NOTE ADULT - SUBJECTIVE AND OBJECTIVE BOX
CC: F/U for UTI    Saw/spoke to patient. No fevers, no chills. Prior symptoms of rigors improved.    Allergies  penicillin (Rash)    ANTIMICROBIALS:  cefTRIAXone   IVPB 1000 every 24 hours    PE:    Vital Signs Last 24 Hrs  T(C): 36.6 (2022 11:47), Max: 36.7 (2022 18:30)  T(F): 97.9 (2022 11:47), Max: 98 (2022 18:30)  HR: 70 (2022 11:47) (67 - 75)  BP: 110/48 (2022 11:47) (103/40 - 130/41)  RR: 18 (2022 11:47) (18 - 18)  SpO2: 99% (2022 11:47) (99% - 100%)    Gen: AOx3, NAD, non-toxic  CV: S1+S2 normal, nontachycardic  Resp: Clear bilat, no resp distress, no crackles/wheezes  Abd: Soft, nontender, +BS  Ext: No LE edema, no wounds    LABS:                        10.1   9.31  )-----------( 126      ( 2022 06:15 )             30.7     -18    135  |  108<H>  |  44<H>  ----------------------------<  123<H>  4.8   |  17<L>  |  1.76<H>    Ca    8.7      2022 06:15  Phos  3.1       Mg     1.80         TPro  6.1  /  Alb  3.1<L>  /  TBili  0.4  /  DBili  x   /  AST  18  /  ALT  21  /  AlkPhos  63      Urinalysis Basic - ( 2022 15:22 )    Color: Yellow / Appearance: Turbid / S.010 / pH: x  Gluc: x / Ketone: Negative  / Bili: Negative / Urobili: <2 mg/dL   Blood: x / Protein: 100 mg/dL / Nitrite: Negative   Leuk Esterase: Large / RBC: 5 /HPF / WBC >50 /HPF   Sq Epi: x / Non Sq Epi: 0 /HPF / Bacteria: Negative    MICROBIOLOGY:    .Urine Urine  22 --  --  --    .Blood Blood-Peripheral  22   No growth to date.  --  --    .Blood Blood-Venous  22   No growth to date.  --  --    Clean Catch Clean Catch (Midstream)  22   <10,000 CFU/mL Normal Urogenital Jeannine  --  --    (otherwise reviewed)    RADIOLOGY:     XR:    FINDINGS:    The heart is normal in size.  The lungs are clear.  There is no pneumothorax or pleural effusion.    IMPRESSION:  Clear lungs.

## 2022-01-18 NOTE — PROGRESS NOTE ADULT - SUBJECTIVE AND OBJECTIVE BOX
CC: no events    TELEMETRY:     PHYSICAL EXAM:    T(C): 36.6 (01-18-22 @ 11:47), Max: 37 (01-17-22 @ 14:08)  HR: 70 (01-18-22 @ 11:47) (65 - 75)  BP: 110/48 (01-18-22 @ 11:47) (103/40 - 130/41)  RR: 18 (01-18-22 @ 11:47) (17 - 18)  SpO2: 99% (01-18-22 @ 11:47) (99% - 100%)  Wt(kg): --  I&O's Summary    17 Jan 2022 07:01  -  18 Jan 2022 07:00  --------------------------------------------------------  IN: 0 mL / OUT: 200 mL / NET: -200 mL        Appearance: Normal	  Cardiovascular: Normal S1 S2,RRR, No JVD, No murmurs  Respiratory: Lungs clear to auscultation	  Gastrointestinal:  Soft, Non-tender, + BS	  Extremities: Normal range of motion, No clubbing, cyanosis or edema  Vascular: Peripheral pulses palpable 2+ bilaterally     LABS:	 	                          10.1   9.31  )-----------( 126      ( 18 Jan 2022 06:15 )             30.7     01-18    135  |  108<H>  |  44<H>  ----------------------------<  123<H>  4.8   |  17<L>  |  1.76<H>    Ca    8.7      18 Jan 2022 06:15  Phos  3.1     01-18  Mg     1.80     01-18    TPro  6.1  /  Alb  3.1<L>  /  TBili  0.4  /  DBili  x   /  AST  18  /  ALT  21  /  AlkPhos  63  01-17      PT/INR - ( 16 Jan 2022 15:02 )   PT: 17.0 sec;   INR: 1.51 ratio         PTT - ( 16 Jan 2022 15:02 )  PTT:36.3 sec    CARDIAC MARKERS:

## 2022-01-18 NOTE — PROGRESS NOTE ADULT - ASSESSMENT
This is an 86M with history of bladder cancer (s/p TURBT 12/2020, s/p intravescicular chemotherapy with gemcitabine c/b AFib with RVR and LILIAN, now s/p BCG instillation 1/12/22 with Dr. Ramsey), s/p L Nephrectomy at age 16, DM2 (not on medications), HTN, HLD, CAD s/p 4 stents (last stent >5 years ago), and AFib on Eliquis who presents to the hospital with complaints of not feeling and generalized shaking.       #Sepsis  -possible urosepsis given recent  intervention  -IV abx per med/ID  -followup cultures    #Troponinemia  -elevated due to demand ischemia in setting of sepsis and underlying CAD  -type II MI  -cont asa    #Afib  -cont amio  -cont eliquis

## 2022-01-18 NOTE — DISCHARGE NOTE NURSING/CASE MANAGEMENT/SOCIAL WORK - NSDCFUADDAPPT_GEN_ALL_CORE_FT
Pt Coreg was increased to 6.25 QD. Call this in to [facility] in [place]     
Follow-up with Dr. Lim in the office as instructed for post-op check as well as with PMD for continuity of care.

## 2022-01-18 NOTE — PHYSICAL THERAPY INITIAL EVALUATION ADULT - PERTINENT HX OF CURRENT PROBLEM, REHAB EVAL
Patient is 86 year old male admitted with history of bladder cancer (s/p TURBT 12/2020, s/p intravescicular chemotherapy with gemcitabine c/b AFib with RVR and LILIAN, now s/p BCG instillation 1/12/22 with Dr. Ramsey), s/p L Nephrectomy at age 16, DM2 (not on medications), HTN, HLD, CAD s/p 4 stents (last stent >5 years ago), and AFib on Eliquis who presents to the hospital with complaints of not feeling and generalized shaking.

## 2022-01-18 NOTE — PROGRESS NOTE ADULT - SUBJECTIVE AND OBJECTIVE BOX
Patient is a 86y old  Male who presents with a chief complaint of Weakness, generalized shaking (2022 13:52)      DATE OF SERVICE: 22 @ 11:25    SUBJECTIVE / OVERNIGHT EVENTS: overnight events noted    ROS:  Resp: No cough no sputum production  CVS: No chest pain no palpitations no orthopnea  GI: no N/V/D  : no dysuria, no hematuria  Neuro: no weakness no paresthesias          MEDICATIONS  (STANDING):  aMIOdarone    Tablet 200 milliGRAM(s) Oral daily  apixaban 2.5 milliGRAM(s) Oral two times a day  aspirin enteric coated 81 milliGRAM(s) Oral daily  cefTRIAXone   IVPB 1000 milliGRAM(s) IV Intermittent every 24 hours  dextrose 40% Gel 15 Gram(s) Oral once  dextrose 5%. 1000 milliLiter(s) (50 mL/Hr) IV Continuous <Continuous>  dextrose 5%. 1000 milliLiter(s) (100 mL/Hr) IV Continuous <Continuous>  dextrose 50% Injectable 25 Gram(s) IV Push once  dextrose 50% Injectable 12.5 Gram(s) IV Push once  dextrose 50% Injectable 25 Gram(s) IV Push once  glucagon  Injectable 1 milliGRAM(s) IntraMuscular once  insulin lispro (ADMELOG) corrective regimen sliding scale   SubCutaneous three times a day before meals  insulin lispro (ADMELOG) corrective regimen sliding scale   SubCutaneous at bedtime  sodium bicarbonate 650 milliGRAM(s) Oral two times a day  sodium chloride 0.45% 1000 milliLiter(s) (75 mL/Hr) IV Continuous <Continuous>  tamsulosin 0.4 milliGRAM(s) Oral at bedtime    MEDICATIONS  (PRN):  acetaminophen     Tablet .. 650 milliGRAM(s) Oral every 6 hours PRN Temp greater or equal to 38C (100.4F), Mild Pain (1 - 3)  aluminum hydroxide/magnesium hydroxide/simethicone Suspension 30 milliLiter(s) Oral every 4 hours PRN Dyspepsia  melatonin 3 milliGRAM(s) Oral at bedtime PRN Insomnia  ondansetron Injectable 4 milliGRAM(s) IV Push every 8 hours PRN Nausea and/or Vomiting        CAPILLARY BLOOD GLUCOSE      POCT Blood Glucose.: 118 mg/dL (2022 07:53)  POCT Blood Glucose.: 181 mg/dL (2022 21:28)  POCT Blood Glucose.: 128 mg/dL (2022 17:31)    I&O's Summary    2022 07:01  -  2022 07:00  --------------------------------------------------------  IN: 0 mL / OUT: 200 mL / NET: -200 mL        Vital Signs Last 24 Hrs  T(C): 36.7 (2022 06:17), Max: 37 (2022 14:08)  T(F): 98 (2022 06:17), Max: 98.6 (2022 14:08)  HR: 75 (2022 06:17) (65 - 75)  BP: 103/40 (2022 06:17) (103/40 - 130/41)  BP(mean): --  RR: 18 (2022 06:17) (17 - 18)  SpO2: 100% (2022 06:17) (100% - 100%)    PHYSICAL EXAM: looks much more comfortable   GENERAL: in no apparent distress  EYES: EOMI, PERRLA, sclera clear  NECK: Supple, No JVD  CHEST/LUNG: clear  HEART: S1 S2; ejection systolic murmur +   ABDOMEN: Soft, Nontender, Bowel sounds present  EXTREMITIES: no edema  NEUROLOGY: AO x 3 non-focal  SKIN: No rashes or lesions    LABS:                        10.1   9.31  )-----------( 126      ( 2022 06:15 )             30.7     -18    135  |  108<H>  |  44<H>  ----------------------------<  123<H>  4.8   |  17<L>  |  1.76<H>    Ca    8.7      2022 06:15  Phos  3.1     -18  Mg     1.80     18    TPro  6.1  /  Alb  3.1<L>  /  TBili  0.4  /  DBili  x   /  AST  18  /  ALT  21  /  AlkPhos  63  17    PT/INR - ( 2022 15:02 )   PT: 17.0 sec;   INR: 1.51 ratio         PTT - ( 2022 15:02 )  PTT:36.3 sec      Urinalysis Basic - ( 2022 15:22 )    Color: Yellow / Appearance: Turbid / S.010 / pH: x  Gluc: x / Ketone: Negative  / Bili: Negative / Urobili: <2 mg/dL   Blood: x / Protein: 100 mg/dL / Nitrite: Negative   Leuk Esterase: Large / RBC: 5 /HPF / WBC >50 /HPF   Sq Epi: x / Non Sq Epi: 0 /HPF / Bacteria: Negative          All consultant(s) notes reviewed and care discussed with other providers        Contact Number, Dr Arroyo [phone]

## 2022-01-18 NOTE — PROGRESS NOTE ADULT - PROBLEM SELECTOR PLAN 1
likely urinary source  ID recommendations noted  bacterial urine culture not sent from Emergency Department   continue ceftriaxone IV for now  blood cultures negative likely urinary source  ID recommendations noted  continue ceftriaxone IV for now  urine culture and blood cultures negative so far  will defer recommendations to ID

## 2022-01-18 NOTE — PROGRESS NOTE ADULT - SUBJECTIVE AND OBJECTIVE BOX
Overnight events noted      VITAL:  T(C): , Max: 37 (22 @ 14:08)  T(F): , Max: 98.6 (22 @ 14:08)  HR: 70 (22 @ 11:47)  BP: 110/48 (22 @ 11:47)  BP(mean): --  RR: 18 (22 @ 11:47)  SpO2: 99% (22 @ 11:47)      PHYSICAL EXAM:  Constitutional: NAD, frail  HEENT: NCAT, DMM  Neck: Supple, No JVD  Respiratory: CTA-b/l  Cardiovascular: RRR s1s2, no m/r/g  Gastrointestinal: BS+, soft, NT/ND  Extremities: No peripheral edema b/l  Neurological: no focal deficits; strength grossly intact  Back: no CVAT b/l    LABS:                        10.1   9.31  )-----------( 126      ( 2022 06:15 )             30.7     Na(135)/K(4.8)/Cl(108)/HCO3(17)/BUN(44)/Cr(1.76)Glu(123)/Ca(8.7)/Mg(1.80)/PO4(3.1)     @ 06:15  Na(136)/K(4.9)/Cl(108)/HCO3(18)/BUN(50)/Cr(1.81)Glu(257)/Ca(8.8)/Mg(2.00)/PO4(3.0)     @ 20:20  Na(136)/K(5.1)/Cl(109)/HCO3(19)/BUN(53)/Cr(2.01)Glu(150)/Ca(8.8)/Mg(1.60)/PO4(3.3)     @ 14:38  Na(135)/K(5.6)/Cl(108)/HCO3(19)/BUN(53)/Cr(2.07)Glu(165)/Ca(8.9)/Mg(1.50)/PO4(3.1)     @ 11:25  Na(136)/K(5.9)/Cl(110)/HCO3(15)/BUN(55)/Cr(2.24)Glu(168)/Ca(9.0)/Mg(1.60)/PO4(2.7)     @ 05:28  Na(--)/K(6.0)/Cl(--)/HCO3(--)/BUN(--)/Cr(--)Glu(--)/Ca(--)/Mg(--)/PO4(--)     @ 04:41  Na(136)/K(6.6)/Cl(109)/HCO3(16)/BUN(54)/Cr(1.99)Glu(209)/Ca(9.0)/Mg(--)/PO4(--)     @ 01:19  Na(--)/K(--)/Cl(--)/HCO3(--)/BUN(--)/Cr(--)Glu(--)/Ca(--)/Mg(1.80)/PO4(2.5)     @ 19:11  Na(137)/K(5.6)/Cl(113)/HCO3(14)/BUN(55)/Cr(1.95)Glu(246)/Ca(9.4)/Mg(--)/PO4(--)     @ 19:07  Na(133)/K(7.2)/Cl(109)/HCO3(14)/BUN(61)/Cr(2.10)Glu(161)/Ca(9.4)/Mg(--)/PO4(--)     @ 15:02    Urinalysis Basic - ( 2022 15:22 )  Color: Yellow / Appearance: Turbid / S.010 / pH: x  Gluc: x / Ketone: Negative  / Bili: Negative / Urobili: <2 mg/dL   Blood: x / Protein: 100 mg/dL / Nitrite: Negative   Leuk Esterase: Large / RBC: 5 /HPF / WBC >50 /HPF   Sq Epi: x / Non Sq Epi: 0 /HPF / Bacteria: Negative      IMPRESSION: 86M w/ HTN, DM2, CAD, solitary kidney s/p nephrectomy, and bladder CA s/p TURBT/recently on chemo; 22 p/w hyperkalemia/metabolic acidosis    (1)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 Presumably at/near baseline GFR of ~25-30ml/min. Resolving/resolved mild prerenally mediated LILIAN    (2)Hyperkalemia - much improved relative to admission, with Lokelma, PO NaHCO3, and low-K diet; addition of Torsemide here could help as well.    (3)Metabolic acidosis - renally mediated     (4)ID - presumed UTI - on IV Rocephin      RECOMMEND:  (1)Add Torsemide 5mg po qd  (2)Continue PO NaHCO3 as ordered  (3)Continue low-K diet  (4)No further Lokelma for now; would readminister prn K 5.5 or higher  (5)BMP daily while admitted  (6)Meds for GFR 30-35ml/min  (7)If for discharge, would have repeat BMP performed within 1 week of discharge; would have patient f/u with my partner Dr. Emily Gusman within 2 weeks of discharge          Ronnell Bull MD  [facility]  Office: [phone]  Cell: [phone]       No pain, no sob      VITAL:  T(C): , Max: 37 (22 @ 14:08)  T(F): , Max: 98.6 (22 @ 14:08)  HR: 70 (22 @ 11:47)  BP: 110/48 (22 @ 11:47)  BP(mean): --  RR: 18 (22 @ 11:47)  SpO2: 99% (22 @ 11:47)      PHYSICAL EXAM:  Constitutional: NAD, frail  HEENT: NCAT, DMM  Neck: Supple, No JVD  Respiratory: CTA-b/l  Cardiovascular: RRR s1s2, no m/r/g  Gastrointestinal: BS+, soft, NT/ND  Extremities: No peripheral edema b/l  Neurological: no focal deficits; strength grossly intact  Back: no CVAT b/l  Derm: (+)Scattered ecchymoses    LABS:                        10.1   9.31  )-----------( 126      ( 2022 06:15 )             30.7     Na(135)/K(4.8)/Cl(108)/HCO3(17)/BUN(44)/Cr(1.76)Glu(123)/Ca(8.7)/Mg(1.80)/PO4(3.1)     @ 06:15  Na(136)/K(4.9)/Cl(108)/HCO3(18)/BUN(50)/Cr(1.81)Glu(257)/Ca(8.8)/Mg(2.00)/PO4(3.0)     @ 20:20  Na(136)/K(5.1)/Cl(109)/HCO3(19)/BUN(53)/Cr(2.01)Glu(150)/Ca(8.8)/Mg(1.60)/PO4(3.3)     @ 14:38  Na(135)/K(5.6)/Cl(108)/HCO3(19)/BUN(53)/Cr(2.07)Glu(165)/Ca(8.9)/Mg(1.50)/PO4(3.1)     @ 11:25  Na(136)/K(5.9)/Cl(110)/HCO3(15)/BUN(55)/Cr(2.24)Glu(168)/Ca(9.0)/Mg(1.60)/PO4(2.7)     @ 05:28  Na(--)/K(6.0)/Cl(--)/HCO3(--)/BUN(--)/Cr(--)Glu(--)/Ca(--)/Mg(--)/PO4(--)     @ 04:41  Na(136)/K(6.6)/Cl(109)/HCO3(16)/BUN(54)/Cr(1.99)Glu(209)/Ca(9.0)/Mg(--)/PO4(--)     @ 01:19  Na(--)/K(--)/Cl(--)/HCO3(--)/BUN(--)/Cr(--)Glu(--)/Ca(--)/Mg(1.80)/PO4(2.5)     @ 19:11  Na(137)/K(5.6)/Cl(113)/HCO3(14)/BUN(55)/Cr(1.95)Glu(246)/Ca(9.4)/Mg(--)/PO4(--)     @ 19:07  Na(133)/K(7.2)/Cl(109)/HCO3(14)/BUN(61)/Cr(2.10)Glu(161)/Ca(9.4)/Mg(--)/PO4(--)     @ 15:02    Urinalysis Basic - ( 2022 15:22 )  Color: Yellow / Appearance: Turbid / S.010 / pH: x  Gluc: x / Ketone: Negative  / Bili: Negative / Urobili: <2 mg/dL   Blood: x / Protein: 100 mg/dL / Nitrite: Negative   Leuk Esterase: Large / RBC: 5 /HPF / WBC >50 /HPF   Sq Epi: x / Non Sq Epi: 0 /HPF / Bacteria: Negative      IMPRESSION: 86M w/ HTN, DM2, CAD, solitary kidney s/p nephrectomy, and bladder CA s/p TURBT/recently on chemo; 22 p/w hyperkalemia/metabolic acidosis    (1)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 Presumably at/near baseline GFR of ~25-30ml/min. Resolving/resolved mild prerenally mediated LILIAN    (2)Hyperkalemia - much improved relative to admission, with Lokelma, PO NaHCO3, and low-K diet; addition of Torsemide here could help as well.    (3)Metabolic acidosis - renally mediated     (4)ID - presumed UTI - on IV Rocephin      RECOMMEND:  (1)Add Torsemide 5mg po qd  (2)Continue PO NaHCO3 as ordered  (3)Continue low-K diet  (4)No further Lokelma for now; would readminister prn K 5.5 or higher  (5)BMP daily while admitted  (6)Meds for GFR 30-35ml/min  (7)If for discharge, would have repeat BMP performed within 1 week of discharge; would have patient f/u with my partner Dr. Emily Gusman within 2 weeks of discharge          Ronnell uBll MD  [facility]  Office: [phone]  Cell: [phone]

## 2022-01-18 NOTE — PHYSICAL THERAPY INITIAL EVALUATION ADULT - ADDITIONAL COMMENTS
Patient report lives with wife in house, 1 step to enter, ambulates with a narrow base quad cane and owns a walker.

## 2022-01-18 NOTE — PROGRESS NOTE ADULT - ASSESSMENT
87 yo M with bladder CA on intravesicular chemo, BCG, initially with shaking and weakness  Fever, leukocytosis  CXR clear  UA positive  History dysuria  Clinically improving on current treatment? Note use of BCG on 1/12, some consideration for disseminated BCG although standard bacterial pathogens still more likely  Acid fast BCX and UCX pending--will take prolonged period of time  Clinically improved today  Overall,  1) Fever  - More acute presentation suspicious for acute bacterial process; also note patient improvement with current therapies  - F/U BCXs--NGTD  2) UTI  - Note prior use of BCG  - Ceftriaxone 1g q 24  - F/U UCX--NGTD  - AFB cultures will take extended period of time, if patient remains well, anticipate would discharge on PO Vantin 200mg q 12 to complete 7 day course  - Follow up in ID clinic in 3-4 weeks after discharge to follow up on cultures, [phone]  3) Leukocytosis  - Trend CBCs    My colleagues will be covering this patient on 1/19/22, I will return 1/20. Please call [phone] or on call fellow with any questions or change in status.     Douglas Marte MD  Pager [phone]  From 5pm-9am, and on weekends call [phone]

## 2022-01-19 ENCOUNTER — APPOINTMENT (OUTPATIENT)
Dept: UROLOGY | Facility: CLINIC | Age: 87
End: 2022-01-19

## 2022-01-19 ENCOUNTER — TRANSCRIPTION ENCOUNTER (OUTPATIENT)
Age: 87
End: 2022-01-19

## 2022-01-19 LAB
ANION GAP SERPL CALC-SCNC: 10 MMOL/L — SIGNIFICANT CHANGE UP (ref 7–14)
BUN SERPL-MCNC: 46 MG/DL — HIGH (ref 7–23)
CALCIUM SERPL-MCNC: 8.6 MG/DL — SIGNIFICANT CHANGE UP (ref 8.4–10.5)
CHLORIDE SERPL-SCNC: 106 MMOL/L — SIGNIFICANT CHANGE UP (ref 98–107)
CO2 SERPL-SCNC: 19 MMOL/L — LOW (ref 22–31)
CREAT SERPL-MCNC: 1.77 MG/DL — HIGH (ref 0.5–1.3)
GLUCOSE BLDC GLUCOMTR-MCNC: 121 MG/DL — HIGH (ref 70–99)
GLUCOSE BLDC GLUCOMTR-MCNC: 141 MG/DL — HIGH (ref 70–99)
GLUCOSE BLDC GLUCOMTR-MCNC: 165 MG/DL — HIGH (ref 70–99)
GLUCOSE BLDC GLUCOMTR-MCNC: 217 MG/DL — HIGH (ref 70–99)
GLUCOSE SERPL-MCNC: 132 MG/DL — HIGH (ref 70–99)
HCT VFR BLD CALC: 31.7 % — LOW (ref 39–50)
HGB BLD-MCNC: 10.7 G/DL — LOW (ref 13–17)
MAGNESIUM SERPL-MCNC: 1.7 MG/DL — SIGNIFICANT CHANGE UP (ref 1.6–2.6)
MCHC RBC-ENTMCNC: 33 PG — SIGNIFICANT CHANGE UP (ref 27–34)
MCHC RBC-ENTMCNC: 33.8 GM/DL — SIGNIFICANT CHANGE UP (ref 32–36)
MCV RBC AUTO: 97.8 FL — SIGNIFICANT CHANGE UP (ref 80–100)
NRBC # BLD: 0 /100 WBCS — SIGNIFICANT CHANGE UP
NRBC # FLD: 0 K/UL — SIGNIFICANT CHANGE UP
PHOSPHATE SERPL-MCNC: 3.1 MG/DL — SIGNIFICANT CHANGE UP (ref 2.5–4.5)
PLATELET # BLD AUTO: 144 K/UL — LOW (ref 150–400)
POTASSIUM SERPL-MCNC: 4.2 MMOL/L — SIGNIFICANT CHANGE UP (ref 3.5–5.3)
POTASSIUM SERPL-SCNC: 4.2 MMOL/L — SIGNIFICANT CHANGE UP (ref 3.5–5.3)
RBC # BLD: 3.24 M/UL — LOW (ref 4.2–5.8)
RBC # FLD: 14 % — SIGNIFICANT CHANGE UP (ref 10.3–14.5)
SODIUM SERPL-SCNC: 135 MMOL/L — SIGNIFICANT CHANGE UP (ref 135–145)
WBC # BLD: 8.68 K/UL — SIGNIFICANT CHANGE UP (ref 3.8–10.5)
WBC # FLD AUTO: 8.68 K/UL — SIGNIFICANT CHANGE UP (ref 3.8–10.5)

## 2022-01-19 RX ORDER — MAGNESIUM SULFATE 500 MG/ML
1 VIAL (ML) INJECTION ONCE
Refills: 0 | Status: COMPLETED | OUTPATIENT
Start: 2022-01-19 | End: 2022-01-19

## 2022-01-19 RX ADMIN — APIXABAN 2.5 MILLIGRAM(S): 2.5 TABLET, FILM COATED ORAL at 05:14

## 2022-01-19 RX ADMIN — Medication 100 GRAM(S): at 12:11

## 2022-01-19 RX ADMIN — Medication 2: at 12:11

## 2022-01-19 RX ADMIN — AMIODARONE HYDROCHLORIDE 200 MILLIGRAM(S): 400 TABLET ORAL at 05:14

## 2022-01-19 RX ADMIN — Medication 5 MILLIGRAM(S): at 12:12

## 2022-01-19 RX ADMIN — APIXABAN 2.5 MILLIGRAM(S): 2.5 TABLET, FILM COATED ORAL at 17:14

## 2022-01-19 RX ADMIN — Medication 81 MILLIGRAM(S): at 17:14

## 2022-01-19 RX ADMIN — CEFTRIAXONE 100 MILLIGRAM(S): 500 INJECTION, POWDER, FOR SOLUTION INTRAMUSCULAR; INTRAVENOUS at 17:15

## 2022-01-19 RX ADMIN — Medication 650 MILLIGRAM(S): at 05:15

## 2022-01-19 RX ADMIN — TAMSULOSIN HYDROCHLORIDE 0.4 MILLIGRAM(S): 0.4 CAPSULE ORAL at 22:15

## 2022-01-19 RX ADMIN — Medication 650 MILLIGRAM(S): at 17:13

## 2022-01-19 NOTE — PROGRESS NOTE ADULT - SUBJECTIVE AND OBJECTIVE BOX
NEPHROLOGY[facility] [phone]        Patient seen and examined resting on room air no new complaints.         MEDICATIONS  (STANDING):  aMIOdarone    Tablet 200 milliGRAM(s) Oral daily  apixaban 2.5 milliGRAM(s) Oral two times a day  aspirin enteric coated 81 milliGRAM(s) Oral daily  cefTRIAXone   IVPB 1000 milliGRAM(s) IV Intermittent every 24 hours  dextrose 40% Gel 15 Gram(s) Oral once  dextrose 5%. 1000 milliLiter(s) (50 mL/Hr) IV Continuous <Continuous>  dextrose 5%. 1000 milliLiter(s) (100 mL/Hr) IV Continuous <Continuous>  dextrose 50% Injectable 25 Gram(s) IV Push once  dextrose 50% Injectable 12.5 Gram(s) IV Push once  dextrose 50% Injectable 25 Gram(s) IV Push once  glucagon  Injectable 1 milliGRAM(s) IntraMuscular once  insulin lispro (ADMELOG) corrective regimen sliding scale   SubCutaneous three times a day before meals  insulin lispro (ADMELOG) corrective regimen sliding scale   SubCutaneous at bedtime  sodium bicarbonate 650 milliGRAM(s) Oral two times a day  tamsulosin 0.4 milliGRAM(s) Oral at bedtime  torsemide 5 milliGRAM(s) Oral daily      VITAL:  T(C): , Max: 36.9 (01-19-22 @ 05:43)  T(F): , Max: 98.4 (01-19-22 @ 05:43)  HR: 77 (01-19-22 @ 05:43)  BP: 104/47 (01-19-22 @ 05:43)  BP(mean): --  RR: 18 (01-19-22 @ 05:43)  SpO2: 100% (01-19-22 @ 05:43)  Wt(kg): --    I and O's:    01-18 @ 07:01  -  01-19 @ 07:00  --------------------------------------------------------  IN: 236 mL / OUT: 1300 mL / NET: -1064 mL          PHYSICAL EXAM:    Constitutional: NAD, frail   Neck:  No JVD  Respiratory: CTAB/L  Cardiovascular: S1 and S2  Gastrointestinal: BS+, soft, NT/ND  Extremities: No peripheral edema  Neurological: A/O x 3, no focal deficits  Psychiatric: Normal mood, normal affect  : No Temple  Skin: (+)Scattered ecchymoses      LABS:                        10.7   8.68  )-----------( 144      ( 19 Jan 2022 07:39 )             31.7     01-19    135  |  106  |  46<H>  ----------------------------<  132<H>  4.2   |  19<L>  |  1.77<H>    Ca    8.6      19 Jan 2022 07:35  Phos  3.1     01-19  Mg     1.70     01-19        RADIOLOGY & ADDITIONAL STUDIES:    < from: Xray Chest 1 View- PORTABLE-Urgent (Xray Chest 1 View- PORTABLE-Urgent .) (01.16.22 @ 15:35) >    ACC: [id number] EXAM:  XR CHEST PORTABLE URGENT 1V                          PROCEDURE DATE:  01/16/2022          INTERPRETATION:  EXAMINATION: XR CHEST URGENT    CLINICAL INDICATION: weakness    TECHNIQUE: Single frontal, portable view of the chest was obtained.    COMPARISON: Chest x-ray 11/9/2021.    FINDINGS:    The heart is normal in size.  The lungs are clear.  There is no pneumothorax or pleural effusion.    IMPRESSION:  Clear lungs.    --- End of Report ---        < end of copied text >

## 2022-01-19 NOTE — DIETITIAN INITIAL EVALUATION ADULT. - FACTORS AFF FOOD INTAKE
Attempted to see pt x2 today. In the am, pt with PT/OT and in pm pt with dialysis. Will attempt swallow re-assessment tomorrow. Pt currently NPO with tube feeding.   none

## 2022-01-19 NOTE — DIETITIAN INITIAL EVALUATION ADULT. - PROBLEM SELECTOR PLAN 5
- s/p recent BCG instillation  - Urology eval in AM vs outpatient  - Concern for possible tumor lysis syndrome in ED given his hyperkalemia but patient with what seems like localized bladder cancer, TLS labs not suggestive of TLS as no signfiicant hyperphasphatemia, hyperurecemia, or hypocalcemia, LDH also wnl, would repeat in AM but low suspicion for TLS

## 2022-01-19 NOTE — CONSULT NOTE ADULT - SUBJECTIVE AND OBJECTIVE BOX
86M with history of bladder cancer (s/p TURBT 12/2020, s/p intravescicular chemotherapy with gemcitabine c/b AFib with RVR and LILIAN, now s/p BCG instillation 1/12/22 with Dr. Ramsey  , s/p L Nephrectomy at age 16, DM2 (not on medications), HTN, HLD, CAD s/p 4 stents (last stent >5 years ago), and AFib on Eliquis who presents to the hospital with complaints of not feeling and generalized shaking.     Said that his symptoms initially started on Friday (3 days PTA) when he started to feel weak and noted that he was SOB when taking out the garbage. On Saturday he noted that he was having generalized body shaking and felt unsteady on his feet (though never had syncope, fall, dizziness, chest pain, or palpations) and this continued into Sunday and therefore came to the hospital for evaluation. Said that currently he still feels "off" but is improved from before. Has chronic nocturia, dysuria, and intermittent hematuria 2/2 his bladder tumor but that has not changed. H    On arrival to the ED, his vitals were T 98.4, P 66, /60, RR 18, O2 sat 100% RA. His lab work showed anemia (improved from prior), LILIAN vs CKD, hyperkalemia, metabolic acidosis, and pyuria/hematuria but chronic from prior. His imaging did not show any acute findings. His trops were indeterminant but stable. He was given the hyperkalemia medications with good response to his potassium and he was admitted to medicine on telemetry.     UCx NGTD.  BCx NGTD x 2.  AFB pending Urine.    PAST MEDICAL & SURGICAL HISTORY:  CAD (coronary artery disease)  (4 stents, non-adherent to aspirin)    Diabetes mellitus, new onset    Single kidney  (hx/o LEFT nephrectomy at age 16;)  Arthritis    Myocardial infarction  (2003)    Hard of hearing    Atrial fibrillation    Hypertension    Hyperlipidemia    History of TIAs  Last 2018    History of bradycardia    History of nephrectomy, unilateral  (hx/o LEFT nephrectomy at age 16)    Stented coronary artery  (4 stents)    S/P bilateral cataract extraction        MEDICATIONS  (STANDING):  aMIOdarone    Tablet 200 milliGRAM(s) Oral daily  apixaban 2.5 milliGRAM(s) Oral two times a day  aspirin enteric coated 81 milliGRAM(s) Oral daily  cefTRIAXone   IVPB 1000 milliGRAM(s) IV Intermittent every 24 hours  dextrose 40% Gel 15 Gram(s) Oral once  dextrose 5%. 1000 milliLiter(s) (50 mL/Hr) IV Continuous <Continuous>  dextrose 5%. 1000 milliLiter(s) (100 mL/Hr) IV Continuous <Continuous>  dextrose 50% Injectable 25 Gram(s) IV Push once  dextrose 50% Injectable 12.5 Gram(s) IV Push once  dextrose 50% Injectable 25 Gram(s) IV Push once  glucagon  Injectable 1 milliGRAM(s) IntraMuscular once  insulin lispro (ADMELOG) corrective regimen sliding scale   SubCutaneous three times a day before meals  insulin lispro (ADMELOG) corrective regimen sliding scale   SubCutaneous at bedtime  sodium bicarbonate 650 milliGRAM(s) Oral two times a day  tamsulosin 0.4 milliGRAM(s) Oral at bedtime  torsemide 5 milliGRAM(s) Oral daily    MEDICATIONS  (PRN):  acetaminophen     Tablet .. 650 milliGRAM(s) Oral every 6 hours PRN Temp greater or equal to 38C (100.4F), Mild Pain (1 - 3)  aluminum hydroxide/magnesium hydroxide/simethicone Suspension 30 milliLiter(s) Oral every 4 hours PRN Dyspepsia  melatonin 3 milliGRAM(s) Oral at bedtime PRN Insomnia  ondansetron Injectable 4 milliGRAM(s) IV Push every 8 hours PRN Nausea and/or Vomiting      FAMILY HISTORY:  Family history of diabetes mellitus in mother (Mother)  (Pt states his mother developed DM in her 70&#x27;s)    FH: bladder cancer (Sibling)      Allergies    penicillin (Rash)    Intolerances    Cipro (Joint Pain)      SOCIAL HISTORY:    REVIEW OF SYSTEMS: Otherwise negative as stated in HPI    Physical Exam  Vital signs  T(C): 37.4 (01-19-22 @ 12:01), Max: 37.4 (01-19-22 @ 12:01)  HR: 72 (01-19-22 @ 12:01)  BP: 100/48 (01-19-22 @ 12:01)  SpO2: 100% (01-19-22 @ 12:01)  Wt(kg): --    Output    OUT:    Voided (mL): 1300 mL  Total OUT: 1300 mL    Total NET: -1300 mL    Physical Exam  Gen:  NAD    Pulm:  No respiratory distress  	  CV:  RRR    GI:  S/ND/NT    :  No suprapubic tenderness or fullness  Urine coca cola colored by the bedside,     LABS:  01-19 @ 07:39  WBC 8.68  / Hct 31.7  / SCr --       01-19 @ 07:35  WBC --    / Hct --    / SCr 1.77     01-19    135  |  106  |  46<H>  ----------------------------<  132<H>  4.2   |  19<L>  |  1.77<H>    Ca    8.6      19 Jan 2022 07:35  Phos  3.1     01-19  Mg     1.70     01-19    Urine Cx: Culture - Urine (01.16.22 @ 19:10)    Specimen Source: Clean Catch Clean Catch (Midstream)    Culture Results:   <10,000 CFU/mL Normal Urogenital Jeannine      Blood Cx: Culture - Blood (01.17.22 @ 06:08)    Specimen Source: .Blood Blood-Peripheral    Culture Results:   No growth to date.

## 2022-01-19 NOTE — DISCHARGE NOTE PROVIDER - HOSPITAL COURSE
85 YO M with PMHx of Bladder CA s/p TURBT 12/2020 and intravesicular chemotherapy with Gemcitabine c/b AFIB with RVR and LILIAN, now s/p BCG Instillation (1/12/22) with Dr. Ramsey, s/p L Nephrectomy (1951), DM2 (not on medications), HTN, HLD, CAD s/p 4 JOSE (last stent >5 years ago), CKD, and AFIB on Eliquis who presented to the hospital with Urosepsis and LILIAN with mild metabolic acidosis. s/p Bicarb GTT with improvement, started on Rocephin and cultures sent. BCx and UCx NGTD, but WBC improved on ABX. 87 YO M with PMHx of Bladder CA s/p TURBT 12/2020 and intravesicular chemotherapy with Gemcitabine c/b AFIB with RVR and LILIAN, now s/p BCG Instillation (1/12/22) with Dr. Ramsey, s/p L Nephrectomy (1951), DM2 (not on medications), HTN, HLD, CAD s/p 4 JOSE (last stent >5 years ago), CKD, and AFIB on Eliquis who presented to the hospital with Urosepsis and LILIAN with mild metabolic acidosis. s/p Bicarb GTT with improvement, started on Rocephin and cultures sent. BCx and UCx NGTD, but WBC improved on ABX. For discharge sending patient home with Vantin to complete total course    On 1/20/22, case was discussed with Dr. Arroyo, patient is medically cleared and optimized for discharge today. All medications were reviewed with attending, and sent to mutually agreed upon pharmacy. 85 YO M with PMHx of Bladder CA s/p TURBT 12/2020 and intravesicular chemotherapy with Gemcitabine c/b AFIB with RVR and LILIAN, now s/p BCG Instillation (1/12/22) with Dr. Ramsey, s/p L Nephrectomy (1951), DM2 (not on medications), HTN, HLD, CAD s/p 4 JOSE (last stent >5 years ago), CKD, and AFIB on Eliquis who presented to the hospital with Urosepsis and LILIAN with mild metabolic acidosis. s/p Bicarb GTT with improvement, started on Rocephin and cultures sent. BCx and UCx NGTD, but WBC improved on ABX. For discharge sending patient home with Vantin to complete total course    On 1/20/22, case was discussed with Dr. Arroyo, patient is medically cleared and optimized for discharge today. All medications were reviewed with attending, and sent to mutually agreed upon pharmacy.

## 2022-01-19 NOTE — DISCHARGE NOTE PROVIDER - CARE PROVIDER_API CALL
Emily Gusman)  Internal Medicine; Nephrology  [address], [address]  [place] [address]  Phone: [phone]  Fax: [phone]  Follow Up Time:     Ranjan Ramsey)  Urology  [address], [address]  [place] [address]  Phone: [phone]  Fax: [phone]  Follow Up Time:

## 2022-01-19 NOTE — DIETITIAN INITIAL EVALUATION ADULT. - PROBLEM SELECTOR PLAN 2
- Unknown baseline recent renal function, had elevated BUN/Cr in November in setting of AFib with RVR and ADHF, was improving but unclear to what level, pt states that he has a follow up appointment with nephrology later this month so doesn't know his baseline renal function  - Currently BUN/Cr elevated but improved from prior, suspect patient likely has some degree of LILIAN on CKD, would c/w IVF for now, monitor BUN/Cr  - Potassium elevated, given patient only with 1 kidney likely elevated 2/2 reduced renal function in the single kidney, improving with medical management, EKG without acute changes, would place on IVF, trend potassium    ADDENDUM:  - Patient repeat BMP overnight showed him to be hyperkalemic to 6.6, not hemolyzed  - Given medical management again, Insulin/D50, Lokelma 10g, Alb neb, spoke with nephrology on call for Dr. Gusman (Dr Alex on call), recommended to start patient on bicarb gtt for 8 hrs and to give lasix 20mg IVP x1. Repeat BMP in 4 hours. Patient reports he is still urinating, no acute changes.  - EKG without peaked T waves, ?new onset RBBB though review of prior EKG from Nov 2021 shows he had some RBBB type changes (RsR' in V2, slightly slurring of S wave), so likely chronic but would check repeat trop (add on to 1AM BMP), repeat trop in AM

## 2022-01-19 NOTE — PROGRESS NOTE ADULT - SUBJECTIVE AND OBJECTIVE BOX
Patient is a 86y old  Male who presents with a chief complaint of Weakness, generalized shaking (19 Jan 2022 11:31)      DATE OF SERVICE: 01-19-22 @ 15:11    SUBJECTIVE / OVERNIGHT EVENTS: overnight events noted  "I feel much better"     ROS:  Resp: No cough no sputum production  CVS: No chest pain no palpitations no orthopnea  GI: no N/V/D  : no dysuria, no hematuria  Neuro: no weakness no paresthesias  Heme: No petechiae no easy bruising  Msk: No joint pain no swelling  Skin: No rash no itching        MEDICATIONS  (STANDING):  aMIOdarone    Tablet 200 milliGRAM(s) Oral daily  apixaban 2.5 milliGRAM(s) Oral two times a day  aspirin enteric coated 81 milliGRAM(s) Oral daily  cefTRIAXone   IVPB 1000 milliGRAM(s) IV Intermittent every 24 hours  dextrose 40% Gel 15 Gram(s) Oral once  dextrose 5%. 1000 milliLiter(s) (50 mL/Hr) IV Continuous <Continuous>  dextrose 5%. 1000 milliLiter(s) (100 mL/Hr) IV Continuous <Continuous>  dextrose 50% Injectable 25 Gram(s) IV Push once  dextrose 50% Injectable 12.5 Gram(s) IV Push once  dextrose 50% Injectable 25 Gram(s) IV Push once  glucagon  Injectable 1 milliGRAM(s) IntraMuscular once  insulin lispro (ADMELOG) corrective regimen sliding scale   SubCutaneous three times a day before meals  insulin lispro (ADMELOG) corrective regimen sliding scale   SubCutaneous at bedtime  sodium bicarbonate 650 milliGRAM(s) Oral two times a day  tamsulosin 0.4 milliGRAM(s) Oral at bedtime  torsemide 5 milliGRAM(s) Oral daily    MEDICATIONS  (PRN):  acetaminophen     Tablet .. 650 milliGRAM(s) Oral every 6 hours PRN Temp greater or equal to 38C (100.4F), Mild Pain (1 - 3)  aluminum hydroxide/magnesium hydroxide/simethicone Suspension 30 milliLiter(s) Oral every 4 hours PRN Dyspepsia  melatonin 3 milliGRAM(s) Oral at bedtime PRN Insomnia  ondansetron Injectable 4 milliGRAM(s) IV Push every 8 hours PRN Nausea and/or Vomiting        CAPILLARY BLOOD GLUCOSE      POCT Blood Glucose.: 217 mg/dL (19 Jan 2022 11:25)  POCT Blood Glucose.: 121 mg/dL (19 Jan 2022 07:46)  POCT Blood Glucose.: 221 mg/dL (18 Jan 2022 20:27)  POCT Blood Glucose.: 117 mg/dL (18 Jan 2022 16:57)    I&O's Summary    18 Jan 2022 07:01  -  19 Jan 2022 07:00  --------------------------------------------------------  IN: 236 mL / OUT: 1300 mL / NET: -1064 mL        Vital Signs Last 24 Hrs  T(C): 37.4 (19 Jan 2022 12:01), Max: 37.4 (19 Jan 2022 12:01)  T(F): 99.3 (19 Jan 2022 12:01), Max: 99.3 (19 Jan 2022 12:01)  HR: 72 (19 Jan 2022 12:01) (70 - 77)  BP: 100/48 (19 Jan 2022 12:01) (100/48 - 104/59)  BP(mean): --  RR: 18 (19 Jan 2022 12:01) (18 - 18)  SpO2: 100% (19 Jan 2022 12:01) (100% - 100%)    PHYSICAL EXAM:   GENERAL: in no distress  EYES: EOMI, PERRLA  NECK: Supple, No JVD  CHEST/LUNG: clear  HEART: S1 S2; systolic murmur +   ABDOMEN: Soft, Nontender  EXTREMITIES: no edema  NEUROLOGY: AO x 3 non-focal  SKIN: No rashes or lesions    LABS:                        10.7   8.68  )-----------( 144      ( 19 Jan 2022 07:39 )             31.7     01-19    135  |  106  |  46<H>  ----------------------------<  132<H>  4.2   |  19<L>  |  1.77<H>    Ca    8.6      19 Jan 2022 07:35  Phos  3.1     01-19  Mg     1.70     01-19                  All consultant(s) notes reviewed and care discussed with other providers        Contact Number, Dr Arroyo [phone]

## 2022-01-19 NOTE — DISCHARGE NOTE PROVIDER - NSDCMRMEDTOKEN_GEN_ALL_CORE_FT
amiodarone 200 mg oral tablet: 1 tab(s) orally once a day   apixaban 2.5 mg oral tablet: 1 tab(s) orally 2 times a day  Aspirin Enteric Coated 81 mg oral delayed release tablet: 1 tab(s) orally once a day   Flomax 0.4 mg oral capsule: 1 cap(s) orally once a day   amiodarone 200 mg oral tablet: 1 tab(s) orally once a day   apixaban 2.5 mg oral tablet: 1 tab(s) orally 2 times a day  Aspirin Enteric Coated 81 mg oral delayed release tablet: 1 tab(s) orally once a day   Flomax 0.4 mg oral capsule: 1 cap(s) orally once a day  sodium bicarbonate 650 mg oral tablet: 1 tab(s) orally 2 times a day  torsemide 5 mg oral tablet: 1 tab(s) orally once a day   amiodarone 200 mg oral tablet: 1 tab(s) orally once a day   apixaban 2.5 mg oral tablet: 1 tab(s) orally 2 times a day  Aspirin Enteric Coated 81 mg oral delayed release tablet: 1 tab(s) orally once a day   cefpodoxime 200 mg oral tablet: 1 tab(s) orally every 12 hours   Flomax 0.4 mg oral capsule: 1 cap(s) orally once a day  sodium bicarbonate 650 mg oral tablet: 1 tab(s) orally 2 times a day  torsemide 5 mg oral tablet: 1 tab(s) orally once a day

## 2022-01-19 NOTE — DISCHARGE NOTE PROVIDER - NSDCFUSCHEDAPPT_GEN_ALL_CORE_FT
RACHEL COMBS ; 01/19/2022 ; [facility]  RACHEL COMBS ; 01/25/2022 ; [facility] [address] RACHEL Hood ; 01/26/2022 ; [facility] [address] [facility]  RACHEL COMBS ; 01/26/2022 ; [facility] [address] [facility]  RACHEL COMBS ; 02/02/2022 ; [facility] [address] [facility]  RACHEL COMBS ; 02/02/2022 ; [facility] [address] [facility]  RACHEL COMBS ; 02/09/2022 ; [facility] [address] [facility]  RACHEL COMBS ; 02/09/2022 ; [facility] Urology [address] [place] [address]  RACHEL COMBS ; 02/15/2022 ; [facility] [address] RACHEL Hood ; 02/16/2022 ; [facility] [address] [facility]  RACHEL COMBS ; 02/16/2022 ; [facility] [address] [facility]  RACHEL COMBS ; 02/23/2022 ; [facility] [address] [facility]  RACHEL COMBS ; 02/23/2022 ; [facility] [address] [facility] RACHEL COMBS ; 01/25/2022 ; [facility] D[facility] [address] RACHEL Hood ; 01/26/2022 ; [facility] U[facility] [address] [place] [address]  RACHEL COMBS ; 01/26/2022 ; [facility] [address] [place] [address]  RACHEL COMBS ; 02/02/2022 ; [facility] [address] [place] [address]  RACHEL COMBS ; 02/02/2022 ; [facility] [address] [place] [address]  RACHEL COMBS ; 02/09/2022 ; [facility] [address] [place] [address]  RACHEL COMBS ; 02/09/2022 ; [facility] [address] [place] [address]  RACHEL COMBS ; 02/15/2022 ; [facility] [address] RACHEL Hood ; 02/16/2022 ; [facility] [address] [place] [address]  RACHEL COMBS ; 02/16/2022 ; [facility] [address] [place] [address]  RACHEL COMBS ; 02/23/2022 ; [facility] [address] [place] [address]  RACHEL COMBS ; 02/23/2022 ; [facility] [address] [place] [address]

## 2022-01-19 NOTE — DIETITIAN INITIAL EVALUATION ADULT. - ORAL INTAKE PTA/DIET HISTORY
Patient seen for assessment. Reports fair appetite PTA, states appetite usually fluctuates day to day. Reports wife use to be a nurse and helps with watching his diet/cooks meals.

## 2022-01-19 NOTE — DIETITIAN INITIAL EVALUATION ADULT. - PERTINENT MEDS FT
MEDICATIONS  (STANDING):  aMIOdarone    Tablet 200 milliGRAM(s) Oral daily  apixaban 2.5 milliGRAM(s) Oral two times a day  aspirin enteric coated 81 milliGRAM(s) Oral daily  cefTRIAXone   IVPB 1000 milliGRAM(s) IV Intermittent every 24 hours  dextrose 40% Gel 15 Gram(s) Oral once  dextrose 5%. 1000 milliLiter(s) (50 mL/Hr) IV Continuous <Continuous>  dextrose 5%. 1000 milliLiter(s) (100 mL/Hr) IV Continuous <Continuous>  dextrose 50% Injectable 25 Gram(s) IV Push once  dextrose 50% Injectable 12.5 Gram(s) IV Push once  dextrose 50% Injectable 25 Gram(s) IV Push once  glucagon  Injectable 1 milliGRAM(s) IntraMuscular once  insulin lispro (ADMELOG) corrective regimen sliding scale   SubCutaneous three times a day before meals  insulin lispro (ADMELOG) corrective regimen sliding scale   SubCutaneous at bedtime  sodium bicarbonate 650 milliGRAM(s) Oral two times a day  tamsulosin 0.4 milliGRAM(s) Oral at bedtime  torsemide 5 milliGRAM(s) Oral daily

## 2022-01-19 NOTE — DIETITIAN INITIAL EVALUATION ADULT. - REASON INDICATOR FOR ASSESSMENT
What Type Of Note Output Would You Prefer (Optional)?: Standard Output Hpi Title: Evaluation of Skin Lesions Assessment/Education

## 2022-01-19 NOTE — DISCHARGE NOTE PROVIDER - DETAILS OF MALNUTRITION DIAGNOSIS/DIAGNOSES
This patient has been assessed with a concern for Malnutrition and was treated during this hospitalization for the following Nutrition diagnosis/diagnoses:     -  01/19/2022: Moderate protein-calorie malnutrition   -  01/19/2022: Underweight (BMI < 19)

## 2022-01-19 NOTE — DIETITIAN INITIAL EVALUATION ADULT. - PROBLEM SELECTOR PLAN 4
- Repeat EKG overnight for hyperkalemia  shows new onset RBBB though review of prior EKG from Nov 2021 shows he had some RBBB type changes (RsR' in V2, slightly slurring of S wave), so likely chronic but would check repeat trop (add on to 1AM BMP), repeat trop in AM, check TTE  - Cards consult as per primary team

## 2022-01-19 NOTE — DIETITIAN INITIAL EVALUATION ADULT. - OTHER INFO
86 year old male with a PMH of bladder cancer, HTN, HLD, CAD, s/p left nephrectomy presents to the hospital with sepsis likely 2/2  etiology per chart.    Patient reports appetite is good in house, noted with 1 intake % per RN flow sheet. Patient amenable to try ensure plant based (180 kcal, 20 g pro) to help meet nutritional needs. Reports no GI distress or chewing/swallowing difficulties. Has no food allergies. Reports weighing himself daily and has recently hovered between 116-118 lbs. ABW is 124.3 lbs. (1/17) and 119.5 lbs. (1/19) per bed scale observation which is more consistent with patients stated UBW. Per previous RD note (11/16/21), patient with weight 122 lbs. indicating a -2.1% wt. loss x 2 months, likely in setting of variable PO intake and catabolic illness. No edema or pressure injuries noted per RN flow sheet.    Patient with questions regarding low potassium diet, reviewed food groups known to be high in potassium and alternative choices to choose. Discussed trying small frequent meals, focusing on protein rich foods and calorie dense snacks to meet nutritional needs and prevent weight loss. Provided patient low potassium educational handout.

## 2022-01-19 NOTE — DISCHARGE NOTE PROVIDER - NSDCFUADDAPPT_GEN_ALL_CORE_FT
- Follow up with your PCP in 1-2 weeks from discharge. - Follow up with Nephro Dr. Emily Gusman within 2 weeks from discharge and repeat BMP (lab/ blood work) within 1 week from discharge. - Follow up with Dr. Ramsey at the [facility] for Urology [phone].  WIll determine if safe to continue BCG treatments given this risk of infection and previous history of UTI's

## 2022-01-19 NOTE — DIETITIAN INITIAL EVALUATION ADULT. - ADD RECOMMEND
Consider d/c DASH/TLC given normal lipid labs and variable appetite. Follow up on nutrition education as needed. Obtain weekly weight and document PO intake to monitor trend. Consider liberalizing diet from DASH/TLC to low sodium. Follow up on nutrition education as needed. Obtain weekly weight and document PO intake to monitor trend.

## 2022-01-19 NOTE — DIETITIAN INITIAL EVALUATION ADULT. - PERTINENT LABORATORY DATA
01-19 Na 135 mmol/L Glu 132 mg/dL<H> K+ 4.2 mmol/L Cr 1.77 mg/dL<H> BUN 46 mg/dL<H> Phos 3.1 mg/dL  01-19 @ 11:25 POCT 217 mg/dL  01-19 @ 07:46 POCT 121 mg/dL  01-18 @ 20:27 POCT 221 mg/dL  01-18 @ 16:57 POCT 117 mg/dL  A1C with Estimated Average Glucose (01.17.22 @ 05:28), A1C with Estimated Average Glucose Result: 5.4

## 2022-01-19 NOTE — PROGRESS NOTE ADULT - ASSESSMENT
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 Presumably at/near baseline GFR of ~25-30ml/min. Resolving/resolved mild prerenally mediated LILIAN    (2)Hyperkalemia - much improved relative to admission, with Lokelma, PO NaHCO3, and low-K diet; addition of Torsemide here could help as well. K+ stable     (3)Metabolic acidosis - renally mediated, HCO3 improving     (4)ID - presumed UTI - on IV Rocephin      RECOMMEND:  (1)Continue Torsemide 5mg po qd  (2)Continue PO NaHCO3 as ordered  (3)Continue low-K diet  (4)No further Lokelma for now; would readminister prn K 5.5 or higher  (5)BMP daily while admitted  (6)Meds for GFR 30-35ml/min  (7)If for discharge, would have repeat BMP performed within 1 week of discharge; would have patient f/u with Dr. 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 Presumably at/near baseline GFR of ~25-30ml/min. Resolving/resolved mild prerenally mediated LILIAN    (2)Hyperkalemia - much improved relative to admission, with Lokelma, PO NaHCO3, and low-K diet; Torsemide also added. K+ stable today     (3)Metabolic acidosis - renally mediated, HCO3 improving     (4)ID - presumed UTI - on IV Rocephin      RECOMMEND:  (1)Continue Torsemide 5mg po qd  (2)Continue PO NaHCO3 as ordered  (3)Continue low-K diet  (4)No further Lokelma for now; would readminister prn K 5.5 or higher  (5)BMP daily while admitted  (6)Meds for GFR 30-35ml/min  (7)If for discharge, would have repeat BMP performed within 1 week of discharge; would have patient f/u with Dr. Emily Gusman within 2 weeks of discharge          Yuliya Vallecillo NP  [facility]  Office: [phone]

## 2022-01-19 NOTE — PROGRESS NOTE ADULT - PROBLEM SELECTOR PLAN 1
likely urinary source even though urine culture negative secondary to rapidity of improvement with antibiotics   ID recommendations noted for after discharge   continue ceftriaxone IV for now  likely discharge home tomorrow

## 2022-01-19 NOTE — DISCHARGE NOTE PROVIDER - CARE PROVIDERS DIRECT ADDRESSES
[email or web address]

## 2022-01-19 NOTE — CONSULT NOTE ADULT - ASSESSMENT
86M with history of bladder cancer (s/p TURBT 12/2020, s/p intravescicular chemotherapy with gemcitabine c/b AFib with RVR and LILIAN, now s/p BCG instillation 1/12/22 with Dr. Ramsey  , s/p L Nephrectomy at age 16, DM2 (not on medications), HTN, HLD, CAD s/p 4 stents (last stent >5 years ago), and AFib on Eliquis who presents to the hospital with complaints of not feeling and generalized shaking.     UCx, BCx all negative.  Urine AFB pending.  ID on board, doesn't appear to be BCG sepsis given quick response.  Likely a large component of dehydration and maybe UTI after BCG instillation.    Feeling much better now    -- Continue Abx per primary team  -- When discharged will need to follow up with Dr. Ramsey at the [facility] for Urology [phone].  WIll determine if safe to continue BCG treatments given this risk of infection and previous history of UTI's  -- Encouraged PO hydration  -- Seen & examined w/ Dr. Ramsey
This is an 86M with history of bladder cancer (s/p TURBT 12/2020, s/p intravescicular chemotherapy with gemcitabine c/b AFib with RVR and LILIAN, now s/p BCG instillation 1/12/22 with Dr. Ramsey), s/p L Nephrectomy at age 16, DM2 (not on medications), HTN, HLD, CAD s/p 4 stents (last stent >5 years ago), and AFib on Eliquis who presents to the hospital with complaints of not feeling and generalized shaking.       #Sepsis  -possible urosepsis given recent  intervention  -IV abx per med/ID  -followup cultures    #Troponinemia  -elevated due to demand ischemia in setting of sepsis and underlying CAD  -type II MI  -cont asa    #Afib  -cont amio  -cont eliquis  
87 yo M with bladder CA on intravesicular chemo, BCG, initially with shaking and weakness  Fever, leukocytosis  CXR clear  UA positive  History dysuria  Clinically improving on current treatment? Note use of BCG on 1/12, some consideration for disseminated BCG although standard bacterial pathogens still more likely  Overall,  1) Fever  - More acute presentation suspicious for acute bacterial process; also note patient improvement with current therapies  - F/U BCXs  - Check acid fast bacilli BCX  2) UTI  - Note prior use of BCG  - Ceftriaxone 1g q 24 (low threshold to escalate to meropenem if clinical worsening)  - F/U UCX  - F/U UCX AFB  3) Leukocytosis  - Trend CBCs    Douglas Marte MD  Pager [phone]  From 5pm-9am, and on weekends call [phone]

## 2022-01-19 NOTE — DIETITIAN NUTRITION RISK NOTIFICATION - TREATMENT: THE FOLLOWING DIET HAS BEEN RECOMMENDED
Diet, Regular:   Consistent Carbohydrate {Evening Snack} (CSTCHOSN)  DASH/TLC {Sodium & Cholesterol Restricted} (DASH)  No Concentrated Potassium (01-17-22 @ 08:38) [Active]

## 2022-01-19 NOTE — CONSULT NOTE ADULT - REASON FOR ADMISSION
Weakness, generalized shaking

## 2022-01-19 NOTE — PROGRESS NOTE ADULT - NSPROGADDITIONALINFOA_GEN_ALL_CORE
discussed with patient in detail, expresses understanding of treatment plans.  likely discharge home tomorrow with home PT

## 2022-01-19 NOTE — DISCHARGE NOTE PROVIDER - NSDCCPCAREPLAN_GEN_ALL_CORE_FT
PRINCIPAL DISCHARGE DIAGNOSIS  Diagnosis: Hyperkalemia  Assessment and Plan of Treatment: - Hyperkalemia (high K) seen in setting of renal failure with urinary tract infection. You received IV fluid hydration and antibiotics with improvement.   - Continue on treatment for your UTI as below.   - Follow up with your PCP in 1-2 weeks from discharge.  - Follow up with Nephro Dr. Emily Gusman within 2 weeks from discharge and repeat BMP (lab/ blood work) within 1 week from discharge.      SECONDARY DISCHARGE DIAGNOSES  Diagnosis: Sepsis, unspecified organism  Assessment and Plan of Treatment: - Continue on   - Follow up with your PCP in 1-2 weeks from discharge.  - Follow up with Nephro Dr. Emily Gusman within 2 weeks from discharge and repeat BMP (lab/ blood work) within 1 week from discharge.    Diagnosis: Malignant tumor of urinary bladder  Assessment and Plan of Treatment: - Follow up with Dr. Ramsey at the [facility] for Urology [phone].  WIll determine if safe to continue BCG treatments given this risk of infection and previous history of UTI's

## 2022-01-19 NOTE — PROGRESS NOTE ADULT - SUBJECTIVE AND OBJECTIVE BOX
CC: no events    TELEMETRY:     PHYSICAL EXAM:    T(C): 36.9 (01-19-22 @ 05:43), Max: 36.9 (01-19-22 @ 05:43)  HR: 77 (01-19-22 @ 05:43) (70 - 77)  BP: 104/47 (01-19-22 @ 05:43) (104/47 - 110/48)  RR: 18 (01-19-22 @ 05:43) (18 - 18)  SpO2: 100% (01-19-22 @ 05:43) (99% - 100%)  Wt(kg): --  I&O's Summary    18 Jan 2022 07:01  -  19 Jan 2022 07:00  --------------------------------------------------------  IN: 236 mL / OUT: 1300 mL / NET: -1064 mL        Appearance: Normal	  Cardiovascular: Normal S1 S2,RRR, No JVD, No murmurs  Respiratory: Lungs clear to auscultation	  Gastrointestinal:  Soft, Non-tender, + BS	  Extremities: Normal range of motion, No clubbing, cyanosis or edema  Vascular: Peripheral pulses palpable 2+ bilaterally     LABS:	 	                          10.7   8.68  )-----------( 144      ( 19 Jan 2022 07:39 )             31.7     01-19    135  |  106  |  46<H>  ----------------------------<  132<H>  4.2   |  19<L>  |  1.77<H>    Ca    8.6      19 Jan 2022 07:35  Phos  3.1     01-19  Mg     1.70     01-19            CARDIAC MARKERS:         Hyperlipidemia, unspecified hyperlipidemia type

## 2022-01-20 ENCOUNTER — TRANSCRIPTION ENCOUNTER (OUTPATIENT)
Age: 87
End: 2022-01-20

## 2022-01-20 VITALS
DIASTOLIC BLOOD PRESSURE: 56 MMHG | RESPIRATION RATE: 17 BRPM | TEMPERATURE: 98 F | OXYGEN SATURATION: 98 % | HEART RATE: 76 BPM | SYSTOLIC BLOOD PRESSURE: 112 MMHG

## 2022-01-20 LAB
ANION GAP SERPL CALC-SCNC: 16 MMOL/L — HIGH (ref 7–14)
BUN SERPL-MCNC: 55 MG/DL — HIGH (ref 7–23)
CALCIUM SERPL-MCNC: 8.9 MG/DL — SIGNIFICANT CHANGE UP (ref 8.4–10.5)
CHLORIDE SERPL-SCNC: 106 MMOL/L — SIGNIFICANT CHANGE UP (ref 98–107)
CO2 SERPL-SCNC: 17 MMOL/L — LOW (ref 22–31)
CREAT SERPL-MCNC: 2.06 MG/DL — HIGH (ref 0.5–1.3)
GLUCOSE BLDC GLUCOMTR-MCNC: 150 MG/DL — HIGH (ref 70–99)
GLUCOSE BLDC GLUCOMTR-MCNC: 183 MG/DL — HIGH (ref 70–99)
GLUCOSE BLDC GLUCOMTR-MCNC: 218 MG/DL — HIGH (ref 70–99)
GLUCOSE SERPL-MCNC: 159 MG/DL — HIGH (ref 70–99)
HCT VFR BLD CALC: 32.9 % — LOW (ref 39–50)
HGB BLD-MCNC: 10.9 G/DL — LOW (ref 13–17)
MAGNESIUM SERPL-MCNC: 1.8 MG/DL — SIGNIFICANT CHANGE UP (ref 1.6–2.6)
MCHC RBC-ENTMCNC: 32.4 PG — SIGNIFICANT CHANGE UP (ref 27–34)
MCHC RBC-ENTMCNC: 33.1 GM/DL — SIGNIFICANT CHANGE UP (ref 32–36)
MCV RBC AUTO: 97.9 FL — SIGNIFICANT CHANGE UP (ref 80–100)
NRBC # BLD: 0 /100 WBCS — SIGNIFICANT CHANGE UP
NRBC # FLD: 0 K/UL — SIGNIFICANT CHANGE UP
PHOSPHATE SERPL-MCNC: 3.2 MG/DL — SIGNIFICANT CHANGE UP (ref 2.5–4.5)
PLATELET # BLD AUTO: 158 K/UL — SIGNIFICANT CHANGE UP (ref 150–400)
POTASSIUM SERPL-MCNC: 4.4 MMOL/L — SIGNIFICANT CHANGE UP (ref 3.5–5.3)
POTASSIUM SERPL-SCNC: 4.4 MMOL/L — SIGNIFICANT CHANGE UP (ref 3.5–5.3)
RBC # BLD: 3.36 M/UL — LOW (ref 4.2–5.8)
RBC # FLD: 13.7 % — SIGNIFICANT CHANGE UP (ref 10.3–14.5)
SODIUM SERPL-SCNC: 139 MMOL/L — SIGNIFICANT CHANGE UP (ref 135–145)
WBC # BLD: 8.48 K/UL — SIGNIFICANT CHANGE UP (ref 3.8–10.5)
WBC # FLD AUTO: 8.48 K/UL — SIGNIFICANT CHANGE UP (ref 3.8–10.5)

## 2022-01-20 PROCEDURE — 99232 SBSQ HOSP IP/OBS MODERATE 35: CPT

## 2022-01-20 RX ORDER — CEFPODOXIME PROXETIL 100 MG
1 TABLET ORAL
Qty: 4 | Refills: 0
Start: 2022-01-20 | End: 2022-01-21

## 2022-01-20 RX ORDER — CEFPODOXIME PROXETIL 100 MG
1 TABLET ORAL
Qty: 8 | Refills: 0
Start: 2022-01-20 | End: 2022-01-23

## 2022-01-20 RX ORDER — SODIUM BICARBONATE 1 MEQ/ML
1 SYRINGE (ML) INTRAVENOUS
Qty: 0 | Refills: 0 | DISCHARGE
Start: 2022-01-20

## 2022-01-20 RX ADMIN — Medication 5 MILLIGRAM(S): at 06:49

## 2022-01-20 RX ADMIN — APIXABAN 2.5 MILLIGRAM(S): 2.5 TABLET, FILM COATED ORAL at 05:40

## 2022-01-20 RX ADMIN — Medication 650 MILLIGRAM(S): at 05:40

## 2022-01-20 RX ADMIN — Medication 1: at 12:22

## 2022-01-20 RX ADMIN — AMIODARONE HYDROCHLORIDE 200 MILLIGRAM(S): 400 TABLET ORAL at 05:41

## 2022-01-20 NOTE — PROGRESS NOTE ADULT - SUBJECTIVE AND OBJECTIVE BOX
CC: no events    TELEMETRY:     PHYSICAL EXAM:    T(C): 37.3 (01-20-22 @ 05:36), Max: 37.4 (01-19-22 @ 12:01)  HR: 84 (01-20-22 @ 06:45) (68 - 84)  BP: 113/50 (01-20-22 @ 06:45) (100/48 - 117/57)  RR: 17 (01-20-22 @ 06:45) (17 - 18)  SpO2: 99% (01-20-22 @ 06:45) (99% - 100%)  Wt(kg): --  I&O's Summary    19 Jan 2022 07:01  -  20 Jan 2022 07:00  --------------------------------------------------------  IN: 440 mL / OUT: 1500 mL / NET: -1060 mL        Appearance: Normal	  Cardiovascular: Normal S1 S2,RRR, No JVD, No murmurs  Respiratory: Lungs clear to auscultation	  Gastrointestinal:  Soft, Non-tender, + BS	  Extremities: Normal range of motion, No clubbing, cyanosis or edema  Vascular: Peripheral pulses palpable 2+ bilaterally     LABS:	 	                          10.9   8.48  )-----------( 158      ( 20 Jan 2022 08:08 )             32.9     01-20    139  |  106  |  55<H>  ----------------------------<  159<H>  4.4   |  17<L>  |  2.06<H>    Ca    8.9      20 Jan 2022 08:04  Phos  3.2     01-20  Mg     1.80     01-20            CARDIAC MARKERS:

## 2022-01-20 NOTE — PROGRESS NOTE ADULT - ASSESSMENT
85 yo M with bladder CA on intravesicular chemo, BCG, initially with shaking and weakness  Fever, leukocytosis  CXR clear  UA positive  History dysuria  Clinically improving on current treatment? Note use of BCG on 1/12, some consideration for disseminated BCG although standard bacterial pathogens still more likely  Acid fast BCX and UCX pending--will take prolonged period of time  Clinically improved today  Overall,  1) Fever  - More acute presentation suspicious for acute bacterial process; also note patient improvement with current therapies  - F/U BCXs--NGTD  2) UTI  - Note prior use of BCG  - Ceftriaxone 1g q 24  - F/U UCX--NGTD  - AFB cultures will take extended period of time, if patient remains well, anticipate would discharge on PO Vantin 200mg q 12 to complete 7 day course  - Follow up in ID clinic in 3-4 weeks after discharge to follow up on cultures, [phone]  3) Leukocytosis  - Trend CBCs    Douglas Marte MD  Pager [phone]  From 5pm-9am, and on weekends call [phone]

## 2022-01-20 NOTE — PROGRESS NOTE ADULT - PROBLEM SELECTOR PLAN 5
urology evaluation  outpatient follow up after
urology evaluation  outpatient follow up after
follow up urology after discharge
follow up urology after discharge

## 2022-01-20 NOTE — PROGRESS NOTE ADULT - ASSESSMENT
This is an 86M with history of bladder cancer (s/p TURBT 12/2020, s/p intravescicular chemotherapy with gemcitabine c/b AFib with RVR and LILIAN, now s/p BCG instillation 1/12/22 with Dr. Ramsey), s/p L Nephrectomy at age 16, DM2 (not on medications), HTN, HLD, CAD s/p 4 stents (last stent >5 years ago), and AFib on Eliquis who presents to the hospital with complaints of not feeling and generalized shaking.       #Sepsis  -possible urosepsis given recent  intervention  -IV abx per med/ID  -followup cultures    #Troponinemia  -elevated due to demand ischemia in setting of sepsis and underlying CAD  -type II MI  -cont asa  -echo grossly unchanged mild LV dysfunction    #Afib  -cont amio  -cont eliquis    DCP

## 2022-01-20 NOTE — PROGRESS NOTE ADULT - PROBLEM SELECTOR PLAN 1
resolved   continue ceftriaxone IV day 5/7 of antibiotics   likely discharge home today on Vantin 200 mg q 12 x 2 more days

## 2022-01-20 NOTE — PROGRESS NOTE ADULT - PROVIDER SPECIALTY LIST ADULT
Cardiology
Infectious Disease
Infectious Disease
Nephrology
Cardiology
Cardiology
Internal Medicine

## 2022-01-20 NOTE — PROGRESS NOTE ADULT - REASON FOR ADMISSION
Weakness, generalized shaking

## 2022-01-20 NOTE — PROGRESS NOTE ADULT - ASSESSMENT
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 Presumably at/near baseline GFR of ~25-30ml/min. Azotemia is higher today     (2)Hyperkalemia - much improved relative to admission, with Lokelma, PO NaHCO3, and low-K diet; Torsemide also added. K+ controlled     (3)Metabolic acidosis - renally mediated, HCO3 lower      (4)ID - presumed UTI - on IV Rocephin      RECOMMEND:  (1)Continue Torsemide 5mg po qd  (2)Continue PO NaHCO3 as ordered  (3)Continue low-K diet  (4)No further Lokelma for now; would readminister prn K 5.5 or higher  (5)BMP daily while admitted  (6)Meds for GFR 30-35ml/min  (7)No renal objection to discharge, patient should have BMP performed within 1 week of discharge; would have patient f/u with Dr. Emily Gusman within 2 weeks of discharge          Yuliya Vallecillo NP  [facility]  Office: [phone]     IMPRESSION: 86M w/ HTN, DM2, CAD, solitary kidney s/p nephrectomy, and bladder CA s/p TURBT/recently on chemo; 1/16/22 p/w hyperkalemia/metabolic acidosis    (1)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 Presumably at/near baseline GFR of ~25-30ml/min. Azotemia is higher today     (2)Hyperkalemia - much improved relative to admission, with Lokelma, PO NaHCO3, and low-K diet; Torsemide also added. K+ controlled     (3)Metabolic acidosis - renally mediated, HCO3 lower      (4)ID - presumed UTI - on IV Rocephin      RECOMMEND:  (1)Continue Torsemide 5mg po qd  (2)Continue PO NaHCO3 as ordered  (3)Continue low-K diet  (4)No further Lokelma for now; would readminister prn K 5.5 or higher  (5)BMP daily while admitted  (6)Meds for GFR 30-35ml/min.  (7)No renal objection to discharge, patient should have BMP performed within 1 week of discharge; would have patient f/u with Dr. Emily Gusman within 2 weeks of discharge          Yuliya Vallecillo NP  [facility]  Office: [phone]

## 2022-01-20 NOTE — PROGRESS NOTE ADULT - PROBLEM SELECTOR PLAN 3
grossly nonfocal  likely generalized weakness secondary to infection, low suspicion for ACS as patient with stable trops and non-ischemic EKG
resolved
grossly nonfocal  likely secondary to sepsis from UTI
resolved

## 2022-01-20 NOTE — PROGRESS NOTE ADULT - NUTRITIONAL ASSESSMENT
This patient has been assessed with a concern for Malnutrition and has been determined to have a diagnosis/diagnoses of Moderate protein-calorie malnutrition and Underweight (BMI < 19).    This patient is being managed with:   Diet Regular-  Consistent Carbohydrate {Evening Snack} (CSTCHOSN)  DASH/TLC {Sodium & Cholesterol Restricted} (DASH)  No Concentrated Potassium  Supplement Feeding Modality:  Oral  Ensure Plant-Based Cans or Servings Per Day:  1       Frequency:  Daily  Entered: Jan 19 2022  4:24PM

## 2022-01-20 NOTE — DISCHARGE NOTE NURSING/CASE MANAGEMENT/SOCIAL WORK - PATIENT PORTAL LINK FT
You can access the FollowMyHealth Patient Portal offered by [facility] by registering at the following website: [email or web address]. By joining [facility]’s FollowMyHealth portal, you will also be able to view your health information using other applications (apps) compatible with our system.

## 2022-01-20 NOTE — PROGRESS NOTE ADULT - PROBLEM SELECTOR PLAN 4
clinically stable  continue to monitor for now  echocardiogram noted EF 45 % but patient is euvolemic  consistent with chronic diastolic heart failure
clinically stable  continue to monitor for now
clinically stable  will CT chest/abdomen/pelvis mfo rnow  after hyperkalemia is fully resolved  will repeat EKG
clinically stable  continue to monitor for now

## 2022-01-20 NOTE — PROGRESS NOTE ADULT - SUBJECTIVE AND OBJECTIVE BOX
Patient is a 86y old  Male who presents with a chief complaint of Weakness, generalized shaking (20 Jan 2022 11:28)      DATE OF SERVICE: 01-20-22 @ 11:36    SUBJECTIVE / OVERNIGHT EVENTS: overnight events noted    ROS:  Resp: No cough no sputum production  CVS: No chest pain no palpitations no orthopnea  GI: no N/V/D  : no dysuria, no hematuria      MEDICATIONS  (STANDING):  aMIOdarone    Tablet 200 milliGRAM(s) Oral daily  apixaban 2.5 milliGRAM(s) Oral two times a day  aspirin enteric coated 81 milliGRAM(s) Oral daily  cefTRIAXone   IVPB 1000 milliGRAM(s) IV Intermittent every 24 hours  dextrose 40% Gel 15 Gram(s) Oral once  dextrose 5%. 1000 milliLiter(s) (50 mL/Hr) IV Continuous <Continuous>  dextrose 5%. 1000 milliLiter(s) (100 mL/Hr) IV Continuous <Continuous>  dextrose 50% Injectable 25 Gram(s) IV Push once  dextrose 50% Injectable 12.5 Gram(s) IV Push once  dextrose 50% Injectable 25 Gram(s) IV Push once  glucagon  Injectable 1 milliGRAM(s) IntraMuscular once  insulin lispro (ADMELOG) corrective regimen sliding scale   SubCutaneous three times a day before meals  insulin lispro (ADMELOG) corrective regimen sliding scale   SubCutaneous at bedtime  sodium bicarbonate 650 milliGRAM(s) Oral two times a day  tamsulosin 0.4 milliGRAM(s) Oral at bedtime  torsemide 5 milliGRAM(s) Oral daily    MEDICATIONS  (PRN):  acetaminophen     Tablet .. 650 milliGRAM(s) Oral every 6 hours PRN Temp greater or equal to 38C (100.4F), Mild Pain (1 - 3)  aluminum hydroxide/magnesium hydroxide/simethicone Suspension 30 milliLiter(s) Oral every 4 hours PRN Dyspepsia  melatonin 3 milliGRAM(s) Oral at bedtime PRN Insomnia  ondansetron Injectable 4 milliGRAM(s) IV Push every 8 hours PRN Nausea and/or Vomiting        CAPILLARY BLOOD GLUCOSE      POCT Blood Glucose.: 150 mg/dL (20 Jan 2022 07:42)  POCT Blood Glucose.: 165 mg/dL (19 Jan 2022 22:00)  POCT Blood Glucose.: 141 mg/dL (19 Jan 2022 16:53)    I&O's Summary    19 Jan 2022 07:01  -  20 Jan 2022 07:00  --------------------------------------------------------  IN: 440 mL / OUT: 1500 mL / NET: -1060 mL        Vital Signs Last 24 Hrs  T(C): 37.3 (20 Jan 2022 05:36), Max: 37.4 (19 Jan 2022 12:01)  T(F): 99.1 (20 Jan 2022 05:36), Max: 99.3 (19 Jan 2022 12:01)  HR: 84 (20 Jan 2022 06:45) (68 - 84)  BP: 113/50 (20 Jan 2022 06:45) (100/48 - 117/57)  BP(mean): --  RR: 17 (20 Jan 2022 06:45) (17 - 18)  SpO2: 99% (20 Jan 2022 06:45) (99% - 100%)    PHYSICAL EXAM:   GENERAL: in no distress  EYES: EOMI, PERRLA  NECK: Supple, No JVD  CHEST/LUNG: clear  HEART: S1 S2; systolic murmur +   ABDOMEN: Soft, Nontender  EXTREMITIES: no edema  NEUROLOGY: AO x 3 non-focal  SKIN: No rashes or lesions    LABS:                        10.9   8.48  )-----------( 158      ( 20 Jan 2022 08:08 )             32.9     01-20    139  |  106  |  55<H>  ----------------------------<  159<H>  4.4   |  17<L>  |  2.06<H>    Ca    8.9      20 Jan 2022 08:04  Phos  3.2     01-20  Mg     1.80     01-20                  All consultant(s) notes reviewed and care discussed with other providers        Contact Number, Dr Arroyo [phone]

## 2022-01-20 NOTE — PROGRESS NOTE ADULT - PROBLEM SELECTOR PLAN 9
continue finger sticks with short acting insulin sliding scale  HbA1C normal 5.4  no intervention required at this time  not on any meds
continue finger sticks with short acting insulin sliding scale  HbA1C normal 5.4  no intervention required at this time  not on any meds
continue finger sticks with short acting insulin sliding scale  HbA1C
continue finger sticks with short acting insulin sliding scale  HbA1C normal 5.4  no intervention required at this time

## 2022-01-20 NOTE — PROGRESS NOTE ADULT - SUBJECTIVE AND OBJECTIVE BOX
CC: F/U for UTI    Saw/spoke to patient. No fevers, no chills. No new complaints.    Allergies  penicillin (Rash)    ANTIMICROBIALS:  cefTRIAXone   IVPB 1000 every 24 hours    PE:    Vital Signs Last 24 Hrs  T(C): 37 (20 Jan 2022 12:19), Max: 37.3 (20 Jan 2022 05:36)  T(F): 98.6 (20 Jan 2022 12:19), Max: 99.1 (20 Jan 2022 05:36)  HR: 82 (20 Jan 2022 12:19) (68 - 84)  BP: 108/52 (20 Jan 2022 12:19) (108/52 - 117/57)  RR: 17 (20 Jan 2022 12:19) (17 - 18)  SpO2: 96% (20 Jan 2022 12:19) (96% - 99%)    Gen: AOx3, NAD, non-toxic, pleasant  CV: S1+S2 normal, nontachycardic  Resp: Clear bilat, no resp distress, no crackles/wheezes  Abd: Soft, nontender, +BS  Ext: No LE edema, no wounds    LABS:                        10.9   8.48  )-----------( 158      ( 20 Jan 2022 08:08 )             32.9     01-20    139  |  106  |  55<H>  ----------------------------<  159<H>  4.4   |  17<L>  |  2.06<H>    Ca    8.9      20 Jan 2022 08:04  Phos  3.2     01-20  Mg     1.80     01-20    MICROBIOLOGY:    .Urine Urine  01-17-22   Culture is being performed.  --  --    .Blood Blood-Peripheral  01-17-22   No growth to date.     .Blood Blood-Venous  01-17-22   No growth to date.    Clean Catch Clean Catch (Midstream)  01-16-22   <10,000 CFU/mL Normal Urogenital Jeannine     (otherwise reviewed)    RADIOLOGY:    1/16 XR:    FINDINGS:    The heart is normal in size.  The lungs are clear.  There is no pneumothorax or pleural effusion.    IMPRESSION:  Clear lungs.

## 2022-01-20 NOTE — PROGRESS NOTE ADULT - PROBLEM SELECTOR PROBLEM 8
OB?  Yes,  11w6d  Pharmacy Verified? Yes   Reason for Call: pregnancy, Patient has questions regarding medications  Best form of Contact:      Cell Phone:   Telephone Information:   Mobile [phone]     Okay to leave a detailed message regarding call? Yes      
Spoke with patient who reports that she was prescribed progesterone earlier in her pregnancy. She was advised at the FOB visit that she can stop at 12 weeks.  Patient wanted to verify that this is still okay.  Discussed okay to stop as of tomorrow at 12 weeks.  She agreed with plan and denied additional questions or concerns.   
CAD (coronary artery disease)

## 2022-01-20 NOTE — PROGRESS NOTE ADULT - PROBLEM SELECTOR PLAN 2
resolving  hyperkalemia resolved   renal help appreciated   will continue to follow recommendations
at baseline   hyperkalemia resolved
mild bump to 2 but this is well within his baseline  discussed with renal  likely still can be discharge if cleared by Dr Gusman
with severe hyperkalemia   renal help appreciated   will continue to follow recommendations  no acute EKG changes

## 2022-01-20 NOTE — PROGRESS NOTE ADULT - PROBLEM SELECTOR PLAN 8
continue ASA  chest pain free

## 2022-01-20 NOTE — PROGRESS NOTE ADULT - PROBLEM SELECTOR PROBLEM 4
Right bundle-branch block

## 2022-01-20 NOTE — DISCHARGE NOTE NURSING/CASE MANAGEMENT/SOCIAL WORK - NSDCPEFALRISK_GEN_ALL_CORE
For information on Fall & Injury Prevention, visit: [email or web address] OR  [email or web address] OR  https://www.cdc.gov/steadi/patient.html

## 2022-01-20 NOTE — PROGRESS NOTE ADULT - SUBJECTIVE AND OBJECTIVE BOX
NEPHROLOGY[facility] [phone]        Patient seen and examined on room air, no new complaints.         MEDICATIONS  (STANDING):  aMIOdarone    Tablet 200 milliGRAM(s) Oral daily  apixaban 2.5 milliGRAM(s) Oral two times a day  aspirin enteric coated 81 milliGRAM(s) Oral daily  cefTRIAXone   IVPB 1000 milliGRAM(s) IV Intermittent every 24 hours  dextrose 40% Gel 15 Gram(s) Oral once  dextrose 5%. 1000 milliLiter(s) (50 mL/Hr) IV Continuous <Continuous>  dextrose 5%. 1000 milliLiter(s) (100 mL/Hr) IV Continuous <Continuous>  dextrose 50% Injectable 25 Gram(s) IV Push once  dextrose 50% Injectable 12.5 Gram(s) IV Push once  dextrose 50% Injectable 25 Gram(s) IV Push once  glucagon  Injectable 1 milliGRAM(s) IntraMuscular once  insulin lispro (ADMELOG) corrective regimen sliding scale   SubCutaneous three times a day before meals  insulin lispro (ADMELOG) corrective regimen sliding scale   SubCutaneous at bedtime  sodium bicarbonate 650 milliGRAM(s) Oral two times a day  tamsulosin 0.4 milliGRAM(s) Oral at bedtime  torsemide 5 milliGRAM(s) Oral daily      VITAL:  T(C): , Max: 37.4 (01-19-22 @ 12:01)  T(F): , Max: 99.3 (01-19-22 @ 12:01)  HR: 84 (01-20-22 @ 06:45)  BP: 113/50 (01-20-22 @ 06:45)  BP(mean): --  RR: 17 (01-20-22 @ 06:45)  SpO2: 99% (01-20-22 @ 06:45)  Wt(kg): --    I and O's:    01-19 @ 07:01  -  01-20 @ 07:00  --------------------------------------------------------  IN: 440 mL / OUT: 1500 mL / NET: -1060 mL          PHYSICAL EXAM:    Constitutional: NAD  Neck:  No JVD  Respiratory: CTAB/L  Cardiovascular: S1 and S2  Gastrointestinal: BS+, soft, NT/ND  Extremities: No peripheral edema  Neurological: A/O x 3, no focal deficits  Psychiatric: Normal mood, normal affect  : No Temple  Skin: No rashes  Access: Not applicable    LABS:                        10.9   8.48  )-----------( 158      ( 20 Jan 2022 08:08 )             32.9     01-20    139  |  106  |  55<H>  ----------------------------<  159<H>  4.4   |  17<L>  |  2.06<H>    Ca    8.9      20 Jan 2022 08:04  Phos  3.2     01-20  Mg     1.80     01-20                         [facility]ROLOGY[facility] [phone]        Patient seen and examined on room air, no new complaints.         MEDICATIONS  (STANDING):  aMIOdarone    Tablet 200 milliGRAM(s) Oral daily.  apixaban 2.5 milliGRAM(s) Oral two times a day  aspirin enteric coated 81 milliGRAM(s) Oral daily  cefTRIAXone   IVPB 1000 milliGRAM(s) IV Intermittent every 24 hours  dextrose 40% Gel 15 Gram(s) Oral once  dextrose 5%. 1000 milliLiter(s) (50 mL/Hr) IV Continuous <Continuous>  dextrose 5%. 1000 milliLiter(s) (100 mL/Hr) IV Continuous <Continuous>  dextrose 50% Injectable 25 Gram(s) IV Push once  dextrose 50% Injectable 12.5 Gram(s) IV Push once  dextrose 50% Injectable 25 Gram(s) IV Push once  glucagon  Injectable 1 milliGRAM(s) IntraMuscular once  insulin lispro (ADMELOG) corrective regimen sliding scale   SubCutaneous three times a day before meals  insulin lispro (ADMELOG) corrective regimen sliding scale   SubCutaneous at bedtime  sodium bicarbonate 650 milliGRAM(s) Oral two times a day  tamsulosin 0.4 milliGRAM(s) Oral at bedtime  torsemide 5 milliGRAM(s) Oral daily      VITAL:  T(C): , Max: 37.4 (01-19-22 @ 12:01)  T(F): , Max: 99.3 (01-19-22 @ 12:01)  HR: 84 (01-20-22 @ 06:45)  BP: 113/50 (01-20-22 @ 06:45)  BP(mean): --  RR: 17 (01-20-22 @ 06:45)  SpO2: 99% (01-20-22 @ 06:45)  Wt(kg): --    I and O's:    01-19 @ 07:01  -  01-20 @ 07:00  --------------------------------------------------------  IN: 440 mL / OUT: 1500 mL / NET: -1060 mL          PHYSICAL EXAM:    Constitutional: NAD  Neck:  No JVD  Respiratory: CTAB/L.  Cardiovascular: S1 and S2  Gastrointestinal: BS+, soft, NT/ND  Extremities: No peripheral edema  Neurological: A/O x 3, no focal deficits  Psychiatric: Normal mood, normal affect  : No Temple  Skin: No rashes  Access: Not applicable    LABS:                        10.9   8.48  )-----------( 158      ( 20 Jan 2022 08:08 )             32.9     01-20    139  |  106  |  55<H>  ----------------------------<  159<H>  4.4   |  17<L>  |  2.06<H>    Ca    8.9      20 Jan 2022 08:04  Phos  3.2     01-20  Mg     1.80     01-20

## 2022-01-20 NOTE — PROGRESS NOTE ADULT - ATTENDING COMMENTS
Renal attd    -Seen with NP and the note was adjusted   -DC planning and outpt follow up   -Torsemide at a low dose and may need to change  to QOD as outpt    Sayed [facility]   [phone]

## 2022-01-20 NOTE — PROGRESS NOTE ADULT - NSPROGADDITIONALINFOA_GEN_ALL_CORE
discussed with patient in detail, expresses understanding of treatment plans.  discussed with covering ACP   discussed with renal  discussed with cardiology  encounter 32 min

## 2022-01-21 ENCOUNTER — INPATIENT (INPATIENT)
Facility: HOSPITAL | Age: 87
LOS: 2 days | Discharge: HOME HEALTH | End: 2022-01-24
Attending: INTERNAL MEDICINE | Admitting: INTERNAL MEDICINE
Payer: MEDICARE

## 2022-01-21 VITALS
SYSTOLIC BLOOD PRESSURE: 118 MMHG | OXYGEN SATURATION: 100 % | HEART RATE: 100 BPM | TEMPERATURE: 98 F | RESPIRATION RATE: 18 BRPM | HEIGHT: 67 IN | DIASTOLIC BLOOD PRESSURE: 61 MMHG

## 2022-01-21 DIAGNOSIS — N17.9 ACUTE KIDNEY FAILURE, UNSPECIFIED: ICD-10-CM

## 2022-01-21 DIAGNOSIS — C67.9 MALIGNANT NEOPLASM OF BLADDER, UNSPECIFIED: ICD-10-CM

## 2022-01-21 DIAGNOSIS — Z95.5 PRESENCE OF CORONARY ANGIOPLASTY IMPLANT AND GRAFT: Chronic | ICD-10-CM

## 2022-01-21 DIAGNOSIS — I48.91 UNSPECIFIED ATRIAL FIBRILLATION: ICD-10-CM

## 2022-01-21 DIAGNOSIS — A41.9 SEPSIS, UNSPECIFIED ORGANISM: ICD-10-CM

## 2022-01-21 DIAGNOSIS — Z98.41 CATARACT EXTRACTION STATUS, RIGHT EYE: Chronic | ICD-10-CM

## 2022-01-21 DIAGNOSIS — Z29.9 ENCOUNTER FOR PROPHYLACTIC MEASURES, UNSPECIFIED: ICD-10-CM

## 2022-01-21 DIAGNOSIS — Z90.5 ACQUIRED ABSENCE OF KIDNEY: Chronic | ICD-10-CM

## 2022-01-21 LAB
ALBUMIN SERPL ELPH-MCNC: 2.8 G/DL — LOW (ref 3.3–5)
ALP SERPL-CCNC: 158 U/L — HIGH (ref 40–120)
ALT FLD-CCNC: 39 U/L — SIGNIFICANT CHANGE UP (ref 4–41)
ANION GAP SERPL CALC-SCNC: 15 MMOL/L — HIGH (ref 7–14)
APPEARANCE UR: ABNORMAL
APPEARANCE UR: ABNORMAL
AST SERPL-CCNC: 40 U/L — SIGNIFICANT CHANGE UP (ref 4–40)
BACTERIA # UR AUTO: ABNORMAL
BACTERIA # UR AUTO: NEGATIVE — SIGNIFICANT CHANGE UP
BASE EXCESS BLDV CALC-SCNC: -7.4 MMOL/L — LOW (ref -2–3)
BASOPHILS # BLD AUTO: 0.02 K/UL — SIGNIFICANT CHANGE UP (ref 0–0.2)
BASOPHILS NFR BLD AUTO: 0.2 % — SIGNIFICANT CHANGE UP (ref 0–2)
BILIRUB SERPL-MCNC: 0.4 MG/DL — SIGNIFICANT CHANGE UP (ref 0.2–1.2)
BILIRUB UR-MCNC: NEGATIVE — SIGNIFICANT CHANGE UP
BILIRUB UR-MCNC: NEGATIVE — SIGNIFICANT CHANGE UP
BLOOD GAS VENOUS COMPREHENSIVE RESULT: SIGNIFICANT CHANGE UP
BUN SERPL-MCNC: 74 MG/DL — HIGH (ref 7–23)
CALCIUM SERPL-MCNC: 8.3 MG/DL — LOW (ref 8.4–10.5)
CHLORIDE BLDV-SCNC: 102 MMOL/L — SIGNIFICANT CHANGE UP (ref 96–108)
CHLORIDE SERPL-SCNC: 100 MMOL/L — SIGNIFICANT CHANGE UP (ref 98–107)
CO2 BLDV-SCNC: 20 MMOL/L — LOW (ref 22–26)
CO2 SERPL-SCNC: 16 MMOL/L — LOW (ref 22–31)
COLOR SPEC: SIGNIFICANT CHANGE UP
COLOR SPEC: SIGNIFICANT CHANGE UP
CREAT SERPL-MCNC: 2.61 MG/DL — HIGH (ref 0.5–1.3)
DIFF PNL FLD: ABNORMAL
DIFF PNL FLD: ABNORMAL
EOSINOPHIL # BLD AUTO: 0.23 K/UL — SIGNIFICANT CHANGE UP (ref 0–0.5)
EOSINOPHIL NFR BLD AUTO: 2.4 % — SIGNIFICANT CHANGE UP (ref 0–6)
EPI CELLS # UR: 0 /HPF — SIGNIFICANT CHANGE UP (ref 0–5)
EPI CELLS # UR: 0 /HPF — SIGNIFICANT CHANGE UP (ref 0–5)
GAS PNL BLDV: 128 MMOL/L — LOW (ref 136–145)
GLUCOSE BLDV-MCNC: 136 MG/DL — HIGH (ref 70–99)
GLUCOSE SERPL-MCNC: 136 MG/DL — HIGH (ref 70–99)
GLUCOSE UR QL: NEGATIVE — SIGNIFICANT CHANGE UP
GLUCOSE UR QL: NEGATIVE — SIGNIFICANT CHANGE UP
HCO3 BLDV-SCNC: 19 MMOL/L — LOW (ref 22–29)
HCT VFR BLD CALC: 30.7 % — LOW (ref 39–50)
HCT VFR BLDA CALC: 30 % — LOW (ref 39–51)
HGB BLD CALC-MCNC: 10.1 G/DL — LOW (ref 13–17)
HGB BLD-MCNC: 10.3 G/DL — LOW (ref 13–17)
HYALINE CASTS # UR AUTO: 0 /LPF — SIGNIFICANT CHANGE UP (ref 0–7)
HYALINE CASTS # UR AUTO: 1 /LPF — SIGNIFICANT CHANGE UP (ref 0–7)
IANC: 7.78 K/UL — SIGNIFICANT CHANGE UP (ref 1.5–8.5)
IMM GRANULOCYTES NFR BLD AUTO: 0.5 % — SIGNIFICANT CHANGE UP (ref 0–1.5)
KETONES UR-MCNC: NEGATIVE — SIGNIFICANT CHANGE UP
KETONES UR-MCNC: NEGATIVE — SIGNIFICANT CHANGE UP
LACTATE BLDV-MCNC: 1.1 MMOL/L — SIGNIFICANT CHANGE UP (ref 0.5–2)
LEUKOCYTE ESTERASE UR-ACNC: ABNORMAL
LEUKOCYTE ESTERASE UR-ACNC: ABNORMAL
LYMPHOCYTES # BLD AUTO: 0.66 K/UL — LOW (ref 1–3.3)
LYMPHOCYTES # BLD AUTO: 6.8 % — LOW (ref 13–44)
MCHC RBC-ENTMCNC: 32.9 PG — SIGNIFICANT CHANGE UP (ref 27–34)
MCHC RBC-ENTMCNC: 33.6 GM/DL — SIGNIFICANT CHANGE UP (ref 32–36)
MCV RBC AUTO: 98.1 FL — SIGNIFICANT CHANGE UP (ref 80–100)
MONOCYTES # BLD AUTO: 0.95 K/UL — HIGH (ref 0–0.9)
MONOCYTES NFR BLD AUTO: 9.8 % — SIGNIFICANT CHANGE UP (ref 2–14)
NEUTROPHILS # BLD AUTO: 7.78 K/UL — HIGH (ref 1.8–7.4)
NEUTROPHILS NFR BLD AUTO: 80.3 % — HIGH (ref 43–77)
NITRITE UR-MCNC: NEGATIVE — SIGNIFICANT CHANGE UP
NITRITE UR-MCNC: NEGATIVE — SIGNIFICANT CHANGE UP
NRBC # BLD: 0 /100 WBCS — SIGNIFICANT CHANGE UP
NRBC # FLD: 0 K/UL — SIGNIFICANT CHANGE UP
PCO2 BLDV: 40 MMHG — LOW (ref 42–55)
PH BLDV: 7.28 — LOW (ref 7.32–7.43)
PH UR: 6 — SIGNIFICANT CHANGE UP (ref 5–8)
PH UR: 6 — SIGNIFICANT CHANGE UP (ref 5–8)
PLATELET # BLD AUTO: 169 K/UL — SIGNIFICANT CHANGE UP (ref 150–400)
PO2 BLDV: 36 MMHG — SIGNIFICANT CHANGE UP
POTASSIUM BLDV-SCNC: SIGNIFICANT CHANGE UP MMOL/L (ref 3.5–5.1)
POTASSIUM SERPL-MCNC: 4.5 MMOL/L — SIGNIFICANT CHANGE UP (ref 3.5–5.3)
POTASSIUM SERPL-SCNC: 4.5 MMOL/L — SIGNIFICANT CHANGE UP (ref 3.5–5.3)
PROT SERPL-MCNC: 6.4 G/DL — SIGNIFICANT CHANGE UP (ref 6–8.3)
PROT UR-MCNC: ABNORMAL
PROT UR-MCNC: ABNORMAL
RBC # BLD: 3.13 M/UL — LOW (ref 4.2–5.8)
RBC # FLD: 13.3 % — SIGNIFICANT CHANGE UP (ref 10.3–14.5)
RBC CASTS # UR COMP ASSIST: 1 /HPF — SIGNIFICANT CHANGE UP (ref 0–4)
RBC CASTS # UR COMP ASSIST: 2 /HPF — SIGNIFICANT CHANGE UP (ref 0–4)
SAO2 % BLDV: 66.3 % — SIGNIFICANT CHANGE UP
SODIUM SERPL-SCNC: 131 MMOL/L — LOW (ref 135–145)
SP GR SPEC: 1.01 — SIGNIFICANT CHANGE UP (ref 1–1.05)
SP GR SPEC: 1.01 — SIGNIFICANT CHANGE UP (ref 1–1.05)
UROBILINOGEN FLD QL: SIGNIFICANT CHANGE UP
UROBILINOGEN FLD QL: SIGNIFICANT CHANGE UP
WBC # BLD: 9.69 K/UL — SIGNIFICANT CHANGE UP (ref 3.8–10.5)
WBC # FLD AUTO: 9.69 K/UL — SIGNIFICANT CHANGE UP (ref 3.8–10.5)
WBC UR QL: 202 /HPF — HIGH (ref 0–5)
WBC UR QL: 417 /HPF — HIGH (ref 0–5)

## 2022-01-21 PROCEDURE — 99285 EMERGENCY DEPT VISIT HI MDM: CPT | Mod: FS

## 2022-01-21 PROCEDURE — 99223 1ST HOSP IP/OBS HIGH 75: CPT

## 2022-01-21 RX ORDER — SODIUM BICARBONATE 1 MEQ/ML
0.09 SYRINGE (ML) INTRAVENOUS
Qty: 75 | Refills: 0 | Status: DISCONTINUED | OUTPATIENT
Start: 2022-01-21 | End: 2022-01-22

## 2022-01-21 RX ORDER — CEFTRIAXONE 500 MG/1
1000 INJECTION, POWDER, FOR SOLUTION INTRAMUSCULAR; INTRAVENOUS ONCE
Refills: 0 | Status: COMPLETED | OUTPATIENT
Start: 2022-01-21 | End: 2022-01-21

## 2022-01-21 RX ORDER — AMIODARONE HYDROCHLORIDE 400 MG/1
200 TABLET ORAL DAILY
Refills: 0 | Status: DISCONTINUED | OUTPATIENT
Start: 2022-01-21 | End: 2022-01-24

## 2022-01-21 RX ORDER — ASPIRIN/CALCIUM CARB/MAGNESIUM 324 MG
81 TABLET ORAL DAILY
Refills: 0 | Status: DISCONTINUED | OUTPATIENT
Start: 2022-01-21 | End: 2022-01-24

## 2022-01-21 RX ORDER — SODIUM CHLORIDE 9 MG/ML
1000 INJECTION INTRAMUSCULAR; INTRAVENOUS; SUBCUTANEOUS ONCE
Refills: 0 | Status: COMPLETED | OUTPATIENT
Start: 2022-01-21 | End: 2022-01-21

## 2022-01-21 RX ORDER — CEFTRIAXONE 500 MG/1
1000 INJECTION, POWDER, FOR SOLUTION INTRAMUSCULAR; INTRAVENOUS EVERY 24 HOURS
Refills: 0 | Status: COMPLETED | OUTPATIENT
Start: 2022-01-21 | End: 2022-01-23

## 2022-01-21 RX ORDER — APIXABAN 2.5 MG/1
2.5 TABLET, FILM COATED ORAL
Refills: 0 | Status: DISCONTINUED | OUTPATIENT
Start: 2022-01-21 | End: 2022-01-24

## 2022-01-21 RX ORDER — SODIUM BICARBONATE 1 MEQ/ML
1 SYRINGE (ML) INTRAVENOUS
Qty: 40 | Refills: 0
Start: 2022-01-21 | End: 2022-02-09

## 2022-01-21 RX ADMIN — CEFTRIAXONE 100 MILLIGRAM(S): 500 INJECTION, POWDER, FOR SOLUTION INTRAMUSCULAR; INTRAVENOUS at 23:16

## 2022-01-21 RX ADMIN — SODIUM CHLORIDE 1000 MILLILITER(S): 9 INJECTION INTRAMUSCULAR; INTRAVENOUS; SUBCUTANEOUS at 19:10

## 2022-01-21 RX ADMIN — CEFTRIAXONE 100 MILLIGRAM(S): 500 INJECTION, POWDER, FOR SOLUTION INTRAMUSCULAR; INTRAVENOUS at 19:09

## 2022-01-21 NOTE — ED ADULT NURSE NOTE - OBJECTIVE STATEMENT
pt a&o x3 states he was brought here at the suggestion of his visiting nurse and pmd for afib. pt has a known hx of afib on a/c. was also told there is a concern for electrolyte balance. pt offers no medical complaints. no c/o chest pain, ha or dizziness. arrives with left ac 20g from ems. nad noted. md assessed.

## 2022-01-21 NOTE — H&P ADULT - PROBLEM SELECTOR PLAN 4
Assessment:  - hx of CKD with one kidney  - now has LILIAN with Cr of 2.61, however his Cr tends to fluctuate  - patient with associated metabolic acidosis likely from CKD and sepsis    Plan:  - will get CT abd/pelvis to evaluate for kidney disease  - nephrology consult  - bicarb drip for metabolic acidosis  - hold diuretics as LILIAN can be pre-renally caused

## 2022-01-21 NOTE — ED PROVIDER NOTE - ATTENDING CONTRIBUTION TO CARE
dolly: pt here with episode of hypotension and tachycardia at home.  bp in 70 range, hr 116 irreg.  has hx afib.  recent dc for urosepsis pt with one kidney,. of note also increased creatinine.  here still with soft bp, afebrile, but concerned as ususally hypertensive in past except for last day of recent admission.  will draw cultures, treating with abx as likely source is urine.  pt here looks well, denies complaints, soft abd  I performed a history and physical exam of the patient and discussed their management with the resident and /or advanced care provider. I reviewed the resident and /or ACP's note and agree with the documented findings and plan of care. My medical decison making and observations are found above.

## 2022-01-21 NOTE — H&P ADULT - PROBLEM SELECTOR PLAN 2
Assessment:  - hx of bladder Ca received BCG injection 1 week ago, likely to have caused him to have urosepsis last admission    Plan:  - monitor for now

## 2022-01-21 NOTE — ED PROVIDER NOTE - CLINICAL SUMMARY MEDICAL DECISION MAKING FREE TEXT BOX
85 y/o male w/ recent admission for urosepsis/gama - sent to ER by visiting nurse for low bp/tachycardia   -r/o worsening gama electrolyet abnormalities or infection  -labs vbg   -iv fluids  -reassess 87 y/o male w/ recent admission for urosepsis/gama - sent to ER by visiting nurse for low bp/tachycardia   -r/o worsening gama electrolyet abnormalities or infection  -labs vbg   -iv fluids  -reassess    dolly: pt here with episode of hypotension and tachycardia at home.  bp in 70 range, hr 116 irreg.  has hx afib.  recent dc for urosepsis pt with one kidney,. of note also increased creatinine.  here still with soft bp, afebrile, but concerned as ususally hypertensive in past except for last day of recent admission.  will draw cultures, treating with abx as likely source is urine.  pt here looks well, denies complaints, soft abd

## 2022-01-21 NOTE — H&P ADULT - ASSESSMENT
87 y/o M with pmhx of Bladder CA s/p TURBT 12/2020 and intravesicular chemotherapy with Gemcitabine c/b AFIB with RVR and LILIAN, now s/p BCG Instillation (1/12/22) with Dr. Ramsey, s/p L Nephrectomy (1951), DM2 (not on medications), HTN, HLD, CAD s/p 4 JOSE (last stent >5 years ago), CKD, and AFIB on Eliquis s/p recent admission to [facility] for urosepsis/LILIAN/metabolic acidosis presents to ER for "increased heart rate". The patient had hx of urosepsis and was admitted last week. Concern for unresolving infection although the patient is back to baseline. Admit for UTI, pylonephritis, sepsis work up.

## 2022-01-21 NOTE — H&P ADULT - NSHPREVIEWOFSYSTEMS_GEN_ALL_CORE
REVIEW OF SYSTEMS:    CONSTITUTIONAL: No weakness, fevers or chills  EYES/ENT: No visual changes;  No dysphagia; No sore throat; No rhinorrhea; No sinus pain/pressure  NECK: No pain or stiffness  RESPIRATORY: No cough, wheezing, hemoptysis; No shortness of breath  CARDIOVASCULAR: No chest pain or palpitations; No lower extremity edema  GASTROINTESTINAL: No abdominal or epigastric pain. No nausea, vomiting, or hematemesis; No diarrhea or constipation. No melena or hematochezia.  GENITOURINARY: + dysuria, frequency, no hematuria  NEUROLOGICAL: No numbness or weakness  MSK: ambulates with cane  SKIN: No itching, burning, rashes, or lesions   All other review of systems is negative unless indicated above.

## 2022-01-21 NOTE — H&P ADULT - PROBLEM SELECTOR PLAN 3
Assessment:  - patient currently on amiodarone for afib  - afib RVR likely in the setting of dehydration/sepsis  - now controlled <100 on tele monitor    Plan:  - tele monitoring  - c./w amiodarone home dose  - manage sepsis  - IV fluids for resuscitation  - consider starting metoprolol for rate control if still not controlled  - cardiology consult

## 2022-01-21 NOTE — ED PROVIDER NOTE - OBJECTIVE STATEMENT
Report given to EUGENIE Bell.   87 YO M with PMHx of Bladder CA s/p TURBT 12/2020 and intravesicular chemotherapy with Gemcitabine c/b AFIB with RVR and LILIAN, now s/p BCG Instillation (1/12/22) with Dr. Ramsey, s/p L Nephrectomy (1951), DM2 (not on medications), HTN, HLD, CAD s/p 4 JOSE (last stent >5 years ago), CKD, and AFIB on Eliquis s/p recent admission to [facility] for urosepsis/LILIAN/metabolic acidosis presents to ER - sent in by VNS for unclear etiology. Pt. feeling well and has no complaints at this time - states was just discharged from ER yesterday and today VNS came - states was found to be in afib (pt has known history) and sent to ER for further evaluation. pt. not sure why he was sent in. [facility] nurse called his PMD Dr. Reginaldo Li and recommened he be evaluated in hospital.

## 2022-01-21 NOTE — H&P ADULT - NSHPLABSRESULTS_GEN_ALL_CORE
10.3   9.69  )-----------( 169      ( 2022 17:41 )             30.7           131<L>  |  100  |  74<H>  ----------------------------<  136<H>  4.5   |  16<L>  |  2.61<H>    Ca    8.3<L>      2022 17:41  Phos  3.2       Mg     1.80         TPro  6.4  /  Alb  2.8<L>  /  TBili  0.4  /  DBili  x   /  AST  40  /  ALT  39  /  AlkPhos  158<H>                  17:41 - VBG - pH: 7.28  | pCO2: 40    | pO2: 36    | Lactate: 1.1        Urinalysis Basic - ( 2022 21:39 )    Color: Light Yellow / Appearance: Slightly Turbid / S.007 / pH: x  Gluc: x / Ketone: Negative  / Bili: Negative / Urobili: <2 mg/dL   Blood: x / Protein: Trace / Nitrite: Negative   Leuk Esterase: Large / RBC: 1 /HPF /  /HPF   Sq Epi: x / Non Sq Epi: 0 /HPF / Bacteria: Negative

## 2022-01-21 NOTE — H&P ADULT - HISTORY OF PRESENT ILLNESS
This is a 85 y/o M with pmhx of Bladder CA s/p TURBT 12/2020 and intravesicular chemotherapy with Gemcitabine c/b AFIB with RVR and LILIAN, now s/p BCG Instillation (1/12/22) with Dr. Ramsey, s/p L Nephrectomy (1951), DM2 (not on medications), HTN, HLD, CAD s/p 4 JOSE (last stent >5 years ago), CKD, and AFIB on Eliquis s/p recent admission to [facility] for urosepsis/LILIAN/metabolic acidosis presents to ER for "increased heart rate. The patient was recently discharged from the ED yesterday for urosepsis which significantly improved with abx. The patient states that he feels completely fine and denies all complaints. Denies hx of fevers since he left the hospital. The patient does not know why he needs to come back to the hospital. Visiting RN told the patient that he was tachycardic in the 110s and his BP dropped to low 100s systolic when his average was 120s systolic. Patient denies palpitations or feelsing of heart racing at that time. The wife was concerned so he was sent back to the ED. The patient only has mild dysuria but he had this for months. Denies any changes to his dysuria. Of note, he states he has increased urinary frequency. Otherwise ROS negative.

## 2022-01-21 NOTE — H&P ADULT - PROBLEM SELECTOR PLAN 1
Assessment:  - patient recently treated for urosepsis which was resolved and discharged yesterday, now returns to the ED for  concerns of hypotension and tachycardia  - family member concern for unresolved infection, however patient himself states he feels fine and denies fevers  - UA significant for pyuria, no leukocytosis, had one episode of hypothermia and low BP but has been at baseline BP since then    Plan:  - obtain CT abd/pelvis no contrast for evaluation of pyelonephritis/abscess  - will empirically treat due to evidence of hypotension and hypothermia - continue ceftriaxone for 3 more days  - blood cultures, urine cultures  - ID consult

## 2022-01-21 NOTE — ED PROVIDER NOTE - CARE PLAN
1 Principal Discharge DX:	LILIAN (acute kidney injury)   Principal Discharge DX:	LILIAN (acute kidney injury)  Secondary Diagnosis:	Hypotension

## 2022-01-21 NOTE — H&P ADULT - NSHPPHYSICALEXAM_GEN_ALL_CORE
PHYSICAL EXAM:  VITALS: Vital Signs Last 24 Hrs  T(C): 36.3 (21 Jan 2022 21:49), Max: 36.7 (21 Jan 2022 16:03)  T(F): 97.4 (21 Jan 2022 21:49), Max: 98 (21 Jan 2022 16:03)  HR: 99 (21 Jan 2022 21:49) (95 - 100)  BP: 102/58 (21 Jan 2022 21:49) (98/60 - 118/79)  BP(mean): --  RR: 20 (21 Jan 2022 21:49) (17 - 22)  SpO2: 100% (21 Jan 2022 21:49) (96% - 100%)  GENERAL: NAD, well-developed, well-nourished  HEAD:  Atraumatic, Normocephalic  EYES: EOMI, PERRL, conjunctiva and sclera clear  ENT: Moist Mucus Membranes present, no ulcers appreciated  NECK: Supple, No JVD  CHEST/LUNG: Clear to auscultation bilaterally; No wheezes, rales or rhonchi, no accessory muscle use  HEART: irregular rate and rhythm, mild tachycardia; No murmurs, rubs, or gallops, (+)S1, S2  ABDOMEN: Soft, Nontender, Nondistended; Normal Bowel sounds, no suprapubic tenderness  EXTREMITIES:  2+ Peripheral Pulses, No clubbing, cyanosis, or edema  PSYCH: normal mood and affect  NEUROLOGY: AAOx3, non-focal  SKIN: No rashes or lesions

## 2022-01-21 NOTE — ED ADULT TRIAGE NOTE - CHIEF COMPLAINT QUOTE
Patient brought to ER by EMS from home. Pt discharged from [facility] yesterday after a stay for Urosepsis. Evaluated by VNS today and sent back for Afib (which he has a history of) and possible electrolyte imbalance. As per, PMD patient's labs are out of balance when he is sick. No S/s or C/O. Pt has a 20 gauge to left antecubital with NS infusing. Patient brought to ER by EMS from home. Pt discharged from [facility] yesterday after a stay for Urosepsis. Evaluated by VNS today and sent back for Afib (which he has a history of) and possible electrolyte imbalance. As per, PMD patient's labs are out of balance when he is sick. No S/s or C/O. Pt has a 20 gauge to left antecubital with NS infusing. Type 2 DM: FS: 94

## 2022-01-21 NOTE — ED ADULT NURSE NOTE - CHIEF COMPLAINT QUOTE
Patient brought to ER by EMS from home. Pt discharged from [facility] yesterday after a stay for Urosepsis. Evaluated by VNS today and sent back for Afib (which he has a history of) and possible electrolyte imbalance. As per, PMD patient's labs are out of balance when he is sick. No S/s or C/O. Pt has a 20 gauge to left antecubital with NS infusing. Type 2 DM: FS: 94

## 2022-01-22 LAB
ALBUMIN SERPL ELPH-MCNC: 2.7 G/DL — LOW (ref 3.3–5)
ALP SERPL-CCNC: 153 U/L — HIGH (ref 40–120)
ALT FLD-CCNC: 41 U/L — SIGNIFICANT CHANGE UP (ref 4–41)
ANION GAP SERPL CALC-SCNC: 13 MMOL/L — SIGNIFICANT CHANGE UP (ref 7–14)
APPEARANCE UR: ABNORMAL
AST SERPL-CCNC: 30 U/L — SIGNIFICANT CHANGE UP (ref 4–40)
BACTERIA # UR AUTO: ABNORMAL
BASOPHILS # BLD AUTO: 0.03 K/UL — SIGNIFICANT CHANGE UP (ref 0–0.2)
BASOPHILS NFR BLD AUTO: 0.3 % — SIGNIFICANT CHANGE UP (ref 0–2)
BILIRUB SERPL-MCNC: 0.5 MG/DL — SIGNIFICANT CHANGE UP (ref 0.2–1.2)
BILIRUB UR-MCNC: NEGATIVE — SIGNIFICANT CHANGE UP
BUN SERPL-MCNC: 62 MG/DL — HIGH (ref 7–23)
CALCIUM SERPL-MCNC: 8.6 MG/DL — SIGNIFICANT CHANGE UP (ref 8.4–10.5)
CHLORIDE SERPL-SCNC: 106 MMOL/L — SIGNIFICANT CHANGE UP (ref 98–107)
CO2 SERPL-SCNC: 18 MMOL/L — LOW (ref 22–31)
COLOR SPEC: YELLOW — SIGNIFICANT CHANGE UP
CREAT ?TM UR-MCNC: 96 MG/DL — SIGNIFICANT CHANGE UP
CREAT SERPL-MCNC: 2.14 MG/DL — HIGH (ref 0.5–1.3)
CULTURE RESULTS: NO GROWTH — SIGNIFICANT CHANGE UP
CULTURE RESULTS: SIGNIFICANT CHANGE UP
DIFF PNL FLD: ABNORMAL
EOSINOPHIL # BLD AUTO: 0.25 K/UL — SIGNIFICANT CHANGE UP (ref 0–0.5)
EOSINOPHIL NFR BLD AUTO: 2.5 % — SIGNIFICANT CHANGE UP (ref 0–6)
EPI CELLS # UR: 0 /HPF — SIGNIFICANT CHANGE UP (ref 0–5)
GLUCOSE SERPL-MCNC: 135 MG/DL — HIGH (ref 70–99)
GLUCOSE UR QL: NEGATIVE — SIGNIFICANT CHANGE UP
HCT VFR BLD CALC: 33.2 % — LOW (ref 39–50)
HGB BLD-MCNC: 10.8 G/DL — LOW (ref 13–17)
IANC: 8.32 K/UL — SIGNIFICANT CHANGE UP (ref 1.5–8.5)
IMM GRANULOCYTES NFR BLD AUTO: 0.4 % — SIGNIFICANT CHANGE UP (ref 0–1.5)
KETONES UR-MCNC: NEGATIVE — SIGNIFICANT CHANGE UP
LEUKOCYTE ESTERASE UR-ACNC: ABNORMAL
LYMPHOCYTES # BLD AUTO: 0.43 K/UL — LOW (ref 1–3.3)
LYMPHOCYTES # BLD AUTO: 4.3 % — LOW (ref 13–44)
MAGNESIUM SERPL-MCNC: 1.7 MG/DL — SIGNIFICANT CHANGE UP (ref 1.6–2.6)
MCHC RBC-ENTMCNC: 32 PG — SIGNIFICANT CHANGE UP (ref 27–34)
MCHC RBC-ENTMCNC: 32.5 GM/DL — SIGNIFICANT CHANGE UP (ref 32–36)
MCV RBC AUTO: 98.2 FL — SIGNIFICANT CHANGE UP (ref 80–100)
MONOCYTES # BLD AUTO: 0.88 K/UL — SIGNIFICANT CHANGE UP (ref 0–0.9)
MONOCYTES NFR BLD AUTO: 8.8 % — SIGNIFICANT CHANGE UP (ref 2–14)
NEUTROPHILS # BLD AUTO: 8.32 K/UL — HIGH (ref 1.8–7.4)
NEUTROPHILS NFR BLD AUTO: 83.7 % — HIGH (ref 43–77)
NITRITE UR-MCNC: NEGATIVE — SIGNIFICANT CHANGE UP
NRBC # BLD: 0 /100 WBCS — SIGNIFICANT CHANGE UP
NRBC # FLD: 0 K/UL — SIGNIFICANT CHANGE UP
PH UR: 5.5 — SIGNIFICANT CHANGE UP (ref 5–8)
PHOSPHATE SERPL-MCNC: 3.3 MG/DL — SIGNIFICANT CHANGE UP (ref 2.5–4.5)
PLATELET # BLD AUTO: 178 K/UL — SIGNIFICANT CHANGE UP (ref 150–400)
POTASSIUM SERPL-MCNC: 4.3 MMOL/L — SIGNIFICANT CHANGE UP (ref 3.5–5.3)
POTASSIUM SERPL-SCNC: 4.3 MMOL/L — SIGNIFICANT CHANGE UP (ref 3.5–5.3)
PROT ?TM UR-MCNC: 146 MG/DL — SIGNIFICANT CHANGE UP
PROT SERPL-MCNC: 6.2 G/DL — SIGNIFICANT CHANGE UP (ref 6–8.3)
PROT UR-MCNC: ABNORMAL
PROT/CREAT UR-RTO: 1.5 RATIO — HIGH (ref 0–0.2)
RBC # BLD: 3.38 M/UL — LOW (ref 4.2–5.8)
RBC # FLD: 13.2 % — SIGNIFICANT CHANGE UP (ref 10.3–14.5)
RBC CASTS # UR COMP ASSIST: 83 /HPF — HIGH (ref 0–4)
SARS-COV-2 RNA SPEC QL NAA+PROBE: SIGNIFICANT CHANGE UP
SODIUM SERPL-SCNC: 137 MMOL/L — SIGNIFICANT CHANGE UP (ref 135–145)
SODIUM UR-SCNC: 48 MMOL/L — SIGNIFICANT CHANGE UP
SP GR SPEC: 1.01 — SIGNIFICANT CHANGE UP (ref 1–1.05)
SPECIMEN SOURCE: SIGNIFICANT CHANGE UP
UROBILINOGEN FLD QL: SIGNIFICANT CHANGE UP
WBC # BLD: 9.95 K/UL — SIGNIFICANT CHANGE UP (ref 3.8–10.5)
WBC # FLD AUTO: 9.95 K/UL — SIGNIFICANT CHANGE UP (ref 3.8–10.5)
WBC UR QL: 603 /HPF — HIGH (ref 0–5)

## 2022-01-22 PROCEDURE — 74176 CT ABD & PELVIS W/O CONTRAST: CPT | Mod: 26

## 2022-01-22 PROCEDURE — 99223 1ST HOSP IP/OBS HIGH 75: CPT

## 2022-01-22 RX ORDER — SODIUM CHLORIDE 9 MG/ML
500 INJECTION INTRAMUSCULAR; INTRAVENOUS; SUBCUTANEOUS ONCE
Refills: 0 | Status: COMPLETED | OUTPATIENT
Start: 2022-01-22 | End: 2022-01-22

## 2022-01-22 RX ORDER — METOPROLOL TARTRATE 50 MG
12.5 TABLET ORAL EVERY 12 HOURS
Refills: 0 | Status: DISCONTINUED | OUTPATIENT
Start: 2022-01-22 | End: 2022-01-23

## 2022-01-22 RX ORDER — MIDODRINE HYDROCHLORIDE 2.5 MG/1
5 TABLET ORAL THREE TIMES A DAY
Refills: 0 | Status: DISCONTINUED | OUTPATIENT
Start: 2022-01-22 | End: 2022-01-24

## 2022-01-22 RX ORDER — SODIUM CHLORIDE 9 MG/ML
1000 INJECTION, SOLUTION INTRAVENOUS
Refills: 0 | Status: DISCONTINUED | OUTPATIENT
Start: 2022-01-22 | End: 2022-01-23

## 2022-01-22 RX ORDER — TAMSULOSIN HYDROCHLORIDE 0.4 MG/1
1 CAPSULE ORAL
Qty: 0 | Refills: 0 | DISCHARGE

## 2022-01-22 RX ADMIN — Medication 75 MEQ/KG/HR: at 15:34

## 2022-01-22 RX ADMIN — Medication 12.5 MILLIGRAM(S): at 17:23

## 2022-01-22 RX ADMIN — Medication 81 MILLIGRAM(S): at 17:23

## 2022-01-22 RX ADMIN — MIDODRINE HYDROCHLORIDE 5 MILLIGRAM(S): 2.5 TABLET ORAL at 14:13

## 2022-01-22 RX ADMIN — APIXABAN 2.5 MILLIGRAM(S): 2.5 TABLET, FILM COATED ORAL at 17:23

## 2022-01-22 RX ADMIN — Medication 12.5 MILLIGRAM(S): at 06:30

## 2022-01-22 RX ADMIN — SODIUM CHLORIDE 75 MILLILITER(S): 9 INJECTION, SOLUTION INTRAVENOUS at 19:33

## 2022-01-22 RX ADMIN — Medication 75 MEQ/KG/HR: at 00:11

## 2022-01-22 RX ADMIN — APIXABAN 2.5 MILLIGRAM(S): 2.5 TABLET, FILM COATED ORAL at 05:28

## 2022-01-22 RX ADMIN — SODIUM CHLORIDE 1000 MILLILITER(S): 9 INJECTION INTRAMUSCULAR; INTRAVENOUS; SUBCUTANEOUS at 06:27

## 2022-01-22 RX ADMIN — AMIODARONE HYDROCHLORIDE 200 MILLIGRAM(S): 400 TABLET ORAL at 05:28

## 2022-01-22 NOTE — PHARMACOTHERAPY INTERVENTION NOTE - COMMENTS
Medication history is complete and was verified with patient's spouse and [facility]. Medication list updated in Outpatient Medication Record (OMR). Please call spectra [phone] if you have any questions.

## 2022-01-22 NOTE — CONSULT NOTE ADULT - ASSESSMENT
This is an 86M with history of bladder cancer (s/p TURBT 12/2020, s/p intravescicular chemotherapy with gemcitabine c/b AFib with RVR and LILIAN, now s/p BCG instillation 1/12/22 with Dr. Ramsey), s/p L Nephrectomy at age 16, DM2 (not on medications), HTN, HLD, CAD s/p 4 stents (last stent >5 years ago), and AFib on Eliquis who presents to the hospital with complaints of not feeling and generalized shaking.       #Sepsis  -appreciate ID eval  -abx stopped      #Afib  -rates elevated  -recent echo revealed stable mild LV dysfunction  -cont amio  -cont eliquis  -start midodrine 5mg PO TID  -increase metoprolol to 25mg PO BID as blood pressure improves

## 2022-01-22 NOTE — CONSULT NOTE ADULT - ATTENDING COMMENTS
Admitted for tachycardia. Known Afib.   Clinically not infected.   Can complete prior antibiotic course as planned but I don't appreciate anything else to treat currently.     Nicholas Dick MD   Infectious Disease   Pager [phone]   After 5PM and on weekends please page fellow on call or call [phone] Admitted for tachycardia. Known Afib.   Clinically not infected.   Can complete prior antibiotic course as planned but I don't appreciate anything else to treat currently.   His urinary findings/symptoms could be related to his cancer and therapy for it.   f/u cultures from last night.     Nicholas Dick MD   Infectious Disease   Pager [phone]   After 5PM and on weekends please page fellow on call or call [phone]

## 2022-01-22 NOTE — PROGRESS NOTE ADULT - SUBJECTIVE AND OBJECTIVE BOX
DATE OF SERVICE: 01-22-22 @ 13:54  CHIEF COMPLAINT:Patient is a 86y old  Male who presents with a chief complaint of electrolyte abnormalities (22 Jan 2022 10:50)    	        PAST MEDICAL & SURGICAL HISTORY:  CAD (coronary artery disease)  (4 stents, non-adherent to aspirin)    Diabetes mellitus, new onset    Single kidney  (hx/o LEFT nephrectomy at age 16; pt states due to a ureter&quot;blockage&quot; causing &quot;infection&quot;; pt denies hx/o renal dysfunction)    Arthritis    Myocardial infarction  (2003)    Hard of hearing    Atrial fibrillation    Hypertension    Hyperlipidemia    History of TIAs  Last 2018    History of bradycardia    History of nephrectomy, unilateral  (hx/o LEFT nephrectomy at age 16)    Stented coronary artery  (4 stents)    S/P bilateral cataract extraction            REVIEW OF SYSTEMS:  CONSTITUTIONAL: No fever, weight loss, or fatigueEYES: No eye pain, visual disturbances, or discharge  NECK: No pain or stiffness  RESPIRATORY: No cough, wheezing, chills or hemoptysis; No Shortness of Breath  CARDIOVASCULAR: No chest pain, palpitations, passing out, dizziness, or leg swelling  GASTROINTESTINAL: No abdominal or epigastric pain. No nausea, vomiting, or hematemesis; No diarrhea or constipation.  GENITOURINARY: No dysuria, frequency, hematuria, or incontinence  NEUROLOGICAL: No headaches,   MUSCULOSKELETAL: No joint pain or swelling; No muscle, back, or extremity pain    Medications:  MEDICATIONS  (STANDING):  aMIOdarone    Tablet 200 milliGRAM(s) Oral daily  apixaban 2.5 milliGRAM(s) Oral two times a day  aspirin enteric coated 81 milliGRAM(s) Oral daily  cefTRIAXone   IVPB 1000 milliGRAM(s) IV Intermittent every 24 hours  metoprolol tartrate 12.5 milliGRAM(s) Oral every 12 hours  midodrine. 5 milliGRAM(s) Oral three times a day  sodium bicarbonate  Infusion 0.094 mEq/kG/Hr (75 mL/Hr) IV Continuous <Continuous>    MEDICATIONS  (PRN):    	    PHYSICAL EXAM:  T(C): 37.1 (01-22-22 @ 05:25), Max: 37.1 (01-22-22 @ 05:25)  HR: 117 (01-22-22 @ 05:25) (95 - 117)  BP: 96/58 (01-22-22 @ 06:25) (96/58 - 118/79)  RR: 16 (01-22-22 @ 05:25) (16 - 22)  SpO2: 97% (01-22-22 @ 05:25) (96% - 100%)  Wt(kg): --  I&O's Summary      Appearance: Normal	  HEENT:   Normal oral mucosa, PERRL, EOMI	  Lymphatic: No lymphadenopathy  Cardiovascular: reg irreg  Respiratory: Lungs clear to auscultation	  Psychiatry: A & O x  Gastrointestinal:  Soft, Non-tender, + BS	  Skin: No rashes, No ecchymoses, No cyanosis	  Neurologic: Non-focal  Extremities: Normal range of motion, No clubbing, cyanosis or edema  Vascular: Peripheral pulses palpable 2+ bilaterally    TELEMETRY: 	    ECG:  	  RADIOLOGY:  OTHER: 	  	  LABS:	 	    CARDIAC MARKERS:                                10.8   9.95  )-----------( 178      ( 22 Jan 2022 06:40 )             33.2     01-22    137  |  106  |  62<H>  ----------------------------<  135<H>  4.3   |  18<L>  |  2.14<H>    Ca    8.6      22 Jan 2022 06:40  Phos  3.3     01-22  Mg     1.70     01-22    TPro  6.2  /  Alb  2.7<L>  /  TBili  0.5  /  DBili  x   /  AST  30  /  ALT  41  /  AlkPhos  153<H>  01-22    proBNP:   Lipid Profile:   HgA1c:   TSH:

## 2022-01-22 NOTE — CONSULT NOTE ADULT - SUBJECTIVE AND OBJECTIVE BOX
HPI:  This is a 87 y/o M with pmhx of Bladder CA s/p TURBT 2020 and intravesicular chemotherapy with Gemcitabine c/b AFIB with RVR and LILIAN, now s/p BCG Instillation (22) with Dr. Ramsey, s/p L Nephrectomy (), DM2 (not on medications), HTN, HLD, CAD s/p 4 JOSE (last stent >5 years ago), CKD, and AFIB on Eliquis s/p recent admission to [facility] for urosepsis/LILIAN/metabolic acidosis presents to ER for "increased heart rate. The patient was recently discharged from the ED for urosepsis which significantly improved with abx. The patient states that he feels completely fine and denies all complaints. Denies hx of fevers since he left the hospital. He is once again noted to have an elevated serum creatinine and so a renal evaluation was requested. He reports no NSAID use or urinary complaints.    PAST MEDICAL & SURGICAL HISTORY:  CAD (coronary artery disease)  (4 stents, non-adherent to aspirin)  Diabetes mellitus, new onset  Single kidney  (hx/o LEFT nephrectomy at age 16; pt states due to a ureter&quot;blockage&quot; causing &quot;infection&quot;; pt denies hx/o renal dysfunction)  Arthrits  Myocardial infarction  ()  Hard of hearing  Atrial fibrillation  Hypertension  Hyperlipidemia  History of TIAs  Last   History of bradycardia  History of nephrectomy, unilateral  (hx/o LEFT nephrectomy at age 16)  Stented coronary artery  (4 stents)  S/P bilateral cataract extraction    MEDICATIONS  (STANDING):  aMIOdarone    Tablet 200 milliGRAM(s) Oral daily  apixaban 2.5 milliGRAM(s) Oral two times a day  aspirin enteric coated 81 milliGRAM(s) Oral daily  cefTRIAXone   IVPB 1000 milliGRAM(s) IV Intermittent every 24 hours  metoprolol tartrate 12.5 milliGRAM(s) Oral every 12 hours  midodrine. 5 milliGRAM(s) Oral three times a day  sodium bicarbonate  Infusion 0.094 mEq/kG/Hr (75 mL/Hr) IV Continuous <Continuous>    Allergies  penicillin (Rash)  Intolerances  Cipro (Joint Pain)    SOCIAL HISTORY:  Denies EtOH, Smoking.    FAMILY HISTORY:  Family history of diabetes mellitus in mother (Mother)  (Pt states his mother developed DM in her 70&#x27;s)  FH: bladder cancer (Sibling)    REVIEW OF SYSTEMS:  Denies any nausea, vomiting, diarrhea, fevers or chills. Denies chest pain, SOB, focal weakness, hematuria or dysuria.  Good oral intake and denies fatigue or weakness.  All other pertinent systems are reviewed and are negative.    VITALS:  T(F): 98 (22 @ 14:17), Max: 98.8 (22 @ 05:25)  HR: 117 (22 @ 05:25) (99 - 117)  BP: 96/58 (22 @ 06:25) (96/58 - 118/79)  SpO2: 100% (22 @ 14:17) (96% - 100%)    PHYSICAL EXAM:  General:  alert, cooperative and no distress  HEENT: no trauma  Lungs: clear to auscultation bilaterally  Heart: normal S1/S2  Abdomen: soft, non-tender not distended, + BS  Extremities: no clubbing, cyanosis or edema  Urologic: no pickering  Neurologic: Grossly normal  Skin: no rashes    LABS:                        10.8   9.95  )-----------( 178      ( 2022 06:40 )             33.2         137  |  106  |  62<H>  ----------------------------<  135<H>  4.3   |  18<L>  |  2.14<H>    Ca    8.6      2022 06:40  Phos  3.3       Mg     1.70         TPro  6.2  /  Alb  2.7<L>  /  TBili  0.5  /  DBili  x   /  AST  30  /  ALT  41  /  AlkPhos  153<H>        Urine Studies:  Urinalysis Basic - ( 2022 06:40 )  Color: Yellow / Appearance: Slightly Turbid / S.013 / pH: x  Blood: x / Protein: 30 mg/dL / Nitrite: Negative   Leuk Esterase: Large / RBC: 83 /HPF /  /HPF     ASSESSMENT:  Patient is a 86y Male with CAD, Afib, HTN, DM, bladder CA, solitary R kidney and CKD p/w tachycardia and acute on chronic kidney injury.  - CKD: stage 3 with baseline creatinine ~1.7  - LILIAN: nonoliguric. Likely prerenal due to volume depletion. Improving.  - solitary R kidney: s/p L nephrectomy  - metabolic acidosis (non AG): due to CKD and recent LILIAN    RECOMMENDATIONS:  - check urine Na+, creatinine and protein  - continue 1/2 NS + 75 NaHCO3 @ 75 cc/hr  - hold oral sodium bicarbonate and torsemide for now  - please dose any new medications for a CrCl of ~30 cc/min  - avoid NSAIDs/nephrotoxins as able    Thank you for the courtesy of this consultation.    Sergey oDwning M.D.  [facility], [place] [id number]  [place] [address] [phone]

## 2022-01-22 NOTE — CONSULT NOTE ADULT - ASSESSMENT
85 y/o M PMHx bladder CA (s/p TURBT 12/2020 and intravesicular chemotherapy with gemcitabine, now s/p BCG instillation on 1/12/22), L nephrectomy (1951), T2DM, HTN, CKD, CAD, Afib (on Eliquis), and recent admission for metabolic acidosis/LILIAN and possible UTI (s/p empiric course of ceftriaxone, cultures negative), now sent in by VNS for tachycardia. ID consulted to assess for unresolved urinary infection.    Afebrile  WBC 10K  Cr 2.6->2.1 since admission  Urinalyses: +pyuria (>200 WBCs)  CT Abd/Pelv:  mild R perinephric stranding, diffuse bladder wall thickening    Impression:  #Pyuria     incomplete note 85 y/o M PMHx bladder CA (s/p TURBT 12/2020 and intravesicular chemotherapy with gemcitabine, now s/p BCG instillation on 1/12/22), L nephrectomy (1951), T2DM, HTN, CKD, CAD, Afib (on Eliquis), and recent admission for metabolic acidosis/LILIAN and possible UTI (s/p empiric course of ceftriaxone, cultures negative), now sent in by VNS for tachycardia. ID consulted to assess for unresolved urinary infection.    Afebrile  WBC 10K  Cr 2.6->2.1 since admission  Urinalyses: +pyuria (>200 WBCs)  CT Abd/Pelv:  mild R perinephric stranding, diffuse bladder wall thickening    Impression:  #Pyuria - was being treated for possible UTI, completes 7 days of antibiotic therapy today. Will not extend course further as patient appears non-toxic with no evidence of active infection. History of BCG instillation (1/12/22), urine AFB culture sent on last admission.   #Tachycardia - in setting of primary afib w/ RVR, unlikely to be due to infection    Recs:  - d/c ceftriaxone and monitor off antibiotics, low suspicion for active infection  - f/u urine culture, blood cultures  - ID clinic appt in 3-4 weeks to follow up on urine AFB culture sent from last admission ([phone])   87 y/o M PMHx bladder CA (s/p TURBT 12/2020 and intravesicular chemotherapy with gemcitabine, now s/p BCG instillation on 1/12/22), L nephrectomy (1951), T2DM, HTN, CKD, CAD, Afib (on Eliquis), and recent admission for metabolic acidosis/LILIAN and possible UTI (s/p empiric course of ceftriaxone, cultures negative), now sent in by VNS for tachycardia. ID consulted to assess for unresolved urinary infection.    Afebrile  WBC 10K  Cr 2.6->2.1 since admission  Urinalyses: +pyuria (>200 WBCs)  CT Abd/Pelv:  mild R perinephric stranding, diffuse bladder wall thickening    Impression:  #Pyuria - was being treated for possible UTI, completes 7 days of antibiotic therapy today. Will not extend course further as patient appears non-toxic with no evidence of active infection. History of BCG instillation (1/12/22), urine AFB culture sent on last admission.   #Tachycardia - in setting of primary afib w/ RVR, unlikely to be due to infection    Recs:  - d/c ceftriaxone and monitor off antibiotics, low suspicion for active infection  - f/u urine culture, blood cultures  - ID clinic appt in 3-4 weeks to follow up on urine AFB culture sent from last admission ([phone])    Plan discussed with ACP    Dakota Marroquin MD PGY-4  Infectious Disease Fellow  Available on [employer] Teams  Pager: [phone]  Before 9AM or after 5PM: [phone]

## 2022-01-22 NOTE — PROGRESS NOTE ADULT - ASSESSMENT
85 y/o M with pmhx of Bladder CA s/p TURBT 12/2020 and intravesicular chemotherapy with Gemcitabine c/b AFIB with RVR and LILIAN, now s/p BCG Instillation (1/12/22) with Dr. Ramsey, s/p L Nephrectomy (1951), DM2 (not on medications), HTN, HLD, CAD s/p 4 JOSE (last stent >5 years ago), CKD, and AFIB on Eliquis s/p recent admission to [facility] for urosepsis/LILIAN/metabolic acidosis presents to ER for "increased heart rate". The patient had hx of urosepsis and was admitted last week. Concern for unresolving infection     Problem/Plan - 1:  ·  Problem: Sepsis secondary to UTI.     - patient recently treated for urosepsis which was resolved and discharged yesterday, now returns to the ED for  concerns of hypotension and tachycardia  id eval noted  no further abs now     CT abd/pelvis no contrast for evaluation of pyelonephritis/abscess    - blood cultures, urine cultures  - ID consult.noted     Problem/Plan - 2:  ·  Problem: Bladder cancer.     - hx of bladder Ca received BCG injection 1 week ago, likely to have caused him to have urosepsis last admission    - monitor for now.     Problem/Plan - 3:  ·  Problem: Atrial fibrillation with rapid ventricular response.   - patient currently on amiodarone for afib  - afib RVR likely in the setting of dehydration/sepsis    - tele monitoring  - c./w amiodarone home dose  - IV fluids for resuscitation  - cardiology consult.noted     Problem/Plan - 4:  ·  Problem: Acute kidney injury superimposed on CKD.   - hx of CKD with one kidney  - patient with associated metabolic acidosis likely from CKD and sepsis  - will get CT abd/pelvis to evaluate for kidney disease  - nephrology consult       Problem/Plan - 5:  ·  Problem: Prophylactic measure.   ·  Plan: c/w apixaban for DVT ppx.

## 2022-01-22 NOTE — CONSULT NOTE ADULT - SUBJECTIVE AND OBJECTIVE BOX
CHIEF COMPLAINT:    HPI:  This is a 87 y/o M with pmhx of Bladder CA s/p TURBT 12/2020 and intravesicular chemotherapy with Gemcitabine c/b AFIB with RVR and LILIAN, now s/p BCG Instillation (1/12/22) with Dr. Ramsey, s/p L Nephrectomy (1951), DM2 (not on medications), HTN, HLD, CAD s/p 4 JOSE (last stent >5 years ago), CKD, and AFIB on Eliquis s/p recent admission to [facility] for urosepsis/LILIAN/metabolic acidosis presents to ER for "increased heart rate. The patient was recently discharged from the ED yesterday for urosepsis which significantly improved with abx. The patient states that he feels completely fine and denies all complaints. Denies hx of fevers since he left the hospital. The patient does not know why he needs to come back to the hospital. Visiting RN told the patient that he was tachycardic in the 110s and his BP dropped to low 100s systolic when his average was 120s systolic. Patient denies palpitations or feelsing of heart racing at that time. The wife was concerned so he was sent back to the ED. The patient only has mild dysuria but he had this for months. Denies any changes to his dysuria. Of note, he states he has increased urinary frequency. Otherwise ROS negative. (21 Jan 2022 21:53)      PAST MEDICAL & SURGICAL HISTORY:  CAD (coronary artery disease)  (4 stents, non-adherent to aspirin)    Diabetes mellitus, new onset    Single kidney  (hx/o LEFT nephrectomy at age 16; pt states due to a ureter&quot;blockage&quot; causing &quot;infection&quot;; pt denies hx/o renal dysfunction)    Arthritis    Myocardial infarction  (2003)    Hard of hearing    Atrial fibrillation    Hypertension    Hyperlipidemia    History of TIAs  Last 2018    History of bradycardia    History of nephrectomy, unilateral  (hx/o LEFT nephrectomy at age 16)    Stented coronary artery  (4 stents)    S/P bilateral cataract extraction            PREVIOUS DIAGNOSTIC TESTING:    [ ] Echocardiogram:  [ ]  Catheterization:  [ ] Stress Test:  	    MEDICATIONS:  MEDICATIONS  (STANDING):  aMIOdarone    Tablet 200 milliGRAM(s) Oral daily  apixaban 2.5 milliGRAM(s) Oral two times a day  aspirin enteric coated 81 milliGRAM(s) Oral daily  cefTRIAXone   IVPB 1000 milliGRAM(s) IV Intermittent every 24 hours  metoprolol tartrate 12.5 milliGRAM(s) Oral every 12 hours  sodium bicarbonate  Infusion 0.094 mEq/kG/Hr (75 mL/Hr) IV Continuous <Continuous>      FAMILY HISTORY:  Family history of diabetes mellitus in mother (Mother)  (Pt states his mother developed DM in her 70&#x27;s)    FH: bladder cancer (Sibling)        SOCIAL HISTORY:    [ ] Non-smoker  [ ] Smoker  [ ] Alcohol    Allergies    penicillin (Rash)    Intolerances    Cipro (Joint Pain)  	    REVIEW OF SYSTEMS:  CONSTITUTIONAL: No fever, weight loss, or fatigue  EYES: No eye pain, visual disturbances, or discharge  ENMT:  No difficulty hearing, tinnitus, vertigo; No sinus or throat pain  NECK: No pain or stiffness  RESPIRATORY: No cough, wheezing, chills or hemoptysis; No Shortness of Breath  CARDIOVASCULAR: No chest pain, palpitations, passing out, dizziness, or leg swelling  GASTROINTESTINAL: No abdominal or epigastric pain. No nausea, vomiting, or hematemesis; No diarrhea or constipation. No melena or hematochezia.  GENITOURINARY: No dysuria, frequency, hematuria, or incontinence  NEUROLOGICAL: No headaches, memory loss, loss of strength, numbness, or tremors  SKIN: No itching, burning, rashes, or lesions   	    [ ] All others negative	  [ ] Unable to obtain    PHYSICAL EXAM:  T(C): 37.1 (01-22-22 @ 05:25), Max: 37.1 (01-22-22 @ 05:25)  HR: 117 (01-22-22 @ 05:25) (95 - 117)  BP: 96/58 (01-22-22 @ 06:25) (96/58 - 118/79)  RR: 16 (01-22-22 @ 05:25) (16 - 22)  SpO2: 97% (01-22-22 @ 05:25) (96% - 100%)  Wt(kg): --  I&O's Summary      Appearance: Normal	  Psychiatry: A & O x 3, Mood & affect appropriate  HEENT:   Normal oral mucosa, PERRL, EOMI	  Lymphatic: No lymphadenopathy  Cardiovascular: Normal S1 S2,RRR, No JVD, No murmurs  Respiratory: Lungs clear to auscultation	  Gastrointestinal:  Soft, Non-tender, + BS	  Skin: No rashes, No ecchymoses, No cyanosis	  Neurologic: Non-focal  Extremities: Normal range of motion, No clubbing, cyanosis or edema  Vascular: Peripheral pulses palpable 2+ bilaterally    TELEMETRY: 	    ECG:  	  RADIOLOGY:  OTHER: 	  	  LABS:	 	    CARDIAC MARKERS:                                  10.8   9.95  )-----------( 178      ( 22 Jan 2022 06:40 )             33.2     01-22    137  |  106  |  62<H>  ----------------------------<  135<H>  4.3   |  18<L>  |  2.14<H>    Ca    8.6      22 Jan 2022 06:40  Phos  3.3     01-22  Mg     1.70     01-22    TPro  6.2  /  Alb  2.7<L>  /  TBili  0.5  /  DBili  x   /  AST  30  /  ALT  41  /  AlkPhos  153<H>  01-22      proBNP:   Lipid Profile:   HgA1c:   TSH:     ASSESSMENT/PLAN:

## 2022-01-22 NOTE — CONSULT NOTE ADULT - SUBJECTIVE AND OBJECTIVE BOX
Patient is a 86y old  Male who presents with a chief complaint of electrolyte abnormalities (2022 21:53)    HPI:  This is a 85 y/o M with pmhx of Bladder CA s/p TURBT 2020 and intravesicular chemotherapy with Gemcitabine c/b AFIB with RVR and LILIAN, now s/p BCG Instillation (22) with Dr. Ramsey, s/p L Nephrectomy (), DM2 (not on medications), HTN, HLD, CAD s/p 4 JOSE (last stent >5 years ago), CKD, and AFIB on Eliquis s/p recent admission to [facility] for urosepsis/LILIAN/metabolic acidosis presents to ER for "increased heart rate. The patient was recently discharged from the ED yesterday for urosepsis which significantly improved with abx. The patient states that he feels completely fine and denies all complaints. Denies hx of fevers since he left the hospital. The patient does not know why he needs to come back to the hospital. Visiting RN told the patient that he was tachycardic in the 110s and his BP dropped to low 100s systolic when his average was 120s systolic. Patient denies palpitations or feelsing of heart racing at that time. The wife was concerned so he was sent back to the ED. The patient only has mild dysuria but he had this for months. Denies any changes to his dysuria. Of note, he states he has increased urinary frequency. Otherwise ROS negative.     ID consulted for possible infection. Seen by ID on last admission, was treated empirically with ceftriaxone while inpatient and switched to Vantin on discharge to complete 7 day course. No fever/chills or urinary symptoms.       REVIEW OF SYSTEMS  [  ] ROS unobtainable because:    [ X ] All other systems negative except as noted below    Constitutional:  [ ] fever [ ] chills  [ ] weight loss  [ ]night sweat  [ ]poor appetite/PO intake [ ]fatigue   Skin:  [ ] rash [ ] phlebitis	  Eyes: [ ] icterus [ ] pain  [ ] discharge	  ENMT: [ ] sore throat  [ ] thrush [ ] ulcers [ ] exudates [ ]anosmia  Respiratory: [ ] dyspnea [ ] hemoptysis [ ] cough [ ] sputum	  Cardiovascular:  [ ] chest pain [ ] palpitations [ ] edema	  Gastrointestinal:  [ ] nausea [ ] vomiting [ ] diarrhea [ ] constipation [ ] pain	  Genitourinary:  [ ] dysuria [ ] frequency [ ] hematuria [ ] discharge [ ] flank pain  [ ] incontinence  Musculoskeletal:  [ ] myalgias [ ] arthralgias [ ] arthritis  [ ] back pain  Neurological:  [ ] headache [ ] weakness [ ] seizures  [ ] confusion/altered mental status    prior hospital charts reviewed [V]  primary team notes reviewed [V]  other consultant notes reviewed [V]    PAST MEDICAL & SURGICAL HISTORY:  CAD (coronary artery disease)  (4 stents, non-adherent to aspirin)  Diabetes mellitus, new onset  Single kidney  (hx/o LEFT nephrectomy at age 16; pt states due to a ureter&quot;blockage&quot; causing &quot;infection&quot;; pt denies hx/o renal dysfunction)  Arthritis  Myocardial infarction  ()  Hard of hearing  Atrial fibrillation  Hypertension  Hyperlipidemia  History of TIAs  History of bradycardia  History of nephrectomy, unilateral  (hx/o LEFT nephrectomy at age 16)  S/P bilateral cataract extraction        FAMILY HISTORY:  Family history of diabetes mellitus in mother (Mother)  FH: bladder cancer (Sibling)    SOCIAL HISTORY:    Allergies  penicillin (Rash)      ANTIMICROBIALS:  cefTRIAXone   IVPB 1000 every 24 hours      ANTIMICROBIALS (past 90 days):   MEDICATIONS  (STANDING):  cefTRIAXone   IVPB   100 mL/Hr IV Intermittent (22 @ 19:09)   100 mL/Hr IV Intermittent (22 @ 23:16)      MEDICATIONS  (STANDING):  aMIOdarone    Tablet 200 daily  apixaban 2.5 two times a day  aspirin enteric coated 81 daily  metoprolol tartrate 12.5 every 12 hours      VITALS:  Vital Signs Last 24 Hrs  T(F): 98.8 (22 @ 05:25), Max: 101.1 (22 @ 01:05)  Vital Signs Last 24 Hrs  HR: 117 (22 @ 05:25) (95 - 117)  BP: 96/58 (22 @ 06:25) (96/58 - 118/79)  RR: 16 (22 @ 05:25)  SpO2: 97% (22 @ 05:25) (96% - 100%)  Wt(kg): --    PHYSICAL EXAM:        Labs:                        10.8   9.95  )-----------( 178      ( 2022 06:40 )             33.2         137  |  106  |  62<H>  ----------------------------<  135<H>  4.3   |  18<L>  |  2.14<H>    Ca    8.6      2022 06:40  Phos  3.3       Mg     1.70         TPro  6.2  /  Alb  2.7<L>  /  TBili  0.5  /  DBili  x   /  AST  30  /  ALT  41  /  AlkPhos  153<H>        WBC Trend:  WBC Count: 9.95 (22 @ 06:40)  WBC Count: 9.69 (22 @ 17:41)  WBC Count: 8.48 (22 @ 08:08)  WBC Count: 8.68 (22 @ 07:39)    Auto Neutrophil #: 8.32 K/uL (22 @ 06:40)  Auto Neutrophil #: 7.78 K/uL (22 @ 17:41)  Auto Neutrophil #: 12.72 K/uL (22 @ 05:28)  Auto Neutrophil #: 4.59 K/uL (22 @ 15:02)  Auto Neutrophil #: 15.02 K/uL (11-15-21 @ 06:44)    Auto Eosinophil %: 2.5 % (22 @ 06:40)  Auto Eosinophil %: 2.4 % (22 @ 17:41)    Urinalysis Basic - ( 2022 06:40 )    Color: Yellow / Appearance: Slightly Turbid / S.013 / pH: x  Gluc: x / Ketone: Negative  / Bili: Negative / Urobili: <2 mg/dL   Blood: x / Protein: 30 mg/dL / Nitrite: Negative   Leuk Esterase: Large / RBC: 83 /HPF /  /HPF   Sq Epi: x / Non Sq Epi: 0 /HPF / Bacteria: Occasional      MICROBIOLOGY:  Culture - Acid Fast - Urine (collected 2022 10:58)  Source: .Urine Urine  Preliminary Report:    Culture is being performed.    Culture - Blood (collected 2022 00:41)  Source: .Blood Blood-Peripheral  Final Report:    No Growth Final    Culture - Blood (collected 2022 00:05)  Source: .Blood Blood-Venous  Final Report:    No Growth Final    Culture - Urine (collected 2022 19:10)  Source: Clean Catch Clean Catch (Midstream)  Final Report:    <10,000 CFU/mL Normal Urogenital Jeannine    Culture - Blood (collected 2021 05:00)  Source: .Blood Blood-Peripheral  Final Report:    No Growth Final    Culture - Urine (collected 09 Sep 2021 00:42)  Source: Clean Catch Clean Catch (Midstream)  Final Report:    No growth    Culture - Urine (collected 13 Aug 2021 23:11)  Source: Clean Catch Clean Catch (Midstream)  Final Report:    No growth    Culture - Blood (collected 13 Aug 2021 22:48)  Source: .Blood Blood-Peripheral  Final Report:    No Growth Final    Culture - Blood (collected 13 Aug 2021 22:48)  Source: .Blood Blood-Peripheral 2 bactec bottles recieved.  Final Report:    No Growth Final    Culture - Urine (collected 2021 17:21)  Source: OR Collect BLADDER URINE  Final Report:    Few Yeast-like cells, presumptively not Candida albicans    "Susceptibilities not performed"    COVID-19 PCR: NotDetec (22 @ 20:33)  COVID-19 PCR: NotDetec (22 @ 15:46)    COVID-19 Holland Domain AB Interp: Positive (11-10-21 @ 09:29)  COVID-19 Nucleocapsid SOFIA AB Interp: Negative (11-10-21 @ 09:29)  COVID-19 Holland Domain AB Interp: Positive (08-15-21 @ 10:31)      RADIOLOGY:  imaging below personally reviewed     Patient is a 86y old  Male who presents with a chief complaint of electrolyte abnormalities (2022 21:53)    HPI:  85 y/o M with pmhx of Bladder CA s/p TURBT 2020 and intravesicular chemotherapy with Gemcitabine c/b AFIB with RVR and LILIAN, now s/p BCG Instillation (22) with Dr. Ramsey, s/p L Nephrectomy (), DM2 (not on medications), HTN, HLD, CAD s/p 4 JOSE (last stent >5 years ago), CKD, and AFIB on Eliquis s/p recent admission to [facility] for urosepsis/LILIAN/metabolic acidosis presents to ER for "increased heart rate. The patient was recently discharged from the ED yesterday for urosepsis which significantly improved with abx. The patient states that he feels completely fine and denies all complaints. Denies hx of fevers since he left the hospital. The patient does not know why he needs to come back to the hospital. Visiting RN told the patient that he was tachycardic in the 110s and his BP dropped to low 100s systolic when his average was 120s systolic. Patient denies palpitations or feelsing of heart racing at that time. The wife was concerned so he was sent back to the ED. The patient only has mild dysuria but he had this for months. Denies any changes to his dysuria. Of note, he states he has increased urinary frequency. Otherwise ROS negative.     ID consulted for possible infection. Seen by ID on last admission, was treated empirically with ceftriaxone while inpatient and switched to Vantin on discharge to complete 7 day course. No fever/chills, abdominal pain, flank pain, or urinary symptoms. Patient felt fine at home, was referred to ED because his visiting nurse noted his heart rate was elevated.       REVIEW OF SYSTEMS  [  ] ROS unobtainable because:    [ X ] All other systems negative except as noted below    Constitutional:  [ ] fever [ ] chills  [ ] weight loss  [ ]night sweat  [ ]poor appetite/PO intake [ ]fatigue   Skin:  [ ] rash [ ] phlebitis	  Eyes: [ ] icterus [ ] pain  [ ] discharge	  ENMT: [ ] sore throat  [ ] thrush [ ] ulcers [ ] exudates [ ]anosmia  Respiratory: [ ] dyspnea [ ] hemoptysis [ ] cough [ ] sputum	  Cardiovascular:  [ ] chest pain [ ] palpitations [ ] edema	  Gastrointestinal:  [ ] nausea [ ] vomiting [ ] diarrhea [ ] constipation [ ] pain	  Genitourinary:  [ ] dysuria [ ] frequency [ ] hematuria [ ] discharge [ ] flank pain  [ ] incontinence  Musculoskeletal:  [ ] myalgias [ ] arthralgias [ ] arthritis  [ ] back pain  Neurological:  [ ] headache [ ] weakness [ ] seizures  [ ] confusion/altered mental status    prior hospital charts reviewed [V]  primary team notes reviewed [V]  other consultant notes reviewed [V]    PAST MEDICAL & SURGICAL HISTORY:  CAD (coronary artery disease)  (4 stents, non-adherent to aspirin)  Diabetes mellitus, new onset  Single kidney  (hx/o LEFT nephrectomy at age 16; pt states due to a ureter&quot;blockage&quot; causing &quot;infection&quot;; pt denies hx/o renal dysfunction)  Arthritis  Myocardial infarction  ()  Hard of hearing  Atrial fibrillation  Hypertension  Hyperlipidemia  History of TIAs  History of bradycardia  History of nephrectomy, unilateral  (hx/o LEFT nephrectomy at age 16)  S/P bilateral cataract extraction        FAMILY HISTORY:  Family history of diabetes mellitus in mother (Mother)  FH: bladder cancer (Sibling)    SOCIAL HISTORY:  Lives with wife in private home    Allergies  penicillin (Rash)      ANTIMICROBIALS:  cefTRIAXone   IVPB 1000 every 24 hours      ANTIMICROBIALS (past 90 days):   MEDICATIONS  (STANDING):  cefTRIAXone   IVPB   100 mL/Hr IV Intermittent (22 @ 19:09)   100 mL/Hr IV Intermittent (22 @ 23:16)      MEDICATIONS  (STANDING):  aMIOdarone    Tablet 200 daily  apixaban 2.5 two times a day  aspirin enteric coated 81 daily  metoprolol tartrate 12.5 every 12 hours      VITALS:  Vital Signs Last 24 Hrs  T(F): 98.8 (22 @ 05:25), Max: 101.1 (22 @ 01:05)  Vital Signs Last 24 Hrs  HR: 117 (22 @ 05:25) (95 - 117)  BP: 96/58 (22 @ 06:25) (96/58 - 118/79)  RR: 16 (22 @ 05:25)  SpO2: 97% (22 @ 05:25) (96% - 100%)  Wt(kg): --    PHYSICAL EXAM:  Constitutional: non-toxic, no distress  HEAD/EYES: anicteric  ENT:  supple, no mucositis  Cardiovascular:   +S1, S2  Respiratory:  clear BS bilaterally  GI:  soft, non-tender, normal bowel sounds  :  no pickering, no CVA tenderness  Musculoskeletal:  no joint swelling  Neurologic: awake and alert, ARANGO, no focal findings  Skin:  no rash, no erythema  Psychiatric:  calm, cooperative       Labs:                        10.8   9.95  )-----------( 178      ( 2022 06:40 )             33.2         137  |  106  |  62<H>  ----------------------------<  135<H>  4.3   |  18<L>  |  2.14<H>    Ca    8.6      2022 06:40  Phos  3.3       Mg     1.70         TPro  6.2  /  Alb  2.7<L>  /  TBili  0.5  /  DBili  x   /  AST  30  /  ALT  41  /  AlkPhos  153<H>        WBC Trend:  WBC Count: 9.95 (22 @ 06:40)  WBC Count: 9.69 (22 @ 17:41)  WBC Count: 8.48 (22 @ 08:08)  WBC Count: 8.68 (22 @ 07:39)    Auto Neutrophil #: 8.32 K/uL (22 @ 06:40)  Auto Neutrophil #: 7.78 K/uL (22 @ 17:41)  Auto Neutrophil #: 12.72 K/uL (22 @ 05:28)  Auto Neutrophil #: 4.59 K/uL (22 @ 15:02)  Auto Neutrophil #: 15.02 K/uL (11-15-21 @ 06:44)    Auto Eosinophil %: 2.5 % (22 @ 06:40)  Auto Eosinophil %: 2.4 % (22 @ 17:41)    Urinalysis Basic - ( 2022 06:40 )    Color: Yellow / Appearance: Slightly Turbid / S.013 / pH: x  Gluc: x / Ketone: Negative  / Bili: Negative / Urobili: <2 mg/dL   Blood: x / Protein: 30 mg/dL / Nitrite: Negative   Leuk Esterase: Large / RBC: 83 /HPF /  /HPF   Sq Epi: x / Non Sq Epi: 0 /HPF / Bacteria: Occasional      MICROBIOLOGY:  Culture - Acid Fast - Urine (collected 2022 10:58)  Source: .Urine Urine  Preliminary Report:    Culture is being performed.    Culture - Blood (collected 2022 00:41)  Source: .Blood Blood-Peripheral  Final Report:    No Growth Final    Culture - Blood (collected 2022 00:05)  Source: .Blood Blood-Venous  Final Report:    No Growth Final    Culture - Urine (collected 2022 19:10)  Source: Clean Catch Clean Catch (Midstream)  Final Report:    <10,000 CFU/mL Normal Urogenital Jeannine    Culture - Blood (collected 2021 05:00)  Source: .Blood Blood-Peripheral  Final Report:    No Growth Final    Culture - Urine (collected 09 Sep 2021 00:42)  Source: Clean Catch Clean Catch (Midstream)  Final Report:    No growth    Culture - Urine (collected 13 Aug 2021 23:11)  Source: Clean Catch Clean Catch (Midstream)  Final Report:    No growth    Culture - Blood (collected 13 Aug 2021 22:48)  Source: .Blood Blood-Peripheral  Final Report:    No Growth Final    Culture - Blood (collected 13 Aug 2021 22:48)  Source: .Blood Blood-Peripheral 2 bactec bottles recieved.  Final Report:    No Growth Final    Culture - Urine (collected 2021 17:21)  Source: OR Collect BLADDER URINE  Final Report:    Few Yeast-like cells, presumptively not Candida albicans    "Susceptibilities not performed"    COVID-19 PCR: NotDetec (22 @ 20:33)  COVID-19 PCR: NotDetec (22 @ 15:46)    COVID-19 Holland Domain AB Interp: Positive (11-10-21 @ 09:29)  COVID-19 Nucleocapsid SOFIA AB Interp: Negative (11-10-21 @ 09:29)  COVID-19 Holland Domain AB Interp: Positive (08-15-21 @ 10:31)      RADIOLOGY:  imaging below personally reviewed    < from: CT Abdomen and Pelvis No Cont (01.22.22 @ 00:51) >  IMPRESSION:  Unable to assess for pyelonephritis without intravenous contrast.  Diffuse bladder wall thickening with perivesicular haziness, suspicious   for cystitis. Stable nonspecific urothelial thickening and mild   distention of the right renal pelvis. Nonspecific mild right perinephric   stranding. Recommend correlation with urinalysis.  < end of copied text >

## 2022-01-22 NOTE — PATIENT PROFILE ADULT - FALL HARM RISK - HARM RISK INTERVENTIONS

## 2022-01-23 LAB
ALBUMIN SERPL ELPH-MCNC: 2.6 G/DL — LOW (ref 3.3–5)
ALP SERPL-CCNC: 181 U/L — HIGH (ref 40–120)
ALT FLD-CCNC: 42 U/L — HIGH (ref 4–41)
ANION GAP SERPL CALC-SCNC: 11 MMOL/L — SIGNIFICANT CHANGE UP (ref 7–14)
AST SERPL-CCNC: 24 U/L — SIGNIFICANT CHANGE UP (ref 4–40)
BASOPHILS # BLD AUTO: 0.03 K/UL — SIGNIFICANT CHANGE UP (ref 0–0.2)
BASOPHILS NFR BLD AUTO: 0.4 % — SIGNIFICANT CHANGE UP (ref 0–2)
BILIRUB SERPL-MCNC: 0.3 MG/DL — SIGNIFICANT CHANGE UP (ref 0.2–1.2)
BUN SERPL-MCNC: 62 MG/DL — HIGH (ref 7–23)
CALCIUM SERPL-MCNC: 8.3 MG/DL — LOW (ref 8.4–10.5)
CHLORIDE SERPL-SCNC: 102 MMOL/L — SIGNIFICANT CHANGE UP (ref 98–107)
CO2 SERPL-SCNC: 21 MMOL/L — LOW (ref 22–31)
CREAT SERPL-MCNC: 2.03 MG/DL — HIGH (ref 0.5–1.3)
EOSINOPHIL # BLD AUTO: 0.36 K/UL — SIGNIFICANT CHANGE UP (ref 0–0.5)
EOSINOPHIL NFR BLD AUTO: 4.7 % — SIGNIFICANT CHANGE UP (ref 0–6)
GLUCOSE SERPL-MCNC: 150 MG/DL — HIGH (ref 70–99)
HCT VFR BLD CALC: 29.4 % — LOW (ref 39–50)
HGB BLD-MCNC: 10 G/DL — LOW (ref 13–17)
IANC: 5.81 K/UL — SIGNIFICANT CHANGE UP (ref 1.5–8.5)
IMM GRANULOCYTES NFR BLD AUTO: 0.5 % — SIGNIFICANT CHANGE UP (ref 0–1.5)
LYMPHOCYTES # BLD AUTO: 0.65 K/UL — LOW (ref 1–3.3)
LYMPHOCYTES # BLD AUTO: 8.5 % — LOW (ref 13–44)
MAGNESIUM SERPL-MCNC: 1.8 MG/DL — SIGNIFICANT CHANGE UP (ref 1.6–2.6)
MCHC RBC-ENTMCNC: 33 PG — SIGNIFICANT CHANGE UP (ref 27–34)
MCHC RBC-ENTMCNC: 34 GM/DL — SIGNIFICANT CHANGE UP (ref 32–36)
MCV RBC AUTO: 97 FL — SIGNIFICANT CHANGE UP (ref 80–100)
MONOCYTES # BLD AUTO: 0.79 K/UL — SIGNIFICANT CHANGE UP (ref 0–0.9)
MONOCYTES NFR BLD AUTO: 10.3 % — SIGNIFICANT CHANGE UP (ref 2–14)
NEUTROPHILS # BLD AUTO: 5.81 K/UL — SIGNIFICANT CHANGE UP (ref 1.8–7.4)
NEUTROPHILS NFR BLD AUTO: 75.6 % — SIGNIFICANT CHANGE UP (ref 43–77)
NRBC # BLD: 0 /100 WBCS — SIGNIFICANT CHANGE UP
NRBC # FLD: 0 K/UL — SIGNIFICANT CHANGE UP
PHOSPHATE SERPL-MCNC: 3 MG/DL — SIGNIFICANT CHANGE UP (ref 2.5–4.5)
PLATELET # BLD AUTO: 174 K/UL — SIGNIFICANT CHANGE UP (ref 150–400)
POTASSIUM SERPL-MCNC: 3.8 MMOL/L — SIGNIFICANT CHANGE UP (ref 3.5–5.3)
POTASSIUM SERPL-SCNC: 3.8 MMOL/L — SIGNIFICANT CHANGE UP (ref 3.5–5.3)
PROT SERPL-MCNC: 5.8 G/DL — LOW (ref 6–8.3)
RBC # BLD: 3.03 M/UL — LOW (ref 4.2–5.8)
RBC # FLD: 13.1 % — SIGNIFICANT CHANGE UP (ref 10.3–14.5)
SODIUM SERPL-SCNC: 134 MMOL/L — LOW (ref 135–145)
WBC # BLD: 7.68 K/UL — SIGNIFICANT CHANGE UP (ref 3.8–10.5)
WBC # FLD AUTO: 7.68 K/UL — SIGNIFICANT CHANGE UP (ref 3.8–10.5)

## 2022-01-23 PROCEDURE — 99231 SBSQ HOSP IP/OBS SF/LOW 25: CPT

## 2022-01-23 RX ORDER — METOPROLOL TARTRATE 50 MG
25 TABLET ORAL
Refills: 0 | Status: DISCONTINUED | OUTPATIENT
Start: 2022-01-23 | End: 2022-01-24

## 2022-01-23 RX ORDER — METOPROLOL TARTRATE 50 MG
12.5 TABLET ORAL ONCE
Refills: 0 | Status: COMPLETED | OUTPATIENT
Start: 2022-01-23 | End: 2022-01-23

## 2022-01-23 RX ORDER — SODIUM CHLORIDE 9 MG/ML
1000 INJECTION, SOLUTION INTRAVENOUS
Refills: 0 | Status: DISCONTINUED | OUTPATIENT
Start: 2022-01-23 | End: 2022-01-24

## 2022-01-23 RX ORDER — MAGNESIUM SULFATE 500 MG/ML
1 VIAL (ML) INJECTION ONCE
Refills: 0 | Status: COMPLETED | OUTPATIENT
Start: 2022-01-23 | End: 2022-01-23

## 2022-01-23 RX ORDER — POTASSIUM CHLORIDE 20 MEQ
20 PACKET (EA) ORAL ONCE
Refills: 0 | Status: COMPLETED | OUTPATIENT
Start: 2022-01-23 | End: 2022-01-23

## 2022-01-23 RX ADMIN — MIDODRINE HYDROCHLORIDE 5 MILLIGRAM(S): 2.5 TABLET ORAL at 00:53

## 2022-01-23 RX ADMIN — MIDODRINE HYDROCHLORIDE 5 MILLIGRAM(S): 2.5 TABLET ORAL at 13:30

## 2022-01-23 RX ADMIN — Medication 25 MILLIGRAM(S): at 17:41

## 2022-01-23 RX ADMIN — APIXABAN 2.5 MILLIGRAM(S): 2.5 TABLET, FILM COATED ORAL at 05:28

## 2022-01-23 RX ADMIN — AMIODARONE HYDROCHLORIDE 200 MILLIGRAM(S): 400 TABLET ORAL at 05:28

## 2022-01-23 RX ADMIN — Medication 100 GRAM(S): at 23:33

## 2022-01-23 RX ADMIN — MIDODRINE HYDROCHLORIDE 5 MILLIGRAM(S): 2.5 TABLET ORAL at 05:27

## 2022-01-23 RX ADMIN — MIDODRINE HYDROCHLORIDE 5 MILLIGRAM(S): 2.5 TABLET ORAL at 22:03

## 2022-01-23 RX ADMIN — Medication 12.5 MILLIGRAM(S): at 14:36

## 2022-01-23 RX ADMIN — SODIUM CHLORIDE 75 MILLILITER(S): 9 INJECTION, SOLUTION INTRAVENOUS at 17:40

## 2022-01-23 RX ADMIN — Medication 12.5 MILLIGRAM(S): at 05:28

## 2022-01-23 RX ADMIN — Medication 81 MILLIGRAM(S): at 17:41

## 2022-01-23 RX ADMIN — APIXABAN 2.5 MILLIGRAM(S): 2.5 TABLET, FILM COATED ORAL at 17:40

## 2022-01-23 RX ADMIN — Medication 20 MILLIEQUIVALENT(S): at 23:32

## 2022-01-23 RX ADMIN — CEFTRIAXONE 100 MILLIGRAM(S): 500 INJECTION, POWDER, FOR SOLUTION INTRAMUSCULAR; INTRAVENOUS at 00:52

## 2022-01-23 NOTE — PROGRESS NOTE ADULT - ASSESSMENT
87 y/o M with pmhx of Bladder CA s/p TURBT 12/2020 and intravesicular chemotherapy with Gemcitabine c/b AFIB with RVR and LILIAN, now s/p BCG Instillation (1/12/22) with Dr. Ramsey, s/p L Nephrectomy (1951), DM2 (not on medications), HTN, HLD, CAD s/p 4 JOSE (last stent >5 years ago), CKD, and AFIB on Eliquis s/p recent admission to [facility] for urosepsis/LILIAN/metabolic acidosis presents to ER for "increased heart rate". The patient had hx of urosepsis and was admitted last week. Concern for unresolving infection although the patient is back to baseline. Admit for UTI, pylonephritis, sepsis work up.

## 2022-01-23 NOTE — PROGRESS NOTE ADULT - ASSESSMENT
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   Here 1/21 for asymptomatic tachycardia.   Treated for UTI the week prior in the setting of fever, dysuria and pyuria.   Nothing grew from blood or urine then or now.   He looks and feels well, asking to go home.   Suspect his chronic unchanged dysuria and pyuria as well as CT findings are related to his cancer rather than infection.   Disseminated BCG is a consideration but so far AFB urine culture negative.     Suggest  -monitor off antibiotics, completed day 7 of planned course overnight   -monitor cultures   -f/u AFB urine cultures     Will sign off, call back if needed    Nicholas Dick MD   Infectious Disease   Pager [phone]   After 5PM and on weekends please page fellow on call or call [phone]

## 2022-01-23 NOTE — PROGRESS NOTE ADULT - PROBLEM SELECTOR PLAN 3
Assessment:  - patient currently on amiodarone for afib  - afib RVR likely in the setting of dehydration/sepsis  - now controlled <100 on tele monitor    Plan:  - tele monitoring  - c./w amiodarone home dose  - manage sepsis  - IV fluids for resuscitation  - consider starting metoprolol for rate control if still not controlled  - cardiology consult outpatient follow up with urology  no intervention required at this time

## 2022-01-23 NOTE — PROGRESS NOTE ADULT - PROBLEM SELECTOR PLAN 4
Assessment:  - hx of CKD with one kidney  - now has LILIAN with Cr of 2.61, however his Cr tends to fluctuate  - patient with associated metabolic acidosis likely from CKD and sepsis    Plan:  - will get CT abd/pelvis to evaluate for kidney disease  - nephrology consult  - bicarb drip for metabolic acidosis  - hold diuretics as LILIAN can be pre-renally caused continue to monitor   still heart rate is ~ 90 - 110  increase metoprolol to 25 BID with hold parameters

## 2022-01-23 NOTE — PROGRESS NOTE ADULT - PROBLEM SELECTOR PLAN 2
Assessment:  - hx of bladder Ca received BCG injection 1 week ago, likely to have caused him to have urosepsis last admission    Plan:  - monitor for now ruled out after admission  CT abdomen/pelvis noted for possible cystitis but urine culture and blood cultures remain negative  recently completed full course of antibiotics for likely UTI  admission tachy and low BP likely secondary to pre-renal state  will continue to monitor off antibiotics

## 2022-01-23 NOTE — PHYSICAL THERAPY INITIAL EVALUATION ADULT - PHYSICAL ASSIST/NONPHYSICAL ASSIST: SIT/SUPINE, REHAB EVAL
CHIEF COMPLAINT:  Medicare annual wellness visit, subsequent.      HISTORY OF PRESENT ILLNESS:  Agustin is a 73-year-old gentleman who came in today with his mom (who is 98 years old) for her wellness visit as well.  He retired in 2014 and enjoys his time off but is bothered by the current political situation.    Overall, Agustin is doing well. He is concerned about the Covid vaccine and whether it is worth it to get it. We had a lengthy discussion of the vaccine and treatment options of Covid. He reluctantly was wearing a mask, but it hung from his ear throughout our visit.      He has a diagnosis of prediabetes--though he's likely diabetic. His at-home sugars are averaging 135 in the morning and 155 at night. His last A1c was 6.9 on 7/14/2020. Blood pressure at home (he brought in a number of readings) averages 134/83.  Last LDL was 99. He is fasting today.  He is a nonsmoker.  It has been recommended that he takes aspirin 81 mg daily but he discontinued it in the past.       MEDICARE QUESTIONS:  No problems with activities of daily living.  He is active but he feels his energy is not as good as it could be. He rides a motorcycle. No problems caring for his home or for himself.  He has had no falls at home in the last year.  No symptoms of depression.  He has slight memory concerns. He occasionally forgets names but does not get lost.     HEALTHCARE MAINTENANCE:  He wears glasses.  Eye care is up-to-date, history of cataract surgery. He purchased hearing aids and is not happy about them.  He is considering returning for a new hearing test and hearing aid adjustment. Dental care is up-to-date.  Immunizations up-to-date.  Tetanus shot due in 2022.  He has had both pneumonia shots. He got his flu shot in January. He has not received the Covid vaccine series. He is due for Shingles shot. Blood pressure averaging 134/83 . Body mass index is 28.11 kg/m ,  From a lab standpoint, we will be checking a lipid panel, metabolic  "panel, TSH, and  A1c.  For colon cancer screening purposes, he will do the Cologuard test. PSA is up-to-date, and he is following with urology.      OBJECTIVE:  Agustin is in no distress.  He is alert and oriented x3.  Partially wearing a mask.  /83 (BP Location: Left arm, Patient Position: Sitting, Cuff Size: Adult Regular)   Pulse 62   Temp 97.1  F (36.2  C) (Skin)   Resp 15   Ht 1.772 m (5' 9.75\")   Wt 88.2 kg (194 lb 8 oz)   SpO2 95%   BMI 28.11 kg/m    HEENT:  Head is normocephalic, atraumatic.  Ears are free of any significant cerumen.  The TMs look fine.  There is no pain with palpation of the frontal or maxillary sinuses.  Eyes are grossly normal.   NECK:  Supple without any anterior or posterior chain adenopathy.  No visible or palpable thyromegaly.  No carotid bruits.   BACK:  Smooth and straight.   LUNGS:  Clear to auscultation bilaterally.   HEART:  Regular rate and rhythm without murmur.   EXTREMITIES:  Ankles are free of any edema. Neuropathy noted in most toes, bottoms > tops.  (This has been noted previously.)     ASSESSMENT AND PLAN:   1. Medicare annual wellness visit, up-to-date with most healthcare maintenance strategies.   2. Recommend Shingles vaccine at pharmacy.  3. Type 2 diabetes without complication, without the long-term current use of insulin.  Labs are pending today. This will help us determine where he is in terms of care. Start Metformin 500 mg. Recheck A1c in 3 months.  4.  Hyperlipidemia with a goal of an LDL less than 100.  Currently on pravastatin 40 mg daily. Lipid panel pending.  6.  Screening for colon cancer with a Cologuard test.  7.  Continue aspirin 81 mg daily.  8.  History of GE reflux, on omeprazole. Refill sent in.  9. Peripheral neuropathy, unchanged.  Educated about looking at feet closely.  8.  Call with any problems or questions. I look forward to seeing him back in approximately 3-4 months for his diabetic check (primarily his A1c).  " verbal cues

## 2022-01-23 NOTE — PHYSICAL THERAPY INITIAL EVALUATION ADULT - PERTINENT HX OF CURRENT PROBLEM, REHAB EVAL
Patient is 86 year old male admitted with history of Bladder CA s/p TURBT 12/2020 and intravesicular chemotherapy with Gemcitabine c/b AFIB with RVR and LILIAN, , s/p L Nephrectomy (1951), DM2 (not on medications), HTN, HLD, CAD s/p 4 JOSE (last stent >5 years ago), CKD, and AFIB on Eliquis  presents to ER for "increased heart rate.

## 2022-01-23 NOTE — PROGRESS NOTE ADULT - SUBJECTIVE AND OBJECTIVE BOX
Follow Up:  Pyuria     Interval History/ROS: Chronic dysuria, unchanged for months, no suprapubic pain or fevers. No diarrhea. Feels fine, wants to go home.     Allergies  penicillin (Rash)        ANTIMICROBIALS:      OTHER MEDS:  aMIOdarone    Tablet 200 milliGRAM(s) Oral daily  apixaban 2.5 milliGRAM(s) Oral two times a day  aspirin enteric coated 81 milliGRAM(s) Oral daily  metoprolol tartrate 12.5 milliGRAM(s) Oral every 12 hours  midodrine. 5 milliGRAM(s) Oral three times a day  sodium chloride 0.45% 1000 milliLiter(s) IV Continuous <Continuous>      Vital Signs Last 24 Hrs  T(C): 36.6 (2022 05:30), Max: 36.7 (2022 14:17)  T(F): 97.9 (2022 05:30), Max: 98.1 (2022 20:53)  HR: 90 (2022 05:30) (89 - 95)  BP: 103/64 (2022 05:30) (103/64 - 121/96)  BP(mean): --  RR: 17 (2022 05:30) (17 - 18)  SpO2: 98% (2022 05:30) (98% - 100%)    Physical Exam:  General: non toxic  Cardio: regular rate   Respiratory: nonlabored   abd: nondistended  : no suprapubic tenderness   Neurologic: no focal deficit  psych: normal affect                          10.0   7.68  )-----------( 174      ( 2022 07:25 )             29.4           134<L>  |  102  |  62<H>  ----------------------------<  150<H>  3.8   |  21<L>  |  2.03<H>    Ca    8.3<L>      2022 07:25  Phos  3.0       Mg     1.80         TPro  5.8<L>  /  Alb  2.6<L>  /  TBili  0.3  /  DBili  x   /  AST  24  /  ALT  42<H>  /  AlkPhos  181<H>        Urinalysis Basic - ( 2022 06:40 )    Color: Yellow / Appearance: Slightly Turbid / S.013 / pH: x  Gluc: x / Ketone: Negative  / Bili: Negative / Urobili: <2 mg/dL   Blood: x / Protein: 30 mg/dL / Nitrite: Negative   Leuk Esterase: Large / RBC: 83 /HPF /  /HPF   Sq Epi: x / Non Sq Epi: 0 /HPF / Bacteria: Occasional        MICROBIOLOGY:  Culture - Blood (collected 22 @ 02:18)  Source: .Blood Blood-Peripheral  Preliminary Report (22 @ 03:01):    No growth to date.    Culture - Blood (collected 22 @ 02:18)  Source: .Blood Blood-Peripheral  Preliminary Report (22 @ 03:01):    No growth to date.    Culture - Urine (collected 22 @ 21:43)  Source: Clean Catch Clean Catch (Midstream)  Final Report (22 @ 20:43):    No growth    Culture - Urine (collected 22 @ 19:12)  Source: Clean Catch Clean Catch (Midstream)  Final Report (22 @ 20:05):    <10,000 CFU/mL Normal Urogenital Jeannine    Culture - Acid Fast - Urine (collected 22 @ 10:58)  Source: .Urine Urine  Preliminary Report (22 @ 15:04):    Culture is being performed.    Culture - Blood (collected 22 @ 00:41)  Source: .Blood Blood-Peripheral  Final Report (22 @ 07:01):    No Growth Final    Culture - Blood (collected 22 @ 00:05)  Source: .Blood Blood-Venous  Final Report (22 @ 07:01):    No Growth Final    Culture - Urine (collected 22 @ 19:10)  Source: Clean Catch Clean Catch (Midstream)  Final Report (22 @ 16:17):    <10,000 CFU/mL Normal Urogenital Jeannine    RADIOLOGY:  Images below reviewed personally  CT Abdomen and Pelvis No Cont (22 @ 00:51)   Unable to assess for pyelonephritis without intravenous contrast.  Diffuse bladder wall thickening with perivesicular haziness, suspicious   for cystitis. Stable nonspecific urothelial thickening and mild   distention of the right renal pelvis. Nonspecific mild right perinephric   stranding. Recommend correlation with urinalysis.

## 2022-01-23 NOTE — PROGRESS NOTE ADULT - SUBJECTIVE AND OBJECTIVE BOX
Patient is a 86y old  Male who presents with a chief complaint of electrolyte abnormalities (2022 10:44)      DATE OF SERVICE: 22 @ 11:15    SUBJECTIVE / OVERNIGHT EVENTS: overnight events noted  "I feel like a million bucks!"    ROS:  Resp: No cough no sputum production  CVS: No chest pain no palpitations no orthopnea  GI: no N/V/D  : no dysuria, no hematuria  Neuro: no weakness no paresthesias  Heme: No petechiae no easy bruising  Msk: No joint pain no swelling  Skin: No rash no itching        MEDICATIONS  (STANDING):  aMIOdarone    Tablet 200 milliGRAM(s) Oral daily  apixaban 2.5 milliGRAM(s) Oral two times a day  aspirin enteric coated 81 milliGRAM(s) Oral daily  metoprolol tartrate 12.5 milliGRAM(s) Oral every 12 hours  midodrine. 5 milliGRAM(s) Oral three times a day  sodium chloride 0.45% 1000 milliLiter(s) (75 mL/Hr) IV Continuous <Continuous>    MEDICATIONS  (PRN):        CAPILLARY BLOOD GLUCOSE        I&O's Summary      Vital Signs Last 24 Hrs  T(C): 36.6 (2022 05:30), Max: 36.7 (2022 14:17)  T(F): 97.9 (2022 05:30), Max: 98.1 (2022 20:53)  HR: 90 (2022 05:30) (89 - 95)  BP: 103/64 (2022 05:30) (103/64 - 121/96)  BP(mean): --  RR: 17 (2022 05:30) (17 - 18)  SpO2: 98% (2022 05:30) (98% - 100%)    PHYSICAL EXAM:   GENERAL: in no distress  EYES: EOMI, PERRLA  NECK: Supple, No JVD  CHEST/LUNG: clear  HEART: S1 S2; systolic murmur +   ABDOMEN: Soft, Nontender  EXTREMITIES: no edema  NEUROLOGY: AO x 3 non-focal  SKIN: No rashes or lesions    LABS:                        10.0   7.68  )-----------( 174      ( 2022 07:25 )             29.4         134<L>  |  102  |  62<H>  ----------------------------<  150<H>  3.8   |  21<L>  |  2.03<H>    Ca    8.3<L>      2022 07:25  Phos  3.0       Mg     1.80         TPro  5.8<L>  /  Alb  2.6<L>  /  TBili  0.3  /  DBili  x   /  AST  24  /  ALT  42<H>  /  AlkPhos  181<H>            Urinalysis Basic - ( 2022 06:40 )    Color: Yellow / Appearance: Slightly Turbid / S.013 / pH: x  Gluc: x / Ketone: Negative  / Bili: Negative / Urobili: <2 mg/dL   Blood: x / Protein: 30 mg/dL / Nitrite: Negative   Leuk Esterase: Large / RBC: 83 /HPF /  /HPF   Sq Epi: x / Non Sq Epi: 0 /HPF / Bacteria: Occasional          All consultant(s) notes reviewed and care discussed with other providers        Contact Number, Dr Arroyo [phone]

## 2022-01-23 NOTE — PROGRESS NOTE ADULT - PROBLEM SELECTOR PLAN 1
ruled out after admission  blood cultures and urine culture negative  admission ta resolving   renal help appreciated  will continue to monitor

## 2022-01-23 NOTE — PROGRESS NOTE ADULT - SUBJECTIVE AND OBJECTIVE BOX
CC: no events    TELEMETRY:     PHYSICAL EXAM:    T(C): 36.6 (01-23-22 @ 05:30), Max: 36.7 (01-22-22 @ 14:17)  HR: 90 (01-23-22 @ 05:30) (89 - 95)  BP: 103/64 (01-23-22 @ 05:30) (103/64 - 121/96)  RR: 17 (01-23-22 @ 05:30) (17 - 18)  SpO2: 98% (01-23-22 @ 05:30) (98% - 100%)  Wt(kg): --  I&O's Summary      Appearance: Normal	  Cardiovascular: Normal S1 S2,RRR, No JVD, No murmurs  Respiratory: Lungs clear to auscultation	  Gastrointestinal:  Soft, Non-tender, + BS	  Extremities: Normal range of motion, No clubbing, cyanosis or edema  Vascular: Peripheral pulses palpable 2+ bilaterally     LABS:	 	                          10.0   7.68  )-----------( 174      ( 23 Jan 2022 07:25 )             29.4     01-23    134<L>  |  102  |  62<H>  ----------------------------<  150<H>  3.8   |  21<L>  |  2.03<H>    Ca    8.3<L>      23 Jan 2022 07:25  Phos  3.0     01-23  Mg     1.80     01-23    TPro  5.8<L>  /  Alb  2.6<L>  /  TBili  0.3  /  DBili  x   /  AST  24  /  ALT  42<H>  /  AlkPhos  181<H>  01-23          CARDIAC MARKERS:

## 2022-01-23 NOTE — PROGRESS NOTE ADULT - ASSESSMENT
on room air  afebrile  charles  denies complaints    aMIOdarone    Tablet 200 milliGRAM(s) Oral daily  apixaban 2.5 milliGRAM(s) Oral two times a day  aspirin enteric coated 81 milliGRAM(s) Oral daily  metoprolol tartrate 25 milliGRAM(s) Oral two times a day  metoprolol tartrate 12.5 milliGRAM(s) Oral once  midodrine. 5 milliGRAM(s) Oral three times a day  sodium chloride 0.45% 1000 milliLiter(s) IV Continuous <Continuous>      VITAL:  T(C): , Max: 36.7 (22 @ 20:53)  T(F): , Max: 98.1 (22 @ 20:53)  HR: 100 (22 @ 13:25)  BP: 100/68 (22 @ 13:25)  BP(mean): --  RR: 16 (22 @ 13:25)  SpO2: 96% (22 @ 13:25)  Wt(kg): --      PHYSICAL EXAM:  General:  alert, cooperative and no distress  HEENT: no trauma  Lungs: clear to auscultation bilaterally  Heart: normal S1/S2  Abdomen: soft, non-tender not distended, + BS  Extremities: no clubbing, cyanosis or edema  Urologic: no pickering  Neurologic: Grossly normal  Skin: no rashes      LABS:                          10.0   7.68  )-----------( 174      ( 2022 07:25 )             29.4     Na(134)/K(3.8)/Cl(102)/HCO3(21)/BUN(62)/Cr(2.03)Glu(150)/Ca(8.3)/Mg(1.80)/PO4(3.0)     @ 07:25  Na(137)/K(4.3)/Cl(106)/HCO3(18)/BUN(62)/Cr(2.14)Glu(135)/Ca(8.6)/Mg(1.70)/PO4(3.3)     @ 06:40  Na(131)/K(4.5)/Cl(100)/HCO3(16)/BUN(74)/Cr(2.61)Glu(136)/Ca(8.3)/Mg(--)/PO4(--)     @ 17:41    Urinalysis Basic - ( 2022 06:40 )    Color: Yellow / Appearance: Slightly Turbid / S.013 / pH: x  Gluc: x / Ketone: Negative  / Bili: Negative / Urobili: <2 mg/dL   Blood: x / Protein: 30 mg/dL / Nitrite: Negative   Leuk Esterase: Large / RBC: 83 /HPF /  /HPF   Sq Epi: x / Non Sq Epi: 0 /HPF / Bacteria: Occasional      Sodium, Random Urine: 48 mmol/L ( @ 20:08)  Creatinine, Random Urine: 96 mg/dL ( @ 20:08)  Protein/Creatinine Ratio Calculation: 1.5 Ratio ( @ 20:08)        ASSESSMENT/PLAN  ASSESSMENT:  Patient is a 86y Male with CAD, Afib, HTN, DM, bladder CA, solitary R kidney and CKD p/w tachycardia and acute on chronic kidney injury.  - CKD: stage 3 with baseline creatinine ~1.7  - LILIAN: nonoliguric. Likely prerenal due to volume depletion. renal fxn improving  - Hyponatremia-mild  - Elevated protein/creatinine  ratio  - solitary R kidney: s/p L nephrectomy  - metabolic acidosis (non AG): due to CKD and recent LILIAN - improving on 1/2 NS + 75 NaHCO3 @ 75 cc/hr    RECOMMENDATIONS:  - continue 1/2 NS + 75 NaHCO3 @ 75 cc/hr  - hold oral sodium bicarbonate and torsemide for now  - please dose any new medications for a CrCl of ~30 cc/min  - avoid NSAIDs/nephrotoxins as able          Edward Amaya NP-BC  [email or web address]  [phone]

## 2022-01-24 ENCOUNTER — TRANSCRIPTION ENCOUNTER (OUTPATIENT)
Age: 87
End: 2022-01-24

## 2022-01-24 VITALS
SYSTOLIC BLOOD PRESSURE: 124 MMHG | DIASTOLIC BLOOD PRESSURE: 68 MMHG | OXYGEN SATURATION: 99 % | RESPIRATION RATE: 18 BRPM | HEART RATE: 94 BPM | TEMPERATURE: 98 F

## 2022-01-24 LAB
ALBUMIN SERPL ELPH-MCNC: 2.7 G/DL — LOW (ref 3.3–5)
ALP SERPL-CCNC: 173 U/L — HIGH (ref 40–120)
ALT FLD-CCNC: 50 U/L — HIGH (ref 4–41)
ANION GAP SERPL CALC-SCNC: 12 MMOL/L — SIGNIFICANT CHANGE UP (ref 7–14)
AST SERPL-CCNC: 28 U/L — SIGNIFICANT CHANGE UP (ref 4–40)
BASOPHILS # BLD AUTO: 0.03 K/UL — SIGNIFICANT CHANGE UP (ref 0–0.2)
BASOPHILS NFR BLD AUTO: 0.4 % — SIGNIFICANT CHANGE UP (ref 0–2)
BILIRUB SERPL-MCNC: 0.2 MG/DL — SIGNIFICANT CHANGE UP (ref 0.2–1.2)
BUN SERPL-MCNC: 58 MG/DL — HIGH (ref 7–23)
CALCIUM SERPL-MCNC: 8.2 MG/DL — LOW (ref 8.4–10.5)
CHLORIDE SERPL-SCNC: 103 MMOL/L — SIGNIFICANT CHANGE UP (ref 98–107)
CO2 SERPL-SCNC: 19 MMOL/L — LOW (ref 22–31)
CREAT SERPL-MCNC: 1.96 MG/DL — HIGH (ref 0.5–1.3)
EOSINOPHIL # BLD AUTO: 0.3 K/UL — SIGNIFICANT CHANGE UP (ref 0–0.5)
EOSINOPHIL NFR BLD AUTO: 4.1 % — SIGNIFICANT CHANGE UP (ref 0–6)
GLUCOSE SERPL-MCNC: 202 MG/DL — HIGH (ref 70–99)
HCT VFR BLD CALC: 29.6 % — LOW (ref 39–50)
HGB BLD-MCNC: 9.3 G/DL — LOW (ref 13–17)
IANC: 5.46 K/UL — SIGNIFICANT CHANGE UP (ref 1.5–8.5)
IMM GRANULOCYTES NFR BLD AUTO: 0.6 % — SIGNIFICANT CHANGE UP (ref 0–1.5)
LYMPHOCYTES # BLD AUTO: 0.76 K/UL — LOW (ref 1–3.3)
LYMPHOCYTES # BLD AUTO: 10.5 % — LOW (ref 13–44)
MAGNESIUM SERPL-MCNC: 2 MG/DL — SIGNIFICANT CHANGE UP (ref 1.6–2.6)
MCHC RBC-ENTMCNC: 31.4 GM/DL — LOW (ref 32–36)
MCHC RBC-ENTMCNC: 32 PG — SIGNIFICANT CHANGE UP (ref 27–34)
MCV RBC AUTO: 101.7 FL — HIGH (ref 80–100)
MONOCYTES # BLD AUTO: 0.68 K/UL — SIGNIFICANT CHANGE UP (ref 0–0.9)
MONOCYTES NFR BLD AUTO: 9.4 % — SIGNIFICANT CHANGE UP (ref 2–14)
NEUTROPHILS # BLD AUTO: 5.46 K/UL — SIGNIFICANT CHANGE UP (ref 1.8–7.4)
NEUTROPHILS NFR BLD AUTO: 75 % — SIGNIFICANT CHANGE UP (ref 43–77)
NRBC # BLD: 0 /100 WBCS — SIGNIFICANT CHANGE UP
NRBC # FLD: 0 K/UL — SIGNIFICANT CHANGE UP
PHOSPHATE SERPL-MCNC: 3.1 MG/DL — SIGNIFICANT CHANGE UP (ref 2.5–4.5)
PLATELET # BLD AUTO: 191 K/UL — SIGNIFICANT CHANGE UP (ref 150–400)
POTASSIUM SERPL-MCNC: 4.4 MMOL/L — SIGNIFICANT CHANGE UP (ref 3.5–5.3)
POTASSIUM SERPL-SCNC: 4.4 MMOL/L — SIGNIFICANT CHANGE UP (ref 3.5–5.3)
PROT SERPL-MCNC: 5.8 G/DL — LOW (ref 6–8.3)
RBC # BLD: 2.91 M/UL — LOW (ref 4.2–5.8)
RBC # FLD: 13.1 % — SIGNIFICANT CHANGE UP (ref 10.3–14.5)
SODIUM SERPL-SCNC: 134 MMOL/L — LOW (ref 135–145)
WBC # BLD: 7.27 K/UL — SIGNIFICANT CHANGE UP (ref 3.8–10.5)
WBC # FLD AUTO: 7.27 K/UL — SIGNIFICANT CHANGE UP (ref 3.8–10.5)

## 2022-01-24 RX ORDER — MIDODRINE HYDROCHLORIDE 2.5 MG/1
1 TABLET ORAL
Qty: 90 | Refills: 0
Start: 2022-01-24 | End: 2022-02-22

## 2022-01-24 RX ORDER — MAGNESIUM OXIDE 400 MG ORAL TABLET 241.3 MG
1 TABLET ORAL
Qty: 0 | Refills: 0 | DISCHARGE

## 2022-01-24 RX ORDER — SODIUM BICARBONATE 1 MEQ/ML
650 SYRINGE (ML) INTRAVENOUS
Refills: 0 | Status: DISCONTINUED | OUTPATIENT
Start: 2022-01-24 | End: 2022-01-24

## 2022-01-24 RX ORDER — AMIODARONE HYDROCHLORIDE 400 MG/1
1 TABLET ORAL
Qty: 30 | Refills: 0
Start: 2022-01-24 | End: 2022-02-22

## 2022-01-24 RX ORDER — APIXABAN 2.5 MG/1
1 TABLET, FILM COATED ORAL
Qty: 60 | Refills: 0
Start: 2022-01-24 | End: 2022-02-22

## 2022-01-24 RX ORDER — ASPIRIN/CALCIUM CARB/MAGNESIUM 324 MG
1 TABLET ORAL
Qty: 30 | Refills: 0
Start: 2022-01-24 | End: 2022-02-22

## 2022-01-24 RX ORDER — SODIUM BICARBONATE 1 MEQ/ML
1 SYRINGE (ML) INTRAVENOUS
Qty: 60 | Refills: 0
Start: 2022-01-24 | End: 2022-02-22

## 2022-01-24 RX ORDER — ROSUVASTATIN CALCIUM 5 MG/1
1 TABLET ORAL
Qty: 0 | Refills: 0 | DISCHARGE

## 2022-01-24 RX ORDER — APIXABAN 2.5 MG/1
1 TABLET, FILM COATED ORAL
Qty: 0 | Refills: 0 | DISCHARGE

## 2022-01-24 RX ORDER — METOPROLOL TARTRATE 50 MG
1 TABLET ORAL
Qty: 60 | Refills: 0
Start: 2022-01-24 | End: 2022-02-22

## 2022-01-24 RX ADMIN — Medication 81 MILLIGRAM(S): at 17:48

## 2022-01-24 RX ADMIN — Medication 25 MILLIGRAM(S): at 06:16

## 2022-01-24 RX ADMIN — APIXABAN 2.5 MILLIGRAM(S): 2.5 TABLET, FILM COATED ORAL at 06:15

## 2022-01-24 RX ADMIN — Medication 25 MILLIGRAM(S): at 17:48

## 2022-01-24 RX ADMIN — MIDODRINE HYDROCHLORIDE 5 MILLIGRAM(S): 2.5 TABLET ORAL at 13:54

## 2022-01-24 RX ADMIN — MIDODRINE HYDROCHLORIDE 5 MILLIGRAM(S): 2.5 TABLET ORAL at 06:17

## 2022-01-24 RX ADMIN — AMIODARONE HYDROCHLORIDE 200 MILLIGRAM(S): 400 TABLET ORAL at 06:15

## 2022-01-24 RX ADMIN — APIXABAN 2.5 MILLIGRAM(S): 2.5 TABLET, FILM COATED ORAL at 17:48

## 2022-01-24 NOTE — PROGRESS NOTE ADULT - ATTENDING COMMENTS
Renal attd    -Seen with NP and the above note was adjusted  -Resume Torsemide 5mg po 3 x a week  -DC planning      DW Dr Arroyo and ACP    [facility]ayed [facility]   [phone]

## 2022-01-24 NOTE — DISCHARGE NOTE PROVIDER - NSDCCPCAREPLAN_GEN_ALL_CORE_FT
PRINCIPAL DISCHARGE DIAGNOSIS  Diagnosis: LILIAN (acute kidney injury)  Assessment and Plan of Treatment: Your creatinine (measurement of your kidney function) was higher than normal upon discharge likely due to volume depletion. Your Torsemide was on hold but your creatinine improved so you will be discharged back on your Torsemide 5mg. However instead of taking Torsemide daily please only take it Monday, Wednesday, and Friday ****. Please continue your sodium bicarbonate pills. Your creatinine upon discharge is 1.96. Please follow up with your Nephrologist.      SECONDARY DISCHARGE DIAGNOSES  Diagnosis: Bladder cancer  Assessment and Plan of Treatment: There was an initial concern for urinary tract infection given your symptoms upon admission however your blood culture and urine culture came back negative. Infectious disease was consulted and stated no concern for infeciton and instead suspect your urinary findings/symptoms could be related to your cancer and therapy for it. Please follow up with ID clinic in 3-4 weeks to follow up on urine AFB culture sent from last admission ([phone]).    Diagnosis: Hypotension  Assessment and Plan of Treatment: Your blood pressure was running on the low side therefore you were started on Midodrine. Please follow up with your PCP.    Diagnosis: Atrial fibrillation with rapid ventricular response  Assessment and Plan of Treatment: Your heart rate was high therefore you were started on Metoprolol 25mg twice a day. Your heart rate is now improved. Please follow up with your Cardiologist.

## 2022-01-24 NOTE — PROGRESS NOTE ADULT - SUBJECTIVE AND OBJECTIVE BOX
CARDIOLOGY FOLLOW UP - Dr. Vega  Date of Service: 1/24/22  CC: oob to chair  denies cp, sob, palpitations / dizziness     Review of Systems:  Constitutional: No fever, weight loss, or fatigue  Respiratory: No cough, wheezing, or hemoptysis, no shortness of breath  Cardiovascular: No chest pain, palpitations, passing out, dizziness, or leg swelling  Gastrointestinal: No abd or epigastric pain.  No nausea, vomiting, or hematemesis; no diarrhea or constipation, no melena or hematochezia  Vascular: no edema       PHYSICAL EXAM:  T(C): 36.7 (01-24-22 @ 13:45), Max: 36.7 (01-23-22 @ 22:00)  HR: 98 (01-24-22 @ 13:45) (93 - 100)  BP: 105/60 (01-24-22 @ 13:45) (102/61 - 105/69)  RR: 19 (01-24-22 @ 13:45) (16 - 19)  SpO2: 100% (01-24-22 @ 13:45) (97% - 100%)  Wt(kg): --  I&O's Summary    23 Jan 2022 07:01  -  24 Jan 2022 07:00  --------------------------------------------------------  IN: 0 mL / OUT: 100 mL / NET: -100 mL        Appearance: Normal	  Cardiovascular: irreg  No JVD, No murmurs  Respiratory: Lungs clear to auscultation	  Gastrointestinal:  Soft, Non-tender, + BS	  Extremities: Normal range of motion, No clubbing, cyanosis or edema      Home Medications:  apixaban 2.5 mg oral tablet: 1 tab(s) orally 2 times a day (22 Jan 2022 13:26)  magnesium oxide 400 mg oral tablet: 1 tab(s) orally 2 times a day (22 Jan 2022 13:26)  rosuvastatin 40 mg oral tablet: 1 tab(s) orally once a day    **Per pharmacy, rx last filled 10/9/21 x 90 day supply (22 Jan 2022 13:26)  tamsulosin 0.4 mg oral capsule: 1 cap(s) orally once a day (22 Jan 2022 13:26)      MEDICATIONS  (STANDING):  aMIOdarone    Tablet 200 milliGRAM(s) Oral daily  apixaban 2.5 milliGRAM(s) Oral two times a day  aspirin enteric coated 81 milliGRAM(s) Oral daily  metoprolol tartrate 25 milliGRAM(s) Oral two times a day  midodrine. 5 milliGRAM(s) Oral three times a day  sodium bicarbonate 650 milliGRAM(s) Oral two times a day      TELEMETRY: afib 90-110s 	    ECG:  	  RADIOLOGY:   DIAGNOSTIC TESTING:  [ ] Echocardiogram:  [ ]  Catheterization:  [ ] Stress Test:    OTHER: 	    LABS:	 	                            9.3    7.27  )-----------( 191      ( 24 Jan 2022 06:47 )             29.6     01-24    134<L>  |  103  |  58<H>  ----------------------------<  202<H>  4.4   |  19<L>  |  1.96<H>    Ca    8.2<L>      24 Jan 2022 06:47  Phos  3.1     01-24  Mg     2.00     01-24    TPro  5.8<L>  /  Alb  2.7<L>  /  TBili  0.2  /  DBili  x   /  AST  28  /  ALT  50<H>  /  AlkPhos  173<H>  01-24

## 2022-01-24 NOTE — PROGRESS NOTE ADULT - NSPROGADDITIONALINFOA_GEN_ALL_CORE
discussed with patient in detail, expresses understanding of treatment plans.
discussed with patient in detail, expresses understanding of treatment plans.  disposition per PT eval likely tomorrow

## 2022-01-24 NOTE — PROVIDER CONTACT NOTE (OTHER) - SITUATION
Patient's orthostatics are negative
Patient HR ranging from , asymptomatic
Pt BP 97/56,  on tele monitor.
Pt had 4 beats of VTach,rapid fib 130s didn't sustain

## 2022-01-24 NOTE — PROGRESS NOTE ADULT - SUBJECTIVE AND OBJECTIVE BOX
NEPHROLOGY-[facility] [phone]        Patient seen and examined resting in chair on room air no new complaints. hr 80-90's on telemetry.         MEDICATIONS  (STANDING):  aMIOdarone    Tablet 200 milliGRAM(s) Oral daily  apixaban 2.5 milliGRAM(s) Oral two times a day  aspirin enteric coated 81 milliGRAM(s) Oral daily  metoprolol tartrate 25 milliGRAM(s) Oral two times a day  midodrine. 5 milliGRAM(s) Oral three times a day  sodium bicarbonate 650 milliGRAM(s) Oral two times a day  sodium chloride 0.45% 1000 milliLiter(s) (75 mL/Hr) IV Continuous <Continuous>      VITAL:  T(C): , Max: 36.7 (01-23-22 @ 22:00)  T(F): , Max: 98 (01-23-22 @ 22:00)  HR: 98 (01-24-22 @ 13:45)  BP: 105/60 (01-24-22 @ 13:45)  BP(mean): --  RR: 19 (01-24-22 @ 13:45)  SpO2: 100% (01-24-22 @ 13:45)  Wt(kg): --    I and O's:    01-23 @ 07:01  -  01-24 @ 07:00  --------------------------------------------------------  IN: 0 mL / OUT: 100 mL / NET: -100 mL          PHYSICAL EXAM:    Constitutional: NAD  Neck:  No JVD  Respiratory: CTAB/L  Cardiovascular: irregular, afib  Gastrointestinal: BS+, soft, NT/ND  Extremities: trace peripheral edema  Neurological: A/O x 3, no focal deficits  Psychiatric: Normal mood, normal affect  : No Temple  Skin: No rashes  Access: Not applicable    LABS:                        9.3    7.27  )-----------( 191      ( 24 Jan 2022 06:47 )             29.6     01-24    134<L>  |  103  |  58<H>  ----------------------------<  202<H>  4.4   |  19<L>  |  1.96<H>    Ca    8.2<L>      24 Jan 2022 06:47  Phos  3.1     01-24  Mg     2.00     01-24    TPro  5.8<L>  /  Alb  2.7<L>  /  TBili  0.2  /  DBili  x   /  AST  28  /  ALT  50<H>  /  AlkPhos  173<H>  01-24          Urine Studies:    Sodium, Random Urine: 48 mmol/L (01-22 @ 20:08)  Creatinine, Random Urine: 96 mg/dL (01-22 @ 20:08)  Protein/Creatinine Ratio Calculation: 1.5 Ratio (01-22 @ 20:08)    FENa 0.8%    RADIOLOGY & ADDITIONAL STUDIES:    < from: CT Abdomen and Pelvis No Cont (01.22.22 @ 00:51) >    PROCEDURE DATE:  01/22/2022          INTERPRETATION:  CLINICAL INFORMATION: LILIAN, evaluate for pyelonephritis.    COMPARISON: CT abdomen pelvis 8/13/2021.    CONTRAST/COMPLICATIONS:  IV Contrast: None  Oral Contrast: None  Complications: None    PROCEDURE:  CT of the Abdomen and Pelvis was performed.  Sagittal and coronal reformats were performed.    FINDINGS:  Evaluation of solid organs and vascular structures is limited without   intravenous contrast.    LOWER CHEST: Mild bibasilar linear and dependent atelectasis. Coronary   calcifications.    LIVER: Nonspecific punctate/linear calcifications in the right hepatic   lobe, possibly vascular in etiology and unchanged.  .  BILE DUCTS: Normal caliber.  GALLBLADDER: Cholelithiasis.  SPLEEN: Within normal limits.  PANCREAS: Punctate opacification pancreatic body may reflect sequela of   chronic pancreatitis.  ADRENALS: Within normal limits.  KIDNEYS/URETERS: Left nephrectomy. Right upper pole renal cyst. Mild   right perinephric stranding. Urothelial thickening and mild distention of   the right renal pelvis, unchanged from prior. No obstructing stone.   Multiple renal calcifications likely vascular in etiology.    BLADDER: Diffuse bladder wall thickening with adjacent fatty stranding.  REPRODUCTIVE ORGANS: Prostate is enlarged.    BOWEL: No bowel obstruction. Appendix is normal. Mild distention of the   rectum with stool.  PERITONEUM: Trace ascites.  VESSELS: Atherosclerotic changes.  RETROPERITONEUM/LYMPH NODES: No mesenteric or pelvic lymphadenopathy.  ABDOMINAL WALL: Small fat-containing right inguinal hernia.  BONES: Degenerative changes. Chronic left posterior 11th rib fracture.   Stable 6 mm sclerotic lesion in the left sacrum.    IMPRESSION:  Unable to assess for pyelonephritis without intravenous contrast.  Diffuse bladder wall thickening with perivesicular haziness, suspicious   for cystitis. Stable nonspecific urothelial thickening and mild   distention of the right renal pelvis. Nonspecific mild right perinephric   stranding. Recommend correlation with urinalysis.    < end of copied text >           NEPHROLOGY-[facility] [phone]        Patient seen and examined resting in chair on room air no new complaints. hr 80-90's on telemetry.         MEDICATIONS  (STANDING):  aMIOdarone    Tablet 200 milliGRAM(s) Oral daily  apixaban 2.5 milliGRAM(s) Oral two times a day  aspirin enteric coated 81 milliGRAM(s) Oral daily  metoprolol tartrate 25 milliGRAM(s) Oral two times a day  midodrine. 5 milliGRAM(s) Oral three times a day  sodium bicarbonate 650 milliGRAM(s) Oral two times a day  sodium chloride 0.45% 1000 milliLiter(s) (75 mL/Hr) IV Continuous <Continuous>      VITAL:  T(C): , Max: 36.7 (01-23-22 @ 22:00)  T(F): , Max: 98 (01-23-22 @ 22:00)  HR: 98 (01-24-22 @ 13:45)  BP: 105/60 (01-24-22 @ 13:45)  BP(mean): --  RR: 19 (01-24-22 @ 13:45)  SpO2: 100% (01-24-22 @ 13:45)  Wt(kg): --    I and O's:    01-23 @ 07:01  -  01-24 @ 07:00  --------------------------------------------------------  IN: 0 mL / OUT: 100 mL / NET: -100 mL.          PHYSICAL EXAM:    Constitutional: NAD  Neck:  No JVD  Respiratory: CTAB/L  Cardiovascular: irregular, afib  Gastrointestinal: BS+, soft, NT/ND  Extremities: trace peripheral edema  Neurological: A/O x 3, no focal deficits  Psychiatric: Normal mood, normal affect  : No Temple  Skin: No rashes  Access: Not applicable    LABS:                        9.3    7.27  )-----------( 191      ( 24 Jan 2022 06:47 )             29.6     01-24    134<L>  |  103  |  58<H>  ----------------------------<  202<H>  4.4   |  19<L>  |  1.96<H>    Ca    8.2<L>      24 Jan 2022 06:47  Phos  3.1     01-24  Mg     2.00     01-24    TPro  5.8<L>  /  Alb  2.7<L>  /  TBili  0.2  /  DBili  x   /  AST  28  /  ALT  50<H>  /  AlkPhos  173<H>  01-24          Urine Studies:    Sodium, Random Urine: 48 mmol/L (01-22 @ 20:08)  Creatinine, Random Urine: 96 mg/dL (01-22 @ 20:08)  Protein/Creatinine Ratio Calculation: 1.5 Ratio (01-22 @ 20:08)    FENa 0.8%    RADIOLOGY & ADDITIONAL STUDIES:    < from: CT Abdomen and Pelvis No Cont (01.22.22 @ 00:51) >    PROCEDURE DATE:  01/22/2022          INTERPRETATION:  CLINICAL INFORMATION: LILIAN, evaluate for pyelonephritis.    COMPARISON: CT abdomen pelvis 8/13/2021.    CONTRAST/COMPLICATIONS:  IV Contrast: None  Oral Contrast: None  Complications: None    PROCEDURE:  CT of the Abdomen and Pelvis was performed.  Sagittal and coronal reformats were performed.    FINDINGS:  Evaluation of solid organs and vascular structures is limited without   intravenous contrast.    LOWER CHEST: Mild bibasilar linear and dependent atelectasis. Coronary   calcifications.    LIVER: Nonspecific punctate/linear calcifications in the right hepatic   lobe, possibly vascular in etiology and unchanged.  .  BILE DUCTS: Normal caliber.  GALLBLADDER: Cholelithiasis.  SPLEEN: Within normal limits.  PANCREAS: Punctate opacification pancreatic body may reflect sequela of   chronic pancreatitis.  ADRENALS: Within normal limits.  KIDNEYS/URETERS: Left nephrectomy. Right upper pole renal cyst. Mild   right perinephric stranding. Urothelial thickening and mild distention of   the right renal pelvis, unchanged from prior. No obstructing stone.   Multiple renal calcifications likely vascular in etiology.    BLADDER: Diffuse bladder wall thickening with adjacent fatty stranding.  REPRODUCTIVE ORGANS: Prostate is enlarged.    BOWEL: No bowel obstruction. Appendix is normal. Mild distention of the   rectum with stool.  PERITONEUM: Trace ascites.  VESSELS: Atherosclerotic changes.  RETROPERITONEUM/LYMPH NODES: No mesenteric or pelvic lymphadenopathy.  ABDOMINAL WALL: Small fat-containing right inguinal hernia.  BONES: Degenerative changes. Chronic left posterior 11th rib fracture.   Stable 6 mm sclerotic lesion in the left sacrum.    IMPRESSION:  Unable to assess for pyelonephritis without intravenous contrast.  Diffuse bladder wall thickening with perivesicular haziness, suspicious   for cystitis. Stable nonspecific urothelial thickening and mild   distention of the right renal pelvis. Nonspecific mild right perinephric   stranding. Recommend correlation with urinalysis.    < end of copied text >

## 2022-01-24 NOTE — PROGRESS NOTE ADULT - ASSESSMENT
This is an 86M with history of bladder cancer (s/p TURBT 12/2020, s/p intravescicular chemotherapy with gemcitabine c/b AFib with RVR and LILIAN, now s/p BCG instillation 1/12/22 with Dr. Ramsey), s/p L Nephrectomy at age 16, DM2 (not on medications), HTN, HLD, CAD s/p 4 stents (last stent >5 years ago), and AFib on Eliquis who presents to the hospital with complaints of not feeling and generalized shaking.     #Sepsis  -appreciate ID eval  -abx stopped    #Afib  -rates improving   -recent echo revealed stable mild LV dysfunction  -cont amio  -cont eliquis  -cont bb  -cont mido to augment bp    plan discussed with ACP  no cv objection for DC  pt to f/u Dr. Syed upon dc

## 2022-01-24 NOTE — DISCHARGE NOTE PROVIDER - PROVIDER TOKENS
PROVIDER:[TOKEN:[[id number]]],PROVIDER:[TOKEN:[[id number]]],PROVIDER:[TOKEN:[[id number]]]

## 2022-01-24 NOTE — DISCHARGE NOTE PROVIDER - NSDCFUSCHEDAPPT_GEN_ALL_CORE_FT
RACHEL COMBS ; 01/25/2022 ; NPP Derm [address] RACHEL Hood ; 01/26/2022 ; [facility] Urology [address] [place] [address]  RACHEL COMBS ; 01/26/2022 ; [facility] [address] [place] [address]  RACHEL COMBS ; 02/02/2022 ; [facility] [address] [place] [address]  RACHEL COMBS ; 02/02/2022 ; [facility] [address] [place] [address]  RACHEL COMBS ; 02/09/2022 ; [facility] [address] [place] [address]  RACHEL COMBS ; 02/09/2022 ; [facility] [address] [place] [address]  RACHEL COMBS ; 02/15/2022 ; N[facility] Derm [address] RACHEL Hood ; 02/16/2022 ; [facility] U[facility] [address] [place] [address]  RACHEL COMBS ; 02/16/2022 ; [facility] [address] [place] [address]  RACHEL COMBS ; 02/23/2022 ; [facility] [address] [place] [address]  RACHEL COMBS ; 02/23/2022 ; [facility] [address] [place] [address]

## 2022-01-24 NOTE — DISCHARGE NOTE PROVIDER - HOSPITAL COURSE
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 ~1.7), and AFIB on Eliquis s/p recent admission to [facility] for urosepsis/LILIAN/metabolic acidosis p/w "increased heart rate". There was an initial concern for unresolving UTI infection although the patient is back to baseline. UA significant for pyuria, no leukocytosis, had one episode of hypothermia and low BP but has been at baseline BP since then. CT abd non con- Unable to assess for pyelonephritis without intravenous contrast. Diffuse bladder wall thickening with perivesicular haziness, suspicious for cystitis. Stable nonspecific urothelial thickening and mild distention of the right renal pelvis. Nonspecific mild right perinephric stranding. Bcx 1/22, ucx neg. ID was consulted who stated Clinically not infected therefore abx DC'ed 1/22. His urinary findings/symptoms could be related to his cancer and therapy for it. Pt will follow up with ID clinic in 3-4 weeks to follow up on urine AFB culture sent from last admission ([phone])  Course c/b LILIAN on CKD likely prerenal due to volume depletion. Diuretics were on hold, pt was treated with IVF, LILIAN now resolved therefore pt will be discharged on Torsemide 5mg MWF (discussed this with Dr. Gusman upon DC). Pt will c/w oral soidum bicarb.  Course c/b Afib with RVR. Pt was started on midodrine 5 TID for better BP control and Metoprolol was increased.    Case discussed with Dr. Arroyo on 1/24, pt medically stable for discharge home. DC plan discussed in detail with wife. Refills sent to pharmacy per wifes request.

## 2022-01-24 NOTE — DISCHARGE NOTE PROVIDER - NSDCMRMEDTOKEN_GEN_ALL_CORE_FT
amiodarone 200 mg oral tablet: 1 tab(s) orally once a day   apixaban 2.5 mg oral tablet: 1 tab(s) orally 2 times a day  Aspirin Enteric Coated 81 mg oral delayed release tablet: 1 tab(s) orally once a day   metoprolol tartrate 25 mg oral tablet: 1 tab(s) orally 2 times a day  midodrine 5 mg oral tablet: 1 tab(s) orally 3 times a day   sodium bicarbonate 650 mg oral tablet: 1 tab(s) orally 2 times a day  tamsulosin 0.4 mg oral capsule: 1 cap(s) orally once a day  torsemide 5 mg oral tablet: 1 tab(s) orally 3 times a week  Monday, Wednesday, Friday

## 2022-01-24 NOTE — DISCHARGE NOTE NURSING/CASE MANAGEMENT/SOCIAL WORK - NSDCPEFALRISK_GEN_ALL_CORE
For information on Fall & Injury Prevention, visit: [email or web address] OR  [email or web address] OR  https://www.cdc.gov/steadi/patient.html

## 2022-01-24 NOTE — PROVIDER CONTACT NOTE (OTHER) - BACKGROUND
Patient admitted with acute kidney injury. Hx of bladder cancer, a-fib, HTN, HLD, CAD, and having a single kidney
Pt admitted with acute renal failure with history of AFib.
86 year old male admitted for acute renal failure
Pt admitted for chest pain. PMHx of CAD, saeizure.

## 2022-01-24 NOTE — PROVIDER CONTACT NOTE (OTHER) - RECOMMENDATIONS
Awaiting further recommendation
As per provider Ross Mccurdy (#[id number]), RN may administer midodrine 5mg at this time as ordered. RN will continue to monitor.
PA made aware.
Will continue to monitor pt and VS.

## 2022-01-24 NOTE — PROVIDER CONTACT NOTE (OTHER) - ACTION/TREATMENT ORDERED:
Administer fluid bolus and metoprolol. Will continue to monitor pt and VS. Tele monitor in place.
As per provider Ross Mccurdy (#[id number]), RN may administer midodrine 5mg at this time as ordered. RN will continue to monitor.
Awaiting orders
Awaiting further recommendation

## 2022-01-24 NOTE — DISCHARGE NOTE PROVIDER - CARE PROVIDERS DIRECT ADDRESSES
,DirectAddress_Unknown,[email or web address],DirectAddress_Unknown

## 2022-01-24 NOTE — PROGRESS NOTE ADULT - ASSESSMENT
ASSESSMENT:  Patient is a 86y Male with CAD, Afib, HTN, DM, bladder CA, solitary R kidney and CKD p/w tachycardia and acute on chronic kidney injury.  - CKD: stage 3 with baseline creatinine ~1.7  - LILIAN: nonoliguric. Likely prerenal due to volume depletion. renal fxn improving  - solitary R kidney: s/p L nephrectomy  - metabolic acidosis (non AG): due to CKD and recent LILIAN, s/p IVF. HCO3 slightly lower today     RECOMMENDATIONS:  - stop bicarb gtt and resume sodium bicarbonate 650mg po bid  - continue to hold torsemide for now  - please dose any new medications for a CrCl of ~30 cc/min  - avoid NSAIDs/nephrotoxins as able        Yuliya Vallecillo NP  [place], [employer]  [phone]   ASSESSMENT:  Patient is a 86y Male with CAD, Afib, HTN, DM, bladder CA, solitary R kidney and CKD p/w tachycardia and acute on chronic kidney injury.  - CKD: stage 3 with baseline creatinine ~1.7  - LILIAN: nonoliguric. Likely prerenal due to volume depletion. renal fxn improving  - solitary R kidney: s/p L nephrectomy  - metabolic acidosis (non AG): due to CKD and recent LILIAN, s/p IVF. HCO3 slightly lower today     RECOMMENDATIONS:  - stop bicarb gtt and resume sodium bicarbonate 650mg po bid.  - continue to hold torsemide for now  - please dose any new medications for a CrCl of ~30 cc/min  - avoid NSAIDs/nephrotoxins as able        Yuliya Vallecillo NP  [employer], [employer]  [phone]

## 2022-01-24 NOTE — PROGRESS NOTE ADULT - PROVIDER SPECIALTY LIST ADULT
Cardiology
Infectious Disease
Cardiology
Nephrology
Internal Medicine
Nephrology
Internal Medicine
Internal Medicine

## 2022-01-24 NOTE — PROVIDER CONTACT NOTE (OTHER) - REASON
Patient's orthostatics are negative
Pt BP 97/56, .
Pt had 4 beats of VTach,rapid fib 130s didn't sustain
Patient HR ranging from , asymptomatic

## 2022-01-24 NOTE — PROVIDER CONTACT NOTE (OTHER) - ASSESSMENT
Pt resting. pt denies chest pain. No SOB. /65
Pt is A&Ox4. Pt denies palpitations, chest pain or pressure, SOB. or H/A.
Patient is A&Ox4, Patient denies pain, chest pain, shortness of breath, nausea, vomiting or dizziness.
VS stable as per flowsheet. HR ranges from  not sustaining and patient is asymptomatic

## 2022-01-24 NOTE — DISCHARGE NOTE NURSING/CASE MANAGEMENT/SOCIAL WORK - PATIENT PORTAL LINK FT
You can access the FollowMyHealth Patient Portal offered by [facility] by registering at the following website: [email or web address]. By joining [employer]’s FollowMyHealth portal, you will also be able to view your health information using other applications (apps) compatible with our system.

## 2022-01-24 NOTE — PROGRESS NOTE ADULT - SUBJECTIVE AND OBJECTIVE BOX
Patient is a 86y old  Male who presents with a chief complaint of electrolyte abnormalities (23 Jan 2022 14:26)      DATE OF SERVICE: 01-24-22 @ 11:45    SUBJECTIVE / OVERNIGHT EVENTS: overnight events noted  'I feel fine'    ROS:  Resp: No cough no sputum production  CVS: No chest pain no palpitations no orthopnea  GI: no N/V/D  : no dysuria, no hematuria  Neuro: no weakness no paresthesias  Heme: No petechiae no easy bruising  Msk: No joint pain no swelling  Skin: No rash no itching        MEDICATIONS  (STANDING):  aMIOdarone    Tablet 200 milliGRAM(s) Oral daily  apixaban 2.5 milliGRAM(s) Oral two times a day  aspirin enteric coated 81 milliGRAM(s) Oral daily  metoprolol tartrate 25 milliGRAM(s) Oral two times a day  midodrine. 5 milliGRAM(s) Oral three times a day  sodium chloride 0.45% 1000 milliLiter(s) (75 mL/Hr) IV Continuous <Continuous>    MEDICATIONS  (PRN):        CAPILLARY BLOOD GLUCOSE        I&O's Summary    23 Jan 2022 07:01  -  24 Jan 2022 07:00  --------------------------------------------------------  IN: 0 mL / OUT: 100 mL / NET: -100 mL        Vital Signs Last 24 Hrs  T(C): 36.6 (24 Jan 2022 06:00), Max: 36.7 (23 Jan 2022 22:00)  T(F): 97.9 (24 Jan 2022 06:00), Max: 98 (23 Jan 2022 22:00)  HR: 93 (24 Jan 2022 06:00) (93 - 100)  BP: 102/61 (24 Jan 2022 06:00) (100/68 - 105/69)  BP(mean): --  RR: 16 (24 Jan 2022 06:00) (16 - 18)  SpO2: 98% (24 Jan 2022 06:00) (95% - 98%)    PHYSICAL EXAM:   GENERAL: in no distress  EYES: EOMI, PERRLA  NECK: Supple, No JVD  CHEST/LUNG: clear  HEART: S1 S2; systolic murmur +   ABDOMEN: Soft, Nontender  EXTREMITIES: no edema  NEUROLOGY: AO x 3 non-focal  SKIN: No rashes or lesions    LABS:                        9.3    7.27  )-----------( 191      ( 24 Jan 2022 06:47 )             29.6     01-24    134<L>  |  103  |  58<H>  ----------------------------<  202<H>  4.4   |  19<L>  |  1.96<H>    Ca    8.2<L>      24 Jan 2022 06:47  Phos  3.1     01-24  Mg     2.00     01-24    TPro  5.8<L>  /  Alb  2.7<L>  /  TBili  0.2  /  DBili  x   /  AST  28  /  ALT  50<H>  /  AlkPhos  173<H>  01-24                All consultant(s) notes reviewed and care discussed with other providers        Contact Number, Dr Arroyo [phone]

## 2022-01-24 NOTE — PROGRESS NOTE ADULT - ASSESSMENT
85 y/o M with pmhx of Bladder CA s/p TURBT 12/2020 and intravesicular chemotherapy with Gemcitabine c/b AFIB with RVR and LILIAN, now s/p BCG Instillation (1/12/22) with Dr. Ramsey, s/p L Nephrectomy (1951), DM2 (not on medications), HTN, HLD, CAD s/p 4 JOSE (last stent >5 years ago), CKD, and AFIB on Eliquis s/p recent admission to [facility] for urosepsis/LILIAN/metabolic acidosis presents to Emergency Department for LILIAN

## 2022-01-24 NOTE — DISCHARGE NOTE PROVIDER - NSDCFUADDAPPT_GEN_ALL_CORE_FT
Please follow up with ID clinic in 3-4 weeks to follow up on urine AFB culture sent from last admission ([phone]).  Please follow up with your Cardiologist.  Please follow up with your Nephrologist.

## 2022-01-24 NOTE — DISCHARGE NOTE PROVIDER - CARE PROVIDER_API CALL
Nicholas Dick)  Internal Medicine  [address], [place] [id number]  Phone: [phone]  Fax: [phone]  Follow Up Time:     Emily Gusman)  Internal Medicine; Nephrology  [address], [address]  [place] [address]  Phone: [phone]  Fax: [phone]  Follow Up Time:     Yuri Vega)  Cardiology  [address], [address]  [place] [address]  Phone: [phone]  Fax: [phone]  Follow Up Time:

## 2022-01-26 ENCOUNTER — APPOINTMENT (OUTPATIENT)
Dept: UROLOGY | Facility: CLINIC | Age: 87
End: 2022-01-26

## 2022-01-26 ENCOUNTER — INPATIENT (INPATIENT)
Facility: HOSPITAL | Age: 87
LOS: 1 days | Discharge: HOME HEALTH | End: 2022-01-28
Attending: INTERNAL MEDICINE | Admitting: INTERNAL MEDICINE
Payer: MEDICARE

## 2022-01-26 VITALS
DIASTOLIC BLOOD PRESSURE: 81 MMHG | OXYGEN SATURATION: 90 % | HEART RATE: 81 BPM | HEIGHT: 67 IN | SYSTOLIC BLOOD PRESSURE: 157 MMHG | RESPIRATION RATE: 22 BRPM | TEMPERATURE: 98 F

## 2022-01-26 DIAGNOSIS — I48.91 UNSPECIFIED ATRIAL FIBRILLATION: ICD-10-CM

## 2022-01-26 DIAGNOSIS — Z90.5 ACQUIRED ABSENCE OF KIDNEY: Chronic | ICD-10-CM

## 2022-01-26 DIAGNOSIS — I25.10 ATHEROSCLEROTIC HEART DISEASE OF NATIVE CORONARY ARTERY WITHOUT ANGINA PECTORIS: ICD-10-CM

## 2022-01-26 DIAGNOSIS — I50.33 ACUTE ON CHRONIC DIASTOLIC (CONGESTIVE) HEART FAILURE: ICD-10-CM

## 2022-01-26 DIAGNOSIS — Z95.5 PRESENCE OF CORONARY ANGIOPLASTY IMPLANT AND GRAFT: Chronic | ICD-10-CM

## 2022-01-26 DIAGNOSIS — E78.5 HYPERLIPIDEMIA, UNSPECIFIED: ICD-10-CM

## 2022-01-26 DIAGNOSIS — N18.4 CHRONIC KIDNEY DISEASE, STAGE 4 (SEVERE): ICD-10-CM

## 2022-01-26 DIAGNOSIS — M19.90 UNSPECIFIED OSTEOARTHRITIS, UNSPECIFIED SITE: ICD-10-CM

## 2022-01-26 DIAGNOSIS — D72.829 ELEVATED WHITE BLOOD CELL COUNT, UNSPECIFIED: ICD-10-CM

## 2022-01-26 DIAGNOSIS — J96.02 ACUTE RESPIRATORY FAILURE WITH HYPERCAPNIA: ICD-10-CM

## 2022-01-26 DIAGNOSIS — Z98.41 CATARACT EXTRACTION STATUS, RIGHT EYE: Chronic | ICD-10-CM

## 2022-01-26 LAB
ALBUMIN SERPL ELPH-MCNC: 2.6 G/DL — LOW (ref 3.3–5)
ALBUMIN SERPL ELPH-MCNC: 3.1 G/DL — LOW (ref 3.3–5)
ALP SERPL-CCNC: 186 U/L — HIGH (ref 40–120)
ALP SERPL-CCNC: 244 U/L — HIGH (ref 40–120)
ALT FLD-CCNC: 48 U/L — HIGH (ref 4–41)
ALT FLD-CCNC: 59 U/L — HIGH (ref 4–41)
ANION GAP SERPL CALC-SCNC: 10 MMOL/L — SIGNIFICANT CHANGE UP (ref 7–14)
ANION GAP SERPL CALC-SCNC: 11 MMOL/L — SIGNIFICANT CHANGE UP (ref 7–14)
AST SERPL-CCNC: 26 U/L — SIGNIFICANT CHANGE UP (ref 4–40)
AST SERPL-CCNC: 38 U/L — SIGNIFICANT CHANGE UP (ref 4–40)
B PERT DNA SPEC QL NAA+PROBE: SIGNIFICANT CHANGE UP
B PERT+PARAPERT DNA PNL SPEC NAA+PROBE: SIGNIFICANT CHANGE UP
BASE EXCESS BLDV CALC-SCNC: -3.8 MMOL/L — LOW (ref -2–3)
BASE EXCESS BLDV CALC-SCNC: 1 MMOL/L — SIGNIFICANT CHANGE UP (ref -2–3)
BASOPHILS # BLD AUTO: 0.1 K/UL — SIGNIFICANT CHANGE UP (ref 0–0.2)
BASOPHILS NFR BLD AUTO: 0.5 % — SIGNIFICANT CHANGE UP (ref 0–2)
BILIRUB SERPL-MCNC: 0.3 MG/DL — SIGNIFICANT CHANGE UP (ref 0.2–1.2)
BILIRUB SERPL-MCNC: 0.4 MG/DL — SIGNIFICANT CHANGE UP (ref 0.2–1.2)
BLOOD GAS VENOUS COMPREHENSIVE RESULT: SIGNIFICANT CHANGE UP
BORDETELLA PARAPERTUSSIS (RAPRVP): SIGNIFICANT CHANGE UP
BUN SERPL-MCNC: 63 MG/DL — HIGH (ref 7–23)
BUN SERPL-MCNC: 64 MG/DL — HIGH (ref 7–23)
C PNEUM DNA SPEC QL NAA+PROBE: SIGNIFICANT CHANGE UP
CA-I SERPL-SCNC: 1.14 MMOL/L — LOW (ref 1.15–1.33)
CALCIUM SERPL-MCNC: 8.1 MG/DL — LOW (ref 8.4–10.5)
CALCIUM SERPL-MCNC: 8.5 MG/DL — SIGNIFICANT CHANGE UP (ref 8.4–10.5)
CHLORIDE BLDV-SCNC: 100 MMOL/L — SIGNIFICANT CHANGE UP (ref 96–108)
CHLORIDE BLDV-SCNC: 101 MMOL/L — SIGNIFICANT CHANGE UP (ref 96–108)
CHLORIDE SERPL-SCNC: 100 MMOL/L — SIGNIFICANT CHANGE UP (ref 98–107)
CHLORIDE SERPL-SCNC: 97 MMOL/L — LOW (ref 98–107)
CO2 BLDV-SCNC: 26.4 MMOL/L — HIGH (ref 22–26)
CO2 BLDV-SCNC: 27.3 MMOL/L — HIGH (ref 22–26)
CO2 SERPL-SCNC: 24 MMOL/L — SIGNIFICANT CHANGE UP (ref 22–31)
CO2 SERPL-SCNC: 26 MMOL/L — SIGNIFICANT CHANGE UP (ref 22–31)
CREAT SERPL-MCNC: 2.08 MG/DL — HIGH (ref 0.5–1.3)
CREAT SERPL-MCNC: 2.15 MG/DL — HIGH (ref 0.5–1.3)
EOSINOPHIL # BLD AUTO: 0.51 K/UL — HIGH (ref 0–0.5)
EOSINOPHIL NFR BLD AUTO: 2.3 % — SIGNIFICANT CHANGE UP (ref 0–6)
FLUAV SUBTYP SPEC NAA+PROBE: SIGNIFICANT CHANGE UP
FLUBV RNA SPEC QL NAA+PROBE: SIGNIFICANT CHANGE UP
GAS PNL BLDV: 130 MMOL/L — LOW (ref 136–145)
GAS PNL BLDV: 135 MMOL/L — LOW (ref 136–145)
GAS PNL BLDV: SIGNIFICANT CHANGE UP
GLUCOSE BLDC GLUCOMTR-MCNC: 214 MG/DL — HIGH (ref 70–99)
GLUCOSE BLDC GLUCOMTR-MCNC: 233 MG/DL — HIGH (ref 70–99)
GLUCOSE BLDC GLUCOMTR-MCNC: 253 MG/DL — HIGH (ref 70–99)
GLUCOSE BLDV-MCNC: 199 MG/DL — HIGH (ref 70–99)
GLUCOSE BLDV-MCNC: 256 MG/DL — HIGH (ref 70–99)
GLUCOSE SERPL-MCNC: 219 MG/DL — HIGH (ref 70–99)
GLUCOSE SERPL-MCNC: 257 MG/DL — HIGH (ref 70–99)
HADV DNA SPEC QL NAA+PROBE: SIGNIFICANT CHANGE UP
HCO3 BLDV-SCNC: 25 MMOL/L — SIGNIFICANT CHANGE UP (ref 22–29)
HCO3 BLDV-SCNC: 26 MMOL/L — SIGNIFICANT CHANGE UP (ref 22–29)
HCOV 229E RNA SPEC QL NAA+PROBE: SIGNIFICANT CHANGE UP
HCOV HKU1 RNA SPEC QL NAA+PROBE: SIGNIFICANT CHANGE UP
HCOV NL63 RNA SPEC QL NAA+PROBE: SIGNIFICANT CHANGE UP
HCOV OC43 RNA SPEC QL NAA+PROBE: SIGNIFICANT CHANGE UP
HCT VFR BLD CALC: 31.7 % — LOW (ref 39–50)
HCT VFR BLD CALC: 37.4 % — LOW (ref 39–50)
HCT VFR BLDA CALC: 31 % — LOW (ref 39–51)
HCT VFR BLDA CALC: 34 % — LOW (ref 39–51)
HGB BLD CALC-MCNC: 10.2 G/DL — LOW (ref 13–17)
HGB BLD CALC-MCNC: 11.2 G/DL — LOW (ref 13–17)
HGB BLD-MCNC: 10.4 G/DL — LOW (ref 13–17)
HGB BLD-MCNC: 11.6 G/DL — LOW (ref 13–17)
HMPV RNA SPEC QL NAA+PROBE: SIGNIFICANT CHANGE UP
HPIV1 RNA SPEC QL NAA+PROBE: SIGNIFICANT CHANGE UP
HPIV2 RNA SPEC QL NAA+PROBE: SIGNIFICANT CHANGE UP
HPIV3 RNA SPEC QL NAA+PROBE: SIGNIFICANT CHANGE UP
HPIV4 RNA SPEC QL NAA+PROBE: SIGNIFICANT CHANGE UP
IANC: 18.05 K/UL — HIGH (ref 1.5–8.5)
IMM GRANULOCYTES NFR BLD AUTO: 1.2 % — SIGNIFICANT CHANGE UP (ref 0–1.5)
LACTATE BLDV-MCNC: 1.5 MMOL/L — SIGNIFICANT CHANGE UP (ref 0.5–2)
LACTATE BLDV-MCNC: 2.5 MMOL/L — HIGH (ref 0.5–2)
LYMPHOCYTES # BLD AUTO: 1.57 K/UL — SIGNIFICANT CHANGE UP (ref 1–3.3)
LYMPHOCYTES # BLD AUTO: 7.2 % — LOW (ref 13–44)
M PNEUMO DNA SPEC QL NAA+PROBE: SIGNIFICANT CHANGE UP
MAGNESIUM SERPL-MCNC: 1.8 MG/DL — SIGNIFICANT CHANGE UP (ref 1.6–2.6)
MCHC RBC-ENTMCNC: 31 GM/DL — LOW (ref 32–36)
MCHC RBC-ENTMCNC: 32.2 PG — SIGNIFICANT CHANGE UP (ref 27–34)
MCHC RBC-ENTMCNC: 32.4 PG — SIGNIFICANT CHANGE UP (ref 27–34)
MCHC RBC-ENTMCNC: 32.8 GM/DL — SIGNIFICANT CHANGE UP (ref 32–36)
MCV RBC AUTO: 104.5 FL — HIGH (ref 80–100)
MCV RBC AUTO: 98.1 FL — SIGNIFICANT CHANGE UP (ref 80–100)
MONOCYTES # BLD AUTO: 1.26 K/UL — HIGH (ref 0–0.9)
MONOCYTES NFR BLD AUTO: 5.8 % — SIGNIFICANT CHANGE UP (ref 2–14)
NEUTROPHILS # BLD AUTO: 18.05 K/UL — HIGH (ref 1.8–7.4)
NEUTROPHILS NFR BLD AUTO: 83 % — HIGH (ref 43–77)
NRBC # BLD: 0 /100 WBCS — SIGNIFICANT CHANGE UP
NRBC # BLD: 0 /100 WBCS — SIGNIFICANT CHANGE UP
NRBC # FLD: 0 K/UL — SIGNIFICANT CHANGE UP
NRBC # FLD: 0 K/UL — SIGNIFICANT CHANGE UP
NT-PROBNP SERPL-SCNC: 8774 PG/ML — HIGH
PCO2 BLDV: 42 MMHG — SIGNIFICANT CHANGE UP (ref 42–55)
PCO2 BLDV: 60 MMHG — HIGH (ref 42–55)
PH BLDV: 7.22 — LOW (ref 7.32–7.43)
PH BLDV: 7.4 — SIGNIFICANT CHANGE UP (ref 7.32–7.43)
PHOSPHATE SERPL-MCNC: 4.1 MG/DL — SIGNIFICANT CHANGE UP (ref 2.5–4.5)
PLATELET # BLD AUTO: 230 K/UL — SIGNIFICANT CHANGE UP (ref 150–400)
PLATELET # BLD AUTO: 311 K/UL — SIGNIFICANT CHANGE UP (ref 150–400)
PO2 BLDV: 29 MMHG — SIGNIFICANT CHANGE UP
PO2 BLDV: 31 MMHG — SIGNIFICANT CHANGE UP
POTASSIUM BLDV-SCNC: 4.7 MMOL/L — SIGNIFICANT CHANGE UP (ref 3.5–5.1)
POTASSIUM BLDV-SCNC: 5.3 MMOL/L — HIGH (ref 3.5–5.1)
POTASSIUM SERPL-MCNC: 4.6 MMOL/L — SIGNIFICANT CHANGE UP (ref 3.5–5.3)
POTASSIUM SERPL-MCNC: 5.2 MMOL/L — SIGNIFICANT CHANGE UP (ref 3.5–5.3)
POTASSIUM SERPL-SCNC: 4.6 MMOL/L — SIGNIFICANT CHANGE UP (ref 3.5–5.3)
POTASSIUM SERPL-SCNC: 5.2 MMOL/L — SIGNIFICANT CHANGE UP (ref 3.5–5.3)
PROT SERPL-MCNC: 6.1 G/DL — SIGNIFICANT CHANGE UP (ref 6–8.3)
PROT SERPL-MCNC: 7 G/DL — SIGNIFICANT CHANGE UP (ref 6–8.3)
RAPID RVP RESULT: SIGNIFICANT CHANGE UP
RBC # BLD: 3.23 M/UL — LOW (ref 4.2–5.8)
RBC # BLD: 3.58 M/UL — LOW (ref 4.2–5.8)
RBC # FLD: 13 % — SIGNIFICANT CHANGE UP (ref 10.3–14.5)
RBC # FLD: 13.1 % — SIGNIFICANT CHANGE UP (ref 10.3–14.5)
RSV RNA SPEC QL NAA+PROBE: SIGNIFICANT CHANGE UP
RV+EV RNA SPEC QL NAA+PROBE: SIGNIFICANT CHANGE UP
SAO2 % BLDV: 42.1 % — SIGNIFICANT CHANGE UP
SAO2 % BLDV: 48.3 % — SIGNIFICANT CHANGE UP
SARS-COV-2 RNA SPEC QL NAA+PROBE: SIGNIFICANT CHANGE UP
SODIUM SERPL-SCNC: 132 MMOL/L — LOW (ref 135–145)
SODIUM SERPL-SCNC: 136 MMOL/L — SIGNIFICANT CHANGE UP (ref 135–145)
TROPONIN T, HIGH SENSITIVITY RESULT: 52 NG/L — HIGH
WBC # BLD: 12.78 K/UL — HIGH (ref 3.8–10.5)
WBC # BLD: 21.76 K/UL — HIGH (ref 3.8–10.5)
WBC # FLD AUTO: 12.78 K/UL — HIGH (ref 3.8–10.5)
WBC # FLD AUTO: 21.76 K/UL — HIGH (ref 3.8–10.5)

## 2022-01-26 PROCEDURE — 71045 X-RAY EXAM CHEST 1 VIEW: CPT | Mod: 26

## 2022-01-26 PROCEDURE — 71250 CT THORAX DX C-: CPT | Mod: 26

## 2022-01-26 PROCEDURE — 99291 CRITICAL CARE FIRST HOUR: CPT | Mod: GC

## 2022-01-26 RX ORDER — SODIUM CHLORIDE 9 MG/ML
1000 INJECTION, SOLUTION INTRAVENOUS
Refills: 0 | Status: DISCONTINUED | OUTPATIENT
Start: 2022-01-26 | End: 2022-01-28

## 2022-01-26 RX ORDER — INSULIN LISPRO 100/ML
VIAL (ML) SUBCUTANEOUS
Refills: 0 | Status: DISCONTINUED | OUTPATIENT
Start: 2022-01-26 | End: 2022-01-28

## 2022-01-26 RX ORDER — DEXTROSE 50 % IN WATER 50 %
15 SYRINGE (ML) INTRAVENOUS ONCE
Refills: 0 | Status: DISCONTINUED | OUTPATIENT
Start: 2022-01-26 | End: 2022-01-28

## 2022-01-26 RX ORDER — DEXTROSE 50 % IN WATER 50 %
12.5 SYRINGE (ML) INTRAVENOUS ONCE
Refills: 0 | Status: DISCONTINUED | OUTPATIENT
Start: 2022-01-26 | End: 2022-01-28

## 2022-01-26 RX ORDER — DEXTROSE 50 % IN WATER 50 %
25 SYRINGE (ML) INTRAVENOUS ONCE
Refills: 0 | Status: DISCONTINUED | OUTPATIENT
Start: 2022-01-26 | End: 2022-01-28

## 2022-01-26 RX ORDER — FUROSEMIDE 40 MG
40 TABLET ORAL ONCE
Refills: 0 | Status: COMPLETED | OUTPATIENT
Start: 2022-01-26 | End: 2022-01-26

## 2022-01-26 RX ORDER — INSULIN LISPRO 100/ML
VIAL (ML) SUBCUTANEOUS AT BEDTIME
Refills: 0 | Status: DISCONTINUED | OUTPATIENT
Start: 2022-01-26 | End: 2022-01-28

## 2022-01-26 RX ORDER — APIXABAN 2.5 MG/1
2.5 TABLET, FILM COATED ORAL EVERY 12 HOURS
Refills: 0 | Status: DISCONTINUED | OUTPATIENT
Start: 2022-01-26 | End: 2022-01-28

## 2022-01-26 RX ORDER — FUROSEMIDE 40 MG
40 TABLET ORAL
Refills: 0 | Status: DISCONTINUED | OUTPATIENT
Start: 2022-01-26 | End: 2022-01-28

## 2022-01-26 RX ORDER — ASPIRIN/CALCIUM CARB/MAGNESIUM 324 MG
81 TABLET ORAL DAILY
Refills: 0 | Status: DISCONTINUED | OUTPATIENT
Start: 2022-01-26 | End: 2022-01-28

## 2022-01-26 RX ORDER — TAMSULOSIN HYDROCHLORIDE 0.4 MG/1
0.4 CAPSULE ORAL AT BEDTIME
Refills: 0 | Status: DISCONTINUED | OUTPATIENT
Start: 2022-01-26 | End: 2022-01-28

## 2022-01-26 RX ORDER — METOPROLOL TARTRATE 50 MG
25 TABLET ORAL
Refills: 0 | Status: DISCONTINUED | OUTPATIENT
Start: 2022-01-26 | End: 2022-01-28

## 2022-01-26 RX ORDER — GLUCAGON INJECTION, SOLUTION 0.5 MG/.1ML
1 INJECTION, SOLUTION SUBCUTANEOUS ONCE
Refills: 0 | Status: DISCONTINUED | OUTPATIENT
Start: 2022-01-26 | End: 2022-01-28

## 2022-01-26 RX ORDER — AMIODARONE HYDROCHLORIDE 400 MG/1
200 TABLET ORAL DAILY
Refills: 0 | Status: DISCONTINUED | OUTPATIENT
Start: 2022-01-26 | End: 2022-01-28

## 2022-01-26 RX ORDER — SODIUM BICARBONATE 1 MEQ/ML
650 SYRINGE (ML) INTRAVENOUS
Refills: 0 | Status: DISCONTINUED | OUTPATIENT
Start: 2022-01-26 | End: 2022-01-28

## 2022-01-26 RX ORDER — CEFEPIME 1 G/1
2000 INJECTION, POWDER, FOR SOLUTION INTRAMUSCULAR; INTRAVENOUS ONCE
Refills: 0 | Status: COMPLETED | OUTPATIENT
Start: 2022-01-26 | End: 2022-01-26

## 2022-01-26 RX ORDER — MIDODRINE HYDROCHLORIDE 2.5 MG/1
5 TABLET ORAL THREE TIMES A DAY
Refills: 0 | Status: DISCONTINUED | OUTPATIENT
Start: 2022-01-26 | End: 2022-01-28

## 2022-01-26 RX ADMIN — Medication 25 MILLIGRAM(S): at 17:49

## 2022-01-26 RX ADMIN — MIDODRINE HYDROCHLORIDE 5 MILLIGRAM(S): 2.5 TABLET ORAL at 17:49

## 2022-01-26 RX ADMIN — APIXABAN 2.5 MILLIGRAM(S): 2.5 TABLET, FILM COATED ORAL at 17:48

## 2022-01-26 RX ADMIN — Medication 81 MILLIGRAM(S): at 17:48

## 2022-01-26 RX ADMIN — Medication 40 MILLIGRAM(S): at 17:49

## 2022-01-26 RX ADMIN — Medication 1: at 21:50

## 2022-01-26 RX ADMIN — CEFEPIME 100 MILLIGRAM(S): 1 INJECTION, POWDER, FOR SOLUTION INTRAMUSCULAR; INTRAVENOUS at 04:05

## 2022-01-26 RX ADMIN — AMIODARONE HYDROCHLORIDE 200 MILLIGRAM(S): 400 TABLET ORAL at 17:48

## 2022-01-26 RX ADMIN — Medication 650 MILLIGRAM(S): at 17:48

## 2022-01-26 RX ADMIN — TAMSULOSIN HYDROCHLORIDE 0.4 MILLIGRAM(S): 0.4 CAPSULE ORAL at 21:10

## 2022-01-26 RX ADMIN — Medication 40 MILLIGRAM(S): at 01:42

## 2022-01-26 NOTE — PATIENT PROFILE ADULT - FALL HARM RISK - HARM RISK INTERVENTIONS

## 2022-01-26 NOTE — ED PROVIDER NOTE - OBJECTIVE STATEMENT
87 yo M with hx of Bladder CA s/p TURBT 12/2020 and intravesicular chemotherapy with Gemcitabine c/b AFIB with RVR and LILIAN, now s/p BCG Instillation (1/12/22) with Dr. 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 ~1.7), and AFIB on Eliquis p/w shortness of breath starting last night. Patient recently discharged following admission for urosepsis c/b LILIAN on CKD. Per wife, was instructed at discharge to try to keep him well hydrated. He was drinking and eating throughout the day, later in the even because short of breath with ambulation. Denies chest pain, palpitations, f/c, n/v/d/c, leg swelling. Wife noticed today that he was urinating less so she gave him a dose of his diuretic.

## 2022-01-26 NOTE — ED PROVIDER NOTE - PHYSICAL EXAMINATION
Gen: NAD, AAOx3, moderate respiratory distress.   HEENT: NCAT, normal conjunctiva, oral mucosa moist  Lung: tachypneic, speaking in short sentences, diffuse crackles.   CV: regular rate and rhythm. cap refill <2x. peripheral pulses 2+bilaterally. 1+pitting bilateral LE edema.   Abd: soft, ND, NT.  MSK: no visible deformities.  Neuro: No focal deficits.  Skin: Intact.  Psych: normal affect.

## 2022-01-26 NOTE — ED PROVIDER NOTE - CARE PLAN
Principal Discharge DX:	Acute respiratory failure with hypercapnia  Secondary Diagnosis:	Pulmonary edema, acute   1

## 2022-01-26 NOTE — ED PROVIDER NOTE - ATTENDING CONTRIBUTION TO CARE
85 yo w bladder ca, afib, nephrectomy, dm, htn, ckd presenting w SOB that started suddenly last night.  Has been constant since starting.  symptoms are severe.  Wife gave a dose of Torsemide tonight.  worse w exertion.  No CP.  Was recently admitted for LILIAN and was given IVF discharged yesterday.  EMS found patient hypoxic and placed on NRB with improvement in sats    Gen: Ill appearing in moderate resp distress  Head: NC/AT  Neck: trachea midline  Resp:  Mod distress increased WOB with crackles in all lung fields  Ext: no deformities  Neuro:  A&O appears non focal  Skin:  Warm and dry as visualized  Psych:  Normal affect and mood     85 yo with increasing SOB and increased WOB with tachypnea.  Lung findings concerning for pulmonary edema.  With WOB will place on BiPap to help with symptoms and diuresis.  Thought to be related initially to over hydration during hospital stay however WBC elevated significantly and lung findings so need to also consider infection and HCAP in the differential.  has also had problems with UTIs in the past.  Given broad spectrum ABx and cultures drawn.  Did not get IVF bolus due to already being in fluid overload on BiPap.  MICU consulted for new BiPap.  Was able to wean O2 to 40% from 50%.  Discussed with Dr Arroyo who will admit the patient.

## 2022-01-26 NOTE — ED ADULT TRIAGE NOTE - RESPIRATORY RATE (BREATHS/MIN)
"              After Visit Summary   7/22/2017    Tracy Galloway    MRN: [id number]           Patient Information     Date Of Birth          1944        Visit Information        Provider Department      7/22/2017 9:10 AM Anoop Olvera MD [facility]        Today's Diagnoses     UTI symptoms    -  1      Care Instructions       * BLADDER INFECTION,Female (Adult)    A bladder infection (\"cystitis\" or \"UTI\") usually causes a constant urge to urinate and a burning when passing urine. Urine may be cloudy, smelly or dark. There may be pain in the lower abdomen. A bladder infection occurs when bacteria from the vaginal area enter the bladder opening (urethra). This can occur from sexual intercourse, wearing tight clothing, dehydration and other factors.  HOME CARE:  1. Drink lots of fluids (at least 6-8 glasses a day, unless you must restrict fluids for other medical reasons). This will force the medicine into your urinary system and flush the bacteria out of your body. Cranberry juice has been shown to help clear out the bacteria.  2. Avoid sexual intercourse until your symptoms are gone.  3. A bladder infection is treated with antibiotics. You may also be given Pyridium (generic = phenazopyridine) to reduce the burning sensation. This medicine will cause your urine to become a bright orange color. The orange urine may stain clothing. You may wear a pad or panty-liner to protect clothing.  PREVENTING FUTURE INFECTIONS:  1. Always wipe from front to back after a bowel movement.  2. Keep the genital area clean and dry.  3. Drink plenty of fluids each day to avoid dehydration.  4. Urinate right after intercourse to flush out the bladder.  5. Wear cotton underwear and cotton-lined panty hose; avoid tight-fitting pants.  6. If you are on birth control pills and are having frequent bladder infections, discuss with your doctor.  FOLLOW UP: Return to this facility or see your " doctor if ALL symptoms are not gone after three days of treatment.  GET PROMPT MEDICAL ATTENTION if any of the following occur:    Fever over 101 F (38.3 C)    No improvement by the third day of treatment    Increasing back or abdominal pain    Repeated vomiting; unable to keep medicine down    Weakness, dizziness or fainting    Vaginal discharge    Pain, redness or swelling in the labia (outer vaginal area)    [id number] [employer], [address] [place] [address], [place] [address]. All rights reserved. This information is not intended as a substitute for professional medical care. Always follow your healthcare professional's instructions.            Follow-ups after your visit        Your next 10 appointments already scheduled     Aug 22, 2017  3:20 PM CDT   SHORT with Gwendolyn Solis MD   [facility]    [address] [place] [address] [phone]              Who to contact     If you have questions or need follow up information about today's clinic visit or your schedule please contact [facility] directly at [phone].  Normal or non-critical lab and imaging results will be communicated to you by bluebottlebizhart, letter or phone within 4 business days after the clinic has received the results. If you do not hear from us within 7 days, please contact the clinic through ACTV8t or phone. If you have a critical or abnormal lab result, we will notify you by phone as soon as possible.  Submit refill requests through MADS or call your pharmacy and they will forward the refill request to us. Please allow 3 business days for your refill to be completed.          Additional Information About Your Visit        MADS Information     MADS gives you secure access to your electronic health record. If you see a primary care provider, you can also send messages to your care team and make appointments. If you have  questions, please call your primary care clinic.  If you do not have a primary care provider, please call [phone] and they will assist you.        Care EveryWhere ID     This is your Care EveryWhere ID. This could be used by other organizations to access your [place] medical records  [id number]        Your Vitals Were     Pulse Temperature Respirations BMI (Body Mass Index)          59 96.4  F (35.8  C) (Tympanic) 18 22.86 kg/m2         Blood Pressure from Last 3 Encounters:   07/22/17 128/53   07/18/17 126/58   07/15/17 159/70    Weight from Last 3 Encounters:   07/22/17 119 lb (54 kg)   07/18/17 119 lb 3.2 oz (54.1 kg)   05/26/17 120 lb (54.4 kg)              We Performed the Following     *UA reflex to Microscopic and Culture ([place] and [facility] (except [place] and [place])     Urine Microscopic          Today's Medication Changes          These changes are accurate as of: 7/22/17  9:41 AM.  If you have any questions, ask your nurse or doctor.               Start taking these medicines.        Dose/Directions    ciprofloxacin 500 MG tablet   Commonly known as:  CIPRO   Used for:  UTI symptoms   Started by:  Anoop Olvera MD        Dose:  500 mg   Take 1 tablet (500 mg) by mouth 2 times daily   Quantity:  14 tablet   Refills:  0            Where to get your medicines      These medications were sent to [id number] [address] [facility]  [address] [facility] [address]    Hours:  Closed 10-16-08 business to [facility] Phone:  [phone]     ciprofloxacin 500 MG tablet                Primary Care Provider Office Phone # Fax #    Gwendolyn Solis [phone]       [facility] [address] [facility] [address]        Equal Access to Services     MARY SUNG AH: Germaine De, tim verma, sergei matthews, elian cote. So [facility]  [phone].    ATENCIÓN: Si ashkan juarez, tiene a garcia disposición servicios gratuitos de asistencia lingüística. Nydia matias [phone].    We comply with applicable federal civil rights laws and [place] laws. We do not discriminate on the basis of race, color, national origin, age, disability sex, sexual orientation or gender identity.            Thank you!     Thank you for choosing [facility]  for your care. Our goal is always to provide you with excellent care. Hearing back from our patients is one way we can continue to improve our services. Please take a few minutes to complete the written survey that you may receive in the mail after your visit with us. Thank you!             Your Updated Medication List - Protect others around you: Learn how to safely use, store and throw away your medicines at www.disposemymeds.org.          This list is accurate as of: 7/22/17  9:41 AM.  Always use your most recent med list.                   Brand Name Dispense Instructions for use Diagnosis    aspirin 81 MG EC tablet     90 tablet    Take 1 tablet (81 mg) by mouth daily        baclofen 10 MG tablet    LIORESAL    90 tablet    Take 1 tablet (10 mg) by mouth 2 times daily    Chronic pain syndrome       ciprofloxacin 500 MG tablet    CIPRO    14 tablet    Take 1 tablet (500 mg) by mouth 2 times daily    UTI symptoms       citalopram 40 MG tablet    celeXA    90 tablet    Take 1 tablet (40 mg) by mouth daily    Anxiety state       DILT- MG 24 hr capsule   Generic drug:  diltiazem     90 capsule    TAKE ONE CAPSULE BY MOUTH ONE TIME DAILY    Essential hypertension with goal blood pressure less than 140/90       DOCUSATE SODIUM PO      Take 100 mg by mouth daily as needed        fluticasone 50 MCG/ACT spray    FLONASE    1 Bottle    Spray 1-2 sprays into both nostrils daily    Viral upper respiratory tract infection       HYDROcodone-acetaminophen  MG per tablet   Start taking  on:  7/26/2017    NORCO    60 tablet    Take 1 tablet by mouth 2 times daily maximum 2 tablet(s) per day    Narcotic dependence (H), Chronic pain syndrome       ibuprofen 600 MG tablet    ADVIL/MOTRIN    90 tablet    Take 1 tablet (600 mg) by mouth every 6 hours as needed for moderate pain    Facial pain       lisinopril 20 MG tablet    PRINIVIL/ZESTRIL    90 tablet    Take 1 tablet (20 mg) by mouth daily    Essential hypertension with goal blood pressure less than 140/90       loratadine 10 MG tablet    CLARITIN     Take 10 mg by mouth daily as needed for allergies        methylPREDNISolone 4 MG tablet    MEDROL DOSEPAK    21 tablet    Follow package instructions    Chronic left-sided low back pain with left-sided sciatica       ranitidine 150 MG tablet    ZANTAC    180 tablet    Take 1 tablet (150 mg) by mouth 2 times daily    Gastroesophageal reflux disease without esophagitis       valACYclovir 1000 mg tablet    VALTREX    21 tablet    As needed fo rbreak outs    Herpes zoster without complication       zolpidem 10 MG tablet    AMBIEN    30 tablet    Take  by mouth nightly as needed for sleep. 1/2 -1 TABLET    Insomnia, unspecified          22

## 2022-01-26 NOTE — ED ADULT NURSE NOTE - CHIEF COMPLAINT QUOTE
arrives from home, as per EMS pt developed difficulty breathing around 11pm this evening while ambulating to the bathroom, EMS found pt  to be hypoxic to 60s on room air,  Pt arrived with non-rebreather 15L of O2,. Pt was seen here and DC at [facility] yesterday for electrolyte imbalance. Denies fever and chest pain. PMHX HTN, A.Fib

## 2022-01-26 NOTE — H&P ADULT - HISTORY OF PRESENT ILLNESS
85 yo M with hx of Bladder CA s/p TURBT 12/2020 and intravesicular chemotherapy with Gemcitabine c/b AFIB with RVR and LILIAN, now s/p BCG Instillation (1/12/22) with Dr. 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 ~1.7), and AFIB on Eliquis p/w shortness of breath starting last night. Patient recently discharged following admission for urosepsis c/b LILIAN on CKD. The patient was drinking and eating throughout the day, later in the even because short of breath with ambulation. Denies chest pain, palpitations, f/c, n/v/d/c, leg swelling. Wife noticed today that he was urinating less so she gave him a dose of his diuretic

## 2022-01-26 NOTE — H&P ADULT - NSHPPHYSICALEXAM_GEN_ALL_CORE
PHYSICAL EXAM: vital signs noted on Sunrise  in no apparent distress  HEENT: RAQUEL EOMI  Neck: Supple, no JVD, no thyromegaly  Lungs: bilateral basal crackles  CVS: S1 S2 no M/R/G  Abdomen: no tenderness, no organomegaly, BS present  Neuro: AO x 3 no focal weakness, no sensory abnormalities  Psych: appropriate affect  Skin: warm, dry  Ext: no cyanosis or clubbing, 1 + edema  Msk: no joint swelling or deformities  Back: no CVA tenderness, no kyphosis/scoliosis

## 2022-01-26 NOTE — H&P ADULT - NSHPREVIEWOFSYSTEMS_GEN_ALL_CORE
Gen: no loss of wt no loss of appetite  ENT: no dizziness no hearing loss  Ophth: no blurring of vision no loss of vision  Resp: No cough no sputum production  CVS: No chest pain no palpitations no orthopnea  GI: no nausea, vomiting or diarrhea   : no dysuria, hematuria see above HPI   Endo: no polyuria no excessive sweating  Neuro: no weakness no paresthesias  Heme: No petechiae no easy bruising  Msk: No joint pain no swelling  Skin: No rash no itching

## 2022-01-26 NOTE — CONSULT NOTE ADULT - SUBJECTIVE AND OBJECTIVE BOX
NEPHROLOGY - NSN    Patient seen and examined.    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 ~1.7), and AFIB on Eliquis p/w shortness of breath starting last night. Patient recently discharged following admission for urosepsis c/b LILIAN on CKD. The patient was drinking and eating throughout the day, later in the even because short of breath with ambulation. Denies chest pain, palpitations, f/c, n/v/d/c, leg swelling. Wife noticed today that he was urinating less so she gave him a dose of his diuretic (26 Jan 2022 12:05)  He went home and drank "a lot" and the was SOB  He arrived on Bipap but now comfortable on nasal canula   There is no hematuria or bubbles in the urine.  No history of NSAIDS or nephrolithisis.  The patient urinates once or twice in the night and there is no incontinence.  No family hx or renal disease or back pain.    No recent abx use.  No alleviating or aggravating factors with respect to the kidneys.         PAST MEDICAL & SURGICAL HISTORY:  CAD (coronary artery disease)  (4 stents, non-adherent to aspirin)    Diabetes mellitus, new onset    Single kidney  (hx/o LEFT nephrectomy at age 16; pt states due to a ureter&quot;blockage&quot; causing &quot;infection&quot;; pt denies hx/o renal dysfunction)    Arthritis    Myocardial infarction  (2003)    Hard of hearing    Atrial fibrillation    Hypertension    Hyperlipidemia    History of TIAs  Last 2018    History of bradycardia    History of nephrectomy, unilateral  (hx/o LEFT nephrectomy at age 16)    Stented coronary artery  (4 stents)    S/P bilateral cataract extraction        MEDICATIONS  (STANDING):  aMIOdarone    Tablet 200 milliGRAM(s) Oral daily  apixaban 2.5 milliGRAM(s) Oral every 12 hours  aspirin enteric coated 81 milliGRAM(s) Oral daily  metoprolol tartrate 25 milliGRAM(s) Oral two times a day  midodrine. 5 milliGRAM(s) Oral three times a day  sodium bicarbonate 650 milliGRAM(s) Oral two times a day  tamsulosin 0.4 milliGRAM(s) Oral at bedtime      Allergies    penicillin (Rash)    Intolerances    Cipro (Joint Pain)      SOCIAL HISTORY:  Denies alcohol abuse, drug abuse or tobacco usage.     FAMILY HISTORY:  Family history of diabetes mellitus in mother (Mother)  (Pt states his mother developed DM in her 70&#x27;s)    FH: bladder cancer (Sibling)        VITALS:  T(C): 36.7 (01-26-22 @ 12:04), Max: 37.1 (01-26-22 @ 02:26)  HR: 64 (01-26-22 @ 12:04) (60 - 81)  BP: 111/62 (01-26-22 @ 12:04) (108/57 - 157/81)  RR: 18 (01-26-22 @ 12:04) (14 - 22)  SpO2: 99% (01-26-22 @ 12:04) (90% - 100%)    REVIEW OF SYSTEMS:  Denies any nausea, vomiting, diarrhea, fever or chills+  fatigue or weakness. All other pertinent systems are reviewed and are negative.    PHYSICAL EXAM:  Constitutional: NAD  HEENT: EOMI  Neck:  +  JVD, supple   Respiratory: CTA B/L  Cardiovascular: S1 and S2, RRR  Gastrointestinal: + BS, soft, NT, ND  Extremities: No peripheral edema, + peripheral pulses  Neurological: A/O x 3, CN2-12 intact  Psychiatric: Normal mood, normal affect  : No Temple  Skin: No rashes, C/D/I  Access: Not applicable    I and O's:    01-26 @ 07:01  -  01-26 @ 12:46  --------------------------------------------------------  IN: 0 mL / OUT: 700 mL / NET: -700 mL      Height (cm): 170.2 (01-26 @ 00:38)    LABS:                        10.4   12.78 )-----------( 230      ( 26 Jan 2022 11:09 )             31.7     01-26    136  |  100  |  63<H>  ----------------------------<  219<H>  4.6   |  26  |  2.08<H>    Ca    8.1<L>      26 Jan 2022 11:09  Phos  4.1     01-26  Mg     1.80     01-26    TPro  6.1  /  Alb  2.6<L>  /  TBili  0.3  /  DBili  x   /  AST  26  /  ALT  48<H>  /  AlkPhos  186<H>  01-26      URINE:      RADIOLOGY & ADDITIONAL STUDIES:    < from: CT Chest No Cont (01.26.22 @ 09:10) >    ACC: [id number] EXAM:  CT CHEST                          PROCEDURE DATE:  01/26/2022          INTERPRETATION:  CLINICAL INFORMATION: Shortness of breath, decreased   urination, history of bladder cancer. Evaluate for pulmonary edema.    COMPARISON: CT abdomen pelvis 1/22/2022.    CONTRAST/COMPLICATIONS:  IV Contrast: NONE  Oral Contrast: NONE  Complications: None reported at time of study completion    PROCEDURE:  CT scan of the chest was obtained without intravenous contrast.    FINDINGS:    LYMPH NODES: A few small mediastinal lymph nodes, for example a 0.9 cm   pretracheal and 0.8 cm right paratracheal lymph node.    HEART/VASCULATURE: The heart size is normal. No pericardial effusion.   Aortic valve and coronary artery calcifications.    AIRWAYS/LUNGS/PLEURA: Secretions are seen within the distal trachea.   Centrally predominant bilateral lung opacities, interlobular septal   thickening, and small bilateral pleural effusions, new since 1/22/2022.    UPPER ABDOMEN: Right renal cyst. Left nephrectomy. Punctate pancreatic   calcification.    BONES/SOFT TISSUES: Subcentimeter hypodense left thyroid nodule. The   esophagus is normal. Symmetric bilateral gynecomastia. Degenerative   changes of the spine.    IMPRESSION:    1.  Centrally predominant bilateral lung opacities, interlobular septal   thickening, and small bilateral pleural effusions new from 1/22/2022   likely represent pulmonary edema rather than pneumonia.  2.  Secretions are seen in the distal trachea.        --- End of Report ---      < from: Transthoracic Echocardiogram (01.18.22 @ 10:40) >    Patient name: RACHEL COMBS  YOB: 1935   Age: 86 (M)   MR#: [id number]  Study Date: 1/18/2022  Location: [address] [place], [place] [address]Sonographer: JOSSIE Morris  Study quality: Technically good  Referring Physician: Sudheer Weiss MD  Blood Pressure: 103/40 mmHg  Height: 170 cm  Weight: 53 kg  BSA: 1.6 m2  ------------------------------------------------------------------------  PROCEDURE: Transthoracic echocardiogram with 2-D, M-Mode  and complete spectral and color flow Doppler.  INDICATION: Abnormal electrocardiogram (ECG) (EKG) (R94.31)  ------------------------------------------------------------------------  DIMENSIONS:  Dimensions:     Normal Values:  LA:     4.0 cm    2.0 - 4.0 cm  Ao:     2.8 cm    2.0 - 3.8 cm  SEPTUM: 0.5 cm    0.6 - 1.2 cm  PWT:    0.6 cm    0.6 - 1.1 cm  LVIDd:  5.2 cm    3.0 - 5.6 cm  LVIDs:    ---     1.8 - 4.0 cm  Derived Variables:  LVMI: 57 g/m2  RWT: 0.23  Ejection Fraction (Visual Estimate): 45 %  ------------------------------------------------------------------------  OBSERVATIONS:  Mitral Valve: Mitral annular calcification, otherwise  normal mitral valve. Mild mitral regurgitation.  Aortic Root: Normal aortic root.  Aortic Valve: Normal trileaflet aortic valve.  Left Atrium: Moderately dilated left atrium.  LA volume  index = 48 cc/m2.  Left Ventricle: Mild to moderate segmental left ventricular  systolic dysfunction. Inferolateral wall hypokinesis.  (DT:177 ms).  Right Heart: Normal right atrium. The right ventricle is  not wellvisualized. Normal tricuspid valve. Normal  pulmonic valve.  Pericardium/PleuraNormal pericardium with no pericardial  effusion.  ------------------------------------------------------------------------  CONCLUSIONS:  1. Mitral annular calcification,otherwise normal mitral  valve. Mild mitral regurgitation.  2. Moderately dilated left atrium.  LA volume index = 48  cc/m2.  3. Mild to moderate segmental left ventricular systolic  dysfunction. Inferolateral wall hypokinesis.  4. The right ventricle is not well visualized.  ------------------------------------------------------------------------  Confirmed on  1/18/2022 - 17:35:40 by Lb Pacheco M.D. RPVI  ------------------------------------------------------------------------    < end of copied text >       BELLO POTTER MD; Resident Radiology  This document has been electronically signed.  DYLAN WEISS MD; Attending Radiologist  This document has been electronically signed. Jan 26 2022 11:04AM    < end of copied text >

## 2022-01-26 NOTE — H&P ADULT - ASSESSMENT
87 yo M with hx of Bladder CA s/p TURBT 12/2020 and intravesicular chemotherapy with Gemcitabine c/b AFIB with RVR and LILIAN, now s/p BCG Instillation (1/12/22) with Dr. 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 ~1.7), and AFIB on Eliquis p/w shortness of breath starting last night clinical presentation consistent with acute on chronic diastolic heart failure

## 2022-01-26 NOTE — CONSULT NOTE ADULT - ASSESSMENT
87 yo M with hx of Bladder CA s/p TURBT 12/2020 and intravesicular chemotherapy with Gemcitabine c/b AFIB with RVR and LILIAN, now s/p BCG Instillation (1/12/22) with Dr. 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 ~1.7), and AFIB on Eliquis p/w shortness of breath   EF 45% at present   CKD stage 4     1 CVS-Pt was told the relationship of water intake and his reduced GFR  Lasix 40mg po bid   Tele for 24 hours to rule out arrythmia;  Echo was just done   2 Renal-Renal parameters are stable.  No need for renal sono  3 Pulm-Nasal canula at present   4 GI-Low salt diet at present     [facility]   [phone]    87 yo M with hx of Bladder CA s/p TURBT 12/2020 and intravesicular chemotherapy with Gemcitabine c/b AFIB with RVR and LILIAN, now s/p BCG Instillation (1/12/22) with Dr. 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 ~1.7), and AFIB on Eliquis p/w shortness of breath   EF 45% at present.  Acute decompensated systolic heart failure  CKD stage 4     1 CVS-Pt was told the relationship of water intake and his reduced GFR  Lasix 40mg po bid   Tele for 24 hours to rule out arrythmia;  Echo was just done   2 Renal-Renal parameters are stable.  No need for renal sono  3 Pulm-Nasal canula at present   4 GI-Low salt diet at present     [facility]   [phone]

## 2022-01-26 NOTE — H&P ADULT - PROBLEM SELECTOR PLAN 1
with acute hypoxic respiratory failure   now resolving  down to 6L nasal cannula   continue to monitor closely  s/p furosemide 40 mg IV  defer further diuresis to renal

## 2022-01-26 NOTE — ED ADULT NURSE NOTE - OBJECTIVE STATEMENT
pt received in  bed, pt aox4, c/o SOB, pt came form home by EMS c/o SOB since yesterday 11PM, Pt arrived with non-rebreather 15L of O2,. Pt was seen here and PT'S wife reported that he was DC at [facility] yesterday . Denies fever and chest pain. As per order blood work done, cardiac monitor on, safety precaution in placed, Nursing care ongoing.

## 2022-01-26 NOTE — H&P ADULT - PROBLEM SELECTOR PLAN 2
unclear etiology  likely reactive   CT chest negative for pneumonia  consistent with pulmonary edema

## 2022-01-26 NOTE — ED ADULT TRIAGE NOTE - CHIEF COMPLAINT QUOTE
arrives from home, as per EMS pt developed difficulty breathing around 11pm this evening while ambulating to the bathroom, EMS found pt  to be hypoxic to 60s on room air,  Pt arrived with non-rebreather 15L of O2,. Pt was seen here and DC at [facility] yesterday for electrolyte imbalance. Denies fever and chest pain. PMHX HTN, A.Fib

## 2022-01-26 NOTE — ED ADULT NURSE REASSESSMENT NOTE - NS ED NURSE REASSESS COMMENT FT1
BREAK RN: pt resting comfortably, A&04, on BiPAP, crackles noted. Bilateral swelling to lower extremities. pt denies of chest pain, headache, nausea, dizziness, vomiting  SOB, fever, chills verbalized. Awaiting further orders.

## 2022-01-27 ENCOUNTER — TRANSCRIPTION ENCOUNTER (OUTPATIENT)
Age: 87
End: 2022-01-27

## 2022-01-27 LAB
ALBUMIN SERPL ELPH-MCNC: 2.4 G/DL — LOW (ref 3.3–5)
ALP SERPL-CCNC: 147 U/L — HIGH (ref 40–120)
ALT FLD-CCNC: 37 U/L — SIGNIFICANT CHANGE UP (ref 4–41)
ANION GAP SERPL CALC-SCNC: 8 MMOL/L — SIGNIFICANT CHANGE UP (ref 7–14)
AST SERPL-CCNC: 24 U/L — SIGNIFICANT CHANGE UP (ref 4–40)
BILIRUB SERPL-MCNC: 0.3 MG/DL — SIGNIFICANT CHANGE UP (ref 0.2–1.2)
BUN SERPL-MCNC: 65 MG/DL — HIGH (ref 7–23)
CALCIUM SERPL-MCNC: 8.2 MG/DL — LOW (ref 8.4–10.5)
CHLORIDE SERPL-SCNC: 104 MMOL/L — SIGNIFICANT CHANGE UP (ref 98–107)
CO2 SERPL-SCNC: 26 MMOL/L — SIGNIFICANT CHANGE UP (ref 22–31)
CREAT SERPL-MCNC: 2.01 MG/DL — HIGH (ref 0.5–1.3)
CULTURE RESULTS: SIGNIFICANT CHANGE UP
CULTURE RESULTS: SIGNIFICANT CHANGE UP
GLUCOSE BLDC GLUCOMTR-MCNC: 127 MG/DL — HIGH (ref 70–99)
GLUCOSE BLDC GLUCOMTR-MCNC: 151 MG/DL — HIGH (ref 70–99)
GLUCOSE BLDC GLUCOMTR-MCNC: 245 MG/DL — HIGH (ref 70–99)
GLUCOSE BLDC GLUCOMTR-MCNC: 253 MG/DL — HIGH (ref 70–99)
GLUCOSE BLDC GLUCOMTR-MCNC: 266 MG/DL — HIGH (ref 70–99)
GLUCOSE SERPL-MCNC: 128 MG/DL — HIGH (ref 70–99)
HCT VFR BLD CALC: 29.1 % — LOW (ref 39–50)
HGB BLD-MCNC: 9.3 G/DL — LOW (ref 13–17)
MCHC RBC-ENTMCNC: 31.7 PG — SIGNIFICANT CHANGE UP (ref 27–34)
MCHC RBC-ENTMCNC: 32 GM/DL — SIGNIFICANT CHANGE UP (ref 32–36)
MCV RBC AUTO: 99.3 FL — SIGNIFICANT CHANGE UP (ref 80–100)
NRBC # BLD: 0 /100 WBCS — SIGNIFICANT CHANGE UP
NRBC # FLD: 0 K/UL — SIGNIFICANT CHANGE UP
PLATELET # BLD AUTO: 217 K/UL — SIGNIFICANT CHANGE UP (ref 150–400)
POTASSIUM SERPL-MCNC: 4.1 MMOL/L — SIGNIFICANT CHANGE UP (ref 3.5–5.3)
POTASSIUM SERPL-SCNC: 4.1 MMOL/L — SIGNIFICANT CHANGE UP (ref 3.5–5.3)
PROT SERPL-MCNC: 5.6 G/DL — LOW (ref 6–8.3)
RBC # BLD: 2.93 M/UL — LOW (ref 4.2–5.8)
RBC # FLD: 13.1 % — SIGNIFICANT CHANGE UP (ref 10.3–14.5)
SODIUM SERPL-SCNC: 138 MMOL/L — SIGNIFICANT CHANGE UP (ref 135–145)
SPECIMEN SOURCE: SIGNIFICANT CHANGE UP
SPECIMEN SOURCE: SIGNIFICANT CHANGE UP
WBC # BLD: 8.46 K/UL — SIGNIFICANT CHANGE UP (ref 3.8–10.5)
WBC # FLD AUTO: 8.46 K/UL — SIGNIFICANT CHANGE UP (ref 3.8–10.5)

## 2022-01-27 RX ADMIN — APIXABAN 2.5 MILLIGRAM(S): 2.5 TABLET, FILM COATED ORAL at 05:25

## 2022-01-27 RX ADMIN — MIDODRINE HYDROCHLORIDE 5 MILLIGRAM(S): 2.5 TABLET ORAL at 17:48

## 2022-01-27 RX ADMIN — Medication 81 MILLIGRAM(S): at 11:26

## 2022-01-27 RX ADMIN — Medication 3: at 17:47

## 2022-01-27 RX ADMIN — Medication 25 MILLIGRAM(S): at 05:25

## 2022-01-27 RX ADMIN — MIDODRINE HYDROCHLORIDE 5 MILLIGRAM(S): 2.5 TABLET ORAL at 08:58

## 2022-01-27 RX ADMIN — Medication 650 MILLIGRAM(S): at 05:25

## 2022-01-27 RX ADMIN — Medication 3: at 14:12

## 2022-01-27 RX ADMIN — Medication 40 MILLIGRAM(S): at 17:48

## 2022-01-27 RX ADMIN — APIXABAN 2.5 MILLIGRAM(S): 2.5 TABLET, FILM COATED ORAL at 17:48

## 2022-01-27 RX ADMIN — Medication 40 MILLIGRAM(S): at 05:25

## 2022-01-27 RX ADMIN — AMIODARONE HYDROCHLORIDE 200 MILLIGRAM(S): 400 TABLET ORAL at 17:49

## 2022-01-27 RX ADMIN — Medication 25 MILLIGRAM(S): at 17:48

## 2022-01-27 RX ADMIN — Medication 650 MILLIGRAM(S): at 17:48

## 2022-01-27 RX ADMIN — MIDODRINE HYDROCHLORIDE 5 MILLIGRAM(S): 2.5 TABLET ORAL at 00:08

## 2022-01-27 RX ADMIN — TAMSULOSIN HYDROCHLORIDE 0.4 MILLIGRAM(S): 0.4 CAPSULE ORAL at 21:07

## 2022-01-27 NOTE — PHYSICAL THERAPY INITIAL EVALUATION ADULT - GENERAL OBSERVATIONS, REHAB EVAL
Consult received, chart reviewed. Patient received in bed, no apparent distress, +tele, +pulse ox, +NC, wife present.

## 2022-01-27 NOTE — DISCHARGE NOTE PROVIDER - HOSPITAL COURSE
87 y/o M with hx of Bladder CA s/p TURBT 12/2020 and intravesicular chemotherapy with Gemcitabine c/b AFIB with RVR and LILIAN, now s/p BCG Instillation (1/12/22) with Dr. 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 ~1.7), and AFIB on Eliquis p/w shortness of breath starting last night clinical presentation consistent with acute on chronic diastolic heart failure. Patient was placed on BIPAP and given a short course of diuresis with improvement in respiratory status and weaned to room air. Noted to have leukocytosis; patient afebrile. Likely reactive. CT chest w/o pneumonia and consistent with pulmonary edema. Leukocytosis resolved.            87 y/o M with hx of Bladder CA s/p TURBT 12/2020 and intravesicular chemotherapy with Gemcitabine c/b AFIB with RVR and LILIAN, now s/p BCG Instillation (1/12/22) with Dr. Ramesy, s/p L Nephrectomy (1951), DM2 (not on medications), HTN, HLD, CAD s/p 4 JOSE (last stent >5 years ago), CKD (baseline Cr. ~1.7), and AFIB on Eliquis p/w shortness of breath starting last night clinical presentation consistent with acute on chronic diastolic heart failure. Patient was placed on BIPAP and given a short course of diuresis with improvement in respiratory status and weaned to room air. Noted to have leukocytosis; patient afebrile. Likely reactive. CT chest w/o pneumonia and consistent with pulmonary edema. Leukocytosis resolved.     Noted to have episodes of desaturation on room air and has qualified for home O2 which was set up by case management prior to discharge.            85 y/o M with hx of Bladder CA s/p TURBT 12/2020 and intravesicular chemotherapy with Gemcitabine c/b AFIB with RVR and LILIAN, now s/p BCG Instillation (1/12/22) with Dr. 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 ~1.7), and AFIB on Eliquis p/w shortness of breath starting last night clinical presentation consistent with acute on chronic diastolic heart failure. Patient was placed on BIPAP and given a short course of diuresis with improvement in respiratory status and weaned to room air. Home diuretic increased to daily dosing. Noted to have leukocytosis; patient afebrile. Likely reactive. CT chest w/o pneumonia and consistent with pulmonary edema. Leukocytosis resolved. Patient's home metoprolol decreased for bradycardia and softer blood pressures.     Noted to have episodes of desaturation on room air and has qualified for home O2 which was set up by case management prior to discharge.     Patient seen and evaluated. Reviewed discharge medications with patient and attending. All new medications requiring new prescriptions were sent to the pharmacy of patient's choice. Reviewed need for prescription for previous home medications and new prescriptions sent if requested. Medically cleared/stable for discharge as per Dr. Arroyo on 1/28/2022 with appropriate follow up. Patient understands and agrees with plan of care.

## 2022-01-27 NOTE — DISCHARGE NOTE PROVIDER - NSDCCPCAREPLAN_GEN_ALL_CORE_FT
PRINCIPAL DISCHARGE DIAGNOSIS  Diagnosis: Acute respiratory failure with hypercapnia  Assessment and Plan of Treatment: You were admitted for shortness of breath likely secondary to your heart failure. You were placed on a breathing machine called BIPAP for a short while and given IV lasix (water pill). Your symptoms has improved however you are still requiring oxygen and are being sent home with oxygen. Continue all medications as prescribed and follow up with your physician Dr. Gusman within 1-2 weeks.       PRINCIPAL DISCHARGE DIAGNOSIS  Diagnosis: Acute respiratory failure with hypercapnia  Assessment and Plan of Treatment: You were admitted for shortness of breath likely secondary to your heart failure. You were placed on a breathing machine called BIPAP for a short while and given IV lasix (water pill). Your symptoms has improved however you are still requiring oxygen and are being sent home with oxygen. Continue all medications as prescribed and follow up with your physician Dr. Gusman within 1-2 weeks. Your water pill was switched to LASIX and you are to take this medication DAILY.      SECONDARY DISCHARGE DIAGNOSES  Diagnosis: Atrial fibrillation  Assessment and Plan of Treatment: Continue your Eliquis (blood thinner) as orderd. Your METOPROLOL dose was decreased to 12.5mg twice daily because your heart rate was slower and your blood pressure can tolerate a lower dose. Continue to follow up closely with your cardiologist. Call your physician or return to the hospital if you develop a fast heart beat that is not resolving.

## 2022-01-27 NOTE — DISCHARGE NOTE PROVIDER - CARE PROVIDER_API CALL
Emily Gusman)  Internal Medicine; Nephrology  [address], [address]  [place] [address]  Phone: [phone]  Fax: [phone]  Follow Up Time:

## 2022-01-27 NOTE — PROGRESS NOTE ADULT - ATTENDING COMMENTS
Renal Attd    -On discharge change the lasix to 40mg po qd.  (as outpt he was taking 3x a week)    [facility]   [phone] Renal Attd    -On discharge change the lasix to 40mg po qd.  (as outpt he was taking 3x a week)  -He is now on 2 liters nasal canula and hopefully will be off by am     [facility]   [phone]

## 2022-01-27 NOTE — DISCHARGE NOTE NURSING/CASE MANAGEMENT/SOCIAL WORK - PATIENT PORTAL LINK FT
You can access the FollowMyHealth Patient Portal offered by [facility] by registering at the following website: [email or web address]. By joining [facility] FollowMyHealth portal, you will also be able to view your health information using other applications (apps) compatible with our system.

## 2022-01-27 NOTE — DISCHARGE NOTE NURSING/CASE MANAGEMENT/SOCIAL WORK - NSDCPEFALRISK_GEN_ALL_CORE
For information on Fall & Injury Prevention, visit: [email or web address] OR  [email or web address] OR  https://www.cdc.gov/steadi/patient.html

## 2022-01-27 NOTE — PHYSICAL THERAPY INITIAL EVALUATION ADULT - ADDITIONAL COMMENTS
Pt. reports owning a straight cane and rollator.     Pt. was left in bed post PT Evaluation, no apparent distress, all lines intact, call bell within reach, wife present. RN made aware.

## 2022-01-27 NOTE — DISCHARGE NOTE PROVIDER - NSDCMRMEDTOKEN_GEN_ALL_CORE_FT
amiodarone 200 mg oral tablet: 1 tab(s) orally once a day   apixaban 2.5 mg oral tablet: 1 tab(s) orally 2 times a day  Aspirin Enteric Coated 81 mg oral delayed release tablet: 1 tab(s) orally once a day   metoprolol tartrate 25 mg oral tablet: 1 tab(s) orally 2 times a day  midodrine 5 mg oral tablet: 1 tab(s) orally 3 times a day   sodium bicarbonate 650 mg oral tablet: 1 tab(s) orally 2 times a day  tamsulosin 0.4 mg oral capsule: 1 cap(s) orally once a day  torsemide 5 mg oral tablet: 1 tab(s) orally 3 times a week  Monday, Wednesday, Friday    amiodarone 200 mg oral tablet: 1 tab(s) orally once a day   apixaban 2.5 mg oral tablet: 1 tab(s) orally 2 times a day  Aspirin Enteric Coated 81 mg oral delayed release tablet: 1 tab(s) orally once a day   furosemide 40 mg oral tablet: 1 tab(s) orally once a day   metoprolol tartrate 25 mg oral tablet: 0.5 tab(s) orally 2 times a day   midodrine 5 mg oral tablet: 1 tab(s) orally 3 times a day   sodium bicarbonate 650 mg oral tablet: 1 tab(s) orally 2 times a day  tamsulosin 0.4 mg oral capsule: 1 cap(s) orally once a day

## 2022-01-27 NOTE — PROGRESS NOTE ADULT - ASSESSMENT
ASSESSMENT/PLAN:   85 yo M with hx of Bladder CA s/p TURBT 12/2020 and intravesicular chemotherapy with Gemcitabine c/b AFIB with RVR and LILIAN, now s/p BCG Instillation (1/12/22) with Dr. 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 ~1.7), and AFIB on Eliquis p/w shortness of breath   EF 45% at present.  Acute decompensated systolic heart failure  CKD stage 4     1 CVS- Lasix 40mg po bid. Pt was told the relationship of water intake and his reduced GFR  Tele for 24 hours to rule out arrythmia;  Echo was just done   2 Renal-Renal parameters are stable.  No need for renal sono  3 Pulm-Nasal canula at present   4 GI-Low salt diet at present     Joi Felipe NP-C  [facility]  [phone]  ASSESSMENT/PLAN:   85 yo M with hx of Bladder CA s/p TURBT 12/2020 and intravesicular chemotherapy with Gemcitabine c/b AFIB with RVR and LILIAN, now s/p BCG Instillation (1/12/22) with Dr. 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 ~1.7), and AFIB on Eliquis p/w shortness of breath   EF 45% at present.  Acute decompensated systolic heart failure  CKD stage 4     1 CVS- Lasix 40mg po bid. Pt was told the relationship of water intake and his reduced GFR  Tele for 24 hours to rule out arrythmia;  Echo was just done   2 Renal-Renal parameters are stable.  No need for renal sono;  On discharge change lasix 40mg po qd   3 Pulm-Nasal canula at present   4 GI-Low salt diet at present     Joi Felipe, NP-C  [facility]  [phone]

## 2022-01-27 NOTE — DISCHARGE NOTE PROVIDER - NSDCFUSCHEDAPPT_GEN_ALL_CORE_FT
RACHEL COMBS ; 02/02/2022 ; NPP Urology [facility]  ARCHEL COMBS ; 02/02/2022 [facility] 02/08/2022 ; NPP Derm [address] RACHEL Hood ; 02/09/2022 ; [facility]  RACHEL COMBS ; 02/09/2022 ; NPP Urology [facility]  RACHEL COMBS ; 02/15/2022 ; NPP Derm [address] RACHEL Hood ; 02/16/2022 ; NPP Urology [facility]  RACHEL COMBS ; 02/16/2022 ; NPP Urology [facility]  RACHEL COMBS ; 02/23/2022 ; NPP Urology [facility]  RACHEL COMBS ; 02/23/2022 ; NPP Urology [facility]  RACHEL COMBS ; 03/02/2022 ; NPP Urology [facility]  RACHEL COMBS ; 03/02/2022 ; NPP Urology [facility]

## 2022-01-28 VITALS — OXYGEN SATURATION: 97 % | HEART RATE: 50 BPM

## 2022-01-28 LAB
CHOLEST SERPL-MCNC: 129 MG/DL — SIGNIFICANT CHANGE UP
GLUCOSE BLDC GLUCOMTR-MCNC: 123 MG/DL — HIGH (ref 70–99)
HDLC SERPL-MCNC: 36 MG/DL — LOW
LIPID PNL WITH DIRECT LDL SERPL: 77 MG/DL — SIGNIFICANT CHANGE UP
NON HDL CHOLESTEROL: 93 MG/DL — SIGNIFICANT CHANGE UP
TRIGL SERPL-MCNC: 82 MG/DL — SIGNIFICANT CHANGE UP

## 2022-01-28 RX ORDER — FUROSEMIDE 40 MG
1 TABLET ORAL
Qty: 30 | Refills: 0
Start: 2022-01-28 | End: 2022-02-26

## 2022-01-28 RX ORDER — METOPROLOL TARTRATE 50 MG
12.5 TABLET ORAL
Refills: 0 | Status: DISCONTINUED | OUTPATIENT
Start: 2022-01-28 | End: 2022-01-28

## 2022-01-28 RX ORDER — METOPROLOL TARTRATE 50 MG
0.5 TABLET ORAL
Qty: 30 | Refills: 0
Start: 2022-01-28 | End: 2022-02-26

## 2022-01-28 RX ADMIN — Medication 650 MILLIGRAM(S): at 05:32

## 2022-01-28 RX ADMIN — APIXABAN 2.5 MILLIGRAM(S): 2.5 TABLET, FILM COATED ORAL at 05:32

## 2022-01-28 RX ADMIN — Medication 40 MILLIGRAM(S): at 05:32

## 2022-01-28 RX ADMIN — Medication 25 MILLIGRAM(S): at 05:33

## 2022-01-28 RX ADMIN — MIDODRINE HYDROCHLORIDE 5 MILLIGRAM(S): 2.5 TABLET ORAL at 05:32

## 2022-01-28 NOTE — PROGRESS NOTE ADULT - PROBLEM SELECTOR PLAN 1
with acute hypoxic respiratory failure   resolved   down to 2L nasal cannula   check RA sat after ambulation  s/p furosemide 40 mg IV  continue po furosemide
with acute hypoxic respiratory failure   resolved   RA sat after ambulation indicates need for home O2  continue po furosemide  cleared for discharge by renal on pre-admission diuretic regimen

## 2022-01-28 NOTE — PROGRESS NOTE ADULT - PROBLEM SELECTOR PLAN 2
unclear etiology  likely reactive   CT chest negative for pneumonia  off antibiotics
unclear etiology  likely reactive   resolved

## 2022-01-28 NOTE — PROGRESS NOTE ADULT - SUBJECTIVE AND OBJECTIVE BOX
Patient is a 86y old  Male who presents with a chief complaint of SOB      DATE OF SERVICE: 01-27-22 @ 12:35    SUBJECTIVE / OVERNIGHT EVENTS: overnight events noted    ROS:  Resp: No cough no sputum production  CVS: No chest pain no palpitations no orthopnea  GI: no N/V/D  : no dysuria, no hematuria  Neuro: no weakness no paresthesias  Heme: No petechiae no easy bruising  Msk: No joint pain no swelling  Skin: No rash no itching        MEDICATIONS  (STANDING):  aMIOdarone    Tablet 200 milliGRAM(s) Oral daily  apixaban 2.5 milliGRAM(s) Oral every 12 hours  aspirin enteric coated 81 milliGRAM(s) Oral daily  dextrose 40% Gel 15 Gram(s) Oral once  dextrose 5%. 1000 milliLiter(s) (50 mL/Hr) IV Continuous <Continuous>  dextrose 5%. 1000 milliLiter(s) (100 mL/Hr) IV Continuous <Continuous>  dextrose 50% Injectable 25 Gram(s) IV Push once  dextrose 50% Injectable 12.5 Gram(s) IV Push once  dextrose 50% Injectable 25 Gram(s) IV Push once  furosemide    Tablet 40 milliGRAM(s) Oral two times a day  glucagon  Injectable 1 milliGRAM(s) IntraMuscular once  insulin lispro (ADMELOG) corrective regimen sliding scale   SubCutaneous three times a day before meals  insulin lispro (ADMELOG) corrective regimen sliding scale   SubCutaneous at bedtime  metoprolol tartrate 25 milliGRAM(s) Oral two times a day  midodrine. 5 milliGRAM(s) Oral three times a day  sodium bicarbonate 650 milliGRAM(s) Oral two times a day  tamsulosin 0.4 milliGRAM(s) Oral at bedtime    MEDICATIONS  (PRN):        CAPILLARY BLOOD GLUCOSE      POCT Blood Glucose.: 127 mg/dL (27 Jan 2022 08:29)  POCT Blood Glucose.: 253 mg/dL (26 Jan 2022 21:30)  POCT Blood Glucose.: 233 mg/dL (26 Jan 2022 20:43)    I&O's Summary    26 Jan 2022 07:01  -  27 Jan 2022 07:00  --------------------------------------------------------  IN: 0 mL / OUT: 1800 mL / NET: -1800 mL    27 Jan 2022 07:01  -  27 Jan 2022 12:35  --------------------------------------------------------  IN: 0 mL / OUT: 500 mL / NET: -500 mL        Vital Signs Last 24 Hrs  T(C): 36.4 (27 Jan 2022 05:20), Max: 36.8 (26 Jan 2022 19:12)  T(F): 97.5 (27 Jan 2022 05:20), Max: 98.2 (26 Jan 2022 19:12)  HR: 61 (27 Jan 2022 11:07) (55 - 67)  BP: 98/50 (27 Jan 2022 08:50) (98/50 - 128/54)  BP(mean): --  RR: 18 (27 Jan 2022 11:20) (13 - 20)  SpO2: 96% (27 Jan 2022 11:20) (86% - 100%)    PHYSICAL EXAM:   HEENT: RAQUEL EOMI  Neck: Supple, no JVD  Lungs: clear  CVS: S1 S2 no M/R/G  Abdomen: no tenderness  Neuro: AO x 3 no focal weakness  Skin: warm, dry  Ext: no edema  Msk: no joint swelling     LABS:                        9.3    8.46  )-----------( 217      ( 27 Jan 2022 05:35 )             29.1     01-27    138  |  104  |  65<H>  ----------------------------<  128<H>  4.1   |  26  |  2.01<H>    Ca    8.2<L>      27 Jan 2022 05:36  Phos  4.1     01-26  Mg     1.80     01-26    TPro  5.6<L>  /  Alb  2.4<L>  /  TBili  0.3  /  DBili  x   /  AST  24  /  ALT  37  /  AlkPhos  147<H>  01-27                All consultant(s) notes reviewed and care discussed with other providers        Contact Number, Dr Arroyo [phone]
NEPHROLOGY    Patient seen and examined sitting on bed. Pt reports feeling much better, breathing improving, less sob, comfortable on 6L NC, currently in no acute distress.     MEDICATIONS  (STANDING):  aMIOdarone    Tablet 200 milliGRAM(s) Oral daily  apixaban 2.5 milliGRAM(s) Oral every 12 hours  aspirin enteric coated 81 milliGRAM(s) Oral daily  dextrose 40% Gel 15 Gram(s) Oral once  dextrose 5%. 1000 milliLiter(s) (50 mL/Hr) IV Continuous <Continuous>  dextrose 5%. 1000 milliLiter(s) (100 mL/Hr) IV Continuous <Continuous>  dextrose 50% Injectable 25 Gram(s) IV Push once  dextrose 50% Injectable 12.5 Gram(s) IV Push once  dextrose 50% Injectable 25 Gram(s) IV Push once  furosemide    Tablet 40 milliGRAM(s) Oral two times a day  glucagon  Injectable 1 milliGRAM(s) IntraMuscular once  insulin lispro (ADMELOG) corrective regimen sliding scale   SubCutaneous three times a day before meals  insulin lispro (ADMELOG) corrective regimen sliding scale   SubCutaneous at bedtime  metoprolol tartrate 25 milliGRAM(s) Oral two times a day  midodrine. 5 milliGRAM(s) Oral three times a day  sodium bicarbonate 650 milliGRAM(s) Oral two times a day  tamsulosin 0.4 milliGRAM(s) Oral at bedtime    VITALS:  T(C): , Max: 36.8 (01-26-22 @ 19:12)  T(F): , Max: 98.2 (01-26-22 @ 19:12)  HR: 60 (01-27-22 @ 08:50)  BP: 98/50 (01-27-22 @ 08:50)  RR: 18 (01-27-22 @ 05:20)  SpO2: 98% (01-27-22 @ 07:05)    I and O's:    01-26 @ 07:01  -  01-27 @ 07:00  --------------------------------------------------------  IN: 0 mL / OUT: 1800 mL / NET: -1800 mL    01-27 @ 07:01 - 01-27 @ 10:46  --------------------------------------------------------  IN: 0 mL / OUT: 500 mL / NET: -500 mL    Height (cm): 170.2 (01-26 @ 20:41)  Weight (kg): 52.2 (01-26 @ 20:41)  BMI (kg/m2): 18 (01-26 @ 20:41)  BSA (m2): 1.6 (01-26 @ 20:41)    PHYSICAL EXAM:  Constitutional: NAD  HEENT: EOMI  Neck:  +  JVD, supple   Respiratory: CTA B/L  Cardiovascular: S1 and S2, RRR  Gastrointestinal: + BS, soft, NT, ND  Extremities: No peripheral edema, + peripheral pulses  Neurological: A/O x 3, CN2-12 intact  Psychiatric: Normal mood, normal affect  : No Temple  Skin: No rashes, C/D/I    LABS:                        9.3    8.46  )-----------( 217      ( 27 Jan 2022 05:35 )             29.1     01-27    138  |  104  |  65<H>  ----------------------------<  128<H>  4.1   |  26  |  2.01<H>    Ca    8.2<L>      27 Jan 2022 05:36  Phos  4.1     01-26  Mg     1.80     01-26    TPro  5.6<L>  /  Alb  2.4<L>  /  TBili  0.3  /  DBili  x   /  AST  24  /  ALT  37  /  AlkPhos  147<H>  01-27    RADIOLOGY & ADDITIONAL STUDIES:    ACC: [id number] EXAM:  CT CHEST                          PROCEDURE DATE:  01/26/2022          INTERPRETATION:  CLINICAL INFORMATION: Shortness of breath, decreased   urination, history of bladder cancer. Evaluate for pulmonary edema.    COMPARISON: CT abdomen pelvis 1/22/2022.    CONTRAST/COMPLICATIONS:  IV Contrast: NONE  Oral Contrast: NONE  Complications: None reported at time of study completion    PROCEDURE:  CT scan of the chest was obtained without intravenous contrast.    FINDINGS:    LYMPH NODES: A few small mediastinal lymph nodes, for example a 0.9 cm   pretracheal and 0.8 cm right paratracheal lymph node.    HEART/VASCULATURE: The heart size is normal. No pericardial effusion.   Aortic valve and coronary artery calcifications.    AIRWAYS/LUNGS/PLEURA: Secretions are seen within the distal trachea.   Centrally predominant bilateral lung opacities, interlobular septal   thickening, and small bilateral pleural effusions, new since 1/22/2022.    UPPER ABDOMEN: Right renal cyst. Left nephrectomy. Punctate pancreatic   calcification.    BONES/SOFT TISSUES: Subcentimeter hypodense left thyroid nodule. The   esophagus is normal. Symmetric bilateral gynecomastia. Degenerative   changes of the spine.    IMPRESSION:    1.  Centrally predominant bilateral lung opacities, interlobular septal   thickening, and small bilateral pleural effusions new from 1/22/2022   likely represent pulmonary edema rather than pneumonia.  2.  Secretions are seen in the distal trachea.      --- End of Report ---     BELLO POTTER MD; Resident Radiology  This document has been electronically signed.  DLYAN WEISS MD; Attending Radiologist  This document has been electronically signed. Jan 26 2022 11:04AM  
NEPHROLOGY[facility] [phone]        Patient seen and examined on 2L nasal cannula, no new complaints.         MEDICATIONS  (STANDING):  aMIOdarone    Tablet 200 milliGRAM(s) Oral daily  apixaban 2.5 milliGRAM(s) Oral every 12 hours  aspirin enteric coated 81 milliGRAM(s) Oral daily  dextrose 40% Gel 15 Gram(s) Oral once  dextrose 5%. 1000 milliLiter(s) (50 mL/Hr) IV Continuous <Continuous>  dextrose 5%. 1000 milliLiter(s) (100 mL/Hr) IV Continuous <Continuous>  dextrose 50% Injectable 25 Gram(s) IV Push once  dextrose 50% Injectable 12.5 Gram(s) IV Push once  dextrose 50% Injectable 25 Gram(s) IV Push once  furosemide    Tablet 40 milliGRAM(s) Oral two times a day  glucagon  Injectable 1 milliGRAM(s) IntraMuscular once  insulin lispro (ADMELOG) corrective regimen sliding scale   SubCutaneous three times a day before meals  insulin lispro (ADMELOG) corrective regimen sliding scale   SubCutaneous at bedtime  metoprolol tartrate 25 milliGRAM(s) Oral two times a day  midodrine. 5 milliGRAM(s) Oral three times a day  sodium bicarbonate 650 milliGRAM(s) Oral two times a day  tamsulosin 0.4 milliGRAM(s) Oral at bedtime      VITAL:  T(C): , Max: 36.6 (01-28-22 @ 05:30)  T(F): , Max: 97.8 (01-28-22 @ 05:30)  HR: 50 (01-28-22 @ 07:08)  BP: 113/56 (01-28-22 @ 05:30)  BP(mean): --  RR: 18 (01-28-22 @ 05:30)  SpO2: 97% (01-28-22 @ 07:08)  Wt(kg): --    I and O's:    01-27 @ 07:01  -  01-28 @ 07:00  --------------------------------------------------------  IN: 400 mL / OUT: 1100 mL / NET: -700 mL          PHYSICAL EXAM:    Constitutional: NAD  Neck:  No JVD  Respiratory: CTAB/L  Cardiovascular: S1 and S2  Gastrointestinal: BS+, soft, NT/ND  Extremities: No peripheral edema  Neurological: A/O x 3, no focal deficits  Psychiatric: Normal mood, normal affect  : No Temple  Skin: No rashes  Access: Not applicable    LABS:                        9.3    8.46  )-----------( 217      ( 27 Jan 2022 05:35 )             29.1     01-27    138  |  104  |  65<H>  ----------------------------<  128<H>  4.1   |  26  |  2.01<H>    Ca    8.2<L>      27 Jan 2022 05:36  Phos  4.1     01-26  Mg     1.80     01-26    TPro  5.6<L>  /  Alb  2.4<L>  /  TBili  0.3  /  DBili  x   /  AST  24  /  ALT  37  /  AlkPhos  147<H>  01-27          RADIOLOGY & ADDITIONAL STUDIES:  < from: CT Chest No Cont (01.26.22 @ 09:10) >    ACC: [id number] EXAM:  CT CHEST                          PROCEDURE DATE:  01/26/2022          INTERPRETATION:  CLINICAL INFORMATION: Shortness of breath, decreased   urination, history of bladder cancer. Evaluate for pulmonary edema.    COMPARISON: CT abdomen pelvis 1/22/2022.    CONTRAST/COMPLICATIONS:  IV Contrast: NONE  Oral Contrast: NONE  Complications: None reported at time of study completion    PROCEDURE:  CT scan of the chest was obtained without intravenous contrast.    FINDINGS:    LYMPH NODES: A few small mediastinal lymph nodes, for example a 0.9 cm   pretracheal and 0.8 cm right paratracheal lymph node.    HEART/VASCULATURE: The heart size is normal. No pericardial effusion.   Aortic valve and coronary artery calcifications.    AIRWAYS/LUNGS/PLEURA: Secretions are seen within the distal trachea.   Centrally predominant bilateral lung opacities, interlobular septal   thickening, and small bilateral pleural effusions, new since 1/22/2022.    UPPER ABDOMEN: Right renal cyst. Left nephrectomy. Punctate pancreatic   calcification.    BONES/SOFT TISSUES: Subcentimeter hypodense left thyroid nodule. The   esophagus is normal. Symmetric bilateral gynecomastia. Degenerative   changes of the spine.    IMPRESSION:    1.  Centrally predominant bilateral lung opacities, interlobular septal   thickening, and small bilateral pleural effusions new from 1/22/2022   likely represent pulmonary edema rather than pneumonia.  2.  Secretions are seen in the distal trachea.    < end of copied text >            
Patient is a 86y old  Male who presents with a chief complaint of shortness of breath (27 Jan 2022 12:34)      DATE OF SERVICE: 01-28-22 @ 13:20    SUBJECTIVE / OVERNIGHT EVENTS: overnight events noted    ROS:  Resp: No cough no sputum production  CVS: No chest pain no palpitations no orthopnea  GI: no N/V/D  : no dysuria, no hematuria          MEDICATIONS  (STANDING):  aMIOdarone    Tablet 200 milliGRAM(s) Oral daily  apixaban 2.5 milliGRAM(s) Oral every 12 hours  aspirin enteric coated 81 milliGRAM(s) Oral daily  dextrose 40% Gel 15 Gram(s) Oral once  dextrose 5%. 1000 milliLiter(s) (50 mL/Hr) IV Continuous <Continuous>  dextrose 5%. 1000 milliLiter(s) (100 mL/Hr) IV Continuous <Continuous>  dextrose 50% Injectable 25 Gram(s) IV Push once  dextrose 50% Injectable 12.5 Gram(s) IV Push once  dextrose 50% Injectable 25 Gram(s) IV Push once  furosemide    Tablet 40 milliGRAM(s) Oral two times a day  glucagon  Injectable 1 milliGRAM(s) IntraMuscular once  insulin lispro (ADMELOG) corrective regimen sliding scale   SubCutaneous three times a day before meals  insulin lispro (ADMELOG) corrective regimen sliding scale   SubCutaneous at bedtime  metoprolol tartrate 12.5 milliGRAM(s) Oral two times a day  midodrine. 5 milliGRAM(s) Oral three times a day  sodium bicarbonate 650 milliGRAM(s) Oral two times a day  tamsulosin 0.4 milliGRAM(s) Oral at bedtime    MEDICATIONS  (PRN):        CAPILLARY BLOOD GLUCOSE      POCT Blood Glucose.: 123 mg/dL (28 Jan 2022 08:08)  POCT Blood Glucose.: 151 mg/dL (27 Jan 2022 21:36)  POCT Blood Glucose.: 253 mg/dL (27 Jan 2022 17:15)  POCT Blood Glucose.: 266 mg/dL (27 Jan 2022 14:09)    I&O's Summary    27 Jan 2022 07:01  -  28 Jan 2022 07:00  --------------------------------------------------------  IN: 400 mL / OUT: 1100 mL / NET: -700 mL    28 Jan 2022 07:01  -  28 Jan 2022 13:20  --------------------------------------------------------  IN: 0 mL / OUT: 700 mL / NET: -700 mL        Vital Signs Last 24 Hrs  T(C): 36.8 (28 Jan 2022 09:56), Max: 36.8 (28 Jan 2022 09:56)  T(F): 98.2 (28 Jan 2022 09:56), Max: 98.2 (28 Jan 2022 09:56)  HR: 50 (28 Jan 2022 11:36) (49 - 53)  BP: 112/62 (28 Jan 2022 09:56) (112/62 - 124/61)  BP(mean): --  RR: 18 (28 Jan 2022 09:56) (18 - 18)  SpO2: 97% (28 Jan 2022 11:36) (96% - 100%)    PHYSICAL EXAM:   HEENT: RAQUEL EOMI  Neck: Supple, no JVD  Lungs: clear  CVS: S1 S2 no M/R/G  Abdomen: no tenderness  Neuro: AO x 3 no focal weakness  Skin: warm, dry  Ext: no edema      LABS:                        9.3    8.46  )-----------( 217      ( 27 Jan 2022 05:35 )             29.1     01-27    138  |  104  |  65<H>  ----------------------------<  128<H>  4.1   |  26  |  2.01<H>    Ca    8.2<L>      27 Jan 2022 05:36    TPro  5.6<L>  /  Alb  2.4<L>  /  TBili  0.3  /  DBili  x   /  AST  24  /  ALT  37  /  AlkPhos  147<H>  01-27                All consultant(s) notes reviewed and care discussed with other providers        Contact Number, Dr Arroyo [phone]

## 2022-01-28 NOTE — PROGRESS NOTE ADULT - ASSESSMENT
85 yo M with hx of Bladder CA s/p TURBT 12/2020 and intravesicular chemotherapy with Gemcitabine c/b AFIB with RVR and LILIAN, now s/p BCG Instillation (1/12/22) with Dr. 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 ~1.7), and AFIB on Eliquis p/w shortness of breath starting last night clinical presentation consistent with acute on chronic diastolic heart failure

## 2022-01-28 NOTE — CHART NOTE - NSCHARTNOTEFT_GEN_A_CORE
SpO2 at rest on room air: 86%  SpO2 at rest with 2L by nasal cannula: 96%     Patient qualifies for home oxygen.   ICD-10: I50.9
Vitals notable for bradycardia in 50s and SBP persistently <120. Will decreased metoprolol from 25mg BID to 12.5mg BID. Discussed with Dr. Arroyo.
87 yo man with CAD, CKD, presenting with acute vol overload and in resp distress with hypoxia and increased WOB, placed on BiPAP and given iv lasix  BiPAP 10/5 60% Vt 415  RR 28 O2 sat 100%  FiO2 decreased to 40% with O2 sat 93%.  Mask fit good.  Pt without nausea, alert and oriented stating feels better and has had increased UOP with lasix  Current BiPAP settings adequate and appropriate.  May wean as tolerated as pt diureses.  He does not require MICU level of care.  D/W ED team

## 2022-01-28 NOTE — PROGRESS NOTE ADULT - PROBLEM SELECTOR PLAN 6
fasting lipid panel noted  no intervention required at this time
not on statin  will check fasting lipid panel

## 2022-01-28 NOTE — PROGRESS NOTE ADULT - PROBLEM SELECTOR PROBLEM 1
Acute on chronic heart failure with preserved ejection fraction (HFpEF)
Acute on chronic heart failure with preserved ejection fraction (HFpEF)

## 2022-01-28 NOTE — PROGRESS NOTE ADULT - ASSESSMENT
ASSESSMENT/PLAN:   87 yo M with hx of Bladder CA s/p TURBT 12/2020 and intravesicular chemotherapy with Gemcitabine c/b AFIB with RVR and LILIAN, now s/p BCG Instillation (1/12/22) with Dr. 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 ~1.7), and AFIB on Eliquis p/w shortness of breath   EF 45% at present.  Acute decompensated systolic heart failure  CKD stage 4     No labs today     1 CVS- Currently on Lasix 40mg po bid. Pt was told the relationship of water intake and his reduced GFR  Tele for 24 hours to rule out arrythmia- currently sinus yassine on tele 50's  2 Renal-Renal parameters are stable.  No need for renal sono;  On discharge change lasix 40mg po qd   3 Pulm- 2L Nasal canula at present, he appears in no distress at this time trial on room air  4 GI-Low salt diet at present     Yuliya Vallecillo NP  [facility]  [phone]

## 2022-01-28 NOTE — PROGRESS NOTE ADULT - PROBLEM SELECTOR PLAN 3
chest pain free  will continue to monitor  continue ASA B Blocker
chest pain free  will continue to monitor  continue ASA B Blocker

## 2022-02-02 ENCOUNTER — OUTPATIENT (OUTPATIENT)
Dept: OUTPATIENT SERVICES | Facility: HOSPITAL | Age: 87
LOS: 1 days | End: 2022-02-02
Payer: MEDICARE

## 2022-02-02 ENCOUNTER — APPOINTMENT (OUTPATIENT)
Dept: UROLOGY | Facility: CLINIC | Age: 87
End: 2022-02-02
Payer: MEDICARE

## 2022-02-02 VITALS
HEIGHT: 67 IN | SYSTOLIC BLOOD PRESSURE: 106 MMHG | HEART RATE: 69 BPM | WEIGHT: 119 LBS | RESPIRATION RATE: 17 BRPM | DIASTOLIC BLOOD PRESSURE: 64 MMHG | BODY MASS INDEX: 18.68 KG/M2 | TEMPERATURE: 98 F

## 2022-02-02 DIAGNOSIS — R35.0 FREQUENCY OF MICTURITION: ICD-10-CM

## 2022-02-02 DIAGNOSIS — Z98.41 CATARACT EXTRACTION STATUS, RIGHT EYE: Chronic | ICD-10-CM

## 2022-02-02 DIAGNOSIS — D49.4 NEOPLASM OF UNSPECIFIED BEHAVIOR OF BLADDER: ICD-10-CM

## 2022-02-02 DIAGNOSIS — Z90.5 ACQUIRED ABSENCE OF KIDNEY: Chronic | ICD-10-CM

## 2022-02-02 DIAGNOSIS — Z95.5 PRESENCE OF CORONARY ANGIOPLASTY IMPLANT AND GRAFT: Chronic | ICD-10-CM

## 2022-02-02 PROCEDURE — 51720 TREATMENT OF BLADDER LESION: CPT

## 2022-02-02 RX ORDER — BACILLUS CALMETTE-GUERIN 50 MG/50ML
50 POWDER, FOR SUSPENSION INTRAVESICAL
Qty: 0 | Refills: 0 | Status: COMPLETED | OUTPATIENT
Start: 2022-02-02

## 2022-02-02 RX ORDER — BACILLUS CALMETTE-GUERIN 50 MG/50ML
50 POWDER, FOR SUSPENSION INTRAVESICAL
Qty: 1 | Refills: 0 | Status: COMPLETED | OUTPATIENT
Start: 2022-02-02 | End: 2022-02-02

## 2022-02-02 RX ADMIN — BACILLUS CALMETTE-GUERIN 0 MG: 50 POWDER, FOR SUSPENSION INTRAVESICAL at 00:00

## 2022-02-03 ENCOUNTER — APPOINTMENT (OUTPATIENT)
Age: 87
End: 2022-02-03
Payer: MEDICARE

## 2022-02-03 DIAGNOSIS — N18.9 CHRONIC KIDNEY DISEASE, UNSPECIFIED: ICD-10-CM

## 2022-02-03 DIAGNOSIS — C61 MALIGNANT NEOPLASM OF PROSTATE: ICD-10-CM

## 2022-02-03 PROCEDURE — 99496 TRANSJ CARE MGMT HIGH F2F 7D: CPT | Mod: 95

## 2022-02-03 RX ORDER — METOPROLOL SUCCINATE 25 MG/1
25 TABLET, EXTENDED RELEASE ORAL
Qty: 90 | Refills: 0 | Status: DISCONTINUED | COMMUNITY
Start: 2020-06-29 | End: 2022-02-03

## 2022-02-03 RX ORDER — LOSARTAN POTASSIUM 50 MG/1
50 TABLET, FILM COATED ORAL
Refills: 0 | Status: DISCONTINUED | COMMUNITY
End: 2022-02-03

## 2022-02-03 RX ORDER — METFORMIN ER 500 MG 500 MG/1
500 TABLET ORAL
Qty: 180 | Refills: 0 | Status: DISCONTINUED | COMMUNITY
Start: 2020-07-14 | End: 2022-02-03

## 2022-02-03 RX ORDER — SULFAMETHOXAZOLE AND TRIMETHOPRIM 800; 160 MG/1; MG/1
800-160 TABLET ORAL
Qty: 6 | Refills: 0 | Status: DISCONTINUED | COMMUNITY
Start: 2022-01-04 | End: 2022-02-03

## 2022-02-03 NOTE — HISTORY OF PRESENT ILLNESS
[FreeTextEntry1] : F/u post hospital discharge STAR Heart Failure  [de-identified] : Mr. Andrea Caballero is a 85 yo male with hx of Bladder CA s/p TURBT 12/2020 and intravesicular chemotherapy with Gemcitabine c/b AFIB with RVR and LILIAN, now s/p BCG Instillation (1/12/22) with Dr. 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 ~1.7), and AFIB on Eliquis presents to [facility] with shortness of breath, presentation consistent with acute on chronic diastolic heart failure and readmitted 1/26-1/28. He was placed on bipap and given short course of diuresis with improvement, Leukocytosis resolved and home metoprolol decreased for bradycardia. He was discharged to home with new O2 due to episodes of desaturation on room air. Of note - he was hospitalized 1/16-1/20 and 1/21-1/24 for acute renal failure. \par \par Mr. Andrea Caballero is being seen for telecommunication video visit, alert & oriented x3 and in no apparent distress, collateral information provided by spouse Katya. \par Since discharge to home states he is feeling well, reports 1 incident yesterday after treatment where he had unsteadiness, denies any fall or trauma. SpO2 93% on RA and 99% on 1.5L O2, reports normally satting upper 90s on RA. Denies any fever/chills, SOB, CP, edema, lightheadedness or any worsening symptoms. \par He followed up with PMD/cards Dr. Reginaldo Rush on 2/1, metoprolol was discontinued and daily BPs sent to PMD office to review. States repeat labs were wnl and next f/u on 2/11. He has resumed weekly BCG treatment at [facility] every Wednesday, currently cycle 2/6. He is currently receiving home care services and referral sent for Physical therapy. Messages were left with Neph Dr. Gusman for appt scheduling. He offers no other complaints at this time.

## 2022-02-03 NOTE — REVIEW OF SYSTEMS
[Fever] : no fever [Chills] : no chills [Fatigue] : no fatigue [Chest Pain] : no chest pain [Lower Ext Edema] : no lower extremity edema [Shortness Of Breath] : no shortness of breath [Wheezing] : no wheezing [Cough] : no cough

## 2022-02-03 NOTE — HEALTH RISK ASSESSMENT
[de-identified] : walker, cane [AdvancecareDate] : 2/3/2022 [FreeTextEntry4] : States he has living will in home and will discuss further with PMD.

## 2022-02-03 NOTE — ASSESSMENT
[FreeTextEntry1] : [employer] TCM STARS teaching: Enforced with patient need for daily weights. Advised to call for an increase of greater than 2 lbs. in a day or 5 pounds in a week. Adhere to low salt diet and educated patient on foods that should be avoided such as processed or fast food. Continue medications as ordered. Follow up with Cardiologist. Contact information given, patient advised to call with any questions/concerns.

## 2022-02-04 ENCOUNTER — TRANSCRIPTION ENCOUNTER (OUTPATIENT)
Age: 87
End: 2022-02-04

## 2022-02-07 ENCOUNTER — NON-APPOINTMENT (OUTPATIENT)
Age: 87
End: 2022-02-07

## 2022-02-07 NOTE — DIETITIAN INITIAL EVALUATION ADULT. - NUTRITION CONSULT
[facility]  [address] [address]  Phone: [phone]  Fax: [phone]    SADIE Gillespie        February 7, 2022     Patient: Tran Moreau   YOB: 1984   Date of Visit: 2/7/2022       To Whom it May Concern:    Tran Moreau was seen in my clinic on 2/7/2022. He may return to work on Please excuse thursday and Friday of last week. .    If you have any questions or concerns, please don't hesitate to call.     Sincerely,         SADIE Gillespie
yes

## 2022-02-09 ENCOUNTER — APPOINTMENT (OUTPATIENT)
Dept: UROLOGY | Facility: CLINIC | Age: 87
End: 2022-02-09

## 2022-02-15 ENCOUNTER — NON-APPOINTMENT (OUTPATIENT)
Age: 87
End: 2022-02-15

## 2022-02-15 ENCOUNTER — APPOINTMENT (OUTPATIENT)
Dept: DERMATOLOGY | Facility: CLINIC | Age: 87
End: 2022-02-15

## 2022-02-16 ENCOUNTER — LABORATORY RESULT (OUTPATIENT)
Age: 87
End: 2022-02-16

## 2022-02-16 ENCOUNTER — APPOINTMENT (OUTPATIENT)
Dept: UROLOGY | Facility: CLINIC | Age: 87
End: 2022-02-16

## 2022-02-16 ENCOUNTER — APPOINTMENT (OUTPATIENT)
Dept: INFECTIOUS DISEASE | Facility: CLINIC | Age: 87
End: 2022-02-16
Payer: MEDICARE

## 2022-02-16 VITALS
BODY MASS INDEX: 17.27 KG/M2 | SYSTOLIC BLOOD PRESSURE: 146 MMHG | RESPIRATION RATE: 17 BRPM | TEMPERATURE: 97.1 F | HEART RATE: 80 BPM | OXYGEN SATURATION: 100 % | DIASTOLIC BLOOD PRESSURE: 70 MMHG | HEIGHT: 67 IN | WEIGHT: 110 LBS

## 2022-02-16 DIAGNOSIS — A31.9 MYCOBACTERIAL INFECTION, UNSPECIFIED: ICD-10-CM

## 2022-02-16 DIAGNOSIS — D49.4 NEOPLASM OF UNSPECIFIED BEHAVIOR OF BLADDER: ICD-10-CM

## 2022-02-16 PROCEDURE — 99214 OFFICE O/P EST MOD 30 MIN: CPT

## 2022-02-18 ENCOUNTER — NON-APPOINTMENT (OUTPATIENT)
Age: 87
End: 2022-02-18

## 2022-02-18 PROBLEM — D49.4 BLADDER TUMOR: Status: ACTIVE | Noted: 2020-10-09

## 2022-02-18 LAB
ALBUMIN SERPL ELPH-MCNC: 4.3 G/DL
ALP BLD-CCNC: 99 U/L
ALT SERPL-CCNC: 35 U/L
ANION GAP SERPL CALC-SCNC: 15 MMOL/L
APPEARANCE: ABNORMAL
AST SERPL-CCNC: 29 U/L
BASOPHILS # BLD AUTO: 0.03 K/UL
BASOPHILS NFR BLD AUTO: 0.4 %
BILIRUB SERPL-MCNC: 0.3 MG/DL
BILIRUBIN URINE: NEGATIVE
BLOOD URINE: ABNORMAL
BUN SERPL-MCNC: 93 MG/DL
CALCIUM SERPL-MCNC: 9.9 MG/DL
CHLORIDE SERPL-SCNC: 96 MMOL/L
CO2 SERPL-SCNC: 26 MMOL/L
COLOR: NORMAL
CREAT SERPL-MCNC: 2.85 MG/DL
EOSINOPHIL # BLD AUTO: 0.09 K/UL
EOSINOPHIL NFR BLD AUTO: 1.2 %
GLUCOSE QUALITATIVE U: NORMAL
GLUCOSE SERPL-MCNC: 203 MG/DL
HCT VFR BLD CALC: 40.1 %
HGB BLD-MCNC: 12.6 G/DL
IMM GRANULOCYTES NFR BLD AUTO: 0.4 %
KETONES URINE: NEGATIVE
LEUKOCYTE ESTERASE URINE: ABNORMAL
LYMPHOCYTES # BLD AUTO: 0.91 K/UL
LYMPHOCYTES NFR BLD AUTO: 12.5 %
MAN DIFF?: NORMAL
MCHC RBC-ENTMCNC: 31.4 GM/DL
MCHC RBC-ENTMCNC: 31.7 PG
MCV RBC AUTO: 100.8 FL
MONOCYTES # BLD AUTO: 0.64 K/UL
MONOCYTES NFR BLD AUTO: 8.8 %
NEUTROPHILS # BLD AUTO: 5.58 K/UL
NEUTROPHILS NFR BLD AUTO: 76.7 %
NITRITE URINE: NEGATIVE
PH URINE: 6
PLATELET # BLD AUTO: 207 K/UL
POTASSIUM SERPL-SCNC: 5.4 MMOL/L
PROT SERPL-MCNC: 7.7 G/DL
PROTEIN URINE: ABNORMAL
RBC # BLD: 3.98 M/UL
RBC # FLD: 12.4 %
SODIUM SERPL-SCNC: 137 MMOL/L
SPECIFIC GRAVITY URINE: 1.02
UROBILINOGEN URINE: NORMAL
WBC # FLD AUTO: 7.28 K/UL

## 2022-02-18 NOTE — ASSESSMENT
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\par However, would still screen for TB to ensure no rare episode of disseminated M TB\par Clinically, if this confirmed as bovis, the concern is whether this is a typical finding after BCG in this patient who has no symptoms, or does he require treatment regardless?\par Presently with minimal symptoms infection\par Overall, Positive culture finding, BCG, bladder tumor\par - Monitor off abx for now\par - Repeat CBC, CMP, UCX, UA, U AFB; also check sputum AFBs x 3\par - Follow up final pending culture from [facility] to ID AFB\par - [facility] aware of findings, will follow up with them for further recommendations\par - RTC 1 month

## 2022-02-18 NOTE — PHYSICAL EXAM
[General Appearance - Alert] : alert [General Appearance - In No Acute Distress] : in no acute distress [General Appearance - Well Nourished] : well nourished [General Appearance - Well-Appearing] : healthy appearing [Outer Ear] : the ears and nose were normal in appearance [Sclera] : the sclera and conjunctiva were normal [Neck Appearance] : the appearance of the neck was normal [Exaggerated Use Of Accessory Muscles For Inspiration] : no accessory muscle use [Edema] : there was no peripheral edema [Abdomen Soft] : soft [Abdomen Tenderness] : non-tender [] : no rash [Oriented To Time, Place, And Person] : oriented to person, place, and time [Affect] : the affect was normal

## 2022-02-18 NOTE — HISTORY OF PRESENT ILLNESS
[FreeTextEntry1] : 87 yo M\par \par Recent hospitalization at [facility], had infusion of BCG, then had fevers, chills, concern for UTI. Bacterial UTI vs A/E related to BCG. Treated with ceftriaxone into Vantin. 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

## 2022-02-22 ENCOUNTER — NON-APPOINTMENT (OUTPATIENT)
Age: 87
End: 2022-02-22

## 2022-02-23 ENCOUNTER — APPOINTMENT (OUTPATIENT)
Dept: UROLOGY | Facility: CLINIC | Age: 87
End: 2022-02-23

## 2022-02-23 LAB
M TB IFN-G BLD-IMP: NEGATIVE
QUANTIFERON TB PLUS MITOGEN MINUS NIL: 0.72 IU/ML
QUANTIFERON TB PLUS NIL: 0 IU/ML
QUANTIFERON TB PLUS TB1 MINUS NIL: 0 IU/ML
QUANTIFERON TB PLUS TB2 MINUS NIL: 0 IU/ML

## 2022-03-02 ENCOUNTER — APPOINTMENT (OUTPATIENT)
Dept: UROLOGY | Facility: CLINIC | Age: 87
End: 2022-03-02

## 2022-03-02 ENCOUNTER — APPOINTMENT (OUTPATIENT)
Dept: DERMATOLOGY | Facility: CLINIC | Age: 87
End: 2022-03-02

## 2022-03-09 LAB
-  ETHAMBUTOL (5.0 MCG/ML): SIGNIFICANT CHANGE UP
-  ISONIAZID (0.1 UG/ML): SIGNIFICANT CHANGE UP
-  RIFAMPIN (1.0 UG/ML): SIGNIFICANT CHANGE UP
METHOD TYPE: SIGNIFICANT CHANGE UP

## 2022-03-10 LAB — -  PYRAZINAMIDE (100.0 UG/ML): SIGNIFICANT CHANGE UP

## 2022-03-18 LAB
-  ETHAMBUTOL: SIGNIFICANT CHANGE UP
-  ETHIONAMIDE: SIGNIFICANT CHANGE UP
-  FLUOROQUINOLONES: SIGNIFICANT CHANGE UP
-  ISONIAZID: SIGNIFICANT CHANGE UP
-  KANAMYCIN/AMIKACIN: SIGNIFICANT CHANGE UP
-  KANAMYCIN: SIGNIFICANT CHANGE UP
-  PYRAZINAMIDE: SIGNIFICANT CHANGE UP
-  RIFAMPIN: SIGNIFICANT CHANGE UP
-  STREPTOMYCIN: SIGNIFICANT CHANGE UP
-  WHOLE GENOME SEQUENCING: SIGNIFICANT CHANGE UP
CULTURE RESULTS: SIGNIFICANT CHANGE UP
METHOD TYPE: SIGNIFICANT CHANGE UP
ORGANISM # SPEC MICROSCOPIC CNT: SIGNIFICANT CHANGE UP
SPECIMEN SOURCE: SIGNIFICANT CHANGE UP

## 2022-04-09 NOTE — ED PROVIDER NOTE - PROGRESS NOTE DETAILS
No Wai, PGY2: MICU consulted for new bipap eval. patient is not a MICU candidate. patient appropriately diuresing at this time, fio2 weaned from 60% to 50%, patient tolerating well.

## 2022-04-13 ENCOUNTER — APPOINTMENT (OUTPATIENT)
Dept: DERMATOLOGY | Facility: CLINIC | Age: 87
End: 2022-04-13
Payer: MEDICARE

## 2022-04-13 DIAGNOSIS — C44.92 SQUAMOUS CELL CARCINOMA OF SKIN, UNSPECIFIED: ICD-10-CM

## 2022-04-13 PROCEDURE — 99204 OFFICE O/P NEW MOD 45 MIN: CPT

## 2022-04-14 PROBLEM — C44.92 SCCA (SQUAMOUS CELL CARCINOMA) OF SKIN: Status: ACTIVE | Noted: 2022-04-13

## 2022-04-14 NOTE — HISTORY OF PRESENT ILLNESS
[FreeTextEntry1] : Consultation for Mohs Micrographic Surgery- SCC/KA R Nasal sidewall [de-identified] : 04/13/2022 \par \par Referred by: Dr. Marroquin\par We had the pleasure of seeing your patient in consultation for Mohs Micrographic Surgery.\par \par Mr. 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 He denies having experienced any symptomatic lesion. His consult appt was pushed back due to 3 hospitalizations since his diagnosis (one for a-fib, one for "fluid overload" and cannot remember other hospitalization)\par \par SH: Wife currently in hospital after breaking arm. Presents with Olive narayan\par \par Upcoming travel plans: none\par \par Pertinent positives noted below\par History of HIV or hepatitis: No\par Blood thinners: Eliquis (has hx of A-fib and 4 cardiac stents), baby asa\par Antibiotic Prophylaxis: None\par Medical implants: None\par The patient's review of systems questionnaire was reviewed. Education needs were identified. There were no barriers to learning.\par

## 2022-04-22 LAB
ACID FAST STN UR: NORMAL
MYCOBACTERIUM BLD CULT: NORMAL

## 2022-05-05 PROCEDURE — 88112 CYTOPATH CELL ENHANCE TECH: CPT | Mod: 26

## 2022-05-06 ENCOUNTER — OUTPATIENT (OUTPATIENT)
Dept: OUTPATIENT SERVICES | Facility: HOSPITAL | Age: 87
LOS: 1 days | End: 2022-05-06
Payer: MEDICARE

## 2022-05-06 ENCOUNTER — APPOINTMENT (OUTPATIENT)
Dept: UROLOGY | Facility: CLINIC | Age: 87
End: 2022-05-06
Payer: MEDICARE

## 2022-05-06 VITALS
HEART RATE: 64 BPM | TEMPERATURE: 98.2 F | RESPIRATION RATE: 16 BRPM | SYSTOLIC BLOOD PRESSURE: 144 MMHG | DIASTOLIC BLOOD PRESSURE: 68 MMHG

## 2022-05-06 DIAGNOSIS — Z95.5 PRESENCE OF CORONARY ANGIOPLASTY IMPLANT AND GRAFT: Chronic | ICD-10-CM

## 2022-05-06 DIAGNOSIS — Z00.00 ENCOUNTER FOR GENERAL ADULT MEDICAL EXAMINATION W/OUT ABNORMAL FINDINGS: ICD-10-CM

## 2022-05-06 DIAGNOSIS — Z90.5 ACQUIRED ABSENCE OF KIDNEY: Chronic | ICD-10-CM

## 2022-05-06 DIAGNOSIS — R35.0 FREQUENCY OF MICTURITION: ICD-10-CM

## 2022-05-06 DIAGNOSIS — Z98.41 CATARACT EXTRACTION STATUS, RIGHT EYE: Chronic | ICD-10-CM

## 2022-05-06 PROCEDURE — 52000 CYSTOURETHROSCOPY: CPT

## 2022-05-08 ENCOUNTER — NON-APPOINTMENT (OUTPATIENT)
Age: 87
End: 2022-05-08

## 2022-05-08 LAB — BACTERIA UR CULT: NORMAL

## 2022-05-10 DIAGNOSIS — C67.9 MALIGNANT NEOPLASM OF BLADDER, UNSPECIFIED: ICD-10-CM

## 2022-05-10 LAB — URINE CYTOLOGY: NORMAL

## 2022-05-25 PROCEDURE — 88112 CYTOPATH CELL ENHANCE TECH: CPT | Mod: 26

## 2022-05-26 ENCOUNTER — NON-APPOINTMENT (OUTPATIENT)
Age: 87
End: 2022-05-26

## 2022-05-26 LAB
APPEARANCE: ABNORMAL
BACTERIA: ABNORMAL
BILIRUBIN URINE: NEGATIVE
BLOOD URINE: ABNORMAL
COLOR: ABNORMAL
GLUCOSE QUALITATIVE U: NEGATIVE
HYALINE CASTS: 2 /LPF
KETONES URINE: NEGATIVE
LEUKOCYTE ESTERASE URINE: ABNORMAL
MICROSCOPIC-UA: NORMAL
NITRITE URINE: POSITIVE
PH URINE: 6.5
PROTEIN URINE: ABNORMAL
RED BLOOD CELLS URINE: >720 /HPF
SPECIFIC GRAVITY URINE: 1.02
SQUAMOUS EPITHELIAL CELLS: 0 /HPF
UROBILINOGEN URINE: NORMAL
WHITE BLOOD CELLS URINE: 281 /HPF

## 2022-05-27 LAB
BACTERIA UR CULT: NORMAL
URINE CYTOLOGY: NORMAL

## 2022-06-20 ENCOUNTER — APPOINTMENT (OUTPATIENT)
Dept: DERMATOLOGY | Facility: CLINIC | Age: 87
End: 2022-06-20

## 2022-06-21 ENCOUNTER — APPOINTMENT (OUTPATIENT)
Dept: DERMATOLOGY | Facility: CLINIC | Age: 87
End: 2022-06-21
Payer: MEDICARE

## 2022-06-21 ENCOUNTER — NON-APPOINTMENT (OUTPATIENT)
Age: 87
End: 2022-06-21

## 2022-06-21 PROCEDURE — 12002 RPR S/N/AX/GEN/TRNK2.6-7.5CM: CPT

## 2022-06-21 PROCEDURE — 17311 MOHS 1 STAGE H/N/HF/G: CPT

## 2022-06-24 ENCOUNTER — APPOINTMENT (OUTPATIENT)
Dept: UROLOGY | Facility: CLINIC | Age: 87
End: 2022-06-24
Payer: MEDICARE

## 2022-06-24 ENCOUNTER — OUTPATIENT (OUTPATIENT)
Dept: OUTPATIENT SERVICES | Facility: HOSPITAL | Age: 87
LOS: 1 days | End: 2022-06-24
Payer: MEDICARE

## 2022-06-24 VITALS — DIASTOLIC BLOOD PRESSURE: 68 MMHG | SYSTOLIC BLOOD PRESSURE: 164 MMHG | HEART RATE: 102 BPM | TEMPERATURE: 98 F

## 2022-06-24 DIAGNOSIS — Z95.5 PRESENCE OF CORONARY ANGIOPLASTY IMPLANT AND GRAFT: Chronic | ICD-10-CM

## 2022-06-24 DIAGNOSIS — R35.0 FREQUENCY OF MICTURITION: ICD-10-CM

## 2022-06-24 DIAGNOSIS — Z98.41 CATARACT EXTRACTION STATUS, RIGHT EYE: Chronic | ICD-10-CM

## 2022-06-24 DIAGNOSIS — Z90.5 ACQUIRED ABSENCE OF KIDNEY: Chronic | ICD-10-CM

## 2022-06-24 DIAGNOSIS — C67.9 MALIGNANT NEOPLASM OF BLADDER, UNSPECIFIED: ICD-10-CM

## 2022-06-24 PROCEDURE — 52000 CYSTOURETHROSCOPY: CPT

## 2022-06-27 DIAGNOSIS — Z01.818 ENCOUNTER FOR OTHER PREPROCEDURAL EXAMINATION: ICD-10-CM

## 2022-06-27 LAB — BACTERIA UR CULT: NORMAL

## 2022-07-11 ENCOUNTER — OUTPATIENT (OUTPATIENT)
Dept: OUTPATIENT SERVICES | Facility: HOSPITAL | Age: 87
LOS: 1 days | End: 2022-07-11
Payer: MEDICARE

## 2022-07-11 VITALS
SYSTOLIC BLOOD PRESSURE: 135 MMHG | HEIGHT: 67 IN | RESPIRATION RATE: 18 BRPM | WEIGHT: 113.1 LBS | DIASTOLIC BLOOD PRESSURE: 66 MMHG | TEMPERATURE: 98 F | OXYGEN SATURATION: 97 % | HEART RATE: 87 BPM

## 2022-07-11 DIAGNOSIS — Z01.818 ENCOUNTER FOR OTHER PREPROCEDURAL EXAMINATION: ICD-10-CM

## 2022-07-11 DIAGNOSIS — Z86.79 PERSONAL HISTORY OF OTHER DISEASES OF THE CIRCULATORY SYSTEM: ICD-10-CM

## 2022-07-11 DIAGNOSIS — C67.9 MALIGNANT NEOPLASM OF BLADDER, UNSPECIFIED: ICD-10-CM

## 2022-07-11 DIAGNOSIS — D49.4 NEOPLASM OF UNSPECIFIED BEHAVIOR OF BLADDER: ICD-10-CM

## 2022-07-11 DIAGNOSIS — I25.10 ATHEROSCLEROTIC HEART DISEASE OF NATIVE CORONARY ARTERY WITHOUT ANGINA PECTORIS: ICD-10-CM

## 2022-07-11 DIAGNOSIS — Z95.5 PRESENCE OF CORONARY ANGIOPLASTY IMPLANT AND GRAFT: Chronic | ICD-10-CM

## 2022-07-11 DIAGNOSIS — Z98.41 CATARACT EXTRACTION STATUS, RIGHT EYE: Chronic | ICD-10-CM

## 2022-07-11 DIAGNOSIS — Z98.890 OTHER SPECIFIED POSTPROCEDURAL STATES: Chronic | ICD-10-CM

## 2022-07-11 DIAGNOSIS — Z90.5 ACQUIRED ABSENCE OF KIDNEY: Chronic | ICD-10-CM

## 2022-07-11 PROCEDURE — G0463: CPT

## 2022-07-11 NOTE — H&P PST ADULT - PROBLEM SELECTOR PLAN 3
Pt. instructed to take his scheduled dose of amiodarone morning of procedure with minimal water  Pt. reports being instructed by Dr. Syed to stop Eliquis 3 days prior to procedure

## 2022-07-11 NOTE — H&P PST ADULT - NSICDXPASTMEDICALHX_GEN_ALL_CORE_FT
PAST MEDICAL HISTORY:  Arthritis     Atrial fibrillation     Bladder cancer     CAD (coronary artery disease) (4 stents,)    Diabetes mellitus, new onset diet controlled    Hard of hearing     History of bradycardia     History of TIAs Last 2018    Hyperlipidemia     Hypertension     Myocardial infarction (2003)    Single kidney (hx/o LEFT nephrectomy at age 16; pt states due to a ureter"blockage" causing "infection"; pt denies hx/o renal dysfunction)     PAST MEDICAL HISTORY:  Arthritis     Atrial fibrillation     Bladder cancer     CAD (coronary artery disease) (4 stents,)    Diabetes mellitus, new onset diet controlled    Hard of hearing     History of bradycardia     History of CHF (congestive heart failure)     History of TIAs Last 2018    Hyperlipidemia     Hypertension     Left carotid bruit     Myocardial infarction (2003)    Single kidney (hx/o LEFT nephrectomy at age 16; pt states due to a ureter"blockage" causing "infection"; pt denies hx/o renal dysfunction)

## 2022-07-11 NOTE — H&P PST ADULT - NSICDXPASTSURGICALHX_GEN_ALL_CORE_FT
PAST SURGICAL HISTORY:  H/O cystoscopy     History of nephrectomy, unilateral (hx/o LEFT nephrectomy at age 16)    S/P bilateral cataract extraction     Stented coronary artery (4 stents)

## 2022-07-11 NOTE — H&P PST ADULT - MUSCULOSKELETAL
details… normal/ROM intact/strength 5/5 bilateral upper extremities/strength 5/5 bilateral lower extremities

## 2022-07-11 NOTE — H&P PST ADULT - HISTORY OF PRESENT ILLNESS
86 year old male presents for Cystoscopy/TURBT. Pt. with a history of Bladder cancer diagnosed in 2021,s/p BCG treatments. Pt. denies dysuria and denies gross hematuria.    Pre procedure COVID swab scheduled for 7/15/22 @ [address] Pt. denies history of COVID infection. 86 year old male presents for Cystoscopy/TURBT. Pt. with a history of Bladder cancer diagnosed in 2021,s/p BCG treatments. Pt. denies dysuria and denies gross hematuria. Pt. with a history of CAD, (3 coronary stents in place) & Atrial fibrillation. Pt. reports being instructed by Dr. Syed to stop Eliquis 3 days prior to surgery & remain on daily aspirin.    Pre procedure COVID swab scheduled for 7/15/22 @ [address] Pt. denies history of COVID infection.

## 2022-07-17 LAB — SARS-COV-2 N GENE NPH QL NAA+PROBE: NOT DETECTED

## 2022-07-18 ENCOUNTER — APPOINTMENT (OUTPATIENT)
Dept: UROLOGY | Facility: HOSPITAL | Age: 87
End: 2022-07-18

## 2022-07-18 PROBLEM — R09.89 OTHER SPECIFIED SYMPTOMS AND SIGNS INVOLVING THE CIRCULATORY AND RESPIRATORY SYSTEMS: Chronic | Status: ACTIVE | Noted: 2022-07-11

## 2022-07-18 PROBLEM — I25.10 ATHEROSCLEROTIC HEART DISEASE OF NATIVE CORONARY ARTERY WITHOUT ANGINA PECTORIS: Chronic | Status: ACTIVE | Noted: 2018-09-18

## 2022-07-18 PROBLEM — C67.9 MALIGNANT NEOPLASM OF BLADDER, UNSPECIFIED: Chronic | Status: ACTIVE | Noted: 2022-07-11

## 2022-07-20 ENCOUNTER — APPOINTMENT (OUTPATIENT)
Dept: DERMATOLOGY | Facility: CLINIC | Age: 87
End: 2022-07-20

## 2022-07-20 DIAGNOSIS — C44.42 SQUAMOUS CELL CARCINOMA OF SKIN OF SCALP AND NECK: ICD-10-CM

## 2022-07-20 PROCEDURE — 99024 POSTOP FOLLOW-UP VISIT: CPT

## 2022-07-24 ENCOUNTER — TRANSCRIPTION ENCOUNTER (OUTPATIENT)
Age: 87
End: 2022-07-24

## 2022-07-24 VITALS — WEIGHT: 113.1 LBS | HEIGHT: 67 IN

## 2022-07-24 NOTE — PRE-ANESTHESIA EVALUATION ADULT - NSANTHPMHFT_GEN_ALL_CORE
86 year old male presents for Cystoscopy/TURBT. Pt. with a history of Bladder cancer diagnosed in 2021,s/p BCG treatments. Pt. denies dysuria and denies gross hematuria. Pt. with a history of CAD, (3 coronary stents in place) & Atrial fibrillation. Pt. reports being instructed by Dr. Syed to stop Eliquis 3 days prior to surgery & remain on daily aspirin.

## 2022-07-25 ENCOUNTER — APPOINTMENT (OUTPATIENT)
Dept: UROLOGY | Facility: HOSPITAL | Age: 87
End: 2022-07-25

## 2022-07-25 ENCOUNTER — TRANSCRIPTION ENCOUNTER (OUTPATIENT)
Age: 87
End: 2022-07-25

## 2022-07-25 ENCOUNTER — OUTPATIENT (OUTPATIENT)
Dept: OUTPATIENT SERVICES | Facility: HOSPITAL | Age: 87
LOS: 1 days | End: 2022-07-25
Payer: MEDICARE

## 2022-07-25 ENCOUNTER — RESULT REVIEW (OUTPATIENT)
Age: 87
End: 2022-07-25

## 2022-07-25 VITALS
RESPIRATION RATE: 16 BRPM | OXYGEN SATURATION: 99 % | TEMPERATURE: 97 F | DIASTOLIC BLOOD PRESSURE: 68 MMHG | SYSTOLIC BLOOD PRESSURE: 151 MMHG | HEART RATE: 68 BPM

## 2022-07-25 DIAGNOSIS — Z95.5 PRESENCE OF CORONARY ANGIOPLASTY IMPLANT AND GRAFT: Chronic | ICD-10-CM

## 2022-07-25 DIAGNOSIS — Z98.890 OTHER SPECIFIED POSTPROCEDURAL STATES: Chronic | ICD-10-CM

## 2022-07-25 DIAGNOSIS — C67.9 MALIGNANT NEOPLASM OF BLADDER, UNSPECIFIED: ICD-10-CM

## 2022-07-25 DIAGNOSIS — Z98.41 CATARACT EXTRACTION STATUS, RIGHT EYE: Chronic | ICD-10-CM

## 2022-07-25 DIAGNOSIS — Z90.5 ACQUIRED ABSENCE OF KIDNEY: Chronic | ICD-10-CM

## 2022-07-25 DIAGNOSIS — D49.4 NEOPLASM OF UNSPECIFIED BEHAVIOR OF BLADDER: ICD-10-CM

## 2022-07-25 LAB — SARS-COV-2 N GENE NPH QL NAA+PROBE: NOT DETECTED

## 2022-07-25 PROCEDURE — 52235 CYSTOSCOPY AND TREATMENT: CPT

## 2022-07-25 PROCEDURE — C9399: CPT

## 2022-07-25 PROCEDURE — 88305 TISSUE EXAM BY PATHOLOGIST: CPT

## 2022-07-25 PROCEDURE — 88305 TISSUE EXAM BY PATHOLOGIST: CPT | Mod: 26

## 2022-07-25 RX ORDER — FENTANYL CITRATE 50 UG/ML
25 INJECTION INTRAVENOUS
Refills: 0 | Status: DISCONTINUED | OUTPATIENT
Start: 2022-07-25 | End: 2022-07-25

## 2022-07-25 RX ORDER — HYDROMORPHONE HYDROCHLORIDE 2 MG/ML
0.25 INJECTION INTRAMUSCULAR; INTRAVENOUS; SUBCUTANEOUS
Refills: 0 | Status: DISCONTINUED | OUTPATIENT
Start: 2022-07-25 | End: 2022-07-25

## 2022-07-25 RX ORDER — ONDANSETRON 8 MG/1
4 TABLET, FILM COATED ORAL ONCE
Refills: 0 | Status: DISCONTINUED | OUTPATIENT
Start: 2022-07-25 | End: 2022-07-25

## 2022-07-25 NOTE — ASU DISCHARGE PLAN (ADULT/PEDIATRIC) - ASU DC SPECIAL INSTRUCTIONSFT
Follow up with Dr Lim within 2 weeks in the office.  Take tylenol as needed for pain control.  No need for antibiotics.  Resume eliquis in 3 days.

## 2022-07-25 NOTE — ASU DISCHARGE PLAN (ADULT/PEDIATRIC) - HAVE YOU HAD A FIRST COVID-19 BOOSTER?
SUBJECTIVE:  Here today for ear pain  Patient ID: Parish Correa is a 21 y.o. female. HPI:   Otalgia    There is pain in both ears. This is a new problem. The current episode started in the past 7 days. The problem has been gradually worsening. The maximum temperature recorded prior to her arrival was 100.4 - 100.9 F. The fever has been present for 1 to 2 days. Associated symptoms include coughing, headaches, hearing loss and a sore throat. Pertinent negatives include no ear discharge. Treatments tried: robitussin, cold and flu Ibuprofen. The treatment provided no relief. There is no history of a chronic ear infection. Pharyngitis   Associated symptoms include coughing, a fever, headaches and a sore throat. Past Medical History:   Diagnosis Date    Anxiety and depression 1/30/2019    Broken arm     MRSA (methicillin resistant staph aureus) culture positive 08/2014    Pericarditis 2000    hx      Prior to Visit Medications    Medication Sig Taking?  Authorizing Provider   Norgestim-Eth Estrad Triphasic (ORTHO TRI-CYCLEN LO) 0.18/0.215/0.25 MG-25 MCG TABS 1 po daily Yes [facility]   amoxicillin-clavulanate (AUGMENTIN) 875-125 MG per tablet Take 1 tablet by mouth 2 times daily for 10 days Yes [facility]   ondansetron (ZOFRAN-ODT) 8 MG TBDP disintegrating tablet 1/2-1 meltaway orally every 8hrs prn nausea and vomiting Yes [facility]   promethazine (PHENERGAN) 25 MG tablet Take 1 tablet by mouth every 6 hours as needed for Nausea Yes [facility]   PARoxetine (PAXIL CR) 25 MG extended release tablet Take 1 tablet by mouth every morning Yes [facility]   fluticasone (FLONASE) 50 MCG/ACT nasal spray 2 sprays by Nasal route daily as needed  Yes Historical Provider, MD   valACYclovir (VALTREX) 1 g tablet  Yes Historical Provider, MD   esomeprazole (NEXIUM) 40 MG delayed release capsule Take 1 capsule by mouth every morning (before breakfast) Yes Apple Babb APRN   dicyclomine (BENTYL) 20 MG tablet Take 1 tablet by mouth 3 times daily as needed (abdominal spasms) Yes [facility]     No Known Allergies    Review of Systems   Constitutional: Positive for fever. HENT: Positive for ear pain, hearing loss and sore throat. Negative for ear discharge. Respiratory: Positive for cough. Neurological: Positive for headaches. OBJECTIVE:    Physical Exam   Constitutional: She is oriented to person, place, and time. She appears well-developed and well-nourished. HENT:   Head: Normocephalic and atraumatic. Right Ear: External ear normal. No tenderness. Tympanic membrane is not injected and not bulging. Left Ear: External ear normal. No tenderness. Tympanic membrane is not injected and not bulging. Nose: Nose normal. No rhinorrhea. Mouth/Throat: Oropharynx is clear and moist. No posterior oropharyngeal edema, posterior oropharyngeal erythema or tonsillar abscesses. Eyes: Conjunctivae are normal.   Neck: Normal range of motion. Neck supple. Cardiovascular: Normal rate, regular rhythm and normal heart sounds. Pulmonary/Chest: Effort normal and breath sounds normal.   Abdominal: Soft. Bowel sounds are normal.   Lymphadenopathy:        Head (right side): Submental, submandibular, tonsillar and preauricular adenopathy present. Head (left side): Submental, submandibular, tonsillar and preauricular adenopathy present. She has no cervical adenopathy. Neurological: She is alert and oriented to person, place, and time. Skin: Skin is warm and dry. Psychiatric: She has a normal mood and affect. /82 (Site: Left Upper Arm, Position: Sitting, Cuff Size: Large Adult)   Pulse 71   Temp 98.3 °F (36.8 °C) (Oral)   Resp 20   Wt 210 lb (95.3 kg)   SpO2 99%   BMI 34.95 kg/m²      ASSESSMENT:      ICD-10-CM    1. Lymphadenopathy of head and neck R59.1 amoxicillin-clavulanate (AUGMENTIN) 875-125 MG per tablet   2.  Encounter for initial prescription of contraceptive pills Z30.011 Norgestim-Eth Estrad Triphasic (ORTHO TRI-CYCLEN LO) 0.18/0.215/0.25 MG-25 MCG TABS       PLAN:    Zoë Ford: Otalgia (bilateral ear pain since Last Monday / swollen lymph nodes ); Pharyngitis (throat is sore , cant swallow); and Contraception (needs refills)    Tylenol for fever  Push fluids. RTC for no improvement. Yes

## 2022-07-25 NOTE — ASU PREOP CHECKLIST - ANTIBIOTIC
Subjective:      Rigoberto Adames is a 51 y.o. female who presents with Medication Refill and Otalgia (left)            Patient here for follow-up of a left sided otalgia, diabetes, hyperlipidemia, hypertension, neuropathy and cardiomyopathy.    Today her blood pressure is elevated due to her not taking her medications in the last 2 weeks. Patient states that she ran out of her medications but did not think of calling the pharmacy to have her medications refilled. She states she knew she had an appointment today and decided to wait until she was seen today to have her medications refilled. She is not having any chest pain, no shortness of breath, no other neurologic changes. She has been advised to go to the emergency room if she should have any of these symptoms or any other changes in her symptoms. She will check her blood pressures at home after she has restarted her medications and keep notes.    Will also have her restart her diabetic medications. A A1c, microalbumin creatinine ratio and a complete metabolic panel have been ordered. Will have her check her feet daily for any skin breakdown or changes in sensation. She has also been reminded that she needs to check her blood sugars daily since she is on insulin. She recently had a diabetic retinal exam, and has a follow-up in 3 months. We'll continue to follow.    Will have her continue to use her cholesterol medications as directed. She is not having any darkening of her urine or unusual muscle aches or pains. Will have her continue to watch her diet and avoid fatty and fried foods. Also encouraged her to increase her fiber intake and exercise as tolerated.  She will also continue to follow-up with her cardiologist for assistance in management of her cardiomyopathy. She will continue to take her medications as previously prescribed. We'll continue to follow.     Current medications, allergies, and problem list reviewed with patient and updated in  "EPIC.        Otalgia    There is pain in the left ear. This is a new problem. The current episode started in the past 7 days. The problem occurs constantly. The problem has been gradually worsening. Maximum temperature: feels feverish. The pain is moderate. Associated symptoms include coughing, rhinorrhea and a sore throat. Pertinent negatives include no abdominal pain, diarrhea, ear discharge, headaches, hearing loss, neck pain, rash or vomiting. Treatments tried: will have her try nasal saline and amoxicillin for worsened sxs, ER precautions given.       Review of Systems   Constitutional: Positive for fever. Negative for chills.   HENT: Positive for congestion, ear pain, rhinorrhea and sore throat. Negative for ear discharge and hearing loss.    Respiratory: Positive for cough. Negative for sputum production and shortness of breath.    Cardiovascular: Negative for chest pain and palpitations.   Gastrointestinal: Negative for abdominal pain, diarrhea and vomiting.   Musculoskeletal: Positive for back pain and joint pain. Negative for neck pain.   Skin: Negative for rash.   Neurological: Positive for tingling and focal weakness. Negative for tremors, sensory change, speech change and headaches.   Psychiatric/Behavioral: Negative for depression, hallucinations, substance abuse and suicidal ideas.          Objective:     Vitals:    01/16/18 1025   BP: (!) 165/100   Pulse: 88   Resp: 16   Temp: 36.5 °C (97.7 °F)   SpO2: 98%   Weight: 54.4 kg (120 lb)   Height: 1.702 m (5' 7\")          Physical Exam   Constitutional: She is oriented to person, place, and time. She appears well-developed and well-nourished.   BMI 18   HENT:   Head: Normocephalic and atraumatic.   Congestion, TM erythematous on L and slight dull on R   Eyes: Conjunctivae and EOM are normal. Pupils are equal, round, and reactive to light.   Neck: Normal range of motion. Neck supple. No thyromegaly present.   Cardiovascular: Normal rate, regular rhythm and " normal heart sounds.  Exam reveals no friction rub.    No murmur heard.  Pulmonary/Chest: Effort normal and breath sounds normal. No respiratory distress. She has no wheezes. She has no rales.   Abdominal: Soft. Bowel sounds are normal. She exhibits no distension. There is no tenderness.   Lymphadenopathy:     She has no cervical adenopathy.   Neurological: She is alert and oriented to person, place, and time.   Weak lower extremities    Skin: Skin is warm and dry.   Psychiatric: She has a normal mood and affect. Her behavior is normal.   Nursing note and vitals reviewed.              Assessment/Plan:     1. HOCM (hypertrophic obstructive cardiomyopathy) (CMS-HCC)  Will have her continue to take her medications as directed by her cardiologist. We'll continue to follow.  - metoprolol (LOPRESSOR) 50 MG Tab; Take 1 Tab by mouth 2 times a day.  Dispense: 180 Tab; Refill: 3    2. Hypertension, benign  Will have her continue to take medications as directed. She has been advised to monitor blood pressure at home and keep notes. If blood pressure elevated or having symptoms of CP, SOB or neurologic changes to go to the er.    - metoprolol (LOPRESSOR) 50 MG Tab; Take 1 Tab by mouth 2 times a day.  Dispense: 180 Tab; Refill: 3    3. DM (diabetes mellitus) with complications (CMS-HCC)  Will have her continue to take her medications as directed. An A1c, microalbumin creatinine ratio and a complete metabolic panel have been ordered. Will have her check her feet daily for any skin breakdown or changes in sensation. We'll continue to follow.  - metformin (GLUCOPHAGE) 500 MG Tab; Take 2 Tabs by mouth 2 times a day, with meals.  Dispense: 360 Tab; Refill: 3    4. DM (diabetes mellitus), secondary, uncontrolled, w/neurologic complic (CMS-HCC)  See above plan.  - insulin glargine (BASAGLAR KWIKPEN) 100 UNIT/ML Solution Pen-injector injection; Inject 25 Units as instructed every evening.  Dispense: 10 PEN; Refill: 3  - Insulin Pen  Needle 33G X 5 MM Misc; 1 Device by Subdermal route every day.  Dispense: 30 Each; Refill: 11  - gabapentin (NEURONTIN) 800 MG tablet; Take 1 Tab by mouth 3 times a day.  Dispense: 90 Tab; Refill: 3    5. Cirrhosis of liver with ascites, unspecified hepatic cirrhosis type (CMS-HCC)  Will have her continue to follow-up with her gastroenterologist as directed. We'll continue to follow.  - gabapentin (NEURONTIN) 800 MG tablet; Take 1 Tab by mouth 3 times a day.  Dispense: 90 Tab; Refill: 3  - spironolactone (ALDACTONE) 25 MG Tab; Take 1 Tab by mouth every day.  Dispense: 30 Tab; Refill: 3    6. Hyperlipidemia associated with type 2 diabetes mellitus (CMS-HCC)  Will have her continues taking her medication as directed. She has been advised to increase the fibers in his diet, avoid fatty/fried foods and try fish oil supplements if not allergic to seafood. Also advised to exercise as tolerated.     - atorvastatin (LIPITOR) 20 MG Tab; Take 1 Tab by mouth every bedtime.  Dispense: 30 Tab; Refill: 3       yes

## 2022-07-25 NOTE — ASU PATIENT PROFILE, ADULT - FALL HARM RISK - HARM RISK INTERVENTIONS

## 2022-07-28 LAB — SURGICAL PATHOLOGY STUDY: SIGNIFICANT CHANGE UP

## 2022-08-09 NOTE — H&P PST ADULT - NSANTHOSAYNRD_GEN_A_CORE
Spontaneous, unlabored and symmetrical No. VIC screening performed.  STOP BANG Legend: 0-2 = LOW Risk; 3-4 = INTERMEDIATE Risk; 5-8 = HIGH Risk

## 2022-10-29 NOTE — PHARMACOTHERAPY INTERVENTION NOTE - INTERVENTION TYPE MED REC
Problem: Pain - Standard  Goal: Alleviation of pain or a reduction in pain to the patient’s comfort goal  Outcome: Progressing     Problem: Altered Physiologic Condition  Goal: Patient physiologically stable as evidenced by normal lochia, palpable uterine involution and vitals within normal limits  Outcome: Progressing     The patient is Stable - Low risk of patient condition declining or worsening    Shift Goals  Clinical Goals: pain control/ maintain firm fundus with scant to light bleeding    Progress made toward(s) clinical / shift goals:  Patient requests to call for pain medication when needed. Patient aware of available prn medication and available nonpharmacologic pain intervention. Patient able to care for self and infant at current pain level.  Fundus firm. Lochia light. Patient educated to call if saturating a pad in more than hour or for large clots.      Patient is not progressing towards the following goals:       Med Rec - Admission

## 2022-11-02 ENCOUNTER — OUTPATIENT (OUTPATIENT)
Dept: OUTPATIENT SERVICES | Facility: HOSPITAL | Age: 87
LOS: 1 days | End: 2022-11-02
Payer: MEDICARE

## 2022-11-02 ENCOUNTER — APPOINTMENT (OUTPATIENT)
Dept: UROLOGY | Facility: CLINIC | Age: 87
End: 2022-11-02

## 2022-11-02 VITALS
WEIGHT: 145 LBS | SYSTOLIC BLOOD PRESSURE: 171 MMHG | BODY MASS INDEX: 22.76 KG/M2 | DIASTOLIC BLOOD PRESSURE: 61 MMHG | HEIGHT: 67 IN | HEART RATE: 69 BPM | TEMPERATURE: 97.3 F

## 2022-11-02 DIAGNOSIS — Z95.5 PRESENCE OF CORONARY ANGIOPLASTY IMPLANT AND GRAFT: Chronic | ICD-10-CM

## 2022-11-02 DIAGNOSIS — Z90.5 ACQUIRED ABSENCE OF KIDNEY: Chronic | ICD-10-CM

## 2022-11-02 DIAGNOSIS — R35.0 FREQUENCY OF MICTURITION: ICD-10-CM

## 2022-11-02 DIAGNOSIS — Z98.41 CATARACT EXTRACTION STATUS, RIGHT EYE: Chronic | ICD-10-CM

## 2022-11-02 DIAGNOSIS — Z98.890 OTHER SPECIFIED POSTPROCEDURAL STATES: Chronic | ICD-10-CM

## 2022-11-02 PROCEDURE — 88112 CYTOPATH CELL ENHANCE TECH: CPT | Mod: 26

## 2022-11-02 PROCEDURE — 52000 CYSTOURETHROSCOPY: CPT

## 2022-11-04 LAB — URINE CYTOLOGY: NORMAL

## 2022-11-09 DIAGNOSIS — C67.9 MALIGNANT NEOPLASM OF BLADDER, UNSPECIFIED: ICD-10-CM

## 2022-11-13 ENCOUNTER — TRANSCRIPTION ENCOUNTER (OUTPATIENT)
Age: 87
End: 2022-11-13

## 2022-11-25 NOTE — CONSULT NOTE ADULT - PROBLEM SELECTOR RECOMMENDATION 4
Rec'd DOS 11/23/2022 final result MRI Brain W/WO IV Contrast. Endorsed to nursing. -S/p stent. Further management as per urology. -S/p stent.   -On Zosyn. Received vanco x1. Further management as per urology.

## 2022-12-08 NOTE — ED PROVIDER NOTE - PRESENTATION SUGGESTIVE OF:
Date of Service: 12/06/2022    SLEEP CENTER FOLLOWUP     A 63-year-old white male with history of moderate sleep-disordered breathing with AHI 21.4, 27.6 supine with oxygen desaturation to 81% based on polysomnogram 06/24/2022.  He has coexistent atrial fib/flutter and underwent ablation in the recent past.    He has completed a trial CPAP range of 5-16 cm of water pressure. Download data 09/29/2022-11/27/2022 reveals nightly usage an average of 8 hours per night.  Delivered pressure ranged from 6 to 8.9 cm of water pressure.  Residual AHI is 0.4.  Mask leak was minimal.      Upon discussion, he indicates that he is feeling much better.  Uncertain how much to attribute to the ablation versus CPAP.  He does raise some concerns about traveling with his CPAP device.    IMPRESSION:    Obstructive sleep apnea.  The patient is compliant with auto CPAP deriving benefit.      PLAN:   1.  Continue current settings of 5-16 cm of water pressure.   2.  Discussion regarding alternative/adjunctive treatments were discussed.  He may consider a dental appliance option for when he is out of town.  He was provided a list of dentists.  3.  Discussion regarding hypoglossal nerve stimulator was held.  He is not interested at this point.    He will see me again in one year or sooner if problems arise.      Dictated By: FLAKITO Cartagena  Signing Provider: MD BAILEY Eddy/wan ([id number])   DD: 12/06/2022 2:05:33 PM TD: 12/08/2022 9:38:01 AM      Copy Sent To:  MD Sina Maya MD Suhail Q. Allaqaband, MD   Bacterial Etiology

## 2022-12-18 ENCOUNTER — NON-APPOINTMENT (OUTPATIENT)
Age: 87
End: 2022-12-18

## 2023-01-01 NOTE — PATIENT PROFILE ADULT - FUNCTIONAL ASSESSMENT - DAILY ACTIVITY 3.
3 = A little assistance I will START or STAY ON the medications listed below when I get home from the hospital:  None

## 2023-01-04 ENCOUNTER — APPOINTMENT (OUTPATIENT)
Dept: DERMATOLOGY | Facility: CLINIC | Age: 88
End: 2023-01-04
Payer: MEDICARE

## 2023-01-04 DIAGNOSIS — C44.42 SQUAMOUS CELL CARCINOMA OF SKIN OF SCALP AND NECK: ICD-10-CM

## 2023-01-04 PROCEDURE — 99214 OFFICE O/P EST MOD 30 MIN: CPT

## 2023-01-22 NOTE — ED ADULT TRIAGE NOTE - MODE OF ARRIVAL
Chief Complaint:    Abdominal pain    Subjective:    The patient is feeling better with less abdominal pain.  He is requiring hydrocodone but it controls his symptoms well.  He is tolerating a diet and having bowel function.    Objective:    Temp:  [97.2 °F (36.2 °C)-97.7 °F (36.5 °C)] 97.7 °F (36.5 °C)  Heart Rate:  [71-81] 71  Resp:  [14-21] 20  BP: (122-133)/(84-88) 122/87    Physical Exam  Constitutional:       Appearance: He is not ill-appearing or toxic-appearing.   Abdominal:      Comments: The patient is up and active so abdominal exam was not performed.   Neurological:      Mental Status: He is alert.   Psychiatric:         Behavior: Behavior is cooperative.         Results:    There are no new labs today.    Assessment/Plan:    The patient has abdominal pain related to a nonspecific enteritis.  He is clinically improving and ready for discharge home from a surgical standpoint.    Raghavendra Menchaca Jr., M.D.   Walk in

## 2023-02-03 ENCOUNTER — APPOINTMENT (OUTPATIENT)
Dept: UROLOGY | Facility: CLINIC | Age: 88
End: 2023-02-03
Payer: MEDICARE

## 2023-02-03 ENCOUNTER — OUTPATIENT (OUTPATIENT)
Dept: OUTPATIENT SERVICES | Facility: HOSPITAL | Age: 88
LOS: 1 days | End: 2023-02-03
Payer: MEDICARE

## 2023-02-03 VITALS — TEMPERATURE: 98 F | SYSTOLIC BLOOD PRESSURE: 171 MMHG | DIASTOLIC BLOOD PRESSURE: 56 MMHG | HEART RATE: 55 BPM

## 2023-02-03 DIAGNOSIS — Z98.41 CATARACT EXTRACTION STATUS, RIGHT EYE: Chronic | ICD-10-CM

## 2023-02-03 DIAGNOSIS — Z98.890 OTHER SPECIFIED POSTPROCEDURAL STATES: Chronic | ICD-10-CM

## 2023-02-03 DIAGNOSIS — Z95.5 PRESENCE OF CORONARY ANGIOPLASTY IMPLANT AND GRAFT: Chronic | ICD-10-CM

## 2023-02-03 DIAGNOSIS — Z90.5 ACQUIRED ABSENCE OF KIDNEY: Chronic | ICD-10-CM

## 2023-02-03 DIAGNOSIS — R35.0 FREQUENCY OF MICTURITION: ICD-10-CM

## 2023-02-03 PROCEDURE — 52000 CYSTOURETHROSCOPY: CPT

## 2023-02-03 PROCEDURE — 88112 CYTOPATH CELL ENHANCE TECH: CPT | Mod: 26

## 2023-02-05 LAB — BACTERIA UR CULT: NORMAL

## 2023-02-06 DIAGNOSIS — C67.9 MALIGNANT NEOPLASM OF BLADDER, UNSPECIFIED: ICD-10-CM

## 2023-02-06 LAB — URINE CYTOLOGY: NORMAL

## 2023-02-07 ENCOUNTER — APPOINTMENT (OUTPATIENT)
Dept: DERMATOLOGY | Facility: CLINIC | Age: 88
End: 2023-02-07
Payer: MEDICARE

## 2023-02-07 ENCOUNTER — NON-APPOINTMENT (OUTPATIENT)
Age: 88
End: 2023-02-07

## 2023-02-07 DIAGNOSIS — C44.329 SQUAMOUS CELL CARCINOMA OF SKIN OF OTHER PARTS OF FACE: ICD-10-CM

## 2023-02-07 PROCEDURE — 13132 CMPLX RPR F/C/C/M/N/AX/G/H/F: CPT

## 2023-02-07 PROCEDURE — 17311 MOHS 1 STAGE H/N/HF/G: CPT

## 2023-02-21 NOTE — CONSULT NOTE ADULT - CONSULT REQUESTED DATE/TIME
17-Jan-2022 08:19
19-Jan-2022 16:00
17-Jan-2022 13:52
17-Jan-2022 10:14
Yes - the patient is able to be screened

## 2023-04-04 ENCOUNTER — OUTPATIENT (OUTPATIENT)
Dept: OUTPATIENT SERVICES | Facility: HOSPITAL | Age: 88
LOS: 1 days | End: 2023-04-04
Payer: MEDICARE

## 2023-04-04 ENCOUNTER — APPOINTMENT (OUTPATIENT)
Dept: UROLOGY | Facility: CLINIC | Age: 88
End: 2023-04-04
Payer: MEDICARE

## 2023-04-04 DIAGNOSIS — Z95.5 PRESENCE OF CORONARY ANGIOPLASTY IMPLANT AND GRAFT: Chronic | ICD-10-CM

## 2023-04-04 DIAGNOSIS — R35.0 FREQUENCY OF MICTURITION: ICD-10-CM

## 2023-04-04 DIAGNOSIS — Z90.5 ACQUIRED ABSENCE OF KIDNEY: Chronic | ICD-10-CM

## 2023-04-04 DIAGNOSIS — Z98.890 OTHER SPECIFIED POSTPROCEDURAL STATES: Chronic | ICD-10-CM

## 2023-04-04 DIAGNOSIS — Z98.41 CATARACT EXTRACTION STATUS, RIGHT EYE: Chronic | ICD-10-CM

## 2023-04-04 PROCEDURE — 52000 CYSTOURETHROSCOPY: CPT

## 2023-04-04 PROCEDURE — 88112 CYTOPATH CELL ENHANCE TECH: CPT | Mod: 26

## 2023-04-05 DIAGNOSIS — C67.0 MALIGNANT NEOPLASM OF TRIGONE OF BLADDER: ICD-10-CM

## 2023-04-06 NOTE — ED ADULT NURSE NOTE - COVID-19 ORDERING FACILITY
Patient: Sarah Santos    Procedure Summary     Date: 04/06/23 Room / Location: [facility] OR 03 /  [facility] OR    Anesthesia Start: 0920 Anesthesia Stop: 1013    Procedure: CARPAL TUNNEL RELEASE RIGHT (Right: Wrist) Diagnosis:       Carpal tunnel syndrome, right      (Carpal tunnel syndrome, right [G56.01])    Surgeons: Chinedu Muniz MD Provider: Bobby Green MD    Anesthesia Type: general ASA Status: 3          Anesthesia Type: general    Vitals  No vitals data found for the desired time range.          Post Anesthesia Care and Evaluation    Patient location during evaluation: PACU  Patient participation: complete - patient participated  Level of consciousness: awake  Pain score: 0  Pain management: adequate    Airway patency: patent  Anesthetic complications: No anesthetic complications  PONV Status: none  Cardiovascular status: acceptable  Respiratory status: acceptable  Hydration status: acceptable      
MOO/NELSON/Penelope

## 2023-04-07 LAB — URINE CYTOLOGY: NORMAL

## 2023-04-14 NOTE — PATIENT PROFILE ADULT - PATIENT'S GENDER IDENTITY
Pt states that yesterday afternoon he had no taste, but the left side of his mouth feels weird \"pulled up\" pt unable to raise his eyebrow.  Pt states that he had an earache last night.  Van negative.    Attending Attestation (For Attendings USE Only)... Male

## 2023-05-22 ENCOUNTER — NON-APPOINTMENT (OUTPATIENT)
Age: 88
End: 2023-05-22

## 2023-06-05 ENCOUNTER — NON-APPOINTMENT (OUTPATIENT)
Age: 88
End: 2023-06-05

## 2023-06-07 DIAGNOSIS — N39.0 URINARY TRACT INFECTION, SITE NOT SPECIFIED: ICD-10-CM

## 2023-06-09 ENCOUNTER — OUTPATIENT (OUTPATIENT)
Dept: OUTPATIENT SERVICES | Facility: HOSPITAL | Age: 88
LOS: 1 days | End: 2023-06-09
Payer: MEDICARE

## 2023-06-09 ENCOUNTER — APPOINTMENT (OUTPATIENT)
Dept: UROLOGY | Facility: CLINIC | Age: 88
End: 2023-06-09
Payer: MEDICARE

## 2023-06-09 DIAGNOSIS — Z90.5 ACQUIRED ABSENCE OF KIDNEY: Chronic | ICD-10-CM

## 2023-06-09 DIAGNOSIS — Z95.5 PRESENCE OF CORONARY ANGIOPLASTY IMPLANT AND GRAFT: Chronic | ICD-10-CM

## 2023-06-09 DIAGNOSIS — Z98.41 CATARACT EXTRACTION STATUS, RIGHT EYE: Chronic | ICD-10-CM

## 2023-06-09 DIAGNOSIS — Z98.890 OTHER SPECIFIED POSTPROCEDURAL STATES: Chronic | ICD-10-CM

## 2023-06-09 DIAGNOSIS — R35.0 FREQUENCY OF MICTURITION: ICD-10-CM

## 2023-06-09 PROCEDURE — 99213 OFFICE O/P EST LOW 20 MIN: CPT

## 2023-06-09 PROCEDURE — 76775 US EXAM ABDO BACK WALL LIM: CPT

## 2023-06-09 PROCEDURE — 76775 US EXAM ABDO BACK WALL LIM: CPT | Mod: 26

## 2023-06-09 NOTE — ASSESSMENT
[FreeTextEntry1] : suspect UTI - will treat empirically pending culture.\par if negative will move up the cystoscoy\par warned him may pass clots next few days.

## 2023-06-09 NOTE — HISTORY OF PRESENT ILLNESS
[FreeTextEntry1] : Seen initially for evaluation of hematuria. He noted dark - almost coffee colored urine for a day. No redness or clots. Saw his PCP who checked urine and told him he had significant microscopic hematuria. No associated back or flank pain. Likewise no dysuria, increased frequency or urgency or other voiding symptoms. 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 \par \par S/p BT on small tumor. - complicated by returning anuric. Noted to have a stone in mid ureter so stented emergently. Once his obstructive uropathy resolved went home. Did ok for a few days but ended back in ER in retention - 300cc PVR.\par no retention before. had him stay on tamsulosin and passed TOV.\par \par now s/p ureteroscopy laser lithotripsy of the stone - stent on string.\par voiding baseline on the Flomax so stopped.\par doing well - in fact voiding better than better.\par ULS today no stones or hydro \par \par 6/23 - had called earlier in week with gross hematuria with clots, starting over the weekend. No back or flank pain UTI symptoms or fevers.\par had dropped off urine for culture but pending \par ULS  - upper tracts Ok - some clot in bladder. \par urine today cloudy - ? UTI

## 2023-06-12 ENCOUNTER — NON-APPOINTMENT (OUTPATIENT)
Age: 88
End: 2023-06-12

## 2023-06-12 DIAGNOSIS — R31.0 GROSS HEMATURIA: ICD-10-CM

## 2023-06-12 LAB — BACTERIA UR CULT: ABNORMAL

## 2023-07-11 NOTE — ASU PREOP CHECKLIST - NOTHING BY MOUTH SINCE
Writer attempted to contact patient to discuss and possibly reschedule their 7/13/2023 appointment with DEBI Leslie. Patient is scheduled for right knee and left hip pain. Gela will only see patient for their right knee. Patient can keep their 7/13/2023 appointment with Gela for right knee pain but will have to schedule with Dr. Colon, Dr. Khan or Non-op for their left hip pain. Patient did not answer and a voice message was left.     Thank you.    25-Jul-2022 06:00

## 2023-08-08 NOTE — ASU PATIENT PROFILE, ADULT - NS PRO ABUSE SCREEN AFRAID ANYONE YN
Quality 130: Documentation Of Current Medications In The Medical Record: Current Medications Documented Detail Level: Detailed Quality 110: Preventive Care And Screening: Influenza Immunization: Influenza Immunization not Administered because Patient Refused. no

## 2023-08-30 NOTE — H&P PST ADULT - NEUROLOGICAL
negative detailed exam Topical Ketoconazole Counseling: Patient counseled that this medication may cause skin irritation or allergic reactions.  In the event of skin irritation, the patient was advised to reduce the amount of the drug applied or use it less frequently.   The patient verbalized understanding of the proper use and possible adverse effects of ketoconazole.  All of the patient's questions and concerns were addressed.

## 2023-10-06 NOTE — PATIENT PROFILE ADULT - FUNCTIONAL ASSESSMENT - DAILY ACTIVITY SECTION LABEL
LMP 8/31/23- 5w1d  Detailed message left on voicemail for patient to call back with any questions or concerns. Termination resources sent by [employer]. .

## 2023-10-11 ENCOUNTER — APPOINTMENT (OUTPATIENT)
Dept: UROLOGY | Facility: CLINIC | Age: 88
End: 2023-10-11
Payer: MEDICARE

## 2023-10-11 ENCOUNTER — OUTPATIENT (OUTPATIENT)
Dept: OUTPATIENT SERVICES | Facility: HOSPITAL | Age: 88
LOS: 1 days | End: 2023-10-11
Payer: MEDICARE

## 2023-10-11 DIAGNOSIS — Z98.890 OTHER SPECIFIED POSTPROCEDURAL STATES: Chronic | ICD-10-CM

## 2023-10-11 DIAGNOSIS — R35.0 FREQUENCY OF MICTURITION: ICD-10-CM

## 2023-10-11 DIAGNOSIS — Z98.41 CATARACT EXTRACTION STATUS, RIGHT EYE: Chronic | ICD-10-CM

## 2023-10-11 DIAGNOSIS — Z95.5 PRESENCE OF CORONARY ANGIOPLASTY IMPLANT AND GRAFT: Chronic | ICD-10-CM

## 2023-10-11 DIAGNOSIS — Z90.5 ACQUIRED ABSENCE OF KIDNEY: Chronic | ICD-10-CM

## 2023-10-11 PROCEDURE — 52000 CYSTOURETHROSCOPY: CPT

## 2023-10-12 DIAGNOSIS — C67.9 MALIGNANT NEOPLASM OF BLADDER, UNSPECIFIED: ICD-10-CM

## 2023-10-12 LAB — URINE CYTOLOGY: NORMAL

## 2023-10-13 LAB — BACTERIA UR CULT: NORMAL

## 2023-10-23 ENCOUNTER — NON-APPOINTMENT (OUTPATIENT)
Age: 88
End: 2023-10-23

## 2023-11-06 ENCOUNTER — NON-APPOINTMENT (OUTPATIENT)
Age: 88
End: 2023-11-06

## 2023-11-09 ENCOUNTER — NON-APPOINTMENT (OUTPATIENT)
Age: 88
End: 2023-11-09

## 2023-12-14 ENCOUNTER — INPATIENT (INPATIENT)
Facility: HOSPITAL | Age: 88
LOS: 3 days | Discharge: HOME | End: 2023-12-18
Attending: INTERNAL MEDICINE | Admitting: INTERNAL MEDICINE
Payer: MEDICARE

## 2023-12-14 VITALS
DIASTOLIC BLOOD PRESSURE: 74 MMHG | SYSTOLIC BLOOD PRESSURE: 145 MMHG | RESPIRATION RATE: 17 BRPM | TEMPERATURE: 98 F | HEART RATE: 75 BPM | OXYGEN SATURATION: 100 %

## 2023-12-14 DIAGNOSIS — Z90.5 ACQUIRED ABSENCE OF KIDNEY: Chronic | ICD-10-CM

## 2023-12-14 DIAGNOSIS — I48.91 UNSPECIFIED ATRIAL FIBRILLATION: ICD-10-CM

## 2023-12-14 DIAGNOSIS — R06.02 SHORTNESS OF BREATH: ICD-10-CM

## 2023-12-14 DIAGNOSIS — I25.10 ATHEROSCLEROTIC HEART DISEASE OF NATIVE CORONARY ARTERY WITHOUT ANGINA PECTORIS: ICD-10-CM

## 2023-12-14 DIAGNOSIS — Z98.890 OTHER SPECIFIED POSTPROCEDURAL STATES: Chronic | ICD-10-CM

## 2023-12-14 DIAGNOSIS — Z98.41 CATARACT EXTRACTION STATUS, RIGHT EYE: Chronic | ICD-10-CM

## 2023-12-14 DIAGNOSIS — E11.9 TYPE 2 DIABETES MELLITUS WITHOUT COMPLICATIONS: ICD-10-CM

## 2023-12-14 DIAGNOSIS — I50.23 ACUTE ON CHRONIC SYSTOLIC (CONGESTIVE) HEART FAILURE: ICD-10-CM

## 2023-12-14 DIAGNOSIS — D64.9 ANEMIA, UNSPECIFIED: ICD-10-CM

## 2023-12-14 DIAGNOSIS — E78.5 HYPERLIPIDEMIA, UNSPECIFIED: ICD-10-CM

## 2023-12-14 DIAGNOSIS — R74.01 ELEVATION OF LEVELS OF LIVER TRANSAMINASE LEVELS: ICD-10-CM

## 2023-12-14 DIAGNOSIS — R82.90 UNSPECIFIED ABNORMAL FINDINGS IN URINE: ICD-10-CM

## 2023-12-14 DIAGNOSIS — N17.9 ACUTE KIDNEY FAILURE, UNSPECIFIED: ICD-10-CM

## 2023-12-14 DIAGNOSIS — Z90.5 ACQUIRED ABSENCE OF KIDNEY: ICD-10-CM

## 2023-12-14 DIAGNOSIS — Z95.5 PRESENCE OF CORONARY ANGIOPLASTY IMPLANT AND GRAFT: Chronic | ICD-10-CM

## 2023-12-14 LAB
ALBUMIN SERPL ELPH-MCNC: 2.7 G/DL — LOW (ref 3.3–5)
ALBUMIN SERPL ELPH-MCNC: 2.7 G/DL — LOW (ref 3.3–5)
ALP SERPL-CCNC: 155 U/L — HIGH (ref 40–120)
ALP SERPL-CCNC: 155 U/L — HIGH (ref 40–120)
ALT FLD-CCNC: 85 U/L — HIGH (ref 4–41)
ALT FLD-CCNC: 85 U/L — HIGH (ref 4–41)
ANION GAP SERPL CALC-SCNC: 12 MMOL/L — SIGNIFICANT CHANGE UP (ref 7–14)
ANION GAP SERPL CALC-SCNC: 12 MMOL/L — SIGNIFICANT CHANGE UP (ref 7–14)
APPEARANCE UR: ABNORMAL
APPEARANCE UR: ABNORMAL
APTT BLD: 35.2 SEC — SIGNIFICANT CHANGE UP (ref 24.5–35.6)
APTT BLD: 35.2 SEC — SIGNIFICANT CHANGE UP (ref 24.5–35.6)
AST SERPL-CCNC: 86 U/L — HIGH (ref 4–40)
AST SERPL-CCNC: 86 U/L — HIGH (ref 4–40)
BACTERIA # UR AUTO: ABNORMAL /HPF
BACTERIA # UR AUTO: ABNORMAL /HPF
BASOPHILS # BLD AUTO: 0.02 K/UL — SIGNIFICANT CHANGE UP (ref 0–0.2)
BASOPHILS # BLD AUTO: 0.02 K/UL — SIGNIFICANT CHANGE UP (ref 0–0.2)
BASOPHILS NFR BLD AUTO: 0.5 % — SIGNIFICANT CHANGE UP (ref 0–2)
BASOPHILS NFR BLD AUTO: 0.5 % — SIGNIFICANT CHANGE UP (ref 0–2)
BILIRUB SERPL-MCNC: 0.3 MG/DL — SIGNIFICANT CHANGE UP (ref 0.2–1.2)
BILIRUB SERPL-MCNC: 0.3 MG/DL — SIGNIFICANT CHANGE UP (ref 0.2–1.2)
BILIRUB UR-MCNC: NEGATIVE — SIGNIFICANT CHANGE UP
BILIRUB UR-MCNC: NEGATIVE — SIGNIFICANT CHANGE UP
BLD GP AB SCN SERPL QL: NEGATIVE — SIGNIFICANT CHANGE UP
BLD GP AB SCN SERPL QL: NEGATIVE — SIGNIFICANT CHANGE UP
BUN SERPL-MCNC: 59 MG/DL — HIGH (ref 7–23)
BUN SERPL-MCNC: 59 MG/DL — HIGH (ref 7–23)
CALCIUM SERPL-MCNC: 8.2 MG/DL — LOW (ref 8.4–10.5)
CALCIUM SERPL-MCNC: 8.2 MG/DL — LOW (ref 8.4–10.5)
CHLORIDE SERPL-SCNC: 106 MMOL/L — SIGNIFICANT CHANGE UP (ref 98–107)
CHLORIDE SERPL-SCNC: 106 MMOL/L — SIGNIFICANT CHANGE UP (ref 98–107)
CO2 SERPL-SCNC: 20 MMOL/L — LOW (ref 22–31)
CO2 SERPL-SCNC: 20 MMOL/L — LOW (ref 22–31)
COLOR SPEC: YELLOW — SIGNIFICANT CHANGE UP
COLOR SPEC: YELLOW — SIGNIFICANT CHANGE UP
CREAT SERPL-MCNC: 3.01 MG/DL — HIGH (ref 0.5–1.3)
CREAT SERPL-MCNC: 3.01 MG/DL — HIGH (ref 0.5–1.3)
DIFF PNL FLD: ABNORMAL
DIFF PNL FLD: ABNORMAL
EGFR: 19 ML/MIN/1.73M2 — LOW
EGFR: 19 ML/MIN/1.73M2 — LOW
EOSINOPHIL # BLD AUTO: 0.1 K/UL — SIGNIFICANT CHANGE UP (ref 0–0.5)
EOSINOPHIL # BLD AUTO: 0.1 K/UL — SIGNIFICANT CHANGE UP (ref 0–0.5)
EOSINOPHIL NFR BLD AUTO: 2.3 % — SIGNIFICANT CHANGE UP (ref 0–6)
EOSINOPHIL NFR BLD AUTO: 2.3 % — SIGNIFICANT CHANGE UP (ref 0–6)
GLUCOSE BLDC GLUCOMTR-MCNC: 135 MG/DL — HIGH (ref 70–99)
GLUCOSE BLDC GLUCOMTR-MCNC: 135 MG/DL — HIGH (ref 70–99)
GLUCOSE BLDC GLUCOMTR-MCNC: 138 MG/DL — HIGH (ref 70–99)
GLUCOSE BLDC GLUCOMTR-MCNC: 138 MG/DL — HIGH (ref 70–99)
GLUCOSE BLDC GLUCOMTR-MCNC: 255 MG/DL — HIGH (ref 70–99)
GLUCOSE BLDC GLUCOMTR-MCNC: 255 MG/DL — HIGH (ref 70–99)
GLUCOSE SERPL-MCNC: 263 MG/DL — HIGH (ref 70–99)
GLUCOSE SERPL-MCNC: 263 MG/DL — HIGH (ref 70–99)
GLUCOSE UR QL: 500 MG/DL
GLUCOSE UR QL: 500 MG/DL
HCT VFR BLD CALC: 23.8 % — LOW (ref 39–50)
HCT VFR BLD CALC: 23.8 % — LOW (ref 39–50)
HGB BLD-MCNC: 7.3 G/DL — LOW (ref 13–17)
HGB BLD-MCNC: 7.3 G/DL — LOW (ref 13–17)
IANC: 3.14 K/UL — SIGNIFICANT CHANGE UP (ref 1.8–7.4)
IANC: 3.14 K/UL — SIGNIFICANT CHANGE UP (ref 1.8–7.4)
IMM GRANULOCYTES NFR BLD AUTO: 0.5 % — SIGNIFICANT CHANGE UP (ref 0–0.9)
IMM GRANULOCYTES NFR BLD AUTO: 0.5 % — SIGNIFICANT CHANGE UP (ref 0–0.9)
INR BLD: 1.11 RATIO — SIGNIFICANT CHANGE UP (ref 0.85–1.18)
INR BLD: 1.11 RATIO — SIGNIFICANT CHANGE UP (ref 0.85–1.18)
KETONES UR-MCNC: NEGATIVE MG/DL — SIGNIFICANT CHANGE UP
KETONES UR-MCNC: NEGATIVE MG/DL — SIGNIFICANT CHANGE UP
LEUKOCYTE ESTERASE UR-ACNC: ABNORMAL
LEUKOCYTE ESTERASE UR-ACNC: ABNORMAL
LYMPHOCYTES # BLD AUTO: 0.52 K/UL — LOW (ref 1–3.3)
LYMPHOCYTES # BLD AUTO: 0.52 K/UL — LOW (ref 1–3.3)
LYMPHOCYTES # BLD AUTO: 12.2 % — LOW (ref 13–44)
LYMPHOCYTES # BLD AUTO: 12.2 % — LOW (ref 13–44)
MCHC RBC-ENTMCNC: 29.2 PG — SIGNIFICANT CHANGE UP (ref 27–34)
MCHC RBC-ENTMCNC: 29.2 PG — SIGNIFICANT CHANGE UP (ref 27–34)
MCHC RBC-ENTMCNC: 30.7 GM/DL — LOW (ref 32–36)
MCHC RBC-ENTMCNC: 30.7 GM/DL — LOW (ref 32–36)
MCV RBC AUTO: 95.2 FL — SIGNIFICANT CHANGE UP (ref 80–100)
MCV RBC AUTO: 95.2 FL — SIGNIFICANT CHANGE UP (ref 80–100)
MONOCYTES # BLD AUTO: 0.46 K/UL — SIGNIFICANT CHANGE UP (ref 0–0.9)
MONOCYTES # BLD AUTO: 0.46 K/UL — SIGNIFICANT CHANGE UP (ref 0–0.9)
MONOCYTES NFR BLD AUTO: 10.8 % — SIGNIFICANT CHANGE UP (ref 2–14)
MONOCYTES NFR BLD AUTO: 10.8 % — SIGNIFICANT CHANGE UP (ref 2–14)
NEUTROPHILS # BLD AUTO: 3.14 K/UL — SIGNIFICANT CHANGE UP (ref 1.8–7.4)
NEUTROPHILS # BLD AUTO: 3.14 K/UL — SIGNIFICANT CHANGE UP (ref 1.8–7.4)
NEUTROPHILS NFR BLD AUTO: 73.7 % — SIGNIFICANT CHANGE UP (ref 43–77)
NEUTROPHILS NFR BLD AUTO: 73.7 % — SIGNIFICANT CHANGE UP (ref 43–77)
NITRITE UR-MCNC: NEGATIVE — SIGNIFICANT CHANGE UP
NITRITE UR-MCNC: NEGATIVE — SIGNIFICANT CHANGE UP
NRBC # BLD: 0 /100 WBCS — SIGNIFICANT CHANGE UP (ref 0–0)
NRBC # BLD: 0 /100 WBCS — SIGNIFICANT CHANGE UP (ref 0–0)
NRBC # FLD: 0 K/UL — SIGNIFICANT CHANGE UP (ref 0–0)
NRBC # FLD: 0 K/UL — SIGNIFICANT CHANGE UP (ref 0–0)
NT-PROBNP SERPL-SCNC: 548 PG/ML — HIGH
NT-PROBNP SERPL-SCNC: 548 PG/ML — HIGH
PH UR: 6 — SIGNIFICANT CHANGE UP (ref 5–8)
PH UR: 6 — SIGNIFICANT CHANGE UP (ref 5–8)
PLATELET # BLD AUTO: 146 K/UL — LOW (ref 150–400)
PLATELET # BLD AUTO: 146 K/UL — LOW (ref 150–400)
POTASSIUM SERPL-MCNC: 4 MMOL/L — SIGNIFICANT CHANGE UP (ref 3.5–5.3)
POTASSIUM SERPL-MCNC: 4 MMOL/L — SIGNIFICANT CHANGE UP (ref 3.5–5.3)
POTASSIUM SERPL-SCNC: 4 MMOL/L — SIGNIFICANT CHANGE UP (ref 3.5–5.3)
POTASSIUM SERPL-SCNC: 4 MMOL/L — SIGNIFICANT CHANGE UP (ref 3.5–5.3)
PROT SERPL-MCNC: 6.1 G/DL — SIGNIFICANT CHANGE UP (ref 6–8.3)
PROT SERPL-MCNC: 6.1 G/DL — SIGNIFICANT CHANGE UP (ref 6–8.3)
PROT UR-MCNC: 300 MG/DL
PROT UR-MCNC: 300 MG/DL
PROTHROM AB SERPL-ACNC: 12.5 SEC — SIGNIFICANT CHANGE UP (ref 9.5–13)
PROTHROM AB SERPL-ACNC: 12.5 SEC — SIGNIFICANT CHANGE UP (ref 9.5–13)
RBC # BLD: 2.5 M/UL — LOW (ref 4.2–5.8)
RBC # BLD: 2.5 M/UL — LOW (ref 4.2–5.8)
RBC # FLD: 15.6 % — HIGH (ref 10.3–14.5)
RBC # FLD: 15.6 % — HIGH (ref 10.3–14.5)
RBC CASTS # UR COMP ASSIST: 15 /HPF — HIGH (ref 0–4)
RBC CASTS # UR COMP ASSIST: 15 /HPF — HIGH (ref 0–4)
RH IG SCN BLD-IMP: POSITIVE — SIGNIFICANT CHANGE UP
RH IG SCN BLD-IMP: POSITIVE — SIGNIFICANT CHANGE UP
SODIUM SERPL-SCNC: 138 MMOL/L — SIGNIFICANT CHANGE UP (ref 135–145)
SODIUM SERPL-SCNC: 138 MMOL/L — SIGNIFICANT CHANGE UP (ref 135–145)
SP GR SPEC: 1.02 — SIGNIFICANT CHANGE UP (ref 1–1.03)
SP GR SPEC: 1.02 — SIGNIFICANT CHANGE UP (ref 1–1.03)
TROPONIN T, HIGH SENSITIVITY RESULT: 81 NG/L — CRITICAL HIGH
TROPONIN T, HIGH SENSITIVITY RESULT: 81 NG/L — CRITICAL HIGH
UROBILINOGEN FLD QL: 0.2 MG/DL — SIGNIFICANT CHANGE UP (ref 0.2–1)
UROBILINOGEN FLD QL: 0.2 MG/DL — SIGNIFICANT CHANGE UP (ref 0.2–1)
WBC # BLD: 4.26 K/UL — SIGNIFICANT CHANGE UP (ref 3.8–10.5)
WBC # BLD: 4.26 K/UL — SIGNIFICANT CHANGE UP (ref 3.8–10.5)
WBC # FLD AUTO: 4.26 K/UL — SIGNIFICANT CHANGE UP (ref 3.8–10.5)
WBC # FLD AUTO: 4.26 K/UL — SIGNIFICANT CHANGE UP (ref 3.8–10.5)
WBC UR QL: >50 /HPF — HIGH (ref 0–5)
WBC UR QL: >50 /HPF — HIGH (ref 0–5)

## 2023-12-14 PROCEDURE — 74176 CT ABD & PELVIS W/O CONTRAST: CPT | Mod: 26,MA

## 2023-12-14 PROCEDURE — 99291 CRITICAL CARE FIRST HOUR: CPT

## 2023-12-14 PROCEDURE — 71046 X-RAY EXAM CHEST 2 VIEWS: CPT | Mod: 26

## 2023-12-14 RX ORDER — SODIUM CHLORIDE 9 MG/ML
1000 INJECTION, SOLUTION INTRAVENOUS
Refills: 0 | Status: DISCONTINUED | OUTPATIENT
Start: 2023-12-14 | End: 2023-12-18

## 2023-12-14 RX ORDER — DEXTROSE 50 % IN WATER 50 %
15 SYRINGE (ML) INTRAVENOUS ONCE
Refills: 0 | Status: DISCONTINUED | OUTPATIENT
Start: 2023-12-14 | End: 2023-12-18

## 2023-12-14 RX ORDER — FUROSEMIDE 40 MG
40 TABLET ORAL DAILY
Refills: 0 | Status: DISCONTINUED | OUTPATIENT
Start: 2023-12-14 | End: 2023-12-16

## 2023-12-14 RX ORDER — DEXTROSE 50 % IN WATER 50 %
25 SYRINGE (ML) INTRAVENOUS ONCE
Refills: 0 | Status: DISCONTINUED | OUTPATIENT
Start: 2023-12-14 | End: 2023-12-18

## 2023-12-14 RX ORDER — INSULIN LISPRO 100/ML
VIAL (ML) SUBCUTANEOUS
Refills: 0 | Status: DISCONTINUED | OUTPATIENT
Start: 2023-12-14 | End: 2023-12-18

## 2023-12-14 RX ORDER — GLUCAGON INJECTION, SOLUTION 0.5 MG/.1ML
1 INJECTION, SOLUTION SUBCUTANEOUS ONCE
Refills: 0 | Status: DISCONTINUED | OUTPATIENT
Start: 2023-12-14 | End: 2023-12-18

## 2023-12-14 RX ORDER — INSULIN GLARGINE 100 [IU]/ML
3 INJECTION, SOLUTION SUBCUTANEOUS AT BEDTIME
Refills: 0 | Status: DISCONTINUED | OUTPATIENT
Start: 2023-12-14 | End: 2023-12-18

## 2023-12-14 RX ORDER — AMIODARONE HYDROCHLORIDE 400 MG/1
200 TABLET ORAL DAILY
Refills: 0 | Status: DISCONTINUED | OUTPATIENT
Start: 2023-12-14 | End: 2023-12-18

## 2023-12-14 RX ORDER — TAMSULOSIN HYDROCHLORIDE 0.4 MG/1
0.4 CAPSULE ORAL AT BEDTIME
Refills: 0 | Status: DISCONTINUED | OUTPATIENT
Start: 2023-12-14 | End: 2023-12-18

## 2023-12-14 RX ORDER — DEXTROSE 50 % IN WATER 50 %
12.5 SYRINGE (ML) INTRAVENOUS ONCE
Refills: 0 | Status: DISCONTINUED | OUTPATIENT
Start: 2023-12-14 | End: 2023-12-18

## 2023-12-14 RX ADMIN — Medication 40 MILLIGRAM(S): at 17:32

## 2023-12-14 RX ADMIN — TAMSULOSIN HYDROCHLORIDE 0.4 MILLIGRAM(S): 0.4 CAPSULE ORAL at 23:00

## 2023-12-14 RX ADMIN — Medication 3: at 17:30

## 2023-12-14 RX ADMIN — INSULIN GLARGINE 3 UNIT(S): 100 INJECTION, SOLUTION SUBCUTANEOUS at 23:00

## 2023-12-14 NOTE — ED ADULT NURSE REASSESSMENT NOTE - NS ED NURSE REASSESS COMMENT FT1
pt urine collected, yellow in color. Pt awaiting first unit of PRBC.  Pts marcy Renae attending made aware, pt endorsed over to oncoming nurse.
report received from break coverage shift RN. Pt denies physical complaints at this time. Airway is patent, respirations are even and unlabored. Plan of care ongoing, safety maintained. Pending transport
Break RN: Pt is A&Ox4, resting in stretcher with no complaints at this time. Respirations even and unlabored, chest rise equal b/l. Sinus rhythm noted on cardiac monitor. VS as noted in flow sheets. Pt denies chest pain, SOB, fever, cough, chills, abdominal pain, N/V/D, h/a, dizziness, numbness/tingling or any urinary symptoms at this time. No acute distress noted. Safety maintained throughout.
Pt resting in room 25 comfortably. VS note post-transfusion reaction. Pt tolerated blood transfusion well. Pt denies physical complaints at this time. Airway is patent, respirations are even and unlabored. Plan of care ongoing, safety maintained.

## 2023-12-14 NOTE — H&P ADULT - NSHPREVIEWOFSYSTEMS_GEN_ALL_CORE
Gen: no loss of wt no loss of appetite  ENT: no dizziness no hearing loss  Ophth: no blurring of vision no loss of vision  Resp: No cough no sputum production see above HPI   CVS: No chest pain no palpitations no orthopnea  GI: no nausea, vomiting or diarrhea   : ? dysuria, + hematuria  Endo: no polyuria no excessive sweating  Neuro: no weakness no paresthesias  Heme: No petechiae no easy bruising  Msk: No joint pain no swelling  Skin: No rash no itching

## 2023-12-14 NOTE — ED ADULT NURSE NOTE - NSFALLRISKINTERV_ED_ALL_ED
Assistance OOB with selected safe patient handling equipment if applicable/Communicate fall risk and risk factors to all staff, patient, and family/Monitor gait and stability/Provide patient with walking aids/Provide visual cue: yellow wristband, yellow gown, etc/Reinforce activity limits and safety measures with patient and family/Call bell, personal items and telephone in reach/Instruct patient to call for assistance before getting out of bed/chair/stretcher/Non-slip footwear applied when patient is off stretcher/[place] to call system/Physically safe environment - no spills, clutter or unnecessary equipment/Purposeful Proactive Rounding/Room/bathroom lighting operational, light cord in reach Assistance OOB with selected safe patient handling equipment if applicable/Communicate fall risk and risk factors to all staff, patient, and family/Monitor gait and stability/Provide patient with walking aids/Provide visual cue: yellow wristband, yellow gown, etc/Reinforce activity limits and safety measures with patient and family/Call bell, personal items and telephone in reach/Instruct patient to call for assistance before getting out of bed/chair/stretcher/Non-slip footwear applied when patient is off stretcher/[place] to call system/Physically safe environment - no spills, clutter or unnecessary equipment/Purposeful Proactive Rounding/Room/bathroom lighting operational, light cord in reach

## 2023-12-14 NOTE — ED ADULT NURSE NOTE - NS ED NURSE DISCH DISPOSITION
Silver Nitrate Text: The wound bed was treated with silver nitrate after the biopsy was performed. Admitted

## 2023-12-14 NOTE — ED ADULT TRIAGE NOTE - NS ED NURSE BANDS TYPE
Outpatient Physical Therapy Ortho Treatment Note  [facility]     Patient Name: Michela Kovacs  : 1999  MRN: [id number]  Today's Date: 3/15/2023      Visit Date: 03/15/2023   Attendance:   Subjective improvement: n/a  Recert: 4/3/23  MD Appointment: TBD      Visit Dx:    ICD-10-CM ICD-9-CM   1. Dizziness  R42 780.4       Patient Active Problem List   Diagnosis   • Palpitations   • SOB (shortness of breath)   • Viral respiratory illness   • Gastroesophageal reflux disease        Past Medical History:   Diagnosis Date   • Allergic    • Constipation    • GERD (gastroesophageal reflux disease)    • Headache    • Heart murmur     9 months   • Ingrown toenail    • Migraine 2022   • Strep throat    • Urinary tract infection 2020        Past Surgical History:   Procedure Laterality Date   • AVULSION TOENAIL PLATE     • ENDOSCOPY N/A 2021    Procedure: ESOPHAGOGASTRODUODENOSCOPY;  Surgeon: Bambi Carmona MD;  Location: [facility] ENDOSCOPY;  Service: Gastroenterology;  Laterality: N/A;        PT Ortho     Row Name 03/15/23 1300       Subjective Pain    Post-Treatment Pain Level 2  -SS          User Key  (r) = Recorded By, (t) = Taken By, (c) = Cosigned By    Initials Name Provider Type    Deven Huntley PT, DPT, CHT Physical Therapist                       Vestibular Eval     Row Name 03/15/23 1300             Positional Testing    Positional Testing --  Deep Head Hang Test - dizziness, no nystagmus  -SS            User Key  (r) = Recorded By, (t) = Taken By, (c) = Cosigned By    Initials Name Provider Type    Deven Huntley PT, DPT, CHT Physical Therapist                   PT Assessment/Plan     Row Name 03/15/23 1300          PT Assessment    Functional Limitations Limitation in home management;Limitations in community activities;Limitations in functional capacity and performance;Performance in leisure activities;Performance in self-care  ADL;Performance in work activities  -EM     Impairments Pain;Joint mobility  -EM     Assessment Comments Pt ana tx well, multiple trigger points noted R>L side with improved muscle integrity noted post manual.  -EM     Rehab Potential Good  -EM     Patient/caregiver participated in establishment of treatment plan and goals Yes  -EM     Patient would benefit from skilled therapy intervention Yes  -EM        PT Plan    PT Frequency 2x/week  -EM     Predicted Duration of Therapy Intervention (PT) 3-4 weeks with further TBD  -EM     PT Plan Comments Cont manual to C-Spine. Add UT, LS, and SCM S. Issue HEP  -EM           User Key  (r) = Recorded By, (t) = Taken By, (c) = Cosigned By    Initials Name Provider Type    EM Earle Mccloud, PTA Physical Therapist Assistant                 Modalities     Row Name 03/15/23 1300             Moist Heat    MH Applied Yes  -EM      Location C-Spine  -EM      PT Moist Heat Minutes 10  -EM      MH Prior to Rx Yes  -EM            User Key  (r) = Recorded By, (t) = Taken By, (c) = Cosigned By    Initials Name Provider Type    EM Earle Mccloud PTA Physical Therapist Assistant               OP Exercises     Row Name 03/15/23 1346 03/15/23 1300          Subjective Comments    Subjective Comments -- Pt reports constant HA that is usually takes place on crown of head, between eyes or back of head. Pt reports she is doing better.  -EM        Subjective Pain    Able to rate subjective pain? -- yes  -EM     Pre-Treatment Pain Level -- 0  -EM     Post-Treatment Pain Level -- 2  -SS        Exercise 1    Exercise Name 1 Deep head hang maneuver  -SS MHP C-Spine  -EM     Cueing 1 Verbal;Tactile  -SS --     Sets 1 1  -SS --     Time 1 -- 10'  -EM        Exercise 2    Exercise Name 2 -- Manual  -EM     Time 2 -- 31'  -EM           User Key  (r) = Recorded By, (t) = Taken By, (c) = Cosigned By    Initials Name Provider Type    EM Earle Mccloud, PTA Physical Therapist Assistant    Deven Huntley  [place], PT, DPT, CHT Physical Therapist                         Manual Rx (last 36 hours)     Manual Treatments     Row Name 03/15/23 1300             Manual Rx 1    Manual Rx 1 Location B SCM, B UT, B Scalenes  -EM      Manual Rx 1 Type STM/TPR  -EM      Manual Rx 1 Duration 31'  -EM            User Key  (r) = Recorded By, (t) = Taken By, (c) = Cosigned By    Initials Name Provider Type    EM Earle Mccloud PTA Physical Therapist Assistant                 PT OP Goals     Row Name 03/15/23 1400 03/15/23 1300       PT Short Term Goals    STG Date to Achieve 04/03/23  -EM --    STG 1 Note  >/= 50% subjective improvement.  -EM --    STG 1 Progress Not Met  -EM --    STG 2 Negative BPPV testing.  -EM --    STG 2 Progress Not Met  -EM --       Long Term Goals    LTG Date to Achieve 05/15/23  -EM --    LTG 1 Independent with HEP/self-management.  -EM --    LTG 1 Progress Not Met  -EM --    LTG 2 Resolution of daily headaches.  -EM --    LTG 2 Progress Not Met  -EM --    LTG 3 Resume PLOF.  -EM --    LTG 3 Progress Not Met  -EM --       Time Calculation    PT Goal Re-Cert Due Date 04/03/23  -EM 04/03/23  -SS          User Key  (r) = Recorded By, (t) = Taken By, (c) = Cosigned By    Initials Name Provider Type    EM Earle Mccloud PTA Physical Therapist Assistant     Deven Andrews, PT, DPT, CHT Physical Therapist                               Time Calculation:   Start Time: 1259  Stop Time: 1347  Time Calculation (min): 48 min  PT Non-Billable Time (min): 10 min  Total Timed Code Minutes- PT: 38 minute(s)  Untimed Charges  PT Moist Heat Minutes: 10  Total Minutes  Untimed Charges Total Minutes: 10   Total Minutes: 10  Therapy Charges for Today     Code Description Service Date Service Provider Modifiers Qty    99974180446 HC PT THER PROC EA 15 MIN 3/15/2023 Earle Mccloud, PTA GP, CQ 2    01641749148 HC PT THER SUPP EA 15 MIN 3/15/2023 Earle Mccloud, PTA GP 1                    Earle Mccloud,  PTA  3/15/2023      Name band;

## 2023-12-14 NOTE — CONSULT NOTE ADULT - ASSESSMENT
87 male PMH of hypertension, hyperlipidemia, A-fib on Eliquis, CHF, CKD, CAD status post stents on aspirin, bladder cancer in remission, diabetes, TIA, left nephrectomy presenting with hematuria and shortness of breath.  Gross hematuria   Acute blood loss anemia   CKD stage 3b and baseline cre 1.7 (solitary kidney)and now close to 3.  Hx of new onset nephrotic syndrome       1 -Official  eval - Dr Lim et al;  I dont think that he has cystitis   2 Renal - No need for renal sono at present;  Trend renal function and start IV lasix for the LE edema   Check TP/cre ratio   3 CVS- Is off A/c at present (hx of afib)   4 Anemia- Blood xfusion in er     Sayed [facility]   [employer] [facility]   [phone]    87 male PMH of hypertension, hyperlipidemia, A-fib on Eliquis, CHF, CKD, CAD status post stents on aspirin, bladder cancer in remission, diabetes, TIA, left nephrectomy presenting with hematuria and shortness of breath.  Gross hematuria   Acute blood loss anemia   CKD stage 3b and baseline cre 1.7 (solitary kidney)and now close to 3.  Hx of new onset nephrotic syndrome       1 -Official  eval - Dr Lim et al;  I dont think that he has cystitis   2 Renal - No need for renal sono at present;  Trend renal function and start IV lasix for the LE edema   Check TP/cre ratio   3 CVS- Is off A/c at present (hx of afib)   4 Anemia- Blood xfusion in er     Sayed [facility]   [employer] [facility]   [phone]

## 2023-12-14 NOTE — H&P ADULT - PROBLEM SELECTOR PROBLEM 9
Appt scheduled with daughter for next available 
Please Triage - Usha Marshall  New Patient-     What is the reason for the patients appointment? Patient's daughter called stating patient has urinary issues   Eduardo Pickett at home came to the house yesterday and they did a straight cath for her  She has a hard time trying to start urinating   She feels her bladder is not emptying  They stated she should follow up with Urology  Imaging/Lab Results:no testing       Do we accept the patient's insurance or is the patient Self-Pay? Provider & Plan: [facility]   Member ID#: Has the patient had any previous urologist(s)?no        Have patient records been requested?no records        Has the patient had any outside testing done?       Does the patient have a personal history of cancer?no       Patient can be reached at :[phone] (S)
Abnormal urinalysis

## 2023-12-14 NOTE — CONSULT NOTE ADULT - SUBJECTIVE AND OBJECTIVE BOX
NEPHROLOGY - NSN    Patient seen and examined.    HPI:  87 male PMH of hypertension, hyperlipidemia, A-fib on Eliquis, CHF, CKD, CAD status post stents on aspirin, bladder cancer in remission, diabetes, TIA, left nephrectomy presenting with hematuria and shortness of breath.  Patient was sent in by his nephrologist Dr. Emily Gusman because his hemoglobin was 8.2 earlier this week.  Patient has been having hematuria since November.  Had a cystoscopy which was unremarkable back in November.  Patient also was diagnosed with a right renal calculus approximately 1 week.  Endorsing dyspnea on exertion since Friday.  Patient takes Lasix as needed for lower extremity edema and wife says that his legs look good and so he has not taken Tylenol.  No fever, chest pain, cough, shortness of breath, abdominal pain, flank pain. (14 Dec 2023 13:30)  He has lost wt and CT of the abd was done and there was no evidence of cancer     PAST MEDICAL & SURGICAL HISTORY:  CAD (coronary artery disease)  (4 stents,)      Diabetes mellitus, new onset  diet controlled      Single kidney  (hx/o LEFT nephrectomy at age 16; pt states due to a ureter"blockage" causing "infection"; pt denies hx/o renal dysfunction)      Arthritis      Myocardial infarction  ()      Hard of hearing      Atrial fibrillation      Hypertension      Hyperlipidemia      History of TIAs  Last       History of bradycardia      Bladder cancer      History of CHF (congestive heart failure)      Left carotid bruit      History of nephrectomy, unilateral  (hx/o LEFT nephrectomy at age 16)      Stented coronary artery  (4 stents)      S/P bilateral cataract extraction      H/O cystoscopy          MEDICATIONS  (STANDING):  aMIOdarone    Tablet 200 milliGRAM(s) Oral daily  dextrose 5%. 1000 milliLiter(s) (100 mL/Hr) IV Continuous <Continuous>  dextrose 5%. 1000 milliLiter(s) (50 mL/Hr) IV Continuous <Continuous>  dextrose 50% Injectable 25 Gram(s) IV Push once  dextrose 50% Injectable 25 Gram(s) IV Push once  dextrose 50% Injectable 12.5 Gram(s) IV Push once  furosemide   Injectable 40 milliGRAM(s) IV Push daily  glucagon  Injectable 1 milliGRAM(s) IntraMuscular once  insulin glargine Injectable (LANTUS) 3 Unit(s) SubCutaneous at bedtime  insulin lispro (ADMELOG) corrective regimen sliding scale   SubCutaneous three times a day before meals  tamsulosin 0.4 milliGRAM(s) Oral at bedtime      Allergies    penicillin (Rash)    Intolerances    Cipro (Joint Pain)      SOCIAL HISTORY:  Denies alcohol abuse, drug abuse or tobacco usage.     FAMILY HISTORY:  Family history of diabetes mellitus in mother (Mother)  (Pt states his mother developed DM in her 70's)    FH: bladder cancer (Sibling)        VITALS:  T(C): 36.4 (23 @ 13:20), Max: 36.7 (23 @ 08:31)  HR: 67 (23 @ 13:20) (67 - 75)  BP: 161/52 (23 @ 13:20) (145/74 - 161/52)  RR: 18 (23 @ 13:20) (17 - 18)  SpO2: 100% (23 @ 13:20) (100% - 100%)    REVIEW OF SYSTEMS:  + SOB. Good oral intake and +  fatigue or weakness. All other pertinent systems are reviewed and are negative.    PHYSICAL EXAM:  Constitutional: NAD  HEENT: EOMI  Neck:  No JVD, supple   Respiratory: CTA B/L  Cardiovascular: S1 and S2, RRR  Gastrointestinal: + BS, soft, NT, ND  Extremities: ++ peripheral edema, + peripheral pulses  Neurological: A/O x 3, CN2-12 intact  Psychiatric: Normal mood, normal affect  : No Temple  Skin: No rashes, C/D/I  Access: Not applicable    I and O's:        LABS:                        7.3    4.26  )-----------( 146      ( 14 Dec 2023 10:09 )             23.8         138  |  106  |  59<H>  ----------------------------<  263<H>  4.0   |  20<L>  |  3.01<H>    Ca    8.2<L>      14 Dec 2023 10:09    TPro  6.1  /  Alb  2.7<L>  /  TBili  0.3  /  DBili  x   /  AST  86<H>  /  ALT  85<H>  /  AlkPhos  155<H>        URINE:  Urinalysis Basic - ( 14 Dec 2023 12:54 )    Color: Yellow / Appearance: Cloudy / S.020 / pH: x  Gluc: x / Ketone: Negative mg/dL  / Bili: Negative / Urobili: 0.2 mg/dL   Blood: x / Protein: 300 mg/dL / Nitrite: Negative   Leuk Esterase: Small / RBC: 15 /HPF / WBC >50 /HPF   Sq Epi: x / Non Sq Epi: x / Bacteria: Few /HPF        RADIOLOGY & ADDITIONAL STUDIES:    < from: Xray Chest 2 Views PA/Lat (23 @ 10:32) >    ACC: [id number] EXAM:  XR CHEST PA LAT 2V   ORDERED BY: JUWAN LAND     PROCEDURE DATE:  2023          INTERPRETATION:  CLINICAL INFORMATION: Dyspnea    TIME OF EXAMINATION: 2023 at 10:29 AM    EXAM: PA and lateral chest    FINDINGS:  The lungs are clear, the heart is not enlarged and there are no effusions   or congestion to suggest CHF or pneumonia.    The bones show no acute findings.        COMPARISON: 2022 when pulmonary edema was present.        IMPRESSION: Clear lungs.    --- End of Report ---            CALVIN HUSAIN MD; Attending Radiologist  This document has been electronically signed. Dec 14 2023 10:37AM    < end of copied text >  < from: CT Abdomen and Pelvis No Cont (23 @ 10:23) >    ACC: [id number] EXAM:  CT ABDOMEN AND PELVIS   ORDERED BY: JUWAN LAND     PROCEDURE DATE:  2023          INTERPRETATION:  CLINICAL INFORMATION: Hematuria, evaluate for right   renal calculus    COMPARISON: CT of abdomen pelvis 2022    CONTRAST/COMPLICATIONS:  IV Contrast: None  Oral Contrast: None  Complications: None reported at time of study completion    PROCEDURE:  CT of the Abdomen and Pelvis was performed.  Sagittal and coronal reformats were performed.    FINDINGS:  LOWER CHEST: Coronary artery and thoracic aortic calcifications. Trace   bilateral pleural effusions. Patchy subpleural reticulations.    LIVER: Hyperdense hepatic parenchyma which can be seen in the setting of   iron deposition or usage of certain medications. Patchy punctate   calcifications.  BILE DUCTS: Normal caliber.  GALLBLADDER: Cholelithiasis. Collapsed gallbladder.  SPLEEN: Within normal limits.  PANCREAS: Punctate calcifications in the body and neck may represent   sequelae of chronic pancreatitis.  ADRENALS: Within normal limits.  KIDNEYS/URETERS: Left nephrectomy. Right upper pole renal cyst.   Urothelial thickening and mild distention of the right renal pelvis,   similar to prior. No obstructing stone. Multiple renal calcifications   likely vascular origin.    BLADDER: Slight right asymmetric thickening.  REPRODUCTIVE ORGANS: Prostate is enlarged.    BOWEL: No bowel obstruction. Appendix is normal.  PERITONEUM: Interval increase in small ascites. Mild mesenteric fat   stranding.  VESSELS: Stable severe calcific stenosis of bilateral superficial femoral   arteries. Atherosclerotic changes of abdominal aortoiliac tree.  RETROPERITONEUM/LYMPH NODES: No lymphadenopathy.  ABDOMINAL WALL: Small fluid containing in bilateral inguinal hernias.  BONES: Degenerative changes.    IMPRESSION:  Slight bladder wall thickening, which may be due to chronic outlet   obstruction versus cystitis. Recommend correlation with urinalysis.  Interval increase in small ascites.  No right renal stone.            --- End of Report ---          TREVER GARCÍA MD; Resident Radiologist  This document has been electronically signed.  MINOR AZUL MD; Attending Radiologist  This document has been electronically signed. Dec 14 2023 11:05AM    < end of copied text >   NEPHROLOGY - NSN    Patient seen and examined.    HPI:  87 male PMH of hypertension, hyperlipidemia, A-fib on Eliquis, CHF, CKD, CAD status post stents on aspirin, bladder cancer in remission, diabetes, TIA, left nephrectomy presenting with hematuria and shortness of breath.  Patient was sent in by his nephrologist Dr. Emily Gusman because his hemoglobin was 8.2 earlier this week.  Patient has been having hematuria since November.  Had a cystoscopy which was unremarkable back in November.  Patient also was diagnosed with a right renal calculus approximately 1 week.  Endorsing dyspnea on exertion since Friday.  Patient takes Lasix as needed for lower extremity edema and wife says that his legs look good and so he has not taken Tylenol.  No fever, chest pain, cough, shortness of breath, abdominal pain, flank pain. (14 Dec 2023 13:30)  He has lost wt and CT of the abd was done and there was no evidence of cancer     PAST MEDICAL & SURGICAL HISTORY:  CAD (coronary artery disease)  (4 stents,)      Diabetes mellitus, new onset  diet controlled      Single kidney  (hx/o LEFT nephrectomy at age 16; pt states due to a ureter"blockage" causing "infection"; pt denies hx/o renal dysfunction)      Arthritis      Myocardial infarction  ()      Hard of hearing      Atrial fibrillation      Hypertension      Hyperlipidemia      History of TIAs  Last       History of bradycardia      Bladder cancer      History of CHF (congestive heart failure)      Left carotid bruit      History of nephrectomy, unilateral  (hx/o LEFT nephrectomy at age 16)      Stented coronary artery  (4 stents)      S/P bilateral cataract extraction      H/O cystoscopy          MEDICATIONS  (STANDING):  aMIOdarone    Tablet 200 milliGRAM(s) Oral daily  dextrose 5%. 1000 milliLiter(s) (100 mL/Hr) IV Continuous <Continuous>  dextrose 5%. 1000 milliLiter(s) (50 mL/Hr) IV Continuous <Continuous>  dextrose 50% Injectable 25 Gram(s) IV Push once  dextrose 50% Injectable 25 Gram(s) IV Push once  dextrose 50% Injectable 12.5 Gram(s) IV Push once  furosemide   Injectable 40 milliGRAM(s) IV Push daily  glucagon  Injectable 1 milliGRAM(s) IntraMuscular once  insulin glargine Injectable (LANTUS) 3 Unit(s) SubCutaneous at bedtime  insulin lispro (ADMELOG) corrective regimen sliding scale   SubCutaneous three times a day before meals  tamsulosin 0.4 milliGRAM(s) Oral at bedtime      Allergies    penicillin (Rash)    Intolerances    Cipro (Joint Pain)      SOCIAL HISTORY:  Denies alcohol abuse, drug abuse or tobacco usage.     FAMILY HISTORY:  Family history of diabetes mellitus in mother (Mother)  (Pt states his mother developed DM in her 70's)    FH: bladder cancer (Sibling)        VITALS:  T(C): 36.4 (23 @ 13:20), Max: 36.7 (23 @ 08:31)  HR: 67 (23 @ 13:20) (67 - 75)  BP: 161/52 (23 @ 13:20) (145/74 - 161/52)  RR: 18 (23 @ 13:20) (17 - 18)  SpO2: 100% (23 @ 13:20) (100% - 100%)    REVIEW OF SYSTEMS:  + SOB. Good oral intake and +  fatigue or weakness. All other pertinent systems are reviewed and are negative.    PHYSICAL EXAM:  Constitutional: NAD  HEENT: EOMI  Neck:  No JVD, supple   Respiratory: CTA B/L  Cardiovascular: S1 and S2, RRR  Gastrointestinal: + BS, soft, NT, ND  Extremities: ++ peripheral edema, + peripheral pulses  Neurological: A/O x 3, CN2-12 intact  Psychiatric: Normal mood, normal affect  : No Temple  Skin: No rashes, C/D/I  Access: Not applicable    I and O's:        LABS:                        7.3    4.26  )-----------( 146      ( 14 Dec 2023 10:09 )             23.8         138  |  106  |  59<H>  ----------------------------<  263<H>  4.0   |  20<L>  |  3.01<H>    Ca    8.2<L>      14 Dec 2023 10:09    TPro  6.1  /  Alb  2.7<L>  /  TBili  0.3  /  DBili  x   /  AST  86<H>  /  ALT  85<H>  /  AlkPhos  155<H>        URINE:  Urinalysis Basic - ( 14 Dec 2023 12:54 )    Color: Yellow / Appearance: Cloudy / S.020 / pH: x  Gluc: x / Ketone: Negative mg/dL  / Bili: Negative / Urobili: 0.2 mg/dL   Blood: x / Protein: 300 mg/dL / Nitrite: Negative   Leuk Esterase: Small / RBC: 15 /HPF / WBC >50 /HPF   Sq Epi: x / Non Sq Epi: x / Bacteria: Few /HPF        RADIOLOGY & ADDITIONAL STUDIES:    < from: Xray Chest 2 Views PA/Lat (23 @ 10:32) >    ACC: [id number] EXAM:  XR CHEST PA LAT 2V   ORDERED BY: JUWAN LAND     PROCEDURE DATE:  2023          INTERPRETATION:  CLINICAL INFORMATION: Dyspnea    TIME OF EXAMINATION: 2023 at 10:29 AM    EXAM: PA and lateral chest    FINDINGS:  The lungs are clear, the heart is not enlarged and there are no effusions   or congestion to suggest CHF or pneumonia.    The bones show no acute findings.        COMPARISON: 2022 when pulmonary edema was present.        IMPRESSION: Clear lungs.    --- End of Report ---            CALVIN HUSAIN MD; Attending Radiologist  This document has been electronically signed. Dec 14 2023 10:37AM    < end of copied text >  < from: CT Abdomen and Pelvis No Cont (23 @ 10:23) >    ACC: [id number] EXAM:  CT ABDOMEN AND PELVIS   ORDERED BY: JUWAN LAND     PROCEDURE DATE:  2023          INTERPRETATION:  CLINICAL INFORMATION: Hematuria, evaluate for right   renal calculus    COMPARISON: CT of abdomen pelvis 2022    CONTRAST/COMPLICATIONS:  IV Contrast: None  Oral Contrast: None  Complications: None reported at time of study completion    PROCEDURE:  CT of the Abdomen and Pelvis was performed.  Sagittal and coronal reformats were performed.    FINDINGS:  LOWER CHEST: Coronary artery and thoracic aortic calcifications. Trace   bilateral pleural effusions. Patchy subpleural reticulations.    LIVER: Hyperdense hepatic parenchyma which can be seen in the setting of   iron deposition or usage of certain medications. Patchy punctate   calcifications.  BILE DUCTS: Normal caliber.  GALLBLADDER: Cholelithiasis. Collapsed gallbladder.  SPLEEN: Within normal limits.  PANCREAS: Punctate calcifications in the body and neck may represent   sequelae of chronic pancreatitis.  ADRENALS: Within normal limits.  KIDNEYS/URETERS: Left nephrectomy. Right upper pole renal cyst.   Urothelial thickening and mild distention of the right renal pelvis,   similar to prior. No obstructing stone. Multiple renal calcifications   likely vascular origin.    BLADDER: Slight right asymmetric thickening.  REPRODUCTIVE ORGANS: Prostate is enlarged.    BOWEL: No bowel obstruction. Appendix is normal.  PERITONEUM: Interval increase in small ascites. Mild mesenteric fat   stranding.  VESSELS: Stable severe calcific stenosis of bilateral superficial femoral   arteries. Atherosclerotic changes of abdominal aortoiliac tree.  RETROPERITONEUM/LYMPH NODES: No lymphadenopathy.  ABDOMINAL WALL: Small fluid containing in bilateral inguinal hernias.  BONES: Degenerative changes.    IMPRESSION:  Slight bladder wall thickening, which may be due to chronic outlet   obstruction versus cystitis. Recommend correlation with urinalysis.  Interval increase in small ascites.  No right renal stone.            --- End of Report ---          TREVER GARCÍA MD; Resident Radiologist  This document has been electronically signed.  MINOR AZUL MD; Attending Radiologist  This document has been electronically signed. Dec 14 2023 11:05AM    < end of copied text >

## 2023-12-14 NOTE — PHARMACOTHERAPY INTERVENTION NOTE - COMMENTS
Medication list updated in Outpatient Medication Record (OMR). OMR verified with [facility] Medication list updated in Outpatient Medication Record (OMR). OMR verified with [facility]

## 2023-12-14 NOTE — ED ADULT TRIAGE NOTE - CHIEF COMPLAINT QUOTE
Pt sent by nephrologist for known kidney stone. Pt denies pain, +hematuria. Pt also c/o feeling "winded" with exertion. Pt 100% on RA breathing unlabored in triage at this time.

## 2023-12-14 NOTE — ED PROVIDER NOTE - ATTENDING CONTRIBUTION TO CARE
87-year-old male past medical history hypertension lipidemia, A-fib on Eliquis, CHF, CAD, bladder cancer, diabetes, remote history left nephrectomy, recent history of right nephrolithiasis presents for dyspnea on exertion for approximately 5 days.  Patient does report having intermittent hematuria no history of black or dark bowel movements.  Did see cardiologist earlier in the week who performed labs, wife reports cardiac workup negative but hemoglobin 8.2 spoke with their nephrologist last night who recommended coming to ED.  Dyspnea with walking upstairs or long period time but no shortness of breath at rest.  Denies chest pain fevers chills cough.  Denies abdominal pain.  Did note hematuria yesterday and earlier this morning but not noting gross hematuria in ED.    Exam as above  EKG normal sinus rhythm 67 bpm right bundle damir block left testicular block left axis deviation no evidence of acute ischemia.  Hemoglobin 7.3.  proBNP downtrending suspect symptoms likely related to anemia over CHF exacerbation versus ACS.  Creatinine has been gradually increasing will speak to nephrology.  Plan: Labs, CT, transfusion admit

## 2023-12-14 NOTE — H&P ADULT - ASSESSMENT
87 male PMH of hypertension, hyperlipidemia, A-fib on Eliquis, CHF, CKD, CAD status post stents on aspirin, bladder cancer in remission, diabetes, TIA, left nephrectomy presenting with hematuria and shortness of breath clinical presentation consistent with acute on chronic systolic heart failure and severe anemia

## 2023-12-14 NOTE — H&P ADULT - HISTORY OF PRESENT ILLNESS
87 male PMH of hypertension, hyperlipidemia, A-fib on Eliquis, CHF, CKD, CAD status post stents on aspirin, bladder cancer in remission, diabetes, TIA, left nephrectomy presenting with hematuria and shortness of breath.  Patient was sent in by his nephrologist Dr. Yonatan Gusman because his hemoglobin was 8.2 earlier this week.  Patient has been having hematuria since November.  Had a cystoscopy which was unremarkable back in November.  Patient also was diagnosed with a right renal calculus approximately 1 week.  Endorsing dyspnea on exertion since Friday.  Patient takes Lasix as needed for lower extremity edema and wife says that his legs look good and so he has not taken Tylenol.  No fever, chest pain, cough, shortness of breath, abdominal pain, flank pain.

## 2023-12-14 NOTE — ED ADULT NURSE NOTE - OBJECTIVE STATEMENT
Pt awake and alert x 4 with co hematuria x several weeks . Pt vin pain ,iv  placed . Pt given call bell instructed on how to call for assistance.

## 2023-12-14 NOTE — H&P ADULT - NSHPADDITIONALINFOADULT_GEN_ALL_CORE
discussed with patient discussed with patient    discussed with renal attending  76 minutes spent on total encounter. The necessity of the time spent during the encounter on this date of service was due to:     detailed physical exam, obtaining and reviewing history, physical examination, explaining the diagnosis, prognosis and treatment plan with the patient/family/caregiver. I also have spent the time ordering studies and testing, interpreting results, medicine reconciliation, and documentation as above

## 2023-12-14 NOTE — H&P ADULT - NSHPLABSRESULTS_GEN_ALL_CORE
noted on [place], including lab results and radiology studies noted on [place], including lab results and radiology studies

## 2023-12-14 NOTE — ED PROVIDER NOTE - PROGRESS NOTE DETAILS
Kandice Burns M.D. (Resident Physician): Hgb 7.3, will transfuse 1u pRBCs. Discussed w/ Dr. Gusman and Dr. Arroyo. Pt's Cr usually 1.5. Will admit pt.

## 2023-12-14 NOTE — PATIENT PROFILE ADULT - FALL HARM RISK - HARM RISK INTERVENTIONS
Assistance with ambulation/Assistance OOB with selected safe patient handling equipment/Communicate Risk of Fall with Harm to all staff/Reinforce activity limits and safety measures with patient and family/Tailored Fall Risk Interventions/Visual Cue: Yellow wristband and red socks/Bed in lowest position, wheels locked, appropriate side rails in place/Call bell, personal items and telephone in reach/Instruct patient to call for assistance before getting out of bed or chair/Non-slip footwear when patient is out of bed/[place] to call system/Physically safe environment - no spills, clutter or unnecessary equipment/Purposeful Proactive Rounding/Room/bathroom lighting operational, light cord in reach Assistance with ambulation/Assistance OOB with selected safe patient handling equipment/Communicate Risk of Fall with Harm to all staff/Reinforce activity limits and safety measures with patient and family/Tailored Fall Risk Interventions/Visual Cue: Yellow wristband and red socks/Bed in lowest position, wheels locked, appropriate side rails in place/Call bell, personal items and telephone in reach/Instruct patient to call for assistance before getting out of bed or chair/Non-slip footwear when patient is out of bed/[place] to call system/Physically safe environment - no spills, clutter or unnecessary equipment/Purposeful Proactive Rounding/Room/bathroom lighting operational, light cord in reach

## 2023-12-14 NOTE — ED PROVIDER NOTE - OBJECTIVE STATEMENT
87 male past medical history hypertension, hyperlipidemia, A-fib on Eliquis, CHF, CAD status post stents on aspirin, bladder cancer in remission, diabetes, TIA, left nephrectomy presenting with hematuria and shortness of breath.  Patient was sent in by his nephrologist Dr. Yonatan Gusman because his hemoglobin was 8.2 earlier this week.  Patient has been having hematuria since November.  Had a cystoscopy which was unremarkable back in November.  Patient also was diagnosed with a right renal calculus approximately 1 week.  Endorsing dyspnea on exertion since Friday.  Patient takes Lasix as needed for lower extremity edema and wife says that his legs look good and so he has not taken Tylenol.  No fever, chest pain, cough, shortness of breath, abdominal pain, flank pain.  Possible dysuria. 87 male past medical history hypertension, hyperlipidemia, A-fib on Eliquis, CHF, CKD, CAD status post stents on aspirin, bladder cancer in remission, diabetes, TIA, left nephrectomy presenting with hematuria and shortness of breath.  Patient was sent in by his nephrologist Dr. Yonatan Gusman because his hemoglobin was 8.2 earlier this week.  Patient has been having hematuria since November.  Had a cystoscopy which was unremarkable back in November.  Patient also was diagnosed with a right renal calculus approximately 1 week.  Endorsing dyspnea on exertion since Friday.  Patient takes Lasix as needed for lower extremity edema and wife says that his legs look good and so he has not taken Tylenol.  No fever, chest pain, cough, shortness of breath, abdominal pain, flank pain.  Possible dysuria.

## 2023-12-14 NOTE — H&P ADULT - NSICDXPASTMEDICALHX_GEN_ALL_CORE_FT
PAST MEDICAL HISTORY:  Arthritis     Atrial fibrillation     Bladder cancer     CAD (coronary artery disease) (4 stents,)    Diabetes mellitus, new onset diet controlled    Hard of hearing     History of bradycardia     History of CHF (congestive heart failure)     History of TIAs Last 2018    Hyperlipidemia     Hypertension     Left carotid bruit     Myocardial infarction (2003)    Single kidney (hx/o LEFT nephrectomy at age 16; pt states due to a ureter"blockage" causing "infection"; pt denies hx/o renal dysfunction)

## 2023-12-14 NOTE — ED PROVIDER NOTE - CLINICAL SUMMARY MEDICAL DECISION MAKING FREE TEXT BOX
87 male past medical history hypertension, hyperlipidemia, A-fib on Eliquis, CHF, CAD status post stents on aspirin, bladder cancer in remission, diabetes, TIA, left nephrectomy presenting with painless hematuria and shortness of breath. DDx includes but not limited to: hematuria 2/2 renal calculus vs anticoagulation vs UTI, MEJIA 2/2 anemia vs CHF exacerbation. Plan: blood work, CXR, CT A/P, UA, ECG. Will re-assess. 87 male past medical history hypertension, hyperlipidemia, A-fib on Eliquis, CHF, CKD, CAD status post stents on aspirin, bladder cancer in remission, diabetes, TIA, left nephrectomy presenting with painless hematuria and shortness of breath. DDx includes but not limited to: hematuria 2/2 renal calculus vs anticoagulation vs UTI, MEJIA 2/2 anemia vs CHF exacerbation. Plan: blood work, CXR, CT A/P, UA, ECG. Will re-assess.

## 2023-12-14 NOTE — H&P ADULT - NSHPPHYSICALEXAM_GEN_ALL_CORE
PHYSICAL EXAM: vital signs noted on [place]  in no apparent distress  HEENT: RAQUEL EOMI  Neck: Supple, no JVD  Lungs: no wheeze, no crackles  CVS: S1 S2 no M/R/G  Abdomen: no tenderness, no organomegaly, BS present  Neuro: AO x 3 no focal weakness, no sensory abnormalities  Psych: appropriate affect  Skin: warm, dry  Ext: no cyanosis or clubbing, + edema  Msk: no joint swelling or deformities  Back: no CVA tenderness, no kyphosis/scoliosis

## 2023-12-15 LAB
A1C WITH ESTIMATED AVERAGE GLUCOSE RESULT: 7.1 % — HIGH (ref 4–5.6)
A1C WITH ESTIMATED AVERAGE GLUCOSE RESULT: 7.1 % — HIGH (ref 4–5.6)
ALBUMIN SERPL ELPH-MCNC: 2.4 G/DL — LOW (ref 3.3–5)
ALBUMIN SERPL ELPH-MCNC: 2.4 G/DL — LOW (ref 3.3–5)
ALP SERPL-CCNC: 145 U/L — HIGH (ref 40–120)
ALP SERPL-CCNC: 145 U/L — HIGH (ref 40–120)
ALT FLD-CCNC: 71 U/L — HIGH (ref 4–41)
ALT FLD-CCNC: 71 U/L — HIGH (ref 4–41)
ANION GAP SERPL CALC-SCNC: 11 MMOL/L — SIGNIFICANT CHANGE UP (ref 7–14)
ANION GAP SERPL CALC-SCNC: 11 MMOL/L — SIGNIFICANT CHANGE UP (ref 7–14)
AST SERPL-CCNC: 67 U/L — HIGH (ref 4–40)
AST SERPL-CCNC: 67 U/L — HIGH (ref 4–40)
BILIRUB SERPL-MCNC: 0.3 MG/DL — SIGNIFICANT CHANGE UP (ref 0.2–1.2)
BILIRUB SERPL-MCNC: 0.3 MG/DL — SIGNIFICANT CHANGE UP (ref 0.2–1.2)
BUN SERPL-MCNC: 57 MG/DL — HIGH (ref 7–23)
BUN SERPL-MCNC: 57 MG/DL — HIGH (ref 7–23)
CALCIUM SERPL-MCNC: 8 MG/DL — LOW (ref 8.4–10.5)
CALCIUM SERPL-MCNC: 8 MG/DL — LOW (ref 8.4–10.5)
CHLORIDE SERPL-SCNC: 111 MMOL/L — HIGH (ref 98–107)
CHLORIDE SERPL-SCNC: 111 MMOL/L — HIGH (ref 98–107)
CO2 SERPL-SCNC: 20 MMOL/L — LOW (ref 22–31)
CO2 SERPL-SCNC: 20 MMOL/L — LOW (ref 22–31)
CREAT SERPL-MCNC: 3.15 MG/DL — HIGH (ref 0.5–1.3)
CREAT SERPL-MCNC: 3.15 MG/DL — HIGH (ref 0.5–1.3)
EGFR: 18 ML/MIN/1.73M2 — LOW
EGFR: 18 ML/MIN/1.73M2 — LOW
ESTIMATED AVERAGE GLUCOSE: 157 — SIGNIFICANT CHANGE UP
ESTIMATED AVERAGE GLUCOSE: 157 — SIGNIFICANT CHANGE UP
FERRITIN SERPL-MCNC: 32 NG/ML — SIGNIFICANT CHANGE UP (ref 30–400)
FERRITIN SERPL-MCNC: 32 NG/ML — SIGNIFICANT CHANGE UP (ref 30–400)
FOLATE SERPL-MCNC: 10.2 NG/ML — SIGNIFICANT CHANGE UP (ref 3.1–17.5)
FOLATE SERPL-MCNC: 10.2 NG/ML — SIGNIFICANT CHANGE UP (ref 3.1–17.5)
GLUCOSE BLDC GLUCOMTR-MCNC: 110 MG/DL — HIGH (ref 70–99)
GLUCOSE BLDC GLUCOMTR-MCNC: 110 MG/DL — HIGH (ref 70–99)
GLUCOSE BLDC GLUCOMTR-MCNC: 248 MG/DL — HIGH (ref 70–99)
GLUCOSE BLDC GLUCOMTR-MCNC: 248 MG/DL — HIGH (ref 70–99)
GLUCOSE BLDC GLUCOMTR-MCNC: 268 MG/DL — HIGH (ref 70–99)
GLUCOSE BLDC GLUCOMTR-MCNC: 268 MG/DL — HIGH (ref 70–99)
GLUCOSE BLDC GLUCOMTR-MCNC: 303 MG/DL — HIGH (ref 70–99)
GLUCOSE BLDC GLUCOMTR-MCNC: 303 MG/DL — HIGH (ref 70–99)
GLUCOSE SERPL-MCNC: 90 MG/DL — SIGNIFICANT CHANGE UP (ref 70–99)
GLUCOSE SERPL-MCNC: 90 MG/DL — SIGNIFICANT CHANGE UP (ref 70–99)
HCT VFR BLD CALC: 26.2 % — LOW (ref 39–50)
HCT VFR BLD CALC: 26.2 % — LOW (ref 39–50)
HGB BLD-MCNC: 8.3 G/DL — LOW (ref 13–17)
HGB BLD-MCNC: 8.3 G/DL — LOW (ref 13–17)
IRON SATN MFR SERPL: 10 % — LOW (ref 14–50)
IRON SATN MFR SERPL: 10 % — LOW (ref 14–50)
IRON SATN MFR SERPL: 27 UG/DL — LOW (ref 45–165)
IRON SATN MFR SERPL: 27 UG/DL — LOW (ref 45–165)
MCHC RBC-ENTMCNC: 28.2 PG — SIGNIFICANT CHANGE UP (ref 27–34)
MCHC RBC-ENTMCNC: 28.2 PG — SIGNIFICANT CHANGE UP (ref 27–34)
MCHC RBC-ENTMCNC: 31.7 GM/DL — LOW (ref 32–36)
MCHC RBC-ENTMCNC: 31.7 GM/DL — LOW (ref 32–36)
MCV RBC AUTO: 89.1 FL — SIGNIFICANT CHANGE UP (ref 80–100)
MCV RBC AUTO: 89.1 FL — SIGNIFICANT CHANGE UP (ref 80–100)
NRBC # BLD: 0 /100 WBCS — SIGNIFICANT CHANGE UP (ref 0–0)
NRBC # BLD: 0 /100 WBCS — SIGNIFICANT CHANGE UP (ref 0–0)
NRBC # FLD: 0 K/UL — SIGNIFICANT CHANGE UP (ref 0–0)
NRBC # FLD: 0 K/UL — SIGNIFICANT CHANGE UP (ref 0–0)
PLATELET # BLD AUTO: 145 K/UL — LOW (ref 150–400)
PLATELET # BLD AUTO: 145 K/UL — LOW (ref 150–400)
POTASSIUM SERPL-MCNC: 3.7 MMOL/L — SIGNIFICANT CHANGE UP (ref 3.5–5.3)
POTASSIUM SERPL-MCNC: 3.7 MMOL/L — SIGNIFICANT CHANGE UP (ref 3.5–5.3)
POTASSIUM SERPL-SCNC: 3.7 MMOL/L — SIGNIFICANT CHANGE UP (ref 3.5–5.3)
POTASSIUM SERPL-SCNC: 3.7 MMOL/L — SIGNIFICANT CHANGE UP (ref 3.5–5.3)
PROT SERPL-MCNC: 5.8 G/DL — LOW (ref 6–8.3)
PROT SERPL-MCNC: 5.8 G/DL — LOW (ref 6–8.3)
RBC # BLD: 2.94 M/UL — LOW (ref 4.2–5.8)
RBC # BLD: 2.94 M/UL — LOW (ref 4.2–5.8)
RBC # FLD: 17.3 % — HIGH (ref 10.3–14.5)
RBC # FLD: 17.3 % — HIGH (ref 10.3–14.5)
SODIUM SERPL-SCNC: 142 MMOL/L — SIGNIFICANT CHANGE UP (ref 135–145)
SODIUM SERPL-SCNC: 142 MMOL/L — SIGNIFICANT CHANGE UP (ref 135–145)
TIBC SERPL-MCNC: 283 UG/DL — SIGNIFICANT CHANGE UP (ref 220–430)
TIBC SERPL-MCNC: 283 UG/DL — SIGNIFICANT CHANGE UP (ref 220–430)
UIBC SERPL-MCNC: 256 UG/DL — SIGNIFICANT CHANGE UP (ref 110–370)
UIBC SERPL-MCNC: 256 UG/DL — SIGNIFICANT CHANGE UP (ref 110–370)
VIT B12 SERPL-MCNC: 840 PG/ML — SIGNIFICANT CHANGE UP (ref 200–900)
VIT B12 SERPL-MCNC: 840 PG/ML — SIGNIFICANT CHANGE UP (ref 200–900)
WBC # BLD: 4.19 K/UL — SIGNIFICANT CHANGE UP (ref 3.8–10.5)
WBC # BLD: 4.19 K/UL — SIGNIFICANT CHANGE UP (ref 3.8–10.5)
WBC # FLD AUTO: 4.19 K/UL — SIGNIFICANT CHANGE UP (ref 3.8–10.5)
WBC # FLD AUTO: 4.19 K/UL — SIGNIFICANT CHANGE UP (ref 3.8–10.5)

## 2023-12-15 RX ORDER — IRON SUCROSE 20 MG/ML
200 INJECTION, SOLUTION INTRAVENOUS
Refills: 0 | Status: DISCONTINUED | OUTPATIENT
Start: 2023-12-17 | End: 2023-12-15

## 2023-12-15 RX ORDER — IRON SUCROSE 20 MG/ML
200 INJECTION, SOLUTION INTRAVENOUS EVERY 24 HOURS
Refills: 0 | Status: DISCONTINUED | OUTPATIENT
Start: 2023-12-15 | End: 2023-12-15

## 2023-12-15 RX ADMIN — IRON SUCROSE 110 MILLIGRAM(S): 20 INJECTION, SOLUTION INTRAVENOUS at 12:44

## 2023-12-15 RX ADMIN — TAMSULOSIN HYDROCHLORIDE 0.4 MILLIGRAM(S): 0.4 CAPSULE ORAL at 22:22

## 2023-12-15 RX ADMIN — Medication 4: at 12:40

## 2023-12-15 RX ADMIN — AMIODARONE HYDROCHLORIDE 200 MILLIGRAM(S): 400 TABLET ORAL at 05:43

## 2023-12-15 RX ADMIN — Medication 40 MILLIGRAM(S): at 05:43

## 2023-12-15 RX ADMIN — Medication 2: at 18:02

## 2023-12-15 RX ADMIN — INSULIN GLARGINE 3 UNIT(S): 100 INJECTION, SOLUTION SUBCUTANEOUS at 22:22

## 2023-12-15 NOTE — PHYSICAL THERAPY INITIAL EVALUATION ADULT - ADDITIONAL COMMENTS
Pt states he lives with his wife in a house with 1 step to enter and remains on the main level. Prior to admission, pt was ambulating independently, however, owns a rolling walker. Pt has a home health aide 1day/week for 6 hours/day.   Post PT evaluation, pt left seated in chair, call bell and remote within reach, all precautions maintained, NAD. RN aware.

## 2023-12-15 NOTE — PROGRESS NOTE ADULT - CONVERSATION DETAILS
detailed discussed with above re all of above options  quite clear in and requesting that all possible treatment modalities be employed at this time, including intubation and/or chest compression     Also requests other non-heroic measures including IVF, antibiotics etc      30 minutes for advanced care planning discussion separate and in addition to the E&M service provided

## 2023-12-15 NOTE — PROGRESS NOTE ADULT - SUBJECTIVE AND OBJECTIVE BOX
Patient is a 87y old  Male who presents with a chief complaint of shortness of breath (14 Dec 2023 15:06)      DATE OF SERVICE: 12-15-23 @ 11:37    SUBJECTIVE / OVERNIGHT EVENTS: overnight events noted    ROS:  Resp: No cough no sputum production  CVS: No chest pain no palpitations no orthopnea  GI: no N/V/D  : no dysuria, no hematuria  "I feel much better"         MEDICATIONS  (STANDING):  aMIOdarone    Tablet 200 milliGRAM(s) Oral daily  dextrose 5%. 1000 milliLiter(s) (50 mL/Hr) IV Continuous <Continuous>  dextrose 5%. 1000 milliLiter(s) (100 mL/Hr) IV Continuous <Continuous>  dextrose 50% Injectable 25 Gram(s) IV Push once  dextrose 50% Injectable 25 Gram(s) IV Push once  dextrose 50% Injectable 12.5 Gram(s) IV Push once  furosemide   Injectable 40 milliGRAM(s) IV Push daily  glucagon  Injectable 1 milliGRAM(s) IntraMuscular once  insulin glargine Injectable (LANTUS) 3 Unit(s) SubCutaneous at bedtime  insulin lispro (ADMELOG) corrective regimen sliding scale   SubCutaneous three times a day before meals  tamsulosin 0.4 milliGRAM(s) Oral at bedtime    MEDICATIONS  (PRN):  dextrose Oral Gel 15 Gram(s) Oral once PRN Blood Glucose LESS THAN 70 milliGRAM(s)/deciliter        CAPILLARY BLOOD GLUCOSE      POCT Blood Glucose.: 110 mg/dL (15 Dec 2023 08:32)  POCT Blood Glucose.: 135 mg/dL (14 Dec 2023 22:56)  POCT Blood Glucose.: 138 mg/dL (14 Dec 2023 20:28)  POCT Blood Glucose.: 255 mg/dL (14 Dec 2023 17:24)    I&O's Summary    14 Dec 2023 07:01  -  15 Dec 2023 07:00  --------------------------------------------------------  IN: 0 mL / OUT: 331 mL / NET: -331 mL        Vital Signs Last 24 Hrs  T(C): 36.7 (15 Dec 2023 05:40), Max: 36.9 (14 Dec 2023 19:13)  T(F): 98.1 (15 Dec 2023 05:40), Max: 98.4 (14 Dec 2023 19:13)  HR: 65 (15 Dec 2023 05:40) (62 - 67)  BP: 129/51 (15 Dec 2023 05:40) (129/51 - 163/58)  BP(mean): --  RR: 18 (15 Dec 2023 05:40) (18 - 18)  SpO2: 100% (15 Dec 2023 05:40) (99% - 100%)    PHYSICAL EXAM:   Neck: Supple, no JVD  Lungs: no wheeze, no crackles  CVS: S1 S2 no M/R/G  Abdomen: no tenderness  Neuro: AO x 3 nonfocal   Ext: resolved edema  Msk: no joint swelling     LABS:                        8.3    4.19  )-----------( 145      ( 15 Dec 2023 07:00 )             26.2     12-15    142  |  111<H>  |  57<H>  ----------------------------<  90  3.7   |  20<L>  |  3.15<H>    Ca    8.0<L>      15 Dec 2023 07:00    TPro  5.8<L>  /  Alb  2.4<L>  /  TBili  0.3  /  DBili  x   /  AST  67<H>  /  ALT  71<H>  /  AlkPhos  145<H>  12-15    PT/INR - ( 14 Dec 2023 10:09 )   PT: 12.5 sec;   INR: 1.11 ratio         PTT - ( 14 Dec 2023 10:09 )  PTT:35.2 sec      Urinalysis Basic - ( 15 Dec 2023 07:00 )    Color: x / Appearance: x / SG: x / pH: x  Gluc: 90 mg/dL / Ketone: x  / Bili: x / Urobili: x   Blood: x / Protein: x / Nitrite: x   Leuk Esterase: x / RBC: x / WBC x   Sq Epi: x / Non Sq Epi: x / Bacteria: x          All consultant(s) notes reviewed and care discussed with other providers        Contact Number, Dr Arroyo [phone] Patient is a 87y old  Male who presents with a chief complaint of shortness of breath (14 Dec 2023 15:06)      DATE OF SERVICE: 12-15-23 @ 11:37    SUBJECTIVE / OVERNIGHT EVENTS: overnight events noted    ROS:  Resp: No cough no sputum production  CVS: No chest pain no palpitations no orthopnea  GI: no N/V/D  : no dysuria, no hematuria  "I feel much better"         MEDICATIONS  (STANDING):  aMIOdarone    Tablet 200 milliGRAM(s) Oral daily  dextrose 5%. 1000 milliLiter(s) (50 mL/Hr) IV Continuous <Continuous>  dextrose 5%. 1000 milliLiter(s) (100 mL/Hr) IV Continuous <Continuous>  dextrose 50% Injectable 25 Gram(s) IV Push once  dextrose 50% Injectable 25 Gram(s) IV Push once  dextrose 50% Injectable 12.5 Gram(s) IV Push once  furosemide   Injectable 40 milliGRAM(s) IV Push daily  glucagon  Injectable 1 milliGRAM(s) IntraMuscular once  insulin glargine Injectable (LANTUS) 3 Unit(s) SubCutaneous at bedtime  insulin lispro (ADMELOG) corrective regimen sliding scale   SubCutaneous three times a day before meals  tamsulosin 0.4 milliGRAM(s) Oral at bedtime    MEDICATIONS  (PRN):  dextrose Oral Gel 15 Gram(s) Oral once PRN Blood Glucose LESS THAN 70 milliGRAM(s)/deciliter        CAPILLARY BLOOD GLUCOSE      POCT Blood Glucose.: 110 mg/dL (15 Dec 2023 08:32)  POCT Blood Glucose.: 135 mg/dL (14 Dec 2023 22:56)  POCT Blood Glucose.: 138 mg/dL (14 Dec 2023 20:28)  POCT Blood Glucose.: 255 mg/dL (14 Dec 2023 17:24)    I&O's Summary    14 Dec 2023 07:01  -  15 Dec 2023 07:00  --------------------------------------------------------  IN: 0 mL / OUT: 331 mL / NET: -331 mL        Vital Signs Last 24 Hrs  T(C): 36.7 (15 Dec 2023 05:40), Max: 36.9 (14 Dec 2023 19:13)  T(F): 98.1 (15 Dec 2023 05:40), Max: 98.4 (14 Dec 2023 19:13)  HR: 65 (15 Dec 2023 05:40) (62 - 67)  BP: 129/51 (15 Dec 2023 05:40) (129/51 - 163/58)  BP(mean): --  RR: 18 (15 Dec 2023 05:40) (18 - 18)  SpO2: 100% (15 Dec 2023 05:40) (99% - 100%)    PHYSICAL EXAM:   Neck: Supple, no JVD  Lungs: no wheeze, no crackles  CVS: S1 S2 no M/R/G  Abdomen: no tenderness  Neuro: AO x 3 nonfocal   Ext: resolved edema  Msk: no joint swelling     LABS:                        8.3    4.19  )-----------( 145      ( 15 Dec 2023 07:00 )             26.2     12-15    142  |  111<H>  |  57<H>  ----------------------------<  90  3.7   |  20<L>  |  3.15<H>    Ca    8.0<L>      15 Dec 2023 07:00    TPro  5.8<L>  /  Alb  2.4<L>  /  TBili  0.3  /  DBili  x   /  AST  67<H>  /  ALT  71<H>  /  AlkPhos  145<H>  12-15    PT/INR - ( 14 Dec 2023 10:09 )   PT: 12.5 sec;   INR: 1.11 ratio         PTT - ( 14 Dec 2023 10:09 )  PTT:35.2 sec      Urinalysis Basic - ( 15 Dec 2023 07:00 )    Color: x / Appearance: x / SG: x / pH: x  Gluc: 90 mg/dL / Ketone: x  / Bili: x / Urobili: x   Blood: x / Protein: x / Nitrite: x   Leuk Esterase: x / RBC: x / WBC x   Sq Epi: x / Non Sq Epi: x / Bacteria: x          All consultant(s) notes reviewed and care discussed with other providers        Contact Number, Dr Arroyo [phone]

## 2023-12-15 NOTE — PROGRESS NOTE ADULT - SUBJECTIVE AND OBJECTIVE BOX
Overnight events noted      VITAL:  T(C): , Max: 36.9 (12-14-23 @ 19:13)  T(F): , Max: 98.4 (12-14-23 @ 19:13)  HR: 65 (12-15-23 @ 05:40)  BP: 129/51 (12-15-23 @ 05:40)  BP(mean): --  RR: 18 (12-15-23 @ 05:40)  SpO2: 100% (12-15-23 @ 05:40)  Wt(kg): --      PHYSICAL EXAM:  General: Alerted, oriented  HEENT: MMM  Lungs:CTA-b/l  Heart: RRR S1S2  Abdomen: Soft, NTND  Ext: no pedal edema b/l  : no pickering      LABS:                        8.3    4.19  )-----------( 145      ( 15 Dec 2023 07:00 )             26.2     Na(142)/K(3.7)/Cl(111)/HCO3(20)/BUN(57)/Cr(3.15)Glu(90)/Ca(8.0)/Mg(--)/PO4(--)    12-15 @ 07:00  Na(138)/K(4.0)/Cl(106)/HCO3(20)/BUN(59)/Cr(3.01)Glu(263)/Ca(8.2)/Mg(--)/PO4(--)    12-14 @ 10:09    Urinalysis Basic - ( 15 Dec 2023 07:00 )    Color: x / Appearance: x / SG: x / pH: x  Gluc: 90 mg/dL / Ketone: x  / Bili: x / Urobili: x   Blood: x / Protein: x / Nitrite: x   Leuk Esterase: x / RBC: x / WBC x   Sq Epi: x / Non Sq Epi: x / Bacteria: x      Assessment and Recommendation:   · Assessment	    87 male PMH of hypertension, hyperlipidemia, A-fib on Eliquis, CHF, CKD, CAD status post stents on aspirin, bladder cancer in remission, diabetes, TIA, left nephrectomy presenting with hematuria and shortness of breath.  Gross hematuria   Acute blood loss anemia   CKD stage 3b and baseline cre 1.7 (solitary kidney)and now close to 3.  Hx of new onset nephrotic syndrome       1 -Official  eval noted   2 Renal - cr stable, cont lasix , monitor I/O  Check TP/cre ratio   3 CVS- Is off A/c at present (hx of afib)   4 Anemia- sp hb stable , low tsat and low ferritin stores , give venofen 200mg x4 doses every other day               [facility]  Office: [phone]         Overnight events noted      VITAL:  T(C): , Max: 36.9 (12-14-23 @ 19:13)  T(F): , Max: 98.4 (12-14-23 @ 19:13)  HR: 65 (12-15-23 @ 05:40)  BP: 129/51 (12-15-23 @ 05:40)  BP(mean): --  RR: 18 (12-15-23 @ 05:40)  SpO2: 100% (12-15-23 @ 05:40)  Wt(kg): --      PHYSICAL EXAM:  General: Alerted, oriented  HEENT: MMM  Lungs:CTA-b/l  Heart: RRR S1S2  Abdomen: Soft, NTND  Ext: no pedal edema b/l  : no pickering      LABS:                        8.3    4.19  )-----------( 145      ( 15 Dec 2023 07:00 )             26.2     Na(142)/K(3.7)/Cl(111)/HCO3(20)/BUN(57)/Cr(3.15)Glu(90)/Ca(8.0)/Mg(--)/PO4(--)    12-15 @ 07:00  Na(138)/K(4.0)/Cl(106)/HCO3(20)/BUN(59)/Cr(3.01)Glu(263)/Ca(8.2)/Mg(--)/PO4(--)    12-14 @ 10:09    Urinalysis Basic - ( 15 Dec 2023 07:00 )    Color: x / Appearance: x / SG: x / pH: x  Gluc: 90 mg/dL / Ketone: x  / Bili: x / Urobili: x   Blood: x / Protein: x / Nitrite: x   Leuk Esterase: x / RBC: x / WBC x   Sq Epi: x / Non Sq Epi: x / Bacteria: x      Assessment and Recommendation:   · Assessment	    87 male PMH of hypertension, hyperlipidemia, A-fib on Eliquis, CHF, CKD, CAD status post stents on aspirin, bladder cancer in remission, diabetes, TIA, left nephrectomy presenting with hematuria and shortness of breath.  Gross hematuria   Acute blood loss anemia   CKD stage 3b and baseline cre 1.7 (solitary kidney)and now close to 3.  Hx of new onset nephrotic syndrome       1 -Official  eval noted   2 Renal - cr stable, cont lasix , monitor I/O  Check TP/cre ratio   3 CVS- Is off A/c at present (hx of afib)   4 Anemia- sp hb stable , low tsat and low ferritin stores , give venofen 200mg x4 doses every other day               [facility]  Office: [phone]         Overnight events noted   seen at bedside ,wife present . concerned about CT results     VITAL:  T(C): , Max: 36.9 (12-14-23 @ 19:13)  T(F): , Max: 98.4 (12-14-23 @ 19:13)  HR: 65 (12-15-23 @ 05:40)  BP: 129/51 (12-15-23 @ 05:40)  BP(mean): --  RR: 18 (12-15-23 @ 05:40)  SpO2: 100% (12-15-23 @ 05:40)  Wt(kg): --      PHYSICAL EXAM:  General: Alerted, oriented  HEENT: MMM  Lungs:CTA-b/l  Heart: RRR S1S2  Abdomen: Soft, NTND  Ext: no pedal edema b/l  : no pickering      LABS:                        8.3    4.19  )-----------( 145      ( 15 Dec 2023 07:00 )             26.2     Na(142)/K(3.7)/Cl(111)/HCO3(20)/BUN(57)/Cr(3.15)Glu(90)/Ca(8.0)/Mg(--)/PO4(--)    12-15 @ 07:00  Na(138)/K(4.0)/Cl(106)/HCO3(20)/BUN(59)/Cr(3.01)Glu(263)/Ca(8.2)/Mg(--)/PO4(--)    12-14 @ 10:09    Urinalysis Basic - ( 15 Dec 2023 07:00 )    Color: x / Appearance: x / SG: x / pH: x  Gluc: 90 mg/dL / Ketone: x  / Bili: x / Urobili: x   Blood: x / Protein: x / Nitrite: x   Leuk Esterase: x / RBC: x / WBC x   Sq Epi: x / Non Sq Epi: x / Bacteria: x    COMPARISON: CT of abdomen pelvis 1/22/2022    CONTRAST/COMPLICATIONS:  IV Contrast: None  Oral Contrast: None  Complications: None reported at time of study completion    PROCEDURE:  CT of the Abdomen and Pelvis was performed.  Sagittal and coronal reformats were performed.    FINDINGS:  LOWER CHEST: Coronary artery and thoracic aortic calcifications. Trace   bilateral pleural effusions. Patchy subpleural reticulations.    LIVER: Hyperdense hepatic parenchyma which can be seen in the setting of   iron deposition or usage of certain medications. Patchy punctate   calcifications.  BILE DUCTS: Normal caliber.  GALLBLADDER: Cholelithiasis. Collapsed gallbladder.  SPLEEN: Within normal limits.  PANCREAS: Punctate calcifications in the body and neck may represent   sequelae of chronic pancreatitis.  ADRENALS: Within normal limits.  KIDNEYS/URETERS: Left nephrectomy. Right upper pole renal cyst.   Urothelial thickening and mild distention of the right renal pelvis,   similar to prior. No obstructing stone. Multiple renal calcifications   likely vascular origin.    BLADDER: Slight right asymmetric thickening.  REPRODUCTIVE ORGANS: Prostate is enlarged.    BOWEL: No bowel obstruction. Appendix is normal.  PERITONEUM: Interval increase in small ascites. Mild mesenteric fat   stranding.  VESSELS: Stable severe calcific stenosis of bilateral superficial femoral   arteries. Atherosclerotic changes of abdominal aortoiliac tree.  RETROPERITONEUM/LYMPH NODES: No lymphadenopathy.  ABDOMINAL WALL: Small fluid containing in bilateral inguinal hernias.  BONES: Degenerative changes.    IMPRESSION:  Slight bladder wall thickening, which may be due to chronic outlet   obstruction versus cystitis. Recommend correlation with urinalysis.  Interval increase in small ascites.  No right renal stone.      Assessment and Recommendation:   · Assessment	    87 male PMH of hypertension, hyperlipidemia, A-fib on Eliquis, CHF, CKD, CAD status post stents on aspirin, bladder cancer in remission, diabetes, TIA, left nephrectomy presenting with hematuria and shortness of breath.  Gross hematuria   Acute blood loss anemia   CKD stage 3b and baseline cre 1.7 (solitary kidney)and now close to 3.  Hx of new onset nephrotic syndrome       1 -Official  eval noted , Ct scan noted   2 Renal - cr stable, cont lasix , monitor I/O  Check TP/cre ratio   3 CVS- Is off A/c at present (hx of afib)   4 Anemia- sp hb stable , low tsat and low ferritin stores , give venofen 200mg x4 doses every other day               [facility]  Office: [phone]         Overnight events noted   seen at bedside ,wife present . concerned about CT results     VITAL:  T(C): , Max: 36.9 (12-14-23 @ 19:13)  T(F): , Max: 98.4 (12-14-23 @ 19:13)  HR: 65 (12-15-23 @ 05:40)  BP: 129/51 (12-15-23 @ 05:40)  BP(mean): --  RR: 18 (12-15-23 @ 05:40)  SpO2: 100% (12-15-23 @ 05:40)  Wt(kg): --      PHYSICAL EXAM:  General: Alerted, oriented  HEENT: MMM  Lungs:CTA-b/l  Heart: RRR S1S2  Abdomen: Soft, NTND  Ext: no pedal edema b/l  : no pickering      LABS:                        8.3    4.19  )-----------( 145      ( 15 Dec 2023 07:00 )             26.2     Na(142)/K(3.7)/Cl(111)/HCO3(20)/BUN(57)/Cr(3.15)Glu(90)/Ca(8.0)/Mg(--)/PO4(--)    12-15 @ 07:00  Na(138)/K(4.0)/Cl(106)/HCO3(20)/BUN(59)/Cr(3.01)Glu(263)/Ca(8.2)/Mg(--)/PO4(--)    12-14 @ 10:09    Urinalysis Basic - ( 15 Dec 2023 07:00 )    Color: x / Appearance: x / SG: x / pH: x  Gluc: 90 mg/dL / Ketone: x  / Bili: x / Urobili: x   Blood: x / Protein: x / Nitrite: x   Leuk Esterase: x / RBC: x / WBC x   Sq Epi: x / Non Sq Epi: x / Bacteria: x    COMPARISON: CT of abdomen pelvis 1/22/2022    CONTRAST/COMPLICATIONS:  IV Contrast: None  Oral Contrast: None  Complications: None reported at time of study completion    PROCEDURE:  CT of the Abdomen and Pelvis was performed.  Sagittal and coronal reformats were performed.    FINDINGS:  LOWER CHEST: Coronary artery and thoracic aortic calcifications. Trace   bilateral pleural effusions. Patchy subpleural reticulations.    LIVER: Hyperdense hepatic parenchyma which can be seen in the setting of   iron deposition or usage of certain medications. Patchy punctate   calcifications.  BILE DUCTS: Normal caliber.  GALLBLADDER: Cholelithiasis. Collapsed gallbladder.  SPLEEN: Within normal limits.  PANCREAS: Punctate calcifications in the body and neck may represent   sequelae of chronic pancreatitis.  ADRENALS: Within normal limits.  KIDNEYS/URETERS: Left nephrectomy. Right upper pole renal cyst.   Urothelial thickening and mild distention of the right renal pelvis,   similar to prior. No obstructing stone. Multiple renal calcifications   likely vascular origin.    BLADDER: Slight right asymmetric thickening.  REPRODUCTIVE ORGANS: Prostate is enlarged.    BOWEL: No bowel obstruction. Appendix is normal.  PERITONEUM: Interval increase in small ascites. Mild mesenteric fat   stranding.  VESSELS: Stable severe calcific stenosis of bilateral superficial femoral   arteries. Atherosclerotic changes of abdominal aortoiliac tree.  RETROPERITONEUM/LYMPH NODES: No lymphadenopathy.  ABDOMINAL WALL: Small fluid containing in bilateral inguinal hernias.  BONES: Degenerative changes.    IMPRESSION:  Slight bladder wall thickening, which may be due to chronic outlet   obstruction versus cystitis. Recommend correlation with urinalysis.  Interval increase in small ascites.  No right renal stone.      Assessment and Recommendation:   · Assessment	    87 male PMH of hypertension, hyperlipidemia, A-fib on Eliquis, CHF, CKD, CAD status post stents on aspirin, bladder cancer in remission, diabetes, TIA, left nephrectomy presenting with hematuria and shortness of breath.  Gross hematuria   Acute blood loss anemia   CKD stage 3b and baseline cre 1.7 (solitary kidney)and now close to 3.  Hx of new onset nephrotic syndrome       1 -Official  eval noted , Ct scan noted   2 Renal - cr stable, cont lasix , monitor I/O  Check TP/cre ratio   3 CVS- Is off A/c at present (hx of afib)   4 Anemia- sp hb stable , low tsat and low ferritin stores , give venofen 200mg x4 doses every other day               [facility]  Office: [phone]

## 2023-12-15 NOTE — PROGRESS NOTE ADULT - NSPROGADDITIONALINFOA_GEN_ALL_CORE
discussed with patient in detail, expresses understanding of treatment plans.  encounter 50 min including PE, progress note, medication adjustment and d/w ACP and/or family

## 2023-12-15 NOTE — PHYSICAL THERAPY INITIAL EVALUATION ADULT - GENERAL OBSERVATIONS, REHAB EVAL
Pt received semi-supine in bed, with all lines intact, NAD, all precautions maintained. BP: 128/47, HR: 62, spO2: 100% on room air

## 2023-12-16 LAB
ALBUMIN SERPL ELPH-MCNC: 2.5 G/DL — LOW (ref 3.3–5)
ALBUMIN SERPL ELPH-MCNC: 2.5 G/DL — LOW (ref 3.3–5)
ALP SERPL-CCNC: 150 U/L — HIGH (ref 40–120)
ALP SERPL-CCNC: 150 U/L — HIGH (ref 40–120)
ALT FLD-CCNC: 64 U/L — HIGH (ref 4–41)
ALT FLD-CCNC: 64 U/L — HIGH (ref 4–41)
ANION GAP SERPL CALC-SCNC: 11 MMOL/L — SIGNIFICANT CHANGE UP (ref 7–14)
ANION GAP SERPL CALC-SCNC: 11 MMOL/L — SIGNIFICANT CHANGE UP (ref 7–14)
AST SERPL-CCNC: 54 U/L — HIGH (ref 4–40)
AST SERPL-CCNC: 54 U/L — HIGH (ref 4–40)
BILIRUB SERPL-MCNC: 0.4 MG/DL — SIGNIFICANT CHANGE UP (ref 0.2–1.2)
BILIRUB SERPL-MCNC: 0.4 MG/DL — SIGNIFICANT CHANGE UP (ref 0.2–1.2)
BUN SERPL-MCNC: 60 MG/DL — HIGH (ref 7–23)
BUN SERPL-MCNC: 60 MG/DL — HIGH (ref 7–23)
CALCIUM SERPL-MCNC: 8 MG/DL — LOW (ref 8.4–10.5)
CALCIUM SERPL-MCNC: 8 MG/DL — LOW (ref 8.4–10.5)
CHLORIDE SERPL-SCNC: 108 MMOL/L — HIGH (ref 98–107)
CHLORIDE SERPL-SCNC: 108 MMOL/L — HIGH (ref 98–107)
CO2 SERPL-SCNC: 19 MMOL/L — LOW (ref 22–31)
CO2 SERPL-SCNC: 19 MMOL/L — LOW (ref 22–31)
CREAT SERPL-MCNC: 3.3 MG/DL — HIGH (ref 0.5–1.3)
CREAT SERPL-MCNC: 3.3 MG/DL — HIGH (ref 0.5–1.3)
EGFR: 17 ML/MIN/1.73M2 — LOW
EGFR: 17 ML/MIN/1.73M2 — LOW
GLUCOSE BLDC GLUCOMTR-MCNC: 137 MG/DL — HIGH (ref 70–99)
GLUCOSE BLDC GLUCOMTR-MCNC: 137 MG/DL — HIGH (ref 70–99)
GLUCOSE BLDC GLUCOMTR-MCNC: 149 MG/DL — HIGH (ref 70–99)
GLUCOSE BLDC GLUCOMTR-MCNC: 149 MG/DL — HIGH (ref 70–99)
GLUCOSE BLDC GLUCOMTR-MCNC: 178 MG/DL — HIGH (ref 70–99)
GLUCOSE BLDC GLUCOMTR-MCNC: 178 MG/DL — HIGH (ref 70–99)
GLUCOSE BLDC GLUCOMTR-MCNC: 222 MG/DL — HIGH (ref 70–99)
GLUCOSE BLDC GLUCOMTR-MCNC: 222 MG/DL — HIGH (ref 70–99)
GLUCOSE SERPL-MCNC: 137 MG/DL — HIGH (ref 70–99)
GLUCOSE SERPL-MCNC: 137 MG/DL — HIGH (ref 70–99)
HCT VFR BLD CALC: 26.1 % — LOW (ref 39–50)
HCT VFR BLD CALC: 26.1 % — LOW (ref 39–50)
HGB BLD-MCNC: 8.3 G/DL — LOW (ref 13–17)
HGB BLD-MCNC: 8.3 G/DL — LOW (ref 13–17)
MAGNESIUM SERPL-MCNC: 2.2 MG/DL — SIGNIFICANT CHANGE UP (ref 1.6–2.6)
MAGNESIUM SERPL-MCNC: 2.2 MG/DL — SIGNIFICANT CHANGE UP (ref 1.6–2.6)
MCHC RBC-ENTMCNC: 28.8 PG — SIGNIFICANT CHANGE UP (ref 27–34)
MCHC RBC-ENTMCNC: 28.8 PG — SIGNIFICANT CHANGE UP (ref 27–34)
MCHC RBC-ENTMCNC: 31.8 GM/DL — LOW (ref 32–36)
MCHC RBC-ENTMCNC: 31.8 GM/DL — LOW (ref 32–36)
MCV RBC AUTO: 90.6 FL — SIGNIFICANT CHANGE UP (ref 80–100)
MCV RBC AUTO: 90.6 FL — SIGNIFICANT CHANGE UP (ref 80–100)
NRBC # BLD: 0 /100 WBCS — SIGNIFICANT CHANGE UP (ref 0–0)
NRBC # BLD: 0 /100 WBCS — SIGNIFICANT CHANGE UP (ref 0–0)
NRBC # FLD: 0 K/UL — SIGNIFICANT CHANGE UP (ref 0–0)
NRBC # FLD: 0 K/UL — SIGNIFICANT CHANGE UP (ref 0–0)
PHOSPHATE SERPL-MCNC: 4.3 MG/DL — SIGNIFICANT CHANGE UP (ref 2.5–4.5)
PHOSPHATE SERPL-MCNC: 4.3 MG/DL — SIGNIFICANT CHANGE UP (ref 2.5–4.5)
PLATELET # BLD AUTO: 148 K/UL — LOW (ref 150–400)
PLATELET # BLD AUTO: 148 K/UL — LOW (ref 150–400)
POTASSIUM SERPL-MCNC: 3.6 MMOL/L — SIGNIFICANT CHANGE UP (ref 3.5–5.3)
POTASSIUM SERPL-MCNC: 3.6 MMOL/L — SIGNIFICANT CHANGE UP (ref 3.5–5.3)
POTASSIUM SERPL-SCNC: 3.6 MMOL/L — SIGNIFICANT CHANGE UP (ref 3.5–5.3)
POTASSIUM SERPL-SCNC: 3.6 MMOL/L — SIGNIFICANT CHANGE UP (ref 3.5–5.3)
PROT SERPL-MCNC: 5.8 G/DL — LOW (ref 6–8.3)
PROT SERPL-MCNC: 5.8 G/DL — LOW (ref 6–8.3)
RBC # BLD: 2.88 M/UL — LOW (ref 4.2–5.8)
RBC # BLD: 2.88 M/UL — LOW (ref 4.2–5.8)
RBC # FLD: 17.1 % — HIGH (ref 10.3–14.5)
RBC # FLD: 17.1 % — HIGH (ref 10.3–14.5)
SODIUM SERPL-SCNC: 138 MMOL/L — SIGNIFICANT CHANGE UP (ref 135–145)
SODIUM SERPL-SCNC: 138 MMOL/L — SIGNIFICANT CHANGE UP (ref 135–145)
WBC # BLD: 4.26 K/UL — SIGNIFICANT CHANGE UP (ref 3.8–10.5)
WBC # BLD: 4.26 K/UL — SIGNIFICANT CHANGE UP (ref 3.8–10.5)
WBC # FLD AUTO: 4.26 K/UL — SIGNIFICANT CHANGE UP (ref 3.8–10.5)
WBC # FLD AUTO: 4.26 K/UL — SIGNIFICANT CHANGE UP (ref 3.8–10.5)

## 2023-12-16 RX ORDER — IRON SUCROSE 20 MG/ML
200 INJECTION, SOLUTION INTRAVENOUS
Refills: 0 | Status: DISCONTINUED | OUTPATIENT
Start: 2023-12-16 | End: 2023-12-18

## 2023-12-16 RX ADMIN — AMIODARONE HYDROCHLORIDE 200 MILLIGRAM(S): 400 TABLET ORAL at 05:08

## 2023-12-16 RX ADMIN — TAMSULOSIN HYDROCHLORIDE 0.4 MILLIGRAM(S): 0.4 CAPSULE ORAL at 22:50

## 2023-12-16 RX ADMIN — INSULIN GLARGINE 3 UNIT(S): 100 INJECTION, SOLUTION SUBCUTANEOUS at 22:51

## 2023-12-16 RX ADMIN — IRON SUCROSE 110 MILLIGRAM(S): 20 INJECTION, SOLUTION INTRAVENOUS at 12:42

## 2023-12-16 RX ADMIN — Medication 2: at 12:22

## 2023-12-16 RX ADMIN — Medication 40 MILLIGRAM(S): at 05:08

## 2023-12-16 NOTE — PROGRESS NOTE ADULT - SUBJECTIVE AND OBJECTIVE BOX
Patient is a 87y old  Male who presents with a chief complaint of shortness of breath (16 Dec 2023 09:56)      DATE OF SERVICE: 12-16-23 @ 10:45    SUBJECTIVE / OVERNIGHT EVENTS: overnight events noted    ROS:  Resp: No cough no sputum production  CVS: No chest pain no palpitations no orthopnea  GI: no N/V/D  "I feel better"         MEDICATIONS  (STANDING):  aMIOdarone    Tablet 200 milliGRAM(s) Oral daily  dextrose 5%. 1000 milliLiter(s) (50 mL/Hr) IV Continuous <Continuous>  dextrose 5%. 1000 milliLiter(s) (100 mL/Hr) IV Continuous <Continuous>  dextrose 50% Injectable 25 Gram(s) IV Push once  dextrose 50% Injectable 12.5 Gram(s) IV Push once  dextrose 50% Injectable 25 Gram(s) IV Push once  furosemide   Injectable 40 milliGRAM(s) IV Push daily  glucagon  Injectable 1 milliGRAM(s) IntraMuscular once  insulin glargine Injectable (LANTUS) 3 Unit(s) SubCutaneous at bedtime  insulin lispro (ADMELOG) corrective regimen sliding scale   SubCutaneous three times a day before meals  tamsulosin 0.4 milliGRAM(s) Oral at bedtime    MEDICATIONS  (PRN):  dextrose Oral Gel 15 Gram(s) Oral once PRN Blood Glucose LESS THAN 70 milliGRAM(s)/deciliter        CAPILLARY BLOOD GLUCOSE      POCT Blood Glucose.: 137 mg/dL (16 Dec 2023 08:33)  POCT Blood Glucose.: 268 mg/dL (15 Dec 2023 22:13)  POCT Blood Glucose.: 248 mg/dL (15 Dec 2023 17:59)  POCT Blood Glucose.: 303 mg/dL (15 Dec 2023 12:36)    I&O's Summary      Vital Signs Last 24 Hrs  T(C): 36.6 (16 Dec 2023 05:08), Max: 36.7 (15 Dec 2023 15:20)  T(F): 97.9 (16 Dec 2023 05:08), Max: 98.1 (15 Dec 2023 15:20)  HR: 68 (16 Dec 2023 05:08) (65 - 68)  BP: 153/59 (16 Dec 2023 05:08) (126/52 - 165/60)  BP(mean): --  RR: 18 (16 Dec 2023 05:08) (18 - 18)  SpO2: 100% (16 Dec 2023 05:08) (100% - 100%)      PHYSICAL EXAM:   Neck: Supple, no JVD  Lungs: no wheeze, no crackles  CVS: S1 S2 no M/R/G  Abdomen: no tenderness  Neuro: AO x 3 nonfocal   Ext: resolved edema  Msk: no joint swelling     LABS:                        8.3    4.26  )-----------( 148      ( 16 Dec 2023 05:06 )             26.1     12-16    138  |  108<H>  |  60<H>  ----------------------------<  137<H>  3.6   |  19<L>  |  3.30<H>    Ca    8.0<L>      16 Dec 2023 05:06  Phos  4.3     12-16  Mg     2.20     12-16    TPro  5.8<L>  /  Alb  2.5<L>  /  TBili  0.4  /  DBili  x   /  AST  54<H>  /  ALT  64<H>  /  AlkPhos  150<H>  12-16          Urinalysis Basic - ( 16 Dec 2023 05:06 )    Color: x / Appearance: x / SG: x / pH: x  Gluc: 137 mg/dL / Ketone: x  / Bili: x / Urobili: x   Blood: x / Protein: x / Nitrite: x   Leuk Esterase: x / RBC: x / WBC x   Sq Epi: x / Non Sq Epi: x / Bacteria: x          All consultant(s) notes reviewed and care discussed with other providers        Contact Number, Dr Arroyo [phone] Patient is a 87y old  Male who presents with a chief complaint of shortness of breath (16 Dec 2023 09:56)      DATE OF SERVICE: 12-16-23 @ 10:45    SUBJECTIVE / OVERNIGHT EVENTS: overnight events noted    ROS:  Resp: No cough no sputum production  CVS: No chest pain no palpitations no orthopnea  GI: no N/V/D  "I feel better"         MEDICATIONS  (STANDING):  aMIOdarone    Tablet 200 milliGRAM(s) Oral daily  dextrose 5%. 1000 milliLiter(s) (50 mL/Hr) IV Continuous <Continuous>  dextrose 5%. 1000 milliLiter(s) (100 mL/Hr) IV Continuous <Continuous>  dextrose 50% Injectable 25 Gram(s) IV Push once  dextrose 50% Injectable 12.5 Gram(s) IV Push once  dextrose 50% Injectable 25 Gram(s) IV Push once  furosemide   Injectable 40 milliGRAM(s) IV Push daily  glucagon  Injectable 1 milliGRAM(s) IntraMuscular once  insulin glargine Injectable (LANTUS) 3 Unit(s) SubCutaneous at bedtime  insulin lispro (ADMELOG) corrective regimen sliding scale   SubCutaneous three times a day before meals  tamsulosin 0.4 milliGRAM(s) Oral at bedtime    MEDICATIONS  (PRN):  dextrose Oral Gel 15 Gram(s) Oral once PRN Blood Glucose LESS THAN 70 milliGRAM(s)/deciliter        CAPILLARY BLOOD GLUCOSE      POCT Blood Glucose.: 137 mg/dL (16 Dec 2023 08:33)  POCT Blood Glucose.: 268 mg/dL (15 Dec 2023 22:13)  POCT Blood Glucose.: 248 mg/dL (15 Dec 2023 17:59)  POCT Blood Glucose.: 303 mg/dL (15 Dec 2023 12:36)    I&O's Summary      Vital Signs Last 24 Hrs  T(C): 36.6 (16 Dec 2023 05:08), Max: 36.7 (15 Dec 2023 15:20)  T(F): 97.9 (16 Dec 2023 05:08), Max: 98.1 (15 Dec 2023 15:20)  HR: 68 (16 Dec 2023 05:08) (65 - 68)  BP: 153/59 (16 Dec 2023 05:08) (126/52 - 165/60)  BP(mean): --  RR: 18 (16 Dec 2023 05:08) (18 - 18)  SpO2: 100% (16 Dec 2023 05:08) (100% - 100%)      PHYSICAL EXAM:   Neck: Supple, no JVD  Lungs: no wheeze, no crackles  CVS: S1 S2 no M/R/G  Abdomen: no tenderness  Neuro: AO x 3 nonfocal   Ext: resolved edema  Msk: no joint swelling     LABS:                        8.3    4.26  )-----------( 148      ( 16 Dec 2023 05:06 )             26.1     12-16    138  |  108<H>  |  60<H>  ----------------------------<  137<H>  3.6   |  19<L>  |  3.30<H>    Ca    8.0<L>      16 Dec 2023 05:06  Phos  4.3     12-16  Mg     2.20     12-16    TPro  5.8<L>  /  Alb  2.5<L>  /  TBili  0.4  /  DBili  x   /  AST  54<H>  /  ALT  64<H>  /  AlkPhos  150<H>  12-16          Urinalysis Basic - ( 16 Dec 2023 05:06 )    Color: x / Appearance: x / SG: x / pH: x  Gluc: 137 mg/dL / Ketone: x  / Bili: x / Urobili: x   Blood: x / Protein: x / Nitrite: x   Leuk Esterase: x / RBC: x / WBC x   Sq Epi: x / Non Sq Epi: x / Bacteria: x          All consultant(s) notes reviewed and care discussed with other providers        Contact Number, Dr Arroyo [phone]

## 2023-12-16 NOTE — PROGRESS NOTE ADULT - SUBJECTIVE AND OBJECTIVE BOX
NEPHROLOGY     Patient seen and examined resting comfortably on room air, reports feeling better, no new complaints, denies pain, in no acute distress.     MEDICATIONS  (STANDING):  aMIOdarone    Tablet 200 milliGRAM(s) Oral daily  dextrose 5%. 1000 milliLiter(s) (100 mL/Hr) IV Continuous <Continuous>  dextrose 5%. 1000 milliLiter(s) (50 mL/Hr) IV Continuous <Continuous>  dextrose 50% Injectable 25 Gram(s) IV Push once  dextrose 50% Injectable 25 Gram(s) IV Push once  dextrose 50% Injectable 12.5 Gram(s) IV Push once  furosemide   Injectable 40 milliGRAM(s) IV Push daily  glucagon  Injectable 1 milliGRAM(s) IntraMuscular once  insulin glargine Injectable (LANTUS) 3 Unit(s) SubCutaneous at bedtime  insulin lispro (ADMELOG) corrective regimen sliding scale   SubCutaneous three times a day before meals  tamsulosin 0.4 milliGRAM(s) Oral at bedtime    VITALS:  T(C): , Max: 36.7 (12-15-23 @ 15:20)  T(F): , Max: 98.1 (12-15-23 @ 15:20)  HR: 68 (12-16-23 @ 05:08)  BP: 153/59 (12-16-23 @ 05:08)  RR: 18 (12-16-23 @ 05:08)  SpO2: 100% (12-16-23 @ 05:08)    PHYSICAL EXAM:  General: Alerted, oriented  HEENT: MMM  Lungs:CTA-b/l  Heart: RRR S1S2  Abdomen: Soft, NTND  Ext: tr lle edema   : no pickering    LABS:                        8.3    4.26  )-----------( 148      ( 16 Dec 2023 05:06 )             26.1     12-16    138  |  108<H>  |  60<H>  ----------------------------<  137<H>  3.6   |  19<L>  |  3.30<H>    Ca    8.0<L>      16 Dec 2023 05:06  Phos  4.3     12-16  Mg     2.20     12-16    TPro  5.8<L>  /  Alb  2.5<L>  /  TBili  0.4  /  DBili  x   /  AST  54<H>  /  ALT  64<H>  /  AlkPhos  150<H>  12-16      ASSESSMENT/PLAN:   87 male PMH of hypertension, hyperlipidemia, A-fib on Eliquis, CHF, CKD, CAD status post stents on aspirin, bladder cancer in remission, diabetes, TIA, left nephrectomy presenting with hematuria and shortness of breath.  Gross hematuria   Acute blood loss anemia   CKD stage 3b and baseline cre 1.7 (solitary kidney)and now close to 3.  Hx of new onset nephrotic syndrome     1 -Official  eval noted, CT scan noted   2 Renal - cr slightly higher on lasix IV, monitor I/O  Check TP/cre ratio   3 CVS- Is off A/c at present (hx of afib)   4 Anemia- sp hb stable , low tsat and low ferritin stores, give venofer 200mg x 4 doses every other day            NEPHROLOGY     Patient seen and examined resting comfortably on room air, reports feeling better, no new complaints, denies pain, in no acute distress.     MEDICATIONS  (STANDING):  aMIOdarone    Tablet 200 milliGRAM(s) Oral daily  dextrose 5%. 1000 milliLiter(s) (100 mL/Hr) IV Continuous <Continuous>  dextrose 5%. 1000 milliLiter(s) (50 mL/Hr) IV Continuous <Continuous>  dextrose 50% Injectable 25 Gram(s) IV Push once  dextrose 50% Injectable 25 Gram(s) IV Push once  dextrose 50% Injectable 12.5 Gram(s) IV Push once  furosemide   Injectable 40 milliGRAM(s) IV Push daily  glucagon  Injectable 1 milliGRAM(s) IntraMuscular once  insulin glargine Injectable (LANTUS) 3 Unit(s) SubCutaneous at bedtime  insulin lispro (ADMELOG) corrective regimen sliding scale   SubCutaneous three times a day before meals  tamsulosin 0.4 milliGRAM(s) Oral at bedtime    VITALS:  T(C): , Max: 36.7 (12-15-23 @ 15:20)  T(F): , Max: 98.1 (12-15-23 @ 15:20)  HR: 68 (12-16-23 @ 05:08)  BP: 153/59 (12-16-23 @ 05:08)  RR: 18 (12-16-23 @ 05:08)  SpO2: 100% (12-16-23 @ 05:08)    PHYSICAL EXAM:  General: Alerted, oriented  HEENT: MMM  Lungs:CTA-b/l  Heart: RRR S1S2  Abdomen: Soft, NTND  Ext: tr lle edema   : no pickering    LABS:                        8.3    4.26  )-----------( 148      ( 16 Dec 2023 05:06 )             26.1     12-16    138  |  108<H>  |  60<H>  ----------------------------<  137<H>  3.6   |  19<L>  |  3.30<H>    Ca    8.0<L>      16 Dec 2023 05:06  Phos  4.3     12-16  Mg     2.20     12-16    TPro  5.8<L>  /  Alb  2.5<L>  /  TBili  0.4  /  DBili  x   /  AST  54<H>  /  ALT  64<H>  /  AlkPhos  150<H>  12-16      ASSESSMENT/PLAN:   87 male PMH of hypertension, hyperlipidemia, A-fib on Eliquis, CHF, CKD, CAD status post stents on aspirin, bladder cancer in remission, diabetes, TIA, left nephrectomy presenting with hematuria and shortness of breath.  Gross hematuria   Acute blood loss anemia   CKD stage 3b and baseline cre 1.7 (solitary kidney)and now close to 3.  Hx of new onset nephrotic syndrome     1 -Official  eval noted, CT scan noted   2 Renal - cr slightly higher on lasix IV, could look to resume po tomorrow, monitor I/Os  Check TP/cre ratio   3 CVS- Is off A/c at present (hx of afib)   4 Anemia- sp hb stable , low tsat and low ferritin stores, give venofer 200mg x 4 doses every other day     D/w Dr. Kylee Felipe, [facility]  [phone]  NEPHROLOGY     Patient seen and examined resting comfortably on room air, reports feeling better, no new complaints, denies pain, in no acute distress.     MEDICATIONS  (STANDING):  aMIOdarone    Tablet 200 milliGRAM(s) Oral daily  dextrose 5%. 1000 milliLiter(s) (100 mL/Hr) IV Continuous <Continuous>  dextrose 5%. 1000 milliLiter(s) (50 mL/Hr) IV Continuous <Continuous>  dextrose 50% Injectable 25 Gram(s) IV Push once  dextrose 50% Injectable 25 Gram(s) IV Push once  dextrose 50% Injectable 12.5 Gram(s) IV Push once  furosemide   Injectable 40 milliGRAM(s) IV Push daily  glucagon  Injectable 1 milliGRAM(s) IntraMuscular once  insulin glargine Injectable (LANTUS) 3 Unit(s) SubCutaneous at bedtime  insulin lispro (ADMELOG) corrective regimen sliding scale   SubCutaneous three times a day before meals  tamsulosin 0.4 milliGRAM(s) Oral at bedtime    VITALS:  T(C): , Max: 36.7 (12-15-23 @ 15:20)  T(F): , Max: 98.1 (12-15-23 @ 15:20)  HR: 68 (12-16-23 @ 05:08)  BP: 153/59 (12-16-23 @ 05:08)  RR: 18 (12-16-23 @ 05:08)  SpO2: 100% (12-16-23 @ 05:08)    PHYSICAL EXAM:  General: Alerted, oriented  HEENT: MMM  Lungs:CTA-b/l  Heart: RRR S1S2  Abdomen: Soft, NTND  Ext: tr lle edema   : no pickering    LABS:                        8.3    4.26  )-----------( 148      ( 16 Dec 2023 05:06 )             26.1     12-16    138  |  108<H>  |  60<H>  ----------------------------<  137<H>  3.6   |  19<L>  |  3.30<H>    Ca    8.0<L>      16 Dec 2023 05:06  Phos  4.3     12-16  Mg     2.20     12-16    TPro  5.8<L>  /  Alb  2.5<L>  /  TBili  0.4  /  DBili  x   /  AST  54<H>  /  ALT  64<H>  /  AlkPhos  150<H>  12-16      ASSESSMENT/PLAN:   87 male PMH of hypertension, hyperlipidemia, A-fib on Eliquis, CHF, CKD, CAD status post stents on aspirin, bladder cancer in remission, diabetes, TIA, left nephrectomy presenting with hematuria and shortness of breath.  Gross hematuria   Acute blood loss anemia   CKD stage 3b and baseline cre 1.7 (solitary kidney)and now close to 3.  Hx of new onset nephrotic syndrome     1 -Official  eval noted, CT scan noted   2 Renal - cr slightly higher on lasix IV, could look to resume po tomorrow, monitor I/Os  Check TP/cre ratio   3 CVS- Is off A/c at present (hx of afib)   4 Anemia- sp hb stable , low tsat and low ferritin stores, give venofer 200mg x 4 doses every other day     D/w Dr. Kylee Felipe, [facility]  [phone]  NEPHROLOGY     Patient seen and examined resting comfortably on room air, reports feeling better, no new complaints, denies pain, in no acute distress.     MEDICATIONS  (STANDING):  aMIOdarone    Tablet 200 milliGRAM(s) Oral daily  dextrose 5%. 1000 milliLiter(s) (100 mL/Hr) IV Continuous <Continuous>  dextrose 5%. 1000 milliLiter(s) (50 mL/Hr) IV Continuous <Continuous>  dextrose 50% Injectable 25 Gram(s) IV Push once  dextrose 50% Injectable 25 Gram(s) IV Push once  dextrose 50% Injectable 12.5 Gram(s) IV Push once  furosemide   Injectable 40 milliGRAM(s) IV Push daily  glucagon  Injectable 1 milliGRAM(s) IntraMuscular once  insulin glargine Injectable (LANTUS) 3 Unit(s) SubCutaneous at bedtime  insulin lispro (ADMELOG) corrective regimen sliding scale   SubCutaneous three times a day before meals  tamsulosin 0.4 milliGRAM(s) Oral at bedtime    VITALS:  T(C): , Max: 36.7 (12-15-23 @ 15:20)  T(F): , Max: 98.1 (12-15-23 @ 15:20)  HR: 68 (12-16-23 @ 05:08)  BP: 153/59 (12-16-23 @ 05:08)  RR: 18 (12-16-23 @ 05:08)  SpO2: 100% (12-16-23 @ 05:08)    PHYSICAL EXAM:  General: Alerted, oriented  HEENT: MMM  Lungs:CTA-b/l  Heart: RRR S1S2  Abdomen: Soft, NTND  Ext: tr lle edema   : no pickering    LABS:                        8.3    4.26  )-----------( 148      ( 16 Dec 2023 05:06 )             26.1     12-16    138  |  108<H>  |  60<H>  ----------------------------<  137<H>  3.6   |  19<L>  |  3.30<H>    Ca    8.0<L>      16 Dec 2023 05:06  Phos  4.3     12-16  Mg     2.20     12-16    TPro  5.8<L>  /  Alb  2.5<L>  /  TBili  0.4  /  DBili  x   /  AST  54<H>  /  ALT  64<H>  /  AlkPhos  150<H>  12-16      ASSESSMENT/PLAN:   87 male PMH of hypertension, hyperlipidemia, A-fib on Eliquis, CHF, CKD, CAD status post stents on aspirin, bladder cancer in remission, diabetes, TIA, left nephrectomy presenting with hematuria and shortness of breath.  Gross hematuria   Acute blood loss anemia   CKD stage 3b and baseline cre 1.7 (solitary kidney)and now close to 3.  Hx of new onset nephrotic syndrome     1 -Official  eval noted, CT scan noted   2 Renal - cr slightly higher on lasix IV, could look to resume po tomorrow, monitor I/Os  Check TP/cre ratio   Check bladder scan  3 CVS- Is off A/c at present (hx of afib)   4 Anemia- sp hb stable , low tsat and low ferritin stores, give venofer 200mg x 4 doses every other day     D/w Dr. Kylee Felipe, [facility]  [phone]  NEPHROLOGY     Patient seen and examined resting comfortably on room air, reports feeling better, no new complaints, denies pain, in no acute distress.     MEDICATIONS  (STANDING):  aMIOdarone    Tablet 200 milliGRAM(s) Oral daily  dextrose 5%. 1000 milliLiter(s) (100 mL/Hr) IV Continuous <Continuous>  dextrose 5%. 1000 milliLiter(s) (50 mL/Hr) IV Continuous <Continuous>  dextrose 50% Injectable 25 Gram(s) IV Push once  dextrose 50% Injectable 25 Gram(s) IV Push once  dextrose 50% Injectable 12.5 Gram(s) IV Push once  furosemide   Injectable 40 milliGRAM(s) IV Push daily  glucagon  Injectable 1 milliGRAM(s) IntraMuscular once  insulin glargine Injectable (LANTUS) 3 Unit(s) SubCutaneous at bedtime  insulin lispro (ADMELOG) corrective regimen sliding scale   SubCutaneous three times a day before meals  tamsulosin 0.4 milliGRAM(s) Oral at bedtime    VITALS:  T(C): , Max: 36.7 (12-15-23 @ 15:20)  T(F): , Max: 98.1 (12-15-23 @ 15:20)  HR: 68 (12-16-23 @ 05:08)  BP: 153/59 (12-16-23 @ 05:08)  RR: 18 (12-16-23 @ 05:08)  SpO2: 100% (12-16-23 @ 05:08)    PHYSICAL EXAM:  General: Alerted, oriented  HEENT: MMM  Lungs:CTA-b/l  Heart: RRR S1S2  Abdomen: Soft, NTND  Ext: tr lle edema   : no pickering    LABS:                        8.3    4.26  )-----------( 148      ( 16 Dec 2023 05:06 )             26.1     12-16    138  |  108<H>  |  60<H>  ----------------------------<  137<H>  3.6   |  19<L>  |  3.30<H>    Ca    8.0<L>      16 Dec 2023 05:06  Phos  4.3     12-16  Mg     2.20     12-16    TPro  5.8<L>  /  Alb  2.5<L>  /  TBili  0.4  /  DBili  x   /  AST  54<H>  /  ALT  64<H>  /  AlkPhos  150<H>  12-16      ASSESSMENT/PLAN:   87 male PMH of hypertension, hyperlipidemia, A-fib on Eliquis, CHF, CKD, CAD status post stents on aspirin, bladder cancer in remission, diabetes, TIA, left nephrectomy presenting with hematuria and shortness of breath.  Gross hematuria   Acute blood loss anemia   CKD stage 3b and baseline cre 1.7 (solitary kidney)and now close to 3.  Hx of new onset nephrotic syndrome     1 -Official  eval noted, CT scan noted   2 Renal - cr slightly higher on lasix IV, could look to resume po tomorrow, monitor I/Os  Check TP/cre ratio   Check bladder scan  3 CVS- Is off A/c at present (hx of afib)   4 Anemia- sp hb stable , low tsat and low ferritin stores, give venofer 200mg x 4 doses every other day     D/w Dr. Kylee Felipe, [facility]  [phone]

## 2023-12-17 LAB
ALBUMIN SERPL ELPH-MCNC: 2.5 G/DL — LOW (ref 3.3–5)
ALBUMIN SERPL ELPH-MCNC: 2.5 G/DL — LOW (ref 3.3–5)
ALP SERPL-CCNC: 133 U/L — HIGH (ref 40–120)
ALP SERPL-CCNC: 133 U/L — HIGH (ref 40–120)
ALT FLD-CCNC: 59 U/L — HIGH (ref 4–41)
ALT FLD-CCNC: 59 U/L — HIGH (ref 4–41)
ANION GAP SERPL CALC-SCNC: 12 MMOL/L — SIGNIFICANT CHANGE UP (ref 7–14)
ANION GAP SERPL CALC-SCNC: 12 MMOL/L — SIGNIFICANT CHANGE UP (ref 7–14)
AST SERPL-CCNC: 49 U/L — HIGH (ref 4–40)
AST SERPL-CCNC: 49 U/L — HIGH (ref 4–40)
BILIRUB SERPL-MCNC: 0.4 MG/DL — SIGNIFICANT CHANGE UP (ref 0.2–1.2)
BILIRUB SERPL-MCNC: 0.4 MG/DL — SIGNIFICANT CHANGE UP (ref 0.2–1.2)
BLD GP AB SCN SERPL QL: NEGATIVE — SIGNIFICANT CHANGE UP
BLD GP AB SCN SERPL QL: NEGATIVE — SIGNIFICANT CHANGE UP
BUN SERPL-MCNC: 51 MG/DL — HIGH (ref 7–23)
BUN SERPL-MCNC: 51 MG/DL — HIGH (ref 7–23)
CALCIUM SERPL-MCNC: 8.2 MG/DL — LOW (ref 8.4–10.5)
CALCIUM SERPL-MCNC: 8.2 MG/DL — LOW (ref 8.4–10.5)
CHLORIDE SERPL-SCNC: 107 MMOL/L — SIGNIFICANT CHANGE UP (ref 98–107)
CHLORIDE SERPL-SCNC: 107 MMOL/L — SIGNIFICANT CHANGE UP (ref 98–107)
CO2 SERPL-SCNC: 21 MMOL/L — LOW (ref 22–31)
CO2 SERPL-SCNC: 21 MMOL/L — LOW (ref 22–31)
CREAT ?TM UR-MCNC: 47 MG/DL — SIGNIFICANT CHANGE UP
CREAT ?TM UR-MCNC: 47 MG/DL — SIGNIFICANT CHANGE UP
CREAT SERPL-MCNC: 3.2 MG/DL — HIGH (ref 0.5–1.3)
CREAT SERPL-MCNC: 3.2 MG/DL — HIGH (ref 0.5–1.3)
EGFR: 18 ML/MIN/1.73M2 — LOW
EGFR: 18 ML/MIN/1.73M2 — LOW
GLUCOSE BLDC GLUCOMTR-MCNC: 110 MG/DL — HIGH (ref 70–99)
GLUCOSE BLDC GLUCOMTR-MCNC: 110 MG/DL — HIGH (ref 70–99)
GLUCOSE BLDC GLUCOMTR-MCNC: 156 MG/DL — HIGH (ref 70–99)
GLUCOSE BLDC GLUCOMTR-MCNC: 156 MG/DL — HIGH (ref 70–99)
GLUCOSE BLDC GLUCOMTR-MCNC: 169 MG/DL — HIGH (ref 70–99)
GLUCOSE BLDC GLUCOMTR-MCNC: 169 MG/DL — HIGH (ref 70–99)
GLUCOSE BLDC GLUCOMTR-MCNC: 310 MG/DL — HIGH (ref 70–99)
GLUCOSE BLDC GLUCOMTR-MCNC: 310 MG/DL — HIGH (ref 70–99)
GLUCOSE SERPL-MCNC: 94 MG/DL — SIGNIFICANT CHANGE UP (ref 70–99)
GLUCOSE SERPL-MCNC: 94 MG/DL — SIGNIFICANT CHANGE UP (ref 70–99)
HCT VFR BLD CALC: 26.2 % — LOW (ref 39–50)
HCT VFR BLD CALC: 26.2 % — LOW (ref 39–50)
HGB BLD-MCNC: 8.4 G/DL — LOW (ref 13–17)
HGB BLD-MCNC: 8.4 G/DL — LOW (ref 13–17)
MAGNESIUM SERPL-MCNC: 2.2 MG/DL — SIGNIFICANT CHANGE UP (ref 1.6–2.6)
MAGNESIUM SERPL-MCNC: 2.2 MG/DL — SIGNIFICANT CHANGE UP (ref 1.6–2.6)
MCHC RBC-ENTMCNC: 28.6 PG — SIGNIFICANT CHANGE UP (ref 27–34)
MCHC RBC-ENTMCNC: 28.6 PG — SIGNIFICANT CHANGE UP (ref 27–34)
MCHC RBC-ENTMCNC: 32.1 GM/DL — SIGNIFICANT CHANGE UP (ref 32–36)
MCHC RBC-ENTMCNC: 32.1 GM/DL — SIGNIFICANT CHANGE UP (ref 32–36)
MCV RBC AUTO: 89.1 FL — SIGNIFICANT CHANGE UP (ref 80–100)
MCV RBC AUTO: 89.1 FL — SIGNIFICANT CHANGE UP (ref 80–100)
NRBC # BLD: 0 /100 WBCS — SIGNIFICANT CHANGE UP (ref 0–0)
NRBC # BLD: 0 /100 WBCS — SIGNIFICANT CHANGE UP (ref 0–0)
NRBC # FLD: 0 K/UL — SIGNIFICANT CHANGE UP (ref 0–0)
NRBC # FLD: 0 K/UL — SIGNIFICANT CHANGE UP (ref 0–0)
PHOSPHATE SERPL-MCNC: 3.9 MG/DL — SIGNIFICANT CHANGE UP (ref 2.5–4.5)
PHOSPHATE SERPL-MCNC: 3.9 MG/DL — SIGNIFICANT CHANGE UP (ref 2.5–4.5)
PLATELET # BLD AUTO: 140 K/UL — LOW (ref 150–400)
PLATELET # BLD AUTO: 140 K/UL — LOW (ref 150–400)
POTASSIUM SERPL-MCNC: 3.4 MMOL/L — LOW (ref 3.5–5.3)
POTASSIUM SERPL-MCNC: 3.4 MMOL/L — LOW (ref 3.5–5.3)
POTASSIUM SERPL-SCNC: 3.4 MMOL/L — LOW (ref 3.5–5.3)
POTASSIUM SERPL-SCNC: 3.4 MMOL/L — LOW (ref 3.5–5.3)
PROT ?TM UR-MCNC: 298 MG/DL — SIGNIFICANT CHANGE UP
PROT ?TM UR-MCNC: 298 MG/DL — SIGNIFICANT CHANGE UP
PROT SERPL-MCNC: 6 G/DL — SIGNIFICANT CHANGE UP (ref 6–8.3)
PROT SERPL-MCNC: 6 G/DL — SIGNIFICANT CHANGE UP (ref 6–8.3)
RBC # BLD: 2.94 M/UL — LOW (ref 4.2–5.8)
RBC # BLD: 2.94 M/UL — LOW (ref 4.2–5.8)
RBC # FLD: 16.7 % — HIGH (ref 10.3–14.5)
RBC # FLD: 16.7 % — HIGH (ref 10.3–14.5)
RH IG SCN BLD-IMP: POSITIVE — SIGNIFICANT CHANGE UP
RH IG SCN BLD-IMP: POSITIVE — SIGNIFICANT CHANGE UP
SODIUM SERPL-SCNC: 140 MMOL/L — SIGNIFICANT CHANGE UP (ref 135–145)
SODIUM SERPL-SCNC: 140 MMOL/L — SIGNIFICANT CHANGE UP (ref 135–145)
WBC # BLD: 4.27 K/UL — SIGNIFICANT CHANGE UP (ref 3.8–10.5)
WBC # BLD: 4.27 K/UL — SIGNIFICANT CHANGE UP (ref 3.8–10.5)
WBC # FLD AUTO: 4.27 K/UL — SIGNIFICANT CHANGE UP (ref 3.8–10.5)
WBC # FLD AUTO: 4.27 K/UL — SIGNIFICANT CHANGE UP (ref 3.8–10.5)

## 2023-12-17 PROCEDURE — 76770 US EXAM ABDO BACK WALL COMP: CPT | Mod: 26

## 2023-12-17 RX ORDER — POTASSIUM CHLORIDE 20 MEQ
40 PACKET (EA) ORAL ONCE
Refills: 0 | Status: COMPLETED | OUTPATIENT
Start: 2023-12-17 | End: 2023-12-17

## 2023-12-17 RX ADMIN — INSULIN GLARGINE 3 UNIT(S): 100 INJECTION, SOLUTION SUBCUTANEOUS at 22:23

## 2023-12-17 RX ADMIN — TAMSULOSIN HYDROCHLORIDE 0.4 MILLIGRAM(S): 0.4 CAPSULE ORAL at 22:23

## 2023-12-17 RX ADMIN — Medication 4: at 12:35

## 2023-12-17 RX ADMIN — Medication 1: at 18:01

## 2023-12-17 RX ADMIN — Medication 40 MILLIEQUIVALENT(S): at 12:36

## 2023-12-17 RX ADMIN — AMIODARONE HYDROCHLORIDE 200 MILLIGRAM(S): 400 TABLET ORAL at 05:48

## 2023-12-17 NOTE — PROGRESS NOTE ADULT - SUBJECTIVE AND OBJECTIVE BOX
NEPHROLOGY     Patient seen and examined resting comfortably on room air, denies sob, reports feeling well, denies pain, in no acute distress.     MEDICATIONS  (STANDING):  aMIOdarone    Tablet 200 milliGRAM(s) Oral daily  dextrose 5%. 1000 milliLiter(s) (100 mL/Hr) IV Continuous <Continuous>  dextrose 5%. 1000 milliLiter(s) (50 mL/Hr) IV Continuous <Continuous>  dextrose 50% Injectable 25 Gram(s) IV Push once  dextrose 50% Injectable 25 Gram(s) IV Push once  dextrose 50% Injectable 12.5 Gram(s) IV Push once  glucagon  Injectable 1 milliGRAM(s) IntraMuscular once  insulin glargine Injectable (LANTUS) 3 Unit(s) SubCutaneous at bedtime  insulin lispro (ADMELOG) corrective regimen sliding scale   SubCutaneous three times a day before meals  iron sucrose IVPB 200 milliGRAM(s) IV Intermittent every 48 hours  tamsulosin 0.4 milliGRAM(s) Oral at bedtime    VITALS:  T(C): , Max: 36.9 (12-17-23 @ 05:48)  T(F): , Max: 98.4 (12-17-23 @ 05:48)  HR: 61 (12-17-23 @ 05:48)  BP: 127/51 (12-17-23 @ 05:48)  RR: 18 (12-17-23 @ 05:48)  SpO2: 98% (12-17-23 @ 05:48)    PHYSICAL EXAM:  General: Alerted, oriented  HEENT: MMM  Lungs:CTA-b/l  Heart: RRR S1S2  Abdomen: Soft, NTND  Ext: no edema   : no pickering    LABS:                        8.4    4.27  )-----------( 140      ( 17 Dec 2023 06:33 )             26.2     12-17    140  |  107  |  51<H>  ----------------------------<  94  3.4<L>   |  21<L>  |  3.20<H>    Ca    8.2<L>      17 Dec 2023 06:33  Phos  3.9     12-17  Mg     2.20     12-17    TPro  6.0  /  Alb  2.5<L>  /  TBili  0.4  /  DBili  x   /  AST  49<H>  /  ALT  59<H>  /  AlkPhos  133<H>  12-17    ASSESSMENT/PLAN:   87 male PMH of hypertension, hyperlipidemia, A-fib on Eliquis, CHF, CKD, CAD status post stents on aspirin, bladder cancer in remission, diabetes, TIA, left nephrectomy presenting with hematuria and shortness of breath.  Gross hematuria   Acute blood loss anemia   CKD stage 3b and baseline cre 1.7 (solitary kidney)and now close to 3.  Hx of new onset nephrotic syndrome   Hypokalemia - mild     1 -Official  eval noted, CT scan noted   2 Renal - cr slightly lower, s/p IV lasix, monitor I/Os  KCl 20 mEq po x 1   Check TP/cre ratio   Check bladder scan  3 CVS- Is off A/c at present (hx of afib)   4 Anemia- sp hb stable, low tsat and low ferritin stores, continue venofer 200mg x 4 doses every other day     Joi Felipe, BRAIN  [facility]  [phone]    NEPHROLOGY     Patient seen and examined resting comfortably on room air, denies sob, reports feeling well, denies pain, in no acute distress.     MEDICATIONS  (STANDING):  aMIOdarone    Tablet 200 milliGRAM(s) Oral daily  dextrose 5%. 1000 milliLiter(s) (100 mL/Hr) IV Continuous <Continuous>  dextrose 5%. 1000 milliLiter(s) (50 mL/Hr) IV Continuous <Continuous>  dextrose 50% Injectable 25 Gram(s) IV Push once  dextrose 50% Injectable 25 Gram(s) IV Push once  dextrose 50% Injectable 12.5 Gram(s) IV Push once  glucagon  Injectable 1 milliGRAM(s) IntraMuscular once  insulin glargine Injectable (LANTUS) 3 Unit(s) SubCutaneous at bedtime  insulin lispro (ADMELOG) corrective regimen sliding scale   SubCutaneous three times a day before meals  iron sucrose IVPB 200 milliGRAM(s) IV Intermittent every 48 hours  tamsulosin 0.4 milliGRAM(s) Oral at bedtime    VITALS:  T(C): , Max: 36.9 (12-17-23 @ 05:48)  T(F): , Max: 98.4 (12-17-23 @ 05:48)  HR: 61 (12-17-23 @ 05:48)  BP: 127/51 (12-17-23 @ 05:48)  RR: 18 (12-17-23 @ 05:48)  SpO2: 98% (12-17-23 @ 05:48)    PHYSICAL EXAM:  General: Alerted, oriented  HEENT: MMM  Lungs:CTA-b/l  Heart: RRR S1S2  Abdomen: Soft, NTND  Ext: no edema   : no pickering    LABS:                        8.4    4.27  )-----------( 140      ( 17 Dec 2023 06:33 )             26.2     12-17    140  |  107  |  51<H>  ----------------------------<  94  3.4<L>   |  21<L>  |  3.20<H>    Ca    8.2<L>      17 Dec 2023 06:33  Phos  3.9     12-17  Mg     2.20     12-17    TPro  6.0  /  Alb  2.5<L>  /  TBili  0.4  /  DBili  x   /  AST  49<H>  /  ALT  59<H>  /  AlkPhos  133<H>  12-17    ASSESSMENT/PLAN:   87 male PMH of hypertension, hyperlipidemia, A-fib on Eliquis, CHF, CKD, CAD status post stents on aspirin, bladder cancer in remission, diabetes, TIA, left nephrectomy presenting with hematuria and shortness of breath.  Gross hematuria   Acute blood loss anemia   CKD stage 3b and baseline cre 1.7 (solitary kidney)and now close to 3.  Hx of new onset nephrotic syndrome   Hypokalemia - mild     1 -Official  eval noted, CT scan noted   2 Renal - cr slightly lower, s/p IV lasix, monitor I/Os  KCl 20 mEq po x 1   Check TP/cre ratio   Check bladder scan  3 CVS- Is off A/c at present (hx of afib)   4 Anemia- sp hb stable, low tsat and low ferritin stores, continue venofer 200mg x 4 doses every other day     Joi Felipe, BRAIN  [facility]  [phone]    NEPHROLOGY     Patient seen and examined resting comfortably on room air, denies sob, reports feeling well, denies pain, in no acute distress.     MEDICATIONS  (STANDING):  aMIOdarone    Tablet 200 milliGRAM(s) Oral daily  dextrose 5%. 1000 milliLiter(s) (100 mL/Hr) IV Continuous <Continuous>  dextrose 5%. 1000 milliLiter(s) (50 mL/Hr) IV Continuous <Continuous>  dextrose 50% Injectable 25 Gram(s) IV Push once  dextrose 50% Injectable 25 Gram(s) IV Push once  dextrose 50% Injectable 12.5 Gram(s) IV Push once  glucagon  Injectable 1 milliGRAM(s) IntraMuscular once  insulin glargine Injectable (LANTUS) 3 Unit(s) SubCutaneous at bedtime  insulin lispro (ADMELOG) corrective regimen sliding scale   SubCutaneous three times a day before meals  iron sucrose IVPB 200 milliGRAM(s) IV Intermittent every 48 hours  tamsulosin 0.4 milliGRAM(s) Oral at bedtime    VITALS:  T(C): , Max: 36.9 (12-17-23 @ 05:48)  T(F): , Max: 98.4 (12-17-23 @ 05:48)  HR: 61 (12-17-23 @ 05:48)  BP: 127/51 (12-17-23 @ 05:48)  RR: 18 (12-17-23 @ 05:48)  SpO2: 98% (12-17-23 @ 05:48)    PHYSICAL EXAM:  General: Alerted, oriented  HEENT: MMM  Lungs:CTA-b/l  Heart: RRR S1S2  Abdomen: Soft, NTND  Ext: no edema   : no pickering    LABS:                        8.4    4.27  )-----------( 140      ( 17 Dec 2023 06:33 )             26.2     12-17    140  |  107  |  51<H>  ----------------------------<  94  3.4<L>   |  21<L>  |  3.20<H>    Ca    8.2<L>      17 Dec 2023 06:33  Phos  3.9     12-17  Mg     2.20     12-17    TPro  6.0  /  Alb  2.5<L>  /  TBili  0.4  /  DBili  x   /  AST  49<H>  /  ALT  59<H>  /  AlkPhos  133<H>  12-17    ASSESSMENT/PLAN:   87 male PMH of hypertension, hyperlipidemia, A-fib on Eliquis, CHF, CKD, CAD status post stents on aspirin, bladder cancer in remission, diabetes, TIA, left nephrectomy presenting with hematuria and shortness of breath.  Gross hematuria   Acute blood loss anemia   CKD stage 3b and baseline cre 1.7 (solitary kidney)and now close to 3.  Hx of new onset nephrotic syndrome   Hypokalemia - mild     1 -Official  eval noted, CT scan noted   2 Renal - cr slightly lower, s/p lasix IV, could look to resume lasix po tomorrow, monitor I/Os  KCl 20 mEq po x 1   Check TP/cre ratio   Check bladder scan  3 CVS- Is off A/c at present (hx of afib)   4 Anemia- sp hb stable, low tsat and low ferritin stores, continue venofer 200mg x 4 doses every other day     D/w Dr. Kylee Felipe, [facility]  [phone]    NEPHROLOGY     Patient seen and examined resting comfortably on room air, denies sob, reports feeling well, denies pain, in no acute distress.     MEDICATIONS  (STANDING):  aMIOdarone    Tablet 200 milliGRAM(s) Oral daily  dextrose 5%. 1000 milliLiter(s) (100 mL/Hr) IV Continuous <Continuous>  dextrose 5%. 1000 milliLiter(s) (50 mL/Hr) IV Continuous <Continuous>  dextrose 50% Injectable 25 Gram(s) IV Push once  dextrose 50% Injectable 25 Gram(s) IV Push once  dextrose 50% Injectable 12.5 Gram(s) IV Push once  glucagon  Injectable 1 milliGRAM(s) IntraMuscular once  insulin glargine Injectable (LANTUS) 3 Unit(s) SubCutaneous at bedtime  insulin lispro (ADMELOG) corrective regimen sliding scale   SubCutaneous three times a day before meals  iron sucrose IVPB 200 milliGRAM(s) IV Intermittent every 48 hours  tamsulosin 0.4 milliGRAM(s) Oral at bedtime    VITALS:  T(C): , Max: 36.9 (12-17-23 @ 05:48)  T(F): , Max: 98.4 (12-17-23 @ 05:48)  HR: 61 (12-17-23 @ 05:48)  BP: 127/51 (12-17-23 @ 05:48)  RR: 18 (12-17-23 @ 05:48)  SpO2: 98% (12-17-23 @ 05:48)    PHYSICAL EXAM:  General: Alerted, oriented  HEENT: MMM  Lungs:CTA-b/l  Heart: RRR S1S2  Abdomen: Soft, NTND  Ext: no edema   : no pickering    LABS:                        8.4    4.27  )-----------( 140      ( 17 Dec 2023 06:33 )             26.2     12-17    140  |  107  |  51<H>  ----------------------------<  94  3.4<L>   |  21<L>  |  3.20<H>    Ca    8.2<L>      17 Dec 2023 06:33  Phos  3.9     12-17  Mg     2.20     12-17    TPro  6.0  /  Alb  2.5<L>  /  TBili  0.4  /  DBili  x   /  AST  49<H>  /  ALT  59<H>  /  AlkPhos  133<H>  12-17    ASSESSMENT/PLAN:   87 male PMH of hypertension, hyperlipidemia, A-fib on Eliquis, CHF, CKD, CAD status post stents on aspirin, bladder cancer in remission, diabetes, TIA, left nephrectomy presenting with hematuria and shortness of breath.  Gross hematuria   Acute blood loss anemia   CKD stage 3b and baseline cre 1.7 (solitary kidney)and now close to 3.  Hx of new onset nephrotic syndrome   Hypokalemia - mild     1 -Official  eval noted, CT scan noted   2 Renal - cr slightly lower, s/p lasix IV, could look to resume lasix po tomorrow, monitor I/Os  KCl 20 mEq po x 1   Check TP/cre ratio   Check bladder scan  3 CVS- Is off A/c at present (hx of afib)   4 Anemia- sp hb stable, low tsat and low ferritin stores, continue venofer 200mg x 4 doses every other day     D/w Dr. Kylee Felipe, [facility]  [phone]

## 2023-12-17 NOTE — CONSULT NOTE ADULT - SUBJECTIVE AND OBJECTIVE BOX
CARDIOLOGY CONSULT - Dr. Vega  Date of Service: 12/17/2023      HPI:  87 male PMH of hypertension, hyperlipidemia, A-fib on Eliquis, CHF, CKD, CAD status post stents on aspirin, bladder cancer in remission, diabetes, TIA, left nephrectomy presenting with hematuria and shortness of breath.  Patient was sent in by his nephrologist Dr. Yonatan Gusman because his hemoglobin was 8.2 earlier this week.  Patient has been having hematuria since November.  Had a cystoscopy which was unremarkable back in November.  Patient also was diagnosed with a right renal calculus approximately 1 week.  Endorsing dyspnea on exertion since Friday.  Patient takes Lasix as needed for lower extremity edema and wife says that his legs look good and so he has not taken Tylenol.  No fever, chest pain, cough, shortness of breath, abdominal pain, flank pain. (14 Dec 2023 13:30)      PAST MEDICAL & SURGICAL HISTORY:  CAD (coronary artery disease)  (4 stents,)      Diabetes mellitus, new onset  diet controlled      Single kidney  (hx/o LEFT nephrectomy at age 16; pt states due to a ureter"blockage" causing "infection"; pt denies hx/o renal dysfunction)      Arthritis      Myocardial infarction  (2003)      Hard of hearing      Atrial fibrillation      Hypertension      Hyperlipidemia      History of TIAs  Last 2018      History of bradycardia      Bladder cancer      History of CHF (congestive heart failure)      Left carotid bruit      History of nephrectomy, unilateral  (hx/o LEFT nephrectomy at age 16)      Stented coronary artery  (4 stents)      S/P bilateral cataract extraction      H/O cystoscopy              PREVIOUS DIAGNOSTIC TESTING:    [ ] Echocardiogram:  [ ]  Catheterization:  [ ] Stress Test:  	    MEDICATIONS:  MEDICATIONS  (STANDING):  aMIOdarone    Tablet 200 milliGRAM(s) Oral daily  dextrose 5%. 1000 milliLiter(s) (100 mL/Hr) IV Continuous <Continuous>  dextrose 5%. 1000 milliLiter(s) (50 mL/Hr) IV Continuous <Continuous>  dextrose 50% Injectable 25 Gram(s) IV Push once  dextrose 50% Injectable 25 Gram(s) IV Push once  dextrose 50% Injectable 12.5 Gram(s) IV Push once  glucagon  Injectable 1 milliGRAM(s) IntraMuscular once  insulin glargine Injectable (LANTUS) 3 Unit(s) SubCutaneous at bedtime  insulin lispro (ADMELOG) corrective regimen sliding scale   SubCutaneous three times a day before meals  iron sucrose IVPB 200 milliGRAM(s) IV Intermittent every 48 hours  tamsulosin 0.4 milliGRAM(s) Oral at bedtime      FAMILY HISTORY:  Family history of diabetes mellitus in mother (Mother)  (Pt states his mother developed DM in her 70's)    FH: bladder cancer (Sibling)        SOCIAL HISTORY:    [ ] Non-smoker  [ ] Smoker  [ ] Alcohol    Allergies    penicillin (Rash)    Intolerances    Cipro (Joint Pain)  	    REVIEW OF SYSTEMS:  CONSTITUTIONAL: No fever, weight loss, or fatigue  EYES: No eye pain, visual disturbances, or discharge  ENMT:  No difficulty hearing, tinnitus, vertigo; No sinus or throat pain  NECK: No pain or stiffness  RESPIRATORY: No cough, wheezing, chills or hemoptysis; No Shortness of Breath  CARDIOVASCULAR: No chest pain, palpitations, passing out, dizziness, or leg swelling  GASTROINTESTINAL: No abdominal or epigastric pain. No nausea, vomiting, or hematemesis; No diarrhea or constipation. No melena or hematochezia.  GENITOURINARY: No dysuria, frequency, hematuria, or incontinence  NEUROLOGICAL: No headaches, memory loss, loss of strength, numbness, or tremors  SKIN: No itching, burning, rashes, or lesions   	    [ ] All others negative	  [ ] Unable to obtain    PHYSICAL EXAM:  T(C): 36.7 (12-17-23 @ 13:21), Max: 36.9 (12-17-23 @ 05:48)  HR: 67 (12-17-23 @ 13:21) (61 - 67)  BP: 112/62 (12-17-23 @ 13:21) (112/62 - 139/60)  RR: 18 (12-17-23 @ 13:21) (18 - 18)  SpO2: 99% (12-17-23 @ 13:21) (98% - 100%)  Wt(kg): --  I&O's Summary      Appearance: Normal	  Psychiatry: A & O x 3, Mood & affect appropriate  HEENT:   Normal oral mucosa, PERRL, EOMI	  Lymphatic: No lymphadenopathy  Cardiovascular: Normal S1 S2,RRR, No JVD, No murmurs  Respiratory: Lungs clear to auscultation	  Gastrointestinal:  Soft, Non-tender, + BS	  Skin: No rashes, No ecchymoses, No cyanosis	  Neurologic: Non-focal  Extremities: Normal range of motion, No clubbing, cyanosis or edema  Vascular: Peripheral pulses palpable 2+ bilaterally    TELEMETRY: 	    ECG:  	  RADIOLOGY:  OTHER: 	  	  LABS:	 	    CARDIAC MARKERS:                                  8.4    4.27  )-----------( 140      ( 17 Dec 2023 06:33 )             26.2     12-17    140  |  107  |  51<H>  ----------------------------<  94  3.4<L>   |  21<L>  |  3.20<H>    Ca    8.2<L>      17 Dec 2023 06:33  Phos  3.9     12-17  Mg     2.20     12-17    TPro  6.0  /  Alb  2.5<L>  /  TBili  0.4  /  DBili  x   /  AST  49<H>  /  ALT  59<H>  /  AlkPhos  133<H>  12-17      proBNP:   Lipid Profile:   HgA1c:   TSH:     ASSESSMENT/PLAN: 	              70 minutes spent on total encounter; more than 50% of the visit was spent counseling and/or coordinating care by the attending physician.

## 2023-12-17 NOTE — PROGRESS NOTE ADULT - SUBJECTIVE AND OBJECTIVE BOX
Patient is a 87y old  Male who presents with a chief complaint of shortness of breath (17 Dec 2023 08:51)      DATE OF SERVICE: 12-17-23 @ 11:26    SUBJECTIVE / OVERNIGHT EVENTS: overnight events noted    ROS:  Resp: No cough no sputum production  CVS: No chest pain no palpitations no orthopnea  GI: no N/V/D  : no dysuria, no hematuria  Neuro: no weakness no paresthesias  Heme: No petechiae no easy bruising  Msk: No joint pain no swelling  Skin: No rash no itching        MEDICATIONS  (STANDING):  aMIOdarone    Tablet 200 milliGRAM(s) Oral daily  dextrose 5%. 1000 milliLiter(s) (50 mL/Hr) IV Continuous <Continuous>  dextrose 5%. 1000 milliLiter(s) (100 mL/Hr) IV Continuous <Continuous>  dextrose 50% Injectable 25 Gram(s) IV Push once  dextrose 50% Injectable 12.5 Gram(s) IV Push once  dextrose 50% Injectable 25 Gram(s) IV Push once  glucagon  Injectable 1 milliGRAM(s) IntraMuscular once  insulin glargine Injectable (LANTUS) 3 Unit(s) SubCutaneous at bedtime  insulin lispro (ADMELOG) corrective regimen sliding scale   SubCutaneous three times a day before meals  iron sucrose IVPB 200 milliGRAM(s) IV Intermittent every 48 hours  potassium chloride    Tablet ER 40 milliEquivalent(s) Oral once  tamsulosin 0.4 milliGRAM(s) Oral at bedtime    MEDICATIONS  (PRN):  dextrose Oral Gel 15 Gram(s) Oral once PRN Blood Glucose LESS THAN 70 milliGRAM(s)/deciliter        CAPILLARY BLOOD GLUCOSE      POCT Blood Glucose.: 110 mg/dL (17 Dec 2023 08:06)  POCT Blood Glucose.: 178 mg/dL (16 Dec 2023 22:16)  POCT Blood Glucose.: 149 mg/dL (16 Dec 2023 17:34)  POCT Blood Glucose.: 222 mg/dL (16 Dec 2023 12:19)    I&O's Summary      Vital Signs Last 24 Hrs  T(C): 36.9 (17 Dec 2023 05:48), Max: 36.9 (17 Dec 2023 05:48)  T(F): 98.4 (17 Dec 2023 05:48), Max: 98.4 (17 Dec 2023 05:48)  HR: 61 (17 Dec 2023 05:48) (61 - 65)  BP: 127/51 (17 Dec 2023 05:48) (127/51 - 144/60)  BP(mean): --  RR: 18 (17 Dec 2023 05:48) (18 - 18)  SpO2: 98% (17 Dec 2023 05:48) (98% - 100%)    PHYSICAL EXAM:   Neck: Supple, no JVD  Lungs: no wheeze, no crackles  CVS: S1 S2 no M/R/G  Abdomen: no tenderness  Neuro: AO x 3 nonfocal   Ext: no edema  Msk: no joint swelling     LABS:                        8.4    4.27  )-----------( 140      ( 17 Dec 2023 06:33 )             26.2     12-17    140  |  107  |  51<H>  ----------------------------<  94  3.4<L>   |  21<L>  |  3.20<H>    Ca    8.2<L>      17 Dec 2023 06:33  Phos  3.9     12-17  Mg     2.20     12-17    TPro  6.0  /  Alb  2.5<L>  /  TBili  0.4  /  DBili  x   /  AST  49<H>  /  ALT  59<H>  /  AlkPhos  133<H>  12-17          Urinalysis Basic - ( 17 Dec 2023 06:33 )    Color: x / Appearance: x / SG: x / pH: x  Gluc: 94 mg/dL / Ketone: x  / Bili: x / Urobili: x   Blood: x / Protein: x / Nitrite: x   Leuk Esterase: x / RBC: x / WBC x   Sq Epi: x / Non Sq Epi: x / Bacteria: x          All consultant(s) notes reviewed and care discussed with other providers        Contact Number, Dr Arroyo [phone] Patient is a 87y old  Male who presents with a chief complaint of shortness of breath (17 Dec 2023 08:51)      DATE OF SERVICE: 12-17-23 @ 11:26    SUBJECTIVE / OVERNIGHT EVENTS: overnight events noted    ROS:  Resp: No cough no sputum production  CVS: No chest pain no palpitations no orthopnea  GI: no N/V/D  : no dysuria, no hematuria  Neuro: no weakness no paresthesias  Heme: No petechiae no easy bruising  Msk: No joint pain no swelling  Skin: No rash no itching        MEDICATIONS  (STANDING):  aMIOdarone    Tablet 200 milliGRAM(s) Oral daily  dextrose 5%. 1000 milliLiter(s) (50 mL/Hr) IV Continuous <Continuous>  dextrose 5%. 1000 milliLiter(s) (100 mL/Hr) IV Continuous <Continuous>  dextrose 50% Injectable 25 Gram(s) IV Push once  dextrose 50% Injectable 12.5 Gram(s) IV Push once  dextrose 50% Injectable 25 Gram(s) IV Push once  glucagon  Injectable 1 milliGRAM(s) IntraMuscular once  insulin glargine Injectable (LANTUS) 3 Unit(s) SubCutaneous at bedtime  insulin lispro (ADMELOG) corrective regimen sliding scale   SubCutaneous three times a day before meals  iron sucrose IVPB 200 milliGRAM(s) IV Intermittent every 48 hours  potassium chloride    Tablet ER 40 milliEquivalent(s) Oral once  tamsulosin 0.4 milliGRAM(s) Oral at bedtime    MEDICATIONS  (PRN):  dextrose Oral Gel 15 Gram(s) Oral once PRN Blood Glucose LESS THAN 70 milliGRAM(s)/deciliter        CAPILLARY BLOOD GLUCOSE      POCT Blood Glucose.: 110 mg/dL (17 Dec 2023 08:06)  POCT Blood Glucose.: 178 mg/dL (16 Dec 2023 22:16)  POCT Blood Glucose.: 149 mg/dL (16 Dec 2023 17:34)  POCT Blood Glucose.: 222 mg/dL (16 Dec 2023 12:19)    I&O's Summary      Vital Signs Last 24 Hrs  T(C): 36.9 (17 Dec 2023 05:48), Max: 36.9 (17 Dec 2023 05:48)  T(F): 98.4 (17 Dec 2023 05:48), Max: 98.4 (17 Dec 2023 05:48)  HR: 61 (17 Dec 2023 05:48) (61 - 65)  BP: 127/51 (17 Dec 2023 05:48) (127/51 - 144/60)  BP(mean): --  RR: 18 (17 Dec 2023 05:48) (18 - 18)  SpO2: 98% (17 Dec 2023 05:48) (98% - 100%)    PHYSICAL EXAM:   Neck: Supple, no JVD  Lungs: no wheeze, no crackles  CVS: S1 S2 no M/R/G  Abdomen: no tenderness  Neuro: AO x 3 nonfocal   Ext: no edema  Msk: no joint swelling     LABS:                        8.4    4.27  )-----------( 140      ( 17 Dec 2023 06:33 )             26.2     12-17    140  |  107  |  51<H>  ----------------------------<  94  3.4<L>   |  21<L>  |  3.20<H>    Ca    8.2<L>      17 Dec 2023 06:33  Phos  3.9     12-17  Mg     2.20     12-17    TPro  6.0  /  Alb  2.5<L>  /  TBili  0.4  /  DBili  x   /  AST  49<H>  /  ALT  59<H>  /  AlkPhos  133<H>  12-17          Urinalysis Basic - ( 17 Dec 2023 06:33 )    Color: x / Appearance: x / SG: x / pH: x  Gluc: 94 mg/dL / Ketone: x  / Bili: x / Urobili: x   Blood: x / Protein: x / Nitrite: x   Leuk Esterase: x / RBC: x / WBC x   Sq Epi: x / Non Sq Epi: x / Bacteria: x          All consultant(s) notes reviewed and care discussed with other providers        Contact Number, Dr Arroyo [phone]

## 2023-12-17 NOTE — CONSULT NOTE ADULT - ASSESSMENT
87 male PMH of hypertension, hyperlipidemia, A-fib on Eliquis, CHF, CKD, CAD status post stents on aspirin, bladder cancer in remission, diabetes, TIA, left nephrectomy presenting with hematuria and shortness of breath.    #SOB  -multifactorial  -no gross fluid overload on exam  -appears euvolemic  -hold diuretics for now  -last echo w mild LV dysfunction  -repeat TTE to r/o change in LVEF    #Atrial Fibrillation  -stable  -AC on hold given hematuria  -cont amio    # CAD  -stable  -mild trop elevation due to CKD  -no cp  -consider low dose asa when bleeding issues resolve    #CKD  -renal followup    75 minutes spent on total encounter; more than 50% of the visit was spent counseling and/or coordinating care by the attending physician.

## 2023-12-18 ENCOUNTER — TRANSCRIPTION ENCOUNTER (OUTPATIENT)
Age: 88
End: 2023-12-18

## 2023-12-18 VITALS
DIASTOLIC BLOOD PRESSURE: 63 MMHG | RESPIRATION RATE: 18 BRPM | HEART RATE: 60 BPM | SYSTOLIC BLOOD PRESSURE: 125 MMHG | TEMPERATURE: 98 F | OXYGEN SATURATION: 100 %

## 2023-12-18 LAB
ALBUMIN SERPL ELPH-MCNC: 2.6 G/DL — LOW (ref 3.3–5)
ALBUMIN SERPL ELPH-MCNC: 2.6 G/DL — LOW (ref 3.3–5)
ALP SERPL-CCNC: 133 U/L — HIGH (ref 40–120)
ALP SERPL-CCNC: 133 U/L — HIGH (ref 40–120)
ALT FLD-CCNC: 52 U/L — HIGH (ref 4–41)
ALT FLD-CCNC: 52 U/L — HIGH (ref 4–41)
ANION GAP SERPL CALC-SCNC: 12 MMOL/L — SIGNIFICANT CHANGE UP (ref 7–14)
ANION GAP SERPL CALC-SCNC: 12 MMOL/L — SIGNIFICANT CHANGE UP (ref 7–14)
AST SERPL-CCNC: 43 U/L — HIGH (ref 4–40)
AST SERPL-CCNC: 43 U/L — HIGH (ref 4–40)
BILIRUB SERPL-MCNC: 0.4 MG/DL — SIGNIFICANT CHANGE UP (ref 0.2–1.2)
BILIRUB SERPL-MCNC: 0.4 MG/DL — SIGNIFICANT CHANGE UP (ref 0.2–1.2)
BUN SERPL-MCNC: 49 MG/DL — HIGH (ref 7–23)
BUN SERPL-MCNC: 49 MG/DL — HIGH (ref 7–23)
CALCIUM SERPL-MCNC: 8.1 MG/DL — LOW (ref 8.4–10.5)
CALCIUM SERPL-MCNC: 8.1 MG/DL — LOW (ref 8.4–10.5)
CHLORIDE SERPL-SCNC: 108 MMOL/L — HIGH (ref 98–107)
CHLORIDE SERPL-SCNC: 108 MMOL/L — HIGH (ref 98–107)
CO2 SERPL-SCNC: 19 MMOL/L — LOW (ref 22–31)
CO2 SERPL-SCNC: 19 MMOL/L — LOW (ref 22–31)
CREAT SERPL-MCNC: 2.83 MG/DL — HIGH (ref 0.5–1.3)
CREAT SERPL-MCNC: 2.83 MG/DL — HIGH (ref 0.5–1.3)
EGFR: 21 ML/MIN/1.73M2 — LOW
EGFR: 21 ML/MIN/1.73M2 — LOW
GLUCOSE BLDC GLUCOMTR-MCNC: 116 MG/DL — HIGH (ref 70–99)
GLUCOSE BLDC GLUCOMTR-MCNC: 116 MG/DL — HIGH (ref 70–99)
GLUCOSE BLDC GLUCOMTR-MCNC: 363 MG/DL — HIGH (ref 70–99)
GLUCOSE BLDC GLUCOMTR-MCNC: 363 MG/DL — HIGH (ref 70–99)
GLUCOSE SERPL-MCNC: 104 MG/DL — HIGH (ref 70–99)
GLUCOSE SERPL-MCNC: 104 MG/DL — HIGH (ref 70–99)
HCT VFR BLD CALC: 26.5 % — LOW (ref 39–50)
HCT VFR BLD CALC: 26.5 % — LOW (ref 39–50)
HGB BLD-MCNC: 8.4 G/DL — LOW (ref 13–17)
HGB BLD-MCNC: 8.4 G/DL — LOW (ref 13–17)
MAGNESIUM SERPL-MCNC: 2.2 MG/DL — SIGNIFICANT CHANGE UP (ref 1.6–2.6)
MAGNESIUM SERPL-MCNC: 2.2 MG/DL — SIGNIFICANT CHANGE UP (ref 1.6–2.6)
MCHC RBC-ENTMCNC: 29.1 PG — SIGNIFICANT CHANGE UP (ref 27–34)
MCHC RBC-ENTMCNC: 29.1 PG — SIGNIFICANT CHANGE UP (ref 27–34)
MCHC RBC-ENTMCNC: 31.7 GM/DL — LOW (ref 32–36)
MCHC RBC-ENTMCNC: 31.7 GM/DL — LOW (ref 32–36)
MCV RBC AUTO: 91.7 FL — SIGNIFICANT CHANGE UP (ref 80–100)
MCV RBC AUTO: 91.7 FL — SIGNIFICANT CHANGE UP (ref 80–100)
NRBC # BLD: 0 /100 WBCS — SIGNIFICANT CHANGE UP (ref 0–0)
NRBC # BLD: 0 /100 WBCS — SIGNIFICANT CHANGE UP (ref 0–0)
NRBC # FLD: 0 K/UL — SIGNIFICANT CHANGE UP (ref 0–0)
NRBC # FLD: 0 K/UL — SIGNIFICANT CHANGE UP (ref 0–0)
PHOSPHATE SERPL-MCNC: 3.2 MG/DL — SIGNIFICANT CHANGE UP (ref 2.5–4.5)
PHOSPHATE SERPL-MCNC: 3.2 MG/DL — SIGNIFICANT CHANGE UP (ref 2.5–4.5)
PLATELET # BLD AUTO: 141 K/UL — LOW (ref 150–400)
PLATELET # BLD AUTO: 141 K/UL — LOW (ref 150–400)
POTASSIUM SERPL-MCNC: 4 MMOL/L — SIGNIFICANT CHANGE UP (ref 3.5–5.3)
POTASSIUM SERPL-MCNC: 4 MMOL/L — SIGNIFICANT CHANGE UP (ref 3.5–5.3)
POTASSIUM SERPL-SCNC: 4 MMOL/L — SIGNIFICANT CHANGE UP (ref 3.5–5.3)
POTASSIUM SERPL-SCNC: 4 MMOL/L — SIGNIFICANT CHANGE UP (ref 3.5–5.3)
PROT SERPL-MCNC: 5.7 G/DL — LOW (ref 6–8.3)
PROT SERPL-MCNC: 5.7 G/DL — LOW (ref 6–8.3)
RBC # BLD: 2.89 M/UL — LOW (ref 4.2–5.8)
RBC # BLD: 2.89 M/UL — LOW (ref 4.2–5.8)
RBC # FLD: 16.4 % — HIGH (ref 10.3–14.5)
RBC # FLD: 16.4 % — HIGH (ref 10.3–14.5)
SODIUM SERPL-SCNC: 139 MMOL/L — SIGNIFICANT CHANGE UP (ref 135–145)
SODIUM SERPL-SCNC: 139 MMOL/L — SIGNIFICANT CHANGE UP (ref 135–145)
WBC # BLD: 4.09 K/UL — SIGNIFICANT CHANGE UP (ref 3.8–10.5)
WBC # BLD: 4.09 K/UL — SIGNIFICANT CHANGE UP (ref 3.8–10.5)
WBC # FLD AUTO: 4.09 K/UL — SIGNIFICANT CHANGE UP (ref 3.8–10.5)
WBC # FLD AUTO: 4.09 K/UL — SIGNIFICANT CHANGE UP (ref 3.8–10.5)

## 2023-12-18 RX ORDER — FUROSEMIDE 40 MG
1 TABLET ORAL
Qty: 0 | Refills: 0 | DISCHARGE

## 2023-12-18 RX ORDER — APIXABAN 2.5 MG/1
1 TABLET, FILM COATED ORAL
Refills: 0 | DISCHARGE

## 2023-12-18 RX ORDER — FERROUS SULFATE 325(65) MG
1 TABLET ORAL
Qty: 30 | Refills: 0
Start: 2023-12-18 | End: 2024-01-16

## 2023-12-18 RX ORDER — FUROSEMIDE 40 MG
1 TABLET ORAL
Qty: 30 | Refills: 0
Start: 2023-12-18 | End: 2024-01-16

## 2023-12-18 RX ADMIN — IRON SUCROSE 110 MILLIGRAM(S): 20 INJECTION, SOLUTION INTRAVENOUS at 14:05

## 2023-12-18 RX ADMIN — Medication 5: at 12:23

## 2023-12-18 RX ADMIN — AMIODARONE HYDROCHLORIDE 200 MILLIGRAM(S): 400 TABLET ORAL at 05:14

## 2023-12-18 NOTE — DISCHARGE NOTE NURSING/CASE MANAGEMENT/SOCIAL WORK - NSDCPEFALRISK_GEN_ALL_CORE
For information on Fall & Injury Prevention, visit: [email or web address] OR  [email or web address] OR  https://www.cdc.gov/steadi/patient.html For information on Fall & Injury Prevention, visit: [email or web address] OR  [email or web address] OR  https://www.cdc.gov/steadi/patient.html

## 2023-12-18 NOTE — PROGRESS NOTE ADULT - PROBLEM SELECTOR PLAN 3
work up likely iron deficiency  low normal ferritin with borderline TIBC  s/p venofer  discharge on po ferrous sulfate 325 qd
work up likely iron deficiency  low normal ferritin with borderline TIBC  continue Venofer
occult blood feces pending   work up possible iron deficiency  low normal ferritin with borderline TIBC  Venofer x 4 doses IV  s/p transfusion x 1 unit  hemoglobin > 8 today
occult blood feces pending   work up possible iron deficiency  low normal ferritin with borderline TIBC  Venofer x 3 doses IV  s/p transfusion x 1 unit  hemoglobin > 8 today  continue to monitor

## 2023-12-18 NOTE — PROGRESS NOTE ADULT - PROBLEM SELECTOR PLAN 2
hold diuresis   appears euvolemic today  will monitor creatinine closely
furosemide 40 IV qd to continue  awaiting renal follow up   much more euvolemic today  will monitor creatinine closely
euvolemic   continue furosemide 40 po qd on discharge (increased dose)
hold diuresis   appears euvolemic today  will monitor creatinine closely  a little improved today

## 2023-12-18 NOTE — PROGRESS NOTE ADULT - PROVIDER SPECIALTY LIST ADULT
Cardiology
Nephrology
Internal Medicine

## 2023-12-18 NOTE — DISCHARGE NOTE PROVIDER - NSDCFUSCHEDAPPT_GEN_ALL_CORE_FT
Jessie Caldwell  [facility]  [address]  Scheduled Appointment: 12/21/2023    [facility]  UROLOGY [address] [place] [address] R  Scheduled Appointment: 02/13/2024    Poornima Lim  [facility]  UROLOGY [address] [place] [address] R  Scheduled Appointment: 02/13/2024     Jessie Caldwell  [facility]  [address]M [address]  Scheduled Appointment: 12/21/2023    [facility]  UROLOGY [address] [place] [address] R  Scheduled Appointment: 02/13/2024    Poornima Lim  [facility]  UROLOGY [address] [place] [address] R  Scheduled Appointment: 02/13/2024

## 2023-12-18 NOTE — PROGRESS NOTE ADULT - PROBLEM SELECTOR PLAN 9
likely asymptomatic bacteruria   urine culture enterococcus sp   hold off on antibiotics for now
likely asymptomatic bacteruria   urine culture testing  hold off on antibiotics for now
likely asymptomatic bacteruria   urine culture enterococcus sp   no role for antibiotics
likely asymptomatic bacteruria   urine culture testing  hold off on antibiotics for now

## 2023-12-18 NOTE — DISCHARGE NOTE PROVIDER - HOSPITAL COURSE
87 male PMH of hypertension, hyperlipidemia, A-fib on Eliquis, CHF, CKD, CAD status post stents on aspirin, bladder cancer in remission, diabetes, TIA, left nephrectomy presenting with hematuria and shortness of breath clinical presentation consistent with acute on chronic systolic heart failure and severe anemia     Acute-on-chronic kidney injury.   ·  Plan: creatinine improved   discussed with renal  resume furosemide 40 po qd on discharge.     Acute on chronic HFrEF (heart failure with reduced ejection fraction).   ·  Plan: euvolemic   continue furosemide 40 po qd on discharge (increased dose).    Severe anemia.   ·  Plan: work up likely iron deficiency  low normal ferritin with borderline TIBC  s/p venofer  discharge on po ferrous sulfate 325 qd.     DM (diabetes mellitus).   ·  Plan: last HbA1C 2022 5.6  resume home dose meds on discharge.    CAD (coronary artery disease).   ·  Plan: chest pain free  cardiology consultation appreciated  no cardiac intervention at this time.     Atrial fibrillation.   ·  Plan: heart rate controlled   no anticoagulation at present.    Hyperlipidemia.   ·  Plan: fasting lipid panel as outpatient.     Transaminitis.   ·  Plan: likely congestive  continues to improve  resume statin.    Abnormal urinalysis.   ·  Plan: likely asymptomatic bacteruria   urine culture enterococcus sp   no role for antibiotics.      Patient stable and cleared for discharge home d/w Dr. Arroyo  on 12/18/23.

## 2023-12-18 NOTE — PROGRESS NOTE ADULT - SUBJECTIVE AND OBJECTIVE BOX
NEPHROLOGY     Patient seen and examined resting comfortably on room air, in no acute distress.     MEDICATIONS  (STANDING):  aMIOdarone    Tablet 200 milliGRAM(s) Oral daily  dextrose 5%. 1000 milliLiter(s) (100 mL/Hr) IV Continuous <Continuous>  dextrose 5%. 1000 milliLiter(s) (50 mL/Hr) IV Continuous <Continuous>  dextrose 50% Injectable 25 Gram(s) IV Push once  dextrose 50% Injectable 25 Gram(s) IV Push once  dextrose 50% Injectable 12.5 Gram(s) IV Push once  glucagon  Injectable 1 milliGRAM(s) IntraMuscular once  insulin glargine Injectable (LANTUS) 3 Unit(s) SubCutaneous at bedtime  insulin lispro (ADMELOG) corrective regimen sliding scale   SubCutaneous three times a day before meals  iron sucrose IVPB 200 milliGRAM(s) IV Intermittent every 48 hours  tamsulosin 0.4 milliGRAM(s) Oral at bedtime    VITALS:  T(C): , Max: 36.9 (12-17-23 @ 05:48)  T(F): , Max: 98.4 (12-17-23 @ 05:48)  HR: 61 (12-17-23 @ 05:48)  BP: 127/51 (12-17-23 @ 05:48)  RR: 18 (12-17-23 @ 05:48)  SpO2: 98% (12-17-23 @ 05:48)    PHYSICAL EXAM:  General: Alerted, oriented  HEENT: MMM  Lungs:CTA-b/l  Heart: RRR S1S2  Abdomen: Soft, NTND  Ext: no edema   : no pickering    LABS:                        8.4    4.27  )-----------( 140      ( 17 Dec 2023 06:33 )             26.2     12-17    140  |  107  |  51<H>  ----------------------------<  94  3.4<L>   |  21<L>  |  3.20<H>    Ca    8.2<L>      17 Dec 2023 06:33  Phos  3.9     12-17  Mg     2.20     12-17    TPro  6.0  /  Alb  2.5<L>  /  TBili  0.4  /  DBili  x   /  AST  49<H>  /  ALT  59<H>  /  AlkPhos  133<H>  12-17    ASSESSMENT/PLAN:   87 male PMH of hypertension, hyperlipidemia, A-fib on Eliquis, CHF, CKD, CAD status post stents on aspirin, bladder cancer in remission, diabetes, TIA, left nephrectomy presenting with hematuria and shortness of breath.  Gross hematuria   Acute blood loss anemia   CKD stage 3b and baseline cre 1.7 (solitary kidney)and now close to 3.  Hx of new onset nephrotic syndrome   Hypokalemia - mild     1 -Official  eval noted, CT scan noted   2 Renal - cr slightly lower, s/p lasix IV, resume lasix 40 mg daily  monitor I/Os  3 CVS- Is off A/c at present (hx of afib)   4 Anemia- sp hb stable, low tsat and low ferritin stores, continue venofer 200mg x 4 doses every other day         [facility]  [facility]  Office: [phone]     NEPHROLOGY     Patient seen and examined resting comfortably on room air, in no acute distress.     MEDICATIONS  (STANDING):  aMIOdarone    Tablet 200 milliGRAM(s) Oral daily  dextrose 5%. 1000 milliLiter(s) (100 mL/Hr) IV Continuous <Continuous>  dextrose 5%. 1000 milliLiter(s) (50 mL/Hr) IV Continuous <Continuous>  dextrose 50% Injectable 25 Gram(s) IV Push once  dextrose 50% Injectable 25 Gram(s) IV Push once  dextrose 50% Injectable 12.5 Gram(s) IV Push once  glucagon  Injectable 1 milliGRAM(s) IntraMuscular once  insulin glargine Injectable (LANTUS) 3 Unit(s) SubCutaneous at bedtime  insulin lispro (ADMELOG) corrective regimen sliding scale   SubCutaneous three times a day before meals  iron sucrose IVPB 200 milliGRAM(s) IV Intermittent every 48 hours  tamsulosin 0.4 milliGRAM(s) Oral at bedtime    VITALS:  T(C): , Max: 36.9 (12-17-23 @ 05:48)  T(F): , Max: 98.4 (12-17-23 @ 05:48)  HR: 61 (12-17-23 @ 05:48)  BP: 127/51 (12-17-23 @ 05:48)  RR: 18 (12-17-23 @ 05:48)  SpO2: 98% (12-17-23 @ 05:48)    PHYSICAL EXAM:  General: Alerted, oriented  HEENT: MMM  Lungs:CTA-b/l  Heart: RRR S1S2  Abdomen: Soft, NTND  Ext: no edema   : no pickering    LABS:                        8.4    4.27  )-----------( 140      ( 17 Dec 2023 06:33 )             26.2     12-17    140  |  107  |  51<H>  ----------------------------<  94  3.4<L>   |  21<L>  |  3.20<H>    Ca    8.2<L>      17 Dec 2023 06:33  Phos  3.9     12-17  Mg     2.20     12-17    TPro  6.0  /  Alb  2.5<L>  /  TBili  0.4  /  DBili  x   /  AST  49<H>  /  ALT  59<H>  /  AlkPhos  133<H>  12-17    ASSESSMENT/PLAN:   87 male PMH of hypertension, hyperlipidemia, A-fib on Eliquis, CHF, CKD, CAD status post stents on aspirin, bladder cancer in remission, diabetes, TIA, left nephrectomy presenting with hematuria and shortness of breath.  Gross hematuria   Acute blood loss anemia   CKD stage 3b and baseline cre 1.7 (solitary kidney)and now close to 3.  Hx of new onset nephrotic syndrome   Hypokalemia - mild     1 -Official  eval noted, CT scan noted   2 Renal - cr slightly lower, s/p lasix IV, resume lasix 40 mg daily  monitor I/Os  3 CVS- Is off A/c at present (hx of afib)   4 Anemia- sp hb stable, low tsat and low ferritin stores, continue venofer 200mg x 4 doses every other day         [facility]  [facility]  Office: [phone]

## 2023-12-18 NOTE — PROGRESS NOTE ADULT - PROBLEM SELECTOR PLAN 5
chest pain free  cardiology consultation appreciated
chest pain free  cardiology consultation appreciated  no cardiac intervention at this time
chest pain free  cardiology consultation appreciated
chest pain free  cardiology consultation appreciated

## 2023-12-18 NOTE — PROGRESS NOTE ADULT - PROBLEM SELECTOR PLAN 8
likely congestive  improved   will monitor and see response to diuresis
likely congestive  continues to improve
likely congestive  continues to improve  resume statin
likely congestive  improved further   will monitor and see response to diuresis

## 2023-12-18 NOTE — PROGRESS NOTE ADULT - TIME BILLING
Agree with above NP note.  cv stable  cont current tx  remains off diuretics, will resume on d.c  eventually resume a/c  dcp

## 2023-12-18 NOTE — DISCHARGE NOTE PROVIDER - NSDCCPCAREPLAN_GEN_ALL_CORE_FT
PRINCIPAL DISCHARGE DIAGNOSIS  Diagnosis: Acute on chronic HFrEF (heart failure with reduced ejection fraction)  Assessment and Plan of Treatment: Low salt diet, fluid restriction to 1500 ml daily, monitor your fluid intake and weight daily, and follow up with your physician within 1 to 2 weeks. Your lasix dose was uptitrated to lasix 40mg daily. continue your other cardiac meds as ordered.        SECONDARY DISCHARGE DIAGNOSES  Diagnosis: Acute-on-chronic kidney injury  Assessment and Plan of Treatment: Please follow up with your PCP or nephrologist, avoid any nephrotoxic medications    Diagnosis: Anemia  Assessment and Plan of Treatment: You recived intravenous iron while in the hospital and should continue taking iron tabs at home    Diagnosis: Hematuria  Assessment and Plan of Treatment: Please follow up with your urologist, continue flomax

## 2023-12-18 NOTE — PROGRESS NOTE ADULT - PROBLEM SELECTOR PLAN 4
last HbA1C 2022 5.6  will repeat  start finger sticks with short acting insulin sliding scale
last HbA1C 2022 5.6  resume home dose meds on discharge
last HbA1C 2022 5.6  will repeat  start finger sticks with short acting insulin sliding scale
last HbA1C 2022 5.6  will repeat  start finger sticks with short acting insulin sliding scale

## 2023-12-18 NOTE — PROGRESS NOTE ADULT - PROBLEM SELECTOR PROBLEM 1
Acute-on-chronic kidney injury

## 2023-12-18 NOTE — PROGRESS NOTE ADULT - PROBLEM SELECTOR PLAN 6
heart rate controlled   no anticoagulation at present

## 2023-12-18 NOTE — DISCHARGE NOTE NURSING/CASE MANAGEMENT/SOCIAL WORK - PATIENT PORTAL LINK FT
Pharmacy sends a refill request for the following:  Medication Detail      Disp Refills Start End      cyclobenzaprine (FLEXERIL) 5 MG tablet 30 tablet 3 10/31/2016     Sig - Route: Take 2 tablets by mouth 3 times daily as needed for Muscle spasms. - Oral    Class: Eprescribe    Notes to Pharmacy: Will call to fill.      Last Appt:2/14/2017  Next Appt:05/01/2017    Ok to refill?   You can access the FollowMyHealth Patient Portal offered by [facility] by registering at the following website: [email or web address]. By joining [employer]’s FollowMyHealth portal, you will also be able to view your health information using other applications (apps) compatible with our system. You can access the FollowMyHealth Patient Portal offered by [facility] by registering at the following website: [email or web address]. By joining [employer]’s FollowMyHealth portal, you will also be able to view your health information using other applications (apps) compatible with our system.

## 2023-12-18 NOTE — PROGRESS NOTE ADULT - ASSESSMENT
87 male PMH of hypertension, hyperlipidemia, A-fib on Eliquis, CHF, CKD, CAD status post stents on aspirin, bladder cancer in remission, diabetes, TIA, left nephrectomy presenting with hematuria and shortness of breath clinical presentation consistent with acute on chronic systolic heart failure and severe anemia
87 male PMH of hypertension, hyperlipidemia, A-fib on Eliquis, CHF, CKD, CAD status post stents on aspirin, bladder cancer in remission, diabetes, TIA, left nephrectomy presenting with hematuria and shortness of breath.    #SOB  -multifactorial  -no gross fluid overload on exam  -appears euvolemic  -hold diuretics for now- fu noted- furosemide 40 po qd to be resume  on discharge.  -last echo w mild LV dysfunction  -repeat TTE to r/o change in LVEF-- can be done as outpt if DCP today     #chronic systolic chf   -volume status stable   -lasix to be resume at dc     #Atrial Fibrillation  -stable  -AC on hold given hematuria-- resume as outpt   -cont amio    # CAD  -stable  -mild trop elevation due to CKD  -no cp  -consider low dose asa when bleeding issues resolve    #CKD  -renal followup    pt to fu with his OUTPT cards on DC  
87 male PMH of hypertension, hyperlipidemia, A-fib on Eliquis, CHF, CKD, CAD status post stents on aspirin, bladder cancer in remission, diabetes, TIA, left nephrectomy presenting with hematuria and shortness of breath clinical presentation consistent with acute on chronic systolic heart failure and severe anemia

## 2023-12-18 NOTE — PROGRESS NOTE ADULT - PROBLEM SELECTOR PLAN 1
creatinine up today 3.35  discussed with renal  will continue to hold furosemide
likely hemodynamically mediated  will await renal recommendations   continue to monitor closely  creatinine minimally up today 3.15
creatinine up today 3.35  discussed with renal  will hold furosemide
creatinine improved   discussed with renal  resume furosemide 40 po qd on discharge

## 2023-12-18 NOTE — DISCHARGE NOTE PROVIDER - NSDCMRMEDTOKEN_GEN_ALL_CORE_FT
amiodarone 200 mg oral tablet: 1 tab(s) orally once a day   Aspirin Enteric Coated 81 mg oral delayed release tablet: 1 tab(s) orally once a day   Crestor 40 mg oral tablet: 1 tab(s) orally once a day (at bedtime)  furosemide 40 mg oral tablet: 1 tab(s) orally once a day  tamsulosin 0.4 mg oral capsule: 1 cap(s) orally once a day  Tresiba FlexTouch 100 units/mL subcutaneous solution: 10 subcutaneous once a day (at bedtime)   amiodarone 200 mg oral tablet: 1 tab(s) orally once a day   Aspirin Enteric Coated 81 mg oral delayed release tablet: 1 tab(s) orally once a day   Crestor 40 mg oral tablet: 1 tab(s) orally once a day (at bedtime)  ferrous sulfate 325 mg (65 mg elemental iron) oral tablet: 1 tab(s) orally once a day  furosemide 40 mg oral tablet: 1 tab(s) orally once a day  tamsulosin 0.4 mg oral capsule: 1 cap(s) orally once a day  Tresiba FlexTouch 100 units/mL subcutaneous solution: 10 subcutaneous once a day (at bedtime)

## 2023-12-18 NOTE — DISCHARGE NOTE PROVIDER - CARE PROVIDER_API CALL
Reginaldo Syed  Cardiovascular Disease  [address] [place] [address]  Phone: [phone]  Fax: [phone]  Follow Up Time: 1 week   Reginaldo Syed  Cardiovascular Disease  [address] [place] [address]  Phone: [phone]  Fax: [phone]  Follow Up Time: 1 week

## 2023-12-18 NOTE — PROGRESS NOTE ADULT - SUBJECTIVE AND OBJECTIVE BOX
CARDIOLOGY FOLLOW UP - Dr. Vega  DATE OF SERVICE: 12/18/23    CC no cp or sob       REVIEW OF SYSTEMS:  CONSTITUTIONAL: No fever, weight loss, or fatigue  RESPIRATORY: No cough, wheezing, chills or hemoptysis; No Shortness of Breath  CARDIOVASCULAR: No chest pain, palpitations, passing out, dizziness, or leg swelling  GASTROINTESTINAL: No abdominal or epigastric pain. No nausea, vomiting, or hematemesis; No diarrhea or constipation. No melena or hematochezia.  VASCULAR: No edema     PHYSICAL EXAM:  T(C): 36.9 (12-18-23 @ 05:07), Max: 36.9 (12-18-23 @ 05:07)  HR: 60 (12-18-23 @ 05:07) (60 - 60)  BP: 125/63 (12-18-23 @ 05:07) (125/63 - 144/54)  RR: 18 (12-18-23 @ 05:07) (18 - 18)  SpO2: 100% (12-18-23 @ 05:07) (100% - 100%)  Wt(kg): --  I&O's Summary    18 Dec 2023 07:01  -  18 Dec 2023 13:38  --------------------------------------------------------  IN: 0 mL / OUT: 350 mL / NET: -350 mL        Appearance: Normal	  Cardiovascular: Normal S1 S2,RRR, No JVD, No murmurs  Respiratory: Lungs clear to auscultation	  Gastrointestinal:  Soft, Non-tender, + BS	  Extremities: Normal range of motion, No clubbing, cyanosis or edema      Home Medications:  Crestor 40 mg oral tablet: 1 tab(s) orally once a day (at bedtime) (14 Dec 2023 14:15)  tamsulosin 0.4 mg oral capsule: 1 cap(s) orally once a day (14 Dec 2023 14:15)  Tresiba FlexTouch 100 units/mL subcutaneous solution: 10 subcutaneous once a day (at bedtime) (14 Dec 2023 14:14)      MEDICATIONS  (STANDING):  aMIOdarone    Tablet 200 milliGRAM(s) Oral daily  dextrose 5%. 1000 milliLiter(s) (100 mL/Hr) IV Continuous <Continuous>  dextrose 5%. 1000 milliLiter(s) (50 mL/Hr) IV Continuous <Continuous>  dextrose 50% Injectable 25 Gram(s) IV Push once  dextrose 50% Injectable 25 Gram(s) IV Push once  dextrose 50% Injectable 12.5 Gram(s) IV Push once  glucagon  Injectable 1 milliGRAM(s) IntraMuscular once  insulin glargine Injectable (LANTUS) 3 Unit(s) SubCutaneous at bedtime  insulin lispro (ADMELOG) corrective regimen sliding scale   SubCutaneous three times a day before meals  iron sucrose IVPB 200 milliGRAM(s) IV Intermittent every 48 hours  tamsulosin 0.4 milliGRAM(s) Oral at bedtime      TELEMETRY: 	    ECG:  	  RADIOLOGY:   DIAGNOSTIC TESTING:  [ ] Echocardiogram:  [ ]  Catheterization:  [ ] Stress Test:    OTHER: 	    LABS:	 	    Troponin T, High Sensitivity Result: 81 ng/L (12-14 @ 13:00)  Troponin T, High Sensitivity Result: 81 ng/L (12-14 @ 10:09)                          8.4    4.09  )-----------( 141      ( 18 Dec 2023 06:29 )             26.5     12-18    139  |  108<H>  |  49<H>  ----------------------------<  104<H>  4.0   |  19<L>  |  2.83<H>    Ca    8.1<L>      18 Dec 2023 06:29  Phos  3.2     12-18  Mg     2.20     12-18    TPro  5.7<L>  /  Alb  2.6<L>  /  TBili  0.4  /  DBili  x   /  AST  43<H>  /  ALT  52<H>  /  AlkPhos  133<H>  12-18

## 2023-12-18 NOTE — PROGRESS NOTE ADULT - SUBJECTIVE AND OBJECTIVE BOX
Patient is a 88y old  Male who presents with a chief complaint of shortness of breath (17 Dec 2023 11:26)      DATE OF SERVICE: 12-18-23 @ 12:15    SUBJECTIVE / OVERNIGHT EVENTS: overnight events noted    ROS:  Resp: No cough no sputum production  CVS: No chest pain no palpitations no orthopnea  GI: no N/V/D  : no dysuria, no hematuria  Neuro: no weakness no paresthesias  Heme: No petechiae no easy bruising  Msk: No joint pain no swelling  Skin: No rash no itching  no hematuria        MEDICATIONS  (STANDING):  aMIOdarone    Tablet 200 milliGRAM(s) Oral daily  dextrose 5%. 1000 milliLiter(s) (100 mL/Hr) IV Continuous <Continuous>  dextrose 5%. 1000 milliLiter(s) (50 mL/Hr) IV Continuous <Continuous>  dextrose 50% Injectable 25 Gram(s) IV Push once  dextrose 50% Injectable 25 Gram(s) IV Push once  dextrose 50% Injectable 12.5 Gram(s) IV Push once  glucagon  Injectable 1 milliGRAM(s) IntraMuscular once  insulin glargine Injectable (LANTUS) 3 Unit(s) SubCutaneous at bedtime  insulin lispro (ADMELOG) corrective regimen sliding scale   SubCutaneous three times a day before meals  iron sucrose IVPB 200 milliGRAM(s) IV Intermittent every 48 hours  tamsulosin 0.4 milliGRAM(s) Oral at bedtime    MEDICATIONS  (PRN):  dextrose Oral Gel 15 Gram(s) Oral once PRN Blood Glucose LESS THAN 70 milliGRAM(s)/deciliter        CAPILLARY BLOOD GLUCOSE      POCT Blood Glucose.: 363 mg/dL (18 Dec 2023 12:11)  POCT Blood Glucose.: 116 mg/dL (18 Dec 2023 08:48)  POCT Blood Glucose.: 156 mg/dL (17 Dec 2023 22:03)  POCT Blood Glucose.: 169 mg/dL (17 Dec 2023 17:48)  POCT Blood Glucose.: 310 mg/dL (17 Dec 2023 12:31)    I&O's Summary      Vital Signs Last 24 Hrs  T(C): 36.9 (18 Dec 2023 05:07), Max: 36.9 (18 Dec 2023 05:07)  T(F): 98.4 (18 Dec 2023 05:07), Max: 98.4 (18 Dec 2023 05:07)  HR: 60 (18 Dec 2023 05:07) (60 - 67)  BP: 125/63 (18 Dec 2023 05:07) (112/62 - 144/54)  BP(mean): --  RR: 18 (18 Dec 2023 05:07) (18 - 18)  SpO2: 100% (18 Dec 2023 05:07) (99% - 100%)    PHYSICAL EXAM:   Neck: Supple  Lungs: clear  CVS: S1 S2 no M/R/G  Abdomen: no tenderness  Neuro: AO x 3 nonfocal   Ext: no edema    LABS:                        8.4    4.09  )-----------( 141      ( 18 Dec 2023 06:29 )             26.5     12-18    139  |  108<H>  |  49<H>  ----------------------------<  104<H>  4.0   |  19<L>  |  2.83<H>    Ca    8.1<L>      18 Dec 2023 06:29  Phos  3.2     12-18  Mg     2.20     12-18    TPro  5.7<L>  /  Alb  2.6<L>  /  TBili  0.4  /  DBili  x   /  AST  43<H>  /  ALT  52<H>  /  AlkPhos  133<H>  12-18          Urinalysis Basic - ( 18 Dec 2023 06:29 )    Color: x / Appearance: x / SG: x / pH: x  Gluc: 104 mg/dL / Ketone: x  / Bili: x / Urobili: x   Blood: x / Protein: x / Nitrite: x   Leuk Esterase: x / RBC: x / WBC x   Sq Epi: x / Non Sq Epi: x / Bacteria: x          All consultant(s) notes reviewed and care discussed with other providers        Contact Number, Dr Arroyo [phone] Patient is a 88y old  Male who presents with a chief complaint of shortness of breath (17 Dec 2023 11:26)      DATE OF SERVICE: 12-18-23 @ 12:15    SUBJECTIVE / OVERNIGHT EVENTS: overnight events noted    ROS:  Resp: No cough no sputum production  CVS: No chest pain no palpitations no orthopnea  GI: no N/V/D  : no dysuria, no hematuria  Neuro: no weakness no paresthesias  Heme: No petechiae no easy bruising  Msk: No joint pain no swelling  Skin: No rash no itching  no hematuria        MEDICATIONS  (STANDING):  aMIOdarone    Tablet 200 milliGRAM(s) Oral daily  dextrose 5%. 1000 milliLiter(s) (100 mL/Hr) IV Continuous <Continuous>  dextrose 5%. 1000 milliLiter(s) (50 mL/Hr) IV Continuous <Continuous>  dextrose 50% Injectable 25 Gram(s) IV Push once  dextrose 50% Injectable 25 Gram(s) IV Push once  dextrose 50% Injectable 12.5 Gram(s) IV Push once  glucagon  Injectable 1 milliGRAM(s) IntraMuscular once  insulin glargine Injectable (LANTUS) 3 Unit(s) SubCutaneous at bedtime  insulin lispro (ADMELOG) corrective regimen sliding scale   SubCutaneous three times a day before meals  iron sucrose IVPB 200 milliGRAM(s) IV Intermittent every 48 hours  tamsulosin 0.4 milliGRAM(s) Oral at bedtime    MEDICATIONS  (PRN):  dextrose Oral Gel 15 Gram(s) Oral once PRN Blood Glucose LESS THAN 70 milliGRAM(s)/deciliter        CAPILLARY BLOOD GLUCOSE      POCT Blood Glucose.: 363 mg/dL (18 Dec 2023 12:11)  POCT Blood Glucose.: 116 mg/dL (18 Dec 2023 08:48)  POCT Blood Glucose.: 156 mg/dL (17 Dec 2023 22:03)  POCT Blood Glucose.: 169 mg/dL (17 Dec 2023 17:48)  POCT Blood Glucose.: 310 mg/dL (17 Dec 2023 12:31)    I&O's Summary      Vital Signs Last 24 Hrs  T(C): 36.9 (18 Dec 2023 05:07), Max: 36.9 (18 Dec 2023 05:07)  T(F): 98.4 (18 Dec 2023 05:07), Max: 98.4 (18 Dec 2023 05:07)  HR: 60 (18 Dec 2023 05:07) (60 - 67)  BP: 125/63 (18 Dec 2023 05:07) (112/62 - 144/54)  BP(mean): --  RR: 18 (18 Dec 2023 05:07) (18 - 18)  SpO2: 100% (18 Dec 2023 05:07) (99% - 100%)    PHYSICAL EXAM:   Neck: Supple  Lungs: clear  CVS: S1 S2 no M/R/G  Abdomen: no tenderness  Neuro: AO x 3 nonfocal   Ext: no edema    LABS:                        8.4    4.09  )-----------( 141      ( 18 Dec 2023 06:29 )             26.5     12-18    139  |  108<H>  |  49<H>  ----------------------------<  104<H>  4.0   |  19<L>  |  2.83<H>    Ca    8.1<L>      18 Dec 2023 06:29  Phos  3.2     12-18  Mg     2.20     12-18    TPro  5.7<L>  /  Alb  2.6<L>  /  TBili  0.4  /  DBili  x   /  AST  43<H>  /  ALT  52<H>  /  AlkPhos  133<H>  12-18          Urinalysis Basic - ( 18 Dec 2023 06:29 )    Color: x / Appearance: x / SG: x / pH: x  Gluc: 104 mg/dL / Ketone: x  / Bili: x / Urobili: x   Blood: x / Protein: x / Nitrite: x   Leuk Esterase: x / RBC: x / WBC x   Sq Epi: x / Non Sq Epi: x / Bacteria: x          All consultant(s) notes reviewed and care discussed with other providers        Contact Number, Dr Arroyo [phone]

## 2023-12-19 ENCOUNTER — TRANSCRIPTION ENCOUNTER (OUTPATIENT)
Age: 88
End: 2023-12-19

## 2023-12-19 LAB
-  AMPICILLIN: SIGNIFICANT CHANGE UP
-  AMPICILLIN: SIGNIFICANT CHANGE UP
-  CIPROFLOXACIN: SIGNIFICANT CHANGE UP
-  CIPROFLOXACIN: SIGNIFICANT CHANGE UP
-  LEVOFLOXACIN: SIGNIFICANT CHANGE UP
-  LEVOFLOXACIN: SIGNIFICANT CHANGE UP
-  NITROFURANTOIN: SIGNIFICANT CHANGE UP
-  NITROFURANTOIN: SIGNIFICANT CHANGE UP
-  TETRACYCLINE: SIGNIFICANT CHANGE UP
-  TETRACYCLINE: SIGNIFICANT CHANGE UP
-  VANCOMYCIN: SIGNIFICANT CHANGE UP
-  VANCOMYCIN: SIGNIFICANT CHANGE UP
CULTURE RESULTS: ABNORMAL
CULTURE RESULTS: ABNORMAL
METHOD TYPE: SIGNIFICANT CHANGE UP
METHOD TYPE: SIGNIFICANT CHANGE UP
ORGANISM # SPEC MICROSCOPIC CNT: ABNORMAL
SPECIMEN SOURCE: SIGNIFICANT CHANGE UP
SPECIMEN SOURCE: SIGNIFICANT CHANGE UP

## 2023-12-21 ENCOUNTER — TRANSCRIPTION ENCOUNTER (OUTPATIENT)
Age: 88
End: 2023-12-21

## 2023-12-24 ENCOUNTER — TRANSCRIPTION ENCOUNTER (OUTPATIENT)
Age: 88
End: 2023-12-24

## 2023-12-28 ENCOUNTER — TRANSCRIPTION ENCOUNTER (OUTPATIENT)
Age: 88
End: 2023-12-28

## 2023-12-29 ENCOUNTER — APPOINTMENT (OUTPATIENT)
Age: 88
End: 2023-12-29
Payer: MEDICARE

## 2023-12-29 VITALS
RESPIRATION RATE: 16 BRPM | HEART RATE: 60 BPM | OXYGEN SATURATION: 98 % | TEMPERATURE: 97.2 F | DIASTOLIC BLOOD PRESSURE: 60 MMHG | SYSTOLIC BLOOD PRESSURE: 126 MMHG

## 2023-12-29 DIAGNOSIS — N18.9 ACUTE KIDNEY FAILURE, UNSPECIFIED: ICD-10-CM

## 2023-12-29 DIAGNOSIS — N17.9 ACUTE KIDNEY FAILURE, UNSPECIFIED: ICD-10-CM

## 2023-12-29 PROCEDURE — 99349 HOME/RES VST EST MOD MDM 40: CPT

## 2023-12-29 RX ORDER — CIPROFLOXACIN HYDROCHLORIDE 500 MG/1
500 TABLET, FILM COATED ORAL TWICE DAILY
Qty: 14 | Refills: 0 | Status: DISCONTINUED | COMMUNITY
Start: 2023-06-09 | End: 2023-12-29

## 2023-12-29 RX ORDER — SULFAMETHOXAZOLE AND TRIMETHOPRIM 800; 160 MG/1; MG/1
800-160 TABLET ORAL
Qty: 14 | Refills: 0 | Status: DISCONTINUED | COMMUNITY
Start: 2022-06-24 | End: 2023-12-29

## 2023-12-29 RX ORDER — MIDODRINE HYDROCHLORIDE 5 MG/1
5 TABLET ORAL 3 TIMES DAILY
Refills: 0 | Status: DISCONTINUED | COMMUNITY
Start: 2022-02-03 | End: 2023-12-29

## 2023-12-29 RX ORDER — REPAGLINIDE 0.5 MG/1
0.5 TABLET ORAL
Refills: 0 | Status: DISCONTINUED | COMMUNITY
End: 2023-12-29

## 2023-12-29 NOTE — PHYSICAL EXAM
[No Acute Distress] : no acute distress [Well-Appearing] : well-appearing [Normal Sclera/Conjunctiva] : normal sclera/conjunctiva [PERRL] : pupils equal round and reactive to light [EOMI] : extraocular movements intact [Normal Outer Ear/Nose] : the outer ears and nose were normal in appearance [No JVD] : no jugular venous distention [Supple] : supple [No Respiratory Distress] : no respiratory distress  [No Accessory Muscle Use] : no accessory muscle use [Normal Rate] : normal rate  [Regular Rhythm] : with a regular rhythm [Pedal Pulses Present] : the pedal pulses are present [No Extremity Clubbing/Cyanosis] : no extremity clubbing/cyanosis [Soft] : abdomen soft [Non-distended] : non-distended [Normal Bowel Sounds] : normal bowel sounds [No Joint Swelling] : no joint swelling [No Rash] : no rash [Normal Affect] : the affect was normal [Alert and Oriented x3] : oriented to person, place, and time [Normal Insight/Judgement] : insight and judgment were intact [de-identified] : +1 BLLE edema L>R [de-identified] : ambulates with rollator

## 2023-12-29 NOTE — HEALTH RISK ASSESSMENT
[Low Salt Diet] : low salt [General Adherence] : general adherence [With Significant Other] : lives with significant other [] :  [With Patient/Caregiver] : , with patient/caregiver [Designated Healthcare Proxy] : Designated healthcare proxy [Name: ___] : Health Care Proxy's Name: [unfilled]  [Relationship: ___] : Relationship: [unfilled] [I will adhere to the patient's wishes.] : I will adhere to the patient's wishes. [Former] : Former [Two or more falls in past year] : Patient reported two or more falls in the past year [Assistive Device] : Patient uses an assistive device [de-identified] : rollator [Change in mental status noted] : No change in mental status noted [AdvancecareDate] : 12/29/2023 [FreeTextEntry4] : Goals of care and advance care plan discussed, pt would want all measures including CPR and intubation.

## 2023-12-29 NOTE — REVIEW OF SYSTEMS
[Lower Ext Edema] : lower extremity edema [Cough] : cough [Dyspnea on Exertion] : dyspnea on exertion [Hematuria] : hematuria [Negative] : Neurological [Fever] : no fever [Chills] : no chills [Chest Pain] : no chest pain [Palpitations] : no palpitations [Orthopena] : no orthopnea [Dysuria] : no dysuria [FreeTextEntry4] : MANPREET

## 2023-12-29 NOTE — PLAN
[FreeTextEntry1] : -Followed up with cardiologist/PCP Dr. Hatch yesterday, 12/28 -Follow up endocrinologist Dr. Lyons 1/3 -Follow up with urologist Dr. Lim 1/5 -Follow up with nephrologist Dr. Gusman 2/9 -Has podiatry that comes to home  Educated patient and family on fall and safety precautions, aspiration precautions, heart healthy diet, daily weights, medication compliance, good skin care and monitor/notify MD/NP for signs/symptoms of fluid overload and electrolyte abnormalities, such as, shortness of breath, cough, swelling, chest discomfort, changes in heart rate, dizziness, fainting, or changes in mental status. Reinforced provider follow up. Continue home care services to strengthen muscles. 24/7 TCM contact provided and encouraged to call with any questions or concerns.

## 2023-12-29 NOTE — COUNSELING
[Fall prevention counseling provided] : Fall prevention counseling provided [Use recommended devices] : Use recommended devices [Needs reinforcement, provided] : Patient needs reinforcement on understanding of lifestyle changes and steps needed to achieve self management goal; reinforcement was provided [Adequate lighting] : Adequate lighting [No throw rugs] : No throw rugs [FreeTextEntry1] : rollator

## 2023-12-29 NOTE — HISTORY OF PRESENT ILLNESS
[Post-hospitalization from ___ Hospital] : Post-hospitalization from [unfilled] Hospital [Admitted on: ___] : The patient was admitted on [unfilled] [Discharged on ___] : discharged on [unfilled] [FreeTextEntry3] : F/u [facility]  [FreeTextEntry2] : Mr. Caballero is a 88 year old male with past medical history of hypertension, hyperlipidemia, A-fib, CHF, CKD, CAD status post stents on aspirin, bladder cancer in remission, diabetes, TIA, left nephrectomy presenting with hematuria and shortness of breath clinical presentation consistent with acute on chronic systolic heart failure and severe anemia. Pt was treated with IV venofer, diuresed and discharged with home care services.   Upon arrival, Mr. Caballero is observed ambulating, appears pleasant and in nad, spouse present.  Since home, feeling better in terms of breathing and strength. Admits to eating very well over the holidays, weights stable 115-116lbs. Reports dizziness last couple days and monitoring FS/BP, improved today. Hematuria improving and has f/u with urology Dr. Lim 1/5 Followed up with Nephrologist Dr. Gusman last week and next f/u in February Saw PCP/Cards Dr. Syed yesterday, continued on Lasix 40mg and denies any changes to medication regime Has [facility] home care, Nurse and PT coming in, plan for outpatient PT once completed.  Ambulates with Rollator.   House calls program discussed, declined, contacts provided if pt changes his mind, also discussed life alert.

## 2024-01-05 ENCOUNTER — APPOINTMENT (OUTPATIENT)
Dept: UROLOGY | Facility: CLINIC | Age: 89
End: 2024-01-05

## 2024-01-05 ENCOUNTER — APPOINTMENT (OUTPATIENT)
Dept: UROLOGY | Facility: CLINIC | Age: 89
End: 2024-01-05
Payer: MEDICARE

## 2024-01-05 DIAGNOSIS — N39.0 URINARY TRACT INFECTION, SITE NOT SPECIFIED: ICD-10-CM

## 2024-01-05 DIAGNOSIS — A49.9 URINARY TRACT INFECTION, SITE NOT SPECIFIED: ICD-10-CM

## 2024-01-05 DIAGNOSIS — R31.0 GROSS HEMATURIA: ICD-10-CM

## 2024-01-05 PROCEDURE — 99213 OFFICE O/P EST LOW 20 MIN: CPT

## 2024-01-05 NOTE — HISTORY OF PRESENT ILLNESS
[FreeTextEntry1] : Seen initially for evaluation of hematuria. He noted dark - almost coffee colored urine for a day. No redness or clots. Saw his PCP who checked urine and told him he had significant microscopic hematuria. No associated back or flank pain. Likewise no dysuria, increased frequency or urgency or other voiding symptoms.  He has gross hematuria ~30 years ago - he passed a small stone a day or so later. 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 He smoked 1ppd 15-60.   S/p BT on small tumor. - complicated by returning anuric. Noted to have a stone in mid ureter so stented emergently. Once his obstructive uropathy resolved went home. Did ok for a few days but ended back in ER in retention - 300cc PVR. no retention before. had him stay on tamsulosin and passed TOV.  now s/p ureteroscopy laser lithotripsy of the stone - stent on string. voiding baseline on the Flomax so stopped. doing well - in fact voiding better than better. ULS today no stones or hydro   6/23 - had called earlier in week with gross hematuria with clots, starting over the weekend. No back or flank pain UTI symptoms or fevers. had dropped off urine for culture but pending  ULS  - upper tracts Ok - some clot in bladder.  urine today cloudy - ? UTI   1/24 here for cystoscopy but urine grossly cloudy. he was admitted [facility] 12/23 with gross hematuria. negative CT (-) and renal ULS but had + culture for enetroscoccus but not treated

## 2024-01-06 ENCOUNTER — NON-APPOINTMENT (OUTPATIENT)
Age: 89
End: 2024-01-06

## 2024-01-10 ENCOUNTER — TRANSCRIPTION ENCOUNTER (OUTPATIENT)
Age: 89
End: 2024-01-10

## 2024-01-10 LAB — BACTERIA UR CULT: ABNORMAL

## 2024-01-11 NOTE — PATIENT PROFILE ADULT - NSPROPTRIGHTCAREGIVER_GEN_A_NUR
no [General Appearance - Alert] : alert [General Appearance - In No Acute Distress] : in no acute distress [Sclera] : the sclera and conjunctiva were normal [Extraocular Movements] : extraocular movements were intact [Outer Ear] : the ears and nose were normal in appearance [Hearing Threshold Finger Rub Not [place]] : hearing was normal [Examination Of The Oral Cavity] : the lips and gums were normal [] : no respiratory distress [Respiration, Rhythm And Depth] : normal respiratory rhythm and effort [Oriented To Time, Place, And Person] : oriented to person, place, and time [Impaired Insight] : insight and judgment were intact [Affect] : the affect was normal [Mood] : the mood was normal

## 2024-01-19 ENCOUNTER — APPOINTMENT (OUTPATIENT)
Dept: UROLOGY | Facility: CLINIC | Age: 89
End: 2024-01-19
Payer: MEDICARE

## 2024-01-19 ENCOUNTER — OUTPATIENT (OUTPATIENT)
Dept: OUTPATIENT SERVICES | Facility: HOSPITAL | Age: 89
LOS: 1 days | End: 2024-01-19
Payer: MEDICARE

## 2024-01-19 DIAGNOSIS — C67.9 MALIGNANT NEOPLASM OF BLADDER, UNSPECIFIED: ICD-10-CM

## 2024-01-19 DIAGNOSIS — R31.9 HEMATURIA, UNSPECIFIED: ICD-10-CM

## 2024-01-19 DIAGNOSIS — Z98.41 CATARACT EXTRACTION STATUS, RIGHT EYE: Chronic | ICD-10-CM

## 2024-01-19 DIAGNOSIS — Z95.5 PRESENCE OF CORONARY ANGIOPLASTY IMPLANT AND GRAFT: Chronic | ICD-10-CM

## 2024-01-19 DIAGNOSIS — Z90.5 ACQUIRED ABSENCE OF KIDNEY: Chronic | ICD-10-CM

## 2024-01-19 DIAGNOSIS — Z98.890 OTHER SPECIFIED POSTPROCEDURAL STATES: Chronic | ICD-10-CM

## 2024-01-19 PROCEDURE — 52000 CYSTOURETHROSCOPY: CPT

## 2024-01-21 LAB — BACTERIA UR CULT: NORMAL

## 2024-01-23 ENCOUNTER — APPOINTMENT (OUTPATIENT)
Dept: UROLOGY | Facility: CLINIC | Age: 89
End: 2024-01-23

## 2024-01-23 DIAGNOSIS — R35.0 FREQUENCY OF MICTURITION: ICD-10-CM

## 2024-01-24 ENCOUNTER — APPOINTMENT (OUTPATIENT)
Dept: UROLOGY | Facility: CLINIC | Age: 89
End: 2024-01-24

## 2024-01-24 DIAGNOSIS — C67.9 MALIGNANT NEOPLASM OF BLADDER, UNSPECIFIED: ICD-10-CM

## 2024-01-24 DIAGNOSIS — R31.9 HEMATURIA, UNSPECIFIED: ICD-10-CM

## 2024-01-29 ENCOUNTER — NON-APPOINTMENT (OUTPATIENT)
Age: 89
End: 2024-01-29

## 2024-01-30 ENCOUNTER — APPOINTMENT (OUTPATIENT)
Dept: DERMATOLOGY | Facility: CLINIC | Age: 89
End: 2024-01-30
Payer: MEDICARE

## 2024-01-30 DIAGNOSIS — C44.329 SQUAMOUS CELL CARCINOMA OF SKIN OF OTHER PARTS OF FACE: ICD-10-CM

## 2024-01-30 PROCEDURE — 17311 MOHS 1 STAGE H/N/HF/G: CPT

## 2024-01-30 PROCEDURE — 12052 INTMD RPR FACE/MM 2.6-5.0 CM: CPT

## 2024-01-30 NOTE — HISTORY OF PRESENT ILLNESS
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 Alessandra Marroquin  We had the pleasure of seeing your patient for Mohs Micrographic Surgery.  Mr. 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 He also has a history of SCC of the L vertex scalp for which I performed Mohs surgery 6/21/22.   SH: Retired for 11-12 years. 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 Lives at home with his wife. Has a son nearby and an aide.   Upcoming travel plans: None  Pertinent positives noted below  History of HIV or hepatitis: No  Blood thinners: eliquis, aspirin 81mg  Antibiotic Prophylaxis: None  Medical implants: None  The patient's review of systems questionnaire was reviewed. Education needs were identified. There were no barriers to learning.

## 2024-01-30 NOTE — PHYSICAL EXAM
[Alert] : alert [Oriented x 3] : ~L oriented x 3 [Well Nourished] : well nourished [Conjunctiva Non-injected] : conjunctiva non-injected [No Visual Lymphadenopathy] : no visual  lymphadenopathy [No Clubbing] : no clubbing [No Edema] : no edema [No Bromhidrosis] : no bromhidrosis [No Chromhidrosis] : no chromhidrosis [FreeTextEntry3] : 0.8x0.6 cm scar  on the right lateral eyebrow No appreciable cervical or axillary LAD

## 2024-02-04 LAB — URINE CYTOLOGY: NORMAL

## 2024-03-16 ENCOUNTER — INPATIENT (INPATIENT)
Facility: HOSPITAL | Age: 89
LOS: 11 days | Discharge: HOME HEALTH | End: 2024-03-28
Attending: INTERNAL MEDICINE | Admitting: INTERNAL MEDICINE
Payer: MEDICARE

## 2024-03-16 VITALS
RESPIRATION RATE: 20 BRPM | DIASTOLIC BLOOD PRESSURE: 56 MMHG | TEMPERATURE: 98 F | HEART RATE: 69 BPM | OXYGEN SATURATION: 99 % | SYSTOLIC BLOOD PRESSURE: 146 MMHG

## 2024-03-16 DIAGNOSIS — Z90.5 ACQUIRED ABSENCE OF KIDNEY: Chronic | ICD-10-CM

## 2024-03-16 DIAGNOSIS — Z98.890 OTHER SPECIFIED POSTPROCEDURAL STATES: Chronic | ICD-10-CM

## 2024-03-16 DIAGNOSIS — Z95.5 PRESENCE OF CORONARY ANGIOPLASTY IMPLANT AND GRAFT: Chronic | ICD-10-CM

## 2024-03-16 DIAGNOSIS — Z98.41 CATARACT EXTRACTION STATUS, RIGHT EYE: Chronic | ICD-10-CM

## 2024-03-16 DIAGNOSIS — R06.00 DYSPNEA, UNSPECIFIED: ICD-10-CM

## 2024-03-16 LAB
ADD ON TEST-SPECIMEN IN LAB: SIGNIFICANT CHANGE UP
ADD ON TEST-SPECIMEN IN LAB: SIGNIFICANT CHANGE UP
ALBUMIN SERPL ELPH-MCNC: 3 G/DL — LOW (ref 3.3–5)
ALP SERPL-CCNC: 125 U/L — HIGH (ref 40–120)
ALT FLD-CCNC: 52 U/L — HIGH (ref 4–41)
ANION GAP SERPL CALC-SCNC: 13 MMOL/L — SIGNIFICANT CHANGE UP (ref 7–14)
AST SERPL-CCNC: 51 U/L — HIGH (ref 4–40)
BASE EXCESS BLDV CALC-SCNC: -6.3 MMOL/L — LOW (ref -2–3)
BASOPHILS # BLD AUTO: 0.02 K/UL — SIGNIFICANT CHANGE UP (ref 0–0.2)
BASOPHILS NFR BLD AUTO: 0.5 % — SIGNIFICANT CHANGE UP (ref 0–2)
BILIRUB SERPL-MCNC: 0.4 MG/DL — SIGNIFICANT CHANGE UP (ref 0.2–1.2)
BLD GP AB SCN SERPL QL: NEGATIVE — SIGNIFICANT CHANGE UP
BLOOD GAS VENOUS COMPREHENSIVE RESULT: SIGNIFICANT CHANGE UP
BUN SERPL-MCNC: 79 MG/DL — HIGH (ref 7–23)
CALCIUM SERPL-MCNC: 8.3 MG/DL — LOW (ref 8.4–10.5)
CHLORIDE BLDV-SCNC: 116 MMOL/L — HIGH (ref 96–108)
CHLORIDE SERPL-SCNC: 115 MMOL/L — HIGH (ref 98–107)
CO2 BLDV-SCNC: 20 MMOL/L — LOW (ref 22–26)
CO2 SERPL-SCNC: 18 MMOL/L — LOW (ref 22–31)
CREAT SERPL-MCNC: 3.74 MG/DL — HIGH (ref 0.5–1.3)
EGFR: 15 ML/MIN/1.73M2 — LOW
EOSINOPHIL # BLD AUTO: 0.04 K/UL — SIGNIFICANT CHANGE UP (ref 0–0.5)
EOSINOPHIL NFR BLD AUTO: 1 % — SIGNIFICANT CHANGE UP (ref 0–6)
FLUAV AG NPH QL: SIGNIFICANT CHANGE UP
FLUBV AG NPH QL: SIGNIFICANT CHANGE UP
GAS PNL BLDV: 146 MMOL/L — HIGH (ref 136–145)
GLUCOSE BLDV-MCNC: 204 MG/DL — HIGH (ref 70–99)
GLUCOSE SERPL-MCNC: 197 MG/DL — HIGH (ref 70–99)
HCO3 BLDV-SCNC: 19 MMOL/L — LOW (ref 22–29)
HCT VFR BLD CALC: 21.8 % — LOW (ref 39–50)
HCT VFR BLDA CALC: 22 % — LOW (ref 39–51)
HGB BLD CALC-MCNC: 7.4 G/DL — LOW (ref 12.6–17.4)
HGB BLD-MCNC: 7.1 G/DL — LOW (ref 13–17)
IANC: 2.78 K/UL — SIGNIFICANT CHANGE UP (ref 1.8–7.4)
IMM GRANULOCYTES NFR BLD AUTO: 0 % — SIGNIFICANT CHANGE UP (ref 0–0.9)
LACTATE BLDV-MCNC: 1.3 MMOL/L — SIGNIFICANT CHANGE UP (ref 0.5–2)
LYMPHOCYTES # BLD AUTO: 0.58 K/UL — LOW (ref 1–3.3)
LYMPHOCYTES # BLD AUTO: 15 % — SIGNIFICANT CHANGE UP (ref 13–44)
MAGNESIUM SERPL-MCNC: 2.3 MG/DL — SIGNIFICANT CHANGE UP (ref 1.6–2.6)
MCHC RBC-ENTMCNC: 32.6 GM/DL — SIGNIFICANT CHANGE UP (ref 32–36)
MCHC RBC-ENTMCNC: 34 PG — SIGNIFICANT CHANGE UP (ref 27–34)
MCV RBC AUTO: 104.3 FL — HIGH (ref 80–100)
MONOCYTES # BLD AUTO: 0.44 K/UL — SIGNIFICANT CHANGE UP (ref 0–0.9)
MONOCYTES NFR BLD AUTO: 11.4 % — SIGNIFICANT CHANGE UP (ref 2–14)
NEUTROPHILS # BLD AUTO: 2.78 K/UL — SIGNIFICANT CHANGE UP (ref 1.8–7.4)
NEUTROPHILS NFR BLD AUTO: 72.1 % — SIGNIFICANT CHANGE UP (ref 43–77)
NRBC # BLD: 0 /100 WBCS — SIGNIFICANT CHANGE UP (ref 0–0)
NRBC # FLD: 0 K/UL — SIGNIFICANT CHANGE UP (ref 0–0)
NT-PROBNP SERPL-SCNC: 750 PG/ML — HIGH
PCO2 BLDV: 35 MMHG — LOW (ref 42–55)
PH BLDV: 7.34 — SIGNIFICANT CHANGE UP (ref 7.32–7.43)
PLATELET # BLD AUTO: 114 K/UL — LOW (ref 150–400)
PO2 BLDV: 45 MMHG — SIGNIFICANT CHANGE UP (ref 25–45)
POTASSIUM BLDV-SCNC: 3.8 MMOL/L — SIGNIFICANT CHANGE UP (ref 3.5–5.1)
POTASSIUM SERPL-MCNC: 3.8 MMOL/L — SIGNIFICANT CHANGE UP (ref 3.5–5.3)
POTASSIUM SERPL-SCNC: 3.8 MMOL/L — SIGNIFICANT CHANGE UP (ref 3.5–5.3)
PROT SERPL-MCNC: 6.4 G/DL — SIGNIFICANT CHANGE UP (ref 6–8.3)
RBC # BLD: 2.09 M/UL — LOW (ref 4.2–5.8)
RBC # FLD: 18.6 % — HIGH (ref 10.3–14.5)
RH IG SCN BLD-IMP: POSITIVE — SIGNIFICANT CHANGE UP
RSV RNA NPH QL NAA+NON-PROBE: SIGNIFICANT CHANGE UP
SAO2 % BLDV: 68.1 % — SIGNIFICANT CHANGE UP (ref 67–88)
SARS-COV-2 RNA SPEC QL NAA+PROBE: SIGNIFICANT CHANGE UP
SODIUM SERPL-SCNC: 146 MMOL/L — HIGH (ref 135–145)
TROPONIN T, HIGH SENSITIVITY RESULT: 69 NG/L — CRITICAL HIGH
WBC # BLD: 3.86 K/UL — SIGNIFICANT CHANGE UP (ref 3.8–10.5)
WBC # FLD AUTO: 3.86 K/UL — SIGNIFICANT CHANGE UP (ref 3.8–10.5)

## 2024-03-16 PROCEDURE — 71046 X-RAY EXAM CHEST 2 VIEWS: CPT | Mod: 26

## 2024-03-16 PROCEDURE — 99285 EMERGENCY DEPT VISIT HI MDM: CPT

## 2024-03-16 NOTE — ED PROVIDER NOTE - PHYSICAL EXAMINATION
GENERAL: Sitting comfortably in bed in no acute distress  NEURO: Alert and Oriented to person, place, date and situation. Pupils symmetric, No ptosis. No facial asymmetry or dysarthria, no tremor noted.  HEENT: No conjunctival injection or scleral icterus.   CARD: Normal rate and regular rhythm, no murmurs and no gallops appreciated.  RESP: Clear to auscultation bilaterally, No wheezes, rales or rhonchi. Good respiratory effort.  ABD: Nondistended, Soft and nontender to palpation in all quadrants, no guarding, no rigidity.  EXT: trace pedal edema

## 2024-03-16 NOTE — ED ADULT NURSE NOTE - OBJECTIVE STATEMENT
89 y/o M arrives to E.D. [facility] c/o worsening SOB x last few weeks. PMHx: CHF, bladder ca., afib (Eliquis), HTN, HLD, MI, left nephrectomy, CAD x4 stents, DM. Pt is a&ox4, ambulatory w/ assist, endorses SOB, RR even and unlabored currently, denies CP, neg N/V/D, denies fevers/cough/body aches. + multiple falls over last few months. Frequent monitoring in place.

## 2024-03-16 NOTE — ED ADULT NURSE REASSESSMENT NOTE - NS ED NURSE REASSESS COMMENT FT1
Pt resting on stretcher, at baseline orientation status, offers no complaints of pain or discomfort at this time, appears in no acute distress, blood transfusion ongoing, NSR on monitor, report given to [facility] RN Darrion, transported over at this time.

## 2024-03-16 NOTE — ED PROVIDER NOTE - ATTENDING CONTRIBUTION TO CARE
88yM with PMHx CKD, HFrEF, CAD s/p stents presenting with 2 weeks progressive shortness of breath on exertion. Denies any worsening pedal edema from baseline, on exam does not have respiratory distress, no evidence of fluid overload.  Patient states that symptoms are mostly reproducible with exertion.  Denies any chest pain.  Lower suspicion for fluid overload as etiology of symptoms, hemoglobin noted to be 7.1, patient with history of chronic anemia, concerned that symptoms may be related to that.  Will give 1 unit packed red blood cells, admit for further eval and treatment.

## 2024-03-16 NOTE — ED PROVIDER NOTE - PROGRESS NOTE DETAILS
Wendy HESTER, EM/IM PGY-3: Hgb 7.1, CXR with small b/l Pleural effusions unlikely to be the cause of his exertional dyspnea, at this point will give blood transfusion due to symptoms and will admit for further cardiac work up and echo, also pt with LILIAN. Ema: I independently reviewed patient's chest x-ray images which show concern for small bilateral pleural effusions, no evidence of pulmonary edema.

## 2024-03-16 NOTE — ED PROVIDER NOTE - OBJECTIVE STATEMENT
88yM with PMHx CKD, HFrEF, CAD s/p stents presenting with 2 weeks progressive shortenss of breath on exertion. Pt denies worsening pedal edema, orthopnea, chest pains or nausea associated with SOB. Denies infectious symptoms fever/chills, cough, UR Isymptoms recently. Takes furosemide 40mg every other day this dose has not been changed recently. No BRBPR, melena, epistaxis, hematuria, any other bleeding. 88yM with PMHx CKD, HFrEF, CAD s/p stents presenting with 2 weeks progressive shortness of breath on exertion. Pt denies worsening pedal edema, orthopnea, chest pains or nausea associated with SOB. Denies infectious symptoms fever/chills, cough, URI symptoms recently. Takes furosemide 40mg every other day this dose has not been changed recently. No BRBPR, melena, epistaxis, hematuria, any other bleeding.

## 2024-03-16 NOTE — ED PROVIDER NOTE - CLINICAL SUMMARY MEDICAL DECISION MAKING FREE TEXT BOX
88yM with PMHx CKD, HFrEF, CAD s/p stents presenting with 2 weeks progressive shortness of breath on exertion. Vitals on arrival within normal limits, on RA and comfortable lying flat on exam. Lungs are clear and heart without murmur and regular rate and rhythm, trace to 1+ pitting edema bilaterally not worse than normal per pt. Ddx includes stable CAD (worsening) anginal equivalent, anemia, HF exacerbation will get CBC, CMP, trop, BNP, CXR, VBG, T&S.

## 2024-03-16 NOTE — ED ADULT TRIAGE NOTE - CHIEF COMPLAINT QUOTE
Pt with co sob x few days. Pt o2 sat 99 % but pt looks tachypneic.  fs 253 in triage.  Pt with no fever.

## 2024-03-16 NOTE — ED ADULT NURSE REASSESSMENT NOTE - NS ED NURSE REASSESS COMMENT FT1
Pt resting in stretcher, A&O x 4, offers no complaints of pain or discomfort at this time. 22G IV placed to R forearm, repeat labs drawn and sent. Blood transfusion started using 2 RN verification, pt tolerating well, appears in no apparent distress at this time. NSR on monitor, RR equal and unlabored, O2 sat 100% on RA, safety maintained, comfort provided, report to be given to [facility] at this time.

## 2024-03-16 NOTE — ED ADULT NURSE REASSESSMENT NOTE - NS ED NURSE REASSESS COMMENT FT1
patient at baseline mental status, appears to be resting comfortably in bed, no complaints noted at this time. Breathing even and unlabored, pallor/diaphoresis not noted.

## 2024-03-16 NOTE — ED ADULT NURSE NOTE - SUICIDE SCREENING QUESTION 2
Identified pt with two pt identifiers(name and ). Reviewed record in preparation for visit and have obtained necessary documentation. Chief Complaint   Patient presents with    Sore Throat     sore throat for 2 wks,  nonproductive cough for 1 wk,  chest tightness since last night          Coordination of Care Questionnaire:  :   1) Have you been to an emergency room, urgent care, or hospitalized since your last visit? If yes, where when, and reason for visit? no       2. Have seen or consulted any other health care provider since your last visit? If yes, where when, and reason for visit? NO    Patient is accompanied by self I have received verbal consent from Debbie Won to discuss any/all medical information while they are present in the room. No

## 2024-03-17 DIAGNOSIS — D64.9 ANEMIA, UNSPECIFIED: ICD-10-CM

## 2024-03-17 DIAGNOSIS — I50.33 ACUTE ON CHRONIC DIASTOLIC (CONGESTIVE) HEART FAILURE: ICD-10-CM

## 2024-03-17 DIAGNOSIS — E11.9 TYPE 2 DIABETES MELLITUS WITHOUT COMPLICATIONS: ICD-10-CM

## 2024-03-17 DIAGNOSIS — I25.10 ATHEROSCLEROTIC HEART DISEASE OF NATIVE CORONARY ARTERY WITHOUT ANGINA PECTORIS: ICD-10-CM

## 2024-03-17 DIAGNOSIS — N18.4 CHRONIC KIDNEY DISEASE, STAGE 4 (SEVERE): ICD-10-CM

## 2024-03-17 DIAGNOSIS — E78.5 HYPERLIPIDEMIA, UNSPECIFIED: ICD-10-CM

## 2024-03-17 DIAGNOSIS — I10 ESSENTIAL (PRIMARY) HYPERTENSION: ICD-10-CM

## 2024-03-17 LAB
ANION GAP SERPL CALC-SCNC: 11 MMOL/L — SIGNIFICANT CHANGE UP (ref 7–14)
APPEARANCE UR: CLEAR — SIGNIFICANT CHANGE UP
BACTERIA # UR AUTO: ABNORMAL /HPF
BILIRUB UR-MCNC: NEGATIVE — SIGNIFICANT CHANGE UP
BUN SERPL-MCNC: 76 MG/DL — HIGH (ref 7–23)
CALCIUM SERPL-MCNC: 8.1 MG/DL — LOW (ref 8.4–10.5)
CHLORIDE SERPL-SCNC: 117 MMOL/L — HIGH (ref 98–107)
CO2 SERPL-SCNC: 18 MMOL/L — LOW (ref 22–31)
COLOR SPEC: YELLOW — SIGNIFICANT CHANGE UP
CREAT SERPL-MCNC: 3.64 MG/DL — HIGH (ref 0.5–1.3)
DIFF PNL FLD: ABNORMAL
EGFR: 15 ML/MIN/1.73M2 — LOW
EPI CELLS # UR: SIGNIFICANT CHANGE UP
GLUCOSE BLDC GLUCOMTR-MCNC: 122 MG/DL — HIGH (ref 70–99)
GLUCOSE BLDC GLUCOMTR-MCNC: 139 MG/DL — HIGH (ref 70–99)
GLUCOSE BLDC GLUCOMTR-MCNC: 224 MG/DL — HIGH (ref 70–99)
GLUCOSE BLDC GLUCOMTR-MCNC: 263 MG/DL — HIGH (ref 70–99)
GLUCOSE SERPL-MCNC: 135 MG/DL — HIGH (ref 70–99)
GLUCOSE UR QL: NEGATIVE MG/DL — SIGNIFICANT CHANGE UP
HCT VFR BLD CALC: 25.2 % — LOW (ref 39–50)
HGB BLD-MCNC: 8.3 G/DL — LOW (ref 13–17)
KETONES UR-MCNC: NEGATIVE MG/DL — SIGNIFICANT CHANGE UP
LEUKOCYTE ESTERASE UR-ACNC: NEGATIVE — SIGNIFICANT CHANGE UP
MAGNESIUM SERPL-MCNC: 2.2 MG/DL — SIGNIFICANT CHANGE UP (ref 1.6–2.6)
MCHC RBC-ENTMCNC: 32.3 PG — SIGNIFICANT CHANGE UP (ref 27–34)
MCHC RBC-ENTMCNC: 32.9 GM/DL — SIGNIFICANT CHANGE UP (ref 32–36)
MCV RBC AUTO: 98.1 FL — SIGNIFICANT CHANGE UP (ref 80–100)
NITRITE UR-MCNC: NEGATIVE — SIGNIFICANT CHANGE UP
NRBC # BLD: 0 /100 WBCS — SIGNIFICANT CHANGE UP (ref 0–0)
NRBC # FLD: 0 K/UL — SIGNIFICANT CHANGE UP (ref 0–0)
PH UR: 6.5 — SIGNIFICANT CHANGE UP (ref 5–8)
PHOSPHATE SERPL-MCNC: 4.1 MG/DL — SIGNIFICANT CHANGE UP (ref 2.5–4.5)
PLATELET # BLD AUTO: 107 K/UL — LOW (ref 150–400)
POTASSIUM SERPL-MCNC: 3.5 MMOL/L — SIGNIFICANT CHANGE UP (ref 3.5–5.3)
POTASSIUM SERPL-SCNC: 3.5 MMOL/L — SIGNIFICANT CHANGE UP (ref 3.5–5.3)
PROT UR-MCNC: 300 MG/DL
RBC # BLD: 2.57 M/UL — LOW (ref 4.2–5.8)
RBC # FLD: 21.6 % — HIGH (ref 10.3–14.5)
RBC CASTS # UR COMP ASSIST: SIGNIFICANT CHANGE UP /HPF (ref 0–4)
SODIUM SERPL-SCNC: 146 MMOL/L — HIGH (ref 135–145)
SP GR SPEC: 1.01 — SIGNIFICANT CHANGE UP (ref 1–1.03)
UROBILINOGEN FLD QL: 0.2 MG/DL — SIGNIFICANT CHANGE UP (ref 0.2–1)
WBC # BLD: 4.48 K/UL — SIGNIFICANT CHANGE UP (ref 3.8–10.5)
WBC # FLD AUTO: 4.48 K/UL — SIGNIFICANT CHANGE UP (ref 3.8–10.5)
WBC UR QL: SIGNIFICANT CHANGE UP /HPF (ref 0–5)

## 2024-03-17 PROCEDURE — 76770 US EXAM ABDO BACK WALL COMP: CPT | Mod: 26

## 2024-03-17 RX ORDER — DEXTROSE 50 % IN WATER 50 %
25 SYRINGE (ML) INTRAVENOUS ONCE
Refills: 0 | Status: DISCONTINUED | OUTPATIENT
Start: 2024-03-17 | End: 2024-03-28

## 2024-03-17 RX ORDER — SODIUM CHLORIDE 9 MG/ML
1000 INJECTION, SOLUTION INTRAVENOUS
Refills: 0 | Status: DISCONTINUED | OUTPATIENT
Start: 2024-03-17 | End: 2024-03-28

## 2024-03-17 RX ORDER — FUROSEMIDE 40 MG
40 TABLET ORAL ONCE
Refills: 0 | Status: COMPLETED | OUTPATIENT
Start: 2024-03-17 | End: 2024-03-17

## 2024-03-17 RX ORDER — ATORVASTATIN CALCIUM 80 MG/1
80 TABLET, FILM COATED ORAL AT BEDTIME
Refills: 0 | Status: DISCONTINUED | OUTPATIENT
Start: 2024-03-17 | End: 2024-03-28

## 2024-03-17 RX ORDER — INSULIN GLARGINE 100 [IU]/ML
3 INJECTION, SOLUTION SUBCUTANEOUS AT BEDTIME
Refills: 0 | Status: DISCONTINUED | OUTPATIENT
Start: 2024-03-17 | End: 2024-03-24

## 2024-03-17 RX ORDER — GLUCAGON INJECTION, SOLUTION 0.5 MG/.1ML
1 INJECTION, SOLUTION SUBCUTANEOUS ONCE
Refills: 0 | Status: DISCONTINUED | OUTPATIENT
Start: 2024-03-17 | End: 2024-03-28

## 2024-03-17 RX ORDER — INSULIN LISPRO 100/ML
3 VIAL (ML) SUBCUTANEOUS ONCE
Refills: 0 | Status: COMPLETED | OUTPATIENT
Start: 2024-03-17 | End: 2024-03-17

## 2024-03-17 RX ORDER — POTASSIUM CHLORIDE 20 MEQ
20 PACKET (EA) ORAL ONCE
Refills: 0 | Status: COMPLETED | OUTPATIENT
Start: 2024-03-17 | End: 2024-03-17

## 2024-03-17 RX ORDER — SODIUM BICARBONATE 1 MEQ/ML
650 SYRINGE (ML) INTRAVENOUS DAILY
Refills: 0 | Status: COMPLETED | OUTPATIENT
Start: 2024-03-17 | End: 2024-03-20

## 2024-03-17 RX ORDER — DEXTROSE 50 % IN WATER 50 %
15 SYRINGE (ML) INTRAVENOUS ONCE
Refills: 0 | Status: DISCONTINUED | OUTPATIENT
Start: 2024-03-17 | End: 2024-03-28

## 2024-03-17 RX ORDER — FERROUS SULFATE 325(65) MG
325 TABLET ORAL DAILY
Refills: 0 | Status: DISCONTINUED | OUTPATIENT
Start: 2024-03-17 | End: 2024-03-28

## 2024-03-17 RX ORDER — AMIODARONE HYDROCHLORIDE 400 MG/1
200 TABLET ORAL DAILY
Refills: 0 | Status: DISCONTINUED | OUTPATIENT
Start: 2024-03-17 | End: 2024-03-28

## 2024-03-17 RX ORDER — TAMSULOSIN HYDROCHLORIDE 0.4 MG/1
0.8 CAPSULE ORAL AT BEDTIME
Refills: 0 | Status: DISCONTINUED | OUTPATIENT
Start: 2024-03-17 | End: 2024-03-28

## 2024-03-17 RX ORDER — ASPIRIN/CALCIUM CARB/MAGNESIUM 324 MG
81 TABLET ORAL DAILY
Refills: 0 | Status: DISCONTINUED | OUTPATIENT
Start: 2024-03-17 | End: 2024-03-21

## 2024-03-17 RX ORDER — INSULIN LISPRO 100/ML
VIAL (ML) SUBCUTANEOUS
Refills: 0 | Status: DISCONTINUED | OUTPATIENT
Start: 2024-03-17 | End: 2024-03-28

## 2024-03-17 RX ORDER — DEXTROSE 50 % IN WATER 50 %
12.5 SYRINGE (ML) INTRAVENOUS ONCE
Refills: 0 | Status: DISCONTINUED | OUTPATIENT
Start: 2024-03-17 | End: 2024-03-28

## 2024-03-17 RX ORDER — APIXABAN 2.5 MG/1
2.5 TABLET, FILM COATED ORAL EVERY 12 HOURS
Refills: 0 | Status: DISCONTINUED | OUTPATIENT
Start: 2024-03-17 | End: 2024-03-20

## 2024-03-17 RX ADMIN — Medication 81 MILLIGRAM(S): at 14:05

## 2024-03-17 RX ADMIN — TAMSULOSIN HYDROCHLORIDE 0.8 MILLIGRAM(S): 0.4 CAPSULE ORAL at 21:29

## 2024-03-17 RX ADMIN — Medication 325 MILLIGRAM(S): at 14:05

## 2024-03-17 RX ADMIN — Medication 3 UNIT(S): at 14:12

## 2024-03-17 RX ADMIN — Medication 40 MILLIGRAM(S): at 12:03

## 2024-03-17 RX ADMIN — Medication 2: at 17:32

## 2024-03-17 RX ADMIN — INSULIN GLARGINE 3 UNIT(S): 100 INJECTION, SOLUTION SUBCUTANEOUS at 21:28

## 2024-03-17 RX ADMIN — AMIODARONE HYDROCHLORIDE 200 MILLIGRAM(S): 400 TABLET ORAL at 14:05

## 2024-03-17 RX ADMIN — Medication 20 MILLIEQUIVALENT(S): at 16:35

## 2024-03-17 RX ADMIN — APIXABAN 2.5 MILLIGRAM(S): 2.5 TABLET, FILM COATED ORAL at 22:03

## 2024-03-17 RX ADMIN — ATORVASTATIN CALCIUM 80 MILLIGRAM(S): 80 TABLET, FILM COATED ORAL at 21:29

## 2024-03-17 NOTE — PATIENT PROFILE ADULT - INTERNATIONAL TRAVEL
----- Message from Chris Christiansno MD sent at 3/10/2020  3:00 PM CDT -----  Stool tests negative except one called calprotectin.  This can be elevated with inflammation of the bowel, and his is a little elevated.  Would like to consult GI since symptoms have been going on for a while.  Referral placed.  
----- Message from Chris Christianson MD sent at 3/10/2020  3:00 PM CDT -----  Stool tests negative except one called calprotectin.  This can be elevated with inflammation of the bowel, and his is a little elevated.  Would like to consult GI since symptoms have been going on for a while.  Referral placed.  
----- Message from Crystal Jimenes LPN sent at 3/11/2020 12:10 PM CDT -----      ----- Message -----  From: Rowan Viramontes  Sent: 3/11/2020  11:11 AM CDT  To: Josué JACO[facility] Staff      Type:  Patient Returning Call    Who Called: pt  Mimi  anya  Who Left Message for Patient:  diya  Does the patient know what this is regarding?:   A follow up  Best Call Back Number: [phone]  Additional Information:    Please call   
LVM advising return our call  Will also send message via portal requesting parent/guardian return call  
LVM to return call regarding patient  (need to give results)  
Parent/Guardian advised of results.  They verbalized understanding  No questions or concerns voiced at this time.  
No

## 2024-03-17 NOTE — CONSULT NOTE ADULT - SUBJECTIVE AND OBJECTIVE BOX
03-17-24 @ 12:01    Patient is a 88y old  Male who presents with a chief complaint of     HPI:  88yM with PMHx CKD, HFrEF, CAD s/p stents presenting with 2 weeks progressive shortness of breath on exertion. Pt denies worsening pedal edema, orthopnea, chest pains or nausea associated with SOB. Denies infectious symptoms fever/chills, cough, URI symptoms recently. Takes furosemide 40mg every other day this dose has not been changed recently. No BRBPR, melena, epistaxis, hematuria, any other bleeding.    he says h is sob was gradually building up till he could not take it and came to hospital : he deneis any underlying lung disease but is an ex smoker:   no fever,   no cough , no phlegm       ?FOLLOWING PRESENT  [x ] Hx of PE/DVT, [x ] Hx COPD, [x ] Hx of Asthma, y[ ] Hx of Hospitalization, x[ ]  Hx of BiPAP/CPAP use, [ x] Hx of VIC    Allergies    penicillin (Rash)    Intolerances    Cipro (Joint Pain)      PAST MEDICAL & SURGICAL HISTORY:  CAD (coronary artery disease)  (4 stents,)      Diabetes mellitus, new onset  diet controlled      Single kidney  (hx/o LEFT nephrectomy at age 16; pt states due to a ureter"blockage" causing "infection"; pt denies hx/o renal dysfunction)      Arthritis      Myocardial infarction  (2003)      Hard of hearing      Atrial fibrillation      Hypertension      Hyperlipidemia      History of TIAs  Last 2018      History of bradycardia      Bladder cancer      History of CHF (congestive heart failure)      Left carotid bruit      History of nephrectomy, unilateral  (hx/o LEFT nephrectomy at age 16)      Stented coronary artery  (4 stents)      S/P bilateral cataract extraction      H/O cystoscopy          FAMILY HISTORY:  Family history of diabetes mellitus in mother (Mother)  (Pt states his mother developed DM in her 70's)    FH: bladder cancer (Sibling)        Social History: [ 35 pk years : quit 30 yrs ago ] TOBACCO                  [ x ] ETOH                                 [x  ] IVDA/DRUGS    REVIEW OF SYSTEMS      General:	x    Skin/Breast:x  	  Ophthalmologic:x  	  ENMT:	x    Respiratory and Thorax:  sob,  rosado , pedal edema  	  Cardiovascular:	x    Gastrointestinal:	x    Genitourinary:	x    Musculoskeletal:	 pedal edema    Neurological:	x    Psychiatric:	x    Hematology/Lymphatics:	x    Endocrine:	x    Allergic/Immunologic:	  x  MEDICATIONS  (STANDING):  furosemide   Injectable 40 milliGRAM(s) IV Push once    MEDICATIONS  (PRN):       Vital Signs Last 24 Hrs  T(C): 36.7 (17 Mar 2024 10:35), Max: 36.8 (16 Mar 2024 20:45)  T(F): 98.1 (17 Mar 2024 10:35), Max: 98.3 (17 Mar 2024 00:30)  HR: 69 (17 Mar 2024 10:35) (63 - 77)  BP: 144/62 (17 Mar 2024 10:35) (128/42 - 160/55)  BP(mean): --  RR: 18 (17 Mar 2024 10:35) (16 - 20)  SpO2: 100% (17 Mar 2024 10:35) (99% - 100%)    Parameters below as of 17 Mar 2024 10:35  Patient On (Oxygen Delivery Method): room air    Orthostatic VS          I&O's Summary      Physical Exam:   GENERAL: NAD, well-groomed, well-developed  HEENT: RAQUEL/   Atraumatic, Normocephalic  ENMT: No tonsillar erythema, exudates, or enlargement; Moist mucous membranes, Good dentition, No lesions  NECK: Supple, No JVD, Normal thyroid  CHEST/LUNG: scattered cracekls+  CVS: Regular rate and rhythm; No murmurs, rubs, or gallops  GI: : Soft, Nontender, Nondistended; Bowel sounds present  NERVOUS SYSTEM:  Alert & Oriented X3  EXTREMITIES:  Mild  edema  LYMPH: No lymphadenopathy noted  SKIN: No rashes or lesions  ENDOCRINOLOGY: No Thyromegaly  PSYCH: Appropriate    Labs:  -6.3<35<4>>45<<7.345>>-6.3<<3><<4><<5<<459>>                            8.3    4.48  )-----------( 107      ( 17 Mar 2024 01:14 )             25.2                         7.1    3.86  )-----------( 114      ( 16 Mar 2024 15:24 )             21.8     03-17    146<H>  |  117<H>  |  76<H>  ----------------------------<  135<H>  3.5   |  18<L>  |  3.64<H>  03-16    146<H>  |  115<H>  |  79<H>  ----------------------------<  197<H>  3.8   |  18<L>  |  3.74<H>    Ca    8.1<L>      17 Mar 2024 01:14  Ca    8.3<L>      16 Mar 2024 15:24  Phos  4.1     03-17  Mg     2.20     03-17  Mg     2.30     03-16    TPro  6.4  /  Alb  3.0<L>  /  TBili  0.4  /  DBili  x   /  AST  51<H>  /  ALT  52<H>  /  AlkPhos  125<H>  03-16    CAPILLARY BLOOD GLUCOSE      POCT Blood Glucose.: 122 mg/dL (17 Mar 2024 09:06)  POCT Blood Glucose.: 114 mg/dL (16 Mar 2024 20:00)  POCT Blood Glucose.: 253 mg/dL (16 Mar 2024 13:58)    LIVER FUNCTIONS - ( 16 Mar 2024 15:24 )  Alb: 3.0 g/dL / Pro: 6.4 g/dL / ALK PHOS: 125 U/L / ALT: 52 U/L / AST: 51 U/L / GGT: x             Urinalysis Basic - ( 17 Mar 2024 01:14 )    Color: x / Appearance: x / SG: x / pH: x  Gluc: 135 mg/dL / Ketone: x  / Bili: x / Urobili: x   Blood: x / Protein: x / Nitrite: x   Leuk Esterase: x / RBC: x / WBC x   Sq Epi: x / Non Sq Epi: x / Bacteria: x      D DImer      Studies  Chest X-RAY  CT SCAN Chest   CT Abdomen  Venous Dopplers: LE:   Others    ra< from: Xray Chest 2 Views PA/Lat (03.16.24 @ 16:39) >    ACC: [id number] EXAM:  XR CHEST PA LAT 2V   ORDERED BY: ANDERSON FARNSWORTH     PROCEDURE DATE:  03/16/2024          INTERPRETATION:  CLINICAL INFORMATION: Shortness of breath on exertion.    TECHNIQUE: PA and lateral views of the chest.    COMPARISON: Multiple prior chest x-rays with most recent dated 12/14/2023    FINDINGS:    Heart: Heart size is normal.  Pulmonary: No focal consolidation. Small bilateral pleural effusions with   bibasilar atelectasis. No pneumothorax.  Bones: No acute bony pathology.    IMPRESSION:  Small bilateral pleural effusions with bibasilar atelectasis.    --- End of Report ---          TOM OSORIO MD; Resident Radiologist  This document has been electronically signed.  TAMIKO MILLIGAN MD; Attending Radiologist  This document has been electronically signed. Mar 17 2024  8:54AM    < end of copied text >  < from: Xray Chest 2 Views PA/Lat (12.14.23 @ 10:32) >  ACC: [id number] EXAM:  XR CHEST PA LAT 2V   ORDERED BY: JUWAN LAND     PROCEDURE DATE:  12/14/2023          INTERPRETATION:  CLINICAL INFORMATION: Dyspnea    TIME OF EXAMINATION: December 14, 2023 at 10:29 AM    EXAM: PA and lateral chest    FINDINGS:  The lungs are clear, the heart is not enlarged and there are no effusions   or congestion to suggest CHF or pneumonia.    The bones show no acute findings.        COMPARISON: January 26, 2022 when pulmonary edema was present.        IMPRESSION: Clear lungs.    --- End of Report ---            CALVIN HUSAIN MD; Attending Radiologist  This document has been electronically signed. Dec 14 2023 10:37AM    < end of copied text >  < from: CT Chest No Cont (01.26.22 @ 09:10) >  UPPER ABDOMEN: Right renal cyst. Left nephrectomy. Punctate pancreatic   calcification.    BONES/SOFT TISSUES: Subcentimeter hypodense left thyroid nodule. The   esophagus is normal. Symmetric bilateral gynecomastia. Degenerative   changes of the spine.    IMPRESSION:    1.  Centrally predominant bilateral lung opacities, interlobular septal   thickening, and small bilateral pleural effusions new from 1/22/2022   likely represent pulmonary edema rather than pneumonia.  2.  Secretions are seen in the distal trachea.        --- End of Report ---           BELLO POTTER MD; Resident Radiology  This document has been electronically signed.  DYLAN WEISS MD; Attending Radiologist  This document has been electronically signed. Jan 26 2022 11:04AM    < end of copied text >      ct< from: CT Chest No Cont (01.26.22 @ 09:10) >    AIRWAYS/LUNGS/PLEURA: Secretions are seen within the distal trachea.   Centrally predominant bilateral lung opacities, interlobular septal   thickening, and small bilateral pleural effusions, new since 1/22/2022.    < end of copied text >    < from: Transthoracic Echocardiogram (01.18.22 @ 10:40) >  Right Heart: Normal right atrium. The right ventricle is  not wellvisualized. Normal tricuspid valve. Normal  pulmonic valve.  Pericardium/PleuraNormal pericardium with no pericardial  effusion.  ------------------------------------------------------------------------  CONCLUSIONS:  1. Mitral annular calcification,otherwise normal mitral  valve. Mild mitral regurgitation.  2. Moderately dilated left atrium.  LA volume index = 48  cc/m2.  3. Mild to moderate segmental left ventricular systolic  dysfunction. Inferolateral wall hypokinesis.  4. The right ventricle is not well visualized.  ------------------------------------------------------------------------  Confirmed on  1/18/2022 - 17:35:40 by LUIS Castrejon  ------------------------------------------------------------------------    < end of copied text >

## 2024-03-17 NOTE — H&P ADULT - NSHPREVIEWOFSYSTEMS_GEN_ALL_CORE
Gen: no loss of wt no loss of appetite  ENT: no dizziness no hearing loss  Ophth: no blurring of vision no loss of vision  Resp: No cough no sputum production  CVS: see above HPI No chest pain no palpitations no orthopnea  GI: no nausea, vomiting or diarrhea   : no dysuria, hematuria  Endo: no polyuria no excessive sweating  Neuro: no weakness no paresthesias  Heme: No petechiae no easy bruising  Msk: No joint pain no swelling  Skin: No rash no itching

## 2024-03-17 NOTE — H&P ADULT - PROBLEM SELECTOR PLAN 4
HbA1C  finger sticks with short acting insulin sliding scale  no oral meds  diabetic diet  monitor for hypoglycemia  low dose Lantus

## 2024-03-17 NOTE — CONSULT NOTE ADULT - SUBJECTIVE AND OBJECTIVE BOX
HPI:  88yM with CAD, HFrEF, Afib, HTN, DM, solitary R kidney and CKD p/w 2 weeks progressive shortness of breath on exertion. Pt denies worsening pedal edema, orthopnea, chest pains or nausea associated with SOB. Denies infectious symptoms fever/chills, cough, URI symptoms recently. Takes furosemide 40mg every other day this dose has not been changed recently. He came to the ED yesterday and was a/w acute on chronic HFrEF. He is being diuresed and is noted to have an elevated serum creatinine. He is known to my partner, Dr. Emily Gusman. He reports no recent NSAID use and he has no urinary complaints.    PAST MEDICAL & SURGICAL HISTORY:  CAD (coronary artery disease)  (4 stents,)  Diabetes mellitus, new onset  diet controlled  Single kidney  (hx/o LEFT nephrectomy at age 16; pt states due to a ureter"blockage" causing "infection"; pt denies hx/o renal dysfunction)  Arthritis  Myocardial infarction  (2003)  Hard of hearing  Atrial fibrillation  Hypeension  Hyperlipidemia  History of TIAs  Last 2018  History of bradycardia  Bladder cancer  History of CHF (congestive heart failure)  Left carotid bruit  History of nephrectomy, unilateral  (hx/o LEFT nephrectomy at age 16)  Stented coronary artery  (4 stents)  S/P bilateral cataract extraction  H/O cystoscopy    MEDICATIONS  (STANDING):  aMIOdarone    Tablet 200 milliGRAM(s) Oral daily  apixaban 2.5 milliGRAM(s) Oral every 12 hours  aspirin enteric coated 81 milliGRAM(s) Oral daily  atorvastatin 80 milliGRAM(s) Oral at bedtime  dextrose 5%. 1000 milliLiter(s) (50 mL/Hr) IV Continuous <Continuous>  dextrose 5%. 1000 milliLiter(s) (100 mL/Hr) IV Continuous <Continuous>  dextrose 50% Injectable 12.5 Gram(s) IV Push once  dextrose 50% Injectable 25 Gram(s) IV Push once  dextrose 50% Injectable 25 Gram(s) IV Push once  ferrous    sulfate 325 milliGRAM(s) Oral daily  glucagon  Injectable 1 milliGRAM(s) IntraMuscular once  insulin glargine Injectable (LANTUS) 3 Unit(s) SubCutaneous at bedtime  insulin lispro (ADMELOG) corrective regimen sliding scale   SubCutaneous three times a day before meals  tamsulosin 0.8 milliGRAM(s) Oral at bedtime    Allergies  penicillin (Rash)  Intolerances  Cipro (Joint Pain)    SOCIAL HISTORY:  Denies EtOH, Smoking.    FAMILY HISTORY:  Family history of diabetes mellitus in mother (Mother)  (Pt states his mother developed DM in her 70's)  FH: bladder cancer (Sibling)    REVIEW OF SYSTEMS:  Denies any nausea, vomiting, diarrhea, fevers or chills. Denies chest pain, focal weakness, hematuria or dysuria.  Good oral intake and denies fatigue or weakness.  All other pertinent systems are reviewed and are negative.    VITALS:  T(F): 98.1 (03-17-24 @ 10:35), Max: 98.3 (03-17-24 @ 00:30)  HR: 65 (03-17-24 @ 12:00) (63 - 69)  BP: 147/60 (03-17-24 @ 12:00) (144/62 - 160/55)  SpO2: 100% (03-17-24 @ 12:00) (99% - 100%)    PHYSICAL EXAM:  General:  alert, cooperative and no distress  HEENT: no trauma  Lungs: diminished BS at bases  Heart: normal S1/S2  Abdomen: soft, non-tender not distended, + BS  Extremities: + edema  Urologic: no pickering  Neurologic: Grossly normal  Skin: no rashes    LABS:                        8.3    4.48  )-----------( 107      ( 17 Mar 2024 01:14 )             25.2     03-17    146<H>  |  117<H>  |  76<H>  ----------------------------<  135<H>  3.5   |  18<L>  |  3.64<H>    Ca    8.1<L>      17 Mar 2024 01:14  Phos  4.1     03-17  Mg     2.20     03-17    TPro  6.4  /  Alb  3.0<L>  /  TBili  0.4  /  DBili  x   /  AST  51<H>  /  ALT  52<H>  /  AlkPhos  125<H>  03-16      Urine Studies:  Urinalysis Basic - ( 17 Mar 2024 01:14 )  Color: x / Appearance: x / SG: x / pH: x  Blood: x / Protein: x / Nitrite: x   Leuk Esterase: x / RBC: x / WBC x     RADIOLOGY & ADDITIONAL STUDIES:  < from: Xray Chest 2 Views PA/Lat (03.16.24 @ 16:39) >  Heart: Heart size is normal.  Pulmonary: No focal consolidation. Small bilateral pleural effusions with   bibasilar atelectasis. No pneumothorax.  Bones: No acute bony pathology.  IMPRESSION:  Small bilateral pleural effusions with bibasilar atelectasis.    BASELINE LABS  Creatinine: 2.83 mg/dL (12.18.23 @ 06:29)   Creatinine: 3.01 mg/dL (12.14.23 @ 10:09)   Creatinine, Serum: 2.01 mg/dL (01.27.22 @ 05:36)     ASSESSMENT:  Patient is a 88y Male with CAD, HFrEF, Afib, HTN, DM, solitary R kidney and CKD p/w acute on chronic HFrEF and acute on chronic kidney injury.  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 Likely prerenal due to ineffective circulating volume and obligate diuresis. No nephrotoxic agents noted. Rule out obstruction (doubt).  - metabolic acidosis: likely due to CKD/LILIAN  - anemia in CKD: R/O iron deficiency  - acute on chronic HFrEF: appears mild    RECOMMENDATIONS:  - check urine na+/creatinine/protein  - check renal US  - a/w lasix 40 mg IV  - check iron levels and give EASA once resulted  - please dose any new medications for a CrCl of 15-20 cc/min  - avoid NSAIDs/nephrotoxins as able    Thank you for the courtesy of this consultation.    Sergey Downing M.D.  [facility], [address]  [place] [address] [phone]       HPI:  88yM with CAD, HFrEF, Afib, HTN, DM, solitary R kidney and CKD p/w 2 weeks progressive shortness of breath on exertion. Pt denies worsening pedal edema, orthopnea, chest pains or nausea associated with SOB. Denies infectious symptoms fever/chills, cough, URI symptoms recently. Takes furosemide 40mg every other day this dose has not been changed recently. He came to the ED yesterday and was a/w acute on chronic HFrEF. He is being diuresed and is noted to have an elevated serum creatinine. He is known to my partner, Dr. Emily Gusman. He reports no recent NSAID use and he has no urinary complaints.    PAST MEDICAL & SURGICAL HISTORY:  CAD (coronary artery disease)  (4 stents,)  Diabetes mellitus, new onset  diet controlled  Single kidney  (hx/o LEFT nephrectomy at age 16; pt states due to a ureter"blockage" causing "infection"; pt denies hx/o renal dysfunction)  Arthritis  Myocardial infarction  (2003)  Hard of hearing  Atrial fibrillation  Hypeension  Hyperlipidemia  History of TIAs  Last 2018  History of bradycardia  Bladder cancer  History of CHF (congestive heart failure)  Left carotid bruit  History of nephrectomy, unilateral  (hx/o LEFT nephrectomy at age 16)  Stented coronary artery  (4 stents)  S/P bilateral cataract extraction  H/O cystoscopy    MEDICATIONS  (STANDING):  aMIOdarone    Tablet 200 milliGRAM(s) Oral daily  apixaban 2.5 milliGRAM(s) Oral every 12 hours  aspirin enteric coated 81 milliGRAM(s) Oral daily  atorvastatin 80 milliGRAM(s) Oral at bedtime  dextrose 5%. 1000 milliLiter(s) (50 mL/Hr) IV Continuous <Continuous>  dextrose 5%. 1000 milliLiter(s) (100 mL/Hr) IV Continuous <Continuous>  dextrose 50% Injectable 12.5 Gram(s) IV Push once  dextrose 50% Injectable 25 Gram(s) IV Push once  dextrose 50% Injectable 25 Gram(s) IV Push once  ferrous    sulfate 325 milliGRAM(s) Oral daily  glucagon  Injectable 1 milliGRAM(s) IntraMuscular once  insulin glargine Injectable (LANTUS) 3 Unit(s) SubCutaneous at bedtime  insulin lispro (ADMELOG) corrective regimen sliding scale   SubCutaneous three times a day before meals  tamsulosin 0.8 milliGRAM(s) Oral at bedtime    Allergies  penicillin (Rash)  Intolerances  Cipro (Joint Pain)    SOCIAL HISTORY:  Denies EtOH, Smoking.    FAMILY HISTORY:  Family history of diabetes mellitus in mother (Mother)  (Pt states his mother developed DM in her 70's)  FH: bladder cancer (Sibling)    REVIEW OF SYSTEMS:  Denies any nausea, vomiting, diarrhea, fevers or chills. Denies chest pain, focal weakness, hematuria or dysuria.  Good oral intake and denies fatigue or weakness.  All other pertinent systems are reviewed and are negative.    VITALS:  T(F): 98.1 (03-17-24 @ 10:35), Max: 98.3 (03-17-24 @ 00:30)  HR: 65 (03-17-24 @ 12:00) (63 - 69)  BP: 147/60 (03-17-24 @ 12:00) (144/62 - 160/55)  SpO2: 100% (03-17-24 @ 12:00) (99% - 100%)    PHYSICAL EXAM:  General:  alert, cooperative and no distress  HEENT: no trauma  Lungs: diminished BS at bases  Heart: normal S1/S2  Abdomen: soft, non-tender not distended, + BS  Extremities: no edema  Urologic: no pickering  Neurologic: Grossly normal  Skin: no rashes    LABS:                        8.3    4.48  )-----------( 107      ( 17 Mar 2024 01:14 )             25.2     03-17    146<H>  |  117<H>  |  76<H>  ----------------------------<  135<H>  3.5   |  18<L>  |  3.64<H>    Ca    8.1<L>      17 Mar 2024 01:14  Phos  4.1     03-17  Mg     2.20     03-17    TPro  6.4  /  Alb  3.0<L>  /  TBili  0.4  /  DBili  x   /  AST  51<H>  /  ALT  52<H>  /  AlkPhos  125<H>  03-16      Urine Studies:  Urinalysis Basic - ( 17 Mar 2024 01:14 )  Color: x / Appearance: x / SG: x / pH: x  Blood: x / Protein: x / Nitrite: x   Leuk Esterase: x / RBC: x / WBC x     RADIOLOGY & ADDITIONAL STUDIES:  < from: Xray Chest 2 Views PA/Lat (03.16.24 @ 16:39) >  Heart: Heart size is normal.  Pulmonary: No focal consolidation. Small bilateral pleural effusions with   bibasilar atelectasis. No pneumothorax.  Bones: No acute bony pathology.  IMPRESSION:  Small bilateral pleural effusions with bibasilar atelectasis.    BASELINE LABS  Creatinine: 2.83 mg/dL (12.18.23 @ 06:29)   Creatinine: 3.01 mg/dL (12.14.23 @ 10:09)   Creatinine, Serum: 2.01 mg/dL (01.27.22 @ 05:36)     ASSESSMENT:  Patient is a 88y Male with CAD, HFrEF, Afib, HTN, DM, solitary R kidney and CKD p/w acute on chronic HFrEF and acute on chronic kidney injury.  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 Likely prerenal due to ineffective circulating volume and obligate diuresis. No nephrotoxic agents noted. Rule out obstruction (doubt).  - metabolic acidosis: likely due to CKD/LILIAN  - anemia in CKD: R/O iron deficiency  - acute on chronic HFrEF: appears mild with no edema now and limited congestion on CXR    RECOMMENDATIONS:  - check urine na+/creatinine/protein  - check renal US  - a/w lasix 40 mg IVx1  - give KCl 20 mEq po x1  - give sodium bicarbonate 650 mg po bid x 3 doses  - check iron levels and give ABBEY once resulted  - please dose any new medications for a CrCl of 15-20 cc/min  - avoid NSAIDs/nephrotoxins as able    Thank you for the courtesy of this consultation.    Sergey Downing M.D.  [facility], [facility]  [address]  [place] [address] [phone]

## 2024-03-17 NOTE — PATIENT PROFILE ADULT - NSTRANSFERBELONGINGSRESP_GEN_A_NUR
Patient's VSS for shift. Patient returned from bronchoscopy just before 10 am. Patient is AAOx4, calm, cooperative, and independent. Patient given benadryl 50 mg 20 minutes before administration of vancomycin. Patient states that her pain is chronic at an 8 and at she requires scheduled morphine and PRN oxycodone every 6 hours to keep pain at a a six. Patient had cultures sent off from her sinuses. Free from falls, injury, and skin breakdown.    yes

## 2024-03-17 NOTE — H&P ADULT - ASSESSMENT
88yM with PMHx CKD, HFrEF, CAD s/p stents presenting with 2 weeks progressive shortness of breath on exertion clinical presentation consistent with acute on chronic diastolic heart failure and LILIAN on CKD

## 2024-03-17 NOTE — CONSULT NOTE ADULT - ASSESSMENT
88yM with PMHx CKD, HFrEF, CAD s/p stents presenting with 2 weeks progressive shortness of breath on exertion. Pt denies worsening pedal edema, orthopnea, chest pains or nausea associated with SOB. Denies infectious symptoms fever/chills, cough, URI symptoms recently. Takes furosemide 40mg every other day this dose has not been changed recently. No BRBPR, melena, epistaxis, hematuria, any other bleeding.  he says h is sob was gradually building up till he could not take it and came to hospital : he deneis any underlying lung disease but is an ex smoker:   no fever,   no cough , no phlegm ;    SOB/CHF/CAD/S/P PCI  ex smoker:   CKD    SOB/CHF/CAD/S/P PCI  -his history is highly suggestive of chf exacerbation;   -he has formed small concepcion effusions:  cxr prior to his film was clear:   -he hasn o clinical features of PNEUMONIA:  -on vbg shows mild metabolic acidosis  -added on Lasix : to co nt:   -no need fro antibiotics or bipap at this time; -consider ct chest : last ct from 2022 reviewed:  was suggestive of martínez failure  -his last echo with mod lv dysfunction  need a repeat one  -Check D Dimer in AM  : clinical  probability of PE is low   ex smoker:   -she has 35 pk years history of smoking:   -has no diagnosis of copd:  -his last ct chest did not show emphysema:   -need to mantain o2 sao2 above 88%  CKD  -per renal :    dvt propahyalxis

## 2024-03-17 NOTE — H&P ADULT - HISTORY OF PRESENT ILLNESS
88yM with PMHx CKD, HFrEF, CAD s/p stents presenting with 2 weeks progressive shortness of breath on exertion. Pt denies worsening pedal edema, orthopnea, chest pains or nausea associated with SOB. Denies infectious symptoms fever/chills, cough, URI symptoms recently. Takes furosemide 40mg every other day this dose has not been changed recently. He feels a bit better this Am but still quite winded with exertion

## 2024-03-17 NOTE — PATIENT PROFILE ADULT - FALL HARM RISK - HARM RISK INTERVENTIONS
Assistance with ambulation/Assistance OOB with selected safe patient handling equipment/Communicate Risk of Fall with Harm to all staff/Discuss with provider need for PT consult/Monitor for mental status changes/Monitor gait and stability/Move patient closer to nurses' station/Provide patient with walking aids - walker, cane, crutches/Reinforce activity limits and safety measures with patient and family/Reorient to person, place and time as needed/Review medications for side effects contributing to fall risk/Tailored Fall Risk Interventions/Visual Cue: Yellow wristband and red socks/Bed in lowest position, wheels locked, appropriate side rails in place/Call bell, personal items and telephone in reach/Instruct patient to call for assistance before getting out of bed or chair/Non-slip footwear when patient is out of bed/[place] to call system/Physically safe environment - no spills, clutter or unnecessary equipment/Purposeful Proactive Rounding/Room/bathroom lighting operational, light cord in reach

## 2024-03-17 NOTE — CONSULT NOTE ADULT - ASSESSMENT
A/P    88 yM with PMHx CKD, HFrEF, CAD s/p stents presenting with 2 weeks progressive shortness of breath on exertion. Pt denies worsening pedal edema, orthopnea, chest pains or nausea associated with SOB.  A/P    88 yM with PMHx CKD, HFrEF, CAD s/p PCI, AF on a/c, bladder cancer in remission, diabetes, TIA, left nephrectomy, presenting with 2 weeks progressive shortness of breath on exertion. Pt denies worsening pedal edema, orthopnea, chest pains or nausea associated with SOB.     #SOB, acute on chronic HFrEF  -likely secondary to mild volume overload  -iv lasix 40 x 1 now   -reassess rangel for further iv vs po   -previous echo w mild LV dysfunction  -repeat TTE    #Atrial Fibrillation  -stable  -AC was on hold for hematuria  -resume if no CI  -cont amio    #CAD, s/p PCI  -stable  -ASA if no CI    #CKD/LILIAN  -?cardio renal   -r/o obstruction  -trend creat     78 minutes spent on total encounter; more than 50% of the visit was spent counseling and/or coordinating care by the attending physician.

## 2024-03-17 NOTE — CONSULT NOTE ADULT - SUBJECTIVE AND OBJECTIVE BOX
CARDIOLOGY CONSULT NOTE - DR. BUSTILLO        Date of Service: 03-17-24 @ 11:31      HPI:  88yM with PMHx CKD, HFrEF, CAD s/p stents presenting with 2 weeks progressive shortness of breath on exertion. Pt denies worsening pedal edema, orthopnea, chest pains or nausea associated with SOB. Denies infectious symptoms fever/chills, cough, URI symptoms recently. Takes furosemide 40mg every other day this dose has not been changed recently.     feels better  no chest pain  less dyspnea  no fever, chills, back pain, cough        PAST MEDICAL & SURGICAL HISTORY:  CAD (coronary artery disease)  (4 stents,)      Diabetes mellitus, new onset  diet controlled      Single kidney  (hx/o LEFT nephrectomy at age 16; pt states due to a ureter"blockage" causing "infection"; pt denies hx/o renal dysfunction)      Arthritis      Myocardial infarction  (2003)      Hard of hearing      Atrial fibrillation      Hypertension      Hyperlipidemia      History of TIAs  Last 2018      History of bradycardia      Bladder cancer      History of CHF (congestive heart failure)      Left carotid bruit      History of nephrectomy, unilateral  (hx/o LEFT nephrectomy at age 16)      Stented coronary artery  (4 stents)      S/P bilateral cataract extraction      H/O cystoscopy            PREVIOUS DIAGNOSTIC TESTING:    [ ] Echocardiogram:  [ ]  Catheterization:  [ ] Stress Test:  	    MEDICATIONS:    Home Medications:  apixaban 2.5 mg oral tablet: 1 tab(s) orally every 12 hours (17 Mar 2024 10:48)  Crestor 40 mg oral tablet: 1 tab(s) orally once a day (at bedtime) (17 Mar 2024 10:49)  repaglinide 0.5 mg oral tablet: 1 tab(s) orally 3 times a day (before meals) (17 Mar 2024 10:49)  tamsulosin 0.4 mg oral capsule: 1 cap(s) orally once a day (17 Mar 2024 10:49)  Tresiba FlexTouch 100 units/mL subcutaneous solution: 10 subcutaneous once a day (at bedtime) (17 Mar 2024 10:49)      MEDICATIONS  (STANDING):      FAMILY HISTORY:  Family history of diabetes mellitus in mother (Mother)  (Pt states his mother developed DM in her 70's)    FH: bladder cancer (Sibling)        SOCIAL HISTORY:    [ x] Non-smoker  [ ] Smoker  [ ] Alcohol    Allergies    penicillin (Rash)    Intolerances    Cipro (Joint Pain)  	    REVIEW OF SYSTEMS:  CONSTITUTIONAL: No fever, weight loss, or fatigue  EYES: No eye pain, visual disturbances, or discharge  ENMT:  No difficulty hearing, tinnitus, vertigo; No sinus or throat pain  NECK: No pain or stiffness  RESPIRATORY: No cough, wheezing, chills or hemoptysis; + Shortness of Breath  CARDIOVASCULAR: as HPI  GASTROINTESTINAL: No abdominal or epigastric pain. No nausea, vomiting, or hematemesis; No diarrhea or constipation. No melena or hematochezia.  GENITOURINARY: No dysuria, frequency, hematuria, or incontinence  NEUROLOGICAL: No headaches, memory loss, loss of strength, numbness, or tremors  SKIN: No itching, burning, rashes, or lesions   	  [ ] All others negative	  [ ] Unable to obtain    PHYSICAL EXAM:    T(C): 36.7 (03-17-24 @ 10:35), Max: 36.8 (03-16-24 @ 20:45)  HR: 69 (03-17-24 @ 10:35) (63 - 77)  BP: 144/62 (03-17-24 @ 10:35) (128/42 - 160/55)  RR: 18 (03-17-24 @ 10:35) (16 - 20)  SpO2: 100% (03-17-24 @ 10:35) (99% - 100%)  Wt(kg): --  I&O's Summary    Daily     Daily     Appearance: Normal	  Psychiatry: A & O x 3, Mood & affect appropriate  HEENT:   Normal oral mucosa, PERRL, EOMI	  Lymphatic: No lymphadenopathy  Cardiovascular: Normal S1 S2,RRR, No murmurs  Respiratory: Lungs clear to auscultation	  Gastrointestinal:  Soft, Non-tender, + BS	  Skin: No rashes, No ecchymoses, No cyanosis	  Neurologic: Non-focal  Extremities: Normal range of motion, b/l edema 1+  Vascular: Peripheral pulses palpable 2+ bilaterally    TELEMETRY: 	    ECG:  	sr, rbbb, lafb  RADIOLOGY:  OTHER: 	  	  LABS:	 	    CARDIAC MARKERS:        proBNP:     Lipid Profile:   HgA1c:   TSH:                           8.3    4.48  )-----------( 107      ( 17 Mar 2024 01:14 )             25.2     03-17    146<H>  |  117<H>  |  76<H>  ----------------------------<  135<H>  3.5   |  18<L>  |  3.64<H>    Ca    8.1<L>      17 Mar 2024 01:14  Phos  4.1     03-17  Mg     2.20     03-17    TPro  6.4  /  Alb  3.0<L>  /  TBili  0.4  /  DBili  x   /  AST  51<H>  /  ALT  52<H>  /  AlkPhos  125<H>  03-16        Creatinine: 3.64 mg/dL (03-17-24 @ 01:14)  Creatinine: 3.74 mg/dL (03-16-24 @ 15:24)        ASSESSMENT/PLAN: 	               CARDIOLOGY CONSULT NOTE - DR. BUSTILLO        Date of Service: 03-17-24 @ 11:31      HPI:  88yM with PMHx CKD, HFrEF, CAD s/p stents presenting with 2 weeks progressive shortness of breath on exertion. Pt denies worsening pedal edema, orthopnea, chest pains or nausea associated with SOB. Denies infectious symptoms fever/chills, cough, URI symptoms recently. Takes furosemide 40mg every other day this dose has not been changed recently.     feels better  no chest pain  less dyspnea  no fever, chills, back pain, cough        PAST MEDICAL & SURGICAL HISTORY:  CAD (coronary artery disease)  (4 stents,)      Diabetes mellitus, new onset  diet controlled      Single kidney  (hx/o LEFT nephrectomy at age 16; pt states due to a ureter"blockage" causing "infection"; pt denies hx/o renal dysfunction)      Arthritis      Myocardial infarction  (2003)      Hard of hearing      Atrial fibrillation      Hypertension      Hyperlipidemia      History of TIAs  Last 2018      History of bradycardia      Bladder cancer      History of CHF (congestive heart failure)      Left carotid bruit      History of nephrectomy, unilateral  (hx/o LEFT nephrectomy at age 16)      Stented coronary artery  (4 stents)      S/P bilateral cataract extraction      H/O cystoscopy            PREVIOUS DIAGNOSTIC TESTING:    [ ] Echocardiogram:  [ ]  Catheterization:  [ ] Stress Test:  	    MEDICATIONS:    Home Medications:  apixaban 2.5 mg oral tablet: 1 tab(s) orally every 12 hours (17 Mar 2024 10:48)  Crestor 40 mg oral tablet: 1 tab(s) orally once a day (at bedtime) (17 Mar 2024 10:49)  repaglinide 0.5 mg oral tablet: 1 tab(s) orally 3 times a day (before meals) (17 Mar 2024 10:49)  tamsulosin 0.4 mg oral capsule: 1 cap(s) orally once a day (17 Mar 2024 10:49)  Tresiba FlexTouch 100 units/mL subcutaneous solution: 10 subcutaneous once a day (at bedtime) (17 Mar 2024 10:49)      MEDICATIONS  (STANDING):      FAMILY HISTORY:  Family history of diabetes mellitus in mother (Mother)  (Pt states his mother developed DM in her 70's)    FH: bladder cancer (Sibling)        SOCIAL HISTORY:    [ x] Non-smoker  [ ] Smoker  [ ] Alcohol    Allergies    penicillin (Rash)    Intolerances    Cipro (Joint Pain)  	    REVIEW OF SYSTEMS:  CONSTITUTIONAL: No fever, weight loss, or fatigue  EYES: No eye pain, visual disturbances, or discharge  ENMT:  No difficulty hearing, tinnitus, vertigo; No sinus or throat pain  NECK: No pain or stiffness  RESPIRATORY: No cough, wheezing, chills or hemoptysis; + Shortness of Breath  CARDIOVASCULAR: as HPI  GASTROINTESTINAL: No abdominal or epigastric pain. No nausea, vomiting, or hematemesis; No diarrhea or constipation. No melena or hematochezia.  GENITOURINARY: No dysuria, frequency, hematuria, or incontinence  NEUROLOGICAL: No headaches, memory loss, loss of strength, numbness, or tremors  SKIN: No itching, burning, rashes, or lesions   	  [ ] All others negative	  [ ] Unable to obtain    PHYSICAL EXAM:    T(C): 36.7 (03-17-24 @ 10:35), Max: 36.8 (03-16-24 @ 20:45)  HR: 69 (03-17-24 @ 10:35) (63 - 77)  BP: 144/62 (03-17-24 @ 10:35) (128/42 - 160/55)  RR: 18 (03-17-24 @ 10:35) (16 - 20)  SpO2: 100% (03-17-24 @ 10:35) (99% - 100%)  Wt(kg): --  I&O's Summary    Daily     Daily     Appearance: Normal	  Psychiatry: A & O x 3, Mood & affect appropriate  HEENT:   Normal oral mucosa, PERRL, EOMI	  Lymphatic: No lymphadenopathy  Cardiovascular: Normal S1 S2,RRR, No murmurs  Respiratory: Lungs clear to auscultation	dec bs bases  Gastrointestinal:  Soft, Non-tender, + BS	  Skin: No rashes, No ecchymoses, No cyanosis	  Neurologic: Non-focal  Extremities: Normal range of motion, b/l edema 1+  Vascular: Peripheral pulses palpable 2+ bilaterally    TELEMETRY: 	    ECG:  	sr, rbbb, lafb  RADIOLOGY:  OTHER: 	  	  LABS:	 	    CARDIAC MARKERS:        proBNP:     Lipid Profile:   HgA1c:   TSH:                           8.3    4.48  )-----------( 107      ( 17 Mar 2024 01:14 )             25.2     03-17    146<H>  |  117<H>  |  76<H>  ----------------------------<  135<H>  3.5   |  18<L>  |  3.64<H>    Ca    8.1<L>      17 Mar 2024 01:14  Phos  4.1     03-17  Mg     2.20     03-17    TPro  6.4  /  Alb  3.0<L>  /  TBili  0.4  /  DBili  x   /  AST  51<H>  /  ALT  52<H>  /  AlkPhos  125<H>  03-16        Creatinine: 3.64 mg/dL (03-17-24 @ 01:14)  Creatinine: 3.74 mg/dL (03-16-24 @ 15:24)        ASSESSMENT/PLAN:

## 2024-03-17 NOTE — H&P ADULT - PROBLEM SELECTOR PLAN 2
likely multifactorial including poor nutrition, ?? chronic blood loss with hematuria and mainly CKD  epogen per renal  chest pain oral iron

## 2024-03-17 NOTE — H&P ADULT - PROBLEM SELECTOR PLAN 1
discussed with cardiology  cardiology input appreciated  s/p furosemide IC x 1 dose  monitor clinically  redose as needed

## 2024-03-17 NOTE — H&P ADULT - NSHPPHYSICALEXAM_GEN_ALL_CORE
PHYSICAL EXAM: vital signs noted on [place]  in no apparent distress  HEENT: RAQUEL EOMI  Neck: Supple, no JVD, no thyromegaly  Lungs: no wheeze, no crackles  CVS: S1 S2 systolic murmur +   Abdomen: no tenderness, no organomegaly, BS present  Neuro: AO x 3 no focal weakness, no sensory abnormalities  Skin: warm, dry  Ext: no cyanosis or clubbing, 1 +  edema  Msk: no joint swelling or deformities  Back: no CVA tenderness, no kyphosis/scoliosis

## 2024-03-18 DIAGNOSIS — I48.91 UNSPECIFIED ATRIAL FIBRILLATION: ICD-10-CM

## 2024-03-18 LAB
A1C WITH ESTIMATED AVERAGE GLUCOSE RESULT: 5.3 % — SIGNIFICANT CHANGE UP (ref 4–5.6)
ANION GAP SERPL CALC-SCNC: 12 MMOL/L — SIGNIFICANT CHANGE UP (ref 7–14)
BUN SERPL-MCNC: 76 MG/DL — HIGH (ref 7–23)
CALCIUM SERPL-MCNC: 8.3 MG/DL — LOW (ref 8.4–10.5)
CHLORIDE SERPL-SCNC: 116 MMOL/L — HIGH (ref 98–107)
CO2 SERPL-SCNC: 18 MMOL/L — LOW (ref 22–31)
CREAT SERPL-MCNC: 3.52 MG/DL — HIGH (ref 0.5–1.3)
CULTURE RESULTS: SIGNIFICANT CHANGE UP
D DIMER BLD IA.RAPID-MCNC: 250 NG/ML DDU — HIGH
EGFR: 16 ML/MIN/1.73M2 — LOW
ESTIMATED AVERAGE GLUCOSE: 105 — SIGNIFICANT CHANGE UP
FERRITIN SERPL-MCNC: 124 NG/ML — SIGNIFICANT CHANGE UP (ref 30–400)
GLUCOSE BLDC GLUCOMTR-MCNC: 157 MG/DL — HIGH (ref 70–99)
GLUCOSE BLDC GLUCOMTR-MCNC: 163 MG/DL — HIGH (ref 70–99)
GLUCOSE BLDC GLUCOMTR-MCNC: 189 MG/DL — HIGH (ref 70–99)
GLUCOSE BLDC GLUCOMTR-MCNC: 274 MG/DL — HIGH (ref 70–99)
GLUCOSE SERPL-MCNC: 131 MG/DL — HIGH (ref 70–99)
HCT VFR BLD CALC: 25.3 % — LOW (ref 39–50)
HGB BLD-MCNC: 8.3 G/DL — LOW (ref 13–17)
IRON SATN MFR SERPL: 19 % — SIGNIFICANT CHANGE UP (ref 14–50)
IRON SATN MFR SERPL: 51 UG/DL — SIGNIFICANT CHANGE UP (ref 45–165)
MCHC RBC-ENTMCNC: 32.5 PG — SIGNIFICANT CHANGE UP (ref 27–34)
MCHC RBC-ENTMCNC: 32.8 GM/DL — SIGNIFICANT CHANGE UP (ref 32–36)
MCV RBC AUTO: 99.2 FL — SIGNIFICANT CHANGE UP (ref 80–100)
NRBC # BLD: 0 /100 WBCS — SIGNIFICANT CHANGE UP (ref 0–0)
NRBC # FLD: 0 K/UL — SIGNIFICANT CHANGE UP (ref 0–0)
PLATELET # BLD AUTO: 117 K/UL — LOW (ref 150–400)
POTASSIUM SERPL-MCNC: 4 MMOL/L — SIGNIFICANT CHANGE UP (ref 3.5–5.3)
POTASSIUM SERPL-SCNC: 4 MMOL/L — SIGNIFICANT CHANGE UP (ref 3.5–5.3)
RBC # BLD: 2.55 M/UL — LOW (ref 4.2–5.8)
RBC # FLD: 21.4 % — HIGH (ref 10.3–14.5)
SODIUM SERPL-SCNC: 146 MMOL/L — HIGH (ref 135–145)
SPECIMEN SOURCE: SIGNIFICANT CHANGE UP
TIBC SERPL-MCNC: 265 UG/DL — SIGNIFICANT CHANGE UP (ref 220–430)
UIBC SERPL-MCNC: 214 UG/DL — SIGNIFICANT CHANGE UP (ref 110–370)
WBC # BLD: 3.81 K/UL — SIGNIFICANT CHANGE UP (ref 3.8–10.5)
WBC # FLD AUTO: 3.81 K/UL — SIGNIFICANT CHANGE UP (ref 3.8–10.5)

## 2024-03-18 PROCEDURE — 76705 ECHO EXAM OF ABDOMEN: CPT | Mod: 26

## 2024-03-18 RX ORDER — FUROSEMIDE 40 MG
40 TABLET ORAL DAILY
Refills: 0 | Status: DISCONTINUED | OUTPATIENT
Start: 2024-03-18 | End: 2024-03-19

## 2024-03-18 RX ORDER — IRON SUCROSE 20 MG/ML
200 INJECTION, SOLUTION INTRAVENOUS EVERY 24 HOURS
Refills: 0 | Status: COMPLETED | OUTPATIENT
Start: 2024-03-18 | End: 2024-03-20

## 2024-03-18 RX ADMIN — Medication 1: at 17:33

## 2024-03-18 RX ADMIN — Medication 3: at 11:37

## 2024-03-18 RX ADMIN — TAMSULOSIN HYDROCHLORIDE 0.8 MILLIGRAM(S): 0.4 CAPSULE ORAL at 21:22

## 2024-03-18 RX ADMIN — Medication 1: at 08:13

## 2024-03-18 RX ADMIN — Medication 81 MILLIGRAM(S): at 11:18

## 2024-03-18 RX ADMIN — ATORVASTATIN CALCIUM 80 MILLIGRAM(S): 80 TABLET, FILM COATED ORAL at 21:23

## 2024-03-18 RX ADMIN — Medication 40 MILLIGRAM(S): at 17:38

## 2024-03-18 RX ADMIN — APIXABAN 2.5 MILLIGRAM(S): 2.5 TABLET, FILM COATED ORAL at 21:23

## 2024-03-18 RX ADMIN — AMIODARONE HYDROCHLORIDE 200 MILLIGRAM(S): 400 TABLET ORAL at 05:51

## 2024-03-18 RX ADMIN — INSULIN GLARGINE 3 UNIT(S): 100 INJECTION, SOLUTION SUBCUTANEOUS at 21:22

## 2024-03-18 RX ADMIN — IRON SUCROSE 110 MILLIGRAM(S): 20 INJECTION, SOLUTION INTRAVENOUS at 17:37

## 2024-03-18 RX ADMIN — Medication 650 MILLIGRAM(S): at 11:19

## 2024-03-18 RX ADMIN — APIXABAN 2.5 MILLIGRAM(S): 2.5 TABLET, FILM COATED ORAL at 11:20

## 2024-03-18 RX ADMIN — Medication 325 MILLIGRAM(S): at 11:19

## 2024-03-18 NOTE — PROGRESS NOTE ADULT - NSPROGADDITIONALINFOA_GEN_ALL_CORE
discussed with patient in detail, expresses understanding of treatment plans.  disposition per PT eval home with home PT

## 2024-03-18 NOTE — PROGRESS NOTE ADULT - ASSESSMENT
A/P    88 yM with PMHx CKD, HFrEF, CAD s/p PCI, AF on a/c, bladder cancer in remission, diabetes, TIA, left nephrectomy, presenting with 2 weeks progressive shortness of breath on exertion. Pt denies worsening pedal edema, orthopnea, chest pains or nausea associated with SOB.     #SOB, acute on chronic HFrEF  -likely secondary to mild volume overload  -start oral lasix 20mg PO daily  -previous echo w mild LV dysfunction  -repeat TTE    #Atrial Fibrillation  -stable  -AC was on hold for hematuria  -resume if no CI  -cont amio    #CAD, s/p PCI  -stable  -ASA if no CI    #CKD/LILIAN  -?cardio renal   -no obstruction  -trend creat     38 minutes spent on total encounter; more than 50% of the visit was spent counseling and/or coordinating care by the attending physician.

## 2024-03-18 NOTE — PROGRESS NOTE ADULT - SUBJECTIVE AND OBJECTIVE BOX
Date of Service: 03-18-24 @ 10:50    Patient is a 88y old  Male who presents with a chief complaint of shortness of breath (17 Mar 2024 15:30)      Any change in ROS: Pt seems OK : SAYS H IS breathing is better     MEDICATIONS  (STANDING):  aMIOdarone    Tablet 200 milliGRAM(s) Oral daily  apixaban 2.5 milliGRAM(s) Oral every 12 hours  aspirin enteric coated 81 milliGRAM(s) Oral daily  atorvastatin 80 milliGRAM(s) Oral at bedtime  dextrose 5%. 1000 milliLiter(s) (50 mL/Hr) IV Continuous <Continuous>  dextrose 5%. 1000 milliLiter(s) (100 mL/Hr) IV Continuous <Continuous>  dextrose 50% Injectable 12.5 Gram(s) IV Push once  dextrose 50% Injectable 25 Gram(s) IV Push once  dextrose 50% Injectable 25 Gram(s) IV Push once  ferrous    sulfate 325 milliGRAM(s) Oral daily  glucagon  Injectable 1 milliGRAM(s) IntraMuscular once  insulin glargine Injectable (LANTUS) 3 Unit(s) SubCutaneous at bedtime  insulin lispro (ADMELOG) corrective regimen sliding scale   SubCutaneous three times a day before meals  sodium bicarbonate 650 milliGRAM(s) Oral daily  tamsulosin 0.8 milliGRAM(s) Oral at bedtime    MEDICATIONS  (PRN):  dextrose Oral Gel 15 Gram(s) Oral once PRN Blood Glucose LESS THAN 70 milliGRAM(s)/deciliter    Vital Signs Last 24 Hrs  T(C): 36.4 (18 Mar 2024 05:50), Max: 36.6 (17 Mar 2024 21:25)  T(F): 97.6 (18 Mar 2024 05:50), Max: 97.9 (17 Mar 2024 21:25)  HR: 58 (18 Mar 2024 05:50) (58 - 65)  BP: 107/57 (18 Mar 2024 05:50) (107/57 - 154/59)  BP(mean): --  RR: 18 (18 Mar 2024 05:50) (16 - 18)  SpO2: 99% (18 Mar 2024 05:50) (99% - 100%)    Parameters below as of 18 Mar 2024 05:50  Patient On (Oxygen Delivery Method): room air        I&O's Summary    17 Mar 2024 07:01  -  18 Mar 2024 07:00  --------------------------------------------------------  IN: 120 mL / OUT: 200 mL / NET: -80 mL          Physical Exam:   GENERAL: NAD, well-groomed, well-developed  HEENT: RAQUEL/   Atraumatic, Normocephalic  ENMT: No tonsillar erythema, exudates, or enlargement; Moist mucous membranes, Good dentition, No lesions  NECK: Supple, No JVD, Normal thyroid  CHEST/LUNG: Clear to auscultaion, ; No rales, rhonchi, wheezing, or rubs  CVS: Regular rate and rhythm; No murmurs, rubs, or gallops  GI: : Soft, Nontender, Nondistended; Bowel sounds present  NERVOUS SYSTEM:  Alert & Oriented X3  EXTREMITIES:  Less edema  LYMPH: No lymphadenopathy noted  SKIN: No rashes or lesions  ENDOCRINOLOGY: No Thyromegaly  PSYCH: Appropriate    Labs:  19                            8.3    3.81  )-----------( 117      ( 18 Mar 2024 06:31 )             25.3                         8.3    4.48  )-----------( 107      ( 17 Mar 2024 01:14 )             25.2                         7.1    3.86  )-----------( 114      ( 16 Mar 2024 15:24 )             21.8     03-18    146<H>  |  116<H>  |  76<H>  ----------------------------<  131<H>  4.0   |  18<L>  |  3.52<H>  03-17    146<H>  |  117<H>  |  76<H>  ----------------------------<  135<H>  3.5   |  18<L>  |  3.64<H>  03-16    146<H>  |  115<H>  |  79<H>  ----------------------------<  197<H>  3.8   |  18<L>  |  3.74<H>    Ca    8.3<L>      18 Mar 2024 06:31  Ca    8.1<L>      17 Mar 2024 01:14  Ca    8.3<L>      16 Mar 2024 15:24  Phos  4.1     03-17  Mg     2.20     03-17  Mg     2.30     03-16    TPro  6.4  /  Alb  3.0<L>  /  TBili  0.4  /  DBili  x   /  AST  51<H>  /  ALT  52<H>  /  AlkPhos  125<H>  03-16    CAPILLARY BLOOD GLUCOSE      POCT Blood Glucose.: 157 mg/dL (18 Mar 2024 07:56)  POCT Blood Glucose.: 139 mg/dL (17 Mar 2024 20:46)  POCT Blood Glucose.: 224 mg/dL (17 Mar 2024 16:56)  POCT Blood Glucose.: 263 mg/dL (17 Mar 2024 13:29)      LIVER FUNCTIONS - ( 16 Mar 2024 15:24 )  Alb: 3.0 g/dL / Pro: 6.4 g/dL / ALK PHOS: 125 U/L / ALT: 52 U/L / AST: 51 U/L / GGT: x             Urinalysis Basic - ( 18 Mar 2024 06:31 )    Color: x / Appearance: x / SG: x / pH: x  Gluc: 131 mg/dL / Ketone: x  / Bili: x / Urobili: x   Blood: x / Protein: x / Nitrite: x   Leuk Esterase: x / RBC: x / WBC x   Sq Epi: x / Non Sq Epi: x / Bacteria: x      D-Dimer Assay, Quantitative: 250 ng/mL DDU (03-18 @ 06:31)        RECENT CULTURES:  03-16 @ 23:24 Clean Catch Clean Catch (Midstream)         rad< from: Xray Chest 2 Views PA/Lat (03.16.24 @ 16:39) >    ACC: [id number] EXAM:  XR CHEST PA LAT 2V   ORDERED BY: ANDERSON FARNSWORTH     PROCEDURE DATE:  03/16/2024          INTERPRETATION:  CLINICAL INFORMATION: Shortness of breath on exertion.    TECHNIQUE: PA and lateral views of the chest.    COMPARISON: Multiple prior chest x-rays with most recent dated 12/14/2023    FINDINGS:    Heart: Heart size is normal.  Pulmonary: No focal consolidation. Small bilateral pleural effusions with   bibasilar atelectasis. No pneumothorax.  Bones: No acute bony pathology.    IMPRESSION:  Small bilateral pleural effusions with bibasilar atelectasis.    --- End of Report ---          < end of copied text >  < from: US Kidney and Bladder (03.17.24 @ 18:18) >    ACC: [id number] EXAM:  US KIDNEYS AND BLADDER   ORDERED BY: MARY ELLEN ESPAÑA     PROCEDURE DATE:  03/17/2024          INTERPRETATION:  CLINICAL INFORMATION: Acute kidney injury.    COMPARISON: None available.    TECHNIQUE: Sonography of the kidneys and bladder.    FINDINGS:  Right kidney: 12.7 cm. increased echogenicity. No hydronephrosis. Upper   pole cyst measures 2.4 cm. Nonobstructing mid pole 4 mm stone.    Left kidney: Not visualized    Urinary bladder: Within normal limits.    IMPRESSION:  Solitary right kidney demonstrating parenchymal disease.  No hydronephrosis.        --- End of Report ---            KEVIN AZUL MD; Attending Radiologist  This document has been electronically signed. Mar 18 2024  9:02AM    < end of copied text >         <10,000 CFU/mL Normal Urogenital Jeannine          RESPIRATORY CULTURES:          Studies  Chest X-RAY  CT SCAN Chest   Venous Dopplers: LE:   CT Abdomen  Others

## 2024-03-18 NOTE — PROGRESS NOTE ADULT - SUBJECTIVE AND OBJECTIVE BOX
Patient is a 88y old  Male who presents with a chief complaint of shortness of breath (18 Mar 2024 11:14)      DATE OF SERVICE: 03-18-24 @ 14:28    SUBJECTIVE / OVERNIGHT EVENTS: overnight events noted    ROS:  Resp: No cough no sputum production  CVS: No chest pain no palpitations no orthopnea  GI: no N/V/D  : no dysuria, no hematuria  "I feel much better"       MEDICATIONS  (STANDING):  aMIOdarone    Tablet 200 milliGRAM(s) Oral daily  apixaban 2.5 milliGRAM(s) Oral every 12 hours  aspirin enteric coated 81 milliGRAM(s) Oral daily  atorvastatin 80 milliGRAM(s) Oral at bedtime  dextrose 5%. 1000 milliLiter(s) (100 mL/Hr) IV Continuous <Continuous>  dextrose 5%. 1000 milliLiter(s) (50 mL/Hr) IV Continuous <Continuous>  dextrose 50% Injectable 12.5 Gram(s) IV Push once  dextrose 50% Injectable 25 Gram(s) IV Push once  dextrose 50% Injectable 25 Gram(s) IV Push once  ferrous    sulfate 325 milliGRAM(s) Oral daily  furosemide    Tablet 40 milliGRAM(s) Oral daily  glucagon  Injectable 1 milliGRAM(s) IntraMuscular once  insulin glargine Injectable (LANTUS) 3 Unit(s) SubCutaneous at bedtime  insulin lispro (ADMELOG) corrective regimen sliding scale   SubCutaneous three times a day before meals  sodium bicarbonate 650 milliGRAM(s) Oral daily  tamsulosin 0.8 milliGRAM(s) Oral at bedtime    MEDICATIONS  (PRN):  dextrose Oral Gel 15 Gram(s) Oral once PRN Blood Glucose LESS THAN 70 milliGRAM(s)/deciliter        CAPILLARY BLOOD GLUCOSE      POCT Blood Glucose.: 274 mg/dL (18 Mar 2024 11:17)  POCT Blood Glucose.: 157 mg/dL (18 Mar 2024 07:56)  POCT Blood Glucose.: 139 mg/dL (17 Mar 2024 20:46)  POCT Blood Glucose.: 224 mg/dL (17 Mar 2024 16:56)    I&O's Summary    17 Mar 2024 07:01  -  18 Mar 2024 07:00  --------------------------------------------------------  IN: 120 mL / OUT: 200 mL / NET: -80 mL        Vital Signs Last 24 Hrs  T(C): 36.6 (18 Mar 2024 11:15), Max: 36.6 (17 Mar 2024 21:25)  T(F): 97.9 (18 Mar 2024 11:15), Max: 97.9 (17 Mar 2024 21:25)  HR: 54 (18 Mar 2024 11:15) (54 - 61)  BP: 120/48 (18 Mar 2024 11:15) (107/57 - 154/59)  BP(mean): --  RR: 18 (18 Mar 2024 11:15) (18 - 18)  SpO2: 100% (18 Mar 2024 11:15) (99% - 100%)    PHYSICAL EXAM:   Neck: Supple, no JVD  Lungs: no wheeze, no crackles  CVS: S1 S2 systolic murmur +   Abdomen: no tenderness  Neuro: AO x 3 nonfocal   Skin: warm, dry  Ext: trace edema    LABS:                        8.3    3.81  )-----------( 117      ( 18 Mar 2024 06:31 )             25.3     03-18    146<H>  |  116<H>  |  76<H>  ----------------------------<  131<H>  4.0   |  18<L>  |  3.52<H>    Ca    8.3<L>      18 Mar 2024 06:31  Phos  4.1     03-17  Mg     2.20     03-17    TPro  6.4  /  Alb  3.0<L>  /  TBili  0.4  /  DBili  x   /  AST  51<H>  /  ALT  52<H>  /  AlkPhos  125<H>  03-16          Urinalysis Basic - ( 18 Mar 2024 06:31 )    Color: x / Appearance: x / SG: x / pH: x  Gluc: 131 mg/dL / Ketone: x  / Bili: x / Urobili: x   Blood: x / Protein: x / Nitrite: x   Leuk Esterase: x / RBC: x / WBC x   Sq Epi: x / Non Sq Epi: x / Bacteria: x          All consultant(s) notes reviewed and care discussed with other providers        Contact Number, Dr Arroyo [phone]

## 2024-03-18 NOTE — PROGRESS NOTE ADULT - SUBJECTIVE AND OBJECTIVE BOX
CARDIOLOGY FOLLOW UP - Dr. Vega  Date of Service: 3/18/2024  CC: no events, breathing improved    Review of Systems:  Constitutional: No fever, weight loss, or fatigue  Respiratory: No cough, wheezing, or hemoptysis, no shortness of breath  Cardiovascular: No chest pain, palpitations, passing out, dizziness, or leg swelling  Gastrointestinal: No abd or epigastric pain. No nausea, vomiting, or hematemesis; no diarrhea or consiptaiton, no melena or hematochezia  Vascular: No edema     TELEMETRY:    PHYSICAL EXAM:  T(C): 36.4 (03-18-24 @ 05:50), Max: 36.6 (03-17-24 @ 21:25)  HR: 58 (03-18-24 @ 05:50) (58 - 65)  BP: 107/57 (03-18-24 @ 05:50) (107/57 - 154/59)  RR: 18 (03-18-24 @ 05:50) (16 - 18)  SpO2: 99% (03-18-24 @ 05:50) (99% - 100%)  Wt(kg): --  I&O's Summary    17 Mar 2024 07:01  -  18 Mar 2024 07:00  --------------------------------------------------------  IN: 120 mL / OUT: 200 mL / NET: -80 mL        Appearance: Normal	  Cardiovascular: Normal S1 S2,RRR, No JVD, No murmurs  Respiratory: Lungs clear to auscultation	  Gastrointestinal:  Soft, Non-tender, + BS	  Extremities: Normal range of motion, No clubbing, cyanosis or edema  Vascular: Peripheral pulses palpable 2+ bilaterally       Home Medications:  apixaban 2.5 mg oral tablet: 1 tab(s) orally every 12 hours (17 Mar 2024 10:48)  Crestor 40 mg oral tablet: 1 tab(s) orally once a day (at bedtime) (17 Mar 2024 10:49)  repaglinide 0.5 mg oral tablet: 1 tab(s) orally 3 times a day (before meals) (17 Mar 2024 10:49)  tamsulosin 0.4 mg oral capsule: 1 cap(s) orally once a day (17 Mar 2024 10:49)  Tresiba FlexTouch 100 units/mL subcutaneous solution: 10 subcutaneous once a day (at bedtime) (17 Mar 2024 10:49)        MEDICATIONS  (STANDING):  aMIOdarone    Tablet 200 milliGRAM(s) Oral daily  apixaban 2.5 milliGRAM(s) Oral every 12 hours  aspirin enteric coated 81 milliGRAM(s) Oral daily  atorvastatin 80 milliGRAM(s) Oral at bedtime  dextrose 5%. 1000 milliLiter(s) (50 mL/Hr) IV Continuous <Continuous>  dextrose 5%. 1000 milliLiter(s) (100 mL/Hr) IV Continuous <Continuous>  dextrose 50% Injectable 12.5 Gram(s) IV Push once  dextrose 50% Injectable 25 Gram(s) IV Push once  dextrose 50% Injectable 25 Gram(s) IV Push once  ferrous    sulfate 325 milliGRAM(s) Oral daily  glucagon  Injectable 1 milliGRAM(s) IntraMuscular once  insulin glargine Injectable (LANTUS) 3 Unit(s) SubCutaneous at bedtime  insulin lispro (ADMELOG) corrective regimen sliding scale   SubCutaneous three times a day before meals  sodium bicarbonate 650 milliGRAM(s) Oral daily  tamsulosin 0.8 milliGRAM(s) Oral at bedtime        EKG:  RADIOLOGY:  DIAGNOSTIC TESTING:  [ ] Echocardiogram:  [ ] Catherterization:  [ ] Stress Test:  OTHER:     LABS:	 	                          8.3    3.81  )-----------( 117      ( 18 Mar 2024 06:31 )             25.3     03-18    146<H>  |  116<H>  |  76<H>  ----------------------------<  131<H>  4.0   |  18<L>  |  3.52<H>    Ca    8.3<L>      18 Mar 2024 06:31  Phos  4.1     03-17  Mg     2.20     03-17    TPro  6.4  /  Alb  3.0<L>  /  TBili  0.4  /  DBili  x   /  AST  51<H>  /  ALT  52<H>  /  AlkPhos  125<H>  03-16          CARDIAC MARKERS:

## 2024-03-18 NOTE — PROGRESS NOTE ADULT - PROBLEM SELECTOR PLAN 5
HbA1C 5.3  may be spuriously low  continue finger sticks with short acting insulin sliding scale  no oral meds  diabetic diet  monitor for hypoglycemia  low dose Lantus

## 2024-03-18 NOTE — PROGRESS NOTE ADULT - ASSESSMENT
88yM with PMHx CKD, HFrEF, CAD s/p stents presenting with 2 weeks progressive shortness of breath on exertion. Pt denies worsening pedal edema, orthopnea, chest pains or nausea associated with SOB. Denies infectious symptoms fever/chills, cough, URI symptoms recently. Takes furosemide 40mg every other day this dose has not been changed recently. No BRBPR, melena, epistaxis, hematuria, any other bleeding.  he says h is sob was gradually building up till he could not take it and came to hospital : he deneis any underlying lung disease but is an ex smoker:   no fever,   no cough , no phlegm ;    SOB/CHF/CAD/S/P PCI  ex smoker:   CKD    SOB/CHF/CAD/S/P PCI  -his history is highly suggestive of chf exacerbation;   -he has formed small concepcion effusions:  cxr prior to his film was clear:   -he hasn o clinical features of PNEUMONIA:  -on vbg shows mild metabolic acidosis  -added on Lasix : to co nt:   -no need fro antibiotics or bipap at this time; -consider ct chest : last ct from 2022 reviewed:  was suggestive of martínez failure  -his last echo with mod lv dysfunction  need a repeat one  -Check D Dimer in AM  : clinical  probability of PE is low   3/18:  -clinically he has improved  on room air:  d dimer is slightly elevated:  no further workup : diuretics per primary t eam   ex smoker:   -she has 35 pk years history of smoking:   -has no diagnosis of copd:  -his last ct chest did not show emphysema:   -need to mantain o2 sao2 above 88%  -he would need outpt full PFT   CKD  -per renal :    dvt prophylaxis:    dw wife and team

## 2024-03-18 NOTE — PROGRESS NOTE ADULT - ASSESSMENT
88y Male with CAD, HFrEF, Afib, HTN, DM, solitary R kidney and CKD p/w acute on chronic HFrEF and acute on chronic kidney injury.    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   [phone]    88y Male with CAD, HFrEF, Afib, HTN, DM, solitary R kidney and CKD p/w acute on chronic HFrEF and acute on chronic kidney injury.  Nephrotic syndrome     Renal - CKD: stage 4 with solitary R kidney and baseline creatinine ~2.8, renal fxn improving, a/w lasix 40 mg po daily.  I thinkl he is euvolemic at present   Due to his age we (pt and myself)have decided on no renal bx   Metabolic acidosis-  likely due to CKD/LILIAN, on sodium bicarbonate tabs x 3 doses  Anemia in CKD- mild  iron deficiency, give venofer 200 mg IV daily x 3 days   CV- acute on chronic HFrEF: appears mild s/p IV lasix yesterday       Sayed [facility]   [phone]

## 2024-03-18 NOTE — PHYSICAL THERAPY INITIAL EVALUATION ADULT - ADDITIONAL COMMENTS
Pt reports that he lives in a private house with his wife with 1 step to enter; bedroom/bathroom is on the first level. Prior to hospital admission, pt was completely independent and used no assistive device with household ambulation and used a rollator occasionally with community ambulation. Pt's last fall was last month in February, pt almost had a fall last week but caught himself. Pt was going to Outpatient PT 1x/week.    Pt left comfortable in bed, NAD, all lines intact, all precautions maintained, with call bell in reach, bed alarm on, and RN aware of PT evaluation.

## 2024-03-18 NOTE — PROGRESS NOTE ADULT - SUBJECTIVE AND OBJECTIVE BOX
[facility] [phone]        Patient seen and examined on room air reports he ambulated the unit without difficulty         MEDICATIONS  (STANDING):  aMIOdarone    Tablet 200 milliGRAM(s) Oral daily  apixaban 2.5 milliGRAM(s) Oral every 12 hours  aspirin enteric coated 81 milliGRAM(s) Oral daily  atorvastatin 80 milliGRAM(s) Oral at bedtime  dextrose 5%. 1000 milliLiter(s) (50 mL/Hr) IV Continuous <Continuous>  dextrose 5%. 1000 milliLiter(s) (100 mL/Hr) IV Continuous <Continuous>  dextrose 50% Injectable 25 Gram(s) IV Push once  dextrose 50% Injectable 12.5 Gram(s) IV Push once  dextrose 50% Injectable 25 Gram(s) IV Push once  ferrous    sulfate 325 milliGRAM(s) Oral daily  furosemide    Tablet 40 milliGRAM(s) Oral daily  glucagon  Injectable 1 milliGRAM(s) IntraMuscular once  insulin glargine Injectable (LANTUS) 3 Unit(s) SubCutaneous at bedtime  insulin lispro (ADMELOG) corrective regimen sliding scale   SubCutaneous three times a day before meals  sodium bicarbonate 650 milliGRAM(s) Oral daily  tamsulosin 0.8 milliGRAM(s) Oral at bedtime      VITAL:  T(C): , Max: 36.6 (03-17-24 @ 21:25)  T(F): , Max: 97.9 (03-17-24 @ 21:25)  HR: 54 (03-18-24 @ 11:15)  BP: 120/48 (03-18-24 @ 11:15)  BP(mean): --  RR: 18 (03-18-24 @ 11:15)  SpO2: 100% (03-18-24 @ 11:15)  Wt(kg): --    I and O's:    03-17 @ 07:01  -  03-18 @ 07:00  --------------------------------------------------------  IN: 120 mL / OUT: 200 mL / NET: -80 mL      Height (cm): 170.2 (03-18 @ 05:50)  Weight (kg): 58.7 (03-18 @ 05:50)  BMI (kg/m2): 20.3 (03-18 @ 05:50)  BSA (m2): 1.68 (03-18 @ 05:50)    PHYSICAL EXAM:    Constitutional: NAD  Neck:  No JVD  Respiratory: CTAB/L  Cardiovascular: S1 and S2  Gastrointestinal: BS+, soft, NT/ND  Extremities: No peripheral edema  Neurological: A/O x 3, no focal deficits  Psychiatric: Normal mood, normal affect  : No Temple  Skin: No rashes  Access: Not applicable    LABS:                        8.3    3.81  )-----------( 117      ( 18 Mar 2024 06:31 )             25.3     03-18    146<H>  |  116<H>  |  76<H>  ----------------------------<  131<H>  4.0   |  18<L>  |  3.52<H>    Ca    8.3<L>      18 Mar 2024 06:31  Phos  4.1     03-17  Mg     2.20     03-17    TPro  6.4  /  Alb  3.0<L>  /  TBili  0.4  /  DBili  x   /  AST  51<H>  /  ALT  52<H>  /  AlkPhos  125<H>  03-16          Urine Studies:  Urinalysis Basic - ( 18 Mar 2024 06:31 )    Color: x / Appearance: x / SG: x / pH: x  Gluc: 131 mg/dL / Ketone: x  / Bili: x / Urobili: x   Blood: x / Protein: x / Nitrite: x   Leuk Esterase: x / RBC: x / WBC x   Sq Epi: x / Non Sq Epi: x / Bacteria: x            RADIOLOGY & ADDITIONAL STUDIES:  < from: US Kidney and Bladder (03.17.24 @ 18:18) >  FINDINGS:  Right kidney: 12.7 cm. increased echogenicity. No hydronephrosis. Upper   pole cyst measures 2.4 cm. Nonobstructing mid pole 4 mm stone.    Left kidney: Not visualized    Urinary bladder: Within normal limits.    IMPRESSION:  Solitary right kidney demonstrating parenchymal disease.  No hydronephrosis.        --- End of Report ---        < end of copied text >      Asssessment/Plan  ASSESSMENT: 88y Male with CAD, HFrEF, Afib, HTN, DM, solitary R kidney and CKD p/w acute on chronic HFrEF and acute on chronic kidney injury.    Renal - CKD: stage 4 with solitary R kidney and baseline creatinine ~2.8, renal fxn improving, a/w lasix 40 mg po daily   Metabolic acidosis-  likely due to CKD/LILIAN, on sodium bicarbonate tabs x 3 days   Anemia in CKD- mild  iron deficiency  CV- acute on chronic HFrEF: appears mild with no edema now and limited congestion on CXR, s/p IV lasix yesterday         Yuliya [facility] LISSY  [facility]  [phone]     NE[facility]ROLOGY[facility] [phone]        Patient seen and examined on room air reports he ambulated the unit without difficulty         MEDICATIONS  (STANDING):  aMIOdarone    Tablet 200 milliGRAM(s) Oral daily  apixaban 2.5 milliGRAM(s) Oral every 12 hours  aspirin enteric coated 81 milliGRAM(s) Oral daily  atorvastatin 80 milliGRAM(s) Oral at bedtime  dextrose 5%. 1000 milliLiter(s) (50 mL/Hr) IV Continuous <Continuous>  dextrose 5%. 1000 milliLiter(s) (100 mL/Hr) IV Continuous <Continuous>  dextrose 50% Injectable 25 Gram(s) IV Push once  dextrose 50% Injectable 12.5 Gram(s) IV Push once  dextrose 50% Injectable 25 Gram(s) IV Push once  ferrous    sulfate 325 milliGRAM(s) Oral daily  furosemide    Tablet 40 milliGRAM(s) Oral daily  glucagon  Injectable 1 milliGRAM(s) IntraMuscular once  insulin glargine Injectable (LANTUS) 3 Unit(s) SubCutaneous at bedtime  insulin lispro (ADMELOG) corrective regimen sliding scale   SubCutaneous three times a day before meals  sodium bicarbonate 650 milliGRAM(s) Oral daily  tamsulosin 0.8 milliGRAM(s) Oral at bedtime      VITAL:  T(C): , Max: 36.6 (03-17-24 @ 21:25)  T(F): , Max: 97.9 (03-17-24 @ 21:25)  HR: 54 (03-18-24 @ 11:15)  BP: 120/48 (03-18-24 @ 11:15)  BP(mean): --  RR: 18 (03-18-24 @ 11:15)  SpO2: 100% (03-18-24 @ 11:15)  Wt(kg): --    I and O's:    03-17 @ 07:01  -  03-18 @ 07:00  --------------------------------------------------------  IN: 120 mL / OUT: 200 mL / NET: -80 mL      Height (cm): 170.2 (03-18 @ 05:50)  Weight (kg): 58.7 (03-18 @ 05:50)  BMI (kg/m2): 20.3 (03-18 @ 05:50)  BSA (m2): 1.68 (03-18 @ 05:50)    PHYSICAL EXAM:    Constitutional: NAD  Neck:  No JVD  Respiratory: CTAB/L  Cardiovascular: S1 and S2  Gastrointestinal: BS+, soft, NT/ND  Extremities: No peripheral edema  Neurological: A/O x 3, no focal deficits  Psychiatric: Normal mood, normal affect  : No Temple  Skin: No rashes  Access: Not applicable    LABS:                        8.3    3.81  )-----------( 117      ( 18 Mar 2024 06:31 )             25.3     03-18    146<H>  |  116<H>  |  76<H>  ----------------------------<  131<H>  4.0   |  18<L>  |  3.52<H>    Ca    8.3<L>      18 Mar 2024 06:31  Phos  4.1     03-17  Mg     2.20     03-17    TPro  6.4  /  Alb  3.0<L>  /  TBili  0.4  /  DBili  x   /  AST  51<H>  /  ALT  52<H>  /  AlkPhos  125<H>  03-16      Ferritin 124, TSat 19%    Urine Studies:  Urinalysis Basic - ( 18 Mar 2024 06:31 )    Color: x / Appearance: x / SG: x / pH: x  Gluc: 131 mg/dL / Ketone: x  / Bili: x / Urobili: x   Blood: x / Protein: x / Nitrite: x   Leuk Esterase: x / RBC: x / WBC x   Sq Epi: x / Non Sq Epi: x / Bacteria: x            RADIOLOGY & ADDITIONAL STUDIES:  < from: US Kidney and Bladder (03.17.24 @ 18:18) >  FINDINGS:  Right kidney: 12.7 cm. increased echogenicity. No hydronephrosis. Upper   pole cyst measures 2.4 cm. Nonobstructing mid pole 4 mm stone.    Left kidney: Not visualized    Urinary bladder: Within normal limits.    IMPRESSION:  Solitary right kidney demonstrating parenchymal disease.  No hydronephrosis.        --- End of Report ---        < end of copied text >      Asssessment/Plan  ASSESSMENT: 88y Male with CAD, HFrEF, Afib, HTN, DM, solitary R kidney and CKD p/w acute on chronic HFrEF and acute on chronic kidney injury.    Renal - CKD: stage 4 with solitary R kidney and baseline creatinine ~2.8, renal fxn improving, a/w lasix 40 mg po daily   Metabolic acidosis-  likely due to CKD/LILIAN, on sodium bicarbonate tabs x 3 doses  Anemia in CKD- mild  iron deficiency, give venofer 200 mg IV daily x 3 days   CV- acute on chronic HFrEF: appears mild with no edema now and limited congestion on CXR, s/p IV lasix yesterday   GI- check hepatic US       [place] [facility]  [phone]     NEPHR[facility] [phone]        Patient seen and examined on room air reports he ambulated the unit without difficulty         MEDICATIONS  (STANDING):  aMIOdarone    Tablet 200 milliGRAM(s) Oral daily  apixaban 2.5 milliGRAM(s) Oral every 12 hours  aspirin enteric coated 81 milliGRAM(s) Oral daily  atorvastatin 80 milliGRAM(s) Oral at bedtime  dextrose 5%. 1000 milliLiter(s) (50 mL/Hr) IV Continuous <Continuous>  dextrose 5%. 1000 milliLiter(s) (100 mL/Hr) IV Continuous <Continuous>  dextrose 50% Injectable 25 Gram(s) IV Push once  dextrose 50% Injectable 12.5 Gram(s) IV Push once  dextrose 50% Injectable 25 Gram(s) IV Push once  ferrous    sulfate 325 milliGRAM(s) Oral daily  furosemide    Tablet 40 milliGRAM(s) Oral daily  glucagon  Injectable 1 milliGRAM(s) IntraMuscular once  insulin glargine Injectable (LANTUS) 3 Unit(s) SubCutaneous at bedtime  insulin lispro (ADMELOG) corrective regimen sliding scale   SubCutaneous three times a day before meals  sodium bicarbonate 650 milliGRAM(s) Oral daily  tamsulosin 0.8 milliGRAM(s) Oral at bedtime      VITAL:  T(C): , Max: 36.6 (03-17-24 @ 21:25)  T(F): , Max: 97.9 (03-17-24 @ 21:25)  HR: 54 (03-18-24 @ 11:15)  BP: 120/48 (03-18-24 @ 11:15)  BP(mean): --  RR: 18 (03-18-24 @ 11:15)  SpO2: 100% (03-18-24 @ 11:15)  Wt(kg): --    I and O's:    03-17 @ 07:01  -  03-18 @ 07:00  --------------------------------------------------------  IN: 120 mL / OUT: 200 mL / NET: -80 mL      Height (cm): 170.2 (03-18 @ 05:50)  Weight (kg): 58.7 (03-18 @ 05:50)  BMI (kg/m2): 20.3 (03-18 @ 05:50)  BSA (m2): 1.68 (03-18 @ 05:50)    PHYSICAL EXAM:    Constitutional: NAD; cachetic   Neck:  No JVD  Respiratory: CTAB/L  Cardiovascular: S1 and S2  Gastrointestinal: BS+, soft, NT/ND  Extremities: No peripheral edema  Neurological: A/O x 3, no focal deficits  Psychiatric: Normal mood, normal affect  : No Temple  Skin: No rashes  Access: Not applicable    LABS:                        8.3    3.81  )-----------( 117      ( 18 Mar 2024 06:31 )             25.3     03-18    146<H>  |  116<H>  |  76<H>  ----------------------------<  131<H>  4.0   |  18<L>  |  3.52<H>    Ca    8.3<L>      18 Mar 2024 06:31  Phos  4.1     03-17  Mg     2.20     03-17    TPro  6.4  /  Alb  3.0<L>  /  TBili  0.4  /  DBili  x   /  AST  51<H>  /  ALT  52<H>  /  AlkPhos  125<H>  03-16      Ferritin 124, TSat 19%    Urine Studies:  Urinalysis Basic - ( 18 Mar 2024 06:31 )    Color: x / Appearance: x / SG: x / pH: x  Gluc: 131 mg/dL / Ketone: x  / Bili: x / Urobili: x   Blood: x / Protein: x / Nitrite: x   Leuk Esterase: x / RBC: x / WBC x   Sq Epi: x / Non Sq Epi: x / Bacteria: x            RADIOLOGY & ADDITIONAL STUDIES:  < from: US Kidney and Bladder (03.17.24 @ 18:18) >  FINDINGS:  Right kidney: 12.7 cm. increased echogenicity. No hydronephrosis. Upper   pole cyst measures 2.4 cm. Nonobstructing mid pole 4 mm stone.    Left kidney: Not visualized    Urinary bladder: Within normal limits.    IMPRESSION:  Solitary right kidney demonstrating parenchymal disease.  No hydronephrosis.        --- End of Report ---        < end of copied text >    < from: Transthoracic Echocardiogram (01.18.22 @ 10:40) >    Patient name: RACHEL COMBS  YOB: 1935   Age: 86 (M)   MR#: [id number]  Study Date: 1/18/2022  Location: [address] [place], [place] [address]Sonographer: JOSSIE Morris  Study quality: Technically good  Referring Physician: Sudheer Arroyo MD  Blood Pressure: 103/40 mmHg  Height: 170 cm  Weight: 53 kg  BSA: 1.6 m2  ------------------------------------------------------------------------  PROCEDURE: Transthoracic echocardiogram with 2-D, M-Mode  and complete spectral and color flow Doppler.  INDICATION: Abnormal electrocardiogram (ECG) (EKG) (R94.31)  ------------------------------------------------------------------------  DIMENSIONS:  Dimensions:     Normal Values:  LA:     4.0 cm    2.0 - 4.0 cm  Ao:     2.8 cm    2.0 - 3.8 cm  SEPTUM: 0.5 cm    0.6 - 1.2 cm  PWT:    0.6 cm    0.6 - 1.1 cm  LVIDd:  5.2 cm    3.0 - 5.6 cm  LVIDs:    ---     1.8 - 4.0 cm  Derived Variables:  LVMI: 57 g/m2  RWT: 0.23  Ejection Fraction (Visual Estimate): 45 %  ------------------------------------------------------------------------  OBSERVATIONS:  Mitral Valve: Mitral annular calcification, otherwise  normal mitral valve. Mild mitral regurgitation.  Aortic Root: Normal aortic root.  Aortic Valve: Normal trileaflet aortic valve.  Left Atrium: Moderately dilated left atrium.  LA volume  index = 48 cc/m2.  Left Ventricle: Mild to moderate segmental left ventricular  systolic dysfunction. Inferolateral wall hypokinesis.  (DT:177 ms).  Right Heart: Normal right atrium. The right ventricle is  not wellvisualized. Normal tricuspid valve. Normal  pulmonic valve.  Pericardium/PleuraNormal pericardium with no pericardial  effusion.  ------------------------------------------------------------------------  CONCLUSIONS:  1. Mitral annular calcification,otherwise normal mitral  valve. Mild mitral regurgitation.  2. Moderately dilated left atrium.  LA volume index = 48  cc/m2.  3. Mild to moderate segmental left ventricular systolic  dysfunction. Inferolateral wall hypokinesis.  4. The right ventricle is not well visualized.  ------------------------------------------------------------------------  Confirmed on  1/18/2022 - 17:35:40 by Lb Pacheco M.D. [facility]  ------------------------------------------------------------------------    < end of copied text >  < from: Xray Chest 2 Views PA/Lat (03.16.24 @ 16:39) >    ACC: [id number] EXAM:  XR CHEST PA LAT 2V   ORDERED BY: ANDERSON PEREIRA DATE:  03/16/2024          INTERPRETATION:  CLINICAL INFORMATION: Shortness of breath on exertion.    TECHNIQUE: PA and lateral views of the chest.    COMPARISON: Multiple prior chest x-rays with most recent dated 12/14/2023    FINDINGS:    Heart: Heart size is normal.  Pulmonary: No focal consolidation. Small bilateral pleural effusions with   bibasilar atelectasis. No pneumothorax.  Bones: No acute bony pathology.    IMPRESSION:  Small bilateral pleural effusions with bibasilar atelectasis.    --- End of Report ---          TOM OSORIO MD; Resident Radiologist  This document has been electronically signed.  TAMIKO MILLIGAN MD; Attending Radiologist  This document has been electronically signed. Mar 17 2024  8:54AM    < end of copied text >

## 2024-03-18 NOTE — PHYSICAL THERAPY INITIAL EVALUATION ADULT - PATIENT PROFILE REVIEW, REHAB EVAL
ACTIVITY ORDER: Ambulate with Assistance; Spoke with RN Kristen Huerta prior to PT evaluation--> Pt OK for PT consult/OOB activity; vitals taken; /48mmHg/yes

## 2024-03-19 ENCOUNTER — RESULT REVIEW (OUTPATIENT)
Age: 89
End: 2024-03-19

## 2024-03-19 LAB
ADD ON TEST-SPECIMEN IN LAB: SIGNIFICANT CHANGE UP
ALBUMIN SERPL ELPH-MCNC: 2.9 G/DL — LOW (ref 3.3–5)
ALP SERPL-CCNC: 118 U/L — SIGNIFICANT CHANGE UP (ref 40–120)
ALT FLD-CCNC: 57 U/L — HIGH (ref 4–41)
ANION GAP SERPL CALC-SCNC: 12 MMOL/L — SIGNIFICANT CHANGE UP (ref 7–14)
AST SERPL-CCNC: 58 U/L — HIGH (ref 4–40)
BILIRUB DIRECT SERPL-MCNC: <0.2 MG/DL — SIGNIFICANT CHANGE UP (ref 0–0.3)
BILIRUB INDIRECT FLD-MCNC: >0.1 MG/DL — SIGNIFICANT CHANGE UP (ref 0–1)
BILIRUB SERPL-MCNC: 0.3 MG/DL — SIGNIFICANT CHANGE UP (ref 0.2–1.2)
BLD GP AB SCN SERPL QL: NEGATIVE — SIGNIFICANT CHANGE UP
BUN SERPL-MCNC: 74 MG/DL — HIGH (ref 7–23)
CALCIUM SERPL-MCNC: 8.3 MG/DL — LOW (ref 8.4–10.5)
CHLORIDE SERPL-SCNC: 116 MMOL/L — HIGH (ref 98–107)
CO2 SERPL-SCNC: 18 MMOL/L — LOW (ref 22–31)
CREAT SERPL-MCNC: 3.68 MG/DL — HIGH (ref 0.5–1.3)
EGFR: 15 ML/MIN/1.73M2 — LOW
GLUCOSE BLDC GLUCOMTR-MCNC: 124 MG/DL — HIGH (ref 70–99)
GLUCOSE BLDC GLUCOMTR-MCNC: 157 MG/DL — HIGH (ref 70–99)
GLUCOSE BLDC GLUCOMTR-MCNC: 160 MG/DL — HIGH (ref 70–99)
GLUCOSE BLDC GLUCOMTR-MCNC: 161 MG/DL — HIGH (ref 70–99)
GLUCOSE BLDC GLUCOMTR-MCNC: 273 MG/DL — HIGH (ref 70–99)
GLUCOSE SERPL-MCNC: 156 MG/DL — HIGH (ref 70–99)
HCT VFR BLD CALC: 23.9 % — LOW (ref 39–50)
HCT VFR BLD CALC: 25 % — LOW (ref 39–50)
HGB BLD-MCNC: 7.8 G/DL — LOW (ref 13–17)
HGB BLD-MCNC: 8.3 G/DL — LOW (ref 13–17)
MAGNESIUM SERPL-MCNC: 2.3 MG/DL — SIGNIFICANT CHANGE UP (ref 1.6–2.6)
MCHC RBC-ENTMCNC: 32.2 PG — SIGNIFICANT CHANGE UP (ref 27–34)
MCHC RBC-ENTMCNC: 32.6 GM/DL — SIGNIFICANT CHANGE UP (ref 32–36)
MCHC RBC-ENTMCNC: 33.1 PG — SIGNIFICANT CHANGE UP (ref 27–34)
MCHC RBC-ENTMCNC: 33.2 GM/DL — SIGNIFICANT CHANGE UP (ref 32–36)
MCV RBC AUTO: 98.8 FL — SIGNIFICANT CHANGE UP (ref 80–100)
MCV RBC AUTO: 99.6 FL — SIGNIFICANT CHANGE UP (ref 80–100)
NRBC # BLD: 0 /100 WBCS — SIGNIFICANT CHANGE UP (ref 0–0)
NRBC # BLD: 0 /100 WBCS — SIGNIFICANT CHANGE UP (ref 0–0)
NRBC # FLD: 0 K/UL — SIGNIFICANT CHANGE UP (ref 0–0)
NRBC # FLD: 0 K/UL — SIGNIFICANT CHANGE UP (ref 0–0)
PHOSPHATE SERPL-MCNC: 4.3 MG/DL — SIGNIFICANT CHANGE UP (ref 2.5–4.5)
PLATELET # BLD AUTO: 118 K/UL — LOW (ref 150–400)
PLATELET # BLD AUTO: 123 K/UL — LOW (ref 150–400)
POTASSIUM SERPL-MCNC: 3.9 MMOL/L — SIGNIFICANT CHANGE UP (ref 3.5–5.3)
POTASSIUM SERPL-SCNC: 3.9 MMOL/L — SIGNIFICANT CHANGE UP (ref 3.5–5.3)
PROT SERPL-MCNC: 6.1 G/DL — SIGNIFICANT CHANGE UP (ref 6–8.3)
RBC # BLD: 2.42 M/UL — LOW (ref 4.2–5.8)
RBC # BLD: 2.51 M/UL — LOW (ref 4.2–5.8)
RBC # FLD: 20.3 % — HIGH (ref 10.3–14.5)
RBC # FLD: 20.7 % — HIGH (ref 10.3–14.5)
RH IG SCN BLD-IMP: POSITIVE — SIGNIFICANT CHANGE UP
SODIUM SERPL-SCNC: 146 MMOL/L — HIGH (ref 135–145)
WBC # BLD: 3.73 K/UL — LOW (ref 3.8–10.5)
WBC # BLD: 3.98 K/UL — SIGNIFICANT CHANGE UP (ref 3.8–10.5)
WBC # FLD AUTO: 3.73 K/UL — LOW (ref 3.8–10.5)
WBC # FLD AUTO: 3.98 K/UL — SIGNIFICANT CHANGE UP (ref 3.8–10.5)

## 2024-03-19 PROCEDURE — 93306 TTE W/DOPPLER COMPLETE: CPT | Mod: 26

## 2024-03-19 RX ORDER — FUROSEMIDE 40 MG
20 TABLET ORAL DAILY
Refills: 0 | Status: DISCONTINUED | OUTPATIENT
Start: 2024-03-20 | End: 2024-03-28

## 2024-03-19 RX ORDER — INFLUENZA VIRUS VACCINE 15; 15; 15; 15 UG/.5ML; UG/.5ML; UG/.5ML; UG/.5ML
0.7 SUSPENSION INTRAMUSCULAR ONCE
Refills: 0 | Status: COMPLETED | OUTPATIENT
Start: 2024-03-19 | End: 2024-03-19

## 2024-03-19 RX ADMIN — Medication 3: at 12:04

## 2024-03-19 RX ADMIN — INSULIN GLARGINE 3 UNIT(S): 100 INJECTION, SOLUTION SUBCUTANEOUS at 21:42

## 2024-03-19 RX ADMIN — Medication 325 MILLIGRAM(S): at 12:05

## 2024-03-19 RX ADMIN — Medication 81 MILLIGRAM(S): at 12:05

## 2024-03-19 RX ADMIN — Medication 1: at 08:59

## 2024-03-19 RX ADMIN — APIXABAN 2.5 MILLIGRAM(S): 2.5 TABLET, FILM COATED ORAL at 21:41

## 2024-03-19 RX ADMIN — AMIODARONE HYDROCHLORIDE 200 MILLIGRAM(S): 400 TABLET ORAL at 06:30

## 2024-03-19 RX ADMIN — Medication 40 MILLIGRAM(S): at 06:30

## 2024-03-19 RX ADMIN — IRON SUCROSE 110 MILLIGRAM(S): 20 INJECTION, SOLUTION INTRAVENOUS at 17:33

## 2024-03-19 RX ADMIN — ATORVASTATIN CALCIUM 80 MILLIGRAM(S): 80 TABLET, FILM COATED ORAL at 21:41

## 2024-03-19 RX ADMIN — APIXABAN 2.5 MILLIGRAM(S): 2.5 TABLET, FILM COATED ORAL at 12:04

## 2024-03-19 RX ADMIN — TAMSULOSIN HYDROCHLORIDE 0.8 MILLIGRAM(S): 0.4 CAPSULE ORAL at 21:41

## 2024-03-19 RX ADMIN — Medication 650 MILLIGRAM(S): at 12:05

## 2024-03-19 NOTE — PROGRESS NOTE ADULT - SUBJECTIVE AND OBJECTIVE BOX
Date of Service: 03-19-24 @ 12:42    Patient is a 88y old  Male who presents with a chief complaint of shortness of breath (19 Mar 2024 11:27)      Any change in ROS: Seems to be doing  ok : no sob:  no cough : no phlegm     MEDICATIONS  (STANDING):  aMIOdarone    Tablet 200 milliGRAM(s) Oral daily  apixaban 2.5 milliGRAM(s) Oral every 12 hours  aspirin enteric coated 81 milliGRAM(s) Oral daily  atorvastatin 80 milliGRAM(s) Oral at bedtime  dextrose 5%. 1000 milliLiter(s) (100 mL/Hr) IV Continuous <Continuous>  dextrose 5%. 1000 milliLiter(s) (50 mL/Hr) IV Continuous <Continuous>  dextrose 50% Injectable 25 Gram(s) IV Push once  dextrose 50% Injectable 12.5 Gram(s) IV Push once  dextrose 50% Injectable 25 Gram(s) IV Push once  ferrous    sulfate 325 milliGRAM(s) Oral daily  furosemide    Tablet 40 milliGRAM(s) Oral daily  glucagon  Injectable 1 milliGRAM(s) IntraMuscular once  influenza  Vaccine (HIGH DOSE) 0.7 milliLiter(s) IntraMuscular once  insulin glargine Injectable (LANTUS) 3 Unit(s) SubCutaneous at bedtime  insulin lispro (ADMELOG) corrective regimen sliding scale   SubCutaneous three times a day before meals  iron sucrose IVPB 200 milliGRAM(s) IV Intermittent every 24 hours  sodium bicarbonate 650 milliGRAM(s) Oral daily  tamsulosin 0.8 milliGRAM(s) Oral at bedtime    MEDICATIONS  (PRN):  dextrose Oral Gel 15 Gram(s) Oral once PRN Blood Glucose LESS THAN 70 milliGRAM(s)/deciliter    Vital Signs Last 24 Hrs  T(C): 36.4 (19 Mar 2024 12:00), Max: 36.8 (18 Mar 2024 20:51)  T(F): 97.6 (19 Mar 2024 12:00), Max: 98.2 (18 Mar 2024 20:51)  HR: 58 (19 Mar 2024 12:00) (56 - 60)  BP: 110/48 (19 Mar 2024 12:00) (110/48 - 145/54)  BP(mean): --  RR: 18 (19 Mar 2024 12:00) (18 - 18)  SpO2: 100% (19 Mar 2024 12:00) (100% - 100%)    Parameters below as of 19 Mar 2024 12:00  Patient On (Oxygen Delivery Method): room air        I&O's Summary        Physical Exam:   GENERAL: NAD, well-groomed, well-developed  HEENT: RAQUEL/   Atraumatic, Normocephalic  ENMT: No tonsillar erythema, exudates, or enlargement; Moist mucous membranes, Good dentition, No lesions  NECK: Supple, No JVD, Normal thyroid  CHEST/LUNG: Clear to auscultaion  CVS: Regular rate and rhythm; No murmurs, rubs, or gallops  GI: : Soft, Nontender, Nondistended; Bowel sounds present  NERVOUS SYSTEM:  Alert & Oriented X3  EXTREMITIES: - edema  LYMPH: No lymphadenopathy noted  SKIN: No rashes or lesions  ENDOCRINOLOGY: No Thyromegaly  PSYCH: Appropriate    Labs:  19                            7.8    3.98  )-----------( 118      ( 19 Mar 2024 05:30 )             23.9                         8.3    3.81  )-----------( 117      ( 18 Mar 2024 06:31 )             25.3                         8.3    4.48  )-----------( 107      ( 17 Mar 2024 01:14 )             25.2                         7.1    3.86  )-----------( 114      ( 16 Mar 2024 15:24 )             21.8     03-19    146<H>  |  116<H>  |  74<H>  ----------------------------<  156<H>  3.9   |  18<L>  |  3.68<H>  03-18    146<H>  |  116<H>  |  76<H>  ----------------------------<  131<H>  4.0   |  18<L>  |  3.52<H>  03-17    146<H>  |  117<H>  |  76<H>  ----------------------------<  135<H>  3.5   |  18<L>  |  3.64<H>  03-16    146<H>  |  115<H>  |  79<H>  ----------------------------<  197<H>  3.8   |  18<L>  |  3.74<H>    Ca    8.3<L>      19 Mar 2024 05:30  Ca    8.3<L>      18 Mar 2024 06:31  Phos  4.3     03-19  Mg     2.30     03-19    TPro  6.1  /  Alb  2.9<L>  /  TBili  0.3  /  DBili  <0.2  /  AST  58<H>  /  ALT  57<H>  /  AlkPhos  118  03-19  TPro  6.4  /  Alb  3.0<L>  /  TBili  0.4  /  DBili  x   /  AST  51<H>  /  ALT  52<H>  /  AlkPhos  125<H>  03-16    CAPILLARY BLOOD GLUCOSE      POCT Blood Glucose.: 273 mg/dL (19 Mar 2024 11:13)  POCT Blood Glucose.: 157 mg/dL (19 Mar 2024 08:57)  POCT Blood Glucose.: 160 mg/dL (19 Mar 2024 07:25)  POCT Blood Glucose.: 189 mg/dL (18 Mar 2024 21:00)  POCT Blood Glucose.: 163 mg/dL (18 Mar 2024 17:23)      LIVER FUNCTIONS - ( 19 Mar 2024 05:30 )  Alb: 2.9 g/dL / Pro: 6.1 g/dL / ALK PHOS: 118 U/L / ALT: 57 U/L / AST: 58 U/L / GGT: x             Urinalysis Basic - ( 19 Mar 2024 05:30 )    Color: x / Appearance: x / SG: x / pH: x  Gluc: 156 mg/dL / Ketone: x  / Bili: x / Urobili: x   Blood: x / Protein: x / Nitrite: x   Leuk Esterase: x / RBC: x / WBC x   Sq Epi: x / Non Sq Epi: x / Bacteria: x      D-Dimer Assay, Quantitative: 250 ng/mL DDU (03-18 @ 06:31)        RECENT CULTURES:  03-16 @ 23:24 Clean Catch Clean Catch (Midstream)     rad< from: Xray Chest 2 Views PA/Lat (03.16.24 @ 16:39) >    ACC: [id number] EXAM:  XR CHEST PA LAT 2V   ORDERED BY: ANDERSON FARNSWORTH     PROCEDURE DATE:  03/16/2024          INTERPRETATION:  CLINICAL INFORMATION: Shortness of breath on exertion.    TECHNIQUE: PA and lateral views of the chest.    COMPARISON: Multiple prior chest x-rays with most recent dated 12/14/2023    FINDINGS:    Heart: Heart size is normal.  Pulmonary: No focal consolidation. Small bilateral pleural effusions with   bibasilar atelectasis. No pneumothorax.  Bones: No acute bony pathology.    IMPRESSION:  Small bilateral pleural effusions with bibasilar atelectasis.    --- End of Report ---          TOM OSORIO MD; Resident Radiologist  This document has been electronically signed.  TAMIKO MILLIGAN MD; Attending Radiologist  This document has been electronically signed. Mar 17 2024  8:54AM    < end of copied text >             <10,000 CFU/mL Normal Urogenital Jeannine          RESPIRATORY CULTURES:          Studies  Chest X-RAY  CT SCAN Chest   Venous Dopplers: LE:   CT Abdomen  Others

## 2024-03-19 NOTE — PROGRESS NOTE ADULT - SUBJECTIVE AND OBJECTIVE BOX
NEPHROLOGY-[facility] [phone]        Patient seen and examined on room air no shortness of breath. He reports he has been having dark stools for about 2 months.         MEDICATIONS  (STANDING):  aMIOdarone    Tablet 200 milliGRAM(s) Oral daily  apixaban 2.5 milliGRAM(s) Oral every 12 hours  aspirin enteric coated 81 milliGRAM(s) Oral daily  atorvastatin 80 milliGRAM(s) Oral at bedtime  dextrose 5%. 1000 milliLiter(s) (100 mL/Hr) IV Continuous <Continuous>  dextrose 5%. 1000 milliLiter(s) (50 mL/Hr) IV Continuous <Continuous>  dextrose 50% Injectable 25 Gram(s) IV Push once  dextrose 50% Injectable 12.5 Gram(s) IV Push once  dextrose 50% Injectable 25 Gram(s) IV Push once  ferrous    sulfate 325 milliGRAM(s) Oral daily  furosemide    Tablet 40 milliGRAM(s) Oral daily  glucagon  Injectable 1 milliGRAM(s) IntraMuscular once  influenza  Vaccine (HIGH DOSE) 0.7 milliLiter(s) IntraMuscular once  insulin glargine Injectable (LANTUS) 3 Unit(s) SubCutaneous at bedtime  insulin lispro (ADMELOG) corrective regimen sliding scale   SubCutaneous three times a day before meals  iron sucrose IVPB 200 milliGRAM(s) IV Intermittent every 24 hours  sodium bicarbonate 650 milliGRAM(s) Oral daily  tamsulosin 0.8 milliGRAM(s) Oral at bedtime      VITAL:  T(C): , Max: 36.8 (03-18-24 @ 20:51)  T(F): , Max: 98.2 (03-18-24 @ 20:51)  HR: 58 (03-19-24 @ 12:00)  BP: 110/48 (03-19-24 @ 12:00)  BP(mean): --  RR: 18 (03-19-24 @ 12:00)  SpO2: 100% (03-19-24 @ 12:00)  Wt(kg): --    I and O's:        PHYSICAL EXAM:    Constitutional: NAD  Neck:  No JVD  Respiratory: CTAB/L  Cardiovascular: S1 and S2  Gastrointestinal: BS+, soft, NT/ND  Extremities: No peripheral edema  Neurological: A/O x 3, no focal deficits  Psychiatric: Normal mood, normal affect  : No Temple  Skin: No rashes  Access: Not applicable    LABS:                        7.8    3.98  )-----------( 118      ( 19 Mar 2024 05:30 )             23.9     03-19    146<H>  |  116<H>  |  74<H>  ----------------------------<  156<H>  3.9   |  18<L>  |  3.68<H>    Ca    8.3<L>      19 Mar 2024 05:30  Phos  4.3     03-19  Mg     2.30     03-19    TPro  6.1  /  Alb  2.9<L>  /  TBili  0.3  /  DBili  <0.2  /  AST  58<H>  /  ALT  57<H>  /  AlkPhos  118  03-19          Urine Studies:  Urinalysis Basic - ( 19 Mar 2024 05:30 )    Color: x / Appearance: x / SG: x / pH: x  Gluc: 156 mg/dL / Ketone: x  / Bili: x / Urobili: x   Blood: x / Protein: x / Nitrite: x   Leuk Esterase: x / RBC: x / WBC x   Sq Epi: x / Non Sq Epi: x / Bacteria: x            RADIOLOGY & ADDITIONAL STUDIES:    < from: US Abdomen Upper Quadrant Right (03.18.24 @ 16:29) >  FINDINGS:  Liver: Mildly increased echogenicity of the enlarged liver, consistent   with underlying parenchymal disease. No surface nodularity.  Bile ducts: Normal caliber. Common bile duct measures 4 mm.  Gallbladder: Cholelithiasis.  Contracted gallbladder.  Pancreas: Visualized portions are within normal limits.  Right kidney: 12.3 cm. Increased echogenicity. Upper pole cyst measures   3.2 cm. Mild hydronephrosis..  Ascites: None.  IVC: Visualized portions are within normal limits.    IMPRESSION:  *  Enlarged liver, demonstrating the presence of parenchymal disease.  *  Cholelithiasis, without evidence of acute cholecystitis.  *  Right renal parenchymal disease.  *  Mild right hydronephrosis.        --- End of Report ---      < end of copied text >        Assessment and Plan:   · Assessment	    88y Male with CAD, HFrEF, Afib, HTN, DM, solitary R kidney and CKD p/w acute on chronic HFrEF and acute on chronic kidney injury.  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  I think he is euvolemic at present   Due to his age we (pt and myself)have decided on no renal bx   Metabolic acidosis-  likely due to CKD/LILIAN, on sodium bicarbonate tabs x 3 days ( day 3/3)  Anemia in CKD- mild  iron deficiency, give venofer 200 mg IV daily x 3 days (day 2/3)  CV- acute on chronic HFrEF: appears mild s/p IV lasix yesterday   GI- hgb declining, check stool OB    [facility]   [phone]              NEPHROLOGY-[facility] [phone]        Patient seen and examined on room air no shortness of breath. He reports he has been having dark stools for about 2 months.         MEDICATIONS  (STANDING):  aMIOdarone    Tablet 200 milliGRAM(s) Oral daily  apixaban 2.5 milliGRAM(s) Oral every 12 hours  aspirin enteric coated 81 milliGRAM(s) Oral daily  atorvastatin 80 milliGRAM(s) Oral at bedtime  dextrose 5%. 1000 milliLiter(s) (100 mL/Hr) IV Continuous <Continuous>  dextrose 5%. 1000 milliLiter(s) (50 mL/Hr) IV Continuous <Continuous>  dextrose 50% Injectable 25 Gram(s) IV Push once  dextrose 50% Injectable 12.5 Gram(s) IV Push once  dextrose 50% Injectable 25 Gram(s) IV Push once  ferrous    sulfate 325 milliGRAM(s) Oral daily  furosemide    Tablet 40 milliGRAM(s) Oral daily  glucagon  Injectable 1 milliGRAM(s) IntraMuscular once  influenza  Vaccine (HIGH DOSE) 0.7 milliLiter(s) IntraMuscular once  insulin glargine Injectable (LANTUS) 3 Unit(s) SubCutaneous at bedtime  insulin lispro (ADMELOG) corrective regimen sliding scale   SubCutaneous three times a day before meals  iron sucrose IVPB 200 milliGRAM(s) IV Intermittent every 24 hours  sodium bicarbonate 650 milliGRAM(s) Oral daily  tamsulosin 0.8 milliGRAM(s) Oral at bedtime      VITAL:  T(C): , Max: 36.8 (03-18-24 @ 20:51)  T(F): , Max: 98.2 (03-18-24 @ 20:51)  HR: 58 (03-19-24 @ 12:00)  BP: 110/48 (03-19-24 @ 12:00)  BP(mean): --  RR: 18 (03-19-24 @ 12:00)  SpO2: 100% (03-19-24 @ 12:00)  Wt(kg): --    I and O's:        PHYSICAL EXAM:    Constitutional: NAD  Neck:  No JVD  Respiratory: CTAB/L  Cardiovascular: S1 and S2  Gastrointestinal: BS+, soft, NT/ND  Extremities: No peripheral edema  Neurological: A/O x 3, no focal deficits  Psychiatric: Normal mood, normal affect  : No Temple  Skin: No rashes  Access: Not applicable    LABS:                        7.8    3.98  )-----------( 118      ( 19 Mar 2024 05:30 )             23.9     03-19    146<H>  |  116<H>  |  74<H>  ----------------------------<  156<H>  3.9   |  18<L>  |  3.68<H>    Ca    8.3<L>      19 Mar 2024 05:30  Phos  4.3     03-19  Mg     2.30     03-19    TPro  6.1  /  Alb  2.9<L>  /  TBili  0.3  /  DBili  <0.2  /  AST  58<H>  /  ALT  57<H>  /  AlkPhos  118  03-19          Urine Studies:  Urinalysis Basic - ( 19 Mar 2024 05:30 )    Color: x / Appearance: x / SG: x / pH: x  Gluc: 156 mg/dL / Ketone: x  / Bili: x / Urobili: x   Blood: x / Protein: x / Nitrite: x   Leuk Esterase: x / RBC: x / WBC x   Sq Epi: x / Non Sq Epi: x / Bacteria: x            RADIOLOGY & ADDITIONAL STUDIES:    < from: US Abdomen Upper Quadrant Right (03.18.24 @ 16:29) >  FINDINGS:  Liver: Mildly increased echogenicity of the enlarged liver, consistent   with underlying parenchymal disease. No surface nodularity.  Bile ducts: Normal caliber. Common bile duct measures 4 mm.  Gallbladder: Cholelithiasis.  Contracted gallbladder.  Pancreas: Visualized portions are within normal limits.  Right kidney: 12.3 cm. Increased echogenicity. Upper pole cyst measures   3.2 cm. Mild hydronephrosis..  Ascites: None.  IVC: Visualized portions are within normal limits.    IMPRESSION:  *  Enlarged liver, demonstrating the presence of parenchymal disease.  *  Cholelithiasis, without evidence of acute cholecystitis.  *  Right renal parenchymal disease.  *  Mild right hydronephrosis.        --- End of Report ---      < end of copied text >

## 2024-03-19 NOTE — PROGRESS NOTE ADULT - ASSESSMENT
88yM with PMHx CKD, HFrEF, CAD s/p stents presenting with 2 weeks progressive shortness of breath on exertion. Pt denies worsening pedal edema, orthopnea, chest pains or nausea associated with SOB. Denies infectious symptoms fever/chills, cough, URI symptoms recently. Takes furosemide 40mg every other day this dose has not been changed recently. No BRBPR, melena, epistaxis, hematuria, any other bleeding.  he says h is sob was gradually building up till he could not take it and came to hospital : he deneis any underlying lung disease but is an ex smoker:   no fever,   no cough , no phlegm ;    SOB/CHF/CAD/S/P PCI  ex smoker:   CKD    SOB/CHF/CAD/S/P PCI  -his history is highly suggestive of chf exacerbation;   -he has formed small concepcion effusions:  cxr prior to his film was clear:   -he hasn o clinical features of PNEUMONIA:  -on vbg shows mild metabolic acidosis  -added on Lasix : to co nt:   -no need fro antibiotics or bipap at this time; -consider ct chest : last ct from 2022 reviewed:  was suggestive of martínez failure  -his last echo with mod lv dysfunction  need a repeat one  -Check D Dimer in AM  : clinical  probability of PE is low   3/18:  -clinically he has improved  on room air:  d dimer is slightly elevated:  no further workup : diuretics per primary t eam   3/19: seems OK; no sob: on room air:  no sob: leg edema has resolved  ex smoker:   -she has 35 pk years history of smoking:   -has no diagnosis of copd:  -his last ct chest did not show emphysema:   -need to mantain o2 sao2 above 88%  -he would need outpt full PFT   CKD  -per renal :    dvt prophylaxis:    dw team

## 2024-03-19 NOTE — PROGRESS NOTE ADULT - SUBJECTIVE AND OBJECTIVE BOX
CARDIOLOGY FOLLOW UP - Dr. Vega  DATE OF SERVICE: 3/19/24    CC no cp or sob       REVIEW OF SYSTEMS:  CONSTITUTIONAL: No fever, weight loss, or fatigue  RESPIRATORY: No cough, wheezing, chills or hemoptysis; No Shortness of Breath  CARDIOVASCULAR: No chest pain, palpitations, passing out, dizziness, or leg swelling  GASTROINTESTINAL: No abdominal or epigastric pain. No nausea, vomiting, or hematemesis; No diarrhea or constipation. No melena or hematochezia.  VASCULAR: No edema     PHYSICAL EXAM:  T(C): 36.3 (03-19-24 @ 06:02), Max: 36.8 (03-18-24 @ 20:51)  HR: 60 (03-19-24 @ 06:02) (56 - 60)  BP: 114/55 (03-19-24 @ 06:02) (114/55 - 145/54)  RR: 18 (03-19-24 @ 06:02) (18 - 18)  SpO2: 100% (03-19-24 @ 06:02) (100% - 100%)  Wt(kg): --  I&O's Summary      Appearance: Normal	  Cardiovascular: Normal S1 S2,RRR, No JVD, No murmurs  Respiratory: Lungs clear to auscultation	  Gastrointestinal:  Soft, Non-tender, + BS	  Extremities: Normal range of motion, No clubbing, cyanosis or edema      Home Medications:  apixaban 2.5 mg oral tablet: 1 tab(s) orally every 12 hours (17 Mar 2024 10:48)  Crestor 40 mg oral tablet: 1 tab(s) orally once a day (at bedtime) (17 Mar 2024 10:49)  repaglinide 0.5 mg oral tablet: 1 tab(s) orally 3 times a day (before meals) (17 Mar 2024 10:49)  tamsulosin 0.4 mg oral capsule: 1 cap(s) orally once a day (17 Mar 2024 10:49)  Tresiba FlexTouch 100 units/mL subcutaneous solution: 10 subcutaneous once a day (at bedtime) (17 Mar 2024 10:49)      MEDICATIONS  (STANDING):  aMIOdarone    Tablet 200 milliGRAM(s) Oral daily  apixaban 2.5 milliGRAM(s) Oral every 12 hours  aspirin enteric coated 81 milliGRAM(s) Oral daily  atorvastatin 80 milliGRAM(s) Oral at bedtime  dextrose 5%. 1000 milliLiter(s) (100 mL/Hr) IV Continuous <Continuous>  dextrose 5%. 1000 milliLiter(s) (50 mL/Hr) IV Continuous <Continuous>  dextrose 50% Injectable 25 Gram(s) IV Push once  dextrose 50% Injectable 12.5 Gram(s) IV Push once  dextrose 50% Injectable 25 Gram(s) IV Push once  ferrous    sulfate 325 milliGRAM(s) Oral daily  furosemide    Tablet 40 milliGRAM(s) Oral daily  glucagon  Injectable 1 milliGRAM(s) IntraMuscular once  influenza  Vaccine (HIGH DOSE) 0.7 milliLiter(s) IntraMuscular once  insulin glargine Injectable (LANTUS) 3 Unit(s) SubCutaneous at bedtime  insulin lispro (ADMELOG) corrective regimen sliding scale   SubCutaneous three times a day before meals  iron sucrose IVPB 200 milliGRAM(s) IV Intermittent every 24 hours  sodium bicarbonate 650 milliGRAM(s) Oral daily  tamsulosin 0.8 milliGRAM(s) Oral at bedtime      TELEMETRY: nsr pvc 	    ECG:  	  RADIOLOGY:   DIAGNOSTIC TESTING:  [ ] Echocardiogram:  [ ]  Catheterization:  [ ] Stress Test:    OTHER: 	    LABS:	 	    Troponin T, High Sensitivity Result: 69 ng/L (03-16 @ 20:00)  Troponin T, High Sensitivity Result: 81 ng/L (03-16 @ 15:24)                          7.8    3.98  )-----------( 118      ( 19 Mar 2024 05:30 )             23.9     03-19    146<H>  |  116<H>  |  74<H>  ----------------------------<  156<H>  3.9   |  18<L>  |  3.68<H>    Ca    8.3<L>      19 Mar 2024 05:30  Phos  4.3     03-19  Mg     2.30     03-19    TPro  6.1  /  Alb  2.9<L>  /  TBili  0.3  /  DBili  <0.2  /  AST  58<H>  /  ALT  57<H>  /  AlkPhos  118  03-19

## 2024-03-19 NOTE — PROGRESS NOTE ADULT - SUBJECTIVE AND OBJECTIVE BOX
Patient is a 88y old  Male who presents with a chief complaint of shortness of breath (19 Mar 2024 12:42)      DATE OF SERVICE: 03-19-24 @ 13:39    SUBJECTIVE / OVERNIGHT EVENTS: overnight events noted    ROS:  Resp: No cough no sputum production  CVS: No chest pain no palpitations no orthopnea  GI: no N/V/D  "I want to go home"         MEDICATIONS  (STANDING):  aMIOdarone    Tablet 200 milliGRAM(s) Oral daily  apixaban 2.5 milliGRAM(s) Oral every 12 hours  aspirin enteric coated 81 milliGRAM(s) Oral daily  atorvastatin 80 milliGRAM(s) Oral at bedtime  dextrose 5%. 1000 milliLiter(s) (100 mL/Hr) IV Continuous <Continuous>  dextrose 5%. 1000 milliLiter(s) (50 mL/Hr) IV Continuous <Continuous>  dextrose 50% Injectable 25 Gram(s) IV Push once  dextrose 50% Injectable 12.5 Gram(s) IV Push once  dextrose 50% Injectable 25 Gram(s) IV Push once  ferrous    sulfate 325 milliGRAM(s) Oral daily  furosemide    Tablet 40 milliGRAM(s) Oral daily  glucagon  Injectable 1 milliGRAM(s) IntraMuscular once  influenza  Vaccine (HIGH DOSE) 0.7 milliLiter(s) IntraMuscular once  insulin glargine Injectable (LANTUS) 3 Unit(s) SubCutaneous at bedtime  insulin lispro (ADMELOG) corrective regimen sliding scale   SubCutaneous three times a day before meals  iron sucrose IVPB 200 milliGRAM(s) IV Intermittent every 24 hours  sodium bicarbonate 650 milliGRAM(s) Oral daily  tamsulosin 0.8 milliGRAM(s) Oral at bedtime    MEDICATIONS  (PRN):  dextrose Oral Gel 15 Gram(s) Oral once PRN Blood Glucose LESS THAN 70 milliGRAM(s)/deciliter        CAPILLARY BLOOD GLUCOSE      POCT Blood Glucose.: 273 mg/dL (19 Mar 2024 11:13)  POCT Blood Glucose.: 157 mg/dL (19 Mar 2024 08:57)  POCT Blood Glucose.: 160 mg/dL (19 Mar 2024 07:25)  POCT Blood Glucose.: 189 mg/dL (18 Mar 2024 21:00)  POCT Blood Glucose.: 163 mg/dL (18 Mar 2024 17:23)    I&O's Summary      Vital Signs Last 24 Hrs  T(C): 36.4 (19 Mar 2024 12:00), Max: 36.8 (18 Mar 2024 20:51)  T(F): 97.6 (19 Mar 2024 12:00), Max: 98.2 (18 Mar 2024 20:51)  HR: 58 (19 Mar 2024 12:00) (56 - 60)  BP: 110/48 (19 Mar 2024 12:00) (110/48 - 145/54)  BP(mean): --  RR: 18 (19 Mar 2024 12:00) (18 - 18)  SpO2: 100% (19 Mar 2024 12:00) (100% - 100%)    PHYSICAL EXAM:   Lungs: decreased breath sounds   CVS: S1 S2 systolic murmur +   Abdomen: no tenderness  Neuro: AO x 3 nonfocal   Skin: warm, dry  Ext: trace edema    LABS:                        7.8    3.98  )-----------( 118      ( 19 Mar 2024 05:30 )             23.9     03-19    146<H>  |  116<H>  |  74<H>  ----------------------------<  156<H>  3.9   |  18<L>  |  3.68<H>    Ca    8.3<L>      19 Mar 2024 05:30  Phos  4.3     03-19  Mg     2.30     03-19    TPro  6.1  /  Alb  2.9<L>  /  TBili  0.3  /  DBili  <0.2  /  AST  58<H>  /  ALT  57<H>  /  AlkPhos  118  03-19          Urinalysis Basic - ( 19 Mar 2024 05:30 )    Color: x / Appearance: x / SG: x / pH: x  Gluc: 156 mg/dL / Ketone: x  / Bili: x / Urobili: x   Blood: x / Protein: x / Nitrite: x   Leuk Esterase: x / RBC: x / WBC x   Sq Epi: x / Non Sq Epi: x / Bacteria: x          All consultant(s) notes reviewed and care discussed with other providers        Contact Number, Dr Arroyo [phone]

## 2024-03-19 NOTE — PROGRESS NOTE ADULT - ASSESSMENT
A/P    88 yM with PMHx CKD, HFrEF, CAD s/p PCI, AF on a/c, bladder cancer in remission, diabetes, TIA, left nephrectomy, presenting with 2 weeks progressive shortness of breath on exertion. Pt denies worsening pedal edema, orthopnea, chest pains or nausea associated with SOB.     #SOB, acute on chronic HFrEF  -likely secondary to mild volume overload-- resolved   -on oral lasix 40mg PO daily--creat uptrending-- is continue to rise will HOLD   -previous echo w mild LV dysfunction  -repeat TTE    #Atrial Fibrillation  -stable  -AC on eliquis -- h/h downtrending- continue to monitor   -cont amio    #CAD, s/p PCI  -stable  -ASA    #CKD/LILIAN  -?cardio renal   -no obstruction  -trend creat

## 2024-03-19 NOTE — PROGRESS NOTE ADULT - ASSESSMENT
88y Male with CAD, HFrEF, Afib, HTN, DM, solitary R kidney and CKD p/w acute on chronic HFrEF and acute on chronic kidney injury.  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  I think he is euvolemic at present n not dehydrated   Due to his age we (pt and myself)have decided on no renal bx   Metabolic acidosis-  likely due to CKD/LILIAN, on sodium bicarbonate tabs x 3 days ( day 3/3)  Anemia in CKD- mild  iron deficiency, give venofer 200 mg IV daily x 3 days (day 2/3)  No renal objection to blood xfusion   CV- acute on chronic HFrEF and this seems resolved   GI- hgb declining, check stool OB    DW Dr Arroyo     [facility]ayed [facility]   [phone]

## 2024-03-20 ENCOUNTER — TRANSCRIPTION ENCOUNTER (OUTPATIENT)
Age: 89
End: 2024-03-20

## 2024-03-20 LAB
ANION GAP SERPL CALC-SCNC: 12 MMOL/L — SIGNIFICANT CHANGE UP (ref 7–14)
BUN SERPL-MCNC: 72 MG/DL — HIGH (ref 7–23)
CALCIUM SERPL-MCNC: 8.1 MG/DL — LOW (ref 8.4–10.5)
CHLORIDE SERPL-SCNC: 110 MMOL/L — HIGH (ref 98–107)
CO2 SERPL-SCNC: 19 MMOL/L — LOW (ref 22–31)
CREAT SERPL-MCNC: 3.8 MG/DL — HIGH (ref 0.5–1.3)
EGFR: 15 ML/MIN/1.73M2 — LOW
GLUCOSE BLDC GLUCOMTR-MCNC: 125 MG/DL — HIGH (ref 70–99)
GLUCOSE BLDC GLUCOMTR-MCNC: 153 MG/DL — HIGH (ref 70–99)
GLUCOSE BLDC GLUCOMTR-MCNC: 212 MG/DL — HIGH (ref 70–99)
GLUCOSE BLDC GLUCOMTR-MCNC: 237 MG/DL — HIGH (ref 70–99)
GLUCOSE BLDC GLUCOMTR-MCNC: 276 MG/DL — HIGH (ref 70–99)
GLUCOSE SERPL-MCNC: 115 MG/DL — HIGH (ref 70–99)
HCT VFR BLD CALC: 26.6 % — LOW (ref 39–50)
HGB BLD-MCNC: 8.7 G/DL — LOW (ref 13–17)
MAGNESIUM SERPL-MCNC: 2.2 MG/DL — SIGNIFICANT CHANGE UP (ref 1.6–2.6)
MCHC RBC-ENTMCNC: 31.3 PG — SIGNIFICANT CHANGE UP (ref 27–34)
MCHC RBC-ENTMCNC: 32.7 GM/DL — SIGNIFICANT CHANGE UP (ref 32–36)
MCV RBC AUTO: 95.7 FL — SIGNIFICANT CHANGE UP (ref 80–100)
NRBC # BLD: 0 /100 WBCS — SIGNIFICANT CHANGE UP (ref 0–0)
NRBC # FLD: 0 K/UL — SIGNIFICANT CHANGE UP (ref 0–0)
OB PNL STL: POSITIVE
PHOSPHATE SERPL-MCNC: 4.4 MG/DL — SIGNIFICANT CHANGE UP (ref 2.5–4.5)
PLATELET # BLD AUTO: 108 K/UL — LOW (ref 150–400)
POTASSIUM SERPL-MCNC: 4.2 MMOL/L — SIGNIFICANT CHANGE UP (ref 3.5–5.3)
POTASSIUM SERPL-SCNC: 4.2 MMOL/L — SIGNIFICANT CHANGE UP (ref 3.5–5.3)
RBC # BLD: 2.78 M/UL — LOW (ref 4.2–5.8)
RBC # FLD: 21.5 % — HIGH (ref 10.3–14.5)
SODIUM SERPL-SCNC: 141 MMOL/L — SIGNIFICANT CHANGE UP (ref 135–145)
WBC # BLD: 3.88 K/UL — SIGNIFICANT CHANGE UP (ref 3.8–10.5)
WBC # FLD AUTO: 3.88 K/UL — SIGNIFICANT CHANGE UP (ref 3.8–10.5)

## 2024-03-20 PROCEDURE — 99223 1ST HOSP IP/OBS HIGH 75: CPT | Mod: GC

## 2024-03-20 RX ORDER — PANTOPRAZOLE SODIUM 20 MG/1
8 TABLET, DELAYED RELEASE ORAL
Qty: 80 | Refills: 0 | Status: DISCONTINUED | OUTPATIENT
Start: 2024-03-20 | End: 2024-03-25

## 2024-03-20 RX ORDER — ERYTHROPOIETIN 10000 [IU]/ML
10000 INJECTION, SOLUTION INTRAVENOUS; SUBCUTANEOUS ONCE
Refills: 0 | Status: COMPLETED | OUTPATIENT
Start: 2024-03-20 | End: 2024-03-20

## 2024-03-20 RX ADMIN — Medication 2: at 11:53

## 2024-03-20 RX ADMIN — IRON SUCROSE 110 MILLIGRAM(S): 20 INJECTION, SOLUTION INTRAVENOUS at 17:47

## 2024-03-20 RX ADMIN — Medication 81 MILLIGRAM(S): at 13:10

## 2024-03-20 RX ADMIN — ERYTHROPOIETIN 10000 UNIT(S): 10000 INJECTION, SOLUTION INTRAVENOUS; SUBCUTANEOUS at 21:23

## 2024-03-20 RX ADMIN — Medication 325 MILLIGRAM(S): at 13:09

## 2024-03-20 RX ADMIN — INSULIN GLARGINE 3 UNIT(S): 100 INJECTION, SOLUTION SUBCUTANEOUS at 22:15

## 2024-03-20 RX ADMIN — PANTOPRAZOLE SODIUM 10 MG/HR: 20 TABLET, DELAYED RELEASE ORAL at 18:40

## 2024-03-20 RX ADMIN — Medication 2: at 17:47

## 2024-03-20 RX ADMIN — AMIODARONE HYDROCHLORIDE 200 MILLIGRAM(S): 400 TABLET ORAL at 05:29

## 2024-03-20 RX ADMIN — ATORVASTATIN CALCIUM 80 MILLIGRAM(S): 80 TABLET, FILM COATED ORAL at 21:23

## 2024-03-20 RX ADMIN — TAMSULOSIN HYDROCHLORIDE 0.8 MILLIGRAM(S): 0.4 CAPSULE ORAL at 21:23

## 2024-03-20 RX ADMIN — Medication 650 MILLIGRAM(S): at 13:10

## 2024-03-20 RX ADMIN — APIXABAN 2.5 MILLIGRAM(S): 2.5 TABLET, FILM COATED ORAL at 13:10

## 2024-03-20 RX ADMIN — Medication 20 MILLIGRAM(S): at 05:29

## 2024-03-20 NOTE — PROGRESS NOTE ADULT - ASSESSMENT
88yM with PMHx CKD, HFrEF, CAD s/p stents presenting with 2 weeks progressive shortness of breath on exertion clinical presentation consistent with acute on chronic diastolic heart failure and ILLIAN on CKD

## 2024-03-20 NOTE — DISCHARGE NOTE PROVIDER - NSDCMRMEDTOKEN_GEN_ALL_CORE_FT
amiodarone 200 mg oral tablet: 1 tab(s) orally once a day   apixaban 2.5 mg oral tablet: 1 tab(s) orally every 12 hours  Aspirin Enteric Coated 81 mg oral delayed release tablet: 1 tab(s) orally once a day   Crestor 40 mg oral tablet: 1 tab(s) orally once a day (at bedtime)  ferrous sulfate 325 mg (65 mg elemental iron) oral tablet: 1 tab(s) orally once a day  furosemide 40 mg oral tablet: 1 tab(s) orally once a day  repaglinide 0.5 mg oral tablet: 1 tab(s) orally 3 times a day (before meals)  tamsulosin 0.4 mg oral capsule: 1 cap(s) orally once a day  Tresiba FlexTouch 100 units/mL subcutaneous solution: 10 subcutaneous once a day (at bedtime)   amiodarone 200 mg oral tablet: 1 tab(s) orally once a day   apixaban 2.5 mg oral tablet: 1 tab(s) orally every 12 hours  Crestor 40 mg oral tablet: 1 tab(s) orally once a day (at bedtime)  ferrous sulfate 325 mg (65 mg elemental iron) oral tablet: 1 tab(s) orally once a day  furosemide 20 mg oral tablet: 1 tab(s) orally once a day  pantoprazole 40 mg oral delayed release tablet: 1 tab(s) orally 2 times a day  repaglinide 0.5 mg oral tablet: 1 tab(s) orally 3 times a day (before meals)  tamsulosin 0.4 mg oral capsule: 1 cap(s) orally once a day  Tresiba FlexTouch 100 units/mL subcutaneous solution: 10 subcutaneous once a day (at bedtime)

## 2024-03-20 NOTE — PROGRESS NOTE ADULT - ASSESSMENT
88y Male with CAD, HFrEF, Afib, HTN, DM, solitary R kidney and CKD p/w acute on chronic HFrEF and acute on chronic kidney injury.  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  I think he is euvolemic at present n not dehydrated   Due to his age we (pt and myself)have decided on no renal bx   Metabolic acidosis-  likely due to CKD/LILIAN, on sodium bicarbonate tabs x 3 days ( day 3/3)  Anemia in CKD- mild  iron deficiency, give venofer 200 mg IV daily x 3 days (day 3/3) and epogen today [id number] iu x 1      CV- acute on chronic HFrEF and this seems resolved     GI- hgb declining, check stool OB and per pt he has had black stools for 2 months(just gave this information recently)  GI eval for EGD?    DW Dr Arroyo   DW WIfe in detail       [facility]   [employer]   [phone]

## 2024-03-20 NOTE — PROGRESS NOTE ADULT - SUBJECTIVE AND OBJECTIVE BOX
NEPHROLOGY-[facility] [phone]        Patient seen and examined in bed.  He was the same         MEDICATIONS  (STANDING):  aMIOdarone    Tablet 200 milliGRAM(s) Oral daily  apixaban 2.5 milliGRAM(s) Oral every 12 hours  aspirin enteric coated 81 milliGRAM(s) Oral daily  atorvastatin 80 milliGRAM(s) Oral at bedtime  dextrose 5%. 1000 milliLiter(s) (100 mL/Hr) IV Continuous <Continuous>  dextrose 5%. 1000 milliLiter(s) (50 mL/Hr) IV Continuous <Continuous>  dextrose 50% Injectable 25 Gram(s) IV Push once  dextrose 50% Injectable 12.5 Gram(s) IV Push once  dextrose 50% Injectable 25 Gram(s) IV Push once  ferrous    sulfate 325 milliGRAM(s) Oral daily  furosemide    Tablet 20 milliGRAM(s) Oral daily  glucagon  Injectable 1 milliGRAM(s) IntraMuscular once  influenza  Vaccine (HIGH DOSE) 0.7 milliLiter(s) IntraMuscular once  insulin glargine Injectable (LANTUS) 3 Unit(s) SubCutaneous at bedtime  insulin lispro (ADMELOG) corrective regimen sliding scale   SubCutaneous three times a day before meals  iron sucrose IVPB 200 milliGRAM(s) IV Intermittent every 24 hours  sodium bicarbonate 650 milliGRAM(s) Oral daily  tamsulosin 0.8 milliGRAM(s) Oral at bedtime      VITAL:  T(C): , Max: 36.8 (03-20-24 @ 05:00)  T(F): , Max: 98.2 (03-20-24 @ 05:00)  HR: 60 (03-20-24 @ 05:00)  BP: 114/51 (03-20-24 @ 05:00)  BP(mean): --  RR: 18 (03-20-24 @ 05:00)  SpO2: 100% (03-20-24 @ 05:00)  Wt(kg): --    I and O's:    03-19 @ 07:01  -  03-20 @ 07:00  --------------------------------------------------------  IN: 1390 mL / OUT: 1100 mL / NET: 290 mL    03-20 @ 07:01  -  03-20 @ 12:01  --------------------------------------------------------  IN: 240 mL / OUT: 0 mL / NET: 240 mL          PHYSICAL EXAM:    Constitutional: NADlooks stated age   Neck:  No JVD  Respiratory: CTAB/L  Cardiovascular: S1 and S2  Gastrointestinal: BS+, soft, NT/ND  Extremities: No peripheral edema  Neurological: A/O x 3, no focal deficits  Psychiatric: Normal mood, normal affect  : No Temple  Skin: No rashes  Access: Not applicable    LABS:                        8.7    3.88  )-----------( 108      ( 20 Mar 2024 03:00 )             26.6     03-20    141  |  110<H>  |  72<H>  ----------------------------<  115<H>  4.2   |  19<L>  |  3.80<H>    Ca    8.1<L>      20 Mar 2024 03:00  Phos  4.4     03-20  Mg     2.20     03-20    TPro  6.1  /  Alb  2.9<L>  /  TBili  0.3  /  DBili  <0.2  /  AST  58<H>  /  ALT  57<H>  /  AlkPhos  118  03-19          Urine Studies:  Urinalysis Basic - ( 20 Mar 2024 03:00 )    Color: x / Appearance: x / SG: x / pH: x  Gluc: 115 mg/dL / Ketone: x  / Bili: x / Urobili: x   Blood: x / Protein: x / Nitrite: x   Leuk Esterase: x / RBC: x / WBC x   Sq Epi: x / Non Sq Epi: x / Bacteria: x            RADIOLOGY & ADDITIONAL STUDIES:

## 2024-03-20 NOTE — DISCHARGE NOTE PROVIDER - NSDCFUADDAPPT_GEN_ALL_CORE_FT
Follow up with PCP within 1-2 weeks of discharge. If you are in need of a general medicine physician and post-discharge medical follow-up for further care/recommendations you may contact the [facility] for an appointment at [phone].     Follow up with cardiology within 1-2 weeks of discharge. If you are in need of a general cardiologist after discharge, you may contact the [facility] for an appointment at [phone].     Follow up with the nephrologist within 1-2 weeks of discharge. If you do not have a kidney doctor, you may follow up at [facility] at [address] [place] [address], 2nd floor, [place] [address]. Call [phone] for an appointment.     Follow up with pulmonology within 1-2 weeks of discharge. [facility]  [address] [place] [address]  [phone]

## 2024-03-20 NOTE — PROGRESS NOTE ADULT - PROBLEM SELECTOR PLAN 5
HbA1C 5.3  may be spuriously low  continue finger sticks with short acting insulin sliding scale  no oral meds  diabetic diet  monitor for hypoglycemia

## 2024-03-20 NOTE — DISCHARGE NOTE PROVIDER - NSDCFUSCHEDAPPT_GEN_ALL_CORE_FT
Poornima Lim  [facility] Physician [facility]  UROLOGY [address] [facility]  Scheduled Appointment: 06/21/2024

## 2024-03-20 NOTE — DISCHARGE NOTE PROVIDER - PROVIDER TOKENS
FREE:[LAST:[FOLLOW UP WITH CARDIOLOGY, PCP, NEPHROLOGY, PULMONOLOGY],PHONE:[(   )    -],FAX:[(   )    -]]

## 2024-03-20 NOTE — PROGRESS NOTE ADULT - SUBJECTIVE AND OBJECTIVE BOX
Date of Service: 03-20-24 @ 12:12    Patient is a 88y old  Male who presents with a chief complaint of shortness of breath (20 Mar 2024 12:02)      Any change in ROS: Doing pretty weak : no sob:  no cough ; no phlegm      MEDICATIONS  (STANDING):  aMIOdarone    Tablet 200 milliGRAM(s) Oral daily  apixaban 2.5 milliGRAM(s) Oral every 12 hours  aspirin enteric coated 81 milliGRAM(s) Oral daily  atorvastatin 80 milliGRAM(s) Oral at bedtime  dextrose 5%. 1000 milliLiter(s) (100 mL/Hr) IV Continuous <Continuous>  dextrose 5%. 1000 milliLiter(s) (50 mL/Hr) IV Continuous <Continuous>  dextrose 50% Injectable 25 Gram(s) IV Push once  dextrose 50% Injectable 12.5 Gram(s) IV Push once  dextrose 50% Injectable 25 Gram(s) IV Push once  epoetin catrachito (EPOGEN) Injectable 22796 Unit(s) SubCutaneous once  ferrous    sulfate 325 milliGRAM(s) Oral daily  furosemide    Tablet 20 milliGRAM(s) Oral daily  glucagon  Injectable 1 milliGRAM(s) IntraMuscular once  influenza  Vaccine (HIGH DOSE) 0.7 milliLiter(s) IntraMuscular once  insulin glargine Injectable (LANTUS) 3 Unit(s) SubCutaneous at bedtime  insulin lispro (ADMELOG) corrective regimen sliding scale   SubCutaneous three times a day before meals  iron sucrose IVPB 200 milliGRAM(s) IV Intermittent every 24 hours  sodium bicarbonate 650 milliGRAM(s) Oral daily  tamsulosin 0.8 milliGRAM(s) Oral at bedtime    MEDICATIONS  (PRN):  dextrose Oral Gel 15 Gram(s) Oral once PRN Blood Glucose LESS THAN 70 milliGRAM(s)/deciliter    Vital Signs Last 24 Hrs  T(C): 36.8 (20 Mar 2024 05:00), Max: 36.8 (20 Mar 2024 05:00)  T(F): 98.2 (20 Mar 2024 05:00), Max: 98.2 (20 Mar 2024 05:00)  HR: 60 (20 Mar 2024 05:00) (58 - 63)  BP: 114/51 (20 Mar 2024 05:00) (114/51 - 147/66)  BP(mean): --  RR: 18 (20 Mar 2024 05:00) (17 - 18)  SpO2: 100% (20 Mar 2024 05:00) (99% - 100%)    Parameters below as of 20 Mar 2024 05:00  Patient On (Oxygen Delivery Method): room air        I&O's Summary    19 Mar 2024 07:01  -  20 Mar 2024 07:00  --------------------------------------------------------  IN: 1390 mL / OUT: 1100 mL / NET: 290 mL    20 Mar 2024 07:01  -  20 Mar 2024 12:12  --------------------------------------------------------  IN: 240 mL / OUT: 0 mL / NET: 240 mL          Physical Exam:   GENERAL: NAD, well-groomed, well-developed  HEENT: RAQUEL/   Atraumatic, Normocephalic  ENMT: No tonsillar erythema, exudates, or enlargement; Moist mucous membranes, Good dentition, No lesions  NECK: Supple, No JVD, Normal thyroid  CHEST/LUNG: Clear to auscultaion  CVS: Regular rate and rhythm; No murmurs, rubs, or gallops  GI: : Soft, Nontender, Nondistended; Bowel sounds present  NERVOUS SYSTEM:  Alert & Oriented X3  EXTREMITIES: - edema  LYMPH: No lymphadenopathy noted  SKIN: No rashes or lesions  ENDOCRINOLOGY: No Thyromegaly  PSYCH: Appropriate    Labs:  19                            8.7    3.88  )-----------( 108      ( 20 Mar 2024 03:00 )             26.6                         8.3    3.73  )-----------( 123      ( 19 Mar 2024 18:00 )             25.0                         7.8    3.98  )-----------( 118      ( 19 Mar 2024 05:30 )             23.9                         8.3    3.81  )-----------( 117      ( 18 Mar 2024 06:31 )             25.3                         8.3    4.48  )-----------( 107      ( 17 Mar 2024 01:14 )             25.2                         7.1    3.86  )-----------( 114      ( 16 Mar 2024 15:24 )             21.8     03-20    141  |  110<H>  |  72<H>  ----------------------------<  115<H>  4.2   |  19<L>  |  3.80<H>  03-19    146<H>  |  116<H>  |  74<H>  ----------------------------<  156<H>  3.9   |  18<L>  |  3.68<H>  03-18    146<H>  |  116<H>  |  76<H>  ----------------------------<  131<H>  4.0   |  18<L>  |  3.52<H>  03-17    146<H>  |  117<H>  |  76<H>  ----------------------------<  135<H>  3.5   |  18<L>  |  3.64<H>  03-16    146<H>  |  115<H>  |  79<H>  ----------------------------<  197<H>  3.8   |  18<L>  |  3.74<H>    Ca    8.1<L>      20 Mar 2024 03:00  Ca    8.3<L>      19 Mar 2024 05:30  Phos  4.4     03-20  Phos  4.3     03-19  Mg     2.20     03-20  Mg     2.30     03-19    TPro  6.1  /  Alb  2.9<L>  /  TBili  0.3  /  DBili  <0.2  /  AST  58<H>  /  ALT  57<H>  /  AlkPhos  118  03-19  TPro  6.4  /  Alb  3.0<L>  /  TBili  0.4  /  DBili  x   /  AST  51<H>  /  ALT  52<H>  /  AlkPhos  125<H>  03-16    CAPILLARY BLOOD GLUCOSE      POCT Blood Glucose.: 237 mg/dL (20 Mar 2024 11:45)  POCT Blood Glucose.: 125 mg/dL (20 Mar 2024 07:09)  POCT Blood Glucose.: 161 mg/dL (19 Mar 2024 21:30)  POCT Blood Glucose.: 124 mg/dL (19 Mar 2024 16:58)      LIVER FUNCTIONS - ( 19 Mar 2024 05:30 )  Alb: 2.9 g/dL / Pro: 6.1 g/dL / ALK PHOS: 118 U/L / ALT: 57 U/L / AST: 58 U/L / GGT: x             Urinalysis Basic - ( 20 Mar 2024 03:00 )    Color: x / Appearance: x / SG: x / pH: x  Gluc: 115 mg/dL / Ketone: x  / Bili: x / Urobili: x   Blood: x / Protein: x / Nitrite: x   Leuk Esterase: x / RBC: x / WBC x   Sq Epi: x / Non Sq Epi: x / Bacteria: x      D-Dimer Assay, Quantitative: 250 ng/mL DDU (03-18 @ 06:31)        RECENT CULTURES:  03-16 @ 23:24 Clean Catch Clean Catch (Midstream)           < from: Xray Chest 2 Views PA/Lat (03.16.24 @ 16:39) >    ACC: [id number] EXAM:  XR CHEST PA LAT 2V   ORDERED BY: ANDERSON FARNSWORTH     PROCEDURE DATE:  03/16/2024          INTERPRETATION:  CLINICAL INFORMATION: Shortness of breath on exertion.    TECHNIQUE: PA and lateral views of the chest.    COMPARISON: Multiple prior chest x-rays with most recent dated 12/14/2023    FINDINGS:    Heart: Heart size is normal.  Pulmonary: No focal consolidation. Small bilateral pleural effusions with   bibasilar atelectasis. No pneumothorax.  Bones: No acute bony pathology.    IMPRESSION:  Small bilateral pleural effusions with bibasilar atelectasis.    --- End of Report ---          TOM OSORIO MD; Resident Radiologist  This document has been electronically signed.  TAMIKO MILLIGAN MD; Attending Radiologist  This document has been electronically signed. Mar 17 2024  8:54AM    < end of copied text >       <10,000 CFU/mL Normal Urogenital Jeannine          RESPIRATORY CULTURES:          Studies  Chest X-RAY  CT SCAN Chest   Venous Dopplers: LE:   CT Abdomen  Others

## 2024-03-20 NOTE — CONSULT NOTE ADULT - ASSESSMENT
88yM with PMHx CKD, HFrEF, CAD s/p stents, afib on Eliquis, bladder cancer s/p chemo, presenting with 2 weeks progressive shortness of breath on exertion clinical presentation consistent with acute on chronic diastolic heart failure and LILIAN on CKD. Found to have worsening anemia requiring frequent transfusions and melena x 2 months.    Assessment  #Melena  #Anemia  #Afib on Eliquis  #Bladder cancer s/p chemo, in remission  #LILIAN on CKD  #Acute HF exacerbation  - (+) melena with transfusion requirement; hemodynamically stable. Concerning for gastric/peptic ulcer diseases vs. AVM's   - On Eliquis and ASA  - Presented with ADHF s/p diuresis and optimization. Currently appeared to be euvolemic, card following  - LILINA on CKD on admission, improved  - Family and patient are agreeable to EGD    Recommendations  - Please hold Eliquis if no contraindication  - NPO after midnight for EGD 3/21  - Please document cardiac clearance prior to EGD  - Please draw CBC, BMP, INR/PT/PTT at 4AM  - Keep PPI drip  - Transfuse for hgb goal > 8 given cardiac history    Note incomplete until finalized by attending signature/attestation.    Carolyn Solis  GI/Hepatology Fellow    MONDAY-FRIDAY 8AM-5PM:  Pager# [phone] ([facility]) or [phone] ([facility])    NON-URGENT CONSULTS:  Please email [email or web address]  AT NIGHT AND ON WEEKENDS:  Contact on-call GI fellow via answering service ([phone]) from 5pm-8am and on weekends/holidays

## 2024-03-20 NOTE — PROGRESS NOTE ADULT - ASSESSMENT
A/P    88 yM with PMHx CKD, HFrEF, CAD s/p PCI, AF on a/c, bladder cancer in remission, diabetes, TIA, left nephrectomy, presenting with 2 weeks progressive shortness of breath on exertion. Pt denies worsening pedal edema, orthopnea, chest pains or nausea associated with SOB.     #SOB, acute on chronic HFrEF  -likely secondary to mild volume overload-- resolved   -on oral lasix 40mg PO daily--creat uptrending-- if it continues to rise will HOLD per renal  -previous echo w mild LV dysfunction  -repeat TTE w mild LV dysfunction    #Atrial Fibrillation  -stable  -AC on eliquis -- h/h downtrending- continue to monitor   -cont amio    #CAD, s/p PCI  -stable  -ASA    #CKD/LILIAN  -?cardio renal   -no obstruction  -trend creat     35 minutes spent on total encounter; more than 50% of the visit was spent counseling and/or coordinating care by the attending physician.   A/P    88 yM with PMHx CKD, HFrEF, CAD s/p PCI, AF on a/c, bladder cancer in remission, diabetes, TIA, left nephrectomy, presenting with 2 weeks progressive shortness of breath on exertion. Pt denies worsening pedal edema, orthopnea, chest pains or nausea associated with SOB.     #SOB, acute on chronic HFrEF  -likely secondary to mild volume overload-- resolved   -on oral lasix 40mg PO daily--creat uptrending-- if it continues to rise will HOLD per renal  -previous echo w mild LV dysfunction  -repeat TTE w mild LV dysfunction    #Atrial Fibrillation  -stable  -on eliquis   -cont amio    #CAD, s/p PCI  -stable  -ASA    #CKD/LILIAN  -?cardio renal   -no obstruction  -trend creat     #Anemia  -plan for EGD  -pt optimized, can proceed    35 minutes spent on total encounter; more than 50% of the visit was spent counseling and/or coordinating care by the attending physician.

## 2024-03-20 NOTE — PROGRESS NOTE ADULT - ASSESSMENT
88yM with PMHx CKD, HFrEF, CAD s/p stents presenting with 2 weeks progressive shortness of breath on exertion. Pt denies worsening pedal edema, orthopnea, chest pains or nausea associated with SOB. Denies infectious symptoms fever/chills, cough, URI symptoms recently. Takes furosemide 40mg every other day this dose has not been changed recently. No BRBPR, melena, epistaxis, hematuria, any other bleeding.  he says h is sob was gradually building up till he could not take it and came to hospital : he deneis any underlying lung disease but is an ex smoker:   no fever,   no cough , no phlegm ;    SOB/CHF/CAD/S/P PCI  ex smoker:   CKD    SOB/CHF/CAD/S/P PCI  -his history is highly suggestive of chf exacerbation;   -he has formed small concepcion effusions:  cxr prior to his film was clear:   -he hasn o clinical features of PNEUMONIA:  -on vbg shows mild metabolic acidosis  -added on Lasix : to co nt:   -no need fro antibiotics or bipap at this time; -consider ct chest : last ct from 2022 reviewed:  was suggestive of martínez failure  -his last echo with mod lv dysfunction  need a repeat one  -Check D Dimer in AM  : clinical  probability of PE is low   3/18:  -clinically he has improved  on room air:  d dimer is slightly elevated:  no further workup : diuretics per primary t eam   3/19: seems OK; no sob: on room air:  no sob: leg edema has resolved  3/20: sob is likely due to mild chf:  improved lasix:  currently on room air and has no leg edema  ex smoker:   -she has 35 pk years history of smoking:   -has no diagnosis of copd:  -his last ct chest did not show emphysema:   -need to mantain o2 sao2 above 88%  -he would need outpt full PFT   CKD  -per renal :    dvt prophylaxis:    dw team

## 2024-03-20 NOTE — CONSULT NOTE ADULT - SUBJECTIVE AND OBJECTIVE BOX
HPI:  RACHEL COMBS is a 88yM with PMHx CKD, HFrEF, CAD s/p stents, afib on Eliquis, bladder cancer s/p chemo, presenting with 2 weeks progressive shortness of breath on exertion clinical presentation consistent with acute on chronic diastolic heart failure and LILIAN on CKD. Found to have worsening anemia requiring frequent transfusions and melena x 2 months.    Patient reports to have daily melena for 2 months, not on home PO iron supplements. During 2021 admission, patient was evaluated by the GI team for anemia without overt GIB, and opted for outpatient evaluation. However, has not followed up with GI since. Last colonoscopy was > 10 years ago, reportedly normal. No prior EGD's.     Currently, patient denied fevers, chills, chest pain, SOB, dysphagia, odynophagia, early satiety, poor oral intake, abdominal pain, nausea, vomiting, hematemesis, hematochezia, or family history of GI-related cancers. No recent NSAIDs use. On Eliquis.   Patient and family are open to EGD for melena evaluation.         PMHX/PSHX:    CAD (coronary artery disease)    Diabetes mellitus, new onset    Single kidney    Arthritis    Myocardial infarction    Hard of hearing    Atrial fibrillation    Hypertension    Hyperlipidemia    History of TIAs    History of bradycardia    Bladder cancer    History of CHF (congestive heart failure)    Left carotid bruit    History of nephrectomy, unilateral    Stented coronary artery    S/P bilateral cataract extraction    H/O cystoscopy      Allergies:  Cipro (Joint Pain)  penicillin (Rash)      Home Medications: reviewed  Hospital Medications:  aMIOdarone    Tablet 200 milliGRAM(s) Oral daily  aspirin enteric coated 81 milliGRAM(s) Oral daily  atorvastatin 80 milliGRAM(s) Oral at bedtime  dextrose 5%. 1000 milliLiter(s) IV Continuous <Continuous>  dextrose 5%. 1000 milliLiter(s) IV Continuous <Continuous>  dextrose 50% Injectable 25 Gram(s) IV Push once  dextrose 50% Injectable 12.5 Gram(s) IV Push once  dextrose 50% Injectable 25 Gram(s) IV Push once  dextrose Oral Gel 15 Gram(s) Oral once PRN  epoetin catrachito (EPOGEN) Injectable 65913 Unit(s) SubCutaneous once  ferrous    sulfate 325 milliGRAM(s) Oral daily  furosemide    Tablet 20 milliGRAM(s) Oral daily  glucagon  Injectable 1 milliGRAM(s) IntraMuscular once  influenza  Vaccine (HIGH DOSE) 0.7 milliLiter(s) IntraMuscular once  insulin glargine Injectable (LANTUS) 3 Unit(s) SubCutaneous at bedtime  insulin lispro (ADMELOG) corrective regimen sliding scale   SubCutaneous three times a day before meals  pantoprazole Infusion 8 mG/Hr IV Continuous <Continuous>  tamsulosin 0.8 milliGRAM(s) Oral at bedtime      Social History:   Tobacco: denies  Alcohol: denies  Recreational drugs: denies    Family history:    Family history of diabetes mellitus in mother (Mother)    FH: bladder cancer (Sibling)      PHYSICAL EXAM:   Vital Signs:  Vital Signs Last 24 Hrs  T(C): 36.3 (20 Mar 2024 17:11), Max: 36.8 (20 Mar 2024 05:00)  T(F): 97.4 (20 Mar 2024 17:11), Max: 98.2 (20 Mar 2024 05:00)  HR: 56 (20 Mar 2024 17:11) (56 - 60)  BP: 136/60 (20 Mar 2024 17:11) (99/51 - 147/66)  BP(mean): --  RR: 18 (20 Mar 2024 17:11) (17 - 18)  SpO2: 100% (20 Mar 2024 17:11) (99% - 100%)    Parameters below as of 20 Mar 2024 17:11  Patient On (Oxygen Delivery Method): room air      Daily     Daily     GENERAL: no acute distress  NEURO: alert and oriented x 3  HEENT: NCAT, no conjunctival pallor appreciated; anicteric sclera  CHEST: no respiratory distress, no accessory muscle use  CARDIAC: regular rate, +S1/S2  ABDOMEN: soft, nontender, no rebound or guarding; RN showed picture of BM from today, (+) melena   EXTREMITIES: warm, well perfused  SKIN: no lesions noted    LABS: reviewed                        8.7    3.88  )-----------( 108      ( 20 Mar 2024 03:00 )             26.6     03-20    141  |  110<H>  |  72<H>  ----------------------------<  115<H>  4.2   |  19<L>  |  3.80<H>    Ca    8.1<L>      20 Mar 2024 03:00  Phos  4.4     03-20  Mg     2.20     03-20    TPro  6.1  /  Alb  2.9<L>  /  TBili  0.3  /  DBili  <0.2  /  AST  58<H>  /  ALT  57<H>  /  AlkPhos  118  03-19    LIVER FUNCTIONS - ( 19 Mar 2024 05:30 )  Alb: 2.9 g/dL / Pro: 6.1 g/dL / ALK PHOS: 118 U/L / ALT: 57 U/L / AST: 58 U/L / GGT: x               < from: US Abdomen Upper Quadrant Right (03.18.24 @ 16:29) >  FINDINGS:  Liver: Mildly increased echogenicity of the enlarged liver, consistent   with underlying parenchymal disease. No surface nodularity.  Bile ducts: Normal caliber. Common bile duct measures 4 mm.  Gallbladder: Cholelithiasis.  Contracted gallbladder.  Pancreas: Visualized portions are within normal limits.  Right kidney: 12.3 cm. Increased echogenicity. Upper pole cyst measures   3.2 cm. Mild hydronephrosis..  Ascites: None.  IVC: Visualized portions are within normal limits.    IMPRESSION:  *  Enlarged liver, demonstrating the presence of parenchymal disease.  *  Cholelithiasis, without evidence of acute cholecystitis.  *  Right renal parenchymal disease.  *  Mild right hydronephrosis.        --- End of Report ---    < end of copied text >

## 2024-03-20 NOTE — PROGRESS NOTE ADULT - SUBJECTIVE AND OBJECTIVE BOX
Patient is a 88y old  Male who presents with a chief complaint of shortness of breath (20 Mar 2024 12:12)      DATE OF SERVICE: 03-20-24 @ 14:26    SUBJECTIVE / OVERNIGHT EVENTS: overnight events noted    ROS:  Resp: No cough no sputum production  CVS: No chest pain no palpitations no orthopnea  GI: no N/V/D          MEDICATIONS  (STANDING):  aMIOdarone    Tablet 200 milliGRAM(s) Oral daily  apixaban 2.5 milliGRAM(s) Oral every 12 hours  aspirin enteric coated 81 milliGRAM(s) Oral daily  atorvastatin 80 milliGRAM(s) Oral at bedtime  dextrose 5%. 1000 milliLiter(s) (100 mL/Hr) IV Continuous <Continuous>  dextrose 5%. 1000 milliLiter(s) (50 mL/Hr) IV Continuous <Continuous>  dextrose 50% Injectable 25 Gram(s) IV Push once  dextrose 50% Injectable 12.5 Gram(s) IV Push once  dextrose 50% Injectable 25 Gram(s) IV Push once  epoetin catrachito (EPOGEN) Injectable 99017 Unit(s) SubCutaneous once  ferrous    sulfate 325 milliGRAM(s) Oral daily  furosemide    Tablet 20 milliGRAM(s) Oral daily  glucagon  Injectable 1 milliGRAM(s) IntraMuscular once  influenza  Vaccine (HIGH DOSE) 0.7 milliLiter(s) IntraMuscular once  insulin glargine Injectable (LANTUS) 3 Unit(s) SubCutaneous at bedtime  insulin lispro (ADMELOG) corrective regimen sliding scale   SubCutaneous three times a day before meals  iron sucrose IVPB 200 milliGRAM(s) IV Intermittent every 24 hours  tamsulosin 0.8 milliGRAM(s) Oral at bedtime    MEDICATIONS  (PRN):  dextrose Oral Gel 15 Gram(s) Oral once PRN Blood Glucose LESS THAN 70 milliGRAM(s)/deciliter        CAPILLARY BLOOD GLUCOSE      POCT Blood Glucose.: 237 mg/dL (20 Mar 2024 11:45)  POCT Blood Glucose.: 125 mg/dL (20 Mar 2024 07:09)  POCT Blood Glucose.: 161 mg/dL (19 Mar 2024 21:30)  POCT Blood Glucose.: 124 mg/dL (19 Mar 2024 16:58)    I&O's Summary    19 Mar 2024 07:01  -  20 Mar 2024 07:00  --------------------------------------------------------  IN: 1390 mL / OUT: 1100 mL / NET: 290 mL    20 Mar 2024 07:01  -  20 Mar 2024 14:26  --------------------------------------------------------  IN: 240 mL / OUT: 0 mL / NET: 240 mL        Vital Signs Last 24 Hrs  T(C): 36.4 (20 Mar 2024 13:00), Max: 36.8 (20 Mar 2024 05:00)  T(F): 97.6 (20 Mar 2024 13:00), Max: 98.2 (20 Mar 2024 05:00)  HR: 60 (20 Mar 2024 13:00) (58 - 63)  BP: 99/51 (20 Mar 2024 13:00) (99/51 - 147/66)  BP(mean): --  RR: 18 (20 Mar 2024 13:00) (17 - 18)  SpO2: 100% (20 Mar 2024 13:00) (99% - 100%)      PHYSICAL EXAM:   Lungs: clear   CVS: S1 S2 systolic murmur +   Abdomen: no tenderness  Neuro: AO x 3 nonfocal   Skin: warm, dry  Ext: trace edema    LABS:                        8.7    3.88  )-----------( 108      ( 20 Mar 2024 03:00 )             26.6     03-20    141  |  110<H>  |  72<H>  ----------------------------<  115<H>  4.2   |  19<L>  |  3.80<H>    Ca    8.1<L>      20 Mar 2024 03:00  Phos  4.4     03-20  Mg     2.20     03-20    TPro  6.1  /  Alb  2.9<L>  /  TBili  0.3  /  DBili  <0.2  /  AST  58<H>  /  ALT  57<H>  /  AlkPhos  118  03-19          Urinalysis Basic - ( 20 Mar 2024 03:00 )    Color: x / Appearance: x / SG: x / pH: x  Gluc: 115 mg/dL / Ketone: x  / Bili: x / Urobili: x   Blood: x / Protein: x / Nitrite: x   Leuk Esterase: x / RBC: x / WBC x   Sq Epi: x / Non Sq Epi: x / Bacteria: x          All consultant(s) notes reviewed and care discussed with other providers        Contact Number, Dr Arroyo [phone]

## 2024-03-20 NOTE — CONSULT NOTE ADULT - ATTENDING COMMENTS
# Intermittent dark stools with worsening anemia c/f UGIB  # LILIAN on CKD  # Acute on chronic diastolic heart failure   # Afib on Eliquis  # CAD s/p stents on ASA  # Bladder cancer s/p chemo    --PPI BID  --Please check coags   --Tentatively plan for EGD tomorrow if medically optimized. Please have cardiology document risk assessment and optimization given recent HF exacerbation  --Trend CBC and transfuse to maintain Hgb >/= 8 given history of CAD  --No objection to continuing ASA  --NPO at MN for possible endoscopic evaluation if medically optimized  --Consider holding Eliquis if no absolute contraindication pre-endoscopic procedure    Additional recommendations as above. Please reach out if questions, concerns or acute change in clinical status as more urgent endoscopic evaluation may be warranted.

## 2024-03-20 NOTE — PROGRESS NOTE ADULT - NSPROGADDITIONALINFOA_GEN_ALL_CORE
discussed with renal  discussed with covering ACP discussed with renal  discussed with covering ACP    patient is optimized for EGD tomorrow

## 2024-03-20 NOTE — DISCHARGE NOTE PROVIDER - HOSPITAL COURSE
88yM with PMHx CKD, HFrEF, CAD s/p stents presenting with 2 weeks progressive shortness of breath on exertion, admitted with acute on chronic diastolic heart failure, anemia and LILIAN on CKD.     1. Severe anemia:   - s/p PRBC  - EGD 3/21: Large amount of blood in antrum with severe mucosal changes/ friability/ sloughing. Hemostatic spray applied. LA Grade A esophagitis.Bx performed. DDx concerning for malignancy (primary vs linitis plastica) or alternate infiltrative process (eg amyloidosis) [ ] F/U Path --> so far negative for maligancy but pending further stains to r/o Amyloid - outpatient follow up.   - S/p RRT 3/21 for hypotension post EGD. Resolved with PRBC/ fluid administration   - Tolerating diet.   - Resumed Eliquis 2.5 mg BID 3/26 -HgB stable.     2. CHF: Diuresed with IV Lasix, now on PO Lasix  - euvolemic. Echo with EF 45-50% with WMA .     3. LILIAN on CKD: US R solitary kidney with mild hydronephrosis, tiny nonobstructing lower pole right renal calculus, status post left nephrectomy. Creatinine stable.     4. CAD: discontinue ASA indefinitely     Medically cleared/stable for discharge as per Dr. WEISS with close outpatient follow up within 1-2 weeks of discharge. Patient understands and agrees with plan of care.

## 2024-03-20 NOTE — DISCHARGE NOTE PROVIDER - CARE PROVIDER_API CALL
FOLLOW UP WITH CARDIOLOGY, PCP, NEPHROLOGY, PULMONOLOGY,   Phone: (   )    -  Fax: (   )    -  Follow Up Time:

## 2024-03-20 NOTE — PROGRESS NOTE ADULT - SUBJECTIVE AND OBJECTIVE BOX
CARDIOLOGY FOLLOW UP - Dr. Vega  Date of Service: 3/20/2024  CC: no events    Review of Systems:  Constitutional: No fever, weight loss, or fatigue  Respiratory: No cough, wheezing, or hemoptysis, no shortness of breath  Cardiovascular: No chest pain, palpitations, passing out, dizziness, or leg swelling  Gastrointestinal: No abd or epigastric pain. No nausea, vomiting, or hematemesis; no diarrhea or consiptaiton, no melena or hematochezia  Vascular: No edema     TELEMETRY:    PHYSICAL EXAM:  T(C): 36.8 (03-20-24 @ 05:00), Max: 36.8 (03-20-24 @ 05:00)  HR: 60 (03-20-24 @ 05:00) (58 - 63)  BP: 114/51 (03-20-24 @ 05:00) (110/48 - 147/66)  RR: 18 (03-20-24 @ 05:00) (17 - 18)  SpO2: 100% (03-20-24 @ 05:00) (99% - 100%)  Wt(kg): --  I&O's Summary    19 Mar 2024 07:01  -  20 Mar 2024 07:00  --------------------------------------------------------  IN: 1390 mL / OUT: 1100 mL / NET: 290 mL    20 Mar 2024 07:01  -  20 Mar 2024 11:38  --------------------------------------------------------  IN: 240 mL / OUT: 0 mL / NET: 240 mL        Appearance: Normal	  Cardiovascular: Normal S1 S2,RRR, No JVD, No murmurs  Respiratory: Lungs clear to auscultation	  Gastrointestinal:  Soft, Non-tender, + BS	  Extremities: Normal range of motion, No clubbing, cyanosis or edema  Vascular: Peripheral pulses palpable 2+ bilaterally       Home Medications:  apixaban 2.5 mg oral tablet: 1 tab(s) orally every 12 hours (17 Mar 2024 10:48)  Crestor 40 mg oral tablet: 1 tab(s) orally once a day (at bedtime) (17 Mar 2024 10:49)  repaglinide 0.5 mg oral tablet: 1 tab(s) orally 3 times a day (before meals) (17 Mar 2024 10:49)  tamsulosin 0.4 mg oral capsule: 1 cap(s) orally once a day (17 Mar 2024 10:49)  Tresiba FlexTouch 100 units/mL subcutaneous solution: 10 subcutaneous once a day (at bedtime) (17 Mar 2024 10:49)        MEDICATIONS  (STANDING):  aMIOdarone    Tablet 200 milliGRAM(s) Oral daily  apixaban 2.5 milliGRAM(s) Oral every 12 hours  aspirin enteric coated 81 milliGRAM(s) Oral daily  atorvastatin 80 milliGRAM(s) Oral at bedtime  dextrose 5%. 1000 milliLiter(s) (100 mL/Hr) IV Continuous <Continuous>  dextrose 5%. 1000 milliLiter(s) (50 mL/Hr) IV Continuous <Continuous>  dextrose 50% Injectable 25 Gram(s) IV Push once  dextrose 50% Injectable 12.5 Gram(s) IV Push once  dextrose 50% Injectable 25 Gram(s) IV Push once  ferrous    sulfate 325 milliGRAM(s) Oral daily  furosemide    Tablet 20 milliGRAM(s) Oral daily  glucagon  Injectable 1 milliGRAM(s) IntraMuscular once  influenza  Vaccine (HIGH DOSE) 0.7 milliLiter(s) IntraMuscular once  insulin glargine Injectable (LANTUS) 3 Unit(s) SubCutaneous at bedtime  insulin lispro (ADMELOG) corrective regimen sliding scale   SubCutaneous three times a day before meals  iron sucrose IVPB 200 milliGRAM(s) IV Intermittent every 24 hours  sodium bicarbonate 650 milliGRAM(s) Oral daily  tamsulosin 0.8 milliGRAM(s) Oral at bedtime        EKG:  RADIOLOGY:  DIAGNOSTIC TESTING:  [ ] Echocardiogram:  [ ] Catherterization:  [ ] Stress Test:  OTHER:     LABS:	 	                          8.7    3.88  )-----------( 108      ( 20 Mar 2024 03:00 )             26.6     03-20    141  |  110<H>  |  72<H>  ----------------------------<  115<H>  4.2   |  19<L>  |  3.80<H>    Ca    8.1<L>      20 Mar 2024 03:00  Phos  4.4     03-20  Mg     2.20     03-20    TPro  6.1  /  Alb  2.9<L>  /  TBili  0.3  /  DBili  <0.2  /  AST  58<H>  /  ALT  57<H>  /  AlkPhos  118  03-19          CARDIAC MARKERS:

## 2024-03-21 LAB
ALBUMIN SERPL ELPH-MCNC: 2.7 G/DL — LOW (ref 3.3–5)
ALP SERPL-CCNC: 106 U/L — SIGNIFICANT CHANGE UP (ref 40–120)
ALT FLD-CCNC: 47 U/L — HIGH (ref 4–41)
ANION GAP SERPL CALC-SCNC: 10 MMOL/L — SIGNIFICANT CHANGE UP (ref 7–14)
ANION GAP SERPL CALC-SCNC: 12 MMOL/L — SIGNIFICANT CHANGE UP (ref 7–14)
ANION GAP SERPL CALC-SCNC: 12 MMOL/L — SIGNIFICANT CHANGE UP (ref 7–14)
APTT BLD: 41.1 SEC — HIGH (ref 24.5–35.6)
AST SERPL-CCNC: 44 U/L — HIGH (ref 4–40)
BASE EXCESS BLDV CALC-SCNC: -11 MMOL/L — LOW (ref -2–3)
BASOPHILS # BLD AUTO: 0.04 K/UL — SIGNIFICANT CHANGE UP (ref 0–0.2)
BASOPHILS NFR BLD AUTO: 0.9 % — SIGNIFICANT CHANGE UP (ref 0–2)
BILIRUB SERPL-MCNC: 0.5 MG/DL — SIGNIFICANT CHANGE UP (ref 0.2–1.2)
BLD GP AB SCN SERPL QL: NEGATIVE — SIGNIFICANT CHANGE UP
BLOOD GAS VENOUS COMPREHENSIVE RESULT: SIGNIFICANT CHANGE UP
BUN SERPL-MCNC: 73 MG/DL — HIGH (ref 7–23)
BUN SERPL-MCNC: 75 MG/DL — HIGH (ref 7–23)
BUN SERPL-MCNC: 77 MG/DL — HIGH (ref 7–23)
CALCIUM SERPL-MCNC: 7.7 MG/DL — LOW (ref 8.4–10.5)
CALCIUM SERPL-MCNC: 7.7 MG/DL — LOW (ref 8.4–10.5)
CALCIUM SERPL-MCNC: 8.2 MG/DL — LOW (ref 8.4–10.5)
CHLORIDE BLDV-SCNC: 112 MMOL/L — HIGH (ref 96–108)
CHLORIDE SERPL-SCNC: 112 MMOL/L — HIGH (ref 98–107)
CHLORIDE SERPL-SCNC: 113 MMOL/L — HIGH (ref 98–107)
CHLORIDE SERPL-SCNC: 114 MMOL/L — HIGH (ref 98–107)
CO2 BLDV-SCNC: 18.2 MMOL/L — LOW (ref 22–26)
CO2 SERPL-SCNC: 15 MMOL/L — LOW (ref 22–31)
CO2 SERPL-SCNC: 17 MMOL/L — LOW (ref 22–31)
CO2 SERPL-SCNC: 19 MMOL/L — LOW (ref 22–31)
CREAT SERPL-MCNC: 3.44 MG/DL — HIGH (ref 0.5–1.3)
CREAT SERPL-MCNC: 3.52 MG/DL — HIGH (ref 0.5–1.3)
CREAT SERPL-MCNC: 3.75 MG/DL — HIGH (ref 0.5–1.3)
EGFR: 15 ML/MIN/1.73M2 — LOW
EGFR: 16 ML/MIN/1.73M2 — LOW
EGFR: 16 ML/MIN/1.73M2 — LOW
EOSINOPHIL # BLD AUTO: 0.1 K/UL — SIGNIFICANT CHANGE UP (ref 0–0.5)
EOSINOPHIL NFR BLD AUTO: 2.3 % — SIGNIFICANT CHANGE UP (ref 0–6)
GAS PNL BLDV: 137 MMOL/L — SIGNIFICANT CHANGE UP (ref 136–145)
GLUCOSE BLDC GLUCOMTR-MCNC: 126 MG/DL — HIGH (ref 70–99)
GLUCOSE BLDC GLUCOMTR-MCNC: 129 MG/DL — HIGH (ref 70–99)
GLUCOSE BLDC GLUCOMTR-MCNC: 140 MG/DL — HIGH (ref 70–99)
GLUCOSE BLDC GLUCOMTR-MCNC: 154 MG/DL — HIGH (ref 70–99)
GLUCOSE BLDC GLUCOMTR-MCNC: 155 MG/DL — HIGH (ref 70–99)
GLUCOSE BLDC GLUCOMTR-MCNC: 164 MG/DL — HIGH (ref 70–99)
GLUCOSE BLDC GLUCOMTR-MCNC: 198 MG/DL — HIGH (ref 70–99)
GLUCOSE BLDV-MCNC: 231 MG/DL — HIGH (ref 70–99)
GLUCOSE SERPL-MCNC: 183 MG/DL — HIGH (ref 70–99)
GLUCOSE SERPL-MCNC: 220 MG/DL — HIGH (ref 70–99)
GLUCOSE SERPL-MCNC: 90 MG/DL — SIGNIFICANT CHANGE UP (ref 70–99)
HCO3 BLDV-SCNC: 17 MMOL/L — LOW (ref 22–29)
HCT VFR BLD CALC: 25.8 % — LOW (ref 39–50)
HCT VFR BLD CALC: 30 % — LOW (ref 39–50)
HCT VFR BLDA CALC: 29 % — LOW (ref 39–51)
HGB BLD CALC-MCNC: 9.5 G/DL — LOW (ref 12.6–17.4)
HGB BLD-MCNC: 8.4 G/DL — LOW (ref 13–17)
HGB BLD-MCNC: 9.8 G/DL — LOW (ref 13–17)
IANC: 2.81 K/UL — SIGNIFICANT CHANGE UP (ref 1.8–7.4)
IMM GRANULOCYTES NFR BLD AUTO: 0.5 % — SIGNIFICANT CHANGE UP (ref 0–0.9)
INR BLD: 1.37 RATIO — HIGH (ref 0.85–1.18)
INR BLD: 1.47 RATIO — HIGH (ref 0.85–1.18)
LACTATE BLDV-MCNC: 3.6 MMOL/L — HIGH (ref 0.5–2)
LACTATE SERPL-SCNC: 1.8 MMOL/L — SIGNIFICANT CHANGE UP (ref 0.5–2)
LYMPHOCYTES # BLD AUTO: 0.81 K/UL — LOW (ref 1–3.3)
LYMPHOCYTES # BLD AUTO: 18.8 % — SIGNIFICANT CHANGE UP (ref 13–44)
MAGNESIUM SERPL-MCNC: 2.1 MG/DL — SIGNIFICANT CHANGE UP (ref 1.6–2.6)
MAGNESIUM SERPL-MCNC: 2.1 MG/DL — SIGNIFICANT CHANGE UP (ref 1.6–2.6)
MAGNESIUM SERPL-MCNC: 2.3 MG/DL — SIGNIFICANT CHANGE UP (ref 1.6–2.6)
MCHC RBC-ENTMCNC: 31.7 PG — SIGNIFICANT CHANGE UP (ref 27–34)
MCHC RBC-ENTMCNC: 32.6 GM/DL — SIGNIFICANT CHANGE UP (ref 32–36)
MCHC RBC-ENTMCNC: 32.7 GM/DL — SIGNIFICANT CHANGE UP (ref 32–36)
MCHC RBC-ENTMCNC: 32.9 PG — SIGNIFICANT CHANGE UP (ref 27–34)
MCV RBC AUTO: 100.7 FL — HIGH (ref 80–100)
MCV RBC AUTO: 97.4 FL — SIGNIFICANT CHANGE UP (ref 80–100)
MONOCYTES # BLD AUTO: 0.53 K/UL — SIGNIFICANT CHANGE UP (ref 0–0.9)
MONOCYTES NFR BLD AUTO: 12.3 % — SIGNIFICANT CHANGE UP (ref 2–14)
NEUTROPHILS # BLD AUTO: 2.81 K/UL — SIGNIFICANT CHANGE UP (ref 1.8–7.4)
NEUTROPHILS NFR BLD AUTO: 65.2 % — SIGNIFICANT CHANGE UP (ref 43–77)
NRBC # BLD: 0 /100 WBCS — SIGNIFICANT CHANGE UP (ref 0–0)
NRBC # BLD: 0 /100 WBCS — SIGNIFICANT CHANGE UP (ref 0–0)
NRBC # FLD: 0 K/UL — SIGNIFICANT CHANGE UP (ref 0–0)
NRBC # FLD: 0 K/UL — SIGNIFICANT CHANGE UP (ref 0–0)
PCO2 BLDV: 45 MMHG — SIGNIFICANT CHANGE UP (ref 42–55)
PH BLDV: 7.18 — LOW (ref 7.32–7.43)
PHOSPHATE SERPL-MCNC: 4.4 MG/DL — SIGNIFICANT CHANGE UP (ref 2.5–4.5)
PHOSPHATE SERPL-MCNC: 4.5 MG/DL — SIGNIFICANT CHANGE UP (ref 2.5–4.5)
PHOSPHATE SERPL-MCNC: 4.8 MG/DL — HIGH (ref 2.5–4.5)
PLATELET # BLD AUTO: 112 K/UL — LOW (ref 150–400)
PLATELET # BLD AUTO: 89 K/UL — LOW (ref 150–400)
PO2 BLDV: 44 MMHG — SIGNIFICANT CHANGE UP (ref 25–45)
POTASSIUM BLDV-SCNC: 6.9 MMOL/L — CRITICAL HIGH (ref 3.5–5.1)
POTASSIUM SERPL-MCNC: 4.3 MMOL/L — SIGNIFICANT CHANGE UP (ref 3.5–5.3)
POTASSIUM SERPL-MCNC: 4.6 MMOL/L — SIGNIFICANT CHANGE UP (ref 3.5–5.3)
POTASSIUM SERPL-MCNC: 5.9 MMOL/L — HIGH (ref 3.5–5.3)
POTASSIUM SERPL-SCNC: 4.3 MMOL/L — SIGNIFICANT CHANGE UP (ref 3.5–5.3)
POTASSIUM SERPL-SCNC: 4.6 MMOL/L — SIGNIFICANT CHANGE UP (ref 3.5–5.3)
POTASSIUM SERPL-SCNC: 5.9 MMOL/L — HIGH (ref 3.5–5.3)
PROT SERPL-MCNC: 6 G/DL — SIGNIFICANT CHANGE UP (ref 6–8.3)
PROTHROM AB SERPL-ACNC: 15.3 SEC — HIGH (ref 9.5–13)
PROTHROM AB SERPL-ACNC: 16.4 SEC — HIGH (ref 9.5–13)
RBC # BLD: 2.65 M/UL — LOW (ref 4.2–5.8)
RBC # BLD: 2.98 M/UL — LOW (ref 4.2–5.8)
RBC # FLD: 19.1 % — HIGH (ref 10.3–14.5)
RBC # FLD: 21.9 % — HIGH (ref 10.3–14.5)
RH IG SCN BLD-IMP: POSITIVE — SIGNIFICANT CHANGE UP
SAO2 % BLDV: 68.3 % — SIGNIFICANT CHANGE UP (ref 67–88)
SODIUM SERPL-SCNC: 139 MMOL/L — SIGNIFICANT CHANGE UP (ref 135–145)
SODIUM SERPL-SCNC: 142 MMOL/L — SIGNIFICANT CHANGE UP (ref 135–145)
SODIUM SERPL-SCNC: 143 MMOL/L — SIGNIFICANT CHANGE UP (ref 135–145)
WBC # BLD: 3.7 K/UL — LOW (ref 3.8–10.5)
WBC # BLD: 4.31 K/UL — SIGNIFICANT CHANGE UP (ref 3.8–10.5)
WBC # FLD AUTO: 3.7 K/UL — LOW (ref 3.8–10.5)
WBC # FLD AUTO: 4.31 K/UL — SIGNIFICANT CHANGE UP (ref 3.8–10.5)

## 2024-03-21 PROCEDURE — 88305 TISSUE EXAM BY PATHOLOGIST: CPT | Mod: 26

## 2024-03-21 PROCEDURE — 43239 EGD BIOPSY SINGLE/MULTIPLE: CPT | Mod: GC

## 2024-03-21 PROCEDURE — 88313 SPECIAL STAINS GROUP 2: CPT | Mod: 26

## 2024-03-21 DEVICE — HEMOSPRAY HEMOSTAT ENDO 7F: Type: IMPLANTABLE DEVICE | Status: FUNCTIONAL

## 2024-03-21 RX ORDER — SODIUM CHLORIDE 9 MG/ML
1000 INJECTION INTRAMUSCULAR; INTRAVENOUS; SUBCUTANEOUS ONCE
Refills: 0 | Status: COMPLETED | OUTPATIENT
Start: 2024-03-21 | End: 2024-03-21

## 2024-03-21 RX ADMIN — ATORVASTATIN CALCIUM 80 MILLIGRAM(S): 80 TABLET, FILM COATED ORAL at 21:24

## 2024-03-21 RX ADMIN — Medication 1: at 12:31

## 2024-03-21 RX ADMIN — AMIODARONE HYDROCHLORIDE 200 MILLIGRAM(S): 400 TABLET ORAL at 12:48

## 2024-03-21 RX ADMIN — TAMSULOSIN HYDROCHLORIDE 0.8 MILLIGRAM(S): 0.4 CAPSULE ORAL at 21:24

## 2024-03-21 RX ADMIN — Medication 81 MILLIGRAM(S): at 12:48

## 2024-03-21 RX ADMIN — INSULIN GLARGINE 3 UNIT(S): 100 INJECTION, SOLUTION SUBCUTANEOUS at 21:25

## 2024-03-21 RX ADMIN — Medication 1: at 18:14

## 2024-03-21 RX ADMIN — PANTOPRAZOLE SODIUM 10 MG/HR: 20 TABLET, DELAYED RELEASE ORAL at 22:48

## 2024-03-21 RX ADMIN — Medication 325 MILLIGRAM(S): at 12:48

## 2024-03-21 RX ADMIN — PANTOPRAZOLE SODIUM 10 MG/HR: 20 TABLET, DELAYED RELEASE ORAL at 05:11

## 2024-03-21 RX ADMIN — SODIUM CHLORIDE 1000 MILLILITER(S): 9 INJECTION INTRAMUSCULAR; INTRAVENOUS; SUBCUTANEOUS at 14:18

## 2024-03-21 NOTE — PROGRESS NOTE ADULT - SUBJECTIVE AND OBJECTIVE BOX
Date of Service: 03-21-24 @ 11:21    Patient is a 88y old  Male who presents with a chief complaint of shortness of breath (20 Mar 2024 18:20)      Any change in ROS: seems to be doing  ok : no sob:  no cough : no phlegm      MEDICATIONS  (STANDING):  aMIOdarone    Tablet 200 milliGRAM(s) Oral daily  aspirin enteric coated 81 milliGRAM(s) Oral daily  atorvastatin 80 milliGRAM(s) Oral at bedtime  dextrose 5%. 1000 milliLiter(s) (100 mL/Hr) IV Continuous <Continuous>  dextrose 5%. 1000 milliLiter(s) (50 mL/Hr) IV Continuous <Continuous>  dextrose 50% Injectable 25 Gram(s) IV Push once  dextrose 50% Injectable 12.5 Gram(s) IV Push once  dextrose 50% Injectable 25 Gram(s) IV Push once  ferrous    sulfate 325 milliGRAM(s) Oral daily  furosemide    Tablet 20 milliGRAM(s) Oral daily  glucagon  Injectable 1 milliGRAM(s) IntraMuscular once  influenza  Vaccine (HIGH DOSE) 0.7 milliLiter(s) IntraMuscular once  insulin glargine Injectable (LANTUS) 3 Unit(s) SubCutaneous at bedtime  insulin lispro (ADMELOG) corrective regimen sliding scale   SubCutaneous three times a day before meals  pantoprazole Infusion 8 mG/Hr (10 mL/Hr) IV Continuous <Continuous>  tamsulosin 0.8 milliGRAM(s) Oral at bedtime    MEDICATIONS  (PRN):  dextrose Oral Gel 15 Gram(s) Oral once PRN Blood Glucose LESS THAN 70 milliGRAM(s)/deciliter    Vital Signs Last 24 Hrs  T(C): 36.3 (21 Mar 2024 09:54), Max: 37.1 (20 Mar 2024 20:58)  T(F): 97.4 (21 Mar 2024 09:54), Max: 98.8 (20 Mar 2024 20:58)  HR: 57 (21 Mar 2024 09:54) (53 - 60)  BP: 120/74 (21 Mar 2024 09:54) (99/51 - 142/56)  BP(mean): --  RR: 14 (21 Mar 2024 09:54) (14 - 19)  SpO2: 100% (21 Mar 2024 09:54) (99% - 100%)    Parameters below as of 21 Mar 2024 09:54  Patient On (Oxygen Delivery Method): room air        I&O's Summary    20 Mar 2024 07:01  -  21 Mar 2024 07:00  --------------------------------------------------------  IN: 1280 mL / OUT: 1200 mL / NET: 80 mL    21 Mar 2024 07:01  -  21 Mar 2024 11:21  --------------------------------------------------------  IN: 0 mL / OUT: 380 mL / NET: -380 mL          Physical Exam:   GENERAL: NAD, well-groomed, well-developed  HEENT: RAQUEL/   Atraumatic, Normocephalic  ENMT: No tonsillar erythema, exudates, or enlargement; Moist mucous membranes, Good dentition, No lesions  NECK: Supple, No JVD, Normal thyroid  CHEST/LUNG: Clear to auscultaion- no wheezing  CVS: Regular rate and rhythm; No murmurs, rubs, or gallops  GI: : Soft, Nontender, Nondistended; Bowel sounds present  NERVOUS SYSTEM:  Alert & Oriented X3  EXTREMITIES:  -edema  LYMPH: No lymphadenopathy noted  SKIN: No rashes or lesions  ENDOCRINOLOGY: No Thyromegaly  PSYCH: Appropriate    Labs:  19                            8.4    3.70  )-----------( 112      ( 21 Mar 2024 03:52 )             25.8                         8.7    3.88  )-----------( 108      ( 20 Mar 2024 03:00 )             26.6                         8.3    3.73  )-----------( 123      ( 19 Mar 2024 18:00 )             25.0                         7.8    3.98  )-----------( 118      ( 19 Mar 2024 05:30 )             23.9                         8.3    3.81  )-----------( 117      ( 18 Mar 2024 06:31 )             25.3     03-21    142  |  113<H>  |  75<H>  ----------------------------<  90  4.3   |  19<L>  |  3.75<H>  03-20    141  |  110<H>  |  72<H>  ----------------------------<  115<H>  4.2   |  19<L>  |  3.80<H>  03-19    146<H>  |  116<H>  |  74<H>  ----------------------------<  156<H>  3.9   |  18<L>  |  3.68<H>  03-18    146<H>  |  116<H>  |  76<H>  ----------------------------<  131<H>  4.0   |  18<L>  |  3.52<H>    Ca    8.2<L>      21 Mar 2024 03:52  Ca    8.1<L>      20 Mar 2024 03:00  Phos  4.8     03-21  Phos  4.4     03-20  Mg     2.30     03-21  Mg     2.20     03-20    TPro  6.1  /  Alb  2.9<L>  /  TBili  0.3  /  DBili  <0.2  /  AST  58<H>  /  ALT  57<H>  /  AlkPhos  118  03-19    CAPILLARY BLOOD GLUCOSE      POCT Blood Glucose.: 129 mg/dL (21 Mar 2024 09:53)  POCT Blood Glucose.: 126 mg/dL (21 Mar 2024 07:05)  POCT Blood Glucose.: 153 mg/dL (20 Mar 2024 21:37)  POCT Blood Glucose.: 212 mg/dL (20 Mar 2024 17:33)  POCT Blood Glucose.: 276 mg/dL (20 Mar 2024 16:26)  POCT Blood Glucose.: 237 mg/dL (20 Mar 2024 11:45)        PT/INR - ( 21 Mar 2024 03:52 )   PT: 16.4 sec;   INR: 1.47 ratio         PTT - ( 21 Mar 2024 03:52 )  PTT:41.1 sec  Urinalysis Basic - ( 21 Mar 2024 03:52 )    Color: x / Appearance: x / SG: x / pH: x  Gluc: 90 mg/dL / Ketone: x  / Bili: x / Urobili: x   Blood: x / Protein: x / Nitrite: x   Leuk Esterase: x / RBC: x / WBC x   Sq Epi: x / Non Sq Epi: x / Bacteria: x      D-Dimer Assay, Quantitative: 250 ng/mL DDU (03-18 @ 06:31)        RECENT CULTURES:  03-16 @ 23:24 Clean Catch Clean Catch (Midstream)         rad< from: Xray Chest 2 Views PA/Lat (03.16.24 @ 16:39) >    ACC: [id number] EXAM:  XR CHEST PA LAT 2V   ORDERED BY: ANDERSON FARNSWORTH     PROCEDURE DATE:  03/16/2024          INTERPRETATION:  CLINICAL INFORMATION: Shortness of breath on exertion.    TECHNIQUE: PA and lateral views of the chest.    COMPARISON: Multiple prior chest x-rays with most recent dated 12/14/2023    FINDINGS:    Heart: Heart size is normal.  Pulmonary: No focal consolidation. Small bilateral pleural effusions with   bibasilar atelectasis. No pneumothorax.  Bones: No acute bony pathology.    IMPRESSION:  Small bilateral pleural effusions with bibasilar atelectasis.    --- End of Report ---          TOM OSORIO MD; Resident Radiologist  This document has been electronically signed.  TAMIKO MILLIGAN MD; Attending Radiologist  This document has been electronically signed. Mar 17 2024  8:54AM    < end of copied text >         <10,000 CFU/mL Normal Urogenital Jeannine          RESPIRATORY CULTURES:          Studies  Chest X-RAY  CT SCAN Chest   Venous Dopplers: LE:   CT Abdomen  Others

## 2024-03-21 NOTE — PRE PROCEDURE NOTE - PRE PROCEDURE EVALUATION
Attending Physician: Dr. Maria                            Procedure: EGD     Indication for Procedure: melena   ________________________________________________________  PAST MEDICAL & SURGICAL HISTORY:  CAD (coronary artery disease)  (4 stents,)      Diabetes mellitus, new onset  diet controlled      Single kidney  (hx/o LEFT nephrectomy at age 16; pt states due to a ureter"blockage" causing "infection"; pt denies hx/o renal dysfunction)      Arthritis      Myocardial infarction  (2003)      Hard of hearing      Atrial fibrillation      Hypertension      Hyperlipidemia      History of TIAs  Last 2018      History of bradycardia      Bladder cancer      History of CHF (congestive heart failure)      Left carotid bruit      History of nephrectomy, unilateral  (hx/o LEFT nephrectomy at age 16)      Stented coronary artery  (4 stents)      S/P bilateral cataract extraction      H/O cystoscopy        ALLERGIES:  Cipro (Joint Pain)  penicillin (Rash)    HOME MEDICATIONS:  apixaban 2.5 mg oral tablet: 1 tab(s) orally every 12 hours  Crestor 40 mg oral tablet: 1 tab(s) orally once a day (at bedtime)  repaglinide 0.5 mg oral tablet: 1 tab(s) orally 3 times a day (before meals)  tamsulosin 0.4 mg oral capsule: 1 cap(s) orally once a day  Tresiba FlexTouch 100 units/mL subcutaneous solution: 10 subcutaneous once a day (at bedtime)    AICD/PPM: [ ] yes   [X] no    PERTINENT LAB DATA:                        8.4    3.70  )-----------( 112      ( 21 Mar 2024 03:52 )             25.8     03-21    142  |  113<H>  |  75<H>  ----------------------------<  90  4.3   |  19<L>  |  3.75<H>    Ca    8.2<L>      21 Mar 2024 03:52  Phos  4.8     03-21  Mg     2.30     03-21      PT/INR - ( 21 Mar 2024 03:52 )   PT: 16.4 sec;   INR: 1.47 ratio         PTT - ( 21 Mar 2024 03:52 )  PTT:41.1 sec            PHYSICAL EXAMINATION:    T(C): 36.7  HR: 53  BP: 142/56  RR: 19  SpO2: 99%  See physical exam under H&P/Progress note      COMMENTS:  Discussed with patient/HCP risks of endoscopy/colonoscopy which include but are not limited to: risks of perforation, infection and bleeding. Patient/HCP would like to proceed at this time.     The patient is a suitable candidate for the planned procedure unless box checked [ ]  No, explain:

## 2024-03-21 NOTE — RAPID RESPONSE TEAM SUMMARY - NSSITUATIONBACKGROUNDRRT_GEN_ALL_CORE
88yM with PMHx CKD, HFrEF, CAD s/p stents presenting with 2 weeks progressive shortness of breath on exertion clinical presentation consistent with acute on chronic diastolic heart failure and LILIAN on CKD. Hospital course complicated by upper GI bleed and melena.     RRT called for hypotension.

## 2024-03-21 NOTE — RAPID RESPONSE TEAM SUMMARY - NSADDTLFINDINGSRRT_GEN_ALL_CORE
Upon arrival, patient feels more tired and lethargic. Found to be hypotensive to 70s/30s. HR 60s-70s. Sating well on RA. Glucose 198. Given concern for bleeding (patient had EGD this AM with bleeding mass), gave 1U PRBC as fast as possible and 500cc IVF. Patient appeared hypovolemic at RRT. BP improved to 110s after volume resuscitation. Bloodwork drawn, primary team to follow up. GI called during rrt by primary team, agreed with volume resuscitation.

## 2024-03-21 NOTE — RAPID RESPONSE TEAM SUMMARY - NSDATETIMERRT_GEN_ALL_CORE
Chief Complaint   Patient presents with     [employer] Programming      Frank Regalado    
She has noticed a small mole on her right chest and is concerned about it.  She is established with dermatology and has not seen them 2016.  It doesn't itch and isn't tender.  
This is a chronic medical problem which is well controlled with as needed inhalers.  She is allergic to ragweed and does mention that her allergies often lead to wheezing.  She currently denies any coughing or shortness of breath.   
This is a new diagnosis for her.  Her vitamin D level was decreased at 21 back in September 2018.  She is currently not taking any vitamin D supplementation.    
This was initially diagnosed in 1979.  She had been on immunosuppressants in the past but is currently not taking taking anything since 2003.  She does mention that she changed her diet, lost weight and her blood work for lupus is come back negative.  Denies any flare ups. She is not established with a local rheumatologist.   
21-Mar-2024 14:58

## 2024-03-21 NOTE — PROGRESS NOTE ADULT - SUBJECTIVE AND OBJECTIVE BOX
Patient is a 88y old  Male who presents with a chief complaint of shortness of breath (21 Mar 2024 12:44)      DATE OF SERVICE: 03-21-24 @ 12:58    SUBJECTIVE / OVERNIGHT EVENTS: overnight events noted    ROS:  Resp: No cough no sputum production  CVS: No chest pain no palpitations no orthopnea  GI: no N/V/D        MEDICATIONS  (STANDING):  aMIOdarone    Tablet 200 milliGRAM(s) Oral daily  aspirin enteric coated 81 milliGRAM(s) Oral daily  atorvastatin 80 milliGRAM(s) Oral at bedtime  dextrose 5%. 1000 milliLiter(s) (100 mL/Hr) IV Continuous <Continuous>  dextrose 5%. 1000 milliLiter(s) (50 mL/Hr) IV Continuous <Continuous>  dextrose 50% Injectable 25 Gram(s) IV Push once  dextrose 50% Injectable 12.5 Gram(s) IV Push once  dextrose 50% Injectable 25 Gram(s) IV Push once  ferrous    sulfate 325 milliGRAM(s) Oral daily  furosemide    Tablet 20 milliGRAM(s) Oral daily  glucagon  Injectable 1 milliGRAM(s) IntraMuscular once  influenza  Vaccine (HIGH DOSE) 0.7 milliLiter(s) IntraMuscular once  insulin glargine Injectable (LANTUS) 3 Unit(s) SubCutaneous at bedtime  insulin lispro (ADMELOG) corrective regimen sliding scale   SubCutaneous three times a day before meals  pantoprazole Infusion 8 mG/Hr (10 mL/Hr) IV Continuous <Continuous>  tamsulosin 0.8 milliGRAM(s) Oral at bedtime    MEDICATIONS  (PRN):  dextrose Oral Gel 15 Gram(s) Oral once PRN Blood Glucose LESS THAN 70 milliGRAM(s)/deciliter        CAPILLARY BLOOD GLUCOSE      POCT Blood Glucose.: 155 mg/dL (21 Mar 2024 11:33)  POCT Blood Glucose.: 129 mg/dL (21 Mar 2024 09:53)  POCT Blood Glucose.: 126 mg/dL (21 Mar 2024 07:05)  POCT Blood Glucose.: 153 mg/dL (20 Mar 2024 21:37)  POCT Blood Glucose.: 212 mg/dL (20 Mar 2024 17:33)  POCT Blood Glucose.: 276 mg/dL (20 Mar 2024 16:26)    I&O's Summary    20 Mar 2024 07:01  -  21 Mar 2024 07:00  --------------------------------------------------------  IN: 1280 mL / OUT: 1200 mL / NET: 80 mL    21 Mar 2024 07:01  -  21 Mar 2024 12:58  --------------------------------------------------------  IN: 0 mL / OUT: 380 mL / NET: -380 mL        Vital Signs Last 24 Hrs  T(C): 36.1 (21 Mar 2024 10:52), Max: 37.1 (20 Mar 2024 20:58)  T(F): 97 (21 Mar 2024 10:52), Max: 98.8 (20 Mar 2024 20:58)  HR: 60 (21 Mar 2024 11:22) (53 - 60)  BP: 143/45 (21 Mar 2024 11:22) (99/51 - 149/39)  BP(mean): --  RR: 12 (21 Mar 2024 11:22) (12 - 19)  SpO2: 98% (21 Mar 2024 11:22) (98% - 100%)    PHYSICAL EXAM:   Lungs: clear   CVS: S1 S2 systolic murmur +   Abdomen: no tenderness  Neuro: AO x 3 nonfocal   Skin: warm, dry  Ext: trace edema    LABS:                        8.4    3.70  )-----------( 112      ( 21 Mar 2024 03:52 )             25.8     03-21    142  |  113<H>  |  75<H>  ----------------------------<  90  4.3   |  19<L>  |  3.75<H>    Ca    8.2<L>      21 Mar 2024 03:52  Phos  4.8     03-21  Mg     2.30     03-21      PT/INR - ( 21 Mar 2024 03:52 )   PT: 16.4 sec;   INR: 1.47 ratio         PTT - ( 21 Mar 2024 03:52 )  PTT:41.1 sec      Urinalysis Basic - ( 21 Mar 2024 03:52 )    Color: x / Appearance: x / SG: x / pH: x  Gluc: 90 mg/dL / Ketone: x  / Bili: x / Urobili: x   Blood: x / Protein: x / Nitrite: x   Leuk Esterase: x / RBC: x / WBC x   Sq Epi: x / Non Sq Epi: x / Bacteria: x          All consultant(s) notes reviewed and care discussed with other providers        Contact Number, Dr Arroyo [phone]

## 2024-03-21 NOTE — PROGRESS NOTE ADULT - PROBLEM SELECTOR PLAN 5
continue finger sticks with short acting insulin sliding scale  no oral meds  diabetic diet  monitor for hypoglycemia

## 2024-03-21 NOTE — RAPID RESPONSE TEAM SUMMARY - NSOTHERINTERVENTIONSRRT_GEN_ALL_CORE
- none pain management is managing pain since pt is on a heavy dose of narcotics, states she takes oxycontin 80 mg q8h and oxyIR 45 mg q4h prn  bowel regimen, pain regimen per pain management, on dilaudid/oxyIR prn

## 2024-03-21 NOTE — PROGRESS NOTE ADULT - ASSESSMENT
88yM with PMHx CKD, HFrEF, CAD s/p stents presenting with 2 weeks progressive shortness of breath on exertion. Pt denies worsening pedal edema, orthopnea, chest pains or nausea associated with SOB. Denies infectious symptoms fever/chills, cough, URI symptoms recently. Takes furosemide 40mg every other day this dose has not been changed recently. No BRBPR, melena, epistaxis, hematuria, any other bleeding.  he says h is sob was gradually building up till he could not take it and came to hospital : he deneis any underlying lung disease but is an ex smoker:   no fever,   no cough , no phlegm ;    SOB/CHF/CAD/S/P PCI  ex smoker:   CKD    SOB/CHF/CAD/S/P PCI  -his history is highly suggestive of chf exacerbation;   -he has formed small concepcion effusions:  cxr prior to his film was clear:   -he hasn o clinical features of PNEUMONIA:  -on vbg shows mild metabolic acidosis  -added on Lasix : to co nt:   -no need fro antibiotics or bipap at this time; -consider ct chest : last ct from 2022 reviewed:  was suggestive of martínez failure  -his last echo with mod lv dysfunction  need a repeat one  -Check D Dimer in AM  : clinical  probability of PE is low   3/18:  -clinically he has improved  on room air:  d dimer is slightly elevated:  no further workup : diuretics per primary t eam   3/19: seems OK; no sob: on room air:  no sob: leg edema has resolved  3/20: sob is likely due to mild chf:  improved lasix:  currently on room air and has no leg edema  3/21: seems ok: pulm wise:  for endoscopy today   ex smoker:   -she has 35 pk years history of smoking:   -has no diagnosis of copd:  -his last ct chest did not show emphysema:   -need to mantain o2 sao2 above 88%  -he would need outpt full PFT   CKD  -per renal :    dvt prophylaxis:    dw team

## 2024-03-21 NOTE — PROGRESS NOTE ADULT - ASSESSMENT
88y Male with CAD, HFrEF, Afib, HTN, DM, solitary R kidney and CKD p/w acute on chronic HFrEF and acute on chronic kidney injury.  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  On baby ASA     GI-   EGD done and there was a mass?  Awaiting GI note     DW Dr Arroyo      [facility]   [phone]

## 2024-03-21 NOTE — PROGRESS NOTE ADULT - SUBJECTIVE AND OBJECTIVE BOX
NEPHROLOGY-[facility] [phone]        Patient seen and examined in bed.  He was the same   SP EGD - Mass was found         MEDICATIONS  (STANDING):  aMIOdarone    Tablet 200 milliGRAM(s) Oral daily  aspirin enteric coated 81 milliGRAM(s) Oral daily  atorvastatin 80 milliGRAM(s) Oral at bedtime  dextrose 5%. 1000 milliLiter(s) (100 mL/Hr) IV Continuous <Continuous>  dextrose 5%. 1000 milliLiter(s) (50 mL/Hr) IV Continuous <Continuous>  dextrose 50% Injectable 25 Gram(s) IV Push once  dextrose 50% Injectable 12.5 Gram(s) IV Push once  dextrose 50% Injectable 25 Gram(s) IV Push once  ferrous    sulfate 325 milliGRAM(s) Oral daily  furosemide    Tablet 20 milliGRAM(s) Oral daily  glucagon  Injectable 1 milliGRAM(s) IntraMuscular once  influenza  Vaccine (HIGH DOSE) 0.7 milliLiter(s) IntraMuscular once  insulin glargine Injectable (LANTUS) 3 Unit(s) SubCutaneous at bedtime  insulin lispro (ADMELOG) corrective regimen sliding scale   SubCutaneous three times a day before meals  pantoprazole Infusion 8 mG/Hr (10 mL/Hr) IV Continuous <Continuous>  tamsulosin 0.8 milliGRAM(s) Oral at bedtime      VITAL:  T(C): , Max: 37.1 (03-20-24 @ 20:58)  T(F): , Max: 98.8 (03-20-24 @ 20:58)  HR: 60 (03-21-24 @ 11:22)  BP: 143/45 (03-21-24 @ 11:22)  BP(mean): --  RR: 12 (03-21-24 @ 11:22)  SpO2: 98% (03-21-24 @ 11:22)  Wt(kg): --    I and O's:    03-20 @ 07:01  -  03-21 @ 07:00  --------------------------------------------------------  IN: 1280 mL / OUT: 1200 mL / NET: 80 mL    03-21 @ 07:01  -  03-21 @ 12:44  --------------------------------------------------------  IN: 0 mL / OUT: 380 mL / NET: -380 mL      Height (cm): 170 (03-21 @ 09:54)  Weight (kg): 58.695 (03-21 @ 09:54)  BMI (kg/m2): 20.3 (03-21 @ 09:54)  BSA (m2): 1.68 (03-21 @ 09:54)    PHYSICAL EXAM:    Constitutional: cachetic   Neck:  No JVD  Respiratory: CTAB/L  Cardiovascular: S1 and S2  Gastrointestinal: BS+, soft, NT/ND  Extremities: No peripheral edema  Neurological: A/O x 3, no focal deficits  Psychiatric: Normal mood, normal affect  : No Temple  Skin: No rashes  Access: Not applicable    LABS:                        8.4    3.70  )-----------( 112      ( 21 Mar 2024 03:52 )             25.8     03-21    142  |  113<H>  |  75<H>  ----------------------------<  90  4.3   |  19<L>  |  3.75<H>    Ca    8.2<L>      21 Mar 2024 03:52  Phos  4.8     03-21  Mg     2.30     03-21            Urine Studies:  Urinalysis Basic - ( 21 Mar 2024 03:52 )    Color: x / Appearance: x / SG: x / pH: x  Gluc: 90 mg/dL / Ketone: x  / Bili: x / Urobili: x   Blood: x / Protein: x / Nitrite: x   Leuk Esterase: x / RBC: x / WBC x   Sq Epi: x / Non Sq Epi: x / Bacteria: x            RADIOLOGY & ADDITIONAL STUDIES:    < from: US Abdomen Upper Quadrant Right (03.18.24 @ 16:29) >    ACC: [id number] EXAM:  US ABDOMEN RT UPR QUADRANT   ORDERED BY: ORLANDO PETER     PROCEDURE DATE:  03/18/2024          INTERPRETATION:  CLINICAL INFORMATION: Chronic kidney disease. Evaluate   liver.    COMPARISON: None available.    TECHNIQUE: Sonography of the right upper quadrant.    FINDINGS:  Liver: Mildly increased echogenicity of the enlarged liver, consistent   with underlying parenchymal disease. No surface nodularity.  Bile ducts: Normal caliber. Common bile duct measures 4 mm.  Gallbladder: Cholelithiasis.  Contracted gallbladder.  Pancreas: Visualized portions are within normal limits.  Right kidney: 12.3 cm. Increased echogenicity. Upper pole cyst measures   3.2 cm. Mild hydronephrosis..  Ascites: None.  IVC: Visualized portions are within normal limits.    IMPRESSION:  *  Enlarged liver, demonstrating the presence of parenchymal disease.  *  Cholelithiasis, without evidence of acute cholecystitis.  *  Right renal parenchymal disease.  *  Mild right hydronephrosis.        --- End of Report ---            KEVIN AZUL MD; Attending Radiologist  This document has been electronically signed. Mar 18 2024  4:40PM    < end of copied text >

## 2024-03-22 LAB
ANION GAP SERPL CALC-SCNC: 10 MMOL/L — SIGNIFICANT CHANGE UP (ref 7–14)
BUN SERPL-MCNC: 87 MG/DL — HIGH (ref 7–23)
CALCIUM SERPL-MCNC: 8 MG/DL — LOW (ref 8.4–10.5)
CHLORIDE SERPL-SCNC: 112 MMOL/L — HIGH (ref 98–107)
CO2 SERPL-SCNC: 16 MMOL/L — LOW (ref 22–31)
CREAT SERPL-MCNC: 3.44 MG/DL — HIGH (ref 0.5–1.3)
EGFR: 16 ML/MIN/1.73M2 — LOW
GLUCOSE BLDC GLUCOMTR-MCNC: 151 MG/DL — HIGH (ref 70–99)
GLUCOSE BLDC GLUCOMTR-MCNC: 153 MG/DL — HIGH (ref 70–99)
GLUCOSE BLDC GLUCOMTR-MCNC: 190 MG/DL — HIGH (ref 70–99)
GLUCOSE BLDC GLUCOMTR-MCNC: 192 MG/DL — HIGH (ref 70–99)
GLUCOSE BLDC GLUCOMTR-MCNC: 242 MG/DL — HIGH (ref 70–99)
GLUCOSE SERPL-MCNC: 154 MG/DL — HIGH (ref 70–99)
HCT VFR BLD CALC: 25.3 % — LOW (ref 39–50)
HCT VFR BLD CALC: 25.8 % — LOW (ref 39–50)
HGB BLD-MCNC: 8.6 G/DL — LOW (ref 13–17)
HGB BLD-MCNC: 8.6 G/DL — LOW (ref 13–17)
MAGNESIUM SERPL-MCNC: 2.2 MG/DL — SIGNIFICANT CHANGE UP (ref 1.6–2.6)
MCHC RBC-ENTMCNC: 32.3 PG — SIGNIFICANT CHANGE UP (ref 27–34)
MCHC RBC-ENTMCNC: 33.1 PG — SIGNIFICANT CHANGE UP (ref 27–34)
MCHC RBC-ENTMCNC: 33.3 GM/DL — SIGNIFICANT CHANGE UP (ref 32–36)
MCHC RBC-ENTMCNC: 34 GM/DL — SIGNIFICANT CHANGE UP (ref 32–36)
MCV RBC AUTO: 97 FL — SIGNIFICANT CHANGE UP (ref 80–100)
MCV RBC AUTO: 97.3 FL — SIGNIFICANT CHANGE UP (ref 80–100)
NRBC # BLD: 0 /100 WBCS — SIGNIFICANT CHANGE UP (ref 0–0)
NRBC # BLD: 0 /100 WBCS — SIGNIFICANT CHANGE UP (ref 0–0)
NRBC # FLD: 0 K/UL — SIGNIFICANT CHANGE UP (ref 0–0)
NRBC # FLD: 0 K/UL — SIGNIFICANT CHANGE UP (ref 0–0)
PHOSPHATE SERPL-MCNC: 4 MG/DL — SIGNIFICANT CHANGE UP (ref 2.5–4.5)
PLATELET # BLD AUTO: 105 K/UL — LOW (ref 150–400)
PLATELET # BLD AUTO: 90 K/UL — LOW (ref 150–400)
POTASSIUM SERPL-MCNC: 4.9 MMOL/L — SIGNIFICANT CHANGE UP (ref 3.5–5.3)
POTASSIUM SERPL-SCNC: 4.9 MMOL/L — SIGNIFICANT CHANGE UP (ref 3.5–5.3)
RBC # BLD: 2.6 M/UL — LOW (ref 4.2–5.8)
RBC # BLD: 2.66 M/UL — LOW (ref 4.2–5.8)
RBC # FLD: 20.3 % — HIGH (ref 10.3–14.5)
RBC # FLD: 20.3 % — HIGH (ref 10.3–14.5)
SODIUM SERPL-SCNC: 138 MMOL/L — SIGNIFICANT CHANGE UP (ref 135–145)
WBC # BLD: 5.07 K/UL — SIGNIFICANT CHANGE UP (ref 3.8–10.5)
WBC # BLD: 5.25 K/UL — SIGNIFICANT CHANGE UP (ref 3.8–10.5)
WBC # FLD AUTO: 5.07 K/UL — SIGNIFICANT CHANGE UP (ref 3.8–10.5)
WBC # FLD AUTO: 5.25 K/UL — SIGNIFICANT CHANGE UP (ref 3.8–10.5)

## 2024-03-22 PROCEDURE — 99232 SBSQ HOSP IP/OBS MODERATE 35: CPT | Mod: GC

## 2024-03-22 PROCEDURE — 76770 US EXAM ABDO BACK WALL COMP: CPT | Mod: 26

## 2024-03-22 RX ADMIN — PANTOPRAZOLE SODIUM 10 MG/HR: 20 TABLET, DELAYED RELEASE ORAL at 07:16

## 2024-03-22 RX ADMIN — Medication 2: at 18:55

## 2024-03-22 RX ADMIN — Medication 1: at 11:57

## 2024-03-22 RX ADMIN — TAMSULOSIN HYDROCHLORIDE 0.8 MILLIGRAM(S): 0.4 CAPSULE ORAL at 21:31

## 2024-03-22 RX ADMIN — Medication 325 MILLIGRAM(S): at 11:56

## 2024-03-22 RX ADMIN — Medication 1: at 08:53

## 2024-03-22 RX ADMIN — Medication 20 MILLIGRAM(S): at 05:21

## 2024-03-22 RX ADMIN — AMIODARONE HYDROCHLORIDE 200 MILLIGRAM(S): 400 TABLET ORAL at 05:21

## 2024-03-22 RX ADMIN — ATORVASTATIN CALCIUM 80 MILLIGRAM(S): 80 TABLET, FILM COATED ORAL at 21:31

## 2024-03-22 RX ADMIN — PANTOPRAZOLE SODIUM 10 MG/HR: 20 TABLET, DELAYED RELEASE ORAL at 19:41

## 2024-03-22 RX ADMIN — INSULIN GLARGINE 3 UNIT(S): 100 INJECTION, SOLUTION SUBCUTANEOUS at 21:29

## 2024-03-22 NOTE — PROGRESS NOTE ADULT - NSPROGADDITIONALINFOA_GEN_ALL_CORE
discussed with patient in detail, expresses understanding of treatment plans.  discussed with covering ACP

## 2024-03-22 NOTE — PROGRESS NOTE ADULT - ATTENDING COMMENTS
S/p EGD yesterday for melenic stools and acute blood loss anemia. Full EGD results above, but notable for severely hemorrhagic mucosa in antrum s/p biopsies and Hemostatic spray for hemostasis. Additional findings as outlined above. Post-procedure, patient with RRT for hypotension on floors, which improved with further resuscitation. Patient reports feeling better today. Reports one melenic stool, but no other complaints.     # Intermittent dark stools with worsening anemia c/f UGIB  # LILIAN on CKD  # Acute on chronic diastolic heart failure   # Afib on Eliquis  # CAD s/p stents on ASA  # Bladder cancer s/p chemo    --No objection to continuing ASA for secondary prophylaxis  --Follow up pending path; GI pathology emailed to request expedited pathology review to assist with next steps in management.  --High dose PPI   --Trend CBC; transfuse as needed to maintain Hgb >/= 8 given cardiac history  --Given diffuse mucosal oozing, patient is at risk of ongoing bleeding from gastric antrum. Etiology remains unclear, but concern for infiltrative process. R/B/A of resuming A/C per primary team.    Above plan reviewed in depth with patient and family at bedside. All questions answered.

## 2024-03-22 NOTE — PROGRESS NOTE ADULT - ASSESSMENT
A/P    88 yM with PMHx CKD, HFrEF, CAD s/p PCI, AF on a/c, bladder cancer in remission, diabetes, TIA, left nephrectomy, presenting with 2 weeks progressive shortness of breath on exertion. Pt denies worsening pedal edema, orthopnea, chest pains or nausea associated with SOB.     #SOB, acute on chronic HFrEF  -likely secondary to mild volume overload-- resolved   -on oral lasix 20mg daILY  -previous echo w mild LV dysfunction  -repeat TTE w mild LV dysfunction    #Atrial Fibrillation  -stable  -AC HELD   -cont amio    #CAD, s/p PCI  -stable  -ASA on hold    #UGIB/Anemia  -transfuse prn    #CKD/LILIAN  -?cardio renal   -no obstruction  -trend creat       35 minutes spent on total encounter; more than 50% of the visit was spent counseling and/or coordinating care by the attending physician.

## 2024-03-22 NOTE — PROGRESS NOTE ADULT - SUBJECTIVE AND OBJECTIVE BOX
Patient is a 88y old  Male who presents with a chief complaint of shortness of breath (22 Mar 2024 13:41)      DATE OF SERVICE: 03-22-24 @ 14:42    SUBJECTIVE / OVERNIGHT EVENTS: overnight events noted    ROS:  Resp: No cough no sputum production  CVS: No chest pain no palpitations no orthopnea  GI: no N/V/D  'I feel fine'         MEDICATIONS  (STANDING):  aMIOdarone    Tablet 200 milliGRAM(s) Oral daily  atorvastatin 80 milliGRAM(s) Oral at bedtime  dextrose 5%. 1000 milliLiter(s) (100 mL/Hr) IV Continuous <Continuous>  dextrose 5%. 1000 milliLiter(s) (50 mL/Hr) IV Continuous <Continuous>  dextrose 50% Injectable 25 Gram(s) IV Push once  dextrose 50% Injectable 12.5 Gram(s) IV Push once  dextrose 50% Injectable 25 Gram(s) IV Push once  ferrous    sulfate 325 milliGRAM(s) Oral daily  furosemide    Tablet 20 milliGRAM(s) Oral daily  glucagon  Injectable 1 milliGRAM(s) IntraMuscular once  influenza  Vaccine (HIGH DOSE) 0.7 milliLiter(s) IntraMuscular once  insulin glargine Injectable (LANTUS) 3 Unit(s) SubCutaneous at bedtime  insulin lispro (ADMELOG) corrective regimen sliding scale   SubCutaneous three times a day before meals  pantoprazole Infusion 8 mG/Hr (10 mL/Hr) IV Continuous <Continuous>  tamsulosin 0.8 milliGRAM(s) Oral at bedtime    MEDICATIONS  (PRN):  dextrose Oral Gel 15 Gram(s) Oral once PRN Blood Glucose LESS THAN 70 milliGRAM(s)/deciliter        CAPILLARY BLOOD GLUCOSE      POCT Blood Glucose.: 190 mg/dL (22 Mar 2024 11:53)  POCT Blood Glucose.: 153 mg/dL (22 Mar 2024 08:23)  POCT Blood Glucose.: 151 mg/dL (22 Mar 2024 07:38)  POCT Blood Glucose.: 140 mg/dL (21 Mar 2024 20:59)  POCT Blood Glucose.: 154 mg/dL (21 Mar 2024 18:12)  POCT Blood Glucose.: 164 mg/dL (21 Mar 2024 17:08)    I&O's Summary    21 Mar 2024 07:01  -  22 Mar 2024 07:00  --------------------------------------------------------  IN: 480 mL / OUT: 380 mL / NET: 100 mL    22 Mar 2024 07:01  -  22 Mar 2024 14:42  --------------------------------------------------------  IN: 577 mL / OUT: 0 mL / NET: 577 mL        Vital Signs Last 24 Hrs  T(C): 36.5 (22 Mar 2024 13:00), Max: 36.8 (22 Mar 2024 05:17)  T(F): 97.7 (22 Mar 2024 13:00), Max: 98.2 (22 Mar 2024 05:17)  HR: 61 (22 Mar 2024 13:00) (59 - 67)  BP: 138/54 (22 Mar 2024 13:00) (104/51 - 138/54)  BP(mean): --  RR: 17 (22 Mar 2024 13:00) (16 - 18)  SpO2: 100% (22 Mar 2024 13:00) (98% - 100%)    PHYSICAL EXAM:   Lungs: clear   CVS: S1 S2 systolic murmur +   Abdomen: no tenderness  Neuro: AO x 3 nonfocal   Skin: warm, dry  Ext: trace edema    LABS:                        8.6    5.25  )-----------( 90       ( 22 Mar 2024 05:20 )             25.8     03-22    138  |  112<H>  |  87<H>  ----------------------------<  154<H>  4.9   |  16<L>  |  3.44<H>    Ca    8.0<L>      22 Mar 2024 05:20  Phos  4.0     03-22  Mg     2.20     03-22    TPro  6.0  /  Alb  2.7<L>  /  TBili  0.5  /  DBili  x   /  AST  44<H>  /  ALT  47<H>  /  AlkPhos  106  03-21    PT/INR - ( 21 Mar 2024 14:40 )   PT: 15.3 sec;   INR: 1.37 ratio         PTT - ( 21 Mar 2024 03:52 )  PTT:41.1 sec      Urinalysis Basic - ( 22 Mar 2024 05:20 )    Color: x / Appearance: x / SG: x / pH: x  Gluc: 154 mg/dL / Ketone: x  / Bili: x / Urobili: x   Blood: x / Protein: x / Nitrite: x   Leuk Esterase: x / RBC: x / WBC x   Sq Epi: x / Non Sq Epi: x / Bacteria: x          All consultant(s) notes reviewed and care discussed with other providers        Contact Number, Dr Arroyo [phone]

## 2024-03-22 NOTE — PROGRESS NOTE ADULT - SUBJECTIVE AND OBJECTIVE BOX
Interval Events:   S/p EGD 3/21 with diffuse hemorrhagic gastritis. RRT 3/21 PM for hypotension, s/p 1U PRBC and IVF with stabilization.       Hospital Medications:  aMIOdarone    Tablet 200 milliGRAM(s) Oral daily  atorvastatin 80 milliGRAM(s) Oral at bedtime  dextrose 5%. 1000 milliLiter(s) IV Continuous <Continuous>  dextrose 5%. 1000 milliLiter(s) IV Continuous <Continuous>  dextrose 50% Injectable 25 Gram(s) IV Push once  dextrose 50% Injectable 12.5 Gram(s) IV Push once  dextrose 50% Injectable 25 Gram(s) IV Push once  dextrose Oral Gel 15 Gram(s) Oral once PRN  ferrous    sulfate 325 milliGRAM(s) Oral daily  furosemide    Tablet 20 milliGRAM(s) Oral daily  glucagon  Injectable 1 milliGRAM(s) IntraMuscular once  influenza  Vaccine (HIGH DOSE) 0.7 milliLiter(s) IntraMuscular once  insulin glargine Injectable (LANTUS) 3 Unit(s) SubCutaneous at bedtime  insulin lispro (ADMELOG) corrective regimen sliding scale   SubCutaneous three times a day before meals  pantoprazole Infusion 8 mG/Hr IV Continuous <Continuous>  tamsulosin 0.8 milliGRAM(s) Oral at bedtime      PHYSICAL EXAM:   Vital Signs:  Vital Signs Last 24 Hrs  T(C): 36.8 (22 Mar 2024 05:17), Max: 36.8 (22 Mar 2024 05:17)  T(F): 98.2 (22 Mar 2024 05:17), Max: 98.2 (22 Mar 2024 05:17)  HR: 66 (22 Mar 2024 05:17) (57 - 70)  BP: 104/51 (22 Mar 2024 05:17) (72/33 - 149/39)  BP(mean): --  RR: 18 (22 Mar 2024 05:17) (12 - 19)  SpO2: 100% (22 Mar 2024 05:17) (97% - 100%)    Parameters below as of 22 Mar 2024 05:17  Patient On (Oxygen Delivery Method): room air      Daily Height in cm: 170 (21 Mar 2024 09:54)    Daily     GENERAL: no acute distress  NEURO: alert and oriented x 3  HEENT: NCAT, no conjunctival pallor appreciated; anicteric sclera  CHEST: no respiratory distress, no accessory muscle use  CARDIAC: regular rate, +S1/S2  ABDOMEN: soft, nontender, no rebound or guarding  EXTREMITIES: warm, well perfused  SKIN: no active lesions noted    LABS: reviewed                        9.8    4.31  )-----------( 89       ( 21 Mar 2024 14:40 )             30.0     03-21    143  |  114<H>  |  77<H>  ----------------------------<  183<H>  4.6   |  17<L>  |  3.44<H>    Ca    7.7<L>      21 Mar 2024 16:30  Phos  4.4     03-21  Mg     2.10     03-21    TPro  6.0  /  Alb  2.7<L>  /  TBili  0.5  /  DBili  x   /  AST  44<H>  /  ALT  47<H>  /  AlkPhos  106  03-21       Interval Events:   S/p EGD 3/21 with diffuse hemorrhagic gastritis. RRT 3/21 PM for hypotension, s/p 1U PRBC and IVF with stabilization.   1 Melena this AM, otherwise asymptomatic and tolerating diet.       Hospital Medications:  aMIOdarone    Tablet 200 milliGRAM(s) Oral daily  atorvastatin 80 milliGRAM(s) Oral at bedtime  dextrose 5%. 1000 milliLiter(s) IV Continuous <Continuous>  dextrose 5%. 1000 milliLiter(s) IV Continuous <Continuous>  dextrose 50% Injectable 25 Gram(s) IV Push once  dextrose 50% Injectable 12.5 Gram(s) IV Push once  dextrose 50% Injectable 25 Gram(s) IV Push once  dextrose Oral Gel 15 Gram(s) Oral once PRN  ferrous    sulfate 325 milliGRAM(s) Oral daily  furosemide    Tablet 20 milliGRAM(s) Oral daily  glucagon  Injectable 1 milliGRAM(s) IntraMuscular once  influenza  Vaccine (HIGH DOSE) 0.7 milliLiter(s) IntraMuscular once  insulin glargine Injectable (LANTUS) 3 Unit(s) SubCutaneous at bedtime  insulin lispro (ADMELOG) corrective regimen sliding scale   SubCutaneous three times a day before meals  pantoprazole Infusion 8 mG/Hr IV Continuous <Continuous>  tamsulosin 0.8 milliGRAM(s) Oral at bedtime      PHYSICAL EXAM:   Vital Signs:  Vital Signs Last 24 Hrs  T(C): 36.8 (22 Mar 2024 05:17), Max: 36.8 (22 Mar 2024 05:17)  T(F): 98.2 (22 Mar 2024 05:17), Max: 98.2 (22 Mar 2024 05:17)  HR: 66 (22 Mar 2024 05:17) (57 - 70)  BP: 104/51 (22 Mar 2024 05:17) (72/33 - 149/39)  BP(mean): --  RR: 18 (22 Mar 2024 05:17) (12 - 19)  SpO2: 100% (22 Mar 2024 05:17) (97% - 100%)    Parameters below as of 22 Mar 2024 05:17  Patient On (Oxygen Delivery Method): room air      Daily Height in cm: 170 (21 Mar 2024 09:54)    Daily     GENERAL: no acute distress  NEURO: alert and oriented x 3  HEENT: NCAT, no conjunctival pallor appreciated; anicteric sclera  CHEST: no respiratory distress, no accessory muscle use  CARDIAC: regular rate, +S1/S2  ABDOMEN: soft, nontender, no rebound or guarding  EXTREMITIES: warm, well perfused  SKIN: no active lesions noted    LABS: reviewed                        9.8    4.31  )-----------( 89       ( 21 Mar 2024 14:40 )             30.0     03-21    143  |  114<H>  |  77<H>  ----------------------------<  183<H>  4.6   |  17<L>  |  3.44<H>    Ca    7.7<L>      21 Mar 2024 16:30  Phos  4.4     03-21  Mg     2.10     03-21    TPro  6.0  /  Alb  2.7<L>  /  TBili  0.5  /  DBili  x   /  AST  44<H>  /  ALT  47<H>  /  AlkPhos  106  03-21       Interval Events:   S/p EGD 3/21 with diffuse hemorrhagic gastritis. RRT 3/21 PM for hypotension, s/p 1U PRBC and IVF with stabilization.   1 Melena this AM, otherwise asymptomatic and tolerating diet.       Hospital Medications:  aMIOdarone    Tablet 200 milliGRAM(s) Oral daily  atorvastatin 80 milliGRAM(s) Oral at bedtime  dextrose 5%. 1000 milliLiter(s) IV Continuous <Continuous>  dextrose 5%. 1000 milliLiter(s) IV Continuous <Continuous>  dextrose 50% Injectable 25 Gram(s) IV Push once  dextrose 50% Injectable 12.5 Gram(s) IV Push once  dextrose 50% Injectable 25 Gram(s) IV Push once  dextrose Oral Gel 15 Gram(s) Oral once PRN  ferrous    sulfate 325 milliGRAM(s) Oral daily  furosemide    Tablet 20 milliGRAM(s) Oral daily  glucagon  Injectable 1 milliGRAM(s) IntraMuscular once  influenza  Vaccine (HIGH DOSE) 0.7 milliLiter(s) IntraMuscular once  insulin glargine Injectable (LANTUS) 3 Unit(s) SubCutaneous at bedtime  insulin lispro (ADMELOG) corrective regimen sliding scale   SubCutaneous three times a day before meals  pantoprazole Infusion 8 mG/Hr IV Continuous <Continuous>  tamsulosin 0.8 milliGRAM(s) Oral at bedtime      PHYSICAL EXAM:   Vital Signs:  Vital Signs Last 24 Hrs  T(C): 36.8 (22 Mar 2024 05:17), Max: 36.8 (22 Mar 2024 05:17)  T(F): 98.2 (22 Mar 2024 05:17), Max: 98.2 (22 Mar 2024 05:17)  HR: 66 (22 Mar 2024 05:17) (57 - 70)  BP: 104/51 (22 Mar 2024 05:17) (72/33 - 149/39)  BP(mean): --  RR: 18 (22 Mar 2024 05:17) (12 - 19)  SpO2: 100% (22 Mar 2024 05:17) (97% - 100%)    Parameters below as of 22 Mar 2024 05:17  Patient On (Oxygen Delivery Method): room air      Daily Height in cm: 170 (21 Mar 2024 09:54)    Daily     GENERAL: no acute distress  NEURO: alert and oriented x 3  HEENT: NCAT, no conjunctival pallor appreciated; anicteric sclera  CHEST: no respiratory distress, no accessory muscle use  CARDIAC: regular rate, +S1/S2  ABDOMEN: soft, nontender, no rebound or guarding  EXTREMITIES: warm, well perfused  SKIN: no active lesions noted    LABS:                         9.8    4.31  )-----------( 89       ( 21 Mar 2024 14:40 )             30.0     03-21    143  |  114<H>  |  77<H>  ----------------------------<  183<H>  4.6   |  17<L>  |  3.44<H>    Ca    7.7<L>      21 Mar 2024 16:30  Phos  4.4     03-21  Mg     2.10     03-21    TPro  6.0  /  Alb  2.7<L>  /  TBili  0.5  /  DBili  x   /  AST  44<H>  /  ALT  47<H>  /  AlkPhos  106  03-21    < from: Upper Endoscopy (03.21.24 @ 10:00) >  Impression:          - LA Grade A esophagitis.                       - A large amount of red blood was found in antrum of                        stomach. Diffuse severe mucosal changes characterized by                        congestion, erythema, friability (with spontaneous                        bleeding), hemorrhagic appearance, and mild sloughing                        were found diffusely in the gastric antrum. No discrete                        mass was identified. Biopsied. Hemostatic spray applied.                        DDx concerning for malignancy (primary vs linitis                        plastica) or alternate infiltrative process (eg                        amyloidosis).                       - Gastroopathy.                       - Nodular mucosa in the duodenal bulb.  Recommendation:      - [facility].                       - Advance diet as tolerated today.                       - Continue with PPI gtt.                       - Administer 1U pRBC given ongoing active slow oozing.                       - Await pathology results.                       - Given diffuse mucosal oozing, patient is at risk of                        ongoing bleeding from gastric antrum. Etiology remains                        unclear, path pending. R/B/A of resuming A/C per primary                        team.    < end of copied text >

## 2024-03-22 NOTE — PROGRESS NOTE ADULT - SUBJECTIVE AND OBJECTIVE BOX
ANESTHESIA POSTOP CHECK    88y Male POSTOP DAY 1     Vital Signs Last 24 Hrs  T(C): 36.8 (22 Mar 2024 05:17), Max: 36.8 (22 Mar 2024 05:17)  T(F): 98.2 (22 Mar 2024 05:17), Max: 98.2 (22 Mar 2024 05:17)  HR: 66 (22 Mar 2024 05:17) (57 - 70)  BP: 104/51 (22 Mar 2024 05:17) (72/33 - 149/39)  BP(mean): --  RR: 18 (22 Mar 2024 05:17) (12 - 19)  SpO2: 100% (22 Mar 2024 05:17) (97% - 100%)    Parameters below as of 22 Mar 2024 05:17  Patient On (Oxygen Delivery Method): room air      I&O's Summary    21 Mar 2024 07:01  -  22 Mar 2024 07:00  --------------------------------------------------------  IN: 480 mL / OUT: 380 mL / NET: 100 mL    22 Mar 2024 07:01  -  22 Mar 2024 09:54  --------------------------------------------------------  IN: 220 mL / OUT: 0 mL / NET: 220 mL        [X ] NO APPARENT ANESTHESIA COMPLICATIONS      Comments:

## 2024-03-22 NOTE — PROGRESS NOTE ADULT - ASSESSMENT
88y Male with CAD, HFrEF, Afib, HTN, DM, solitary R kidney and CKD p/w acute on chronic HFrEF and acute on chronic kidney injury.  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  I think he is euvolemic at present n not dehydrated   Ct scan was done 11/2023 and there was no hydro and now the renal sono had mild hydro in a solitary kidney-  eval?  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  On baby ASA     GI-   EGD with biopsy but no specific mass found and slow ooze     Will dw Dr Arroyo      [facility]   [phone]              88y Male with CAD, HFrEF, Afib, HTN, DM, solitary R kidney and CKD p/w acute on chronic HFrEF and acute on chronic kidney injury.  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  I think he is euvolemic at present n not dehydrated   Ct scan was done 11/2023 and there was no hydro and now the renal sono had mild hydro in a solitary kidney-  eval?  renal sono ordered   Due to his age we (pt and myself)have decided on no renal bx   Metabolic acidosis-  likely due to CKD/LILIAN, on sodium bicarbonate tabs x 3 days ( day 3/3)  Anemia in CKD- mild  iron deficiency sp venofer       CV- acute on chronic HFrEF and this seems resolved.  On baby ASA     GI-   EGD with biopsy but no specific mass found and slow ooze     Will dw Dr Arroyo      [facility]ayed [facility]   [phone]

## 2024-03-22 NOTE — PROGRESS NOTE ADULT - ASSESSMENT
88yM with PMHx CKD, HFrEF, CAD s/p stents presenting with 2 weeks progressive shortness of breath on exertion. Pt denies worsening pedal edema, orthopnea, chest pains or nausea associated with SOB. Denies infectious symptoms fever/chills, cough, URI symptoms recently. Takes furosemide 40mg every other day this dose has not been changed recently. No BRBPR, melena, epistaxis, hematuria, any other bleeding.  he says h is sob was gradually building up till he could not take it and came to hospital : he deneis any underlying lung disease but is an ex smoker:   no fever,   no cough , no phlegm ;    SOB/CHF/CAD/S/P PCI  ex smoker:   CKD    SOB/CHF/CAD/S/P PCI  -his history is highly suggestive of chf exacerbation;   -he has formed small concepcion effusions:  cxr prior to his film was clear:   -he hasn o clinical features of PNEUMONIA:  -on vbg shows mild metabolic acidosis  -added on Lasix : to co nt:   -no need fro antibiotics or bipap at this time; -consider ct chest : last ct from 2022 reviewed:  was suggestive of [facility] failure  -his last echo with mod lv dysfunction  need a repeat one  -Check D Dimer in AM  : clinical  probability of PE is low   3/18:  -clinically he has improved  on room air:  d dimer is slightly elevated:  no further workup : diuretics per primary t eam   3/19: seems OK; no sob: on room air:  no sob: leg edema has resolved  3/20: sob is likely due to mild chf:  improved lasix:  currently on room air and has no leg edema  3/21: seems ok: pulm wise:  for endoscopy today   3/12: h9s endoscopy is done showed:  LA Grade A esophagitis.                       - A large amount of red blood was found in antrum of                        stomach. Diffuse severe mucosal changes characterized by                        congestion, erythema, friability (with spontaneous                        bleeding), hemorrhagic appearance, and mild sloughing                        were found diffusely in the gastric antrum. No discrete                        mass was identified. Biopsied. Hemostatic spray applied.                        DDx concerning for malignancy (primary vs linitis                        plastica) or alternate infiltrative process (eg                        amyloidosis).                       - Gastroopathy.                       - Nodular mucosa in the duodenal bulb.  -no ac for now  ex smoker:   -she has 35 pk years history of smoking:   -has no diagnosis of copd:  -his last ct chest did not show emphysema:   -need to mantain o2 sao2 above 88%  -he would need outpt full PFT   3/22: seems stable: tolerated the procedure well:  no resp distress  CKD  -per renal :    dvt prophylaxis:    dw team

## 2024-03-22 NOTE — PROGRESS NOTE ADULT - SUBJECTIVE AND OBJECTIVE BOX
CARDIOLOGY FOLLOW UP - Dr. Vega  Date of Service: 3/22/2024  CC: events noted    Review of Systems:  Constitutional: No fever, weight loss, or fatigue  Respiratory: No cough, wheezing, or hemoptysis, no shortness of breath  Cardiovascular: No chest pain, palpitations, passing out, dizziness, or leg swelling  Gastrointestinal: No abd or epigastric pain. No nausea, vomiting, or hematemesis; no diarrhea or consiptaiton, no melena or hematochezia  Vascular: No edema     TELEMETRY:    PHYSICAL EXAM:  T(C): 36.8 (03-22-24 @ 05:17), Max: 36.8 (03-22-24 @ 05:17)  HR: 66 (03-22-24 @ 05:17) (59 - 70)  BP: 104/51 (03-22-24 @ 05:17) (72/33 - 125/51)  RR: 18 (03-22-24 @ 05:17) (16 - 18)  SpO2: 100% (03-22-24 @ 05:17) (97% - 100%)  Wt(kg): --  I&O's Summary    21 Mar 2024 07:01  -  22 Mar 2024 07:00  --------------------------------------------------------  IN: 480 mL / OUT: 380 mL / NET: 100 mL    22 Mar 2024 07:01  -  22 Mar 2024 11:31  --------------------------------------------------------  IN: 220 mL / OUT: 0 mL / NET: 220 mL        Appearance: Normal	  Cardiovascular: Normal S1 S2,RRR, No JVD, No murmurs  Respiratory: Lungs clear to auscultation	  Gastrointestinal:  Soft, Non-tender, + BS	  Extremities: Normal range of motion, No clubbing, cyanosis or edema  Vascular: Peripheral pulses palpable 2+ bilaterally       Home Medications:  apixaban 2.5 mg oral tablet: 1 tab(s) orally every 12 hours (17 Mar 2024 10:48)  Crestor 40 mg oral tablet: 1 tab(s) orally once a day (at bedtime) (17 Mar 2024 10:49)  repaglinide 0.5 mg oral tablet: 1 tab(s) orally 3 times a day (before meals) (17 Mar 2024 10:49)  tamsulosin 0.4 mg oral capsule: 1 cap(s) orally once a day (17 Mar 2024 10:49)  Tresiba FlexTouch 100 units/mL subcutaneous solution: 10 subcutaneous once a day (at bedtime) (17 Mar 2024 10:49)        MEDICATIONS  (STANDING):  aMIOdarone    Tablet 200 milliGRAM(s) Oral daily  atorvastatin 80 milliGRAM(s) Oral at bedtime  dextrose 5%. 1000 milliLiter(s) (100 mL/Hr) IV Continuous <Continuous>  dextrose 5%. 1000 milliLiter(s) (50 mL/Hr) IV Continuous <Continuous>  dextrose 50% Injectable 25 Gram(s) IV Push once  dextrose 50% Injectable 12.5 Gram(s) IV Push once  dextrose 50% Injectable 25 Gram(s) IV Push once  ferrous    sulfate 325 milliGRAM(s) Oral daily  furosemide    Tablet 20 milliGRAM(s) Oral daily  glucagon  Injectable 1 milliGRAM(s) IntraMuscular once  influenza  Vaccine (HIGH DOSE) 0.7 milliLiter(s) IntraMuscular once  insulin glargine Injectable (LANTUS) 3 Unit(s) SubCutaneous at bedtime  insulin lispro (ADMELOG) corrective regimen sliding scale   SubCutaneous three times a day before meals  pantoprazole Infusion 8 mG/Hr (10 mL/Hr) IV Continuous <Continuous>  tamsulosin 0.8 milliGRAM(s) Oral at bedtime        EKG:  RADIOLOGY:  DIAGNOSTIC TESTING:  [ ] Echocardiogram:  [ ] Catherterization:  [ ] Stress Test:  OTHER:     LABS:	 	                          8.6    5.25  )-----------( 90       ( 22 Mar 2024 05:20 )             25.8     03-22    138  |  112<H>  |  87<H>  ----------------------------<  154<H>  4.9   |  16<L>  |  3.44<H>    Ca    8.0<L>      22 Mar 2024 05:20  Phos  4.0     03-22  Mg     2.20     03-22    TPro  6.0  /  Alb  2.7<L>  /  TBili  0.5  /  DBili  x   /  AST  44<H>  /  ALT  47<H>  /  AlkPhos  106  03-21      PT/INR - ( 21 Mar 2024 14:40 )   PT: 15.3 sec;   INR: 1.37 ratio         PTT - ( 21 Mar 2024 03:52 )  PTT:41.1 sec    CARDIAC MARKERS:

## 2024-03-22 NOTE — PROGRESS NOTE ADULT - SUBJECTIVE AND OBJECTIVE BOX
Date of Service: 03-22-24 @ 13:32    Patient is a 88y old  Male who presents with a chief complaint of shortness of breath (22 Mar 2024 11:31)      Any change in ROS: seems to be doing  ok ; no sob: no cough ; still with dark stools; on room air    MEDICATIONS  (STANDING):  aMIOdarone    Tablet 200 milliGRAM(s) Oral daily  atorvastatin 80 milliGRAM(s) Oral at bedtime  dextrose 5%. 1000 milliLiter(s) (100 mL/Hr) IV Continuous <Continuous>  dextrose 5%. 1000 milliLiter(s) (50 mL/Hr) IV Continuous <Continuous>  dextrose 50% Injectable 25 Gram(s) IV Push once  dextrose 50% Injectable 12.5 Gram(s) IV Push once  dextrose 50% Injectable 25 Gram(s) IV Push once  ferrous    sulfate 325 milliGRAM(s) Oral daily  furosemide    Tablet 20 milliGRAM(s) Oral daily  glucagon  Injectable 1 milliGRAM(s) IntraMuscular once  influenza  Vaccine (HIGH DOSE) 0.7 milliLiter(s) IntraMuscular once  insulin glargine Injectable (LANTUS) 3 Unit(s) SubCutaneous at bedtime  insulin lispro (ADMELOG) corrective regimen sliding scale   SubCutaneous three times a day before meals  pantoprazole Infusion 8 mG/Hr (10 mL/Hr) IV Continuous <Continuous>  tamsulosin 0.8 milliGRAM(s) Oral at bedtime    MEDICATIONS  (PRN):  dextrose Oral Gel 15 Gram(s) Oral once PRN Blood Glucose LESS THAN 70 milliGRAM(s)/deciliter    Vital Signs Last 24 Hrs  T(C): 36.5 (22 Mar 2024 13:00), Max: 36.8 (22 Mar 2024 05:17)  T(F): 97.7 (22 Mar 2024 13:00), Max: 98.2 (22 Mar 2024 05:17)  HR: 61 (22 Mar 2024 13:00) (59 - 70)  BP: 138/54 (22 Mar 2024 13:00) (72/33 - 138/54)  BP(mean): --  RR: 17 (22 Mar 2024 13:00) (16 - 18)  SpO2: 100% (22 Mar 2024 13:00) (97% - 100%)    Parameters below as of 22 Mar 2024 13:00  Patient On (Oxygen Delivery Method): room air        I&O's Summary    21 Mar 2024 07:01  -  22 Mar 2024 07:00  --------------------------------------------------------  IN: 480 mL / OUT: 380 mL / NET: 100 mL    22 Mar 2024 07:01  -  22 Mar 2024 13:32  --------------------------------------------------------  IN: 220 mL / OUT: 0 mL / NET: 220 mL          Physical Exam:   GENERAL: NAD, well-groomed, well-developed  HEENT: RAQUEL/   Atraumatic, Normocephalic  ENMT: No tonsillar erythema, exudates, or enlargement; Moist mucous membranes, Good dentition, No lesions  NECK: Supple, No JVD, Normal thyroid  CHEST/LUNG: Clear to auscultaion  CVS: Regular rate and rhythm; No murmurs, rubs, or gallops  GI: : Soft, Nontender, Nondistended; Bowel sounds present  NERVOUS SYSTEM:  Alert & Oriented X3  EXTREMITIES:- edema  LYMPH: No lymphadenopathy noted  SKIN: No rashes or lesions  ENDOCRINOLOGY: No Thyromegaly  PSYCH: Appropriate    Labs:  17, 19                            8.6    5.25  )-----------( 90       ( 22 Mar 2024 05:20 )             25.8                         9.8    4.31  )-----------( 89       ( 21 Mar 2024 14:40 )             30.0                         8.4    3.70  )-----------( 112      ( 21 Mar 2024 03:52 )             25.8                         8.7    3.88  )-----------( 108      ( 20 Mar 2024 03:00 )             26.6                         8.3    3.73  )-----------( 123      ( 19 Mar 2024 18:00 )             25.0                         7.8    3.98  )-----------( 118      ( 19 Mar 2024 05:30 )             23.9     03-22    138  |  112<H>  |  87<H>  ----------------------------<  154<H>  4.9   |  16<L>  |  3.44<H>  03-21    143  |  114<H>  |  77<H>  ----------------------------<  183<H>  4.6   |  17<L>  |  3.44<H>  03-21    139  |  112<H>  |  73<H>  ----------------------------<  220<H>  5.9<H>   |  15<L>  |  3.52<H>  03-21    142  |  113<H>  |  75<H>  ----------------------------<  90  4.3   |  19<L>  |  3.75<H>  03-20    141  |  110<H>  |  72<H>  ----------------------------<  115<H>  4.2   |  19<L>  |  3.80<H>  03-19    146<H>  |  116<H>  |  74<H>  ----------------------------<  156<H>  3.9   |  18<L>  |  3.68<H>    Ca    8.0<L>      22 Mar 2024 05:20  Ca    7.7<L>      21 Mar 2024 16:30  Ca    7.7<L>      21 Mar 2024 14:40  Ca    8.2<L>      21 Mar 2024 03:52  Phos  4.0     03-22  Phos  4.4     03-21  Phos  4.5     03-21  Phos  4.8     03-21  Mg     2.20     03-22  Mg     2.10     03-21  Mg     2.10     03-21  Mg     2.30     03-21    TPro  6.0  /  Alb  2.7<L>  /  TBili  0.5  /  DBili  x   /  AST  44<H>  /  ALT  47<H>  /  AlkPhos  106  03-21  TPro  6.1  /  Alb  2.9<L>  /  TBili  0.3  /  DBili  <0.2  /  AST  58<H>  /  ALT  57<H>  /  AlkPhos  118  03-19    CAPILLARY BLOOD GLUCOSE      POCT Blood Glucose.: 190 mg/dL (22 Mar 2024 11:53)  POCT Blood Glucose.: 153 mg/dL (22 Mar 2024 08:23)  POCT Blood Glucose.: 151 mg/dL (22 Mar 2024 07:38)  POCT Blood Glucose.: 140 mg/dL (21 Mar 2024 20:59)  POCT Blood Glucose.: 154 mg/dL (21 Mar 2024 18:12)  POCT Blood Glucose.: 164 mg/dL (21 Mar 2024 17:08)  POCT Blood Glucose.: 198 mg/dL (21 Mar 2024 14:22)      LIVER FUNCTIONS - ( 21 Mar 2024 14:40 )  Alb: 2.7 g/dL / Pro: 6.0 g/dL / ALK PHOS: 106 U/L / ALT: 47 U/L / AST: 44 U/L / GGT: x           PT/INR - ( 21 Mar 2024 14:40 )   PT: 15.3 sec;   INR: 1.37 ratio         PTT - ( 21 Mar 2024 03:52 )  PTT:41.1 sec  Urinalysis Basic - ( 22 Mar 2024 05:20 )    Color: x / Appearance: x / SG: x / pH: x  Gluc: 154 mg/dL / Ketone: x  / Bili: x / Urobili: x   Blood: x / Protein: x / Nitrite: x   Leuk Esterase: x / RBC: x / WBC x   Sq Epi: x / Non Sq Epi: x / Bacteria: x      D-Dimer Assay, Quantitative: 250 ng/mL DDU (03-18 @ 06:31)  Lactate, Blood: 1.8 mmol/L (03-21 @ 18:22)        RECENT CULTURES:  03-16 @ 23:24 Clean Catch Clean Catch (Midstream)                <10,000 CFU/mL Normal Urogenital Jeannine    rad< from: US Abdomen Upper Quadrant Right (03.18.24 @ 16:29) >    ACC: [id number] EXAM:  US ABDOMEN RT UPR QUADRANT   ORDERED BY: ORLANDO PETER     PROCEDURE DATE:  03/18/2024          INTERPRETATION:  CLINICAL INFORMATION: Chronic kidney disease. Evaluate   liver.    COMPARISON: None available.    TECHNIQUE: Sonography of the right upper quadrant.    FINDINGS:  Liver: Mildly increased echogenicity of the enlarged liver, consistent   with underlying parenchymal disease. No surface nodularity.  Bile ducts: Normal caliber. Common bile duct measures 4 mm.  Gallbladder: Cholelithiasis.  Contracted gallbladder.  Pancreas: Visualized portions are within normal limits.  Right kidney: 12.3 cm. Increased echogenicity. Upper pole cyst measures   3.2 cm. Mild hydronephrosis..  Ascites: None.  IVC: Visualized portions are within normal limits.    IMPRESSION:  *  Enlarged liver, demonstrating the presence of parenchymal disease.  *  Cholelithiasis, without evidence of acute cholecystitis.  *  Right renal parenchymal disease.  *  Mild right hydronephrosis.        --- End of Report ---            KEVIN AZUL MD; Attending Radiologist  This document has been electronically signed. Mar 18 2024  4:40PM    < end of copied text >        RESPIRATORY CULTURES:          Studies  Chest X-RAY  CT SCAN Chest   Venous Dopplers: LE:   CT Abdomen  Others    < from: Upper Endoscopy (03.21.24 @ 10:00) >       Diffuse moderate gastropathy characterized by erythema and friability        was found in the entire examined stomach with scattered mucosal changes        concerning for intestinal metaplasia or infiltrative process. .       Diffuse nodular mucosa was found in the duodenal bulb.       The exam of the duodenum wasotherwise normal.                                                                                   Impression:          - LA Grade A esophagitis.                       - A large amount of red blood was found in antrum of                        stomach. Diffuse severe mucosal changes characterized by                        congestion, erythema, friability (with spontaneous                        bleeding), hemorrhagic appearance, and mild sloughing                        were found diffusely in the gastric antrum. No discrete                        mass was identified. Biopsied. Hemostatic spray applied.                        DDx concerning for malignancy (primary vs linitis                        plastica) or alternate infiltrative process (eg                        amyloidosis).                       - Gastroopathy.                       - Nodular mucosa in the duodenal bulb.  Recommendation:      - [facility]                       - Advance diet as tolerated today.                       - Continue with PPI gtt.                       - Administer 1U pRBC given ongoing active slow oozing.                       - Await pathology results.                       - Given diffuse mucosal oozing, patient is at risk of                        ongoing bleeding from gastric antrum. Etiology remains                        unclear, path pending. R/B/A of resuming A/C per primary                        team.                                     Attending Participation:    < end of copied text >

## 2024-03-22 NOTE — PROGRESS NOTE ADULT - SUBJECTIVE AND OBJECTIVE BOX
[facility] [phone]        Patient seen and examined in bed.  He was the same and feeling better  Yesterday had a rapid response         MEDICATIONS  (STANDING):  aMIOdarone    Tablet 200 milliGRAM(s) Oral daily  atorvastatin 80 milliGRAM(s) Oral at bedtime  dextrose 5%. 1000 milliLiter(s) (100 mL/Hr) IV Continuous <Continuous>  dextrose 5%. 1000 milliLiter(s) (50 mL/Hr) IV Continuous <Continuous>  dextrose 50% Injectable 25 Gram(s) IV Push once  dextrose 50% Injectable 12.5 Gram(s) IV Push once  dextrose 50% Injectable 25 Gram(s) IV Push once  ferrous    sulfate 325 milliGRAM(s) Oral daily  furosemide    Tablet 20 milliGRAM(s) Oral daily  glucagon  Injectable 1 milliGRAM(s) IntraMuscular once  influenza  Vaccine (HIGH DOSE) 0.7 milliLiter(s) IntraMuscular once  insulin glargine Injectable (LANTUS) 3 Unit(s) SubCutaneous at bedtime  insulin lispro (ADMELOG) corrective regimen sliding scale   SubCutaneous three times a day before meals  pantoprazole Infusion 8 mG/Hr (10 mL/Hr) IV Continuous <Continuous>  tamsulosin 0.8 milliGRAM(s) Oral at bedtime      VITAL:  T(C): , Max: 36.8 (03-22-24 @ 05:17)  T(F): , Max: 98.2 (03-22-24 @ 05:17)  HR: 61 (03-22-24 @ 13:00)  BP: 138/54 (03-22-24 @ 13:00)  BP(mean): --  RR: 17 (03-22-24 @ 13:00)  SpO2: 100% (03-22-24 @ 13:00)  Wt(kg): --    I and O's:    03-21 @ 07:01  -  03-22 @ 07:00  --------------------------------------------------------  IN: 480 mL / OUT: 380 mL / NET: 100 mL    03-22 @ 07:01  -  03-22 @ 13:41  --------------------------------------------------------  IN: 577 mL / OUT: 0 mL / NET: 577 mL          PHYSICAL EXAM:    Constitutional: NAD;  looks stated age   Neck:  No JVD  Respiratory: CTAB/L  Cardiovascular: S1 and S2  Gastrointestinal: BS+, soft, NT/ND  Extremities: No peripheral edema  Neurological: A/O x 3, no focal deficits  Psychiatric: Normal mood, normal affect  : No [place]  Skin: No rashes  Access: Not applicable    LABS:                        8.6    5.25  )-----------( 90       ( 22 Mar 2024 05:20 )             25.8     03-22    138  |  112<H>  |  87<H>  ----------------------------<  154<H>  4.9   |  16<L>  |  3.44<H>    Ca    8.0<L>      22 Mar 2024 05:20  Phos  4.0     03-22  Mg     2.20     03-22    TPro  6.0  /  Alb  2.7<L>  /  TBili  0.5  /  DBili  x   /  AST  44<H>  /  ALT  47<H>  /  AlkPhos  106  03-21          Urine Studies:  Urinalysis Basic - ( 22 Mar 2024 05:20 )    Color: x / Appearance: x / SG: x / pH: x  Gluc: 154 mg/dL / Ketone: x  / Bili: x / Urobili: x   Blood: x / Protein: x / Nitrite: x   Leuk Esterase: x / RBC: x / WBC x   Sq Epi: x / Non Sq Epi: x / Bacteria: x            RADIOLOGY & ADDITIONAL STUDIES:        < from: US Abdomen Upper Quadrant Right (03.18.24 @ 16:29) >    ACC: [id number] EXAM:  US ABDOMEN RT UPR QUADRANT   ORDERED BY: ORLANDO PETER     PROCEDURE DATE:  03/18/2024          INTERPRETATION:  CLINICAL INFORMATION: Chronic kidney disease. Evaluate   liver.    COMPARISON: None available.    TECHNIQUE: Sonography of the right upper quadrant.    FINDINGS:  Liver: Mildly increased echogenicity of the enlarged liver, consistent   with underlying parenchymal disease. No surface nodularity.  Bile ducts: Normal caliber. Common bile duct measures 4 mm.  Gallbladder: Cholelithiasis.  Contracted gallbladder.  Pancreas: Visualized portions are within normal limits.  Right kidney: 12.3 cm. Increased echogenicity. Upper pole cyst measures   3.2 cm. Mild hydronephrosis..  Ascites: None.  IVC: Visualized portions are within normal limits.    IMPRESSION:  *  Enlarged liver, demonstrating the presence of parenchymal disease.  *  Cholelithiasis, without evidence of acute cholecystitis.  *  Right renal parenchymal disease.  *  Mild right hydronephrosis.        < end of copied text >

## 2024-03-22 NOTE — PROGRESS NOTE ADULT - ASSESSMENT
88yM with PMHx CKD, HFrEF, CAD s/p stents, afib on Eliquis, bladder cancer s/p chemo, presenting with 2 weeks progressive shortness of breath on exertion clinical presentation consistent with acute on chronic diastolic heart failure and LILIAN on CKD. Found to have worsening anemia requiring frequent transfusions and melena x 2 months.    Assessment  #Melena  #Anemia  #Afib on Eliquis  #Bladder cancer s/p chemo, in remission  #LILIAN on CKD  #Acute HF exacerbation  - (+) melena with transfusion requirement; hemodynamically stable. Concerning for gastric/peptic ulcer diseases vs. AVM's   - On Eliquis and ASA  - Presented with ADHF s/p diuresis and optimization. Currently appeared to be euvolemic, card following  - LILIAN on CKD on admission, improved  - S/p EGD 3/21    - LA Grade A esophagitis.    - A large amount of red blood was found in antrum of stomach. Diffuse severe mucosal changes characterized by congestion, erythema, friability (with spontaneous bleeding), hemorrhagic appearance, and mild sloughing were found diffusely in the gastric antrum. No discrete mass was identified. Biopsied. Hemostatic spray applied. DDx concerning for malignancy (primary vs linitis plastica) or alternate infiltrative process (eg amyloidosis).    - Gastropathy.    - Nodular mucosa in the duodenal bulb.    - RRT 3/21 PM for hypotension. S/p IVF and PRBC with stabilization. Eliquis on hold.                           Recommendations  - Advance diet as tolerated today.  - Continue with PPI gtt.  - F/u path  - Given diffuse mucosal oozing, patient is at risk of ongoing bleeding from gastric antrum. Etiology remains unclear, path pending. R/B/A of resuming A/C per primary team and cardiology.      Note incomplete until finalized by attending signature/attestation.    Carolyn Solis  GI/Hepatology Fellow    MONDAY-FRIDAY 8AM-5PM:  Pager# [phone] ([facility]) or [phone] ([facility])    NON-URGENT CONSULTS:  Please email [email or web address] OR [email or web address]  AT NIGHT AND ON WEEKENDS:  Contact on-call GI fellow via answering service ([phone]) from 5pm-8am and on weekends/holidays   88yM with PMHx CKD, HFrEF, CAD s/p stents, afib on Eliquis, bladder cancer s/p chemo, presenting with 2 weeks progressive shortness of breath on exertion clinical presentation consistent with acute on chronic diastolic heart failure and LILIAN on CKD. Found to have worsening anemia requiring frequent transfusions and melena x 2 months.    Assessment  #Melena  #Anemia  #Afib on Eliquis  #Bladder cancer s/p chemo, in remission  #LILIAN on CKD  #Acute HF exacerbation  - (+) melena with transfusion requirement; hemodynamically stable. Concerning for gastric/peptic ulcer diseases vs. AVM's   - On Eliquis and ASA  - Presented with ADHF s/p diuresis and optimization. Currently appeared to be euvolemic, card following  - LILIAN on CKD on admission, improved  - S/p EGD 3/21    - LA Grade A esophagitis.    - A large amount of red blood was found in antrum of stomach. Diffuse severe mucosal changes characterized by congestion, erythema, friability (with spontaneous bleeding), hemorrhagic appearance, and mild sloughing were found diffusely in the gastric antrum. No discrete mass was identified. Biopsied. Hemostatic spray applied. DDx concerning for malignancy (primary vs linitis plastica) or alternate infiltrative process (eg amyloidosis).    - Gastropathy.    - Nodular mucosa in the duodenal bulb.    - RRT 3/21 PM for hypotension. S/p IVF and PRBC with stabilization. Eliquis on hold.   - Melena x 1 3/22 AM with stable HD and hgb                          Recommendations  - Diet as tolerated  - Continue with PPI gtt.  - F/u path  - Given diffuse mucosal oozing, patient is at risk of ongoing bleeding from gastric antrum. Etiology remains unclear, path pending. R/B/A of resuming A/C per primary team and cardiology.  - Continue to monitor clinically for now      Note incomplete until finalized by attending signature/attestation.    Carolyn Solis  GI/Hepatology Fellow    MONDAY-FRIDAY 8AM-5PM:  Pager# [phone] ([facility]) or [phone] ([facility])    NON-URGENT CONSULTS:  Please email [email or web address]  AT NIGHT AND ON WEEKENDS:  Contact on-call GI fellow via answering service ([phone]) from 5pm-8am and on weekends/holidays   88yM with PMHx CKD, HFrEF, CAD s/p stents, afib on Eliquis, bladder cancer s/p chemo, presenting with 2 weeks progressive shortness of breath on exertion clinical presentation consistent with acute on chronic diastolic heart failure and LILIAN on CKD. Found to have worsening anemia requiring frequent transfusions and melena x 2 months.    Assessment  #Melena  #Anemia  #Afib on Eliquis  #Bladder cancer s/p chemo, in remission  #LILIAN on CKD  #Acute HF exacerbation  - (+) melena with transfusion requirement; hemodynamically stable.   - On Eliquis and ASA  - Presented with ADHF s/p diuresis and optimization. Currently appeared to be euvolemic, card following  - LILIAN on CKD on admission, improved  - S/p EGD 3/21    - LA Grade A esophagitis.    - A large amount of red blood was found in antrum of stomach. Diffuse severe mucosal changes characterized by congestion, erythema, friability (with spontaneous bleeding), hemorrhagic appearance, and mild sloughing were found diffusely in the gastric antrum. No discrete mass was identified. Biopsied. Hemostatic spray applied. DDx concerning for malignancy (primary vs linitis plastica) or alternate infiltrative process (eg amyloidosis).    - Gastropathy.    - Nodular mucosa in the duodenal bulb.    - RRT 3/21 PM for hypotension. S/p IVF and PRBC with stabilization. Eliquis on hold.   - Melena x 1 3/22 AM with stable HD and hgb                          Recommendations  - Diet as tolerated  - Continue with PPI gtt.  - F/u path  - Given diffuse mucosal oozing, patient is at risk of ongoing bleeding from gastric antrum. Etiology remains unclear, path pending. R/B/A of resuming A/C per primary team and cardiology.  - Continue to monitor clinically for now    Note incomplete until finalized by attending signature/attestation.    Carolyn Solis  GI/Hepatology Fellow    MONDAY-FRIDAY 8AM-5PM:  Pager# [phone] ([facility]) or [phone] ([facility])    NON-URGENT CONSULTS:  Please email [email or web address]  AT NIGHT AND ON WEEKENDS:  Contact on-call GI fellow via answering service ([phone]) from 5pm-8am and on weekends/holidays

## 2024-03-23 LAB
ANION GAP SERPL CALC-SCNC: 11 MMOL/L — SIGNIFICANT CHANGE UP (ref 7–14)
BASE EXCESS BLDV CALC-SCNC: -8.3 MMOL/L — LOW (ref -2–3)
BUN SERPL-MCNC: 94 MG/DL — HIGH (ref 7–23)
CA-I SERPL-SCNC: 1.27 MMOL/L — SIGNIFICANT CHANGE UP (ref 1.15–1.33)
CALCIUM SERPL-MCNC: 8.2 MG/DL — LOW (ref 8.4–10.5)
CHLORIDE BLDV-SCNC: 113 MMOL/L — HIGH (ref 96–108)
CHLORIDE SERPL-SCNC: 116 MMOL/L — HIGH (ref 98–107)
CO2 BLDV-SCNC: 18.1 MMOL/L — LOW (ref 22–26)
CO2 SERPL-SCNC: 16 MMOL/L — LOW (ref 22–31)
CREAT SERPL-MCNC: 3.19 MG/DL — HIGH (ref 0.5–1.3)
EGFR: 18 ML/MIN/1.73M2 — LOW
GAS PNL BLDV: 140 MMOL/L — SIGNIFICANT CHANGE UP (ref 136–145)
GAS PNL BLDV: SIGNIFICANT CHANGE UP
GLUCOSE BLDC GLUCOMTR-MCNC: 137 MG/DL — HIGH (ref 70–99)
GLUCOSE BLDC GLUCOMTR-MCNC: 178 MG/DL — HIGH (ref 70–99)
GLUCOSE BLDC GLUCOMTR-MCNC: 189 MG/DL — HIGH (ref 70–99)
GLUCOSE BLDC GLUCOMTR-MCNC: 232 MG/DL — HIGH (ref 70–99)
GLUCOSE BLDV-MCNC: 180 MG/DL — HIGH (ref 70–99)
GLUCOSE SERPL-MCNC: 171 MG/DL — HIGH (ref 70–99)
HCO3 BLDV-SCNC: 17 MMOL/L — LOW (ref 22–29)
HCT VFR BLD CALC: 23.7 % — LOW (ref 39–50)
HCT VFR BLD CALC: 29 % — LOW (ref 39–50)
HCT VFR BLDA CALC: 31 % — LOW (ref 39–51)
HGB BLD CALC-MCNC: 10.3 G/DL — LOW (ref 12.6–17.4)
HGB BLD-MCNC: 7.7 G/DL — LOW (ref 13–17)
HGB BLD-MCNC: 9.7 G/DL — LOW (ref 13–17)
LACTATE BLDV-MCNC: 2.6 MMOL/L — HIGH (ref 0.5–2)
MAGNESIUM SERPL-MCNC: 2.1 MG/DL — SIGNIFICANT CHANGE UP (ref 1.6–2.6)
MCHC RBC-ENTMCNC: 32 PG — SIGNIFICANT CHANGE UP (ref 27–34)
MCHC RBC-ENTMCNC: 32.1 PG — SIGNIFICANT CHANGE UP (ref 27–34)
MCHC RBC-ENTMCNC: 32.5 GM/DL — SIGNIFICANT CHANGE UP (ref 32–36)
MCHC RBC-ENTMCNC: 33.4 GM/DL — SIGNIFICANT CHANGE UP (ref 32–36)
MCV RBC AUTO: 96 FL — SIGNIFICANT CHANGE UP (ref 80–100)
MCV RBC AUTO: 98.3 FL — SIGNIFICANT CHANGE UP (ref 80–100)
NRBC # BLD: 0 /100 WBCS — SIGNIFICANT CHANGE UP (ref 0–0)
NRBC # BLD: 0 /100 WBCS — SIGNIFICANT CHANGE UP (ref 0–0)
NRBC # FLD: 0 K/UL — SIGNIFICANT CHANGE UP (ref 0–0)
NRBC # FLD: 0 K/UL — SIGNIFICANT CHANGE UP (ref 0–0)
PCO2 BLDV: 34 MMHG — LOW (ref 42–55)
PH BLDV: 7.31 — LOW (ref 7.32–7.43)
PHOSPHATE SERPL-MCNC: 3.4 MG/DL — SIGNIFICANT CHANGE UP (ref 2.5–4.5)
PLATELET # BLD AUTO: 114 K/UL — LOW (ref 150–400)
PLATELET # BLD AUTO: 96 K/UL — LOW (ref 150–400)
PO2 BLDV: 45 MMHG — SIGNIFICANT CHANGE UP (ref 25–45)
POTASSIUM BLDV-SCNC: 4.7 MMOL/L — SIGNIFICANT CHANGE UP (ref 3.5–5.1)
POTASSIUM SERPL-MCNC: 4.2 MMOL/L — SIGNIFICANT CHANGE UP (ref 3.5–5.3)
POTASSIUM SERPL-SCNC: 4.2 MMOL/L — SIGNIFICANT CHANGE UP (ref 3.5–5.3)
RBC # BLD: 2.41 M/UL — LOW (ref 4.2–5.8)
RBC # BLD: 3.02 M/UL — LOW (ref 4.2–5.8)
RBC # FLD: 20.2 % — HIGH (ref 10.3–14.5)
RBC # FLD: 21.2 % — HIGH (ref 10.3–14.5)
SAO2 % BLDV: 76.3 % — SIGNIFICANT CHANGE UP (ref 67–88)
SODIUM SERPL-SCNC: 143 MMOL/L — SIGNIFICANT CHANGE UP (ref 135–145)
WBC # BLD: 5.65 K/UL — SIGNIFICANT CHANGE UP (ref 3.8–10.5)
WBC # BLD: 8.93 K/UL — SIGNIFICANT CHANGE UP (ref 3.8–10.5)
WBC # FLD AUTO: 5.65 K/UL — SIGNIFICANT CHANGE UP (ref 3.8–10.5)
WBC # FLD AUTO: 8.93 K/UL — SIGNIFICANT CHANGE UP (ref 3.8–10.5)

## 2024-03-23 PROCEDURE — 99232 SBSQ HOSP IP/OBS MODERATE 35: CPT | Mod: GC

## 2024-03-23 RX ADMIN — PANTOPRAZOLE SODIUM 10 MG/HR: 20 TABLET, DELAYED RELEASE ORAL at 11:18

## 2024-03-23 RX ADMIN — PANTOPRAZOLE SODIUM 10 MG/HR: 20 TABLET, DELAYED RELEASE ORAL at 05:21

## 2024-03-23 RX ADMIN — Medication 20 MILLIGRAM(S): at 05:23

## 2024-03-23 RX ADMIN — TAMSULOSIN HYDROCHLORIDE 0.8 MILLIGRAM(S): 0.4 CAPSULE ORAL at 21:51

## 2024-03-23 RX ADMIN — Medication 1: at 08:31

## 2024-03-23 RX ADMIN — Medication 1: at 12:28

## 2024-03-23 RX ADMIN — INSULIN GLARGINE 3 UNIT(S): 100 INJECTION, SOLUTION SUBCUTANEOUS at 21:52

## 2024-03-23 RX ADMIN — AMIODARONE HYDROCHLORIDE 200 MILLIGRAM(S): 400 TABLET ORAL at 05:23

## 2024-03-23 RX ADMIN — Medication 325 MILLIGRAM(S): at 11:23

## 2024-03-23 RX ADMIN — Medication 100 MILLIGRAM(S): at 14:09

## 2024-03-23 RX ADMIN — ATORVASTATIN CALCIUM 80 MILLIGRAM(S): 80 TABLET, FILM COATED ORAL at 21:51

## 2024-03-23 RX ADMIN — Medication 100 MILLIGRAM(S): at 21:51

## 2024-03-23 NOTE — PROGRESS NOTE ADULT - NSPROGADDITIONALINFOA_GEN_ALL_CORE
discussed with patient in detail, expresses understanding of treatment plans.  discharge planning Monday if stable

## 2024-03-23 NOTE — PROGRESS NOTE ADULT - SUBJECTIVE AND OBJECTIVE BOX
Interval Events:   No acute events overnight. No BM since yesterday AM.    Patient denied fever, chills, nausea, vomiting, abdominal pain, diarrhea, melena, hematochezia or hematemesis. Tolerating PO.      Hospital Medications:  aMIOdarone    Tablet 200 milliGRAM(s) Oral daily  atorvastatin 80 milliGRAM(s) Oral at bedtime  benzonatate 100 milliGRAM(s) Oral three times a day  dextrose 5%. 1000 milliLiter(s) IV Continuous <Continuous>  dextrose 5%. 1000 milliLiter(s) IV Continuous <Continuous>  dextrose 50% Injectable 25 Gram(s) IV Push once  dextrose 50% Injectable 12.5 Gram(s) IV Push once  dextrose 50% Injectable 25 Gram(s) IV Push once  dextrose Oral Gel 15 Gram(s) Oral once PRN  ferrous    sulfate 325 milliGRAM(s) Oral daily  furosemide    Tablet 20 milliGRAM(s) Oral daily  glucagon  Injectable 1 milliGRAM(s) IntraMuscular once  influenza  Vaccine (HIGH DOSE) 0.7 milliLiter(s) IntraMuscular once  insulin glargine Injectable (LANTUS) 3 Unit(s) SubCutaneous at bedtime  insulin lispro (ADMELOG) corrective regimen sliding scale   SubCutaneous three times a day before meals  pantoprazole Infusion 8 mG/Hr IV Continuous <Continuous>  tamsulosin 0.8 milliGRAM(s) Oral at bedtime      PHYSICAL EXAM:   Vital Signs:  Vital Signs Last 24 Hrs  T(C): 36.6 (23 Mar 2024 14:35), Max: 37.1 (23 Mar 2024 01:50)  T(F): 97.8 (23 Mar 2024 14:35), Max: 98.7 (23 Mar 2024 01:50)  HR: 71 (23 Mar 2024 14:35) (66 - 73)  BP: 125/50 (23 Mar 2024 14:35) (111/48 - 144/57)  BP(mean): --  RR: 20 (23 Mar 2024 14:35) (17 - 20)  SpO2: 99% (23 Mar 2024 14:35) (99% - 100%)    Parameters below as of 23 Mar 2024 14:35  Patient On (Oxygen Delivery Method): room air      Daily     Daily     GENERAL: no acute distress  NEURO: alert and oriented x 3  HEENT: NCAT, no conjunctival pallor appreciated; anicteric sclera  CHEST: no respiratory distress, no accessory muscle use  CARDIAC: regular rate, +S1/S2  ABDOMEN: soft, nontender, no rebound or guarding  EXTREMITIES: warm, well perfused  SKIN: no active lesions noted    LABS: reviewed                        7.7    5.65  )-----------( 96       ( 23 Mar 2024 04:19 )             23.7     03-23    143  |  116<H>  |  94<H>  ----------------------------<  171<H>  4.2   |  16<L>  |  3.19<H>    Ca    8.2<L>      23 Mar 2024 04:19  Phos  3.4     03-23  Mg     2.10     03-23

## 2024-03-23 NOTE — PROGRESS NOTE ADULT - SUBJECTIVE AND OBJECTIVE BOX
[facility] [phone]        Patient seen and examined in bed.  He endorses poor appetite denies sob. States he was able to ambulate to the restroom without SOB. S/p 1 unit PRBC today no adverse reaction noted.      MEDICATIONS  (STANDING):  aMIOdarone    Tablet 200 milliGRAM(s) Oral daily  atorvastatin 80 milliGRAM(s) Oral at bedtime  dextrose 5%. 1000 milliLiter(s) (100 mL/Hr) IV Continuous <Continuous>  dextrose 5%. 1000 milliLiter(s) (50 mL/Hr) IV Continuous <Continuous>  dextrose 50% Injectable 25 Gram(s) IV Push once  dextrose 50% Injectable 12.5 Gram(s) IV Push once  dextrose 50% Injectable 25 Gram(s) IV Push once  ferrous    sulfate 325 milliGRAM(s) Oral daily  furosemide    Tablet 20 milliGRAM(s) Oral daily  glucagon  Injectable 1 milliGRAM(s) IntraMuscular once  influenza  Vaccine (HIGH DOSE) 0.7 milliLiter(s) IntraMuscular once  insulin glargine Injectable (LANTUS) 3 Unit(s) SubCutaneous at bedtime  insulin lispro (ADMELOG) corrective regimen sliding scale   SubCutaneous three times a day before meals  pantoprazole Infusion 8 mG/Hr (10 mL/Hr) IV Continuous <Continuous>  tamsulosin 0.8 milliGRAM(s) Oral at bedtime      VITAL:  T(C): , Max: 36.8 (03-22-24 @ 05:17)  T(F): , Max: 98.2 (03-22-24 @ 05:17)  HR: 61 (03-22-24 @ 13:00)  BP: 138/54 (03-22-24 @ 13:00)  BP(mean): --  RR: 17 (03-22-24 @ 13:00)  SpO2: 100% (03-22-24 @ 13:00)  Wt(kg): --    I and O's:    03-21 @ 07:01  -  03-22 @ 07:00  --------------------------------------------------------  IN: 480 mL / OUT: 380 mL / NET: 100 mL    03-22 @ 07:01  -  03-22 @ 13:41  --------------------------------------------------------  IN: 577 mL / OUT: 0 mL / NET: 577 mL          PHYSICAL EXAM:    Constitutional: NAD; Alert  Neck:  No JVD  Respiratory: CTA B/L  Cardiovascular: S1 and S2  Gastrointestinal: BS+, soft, NT/ND  Extremities: No peripheral edema  Neurological: A/O x 3, no focal deficits  Psychiatric: Normal mood, normal affect  : No Temple  Skin: No rashes  Access: Not applicable    LABS:                        8.6    5.25  )-----------( 90       ( 22 Mar 2024 05:20 )             25.8     03-22    138  |  112<H>  |  87<H>  ----------------------------<  154<H>  4.9   |  16<L>  |  3.44<H>    Ca    8.0<L>      22 Mar 2024 05:20  Phos  4.0     03-22  Mg     2.20     03-22    TPro  6.0  /  Alb  2.7<L>  /  TBili  0.5  /  DBili  x   /  AST  44<H>  /  ALT  47<H>  /  AlkPhos  106  03-21          Urine Studies:  Urinalysis Basic - ( 22 Mar 2024 05:20 )    Color: x / Appearance: x / SG: x / pH: x  Gluc: 154 mg/dL / Ketone: x  / Bili: x / Urobili: x   Blood: x / Protein: x / Nitrite: x   Leuk Esterase: x / RBC: x / WBC x   Sq Epi: x / Non Sq Epi: x / Bacteria: x            RADIOLOGY & ADDITIONAL STUDIES:        < from: US Abdomen Upper Quadrant Right (03.18.24 @ 16:29) >    ACC: [id number] EXAM:  US ABDOMEN RT UPR QUADRANT   ORDERED BY: ORLANDO PETER     PROCEDURE DATE:  03/18/2024          INTERPRETATION:  CLINICAL INFORMATION: Chronic kidney disease. Evaluate   liver.    COMPARISON: None available.    TECHNIQUE: Sonography of the right upper quadrant.    FINDINGS:  Liver: Mildly increased echogenicity of the enlarged liver, consistent   with underlying parenchymal disease. No surface nodularity.  Bile ducts: Normal caliber. Common bile duct measures 4 mm.  Gallbladder: Cholelithiasis.  Contracted gallbladder.  Pancreas: Visualized portions are within normal limits.  Right kidney: 12.3 cm. Increased echogenicity. Upper pole cyst measures   3.2 cm. Mild hydronephrosis..  Ascites: None.  IVC: Visualized portions are within normal limits.    IMPRESSION:  *  Enlarged liver, demonstrating the presence of parenchymal disease.  *  Cholelithiasis, without evidence of acute cholecystitis.  *  Right renal parenchymal disease.  *  Mild right hydronephrosis.        < end of copied text >

## 2024-03-23 NOTE — PROGRESS NOTE ADULT - ATTENDING COMMENTS
88M CKD, HFrEF, CAD s/p stents, afib on Eliquis p/w melena and anemia, found to have hemorrhagic gastritis in antrum on EGD 3/21, with hemospray applied.    Feels well, tolerating regular diet, no melena since yesterday morning. Hgb slowly downtrending 9s --> 7s, CKD so BUN not reliable but uptrending. Afeb, VSS, soft abd.     Unfortunately hemorrhagic gastritis is not endoscopically intervenable (can only apply temporizing measures like hemospray and purastat, which are shortlived, with risk of sedated procedure in elderly ill patient likely outweighing their benefit).     Ensure PPI BID. Would hold off on AC and antiplatelet agents if possible until hgb has been stable for >24h. Would monitor CBC and transfuse >8 given CAD, plts >50. If evidence of brisk bleeding (further melena, hemodynamic instability) would obtain CTA and alert IR if active extrav is found for possible embolization of vessel supplying the area. If life-threatening bleeding would consult surgery for possible emergent gastrectomy if within [facility]. 88M CKD, HFrEF, CAD s/p stents, afib on Eliquis p/w melena and anemia, found to have hemorrhagic gastritis in antrum on EGD 3/21, with hemospray applied.    Feels well, tolerating regular diet, no melena since yesterday morning. Hgb slowly downtrending 9s --> 7s, CKD so BUN not reliable but uptrending. Afeb, VSS, soft abd.     Unfortunately hemorrhagic gastritis is not endoscopically intervenable (can only apply temporizing measures like hemospray and purastat, which are shortlived, with risk of sedated procedure in elderly ill patient likely outweighing their benefit). Appearance of gastritis c/f malignancy, path pending.    Ensure PPI BID. Would hold off on AC and antiplatelet agents if possible until hgb has been stable for >24h. Would monitor CBC and transfuse >8 given CAD, plts >50. If evidence of brisk bleeding (further melena, hemodynamic instability) would obtain CTA and alert IR if active extrav is found for possible embolization of vessel supplying the area. If life-threatening bleeding would consult surgery for possible emergent gastrectomy if within [facility].

## 2024-03-23 NOTE — PROGRESS NOTE ADULT - SUBJECTIVE AND OBJECTIVE BOX
Patient is a 88y old  Male who presents with a chief complaint of shortness of breath (23 Mar 2024 11:34)      DATE OF SERVICE: 03-23-24 @ 15:30    SUBJECTIVE / OVERNIGHT EVENTS: overnight events noted    ROS:  Resp: No cough no sputum production  CVS: No chest pain no palpitations no orthopnea  GI: no N/V/D  "I feel much better"         MEDICATIONS  (STANDING):  aMIOdarone    Tablet 200 milliGRAM(s) Oral daily  atorvastatin 80 milliGRAM(s) Oral at bedtime  benzonatate 100 milliGRAM(s) Oral three times a day  dextrose 5%. 1000 milliLiter(s) (100 mL/Hr) IV Continuous <Continuous>  dextrose 5%. 1000 milliLiter(s) (50 mL/Hr) IV Continuous <Continuous>  dextrose 50% Injectable 25 Gram(s) IV Push once  dextrose 50% Injectable 12.5 Gram(s) IV Push once  dextrose 50% Injectable 25 Gram(s) IV Push once  ferrous    sulfate 325 milliGRAM(s) Oral daily  furosemide    Tablet 20 milliGRAM(s) Oral daily  glucagon  Injectable 1 milliGRAM(s) IntraMuscular once  influenza  Vaccine (HIGH DOSE) 0.7 milliLiter(s) IntraMuscular once  insulin glargine Injectable (LANTUS) 3 Unit(s) SubCutaneous at bedtime  insulin lispro (ADMELOG) corrective regimen sliding scale   SubCutaneous three times a day before meals  pantoprazole Infusion 8 mG/Hr (10 mL/Hr) IV Continuous <Continuous>  tamsulosin 0.8 milliGRAM(s) Oral at bedtime    MEDICATIONS  (PRN):  dextrose Oral Gel 15 Gram(s) Oral once PRN Blood Glucose LESS THAN 70 milliGRAM(s)/deciliter        CAPILLARY BLOOD GLUCOSE      POCT Blood Glucose.: 189 mg/dL (23 Mar 2024 12:26)  POCT Blood Glucose.: 178 mg/dL (23 Mar 2024 08:23)  POCT Blood Glucose.: 192 mg/dL (22 Mar 2024 21:30)  POCT Blood Glucose.: 242 mg/dL (22 Mar 2024 18:52)    I&O's Summary    22 Mar 2024 07:01  -  23 Mar 2024 07:00  --------------------------------------------------------  IN: 697 mL / OUT: 600 mL / NET: 97 mL    23 Mar 2024 07:01  -  23 Mar 2024 15:30  --------------------------------------------------------  IN: 810 mL / OUT: 1000 mL / NET: -190 mL        Vital Signs Last 24 Hrs  T(C): 36.6 (23 Mar 2024 14:35), Max: 37.1 (23 Mar 2024 01:50)  T(F): 97.8 (23 Mar 2024 14:35), Max: 98.7 (23 Mar 2024 01:50)  HR: 71 (23 Mar 2024 14:35) (66 - 73)  BP: 125/50 (23 Mar 2024 14:35) (111/48 - 144/57)  BP(mean): --  RR: 20 (23 Mar 2024 14:35) (17 - 20)  SpO2: 99% (23 Mar 2024 14:35) (99% - 100%)    PHYSICAL EXAM:   Lungs: clear   CVS: S1 S2 systolic murmur +   Abdomen: no tenderness  Neuro: AO x 3 nonfocal   Skin: warm, dry  Ext: trace edema    LABS:                        7.7    5.65  )-----------( 96       ( 23 Mar 2024 04:19 )             23.7     03-23    143  |  116<H>  |  94<H>  ----------------------------<  171<H>  4.2   |  16<L>  |  3.19<H>    Ca    8.2<L>      23 Mar 2024 04:19  Phos  3.4     03-23  Mg     2.10     03-23            Urinalysis Basic - ( 23 Mar 2024 04:19 )    Color: x / Appearance: x / SG: x / pH: x  Gluc: 171 mg/dL / Ketone: x  / Bili: x / Urobili: x   Blood: x / Protein: x / Nitrite: x   Leuk Esterase: x / RBC: x / WBC x   Sq Epi: x / Non Sq Epi: x / Bacteria: x          All consultant(s) notes reviewed and care discussed with other providers        Contact Number, Dr Arroyo [phone]

## 2024-03-23 NOTE — PROGRESS NOTE ADULT - SUBJECTIVE AND OBJECTIVE BOX
CARDIOLOGY FOLLOW UP - Dr. Vega  Date of Service: 3/23/24  CC: no events    Review of Systems:  Constitutional: No fever, weight loss, or fatigue  Respiratory: No cough, wheezing, or hemoptysis, no shortness of breath  Cardiovascular: No chest pain, palpitations, passing out, dizziness, or leg swelling  Gastrointestinal: No abd or epigastric pain. No nausea, vomiting, or hematemesis; no diarrhea or consiptaiton, no melena or hematochezia  Vascular: No edema     TELEMETRY:    PHYSICAL EXAM:  T(C): 37.1 (03-23-24 @ 05:10), Max: 37.1 (03-23-24 @ 01:50)  HR: 73 (03-23-24 @ 05:10) (61 - 73)  BP: 124/56 (03-23-24 @ 05:10) (124/56 - 144/57)  RR: 18 (03-23-24 @ 05:10) (17 - 18)  SpO2: 99% (03-23-24 @ 05:10) (99% - 100%)  Wt(kg): --  I&O's Summary    22 Mar 2024 07:01  -  23 Mar 2024 07:00  --------------------------------------------------------  IN: 697 mL / OUT: 600 mL / NET: 97 mL    23 Mar 2024 07:01  -  23 Mar 2024 10:36  --------------------------------------------------------  IN: 120 mL / OUT: 0 mL / NET: 120 mL        Appearance: Normal	  Cardiovascular: Normal S1 S2,RRR, No JVD, No murmurs  Respiratory: Lungs clear to auscultation	  Gastrointestinal:  Soft, Non-tender, + BS	  Extremities: Normal range of motion, No clubbing, cyanosis or edema  Vascular: Peripheral pulses palpable 2+ bilaterally       Home Medications:  apixaban 2.5 mg oral tablet: 1 tab(s) orally every 12 hours (17 Mar 2024 10:48)  Crestor 40 mg oral tablet: 1 tab(s) orally once a day (at bedtime) (17 Mar 2024 10:49)  repaglinide 0.5 mg oral tablet: 1 tab(s) orally 3 times a day (before meals) (17 Mar 2024 10:49)  tamsulosin 0.4 mg oral capsule: 1 cap(s) orally once a day (17 Mar 2024 10:49)  Tresiba FlexTouch 100 units/mL subcutaneous solution: 10 subcutaneous once a day (at bedtime) (17 Mar 2024 10:49)        MEDICATIONS  (STANDING):  aMIOdarone    Tablet 200 milliGRAM(s) Oral daily  atorvastatin 80 milliGRAM(s) Oral at bedtime  dextrose 5%. 1000 milliLiter(s) (50 mL/Hr) IV Continuous <Continuous>  dextrose 5%. 1000 milliLiter(s) (100 mL/Hr) IV Continuous <Continuous>  dextrose 50% Injectable 25 Gram(s) IV Push once  dextrose 50% Injectable 12.5 Gram(s) IV Push once  dextrose 50% Injectable 25 Gram(s) IV Push once  ferrous    sulfate 325 milliGRAM(s) Oral daily  furosemide    Tablet 20 milliGRAM(s) Oral daily  glucagon  Injectable 1 milliGRAM(s) IntraMuscular once  influenza  Vaccine (HIGH DOSE) 0.7 milliLiter(s) IntraMuscular once  insulin glargine Injectable (LANTUS) 3 Unit(s) SubCutaneous at bedtime  insulin lispro (ADMELOG) corrective regimen sliding scale   SubCutaneous three times a day before meals  pantoprazole Infusion 8 mG/Hr (10 mL/Hr) IV Continuous <Continuous>  tamsulosin 0.8 milliGRAM(s) Oral at bedtime        EKG:  RADIOLOGY:  DIAGNOSTIC TESTING:  [ ] Echocardiogram:  [ ] Catherterization:  [ ] Stress Test:  OTHER:     LABS:	 	                          7.7    5.65  )-----------( 96       ( 23 Mar 2024 04:19 )             23.7     03-23    143  |  116<H>  |  94<H>  ----------------------------<  171<H>  4.2   |  16<L>  |  3.19<H>    Ca    8.2<L>      23 Mar 2024 04:19  Phos  3.4     03-23  Mg     2.10     03-23    TPro  6.0  /  Alb  2.7<L>  /  TBili  0.5  /  DBili  x   /  AST  44<H>  /  ALT  47<H>  /  AlkPhos  106  03-21      PT/INR - ( 21 Mar 2024 14:40 )   PT: 15.3 sec;   INR: 1.37 ratio             CARDIAC MARKERS:

## 2024-03-23 NOTE — PROGRESS NOTE ADULT - ASSESSMENT
88yM with PMHx CKD, HFrEF, CAD s/p stents presenting with 2 weeks progressive shortness of breath on exertion. Pt denies worsening pedal edema, orthopnea, chest pains or nausea associated with SOB. Denies infectious symptoms fever/chills, cough, URI symptoms recently. Takes furosemide 40mg every other day this dose has not been changed recently. No BRBPR, melena, epistaxis, hematuria, any other bleeding.  he says h is sob was gradually building up till he could not take it and came to hospital : he deneis any underlying lung disease but is an ex smoker:   no fever,   no cough , no phlegm ;    SOB/CHF/CAD/S/P PCI  ex smoker:   CKD    SOB/CHF/CAD/S/P PCI  -his history is highly suggestive of chf exacerbation;   -he has formed small concepcion effusions:  cxr prior to his film was clear:   -he hasn o clinical features of PNEUMONIA:  -on vbg shows mild metabolic acidosis  -added on Lasix : to co nt:   -no need fro antibiotics or bipap at this time; -consider ct chest : last ct from 2022 reviewed:  was suggestive of martínez failure  -his last echo with mod lv dysfunction  need a repeat one  -Check D Dimer in AM  : clinical  probability of PE is low   3/18:  -clinically he has improved  on room air:  d dimer is slightly elevated:  no further workup : diuretics per primary t eam   3/19: seems OK; no sob: on room air:  no sob: leg edema has resolved  3/20: sob is likely due to mild chf:  improved lasix:  currently on room air and has no leg edema  3/21: seems ok: pulm wise:  for endoscopy today   3/12: h9s endoscopy is done showed:  LA Grade A esophagitis.                       - A large amount of red blood was found in antrum of                        stomach. Diffuse severe mucosal changes characterized by                        congestion, erythema, friability (with spontaneous                        bleeding), hemorrhagic appearance, and mild sloughing                        were found diffusely in the gastric antrum. No discrete                        mass was identified. Biopsied. Hemostatic spray applied.                        DDx concerning for malignancy (primary vs linitis                        plastica) or alternate infiltrative process (eg                        amyloidosis).                       - Gastroopathy.                       - Nodular mucosa in the duodenal bulb.  -no ac for now  3/23: seems stable:  no ctive bleeding:  coughing excessively:  add tessalon Perles     ex smoker:   -she has 35 pk years history of smoking:   -has no diagnosis of copd:  -his last ct chest did not show emphysema:   -need to mantain o2 sao2 above 88%  -he would need outpt full PFT   3/22: seems stable: tolerated the procedure well:  no resp distress  3/23:  has excessive coughing today  : add tessalon Perles   CKD  -per renal :    dvt prophylaxis:    dw team

## 2024-03-23 NOTE — PROGRESS NOTE ADULT - ASSESSMENT
88yM with PMHx CKD, HFrEF, CAD s/p stents, afib on Eliquis, bladder cancer s/p chemo, presenting with 2 weeks progressive shortness of breath on exertion clinical presentation consistent with acute on chronic diastolic heart failure and LILIAN on CKD. Found to have worsening anemia requiring frequent transfusions and melena x 2 months.    Assessment  #Melena  #Anemia  #Afib on Eliquis  #Bladder cancer s/p chemo, in remission  #LILIAN on CKD  #Acute HF exacerbation  - (+) melena with transfusion requirement; hemodynamically stable.   - On Eliquis and ASA  - Presented with ADHF s/p diuresis and optimization. Currently appeared to be euvolemic, card following  - LILIAN on CKD on admission, improved  - S/p EGD 3/21    - LA Grade A esophagitis.    - A large amount of red blood was found in antrum of stomach. Diffuse severe mucosal changes characterized by congestion, erythema, friability (with spontaneous bleeding), hemorrhagic appearance, and mild sloughing were found diffusely in the gastric antrum. No discrete mass was identified. Biopsied. Hemostatic spray applied. DDx concerning for malignancy (primary vs linitis plastica) or alternate infiltrative process (eg amyloidosis).    - Gastropathy.    - Nodular mucosa in the duodenal bulb.    - RRT 3/21 PM for hypotension. S/p IVF and PRBC with stabilization. Eliquis on hold.   - Melena x 1 3/22 AM with stable HD and hgb  - No further episodes of melena on 3/23                          Recommendations  - Diet as tolerated  - May transition PPI to BID dosing  - F/u path, expedited with pathology department  - Given diffuse mucosal oozing, patient is at risk of ongoing bleeding from gastric antrum. Etiology remains unclear, path pending. R/B/A of resuming A/C per primary team and cardiology.  - No further plan for inpatient GI intervention at this time. If patient has hemodynamically unstable bleeding, may consider CTA A/P (if renal function permits) and possible IR embolization if able to localize bleeding vessel.   - GI team will sign off at this time. Please reconsult as needed.    Case d/w Dr. Lamine Solis  GI/Hepatology Fellow    MONDAY-FRIDAY 8AM-5PM:  Pager# [phone] ([facility]) or [phone] ([facility])    NON-URGENT CONSULTS:  Please email [email or web address]  AT NIGHT AND ON WEEKENDS:  Contact on-call GI fellow via answering service ([phone]) from 5pm-8am and on weekends/holidays

## 2024-03-23 NOTE — PROGRESS NOTE ADULT - SUBJECTIVE AND OBJECTIVE BOX
Date of Service: 03-23-24 @ 11:34    Patient is a 88y old  Male who presents with a chief complaint of shortness of breath (23 Mar 2024 10:36)      Any change in ROS: c/o cough : otherwise no bleeding or sob  :     MEDICATIONS  (STANDING):  aMIOdarone    Tablet 200 milliGRAM(s) Oral daily  atorvastatin 80 milliGRAM(s) Oral at bedtime  dextrose 5%. 1000 milliLiter(s) (100 mL/Hr) IV Continuous <Continuous>  dextrose 5%. 1000 milliLiter(s) (50 mL/Hr) IV Continuous <Continuous>  dextrose 50% Injectable 25 Gram(s) IV Push once  dextrose 50% Injectable 12.5 Gram(s) IV Push once  dextrose 50% Injectable 25 Gram(s) IV Push once  ferrous    sulfate 325 milliGRAM(s) Oral daily  furosemide    Tablet 20 milliGRAM(s) Oral daily  glucagon  Injectable 1 milliGRAM(s) IntraMuscular once  influenza  Vaccine (HIGH DOSE) 0.7 milliLiter(s) IntraMuscular once  insulin glargine Injectable (LANTUS) 3 Unit(s) SubCutaneous at bedtime  insulin lispro (ADMELOG) corrective regimen sliding scale   SubCutaneous three times a day before meals  pantoprazole Infusion 8 mG/Hr (10 mL/Hr) IV Continuous <Continuous>  tamsulosin 0.8 milliGRAM(s) Oral at bedtime    MEDICATIONS  (PRN):  dextrose Oral Gel 15 Gram(s) Oral once PRN Blood Glucose LESS THAN 70 milliGRAM(s)/deciliter    Vital Signs Last 24 Hrs  T(C): 37.1 (23 Mar 2024 05:10), Max: 37.1 (23 Mar 2024 01:50)  T(F): 98.7 (23 Mar 2024 05:10), Max: 98.7 (23 Mar 2024 01:50)  HR: 73 (23 Mar 2024 05:10) (61 - 73)  BP: 124/56 (23 Mar 2024 05:10) (124/56 - 144/57)  BP(mean): --  RR: 18 (23 Mar 2024 05:10) (17 - 18)  SpO2: 99% (23 Mar 2024 05:10) (99% - 100%)    Parameters below as of 23 Mar 2024 05:10  Patient On (Oxygen Delivery Method): room air        I&O's Summary    22 Mar 2024 07:01  -  23 Mar 2024 07:00  --------------------------------------------------------  IN: 697 mL / OUT: 600 mL / NET: 97 mL    23 Mar 2024 07:01  -  23 Mar 2024 11:34  --------------------------------------------------------  IN: 120 mL / OUT: 0 mL / NET: 120 mL          Physical Exam:   GENERAL: NAD, well-groomed, well-developed  HEENT: RAQUEL/   Atraumatic, Normocephalic  ENMT: No tonsillar erythema, exudates, or enlargement; Moist mucous membranes, Good dentition, No lesions  NECK: Supple, No JVD, Normal thyroid  CHEST/LUNG: Clear to auscultaion- no wheezing  CVS: Regular rate and rhythm; No murmurs, rubs, or gallops  GI: : Soft, Nontender, Nondistended; Bowel sounds present  NERVOUS SYSTEM:  Alert & Oriented X3  EXTREMITIES: - edema  LYMPH: No lymphadenopathy noted  SKIN: No rashes or lesions  ENDOCRINOLOGY: No Thyromegaly  PSYCH: Appropriate    Labs:  17, 19                            7.7    5.65  )-----------( 96       ( 23 Mar 2024 04:19 )             23.7                         8.6    5.07  )-----------( 105      ( 22 Mar 2024 18:45 )             25.3                         8.6    5.25  )-----------( 90       ( 22 Mar 2024 05:20 )             25.8                         9.8    4.31  )-----------( 89       ( 21 Mar 2024 14:40 )             30.0                         8.4    3.70  )-----------( 112      ( 21 Mar 2024 03:52 )             25.8                         8.7    3.88  )-----------( 108      ( 20 Mar 2024 03:00 )             26.6                         8.3    3.73  )-----------( 123      ( 19 Mar 2024 18:00 )             25.0     03-23    143  |  116<H>  |  94<H>  ----------------------------<  171<H>  4.2   |  16<L>  |  3.19<H>  03-22    138  |  112<H>  |  87<H>  ----------------------------<  154<H>  4.9   |  16<L>  |  3.44<H>  03-21    143  |  114<H>  |  77<H>  ----------------------------<  183<H>  4.6   |  17<L>  |  3.44<H>  03-21    139  |  112<H>  |  73<H>  ----------------------------<  220<H>  5.9<H>   |  15<L>  |  3.52<H>  03-21    142  |  113<H>  |  75<H>  ----------------------------<  90  4.3   |  19<L>  |  3.75<H>  03-20    141  |  110<H>  |  72<H>  ----------------------------<  115<H>  4.2   |  19<L>  |  3.80<H>    Ca    8.2<L>      23 Mar 2024 04:19  Ca    8.0<L>      22 Mar 2024 05:20  Ca    7.7<L>      21 Mar 2024 16:30  Ca    7.7<L>      21 Mar 2024 14:40  Phos  3.4     03-23  Phos  4.0     03-22  Phos  4.4     03-21  Phos  4.5     03-21  Mg     2.10     03-23  Mg     2.20     03-22  Mg     2.10     03-21  Mg     2.10     03-21    TPro  6.0  /  Alb  2.7<L>  /  TBili  0.5  /  DBili  x   /  AST  44<H>  /  ALT  47<H>  /  AlkPhos  106  03-21    CAPILLARY BLOOD GLUCOSE      POCT Blood Glucose.: 178 mg/dL (23 Mar 2024 08:23)  POCT Blood Glucose.: 192 mg/dL (22 Mar 2024 21:30)  POCT Blood Glucose.: 242 mg/dL (22 Mar 2024 18:52)  POCT Blood Glucose.: 190 mg/dL (22 Mar 2024 11:53)      LIVER FUNCTIONS - ( 21 Mar 2024 14:40 )  Alb: 2.7 g/dL / Pro: 6.0 g/dL / ALK PHOS: 106 U/L / ALT: 47 U/L / AST: 44 U/L / GGT: x           PT/INR - ( 21 Mar 2024 14:40 )   PT: 15.3 sec;   INR: 1.37 ratio           Urinalysis Basic - ( 23 Mar 2024 04:19 )    Color: x / Appearance: x / SG: x / pH: x  Gluc: 171 mg/dL / Ketone: x  / Bili: x / Urobili: x   Blood: x / Protein: x / Nitrite: x   Leuk Esterase: x / RBC: x / WBC x   Sq Epi: x / Non Sq Epi: x / Bacteria: x      D-Dimer Assay, Quantitative: 250 ng/mL DDU (03-18 @ 06:31)  Lactate, Blood: 1.8 mmol/L (03-21 @ 18:22)        RECENT CULTURES:  03-16 @ 23:24 Clean Catch Clean Catch (Midstream)                <10,000 CFU/mL Normal Urogenital Jeannine      rad< from: US Kidney and Bladder (03.22.24 @ 17:53) >    TECHNIQUE: Sonography of the kidneys and bladder.    FINDINGS:  Right kidney: 10.7 cm. No renal mass. Upper pole cyst measuring 2.2 cm x   2.3 cm x 2 cm. Tiny 3 mm nonobstructing lower pole calculus. Mild   hydronephrosis.    Left kidney: Nephrectomy.    Urinary bladder: Distended. Prevoid volume is 404. Postvoid volume is 1   70 cc. Cannot exclude mild thickening of the urinary bladder wall. A   ureteral jet is demonstrated (difficult to say whether its right or   left-sided).    IMPRESSION:  Mild hydronephrosis.The dilatation of the right renal collecting system   is similar to study 3/18/2024.    Again seen is the tiny nonobstructing lower pole right renal calculus.    Patient is status post left nephrectomy.    Postvoid urinary bladder volume is 1 70 cc. Cannot exclude mild   thickening of the urinary bladder wall.    --- End of Report ---      < end of copied text >  < from: Xray Chest 2 Views PA/Lat (03.16.24 @ 16:39) >    ACC: [id number] EXAM:  XR CHEST PA LAT 2V   ORDERED BY: ANDERSON FARNSWORTH     PROCEDURE DATE:  03/16/2024          INTERPRETATION:  CLINICAL INFORMATION: Shortness of breath on exertion.    TECHNIQUE: PA and lateral views of the chest.    COMPARISON: Multiple prior chest x-rays with most recent dated 12/14/2023    FINDINGS:    Heart: Heart size is normal.  Pulmonary: No focal consolidation. Small bilateral pleural effusions with   bibasilar atelectasis. No pneumothorax.  Bones: No acute bony pathology.    IMPRESSION:  Small bilateral pleural effusions with bibasilar atelectasis.    --- End of Report ---          TOM OSORIO MD; Resident Radiologist  This document has been electronically signed.  TAMIKO MILLIGAN MD; Attending Radiologist  This document has been electronically signed. Mar 17 2024  8:54AM    < end of copied text >  < from: CT Chest No Cont (01.26.22 @ 09:10) >  BONES/SOFT TISSUES: Subcentimeter hypodense left thyroid nodule. The   esophagus is normal. Symmetric bilateral gynecomastia. Degenerative   changes of the spine.    IMPRESSION:    1.  Centrally predominant bilateral lung opacities, interlobular septal   thickening, and small bilateral pleural effusions new from 1/22/2022   likely represent pulmonary edema rather than pneumonia.  2.  Secretions are seen in the distal trachea.        --- End of Report ---    < end of copied text >  < from: TTE W or WO Ultrasound Enhancing Agent (03.19.24 @ 08:16) >  Procedure:         Transthoracic echocardiogram with 2-D, M-mode and complete                     spectral and color flow Doppler.  Ordering Location: [facility]  Admission Status:  Inpatient  Study Information: Image quality for this study is fair.    _______________________________________________________________________________________     CONCLUSIONS:      1. The left ventricular cavity is normal in size. Left ventricular wall thickness is normal. Left ventricular systolic function is mildly decreased with an ejection fraction visually estimated at 45 to 50%. There are regional wall motion abnormalities present. There is mild (grade 1) left ventricular diastolic dysfunction. Hypokinesis of the lateral and inferolateral walls.   2. Normal right ventricular cavity size and normal systolic function.   3. Structurally normal mitral valve with normal leaflet excursion. There is calcification of the mitral valve annulus. There is trace mitral regurgitation.   4. The aortic valve appears trileaflet with normal systolic excursion. There is calcification of the aortic valve leaflets. There is mild to moderate aortic regurgitation.    _____________________________________________________    < end of copied text >      RESPIRATORY CULTURES:          Studies  Chest X-RAY  CT SCAN Chest   Venous Dopplers: LE:   CT Abdomen  Others

## 2024-03-23 NOTE — PROGRESS NOTE ADULT - ASSESSMENT
88y Male with CAD, HFrEF, Afib, HTN, DM, solitary R kidney and CKD p/w acute on chronic HFrEF and acute on chronic kidney injury.  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  I think he is euvolemic at present n not dehydrated   Ct scan was done 11/2023 and there was no hydro and now the renal sono had mild hydro in a solitary kidney-  eval?   Due to his age we (pt and myself)have decided on no renal bx. Renal sono with mild hydronephrosis, similar to 3/18/24. Tiny nonobstructing calculi to right lower pole.  PVR 170cc  Metabolic acidosis-  likely due to CKD/LILIAN, s/p sodium bicarbonate tabs x 3 days  Anemia in CKD- mild  iron deficiency sp venofer       CV- acute on chronic HFrEF and this seems resolved.  On baby ASA     GI-   EGD with biopsy but no specific mass found and slow ooze     RECOMMENDATIONS  -C/w Lasix 20 mg daily  -PRBC transfusion per primary team  -CBC, BMP daily  -Avoid nephrotoxins as able    Asher Rowe, LISSY  [facility]   [phone]

## 2024-03-24 LAB
ANION GAP SERPL CALC-SCNC: 10 MMOL/L — SIGNIFICANT CHANGE UP (ref 7–14)
BLD GP AB SCN SERPL QL: NEGATIVE — SIGNIFICANT CHANGE UP
BUN SERPL-MCNC: 75 MG/DL — HIGH (ref 7–23)
CALCIUM SERPL-MCNC: 8.2 MG/DL — LOW (ref 8.4–10.5)
CHLORIDE SERPL-SCNC: 117 MMOL/L — HIGH (ref 98–107)
CO2 SERPL-SCNC: 16 MMOL/L — LOW (ref 22–31)
CREAT SERPL-MCNC: 2.84 MG/DL — HIGH (ref 0.5–1.3)
EGFR: 21 ML/MIN/1.73M2 — LOW
GLUCOSE BLDC GLUCOMTR-MCNC: 148 MG/DL — HIGH (ref 70–99)
GLUCOSE BLDC GLUCOMTR-MCNC: 182 MG/DL — HIGH (ref 70–99)
GLUCOSE BLDC GLUCOMTR-MCNC: 209 MG/DL — HIGH (ref 70–99)
GLUCOSE BLDC GLUCOMTR-MCNC: 325 MG/DL — HIGH (ref 70–99)
GLUCOSE SERPL-MCNC: 149 MG/DL — HIGH (ref 70–99)
HCT VFR BLD CALC: 27.9 % — LOW (ref 39–50)
HGB BLD-MCNC: 9.3 G/DL — LOW (ref 13–17)
MAGNESIUM SERPL-MCNC: 2.1 MG/DL — SIGNIFICANT CHANGE UP (ref 1.6–2.6)
MCHC RBC-ENTMCNC: 32.3 PG — SIGNIFICANT CHANGE UP (ref 27–34)
MCHC RBC-ENTMCNC: 33.3 GM/DL — SIGNIFICANT CHANGE UP (ref 32–36)
MCV RBC AUTO: 96.9 FL — SIGNIFICANT CHANGE UP (ref 80–100)
NRBC # BLD: 0 /100 WBCS — SIGNIFICANT CHANGE UP (ref 0–0)
NRBC # FLD: 0 K/UL — SIGNIFICANT CHANGE UP (ref 0–0)
PHOSPHATE SERPL-MCNC: 2.8 MG/DL — SIGNIFICANT CHANGE UP (ref 2.5–4.5)
PLATELET # BLD AUTO: 104 K/UL — LOW (ref 150–400)
POTASSIUM SERPL-MCNC: 4.2 MMOL/L — SIGNIFICANT CHANGE UP (ref 3.5–5.3)
POTASSIUM SERPL-SCNC: 4.2 MMOL/L — SIGNIFICANT CHANGE UP (ref 3.5–5.3)
RBC # BLD: 2.88 M/UL — LOW (ref 4.2–5.8)
RBC # FLD: 21.2 % — HIGH (ref 10.3–14.5)
RH IG SCN BLD-IMP: POSITIVE — SIGNIFICANT CHANGE UP
SODIUM SERPL-SCNC: 143 MMOL/L — SIGNIFICANT CHANGE UP (ref 135–145)
WBC # BLD: 7.02 K/UL — SIGNIFICANT CHANGE UP (ref 3.8–10.5)
WBC # FLD AUTO: 7.02 K/UL — SIGNIFICANT CHANGE UP (ref 3.8–10.5)

## 2024-03-24 RX ORDER — INSULIN GLARGINE 100 [IU]/ML
10 INJECTION, SOLUTION SUBCUTANEOUS AT BEDTIME
Refills: 0 | Status: DISCONTINUED | OUTPATIENT
Start: 2024-03-24 | End: 2024-03-28

## 2024-03-24 RX ORDER — ACETAMINOPHEN 500 MG
650 TABLET ORAL EVERY 6 HOURS
Refills: 0 | Status: DISCONTINUED | OUTPATIENT
Start: 2024-03-24 | End: 2024-03-28

## 2024-03-24 RX ADMIN — Medication 20 MILLIGRAM(S): at 05:09

## 2024-03-24 RX ADMIN — Medication 650 MILLIGRAM(S): at 13:16

## 2024-03-24 RX ADMIN — Medication 2: at 17:10

## 2024-03-24 RX ADMIN — Medication 650 MILLIGRAM(S): at 14:16

## 2024-03-24 RX ADMIN — TAMSULOSIN HYDROCHLORIDE 0.8 MILLIGRAM(S): 0.4 CAPSULE ORAL at 22:16

## 2024-03-24 RX ADMIN — Medication 100 MILLIGRAM(S): at 13:16

## 2024-03-24 RX ADMIN — Medication 100 MILLIGRAM(S): at 22:16

## 2024-03-24 RX ADMIN — Medication 4: at 12:10

## 2024-03-24 RX ADMIN — AMIODARONE HYDROCHLORIDE 200 MILLIGRAM(S): 400 TABLET ORAL at 05:09

## 2024-03-24 RX ADMIN — INSULIN GLARGINE 10 UNIT(S): 100 INJECTION, SOLUTION SUBCUTANEOUS at 22:25

## 2024-03-24 RX ADMIN — Medication 325 MILLIGRAM(S): at 13:16

## 2024-03-24 RX ADMIN — ATORVASTATIN CALCIUM 80 MILLIGRAM(S): 80 TABLET, FILM COATED ORAL at 22:16

## 2024-03-24 RX ADMIN — PANTOPRAZOLE SODIUM 10 MG/HR: 20 TABLET, DELAYED RELEASE ORAL at 03:31

## 2024-03-24 RX ADMIN — PANTOPRAZOLE SODIUM 10 MG/HR: 20 TABLET, DELAYED RELEASE ORAL at 17:06

## 2024-03-24 NOTE — PROGRESS NOTE ADULT - NSPROGADDITIONALINFOA_GEN_ALL_CORE
discussed with patient in detail, expresses understanding of treatment plans.  discussed with wife over the phone (Katya BERUMEN retired)  discussed with covering ACP

## 2024-03-24 NOTE — PROGRESS NOTE ADULT - SUBJECTIVE AND OBJECTIVE BOX
Patient is a 88y old  Male who presents with a chief complaint of shortness of breath (24 Mar 2024 10:04)      DATE OF SERVICE: 03-24-24 @ 14:00    SUBJECTIVE / OVERNIGHT EVENTS: overnight events noted    ROS:  Resp: No cough no sputum production  CVS: No chest pain no palpitations no orthopnea  GI: no N/V/D  'I feel fine'       MEDICATIONS  (STANDING):  aMIOdarone    Tablet 200 milliGRAM(s) Oral daily  atorvastatin 80 milliGRAM(s) Oral at bedtime  benzonatate 100 milliGRAM(s) Oral three times a day  dextrose 5%. 1000 milliLiter(s) (100 mL/Hr) IV Continuous <Continuous>  dextrose 5%. 1000 milliLiter(s) (50 mL/Hr) IV Continuous <Continuous>  dextrose 50% Injectable 25 Gram(s) IV Push once  dextrose 50% Injectable 12.5 Gram(s) IV Push once  dextrose 50% Injectable 25 Gram(s) IV Push once  ferrous    sulfate 325 milliGRAM(s) Oral daily  furosemide    Tablet 20 milliGRAM(s) Oral daily  glucagon  Injectable 1 milliGRAM(s) IntraMuscular once  influenza  Vaccine (HIGH DOSE) 0.7 milliLiter(s) IntraMuscular once  insulin glargine Injectable (LANTUS) 10 Unit(s) SubCutaneous at bedtime  insulin lispro (ADMELOG) corrective regimen sliding scale   SubCutaneous three times a day before meals  pantoprazole Infusion 8 mG/Hr (10 mL/Hr) IV Continuous <Continuous>  tamsulosin 0.8 milliGRAM(s) Oral at bedtime    MEDICATIONS  (PRN):  acetaminophen     Tablet .. 650 milliGRAM(s) Oral every 6 hours PRN Temp greater or equal to 38C (100.4F), Mild Pain (1 - 3)  dextrose Oral Gel 15 Gram(s) Oral once PRN Blood Glucose LESS THAN 70 milliGRAM(s)/deciliter        CAPILLARY BLOOD GLUCOSE      POCT Blood Glucose.: 325 mg/dL (24 Mar 2024 12:05)  POCT Blood Glucose.: 148 mg/dL (24 Mar 2024 08:32)  POCT Blood Glucose.: 232 mg/dL (23 Mar 2024 21:50)  POCT Blood Glucose.: 137 mg/dL (23 Mar 2024 16:41)    I&O's Summary    23 Mar 2024 07:01  -  24 Mar 2024 07:00  --------------------------------------------------------  IN: 930 mL / OUT: 1100 mL / NET: -170 mL    24 Mar 2024 07:01  -  24 Mar 2024 14:00  --------------------------------------------------------  IN: 580 mL / OUT: 300 mL / NET: 280 mL        Vital Signs Last 24 Hrs  T(C): 36.3 (24 Mar 2024 13:00), Max: 36.8 (24 Mar 2024 09:06)  T(F): 97.4 (24 Mar 2024 13:00), Max: 98.3 (24 Mar 2024 09:06)  HR: 71 (24 Mar 2024 13:00) (71 - 79)  BP: 130/51 (24 Mar 2024 13:00) (125/50 - 156/57)  BP(mean): --  RR: 17 (24 Mar 2024 13:00) (17 - 20)  SpO2: 99% (24 Mar 2024 13:00) (98% - 100%)    PHYSICAL EXAM:   Lungs: clear   CVS: S1 S2 systolic murmur +   Abdomen: no tenderness  Neuro: AO x 3 nonfocal   Skin: warm, dry  Ext: trace edema    LABS:                        9.3    7.02  )-----------( 104      ( 24 Mar 2024 04:30 )             27.9     03-24    143  |  117<H>  |  75<H>  ----------------------------<  149<H>  4.2   |  16<L>  |  2.84<H>    Ca    8.2<L>      24 Mar 2024 04:30  Phos  2.8     03-24  Mg     2.10     03-24            Urinalysis Basic - ( 24 Mar 2024 04:30 )    Color: x / Appearance: x / SG: x / pH: x  Gluc: 149 mg/dL / Ketone: x  / Bili: x / Urobili: x   Blood: x / Protein: x / Nitrite: x   Leuk Esterase: x / RBC: x / WBC x   Sq Epi: x / Non Sq Epi: x / Bacteria: x          All consultant(s) notes reviewed and care discussed with other providers        Contact Number, Dr Arroyo [phone]

## 2024-03-24 NOTE — PROGRESS NOTE ADULT - ASSESSMENT
A/P    88 yM with PMHx CKD, HFrEF, CAD s/p PCI, AF on a/c, bladder cancer in remission, diabetes, TIA, left nephrectomy, presenting with 2 weeks progressive shortness of breath on exertion. Pt denies worsening pedal edema, orthopnea, chest pains or nausea associated with SOB.     #SOB, acute on chronic HFrEF  -likely secondary to mild volume overload-- resolved   -on oral lasix 20mg daILY  -previous echo w mild LV dysfunction  -repeat TTE w mild LV dysfunction    #Atrial Fibrillation  -stable  -AC HELD due to elevated gib risk, cont to hold for now  -cont amio    #CAD, s/p PCI  -stable  -ASA on hold    #UGIB/Anemia  -transfuse prn    #CKD/LILIAN  -?cardio renal   -no obstruction  -trend creat       35 minutes spent on total encounter; more than 50% of the visit was spent counseling and/or coordinating care by the attending physician.

## 2024-03-24 NOTE — PROGRESS NOTE ADULT - ASSESSMENT
· Assessment	  88y Male with CAD, HFrEF, Afib, HTN, DM, solitary R kidney and CKD p/w acute on chronic HFrEF and acute on chronic kidney injury.  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  I think he is euvolemic at present n not dehydrated   Ct scan was done 11/2023 and there was no hydro and now the renal sono had mild hydro in a solitary kidney-  eval?   Due to his age we (pt and myself)have decided on no renal bx. Renal sono with mild hydronephrosis, similar to 3/18/24. Tiny nonobstructing calculi to right lower pole.  PVR 170cc  Metabolic acidosis-  likely due to CKD/LILIAN, s/p sodium bicarbonate tabs x 3 days  Anemia in CKD- mild  iron deficiency sp venofer       CV- acute on chronic HFrEF and this seems resolved.  On baby ASA     GI-   EGD with biopsy but no specific mass found and slow ooze , on protonix gtt     RECOMMENDATIONS  -C/w Lasix 20 mg daily  -PRBC transfusion per primary team, hgb stable s/p PRBC transfusion yesterday   -CBC, BMP daily  -Avoid nephrotoxins as able

## 2024-03-24 NOTE — PROGRESS NOTE ADULT - SUBJECTIVE AND OBJECTIVE BOX
CARDIOLOGY FOLLOW UP - Dr. Vega  Date of Service: 3/24/24  CC: no events    Review of Systems:  Constitutional: No fever, weight loss, or fatigue  Respiratory: No cough, wheezing, or hemoptysis, no shortness of breath  Cardiovascular: No chest pain, palpitations, passing out, dizziness, or leg swelling  Gastrointestinal: No abd or epigastric pain. No nausea, vomiting, or hematemesis; no diarrhea or consiptaiton, no melena or hematochezia  Vascular: No edema     TELEMETRY:    PHYSICAL EXAM:  T(C): 36.7 (03-24-24 @ 05:06), Max: 36.7 (03-24-24 @ 05:06)  HR: 79 (03-24-24 @ 05:06) (71 - 79)  BP: 138/57 (03-24-24 @ 05:06) (111/48 - 156/57)  RR: 17 (03-24-24 @ 05:06) (17 - 20)  SpO2: 98% (03-24-24 @ 05:06) (98% - 100%)  Wt(kg): --  I&O's Summary    23 Mar 2024 07:01  -  24 Mar 2024 07:00  --------------------------------------------------------  IN: 930 mL / OUT: 1100 mL / NET: -170 mL        Appearance: Normal	  Cardiovascular: Normal S1 S2,RRR, No JVD, No murmurs  Respiratory: Lungs clear to auscultation	  Gastrointestinal:  Soft, Non-tender, + BS	  Extremities: Normal range of motion, No clubbing, cyanosis or edema  Vascular: Peripheral pulses palpable 2+ bilaterally       Home Medications:  apixaban 2.5 mg oral tablet: 1 tab(s) orally every 12 hours (17 Mar 2024 10:48)  Crestor 40 mg oral tablet: 1 tab(s) orally once a day (at bedtime) (17 Mar 2024 10:49)  repaglinide 0.5 mg oral tablet: 1 tab(s) orally 3 times a day (before meals) (17 Mar 2024 10:49)  tamsulosin 0.4 mg oral capsule: 1 cap(s) orally once a day (17 Mar 2024 10:49)  Tresiba FlexTouch 100 units/mL subcutaneous solution: 10 subcutaneous once a day (at bedtime) (17 Mar 2024 10:49)        MEDICATIONS  (STANDING):  aMIOdarone    Tablet 200 milliGRAM(s) Oral daily  atorvastatin 80 milliGRAM(s) Oral at bedtime  benzonatate 100 milliGRAM(s) Oral three times a day  dextrose 5%. 1000 milliLiter(s) (100 mL/Hr) IV Continuous <Continuous>  dextrose 5%. 1000 milliLiter(s) (50 mL/Hr) IV Continuous <Continuous>  dextrose 50% Injectable 25 Gram(s) IV Push once  dextrose 50% Injectable 12.5 Gram(s) IV Push once  dextrose 50% Injectable 25 Gram(s) IV Push once  ferrous    sulfate 325 milliGRAM(s) Oral daily  furosemide    Tablet 20 milliGRAM(s) Oral daily  glucagon  Injectable 1 milliGRAM(s) IntraMuscular once  influenza  Vaccine (HIGH DOSE) 0.7 milliLiter(s) IntraMuscular once  insulin glargine Injectable (LANTUS) 3 Unit(s) SubCutaneous at bedtime  insulin lispro (ADMELOG) corrective regimen sliding scale   SubCutaneous three times a day before meals  pantoprazole Infusion 8 mG/Hr (10 mL/Hr) IV Continuous <Continuous>  tamsulosin 0.8 milliGRAM(s) Oral at bedtime        EKG:  RADIOLOGY:  DIAGNOSTIC TESTING:  [ ] Echocardiogram:  [ ] Catherterization:  [ ] Stress Test:  OTHER:     LABS:	 	                          9.3    7.02  )-----------( 104      ( 24 Mar 2024 04:30 )             27.9     03-24    143  |  117<H>  |  75<H>  ----------------------------<  149<H>  4.2   |  16<L>  |  2.84<H>    Ca    8.2<L>      24 Mar 2024 04:30  Phos  2.8     03-24  Mg     2.10     03-24            CARDIAC MARKERS:

## 2024-03-24 NOTE — PROGRESS NOTE ADULT - SUBJECTIVE AND OBJECTIVE BOX
Date of Service: 03-24-24 @ 10:05    Patient is a 88y old  Male who presents with a chief complaint of shortness of breath (24 Mar 2024 08:48)      Any change in ROS: Sitting in chair:  no sob:  no cough ; no phlegm     MEDICATIONS  (STANDING):  aMIOdarone    Tablet 200 milliGRAM(s) Oral daily  atorvastatin 80 milliGRAM(s) Oral at bedtime  benzonatate 100 milliGRAM(s) Oral three times a day  dextrose 5%. 1000 milliLiter(s) (100 mL/Hr) IV Continuous <Continuous>  dextrose 5%. 1000 milliLiter(s) (50 mL/Hr) IV Continuous <Continuous>  dextrose 50% Injectable 25 Gram(s) IV Push once  dextrose 50% Injectable 12.5 Gram(s) IV Push once  dextrose 50% Injectable 25 Gram(s) IV Push once  ferrous    sulfate 325 milliGRAM(s) Oral daily  furosemide    Tablet 20 milliGRAM(s) Oral daily  glucagon  Injectable 1 milliGRAM(s) IntraMuscular once  influenza  Vaccine (HIGH DOSE) 0.7 milliLiter(s) IntraMuscular once  insulin glargine Injectable (LANTUS) 3 Unit(s) SubCutaneous at bedtime  insulin lispro (ADMELOG) corrective regimen sliding scale   SubCutaneous three times a day before meals  pantoprazole Infusion 8 mG/Hr (10 mL/Hr) IV Continuous <Continuous>  tamsulosin 0.8 milliGRAM(s) Oral at bedtime    MEDICATIONS  (PRN):  dextrose Oral Gel 15 Gram(s) Oral once PRN Blood Glucose LESS THAN 70 milliGRAM(s)/deciliter    Vital Signs Last 24 Hrs  T(C): 36.8 (24 Mar 2024 09:06), Max: 36.8 (24 Mar 2024 09:06)  T(F): 98.3 (24 Mar 2024 09:06), Max: 98.3 (24 Mar 2024 09:06)  HR: 77 (24 Mar 2024 09:06) (71 - 79)  BP: 128/42 (24 Mar 2024 09:06) (111/48 - 156/57)  BP(mean): --  RR: 17 (24 Mar 2024 09:06) (17 - 20)  SpO2: 100% (24 Mar 2024 09:06) (98% - 100%)    Parameters below as of 24 Mar 2024 09:06  Patient On (Oxygen Delivery Method): room air        I&O's Summary    23 Mar 2024 07:01  -  24 Mar 2024 07:00  --------------------------------------------------------  IN: 930 mL / OUT: 1100 mL / NET: -170 mL    24 Mar 2024 07:01  -  24 Mar 2024 10:05  --------------------------------------------------------  IN: 240 mL / OUT: 300 mL / NET: -60 mL          Physical Exam:   GENERAL: NAD, well-groomed, well-developed  HEENT: RAQUEL/   Atraumatic, Normocephalic  ENMT: No tonsillar erythema, exudates, or enlargement; Moist mucous membranes, Good dentition, No lesions  NECK: Supple, No JVD, Normal thyroid  CHEST/LUNG: Clear to auscultaion  CVS: Regular rate and rhythm; No murmurs, rubs, or gallops  GI: : Soft, Nontender, Nondistended; Bowel sounds present  NERVOUS SYSTEM:  Alert & Oriented X3  EXTREMITIES: - edema  LYMPH: No lymphadenopathy noted  SKIN: No rashes or lesions  ENDOCRINOLOGY: No Thyromegaly  PSYCH: Appropriate    Labs:  17, 17                            9.3    7.02  )-----------( 104      ( 24 Mar 2024 04:30 )             27.9                         9.7    8.93  )-----------( 114      ( 23 Mar 2024 18:28 )             29.0                         7.7    5.65  )-----------( 96       ( 23 Mar 2024 04:19 )             23.7                         8.6    5.07  )-----------( 105      ( 22 Mar 2024 18:45 )             25.3                         8.6    5.25  )-----------( 90       ( 22 Mar 2024 05:20 )             25.8                         9.8    4.31  )-----------( 89       ( 21 Mar 2024 14:40 )             30.0                         8.4    3.70  )-----------( 112      ( 21 Mar 2024 03:52 )             25.8     03-24    143  |  117<H>  |  75<H>  ----------------------------<  149<H>  4.2   |  16<L>  |  2.84<H>  03-23    143  |  116<H>  |  94<H>  ----------------------------<  171<H>  4.2   |  16<L>  |  3.19<H>  03-22    138  |  112<H>  |  87<H>  ----------------------------<  154<H>  4.9   |  16<L>  |  3.44<H>  03-21    143  |  114<H>  |  77<H>  ----------------------------<  183<H>  4.6   |  17<L>  |  3.44<H>  03-21    139  |  112<H>  |  73<H>  ----------------------------<  220<H>  5.9<H>   |  15<L>  |  3.52<H>  03-21    142  |  113<H>  |  75<H>  ----------------------------<  90  4.3   |  19<L>  |  3.75<H>    Ca    8.2<L>      24 Mar 2024 04:30  Ca    8.2<L>      23 Mar 2024 04:19  Phos  2.8     03-24  Phos  3.4     03-23  Mg     2.10     03-24  Mg     2.10     03-23    TPro  6.0  /  Alb  2.7<L>  /  TBili  0.5  /  DBili  x   /  AST  44<H>  /  ALT  47<H>  /  AlkPhos  106  03-21    CAPILLARY BLOOD GLUCOSE      POCT Blood Glucose.: 148 mg/dL (24 Mar 2024 08:32)  POCT Blood Glucose.: 232 mg/dL (23 Mar 2024 21:50)  POCT Blood Glucose.: 137 mg/dL (23 Mar 2024 16:41)  POCT Blood Glucose.: 189 mg/dL (23 Mar 2024 12:26)          Urinalysis Basic - ( 24 Mar 2024 04:30 )    Color: x / Appearance: x / SG: x / pH: x  Gluc: 149 mg/dL / Ketone: x  / Bili: x / Urobili: x   Blood: x / Protein: x / Nitrite: x   Leuk Esterase: x / RBC: x / WBC x   Sq Epi: x / Non Sq Epi: x / Bacteria: x      D-Dimer Assay, Quantitative: 250 ng/mL DDU (03-18 @ 06:31)  Lactate, Blood: 1.8 mmol/L (03-21 @ 18:22)        RECENT CULTURES:        RESPIRATORY CULTURES:    rad< from: US Kidney and Bladder (03.22.24 @ 17:53) >      INTERPRETATION:  CLINICAL INFORMATION: Hydronephrosis in solitary kidney.    COMPARISON: 3/17/2024. CT 12/14/2023. Right upper quadrant sonography 3   3/18/2024.    TECHNIQUE: Sonography of the kidneys and bladder.    FINDINGS:  Right kidney: 10.7 cm. No renal mass. Upper pole cyst measuring 2.2 cm x   2.3 cm x 2 cm. Tiny 3 mm nonobstructing lower pole calculus. Mild   hydronephrosis.    Left kidney: Nephrectomy.    Urinary bladder: Distended. Prevoid volume is 404. Postvoid volume is 1   70 cc. Cannot exclude mild thickening of the urinary bladder wall. A   ureteral jet is demonstrated (difficult to say whether its right or   left-sided).    IMPRESSION:  Mild hydronephrosis.The dilatation of the right renal collecting system   is similar to study 3/18/2024.    Again seen is the tiny nonobstructing lower pole right renal calculus.    Patient is status post left nephrectomy.    Postvoid urinary bladder volume is 1 70 cc. Cannot exclude mild   thickening of the urinary bladder wall.    --- End of Report ---            SILVIO DEL VALLE MD; Attending Radiologist  This document has been electronically signed. Mar 23 2024  8:32AM    < end of copied text >  < from: US Kidney and Bladder (03.17.24 @ 18:18) >    ACC: [id number] EXAM:  US KIDNEYS AND BLADDER   ORDERED BY: MARY ELLEN ESPAÑA     PROCEDURE DATE:  03/17/2024          INTERPRETATION:  CLINICAL INFORMATION: Acute kidney injury.    COMPARISON: None available.    TECHNIQUE: Sonography of the kidneys and bladder.    FINDINGS:  Right kidney: 12.7 cm. increased echogenicity. No hydronephrosis. Upper   pole cyst measures 2.4 cm. Nonobstructing mid pole 4 mm stone.    Left kidney: Not visualized    Urinary bladder: Within normal limits.    IMPRESSION:  Solitary right kidney demonstrating parenchymal disease.  No hydronephrosis.        --- End of Report ---            KEVIN AZUL MD; Attending Radiologist  This document has been electronically signed. Mar 18 2024  9:02AM    < end of copied text >        Studies  Chest X-RAY  CT SCAN Chest   Venous Dopplers: LE:   CT Abdomen  Others

## 2024-03-24 NOTE — PROGRESS NOTE ADULT - ASSESSMENT
88yM with PMHx CKD, HFrEF, CAD s/p stents presenting with 2 weeks progressive shortness of breath on exertion. Pt denies worsening pedal edema, orthopnea, chest pains or nausea associated with SOB. Denies infectious symptoms fever/chills, cough, URI symptoms recently. Takes furosemide 40mg every other day this dose has not been changed recently. No BRBPR, melena, epistaxis, hematuria, any other bleeding.  he says h is sob was gradually building up till he could not take it and came to hospital : he deneis any underlying lung disease but is an ex smoker:   no fever,   no cough , no phlegm ;    SOB/CHF/CAD/S/P PCI  ex smoker:   CKD    SOB/CHF/CAD/S/P PCI  -his history is highly suggestive of chf exacerbation;   -he has formed small concepcion effusions:  cxr prior to his film was clear:   -he hasn o clinical features of PNEUMONIA:  -on vbg shows mild metabolic acidosis  -added on Lasix : to co nt:   -no need fro antibiotics or bipap at this time; -consider ct chest : last ct from 2022 reviewed:  was suggestive of martínez failure  -his last echo with mod lv dysfunction  need a repeat one  -Check D Dimer in AM  : clinical  probability of PE is low   3/18:  -clinically he has improved  on room air:  d dimer is slightly elevated:  no further workup : diuretics per primary t eam   3/19: seems OK; no sob: on room air:  no sob: leg edema has resolved  3/20: sob is likely due to mild chf:  improved lasix:  currently on room air and has no leg edema  3/21: seems ok: pulm wise:  for endoscopy today   3/12: h9s endoscopy is done showed:  LA Grade A esophagitis.                       - A large amount of red blood was found in antrum of                        stomach. Diffuse severe mucosal changes characterized by                        congestion, erythema, friability (with spontaneous                        bleeding), hemorrhagic appearance, and mild sloughing                        were found diffusely in the gastric antrum. No discrete                        mass was identified. Biopsied. Hemostatic spray applied.                        DDx concerning for malignancy (primary vs linitis                        plastica) or alternate infiltrative process (eg                        amyloidosis).                       - Gastroopathy.                       - Nodular mucosa in the duodenal bulb.  -no ac for now  3/23: seems stable:  no active bleeding:  coughing excessively:  add tessalon Perles  3/24: he feels better today  : not sob: on room air:  cough is better:       ex smoker:   -she has 35 pk years history of smoking:   -has no diagnosis of copd:  -his last ct chest did not show emphysema:   -need to mantain o2 sao2 above 88%  -he would need outpt full PFT   3/22: seems stable: tolerated the procedure well:  no resp distress  3/23:  has excessive coughing today  : add tessalon Perles   3/24: seems stable:     CKD  -per renal :    dvt prophylaxis:    dw team

## 2024-03-24 NOTE — PROGRESS NOTE ADULT - SUBJECTIVE AND OBJECTIVE BOX
Overnight events noted      VITAL:  T(C): , Max: 36.8 (03-24-24 @ 09:06)  T(F): , Max: 98.3 (03-24-24 @ 09:06)  HR: 71 (03-24-24 @ 13:00)  BP: 130/51 (03-24-24 @ 13:00)  BP(mean): --  RR: 17 (03-24-24 @ 13:00)  SpO2: 99% (03-24-24 @ 13:00)  Wt(kg): --      PHYSICAL EXAM:    Constitutional: NAD; Alert  Neck:  No JVD  Respiratory: CTA B/L  Cardiovascular: S1 and S2  Gastrointestinal: BS+, soft, NT/ND  Extremities: No peripheral edema  Neurological: A/O x 3, no focal deficits  Psychiatric: Normal mood, normal affect  : No Temple  Skin: No rashes  Access: Not applicable      LABS:                        9.3    7.02  )-----------( 104      ( 24 Mar 2024 04:30 )             27.9     Na(143)/K(4.2)/Cl(117)/HCO3(16)/BUN(75)/Cr(2.84)Glu(149)/Ca(8.2)/Mg(2.10)/PO4(2.8)    03-24 @ 04:30  Na(143)/K(4.2)/Cl(116)/HCO3(16)/BUN(94)/Cr(3.19)Glu(171)/Ca(8.2)/Mg(2.10)/PO4(3.4)    03-23 @ 04:19  Na(138)/K(4.9)/Cl(112)/HCO3(16)/BUN(87)/Cr(3.44)Glu(154)/Ca(8.0)/Mg(2.20)/PO4(4.0)    03-22 @ 05:20  Na(143)/K(4.6)/Cl(114)/HCO3(17)/BUN(77)/Cr(3.44)Glu(183)/Ca(7.7)/Mg(2.10)/PO4(4.4)    03-21 @ 16:30    Urinalysis Basic - ( 24 Mar 2024 04:30 )    Color: x / Appearance: x / SG: x / pH: x  Gluc: 149 mg/dL / Ketone: x  / Bili: x / Urobili: x   Blood: x / Protein: x / Nitrite: x   Leuk Esterase: x / RBC: x / WBC x   Sq Epi: x / Non Sq Epi: x / Bacteria: x

## 2024-03-24 NOTE — PROGRESS NOTE ADULT - PROBLEM SELECTOR PLAN 5
continue finger sticks with short acting insulin sliding scale  no oral meds  diabetic diet  monitor for hypoglycemia continue finger sticks with short acting insulin sliding scale  no oral meds  diabetic diet  monitor for hypoglycemia  increase Lantus to 10 units as finger sticks trending up

## 2024-03-25 LAB
ANION GAP SERPL CALC-SCNC: 11 MMOL/L — SIGNIFICANT CHANGE UP (ref 7–14)
BUN SERPL-MCNC: 68 MG/DL — HIGH (ref 7–23)
CALCIUM SERPL-MCNC: 8 MG/DL — LOW (ref 8.4–10.5)
CHLORIDE SERPL-SCNC: 115 MMOL/L — HIGH (ref 98–107)
CO2 SERPL-SCNC: 18 MMOL/L — LOW (ref 22–31)
CREAT SERPL-MCNC: 2.74 MG/DL — HIGH (ref 0.5–1.3)
EGFR: 22 ML/MIN/1.73M2 — LOW
GLUCOSE BLDC GLUCOMTR-MCNC: 134 MG/DL — HIGH (ref 70–99)
GLUCOSE BLDC GLUCOMTR-MCNC: 138 MG/DL — HIGH (ref 70–99)
GLUCOSE BLDC GLUCOMTR-MCNC: 188 MG/DL — HIGH (ref 70–99)
GLUCOSE BLDC GLUCOMTR-MCNC: 259 MG/DL — HIGH (ref 70–99)
GLUCOSE SERPL-MCNC: 154 MG/DL — HIGH (ref 70–99)
HCT VFR BLD CALC: 26.2 % — LOW (ref 39–50)
HGB BLD-MCNC: 8.6 G/DL — LOW (ref 13–17)
MAGNESIUM SERPL-MCNC: 2 MG/DL — SIGNIFICANT CHANGE UP (ref 1.6–2.6)
MCHC RBC-ENTMCNC: 31.7 PG — SIGNIFICANT CHANGE UP (ref 27–34)
MCHC RBC-ENTMCNC: 32.8 GM/DL — SIGNIFICANT CHANGE UP (ref 32–36)
MCV RBC AUTO: 96.7 FL — SIGNIFICANT CHANGE UP (ref 80–100)
NRBC # BLD: 0 /100 WBCS — SIGNIFICANT CHANGE UP (ref 0–0)
NRBC # FLD: 0 K/UL — SIGNIFICANT CHANGE UP (ref 0–0)
PHOSPHATE SERPL-MCNC: 3 MG/DL — SIGNIFICANT CHANGE UP (ref 2.5–4.5)
PLATELET # BLD AUTO: 92 K/UL — LOW (ref 150–400)
POTASSIUM SERPL-MCNC: 4 MMOL/L — SIGNIFICANT CHANGE UP (ref 3.5–5.3)
POTASSIUM SERPL-SCNC: 4 MMOL/L — SIGNIFICANT CHANGE UP (ref 3.5–5.3)
RBC # BLD: 2.71 M/UL — LOW (ref 4.2–5.8)
RBC # FLD: 20.5 % — HIGH (ref 10.3–14.5)
SODIUM SERPL-SCNC: 144 MMOL/L — SIGNIFICANT CHANGE UP (ref 135–145)
WBC # BLD: 4.57 K/UL — SIGNIFICANT CHANGE UP (ref 3.8–10.5)
WBC # FLD AUTO: 4.57 K/UL — SIGNIFICANT CHANGE UP (ref 3.8–10.5)

## 2024-03-25 RX ORDER — ERYTHROPOIETIN 10000 [IU]/ML
10000 INJECTION, SOLUTION INTRAVENOUS; SUBCUTANEOUS ONCE
Refills: 0 | Status: COMPLETED | OUTPATIENT
Start: 2024-03-25 | End: 2024-03-25

## 2024-03-25 RX ORDER — PANTOPRAZOLE SODIUM 20 MG/1
40 TABLET, DELAYED RELEASE ORAL
Refills: 0 | Status: DISCONTINUED | OUTPATIENT
Start: 2024-03-25 | End: 2024-03-28

## 2024-03-25 RX ADMIN — Medication 100 MILLIGRAM(S): at 05:22

## 2024-03-25 RX ADMIN — Medication 3: at 11:31

## 2024-03-25 RX ADMIN — AMIODARONE HYDROCHLORIDE 200 MILLIGRAM(S): 400 TABLET ORAL at 05:22

## 2024-03-25 RX ADMIN — Medication 100 MILLIGRAM(S): at 21:25

## 2024-03-25 RX ADMIN — INSULIN GLARGINE 10 UNIT(S): 100 INJECTION, SOLUTION SUBCUTANEOUS at 21:26

## 2024-03-25 RX ADMIN — ATORVASTATIN CALCIUM 80 MILLIGRAM(S): 80 TABLET, FILM COATED ORAL at 21:25

## 2024-03-25 RX ADMIN — TAMSULOSIN HYDROCHLORIDE 0.8 MILLIGRAM(S): 0.4 CAPSULE ORAL at 21:25

## 2024-03-25 RX ADMIN — Medication 325 MILLIGRAM(S): at 11:31

## 2024-03-25 RX ADMIN — PANTOPRAZOLE SODIUM 40 MILLIGRAM(S): 20 TABLET, DELAYED RELEASE ORAL at 17:06

## 2024-03-25 RX ADMIN — Medication 100 MILLIGRAM(S): at 12:29

## 2024-03-25 RX ADMIN — Medication 20 MILLIGRAM(S): at 05:22

## 2024-03-25 RX ADMIN — ERYTHROPOIETIN 10000 UNIT(S): 10000 INJECTION, SOLUTION INTRAVENOUS; SUBCUTANEOUS at 18:20

## 2024-03-25 NOTE — PROGRESS NOTE ADULT - SUBJECTIVE AND OBJECTIVE BOX
CARDIOLOGY FOLLOW UP - Dr. Vega  DATE OF SERVICE: 3/25/24    CC no cp or sob     REVIEW OF SYSTEMS:  CONSTITUTIONAL: No fever, weight loss, or fatigue  RESPIRATORY: denies sob, dyspnea, cough   CARDIOVASCULAR: No chest pain, palpitations, passing out, dizziness, or leg swelling      PHYSICAL EXAM:  T(C): 36.6 (03-25-24 @ 09:30), Max: 36.6 (03-24-24 @ 17:15)  HR: 64 (03-25-24 @ 09:30) (63 - 71)  BP: 120/55 (03-25-24 @ 09:30) (120/55 - 146/60)  RR: 17 (03-25-24 @ 09:30) (17 - 18)  SpO2: 98% (03-25-24 @ 09:30) (98% - 100%)  Wt(kg): --  I&O's Summary    24 Mar 2024 07:01  -  25 Mar 2024 07:00  --------------------------------------------------------  IN: 1020 mL / OUT: 600 mL / NET: 420 mL        Appearance: Normal	  Cardiovascular: Normal S1 S2,rrr   Respiratory: coarse   Gastrointestinal:  Soft, Non-tender, + BS	  Extremities: Normal range of motion, No clubbing, cyanosis or edema      Home Medications:  apixaban 2.5 mg oral tablet: 1 tab(s) orally every 12 hours (17 Mar 2024 10:48)  Crestor 40 mg oral tablet: 1 tab(s) orally once a day (at bedtime) (17 Mar 2024 10:49)  repaglinide 0.5 mg oral tablet: 1 tab(s) orally 3 times a day (before meals) (17 Mar 2024 10:49)  tamsulosin 0.4 mg oral capsule: 1 cap(s) orally once a day (17 Mar 2024 10:49)  Tresiba FlexTouch 100 units/mL subcutaneous solution: 10 subcutaneous once a day (at bedtime) (17 Mar 2024 10:49)      MEDICATIONS  (STANDING):  aMIOdarone    Tablet 200 milliGRAM(s) Oral daily  atorvastatin 80 milliGRAM(s) Oral at bedtime  benzonatate 100 milliGRAM(s) Oral three times a day  dextrose 5%. 1000 milliLiter(s) (50 mL/Hr) IV Continuous <Continuous>  dextrose 5%. 1000 milliLiter(s) (100 mL/Hr) IV Continuous <Continuous>  dextrose 50% Injectable 25 Gram(s) IV Push once  dextrose 50% Injectable 12.5 Gram(s) IV Push once  dextrose 50% Injectable 25 Gram(s) IV Push once  ferrous    sulfate 325 milliGRAM(s) Oral daily  furosemide    Tablet 20 milliGRAM(s) Oral daily  glucagon  Injectable 1 milliGRAM(s) IntraMuscular once  influenza  Vaccine (HIGH DOSE) 0.7 milliLiter(s) IntraMuscular once  insulin glargine Injectable (LANTUS) 10 Unit(s) SubCutaneous at bedtime  insulin lispro (ADMELOG) corrective regimen sliding scale   SubCutaneous three times a day before meals  pantoprazole    Tablet 40 milliGRAM(s) Oral two times a day  tamsulosin 0.8 milliGRAM(s) Oral at bedtime      TELEMETRY: nsr / sinus tach 	    ECG:  	  RADIOLOGY:   < from: US Abdomen Upper Quadrant Right (03.18.24 @ 16:29) >    IMPRESSION:  *  Enlarged liver, demonstrating the presence of parenchymal disease.  *  Cholelithiasis, without evidence of acute cholecystitis.  *  Right renal parenchymal disease.  *  Mild right hydronephrosis.    < end of copied text >    DIAGNOSTIC TESTING:  [ ] Echocardiogram:  [ ]  Catheterization:  [ ] Stress Test:    OTHER: 	    LABS:	 	                            8.6    4.57  )-----------( 92       ( 25 Mar 2024 03:55 )             26.2     03-25    144  |  115<H>  |  68<H>  ----------------------------<  154<H>  4.0   |  18<L>  |  2.74<H>    Ca    8.0<L>      25 Mar 2024 03:55  Phos  3.0     03-25  Mg     2.00     03-25               CARDIOLOGY FOLLOW UP - Dr. Vega  DATE OF SERVICE: 3/25/24    CC no cp or sob     REVIEW OF SYSTEMS:  CONSTITUTIONAL: No fever, weight loss, or fatigue  RESPIRATORY: denies sob, dyspnea, cough   CARDIOVASCULAR: No chest pain, palpitations, passing out, dizziness, or leg swelling      PHYSICAL EXAM:  T(C): 36.6 (03-25-24 @ 09:30), Max: 36.6 (03-24-24 @ 17:15)  HR: 64 (03-25-24 @ 09:30) (63 - 71)  BP: 120/55 (03-25-24 @ 09:30) (120/55 - 146/60)  RR: 17 (03-25-24 @ 09:30) (17 - 18)  SpO2: 98% (03-25-24 @ 09:30) (98% - 100%)  Wt(kg): --  I&O's Summary    24 Mar 2024 07:01  -  25 Mar 2024 07:00  --------------------------------------------------------  IN: 1020 mL / OUT: 600 mL / NET: 420 mL        Appearance: Normal	  Cardiovascular: Normal S1 S2,rrr   Respiratory: CTABL  Gastrointestinal:  Soft, Non-tender, + BS	  Extremities: Normal range of motion, No clubbing, cyanosis or edema      Home Medications:  apixaban 2.5 mg oral tablet: 1 tab(s) orally every 12 hours (17 Mar 2024 10:48)  Crestor 40 mg oral tablet: 1 tab(s) orally once a day (at bedtime) (17 Mar 2024 10:49)  repaglinide 0.5 mg oral tablet: 1 tab(s) orally 3 times a day (before meals) (17 Mar 2024 10:49)  tamsulosin 0.4 mg oral capsule: 1 cap(s) orally once a day (17 Mar 2024 10:49)  Tresiba FlexTouch 100 units/mL subcutaneous solution: 10 subcutaneous once a day (at bedtime) (17 Mar 2024 10:49)      MEDICATIONS  (STANDING):  aMIOdarone    Tablet 200 milliGRAM(s) Oral daily  atorvastatin 80 milliGRAM(s) Oral at bedtime  benzonatate 100 milliGRAM(s) Oral three times a day  dextrose 5%. 1000 milliLiter(s) (50 mL/Hr) IV Continuous <Continuous>  dextrose 5%. 1000 milliLiter(s) (100 mL/Hr) IV Continuous <Continuous>  dextrose 50% Injectable 25 Gram(s) IV Push once  dextrose 50% Injectable 12.5 Gram(s) IV Push once  dextrose 50% Injectable 25 Gram(s) IV Push once  ferrous    sulfate 325 milliGRAM(s) Oral daily  furosemide    Tablet 20 milliGRAM(s) Oral daily  glucagon  Injectable 1 milliGRAM(s) IntraMuscular once  influenza  Vaccine (HIGH DOSE) 0.7 milliLiter(s) IntraMuscular once  insulin glargine Injectable (LANTUS) 10 Unit(s) SubCutaneous at bedtime  insulin lispro (ADMELOG) corrective regimen sliding scale   SubCutaneous three times a day before meals  pantoprazole    Tablet 40 milliGRAM(s) Oral two times a day  tamsulosin 0.8 milliGRAM(s) Oral at bedtime      TELEMETRY: nsr / sinus tach 	    ECG:  	  RADIOLOGY:   < from: US Abdomen Upper Quadrant Right (03.18.24 @ 16:29) >    IMPRESSION:  *  Enlarged liver, demonstrating the presence of parenchymal disease.  *  Cholelithiasis, without evidence of acute cholecystitis.  *  Right renal parenchymal disease.  *  Mild right hydronephrosis.    < end of copied text >    DIAGNOSTIC TESTING:  [ ] Echocardiogram:  [ ]  Catheterization:  [ ] Stress Test:    OTHER: 	    LABS:	 	                            8.6    4.57  )-----------( 92       ( 25 Mar 2024 03:55 )             26.2     03-25    144  |  115<H>  |  68<H>  ----------------------------<  154<H>  4.0   |  18<L>  |  2.74<H>    Ca    8.0<L>      25 Mar 2024 03:55  Phos  3.0     03-25  Mg     2.00     03-25

## 2024-03-25 NOTE — PROGRESS NOTE ADULT - SUBJECTIVE AND OBJECTIVE BOX
Patient is a 88y old  Male who presents with a chief complaint of shortness of breath (25 Mar 2024 12:08)    DATE OF SERVICE: 03-25-24 @ 12:20    SUBJECTIVE / OVERNIGHT EVENTS: overnight events noted    ROS:  Resp: No cough no sputum production  CVS: No chest pain no palpitations no orthopnea  GI: no N/V/D      MEDICATIONS  (STANDING):  aMIOdarone    Tablet 200 milliGRAM(s) Oral daily  atorvastatin 80 milliGRAM(s) Oral at bedtime  benzonatate 100 milliGRAM(s) Oral three times a day  dextrose 5%. 1000 milliLiter(s) (100 mL/Hr) IV Continuous <Continuous>  dextrose 5%. 1000 milliLiter(s) (50 mL/Hr) IV Continuous <Continuous>  dextrose 50% Injectable 25 Gram(s) IV Push once  dextrose 50% Injectable 12.5 Gram(s) IV Push once  dextrose 50% Injectable 25 Gram(s) IV Push once  ferrous    sulfate 325 milliGRAM(s) Oral daily  furosemide    Tablet 20 milliGRAM(s) Oral daily  glucagon  Injectable 1 milliGRAM(s) IntraMuscular once  influenza  Vaccine (HIGH DOSE) 0.7 milliLiter(s) IntraMuscular once  insulin glargine Injectable (LANTUS) 10 Unit(s) SubCutaneous at bedtime  insulin lispro (ADMELOG) corrective regimen sliding scale   SubCutaneous three times a day before meals  pantoprazole    Tablet 40 milliGRAM(s) Oral two times a day  tamsulosin 0.8 milliGRAM(s) Oral at bedtime    MEDICATIONS  (PRN):  acetaminophen     Tablet .. 650 milliGRAM(s) Oral every 6 hours PRN Temp greater or equal to 38C (100.4F), Mild Pain (1 - 3)  dextrose Oral Gel 15 Gram(s) Oral once PRN Blood Glucose LESS THAN 70 milliGRAM(s)/deciliter        CAPILLARY BLOOD GLUCOSE      POCT Blood Glucose.: 259 mg/dL (25 Mar 2024 11:29)  POCT Blood Glucose.: 138 mg/dL (25 Mar 2024 07:10)  POCT Blood Glucose.: 182 mg/dL (24 Mar 2024 22:24)  POCT Blood Glucose.: 209 mg/dL (24 Mar 2024 17:07)    I&O's Summary    24 Mar 2024 07:01  -  25 Mar 2024 07:00  --------------------------------------------------------  IN: 1020 mL / OUT: 600 mL / NET: 420 mL        Vital Signs Last 24 Hrs  T(C): 36.6 (25 Mar 2024 09:30), Max: 36.6 (24 Mar 2024 17:15)  T(F): 97.8 (25 Mar 2024 09:30), Max: 97.8 (24 Mar 2024 17:15)  HR: 64 (25 Mar 2024 09:30) (63 - 71)  BP: 120/55 (25 Mar 2024 09:30) (120/55 - 146/60)  BP(mean): --  RR: 17 (25 Mar 2024 09:30) (17 - 18)  SpO2: 98% (25 Mar 2024 09:30) (98% - 100%)    PHYSICAL EXAM:   Lungs: clear   CVS: S1 S2 systolic murmur +   Abdomen: no tenderness  Neuro: AO x 3 nonfocal   Skin: warm, dry  Ext: trace edema    LABS:                        8.6    4.57  )-----------( 92       ( 25 Mar 2024 03:55 )             26.2     03-25    144  |  115<H>  |  68<H>  ----------------------------<  154<H>  4.0   |  18<L>  |  2.74<H>    Ca    8.0<L>      25 Mar 2024 03:55  Phos  3.0     03-25  Mg     2.00     03-25            Urinalysis Basic - ( 25 Mar 2024 03:55 )    Color: x / Appearance: x / SG: x / pH: x  Gluc: 154 mg/dL / Ketone: x  / Bili: x / Urobili: x   Blood: x / Protein: x / Nitrite: x   Leuk Esterase: x / RBC: x / WBC x   Sq Epi: x / Non Sq Epi: x / Bacteria: x          All consultant(s) notes reviewed and care discussed with other providers        Contact Number, Dr Arroyo [phone]

## 2024-03-25 NOTE — PROGRESS NOTE ADULT - ASSESSMENT
88yM with PMHx CKD, HFrEF, CAD s/p stents presenting with 2 weeks progressive shortness of breath on exertion. Pt denies worsening pedal edema, orthopnea, chest pains or nausea associated with SOB. Denies infectious symptoms fever/chills, cough, URI symptoms recently. Takes furosemide 40mg every other day this dose has not been changed recently. No BRBPR, melena, epistaxis, hematuria, any other bleeding.  he says h is sob was gradually building up till he could not take it and came to hospital : he deneis any underlying lung disease but is an ex smoker:   no fever,   no cough , no phlegm ;    SOB/CHF/CAD/S/P PCI  ex smoker:   CKD    SOB/CHF/CAD/S/P PCI  -his history is highly suggestive of chf exacerbation;   -he has formed small concepcion effusions:  cxr prior to his film was clear:   -he hasn o clinical features of PNEUMONIA:  -on vbg shows mild metabolic acidosis  -added on Lasix : to co nt:   -no need fro antibiotics or bipap at this time; -consider ct chest : last ct from 2022 reviewed:  was suggestive of martínez failure  -his last echo with mod lv dysfunction  need a repeat one  -Check D Dimer in AM  : clinical  probability of PE is low   3/18:  -clinically he has improved  on room air:  d dimer is slightly elevated:  no further workup : diuretics per primary t eam   3/19: seems OK; no sob: on room air:  no sob: leg edema has resolved  3/20: sob is likely due to mild chf:  improved lasix:  currently on room air and has no leg edema  3/21: seems ok: pulm wise:  for endoscopy today   3/12: h9s endoscopy is done showed:  LA Grade A esophagitis.                       - A large amount of red blood was found in antrum of                        stomach. Diffuse severe mucosal changes characterized by                        congestion, erythema, friability (with spontaneous                        bleeding), hemorrhagic appearance, and mild sloughing                        were found diffusely in the gastric antrum. No discrete                        mass was identified. Biopsied. Hemostatic spray applied.                        DDx concerning for malignancy (primary vs linitis                        plastica) or alternate infiltrative process (eg                        amyloidosis).                       - Gastroopathy.                       - Nodular mucosa in the duodenal bulb.  -no ac for now  3/23: seems stable:  no active bleeding:  coughing excessively:  add tessalon Perles  3/24: he feels better today  : not sob: on room air:  cough is better:   3/25; He seems K: no sob;  no cough : no phlegm        ex smoker:   -she has 35 pk years history of smoking:   -has no diagnosis of copd:  -his last ct chest did not show emphysema:   -need to mantain o2 sao2 above 88%  -he would need outpt full PFT   3/22: seems stable: tolerated the procedure well:  no resp distress  3/23:  has excessive coughing today  : add tessalon Perles   3/24: seems stable:   3/25: stable:     CKD  -per renal :    dvt prophylaxis:    dw team

## 2024-03-25 NOTE — PROGRESS NOTE ADULT - SUBJECTIVE AND OBJECTIVE BOX
Date of Service: 03-25-24 @ 12:08    Patient is a 88y old  Male who presents with a chief complaint of shortness of breath (25 Mar 2024 10:31)      Any change in ROS:  doing ok ; no sob:  no cough ; no phlegm      MEDICATIONS  (STANDING):  aMIOdarone    Tablet 200 milliGRAM(s) Oral daily  atorvastatin 80 milliGRAM(s) Oral at bedtime  benzonatate 100 milliGRAM(s) Oral three times a day  dextrose 5%. 1000 milliLiter(s) (100 mL/Hr) IV Continuous <Continuous>  dextrose 5%. 1000 milliLiter(s) (50 mL/Hr) IV Continuous <Continuous>  dextrose 50% Injectable 25 Gram(s) IV Push once  dextrose 50% Injectable 12.5 Gram(s) IV Push once  dextrose 50% Injectable 25 Gram(s) IV Push once  ferrous    sulfate 325 milliGRAM(s) Oral daily  furosemide    Tablet 20 milliGRAM(s) Oral daily  glucagon  Injectable 1 milliGRAM(s) IntraMuscular once  influenza  Vaccine (HIGH DOSE) 0.7 milliLiter(s) IntraMuscular once  insulin glargine Injectable (LANTUS) 10 Unit(s) SubCutaneous at bedtime  insulin lispro (ADMELOG) corrective regimen sliding scale   SubCutaneous three times a day before meals  pantoprazole    Tablet 40 milliGRAM(s) Oral two times a day  tamsulosin 0.8 milliGRAM(s) Oral at bedtime    MEDICATIONS  (PRN):  acetaminophen     Tablet .. 650 milliGRAM(s) Oral every 6 hours PRN Temp greater or equal to 38C (100.4F), Mild Pain (1 - 3)  dextrose Oral Gel 15 Gram(s) Oral once PRN Blood Glucose LESS THAN 70 milliGRAM(s)/deciliter    Vital Signs Last 24 Hrs  T(C): 36.6 (25 Mar 2024 09:30), Max: 36.6 (24 Mar 2024 17:15)  T(F): 97.8 (25 Mar 2024 09:30), Max: 97.8 (24 Mar 2024 17:15)  HR: 64 (25 Mar 2024 09:30) (63 - 71)  BP: 120/55 (25 Mar 2024 09:30) (120/55 - 146/60)  BP(mean): --  RR: 17 (25 Mar 2024 09:30) (17 - 18)  SpO2: 98% (25 Mar 2024 09:30) (98% - 100%)    Parameters below as of 25 Mar 2024 09:30  Patient On (Oxygen Delivery Method): room air        I&O's Summary    24 Mar 2024 07:01  -  25 Mar 2024 07:00  --------------------------------------------------------  IN: 1020 mL / OUT: 600 mL / NET: 420 mL          Physical Exam:   GENERAL: NAD, well-groomed, well-developed  HEENT: RAQUEL/   Atraumatic, Normocephalic  ENMT: No tonsillar erythema, exudates, or enlargement; Moist mucous membranes, Good dentition, No lesions  NECK: Supple, No JVD, Normal thyroid  CHEST/LUNG: Clear to auscultaion  CVS: Regular rate and rhythm; No murmurs, rubs, or gallops  GI: : Soft, Nontender, Nondistended; Bowel sounds present  NERVOUS SYSTEM:  Alert & Oriented X3  EXTREMITIES:- edema  LYMPH: No lymphadenopathy noted  SKIN: No rashes or lesions  ENDOCRINOLOGY: No Thyromegaly  PSYCH: Appropriate    Labs:  17, 17                            8.6    4.57  )-----------( 92       ( 25 Mar 2024 03:55 )             26.2                         9.3    7.02  )-----------( 104      ( 24 Mar 2024 04:30 )             27.9                         9.7    8.93  )-----------( 114      ( 23 Mar 2024 18:28 )             29.0                         7.7    5.65  )-----------( 96       ( 23 Mar 2024 04:19 )             23.7                         8.6    5.07  )-----------( 105      ( 22 Mar 2024 18:45 )             25.3                         8.6    5.25  )-----------( 90       ( 22 Mar 2024 05:20 )             25.8                         9.8    4.31  )-----------( 89       ( 21 Mar 2024 14:40 )             30.0     03-25    144  |  115<H>  |  68<H>  ----------------------------<  154<H>  4.0   |  18<L>  |  2.74<H>  03-24    143  |  117<H>  |  75<H>  ----------------------------<  149<H>  4.2   |  16<L>  |  2.84<H>  03-23    143  |  116<H>  |  94<H>  ----------------------------<  171<H>  4.2   |  16<L>  |  3.19<H>  03-22    138  |  112<H>  |  87<H>  ----------------------------<  154<H>  4.9   |  16<L>  |  3.44<H>  03-21    143  |  114<H>  |  77<H>  ----------------------------<  183<H>  4.6   |  17<L>  |  3.44<H>  03-21    139  |  112<H>  |  73<H>  ----------------------------<  220<H>  5.9<H>   |  15<L>  |  3.52<H>    Ca    8.0<L>      25 Mar 2024 03:55  Ca    8.2<L>      24 Mar 2024 04:30  Phos  3.0     03-25  Phos  2.8     03-24  Mg     2.00     03-25  Mg     2.10     03-24    TPro  6.0  /  Alb  2.7<L>  /  TBili  0.5  /  DBili  x   /  AST  44<H>  /  ALT  47<H>  /  AlkPhos  106  03-21    CAPILLARY BLOOD GLUCOSE      POCT Blood Glucose.: 259 mg/dL (25 Mar 2024 11:29)  POCT Blood Glucose.: 138 mg/dL (25 Mar 2024 07:10)  POCT Blood Glucose.: 182 mg/dL (24 Mar 2024 22:24)  POCT Blood Glucose.: 209 mg/dL (24 Mar 2024 17:07)          Urinalysis Basic - ( 25 Mar 2024 03:55 )    Color: x / Appearance: x / SG: x / pH: x  Gluc: 154 mg/dL / Ketone: x  / Bili: x / Urobili: x   Blood: x / Protein: x / Nitrite: x   Leuk Esterase: x / RBC: x / WBC x   Sq Epi: x / Non Sq Epi: x / Bacteria: x      Lactate, Blood: 1.8 mmol/L (03-21 @ 18:22)        RECENT CULTURES:    rad< from: US Kidney and Bladder (03.22.24 @ 17:53) >    FINDINGS:  Right kidney: 10.7 cm. No renal mass. Upper pole cyst measuring 2.2 cm x   2.3 cm x 2 cm. Tiny 3 mm nonobstructing lower pole calculus. Mild   hydronephrosis.    Left kidney: Nephrectomy.    Urinary bladder: Distended. Prevoid volume is 404. Postvoid volume is 1   70 cc. Cannot exclude mild thickening of the urinary bladder wall. A   ureteral jet is demonstrated (difficult to say whether its right or   left-sided).    IMPRESSION:  Mild hydronephrosis.The dilatation of the right renal collecting system   is similar to study 3/18/2024.    Again seen is the tiny nonobstructing lower pole right renal calculus.    Patient is status post left nephrectomy.    Postvoid urinary bladder volume is 1 70 cc. Cannot exclude mild   thickening of the urinary bladder wall.    --- End of Report ---            SILVIO DEL VALLE MD; Attending Radiologist    < end of copied text >  < from: US Abdomen Upper Quadrant Right (03.18.24 @ 16:29) >    TECHNIQUE: Sonography of the right upper quadrant.    FINDINGS:  Liver: Mildly increased echogenicity of the enlarged liver, consistent   with underlying parenchymal disease. No surface nodularity.  Bile ducts: Normal caliber. Common bile duct measures 4 mm.  Gallbladder: Cholelithiasis.  Contracted gallbladder.  Pancreas: Visualized portions are within normal limits.  Right kidney: 12.3 cm. Increased echogenicity. Upper pole cyst measures   3.2 cm. Mild hydronephrosis..  Ascites: None.  IVC: Visualized portions are within normal limits.    IMPRESSION:  *  Enlarged liver, demonstrating the presence of parenchymal disease.  *  Cholelithiasis, without evidence of acute cholecystitis.  *  Right renal parenchymal disease.  *  Mild right hydronephrosis.        --- End of Report ---            KEVIN AZUL MD; Attending Radiologist  This document has been electronically signed. Mar 18 2024  4:40PM    < end of copied text >  < from: Xray Chest 2 Views PA/Lat (12.14.23 @ 10:32) >      INTERPRETATION:  CLINICAL INFORMATION: Dyspnea    TIME OF EXAMINATION: December 14, 2023 at 10:29 AM    EXAM: PA and lateral chest    FINDINGS:  The lungs are clear, the heart is not enlarged and there are no effusions   or congestion to suggest CHF or pneumonia.    The bones show no acute findings.        COMPARISON: January 26, 2022 when pulmonary edema was present.        IMPRESSION: Clear lungs.    --- End of Report ---            CALVIN HUSAIN MD; Attending Radiologist  This document has been electronically signed. Dec 14 2023 10:37AM    < end of copied text >      RESPIRATORY CULTURES:          Studies  Chest X-RAY  CT SCAN Chest   Venous Dopplers: LE:   CT Abdomen  Others

## 2024-03-25 NOTE — DIETITIAN INITIAL EVALUATION ADULT - PERTINENT LABORATORY DATA
03-25    144  |  115<H>  |  68<H>  ----------------------------<  154<H>  4.0   |  18<L>  |  2.74<H>    Ca    8.0<L>      25 Mar 2024 03:55  Phos  3.0     03-25  Mg     2.00     03-25    POCT Blood Glucose.: 259 mg/dL (03-25-24 @ 11:29)  A1C with Estimated Average Glucose Result: 5.3 % (03-18-24 @ 06:31)  A1C with Estimated Average Glucose Result: 7.1 % (12-15-23 @ 07:00)

## 2024-03-25 NOTE — DIETITIAN INITIAL EVALUATION ADULT - PERTINENT MEDS FT
MEDICATIONS  (STANDING):  aMIOdarone    Tablet 200 milliGRAM(s) Oral daily  atorvastatin 80 milliGRAM(s) Oral at bedtime  benzonatate 100 milliGRAM(s) Oral three times a day  dextrose 5%. 1000 milliLiter(s) (100 mL/Hr) IV Continuous <Continuous>  dextrose 5%. 1000 milliLiter(s) (50 mL/Hr) IV Continuous <Continuous>  dextrose 50% Injectable 25 Gram(s) IV Push once  dextrose 50% Injectable 12.5 Gram(s) IV Push once  dextrose 50% Injectable 25 Gram(s) IV Push once  ferrous    sulfate 325 milliGRAM(s) Oral daily  furosemide    Tablet 20 milliGRAM(s) Oral daily  glucagon  Injectable 1 milliGRAM(s) IntraMuscular once  influenza  Vaccine (HIGH DOSE) 0.7 milliLiter(s) IntraMuscular once  insulin glargine Injectable (LANTUS) 10 Unit(s) SubCutaneous at bedtime  insulin lispro (ADMELOG) corrective regimen sliding scale   SubCutaneous three times a day before meals  pantoprazole    Tablet 40 milliGRAM(s) Oral two times a day  tamsulosin 0.8 milliGRAM(s) Oral at bedtime    MEDICATIONS  (PRN):  acetaminophen     Tablet .. 650 milliGRAM(s) Oral every 6 hours PRN Temp greater or equal to 38C (100.4F), Mild Pain (1 - 3)  dextrose Oral Gel 15 Gram(s) Oral once PRN Blood Glucose LESS THAN 70 milliGRAM(s)/deciliter

## 2024-03-25 NOTE — PROGRESS NOTE ADULT - ASSESSMENT
A/P    88 yM with PMHx CKD, HFrEF, CAD s/p PCI, AF on a/c, bladder cancer in remission, diabetes, TIA, left nephrectomy, presenting with 2 weeks progressive shortness of breath on exertion. Pt denies worsening pedal edema, orthopnea, chest pains or nausea associated with SOB.     #SOB, acute on chronic HFrEF  -likely secondary to mild volume overload-- resolved   -on oral lasix 20mg daILY  -previous echo w mild LV dysfunction  -repeat TTE w mild LV dysfunction    #Atrial Fibrillation  -stable  -AC HELD due to elevated gib risk  -Per GI Would hold off on AC and antiplatelet agents if possible until hgb has been stable for >24h.  -cont amio    #CAD, s/p PCI  -stable  -ASA on hold as mentioned above    #UGIB/Anemia  -transfuse prn  -h/h mildly decreased today   -med fu     #CKD/LILIAN  -?cardio renal   -no obstruction  -trend creat

## 2024-03-25 NOTE — PROGRESS NOTE ADULT - ASSESSMENT
88y Male with CAD, HFrEF, Afib, HTN, DM, solitary R kidney and CKD p/w acute on chronic HFrEF and acute on chronic kidney injury.  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  I think he is euvolemic at present n not dehydrated   Ct scan was done 11/2023 and there was no hydro and now the renal sono had mild hydro in a solitary kidney-  eval?     Due to his age we (pt and myself)have decided on no renal bx   on lasix 20 mg po daily     Metabolic acidosis-  likely due to CKD/LILIAN, s/p bicarb tabs, improving     Anemia in CKD- mild  iron deficiency sp venofer  and PRBC hgb declining   Epogen today [id number] u x 1      CV- acute on chronic HFrEF and this seems resolved.  all AC on hold     GI-   EGD with biopsy but no specific mass found and slow ooze;  Biopsy is still pending       Sayed [facility]   [phone]

## 2024-03-25 NOTE — PROGRESS NOTE ADULT - SUBJECTIVE AND OBJECTIVE BOX
NEPHROLOGY-[facility] [phone]        Patient seen and examined on room air, tolerating diet.         MEDICATIONS  (STANDING):  aMIOdarone    Tablet 200 milliGRAM(s) Oral daily  atorvastatin 80 milliGRAM(s) Oral at bedtime  benzonatate 100 milliGRAM(s) Oral three times a day  dextrose 5%. 1000 milliLiter(s) (100 mL/Hr) IV Continuous <Continuous>  dextrose 5%. 1000 milliLiter(s) (50 mL/Hr) IV Continuous <Continuous>  dextrose 50% Injectable 25 Gram(s) IV Push once  dextrose 50% Injectable 12.5 Gram(s) IV Push once  dextrose 50% Injectable 25 Gram(s) IV Push once  ferrous    sulfate 325 milliGRAM(s) Oral daily  furosemide    Tablet 20 milliGRAM(s) Oral daily  glucagon  Injectable 1 milliGRAM(s) IntraMuscular once  influenza  Vaccine (HIGH DOSE) 0.7 milliLiter(s) IntraMuscular once  insulin glargine Injectable (LANTUS) 10 Unit(s) SubCutaneous at bedtime  insulin lispro (ADMELOG) corrective regimen sliding scale   SubCutaneous three times a day before meals  pantoprazole    Tablet 40 milliGRAM(s) Oral two times a day  tamsulosin 0.8 milliGRAM(s) Oral at bedtime      VITAL:  T(C): , Max: 36.6 (03-24-24 @ 17:15)  T(F): , Max: 97.8 (03-24-24 @ 17:15)  HR: 64 (03-25-24 @ 09:30)  BP: 120/55 (03-25-24 @ 09:30)  BP(mean): --  RR: 17 (03-25-24 @ 09:30)  SpO2: 98% (03-25-24 @ 09:30)  Wt(kg): --    I and O's:    03-24 @ 07:01  -  03-25 @ 07:00  --------------------------------------------------------  IN: 1020 mL / OUT: 600 mL / NET: 420 mL          PHYSICAL EXAM:    Constitutional: NAD  Neck:  No JVD  Respiratory: CTAB/L  Cardiovascular: S1 and S2  Gastrointestinal: BS+, soft, NT/ND  Extremities: No peripheral edema  Neurological: A/O x 3, no focal deficits  Psychiatric: Normal mood, normal affect  : No Temple  Skin: No rashes  Access: Not applicable    LABS:                        8.6    4.57  )-----------( 92       ( 25 Mar 2024 03:55 )             26.2     03-25    144  |  115<H>  |  68<H>  ----------------------------<  154<H>  4.0   |  18<L>  |  2.74<H>    Ca    8.0<L>      25 Mar 2024 03:55  Phos  3.0     03-25  Mg     2.00     03-25            Urine Studies:  Urinalysis Basic - ( 25 Mar 2024 03:55 )    Color: x / Appearance: x / SG: x / pH: x  Gluc: 154 mg/dL / Ketone: x  / Bili: x / Urobili: x   Blood: x / Protein: x / Nitrite: x   Leuk Esterase: x / RBC: x / WBC x   Sq Epi: x / Non Sq Epi: x / Bacteria: x        Assessment and Plan:   · Assessment	      88y Male with CAD, HFrEF, Afib, HTN, DM, solitary R kidney and CKD p/w acute on chronic HFrEF and acute on chronic kidney injury.  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  I think he is euvolemic at present n not dehydrated   Ct scan was done 11/2023 and there was no hydro and now the renal sono had mild hydro in a solitary kidney-  eval?  renal sono ordered   Due to his age we (pt and myself)have decided on no renal bx   on lasix 20 mg po daily     Metabolic acidosis-  likely due to CKD/LILIAN, s/p bicarb tabs, improving     Anemia in CKD- mild  iron deficiency sp venofer  and PRBC hgb declining      CV- acute on chronic HFrEF and this seems resolved.  all AC on hold     GI-   EGD with biopsy but no specific mass found and slow ooze       Sayed [facility]   [phone]                  NEPHROLOGY-[facility] [phone]        Patient seen and examined on room air, tolerating diet.         MEDICATIONS  (STANDING):  aMIOdarone    Tablet 200 milliGRAM(s) Oral daily  atorvastatin 80 milliGRAM(s) Oral at bedtime  benzonatate 100 milliGRAM(s) Oral three times a day  dextrose 5%. 1000 milliLiter(s) (100 mL/Hr) IV Continuous <Continuous>  dextrose 5%. 1000 milliLiter(s) (50 mL/Hr) IV Continuous <Continuous>  dextrose 50% Injectable 25 Gram(s) IV Push once  dextrose 50% Injectable 12.5 Gram(s) IV Push once  dextrose 50% Injectable 25 Gram(s) IV Push once  ferrous    sulfate 325 milliGRAM(s) Oral daily  furosemide    Tablet 20 milliGRAM(s) Oral daily  glucagon  Injectable 1 milliGRAM(s) IntraMuscular once  influenza  Vaccine (HIGH DOSE) 0.7 milliLiter(s) IntraMuscular once  insulin glargine Injectable (LANTUS) 10 Unit(s) SubCutaneous at bedtime  insulin lispro (ADMELOG) corrective regimen sliding scale   SubCutaneous three times a day before meals  pantoprazole    Tablet 40 milliGRAM(s) Oral two times a day  tamsulosin 0.8 milliGRAM(s) Oral at bedtime      VITAL:  T(C): , Max: 36.6 (03-24-24 @ 17:15)  T(F): , Max: 97.8 (03-24-24 @ 17:15)  HR: 64 (03-25-24 @ 09:30)  BP: 120/55 (03-25-24 @ 09:30)  BP(mean): --  RR: 17 (03-25-24 @ 09:30)  SpO2: 98% (03-25-24 @ 09:30)  Wt(kg): --    I and O's:    03-24 @ 07:01  -  03-25 @ 07:00  --------------------------------------------------------  IN: 1020 mL / OUT: 600 mL / NET: 420 mL          PHYSICAL EXAM:    Constitutional: NAD; cachetic   Neck:  No JVD  Respiratory: CTAB/L  Cardiovascular: S1 and S2  Gastrointestinal: BS+, soft, NT/ND  Extremities: No peripheral edema  Neurological: A/O x 3, no focal deficits  Psychiatric: Normal mood, normal affect  : No Temple  Skin: No rashes  Access: Not applicable    LABS:                        8.6    4.57  )-----------( 92       ( 25 Mar 2024 03:55 )             26.2     03-25    144  |  115<H>  |  68<H>  ----------------------------<  154<H>  4.0   |  18<L>  |  2.74<H>    Ca    8.0<L>      25 Mar 2024 03:55  Phos  3.0     03-25  Mg     2.00     03-25            Urine Studies:  Urinalysis Basic - ( 25 Mar 2024 03:55 )    Color: x / Appearance: x / SG: x / pH: x  Gluc: 154 mg/dL / Ketone: x  / Bili: x / Urobili: x   Blood: x / Protein: x / Nitrite: x   Leuk Esterase: x / RBC: x / WBC x   Sq Epi: x / Non Sq Epi: x / Bacteria: x             < from: US Kidney and Bladder (03.22.24 @ 17:53) >    ACC: [id number] EXAM:  US KIDNEYS AND BLADDER   ORDERED BY: PAMELA SEGOVIA     PROCEDURE DATE:  03/22/2024          INTERPRETATION:  CLINICAL INFORMATION: Hydronephrosis in solitary kidney.    COMPARISON: 3/17/2024. CT 12/14/2023. Right upper quadrant sonography 3   3/18/2024.    TECHNIQUE: Sonography of the kidneys and bladder.    FINDINGS:  Right kidney: 10.7 cm. No renal mass. Upper pole cyst measuring 2.2 cm x   2.3 cm x 2 cm. Tiny 3 mm nonobstructing lower pole calculus. Mild   hydronephrosis.    Left kidney: Nephrectomy.    Urinary bladder: Distended. Prevoid volume is 404. Postvoid volume is 1   70 cc. Cannot exclude mild thickening of the urinary bladder wall. A   ureteral jet is demonstrated (difficult to say whether its right or   left-sided).    IMPRESSION:  Mild hydronephrosis.The dilatation of the right renal collecting system   is similar to study 3/18/2024.    Again seen is the tiny nonobstructing lower pole right renal calculus.    Patient is status post left nephrectomy.    Postvoid urinary bladder volume is 1 70 cc. Cannot exclude mild   thickening of the urinary bladder wall.    --- End of Report ---            SILVIO DEL VALLE MD; Attending Radiologist  This document has been el    < end of copied text >

## 2024-03-25 NOTE — DIETITIAN INITIAL EVALUATION ADULT - OTHER INFO
88yM with PMH CKD, HFrEF, CAD s/p stents presenting with 2 weeks progressive shortness of breath on exertion clinical presentation consistent with acute on chronic diastolic heart failure and LILIAN on CKD.    Pt seen  and reports 75% intake of meals with No GI distress (nausea/vomiting/diarrhea/constipation.) at present. Noted s kin ecchymosis, no edema per nursing flow sheet.

## 2024-03-25 NOTE — PROGRESS NOTE ADULT - PROBLEM SELECTOR PLAN 5
continue finger sticks with short acting insulin sliding scale  no oral meds  diabetic diet  monitor for hypoglycemia  increase Lantus to 10 units as finger sticks trending up

## 2024-03-26 LAB
ANION GAP SERPL CALC-SCNC: 12 MMOL/L — SIGNIFICANT CHANGE UP (ref 7–14)
BUN SERPL-MCNC: 61 MG/DL — HIGH (ref 7–23)
CALCIUM SERPL-MCNC: 8.2 MG/DL — LOW (ref 8.4–10.5)
CHLORIDE SERPL-SCNC: 116 MMOL/L — HIGH (ref 98–107)
CO2 SERPL-SCNC: 18 MMOL/L — LOW (ref 22–31)
CREAT SERPL-MCNC: 2.66 MG/DL — HIGH (ref 0.5–1.3)
EGFR: 22 ML/MIN/1.73M2 — LOW
GLUCOSE BLDC GLUCOMTR-MCNC: 137 MG/DL — HIGH (ref 70–99)
GLUCOSE BLDC GLUCOMTR-MCNC: 138 MG/DL — HIGH (ref 70–99)
GLUCOSE BLDC GLUCOMTR-MCNC: 198 MG/DL — HIGH (ref 70–99)
GLUCOSE BLDC GLUCOMTR-MCNC: 283 MG/DL — HIGH (ref 70–99)
GLUCOSE SERPL-MCNC: 123 MG/DL — HIGH (ref 70–99)
HCT VFR BLD CALC: 27.5 % — LOW (ref 39–50)
HGB BLD-MCNC: 9 G/DL — LOW (ref 13–17)
MAGNESIUM SERPL-MCNC: 2.1 MG/DL — SIGNIFICANT CHANGE UP (ref 1.6–2.6)
MCHC RBC-ENTMCNC: 31.8 PG — SIGNIFICANT CHANGE UP (ref 27–34)
MCHC RBC-ENTMCNC: 32.7 GM/DL — SIGNIFICANT CHANGE UP (ref 32–36)
MCV RBC AUTO: 97.2 FL — SIGNIFICANT CHANGE UP (ref 80–100)
NRBC # BLD: 0 /100 WBCS — SIGNIFICANT CHANGE UP (ref 0–0)
NRBC # FLD: 0 K/UL — SIGNIFICANT CHANGE UP (ref 0–0)
PHOSPHATE SERPL-MCNC: 3.6 MG/DL — SIGNIFICANT CHANGE UP (ref 2.5–4.5)
PLATELET # BLD AUTO: 104 K/UL — LOW (ref 150–400)
POTASSIUM SERPL-MCNC: 3.9 MMOL/L — SIGNIFICANT CHANGE UP (ref 3.5–5.3)
POTASSIUM SERPL-SCNC: 3.9 MMOL/L — SIGNIFICANT CHANGE UP (ref 3.5–5.3)
RBC # BLD: 2.83 M/UL — LOW (ref 4.2–5.8)
RBC # FLD: 19.8 % — HIGH (ref 10.3–14.5)
SODIUM SERPL-SCNC: 146 MMOL/L — HIGH (ref 135–145)
SURGICAL PATHOLOGY STUDY: SIGNIFICANT CHANGE UP
WBC # BLD: 4.09 K/UL — SIGNIFICANT CHANGE UP (ref 3.8–10.5)
WBC # FLD AUTO: 4.09 K/UL — SIGNIFICANT CHANGE UP (ref 3.8–10.5)

## 2024-03-26 PROCEDURE — 74176 CT ABD & PELVIS W/O CONTRAST: CPT | Mod: 26

## 2024-03-26 RX ORDER — APIXABAN 2.5 MG/1
2.5 TABLET, FILM COATED ORAL
Refills: 0 | Status: DISCONTINUED | OUTPATIENT
Start: 2024-03-26 | End: 2024-03-26

## 2024-03-26 RX ORDER — APIXABAN 2.5 MG/1
2.5 TABLET, FILM COATED ORAL EVERY 12 HOURS
Refills: 0 | Status: DISCONTINUED | OUTPATIENT
Start: 2024-03-26 | End: 2024-03-26

## 2024-03-26 RX ORDER — APIXABAN 2.5 MG/1
2.5 TABLET, FILM COATED ORAL EVERY 12 HOURS
Refills: 0 | Status: DISCONTINUED | OUTPATIENT
Start: 2024-03-26 | End: 2024-03-28

## 2024-03-26 RX ADMIN — PANTOPRAZOLE SODIUM 40 MILLIGRAM(S): 20 TABLET, DELAYED RELEASE ORAL at 05:30

## 2024-03-26 RX ADMIN — Medication 20 MILLIGRAM(S): at 05:30

## 2024-03-26 RX ADMIN — TAMSULOSIN HYDROCHLORIDE 0.8 MILLIGRAM(S): 0.4 CAPSULE ORAL at 21:06

## 2024-03-26 RX ADMIN — Medication 325 MILLIGRAM(S): at 11:33

## 2024-03-26 RX ADMIN — AMIODARONE HYDROCHLORIDE 200 MILLIGRAM(S): 400 TABLET ORAL at 05:30

## 2024-03-26 RX ADMIN — APIXABAN 2.5 MILLIGRAM(S): 2.5 TABLET, FILM COATED ORAL at 20:15

## 2024-03-26 RX ADMIN — Medication 100 MILLIGRAM(S): at 05:30

## 2024-03-26 RX ADMIN — ATORVASTATIN CALCIUM 80 MILLIGRAM(S): 80 TABLET, FILM COATED ORAL at 21:06

## 2024-03-26 RX ADMIN — Medication 100 MILLIGRAM(S): at 21:06

## 2024-03-26 RX ADMIN — Medication 100 MILLIGRAM(S): at 11:32

## 2024-03-26 RX ADMIN — INSULIN GLARGINE 10 UNIT(S): 100 INJECTION, SOLUTION SUBCUTANEOUS at 21:06

## 2024-03-26 RX ADMIN — PANTOPRAZOLE SODIUM 40 MILLIGRAM(S): 20 TABLET, DELAYED RELEASE ORAL at 17:13

## 2024-03-26 RX ADMIN — Medication 1: at 11:36

## 2024-03-26 RX ADMIN — Medication 3: at 17:11

## 2024-03-26 NOTE — PROGRESS NOTE ADULT - SUBJECTIVE AND OBJECTIVE BOX
Patient is a 88y old  Male who presents with a chief complaint of shortness of breath (26 Mar 2024 12:29)      DATE OF SERVICE: 03-26-24 @ 14:43    SUBJECTIVE / OVERNIGHT EVENTS: overnight events noted    ROS:  Resp: No cough no sputum production  CVS: No chest pain no palpitations no orthopnea  GI: no N/V/D          MEDICATIONS  (STANDING):  aMIOdarone    Tablet 200 milliGRAM(s) Oral daily  atorvastatin 80 milliGRAM(s) Oral at bedtime  benzonatate 100 milliGRAM(s) Oral three times a day  dextrose 5%. 1000 milliLiter(s) (50 mL/Hr) IV Continuous <Continuous>  dextrose 5%. 1000 milliLiter(s) (100 mL/Hr) IV Continuous <Continuous>  dextrose 50% Injectable 25 Gram(s) IV Push once  dextrose 50% Injectable 12.5 Gram(s) IV Push once  dextrose 50% Injectable 25 Gram(s) IV Push once  ferrous    sulfate 325 milliGRAM(s) Oral daily  furosemide    Tablet 20 milliGRAM(s) Oral daily  glucagon  Injectable 1 milliGRAM(s) IntraMuscular once  insulin glargine Injectable (LANTUS) 10 Unit(s) SubCutaneous at bedtime  insulin lispro (ADMELOG) corrective regimen sliding scale   SubCutaneous three times a day before meals  pantoprazole    Tablet 40 milliGRAM(s) Oral two times a day  tamsulosin 0.8 milliGRAM(s) Oral at bedtime    MEDICATIONS  (PRN):  acetaminophen     Tablet .. 650 milliGRAM(s) Oral every 6 hours PRN Temp greater or equal to 38C (100.4F), Mild Pain (1 - 3)  dextrose Oral Gel 15 Gram(s) Oral once PRN Blood Glucose LESS THAN 70 milliGRAM(s)/deciliter        CAPILLARY BLOOD GLUCOSE      POCT Blood Glucose.: 198 mg/dL (26 Mar 2024 11:25)  POCT Blood Glucose.: 137 mg/dL (26 Mar 2024 06:41)  POCT Blood Glucose.: 188 mg/dL (25 Mar 2024 20:48)  POCT Blood Glucose.: 134 mg/dL (25 Mar 2024 16:41)    I&O's Summary    25 Mar 2024 07:01  -  26 Mar 2024 07:00  --------------------------------------------------------  IN: 300 mL / OUT: 400 mL / NET: -100 mL        Vital Signs Last 24 Hrs  T(C): 36.6 (26 Mar 2024 09:00), Max: 36.7 (26 Mar 2024 01:00)  T(F): 97.8 (26 Mar 2024 09:00), Max: 98 (26 Mar 2024 01:00)  HR: 72 (26 Mar 2024 09:00) (58 - 72)  BP: 122/50 (26 Mar 2024 09:00) (112/51 - 143/51)  BP(mean): --  RR: 16 (26 Mar 2024 09:00) (16 - 18)  SpO2: 100% (26 Mar 2024 09:00) (98% - 100%)    PHYSICAL EXAM:   Lungs: clear   CVS: S1 S2 systolic murmur +   Abdomen: no tenderness  Neuro: AO x 3 nonfocal   Skin: warm, dry  Ext: trace edema    LABS:                        9.0    4.09  )-----------( 104      ( 26 Mar 2024 06:00 )             27.5     03-26    146<H>  |  116<H>  |  61<H>  ----------------------------<  123<H>  3.9   |  18<L>  |  2.66<H>    Ca    8.2<L>      26 Mar 2024 06:00  Phos  3.6     03-26  Mg     2.10     03-26            Urinalysis Basic - ( 26 Mar 2024 06:00 )    Color: x / Appearance: x / SG: x / pH: x  Gluc: 123 mg/dL / Ketone: x  / Bili: x / Urobili: x   Blood: x / Protein: x / Nitrite: x   Leuk Esterase: x / RBC: x / WBC x   Sq Epi: x / Non Sq Epi: x / Bacteria: x          All consultant(s) notes reviewed and care discussed with other providers        Contact Number, Dr Arroyo [phone]

## 2024-03-26 NOTE — PROGRESS NOTE ADULT - SUBJECTIVE AND OBJECTIVE BOX
[facility] [phone]        Patient seen and examined in bed.  He was the same         MEDICATIONS  (STANDING):  aMIOdarone    Tablet 200 milliGRAM(s) Oral daily  atorvastatin 80 milliGRAM(s) Oral at bedtime  benzonatate 100 milliGRAM(s) Oral three times a day  dextrose 5%. 1000 milliLiter(s) (100 mL/Hr) IV Continuous <Continuous>  dextrose 5%. 1000 milliLiter(s) (50 mL/Hr) IV Continuous <Continuous>  dextrose 50% Injectable 25 Gram(s) IV Push once  dextrose 50% Injectable 12.5 Gram(s) IV Push once  dextrose 50% Injectable 25 Gram(s) IV Push once  ferrous    sulfate 325 milliGRAM(s) Oral daily  furosemide    Tablet 20 milliGRAM(s) Oral daily  glucagon  Injectable 1 milliGRAM(s) IntraMuscular once  insulin glargine Injectable (LANTUS) 10 Unit(s) SubCutaneous at bedtime  insulin lispro (ADMELOG) corrective regimen sliding scale   SubCutaneous three times a day before meals  pantoprazole    Tablet 40 milliGRAM(s) Oral two times a day  tamsulosin 0.8 milliGRAM(s) Oral at bedtime      VITAL:  T(C): , Max: 36.7 (03-25-24 @ 13:30)  T(F): , Max: 98 (03-25-24 @ 13:30)  HR: 72 (03-26-24 @ 09:00)  BP: 122/50 (03-26-24 @ 09:00)  BP(mean): --  RR: 16 (03-26-24 @ 09:00)  SpO2: 100% (03-26-24 @ 09:00)  Wt(kg): --    I and O's:    03-25 @ 07:01  -  03-26 @ 07:00  --------------------------------------------------------  IN: 300 mL / OUT: 400 mL / NET: -100 mL          PHYSICAL EXAM:    Constitutional: NAD  Neck:  No JVD  Respiratory: CTAB/L  Cardiovascular: S1 and S2  Gastrointestinal: BS+, soft, NT/ND  Extremities: No peripheral edema  Neurological: A/O x 3, no focal deficits  Psychiatric: Normal mood, normal affect  : No Temple  Skin: No rashes  Access: Not applicable    LABS:                        9.0    4.09  )-----------( 104      ( 26 Mar 2024 06:00 )             27.5     03-26    146<H>  |  116<H>  |  61<H>  ----------------------------<  123<H>  3.9   |  18<L>  |  2.66<H>    Ca    8.2<L>      26 Mar 2024 06:00  Phos  3.6     03-26  Mg     2.10     03-26            Urine Studies:  Urinalysis Basic - ( 26 Mar 2024 06:00 )    Color: x / Appearance: x / SG: x / pH: x  Gluc: 123 mg/dL / Ketone: x  / Bili: x / Urobili: x   Blood: x / Protein: x / Nitrite: x   Leuk Esterase: x / RBC: x / WBC x   Sq Epi: x / Non Sq Epi: x / Bacteria: x            RADIOLOGY & ADDITIONAL STUDIES:

## 2024-03-26 NOTE — PROGRESS NOTE ADULT - SUBJECTIVE AND OBJECTIVE BOX
Date of Service: 03-26-24 @ 12:29    Patient is a 88y old  Male who presents with a chief complaint of shortness of breath (26 Mar 2024 11:30)      Any change in ROS: seems to be doing  ok : no sob:     MEDICATIONS  (STANDING):  aMIOdarone    Tablet 200 milliGRAM(s) Oral daily  atorvastatin 80 milliGRAM(s) Oral at bedtime  benzonatate 100 milliGRAM(s) Oral three times a day  dextrose 5%. 1000 milliLiter(s) (50 mL/Hr) IV Continuous <Continuous>  dextrose 5%. 1000 milliLiter(s) (100 mL/Hr) IV Continuous <Continuous>  dextrose 50% Injectable 25 Gram(s) IV Push once  dextrose 50% Injectable 12.5 Gram(s) IV Push once  dextrose 50% Injectable 25 Gram(s) IV Push once  ferrous    sulfate 325 milliGRAM(s) Oral daily  furosemide    Tablet 20 milliGRAM(s) Oral daily  glucagon  Injectable 1 milliGRAM(s) IntraMuscular once  insulin glargine Injectable (LANTUS) 10 Unit(s) SubCutaneous at bedtime  insulin lispro (ADMELOG) corrective regimen sliding scale   SubCutaneous three times a day before meals  pantoprazole    Tablet 40 milliGRAM(s) Oral two times a day  tamsulosin 0.8 milliGRAM(s) Oral at bedtime    MEDICATIONS  (PRN):  acetaminophen     Tablet .. 650 milliGRAM(s) Oral every 6 hours PRN Temp greater or equal to 38C (100.4F), Mild Pain (1 - 3)  dextrose Oral Gel 15 Gram(s) Oral once PRN Blood Glucose LESS THAN 70 milliGRAM(s)/deciliter    Vital Signs Last 24 Hrs  T(C): 36.6 (26 Mar 2024 09:00), Max: 36.7 (25 Mar 2024 13:30)  T(F): 97.8 (26 Mar 2024 09:00), Max: 98 (25 Mar 2024 13:30)  HR: 72 (26 Mar 2024 09:00) (58 - 72)  BP: 122/50 (26 Mar 2024 09:00) (112/51 - 147/54)  BP(mean): --  RR: 16 (26 Mar 2024 09:00) (16 - 18)  SpO2: 100% (26 Mar 2024 09:00) (97% - 100%)    Parameters below as of 26 Mar 2024 09:00  Patient On (Oxygen Delivery Method): room air        I&O's Summary    25 Mar 2024 07:01  -  26 Mar 2024 07:00  --------------------------------------------------------  IN: 300 mL / OUT: 400 mL / NET: -100 mL          Physical Exam:   GENERAL: NAD, well-groomed, well-developed  HEENT: RAQUEL/   Atraumatic, Normocephalic  ENMT: No tonsillar erythema, exudates, or enlargement; Moist mucous membranes, Good dentition, No lesions  NECK: Supple, No JVD, Normal thyroid  CHEST/LUNG: Clear to auscultaion  CVS: Regular rate and rhythm; No murmurs, rubs, or gallops  GI: : Soft, Nontender, Nondistended; Bowel sounds present  NERVOUS SYSTEM:  Alert & Oriented X3  EXTREMITIES:  2+ Peripheral Pulses, No clubbing, cyanosis, or edema  LYMPH: No lymphadenopathy noted  SKIN: No rashes or lesions  ENDOCRINOLOGY: No Thyromegaly  PSYCH: Appropriate    Labs:  17, 17                            9.0    4.09  )-----------( 104      ( 26 Mar 2024 06:00 )             27.5                         8.6    4.57  )-----------( 92       ( 25 Mar 2024 03:55 )             26.2                         9.3    7.02  )-----------( 104      ( 24 Mar 2024 04:30 )             27.9                         9.7    8.93  )-----------( 114      ( 23 Mar 2024 18:28 )             29.0                         7.7    5.65  )-----------( 96       ( 23 Mar 2024 04:19 )             23.7                         8.6    5.07  )-----------( 105      ( 22 Mar 2024 18:45 )             25.3     03-26    146<H>  |  116<H>  |  61<H>  ----------------------------<  123<H>  3.9   |  18<L>  |  2.66<H>  03-25    144  |  115<H>  |  68<H>  ----------------------------<  154<H>  4.0   |  18<L>  |  2.74<H>  03-24    143  |  117<H>  |  75<H>  ----------------------------<  149<H>  4.2   |  16<L>  |  2.84<H>  03-23    143  |  116<H>  |  94<H>  ----------------------------<  171<H>  4.2   |  16<L>  |  3.19<H>    Ca    8.2<L>      26 Mar 2024 06:00  Ca    8.0<L>      25 Mar 2024 03:55  Phos  3.6     03-26  Phos  3.0     03-25  Mg     2.10     03-26  Mg     2.00     03-25      CAPILLARY BLOOD GLUCOSE      POCT Blood Glucose.: 198 mg/dL (26 Mar 2024 11:25)  POCT Blood Glucose.: 137 mg/dL (26 Mar 2024 06:41)  POCT Blood Glucose.: 188 mg/dL (25 Mar 2024 20:48)  POCT Blood Glucose.: 134 mg/dL (25 Mar 2024 16:41)          Urinalysis Basic - ( 26 Mar 2024 06:00 )    Color: x / Appearance: x / SG: x / pH: x  Gluc: 123 mg/dL / Ketone: x  / Bili: x / Urobili: x   Blood: x / Protein: x / Nitrite: x   Leuk Esterase: x / RBC: x / WBC x   Sq Epi: x / Non Sq Epi: x / Bacteria: x      rad< from: CT Abdomen and Pelvis No Cont (03.26.24 @ 08:53) >    ACC: [id number] EXAM:  CT ABDOMEN AND PELVIS   ORDERED BY: CHECO PRESTON     PROCEDURE DATE:  03/26/2024          INTERPRETATION:  CLINICAL INFORMATION: Evaluate hydronephrosis.    COMPARISON: CT abdomen pelvis 12/14/2023.    CONTRAST/COMPLICATIONS:  IV Contrast: None  Oral Contrast: None  Complications: None reported    PROCEDURE:  CT of the Abdomen and Pelvis was performed.  Sagittal and coronal reformats were performed.    FINDINGS:  LOWER CHEST: Within normal limits.    LIVER: Increased hepatic attenuation can be seen in the setting of   transfusion or medication related.  BILE DUCTS: Normal caliber.  GALLBLADDER: Cholelithiasis.  SPLEEN: Within normal limits.  PANCREAS: Atrophic  ADRENALS: Within normal limits.  KIDNEYS/URETERS: Absent left kidney. No right-sided hydronephrosis. Right   upper pole cyst. Vascular calcifications.    BLADDER: Within normal limits.  REPRODUCTIVE ORGANS: Prostate is enlarged.    BOWEL: No bowel obstruction. Appendix is normal.  PERITONEUM: Trace pelvic free fluid.  VESSELS: Atherosclerotic changes.  RETROPERITONEUM/LYMPH NODES: No lymphadenopathy.  ABDOMINAL WALL: Small bilateral fat-containing inguinal hernias.  BONES: Degenerative changes.    IMPRESSION:  Absent left kidney. No right-sided hydronephrosis.        --- End of Report ---            GEORGETTE BANDA MD; Attending Radiologist  This document has been electronically signed. Mar 26 2024  9:25AM    < end of copied text >        RECENT CULTURES:        RESPIRATORY CULTURES:          Studies  Chest X-RAY  CT SCAN Chest   Venous Dopplers: LE:   CT Abdomen  Others

## 2024-03-26 NOTE — PROGRESS NOTE ADULT - ASSESSMENT
88yM with PMHx CKD, HFrEF, CAD s/p stents presenting with 2 weeks progressive shortness of breath on exertion. Pt denies worsening pedal edema, orthopnea, chest pains or nausea associated with SOB. Denies infectious symptoms fever/chills, cough, URI symptoms recently. Takes furosemide 40mg every other day this dose has not been changed recently. No BRBPR, melena, epistaxis, hematuria, any other bleeding.  he says h is sob was gradually building up till he could not take it and came to hospital : he deneis any underlying lung disease but is an ex smoker:   no fever,   no cough , no phlegm ;    SOB/CHF/CAD/S/P PCI  ex smoker:   CKD    SOB/CHF/CAD/S/P PCI  -his history is highly suggestive of chf exacerbation;   -he has formed small concepcion effusions:  cxr prior to his film was clear:   -he hasn o clinical features of PNEUMONIA:  -on vbg shows mild metabolic acidosis  -added on Lasix : to co nt:   -no need fro antibiotics or bipap at this time; -consider ct chest : last ct from 2022 reviewed:  was suggestive of martínez failure  -his last echo with mod lv dysfunction  need a repeat one  -Check D Dimer in AM  : clinical  probability of PE is low   3/18:  -clinically he has improved  on room air:  d dimer is slightly elevated:  no further workup : diuretics per primary t eam   3/19: seems OK; no sob: on room air:  no sob: leg edema has resolved  3/20: sob is likely due to mild chf:  improved lasix:  currently on room air and has no leg edema  3/21: seems ok: pulm wise:  for endoscopy today   3/12: h9s endoscopy is done showed:  LA Grade A esophagitis.                       - A large amount of red blood was found in antrum of                        stomach. Diffuse severe mucosal changes characterized by                        congestion, erythema, friability (with spontaneous                        bleeding), hemorrhagic appearance, and mild sloughing                        were found diffusely in the gastric antrum. No discrete                        mass was identified. Biopsied. Hemostatic spray applied.                        DDx concerning for malignancy (primary vs linitis                        plastica) or alternate infiltrative process (eg                        amyloidosis).                       - Gastroopathy.                       - Nodular mucosa in the duodenal bulb.  -no ac for now  3/23: seems stable:  no active bleeding:  coughing excessively:  add tessalon Perles  3/24: he feels better today  : not sob: on room air:  cough is better:   3/25; He seems K: no sob;  no cough : no phlegm     3/26: seems to be doing  ok : no sob:  on room air:  no pulm boom      ex smoker:   -she has 35 pk years history of smoking:   -has no diagnosis of copd:  -his last ct chest did not show emphysema:   -need to mantain o2 sao2 above 88%  -he would need outpt full PFT   3/22: seems stable: tolerated the procedure well:  no resp distress  3/23:  has excessive coughing today  : add tessalon Perles   3/24: seems stable:   3/25: stable:   3/26: seems stable:  for outpt folow up and PFT     CKD  -per renal :    dvt prophylaxis:    dw team

## 2024-03-26 NOTE — PROVIDER CONTACT NOTE (OTHER) - ASSESSMENT
bp 106/43. all other vitals stable. pt denies dizziness. pt resting comfortably in bed.
Vs in flow sheet; pt asymptomatic.
Vs in flow sheet; pt asymptomatic.
A&Ox4. patient was being set up for breakfast at time of bradycardia episode. Vitals as per flowsheet. patient not in distress. Asymptomatic.

## 2024-03-26 NOTE — ADVANCED PRACTICE NURSE CONSULT - RECOMMEDATIONS
Topical Recommendations    R forearm: Cleanse with NS, pat dry. Apply Liquid barrier film to periwound skin (allow to dry). Apply hydrofiber (aquacel ribbon) to base of wound. Secure with silicone foam with borders. Change daily and PRN if soiled.    Continue low air loss bed therapy, continue heel elevation, continue to turn & reposition per protocol, soft pillow between bony prominences, continue moisture management with barrier creams & single breathable pad, continue measures to decrease friction/shear/pressure. Continue with nutritional support as per dietary/orders.    Plan of care discussed with patient, all questions answered.    Please contact Wound Care Service Line if we can be of further assistance (ext [phone]).

## 2024-03-26 NOTE — PROGRESS NOTE ADULT - ASSESSMENT
88y Male with CAD, HFrEF, Afib, HTN, DM, solitary R kidney and CKD p/w acute on chronic HFrEF and acute on chronic kidney injury.  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  all AC on hold and should not be restarted     GI-   EGD with biopsy but no specific mass found and slow ooze;  Biopsy did not show any cancer         [facility]   [phone]

## 2024-03-26 NOTE — ADVANCED PRACTICE NURSE CONSULT - ASSESSMENT
General: A&Ox4, OOB to chair, received sitting up in chair, continent of urine and stool. Skin warm, dry with increased moisture in intertriginous folds, adequate skin turgor, scattered areas of hyperpigmentation and hypopigmentation, scattered areas of ecchymosis on bilateral upper extremities with no hematomas. Blanchable erythema on bilateral heels.     R forearm: [id number] Category 1 skin tear as evidenced by most epidermis rolled back in place, exposing <1mm of red, moist dermis. Small sanguinous drainage noted from dermis. As per patient, banged his arm trying to pull the blanket while in bed. Patient states he used to be on eliquis, but has very, very fragile skin. No induration, no erythema, no crepitus, no fluctuance, no suspected hematoma, no edema, no temperature changes, no overt signs of infection noted.

## 2024-03-26 NOTE — PROGRESS NOTE ADULT - ASSESSMENT
A/P    88 yM with PMHx CKD, HFrEF, CAD s/p PCI, AF on a/c, bladder cancer in remission, diabetes, TIA, left nephrectomy, presenting with 2 weeks progressive shortness of breath on exertion. Pt denies worsening pedal edema, orthopnea, chest pains or nausea associated with SOB.     #SOB, acute on chronic HFrEF  -likely secondary to mild volume overload-- resolved   -on oral lasix 20mg daILY  -previous echo w mild LV dysfunction  -repeat TTE w mild LV dysfunction    #Atrial Fibrillation  -stable  -AC HELD due to elevated gib risk  -Per GI Would hold off on AC and antiplatelet agents if possible until hgb has been stable for >24h.  -cont amio    #CAD, s/p PCI  -stable  -ASA on hold as mentioned above--- resume per GI recommendations as mentioned above      #UGIB/Anemia  -transfuse prn  -h/h improved today   -med fu     #CKD/LILIAN  -?cardio renal   -no obstruction  -trend creat

## 2024-03-26 NOTE — PROGRESS NOTE ADULT - SUBJECTIVE AND OBJECTIVE BOX
CARDIOLOGY FOLLOW UP - Dr. Vega  DATE OF SERVICE: 3/26/24    CC no cp or sob       REVIEW OF SYSTEMS:  CONSTITUTIONAL: No fever, weight loss, or fatigue  RESPIRATORY: No cough, wheezing, chills or hemoptysis; No Shortness of Breath  CARDIOVASCULAR: No chest pain, palpitations, passing out, dizziness, or leg swelling  GASTROINTESTINAL: No abdominal or epigastric pain. No nausea, vomiting, or hematemesis; No diarrhea or constipation. No melena or hematochezia.  VASCULAR: No edema     PHYSICAL EXAM:  T(C): 36.6 (03-26-24 @ 09:00), Max: 36.7 (03-25-24 @ 13:30)  HR: 72 (03-26-24 @ 09:00) (58 - 72)  BP: 122/50 (03-26-24 @ 09:00) (112/51 - 147/54)  RR: 16 (03-26-24 @ 09:00) (16 - 18)  SpO2: 100% (03-26-24 @ 09:00) (97% - 100%)  Wt(kg): --  I&O's Summary    25 Mar 2024 07:01  -  26 Mar 2024 07:00  --------------------------------------------------------  IN: 300 mL / OUT: 400 mL / NET: -100 mL        Appearance: Normal	  Cardiovascular: Normal S1 S2,RRR, No JVD, No murmurs  Respiratory: Lungs clear to auscultation	  Gastrointestinal:  Soft, Non-tender, + BS	  Extremities: Normal range of motion, No clubbing, cyanosis or edema      Home Medications:  apixaban 2.5 mg oral tablet: 1 tab(s) orally every 12 hours (17 Mar 2024 10:48)  Crestor 40 mg oral tablet: 1 tab(s) orally once a day (at bedtime) (17 Mar 2024 10:49)  repaglinide 0.5 mg oral tablet: 1 tab(s) orally 3 times a day (before meals) (17 Mar 2024 10:49)  tamsulosin 0.4 mg oral capsule: 1 cap(s) orally once a day (17 Mar 2024 10:49)  Tresiba FlexTouch 100 units/mL subcutaneous solution: 10 subcutaneous once a day (at bedtime) (17 Mar 2024 10:49)      MEDICATIONS  (STANDING):  aMIOdarone    Tablet 200 milliGRAM(s) Oral daily  atorvastatin 80 milliGRAM(s) Oral at bedtime  benzonatate 100 milliGRAM(s) Oral three times a day  dextrose 5%. 1000 milliLiter(s) (100 mL/Hr) IV Continuous <Continuous>  dextrose 5%. 1000 milliLiter(s) (50 mL/Hr) IV Continuous <Continuous>  dextrose 50% Injectable 25 Gram(s) IV Push once  dextrose 50% Injectable 12.5 Gram(s) IV Push once  dextrose 50% Injectable 25 Gram(s) IV Push once  ferrous    sulfate 325 milliGRAM(s) Oral daily  furosemide    Tablet 20 milliGRAM(s) Oral daily  glucagon  Injectable 1 milliGRAM(s) IntraMuscular once  insulin glargine Injectable (LANTUS) 10 Unit(s) SubCutaneous at bedtime  insulin lispro (ADMELOG) corrective regimen sliding scale   SubCutaneous three times a day before meals  pantoprazole    Tablet 40 milliGRAM(s) Oral two times a day  tamsulosin 0.8 milliGRAM(s) Oral at bedtime      TELEMETRY: 	    ECG:  	  RADIOLOGY:   DIAGNOSTIC TESTING:  [ ] Echocardiogram:  [ ]  Catheterization:  [ ] Stress Test:    OTHER: 	    LABS:	 	                            9.0    4.09  )-----------( 104      ( 26 Mar 2024 06:00 )             27.5     03-26    146<H>  |  116<H>  |  61<H>  ----------------------------<  123<H>  3.9   |  18<L>  |  2.66<H>    Ca    8.2<L>      26 Mar 2024 06:00  Phos  3.6     03-26  Mg     2.10     03-26

## 2024-03-26 NOTE — PROGRESS NOTE ADULT - PROBLEM SELECTOR PLAN 5
continue finger sticks with short acting insulin sliding scale  no oral meds  diabetic diet  monitor for hypoglycemia  continue Lantus 10 units   finger sticks improved

## 2024-03-26 NOTE — PROGRESS NOTE ADULT - NSPROGADDITIONALINFOA_GEN_ALL_CORE
discussed with patient in detail, expresses understanding of treatment plans.  discussed with wife over the phone  discussed with covering ACP

## 2024-03-26 NOTE — CHART NOTE - NSCHARTNOTEFT_GEN_A_CORE
Brief GI Note:     Discussed with patient results biopsies of last endoscopy. Discussed that initial biopsies did not show malignancy or IM. Discussed that amyloidosis is also on differential and is pending additional stains. Did discuss that there is a possibility of missed biopsy given that endoscopy with oozing in stomach and that role of repeat endoscopy for examination of stomach and repeat biopsies can be considered if final path is negative for amyloidosis. Patient understands and would like to readdress once final path is back. Reports being relieved that initial path is negative for malignancy. Reports that he has continued to have dark stools.     Final Diagnosis   1. Gastric, antrum, biopsy   - Moderate chronic inactive gastritis, erosion, and mild foveolar   hyperplasia.   - Negative for intestinal metaplasia and dysplasia.   - Negative for morphologic evidence of H. pylori.   Note - congo red stain pending, addendum to follow.     D/w Dr. Maria (GI attending)    Debi Guzmna MD, PGY5  GI Fellow
Patient had EGD today which revealed:   - LA Grade A esophagitis.  - A large amount of red blood was found in antrum of   stomach. Diffuse severe mucosal changes characterized by   congestion, erythema, friability (with spontaneous   bleeding), hemorrhagic appearance, and mild sloughing   were found diffusely in the gastric antrum. No discrete   mass was identified. Biopsied. Hemostatic spray applied.   - DDx concerning for malignancy (primary vs linitis   plastica) or alternate infiltrative process (eg   amyloidosis).  - Gastroopathy.    1U PRBC ordered post EGD at patient with persistent oozing into the antrum during EGD. During blood transfusion initiation, BP 72/33 and patient feeling "groggy" and tired. RRT called for faster administration of PRBC. During RRT, PRBC and NS 500cc given. Repeat /46 and patient feeling better. GI to follow up. Dr. Arroyo made aware.

## 2024-03-27 LAB
ANION GAP SERPL CALC-SCNC: 11 MMOL/L — SIGNIFICANT CHANGE UP (ref 7–14)
BUN SERPL-MCNC: 61 MG/DL — HIGH (ref 7–23)
CALCIUM SERPL-MCNC: 7.9 MG/DL — LOW (ref 8.4–10.5)
CHLORIDE SERPL-SCNC: 112 MMOL/L — HIGH (ref 98–107)
CO2 SERPL-SCNC: 19 MMOL/L — LOW (ref 22–31)
CREAT SERPL-MCNC: 2.73 MG/DL — HIGH (ref 0.5–1.3)
EGFR: 22 ML/MIN/1.73M2 — LOW
GLUCOSE BLDC GLUCOMTR-MCNC: 101 MG/DL — HIGH (ref 70–99)
GLUCOSE BLDC GLUCOMTR-MCNC: 133 MG/DL — HIGH (ref 70–99)
GLUCOSE BLDC GLUCOMTR-MCNC: 219 MG/DL — HIGH (ref 70–99)
GLUCOSE BLDC GLUCOMTR-MCNC: 318 MG/DL — HIGH (ref 70–99)
GLUCOSE SERPL-MCNC: 94 MG/DL — SIGNIFICANT CHANGE UP (ref 70–99)
HCT VFR BLD CALC: 26.5 % — LOW (ref 39–50)
HGB BLD-MCNC: 8.5 G/DL — LOW (ref 13–17)
MCHC RBC-ENTMCNC: 31.3 PG — SIGNIFICANT CHANGE UP (ref 27–34)
MCHC RBC-ENTMCNC: 32.1 GM/DL — SIGNIFICANT CHANGE UP (ref 32–36)
MCV RBC AUTO: 97.4 FL — SIGNIFICANT CHANGE UP (ref 80–100)
NRBC # BLD: 0 /100 WBCS — SIGNIFICANT CHANGE UP (ref 0–0)
NRBC # FLD: 0 K/UL — SIGNIFICANT CHANGE UP (ref 0–0)
PLATELET # BLD AUTO: 105 K/UL — LOW (ref 150–400)
POTASSIUM SERPL-MCNC: 4 MMOL/L — SIGNIFICANT CHANGE UP (ref 3.5–5.3)
POTASSIUM SERPL-SCNC: 4 MMOL/L — SIGNIFICANT CHANGE UP (ref 3.5–5.3)
RBC # BLD: 2.72 M/UL — LOW (ref 4.2–5.8)
RBC # FLD: 19.8 % — HIGH (ref 10.3–14.5)
SODIUM SERPL-SCNC: 142 MMOL/L — SIGNIFICANT CHANGE UP (ref 135–145)
WBC # BLD: 4.3 K/UL — SIGNIFICANT CHANGE UP (ref 3.8–10.5)
WBC # FLD AUTO: 4.3 K/UL — SIGNIFICANT CHANGE UP (ref 3.8–10.5)

## 2024-03-27 RX ADMIN — Medication 100 MILLIGRAM(S): at 21:27

## 2024-03-27 RX ADMIN — APIXABAN 2.5 MILLIGRAM(S): 2.5 TABLET, FILM COATED ORAL at 09:26

## 2024-03-27 RX ADMIN — AMIODARONE HYDROCHLORIDE 200 MILLIGRAM(S): 400 TABLET ORAL at 05:30

## 2024-03-27 RX ADMIN — Medication 2: at 17:11

## 2024-03-27 RX ADMIN — PANTOPRAZOLE SODIUM 40 MILLIGRAM(S): 20 TABLET, DELAYED RELEASE ORAL at 05:30

## 2024-03-27 RX ADMIN — Medication 20 MILLIGRAM(S): at 05:30

## 2024-03-27 RX ADMIN — PANTOPRAZOLE SODIUM 40 MILLIGRAM(S): 20 TABLET, DELAYED RELEASE ORAL at 17:09

## 2024-03-27 RX ADMIN — Medication 100 MILLIGRAM(S): at 06:20

## 2024-03-27 RX ADMIN — TAMSULOSIN HYDROCHLORIDE 0.8 MILLIGRAM(S): 0.4 CAPSULE ORAL at 21:27

## 2024-03-27 RX ADMIN — Medication 4: at 11:54

## 2024-03-27 RX ADMIN — Medication 100 MILLIGRAM(S): at 12:12

## 2024-03-27 RX ADMIN — INSULIN GLARGINE 10 UNIT(S): 100 INJECTION, SOLUTION SUBCUTANEOUS at 21:28

## 2024-03-27 RX ADMIN — APIXABAN 2.5 MILLIGRAM(S): 2.5 TABLET, FILM COATED ORAL at 21:27

## 2024-03-27 RX ADMIN — ATORVASTATIN CALCIUM 80 MILLIGRAM(S): 80 TABLET, FILM COATED ORAL at 21:27

## 2024-03-27 RX ADMIN — Medication 325 MILLIGRAM(S): at 12:05

## 2024-03-27 NOTE — PROGRESS NOTE ADULT - SUBJECTIVE AND OBJECTIVE BOX
CARDIOLOGY FOLLOW UP - Dr. Vega  Date of Service: 3/27/24  CC: no events    Review of Systems:  Constitutional: No fever, weight loss, or fatigue  Respiratory: No cough, wheezing, or hemoptysis, no shortness of breath  Cardiovascular: No chest pain, palpitations, passing out, dizziness, or leg swelling  Gastrointestinal: No abd or epigastric pain. No nausea, vomiting, or hematemesis; no diarrhea or consiptaiton, no melena or hematochezia  Vascular: No edema     TELEMETRY:    PHYSICAL EXAM:  T(C): 36.6 (03-27-24 @ 05:28), Max: 36.6 (03-27-24 @ 05:28)  HR: 58 (03-27-24 @ 05:28) (58 - 71)  BP: 110/50 (03-27-24 @ 05:28) (106/43 - 141/53)  RR: 18 (03-27-24 @ 05:28) (16 - 18)  SpO2: 100% (03-27-24 @ 05:28) (99% - 100%)  Wt(kg): --  I&O's Summary    26 Mar 2024 07:01  -  27 Mar 2024 07:00  --------------------------------------------------------  IN: 760 mL / OUT: 850 mL / NET: -90 mL        Appearance: Normal	  Cardiovascular: Normal S1 S2,RRR, No JVD, No murmurs  Respiratory: Lungs clear to auscultation	  Gastrointestinal:  Soft, Non-tender, + BS	  Extremities: Normal range of motion, No clubbing, cyanosis or edema  Vascular: Peripheral pulses palpable 2+ bilaterally       Home Medications:  apixaban 2.5 mg oral tablet: 1 tab(s) orally every 12 hours (17 Mar 2024 10:48)  Crestor 40 mg oral tablet: 1 tab(s) orally once a day (at bedtime) (17 Mar 2024 10:49)  repaglinide 0.5 mg oral tablet: 1 tab(s) orally 3 times a day (before meals) (17 Mar 2024 10:49)  tamsulosin 0.4 mg oral capsule: 1 cap(s) orally once a day (17 Mar 2024 10:49)  Tresiba FlexTouch 100 units/mL subcutaneous solution: 10 subcutaneous once a day (at bedtime) (17 Mar 2024 10:49)        MEDICATIONS  (STANDING):  aMIOdarone    Tablet 200 milliGRAM(s) Oral daily  apixaban 2.5 milliGRAM(s) Oral every 12 hours  atorvastatin 80 milliGRAM(s) Oral at bedtime  benzonatate 100 milliGRAM(s) Oral three times a day  dextrose 5%. 1000 milliLiter(s) (50 mL/Hr) IV Continuous <Continuous>  dextrose 5%. 1000 milliLiter(s) (100 mL/Hr) IV Continuous <Continuous>  dextrose 50% Injectable 12.5 Gram(s) IV Push once  dextrose 50% Injectable 25 Gram(s) IV Push once  dextrose 50% Injectable 25 Gram(s) IV Push once  ferrous    sulfate 325 milliGRAM(s) Oral daily  furosemide    Tablet 20 milliGRAM(s) Oral daily  glucagon  Injectable 1 milliGRAM(s) IntraMuscular once  insulin glargine Injectable (LANTUS) 10 Unit(s) SubCutaneous at bedtime  insulin lispro (ADMELOG) corrective regimen sliding scale   SubCutaneous three times a day before meals  pantoprazole    Tablet 40 milliGRAM(s) Oral two times a day  tamsulosin 0.8 milliGRAM(s) Oral at bedtime        EKG:  RADIOLOGY:  DIAGNOSTIC TESTING:  [ ] Echocardiogram:  [ ] Catherterization:  [ ] Stress Test:  OTHER:     LABS:	 	                          8.5    4.30  )-----------( 105      ( 27 Mar 2024 04:15 )             26.5     03-27    142  |  112<H>  |  61<H>  ----------------------------<  94  4.0   |  19<L>  |  2.73<H>    Ca    7.9<L>      27 Mar 2024 04:15  Phos  3.6     03-26  Mg     2.10     03-26            CARDIAC MARKERS:

## 2024-03-27 NOTE — PROGRESS NOTE ADULT - SUBJECTIVE AND OBJECTIVE BOX
Patient is a 88y old  Male who presents with a chief complaint of shortness of breath (27 Mar 2024 10:19)      DATE OF SERVICE: 03-27-24 @ 10:56    SUBJECTIVE / OVERNIGHT EVENTS: overnight events noted    ROS:  Resp: No cough no sputum production  CVS: No chest pain no palpitations no orthopnea  GI: no N/V/D  'I feel fine'  no BM x 2 days        MEDICATIONS  (STANDING):  aMIOdarone    Tablet 200 milliGRAM(s) Oral daily  apixaban 2.5 milliGRAM(s) Oral every 12 hours  atorvastatin 80 milliGRAM(s) Oral at bedtime  benzonatate 100 milliGRAM(s) Oral three times a day  dextrose 5%. 1000 milliLiter(s) (100 mL/Hr) IV Continuous <Continuous>  dextrose 5%. 1000 milliLiter(s) (50 mL/Hr) IV Continuous <Continuous>  dextrose 50% Injectable 12.5 Gram(s) IV Push once  dextrose 50% Injectable 25 Gram(s) IV Push once  dextrose 50% Injectable 25 Gram(s) IV Push once  ferrous    sulfate 325 milliGRAM(s) Oral daily  furosemide    Tablet 20 milliGRAM(s) Oral daily  glucagon  Injectable 1 milliGRAM(s) IntraMuscular once  insulin glargine Injectable (LANTUS) 10 Unit(s) SubCutaneous at bedtime  insulin lispro (ADMELOG) corrective regimen sliding scale   SubCutaneous three times a day before meals  pantoprazole    Tablet 40 milliGRAM(s) Oral two times a day  tamsulosin 0.8 milliGRAM(s) Oral at bedtime    MEDICATIONS  (PRN):  acetaminophen     Tablet .. 650 milliGRAM(s) Oral every 6 hours PRN Temp greater or equal to 38C (100.4F), Mild Pain (1 - 3)  dextrose Oral Gel 15 Gram(s) Oral once PRN Blood Glucose LESS THAN 70 milliGRAM(s)/deciliter        CAPILLARY BLOOD GLUCOSE      POCT Blood Glucose.: 101 mg/dL (27 Mar 2024 07:03)  POCT Blood Glucose.: 138 mg/dL (26 Mar 2024 21:04)  POCT Blood Glucose.: 283 mg/dL (26 Mar 2024 16:39)  POCT Blood Glucose.: 198 mg/dL (26 Mar 2024 11:25)    I&O's Summary    26 Mar 2024 07:01  -  27 Mar 2024 07:00  --------------------------------------------------------  IN: 760 mL / OUT: 850 mL / NET: -90 mL        Vital Signs Last 24 Hrs  T(C): 36.6 (27 Mar 2024 05:28), Max: 36.6 (27 Mar 2024 05:28)  T(F): 97.8 (27 Mar 2024 05:28), Max: 97.8 (27 Mar 2024 05:28)  HR: 58 (27 Mar 2024 05:28) (58 - 71)  BP: 110/50 (27 Mar 2024 05:28) (106/43 - 141/53)  BP(mean): --  RR: 18 (27 Mar 2024 05:28) (16 - 18)  SpO2: 100% (27 Mar 2024 05:28) (99% - 100%)    PHYSICAL EXAM:   Lungs: clear   CVS: S1 S2 systolic murmur +   Abdomen: no tenderness  Neuro: AO x 3 nonfocal   Skin: warm, dry  Ext: trace edema    LABS:                        8.5    4.30  )-----------( 105      ( 27 Mar 2024 04:15 )             26.5     03-27    142  |  112<H>  |  61<H>  ----------------------------<  94  4.0   |  19<L>  |  2.73<H>    Ca    7.9<L>      27 Mar 2024 04:15  Phos  3.6     03-26  Mg     2.10     03-26            Urinalysis Basic - ( 27 Mar 2024 04:15 )    Color: x / Appearance: x / SG: x / pH: x  Gluc: 94 mg/dL / Ketone: x  / Bili: x / Urobili: x   Blood: x / Protein: x / Nitrite: x   Leuk Esterase: x / RBC: x / WBC x   Sq Epi: x / Non Sq Epi: x / Bacteria: x          All consultant(s) notes reviewed and care discussed with other providers        Contact Number, Dr Arroyo [phone]

## 2024-03-27 NOTE — PROGRESS NOTE ADULT - NSPROGADDITIONALINFOA_GEN_ALL_CORE
discussed with patient in detail, expresses understanding of treatment plans.   discussed with wife over the phone yesterday   discussed with renal

## 2024-03-27 NOTE — PROGRESS NOTE ADULT - SUBJECTIVE AND OBJECTIVE BOX
Date of Service: 03-27-24 @ 13:05    Patient is a 88y old  Male who presents with a chief complaint of shortness of breath (27 Mar 2024 10:56)      Any change in ROS: seems OK;  no ob;  no leg edema; ; he says his breathing is improved 100%    MEDICATIONS  (STANDING):  aMIOdarone    Tablet 200 milliGRAM(s) Oral daily  apixaban 2.5 milliGRAM(s) Oral every 12 hours  atorvastatin 80 milliGRAM(s) Oral at bedtime  benzonatate 100 milliGRAM(s) Oral three times a day  dextrose 5%. 1000 milliLiter(s) (50 mL/Hr) IV Continuous <Continuous>  dextrose 5%. 1000 milliLiter(s) (100 mL/Hr) IV Continuous <Continuous>  dextrose 50% Injectable 12.5 Gram(s) IV Push once  dextrose 50% Injectable 25 Gram(s) IV Push once  dextrose 50% Injectable 25 Gram(s) IV Push once  ferrous    sulfate 325 milliGRAM(s) Oral daily  furosemide    Tablet 20 milliGRAM(s) Oral daily  glucagon  Injectable 1 milliGRAM(s) IntraMuscular once  insulin glargine Injectable (LANTUS) 10 Unit(s) SubCutaneous at bedtime  insulin lispro (ADMELOG) corrective regimen sliding scale   SubCutaneous three times a day before meals  pantoprazole    Tablet 40 milliGRAM(s) Oral two times a day  tamsulosin 0.8 milliGRAM(s) Oral at bedtime    MEDICATIONS  (PRN):  acetaminophen     Tablet .. 650 milliGRAM(s) Oral every 6 hours PRN Temp greater or equal to 38C (100.4F), Mild Pain (1 - 3)  dextrose Oral Gel 15 Gram(s) Oral once PRN Blood Glucose LESS THAN 70 milliGRAM(s)/deciliter    Vital Signs Last 24 Hrs  T(C): 36.6 (27 Mar 2024 05:28), Max: 36.6 (27 Mar 2024 05:28)  T(F): 97.8 (27 Mar 2024 05:28), Max: 97.8 (27 Mar 2024 05:28)  HR: 58 (27 Mar 2024 05:28) (58 - 71)  BP: 110/50 (27 Mar 2024 05:28) (106/43 - 141/53)  BP(mean): --  RR: 18 (27 Mar 2024 05:28) (16 - 18)  SpO2: 100% (27 Mar 2024 05:28) (99% - 100%)    Parameters below as of 27 Mar 2024 05:28  Patient On (Oxygen Delivery Method): room air        I&O's Summary    26 Mar 2024 07:01  -  27 Mar 2024 07:00  --------------------------------------------------------  IN: 760 mL / OUT: 850 mL / NET: -90 mL    27 Mar 2024 07:01  -  27 Mar 2024 13:05  --------------------------------------------------------  IN: 480 mL / OUT: 700 mL / NET: -220 mL          Physical Exam:   GENERAL: NAD, well-groomed, well-developed  HEENT: RAQUEL/   Atraumatic, Normocephalic  ENMT: No tonsillar erythema, exudates, or enlargement; Moist mucous membranes, Good dentition, No lesions  NECK: Supple, No JVD, Normal thyroid  CHEST/LUNG: Clear to auscultaion  CVS: Regular rate and rhythm; No murmurs, rubs, or gallops  GI: : Soft, Nontender, Nondistended; Bowel sounds present  NERVOUS SYSTEM:  Alert & Oriented X3  EXTREMITIES:  2+ Peripheral Pulses, No clubbing, cyanosis, or edema  LYMPH: No lymphadenopathy noted  SKIN: No rashes or lesions  ENDOCRINOLOGY: No Thyromegaly  PSYCH: Appropriate    Labs:  17, 17                            8.5    4.30  )-----------( 105      ( 27 Mar 2024 04:15 )             26.5                         9.0    4.09  )-----------( 104      ( 26 Mar 2024 06:00 )             27.5                         8.6    4.57  )-----------( 92       ( 25 Mar 2024 03:55 )             26.2                         9.3    7.02  )-----------( 104      ( 24 Mar 2024 04:30 )             27.9                         9.7    8.93  )-----------( 114      ( 23 Mar 2024 18:28 )             29.0     03-27    142  |  112<H>  |  61<H>  ----------------------------<  94  4.0   |  19<L>  |  2.73<H>  03-26    146<H>  |  116<H>  |  61<H>  ----------------------------<  123<H>  3.9   |  18<L>  |  2.66<H>  03-25    144  |  115<H>  |  68<H>  ----------------------------<  154<H>  4.0   |  18<L>  |  2.74<H>  03-24    143  |  117<H>  |  75<H>  ----------------------------<  149<H>  4.2   |  16<L>  |  2.84<H>    Ca    7.9<L>      27 Mar 2024 04:15  Ca    8.2<L>      26 Mar 2024 06:00  Phos  3.6     03-26  Mg     2.10     03-26      CAPILLARY BLOOD GLUCOSE      POCT Blood Glucose.: 318 mg/dL (27 Mar 2024 11:52)  POCT Blood Glucose.: 101 mg/dL (27 Mar 2024 07:03)  POCT Blood Glucose.: 138 mg/dL (26 Mar 2024 21:04)  POCT Blood Glucose.: 283 mg/dL (26 Mar 2024 16:39)          Urinalysis Basic - ( 27 Mar 2024 04:15 )    Color: x / Appearance: x / SG: x / pH: x  Gluc: 94 mg/dL / Ketone: x  / Bili: x / Urobili: x   Blood: x / Protein: x / Nitrite: x   Leuk Esterase: x / RBC: x / WBC x   Sq Epi: x / Non Sq Epi: x / Bacteria: x      < from: CT Abdomen and Pelvis No Cont (03.26.24 @ 08:53) >  IDNEYS/URETERS: Absent left kidney. No right-sided hydronephrosis. Right   upper pole cyst. Vascular calcifications.    BLADDER: Within normal limits.  REPRODUCTIVE ORGANS: Prostate is enlarged.    BOWEL: No bowel obstruction. Appendix is normal.  PERITONEUM: Trace pelvic free fluid.  VESSELS: Atherosclerotic changes.  RETROPERITONEUM/LYMPH NODES: No lymphadenopathy.  ABDOMINAL WALL: Small bilateral fat-containing inguinal hernias.  BONES: Degenerative changes.    IMPRESSION:  Absent left kidney. No right-sided hydronephrosis.        --- End of Report ---            GEORGETTE BANDA MD; Attending Radiologist  This document has been electronically signed. Mar 26 2024  9:25AM    < end of copied text >          RECENT CULTURES:        RESPIRATORY CULTURES:          Studies  Chest X-RAY  CT SCAN Chest   Venous Dopplers: LE:   CT Abdomen  Others

## 2024-03-27 NOTE — PROGRESS NOTE ADULT - ASSESSMENT
A/P    88 yM with PMHx CKD, HFrEF, CAD s/p PCI, AF on a/c, bladder cancer in remission, diabetes, TIA, left nephrectomy, presenting with 2 weeks progressive shortness of breath on exertion. Pt denies worsening pedal edema, orthopnea, chest pains or nausea associated with SOB.     #SOB, acute on chronic HFrEF  -likely secondary to mild volume overload-- resolved   -on oral lasix 20mg daILY  -previous echo w mild LV dysfunction  -repeat TTE w mild LV dysfunction    #Atrial Fibrillation  -stable  -AC HELD due to elevated gib risk  -Per GI Would hold off on AC and antiplatelet agents if possible until hgb has been stable for >24h.  -cont amio    #CAD, s/p PCI  -stable  -ASA on hold as mentioned above--- resume per GI recommendations as mentioned above      #UGIB/Anemia  -transfuse prn  -h/h improved today   -med fu     #CKD/LILIAN  -?cardio renal   -no obstruction  -trend creat     35 minutes spent on total encounter; more than 50% of the visit was spent counseling and/or coordinating care by the attending physician.

## 2024-03-27 NOTE — PROGRESS NOTE ADULT - ASSESSMENT
88y Male with CAD, HFrEF, Afib, HTN, DM, solitary R kidney and CKD p/w acute on chronic HFrEF and acute on chronic kidney injury.  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  eliquis restarted yesterday     GI-   EGD with biopsy but no specific mass found and slow ooze;  Biopsy did not show any cancer         Sayed [facility]   [phone]

## 2024-03-27 NOTE — PROGRESS NOTE ADULT - SUBJECTIVE AND OBJECTIVE BOX
[facility] [phone]        Patient seen and examined on room air, reports he had a a dark BM today.         MEDICATIONS  (STANDING):  aMIOdarone    Tablet 200 milliGRAM(s) Oral daily  apixaban 2.5 milliGRAM(s) Oral every 12 hours  atorvastatin 80 milliGRAM(s) Oral at bedtime  benzonatate 100 milliGRAM(s) Oral three times a day  dextrose 5%. 1000 milliLiter(s) (50 mL/Hr) IV Continuous <Continuous>  dextrose 5%. 1000 milliLiter(s) (100 mL/Hr) IV Continuous <Continuous>  dextrose 50% Injectable 25 Gram(s) IV Push once  dextrose 50% Injectable 25 Gram(s) IV Push once  dextrose 50% Injectable 12.5 Gram(s) IV Push once  ferrous    sulfate 325 milliGRAM(s) Oral daily  furosemide    Tablet 20 milliGRAM(s) Oral daily  glucagon  Injectable 1 milliGRAM(s) IntraMuscular once  insulin glargine Injectable (LANTUS) 10 Unit(s) SubCutaneous at bedtime  insulin lispro (ADMELOG) corrective regimen sliding scale   SubCutaneous three times a day before meals  pantoprazole    Tablet 40 milliGRAM(s) Oral two times a day  tamsulosin 0.8 milliGRAM(s) Oral at bedtime      VITAL:  T(C): , Max: 36.6 (03-27-24 @ 05:28)  T(F): , Max: 97.8 (03-27-24 @ 05:28)  HR: 55 (03-27-24 @ 13:28)  BP: 107/50 (03-27-24 @ 13:28)  BP(mean): --  RR: 18 (03-27-24 @ 13:28)  SpO2: 100% (03-27-24 @ 13:28)  Wt(kg): --    I and O's:    03-26 @ 07:01  -  03-27 @ 07:00  --------------------------------------------------------  IN: 760 mL / OUT: 850 mL / NET: -90 mL    03-27 @ 07:01  -  03-27 @ 15:56  --------------------------------------------------------  IN: 630 mL / OUT: 700 mL / NET: -70 mL          PHYSICAL EXAM:    Constitutional: NAD  Neck:  No JVD  Respiratory: CTAB/L  Cardiovascular: S1 and S2  Gastrointestinal: BS+, soft, NT/ND  Extremities: No peripheral edema  Neurological: A/O x 3, no focal deficits  Psychiatric: Normal mood, normal affect  : No Temple  Skin: No rashes  Access: Not applicable    LABS:                        8.5    4.30  )-----------( 105      ( 27 Mar 2024 04:15 )             26.5     03-27    142  |  112<H>  |  61<H>  ----------------------------<  94  4.0   |  19<L>  |  2.73<H>    Ca    7.9<L>      27 Mar 2024 04:15  Phos  3.6     03-26  Mg     2.10     03-26            Urine Studies:  Urinalysis Basic - ( 27 Mar 2024 04:15 )    Color: x / Appearance: x / SG: x / pH: x  Gluc: 94 mg/dL / Ketone: x  / Bili: x / Urobili: x   Blood: x / Protein: x / Nitrite: x   Leuk Esterase: x / RBC: x / WBC x   Sq Epi: x / Non Sq Epi: x / Bacteria: x            RADIOLOGY & ADDITIONAL STUDIES:    < from: CT Abdomen and Pelvis No Cont (03.26.24 @ 08:53) >  CONTRAST/COMPLICATIONS:  IV Contrast: None  Oral Contrast: None  Complications: None reported    PROCEDURE:  CT of the Abdomen and Pelvis was performed.  Sagittal and coronal reformats were performed.    FINDINGS:  LOWER CHEST: Within normal limits.    LIVER: Increased hepatic attenuation can be seen in the setting of   transfusion or medication related.  BILE DUCTS: Normal caliber.  GALLBLADDER: Cholelithiasis.  SPLEEN: Within normal limits.  PANCREAS: Atrophic  ADRENALS: Within normal limits.  KIDNEYS/URETERS: Absent left kidney. No right-sided hydronephrosis. Right   upper pole cyst. Vascular calcifications.    BLADDER: Within normal limits.  REPRODUCTIVE ORGANS: Prostate is enlarged.    BOWEL: No bowel obstruction. Appendix is normal.  PERITONEUM: Trace pelvic free fluid.  VESSELS: Atherosclerotic changes.  RETROPERITONEUM/LYMPH NODES: No lymphadenopathy.  ABDOMINAL WALL: Small bilateral fat-containing inguinal hernias.  BONES: Degenerative changes.    IMPRESSION:  Absent left kidney. No right-sided hydronephrosis.    < end of copied text >    Assessment and Plan:   · Assessment	    88y Male with CAD, HFrEF, Afib, HTN, DM, solitary R kidney and CKD p/w acute on chronic HFrEF and acute on chronic kidney injury.  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  eliquis restarted yesterday     GI-   EGD with biopsy but no specific mass found and slow ooze;  Biopsy did not show any cancer         [facility]   [facility]   [phone]        [facility] [phone]        Patient seen and examined on room air, reports he had a a dark BM today.         MEDICATIONS  (STANDING):  aMIOdarone    Tablet 200 milliGRAM(s) Oral daily.  apixaban 2.5 milliGRAM(s) Oral every 12 hours  atorvastatin 80 milliGRAM(s) Oral at bedtime  benzonatate 100 milliGRAM(s) Oral three times a day  dextrose 5%. 1000 milliLiter(s) (50 mL/Hr) IV Continuous <Continuous>  dextrose 5%. 1000 milliLiter(s) (100 mL/Hr) IV Continuous <Continuous>  dextrose 50% Injectable 25 Gram(s) IV Push once  dextrose 50% Injectable 25 Gram(s) IV Push once  dextrose 50% Injectable 12.5 Gram(s) IV Push once.  ferrous    sulfate 325 milliGRAM(s) Oral daily  furosemide    Tablet 20 milliGRAM(s) Oral daily  glucagon  Injectable 1 milliGRAM(s) IntraMuscular once  insulin glargine Injectable (LANTUS) 10 Unit(s) SubCutaneous at bedtime  insulin lispro (ADMELOG) corrective regimen sliding scale   SubCutaneous three times a day before meals  pantoprazole    Tablet 40 milliGRAM(s) Oral two times a day  tamsulosin 0.8 milliGRAM(s) Oral at bedtime      VITAL:  T(C): , Max: 36.6 (03-27-24 @ 05:28)  T(F): , Max: 97.8 (03-27-24 @ 05:28)  HR: 55 (03-27-24 @ 13:28)  BP: 107/50 (03-27-24 @ 13:28).  BP(mean): --  RR: 18 (03-27-24 @ 13:28)  SpO2: 100% (03-27-24 @ 13:28)  Wt(kg): --    I and O's:    03-26 @ 07:01  -  03-27 @ 07:00  --------------------------------------------------------  IN: 760 mL / OUT: 850 mL / NET: -90 mL    03-27 @ 07:01  -  03-27 @ 15:56  --------------------------------------------------------  IN: 630 mL / OUT: 700 mL / NET: -70 mL          PHYSICAL EXAM:    Constitutional: NAD  Neck:  No JVD  Respiratory: CTAB/L  Cardiovascular: S1 and S2  Gastrointestinal: BS+, soft, NT/ND.  Extremities: No peripheral edema  Neurological: A/O x 3, no focal deficits  Psychiatric: Normal mood, normal affect  : No Temple  Skin: No rashes  Access: Not applicable    LABS:                        8.5    4.30  )-----------( 105      ( 27 Mar 2024 04:15 )             26.5     03-27    142  |  112<H>  |  61<H>  ----------------------------<  94  4.0   |  19<L>  |  2.73<H>    Ca    7.9<L>      27 Mar 2024 04:15  Phos  3.6     03-26  Mg     2.10     03-26            Urine Studies:  Urinalysis Basic - ( 27 Mar 2024 04:15 )    Color: x / Appearance: x / SG: x / pH: x  Gluc: 94 mg/dL / Ketone: x  / Bili: x / Urobili: x   Blood: x / Protein: x / Nitrite: x   Leuk Esterase: x / RBC: x / WBC x   Sq Epi: x / Non Sq Epi: x / Bacteria: x            RADIOLOGY & ADDITIONAL STUDIES:    < from: CT Abdomen and Pelvis No Cont (03.26.24 @ 08:53) >  CONTRAST/COMPLICATIONS:  IV Contrast: None  Oral Contrast: None  Complications: None reported    PROCEDURE:  CT of the Abdomen and Pelvis was performed.  Sagittal and coronal reformats were performed.    FINDINGS:  LOWER CHEST: Within normal limits.    LIVER: Increased hepatic attenuation can be seen in the setting of   transfusion or medication related.  BILE DUCTS: Normal caliber.  GALLBLADDER: Cholelithiasis.  SPLEEN: Within normal limits.  PANCREAS: Atrophic  ADRENALS: Within normal limits.  KIDNEYS/URETERS: Absent left kidney. No right-sided hydronephrosis. Right   upper pole cyst. Vascular calcifications.    BLADDER: Within normal limits.  REPRODUCTIVE ORGANS: Prostate is enlarged.    BOWEL: No bowel obstruction. Appendix is normal.  PERITONEUM: Trace pelvic free fluid.  VESSELS: Atherosclerotic changes.  RETROPERITONEUM/LYMPH NODES: No lymphadenopathy.  ABDOMINAL WALL: Small bilateral fat-containing inguinal hernias.  BONES: Degenerative changes.    IMPRESSION:  Absent left kidney. No right-sided hydronephrosis.    < end of copied text >

## 2024-03-27 NOTE — PROGRESS NOTE ADULT - ASSESSMENT
88yM with PMHx CKD, HFrEF, CAD s/p stents presenting with 2 weeks progressive shortness of breath on exertion. Pt denies worsening pedal edema, orthopnea, chest pains or nausea associated with SOB. Denies infectious symptoms fever/chills, cough, URI symptoms recently. Takes furosemide 40mg every other day this dose has not been changed recently. No BRBPR, melena, epistaxis, hematuria, any other bleeding.  he says h is sob was gradually building up till he could not take it and came to hospital : he deneis any underlying lung disease but is an ex smoker:   no fever,   no cough , no phlegm ;    SOB/CHF/CAD/S/P PCI  ex smoker:   CKD    SOB/CHF/CAD/S/P PCI  -his history is highly suggestive of chf exacerbation;   -he has formed small concepcion effusions:  cxr prior to his film was clear:   -he hasn o clinical features of PNEUMONIA:  -on vbg shows mild metabolic acidosis  -added on Lasix : to co nt:   -no need fro antibiotics or bipap at this time; -consider ct chest : last ct from 2022 reviewed:  was suggestive of martínez failure  -his last echo with mod lv dysfunction  need a repeat one  -Check D Dimer in AM  : clinical  probability of PE is low   3/18:  -clinically he has improved  on room air:  d dimer is slightly elevated:  no further workup : diuretics per primary t eam   3/19: seems OK; no sob: on room air:  no sob: leg edema has resolved  3/20: sob is likely due to mild chf:  improved lasix:  currently on room air and has no leg edema  3/21: seems ok: pulm wise:  for endoscopy today   3/12: h9s endoscopy is done showed:  LA Grade A esophagitis.                       - A large amount of red blood was found in antrum of                        stomach. Diffuse severe mucosal changes characterized by                        congestion, erythema, friability (with spontaneous                        bleeding), hemorrhagic appearance, and mild sloughing                        were found diffusely in the gastric antrum. No discrete                        mass was identified. Biopsied. Hemostatic spray applied.                        DDx concerning for malignancy (primary vs linitis                        plastica) or alternate infiltrative process (eg                        amyloidosis).                       - Gastroopathy.                       - Nodular mucosa in the duodenal bulb.  -no ac for now  3/23: seems stable:  no active bleeding:  coughing excessively:  add tessalon Perles  3/24: he feels better today  : not sob: on room air:  cough is better:   3/25; He seems K: no sob;  no cough : no phlegm     3/26: seems to be doing  ok : no sob:  on room air:  no pulm evetns   3/27: seems to be going pretty good;  on roomai      ex smoker:   -she has 35 pk years history of smoking:   -has no diagnosis of copd:  -his last ct chest did not show emphysema:   -need to mantain o2 sao2 above 88%  -he would need outpt full PFT   3/22: seems stable: tolerated the procedure well:  no resp distress  3/23:  has excessive coughing today  : add tessalon Perles   3/24: seems stable:   3/25: stable:   3/26: seems stable:  for outpt folow up and PFT     CKD  -per renal :    dvt prophylaxis:    dw team Qbrexza Pregnancy And Lactation Text: There is no available data on Qbrexza use in pregnant women.  There is no available data on Qbrexza use in lactation.

## 2024-03-28 ENCOUNTER — NON-APPOINTMENT (OUTPATIENT)
Age: 89
End: 2024-03-28

## 2024-03-28 ENCOUNTER — TRANSCRIPTION ENCOUNTER (OUTPATIENT)
Age: 89
End: 2024-03-28

## 2024-03-28 VITALS
RESPIRATION RATE: 18 BRPM | TEMPERATURE: 98 F | SYSTOLIC BLOOD PRESSURE: 119 MMHG | OXYGEN SATURATION: 100 % | DIASTOLIC BLOOD PRESSURE: 50 MMHG | HEART RATE: 61 BPM

## 2024-03-28 LAB
BLD GP AB SCN SERPL QL: NEGATIVE — SIGNIFICANT CHANGE UP
GLUCOSE BLDC GLUCOMTR-MCNC: 183 MG/DL — HIGH (ref 70–99)
GLUCOSE BLDC GLUCOMTR-MCNC: 98 MG/DL — SIGNIFICANT CHANGE UP (ref 70–99)
HCT VFR BLD CALC: 27.1 % — LOW (ref 39–50)
HGB BLD-MCNC: 8.7 G/DL — LOW (ref 13–17)
MCHC RBC-ENTMCNC: 31.4 PG — SIGNIFICANT CHANGE UP (ref 27–34)
MCHC RBC-ENTMCNC: 32.1 GM/DL — SIGNIFICANT CHANGE UP (ref 32–36)
MCV RBC AUTO: 97.8 FL — SIGNIFICANT CHANGE UP (ref 80–100)
NRBC # BLD: 0 /100 WBCS — SIGNIFICANT CHANGE UP (ref 0–0)
NRBC # FLD: 0 K/UL — SIGNIFICANT CHANGE UP (ref 0–0)
PLATELET # BLD AUTO: 104 K/UL — LOW (ref 150–400)
RBC # BLD: 2.77 M/UL — LOW (ref 4.2–5.8)
RBC # FLD: 19.2 % — HIGH (ref 10.3–14.5)
RH IG SCN BLD-IMP: POSITIVE — SIGNIFICANT CHANGE UP
WBC # BLD: 3.9 K/UL — SIGNIFICANT CHANGE UP (ref 3.8–10.5)
WBC # FLD AUTO: 3.9 K/UL — SIGNIFICANT CHANGE UP (ref 3.8–10.5)

## 2024-03-28 RX ORDER — PANTOPRAZOLE SODIUM 20 MG/1
1 TABLET, DELAYED RELEASE ORAL
Qty: 60 | Refills: 0
Start: 2024-03-28 | End: 2024-04-26

## 2024-03-28 RX ORDER — ERYTHROPOIETIN 10000 [IU]/ML
10000 INJECTION, SOLUTION INTRAVENOUS; SUBCUTANEOUS ONCE
Refills: 0 | Status: DISCONTINUED | OUTPATIENT
Start: 2024-03-28 | End: 2024-03-28

## 2024-03-28 RX ORDER — FUROSEMIDE 40 MG
1 TABLET ORAL
Qty: 30 | Refills: 0
Start: 2024-03-28 | End: 2024-04-26

## 2024-03-28 RX ADMIN — Medication 20 MILLIGRAM(S): at 05:10

## 2024-03-28 RX ADMIN — Medication 1: at 11:34

## 2024-03-28 RX ADMIN — APIXABAN 2.5 MILLIGRAM(S): 2.5 TABLET, FILM COATED ORAL at 08:32

## 2024-03-28 RX ADMIN — Medication 325 MILLIGRAM(S): at 11:24

## 2024-03-28 RX ADMIN — AMIODARONE HYDROCHLORIDE 200 MILLIGRAM(S): 400 TABLET ORAL at 05:10

## 2024-03-28 RX ADMIN — PANTOPRAZOLE SODIUM 40 MILLIGRAM(S): 20 TABLET, DELAYED RELEASE ORAL at 05:10

## 2024-03-28 RX ADMIN — Medication 100 MILLIGRAM(S): at 05:09

## 2024-03-28 NOTE — PROVIDER CONTACT NOTE (OTHER) - ACTION/TREATMENT ORDERED:
Provider made aware. No new orders
provider notified. no new orders at this time.
Provider made aware.
Protopic Counseling: Patient may experience a mild burning sensation during topical application. Protopic is not approved in children less than 2 years of age. There have been case reports of hematologic and skin malignancies in patients using topical calcineurin inhibitors although causality is questionable.

## 2024-03-28 NOTE — PROGRESS NOTE ADULT - ASSESSMENT
A/P    88 yM with PMHx CKD, HFrEF, CAD s/p PCI, AF on a/c, bladder cancer in remission, diabetes, TIA, left nephrectomy, presenting with 2 weeks progressive shortness of breath on exertion. Pt denies worsening pedal edema, orthopnea, chest pains or nausea associated with SOB.     #SOB, acute on chronic HFrEF  -likely secondary to mild volume overload-- resolved   -on oral lasix 20mg daILY  -previous echo w mild LV dysfunction  -repeat TTE w mild LV dysfunction    #Atrial Fibrillation  -stable  -ac resumed , on eliquis  -monitor cbc   -cont amio    #CAD, s/p PCI  -stable  -ac resumed--- ASA on hold --- resume per GI recommendations as mentioned above      #UGIB/Anemia  -transfuse prn  -monitor cbc   -med fu     #CKD/LILIAN  -?cardio renal   -no obstruction  -trend creat     pt to fu with his outpt cards on DC

## 2024-03-28 NOTE — PROGRESS NOTE ADULT - PROBLEM SELECTOR PLAN 8
continue statin

## 2024-03-28 NOTE — PROGRESS NOTE ADULT - SUBJECTIVE AND OBJECTIVE BOX
Date of Service: 03-28-24 @ 11:07    Patient is a 88y old  Male who presents with a chief complaint of shortness of breath (28 Mar 2024 11:01)      Any change in ROS: he seems pretty good:  no sob:  on room air:     MEDICATIONS  (STANDING):  aMIOdarone    Tablet 200 milliGRAM(s) Oral daily  apixaban 2.5 milliGRAM(s) Oral every 12 hours  atorvastatin 80 milliGRAM(s) Oral at bedtime  dextrose 5%. 1000 milliLiter(s) (100 mL/Hr) IV Continuous <Continuous>  dextrose 5%. 1000 milliLiter(s) (50 mL/Hr) IV Continuous <Continuous>  dextrose 50% Injectable 12.5 Gram(s) IV Push once  dextrose 50% Injectable 25 Gram(s) IV Push once  dextrose 50% Injectable 25 Gram(s) IV Push once  ferrous    sulfate 325 milliGRAM(s) Oral daily  furosemide    Tablet 20 milliGRAM(s) Oral daily  glucagon  Injectable 1 milliGRAM(s) IntraMuscular once  insulin glargine Injectable (LANTUS) 10 Unit(s) SubCutaneous at bedtime  insulin lispro (ADMELOG) corrective regimen sliding scale   SubCutaneous three times a day before meals  pantoprazole    Tablet 40 milliGRAM(s) Oral two times a day  tamsulosin 0.8 milliGRAM(s) Oral at bedtime    MEDICATIONS  (PRN):  acetaminophen     Tablet .. 650 milliGRAM(s) Oral every 6 hours PRN Temp greater or equal to 38C (100.4F), Mild Pain (1 - 3)  dextrose Oral Gel 15 Gram(s) Oral once PRN Blood Glucose LESS THAN 70 milliGRAM(s)/deciliter    Vital Signs Last 24 Hrs  T(C): 36.8 (28 Mar 2024 05:05), Max: 36.8 (28 Mar 2024 05:05)  T(F): 98.2 (28 Mar 2024 05:05), Max: 98.2 (28 Mar 2024 05:05)  HR: 65 (28 Mar 2024 05:05) (52 - 65)  BP: 100/53 (28 Mar 2024 05:05) (100/53 - 138/54)  BP(mean): --  RR: 18 (28 Mar 2024 05:05) (18 - 18)  SpO2: 100% (28 Mar 2024 05:05) (100% - 100%)    Parameters below as of 28 Mar 2024 05:05  Patient On (Oxygen Delivery Method): room air        I&O's Summary    27 Mar 2024 07:01  -  28 Mar 2024 07:00  --------------------------------------------------------  IN: 1110 mL / OUT: 1150 mL / NET: -40 mL    28 Mar 2024 07:01  -  28 Mar 2024 11:07  --------------------------------------------------------  IN: 240 mL / OUT: 650 mL / NET: -410 mL          Physical Exam:   GENERAL: NAD, well-groomed, well-developed  HEENT: RAQUEL/   Atraumatic, Normocephalic  ENMT: No tonsillar erythema, exudates, or enlargement; Moist mucous membranes, Good dentition, No lesions  NECK: Supple, No JVD, Normal thyroid  CHEST/LUNG: Clear to auscultaion-  CVS: Regular rate and rhythm; No murmurs, rubs, or gallops  GI: : Soft, Nontender, Nondistended; Bowel sounds present  NERVOUS SYSTEM:  Alert & Oriented X3,  EXTREMITIES: - edema  LYMPH: No lymphadenopathy noted  SKIN: No rashes or lesions  ENDOCRINOLOGY: No Thyromegaly  PSYCH: Appropriate    Labs:  17, 17                            8.7    3.90  )-----------( 104      ( 28 Mar 2024 05:02 )             27.1                         8.5    4.30  )-----------( 105      ( 27 Mar 2024 04:15 )             26.5                         9.0    4.09  )-----------( 104      ( 26 Mar 2024 06:00 )             27.5                         8.6    4.57  )-----------( 92       ( 25 Mar 2024 03:55 )             26.2     03-27    142  |  112<H>  |  61<H>  ----------------------------<  94  4.0   |  19<L>  |  2.73<H>  03-26    146<H>  |  116<H>  |  61<H>  ----------------------------<  123<H>  3.9   |  18<L>  |  2.66<H>  03-25    144  |  115<H>  |  68<H>  ----------------------------<  154<H>  4.0   |  18<L>  |  2.74<H>    Ca    7.9<L>      27 Mar 2024 04:15      CAPILLARY BLOOD GLUCOSE      POCT Blood Glucose.: 98 mg/dL (28 Mar 2024 08:14)  POCT Blood Glucose.: 133 mg/dL (27 Mar 2024 21:04)  POCT Blood Glucose.: 219 mg/dL (27 Mar 2024 17:10)  POCT Blood Glucose.: 318 mg/dL (27 Mar 2024 11:52)          Urinalysis Basic - ( 27 Mar 2024 04:15 )    Color: x / Appearance: x / SG: x / pH: x  Gluc: 94 mg/dL / Ketone: x  / Bili: x / Urobili: x   Blood: x / Protein: x / Nitrite: x   Leuk Esterase: x / RBC: x / WBC x   Sq Epi: x / Non Sq Epi: x / Bacteria: x      rad< from: CT Abdomen and Pelvis No Cont (03.26.24 @ 08:53) >  FINDINGS:  LOWER CHEST: Within normal limits.    LIVER: Increased hepatic attenuation can be seen in the setting of   transfusion or medication related.  BILE DUCTS: Normal caliber.  GALLBLADDER: Cholelithiasis.  SPLEEN: Within normal limits.  PANCREAS: Atrophic  ADRENALS: Within normal limits.  KIDNEYS/URETERS: Absent left kidney. No right-sided hydronephrosis. Right   upper pole cyst. Vascular calcifications.    BLADDER: Within normal limits.  REPRODUCTIVE ORGANS: Prostate is enlarged.    BOWEL: No bowel obstruction. Appendix is normal.  PERITONEUM: Trace pelvic free fluid.  VESSELS: Atherosclerotic changes.  RETROPERITONEUM/LYMPH NODES: No lymphadenopathy.  ABDOMINAL WALL: Small bilateral fat-containing inguinal hernias.  BONES: Degenerative changes.    IMPRESSION:  Absent left kidney. No right-sided hydronephrosis.        --- End of Report ---            GEORGETTE BANDA MD; Attending Radiologist  This document has been electronically signed. Mar 26 2024  9:25AM    < end of copied text >  < from: Xray Chest 2 Views PA/Lat (03.16.24 @ 16:39) >    ACC: [id number] EXAM:  XR CHEST PA LAT 2V   ORDERED BY: ANDERSON FARNSWORTH     PROCEDURE DATE:  03/16/2024          INTERPRETATION:  CLINICAL INFORMATION: Shortness of breath on exertion.    TECHNIQUE: PA and lateral views of the chest.    COMPARISON: Multiple prior chest x-rays with most recent dated 12/14/2023    FINDINGS:    Heart: Heart size is normal.  Pulmonary: No focal consolidation. Small bilateral pleural effusions with   bibasilar atelectasis. No pneumothorax.  Bones: No acute bony pathology.    IMPRESSION:  Small bilateral pleural effusions with bibasilar atelectasis.    --- End of Report ---          TOM OSORIO MD; Resident Radiologist  This document has been electronically signed.  TAMIKO MILLIGAN MD; Attending Radiologist  This document has been electronically signed. Mar 17 2024  8:54AM    < end of copied text >        RECENT CULTURES:        RESPIRATORY CULTURES:          Studies  Chest X-RAY  CT SCAN Chest   Venous Dopplers: LE:   CT Abdomen  Others

## 2024-03-28 NOTE — PROGRESS NOTE ADULT - TIME BILLING
agree with above  cv stable  cont diuretic  trend cr
Agree with above ACP note.  cv stable  cont current tx  on a/c  resume asa 81 if no GI CI  dcp
Agree with above ACP note.  cv stable remains off a/c  trend heme, plat   consider a/c restart pending cbc trend
Agree with above ACP note.  cv stable remains off a/c  trend heme, plat   consider a/c restart pending cbc trend

## 2024-03-28 NOTE — PROGRESS NOTE ADULT - SUBJECTIVE AND OBJECTIVE BOX
Patient is a 88y old  Male who presents with a chief complaint of shortness of breath (28 Mar 2024 11:07)      DATE OF SERVICE: 03-28-24 @ 14:22    SUBJECTIVE / OVERNIGHT EVENTS: overnight events noted    ROS:  Resp: No cough no sputum production  CVS: No chest pain no palpitations no orthopnea  GI: no N/V/D  "I want to go home"  2 x BM yesterday brown    MEDICATIONS  (STANDING):  aMIOdarone    Tablet 200 milliGRAM(s) Oral daily  apixaban 2.5 milliGRAM(s) Oral every 12 hours  atorvastatin 80 milliGRAM(s) Oral at bedtime  dextrose 5%. 1000 milliLiter(s) (100 mL/Hr) IV Continuous <Continuous>  dextrose 5%. 1000 milliLiter(s) (50 mL/Hr) IV Continuous <Continuous>  dextrose 50% Injectable 25 Gram(s) IV Push once  dextrose 50% Injectable 25 Gram(s) IV Push once  dextrose 50% Injectable 12.5 Gram(s) IV Push once  ferrous    sulfate 325 milliGRAM(s) Oral daily  furosemide    Tablet 20 milliGRAM(s) Oral daily  glucagon  Injectable 1 milliGRAM(s) IntraMuscular once  insulin glargine Injectable (LANTUS) 10 Unit(s) SubCutaneous at bedtime  insulin lispro (ADMELOG) corrective regimen sliding scale   SubCutaneous three times a day before meals  pantoprazole    Tablet 40 milliGRAM(s) Oral two times a day  tamsulosin 0.8 milliGRAM(s) Oral at bedtime    MEDICATIONS  (PRN):  acetaminophen     Tablet .. 650 milliGRAM(s) Oral every 6 hours PRN Temp greater or equal to 38C (100.4F), Mild Pain (1 - 3)  dextrose Oral Gel 15 Gram(s) Oral once PRN Blood Glucose LESS THAN 70 milliGRAM(s)/deciliter        CAPILLARY BLOOD GLUCOSE      POCT Blood Glucose.: 183 mg/dL (28 Mar 2024 11:30)  POCT Blood Glucose.: 98 mg/dL (28 Mar 2024 08:14)  POCT Blood Glucose.: 133 mg/dL (27 Mar 2024 21:04)  POCT Blood Glucose.: 219 mg/dL (27 Mar 2024 17:10)    I&O's Summary    27 Mar 2024 07:01  -  28 Mar 2024 07:00  --------------------------------------------------------  IN: 1110 mL / OUT: 1150 mL / NET: -40 mL    28 Mar 2024 07:01  -  28 Mar 2024 14:22  --------------------------------------------------------  IN: 480 mL / OUT: 650 mL / NET: -170 mL        Vital Signs Last 24 Hrs  T(C): 36.4 (28 Mar 2024 13:13), Max: 36.8 (28 Mar 2024 05:05)  T(F): 97.5 (28 Mar 2024 13:13), Max: 98.2 (28 Mar 2024 05:05)  HR: 61 (28 Mar 2024 13:13) (52 - 65)  BP: 119/50 (28 Mar 2024 13:13) (100/53 - 138/54)  BP(mean): --  RR: 18 (28 Mar 2024 13:13) (18 - 18)  SpO2: 100% (28 Mar 2024 13:13) (100% - 100%)    PHYSICAL EXAM:   Lungs: clear   CVS: S1 S2 systolic murmur +   Abdomen: no tenderness  Neuro: AO x 3 nonfocal   Skin: warm, dry  Ext: no edema    LABS:                        8.7    3.90  )-----------( 104      ( 28 Mar 2024 05:02 )             27.1     03-27    142  |  112<H>  |  61<H>  ----------------------------<  94  4.0   |  19<L>  |  2.73<H>    Ca    7.9<L>      27 Mar 2024 04:15            Urinalysis Basic - ( 27 Mar 2024 04:15 )    Color: x / Appearance: x / SG: x / pH: x  Gluc: 94 mg/dL / Ketone: x  / Bili: x / Urobili: x   Blood: x / Protein: x / Nitrite: x   Leuk Esterase: x / RBC: x / WBC x   Sq Epi: x / Non Sq Epi: x / Bacteria: x          All consultant(s) notes reviewed and care discussed with other providers        Contact Number, Dr Arroyo [phone]

## 2024-03-28 NOTE — PROGRESS NOTE ADULT - PROBLEM SELECTOR PLAN 4
with LILIAN on CKD now  resolving   creatinine baseline
with LILIAN on CKD now  resolving   monitor creatinine  renal help appreciated  continue to monitor
with LILIAN on CKD  monitor creatinine  renal help appreciated  continue to monitor
with LILIAN on CKD now  resolving   creatinine baseline
with LILIAN on CKD now  resolving   creatinine baseline
with LILIAN on CKD  monitor creatinine  renal help appreciated  continue to monitor
with LILIAN on CKD  monitor creatinine  renal help appreciated  continue to monitor
with LILIAN on CKD now  resolving   monitor creatinine  renal help appreciated  continue to monitor
with LILIAN on CKD now likely new Psychiatricl   monitor creatinine  renal help appreciated  continue to monitor
with LILIAN on CKD now likely new baseline   monitor creatinine  renal help appreciated  continue to monitor
with LILIAN on CKD now  resolving   monitor creatinine  renal help appreciated  continue to monitor

## 2024-03-28 NOTE — PROGRESS NOTE ADULT - SUBJECTIVE AND OBJECTIVE BOX
NEPHROLOGY-NSN [phone]        Patient seen and examined on room air no new complaints, eager to go home.         MEDICATIONS  (STANDING):  aMIOdarone    Tablet 200 milliGRAM(s) Oral daily  apixaban 2.5 milliGRAM(s) Oral every 12 hours  atorvastatin 80 milliGRAM(s) Oral at bedtime  dextrose 5%. 1000 milliLiter(s) (50 mL/Hr) IV Continuous <Continuous>  dextrose 5%. 1000 milliLiter(s) (100 mL/Hr) IV Continuous <Continuous>  dextrose 50% Injectable 25 Gram(s) IV Push once  dextrose 50% Injectable 25 Gram(s) IV Push once  dextrose 50% Injectable 12.5 Gram(s) IV Push once  ferrous    sulfate 325 milliGRAM(s) Oral daily  furosemide    Tablet 20 milliGRAM(s) Oral daily  glucagon  Injectable 1 milliGRAM(s) IntraMuscular once  insulin glargine Injectable (LANTUS) 10 Unit(s) SubCutaneous at bedtime  insulin lispro (ADMELOG) corrective regimen sliding scale   SubCutaneous three times a day before meals  pantoprazole    Tablet 40 milliGRAM(s) Oral two times a day  tamsulosin 0.8 milliGRAM(s) Oral at bedtime      VITAL:  T(C): , Max: 36.8 (03-28-24 @ 05:05)  T(F): , Max: 98.2 (03-28-24 @ 05:05)  HR: 61 (03-28-24 @ 13:13)  BP: 119/50 (03-28-24 @ 13:13)  BP(mean): --  RR: 18 (03-28-24 @ 13:13)  SpO2: 100% (03-28-24 @ 13:13)  Wt(kg): --    I and O's:    03-27 @ 07:01  -  03-28 @ 07:00  --------------------------------------------------------  IN: 1110 mL / OUT: 1150 mL / NET: -40 mL    03-28 @ 07:01  -  03-28 @ 14:22  --------------------------------------------------------  IN: 480 mL / OUT: 650 mL / NET: -170 mL          PHYSICAL EXAM:    Constitutional: NAD  Neck:  No JVD  Respiratory: CTAB/L  Cardiovascular: S1 and S2  Gastrointestinal: BS+, soft, NT/ND  Extremities: No peripheral edema  Neurological: A/O x 3, no focal deficits  Psychiatric: Normal mood, normal affect  : No Temple  Skin: No rashes  Access: Not applicable    LABS:                        8.7    3.90  )-----------( 104      ( 28 Mar 2024 05:02 )             27.1     03-27    142  |  112<H>  |  61<H>  ----------------------------<  94  4.0   |  19<L>  |  2.73<H>    Ca    7.9<L>      27 Mar 2024 04:15            Urine Studies:  Urinalysis Basic - ( 27 Mar 2024 04:15 )    Color: x / Appearance: x / SG: x / pH: x  Gluc: 94 mg/dL / Ketone: x  / Bili: x / Urobili: x   Blood: x / Protein: x / Nitrite: x   Leuk Esterase: x / RBC: x / WBC x   Sq Epi: x / Non Sq Epi: x / Bacteria: x          Assessment and Plan:   · Assessment	    88y Male with CAD, HFrEF, Afib, HTN, DM, solitary R kidney and CKD p/w acute on chronic HFrEF and acute on chronic kidney injury.  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  Eliquis restarted    GI-   EGD with biopsy but no specific mass found and slow ooze;  Biopsy did not show any cancer         Sayed [facility]   [phone]            NEPHROLOGY-NSN [phone]        Patient seen and examined on room air no new complaints, eager to go home.         MEDICATIONS  (STANDING):  aMIOdarone    Tablet 200 milliGRAM(s) Oral daily  apixaban 2.5 milliGRAM(s) Oral every 12 hours  atorvastatin 80 milliGRAM(s) Oral at bedtime  dextrose 5%. 1000 milliLiter(s) (50 mL/Hr) IV Continuous <Continuous>  dextrose 5%. 1000 milliLiter(s) (100 mL/Hr) IV Continuous <Continuous>  dextrose 50% Injectable 25 Gram(s) IV Push once  dextrose 50% Injectable 25 Gram(s) IV Push once  dextrose 50% Injectable 12.5 Gram(s) IV Push once  ferrous    sulfate 325 milliGRAM(s) Oral daily  furosemide    Tablet 20 milliGRAM(s) Oral daily  glucagon  Injectable 1 milliGRAM(s) IntraMuscular once  insulin glargine Injectable (LANTUS) 10 Unit(s) SubCutaneous at bedtime  insulin lispro (ADMELOG) corrective regimen sliding scale   SubCutaneous three times a day before meals  pantoprazole    Tablet 40 milliGRAM(s) Oral two times a day  tamsulosin 0.8 milliGRAM(s) Oral at bedtime      VITAL:  T(C): , Max: 36.8 (03-28-24 @ 05:05)  T(F): , Max: 98.2 (03-28-24 @ 05:05)  HR: 61 (03-28-24 @ 13:13)  BP: 119/50 (03-28-24 @ 13:13)  BP(mean): --  RR: 18 (03-28-24 @ 13:13)  SpO2: 100% (03-28-24 @ 13:13)  Wt(kg): --    I and O's:    03-27 @ 07:01  -  03-28 @ 07:00  --------------------------------------------------------  IN: 1110 mL / OUT: 1150 mL / NET: -40 mL    03-28 @ 07:01  -  03-28 @ 14:22  --------------------------------------------------------  IN: 480 mL / OUT: 650 mL / NET: -170 mL          PHYSICAL EXAM:    Constitutional: NAD; cachetic   Neck:  No JVD  Respiratory: CTAB/L  Cardiovascular: S1 and S2  Gastrointestinal: BS+, soft, NT/ND  Extremities: No peripheral edema  Neurological: A/O x 3, no focal deficits  Psychiatric: Normal mood, normal affect  : No Temple  Skin: No rashes  Access: Not applicable    LABS:                        8.7    3.90  )-----------( 104      ( 28 Mar 2024 05:02 )             27.1     03-27    142  |  112<H>  |  61<H>  ----------------------------<  94  4.0   |  19<L>  |  2.73<H>    Ca    7.9<L>      27 Mar 2024 04:15            Urine Studies:  Urinalysis Basic - ( 27 Mar 2024 04:15 )    Color: x / Appearance: x / SG: x / pH: x  Gluc: 94 mg/dL / Ketone: x  / Bili: x / Urobili: x   Blood: x / Protein: x / Nitrite: x   Leuk Esterase: x / RBC: x / WBC x   Sq Epi: x / Non Sq Epi: x / Bacteria: x

## 2024-03-28 NOTE — PROVIDER CONTACT NOTE (OTHER) - BACKGROUND
87 y/o female admitted on 3/16/24 for Dyspnea. PMHx of CHF, DM
87 y/o male admitted on 3/16/24 for Dyspnea.
87 y/o female admitted on 3/16/24 for Dyspnea. PMHx of CHF, DM
89 y/o female admitted on 3/16/24 for Dyspnea. PMHx of CHF, DM
Yes

## 2024-03-28 NOTE — PROGRESS NOTE ADULT - PROVIDER SPECIALTY LIST ADULT
Anesthesia
Cardiology
Cardiology
Gastroenterology
Internal Medicine
Nephrology
Pulmonology
Cardiology
Nephrology
Pulmonology
Cardiology
Gastroenterology
Nephrology
Pulmonology
Pulmonology
Cardiology
Cardiology
Internal Medicine
Pulmonology
Internal Medicine

## 2024-03-28 NOTE — PROVIDER CONTACT NOTE (OTHER) - SITUATION
DBP low 136/46
Frequently yassine down to mid 40's while sleeping.
Pt had an increase in DW of 9.5 lbs based on documented weight.
diastolic <50 asymptomatic
patient yassine to 40s nonsustained

## 2024-03-28 NOTE — PROGRESS NOTE ADULT - SUBJECTIVE AND OBJECTIVE BOX
CARDIOLOGY FOLLOW UP - Dr. Vega  DATE OF SERVICE: 3/28/24    CC no cp or sob       REVIEW OF SYSTEMS:  CONSTITUTIONAL: No fever, weight loss, or fatigue  RESPIRATORY: No cough, wheezing, chills or hemoptysis; No Shortness of Breath  CARDIOVASCULAR: No chest pain, palpitations, passing out, dizziness, or leg swelling  GASTROINTESTINAL: No abdominal or epigastric pain. No nausea, vomiting, or hematemesis; No diarrhea or constipation. No melena or hematochezia.  VASCULAR: No edema     PHYSICAL EXAM:  T(C): 36.8 (03-28-24 @ 05:05), Max: 36.8 (03-28-24 @ 05:05)  HR: 65 (03-28-24 @ 05:05) (52 - 65)  BP: 100/53 (03-28-24 @ 05:05) (100/53 - 138/54)  RR: 18 (03-28-24 @ 05:05) (18 - 18)  SpO2: 100% (03-28-24 @ 05:05) (100% - 100%)  Wt(kg): --  I&O's Summary    27 Mar 2024 07:01  -  28 Mar 2024 07:00  --------------------------------------------------------  IN: 1110 mL / OUT: 1150 mL / NET: -40 mL    28 Mar 2024 07:01  -  28 Mar 2024 11:02  --------------------------------------------------------  IN: 240 mL / OUT: 0 mL / NET: 240 mL        Appearance: Normal	  Cardiovascular: Normal S1 S2,RRR, No JVD, No murmurs  Respiratory: Lungs clear to auscultation	  Gastrointestinal:  Soft, Non-tender, + BS	  Extremities: Normal range of motion, No clubbing, cyanosis or edema      Home Medications:  apixaban 2.5 mg oral tablet: 1 tab(s) orally every 12 hours (17 Mar 2024 10:48)  Crestor 40 mg oral tablet: 1 tab(s) orally once a day (at bedtime) (17 Mar 2024 10:49)  repaglinide 0.5 mg oral tablet: 1 tab(s) orally 3 times a day (before meals) (17 Mar 2024 10:49)  tamsulosin 0.4 mg oral capsule: 1 cap(s) orally once a day (17 Mar 2024 10:49)  Tresiba FlexTouch 100 units/mL subcutaneous solution: 10 subcutaneous once a day (at bedtime) (17 Mar 2024 10:49)      MEDICATIONS  (STANDING):  aMIOdarone    Tablet 200 milliGRAM(s) Oral daily  apixaban 2.5 milliGRAM(s) Oral every 12 hours  atorvastatin 80 milliGRAM(s) Oral at bedtime  dextrose 5%. 1000 milliLiter(s) (100 mL/Hr) IV Continuous <Continuous>  dextrose 5%. 1000 milliLiter(s) (50 mL/Hr) IV Continuous <Continuous>  dextrose 50% Injectable 12.5 Gram(s) IV Push once  dextrose 50% Injectable 25 Gram(s) IV Push once  dextrose 50% Injectable 25 Gram(s) IV Push once  ferrous    sulfate 325 milliGRAM(s) Oral daily  furosemide    Tablet 20 milliGRAM(s) Oral daily  glucagon  Injectable 1 milliGRAM(s) IntraMuscular once  insulin glargine Injectable (LANTUS) 10 Unit(s) SubCutaneous at bedtime  insulin lispro (ADMELOG) corrective regimen sliding scale   SubCutaneous three times a day before meals  pantoprazole    Tablet 40 milliGRAM(s) Oral two times a day  tamsulosin 0.8 milliGRAM(s) Oral at bedtime      TELEMETRY: 	    ECG:  	  RADIOLOGY:   DIAGNOSTIC TESTING:  [ ] Echocardiogram:  [ ]  Catheterization:  [ ] Stress Test:    OTHER: 	    LABS:	 	                            8.7    3.90  )-----------( 104      ( 28 Mar 2024 05:02 )             27.1     03-27    142  |  112<H>  |  61<H>  ----------------------------<  94  4.0   |  19<L>  |  2.73<H>    Ca    7.9<L>      27 Mar 2024 04:15

## 2024-03-28 NOTE — DISCHARGE NOTE NURSING/CASE MANAGEMENT/SOCIAL WORK - NSDCFUADDAPPT_GEN_ALL_CORE_FT
Follow up with PCP within 1-2 weeks of discharge. If you are in need of a general medicine physician and post-discharge medical follow-up for further care/recommendations you may contact the [facility] for an appointment at [phone].     Follow up with cardiology within 1-2 weeks of discharge. If you are in need of a general cardiologist after discharge, you may contact the [facility] for an appointment at [phone].     Follow up with the nephrologist within 1-2 weeks of discharge. If you do not have a kidney doctor, you may follow up at [facility] at [address] [place] [address], 2nd floor, [place] [address]. Call [phone] for an appointment.     Follow up with pulmonology within 1-2 weeks of discharge. Pulmonary[facility] Clinic  [address] [place] [address]  [phone]

## 2024-03-28 NOTE — PROGRESS NOTE ADULT - PROBLEM SELECTOR PROBLEM 2
Acute on chronic heart failure with preserved ejection fraction (HFpEF)
Atrial fibrillation
Atrial fibrillation
Acute on chronic heart failure with preserved ejection fraction (HFpEF)
Atrial fibrillation
Acute on chronic heart failure with preserved ejection fraction (HFpEF)
Acute on chronic heart failure with preserved ejection fraction (HFpEF)
Atrial fibrillation

## 2024-03-28 NOTE — PROGRESS NOTE ADULT - PROBLEM SELECTOR PLAN 2
resolved   appears euvolemic   creatinine improved
resolved   appears euvolemic   will continue to monitor
resolved   appears euvolemic   creatinine improved further
chronic atrial fibrillation  continue apixaban  heart rate controlled
heart rate controlled   will need to hold apixaban
resolved   creatinine likely baseline   appears euvolemic   will continue to monitor
chronic atrial fibrillation  hold apixaban  FOB positive   GI consultation requested
chronic atrial fibrillation  continue apixaban  heart rate controlled
resolved   appears euvolemic   creatinine likely new baseline
resolved   creatinine now baseline   appears euvolemic   will continue to monitor
resolved   creatinine likely baseline   appears euvolemic   will continue to monitor  furosemide 20 po qd on discharge

## 2024-03-28 NOTE — PROGRESS NOTE ADULT - PROBLEM SELECTOR PLAN 7
acceptable  continue to monitor

## 2024-03-28 NOTE — DISCHARGE NOTE NURSING/CASE MANAGEMENT/SOCIAL WORK - PATIENT PORTAL LINK FT
You can access the FollowMyHealth Patient Portal offered by [facility] by registering at the following website: [email or web address]. By joining [facility]’s FollowMyHealth portal, you will also be able to view your health information using other applications (apps) compatible with our system.

## 2024-03-28 NOTE — PROGRESS NOTE ADULT - PROBLEM SELECTOR PROBLEM 3
Atrial fibrillation
Atrial fibrillation
Severe anemia
Atrial fibrillation
Atrial fibrillation
Severe anemia
Atrial fibrillation
Severe anemia
Severe anemia

## 2024-03-28 NOTE — PROGRESS NOTE ADULT - PROBLEM SELECTOR PLAN 6
chest pain free  continue to follow cardiology recommendations  discontinue ASA
chest pain free  continue to follow cardiology recommendations  discontinue ASA indefinitely
chest pain free  continue to follow cardiology recommendations  discontinue ASA
chest pain free  continue to follow cardiology recommendations  discontinue ASA indefinitely
chest pain free  continue to follow cardiology recommendations  discontinue ASA
chest pain free  continue to follow cardiology recommendations  continue ASA
chest pain free  continue to follow cardiology recommendations  continue ASA
chest pain free  continue to follow cardiology recommendations  discontinue ASA
chest pain free  continue to follow cardiology recommendations  discontinue ASA
chest pain free  continue to follow cardiology recommendations  continue ASA
chest pain free  continue to follow cardiology recommendations  discontinue ASA

## 2024-03-28 NOTE — PROGRESS NOTE ADULT - ASSESSMENT
88yM with PMHx CKD, HFrEF, CAD s/p stents presenting with 2 weeks progressive shortness of breath on exertion. Pt denies worsening pedal edema, orthopnea, chest pains or nausea associated with SOB. Denies infectious symptoms fever/chills, cough, URI symptoms recently. Takes furosemide 40mg every other day this dose has not been changed recently. No BRBPR, melena, epistaxis, hematuria, any other bleeding.  he says h is sob was gradually building up till he could not take it and came to hospital : he deneis any underlying lung disease but is an ex smoker:   no fever,   no cough , no phlegm ;    SOB/CHF/CAD/S/P PCI  ex smoker:   CKD    SOB/CHF/CAD/S/P PCI  -his history is highly suggestive of chf exacerbation;   -he has formed small concepcion effusions:  cxr prior to his film was clear:   -he hasn o clinical features of PNEUMONIA:  -on vbg shows mild metabolic acidosis  -added on Lasix : to co nt:   -no need fro antibiotics or bipap at this time; -consider ct chest : last ct from 2022 reviewed:  was suggestive of martínez failure  -his last echo with mod lv dysfunction  need a repeat one  -Check D Dimer in AM  : clinical  probability of PE is low   3/18:  -clinically he has improved  on room air:  d dimer is slightly elevated:  no further workup : diuretics per primary t eam   3/19: seems OK; no sob: on room air:  no sob: leg edema has resolved  3/20: sob is likely due to mild chf:  improved lasix:  currently on room air and has no leg edema  3/21: seems ok: pulm wise:  for endoscopy today   3/12: h9s endoscopy is done showed:  LA Grade A esophagitis.                       - A large amount of red blood was found in antrum of                        stomach. Diffuse severe mucosal changes characterized by                        congestion, erythema, friability (with spontaneous                        bleeding), hemorrhagic appearance, and mild sloughing                        were found diffusely in the gastric antrum. No discrete                        mass was identified. Biopsied. Hemostatic spray applied.                        DDx concerning for malignancy (primary vs linitis                        plastica) or alternate infiltrative process (eg                        amyloidosis).                       - Gastroopathy.                       - Nodular mucosa in the duodenal bulb.  -no ac for now  3/23: seems stable:  no active bleeding:  coughing excessively:  add tessalon Perles  3/24: he feels better today  : not sob: on room air:  cough is better:   3/25; He seems K: no sob;  no cough : no phlegm     3/26: seems to be doing  ok : no sob:  on room air:  no pulm events   3/27: seems to be going pretty good;  on room air  3/28: clinically stable:  no sob: o n aisha ir:  on low dose lasix     ex smoker:   -she has 35 pk years history of smoking:   -has no diagnosis of copd:  -his last ct chest did not show emphysema:   -need to mantain o2 sao2 above 88%  -he would need outpt full PFT   3/22: seems stable: tolerated the procedure well:  no resp distress  3/23:  has excessive coughing today  : add tessalon Perles   3/24: seems stable:   3/25: stable:   3/26: seems stable:  for outpt folow up and PFT     CKD  -per renal :    dvt prophylaxis:    mayela ACP:  dc planning

## 2024-03-28 NOTE — PROGRESS NOTE ADULT - ASSESSMENT
88y Male with CAD, HFrEF, Afib, HTN, DM, solitary R kidney and CKD p/w acute on chronic HFrEF and acute on chronic kidney injury.  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  Eliquis restarted and the HH is stable     GI-   EGD with biopsy but no specific mass found and slow ooze;  Biopsy did not show any cancer     I went discussed with him and his wife re A/C and risk of bleed and also risk of no A/C (n potential CVA)  He is at high risk for re admission   I will see in the office 4/2     Sayed [facility]   [phone]

## 2024-03-28 NOTE — PROGRESS NOTE ADULT - PROBLEM SELECTOR PROBLEM 1
Severe anemia
Acute on chronic heart failure with preserved ejection fraction (HFpEF)
Severe anemia
Acute on chronic heart failure with preserved ejection fraction (HFpEF)
Severe anemia

## 2024-03-28 NOTE — PROGRESS NOTE ADULT - PROBLEM SELECTOR PLAN 1
continued occasionally black stool  likely old blood
hemoglobin stable   GI help appreciated  continue pantoprazole gtt today  change to po pantoprazole likely tomorrow 40 BID
will start po furosemide   monitor clinically  clinically improving
likely secondary to UGI oozing and bleeding  GI help appreciated  s/p EGD  transfusion one unit yesterday  will repeat hemoglobin 4 PM today  then daily if stable  transfusion as needed  hold discharge over weekend
continued occasionally black stool  likely old blood  s/p one unit PRBC yesterday  no further transfusions at this time   continue pantoprazole gtt til tomorrow Monday  change to po pantoprazole likely tomorrow 40 BID
appears better today  continue to follow cardiology recommendations   monitor clinically  clinically improving
hemoglobin stable  no further melena  continue to monitor   start apixaban 2.5 BID as CHADS VASC score high   discussed with wife over the phone  monitor x 48 hrs then discharge if stable
hemoglobin stable  continue apixaban 2.5 BID as CHADS VASC score high   discussed with wife over the phone  monitor x 48 hrs then discharge if stable  possibly tomorrow
resolved   appears euvolemic   creatinine likely new baseline
hemoglobin stable 8.7 today  continue apixaban 2.5 BID as CHADS VASC score high   discussed with wife over the phone  she understands risk or rebleed but also is not prepared to take the high risk of CVA  stable for discharge today
appears better today  continue to follow cardiology recommendations   monitor clinically  creatinine likely new baseline

## 2024-03-28 NOTE — PROGRESS NOTE ADULT - PROBLEM SELECTOR PROBLEM 4
Stage 4 chronic kidney disease

## 2024-03-29 ENCOUNTER — NON-APPOINTMENT (OUTPATIENT)
Age: 89
End: 2024-03-29

## 2024-04-19 ENCOUNTER — APPOINTMENT (OUTPATIENT)
Dept: ELECTROPHYSIOLOGY | Facility: CLINIC | Age: 89
End: 2024-04-19
Payer: MEDICARE

## 2024-04-19 ENCOUNTER — NON-APPOINTMENT (OUTPATIENT)
Age: 89
End: 2024-04-19

## 2024-04-19 VITALS
WEIGHT: 128 LBS | OXYGEN SATURATION: 100 % | DIASTOLIC BLOOD PRESSURE: 41 MMHG | SYSTOLIC BLOOD PRESSURE: 107 MMHG | HEART RATE: 74 BPM | HEIGHT: 67 IN | BODY MASS INDEX: 20.09 KG/M2

## 2024-04-19 DIAGNOSIS — I50.9 HEART FAILURE, UNSPECIFIED: ICD-10-CM

## 2024-04-19 DIAGNOSIS — K92.2 GASTROINTESTINAL HEMORRHAGE, UNSPECIFIED: ICD-10-CM

## 2024-04-19 DIAGNOSIS — I48.91 UNSPECIFIED ATRIAL FIBRILLATION: ICD-10-CM

## 2024-04-19 DIAGNOSIS — K92.1 MELENA: ICD-10-CM

## 2024-04-19 PROCEDURE — 99204 OFFICE O/P NEW MOD 45 MIN: CPT | Mod: 25

## 2024-04-19 PROCEDURE — 93000 ELECTROCARDIOGRAM COMPLETE: CPT

## 2024-04-19 RX ORDER — TAMSULOSIN HYDROCHLORIDE 0.4 MG/1
0.4 CAPSULE ORAL
Qty: 30 | Refills: 5 | Status: ACTIVE | COMMUNITY
Start: 2020-12-23

## 2024-04-19 RX ORDER — ASPIRIN ENTERIC COATED TABLETS 81 MG 81 MG/1
81 TABLET, DELAYED RELEASE ORAL DAILY
Refills: 0 | Status: DISCONTINUED | COMMUNITY
Start: 2022-02-03 | End: 2024-04-19

## 2024-04-19 RX ORDER — AMIODARONE HYDROCHLORIDE 200 MG/1
200 TABLET ORAL DAILY
Refills: 0 | Status: ACTIVE | COMMUNITY
Start: 2022-02-03

## 2024-04-19 RX ORDER — ROSUVASTATIN CALCIUM 40 MG/1
40 TABLET, FILM COATED ORAL
Refills: 0 | Status: ACTIVE | COMMUNITY
Start: 2023-12-29

## 2024-04-19 RX ORDER — CIPROFLOXACIN HYDROCHLORIDE 500 MG/1
500 TABLET, FILM COATED ORAL TWICE DAILY
Qty: 14 | Refills: 0 | Status: DISCONTINUED | COMMUNITY
Start: 2024-01-05 | End: 2024-04-19

## 2024-04-19 RX ORDER — SODIUM BICARBONATE 650 MG/1
650 TABLET ORAL TWICE DAILY
Refills: 0 | Status: DISCONTINUED | COMMUNITY
Start: 2022-02-03 | End: 2024-04-19

## 2024-04-19 RX ORDER — TAMSULOSIN HYDROCHLORIDE 0.4 MG/1
0.4 CAPSULE ORAL
Qty: 90 | Refills: 3 | Status: ACTIVE | COMMUNITY
Start: 2024-04-19

## 2024-04-19 RX ORDER — SULFAMETHOXAZOLE AND TRIMETHOPRIM 800; 160 MG/1; MG/1
800-160 TABLET ORAL
Qty: 10 | Refills: 0 | Status: DISCONTINUED | COMMUNITY
Start: 2022-05-25 | End: 2024-04-19

## 2024-04-19 RX ORDER — SULFAMETHOXAZOLE AND TRIMETHOPRIM 800; 160 MG/1; MG/1
800-160 TABLET ORAL TWICE DAILY
Qty: 6 | Refills: 0 | Status: DISCONTINUED | COMMUNITY
Start: 2023-02-03 | End: 2024-04-19

## 2024-04-19 RX ORDER — AMOXICILLIN AND CLAVULANATE POTASSIUM 875; 125 MG/1; MG/1
875-125 TABLET, COATED ORAL
Qty: 10 | Refills: 0 | Status: DISCONTINUED | COMMUNITY
Start: 2024-01-06 | End: 2024-04-19

## 2024-04-19 RX ORDER — FUROSEMIDE 40 MG/1
40 TABLET ORAL DAILY
Refills: 0 | Status: ACTIVE | COMMUNITY
Start: 2022-02-03

## 2024-04-19 RX ORDER — AMPICILLIN 500 MG/1
500 CAPSULE ORAL 4 TIMES DAILY
Qty: 20 | Refills: 0 | Status: DISCONTINUED | COMMUNITY
Start: 2023-06-12 | End: 2024-04-19

## 2024-04-19 RX ORDER — APIXABAN 2.5 MG/1
2.5 TABLET, FILM COATED ORAL
Refills: 0 | Status: ACTIVE | COMMUNITY
Start: 2020-08-17

## 2024-04-19 RX ORDER — BACILLUS CALMETTE-GUERIN 50 MG/50ML
50 POWDER, FOR SUSPENSION INTRAVESICAL
Qty: 1 | Refills: 0 | Status: DISCONTINUED | OUTPATIENT
Start: 2022-01-25 | End: 2024-04-19

## 2024-04-19 RX ORDER — REPAGLINIDE 0.5 MG/1
0.5 TABLET ORAL
Refills: 0 | Status: ACTIVE | COMMUNITY
Start: 2024-04-19

## 2024-04-19 RX ORDER — INSULIN DEGLUDEC INJECTION 100 U/ML
100 INJECTION, SOLUTION SUBCUTANEOUS
Refills: 0 | Status: ACTIVE | COMMUNITY
Start: 2023-12-29

## 2024-04-19 NOTE — HISTORY OF PRESENT ILLNESS
[FreeTextEntry1] : Mr. RACHEL COMBS is an 88 year old man with past medical history Afib on Eliquis, bladder cancer, CHF, CKD, Hematuria, SCCA, was recently admitted with GIB of who presents here for initial visit for consideration of possible Watchman procedure for safe discontinuation of Eliquis. He is doing well now.  Denies chest pain, palpitations, shortness of breath, dyspnea on exertion, syncope, light headedness or dizziness.

## 2024-04-19 NOTE — DISCUSSION/SUMMARY
[EKG obtained to assist in diagnosis and management of assessed problem(s)] : EKG obtained to assist in diagnosis and management of assessed problem(s) [FreeTextEntry1] : Impressions:  Paroxysmal Afib: EKG performed today to assess for conduction disease reveals Sinus rhythm. On Eliquis for stroke prevention. Was recently hospitalized for GIB. Discussed possibility of Watchman procedure for long term thromboembolic prophylaxis without need for use of long term AC. Risks, benefits and alternatives to procedure discussed at length including f/u MARCELLA and use of ASA/Clopidogrel for 12 weeks post procedure followed by ASA indefinitely.  Chronic systolic CHF: EF of 45-50% in the past.   CKD: h/o of LILIAN. Has solitary right kidney.   Hx of gastro esophagitis with GI bleeding   .

## 2024-04-20 ENCOUNTER — INPATIENT (INPATIENT)
Facility: HOSPITAL | Age: 89
LOS: 6 days | Discharge: HOME | End: 2024-04-27
Attending: INTERNAL MEDICINE | Admitting: INTERNAL MEDICINE
Payer: MEDICARE

## 2024-04-20 VITALS
HEIGHT: 67 IN | DIASTOLIC BLOOD PRESSURE: 69 MMHG | HEART RATE: 74 BPM | OXYGEN SATURATION: 99 % | SYSTOLIC BLOOD PRESSURE: 122 MMHG | RESPIRATION RATE: 22 BRPM | TEMPERATURE: 97 F

## 2024-04-20 DIAGNOSIS — Z98.890 OTHER SPECIFIED POSTPROCEDURAL STATES: Chronic | ICD-10-CM

## 2024-04-20 DIAGNOSIS — Z29.9 ENCOUNTER FOR PROPHYLACTIC MEASURES, UNSPECIFIED: ICD-10-CM

## 2024-04-20 DIAGNOSIS — I21.4 NON-ST ELEVATION (NSTEMI) MYOCARDIAL INFARCTION: ICD-10-CM

## 2024-04-20 DIAGNOSIS — Z98.41 CATARACT EXTRACTION STATUS, RIGHT EYE: Chronic | ICD-10-CM

## 2024-04-20 DIAGNOSIS — I48.0 PAROXYSMAL ATRIAL FIBRILLATION: ICD-10-CM

## 2024-04-20 DIAGNOSIS — Z90.5 ACQUIRED ABSENCE OF KIDNEY: Chronic | ICD-10-CM

## 2024-04-20 DIAGNOSIS — D69.6 THROMBOCYTOPENIA, UNSPECIFIED: ICD-10-CM

## 2024-04-20 DIAGNOSIS — Z95.5 PRESENCE OF CORONARY ANGIOPLASTY IMPLANT AND GRAFT: Chronic | ICD-10-CM

## 2024-04-20 DIAGNOSIS — N18.4 CHRONIC KIDNEY DISEASE, STAGE 4 (SEVERE): ICD-10-CM

## 2024-04-20 DIAGNOSIS — K92.2 GASTROINTESTINAL HEMORRHAGE, UNSPECIFIED: ICD-10-CM

## 2024-04-20 DIAGNOSIS — D62 ACUTE POSTHEMORRHAGIC ANEMIA: ICD-10-CM

## 2024-04-20 LAB
ALBUMIN SERPL ELPH-MCNC: 3.1 G/DL — LOW (ref 3.3–5)
ALP SERPL-CCNC: 124 U/L — HIGH (ref 40–120)
ALT FLD-CCNC: 50 U/L — HIGH (ref 4–41)
ANION GAP SERPL CALC-SCNC: 14 MMOL/L — SIGNIFICANT CHANGE UP (ref 7–14)
ANISOCYTOSIS BLD QL: SIGNIFICANT CHANGE UP
APTT BLD: 37.8 SEC — HIGH (ref 24.5–35.6)
AST SERPL-CCNC: 39 U/L — SIGNIFICANT CHANGE UP (ref 4–40)
BASOPHILS # BLD AUTO: 0.04 K/UL — SIGNIFICANT CHANGE UP (ref 0–0.2)
BASOPHILS NFR BLD AUTO: 0.6 % — SIGNIFICANT CHANGE UP (ref 0–2)
BILIRUB SERPL-MCNC: 0.5 MG/DL — SIGNIFICANT CHANGE UP (ref 0.2–1.2)
BLD GP AB SCN SERPL QL: NEGATIVE — SIGNIFICANT CHANGE UP
BUN SERPL-MCNC: 88 MG/DL — HIGH (ref 7–23)
CALCIUM SERPL-MCNC: 8.3 MG/DL — LOW (ref 8.4–10.5)
CHLORIDE SERPL-SCNC: 116 MMOL/L — HIGH (ref 98–107)
CK MB BLD-MCNC: 11.6 % — HIGH (ref 0–2.5)
CK MB CFR SERPL CALC: 5.9 NG/ML — SIGNIFICANT CHANGE UP
CK SERPL-CCNC: 51 U/L — SIGNIFICANT CHANGE UP (ref 30–200)
CO2 SERPL-SCNC: 15 MMOL/L — LOW (ref 22–31)
CREAT SERPL-MCNC: 3.35 MG/DL — HIGH (ref 0.5–1.3)
EGFR: 17 ML/MIN/1.73M2 — LOW
EOSINOPHIL # BLD AUTO: 0.13 K/UL — SIGNIFICANT CHANGE UP (ref 0–0.5)
EOSINOPHIL NFR BLD AUTO: 1.9 % — SIGNIFICANT CHANGE UP (ref 0–6)
GLUCOSE BLDC GLUCOMTR-MCNC: 130 MG/DL — HIGH (ref 70–99)
GLUCOSE BLDC GLUCOMTR-MCNC: 131 MG/DL — HIGH (ref 70–99)
GLUCOSE BLDC GLUCOMTR-MCNC: 139 MG/DL — HIGH (ref 70–99)
GLUCOSE SERPL-MCNC: 103 MG/DL — HIGH (ref 70–99)
HCT VFR BLD CALC: 25.3 % — LOW (ref 39–50)
HGB BLD-MCNC: 7.7 G/DL — LOW (ref 13–17)
IANC: 5.28 K/UL — SIGNIFICANT CHANGE UP (ref 1.8–7.4)
IMM GRANULOCYTES NFR BLD AUTO: 0.3 % — SIGNIFICANT CHANGE UP (ref 0–0.9)
INR BLD: 1.24 RATIO — HIGH (ref 0.85–1.18)
LYMPHOCYTES # BLD AUTO: 0.86 K/UL — LOW (ref 1–3.3)
LYMPHOCYTES # BLD AUTO: 12.4 % — LOW (ref 13–44)
MACROCYTES BLD QL: SIGNIFICANT CHANGE UP
MANUAL SMEAR VERIFICATION: YES — SIGNIFICANT CHANGE UP
MCHC RBC-ENTMCNC: 30.4 GM/DL — LOW (ref 32–36)
MCHC RBC-ENTMCNC: 32.9 PG — SIGNIFICANT CHANGE UP (ref 27–34)
MCV RBC AUTO: 108.1 FL — HIGH (ref 80–100)
MONOCYTES # BLD AUTO: 0.61 K/UL — SIGNIFICANT CHANGE UP (ref 0–0.9)
MONOCYTES NFR BLD AUTO: 8.8 % — SIGNIFICANT CHANGE UP (ref 2–14)
NEUTROPHILS # BLD AUTO: 5.28 K/UL — SIGNIFICANT CHANGE UP (ref 1.8–7.4)
NEUTROPHILS NFR BLD AUTO: 76 % — SIGNIFICANT CHANGE UP (ref 43–77)
NRBC # BLD: 0 /100 WBCS — SIGNIFICANT CHANGE UP (ref 0–0)
NRBC # FLD: 0 K/UL — SIGNIFICANT CHANGE UP (ref 0–0)
PLAT MORPH BLD: NORMAL — SIGNIFICANT CHANGE UP
PLATELET # BLD AUTO: 141 K/UL — LOW (ref 150–400)
PLATELET COUNT - ESTIMATE: ADEQUATE — SIGNIFICANT CHANGE UP
POLYCHROMASIA BLD QL SMEAR: SLIGHT — SIGNIFICANT CHANGE UP
POTASSIUM SERPL-MCNC: 4.8 MMOL/L — SIGNIFICANT CHANGE UP (ref 3.5–5.3)
POTASSIUM SERPL-SCNC: 4.8 MMOL/L — SIGNIFICANT CHANGE UP (ref 3.5–5.3)
PROT SERPL-MCNC: 5.9 G/DL — LOW (ref 6–8.3)
PROTHROM AB SERPL-ACNC: 13.9 SEC — HIGH (ref 9.5–13)
RBC # BLD: 2.34 M/UL — LOW (ref 4.2–5.8)
RBC # FLD: 18.4 % — HIGH (ref 10.3–14.5)
RBC BLD AUTO: ABNORMAL
RH IG SCN BLD-IMP: POSITIVE — SIGNIFICANT CHANGE UP
SCHISTOCYTES BLD QL AUTO: SLIGHT — SIGNIFICANT CHANGE UP
SODIUM SERPL-SCNC: 145 MMOL/L — SIGNIFICANT CHANGE UP (ref 135–145)
TROPONIN T, HIGH SENSITIVITY RESULT: 615 NG/L — CRITICAL HIGH
TROPONIN T, HIGH SENSITIVITY RESULT: 704 NG/L — CRITICAL HIGH
WBC # BLD: 6.94 K/UL — SIGNIFICANT CHANGE UP (ref 3.8–10.5)
WBC # FLD AUTO: 6.94 K/UL — SIGNIFICANT CHANGE UP (ref 3.8–10.5)

## 2024-04-20 PROCEDURE — 71045 X-RAY EXAM CHEST 1 VIEW: CPT | Mod: 26

## 2024-04-20 PROCEDURE — 99222 1ST HOSP IP/OBS MODERATE 55: CPT | Mod: GC

## 2024-04-20 PROCEDURE — 99291 CRITICAL CARE FIRST HOUR: CPT

## 2024-04-20 RX ORDER — PANTOPRAZOLE SODIUM 20 MG/1
40 TABLET, DELAYED RELEASE ORAL EVERY 12 HOURS
Refills: 0 | Status: DISCONTINUED | OUTPATIENT
Start: 2024-04-20 | End: 2024-04-27

## 2024-04-20 RX ORDER — PANTOPRAZOLE SODIUM 20 MG/1
80 TABLET, DELAYED RELEASE ORAL ONCE
Refills: 0 | Status: COMPLETED | OUTPATIENT
Start: 2024-04-20 | End: 2024-04-20

## 2024-04-20 RX ORDER — AMIODARONE HYDROCHLORIDE 400 MG/1
200 TABLET ORAL ONCE
Refills: 0 | Status: COMPLETED | OUTPATIENT
Start: 2024-04-20 | End: 2024-04-20

## 2024-04-20 RX ORDER — ACETAMINOPHEN 500 MG
650 TABLET ORAL EVERY 6 HOURS
Refills: 0 | Status: DISCONTINUED | OUTPATIENT
Start: 2024-04-20 | End: 2024-04-27

## 2024-04-20 RX ORDER — AMIODARONE HYDROCHLORIDE 400 MG/1
200 TABLET ORAL DAILY
Refills: 0 | Status: DISCONTINUED | OUTPATIENT
Start: 2024-04-20 | End: 2024-04-27

## 2024-04-20 RX ORDER — INSULIN LISPRO 100/ML
VIAL (ML) SUBCUTANEOUS
Refills: 0 | Status: DISCONTINUED | OUTPATIENT
Start: 2024-04-20 | End: 2024-04-27

## 2024-04-20 RX ORDER — DEXTROSE 10 % IN WATER 10 %
125 INTRAVENOUS SOLUTION INTRAVENOUS ONCE
Refills: 0 | Status: DISCONTINUED | OUTPATIENT
Start: 2024-04-20 | End: 2024-04-27

## 2024-04-20 RX ORDER — SODIUM CHLORIDE 9 MG/ML
1000 INJECTION, SOLUTION INTRAVENOUS
Refills: 0 | Status: DISCONTINUED | OUTPATIENT
Start: 2024-04-20 | End: 2024-04-27

## 2024-04-20 RX ORDER — DEXTROSE 50 % IN WATER 50 %
12.5 SYRINGE (ML) INTRAVENOUS ONCE
Refills: 0 | Status: DISCONTINUED | OUTPATIENT
Start: 2024-04-20 | End: 2024-04-27

## 2024-04-20 RX ORDER — DEXTROSE 50 % IN WATER 50 %
15 SYRINGE (ML) INTRAVENOUS ONCE
Refills: 0 | Status: DISCONTINUED | OUTPATIENT
Start: 2024-04-20 | End: 2024-04-27

## 2024-04-20 RX ORDER — FUROSEMIDE 40 MG
20 TABLET ORAL DAILY
Refills: 0 | Status: DISCONTINUED | OUTPATIENT
Start: 2024-04-20 | End: 2024-04-22

## 2024-04-20 RX ORDER — TAMSULOSIN HYDROCHLORIDE 0.4 MG/1
0.4 CAPSULE ORAL AT BEDTIME
Refills: 0 | Status: DISCONTINUED | OUTPATIENT
Start: 2024-04-20 | End: 2024-04-27

## 2024-04-20 RX ORDER — GLUCAGON INJECTION, SOLUTION 0.5 MG/.1ML
1 INJECTION, SOLUTION SUBCUTANEOUS ONCE
Refills: 0 | Status: DISCONTINUED | OUTPATIENT
Start: 2024-04-20 | End: 2024-04-27

## 2024-04-20 RX ORDER — INSULIN LISPRO 100/ML
VIAL (ML) SUBCUTANEOUS AT BEDTIME
Refills: 0 | Status: DISCONTINUED | OUTPATIENT
Start: 2024-04-20 | End: 2024-04-27

## 2024-04-20 RX ORDER — DEXTROSE 50 % IN WATER 50 %
25 SYRINGE (ML) INTRAVENOUS ONCE
Refills: 0 | Status: DISCONTINUED | OUTPATIENT
Start: 2024-04-20 | End: 2024-04-27

## 2024-04-20 RX ORDER — LANOLIN ALCOHOL/MO/W.PET/CERES
3 CREAM (GRAM) TOPICAL AT BEDTIME
Refills: 0 | Status: DISCONTINUED | OUTPATIENT
Start: 2024-04-20 | End: 2024-04-27

## 2024-04-20 RX ORDER — ATORVASTATIN CALCIUM 80 MG/1
80 TABLET, FILM COATED ORAL AT BEDTIME
Refills: 0 | Status: DISCONTINUED | OUTPATIENT
Start: 2024-04-20 | End: 2024-04-27

## 2024-04-20 RX ADMIN — AMIODARONE HYDROCHLORIDE 200 MILLIGRAM(S): 400 TABLET ORAL at 17:59

## 2024-04-20 RX ADMIN — PANTOPRAZOLE SODIUM 80 MILLIGRAM(S): 20 TABLET, DELAYED RELEASE ORAL at 12:18

## 2024-04-20 NOTE — ED PROVIDER NOTE - CARE PLAN
Principal Discharge DX:	Non-ST elevation MI (NSTEMI)  Secondary Diagnosis:	UGIB (upper gastrointestinal bleed)   1

## 2024-04-20 NOTE — H&P ADULT - PROBLEM SELECTOR PLAN 8
- DVT prophylaxis - SCD given concerns for gi bleeding.     - Disucssed plan as above with wife on 04/20.   - GOC, discussed with wife and patient, wishes appears no interventions that would prolong pain and suffering. Short trial of CPR as per wife. GOC to be discussed further tomorrow. - DVT prophylaxis - SCD given concerns for gi bleeding.     - Discussed plan as above with wife on 04/20.   - GOC, discussed with wife and patient, wishes appears no interventions that would prolong pain and suffering. Short trial of CPR as per wife. GOC to be discussed further tomorrow. ECG showing prolonged qt interval   - magnesium 1gram IV ordered on 04/21  - patient is on amiodarone and currently is rhythm controlled. cardiology to follow up for dose lowering if appropriate.     Monitor QT on serial ECG

## 2024-04-20 NOTE — H&P ADULT - HISTORY OF PRESENT ILLNESS
History obtained from patient and chart review.     89yo w/ PMHx CKD, HFrEF, Diabetes, TIA,  CAD s/p stenting, bladder cancer s/p chemo in remission, afib on eliquis, hx of left nephrectomy, recent admission 3/16-3/28 for SOB found to be anemic s/p EGD who presents to the ER complaining of increased weakness, dizziness, sob/rosado, and chest discomfort and dark stools. He has been taking eliquis, last dose yesterday. . He has been having dark stools for the past 4 -5 days, with concerns for melana.     Initial vital signs, /69, HR 74, RR22, Temp 97.3 saturating 99% on room air. Found with low hgb, ordered for 1 unit of PRBC. He has on Epo injection. As per ER provider note, discussed with Dr. Vega and GI consulted, admitted under Dr. Matta.     Additional hx:   Recent admission 3/16-3/28 for SOB in setting HF exacerbation, treated for volume overload in setting HFrEF (EF 45-50%) requiring diuresis. Also had LILIAN. He underwent EGD for anemia on 3/21 showing LA grade A esophagitis large amount of red blood in antrum of stomach w/ severe mucosal changes characterized by congestion, erythema friability, hemorrhagic appearance. Treated w/ hemospray. Underlying c/f malignancy. Path showed moderate chronic inactive gastritis and mild foveolar hyperplasia, neg for dypslaia or H. pylori. [place] red stain neg. ASA was held.    Had outpt follow up and was planned for repeat EGD with Dr. Osei. Tentatively planned for watchman procedure in future with cardiology Meds include eliquis. Now presenting w/ melena x5 days per wife who is a nurse. Was not feeling himself.         History obtained from patient and chart review.     89yo w/ PMHx CKD, HFrEF, Diabetes, TIA,  CAD s/p stenting, bladder cancer s/p chemo in remission, afib on eliquis, hx of left nephrectomy, recent admission 3/16-3/28 for SOB found to be anemic s/p EGD who presents to the ER complaining of increased weakness, dizziness, sob/rosado, with chest tightness and dark stools. He has been taking eliquis, last dose yesterday. He also is taking protonix at home.  He has been having dark stools for the past 4 -5 days, with concerns for melena. Last dark bowel movement was in the morning, it was black as per patient.      Initial vital signs, /69, HR 74, RR22, Temp 97.3 saturating 99% on room air. Found with low hgb, ordered for 1 unit of PRBC.  Labs concerns for troponemia.  As per ER provider note, discussed with Dr. Vega and GI consulted, admitted under Dr. Matta.     Additional hx:   Recent admission 3/16-3/28 for SOB in setting HF exacerbation, treated for volume overload in setting HFrEF (EF 45-50%) requiring diuresis. Also had LILIAN. He underwent EGD for anemia on 3/21 showing LA grade A esophagitis large amount of red blood in antrum of stomach w/ severe mucosal changes characterized by congestion, erythema friability, hemorrhagic appearance. Treated w/ hemospray. Underlying c/f malignancy. Path showed moderate chronic inactive gastritis and mild foveolar hyperplasia, neg for dypslaia or H. pylori. [place] red stain neg. ASA was held.    Had outpt follow up and was planned for repeat EGD with Dr. Osei. Tentatively planned for watchman procedure in future with cardiology Meds include eliquis. Now presenting w/ melena x5 days per wife who is a nurse. Was not feeling himself.

## 2024-04-20 NOTE — H&P ADULT - PROBLEM SELECTOR PLAN 10
- DVT prophylaxis - SCD given concerns for gi bleeding.     - Discussed plan as above with wife on 04/20.   - GOC, discussed with wife and patient, wishes appears no interventions that would prolong pain and suffering. Short trial of CPR as per wife. GOC to be discussed further tomorrow. Family to bring form that discusses wishes.

## 2024-04-20 NOTE — ED ADULT NURSE NOTE - NSFALLRISKINTERV_ED_ALL_ED

## 2024-04-20 NOTE — ED PROVIDER NOTE - PROGRESS NOTE DETAILS
Jesús, PGY-3, EM: s/w Dr. Vega as he was previously seen during his last admission by him. troponin elevated to > 700. given UGIB would not start on heparin, will send repeat after blood  is started. will admit under Dr. Matta based on his pmd. Jesús, PGY-3, EM: GI consults email sent

## 2024-04-20 NOTE — ED ADULT NURSE NOTE - CHIEF COMPLAINT QUOTE
pt c/o increased weakness, dizziness, sob/ rosado, chest pain at rest with black/ dark stools since being d/c'ed 2 weeks ago from [facility] for GIB. + Eliquis use. past med hx cad, anemia, dm2  fs= 112

## 2024-04-20 NOTE — H&P ADULT - PROBLEM SELECTOR PLAN 4
Platelets of 141 on 4/20. previously 104. - CKD4- Hx of left nephrectomy. creatinine 3.35 on 4/20. previously 2.73. Avoid nephrotoxic agents. Monitor electrolytes closely. Nephrology consult for further management.   - Bicarb of 15 on 4/20, likely from renal acidosis. Recheck on metabolic panel to consider starting bicarb supplementation.    - No signs of urinary obstruction. currently, Will check US renal to assess for hydronephrosis.   - Possibly cardiorenal.  - He is on lasix, repeat bmp tonight, and continue in the morning. - CKD4- Hx of left nephrectomy. creatinine 3.35 on 4/20. previously 2.73. Avoid nephrotoxic agents. Monitor electrolytes closely. Nephrology consult for further management.   - Bicarb of 15 on 4/20, likely from renal acidosis. Recheck on metabolic panel to consider starting bicarb supplementation.    - No signs of urinary obstruction. currently, Will check US renal to assess for hydronephrosis.   - Possibly cardiorenal.  - He is on lasix, repeat bmp tonight, and continue in the morning.  - checking magnesium and phosphorus

## 2024-04-20 NOTE — H&P ADULT - NSHPLABSRESULTS_GEN_ALL_CORE
Chest Xray personally reviewed, no focal consolidation.     7.7    6.94  )-----------( 141      ( 20 Apr 2024 12:03 )             25.3       04-20    145  |  116<H>  |  88<H>  ----------------------------<  103<H>  4.8   |  15<L>  |  3.35<H>    Ca    8.3<L>      20 Apr 2024 12:03    TPro  5.9<L>  /  Alb  3.1<L>  /  TBili  0.5  /  DBili  x   /  AST  39  /  ALT  50<H>  /  AlkPhos  124<H>  04-20 ECG personally reviewed NSR, RBBB, HR 70, Ana 142, , non specific ST abnormality. Compared to prior improved ST changes in V1, and left ant fascicular block.     Chest Xray personally reviewed, no focal consolidation.                    7.7    6.94  )-----------( 141      ( 20 Apr 2024 12:03 )             25.3       04-20    145  |  116<H>  |  88<H>  ----------------------------<  103<H>  4.8   |  15<L>  |  3.35<H>    Ca    8.3<L>      20 Apr 2024 12:03    TPro  5.9<L>  /  Alb  3.1<L>  /  TBili  0.5  /  DBili  x   /  AST  39  /  ALT  50<H>  /  AlkPhos  124<H>  04-20

## 2024-04-20 NOTE — H&P ADULT - NSHPSOCIALHISTORY_GEN_ALL_CORE
Lives alone. No smoking, alcohol, drugs, Wife. No smoking, alcohol, drugs, Lives at home with wife. No smoking, alcohol, drugs,

## 2024-04-20 NOTE — H&P ADULT - PROBLEM SELECTOR PLAN 1
- initially complained of chest tightness, dyspnea on admission.   - has hx of CAD, transfused to goal of 8 with improvement of dyspnea and chest pain.  - Troponin initially 704 decreased to 615. CK of 51. CKMB of 5.9.  Elevated levels likely from CKD.  - c/w telemetry, on 4/20 showing SR 60-70s, w PVCs.   - ECG. Cardiology consult for further management.   - GI note appreciated, No AC. - initially complained of chest tightness, dyspnea on admission.   - has hx of CAD, transfused to goal of 8 with improvement of dyspnea and chest tightness. NO ACTIVE CHEST PAIN CURRENTLY. Chest tightness likely from decreased hemoglobin.   - Troponin initially 704 decreased to 615. CK of 51. CKMB of 5.9.  Elevated levels likely from CKD. Continue to monitor.   - c/w telemetry, on 4/20 showing SR 60-70s, w PVCs.   - ECG. Cardiology consult for further management.   - GI note appreciated, No AC for pending procedure on Monday. Last dose 04/19,   - Restart AC as per GI. - initially complained of chest tightness, dyspnea on admission.   - has hx of CAD, transfused to goal of 8 with improvement of dyspnea and chest tightness. NO ACTIVE CHEST PAIN CURRENTLY. Chest tightness likely from decreased hemoglobin.   - Troponin initially 704 decreased to 615. CK of 51. CKMB of 5.9.  Elevated levels likely from CKD. Continue to monitor.   - c/w telemetry, on 4/20 showing SR 60-70s, w PVCs.   - ECG. Cardiology consult for further management.   - GI note appreciated, No AC for pending procedure on Monday. Last dose of eliquis 04/19, Cardiology clearance is needed.    - Restart AC as per GI. - initially complained of chest tightness, dyspnea on admission. Likely tpye 2 MI for demand ischemia.   - has hx of CAD, transfused to goal of 8 with improvement of dyspnea and chest tightness. NO ACTIVE CHEST PAIN CURRENTLY. Chest tightness likely from decreased hemoglobin.   - Troponin initially 704 decreased to 615. CK of 51. CKMB of 5.9.  Elevated levels likely from CKD. Continue to monitor.   - c/w telemetry, on 4/20 showing SR 60-70s, w PVCs.   - ECG reviewed NSR, RBBB, HR 70 Ana 142,  . Cardiology consult for further management.   - GI note appreciated, No AC for pending procedure on Monday. Last dose of eliquis 04/19, Cardiology clearance is needed.    - Restart AC as per GI.  - Checking TTE and pro bnp - initially complained of chest tightness, dyspnea on admission. Likely tpye 2 MI for demand ischemia.   - has hx of CAD, transfused to goal of 8 with improvement of dyspnea and chest tightness. NO ACTIVE CHEST PAIN CURRENTLY. Chest tightness likely from decreased hemoglobin.   - Troponin initially 704 decreased to 615. CK of 51. CKMB of 5.9.  Elevated levels likely from CKD. Continue to monitor.   - c/w telemetry, on 4/20 showing SR 60-70s, w PVCs.   - ECG reviewed NSR, RBBB, HR 70 Ana 142,  . Cardiology consult for further management.   - GI note appreciated, No AC for pending procedure on Monday. Last dose of eliquis 04/19, Cardiology clearance is needed.    - Restart AC as per GI.  - Checking TTE and pro bnp.    - c/w lasix 20mg daily for heart failure, checking magnesium level given chronic lasix use.

## 2024-04-20 NOTE — ED PROVIDER NOTE - CLINICAL SUMMARY MEDICAL DECISION MAKING FREE TEXT BOX
88-year-old male with a past medical history of CAD s/p stents, DM, HTN, HLD, Afib on eliquis presenting with concern of melena and chest discomfort. Differential diagnosis includes but is not limited to recurrent UGIB, pneumonia, ACS, infection. Pt pale 88-year-old male with a past medical history of CAD s/p stents, DM, HTN, HLD, Afib on eliquis presenting with concern of melena and chest discomfort. Differential diagnosis includes but is not limited to recurrent UGIB, pneumonia, ACS, infection. Pt pale With symptoms consistent with anemia.  Given this will check labs type and screen placed on cardiac monitor patient likely to require admission and GI consult.  Will also rule out ACS or other etiology of the patient's chest pain.  Given prior admission he was found to have hemorrhagic gastritis we will give Protonix IV push.

## 2024-04-20 NOTE — ED ADULT NURSE NOTE - OBJECTIVE STATEMENT
A&Ox3. ambulatory. c/o increased weakness, dizziness, sob/ rosado, chest pain at rest with black/ dark stools. PT states symptoms are similar as when he was admitted to the hospital recently. NAD. respirations are even and un labored. skin intact. safety precautions maintained. 20g placed to LAC. labs drawn and sent. placed on cardiac monitor, NSR. ekg obtained. call bell at bedside.

## 2024-04-20 NOTE — ED PROVIDER NOTE - PHYSICAL EXAMINATION
General: pale-appearing, no acute distress  Head: Atraumatic, normocephalic  Eyes: EOM grossly in tact, no scleral icterus, no discharge  ENT: moist mucous membranes  Neurology: A&Ox 3, nonfocal, ARANGO x 4  Respiratory: CTAB, no wheezing, normal respiratory effort  CV: RRR, good s1/s2, no S3, Extremities warm and well perfused  Abdominal: Soft, non-distended, non-tender, no masses  Extremities: No edema, no deformities  Skin: warm and dry. No rashes

## 2024-04-20 NOTE — ED PROVIDER NOTE - ATTENDING CONTRIBUTION TO CARE
Attending note:   After face to face evaluation of this patient, I concur with above noted hx, pe, and care plan for this patient.  Arroyo: 88-year-old male with history of coronary disease with stents and diabetes, hypertension and hyperlipidemia.  Patient is also on Eliquis which as per family states that is due to stroke prevention.  Patient was recently admitted to hospital for hemorrhagic gastritis and required transfusion.  Patient had initially going home not on Eliquis but it was restarted at home.  For the last 5 days patient has had black stools twice a day and dyspnea on exertion with some occasional chest pain.  Patient denies any nausea or vomiting.  On exam patient's vital signs show normal heart rate and normal blood pressure.  Patient does appear slightly pale.  Abdomen soft nontender lungs appear clear.  There is some bilateral pitting edema with left slightly more than right but 1+ maximum.  Patient is awake alert and answering all questions.  Wife is at bedside.  Patient's PMD is Dr. Reginaldo Mccray.  Labs show slight increase in creatinine.  Hemoglobin is 7.7 but troponin is also 700.  This is much increased from previous.  This is in the setting of possibly demand ischemia.  Would recommend 1 unit of transfusion to help alleviate that.  Patient is agreeable.  Also spoke with cardiology Dr. Richardson who patient had seen last time while in the hospital.  Email also sent to GI.  Case and all results discussed with Dr. Matta who accepted the admission.  Although patient appears to have an NSTEMI anticoagulation is not started at this time given that patient is likely here for GI bleed.

## 2024-04-20 NOTE — H&P ADULT - PROBLEM SELECTOR PLAN 3
- CKD4- Hx of left nepherecomty. creatinine 3.35 on 4/20. previously 2.73. Avoid nephrotoxic agents. Monitor electrolytes closely. Nephrology consult for further management.   - Bicarb of 15 on 4/20, likely from renal acidosis. Recheck on metabolic panel to consider starting bicarb supplementaton.    - No signs of urinary obstruction.   - Possibly cardiorenal. - CKD4- Hx of left nepherecomty. creatinine 3.35 on 4/20. previously 2.73. Avoid nephrotoxic agents. Monitor electrolytes closely. Nephrology consult for further management.   - Bicarb of 15 on 4/20, likely from renal acidosis. Recheck on metabolic panel to consider starting bicarb supplementaton.    - No signs of urinary obstruction.   - Possibly cardiorenal.  - He is on lasix, repeat bmp tonight, and continue in the morning. - GI notes appreciated, rrecent EGD which showed red blood in antrum w/ significant mucosal changes, suspect this is the source of patient's melena and anemia. Path was recently neg for malignancy and con[place] red. He was already scheduled for outpt EGD in May which can be expedited here in setting melena.  - NPO on sunday night - GI notes appreciated, recent EGD which showed red blood in antrum w/ significant mucosal changes, suspect this is the source of patient's melena and anemia. Path was recently neg for malignancy and congo red. He was already scheduled for outpt EGD in May which can be expedited here in setting melena.  - NPO on Sunday night - GI notes appreciated, recent EGD which showed red blood in antrum w/ significant mucosal changes, suspect this is the source of patient's melena and anemia. Path was recently neg for malignancy and congo red. He was already scheduled for outpt EGD in May which can be expedited here in setting melena.  - given these recent GI findings and pending procedure, holding of aspirin and antiplatelet therapies as risk is elevated. Furthermore, eliquis will be held for 3 days pending procedure if cleared by cardiology   - NPO on Sunday night

## 2024-04-20 NOTE — H&P ADULT - NSHPPHYSICALEXAM_GEN_ALL_CORE
PHYSICAL EXAM:  Vital Signs Last 24 Hrs  T(C): 36.7 (20 Apr 2024 16:54), Max: 36.7 (20 Apr 2024 11:58)  T(F): 98 (20 Apr 2024 16:54), Max: 98 (20 Apr 2024 11:58)  HR: 72 (20 Apr 2024 16:54) (72 - 85)  BP: 118/52 (20 Apr 2024 16:54) (107/52 - 122/69)  BP(mean): --  RR: 16 (20 Apr 2024 16:54) (16 - 22)  SpO2: 100% (20 Apr 2024 16:54) (99% - 100%)    Parameters below as of 20 Apr 2024 16:54  Patient On (Oxygen Delivery Method): room air      CONSTITUTIONAL: NAD, elderly frail.   EYES: Reactive pupils; conjunctiva and sclera clear  ENMT: Moist oral mucosa, no pharyngeal injection  NECK: Supple, No JVD   RESPIRATORY: Normal respiratory effort; lungs are clear to auscultation bilaterally  CARDIOVASCULAR: Regular rate and rhythm, normal S1 and S2, no murmur/rub/gallop; No lower extremity edema; Peripheral pulses are 2+ bilaterally  ABDOMEN: Nontender to palpation, normoactive bowel sounds, no rebound/guarding; No hepatosplenomegaly  MUSCULOSKELETAL:  Normal gait; no clubbing or cyanosis of digits; no joint swelling or tenderness to palpation  PSYCH: A+O to person, place, and time; affect appropriate  NEUROLOGY: CN 2-12 are intact and symmetric; no gross sensory deficits   SKIN: No rashes; no palpable lesions

## 2024-04-20 NOTE — ED PROVIDER NOTE - OBJECTIVE STATEMENT
88-year-old male with a past medical history of CAD s/p stents, DM, HTN, HLD, Afib on eliquis presenting with concern of melena and chest discomfort.  Patient had a recent admission for an upper GI bleed which was found to be due to diffuse hemorrhagic gastritis, prior to admission he had similar symptoms.  Was discharged on Protonix and has been taking as prescribed.  According to patient he has been having melena for the past 4 to 5 days with recurrence of his symptoms of anemia.  Most recent outpatient labs his hemoglobin was 9.  No fever, chills, nausea, vomiting, diarrhea, lower extremity pain or swelling.  Patient takes Eliquis and it was restarted on discharge took it yesterday but has not taken it today. While admitted he required multiple transfusions and was receiving EPO injections.

## 2024-04-20 NOTE — ED ADULT TRIAGE NOTE - AS TEMP SITE
parent states pt with fever, vomiting and diarrhea since Sunday. Pts lips cracked. c/o periumbilical pain, tender on palpation. Mom states also c/o left leg pain since falling on saturday at a birthday party.
oral

## 2024-04-20 NOTE — CONSULT NOTE ADULT - SUBJECTIVE AND OBJECTIVE BOX
INITIAL GI CONSULTATION  HPI:  89yo w/ PMHx CKD, HFrEF, CAD s/p stenting, bladder cancer s/p chemo, afib on eliquis, recent admission 3/16-3/28 for SOB found to be anemic s/p EGD presenting w/ melena/chest discomfort.    Recent admission 3/16-3/28 for SOB found ot be anemic.   Course c/b volume overload in setting HFrEF (EF 45-50%) requiring diuresis. Also had LILIAN. He underwent EGD on 3/21 showing LA grade A esophagisi, large amount of red blood in antrum of stomach w/ severe mucosal changes characterized by congestion, ertyeham, friability, hemorrhagic appearance. Treated e/ hemospray. Underlying c/f malignancy. Path showed moderate chronic inactive gastritis and mild foveolar hyperplasia, neg for dypslaia or H. pylori. [place] red stain neg. ASA was held.    Had outpt follow up and was planned for repeat EGD with Dr. Osei. Tentatively planned for watchman procedure in future with cardsology. Meds include eliquis. Now presenting w/ melena x5 days per wife who is a nurse. Was not feeling himself.    On arrival, HD stable w/ /69, HR 70s. Labs showed Hb 7.7 from 8.7 on 3/28 w/ , plts 141, BUN/Cr 88/3.35 (has b/l CKD w/ Cr ~2.7). INR 1.24. Trop is elevated in 700s.Started on PPI.  Ordered for 1u PRBCs.      ASA/NSAIDs/anticoagulation: Eliquis, last dose night of 4/19    FamHx: ***no h/o GI malignancies known  PMH/PSH:  PAST MEDICAL & SURGICAL HISTORY:  CAD (coronary artery disease)  (4 stents,)      Diabetes mellitus, new onset  diet controlled      Single kidney  (hx/o LEFT nephrectomy at age 16; pt states due to a ureter"blockage" causing "infection"; pt denies hx/o renal dysfunction)      Arthritis      Myocardial infarction  (2003)      Hard of hearing      Atrial fibrillation      Hypertension      Hyperlipidemia      History of TIAs  Last 2018      History of bradycardia      Bladder cancer      History of CHF (congestive heart failure)      Left carotid bruit      History of nephrectomy, unilateral  (hx/o LEFT nephrectomy at age 16)      Stented coronary artery  (4 stents)      S/P bilateral cataract extraction      H/O cystoscopy          MEDS:  MEDICATIONS  (STANDING):  aMIOdarone    Tablet 200 milliGRAM(s) Oral once    MEDICATIONS  (PRN):    Allergies    penicillin (Rash)    Intolerances    Cipro (Joint Pain)        ______________________________________________________________________  PHYSICAL EXAM:  T(C): 36.3 (04-20-24 @ 15:03), Max: 36.7 (04-20-24 @ 11:58)  HR: 72 (04-20-24 @ 15:03)  BP: 118/50 (04-20-24 @ 15:03)  RR: 16 (04-20-24 @ 15:03)  SpO2: 100% (04-20-24 @ 15:03)  Wt(kg): --    GEN: NAD, normocephalic  CVS: Normal rate, HD stable  CHEST: No signs of respiratory distress  Abd: Soft, NTND, Stool streaks brown but w/ mixed dark melena  EXTR: no cyanosis, no clubbing, no edema  NEURO: Awake and alert, conversant  SKIN:  warm;  non icteric      Labs/Imaging reviewed.                        7.7    6.94  )-----------( 141      ( 20 Apr 2024 12:03 )             25.3     Last Hb:Hemoglobin: 7.7 g/dL (04-20-24 @ 12:03)               145   |  116   |  88                 Ca: 8.3    BMP:   ----------------------------< 103    Mg: x     (04-20-24 @ 12:03)             4.8    |  15    | 3.35               Ph: x        LFT:     TPro: 5.9 / Alb: 3.1 / TBili: 0.5 / DBili: x / AST: 39 / ALT: 50 / AlkPhos: 124   (04-20-24 @ 12:03)    Creatinine: 3.35 mg/dL      BUN/Cr: Blood Urea Nitrogen: 88 mg/dL (04-20-24 @ 12:03)  /Creatinine: 3.35 mg/dL (04-20-24 @ 12:03)    AST/ALTAspartate Aminotransferase (AST/SGOT): 39 U/L (04-20-24 @ 12:03)  /Alanine Aminotransferase (ALT/SGPT): 50 U/L (04-20-24 @ 12:03)    ALP Alkaline Phosphatase: 124 U/L (04-20-24 @ 12:03)    T/Dbili /  INR: INR: 1.24 ratio (04-20-24 @ 12:03)      EGD/[place]:  Upper Endoscopy:  (03-21-24 @ 10:00)      Imaging:  CT Abdomen and Pelvis No Cont:   ACC: [id number] EXAM:  CT ABDOMEN AND PELVIS   ORDERED BY: CHECO PRESTON     PROCEDURE DATE:  03/26/2024          INTERPRETATION:  CLINICAL INFORMATION: Evaluate hydronephrosis.    COMPARISON: CT abdomen pelvis 12/14/2023.    CONTRAST/COMPLICATIONS:  IV Contrast: None  Oral Contrast: None  Complications: None reported    PROCEDURE:  CT of the Abdomen and Pelvis was performed.  Sagittal and coronal reformats were performed.    FINDINGS:  LOWER CHEST: Within normal limits.    LIVER: Increased hepatic attenuation can be seen in the setting of   transfusion or medication related.  BILE DUCTS: Normal caliber.  GALLBLADDER: Cholelithiasis.  SPLEEN: Within normal limits.  PANCREAS: Atrophic  ADRENALS: Within normal limits.  KIDNEYS/URETERS: Absent left kidney. No right-sided hydronephrosis. Right   upper pole cyst. Vascular calcifications.    BLADDER: Within normal limits.  REPRODUCTIVE ORGANS: Prostate is enlarged.    BOWEL: No bowel obstruction. Appendix is normal.  PERITONEUM: Trace pelvic free fluid.  VESSELS: Atherosclerotic changes.  RETROPERITONEUM/LYMPH NODES: No lymphadenopathy.  ABDOMINAL WALL: Small bilateral fat-containing inguinal hernias.  BONES: Degenerative changes.    IMPRESSION:  Absent left kidney. No right-sided hydronephrosis.        --- End of Report ---            GEORGETTE BANDA MD; Attending Radiologist  This document has been electronically signed. Mar 26 2024  9:25AM (03-26-24 @ 08:53)  US Abdomen Upper Quadrant Right:   ACC: [id number] EXAM:  US ABDOMEN RT UPR QUADRANT   ORDERED BY: ORLANDO PETER     PROCEDURE DATE:  03/18/2024          INTERPRETATION:  CLINICAL INFORMATION: Chronic kidney disease. Evaluate   liver.    COMPARISON: None available.    TECHNIQUE: Sonography of the right upper quadrant.    FINDINGS:  Liver: Mildly increased echogenicity of the enlarged liver, consistent   with underlying parenchymal disease. No surface nodularity.  Bile ducts: Normal caliber. Common bile duct measures 4 mm.  Gallbladder: Cholelithiasis.  Contracted gallbladder.  Pancreas: Visualized portions are within normal limits.  Right kidney: 12.3 cm. Increased echogenicity. Upper pole cyst measures   3.2 cm. Mild hydronephrosis..  Ascites: None.  IVC: Visualized portions are within normal limits.    IMPRESSION:  *  Enlarged liver, demonstrating the presence of parenchymal disease.  *  Cholelithiasis, without evidence of acute cholecystitis.  *  Right renal parenchymal disease.  *  Mild right hydronephrosis.        --- End of Report ---           INITIAL GI CONSULTATION  HPI:  87yo w/ PMHx CKD, HFrEF, CAD s/p stenting, bladder cancer s/p chemo, afib on eliquis, recent admission 3/16-3/28 for SOB found to be anemic s/p EGD presenting w/ melena/chest discomfort.    Recent admission 3/16-3/28 for SOB in setting HF exacerbation, treated for volume overload in setting HFrEF (EF 45-50%) requiring diuresis. Also had LILIAN. He underwent EGD for anemia on 3/21 showing LA grade A esophagitis large amount of red blood in antrum of stomach w/ severe mucosal changes characterized by congestion, erythema friability, hemorrhagic appearance. Treated w/ hemospray. Underlying c/f malignancy. Path showed moderate chronic inactive gastritis and mild foveolar hyperplasia, neg for dypslaia or H. pylori. [place] red stain neg. ASA was held.    Had outpt follow up and was planned for repeat EGD with Dr. Osei. Tentatively planned for watchman procedure in future with cardsology. Meds include eliquis. Now presenting w/ melena x5 days per wife who is a nurse. Was not feeling himself.    On arrival, HD stable w/ /69, HR 70s. Labs showed Hb 7.7 from 8.7 on 3/28 w/ , plts 141, BUN/Cr 88/3.35 (has b/l CKD w/ Cr ~2.7). INR 1.24. Trop is elevated in 700s.Started on PPI.  Ordered for 1u PRBCs.      ASA/NSAIDs/anticoagulation: Eliquis, last dose night of 4/19    FamHx: ***no h/o GI malignancies known  PMH/PSH:  PAST MEDICAL & SURGICAL HISTORY:  CAD (coronary artery disease)  (4 stents,)      Diabetes mellitus, new onset  diet controlled      Single kidney  (hx/o LEFT nephrectomy at age 16; pt states due to a ureter"blockage" causing "infection"; pt denies hx/o renal dysfunction)      Arthritis      Myocardial infarction  (2003)      Hard of hearing      Atrial fibrillation      Hypertension      Hyperlipidemia      History of TIAs  Last 2018      History of bradycardia      Bladder cancer      History of CHF (congestive heart failure)      Left carotid bruit      History of nephrectomy, unilateral  (hx/o LEFT nephrectomy at age 16)      Stented coronary artery  (4 stents)      S/P bilateral cataract extraction      H/O cystoscopy          MEDS:  MEDICATIONS  (STANDING):  aMIOdarone    Tablet 200 milliGRAM(s) Oral once    MEDICATIONS  (PRN):    Allergies    penicillin (Rash)    Intolerances    Cipro (Joint Pain)        ______________________________________________________________________  PHYSICAL EXAM:  T(C): 36.3 (04-20-24 @ 15:03), Max: 36.7 (04-20-24 @ 11:58)  HR: 72 (04-20-24 @ 15:03)  BP: 118/50 (04-20-24 @ 15:03)  RR: 16 (04-20-24 @ 15:03)  SpO2: 100% (04-20-24 @ 15:03)  Wt(kg): --    GEN: NAD, normocephalic  CVS: Normal rate, HD stable  CHEST: No signs of respiratory distress  Abd: Soft, NTND, Stool streaks brown but w/ mixed dark melena  EXTR: no cyanosis, no clubbing, no edema  NEURO: Awake and alert, conversant  SKIN:  warm;  non icteric      Labs/Imaging reviewed.                        7.7    6.94  )-----------( 141      ( 20 Apr 2024 12:03 )             25.3     Last Hb:Hemoglobin: 7.7 g/dL (04-20-24 @ 12:03)               145   |  116   |  88                 Ca: 8.3    BMP:   ----------------------------< 103    Mg: x     (04-20-24 @ 12:03)             4.8    |  15    | 3.35               Ph: x        LFT:     TPro: 5.9 / Alb: 3.1 / TBili: 0.5 / DBili: x / AST: 39 / ALT: 50 / AlkPhos: 124   (04-20-24 @ 12:03)    Creatinine: 3.35 mg/dL      BUN/Cr: Blood Urea Nitrogen: 88 mg/dL (04-20-24 @ 12:03)  /Creatinine: 3.35 mg/dL (04-20-24 @ 12:03)    AST/ALTAspartate Aminotransferase (AST/SGOT): 39 U/L (04-20-24 @ 12:03)  /Alanine Aminotransferase (ALT/SGPT): 50 U/L (04-20-24 @ 12:03)    ALP Alkaline Phosphatase: 124 U/L (04-20-24 @ 12:03)    T/Dbili /  INR: INR: 1.24 ratio (04-20-24 @ 12:03)      EGD/[place]:  Upper Endoscopy:  (03-21-24 @ 10:00)      Imaging:  CT Abdomen and Pelvis No Cont:   ACC: [id number] EXAM:  CT ABDOMEN AND PELVIS   ORDERED BY: CHECO PRESTON     PROCEDURE DATE:  03/26/2024          INTERPRETATION:  CLINICAL INFORMATION: Evaluate hydronephrosis.    COMPARISON: CT abdomen pelvis 12/14/2023.    CONTRAST/COMPLICATIONS:  IV Contrast: None  Oral Contrast: None  Complications: None reported    PROCEDURE:  CT of the Abdomen and Pelvis was performed.  Sagittal and coronal reformats were performed.    FINDINGS:  LOWER CHEST: Within normal limits.    LIVER: Increased hepatic attenuation can be seen in the setting of   transfusion or medication related.  BILE DUCTS: Normal caliber.  GALLBLADDER: Cholelithiasis.  SPLEEN: Within normal limits.  PANCREAS: Atrophic  ADRENALS: Within normal limits.  KIDNEYS/URETERS: Absent left kidney. No right-sided hydronephrosis. Right   upper pole cyst. Vascular calcifications.    BLADDER: Within normal limits.  REPRODUCTIVE ORGANS: Prostate is enlarged.    BOWEL: No bowel obstruction. Appendix is normal.  PERITONEUM: Trace pelvic free fluid.  VESSELS: Atherosclerotic changes.  RETROPERITONEUM/LYMPH NODES: No lymphadenopathy.  ABDOMINAL WALL: Small bilateral fat-containing inguinal hernias.  BONES: Degenerative changes.    IMPRESSION:  Absent left kidney. No right-sided hydronephrosis.        --- End of Report ---            GEORGETTE BANDA MD; Attending Radiologist  This document has been electronically signed. Mar 26 2024  9:25AM (03-26-24 @ 08:53)  US Abdomen Upper Quadrant Right:   ACC: [id number] EXAM:  US ABDOMEN RT UPR QUADRANT   ORDERED BY: ORLANDO PETER     PROCEDURE DATE:  03/18/2024          INTERPRETATION:  CLINICAL INFORMATION: Chronic kidney disease. Evaluate   liver.    COMPARISON: None available.    TECHNIQUE: Sonography of the right upper quadrant.    FINDINGS:  Liver: Mildly increased echogenicity of the enlarged liver, consistent   with underlying parenchymal disease. No surface nodularity.  Bile ducts: Normal caliber. Common bile duct measures 4 mm.  Gallbladder: Cholelithiasis.  Contracted gallbladder.  Pancreas: Visualized portions are within normal limits.  Right kidney: 12.3 cm. Increased echogenicity. Upper pole cyst measures   3.2 cm. Mild hydronephrosis..  Ascites: None.  IVC: Visualized portions are within normal limits.    IMPRESSION:  *  Enlarged liver, demonstrating the presence of parenchymal disease.  *  Cholelithiasis, without evidence of acute cholecystitis.  *  Right renal parenchymal disease.  *  Mild right hydronephrosis.        --- End of Report ---

## 2024-04-20 NOTE — H&P ADULT - NSHPADDITIONALINFOADULT_GEN_ALL_CORE
ESTUARDO Alvarenga, participated in the initial history and examination. Sign out given to NP around 1am. Follow up on labs. Consults in the am.

## 2024-04-20 NOTE — H&P ADULT - NSHPREVIEWOFSYSTEMS_GEN_ALL_CORE
Constitutional: No Fever, Weight Changes  Eyes: No recent vision problems or eye pain.  ENT: No sore throat.  Endocrine: No excess sweating  Cardiovascular: + chest tightness, dyspnea, shortness of breath, Denied chest pain.   Respiratory: No cough, congestion, or wheezing.  Gastrointestinal: No abdominal pain, nausea, vomiting, or diarrhea. Dark stools  Genitourinary: No dysuria, hematuria  Musculoskeletal: No joint swelling, joint pain  Neurologic: No headache, dizziness, focal weakness  Skin: No rashes, hematoma, prupura

## 2024-04-20 NOTE — ED ADULT NURSE REASSESSMENT NOTE - NS ED NURSE REASSESS COMMENT FT1
Break RN: Pt resting in stretcher, at baseline mental status, no acute distress noted at this time. Respirations equal and unlabored. Pt on cardiac monitor, NSR noted. Medicated as ordered. Labs drawn and sent. Care plan continued. Comfort measures provided. Safety maintained. Awaiting lab results and imaging.
PT educated on blood transfusion reaction symptoms. PT is able to teach back. A&Ox3. NAD. pt denies SOB, chest pain, dizziness, weakness, urinary symptoms, HA, n/v/d, fevers, chills, pain. respirations are even and un labored. safety precautions maintained. call bell at bedside. rate of PRBC infusion verified with MD Espinosa
PT denies any signs or symptoms of transfusion reaction. pt denies SOB, chest pain, dizziness, weakness, urinary symptoms, HA, n/v/d, fevers, chills, pain, itchiness. NAD. respirations are even and un labored. safety precautions maintained.

## 2024-04-20 NOTE — H&P ADULT - ASSESSMENT
#Anemia - hgb 7.7 on 4/20. previously 8.7.     #Thrombocytopenia - 141 on 4/20. previously 104.     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 previously 2.73. Avoid nephrotoxic agents. Monitor electrolytes closely. Follows with Dr. Andrés Christie of 15 on 4/20.     #Troponemia -  On 4/20, Troponins initially 704 decreased to 615. CK of 51. CKMB of 5.9.   likely from ckd. CK ECG. Cardiology consult.        #Anemia - hgb 7.7 on 4/20. previously 8.7.     #Thrombocytopenia - 141 on 4/20. previously 104.     #CKD4- Hx of left nepherecomty. creatinine 3.35 on 4/20. previously 2.73. Avoid nephrotoxic agents. Monitor electrolytes closely. Nephrology consult for further management.   - Bicarb of 15 on 4/20, likely from renal acidosis. Recheck on metabolic panel to consider starting bicarb supplementaton.    - No signs of urinary obstruction.   - Possibly cardiorenal.     #Troponemia -  On 4/20, Troponins initially 704 decreased to 615. CK of 51. CKMB of 5.9.   likely from ckd. CK ECG. Cardiology consult for further management.     #NSTEMI vs ACS   - initially complained of chest pain on admission,      #Anemia -     #Thrombocytopenia -         #Troponemia -  On 4/20,     #NSTEMI vs ACS   - initially complained of chest pain on admission,  89yo w/ PMHx CKD, HFrEF, Diabetes, TIA,  CAD s/p stenting, bladder cancer s/p chemo in remission, afib on eliquis, hx of left nephrectomy, recent admission 3/16-3/28 for SOB found to be anemic s/p EGD who presents to the ER complaining of increased weakness, dizziness, sob/rosado, with chest tightness and dark stools. He has been taking eliquis, last dose yesterday. He also is taking protonix at home.  He has been having dark stools for the past 4 -5 days, with concerns for melena. Last dark bowel movement was in the morning, it was black as per patient.   89yo w/ PMHx CKD, HFrEF, Diabetes, TIA,  CAD s/p stenting, bladder cancer s/p chemo in remission, afib on eliquis, hx of left nephrectomy, recent admission 3/16-3/28 for SOB found to be anemic s/p EGD who presents to the ER complaining of increased weakness, dizziness, sob/rosado, with chest tightness and dark stools. Found with anemia s/p 1 unit PRBC. Plan for EGD, holding AC as per GI.

## 2024-04-20 NOTE — H&P ADULT - PROBLEM SELECTOR PLAN 2
- Hgb 7.7 on 4/20. previously 8.7.   - Likely from GI bleeding.   - s/p 1 units of PRBC   - repeat cbc ordered.   - Monitoring for bloody bms.

## 2024-04-20 NOTE — H&P ADULT - PROBLEM SELECTOR PLAN 6
Platelets of 141 on 4/20. previously 104. Platelets of 141 on 4/20. previously 104.  checking b12 and folate

## 2024-04-20 NOTE — ED ADULT TRIAGE NOTE - CHIEF COMPLAINT QUOTE
pt c/o increased weakness, dizziness, sob/ rosado, chest pain at rest with black/ dark stools since being d/c'ed 2 weeks ago from [facility] for GIB. + Eliquis use. past med hx cad, anemia, dm2  fs= 112

## 2024-04-21 ENCOUNTER — RESULT REVIEW (OUTPATIENT)
Age: 89
End: 2024-04-21

## 2024-04-21 DIAGNOSIS — R94.31 ABNORMAL ELECTROCARDIOGRAM [ECG] [EKG]: ICD-10-CM

## 2024-04-21 DIAGNOSIS — E11.9 TYPE 2 DIABETES MELLITUS WITHOUT COMPLICATIONS: ICD-10-CM

## 2024-04-21 DIAGNOSIS — N40.0 BENIGN PROSTATIC HYPERPLASIA WITHOUT LOWER URINARY TRACT SYMPTOMS: ICD-10-CM

## 2024-04-21 LAB
ADD ON TEST-SPECIMEN IN LAB: SIGNIFICANT CHANGE UP
ALBUMIN SERPL ELPH-MCNC: 3.1 G/DL — LOW (ref 3.3–5)
ALP SERPL-CCNC: 124 U/L — HIGH (ref 40–120)
ALT FLD-CCNC: 46 U/L — HIGH (ref 4–41)
ANION GAP SERPL CALC-SCNC: 11 MMOL/L — SIGNIFICANT CHANGE UP (ref 7–14)
ANION GAP SERPL CALC-SCNC: 12 MMOL/L — SIGNIFICANT CHANGE UP (ref 7–14)
AST SERPL-CCNC: 40 U/L — SIGNIFICANT CHANGE UP (ref 4–40)
BILIRUB SERPL-MCNC: 1.4 MG/DL — HIGH (ref 0.2–1.2)
BUN SERPL-MCNC: 82 MG/DL — HIGH (ref 7–23)
BUN SERPL-MCNC: 85 MG/DL — HIGH (ref 7–23)
CALCIUM SERPL-MCNC: 8.1 MG/DL — LOW (ref 8.4–10.5)
CALCIUM SERPL-MCNC: 8.7 MG/DL — SIGNIFICANT CHANGE UP (ref 8.4–10.5)
CHLORIDE SERPL-SCNC: 117 MMOL/L — HIGH (ref 98–107)
CHLORIDE SERPL-SCNC: 118 MMOL/L — HIGH (ref 98–107)
CO2 SERPL-SCNC: 16 MMOL/L — LOW (ref 22–31)
CO2 SERPL-SCNC: 17 MMOL/L — LOW (ref 22–31)
CREAT SERPL-MCNC: 3.32 MG/DL — HIGH (ref 0.5–1.3)
CREAT SERPL-MCNC: 3.45 MG/DL — HIGH (ref 0.5–1.3)
EGFR: 16 ML/MIN/1.73M2 — LOW
EGFR: 17 ML/MIN/1.73M2 — LOW
FOLATE SERPL-MCNC: 11.3 NG/ML — SIGNIFICANT CHANGE UP (ref 3.1–17.5)
GLUCOSE BLDC GLUCOMTR-MCNC: 117 MG/DL — HIGH (ref 70–99)
GLUCOSE BLDC GLUCOMTR-MCNC: 135 MG/DL — HIGH (ref 70–99)
GLUCOSE BLDC GLUCOMTR-MCNC: 173 MG/DL — HIGH (ref 70–99)
GLUCOSE BLDC GLUCOMTR-MCNC: 192 MG/DL — HIGH (ref 70–99)
GLUCOSE SERPL-MCNC: 101 MG/DL — HIGH (ref 70–99)
GLUCOSE SERPL-MCNC: 119 MG/DL — HIGH (ref 70–99)
HCT VFR BLD CALC: 24 % — LOW (ref 39–50)
HCT VFR BLD CALC: 30.8 % — LOW (ref 39–50)
HGB BLD-MCNC: 10.3 G/DL — LOW (ref 13–17)
HGB BLD-MCNC: 7.8 G/DL — LOW (ref 13–17)
MAGNESIUM SERPL-MCNC: 2 MG/DL — SIGNIFICANT CHANGE UP (ref 1.6–2.6)
MCHC RBC-ENTMCNC: 32.4 PG — SIGNIFICANT CHANGE UP (ref 27–34)
MCHC RBC-ENTMCNC: 32.5 GM/DL — SIGNIFICANT CHANGE UP (ref 32–36)
MCHC RBC-ENTMCNC: 32.5 PG — SIGNIFICANT CHANGE UP (ref 27–34)
MCHC RBC-ENTMCNC: 33.4 GM/DL — SIGNIFICANT CHANGE UP (ref 32–36)
MCV RBC AUTO: 100 FL — SIGNIFICANT CHANGE UP (ref 80–100)
MCV RBC AUTO: 96.9 FL — SIGNIFICANT CHANGE UP (ref 80–100)
NRBC # BLD: 0 /100 WBCS — SIGNIFICANT CHANGE UP (ref 0–0)
NRBC # BLD: 0 /100 WBCS — SIGNIFICANT CHANGE UP (ref 0–0)
NRBC # FLD: 0 K/UL — SIGNIFICANT CHANGE UP (ref 0–0)
NRBC # FLD: 0 K/UL — SIGNIFICANT CHANGE UP (ref 0–0)
NT-PROBNP SERPL-SCNC: 4660 PG/ML — HIGH
PHOSPHATE SERPL-MCNC: 3.1 MG/DL — SIGNIFICANT CHANGE UP (ref 2.5–4.5)
PLATELET # BLD AUTO: 119 K/UL — LOW (ref 150–400)
PLATELET # BLD AUTO: 125 K/UL — LOW (ref 150–400)
POTASSIUM SERPL-MCNC: 4.6 MMOL/L — SIGNIFICANT CHANGE UP (ref 3.5–5.3)
POTASSIUM SERPL-MCNC: 5.1 MMOL/L — SIGNIFICANT CHANGE UP (ref 3.5–5.3)
POTASSIUM SERPL-SCNC: 4.6 MMOL/L — SIGNIFICANT CHANGE UP (ref 3.5–5.3)
POTASSIUM SERPL-SCNC: 5.1 MMOL/L — SIGNIFICANT CHANGE UP (ref 3.5–5.3)
PROT SERPL-MCNC: 6.1 G/DL — SIGNIFICANT CHANGE UP (ref 6–8.3)
RBC # BLD: 2.4 M/UL — LOW (ref 4.2–5.8)
RBC # BLD: 3.18 M/UL — LOW (ref 4.2–5.8)
RBC # FLD: 21.8 % — HIGH (ref 10.3–14.5)
RBC # FLD: 22.2 % — HIGH (ref 10.3–14.5)
SODIUM SERPL-SCNC: 145 MMOL/L — SIGNIFICANT CHANGE UP (ref 135–145)
SODIUM SERPL-SCNC: 146 MMOL/L — HIGH (ref 135–145)
TROPONIN T, HIGH SENSITIVITY RESULT: 622 NG/L — CRITICAL HIGH
TSH SERPL-MCNC: 2.46 UIU/ML — SIGNIFICANT CHANGE UP (ref 0.27–4.2)
URATE SERPL-MCNC: 5.7 MG/DL — SIGNIFICANT CHANGE UP (ref 3.4–8.8)
VIT B12 SERPL-MCNC: 580 PG/ML — SIGNIFICANT CHANGE UP (ref 200–900)
WBC # BLD: 5.09 K/UL — SIGNIFICANT CHANGE UP (ref 3.8–10.5)
WBC # BLD: 6.58 K/UL — SIGNIFICANT CHANGE UP (ref 3.8–10.5)
WBC # FLD AUTO: 5.09 K/UL — SIGNIFICANT CHANGE UP (ref 3.8–10.5)
WBC # FLD AUTO: 6.58 K/UL — SIGNIFICANT CHANGE UP (ref 3.8–10.5)

## 2024-04-21 PROCEDURE — 93010 ELECTROCARDIOGRAM REPORT: CPT | Mod: 76

## 2024-04-21 PROCEDURE — 93308 TTE F-UP OR LMTD: CPT | Mod: 26

## 2024-04-21 PROCEDURE — 99223 1ST HOSP IP/OBS HIGH 75: CPT

## 2024-04-21 PROCEDURE — 76770 US EXAM ABDO BACK WALL COMP: CPT | Mod: 26

## 2024-04-21 PROCEDURE — 93321 DOPPLER ECHO F-UP/LMTD STD: CPT | Mod: 26

## 2024-04-21 RX ORDER — INFLUENZA VIRUS VACCINE 15; 15; 15; 15 UG/.5ML; UG/.5ML; UG/.5ML; UG/.5ML
0.7 SUSPENSION INTRAMUSCULAR ONCE
Refills: 0 | Status: DISCONTINUED | OUTPATIENT
Start: 2024-04-21 | End: 2024-04-27

## 2024-04-21 RX ORDER — CITRIC ACID/SODIUM CITRATE 300-500 MG
30 SOLUTION, ORAL ORAL
Refills: 0 | Status: DISCONTINUED | OUTPATIENT
Start: 2024-04-21 | End: 2024-04-27

## 2024-04-21 RX ORDER — SODIUM CHLORIDE 9 MG/ML
1000 INJECTION, SOLUTION INTRAVENOUS
Refills: 0 | Status: DISCONTINUED | OUTPATIENT
Start: 2024-04-21 | End: 2024-04-25

## 2024-04-21 RX ADMIN — PANTOPRAZOLE SODIUM 40 MILLIGRAM(S): 20 TABLET, DELAYED RELEASE ORAL at 06:39

## 2024-04-21 RX ADMIN — SODIUM CHLORIDE 75 MILLILITER(S): 9 INJECTION, SOLUTION INTRAVENOUS at 14:57

## 2024-04-21 RX ADMIN — SODIUM CHLORIDE 75 MILLILITER(S): 9 INJECTION, SOLUTION INTRAVENOUS at 20:42

## 2024-04-21 RX ADMIN — Medication 30 MILLILITER(S): at 17:41

## 2024-04-21 RX ADMIN — Medication 1: at 12:19

## 2024-04-21 RX ADMIN — AMIODARONE HYDROCHLORIDE 200 MILLIGRAM(S): 400 TABLET ORAL at 06:39

## 2024-04-21 RX ADMIN — Medication 20 MILLIGRAM(S): at 06:39

## 2024-04-21 RX ADMIN — ATORVASTATIN CALCIUM 80 MILLIGRAM(S): 80 TABLET, FILM COATED ORAL at 22:37

## 2024-04-21 RX ADMIN — PANTOPRAZOLE SODIUM 40 MILLIGRAM(S): 20 TABLET, DELAYED RELEASE ORAL at 17:41

## 2024-04-21 RX ADMIN — TAMSULOSIN HYDROCHLORIDE 0.4 MILLIGRAM(S): 0.4 CAPSULE ORAL at 22:37

## 2024-04-21 NOTE — CONSULT NOTE ADULT - SUBJECTIVE AND OBJECTIVE BOX
CARDIOLOGY CONSULT - Dr. Vega  Date of Service: 4/21/24      HPI:  History obtained from patient and chart review.     89yo w/ PMHx CKD, HFrEF, Diabetes, TIA,  CAD s/p stenting, bladder cancer s/p chemo in remission, afib on eliquis, hx of left nephrectomy, recent admission 3/16-3/28 for SOB found to be anemic s/p EGD who presents to the ER complaining of increased weakness, dizziness, sob/rosado, with chest tightness and dark stools. He has been taking eliquis, last dose yesterday. He also is taking protonix at home.  He has been having dark stools for the past 4 -5 days, with concerns for melena. Last dark bowel movement was in the morning, it was black as per patient.      Initial vital signs, /69, HR 74, RR22, Temp 97.3 saturating 99% on room air. Found with low hgb, ordered for 1 unit of PRBC.  Labs concerns for troponemia.  As per ER provider note, discussed with Dr. Vega and GI consulted, admitted under Dr. Matta.     Additional hx:   Recent admission 3/16-3/28 for SOB in setting HF exacerbation, treated for volume overload in setting HFrEF (EF 45-50%) requiring diuresis. Also had LILIAN. He underwent EGD for anemia on 3/21 showing LA grade A esophagitis large amount of red blood in antrum of stomach w/ severe mucosal changes characterized by congestion, erythema friability, hemorrhagic appearance. Treated w/ hemospray. Underlying c/f malignancy. Path showed moderate chronic inactive gastritis and mild foveolar hyperplasia, neg for dypslaia or H. pylori. [place] red stain neg. ASA was held.    Had outpt follow up and was planned for repeat EGD with Dr. Osei. Tentatively planned for watchman procedure in future with cardiology Meds include eliquis. Now presenting w/ melena x5 days per wife who is a nurse. Was not feeling himself.      PAST MEDICAL & SURGICAL HISTORY:  CAD (coronary artery disease)  (4 stents,)      Diabetes mellitus, new onset  diet controlled      Single kidney  (hx/o LEFT nephrectomy at age 16; pt states due to a ureter"blockage" causing "infection"; pt denies hx/o renal dysfunction)      Arthritis      Myocardial infarction  (2003)      Hard of hearing      Atrial fibrillation      Hypertension      Hyperlipidemia      History of TIAs  Last 2018      History of bradycardia      Bladder cancer      History of CHF (congestive heart failure)      Left carotid bruit      History of nephrectomy, unilateral  (hx/o LEFT nephrectomy at age 16)      Stented coronary artery  (4 stents)      S/P bilateral cataract extraction      H/O cystoscopy              PREVIOUS DIAGNOSTIC TESTING:    [ ] Echocardiogram:  [ ]  Catheterization:  [ ] Stress Test:  	    MEDICATIONS:  MEDICATIONS  (STANDING):  aMIOdarone    Tablet 200 milliGRAM(s) Oral daily  atorvastatin 80 milliGRAM(s) Oral at bedtime  dextrose 10% Bolus 125 milliLiter(s) IV Bolus once  dextrose 5%. 1000 milliLiter(s) (100 mL/Hr) IV Continuous <Continuous>  dextrose 5%. 1000 milliLiter(s) (50 mL/Hr) IV Continuous <Continuous>  dextrose 50% Injectable 12.5 Gram(s) IV Push once  dextrose 50% Injectable 25 Gram(s) IV Push once  furosemide    Tablet 20 milliGRAM(s) Oral daily  glucagon  Injectable 1 milliGRAM(s) IntraMuscular once  insulin lispro (ADMELOG) corrective regimen sliding scale   SubCutaneous at bedtime  insulin lispro (ADMELOG) corrective regimen sliding scale   SubCutaneous three times a day before meals  pantoprazole  Injectable 40 milliGRAM(s) IV Push every 12 hours  tamsulosin 0.4 milliGRAM(s) Oral at bedtime      FAMILY HISTORY:  Family history of diabetes mellitus in mother (Mother)  (Pt states his mother developed DM in her 70's)    FH: bladder cancer (Sibling)        SOCIAL HISTORY:    [ ] Non-smoker  [ ] Smoker  [ ] Alcohol    Allergies    penicillin (Rash)    Intolerances    Cipro (Joint Pain)  	    REVIEW OF SYSTEMS:  CONSTITUTIONAL: No fever, weight loss, or fatigue  EYES: No eye pain, visual disturbances, or discharge  ENMT:  No difficulty hearing, tinnitus, vertigo; No sinus or throat pain  NECK: No pain or stiffness  RESPIRATORY: No cough, wheezing, chills or hemoptysis; No Shortness of Breath  CARDIOVASCULAR: No chest pain, palpitations, passing out, dizziness, or leg swelling  GASTROINTESTINAL: No abdominal or epigastric pain. No nausea, vomiting, or hematemesis; No diarrhea or constipation. No melena or hematochezia.  GENITOURINARY: No dysuria, frequency, hematuria, or incontinence  NEUROLOGICAL: No headaches, memory loss, loss of strength, numbness, or tremors  SKIN: No itching, burning, rashes, or lesions   	    [ ] All others negative	  [ ] Unable to obtain    PHYSICAL EXAM:  T(C): 36.7 (04-21-24 @ 05:11), Max: 36.7 (04-20-24 @ 11:58)  HR: 74 (04-21-24 @ 05:11) (72 - 85)  BP: 129/61 (04-21-24 @ 05:11) (103/55 - 129/61)  RR: 18 (04-21-24 @ 05:11) (16 - 22)  SpO2: 100% (04-21-24 @ 05:11) (99% - 100%)  Wt(kg): --  I&O's Summary      Appearance: Normal	  Psychiatry: A & O x 3, Mood & affect appropriate  HEENT:   Normal oral mucosa, PERRL, EOMI	  Lymphatic: No lymphadenopathy  Cardiovascular: Normal S1 S2,RRR, No JVD, No murmurs  Respiratory: Lungs clear to auscultation	  Gastrointestinal:  Soft, Non-tender, + BS	  Skin: No rashes, No ecchymoses, No cyanosis	  Neurologic: Non-focal  Extremities: Normal range of motion, No clubbing, cyanosis or edema  Vascular: Peripheral pulses palpable 2+ bilaterally    TELEMETRY: 	    ECG:  	  RADIOLOGY:  OTHER: 	  	  LABS:	 	    CARDIAC MARKERS:                                  7.8    5.09  )-----------( 119      ( 20 Apr 2024 23:30 )             24.0     04-20    145  |  117<H>  |  85<H>  ----------------------------<  101<H>  4.6   |  17<L>  |  3.32<H>    Ca    8.1<L>      20 Apr 2024 23:30  Phos  3.1     04-20  Mg     2.00     04-20    TPro  5.9<L>  /  Alb  3.1<L>  /  TBili  0.5  /  DBili  x   /  AST  39  /  ALT  50<H>  /  AlkPhos  124<H>  04-20    PT/INR - ( 20 Apr 2024 12:03 )   PT: 13.9 sec;   INR: 1.24 ratio         PTT - ( 20 Apr 2024 12:03 )  PTT:37.8 sec  proBNP:   Lipid Profile:   HgA1c:   TSH:     ASSESSMENT/PLAN: 	              70 minutes spent on total encounter; more than 50% of the visit was spent counseling and/or coordinating care by the attending physician.   Statement Selected

## 2024-04-21 NOTE — PROGRESS NOTE ADULT - SUBJECTIVE AND OBJECTIVE BOX
pt seen ad examined, pt denies abd pain , chest pain, shortness of breath ,n, vomiting   4/21/24    MEDICATIONS  (STANDING):  aMIOdarone    Tablet 200 milliGRAM(s) Oral daily  atorvastatin 80 milliGRAM(s) Oral at bedtime  citric acid/sodium citrate Solution 30 milliLiter(s) Oral two times a day  dextrose 10% Bolus 125 milliLiter(s) IV Bolus once  dextrose 5%. 1000 milliLiter(s) (75 mL/Hr) IV Continuous <Continuous>  dextrose 5%. 1000 milliLiter(s) (50 mL/Hr) IV Continuous <Continuous>  dextrose 5%. 1000 milliLiter(s) (100 mL/Hr) IV Continuous <Continuous>  dextrose 50% Injectable 25 Gram(s) IV Push once  dextrose 50% Injectable 12.5 Gram(s) IV Push once  furosemide    Tablet 20 milliGRAM(s) Oral daily  glucagon  Injectable 1 milliGRAM(s) IntraMuscular once  insulin lispro (ADMELOG) corrective regimen sliding scale   SubCutaneous at bedtime  insulin lispro (ADMELOG) corrective regimen sliding scale   SubCutaneous three times a day before meals  pantoprazole  Injectable 40 milliGRAM(s) IV Push every 12 hours  tamsulosin 0.4 milliGRAM(s) Oral at bedtime    MEDICATIONS  (PRN):  acetaminophen     Tablet .. 650 milliGRAM(s) Oral every 6 hours PRN Temp greater or equal to 38C (100.4F), Mild Pain (1 - 3)  aluminum hydroxide/magnesium hydroxide/simethicone Suspension 30 milliLiter(s) Oral every 4 hours PRN Dyspepsia  dextrose Oral Gel 15 Gram(s) Oral once PRN Blood Glucose LESS THAN 70 milliGRAM(s)/deciliter  melatonin 3 milliGRAM(s) Oral at bedtime PRN Insomnia    T(C): 36.3 (04-21-24 @ 12:43), Max: 36.8 (04-21-24 @ 03:50)  HR: 84 (04-21-24 @ 12:43) (72 - 84)  BP: 122/58 (04-21-24 @ 12:43) (103/55 - 129/61)  RR: 17 (04-21-24 @ 12:43) (16 - 18)  SpO2: 97% (04-21-24 @ 12:43) (97% - 100%)    CAPILLARY BLOOD GLUCOSE      POCT Blood Glucose.: 192 mg/dL (21 Apr 2024 11:45)  POCT Blood Glucose.: 135 mg/dL (21 Apr 2024 08:27)  POCT Blood Glucose.: 131 mg/dL (20 Apr 2024 22:22)  POCT Blood Glucose.: 130 mg/dL (20 Apr 2024 17:13)    I&O's Summary      Constitutional: No fever, fatigue  Skin: No rash.  Eyes: No recent vision problems or eye pain.  ENT: No congestion, ear pain, or sore throat.  Cardiovascular: No chest pain or palpation.  Respiratory: No cough, shortness of breath, congestion, or wheezing.  Gastrointestinal: No abdominal pain, nausea, vomiting, or diarrhea.  Genitourinary: No dysuria.  Musculoskeletal: No joint swelling.  Neurologic: No headache.    PHYSICAL EXAM:  GENERAL: NAD  EYES: EOMI, PERRLA  NECK: Supple, No JVD  CHEST/LUNG: dec breath sounds at bases  HEART:  S1 , S2 +  ABDOMEN: soft , bs+  EXTREMITIES:  trace edema  NEUROLOGY:alert awake follows commands      LABS:                        10.3   6.58  )-----------( 125      ( 21 Apr 2024 06:23 )             30.8     04-21    146<H>  |  118<H>  |  82<H>  ----------------------------<  119<H>  5.1   |  16<L>  |  3.45<H>    Ca    8.7      21 Apr 2024 06:23  Phos  3.1     04-20  Mg     2.00     04-20    TPro  6.1  /  Alb  3.1<L>  /  TBili  1.4<H>  /  DBili  x   /  AST  40  /  ALT  46<H>  /  AlkPhos  124<H>  04-21    PT/INR - ( 20 Apr 2024 12:03 )   PT: 13.9 sec;   INR: 1.24 ratio         PTT - ( 20 Apr 2024 12:03 )  PTT:37.8 sec  CARDIAC MARKERS ( 20 Apr 2024 15:05 )  x     / x     / 51 U/L / x     / 5.9 ng/mL      Urinalysis Basic - ( 21 Apr 2024 06:23 )    Color: x / Appearance: x / SG: x / pH: x  Gluc: 119 mg/dL / Ketone: x  / Bili: x / Urobili: x   Blood: x / Protein: x / Nitrite: x   Leuk Esterase: x / RBC: x / WBC x   Sq Epi: x / Non Sq Epi: x / Bacteria: x        RADIOLOGY & ADDITIONAL TESTS:    Imaging Personally Reviewed:    Consultant(s) Notes Reviewed:      Care Discussed with Consultants/Other Providers:

## 2024-04-21 NOTE — CONSULT NOTE ADULT - SUBJECTIVE AND OBJECTIVE BOX
Patient is a 88y old  Male who presents with a chief complaint of Blood loss anemia, GI bleeding, NSTEMI (21 Apr 2024 07:15)      HPI:  History obtained from patient and chart review.     87yo w/ PMHx CKD, HFrEF, Diabetes, TIA,  CAD s/p stenting, bladder cancer s/p chemo in remission, afib on eliquis, hx of left nephrectomy, recent admission 3/16-3/28 for SOB found to be anemic s/p EGD who presents to the ER complaining of increased weakness, dizziness, sob/rosado, with chest tightness and dark stools. He has been taking eliquis, last dose yesterday. He also is taking protonix at home.  He has been having dark stools for the past 4 -5 days, with concerns for melena. Last dark bowel movement was in the morning, it was black as per patient.      Initial vital signs, /69, HR 74, RR22, Temp 97.3 saturating 99% on room air. Found with low hgb, ordered for 1 unit of PRBC.  Labs concerns for troponemia.  As per ER provider note, discussed with Dr. Vega and GI consulted, admitted under Dr. Matta.     Additional hx:   Recent admission 3/16-3/28 for SOB in setting HF exacerbation, treated for volume overload in setting HFrEF (EF 45-50%) requiring diuresis. Also had LILIAN. He underwent EGD for anemia on 3/21 showing LA grade A esophagitis large amount of red blood in antrum of stomach w/ severe mucosal changes characterized by congestion, erythema friability, hemorrhagic appearance. Treated w/ hemospray. Underlying c/f malignancy. Path showed moderate chronic inactive gastritis and mild foveolar hyperplasia, neg for dypslaia or H. pylori. [place] red stain neg. ASA was held.    Had outpt follow up and was planned for repeat EGD with Dr. Osei. Tentatively planned for watchman procedure in future with cardiology Meds include eliquis. Now presenting w/ melena x5 days per wife who is a nurse. Was not feeling himself. (20 Apr 2024 21:21)    Doing okay today. plan for EGD tomorrow with GI. not actively in CHF. hypernatremic.     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 16; pt states due to a ureter"blockage" causing "infection"; pt denies hx/o renal dysfunction)      Arthritis      Myocardial infarction  (2003)      Hard of hearing      Atrial fibrillation      Hypertension      Hyperlipidemia      History of TIAs  Last 2018      History of bradycardia      Bladder cancer      History of CHF (congestive heart failure)      Left carotid bruit      History of nephrectomy, unilateral  (hx/o LEFT nephrectomy at age 16)      Stented coronary artery  (4 stents)      S/P bilateral cataract extraction      H/O cystoscopy          MEDICATIONS  (STANDING):  aMIOdarone    Tablet 200 milliGRAM(s) Oral daily  atorvastatin 80 milliGRAM(s) Oral at bedtime  citric acid/sodium citrate Solution 30 milliLiter(s) Oral two times a day  dextrose 10% Bolus 125 milliLiter(s) IV Bolus once  dextrose 5%. 1000 milliLiter(s) (100 mL/Hr) IV Continuous <Continuous>  dextrose 5%. 1000 milliLiter(s) (75 mL/Hr) IV Continuous <Continuous>  dextrose 5%. 1000 milliLiter(s) (50 mL/Hr) IV Continuous <Continuous>  dextrose 50% Injectable 12.5 Gram(s) IV Push once  dextrose 50% Injectable 25 Gram(s) IV Push once  furosemide    Tablet 20 milliGRAM(s) Oral daily  glucagon  Injectable 1 milliGRAM(s) IntraMuscular once  insulin lispro (ADMELOG) corrective regimen sliding scale   SubCutaneous at bedtime  insulin lispro (ADMELOG) corrective regimen sliding scale   SubCutaneous three times a day before meals  pantoprazole  Injectable 40 milliGRAM(s) IV Push every 12 hours  tamsulosin 0.4 milliGRAM(s) Oral at bedtime      Allergies    penicillin (Rash)    Intolerances    Cipro (Joint Pain)      SOCIAL HISTORY:  Denies ETOh,Smoking,     FAMILY HISTORY:  Family history of diabetes mellitus in mother (Mother)  (Pt states his mother developed DM in her 70's)    FH: bladder cancer (Sibling)        REVIEW OF SYSTEMS:  CONSTITUTIONAL: No weakness, fevers or chills  EYES/ENT: No visual changes;  No vertigo or throat pain   NECK: No pain or stiffness  RESPIRATORY: No cough, wheezing, hemoptysis; No shortness of breath  CARDIOVASCULAR: No chest pain or palpitations  GASTROINTESTINAL: No abdominal or epigastric pain. No nausea, vomiting, or hematemesis; No diarrhea or constipation. No melena or hematochezia.  GENITOURINARY: No dysuria, frequency or hematuria  NEUROLOGICAL: No numbness or weakness  SKIN: No itching, burning, rashes, or lesions   All other review of systems is negative unless indicated above.    VITAL:  T(C): , Max: 36.8 (04-21-24 @ 03:50)  T(F): , Max: 98.3 (04-21-24 @ 03:50)  HR: 84 (04-21-24 @ 12:43)  BP: 122/58 (04-21-24 @ 12:43)  BP(mean): --  RR: 17 (04-21-24 @ 12:43)  SpO2: 97% (04-21-24 @ 12:43)  Wt(kg): --    PHYSICAL EXAM:  Constitutional: NAD, Alert  HEENT: NCAT, MMM  Neck: Supple, No JVD  Respiratory: CTA-b/l  Cardiovascular: RRR s1s2, no m/r/g  Gastrointestinal: BS+, soft, NT/ND  Extremities: No peripheral edema b/l  Neurological: no focal deficits; strength grossly intact  Back: no CVAT b/l  Skin: No rashes, no nevi    LABS:                        10.3   6.58  )-----------( 125      ( 21 Apr 2024 06:23 )             30.8     Na(146)/K(5.1)/Cl(118)/HCO3(16)/BUN(82)/Cr(3.45)Glu(119)/Ca(8.7)/Mg(--)/PO4(--)    04-21 @ 06:23  Na(145)/K(4.6)/Cl(117)/HCO3(17)/BUN(85)/Cr(3.32)Glu(101)/Ca(8.1)/Mg(2.00)/PO4(3.1)    04-20 @ 23:30  Na(145)/K(4.8)/Cl(116)/HCO3(15)/BUN(88)/Cr(3.35)Glu(103)/Ca(8.3)/Mg(--)/PO4(--)    04-20 @ 12:03    Urinalysis Basic - ( 21 Apr 2024 06:23 )    Color: x / Appearance: x / SG: x / pH: x  Gluc: 119 mg/dL / Ketone: x  / Bili: x / Urobili: x   Blood: x / Protein: x / Nitrite: x   Leuk Esterase: x / RBC: x / WBC x   Sq Epi: x / Non Sq Epi: x / Bacteria: x            IMAGING:    ASSESSMENT:  87yo w/ PMHx CKD, HFrEF, Diabetes, TIA,  CAD s/p stenting, bladder cancer s/p chemo in remission, afib on eliquis, hx of left nephrectomy, recent admission 3/16-3/28 for SOB found to be anemic s/p EGD who presents to the ER complaining of increased weakness, dizziness, sob/rosado, with chest tightness and dark stools with concerns for NSTEMI and GIB and LILIAN. Nephrology consulted for LILIAN on CKD.     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 Emily Gusman as OP  -BL Cr approx 2.5  -CKD in part at least from prior L nephrectomy, recurrent LILIAN with incomplete resolution  -LILIAN in this context suspect 2/2 volume depletion and ABLA.     Hypernatremia  -2/2 diuretics, low PO intake    Hyperkalemia  -2/2 reduced renal clearance + dehydration    NSTEMI  -per cardiology    GIB  -EGD tomorrow with GI    low serum bicarb  -2/2 renal insufficiency    Volume: hypovolemic      RECOMMEND:  -UA, Eloise, Upcr, Uric acid  -renal US reviewed. no hydro on R (solitary kidney)  -would hold lasix for now  -Start D5w @ 75cc/hr x10h  -START bicitra 30 BID  -a/w EGD tomorrow  -daily CMP + phos  -Dose Rx for CrCl 15-20mL/min    Thank you for involving [facility] in this patient's care.    With warm regards,    Brigido Sanon DO   [facility]  Office: [phone]           Patient is a 88y old  Male who presents with a chief complaint of Blood loss anemia, GI bleeding, NSTEMI (21 Apr 2024 07:15)      HPI:  History obtained from patient and chart review.     87yo w/ PMHx CKD, HFrEF, Diabetes, TIA,  CAD s/p stenting, bladder cancer s/p chemo in remission, afib on eliquis, hx of left nephrectomy, recent admission 3/16-3/28 for SOB found to be anemic s/p EGD who presents to the ER complaining of increased weakness, dizziness, sob/rosado, with chest tightness and dark stools. He has been taking eliquis, last dose yesterday. He also is taking protonix at home.  He has been having dark stools for the past 4 -5 days, with concerns for melena. Last dark bowel movement was in the morning, it was black as per patient.      Initial vital signs, /69, HR 74, RR22, Temp 97.3 saturating 99% on room air. Found with low hgb, ordered for 1 unit of PRBC.  Labs concerns for troponemia.  As per ER provider note, discussed with Dr. Vega and GI consulted, admitted under Dr. Matta.     Additional hx:   Recent admission 3/16-3/28 for SOB in setting HF exacerbation, treated for volume overload in setting HFrEF (EF 45-50%) requiring diuresis. Also had LILIAN. He underwent EGD for anemia on 3/21 showing LA grade A esophagitis large amount of red blood in antrum of stomach w/ severe mucosal changes characterized by congestion, erythema friability, hemorrhagic appearance. Treated w/ hemospray. Underlying c/f malignancy. Path showed moderate chronic inactive gastritis and mild foveolar hyperplasia, neg for dypslaia or H. pylori. [place] red stain neg. ASA was held.    Had outpt follow up and was planned for repeat EGD with Dr. Osei. Tentatively planned for watchman procedure in future with cardiology Meds include eliquis. Now presenting w/ melena x5 days per wife who is a nurse. Was not feeling himself. (20 Apr 2024 21:21)    Doing okay today. plan for EGD tomorrow with GI. not actively in CHF. hypernatremic.     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 16; pt states due to a ureter"blockage" causing "infection"; pt denies hx/o renal dysfunction)      Arthritis      Myocardial infarction  (2003)      Hard of hearing      Atrial fibrillation      Hypertension      Hyperlipidemia      History of TIAs  Last 2018      History of bradycardia      Bladder cancer      History of CHF (congestive heart failure)      Left carotid bruit      History of nephrectomy, unilateral  (hx/o LEFT nephrectomy at age 16)      Stented coronary artery  (4 stents)      S/P bilateral cataract extraction      H/O cystoscopy          MEDICATIONS  (STANDING):  aMIOdarone    Tablet 200 milliGRAM(s) Oral daily  atorvastatin 80 milliGRAM(s) Oral at bedtime  citric acid/sodium citrate Solution 30 milliLiter(s) Oral two times a day  dextrose 10% Bolus 125 milliLiter(s) IV Bolus once  dextrose 5%. 1000 milliLiter(s) (100 mL/Hr) IV Continuous <Continuous>  dextrose 5%. 1000 milliLiter(s) (75 mL/Hr) IV Continuous <Continuous>  dextrose 5%. 1000 milliLiter(s) (50 mL/Hr) IV Continuous <Continuous>  dextrose 50% Injectable 12.5 Gram(s) IV Push once  dextrose 50% Injectable 25 Gram(s) IV Push once  furosemide    Tablet 20 milliGRAM(s) Oral daily  glucagon  Injectable 1 milliGRAM(s) IntraMuscular once  insulin lispro (ADMELOG) corrective regimen sliding scale   SubCutaneous at bedtime  insulin lispro (ADMELOG) corrective regimen sliding scale   SubCutaneous three times a day before meals  pantoprazole  Injectable 40 milliGRAM(s) IV Push every 12 hours  tamsulosin 0.4 milliGRAM(s) Oral at bedtime      Allergies    penicillin (Rash)    Intolerances    Cipro (Joint Pain)      SOCIAL HISTORY:  Denies ETOh,Smoking,     FAMILY HISTORY:  Family history of diabetes mellitus in mother (Mother)  (Pt states his mother developed DM in her 70's)    FH: bladder cancer (Sibling)        REVIEW OF SYSTEMS:  CONSTITUTIONAL: No weakness, fevers or chills  EYES/ENT: No visual changes;  No vertigo or throat pain   NECK: No pain or stiffness  RESPIRATORY: No cough, wheezing, hemoptysis; No shortness of breath  CARDIOVASCULAR: No chest pain or palpitations  GASTROINTESTINAL: No abdominal or epigastric pain. No nausea, vomiting, or hematemesis; No diarrhea or constipation. No melena or hematochezia.  GENITOURINARY: No dysuria, frequency or hematuria  NEUROLOGICAL: No numbness or weakness  SKIN: No itching, burning, rashes, or lesions   All other review of systems is negative unless indicated above.    VITAL:  T(C): , Max: 36.8 (04-21-24 @ 03:50)  T(F): , Max: 98.3 (04-21-24 @ 03:50)  HR: 84 (04-21-24 @ 12:43)  BP: 122/58 (04-21-24 @ 12:43)  BP(mean): --  RR: 17 (04-21-24 @ 12:43)  SpO2: 97% (04-21-24 @ 12:43)  Wt(kg): --    PHYSICAL EXAM:  Constitutional: NAD, Alert  HEENT: NCAT, MMM  Neck: Supple, No JVD  Respiratory: CTA-b/l  Cardiovascular: RRR s1s2, no m/r/g  Gastrointestinal: BS+,  distended  Extremities: 1-2+ BL L edema  Neurological: no focal deficits; strength grossly intact  Back: no CVAT b/l  Skin: No rashes, no nevi    LABS:                        10.3   6.58  )-----------( 125      ( 21 Apr 2024 06:23 )             30.8     Na(146)/K(5.1)/Cl(118)/HCO3(16)/BUN(82)/Cr(3.45)Glu(119)/Ca(8.7)/Mg(--)/PO4(--)    04-21 @ 06:23  Na(145)/K(4.6)/Cl(117)/HCO3(17)/BUN(85)/Cr(3.32)Glu(101)/Ca(8.1)/Mg(2.00)/PO4(3.1)    04-20 @ 23:30  Na(145)/K(4.8)/Cl(116)/HCO3(15)/BUN(88)/Cr(3.35)Glu(103)/Ca(8.3)/Mg(--)/PO4(--)    04-20 @ 12:03    Urinalysis Basic - ( 21 Apr 2024 06:23 )    Color: x / Appearance: x / SG: x / pH: x  Gluc: 119 mg/dL / Ketone: x  / Bili: x / Urobili: x   Blood: x / Protein: x / Nitrite: x   Leuk Esterase: x / RBC: x / WBC x   Sq Epi: x / Non Sq Epi: x / Bacteria: x            IMAGING:    ASSESSMENT:  87yo w/ PMHx CKD, HFrEF, Diabetes, TIA,  CAD s/p stenting, bladder cancer s/p chemo in remission, afib on eliquis, hx of left nephrectomy, recent admission 3/16-3/28 for SOB found to be anemic s/p EGD who presents to the ER complaining of increased weakness, dizziness, sob/rosado, with chest tightness and dark stools with concerns for NSTEMI and GIB and LILIAN. Nephrology consulted for LILIAN on CKD.     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 Emily Gusman as OP  -BL Cr approx 2.5  -CKD in part at least from prior L nephrectomy, recurrent LILIAN with incomplete resolution  -LILIAN in this context suspect 2/2 volume depletion and ABLA.     Hypernatremia  -2/2 diuretics, low PO intake    Hyperkalemia  -2/2 reduced renal clearance + dehydration    NSTEMI  -per cardiology    GIB  -EGD tomorrow with GI    low serum bicarb  -2/2 renal insufficiency    Volume: hypovolemic      RECOMMEND:  -UA, Eloise, Upcr, Uric acid  -renal US reviewed. no hydro on R (solitary kidney)  -would hold lasix for now  -Start D5w @ 75cc/hr x10h  -START bicitra 30 BID  -a/w EGD tomorrow  -daily CMP + phos  -Dose Rx for CrCl 15-20mL/min    Thank you for involving [facility] in this patient's care.    With warm regards,    Brigido Sanon DO   [facility]  Office: [phone]

## 2024-04-21 NOTE — PROGRESS NOTE ADULT - ASSESSMENT
87yo w/ PMHx CKD, HFrEF, Diabetes, TIA,  CAD s/p stenting, bladder cancer s/p chemo in remission, afib on eliquis, hx of left nephrectomy, recent admission 3/16-3/28 for SOB found to be anemic s/p EGD who presents to the ER complaining of increased weakness, dizziness, sob/rosado, with chest tightness and dark stools. Found with anemia s/p 1 unit PRBC. Plan for EGD, holding AC as per GI.

## 2024-04-21 NOTE — CHART NOTE - NSCHARTNOTEFT_GEN_A_CORE
Brief GI update note:  87yo w/ PMHx CKD, HFrEF, CAD s/p stenting, bladder cancer s/p chemo, afib on eliquis, recent admission 3/16-3/28 for SOB found to be anemic s/p EGD presenting w/ melena/chest discomfort w/ Hb 7.7 from b/l 8.7.   -Received 2u pRBCs w/ appropriate response to 10.3. Trops downtrending. Mildly hypernatremic today  -Seen by cardiology   Recommendations:  -NPO at midnight for EGD tomorrow  -3am labs- cbc, cmp, coags. If repleting K, give IV repletion only  -Correction of hypernatremia by primary team  -C/w PPI   -Hold REMI Parr  GI/Hep fellow

## 2024-04-21 NOTE — PROGRESS NOTE ADULT - PROBLEM SELECTOR PLAN 3
- GI notes appreciated, recent EGD which showed red blood in antrum w/ significant mucosal changes, suspect this is the source of patient's melena and anemia. Path was recently neg for malignancy and congo red. He was already scheduled for outpt EGD in May which can be expedited here in setting melena.  -as per GI , poss egd tomorrow

## 2024-04-21 NOTE — PROVIDER CONTACT NOTE (OTHER) - ASSESSMENT
Patient unable to describe sensation in chest but denies any pain shortness of breath or tightness in   chest. /49 HR 74 with 97% oxygen saturation on room air and 98.3F oral temp

## 2024-04-21 NOTE — PROGRESS NOTE ADULT - PROBLEM SELECTOR PLAN 4
- CKD4- Hx of left nephrectomy. creatinine 3.35 on 4/20. previously 2.73. Avoid nephrotoxic agents. Monitor electrolytes closely. Nephrology consult for further management.   - Bicarb of 15 on 4/20, likely from renal acidosis. Recheck on metabolic panel to consider starting bicarb supplementation.    - No signs of urinary obstruction. currently, Will check US renal to assess for hydronephrosis.   - Possibly cardiorenal.  renal eval

## 2024-04-22 LAB
ADD ON TEST-SPECIMEN IN LAB: SIGNIFICANT CHANGE UP
ANION GAP SERPL CALC-SCNC: 12 MMOL/L — SIGNIFICANT CHANGE UP (ref 7–14)
APPEARANCE UR: CLEAR — SIGNIFICANT CHANGE UP
APTT BLD: 35 SEC — SIGNIFICANT CHANGE UP (ref 24.5–35.6)
BACTERIA # UR AUTO: NEGATIVE /HPF — SIGNIFICANT CHANGE UP
BASOPHILS # BLD AUTO: 0.03 K/UL — SIGNIFICANT CHANGE UP (ref 0–0.2)
BASOPHILS NFR BLD AUTO: 0.5 % — SIGNIFICANT CHANGE UP (ref 0–2)
BILIRUB UR-MCNC: NEGATIVE — SIGNIFICANT CHANGE UP
BUN SERPL-MCNC: 73 MG/DL — HIGH (ref 7–23)
CALCIUM SERPL-MCNC: 8.5 MG/DL — SIGNIFICANT CHANGE UP (ref 8.4–10.5)
CAST: 9 /LPF — HIGH (ref 0–4)
CHLORIDE SERPL-SCNC: 113 MMOL/L — HIGH (ref 98–107)
CO2 SERPL-SCNC: 17 MMOL/L — LOW (ref 22–31)
COLOR SPEC: YELLOW — SIGNIFICANT CHANGE UP
CREAT ?TM UR-MCNC: 42 MG/DL — SIGNIFICANT CHANGE UP
CREAT SERPL-MCNC: 3.12 MG/DL — HIGH (ref 0.5–1.3)
DIFF PNL FLD: ABNORMAL
EGFR: 18 ML/MIN/1.73M2 — LOW
EOSINOPHIL # BLD AUTO: 0.12 K/UL — SIGNIFICANT CHANGE UP (ref 0–0.5)
EOSINOPHIL NFR BLD AUTO: 1.9 % — SIGNIFICANT CHANGE UP (ref 0–6)
GLUCOSE BLDC GLUCOMTR-MCNC: 114 MG/DL — HIGH (ref 70–99)
GLUCOSE BLDC GLUCOMTR-MCNC: 155 MG/DL — HIGH (ref 70–99)
GLUCOSE BLDC GLUCOMTR-MCNC: 156 MG/DL — HIGH (ref 70–99)
GLUCOSE BLDC GLUCOMTR-MCNC: 158 MG/DL — HIGH (ref 70–99)
GLUCOSE BLDC GLUCOMTR-MCNC: 162 MG/DL — HIGH (ref 70–99)
GLUCOSE SERPL-MCNC: 142 MG/DL — HIGH (ref 70–99)
GLUCOSE UR QL: NEGATIVE MG/DL — SIGNIFICANT CHANGE UP
HCT VFR BLD CALC: 28.5 % — LOW (ref 39–50)
HGB BLD-MCNC: 9.4 G/DL — LOW (ref 13–17)
IANC: 4.69 K/UL — SIGNIFICANT CHANGE UP (ref 1.8–7.4)
IMM GRANULOCYTES NFR BLD AUTO: 0.3 % — SIGNIFICANT CHANGE UP (ref 0–0.9)
INR BLD: 1.22 RATIO — HIGH (ref 0.85–1.18)
KETONES UR-MCNC: NEGATIVE MG/DL — SIGNIFICANT CHANGE UP
LEUKOCYTE ESTERASE UR-ACNC: NEGATIVE — SIGNIFICANT CHANGE UP
LYMPHOCYTES # BLD AUTO: 0.71 K/UL — LOW (ref 1–3.3)
LYMPHOCYTES # BLD AUTO: 11.2 % — LOW (ref 13–44)
MAGNESIUM SERPL-MCNC: 1.9 MG/DL — SIGNIFICANT CHANGE UP (ref 1.6–2.6)
MCHC RBC-ENTMCNC: 32.4 PG — SIGNIFICANT CHANGE UP (ref 27–34)
MCHC RBC-ENTMCNC: 33 GM/DL — SIGNIFICANT CHANGE UP (ref 32–36)
MCV RBC AUTO: 98.3 FL — SIGNIFICANT CHANGE UP (ref 80–100)
MONOCYTES # BLD AUTO: 0.76 K/UL — SIGNIFICANT CHANGE UP (ref 0–0.9)
MONOCYTES NFR BLD AUTO: 12 % — SIGNIFICANT CHANGE UP (ref 2–14)
MUCOUS THREADS # UR AUTO: PRESENT
NEUTROPHILS # BLD AUTO: 4.69 K/UL — SIGNIFICANT CHANGE UP (ref 1.8–7.4)
NEUTROPHILS NFR BLD AUTO: 74.1 % — SIGNIFICANT CHANGE UP (ref 43–77)
NITRITE UR-MCNC: NEGATIVE — SIGNIFICANT CHANGE UP
NRBC # BLD: 0 /100 WBCS — SIGNIFICANT CHANGE UP (ref 0–0)
NRBC # FLD: 0 K/UL — SIGNIFICANT CHANGE UP (ref 0–0)
PH UR: 5 — SIGNIFICANT CHANGE UP (ref 5–8)
PHOSPHATE SERPL-MCNC: 3.3 MG/DL — SIGNIFICANT CHANGE UP (ref 2.5–4.5)
PLATELET # BLD AUTO: 119 K/UL — LOW (ref 150–400)
POTASSIUM SERPL-MCNC: 4.4 MMOL/L — SIGNIFICANT CHANGE UP (ref 3.5–5.3)
POTASSIUM SERPL-SCNC: 4.4 MMOL/L — SIGNIFICANT CHANGE UP (ref 3.5–5.3)
PROT ?TM UR-MCNC: 162 MG/DL — SIGNIFICANT CHANGE UP
PROT UR-MCNC: 100 MG/DL
PROT/CREAT UR-RTO: 3.9 RATIO — HIGH (ref 0–0.2)
PROTHROM AB SERPL-ACNC: 13.7 SEC — HIGH (ref 9.5–13)
RBC # BLD: 2.9 M/UL — LOW (ref 4.2–5.8)
RBC # FLD: 22.2 % — HIGH (ref 10.3–14.5)
RBC CASTS # UR COMP ASSIST: 0 /HPF — SIGNIFICANT CHANGE UP (ref 0–4)
REVIEW: SIGNIFICANT CHANGE UP
SODIUM SERPL-SCNC: 142 MMOL/L — SIGNIFICANT CHANGE UP (ref 135–145)
SODIUM UR-SCNC: 110 MMOL/L — SIGNIFICANT CHANGE UP
SP GR SPEC: 1.01 — SIGNIFICANT CHANGE UP (ref 1–1.03)
SQUAMOUS # UR AUTO: 0 /HPF — SIGNIFICANT CHANGE UP (ref 0–5)
TROPONIN T, HIGH SENSITIVITY RESULT: 532 NG/L — CRITICAL HIGH
UROBILINOGEN FLD QL: 0.2 MG/DL — SIGNIFICANT CHANGE UP (ref 0.2–1)
WBC # BLD: 6.33 K/UL — SIGNIFICANT CHANGE UP (ref 3.8–10.5)
WBC # FLD AUTO: 6.33 K/UL — SIGNIFICANT CHANGE UP (ref 3.8–10.5)
WBC UR QL: 3 /HPF — SIGNIFICANT CHANGE UP (ref 0–5)

## 2024-04-22 PROCEDURE — 43270 EGD LESION ABLATION: CPT | Mod: GC

## 2024-04-22 DEVICE — CATH CHANNEL RFA ENDOSCOPSIC: Type: IMPLANTABLE DEVICE | Status: FUNCTIONAL

## 2024-04-22 RX ORDER — SUCRALFATE 1 G
1 TABLET ORAL
Refills: 0 | Status: DISCONTINUED | OUTPATIENT
Start: 2024-04-22 | End: 2024-04-27

## 2024-04-22 RX ORDER — FUROSEMIDE 40 MG
40 TABLET ORAL ONCE
Refills: 0 | Status: COMPLETED | OUTPATIENT
Start: 2024-04-22 | End: 2024-04-22

## 2024-04-22 RX ADMIN — Medication 1: at 15:49

## 2024-04-22 RX ADMIN — PANTOPRAZOLE SODIUM 40 MILLIGRAM(S): 20 TABLET, DELAYED RELEASE ORAL at 06:29

## 2024-04-22 RX ADMIN — Medication 1 GRAM(S): at 15:50

## 2024-04-22 RX ADMIN — TAMSULOSIN HYDROCHLORIDE 0.4 MILLIGRAM(S): 0.4 CAPSULE ORAL at 21:29

## 2024-04-22 RX ADMIN — Medication 40 MILLIGRAM(S): at 17:46

## 2024-04-22 RX ADMIN — ATORVASTATIN CALCIUM 80 MILLIGRAM(S): 80 TABLET, FILM COATED ORAL at 21:29

## 2024-04-22 RX ADMIN — Medication 1 GRAM(S): at 17:42

## 2024-04-22 RX ADMIN — Medication 30 MILLILITER(S): at 08:00

## 2024-04-22 RX ADMIN — Medication 1: at 07:45

## 2024-04-22 RX ADMIN — AMIODARONE HYDROCHLORIDE 200 MILLIGRAM(S): 400 TABLET ORAL at 06:30

## 2024-04-22 RX ADMIN — PANTOPRAZOLE SODIUM 40 MILLIGRAM(S): 20 TABLET, DELAYED RELEASE ORAL at 17:44

## 2024-04-22 RX ADMIN — Medication 20 MILLIGRAM(S): at 06:30

## 2024-04-22 RX ADMIN — Medication 30 MILLILITER(S): at 17:45

## 2024-04-22 NOTE — PROGRESS NOTE ADULT - ASSESSMENT
ECHO 3/19/24 : . Left ventricular systolic function is mildly decreased with an ejection fraction visually estimated at 45 to 50%. There are regional wall motion abnormalities present. There is mild (grade 1) left ventricular diastolic dysfunction. Hypokinesis of the lateral and inferolateral walls. trace mitral regurgitation.mild to moderate aortic regurgitation.  ECHO 4/21/24 : Left ventricular systolic function is moderately decreased with an ejection fraction of 37 % Regional wall motion abnormalities present. Multiple segmental abnormalities , mild to moderate tricuspid regurgitation; severe mitral regurgitation    A/P    89yo w/ PMHx CKD, HFrEF, Diabetes, TIA,  CAD s/p stenting, bladder cancer s/p chemo in remission, afib on eliquis, hx of left nephrectomy, recent admission 3/16-3/28 for SOB found to be anemic s/p EGD who presents to the ER complaining of increased weakness, dizziness, sob/rosado, with chest tightness and dark stools.    #Chest Pain/+ Troponin  -likely type II MI (demand ischemia) in setting of CKD, anemia  -GI eval appreciated  -sp egd   -antiplatelet/DOAC on hold  -pt w h/o CAD s/p PCI  -resume asa/ ac  when feasible per GI    #SOB,chronic HFrEF  -does not appear overloaded  -lasix on hold for now   -previous echo w mild LV dysfunction  -repeat TTE w LV dysfunction EF 37% WMA (overall unchanged from prior) however now with  severe eccentric mitral regurgitation 37%    #Sev MR   -echo as above-dw patient and family -- fam wants patient to be optimize from a gi/ med standpoint before possibly proceeding MR work up    #Atrial Fibrillation  -stable  -ac on hold  -monitor cbc   -cont amio  -per family  patient undergoing watchman eval as outpt with dr Pan     #CKD  -renal f/u

## 2024-04-22 NOTE — PROGRESS NOTE ADULT - SUBJECTIVE AND OBJECTIVE BOX
pt seen ad examined, pt denies abd pain , chest pain, shortness of breath ,n, vomiting   4/22/24    MEDICATIONS  (STANDING):  aMIOdarone    Tablet 200 milliGRAM(s) Oral daily  atorvastatin 80 milliGRAM(s) Oral at bedtime  citric acid/sodium citrate Solution 30 milliLiter(s) Oral two times a day  dextrose 10% Bolus 125 milliLiter(s) IV Bolus once  dextrose 5%. 1000 milliLiter(s) (75 mL/Hr) IV Continuous <Continuous>  dextrose 5%. 1000 milliLiter(s) (50 mL/Hr) IV Continuous <Continuous>  dextrose 5%. 1000 milliLiter(s) (100 mL/Hr) IV Continuous <Continuous>  dextrose 50% Injectable 25 Gram(s) IV Push once  dextrose 50% Injectable 12.5 Gram(s) IV Push once  glucagon  Injectable 1 milliGRAM(s) IntraMuscular once  influenza  Vaccine (HIGH DOSE) 0.7 milliLiter(s) IntraMuscular once  insulin lispro (ADMELOG) corrective regimen sliding scale   SubCutaneous at bedtime  insulin lispro (ADMELOG) corrective regimen sliding scale   SubCutaneous three times a day before meals  pantoprazole  Injectable 40 milliGRAM(s) IV Push every 12 hours  sucralfate 1 Gram(s) Oral two times a day  tamsulosin 0.4 milliGRAM(s) Oral at bedtime    MEDICATIONS  (PRN):  acetaminophen     Tablet .. 650 milliGRAM(s) Oral every 6 hours PRN Temp greater or equal to 38C (100.4F), Mild Pain (1 - 3)  aluminum hydroxide/magnesium hydroxide/simethicone Suspension 30 milliLiter(s) Oral every 4 hours PRN Dyspepsia  dextrose Oral Gel 15 Gram(s) Oral once PRN Blood Glucose LESS THAN 70 milliGRAM(s)/deciliter  melatonin 3 milliGRAM(s) Oral at bedtime PRN Insomnia    Vital Signs Last 24 Hrs  T(C): 36.8 (04-22-24 @ 11:34), Max: 36.8 (04-22-24 @ 11:34)  T(F): 98.2 (04-22-24 @ 11:34), Max: 98.2 (04-22-24 @ 11:34)  HR: 69 (04-22-24 @ 12:10) (69 - 79)  BP: 107/45 (04-22-24 @ 12:10) (94/46 - 127/60)  BP(mean): --  RR: 13 (04-22-24 @ 12:10) (13 - 18)  SpO2: 95% (04-22-24 @ 12:10) (95% - 98%)          Constitutional: No fever, fatigue  Skin: No rash.  Eyes: No recent vision problems or eye pain.  ENT: No congestion, ear pain, or sore throat.  Cardiovascular: No chest pain or palpation.  Respiratory: No cough, shortness of breath, congestion, or wheezing.  Gastrointestinal: No abdominal pain, nausea, vomiting, or diarrhea.  Genitourinary: No dysuria.  Musculoskeletal: No joint swelling.  Neurologic: No headache.    PHYSICAL EXAM:  GENERAL: NAD  EYES: EOMI, PERRLA  NECK: Supple, No JVD  CHEST/LUNG: dec breath sounds at bases  HEART:  S1 , S2 +  ABDOMEN: soft , bs+  EXTREMITIES:  trace edema  NEUROLOGY:alert awake follows commands      LABS:  04-22    142  |  113<H>  |  73<H>  ----------------------------<  142<H>  4.4   |  17<L>  |  3.12<H>    Ca    8.5      22 Apr 2024 06:50  Phos  3.3     04-22  Mg     1.90     04-22    TPro  6.1  /  Alb  3.1<L>  /  TBili  1.4<H>  /  DBili      /  AST  40  /  ALT  46<H>  /  AlkPhos  124<H>  04-21    Creatinine Trend: 3.12 <--, 3.45 <--, 3.32 <--, 3.35 <--                        9.4    6.33  )-----------( 119      ( 22 Apr 2024 06:50 )             28.5     Urine Studies:  Urinalysis Basic - ( 22 Apr 2024 06:50 )    Color:  / Appearance:  / SG:  / pH:   Gluc: 142 mg/dL / Ketone:   / Bili:  / Urobili:    Blood:  / Protein:  / Nitrite:    Leuk Esterase:  / RBC:  / WBC    Sq Epi:  / Non Sq Epi:  / Bacteria:       Sodium, Random Urine: 110 mmol/L (04-21 @ 23:30)  Creatinine, Random Urine: 42 mg/dL (04-21 @ 23:30)  Protein/Creatinine Ratio Calculation: 3.9 Ratio (04-21 @ 23:30)          LIVER FUNCTIONS - ( 21 Apr 2024 06:23 )  Alb: 3.1 g/dL / Pro: 6.1 g/dL / ALK PHOS: 124 U/L / ALT: 46 U/L / AST: 40 U/L / GGT: x           PT/INR - ( 22 Apr 2024 06:50 )   PT: 13.7 sec;   INR: 1.22 ratio         PTT - ( 22 Apr 2024 06:50 )  PTT:35.0 sec        RADIOLOGY & ADDITIONAL TESTS:    Imaging Personally Reviewed:    Consultant(s) Notes Reviewed:      Care Discussed with Consultants/Other Providers:

## 2024-04-22 NOTE — PROGRESS NOTE ADULT - SUBJECTIVE AND OBJECTIVE BOX
[facility] [phone]        Patient seen and examined in bed.  He was the same         MEDICATIONS  (STANDING):  aMIOdarone    Tablet 200 milliGRAM(s) Oral daily  atorvastatin 80 milliGRAM(s) Oral at bedtime  citric acid/sodium citrate Solution 30 milliLiter(s) Oral two times a day  dextrose 10% Bolus 125 milliLiter(s) IV Bolus once  dextrose 5%. 1000 milliLiter(s) (75 mL/Hr) IV Continuous <Continuous>  dextrose 5%. 1000 milliLiter(s) (100 mL/Hr) IV Continuous <Continuous>  dextrose 5%. 1000 milliLiter(s) (50 mL/Hr) IV Continuous <Continuous>  dextrose 50% Injectable 12.5 Gram(s) IV Push once  dextrose 50% Injectable 25 Gram(s) IV Push once  glucagon  Injectable 1 milliGRAM(s) IntraMuscular once  influenza  Vaccine (HIGH DOSE) 0.7 milliLiter(s) IntraMuscular once  insulin lispro (ADMELOG) corrective regimen sliding scale   SubCutaneous at bedtime  insulin lispro (ADMELOG) corrective regimen sliding scale   SubCutaneous three times a day before meals  pantoprazole  Injectable 40 milliGRAM(s) IV Push every 12 hours  sucralfate 1 Gram(s) Oral two times a day  tamsulosin 0.4 milliGRAM(s) Oral at bedtime      VITAL:  T(C): , Max: 36.8 (04-21-24 @ 21:46)  T(F): , Max: 98.2 (04-21-24 @ 21:46)  HR: 69 (04-22-24 @ 12:10)  BP: 107/45 (04-22-24 @ 12:10)  BP(mean): --  RR: 13 (04-22-24 @ 12:10)  SpO2: 95% (04-22-24 @ 12:10)  Wt(kg): --    I and O's:    04-21 @ 07:01  -  04-22 @ 07:00  --------------------------------------------------------  IN: 120 mL / OUT: 0 mL / NET: 120 mL          PHYSICAL EXAM:    Constitutional: NAD  Neck:  No JVD  Respiratory: CTAB/L  Cardiovascular: S1 and S2  Gastrointestinal: BS+, soft, NT/ND  Extremities: No peripheral edema  Neurological: A/O x 3, no focal deficits  Psychiatric: Normal mood, normal affect  : No Temple  Skin: No rashes  Access: Not applicable    LABS:                        9.4    6.33  )-----------( 119      ( 22 Apr 2024 06:50 )             28.5     04-22    142  |  113<H>  |  73<H>  ----------------------------<  142<H>  4.4   |  17<L>  |  3.12<H>    Ca    8.5      22 Apr 2024 06:50  Phos  3.3     04-22  Mg     1.90     04-22    TPro  6.1  /  Alb  3.1<L>  /  TBili  1.4<H>  /  DBili  x   /  AST  40  /  ALT  46<H>  /  AlkPhos  124<H>  04-21          Urine Studies:  Urinalysis Basic - ( 22 Apr 2024 06:50 )    Color: x / Appearance: x / SG: x / pH: x  Gluc: 142 mg/dL / Ketone: x  / Bili: x / Urobili: x   Blood: x / Protein: x / Nitrite: x   Leuk Esterase: x / RBC: x / WBC x   Sq Epi: x / Non Sq Epi: x / Bacteria: x      Sodium, Random Urine: 110 mmol/L (04-21 @ 23:30)  Creatinine, Random Urine: 42 mg/dL (04-21 @ 23:30)  Protein/Creatinine Ratio Calculation: 3.9 Ratio (04-21 @ 23:30)        RADIOLOGY & ADDITIONAL STUDIES:            < from: US Kidney and Bladder (04.21.24 @ 09:54) >    ACC: [id number] EXAM:  US KIDNEYS AND BLADDER   ORDERED BY: NIGEL LIU     PROCEDURE DATE:  04/21/2024          INTERPRETATION:  CLINICAL INFORMATION: I21.4 Acute subendocardial   myocardial infarction. Evaluate for obstructive uropathy    COMPARISON: CT 3/26/2024    TECHNIQUE: Sonography of the kidneys and bladder.    FINDINGS:  Right kidney: 10.8 cm. No renal mass, hydronephrosis or calculi. 2.2 cm   upper pole cyst    Left kidney: Nonvisualized LEFT kidney/nephrectomy    Urinarybladder: Within normal limits.    IMPRESSION:  1.  No RIGHT obstructive uropathy.  2.  Nonvisualized LEFT kidney/LEFT nephrectomy    --- End of Report ---              < end of copied text >   [facility] [phone]        Patient seen and examined in bed.  He was the same   SP EGD   He states that he was a little winded         MEDICATIONS  (STANDING):  aMIOdarone    Tablet 200 milliGRAM(s) Oral daily  atorvastatin 80 milliGRAM(s) Oral at bedtime  citric acid/sodium citrate Solution 30 milliLiter(s) Oral two times a day  dextrose 10% Bolus 125 milliLiter(s) IV Bolus once  dextrose 5%. 1000 milliLiter(s) (75 mL/Hr) IV Continuous <Continuous>  dextrose 5%. 1000 milliLiter(s) (100 mL/Hr) IV Continuous <Continuous>  dextrose 5%. 1000 milliLiter(s) (50 mL/Hr) IV Continuous <Continuous>  dextrose 50% Injectable 12.5 Gram(s) IV Push once  dextrose 50% Injectable 25 Gram(s) IV Push once  glucagon  Injectable 1 milliGRAM(s) IntraMuscular once  influenza  Vaccine (HIGH DOSE) 0.7 milliLiter(s) IntraMuscular once  insulin lispro (ADMELOG) corrective regimen sliding scale   SubCutaneous at bedtime  insulin lispro (ADMELOG) corrective regimen sliding scale   SubCutaneous three times a day before meals  pantoprazole  Injectable 40 milliGRAM(s) IV Push every 12 hours  sucralfate 1 Gram(s) Oral two times a day  tamsulosin 0.4 milliGRAM(s) Oral at bedtime      VITAL:  T(C): , Max: 36.8 (04-21-24 @ 21:46)  T(F): , Max: 98.2 (04-21-24 @ 21:46)  HR: 69 (04-22-24 @ 12:10)  BP: 107/45 (04-22-24 @ 12:10)  BP(mean): --  RR: 13 (04-22-24 @ 12:10)  SpO2: 95% (04-22-24 @ 12:10)  Wt(kg): --    I and O's:    04-21 @ 07:01  -  04-22 @ 07:00  --------------------------------------------------------  IN: 120 mL / OUT: 0 mL / NET: 120 mL          PHYSICAL EXAM:    Constitutional: NAD  Neck:  No JVD  Respiratory: CTAB/L  Cardiovascular: S1 and S2  Gastrointestinal: BS+, soft, NT/ND  Extremities: No peripheral edema  Neurological: A/O x 3, no focal deficits  Psychiatric: Normal mood, normal affect  : No Temple  Skin: No rashes  Access: Not applicable    LABS:                        9.4    6.33  )-----------( 119      ( 22 Apr 2024 06:50 )             28.5     04-22    142  |  113<H>  |  73<H>  ----------------------------<  142<H>  4.4   |  17<L>  |  3.12<H>    Ca    8.5      22 Apr 2024 06:50  Phos  3.3     04-22  Mg     1.90     04-22    TPro  6.1  /  Alb  3.1<L>  /  TBili  1.4<H>  /  DBili  x   /  AST  40  /  ALT  46<H>  /  AlkPhos  124<H>  04-21          Urine Studies:  Urinalysis Basic - ( 22 Apr 2024 06:50 )    Color: x / Appearance: x / SG: x / pH: x  Gluc: 142 mg/dL / Ketone: x  / Bili: x / Urobili: x   Blood: x / Protein: x / Nitrite: x   Leuk Esterase: x / RBC: x / WBC x   Sq Epi: x / Non Sq Epi: x / Bacteria: x      Sodium, Random Urine: 110 mmol/L (04-21 @ 23:30)  Creatinine, Random Urine: 42 mg/dL (04-21 @ 23:30)  Protein/Creatinine Ratio Calculation: 3.9 Ratio (04-21 @ 23:30)        RADIOLOGY & ADDITIONAL STUDIES:            < from: US Kidney and Bladder (04.21.24 @ 09:54) >    ACC: [id number] EXAM:  US KIDNEYS AND BLADDER   ORDERED BY: NIGEL LIU     PROCEDURE DATE:  04/21/2024          INTERPRETATION:  CLINICAL INFORMATION: I21.4 Acute subendocardial   myocardial infarction. Evaluate for obstructive uropathy    COMPARISON: CT 3/26/2024    TECHNIQUE: Sonography of the kidneys and bladder.    FINDINGS:  Right kidney: 10.8 cm. No renal mass, hydronephrosis or calculi. 2.2 cm   upper pole cyst    Left kidney: Nonvisualized LEFT kidney/nephrectomy    Urinarybladder: Within normal limits.    IMPRESSION:  1.  No RIGHT obstructive uropathy.  2.  Nonvisualized LEFT kidney/LEFT nephrectomy    --- End of Report ---              < end of copied text >

## 2024-04-22 NOTE — PROGRESS NOTE ADULT - PROBLEM SELECTOR PLAN 4
- CKD4- Hx of left nephrectomy. creatinine 3.35 on 4/20. previously 2.73. Avoid nephrotoxic agents. Monitor electrolytes closely. Nephrology consult for further management.   - Bicarb of 15 on 4/20, likely from renal acidosis. Recheck on metabolic panel to consider starting bicarb supplementation.    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     Lasix 40mg IVP x 1 as he states that he was SOB   Due to his age we (pt and myself)have decided on no renal bx

## 2024-04-22 NOTE — PROGRESS NOTE ADULT - SUBJECTIVE AND OBJECTIVE BOX
CARDIOLOGY FOLLOW UP - Dr. Vega  DATE OF SERVICE: 4/22/24    CC sp egd   no cp or sob   fam at bedside       REVIEW OF SYSTEMS:  CONSTITUTIONAL: No fever, weight loss, or fatigue  RESPIRATORY: No cough, wheezing, chills or hemoptysis; No Shortness of Breath  CARDIOVASCULAR: No chest pain, palpitations, passing out, dizziness, or leg swelling  GASTROINTESTINAL: No abdominal or epigastric pain. No nausea, vomiting, or hematemesis; No diarrhea or constipation. No melena or hematochezia.  VASCULAR: No edema     PHYSICAL EXAM:  T(C): 36.8 (04-22-24 @ 11:34), Max: 36.8 (04-21-24 @ 21:46)  HR: 69 (04-22-24 @ 12:10) (69 - 80)  BP: 107/45 (04-22-24 @ 12:10) (94/46 - 127/60)  RR: 13 (04-22-24 @ 12:10) (13 - 18)  SpO2: 95% (04-22-24 @ 12:10) (95% - 98%)  Wt(kg): --  I&O's Summary    21 Apr 2024 07:01  -  22 Apr 2024 07:00  --------------------------------------------------------  IN: 120 mL / OUT: 0 mL / NET: 120 mL        Appearance: Normal	  Cardiovascular: Normal S1 S2,RR murmur  Respiratory: Lungs clear to auscultation	  Gastrointestinal:  Soft, Non-tender, + BS	  Extremities: Normal range of motion, No clubbing, cyanosis or edema      Home Medications:  apixaban 2.5 mg oral tablet: 1 tab(s) orally every 12 hours (20 Apr 2024 23:35)  Crestor 40 mg oral tablet: 1 tab(s) orally once a day (at bedtime) (20 Apr 2024 23:35)  repaglinide 0.5 mg oral tablet: 1 tab(s) orally 3 times a day (before meals) (20 Apr 2024 23:27)  tamsulosin 0.4 mg oral capsule: 1 cap(s) orally once a day (20 Apr 2024 23:27)  Tresiba FlexTouch 100 units/mL subcutaneous solution: 5 solution subcutaneous once a day (at bedtime) (20 Apr 2024 23:27)      MEDICATIONS  (STANDING):  aMIOdarone    Tablet 200 milliGRAM(s) Oral daily  atorvastatin 80 milliGRAM(s) Oral at bedtime  citric acid/sodium citrate Solution 30 milliLiter(s) Oral two times a day  dextrose 10% Bolus 125 milliLiter(s) IV Bolus once  dextrose 5%. 1000 milliLiter(s) (100 mL/Hr) IV Continuous <Continuous>  dextrose 5%. 1000 milliLiter(s) (50 mL/Hr) IV Continuous <Continuous>  dextrose 5%. 1000 milliLiter(s) (75 mL/Hr) IV Continuous <Continuous>  dextrose 50% Injectable 12.5 Gram(s) IV Push once  dextrose 50% Injectable 25 Gram(s) IV Push once  glucagon  Injectable 1 milliGRAM(s) IntraMuscular once  influenza  Vaccine (HIGH DOSE) 0.7 milliLiter(s) IntraMuscular once  insulin lispro (ADMELOG) corrective regimen sliding scale   SubCutaneous at bedtime  insulin lispro (ADMELOG) corrective regimen sliding scale   SubCutaneous three times a day before meals  pantoprazole  Injectable 40 milliGRAM(s) IV Push every 12 hours  tamsulosin 0.4 milliGRAM(s) Oral at bedtime      TELEMETRY: nsr  	    ECG:  	  RADIOLOGY:     DIAGNOSTIC TESTING:  [ ] Echocardiogram:   < from: TTE Limited W or WO Ultrasound Enhancing Agent (04.21.24 @ 15:27) >     CONCLUSIONS:      1. Left ventricular systolic function is moderately decreased with an ejection fraction of 37 % by Villar's method of disks. Regional wall motion abnormalities present.   2. Multiple segmental abnormalities exist. See findings.   3. Analysis of left ventricular diastolic function and filling pressure is made challenging by the presence of severe mitral regurgitation.   4. The left atrium is mildly dilated.   5. The right atrium is dilated.   6. There is posterior leaflet tethering. There is no mitral valve stenosis. There is severe mitral regurgitation. The mitral regurgitant jet is eccentrically directed.   7. There is mild to moderate tricuspid regurgitation. Estimated pulmonary artery systolic pressure is 58 mmHg.   8. Compared to the transthoracic echocardiogram performed on 3/19/2024, there is now severe eccentric mitral regurgitation, LVEF has decreased to 37% and LV size has increased from 2.5 cm to 4.7 cm (end-systolic dimension). The wall motion abnormalities of the inferolateral wall, lateral and inferior wall are more pronounced. The MR mechanism is probably due to tethering of the posterior mitral leaflet (ischemic MR).    ________________________________________________________________________________________  Critical Findings: New LV dysfunction (EF <40%) or Change in LVEF (EF <40%) and  Newly diagnosed Severe Valve Regurgitation or Severe Stenosis. Paxton Mitchell MD  was informed of the findings by 2-way electronic message on 4/21/2024 at 5:10:04  PM.    < end of copied text >    [ ]  Catheterization:  [ ] Stress Test:    OTHER: 	    LABS:	 	    Troponin T, High Sensitivity Result: 532 ng/L (04-22 @ 06:50)  Troponin T, High Sensitivity Result: 622 ng/L (04-20 @ 23:30)  Creatine Kinase, Serum: 51 U/L [30 - 200] (04-20 @ 15:05)  CKMB Units: 5.9 ng/mL (04-20 @ 15:05)  Troponin T, High Sensitivity Result: 615 ng/L (04-20 @ 15:05)  Troponin T, High Sensitivity Result: 704 ng/L (04-20 @ 12:03)                          9.4    6.33  )-----------( 119      ( 22 Apr 2024 06:50 )             28.5     04-22    142  |  113<H>  |  73<H>  ----------------------------<  142<H>  4.4   |  17<L>  |  3.12<H>    Ca    8.5      22 Apr 2024 06:50  Phos  3.3     04-22  Mg     1.90     04-22    TPro  6.1  /  Alb  3.1<L>  /  TBili  1.4<H>  /  DBili  x   /  AST  40  /  ALT  46<H>  /  AlkPhos  124<H>  04-21    PT/INR - ( 22 Apr 2024 06:50 )   PT: 13.7 sec;   INR: 1.22 ratio         PTT - ( 22 Apr 2024 06:50 )  PTT:35.0 sec

## 2024-04-22 NOTE — PRE PROCEDURE NOTE - PRE PROCEDURE EVALUATION
Attending Physician:            CRYSTAL                Procedure: egd    Indication for Procedure: melena  ________________________________________________________  PAST MEDICAL & SURGICAL HISTORY:  CAD (coronary artery disease)  (4 stents,)      Diabetes mellitus, new onset  diet controlled      Single kidney  (hx/o LEFT nephrectomy at age 16; pt states due to a ureter"blockage" causing "infection"; pt denies hx/o renal dysfunction)      Arthritis      Myocardial infarction  (2003)      Hard of hearing      Atrial fibrillation      Hypertension      Hyperlipidemia      History of TIAs  Last 2018      History of bradycardia      Bladder cancer      History of CHF (congestive heart failure)      Left carotid bruit      History of nephrectomy, unilateral  (hx/o LEFT nephrectomy at age 16)      Stented coronary artery  (4 stents)      S/P bilateral cataract extraction      H/O cystoscopy        ALLERGIES:  Cipro (Joint Pain)  penicillin (Rash)    HOME MEDICATIONS:  apixaban 2.5 mg oral tablet: 1 tab(s) orally every 12 hours  Crestor 40 mg oral tablet: 1 tab(s) orally once a day (at bedtime)  repaglinide 0.5 mg oral tablet: 1 tab(s) orally 3 times a day (before meals)  tamsulosin 0.4 mg oral capsule: 1 cap(s) orally once a day  Tresiba FlexTouch 100 units/mL subcutaneous solution: 5 solution subcutaneous once a day (at bedtime)    AICD/PPM: [ ] yes   [ ] no    PERTINENT LAB DATA:                        9.4    6.33  )-----------( 119      ( 22 Apr 2024 06:50 )             28.5     04-22    142  |  113<H>  |  73<H>  ----------------------------<  142<H>  4.4   |  17<L>  |  3.12<H>    Ca    8.5      22 Apr 2024 06:50  Phos  3.3     04-22  Mg     1.90     04-22    TPro  6.1  /  Alb  3.1<L>  /  TBili  1.4<H>  /  DBili  x   /  AST  40  /  ALT  46<H>  /  AlkPhos  124<H>  04-21    PT/INR - ( 22 Apr 2024 06:50 )   PT: 13.7 sec;   INR: 1.22 ratio         PTT - ( 22 Apr 2024 06:50 )  PTT:35.0 sec  CARDIAC MARKERS ( 20 Apr 2024 15:05 )  x     / x     / 51 U/L / x     / 5.9 ng/mL            PHYSICAL EXAMINATION:    T(C): 36.2  HR: 79  BP: 127/60  RR: 18  SpO2: 95%    Constitutional: NAD  HEENT: PERRLA, EOMI,    Neck:  No JVD  Respiratory: CTAB/L  Cardiovascular: S1 and S2  Gastrointestinal: BS+, soft, NT/ND  Extremities: No peripheral edema  Neurological: A/O x 3, no focal deficits  Psychiatric: Normal mood, normal affect  Skin: No rashes    ASA Class: I [ ]  II [ ]  III [x ]  IV [ ]    COMMENTS:    The patient is a suitable candidate for the planned procedure unless box checked [ ]  No, explain:    I explained the risks (bleeding, infection perforation, Cv risk, anesthesia risk)/b/a with the patient. The patient agrees to proceed.

## 2024-04-22 NOTE — PROGRESS NOTE ADULT - ASSESSMENT
88y Male with CAD, HFrEF, Afib, HTN, DM, solitary R kidney and CKD p/w acute on chronic HFrEF and acute on chronic kidney injury.  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  I think he is euvolemic at present n not dehydrated   Due to his age we (pt and myself)have decided on no renal bx   Off diuretics     Metabolic acidosis-  likely due to CKD/LILIAN     Anemia in CKD-  Trend CBC      CV- acute on chronic HFrEF and this seems resolved.  juan is off     GI-   EGD results pending         Sayed [facility]   [phone]          88y Male with CAD, HFrEF, Afib, HTN, DM, solitary R kidney and CKD p/w acute on chronic HFrEF and acute on chronic kidney injury.  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     Lasix 40mg IVP x 1 as he states that he was SOB   Due to his age we (pt and myself)have decided on no renal bx      Metabolic acidosis-  likely due to CKD/LILIAN     Anemia in CKD-  Trend CBC      CV- acute on chronic HFrEF and this seems resolved.  eliquis is off   As an outpt he was slated for [facility].  Is that possible here this hospitalization     GI-   EGD results pending         Sayed [facility]   [phone]

## 2024-04-22 NOTE — PROGRESS NOTE ADULT - PROBLEM SELECTOR PLAN 3
- GI notes appreciated, recent EGD which showed red blood in antrum w/ significant mucosal changes, suspect this is the source of patient's melena and anemia. Path was recently neg for malignancy and congo red. He was already scheduled for outpt EGD in May which can be expedited here in setting melena.  -as per GI , poss egd today

## 2024-04-23 LAB
ALBUMIN SERPL ELPH-MCNC: 2.6 G/DL — LOW (ref 3.3–5)
ALP SERPL-CCNC: 127 U/L — HIGH (ref 40–120)
ALT FLD-CCNC: 56 U/L — HIGH (ref 4–41)
ANION GAP SERPL CALC-SCNC: 13 MMOL/L — SIGNIFICANT CHANGE UP (ref 7–14)
AST SERPL-CCNC: 50 U/L — HIGH (ref 4–40)
BASOPHILS # BLD AUTO: 0.04 K/UL — SIGNIFICANT CHANGE UP (ref 0–0.2)
BASOPHILS NFR BLD AUTO: 0.6 % — SIGNIFICANT CHANGE UP (ref 0–2)
BILIRUB SERPL-MCNC: 1.3 MG/DL — HIGH (ref 0.2–1.2)
BUN SERPL-MCNC: 68 MG/DL — HIGH (ref 7–23)
CALCIUM SERPL-MCNC: 7.8 MG/DL — LOW (ref 8.4–10.5)
CHLORIDE SERPL-SCNC: 112 MMOL/L — HIGH (ref 98–107)
CO2 SERPL-SCNC: 17 MMOL/L — LOW (ref 22–31)
CREAT SERPL-MCNC: 3.17 MG/DL — HIGH (ref 0.5–1.3)
EGFR: 18 ML/MIN/1.73M2 — LOW
EOSINOPHIL # BLD AUTO: 0.13 K/UL — SIGNIFICANT CHANGE UP (ref 0–0.5)
EOSINOPHIL NFR BLD AUTO: 1.8 % — SIGNIFICANT CHANGE UP (ref 0–6)
GLUCOSE BLDC GLUCOMTR-MCNC: 154 MG/DL — HIGH (ref 70–99)
GLUCOSE BLDC GLUCOMTR-MCNC: 158 MG/DL — HIGH (ref 70–99)
GLUCOSE BLDC GLUCOMTR-MCNC: 164 MG/DL — HIGH (ref 70–99)
GLUCOSE BLDC GLUCOMTR-MCNC: 171 MG/DL — HIGH (ref 70–99)
GLUCOSE BLDC GLUCOMTR-MCNC: 185 MG/DL — HIGH (ref 70–99)
GLUCOSE SERPL-MCNC: 155 MG/DL — HIGH (ref 70–99)
HCT VFR BLD CALC: 29 % — LOW (ref 39–50)
HGB BLD-MCNC: 9.6 G/DL — LOW (ref 13–17)
IANC: 5.34 K/UL — SIGNIFICANT CHANGE UP (ref 1.8–7.4)
IMM GRANULOCYTES NFR BLD AUTO: 0.3 % — SIGNIFICANT CHANGE UP (ref 0–0.9)
LYMPHOCYTES # BLD AUTO: 0.78 K/UL — LOW (ref 1–3.3)
LYMPHOCYTES # BLD AUTO: 11 % — LOW (ref 13–44)
MCHC RBC-ENTMCNC: 31.9 PG — SIGNIFICANT CHANGE UP (ref 27–34)
MCHC RBC-ENTMCNC: 33.1 GM/DL — SIGNIFICANT CHANGE UP (ref 32–36)
MCV RBC AUTO: 96.3 FL — SIGNIFICANT CHANGE UP (ref 80–100)
MONOCYTES # BLD AUTO: 0.79 K/UL — SIGNIFICANT CHANGE UP (ref 0–0.9)
MONOCYTES NFR BLD AUTO: 11.1 % — SIGNIFICANT CHANGE UP (ref 2–14)
NEUTROPHILS # BLD AUTO: 5.34 K/UL — SIGNIFICANT CHANGE UP (ref 1.8–7.4)
NEUTROPHILS NFR BLD AUTO: 75.2 % — SIGNIFICANT CHANGE UP (ref 43–77)
NRBC # BLD: 0 /100 WBCS — SIGNIFICANT CHANGE UP (ref 0–0)
NRBC # FLD: 0 K/UL — SIGNIFICANT CHANGE UP (ref 0–0)
PLATELET # BLD AUTO: 116 K/UL — LOW (ref 150–400)
POTASSIUM SERPL-MCNC: 3.9 MMOL/L — SIGNIFICANT CHANGE UP (ref 3.5–5.3)
POTASSIUM SERPL-SCNC: 3.9 MMOL/L — SIGNIFICANT CHANGE UP (ref 3.5–5.3)
PROT SERPL-MCNC: 5.6 G/DL — LOW (ref 6–8.3)
RBC # BLD: 3.01 M/UL — LOW (ref 4.2–5.8)
RBC # FLD: 21.6 % — HIGH (ref 10.3–14.5)
SODIUM SERPL-SCNC: 142 MMOL/L — SIGNIFICANT CHANGE UP (ref 135–145)
TROPONIN T, HIGH SENSITIVITY RESULT: 526 NG/L — CRITICAL HIGH
WBC # BLD: 7.1 K/UL — SIGNIFICANT CHANGE UP (ref 3.8–10.5)
WBC # FLD AUTO: 7.1 K/UL — SIGNIFICANT CHANGE UP (ref 3.8–10.5)

## 2024-04-23 RX ADMIN — AMIODARONE HYDROCHLORIDE 200 MILLIGRAM(S): 400 TABLET ORAL at 06:29

## 2024-04-23 RX ADMIN — Medication 1 GRAM(S): at 18:50

## 2024-04-23 RX ADMIN — Medication 1: at 18:49

## 2024-04-23 RX ADMIN — Medication 30 MILLILITER(S): at 06:30

## 2024-04-23 RX ADMIN — PANTOPRAZOLE SODIUM 40 MILLIGRAM(S): 20 TABLET, DELAYED RELEASE ORAL at 18:50

## 2024-04-23 RX ADMIN — Medication 1: at 08:47

## 2024-04-23 RX ADMIN — Medication 1 GRAM(S): at 06:29

## 2024-04-23 RX ADMIN — Medication 1: at 12:40

## 2024-04-23 RX ADMIN — ATORVASTATIN CALCIUM 80 MILLIGRAM(S): 80 TABLET, FILM COATED ORAL at 21:40

## 2024-04-23 RX ADMIN — PANTOPRAZOLE SODIUM 40 MILLIGRAM(S): 20 TABLET, DELAYED RELEASE ORAL at 06:29

## 2024-04-23 RX ADMIN — Medication 30 MILLILITER(S): at 18:54

## 2024-04-23 RX ADMIN — TAMSULOSIN HYDROCHLORIDE 0.4 MILLIGRAM(S): 0.4 CAPSULE ORAL at 21:40

## 2024-04-23 NOTE — PROGRESS NOTE ADULT - ASSESSMENT
ECHO 3/19/24 : . Left ventricular systolic function is mildly decreased with an ejection fraction visually estimated at 45 to 50%. There are regional wall motion abnormalities present. There is mild (grade 1) left ventricular diastolic dysfunction. Hypokinesis of the lateral and inferolateral walls. trace mitral regurgitation.mild to moderate aortic regurgitation.  ECHO 4/21/24 : Left ventricular systolic function is moderately decreased with an ejection fraction of 37 % Regional wall motion abnormalities present. Multiple segmental abnormalities , mild to moderate tricuspid regurgitation; severe mitral regurgitation    A/P    89yo w/ PMHx CKD, HFrEF, Diabetes, TIA,  CAD s/p stenting, bladder cancer s/p chemo in remission, afib on eliquis, hx of left nephrectomy, recent admission 3/16-3/28 for SOB found to be anemic s/p EGD who presents to the ER complaining of increased weakness, dizziness, sob/rosado, with chest tightness and dark stools.    #Chest Pain/+ Troponin  -likely type II MI (demand ischemia) in setting of CKD, anemia  -GI eval appreciated  -sp egd   -antiplatelet/DOAC on hold  -pt w h/o CAD s/p PCI  -resume asa/ ac  when feasible per GI    #SOB,chronic HFrEF  -does not appear overloaded  -sp lasix ivp  4/22 per renal  -lasix on hold for now   -previous echo w mild LV dysfunction  -repeat TTE w LV dysfunction EF 37% WMA (overall unchanged from prior) however now with  severe eccentric mitral regurgitation 37%    #Sev MR   -echo as above-dw patient and wife (via phone) today - they  want to pursue MR workup - requesting for intpt [facility]CELLA       #Atrial Fibrillation  -stable  -ac on hold  -monitor cbc   -cont amio  -per family  patient undergoing watchman eval as outpt with dr Pan     #CKD  -renal f/u   ECHO 3/19/24 : . Left ventricular systolic function is mildly decreased with an ejection fraction visually estimated at 45 to 50%. There are regional wall motion abnormalities present. There is mild (grade 1) left ventricular diastolic dysfunction. Hypokinesis of the lateral and inferolateral walls. trace mitral regurgitation.mild to moderate aortic regurgitation.  ECHO 4/21/24 : Left ventricular systolic function is moderately decreased with an ejection fraction of 37 % Regional wall motion abnormalities present. Multiple segmental abnormalities , mild to moderate tricuspid regurgitation; severe mitral regurgitation    A/P    87yo w/ PMHx CKD, HFrEF, Diabetes, TIA,  CAD s/p stenting, bladder cancer s/p chemo in remission, afib on eliquis, hx of left nephrectomy, recent admission 3/16-3/28 for SOB found to be anemic s/p EGD who presents to the ER complaining of increased weakness, dizziness, sob/rosado, with chest tightness and dark stools.    #Chest Pain/+ Troponin  -likely type II MI (demand ischemia) in setting of CKD, anemia  -GI eval appreciated  -sp egd   -antiplatelet/DOAC on hold  -pt w h/o CAD s/p PCI  -resume asa/ ac  when feasible per GI    #SOB,chronic HFrEF  -does not appear overloaded  -sp lasix ivp  4/22 per renal  -lasix on hold for now   -previous echo w mild LV dysfunction  -repeat TTE w LV dysfunction EF 37% WMA (overall unchanged from prior) however now with  severe eccentric mitral regurgitation 37%    #Sev MR   -echo as above-dw patient and wife (via phone) today - they  want to pursue MR workup - requesting for intpt MARCELLA       #Atrial Fibrillation  -stable  -ac on hold  -monitor cbc   -cont amio  -per family  patient undergoing watchman eval as outpt with dr Pan -- will CALL EP FOR INPT  EVAL    #CKD  -renal f/u

## 2024-04-23 NOTE — PROGRESS NOTE ADULT - ASSESSMENT
89yo w/ PMHx CKD, HFrEF, CAD s/p stenting, bladder cancer s/p chemo, afib on eliquis, recent admission 3/16-3/28 for SOB found to be anemic s/p EGD presenting w/ melena/chest discomfort.    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 BUN/Cr 88/3.35 which appears to be at baseline. S/P EGD 4/22 with   Recommendations:  -Cardiology eval for troponin elevation  -IV PPI BID  -Plan for EGD once optimized; likely Monday  -Transfuse to maintain Hb >8  -Active T&S  -2 large bore IVs  -Hold AC  -Please make NPO on Sunday night  -3am labs Monday morning: CBC, CMP, coags; if repleting K, please give IV repletion only    Note incomplete until finalized by attending signature/attestation.    Ora Parr  GI/Hepatology Fellow PGY5    NON-URGENT CONSULTS:  Please email [email or web address]  AT NIGHT AND ON WEEKENDS:  Available on Microsoft Teams  [phone] ([employer] Pager)    For urgent consults, please contact on call GI team. See Amion schedule ([facility]) or [employer] paging system ([facility])      showing LA grade A esophagitis large amount of red blood in antrum of stomach w/ severe mucosal changes characterized by congestion, erythema friability, hemorrhagic appearance. Treated w/ hemospray. Underlying c/f malignancy. Path showed moderate chronic inactive gastritis and mild foveolar hyperplasia, neg for dypslaia or H. pylori. Congo red stain neg. ASA was held.    On arrival, HD stable w/ /69, HR 70s. Labs showed Hb 7.7 from 8.7 on 3/28 w/ , plts 141, BUN/Cr 88/3.35 (has b/l CKD w/ Cr ~2.7). INR 1.24. Trop is elevated in 700s.Started on PPI.  Ordered for 1u PRBCs.   89yo w/ PMHx CKD, HFrEF, CAD s/p stenting, bladder cancer s/p chemo, afib on eliquis, recent admission 3/16-3/28 for SOB found to be anemic s/p EGD presenting w/ melena/chest discomfort.    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 BUN/Cr 88/3.35 which appears to be at baseline. S/P EGD 4/22 with antral linear vascular ectasias c/w GAVE; s/p RFA with 93 ablations  - Hgb stable ~9  Recommendations:  - PPI BID  - Carafate 1g TID for 7 days  - Transfuse to maintain Hb >8  - If Hgb stable and pt with brown BM, no absolute GI contraindication to resumption of AC based on risk/benefit discussion of continued AC per primary team  - will sign off at this time, however please call back with questions or should any worsening/persistent issues arise. Follow up in Gastroenterology Clinic: [phone] ([facility] at [address] [place] [address]) or [phone] ([facility] at [address] [place] [address]) or [phone] ([facility] at [address] [place] [address])  or [facility]: [phone] ([address] [place] [address]. [address] [place], [place], [address])    Note and recommendations are incomplete until reviewed and attested by attending.    Floyd Yu MD  GI/Hepatology Fellow, PGY4  Long Range Pager [phone] or [facility] Pager [phone]  Teams preferred (7AM to 5PM); after 5PM, call GI fellow on call    On Weekends/Holidays (All Day) and Weekdays after 5PM to 8AM  For non-urgent consults, please email [email or web address] and [email or web address]  For urgent consults, please contact on call GI team. See Amion schedule ([facility]), [employer] paging system ([facility]), or call hospital  ([facility])

## 2024-04-23 NOTE — PROGRESS NOTE ADULT - SUBJECTIVE AND OBJECTIVE BOX
Chief Complaint:  Patient is a 88y old  Male who presents with a chief complaint of Blood loss anemia, GI bleeding, NSTEMI (23 Apr 2024 07:26)    Interval Events:   s/p EGD with RFA of GAVE  no abd pain, nausea, vomiting  tolerating PO    Allergies:  Cipro (Joint Pain)  penicillin (Rash)        Hospital Medications:  acetaminophen     Tablet .. 650 milliGRAM(s) Oral every 6 hours PRN  aluminum hydroxide/magnesium hydroxide/simethicone Suspension 30 milliLiter(s) Oral every 4 hours PRN  aMIOdarone    Tablet 200 milliGRAM(s) Oral daily  atorvastatin 80 milliGRAM(s) Oral at bedtime  citric acid/sodium citrate Solution 30 milliLiter(s) Oral two times a day  dextrose 10% Bolus 125 milliLiter(s) IV Bolus once  dextrose 5%. 1000 milliLiter(s) IV Continuous <Continuous>  dextrose 5%. 1000 milliLiter(s) IV Continuous <Continuous>  dextrose 5%. 1000 milliLiter(s) IV Continuous <Continuous>  dextrose 50% Injectable 25 Gram(s) IV Push once  dextrose 50% Injectable 12.5 Gram(s) IV Push once  dextrose Oral Gel 15 Gram(s) Oral once PRN  glucagon  Injectable 1 milliGRAM(s) IntraMuscular once  influenza  Vaccine (HIGH DOSE) 0.7 milliLiter(s) IntraMuscular once  insulin lispro (ADMELOG) corrective regimen sliding scale   SubCutaneous at bedtime  insulin lispro (ADMELOG) corrective regimen sliding scale   SubCutaneous three times a day before meals  melatonin 3 milliGRAM(s) Oral at bedtime PRN  pantoprazole  Injectable 40 milliGRAM(s) IV Push every 12 hours  sucralfate 1 Gram(s) Oral two times a day  tamsulosin 0.4 milliGRAM(s) Oral at bedtime      PMHX/PSHX:  CAD (coronary artery disease)    Diabetes mellitus, new onset    Single kidney    Arthritis    Myocardial infarction    Hard of hearing    Atrial fibrillation    Hypertension    Hyperlipidemia    History of TIAs    History of bradycardia    Bladder cancer    History of CHF (congestive heart failure)    Left carotid bruit    History of nephrectomy, unilateral    Stented coronary artery    S/P bilateral cataract extraction    H/O cystoscopy        Family history:  Family history of diabetes mellitus in mother (Mother)    FH: bladder cancer (Sibling)        PHYSICAL EXAM:   Vital Signs:  Vital Signs Last 24 Hrs  T(C): 36.8 (23 Apr 2024 05:45), Max: 36.8 (22 Apr 2024 11:34)  T(F): 98.2 (23 Apr 2024 05:45), Max: 98.2 (22 Apr 2024 11:34)  HR: 74 (23 Apr 2024 05:45) (69 - 86)  BP: 102/55 (23 Apr 2024 05:45) (94/46 - 127/60)  BP(mean): --  RR: 17 (23 Apr 2024 05:45) (13 - 18)  SpO2: 96% (23 Apr 2024 05:45) (95% - 99%)    Parameters below as of 23 Apr 2024 05:45  Patient On (Oxygen Delivery Method): room air      Daily     Daily     GENERAL:  No acute distress  HEENT:  no scleral icterus  CHEST:  no accessory muscle use  HEART:  Regular rate and rhythm  ABDOMEN:  Soft, non-tender, non-distended  EXTREMITIES:  No edema  SKIN:  No rash/ecchymoses  NEURO:  Alert and oriented x 3    LABS:                        9.6    7.10  )-----------( 116      ( 23 Apr 2024 06:45 )             29.0     Mean Cell Volume: 96.3 fL (04-23-24 @ 06:45)    04-23    142  |  112<H>  |  68<H>  ----------------------------<  155<H>  3.9   |  17<L>  |  3.17<H>    Ca    7.8<L>      23 Apr 2024 06:45  Phos  3.3     04-22  Mg     1.90     04-22    TPro  5.6<L>  /  Alb  2.6<L>  /  TBili  1.3<H>  /  DBili  x   /  AST  50<H>  /  ALT  56<H>  /  AlkPhos  127<H>  04-23    LIVER FUNCTIONS - ( 23 Apr 2024 06:45 )  Alb: 2.6 g/dL / Pro: 5.6 g/dL / ALK PHOS: 127 U/L / ALT: 56 U/L / AST: 50 U/L / GGT: x           PT/INR - ( 22 Apr 2024 06:50 )   PT: 13.7 sec;   INR: 1.22 ratio         PTT - ( 22 Apr 2024 06:50 )  PTT:35.0 sec  Urinalysis Basic - ( 23 Apr 2024 06:45 )    Color: x / Appearance: x / SG: x / pH: x  Gluc: 155 mg/dL / Ketone: x  / Bili: x / Urobili: x   Blood: x / Protein: x / Nitrite: x   Leuk Esterase: x / RBC: x / WBC x   Sq Epi: x / Non Sq Epi: x / Bacteria: x                              9.6    7.10  )-----------( 116      ( 23 Apr 2024 06:45 )             29.0                         9.4    6.33  )-----------( 119      ( 22 Apr 2024 06:50 )             28.5                         10.3   6.58  )-----------( 125      ( 21 Apr 2024 06:23 )             30.8                         7.8    5.09  )-----------( 119      ( 20 Apr 2024 23:30 )             24.0                         7.7    6.94  )-----------( 141      ( 20 Apr 2024 12:03 )             25.3

## 2024-04-23 NOTE — PHYSICAL THERAPY INITIAL EVALUATION ADULT - ADDITIONAL COMMENTS
Pt lives in a home with family, no recent falls, uses DME to ambulate, has 1-2 stairs to negotiate, wife assists as needed.   Patients Current SpO2: 99%

## 2024-04-23 NOTE — PROGRESS NOTE ADULT - PROBLEM SELECTOR PLAN 8
ECG showing prolonged qt interval   - magnesium 1gram IV ordered on 04/21  - patient is on amiodarone and currently is rhythm controlled. cardiology to follow up for dose lowering if appropriate.     Monitor QT on serial ECG HbA1C normal recently   finger sticks with short acting insulin sliding scale  no oral meds  diabetic diet  monitor for hypoglycemia

## 2024-04-23 NOTE — PROGRESS NOTE ADULT - ASSESSMENT
89yo w/ PMHx CKD, HFrEF, Diabetes, TIA,  CAD s/p stenting, bladder cancer s/p chemo in remission, afib on eliquis, hx of left nephrectomy, recent admission 3/16-3/28 for SOB found to be anemic s/p EGD who presents to the ER complaining of increased weakness, dizziness, sob/rosado, with chest tightness and dark stools. Found with anemia s/p 1 unit PRBC. Plan for EGD, holding AC as per GI.

## 2024-04-23 NOTE — PROGRESS NOTE ADULT - PROBLEM SELECTOR PLAN 10
- DVT prophylaxis - SCD given concerns for gi bleeding.     -
- DVT prophylaxis - SCD given concerns for gi bleeding.     -
- DVT prophylaxis - SCD given concerns for gi bleeding.     - Discussed plan as above with wife on 04/20.   - GOC, discussed with wife and patient, wishes appears no interventions that would prolong pain and suffering. Short trial of CPR as per wife. GOC to be discussed further tomorrow. Family to bring form that discusses wishes.

## 2024-04-23 NOTE — PROGRESS NOTE ADULT - SUBJECTIVE AND OBJECTIVE BOX
NEPHROLOGY-[facility] [phone]        Patient seen and examined in bed.  He was the same         MEDICATIONS  (STANDING):  aMIOdarone    Tablet 200 milliGRAM(s) Oral daily  atorvastatin 80 milliGRAM(s) Oral at bedtime  citric acid/sodium citrate Solution 30 milliLiter(s) Oral two times a day  dextrose 10% Bolus 125 milliLiter(s) IV Bolus once  dextrose 5%. 1000 milliLiter(s) (100 mL/Hr) IV Continuous <Continuous>  dextrose 5%. 1000 milliLiter(s) (75 mL/Hr) IV Continuous <Continuous>  dextrose 5%. 1000 milliLiter(s) (50 mL/Hr) IV Continuous <Continuous>  dextrose 50% Injectable 12.5 Gram(s) IV Push once  dextrose 50% Injectable 25 Gram(s) IV Push once  glucagon  Injectable 1 milliGRAM(s) IntraMuscular once  influenza  Vaccine (HIGH DOSE) 0.7 milliLiter(s) IntraMuscular once  insulin lispro (ADMELOG) corrective regimen sliding scale   SubCutaneous three times a day before meals  insulin lispro (ADMELOG) corrective regimen sliding scale   SubCutaneous at bedtime  pantoprazole  Injectable 40 milliGRAM(s) IV Push every 12 hours  sucralfate 1 Gram(s) Oral two times a day  tamsulosin 0.4 milliGRAM(s) Oral at bedtime      VITAL:  T(C): , Max: 36.8 (04-23-24 @ 05:45)  T(F): , Max: 98.2 (04-23-24 @ 05:45)  HR: 74 (04-23-24 @ 05:45)  BP: 102/55 (04-23-24 @ 05:45)  BP(mean): --  RR: 17 (04-23-24 @ 05:45)  SpO2: 96% (04-23-24 @ 05:45)  Wt(kg): --    I and O's:        PHYSICAL EXAM:    Constitutional: NAD  Neck:  No JVD  Respiratory: CTAB/L  Cardiovascular: S1 and S2  Gastrointestinal: BS+, soft, NT/ND  Extremities: No peripheral edema  Neurological: A/O x 3, no focal deficits  Psychiatric: Normal mood, normal affect  : No Temple  Skin: No rashes  Access: Not applicable    LABS:                        9.6    7.10  )-----------( 116      ( 23 Apr 2024 06:45 )             29.0     04-23    142  |  112<H>  |  68<H>  ----------------------------<  155<H>  3.9   |  17<L>  |  3.17<H>    Ca    7.8<L>      23 Apr 2024 06:45  Phos  3.3     04-22  Mg     1.90     04-22    TPro  5.6<L>  /  Alb  2.6<L>  /  TBili  1.3<H>  /  DBili  x   /  AST  50<H>  /  ALT  56<H>  /  AlkPhos  127<H>  04-23          Urine Studies:  Urinalysis Basic - ( 23 Apr 2024 06:45 )    Color: x / Appearance: x / SG: x / pH: x  Gluc: 155 mg/dL / Ketone: x  / Bili: x / Urobili: x   Blood: x / Protein: x / Nitrite: x   Leuk Esterase: x / RBC: x / WBC x   Sq Epi: x / Non Sq Epi: x / Bacteria: x      Sodium, Random Urine: 110 mmol/L (04-21 @ 23:30)  Creatinine, Random Urine: 42 mg/dL (04-21 @ 23:30)  Protein/Creatinine Ratio Calculation: 3.9 Ratio (04-21 @ 23:30)        RADIOLOGY & ADDITIONAL STUDIES:

## 2024-04-23 NOTE — PROGRESS NOTE ADULT - PROBLEM SELECTOR PLAN 3
- GI notes appreciated, recent EGD which showed red blood in antrum w/ significant mucosal changes, suspect this is the source of patient's melena and anemia. Path was recently neg for malignancy and congo red. He was already scheduled for outpt EGD in May which can be expedited here in setting melena.  - given these recent GI findings and pending procedure, holding of aspirin and antiplatelet therapies as risk is elevated. 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

## 2024-04-23 NOTE — PROGRESS NOTE ADULT - ASSESSMENT
88y Male with CAD, HFrEF, Afib, HTN, DM, solitary R kidney and CKD p/w acute on chronic HFrEF and acute on chronic kidney injury.  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     Due to his age we (pt and myself)have decided on no renal bx      Metabolic acidosis-  likely due to CKD/LILIAN     Anemia in CKD-  Trend CBC      CV- acute on chronic HFrEF and this seems resolved.  eliquis is off;  AI IV lasix yesterday   As an outpt he was slated for watchmans.     GI-   EGD results pending         Sayed [facility]   Wm QingCloud   [phone]

## 2024-04-23 NOTE — PROGRESS NOTE ADULT - SUBJECTIVE AND OBJECTIVE BOX
CARDIOLOGY FOLLOW UP - Dr. Vega  DATE OF SERVICE: 4/23/24    CC no cp or sob   spoke with wife via phone- fam wants to pursue MR workup - requesting for intpt MARCELLA       REVIEW OF SYSTEMS:  CONSTITUTIONAL: No fever, weight loss, or fatigue  RESPIRATORY: No cough, wheezing, chills or hemoptysis; No Shortness of Breath  CARDIOVASCULAR: No chest pain, palpitations, passing out, dizziness, or leg swelling  GASTROINTESTINAL: No abdominal or epigastric pain. No nausea, vomiting, or hematemesis; No diarrhea or constipation. No melena or hematochezia.  VASCULAR: No edema     PHYSICAL EXAM:  T(C): 36.8 (04-23-24 @ 05:45), Max: 36.8 (04-22-24 @ 11:34)  HR: 74 (04-23-24 @ 05:45) (69 - 86)  BP: 102/55 (04-23-24 @ 05:45) (94/46 - 118/48)  RR: 17 (04-23-24 @ 05:45) (13 - 18)  SpO2: 96% (04-23-24 @ 05:45) (95% - 99%)  Wt(kg): --  I&O's Summary      Appearance: Normal	  Cardiovascular: Normal S1 S2,RR + murmur   Respiratory: Lungs clear to auscultation	  Gastrointestinal:  Soft, Non-tender, + BS	  Extremities: Normal range of motion, No clubbing, cyanosis or edema      Home Medications:  apixaban 2.5 mg oral tablet: 1 tab(s) orally every 12 hours (20 Apr 2024 23:35)  Crestor 40 mg oral tablet: 1 tab(s) orally once a day (at bedtime) (20 Apr 2024 23:35)  repaglinide 0.5 mg oral tablet: 1 tab(s) orally 3 times a day (before meals) (20 Apr 2024 23:27)  tamsulosin 0.4 mg oral capsule: 1 cap(s) orally once a day (20 Apr 2024 23:27)  Tresiba FlexTouch 100 units/mL subcutaneous solution: 5 solution subcutaneous once a day (at bedtime) (20 Apr 2024 23:27)      MEDICATIONS  (STANDING):  aMIOdarone    Tablet 200 milliGRAM(s) Oral daily  atorvastatin 80 milliGRAM(s) Oral at bedtime  citric acid/sodium citrate Solution 30 milliLiter(s) Oral two times a day  dextrose 10% Bolus 125 milliLiter(s) IV Bolus once  dextrose 5%. 1000 milliLiter(s) (75 mL/Hr) IV Continuous <Continuous>  dextrose 5%. 1000 milliLiter(s) (50 mL/Hr) IV Continuous <Continuous>  dextrose 5%. 1000 milliLiter(s) (100 mL/Hr) IV Continuous <Continuous>  dextrose 50% Injectable 25 Gram(s) IV Push once  dextrose 50% Injectable 12.5 Gram(s) IV Push once  glucagon  Injectable 1 milliGRAM(s) IntraMuscular once  influenza  Vaccine (HIGH DOSE) 0.7 milliLiter(s) IntraMuscular once  insulin lispro (ADMELOG) corrective regimen sliding scale   SubCutaneous at bedtime  insulin lispro (ADMELOG) corrective regimen sliding scale   SubCutaneous three times a day before meals  pantoprazole  Injectable 40 milliGRAM(s) IV Push every 12 hours  sucralfate 1 Gram(s) Oral two times a day  tamsulosin 0.4 milliGRAM(s) Oral at bedtime      TELEMETRY: nsr 	    ECG:  	  RADIOLOGY:   DIAGNOSTIC TESTING:  [ ] Echocardiogram:  [ ]  Catheterization:  [ ] Stress Test:    OTHER: 	    LABS:	 	    Troponin T, High Sensitivity Result: 526 ng/L (04-23 @ 06:45)  Troponin T, High Sensitivity Result: 532 ng/L (04-22 @ 06:50)  Troponin T, High Sensitivity Result: 622 ng/L (04-20 @ 23:30)  Creatine Kinase, Serum: 51 U/L [30 - 200] (04-20 @ 15:05)  CKMB Units: 5.9 ng/mL (04-20 @ 15:05)  Troponin T, High Sensitivity Result: 615 ng/L (04-20 @ 15:05)  Troponin T, High Sensitivity Result: 704 ng/L (04-20 @ 12:03)                          9.6    7.10  )-----------( 116      ( 23 Apr 2024 06:45 )             29.0     04-23    142  |  112<H>  |  68<H>  ----------------------------<  155<H>  3.9   |  17<L>  |  3.17<H>    Ca    7.8<L>      23 Apr 2024 06:45  Phos  3.3     04-22  Mg     1.90     04-22    TPro  5.6<L>  /  Alb  2.6<L>  /  TBili  1.3<H>  /  DBili  x   /  AST  50<H>  /  ALT  56<H>  /  AlkPhos  127<H>  04-23    PT/INR - ( 22 Apr 2024 06:50 )   PT: 13.7 sec;   INR: 1.22 ratio         PTT - ( 22 Apr 2024 06:50 )  PTT:35.0 sec

## 2024-04-23 NOTE — PROGRESS NOTE ADULT - NSPROGADDITIONALINFOA_GEN_ALL_CORE
discussed with patient in detail, expresses understanding of treatment plans.  discussed with wife Katya over the phone     remains FULL CODE for now

## 2024-04-23 NOTE — PROGRESS NOTE ADULT - PROBLEM SELECTOR PLAN 4
- CKD4- Hx of left nephrectomy. creatinine 3.35 on 4/20. previously 2.73. Avoid nephrotoxic agents. Monitor electrolytes closely. Nephrology consult for further management.   - Bicarb of 15 on 4/20, likely from renal acidosis. Recheck on metabolic panel to consider starting bicarb supplementation.    - No signs of urinary obstruction. currently, Will check US renal to assess for hydronephrosis.   - Possibly cardiorenal.  - He is on lasix, repeat bmp tonight, and continue in the morning.  - checking magnesium and phosphorus renal follow up appreciated  continue to follow recommendations  likely at current baseline   no nephrotoxic meds

## 2024-04-23 NOTE — PROGRESS NOTE ADULT - PROBLEM SELECTOR PLAN 6
Platelets of 141 on 4/20. previously 104.  checking b12 and folate Platelets of 141 on 4/20. previously 104.  b12 and folate normal

## 2024-04-23 NOTE — PROGRESS NOTE ADULT - SUBJECTIVE AND OBJECTIVE BOX
Patient is a 88y old  Male who presents with a chief complaint of Blood loss anemia, GI bleeding, NSTEMI (23 Apr 2024 11:34)  patient known to me from prior admission, assigned this AM to assume care  chart reviewed and events thus far noted  admitted overnight by hospitalist colleague     DATE OF SERVICE: 04-23-24 @ 13:44    SUBJECTIVE / OVERNIGHT EVENTS: overnight events noted    ROS:  Resp: No cough no sputum production  CVS: No chest pain no palpitations no orthopnea  GI: no N/V/D          MEDICATIONS  (STANDING):  aMIOdarone    Tablet 200 milliGRAM(s) Oral daily  atorvastatin 80 milliGRAM(s) Oral at bedtime  citric acid/sodium citrate Solution 30 milliLiter(s) Oral two times a day  dextrose 10% Bolus 125 milliLiter(s) IV Bolus once  dextrose 5%. 1000 milliLiter(s) (100 mL/Hr) IV Continuous <Continuous>  dextrose 5%. 1000 milliLiter(s) (75 mL/Hr) IV Continuous <Continuous>  dextrose 5%. 1000 milliLiter(s) (50 mL/Hr) IV Continuous <Continuous>  dextrose 50% Injectable 12.5 Gram(s) IV Push once  dextrose 50% Injectable 25 Gram(s) IV Push once  glucagon  Injectable 1 milliGRAM(s) IntraMuscular once  influenza  Vaccine (HIGH DOSE) 0.7 milliLiter(s) IntraMuscular once  insulin lispro (ADMELOG) corrective regimen sliding scale   SubCutaneous three times a day before meals  insulin lispro (ADMELOG) corrective regimen sliding scale   SubCutaneous at bedtime  pantoprazole  Injectable 40 milliGRAM(s) IV Push every 12 hours  sucralfate 1 Gram(s) Oral two times a day  tamsulosin 0.4 milliGRAM(s) Oral at bedtime    MEDICATIONS  (PRN):  acetaminophen     Tablet .. 650 milliGRAM(s) Oral every 6 hours PRN Temp greater or equal to 38C (100.4F), Mild Pain (1 - 3)  aluminum hydroxide/magnesium hydroxide/simethicone Suspension 30 milliLiter(s) Oral every 4 hours PRN Dyspepsia  dextrose Oral Gel 15 Gram(s) Oral once PRN Blood Glucose LESS THAN 70 milliGRAM(s)/deciliter  melatonin 3 milliGRAM(s) Oral at bedtime PRN Insomnia        CAPILLARY BLOOD GLUCOSE      POCT Blood Glucose.: 164 mg/dL (23 Apr 2024 12:04)  POCT Blood Glucose.: 158 mg/dL (23 Apr 2024 08:24)  POCT Blood Glucose.: 158 mg/dL (22 Apr 2024 21:10)  POCT Blood Glucose.: 155 mg/dL (22 Apr 2024 15:47)    I&O's Summary      Vital Signs Last 24 Hrs  T(C): 36.5 (23 Apr 2024 11:53), Max: 36.8 (23 Apr 2024 05:45)  T(F): 97.7 (23 Apr 2024 11:53), Max: 98.2 (23 Apr 2024 05:45)  HR: 70 (23 Apr 2024 11:53) (70 - 86)  BP: 106/53 (23 Apr 2024 11:53) (102/55 - 118/48)  BP(mean): --  RR: 17 (23 Apr 2024 11:53) (17 - 18)  SpO2: 99% (23 Apr 2024 11:53) (96% - 99%)    PHYSICAL EXAM:  GENERAL: in no distress  EYES: EOMI, PERRLA  NECK: Supple, No JVD  CHEST/LUNG: clear   HEART: S1 S2; systolic murmur +   ABDOMEN: Soft, Nontender,  EXTREMITIES:  no edema  NEUROLOGY: AO x 3 non-focal      LABS:                        9.6    7.10  )-----------( 116      ( 23 Apr 2024 06:45 )             29.0     04-23    142  |  112<H>  |  68<H>  ----------------------------<  155<H>  3.9   |  17<L>  |  3.17<H>    Ca    7.8<L>      23 Apr 2024 06:45  Phos  3.3     04-22  Mg     1.90     04-22    TPro  5.6<L>  /  Alb  2.6<L>  /  TBili  1.3<H>  /  DBili  x   /  AST  50<H>  /  ALT  56<H>  /  AlkPhos  127<H>  04-23    PT/INR - ( 22 Apr 2024 06:50 )   PT: 13.7 sec;   INR: 1.22 ratio         PTT - ( 22 Apr 2024 06:50 )  PTT:35.0 sec      Urinalysis Basic - ( 23 Apr 2024 06:45 )    Color: x / Appearance: x / SG: x / pH: x  Gluc: 155 mg/dL / Ketone: x  / Bili: x / Urobili: x   Blood: x / Protein: x / Nitrite: x   Leuk Esterase: x / RBC: x / WBC x   Sq Epi: x / Non Sq Epi: x / Bacteria: x          All consultant(s) notes reviewed and care discussed with other providers        Contact Number, Dr Arroyo [phone]

## 2024-04-23 NOTE — PROGRESS NOTE ADULT - PROBLEM SELECTOR PLAN 5
- holding AC   - c/w amiodarone   - c/w telemetry heart rate controlled   continue amiodarone  cardiology follow up appreciated  needs work up of significant MR  MARCELLA tomorrow

## 2024-04-23 NOTE — PROGRESS NOTE ADULT - SUBJECTIVE AND OBJECTIVE BOX
POST ANESTHESIA EVALUATION    88y Male POSTOP DAY 1     MENTAL STATUS: Patient participation [ x ] Awake     [  ] Arousable     [  ] Sedated    AIRWAY PATENCY: [ x ] Satisfactory  [  ] Other:     Vital Signs Last 24 Hrs  T(C): 36.8 (23 Apr 2024 05:45), Max: 36.8 (22 Apr 2024 11:34)  T(F): 98.2 (23 Apr 2024 05:45), Max: 98.2 (22 Apr 2024 11:34)  HR: 74 (23 Apr 2024 05:45) (69 - 86)  BP: 102/55 (23 Apr 2024 05:45) (94/46 - 118/48)  BP(mean): --  RR: 17 (23 Apr 2024 05:45) (13 - 18)  SpO2: 96% (23 Apr 2024 05:45) (95% - 99%)    Parameters below as of 23 Apr 2024 05:45  Patient On (Oxygen Delivery Method): room air      I&O's Summary        NAUSEA/ VOMITTING:  [ x ] NONE  [  ] CONTROLLED [  ] OTHER     PAIN: [  x] CONTROLLED WITH CURRENT REGIMEN  [  ] OTHER    [ x ] NO APPARENT ANESTHESIA COMPLICATIONS      Comments: Discussed with [facility] RN

## 2024-04-23 NOTE — PHYSICAL THERAPY INITIAL EVALUATION ADULT - PERTINENT HX OF CURRENT PROBLEM, REHAB EVAL
87yo w/ PMHx CKD, HFrEF, Diabetes, TIA,  CAD s/p stenting, bladder cancer s/p chemo in remission, afib on eliquis, hx of left nephrectomy, recent admission 3/16-3/28 for SOB found to be anemic s/p EGD who presents to the ER complaining of increased weakness, dizziness, sob/rosado, with chest tightness and dark stools. He has been taking eliquis, last dose yesterday. He also is taking protonix at home.  He has been having dark stools for the past 4 -5 days, with concerns for melena. Last dark bowel movement was in the morning, it was black as per patient.

## 2024-04-24 ENCOUNTER — RESULT REVIEW (OUTPATIENT)
Age: 89
End: 2024-04-24

## 2024-04-24 DIAGNOSIS — R79.89 OTHER SPECIFIED ABNORMAL FINDINGS OF BLOOD CHEMISTRY: ICD-10-CM

## 2024-04-24 LAB
ALBUMIN SERPL ELPH-MCNC: 2.3 G/DL — LOW (ref 3.3–5)
ALP SERPL-CCNC: 113 U/L — SIGNIFICANT CHANGE UP (ref 40–120)
ALT FLD-CCNC: 49 U/L — HIGH (ref 4–41)
ANION GAP SERPL CALC-SCNC: 13 MMOL/L — SIGNIFICANT CHANGE UP (ref 7–14)
AST SERPL-CCNC: 40 U/L — SIGNIFICANT CHANGE UP (ref 4–40)
BASOPHILS # BLD AUTO: 0.04 K/UL — SIGNIFICANT CHANGE UP (ref 0–0.2)
BASOPHILS NFR BLD AUTO: 0.9 % — SIGNIFICANT CHANGE UP (ref 0–2)
BILIRUB SERPL-MCNC: 1 MG/DL — SIGNIFICANT CHANGE UP (ref 0.2–1.2)
BUN SERPL-MCNC: 70 MG/DL — HIGH (ref 7–23)
CALCIUM SERPL-MCNC: 7.4 MG/DL — LOW (ref 8.4–10.5)
CHLORIDE SERPL-SCNC: 109 MMOL/L — HIGH (ref 98–107)
CO2 SERPL-SCNC: 18 MMOL/L — LOW (ref 22–31)
CREAT SERPL-MCNC: 3.17 MG/DL — HIGH (ref 0.5–1.3)
EGFR: 18 ML/MIN/1.73M2 — LOW
EOSINOPHIL # BLD AUTO: 0.25 K/UL — SIGNIFICANT CHANGE UP (ref 0–0.5)
EOSINOPHIL NFR BLD AUTO: 5.4 % — SIGNIFICANT CHANGE UP (ref 0–6)
GLUCOSE BLDC GLUCOMTR-MCNC: 122 MG/DL — HIGH (ref 70–99)
GLUCOSE BLDC GLUCOMTR-MCNC: 139 MG/DL — HIGH (ref 70–99)
GLUCOSE BLDC GLUCOMTR-MCNC: 141 MG/DL — HIGH (ref 70–99)
GLUCOSE BLDC GLUCOMTR-MCNC: 215 MG/DL — HIGH (ref 70–99)
GLUCOSE SERPL-MCNC: 107 MG/DL — HIGH (ref 70–99)
HCT VFR BLD CALC: 28.1 % — LOW (ref 39–50)
HGB BLD-MCNC: 9.2 G/DL — LOW (ref 13–17)
IANC: 3.1 K/UL — SIGNIFICANT CHANGE UP (ref 1.8–7.4)
IMM GRANULOCYTES NFR BLD AUTO: 0.2 % — SIGNIFICANT CHANGE UP (ref 0–0.9)
LYMPHOCYTES # BLD AUTO: 0.69 K/UL — LOW (ref 1–3.3)
LYMPHOCYTES # BLD AUTO: 15 % — SIGNIFICANT CHANGE UP (ref 13–44)
MAGNESIUM SERPL-MCNC: 2 MG/DL — SIGNIFICANT CHANGE UP (ref 1.6–2.6)
MCHC RBC-ENTMCNC: 32.6 PG — SIGNIFICANT CHANGE UP (ref 27–34)
MCHC RBC-ENTMCNC: 32.7 GM/DL — SIGNIFICANT CHANGE UP (ref 32–36)
MCV RBC AUTO: 99.6 FL — SIGNIFICANT CHANGE UP (ref 80–100)
MONOCYTES # BLD AUTO: 0.52 K/UL — SIGNIFICANT CHANGE UP (ref 0–0.9)
MONOCYTES NFR BLD AUTO: 11.3 % — SIGNIFICANT CHANGE UP (ref 2–14)
NEUTROPHILS # BLD AUTO: 3.1 K/UL — SIGNIFICANT CHANGE UP (ref 1.8–7.4)
NEUTROPHILS NFR BLD AUTO: 67.2 % — SIGNIFICANT CHANGE UP (ref 43–77)
NRBC # BLD: 0 /100 WBCS — SIGNIFICANT CHANGE UP (ref 0–0)
NRBC # FLD: 0 K/UL — SIGNIFICANT CHANGE UP (ref 0–0)
PHOSPHATE SERPL-MCNC: 3.4 MG/DL — SIGNIFICANT CHANGE UP (ref 2.5–4.5)
PLATELET # BLD AUTO: 102 K/UL — LOW (ref 150–400)
POTASSIUM SERPL-MCNC: 3.4 MMOL/L — LOW (ref 3.5–5.3)
POTASSIUM SERPL-SCNC: 3.4 MMOL/L — LOW (ref 3.5–5.3)
PROT SERPL-MCNC: 5.2 G/DL — LOW (ref 6–8.3)
RBC # BLD: 2.82 M/UL — LOW (ref 4.2–5.8)
RBC # FLD: 20.4 % — HIGH (ref 10.3–14.5)
SODIUM SERPL-SCNC: 140 MMOL/L — SIGNIFICANT CHANGE UP (ref 135–145)
WBC # BLD: 4.61 K/UL — SIGNIFICANT CHANGE UP (ref 3.8–10.5)
WBC # FLD AUTO: 4.61 K/UL — SIGNIFICANT CHANGE UP (ref 3.8–10.5)

## 2024-04-24 PROCEDURE — 93312 ECHO TRANSESOPHAGEAL: CPT | Mod: 26

## 2024-04-24 PROCEDURE — 99232 SBSQ HOSP IP/OBS MODERATE 35: CPT | Mod: GC

## 2024-04-24 PROCEDURE — 93325 DOPPLER ECHO COLOR FLOW MAPG: CPT | Mod: 26,GC

## 2024-04-24 PROCEDURE — 93320 DOPPLER ECHO COMPLETE: CPT | Mod: 26,GC

## 2024-04-24 PROCEDURE — 76376 3D RENDER W/INTRP POSTPROCES: CPT | Mod: 26

## 2024-04-24 RX ADMIN — Medication 1 GRAM(S): at 05:59

## 2024-04-24 RX ADMIN — PANTOPRAZOLE SODIUM 40 MILLIGRAM(S): 20 TABLET, DELAYED RELEASE ORAL at 05:59

## 2024-04-24 RX ADMIN — Medication 30 MILLILITER(S): at 05:58

## 2024-04-24 RX ADMIN — ATORVASTATIN CALCIUM 80 MILLIGRAM(S): 80 TABLET, FILM COATED ORAL at 21:31

## 2024-04-24 RX ADMIN — PANTOPRAZOLE SODIUM 40 MILLIGRAM(S): 20 TABLET, DELAYED RELEASE ORAL at 18:41

## 2024-04-24 RX ADMIN — Medication 1 GRAM(S): at 18:40

## 2024-04-24 RX ADMIN — TAMSULOSIN HYDROCHLORIDE 0.4 MILLIGRAM(S): 0.4 CAPSULE ORAL at 21:31

## 2024-04-24 RX ADMIN — AMIODARONE HYDROCHLORIDE 200 MILLIGRAM(S): 400 TABLET ORAL at 05:59

## 2024-04-24 RX ADMIN — Medication 30 MILLILITER(S): at 18:41

## 2024-04-24 NOTE — PROGRESS NOTE ADULT - PROBLEM SELECTOR PLAN 4
renal follow up appreciated  continue to follow recommendations  likely at current baseline   no nephrotoxic meds

## 2024-04-24 NOTE — PROGRESS NOTE ADULT - ASSESSMENT
87yo w/ PMHx CKD, HFrEF, CAD s/p stenting, bladder cancer s/p chemo, afib on eliquis, recent admission 3/16-3/28 for SOB found to be anemic s/p EGD presenting w/ melena/chest discomfort.    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 BUN/Cr 88/3.35 which appears to be at baseline. S/P EGD 4/22 with antral linear vascular ectasias c/w GAVE; s/p RFA with 93 ablations  - Hgb stable ~9  Recommendations:  - okay to proceed for MARCELLA from GI standpoint; GI pathology was in the gastric antrum  - PPI BID  - Carafate 1g TID for 7 days  - Transfuse to maintain Hb >8  - If Hgb stable and pt with brown BM, no absolute GI contraindication to resumption of AC based on risk/benefit discussion of continued AC per primary team  - will sign off at this time, however please call back with questions or should any worsening/persistent issues arise. Follow up in Gastroenterology Clinic: [phone] ([facility] at [address] [place] [address]) or [phone] ([facility] at [address] [place] [address]) or [phone] ([facility] at [address] [place] [address])  or [place] Office: [phone] ([address] [place] [address]. [address] [place], [place], [address])    Discussed with Dr. Westfall  Note and recommendations are incomplete until reviewed and attested by attending.    Floyd Yu MD  GI/Hepatology Fellow, PGY4  [place] Pager [phone] or [facility] Pager [phone]  Teams preferred (7AM to 5PM); after 5PM, call GI fellow on call    On Weekends/Holidays (All Day) and Weekdays after 5PM to 8AM  For non-urgent consults, please email [email or web address] and [email or web address]  For urgent consults, please contact on call GI team. See Sergio schedule ([facility]), [facility] paging system ([facility]), or call hospital  ([facility])

## 2024-04-24 NOTE — PROGRESS NOTE ADULT - SUBJECTIVE AND OBJECTIVE BOX
NEPHROLOGY-[facility] [phone]        Patient seen and examined in bed.  He was the same         MEDICATIONS  (STANDING):  aMIOdarone    Tablet 200 milliGRAM(s) Oral daily  atorvastatin 80 milliGRAM(s) Oral at bedtime  citric acid/sodium citrate Solution 30 milliLiter(s) Oral two times a day  dextrose 10% Bolus 125 milliLiter(s) IV Bolus once  dextrose 5%. 1000 milliLiter(s) (100 mL/Hr) IV Continuous <Continuous>  dextrose 5%. 1000 milliLiter(s) (75 mL/Hr) IV Continuous <Continuous>  dextrose 5%. 1000 milliLiter(s) (50 mL/Hr) IV Continuous <Continuous>  dextrose 50% Injectable 12.5 Gram(s) IV Push once  dextrose 50% Injectable 25 Gram(s) IV Push once  glucagon  Injectable 1 milliGRAM(s) IntraMuscular once  influenza  Vaccine (HIGH DOSE) 0.7 milliLiter(s) IntraMuscular once  insulin lispro (ADMELOG) corrective regimen sliding scale   SubCutaneous at bedtime  insulin lispro (ADMELOG) corrective regimen sliding scale   SubCutaneous three times a day before meals  pantoprazole  Injectable 40 milliGRAM(s) IV Push every 12 hours  sucralfate 1 Gram(s) Oral two times a day  tamsulosin 0.4 milliGRAM(s) Oral at bedtime      VITAL:  T(C): , Max: 36.7 (04-23-24 @ 20:21)  T(F): , Max: 98.1 (04-23-24 @ 20:21)  HR: 67 (04-24-24 @ 05:59)  BP: 98/44 (04-24-24 @ 05:59)  BP(mean): --  RR: 14 (04-24-24 @ 05:59)  SpO2: 99% (04-24-24 @ 05:59)  Wt(kg): --    I and O's:        PHYSICAL EXAM:    Constitutional: NAD; cachetic   Neck:  No JVD  Respiratory: CTAB/L  Cardiovascular: S1 and S2  Gastrointestinal: BS+, soft, NT/ND  Extremities: No peripheral edema  Neurological: A/O x 3, no focal deficits  Psychiatric: Normal mood, normal affect  : No [place]  Skin: No rashes  Access: Not applicable    LABS:                        9.2    4.61  )-----------( 102      ( 24 Apr 2024 05:06 )             28.1     04-24    140  |  109<H>  |  70<H>  ----------------------------<  107<H>  3.4<L>   |  18<L>  |  3.17<H>    Ca    7.4<L>      24 Apr 2024 05:06  Phos  3.4     04-24  Mg     2.00     04-24    TPro  5.2<L>  /  Alb  2.3<L>  /  TBili  1.0  /  DBili  x   /  AST  40  /  ALT  49<H>  /  AlkPhos  113  04-24          Urine Studies:  Urinalysis Basic - ( 24 Apr 2024 05:06 )    Color: x / Appearance: x / SG: x / pH: x  Gluc: 107 mg/dL / Ketone: x  / Bili: x / Urobili: x   Blood: x / Protein: x / Nitrite: x   Leuk Esterase: x / RBC: x / WBC x   Sq Epi: x / Non Sq Epi: x / Bacteria: x            RADIOLOGY & ADDITIONAL STUDIES:

## 2024-04-24 NOTE — PROGRESS NOTE ADULT - ASSESSMENT
ECHO 3/19/24 : . Left ventricular systolic function is mildly decreased with an ejection fraction visually estimated at 45 to 50%. There are regional wall motion abnormalities present. There is mild (grade 1) left ventricular diastolic dysfunction. Hypokinesis of the lateral and inferolateral walls. trace mitral regurgitation.mild to moderate aortic regurgitation.  ECHO 4/21/24 : Left ventricular systolic function is moderately decreased with an ejection fraction of 37 % Regional wall motion abnormalities present. Multiple segmental abnormalities , mild to moderate tricuspid regurgitation; severe mitral regurgitation    A/P    87yo w/ PMHx CKD, HFrEF, Diabetes, TIA,  CAD s/p stenting, bladder cancer s/p chemo in remission, afib on eliquis, hx of left nephrectomy, recent admission 3/16-3/28 for SOB found to be anemic s/p EGD who presents to the ER complaining of increased weakness, dizziness, sob/rosado, with chest tightness and dark stools.    #Chest Pain/+ Troponin  -likely type II MI (demand ischemia) in setting of CKD, anemia  -GI eval appreciated  -sp egd   -antiplatelet/DOAC on hold  -pt w h/o CAD s/p PCI  -resume asa/ ac  when feasible per GI    #SOB,chronic HFrEF  -does not appear overloaded  -sp lasix ivp  4/22 per renal  -lasix on hold for now   -previous echo w mild LV dysfunction  -repeat TTE w LV dysfunction EF 37% WMA (overall unchanged from prior) however now with  severe eccentric mitral regurgitation 37%    #Sev MR   -echo as above-dw patient and wife (via phone) today - they  want to pursue MR workup - requesting for intpt MARCELLA       #Atrial Fibrillation  -stable  -ac on hold  -monitor cbc   -cont amio  -per family  patient undergoing watchman eval as outpt with dr Pan -- will CALL EP FOR INPT  EVAL    #CKD  -renal f/u    35 minutes spent on total encounter; more than 50% of the visit was spent counseling and/or coordinating care by the attending physician.

## 2024-04-24 NOTE — PROGRESS NOTE ADULT - SUBJECTIVE AND OBJECTIVE BOX
Patient is a 88y old  Male who presents with a chief complaint of Blood loss anemia, GI bleeding, NSTEMI (24 Apr 2024 11:38)      DATE OF SERVICE: 04-24-24 @ 14:47    SUBJECTIVE / OVERNIGHT EVENTS: overnight events noted    ROS:  Resp: No cough no sputum production  CVS: No chest pain no palpitations no orthopnea  GI: no N/V/D  "I am hungry"        MEDICATIONS  (STANDING):  aMIOdarone    Tablet 200 milliGRAM(s) Oral daily  atorvastatin 80 milliGRAM(s) Oral at bedtime  citric acid/sodium citrate Solution 30 milliLiter(s) Oral two times a day  dextrose 10% Bolus 125 milliLiter(s) IV Bolus once  dextrose 5%. 1000 milliLiter(s) (75 mL/Hr) IV Continuous <Continuous>  dextrose 5%. 1000 milliLiter(s) (50 mL/Hr) IV Continuous <Continuous>  dextrose 5%. 1000 milliLiter(s) (100 mL/Hr) IV Continuous <Continuous>  dextrose 50% Injectable 25 Gram(s) IV Push once  dextrose 50% Injectable 12.5 Gram(s) IV Push once  glucagon  Injectable 1 milliGRAM(s) IntraMuscular once  influenza  Vaccine (HIGH DOSE) 0.7 milliLiter(s) IntraMuscular once  insulin lispro (ADMELOG) corrective regimen sliding scale   SubCutaneous at bedtime  insulin lispro (ADMELOG) corrective regimen sliding scale   SubCutaneous three times a day before meals  pantoprazole  Injectable 40 milliGRAM(s) IV Push every 12 hours  sucralfate 1 Gram(s) Oral two times a day  tamsulosin 0.4 milliGRAM(s) Oral at bedtime    MEDICATIONS  (PRN):  acetaminophen     Tablet .. 650 milliGRAM(s) Oral every 6 hours PRN Temp greater or equal to 38C (100.4F), Mild Pain (1 - 3)  aluminum hydroxide/magnesium hydroxide/simethicone Suspension 30 milliLiter(s) Oral every 4 hours PRN Dyspepsia  dextrose Oral Gel 15 Gram(s) Oral once PRN Blood Glucose LESS THAN 70 milliGRAM(s)/deciliter  melatonin 3 milliGRAM(s) Oral at bedtime PRN Insomnia        CAPILLARY BLOOD GLUCOSE      POCT Blood Glucose.: 139 mg/dL (24 Apr 2024 11:48)  POCT Blood Glucose.: 122 mg/dL (24 Apr 2024 08:19)  POCT Blood Glucose.: 154 mg/dL (23 Apr 2024 20:52)  POCT Blood Glucose.: 171 mg/dL (23 Apr 2024 18:46)  POCT Blood Glucose.: 185 mg/dL (23 Apr 2024 17:24)    I&O's Summary      Vital Signs Last 24 Hrs  T(C): 36.4 (24 Apr 2024 12:37), Max: 36.7 (23 Apr 2024 20:21)  T(F): 97.5 (24 Apr 2024 12:37), Max: 98.1 (23 Apr 2024 20:21)  HR: 65 (24 Apr 2024 12:37) (65 - 71)  BP: 106/54 (24 Apr 2024 12:37) (98/44 - 106/54)  BP(mean): --  RR: 17 (24 Apr 2024 12:37) (14 - 17)  SpO2: 95% (24 Apr 2024 12:37) (95% - 99%)    PHYSICAL EXAM:  NECK: Supple  CHEST/LUNG: clear   HEART: S1 S2; systolic murmur +   ABDOMEN: Soft, Nontender,  EXTREMITIES:  no edema  NEUROLOGY: AO x 3 non-focal    LABS:                        9.2    4.61  )-----------( 102      ( 24 Apr 2024 05:06 )             28.1     04-24    140  |  109<H>  |  70<H>  ----------------------------<  107<H>  3.4<L>   |  18<L>  |  3.17<H>    Ca    7.4<L>      24 Apr 2024 05:06  Phos  3.4     04-24  Mg     2.00     04-24    TPro  5.2<L>  /  Alb  2.3<L>  /  TBili  1.0  /  DBili  x   /  AST  40  /  ALT  49<H>  /  AlkPhos  113  04-24          Urinalysis Basic - ( 24 Apr 2024 05:06 )    Color: x / Appearance: x / SG: x / pH: x  Gluc: 107 mg/dL / Ketone: x  / Bili: x / Urobili: x   Blood: x / Protein: x / Nitrite: x   Leuk Esterase: x / RBC: x / WBC x   Sq Epi: x / Non Sq Epi: x / Bacteria: x          All consultant(s) notes reviewed and care discussed with other providers        Contact Number, Dr Arroyo [phone]

## 2024-04-24 NOTE — PROGRESS NOTE ADULT - SUBJECTIVE AND OBJECTIVE BOX
Chief Complaint:  Patient is a 88y old  Male who presents with a chief complaint of Blood loss anemia, GI bleeding, NSTEMI (24 Apr 2024 09:39)      Interval Events:  GI asked to comment on optimization prior to MARCELLA  EGD with gastric antral vascular ectasias s/p RFA  Hgb stable    Allergies:  Cipro (Joint Pain)  penicillin (Rash)        Hospital Medications:  acetaminophen     Tablet .. 650 milliGRAM(s) Oral every 6 hours PRN  aluminum hydroxide/magnesium hydroxide/simethicone Suspension 30 milliLiter(s) Oral every 4 hours PRN  aMIOdarone    Tablet 200 milliGRAM(s) Oral daily  atorvastatin 80 milliGRAM(s) Oral at bedtime  citric acid/sodium citrate Solution 30 milliLiter(s) Oral two times a day  dextrose 10% Bolus 125 milliLiter(s) IV Bolus once  dextrose 5%. 1000 milliLiter(s) IV Continuous <Continuous>  dextrose 5%. 1000 milliLiter(s) IV Continuous <Continuous>  dextrose 5%. 1000 milliLiter(s) IV Continuous <Continuous>  dextrose 50% Injectable 25 Gram(s) IV Push once  dextrose 50% Injectable 12.5 Gram(s) IV Push once  dextrose Oral Gel 15 Gram(s) Oral once PRN  glucagon  Injectable 1 milliGRAM(s) IntraMuscular once  influenza  Vaccine (HIGH DOSE) 0.7 milliLiter(s) IntraMuscular once  insulin lispro (ADMELOG) corrective regimen sliding scale   SubCutaneous three times a day before meals  insulin lispro (ADMELOG) corrective regimen sliding scale   SubCutaneous at bedtime  melatonin 3 milliGRAM(s) Oral at bedtime PRN  pantoprazole  Injectable 40 milliGRAM(s) IV Push every 12 hours  sucralfate 1 Gram(s) Oral two times a day  tamsulosin 0.4 milliGRAM(s) Oral at bedtime      PMHX/PSHX:  CAD (coronary artery disease)    Diabetes mellitus, new onset    Single kidney    Arthritis    Myocardial infarction    Hard of hearing    Atrial fibrillation    Hypertension    Hyperlipidemia    History of TIAs    History of bradycardia    Bladder cancer    History of CHF (congestive heart failure)    Left carotid bruit    History of nephrectomy, unilateral    Stented coronary artery    S/P bilateral cataract extraction    H/O cystoscopy        Family history:  Family history of diabetes mellitus in mother (Mother)    FH: bladder cancer (Sibling)        PHYSICAL EXAM:   Vital Signs:  Vital Signs Last 24 Hrs  T(C): 36.7 (23 Apr 2024 20:21), Max: 36.7 (23 Apr 2024 20:21)  T(F): 98.1 (23 Apr 2024 20:21), Max: 98.1 (23 Apr 2024 20:21)  HR: 67 (24 Apr 2024 05:59) (67 - 71)  BP: 98/44 (24 Apr 2024 05:59) (98/44 - 106/53)  BP(mean): --  RR: 14 (24 Apr 2024 05:59) (14 - 17)  SpO2: 99% (24 Apr 2024 05:59) (97% - 99%)    Parameters below as of 24 Apr 2024 05:59  Patient On (Oxygen Delivery Method): room air      Daily     Daily     GENERAL:  No acute distress  HEENT:  no scleral icterus  CHEST:  no accessory muscle use  HEART:  Regular rate and rhythm  ABDOMEN:  Soft, non-tender, non-distended  EXTREMITIES:  No edema  SKIN:  No rash/ecchymoses  NEURO:  Alert and oriented x 3    LABS:                        9.2    4.61  )-----------( 102      ( 24 Apr 2024 05:06 )             28.1     Mean Cell Volume: 99.6 fL (04-24-24 @ 05:06)    04-24    140  |  109<H>  |  70<H>  ----------------------------<  107<H>  3.4<L>   |  18<L>  |  3.17<H>    Ca    7.4<L>      24 Apr 2024 05:06  Phos  3.4     04-24  Mg     2.00     04-24    TPro  5.2<L>  /  Alb  2.3<L>  /  TBili  1.0  /  DBili  x   /  AST  40  /  ALT  49<H>  /  AlkPhos  113  04-24    LIVER FUNCTIONS - ( 24 Apr 2024 05:06 )  Alb: 2.3 g/dL / Pro: 5.2 g/dL / ALK PHOS: 113 U/L / ALT: 49 U/L / AST: 40 U/L / GGT: x             Urinalysis Basic - ( 24 Apr 2024 05:06 )    Color: x / Appearance: x / SG: x / pH: x  Gluc: 107 mg/dL / Ketone: x  / Bili: x / Urobili: x   Blood: x / Protein: x / Nitrite: x   Leuk Esterase: x / RBC: x / WBC x   Sq Epi: x / Non Sq Epi: x / Bacteria: x                              9.2    4.61  )-----------( 102      ( 24 Apr 2024 05:06 )             28.1                         9.6    7.10  )-----------( 116      ( 23 Apr 2024 06:45 )             29.0                         9.4    6.33  )-----------( 119      ( 22 Apr 2024 06:50 )             28.5

## 2024-04-24 NOTE — PROGRESS NOTE ADULT - SUBJECTIVE AND OBJECTIVE BOX
CARDIOLOGY FOLLOW UP - Dr. Vega  Date of Service: 4/24/24  CC    Review of Systems:  Constitutional: No fever, weight loss, or fatigue  Respiratory: No cough, wheezing, or hemoptysis, no shortness of breath  Cardiovascular: No chest pain, palpitations, passing out, dizziness, or leg swelling  Gastrointestinal: No abd or epigastric pain. No nausea, vomiting, or hematemesis; no diarrhea or consiptaiton, no melena or hematochezia  Vascular: No edema     TELEMETRY:    PHYSICAL EXAM:  T(C): 36.7 (04-23-24 @ 20:21), Max: 36.7 (04-23-24 @ 20:21)  HR: 67 (04-24-24 @ 05:59) (67 - 71)  BP: 98/44 (04-24-24 @ 05:59) (98/44 - 106/53)  RR: 14 (04-24-24 @ 05:59) (14 - 17)  SpO2: 99% (04-24-24 @ 05:59) (97% - 99%)  Wt(kg): --  I&O's Summary      Appearance: Normal	  Cardiovascular: Normal S1 S2,RRR, No JVD, No murmurs  Respiratory: Lungs clear to auscultation	  Gastrointestinal:  Soft, Non-tender, + BS	  Extremities: Normal range of motion, No clubbing, cyanosis or edema  Vascular: Peripheral pulses palpable 2+ bilaterally       Home Medications:  apixaban 2.5 mg oral tablet: 1 tab(s) orally every 12 hours (20 Apr 2024 23:35)  Crestor 40 mg oral tablet: 1 tab(s) orally once a day (at bedtime) (20 Apr 2024 23:35)  repaglinide 0.5 mg oral tablet: 1 tab(s) orally 3 times a day (before meals) (20 Apr 2024 23:27)  tamsulosin 0.4 mg oral capsule: 1 cap(s) orally once a day (20 Apr 2024 23:27)  Tresiba FlexTouch 100 units/mL subcutaneous solution: 5 solution subcutaneous once a day (at bedtime) (20 Apr 2024 23:27)        MEDICATIONS  (STANDING):  aMIOdarone    Tablet 200 milliGRAM(s) Oral daily  atorvastatin 80 milliGRAM(s) Oral at bedtime  citric acid/sodium citrate Solution 30 milliLiter(s) Oral two times a day  dextrose 10% Bolus 125 milliLiter(s) IV Bolus once  dextrose 5%. 1000 milliLiter(s) (75 mL/Hr) IV Continuous <Continuous>  dextrose 5%. 1000 milliLiter(s) (50 mL/Hr) IV Continuous <Continuous>  dextrose 5%. 1000 milliLiter(s) (100 mL/Hr) IV Continuous <Continuous>  dextrose 50% Injectable 25 Gram(s) IV Push once  dextrose 50% Injectable 12.5 Gram(s) IV Push once  glucagon  Injectable 1 milliGRAM(s) IntraMuscular once  influenza  Vaccine (HIGH DOSE) 0.7 milliLiter(s) IntraMuscular once  insulin lispro (ADMELOG) corrective regimen sliding scale   SubCutaneous three times a day before meals  insulin lispro (ADMELOG) corrective regimen sliding scale   SubCutaneous at bedtime  pantoprazole  Injectable 40 milliGRAM(s) IV Push every 12 hours  sucralfate 1 Gram(s) Oral two times a day  tamsulosin 0.4 milliGRAM(s) Oral at bedtime        EKG:  RADIOLOGY:  DIAGNOSTIC TESTING:  [ ] Echocardiogram:  [ ] Catherterization:  [ ] Stress Test:  OTHER:     LABS:	 	                          9.2    4.61  )-----------( 102      ( 24 Apr 2024 05:06 )             28.1     04-24    140  |  109<H>  |  70<H>  ----------------------------<  107<H>  3.4<L>   |  18<L>  |  3.17<H>    Ca    7.4<L>      24 Apr 2024 05:06  Phos  3.4     04-24  Mg     2.00     04-24    TPro  5.2<L>  /  Alb  2.3<L>  /  TBili  1.0  /  DBili  x   /  AST  40  /  ALT  49<H>  /  AlkPhos  113  04-24          CARDIAC MARKERS:

## 2024-04-24 NOTE — PROGRESS NOTE ADULT - ASSESSMENT
88y Male with CAD, HFrEF, Afib, HTN, DM, solitary R kidney and CKD p/w acute on chronic HFrEF and acute on chronic kidney injury.  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     Due to his age we (pt and myself)have decided on no renal bx      Metabolic acidosis-  likely due to CKD/LILIAN     Anemia in CKD-  Trend CBC      CV- acute on chronic HFrEF and this seems resolved.  eliquis is off;     As an outpt he was slated for watchmans.   MARCELLA today to assess MR     GI- SP EGD         Sayed [facility]   [facility]   [phone]

## 2024-04-24 NOTE — PROGRESS NOTE ADULT - PROBLEM SELECTOR PLAN 5
heart rate controlled   continue amiodarone  cardiology follow up appreciated  needs work up of significant MR  MARCELLA today to evaluate severity of MR

## 2024-04-24 NOTE — PROGRESS NOTE ADULT - PROBLEM SELECTOR PLAN 8
HbA1C normal recently   finger sticks with short acting insulin sliding scale  no oral meds  diabetic diet  monitor for hypoglycemia

## 2024-04-24 NOTE — PROGRESS NOTE ADULT - ATTENDING COMMENTS
Patient seen and examined with the GI fellow. I agree with the above assessment and plan. Thank you for allowing us to care for your patient.    OK to proceed with MARCELLA. GI pathology was in the antrum and thus wont interfere with MARCELLA Patient seen and examined with the GI fellow. I agree with the above assessment and plan. Thank you for allowing us to care for your patient.    GAVE s/p RFA. OK to proceed with MARCELLA. GI pathology was in the antrum and thus wont interfere with MARCELLA

## 2024-04-25 LAB
ALBUMIN SERPL ELPH-MCNC: 2.4 G/DL — LOW (ref 3.3–5)
ALP SERPL-CCNC: 140 U/L — HIGH (ref 40–120)
ALT FLD-CCNC: 51 U/L — HIGH (ref 4–41)
ANION GAP SERPL CALC-SCNC: 13 MMOL/L — SIGNIFICANT CHANGE UP (ref 7–14)
AST SERPL-CCNC: 46 U/L — HIGH (ref 4–40)
BASOPHILS # BLD AUTO: 0.04 K/UL — SIGNIFICANT CHANGE UP (ref 0–0.2)
BASOPHILS NFR BLD AUTO: 0.9 % — SIGNIFICANT CHANGE UP (ref 0–2)
BILIRUB SERPL-MCNC: 0.8 MG/DL — SIGNIFICANT CHANGE UP (ref 0.2–1.2)
BUN SERPL-MCNC: 67 MG/DL — HIGH (ref 7–23)
CALCIUM SERPL-MCNC: 7.7 MG/DL — LOW (ref 8.4–10.5)
CHLORIDE SERPL-SCNC: 111 MMOL/L — HIGH (ref 98–107)
CO2 SERPL-SCNC: 19 MMOL/L — LOW (ref 22–31)
CREAT SERPL-MCNC: 3.18 MG/DL — HIGH (ref 0.5–1.3)
EGFR: 18 ML/MIN/1.73M2 — LOW
EOSINOPHIL # BLD AUTO: 0.27 K/UL — SIGNIFICANT CHANGE UP (ref 0–0.5)
EOSINOPHIL NFR BLD AUTO: 5.8 % — SIGNIFICANT CHANGE UP (ref 0–6)
GLUCOSE BLDC GLUCOMTR-MCNC: 153 MG/DL — HIGH (ref 70–99)
GLUCOSE BLDC GLUCOMTR-MCNC: 178 MG/DL — HIGH (ref 70–99)
GLUCOSE BLDC GLUCOMTR-MCNC: 181 MG/DL — HIGH (ref 70–99)
GLUCOSE BLDC GLUCOMTR-MCNC: 195 MG/DL — HIGH (ref 70–99)
GLUCOSE SERPL-MCNC: 137 MG/DL — HIGH (ref 70–99)
HCT VFR BLD CALC: 28.3 % — LOW (ref 39–50)
HGB BLD-MCNC: 9.3 G/DL — LOW (ref 13–17)
IANC: 3.3 K/UL — SIGNIFICANT CHANGE UP (ref 1.8–7.4)
IMM GRANULOCYTES NFR BLD AUTO: 0.2 % — SIGNIFICANT CHANGE UP (ref 0–0.9)
LYMPHOCYTES # BLD AUTO: 0.51 K/UL — LOW (ref 1–3.3)
LYMPHOCYTES # BLD AUTO: 11 % — LOW (ref 13–44)
MAGNESIUM SERPL-MCNC: 2.1 MG/DL — SIGNIFICANT CHANGE UP (ref 1.6–2.6)
MCHC RBC-ENTMCNC: 32.9 GM/DL — SIGNIFICANT CHANGE UP (ref 32–36)
MCHC RBC-ENTMCNC: 33 PG — SIGNIFICANT CHANGE UP (ref 27–34)
MCV RBC AUTO: 100.4 FL — HIGH (ref 80–100)
MONOCYTES # BLD AUTO: 0.52 K/UL — SIGNIFICANT CHANGE UP (ref 0–0.9)
MONOCYTES NFR BLD AUTO: 11.2 % — SIGNIFICANT CHANGE UP (ref 2–14)
NEUTROPHILS # BLD AUTO: 3.3 K/UL — SIGNIFICANT CHANGE UP (ref 1.8–7.4)
NEUTROPHILS NFR BLD AUTO: 70.9 % — SIGNIFICANT CHANGE UP (ref 43–77)
NRBC # BLD: 0 /100 WBCS — SIGNIFICANT CHANGE UP (ref 0–0)
NRBC # FLD: 0 K/UL — SIGNIFICANT CHANGE UP (ref 0–0)
PHOSPHATE SERPL-MCNC: 3.5 MG/DL — SIGNIFICANT CHANGE UP (ref 2.5–4.5)
PLATELET # BLD AUTO: 115 K/UL — LOW (ref 150–400)
POTASSIUM SERPL-MCNC: 3.5 MMOL/L — SIGNIFICANT CHANGE UP (ref 3.5–5.3)
POTASSIUM SERPL-SCNC: 3.5 MMOL/L — SIGNIFICANT CHANGE UP (ref 3.5–5.3)
PROT SERPL-MCNC: 5.4 G/DL — LOW (ref 6–8.3)
RBC # BLD: 2.82 M/UL — LOW (ref 4.2–5.8)
RBC # FLD: 20.4 % — HIGH (ref 10.3–14.5)
SODIUM SERPL-SCNC: 143 MMOL/L — SIGNIFICANT CHANGE UP (ref 135–145)
WBC # BLD: 4.65 K/UL — SIGNIFICANT CHANGE UP (ref 3.8–10.5)
WBC # FLD AUTO: 4.65 K/UL — SIGNIFICANT CHANGE UP (ref 3.8–10.5)

## 2024-04-25 PROCEDURE — 99222 1ST HOSP IP/OBS MODERATE 55: CPT

## 2024-04-25 RX ORDER — APIXABAN 2.5 MG/1
2.5 TABLET, FILM COATED ORAL EVERY 12 HOURS
Refills: 0 | Status: DISCONTINUED | OUTPATIENT
Start: 2024-04-25 | End: 2024-04-27

## 2024-04-25 RX ADMIN — Medication 1: at 08:46

## 2024-04-25 RX ADMIN — Medication 1: at 18:25

## 2024-04-25 RX ADMIN — AMIODARONE HYDROCHLORIDE 200 MILLIGRAM(S): 400 TABLET ORAL at 06:04

## 2024-04-25 RX ADMIN — PANTOPRAZOLE SODIUM 40 MILLIGRAM(S): 20 TABLET, DELAYED RELEASE ORAL at 18:25

## 2024-04-25 RX ADMIN — Medication 30 MILLILITER(S): at 06:05

## 2024-04-25 RX ADMIN — PANTOPRAZOLE SODIUM 40 MILLIGRAM(S): 20 TABLET, DELAYED RELEASE ORAL at 06:04

## 2024-04-25 RX ADMIN — ATORVASTATIN CALCIUM 80 MILLIGRAM(S): 80 TABLET, FILM COATED ORAL at 21:27

## 2024-04-25 RX ADMIN — TAMSULOSIN HYDROCHLORIDE 0.4 MILLIGRAM(S): 0.4 CAPSULE ORAL at 21:27

## 2024-04-25 RX ADMIN — Medication 1: at 12:29

## 2024-04-25 RX ADMIN — Medication 1 GRAM(S): at 06:04

## 2024-04-25 RX ADMIN — APIXABAN 2.5 MILLIGRAM(S): 2.5 TABLET, FILM COATED ORAL at 21:26

## 2024-04-25 RX ADMIN — Medication 1 GRAM(S): at 18:25

## 2024-04-25 RX ADMIN — Medication 30 MILLILITER(S): at 18:25

## 2024-04-25 NOTE — PROGRESS NOTE ADULT - SUBJECTIVE AND OBJECTIVE BOX
CARDIOLOGY FOLLOW UP - Dr. Vega  DATE OF SERVICE: 4/25/24     CC no cp or sob       REVIEW OF SYSTEMS:  CONSTITUTIONAL: No fever, weight loss, or fatigue  RESPIRATORY: No cough, wheezing, chills or hemoptysis; No Shortness of Breath  CARDIOVASCULAR: No chest pain, palpitations, passing out, dizziness, or leg swelling  GASTROINTESTINAL: No abdominal or epigastric pain. No nausea, vomiting, or hematemesis; No diarrhea or constipation. No melena or hematochezia.  VASCULAR: No edema     PHYSICAL EXAM:  T(C): 36.7 (04-25-24 @ 05:57), Max: 36.8 (04-24-24 @ 21:26)  HR: 68 (04-25-24 @ 05:57) (62 - 71)  BP: 102/54 (04-25-24 @ 05:57) (102/54 - 112/52)  RR: 18 (04-25-24 @ 05:57) (17 - 18)  SpO2: 96% (04-25-24 @ 05:57) (95% - 99%)  Wt(kg): --  I&O's Summary    24 Apr 2024 07:01  -  25 Apr 2024 07:00  --------------------------------------------------------  IN: 0 mL / OUT: 300 mL / NET: -300 mL        Appearance: Normal	  Cardiovascular: Normal S1 S2,RRR,+ murmurs  Respiratory: Lungs clear to auscultation	  Gastrointestinal:  Soft, Non-tender, + BS	  Extremities: Normal range of motion, No clubbing, cyanosis or edema      Home Medications:  apixaban 2.5 mg oral tablet: 1 tab(s) orally every 12 hours (20 Apr 2024 23:35)  Crestor 40 mg oral tablet: 1 tab(s) orally once a day (at bedtime) (20 Apr 2024 23:35)  repaglinide 0.5 mg oral tablet: 1 tab(s) orally 3 times a day (before meals) (20 Apr 2024 23:27)  tamsulosin 0.4 mg oral capsule: 1 cap(s) orally once a day (20 Apr 2024 23:27)  Tresiba FlexTouch 100 units/mL subcutaneous solution: 5 solution subcutaneous once a day (at bedtime) (20 Apr 2024 23:27)      MEDICATIONS  (STANDING):  aMIOdarone    Tablet 200 milliGRAM(s) Oral daily  atorvastatin 80 milliGRAM(s) Oral at bedtime  citric acid/sodium citrate Solution 30 milliLiter(s) Oral two times a day  dextrose 10% Bolus 125 milliLiter(s) IV Bolus once  dextrose 5%. 1000 milliLiter(s) (75 mL/Hr) IV Continuous <Continuous>  dextrose 5%. 1000 milliLiter(s) (50 mL/Hr) IV Continuous <Continuous>  dextrose 5%. 1000 milliLiter(s) (100 mL/Hr) IV Continuous <Continuous>  dextrose 50% Injectable 25 Gram(s) IV Push once  dextrose 50% Injectable 12.5 Gram(s) IV Push once  glucagon  Injectable 1 milliGRAM(s) IntraMuscular once  influenza  Vaccine (HIGH DOSE) 0.7 milliLiter(s) IntraMuscular once  insulin lispro (ADMELOG) corrective regimen sliding scale   SubCutaneous at bedtime  insulin lispro (ADMELOG) corrective regimen sliding scale   SubCutaneous three times a day before meals  pantoprazole  Injectable 40 milliGRAM(s) IV Push every 12 hours  sucralfate 1 Gram(s) Oral two times a day  tamsulosin 0.4 milliGRAM(s) Oral at bedtime      TELEMETRY: 	    ECG:  	  RADIOLOGY:   DIAGNOSTIC TESTING:  [ ] Echocardiogram:  < from: MARCELLA W or WO Ultrasound Enhancing Agent (04.24.24 @ 12:50) >     CONCLUSIONS:      1. Left ventricular systolic function is moderately decreased. There are regional wall motion abnormalities present. Hypokinesis of the inferior, lateral, and inferolateral walls.   2. Normal right ventricular cavity size and normal systolic function.   3. Structurally normal mitral valve with normal leaflet excursion. There is calcification of the mitral valve annulus. There is symmetric leaflet tethering. There is severe mitral regurgitation. Vena contracta width ~ 0.7 cm. Estimated effective regurgitant orifice area (EROA) ~ 0.4 cm2 (by the PISA method).   4. The aortic valve appears trileaflet with normal systolic excursion. There is calcification of the aortic valve leaflets. There is mild to moderate aortic regurgitation. Vena contracta width ~ 0.3 cm.   5. No atheroma in the visualized portions of the proximal ascending aorta. Mild non-mobile atheroma in the visualized portions of the transverse aortic arch. Mild non-mobile atheroma in the visualized portions of the descending aorta.   6. The left atrium is normal in size. There is no evidence of left atrial or left atrial appendage thrombus. Measurements of the left atrial appendage (JOSÉ MIGUEL) were carried out as outlined below. Note that the JOSÉ MIGUEL width refers to its width at the ostium. The JOSÉ MIGUEL length refers to the distance from the plane of the JOSÉ MIGUEL ostium to its most distal point. Note that the JOSÉ MIGUEL has a "windsock" appearance.           Zero degrees: width = 1.9 cm, length = 3.0 cm      30 degrees: width = 1.7 cm, length = 2.9 cm      45 degrees: width = 1.7 cm, length = 2.8 cm      60 degrees: width = 1.6 cm, length = 2.7 cm      90 degrees: width = 1.8 cm, length = 2.6 cm      135 degrees: width = 2.0 cm, length = 2.8 cm.   7. Agitated saline injection was negative for intracardiac shunt.    ____________________________________________________________________    < end of copied text >    [ ]  Catheterization:  [ ] Stress Test:    OTHER: 	    LABS:	 	    Troponin T, High Sensitivity Result: 526 ng/L (04-23 @ 06:45)  Troponin T, High Sensitivity Result: 532 ng/L (04-22 @ 06:50)  Troponin T, High Sensitivity Result: 622 ng/L (04-20 @ 23:30)  Creatine Kinase, Serum: 51 U/L [30 - 200] (04-20 @ 15:05)  CKMB Units: 5.9 ng/mL (04-20 @ 15:05)  Troponin T, High Sensitivity Result: 615 ng/L (04-20 @ 15:05)  Troponin T, High Sensitivity Result: 704 ng/L (04-20 @ 12:03)                          9.3    4.65  )-----------( 115 ( 25 Apr 2024 06:18 )             28.3     04-25    143  |  111<H>  |  67<H>  ----------------------------<  137<H>  3.5   |  19<L>  |  3.18<H>    Ca    7.7<L>      25 Apr 2024 06:18  Phos  3.5     04-25  Mg     2.10     04-25    TPro  5.4<L>  /  Alb  2.4<L>  /  TBili  0.8  /  DBili  x   /  AST  46<H>  /  ALT  51<H>  /  AlkPhos  140<H>  04-25

## 2024-04-25 NOTE — PROGRESS NOTE ADULT - TIME BILLING
Agree with above ACP note.  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
Agree with above ACP note.  cv stable  continue medical treatment of HF, severe MR, AF  eps f/u for possible watchman as outpt   d/w structural heart dr rosmery montoya  he will arrange outpt f/u for guillermina clip eval next week   patient will need eventual LHC if renal fxn stable   d/c planning  resume a/c per medicine
Agree with above ACP note.  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

## 2024-04-25 NOTE — PROGRESS NOTE ADULT - SUBJECTIVE AND OBJECTIVE BOX
[facility] [phone]        Patient seen and examined in bed.  He was the same         MEDICATIONS  (STANDING):  aMIOdarone    Tablet 200 milliGRAM(s) Oral daily  atorvastatin 80 milliGRAM(s) Oral at bedtime  citric acid/sodium citrate Solution 30 milliLiter(s) Oral two times a day  dextrose 10% Bolus 125 milliLiter(s) IV Bolus once  dextrose 5%. 1000 milliLiter(s) (50 mL/Hr) IV Continuous <Continuous>  dextrose 5%. 1000 milliLiter(s) (100 mL/Hr) IV Continuous <Continuous>  dextrose 5%. 1000 milliLiter(s) (75 mL/Hr) IV Continuous <Continuous>  dextrose 50% Injectable 25 Gram(s) IV Push once  dextrose 50% Injectable 12.5 Gram(s) IV Push once  glucagon  Injectable 1 milliGRAM(s) IntraMuscular once  influenza  Vaccine (HIGH DOSE) 0.7 milliLiter(s) IntraMuscular once  insulin lispro (ADMELOG) corrective regimen sliding scale   SubCutaneous at bedtime  insulin lispro (ADMELOG) corrective regimen sliding scale   SubCutaneous three times a day before meals  pantoprazole  Injectable 40 milliGRAM(s) IV Push every 12 hours  sucralfate 1 Gram(s) Oral two times a day  tamsulosin 0.4 milliGRAM(s) Oral at bedtime      VITAL:  T(C): , Max: 36.8 (04-24-24 @ 21:26)  T(F): , Max: 98.3 (04-24-24 @ 21:26)  HR: 67 (04-25-24 @ 12:00)  BP: 114/49 (04-25-24 @ 12:00)  BP(mean): --  RR: 17 (04-25-24 @ 12:00)  SpO2: 98% (04-25-24 @ 12:00)  Wt(kg): --    I and O's:    04-24 @ 07:01  -  04-25 @ 07:00  --------------------------------------------------------  IN: 0 mL / OUT: 300 mL / NET: -300 mL          PHYSICAL EXAM:    Constitutional: NAD  Neck:  No JVD  Respiratory: CTAB/L  Cardiovascular: S1 and S2  Gastrointestinal: BS+, soft, NT/ND  Extremities: No peripheral edema  Neurological: A/O x 3, no focal deficits  Psychiatric: Normal mood, normal affect  : No Temple  Skin: No rashes  Access: Not applicable    LABS:                        9.3    4.65  )-----------( 115      ( 25 Apr 2024 06:18 )             28.3     04-25    143  |  111<H>  |  67<H>  ----------------------------<  137<H>  3.5   |  19<L>  |  3.18<H>    Ca    7.7<L>      25 Apr 2024 06:18  Phos  3.5     04-25  Mg     2.10     04-25    TPro  5.4<L>  /  Alb  2.4<L>  /  TBili  0.8  /  DBili  x   /  AST  46<H>  /  ALT  51<H>  /  AlkPhos  140<H>  04-25          Urine Studies:  Urinalysis Basic - ( 25 Apr 2024 06:18 )    Color: x / Appearance: x / SG: x / pH: x  Gluc: 137 mg/dL / Ketone: x  / Bili: x / Urobili: x   Blood: x / Protein: x / Nitrite: x   Leuk Esterase: x / RBC: x / WBC x   Sq Epi: x / Non Sq Epi: x / Bacteria: x            RADIOLOGY & ADDITIONAL STUDIES:

## 2024-04-25 NOTE — PROGRESS NOTE ADULT - SUBJECTIVE AND OBJECTIVE BOX
Patient is a 88y old  Male who presents with a chief complaint of Blood loss anemia, GI bleeding, NSTEMI (24 Apr 2024 14:47)      DATE OF SERVICE: 04-25-24 @ 09:31    SUBJECTIVE / OVERNIGHT EVENTS: overnight events noted    ROS:  Resp: No cough no sputum production  CVS: No chest pain no palpitations no orthopnea  GI: no N/V/D        MEDICATIONS  (STANDING):  aMIOdarone    Tablet 200 milliGRAM(s) Oral daily  atorvastatin 80 milliGRAM(s) Oral at bedtime  citric acid/sodium citrate Solution 30 milliLiter(s) Oral two times a day  dextrose 10% Bolus 125 milliLiter(s) IV Bolus once  dextrose 5%. 1000 milliLiter(s) (50 mL/Hr) IV Continuous <Continuous>  dextrose 5%. 1000 milliLiter(s) (100 mL/Hr) IV Continuous <Continuous>  dextrose 5%. 1000 milliLiter(s) (75 mL/Hr) IV Continuous <Continuous>  dextrose 50% Injectable 25 Gram(s) IV Push once  dextrose 50% Injectable 12.5 Gram(s) IV Push once  glucagon  Injectable 1 milliGRAM(s) IntraMuscular once  influenza  Vaccine (HIGH DOSE) 0.7 milliLiter(s) IntraMuscular once  insulin lispro (ADMELOG) corrective regimen sliding scale   SubCutaneous at bedtime  insulin lispro (ADMELOG) corrective regimen sliding scale   SubCutaneous three times a day before meals  pantoprazole  Injectable 40 milliGRAM(s) IV Push every 12 hours  sucralfate 1 Gram(s) Oral two times a day  tamsulosin 0.4 milliGRAM(s) Oral at bedtime    MEDICATIONS  (PRN):  acetaminophen     Tablet .. 650 milliGRAM(s) Oral every 6 hours PRN Temp greater or equal to 38C (100.4F), Mild Pain (1 - 3)  aluminum hydroxide/magnesium hydroxide/simethicone Suspension 30 milliLiter(s) Oral every 4 hours PRN Dyspepsia  dextrose Oral Gel 15 Gram(s) Oral once PRN Blood Glucose LESS THAN 70 milliGRAM(s)/deciliter  melatonin 3 milliGRAM(s) Oral at bedtime PRN Insomnia        CAPILLARY BLOOD GLUCOSE      POCT Blood Glucose.: 153 mg/dL (25 Apr 2024 08:25)  POCT Blood Glucose.: 215 mg/dL (24 Apr 2024 22:15)  POCT Blood Glucose.: 141 mg/dL (24 Apr 2024 17:05)  POCT Blood Glucose.: 139 mg/dL (24 Apr 2024 11:48)    I&O's Summary    24 Apr 2024 07:01  -  25 Apr 2024 07:00  --------------------------------------------------------  IN: 0 mL / OUT: 300 mL / NET: -300 mL        Vital Signs Last 24 Hrs  T(C): 36.7 (25 Apr 2024 05:57), Max: 36.8 (24 Apr 2024 21:26)  T(F): 98.1 (25 Apr 2024 05:57), Max: 98.3 (24 Apr 2024 21:26)  HR: 68 (25 Apr 2024 05:57) (62 - 71)  BP: 102/54 (25 Apr 2024 05:57) (102/54 - 112/52)  BP(mean): --  RR: 18 (25 Apr 2024 05:57) (17 - 18)  SpO2: 96% (25 Apr 2024 05:57) (95% - 99%)    PHYSICAL EXAM:  CHEST/LUNG: clear  HEART: S1 S2; systolic murmur +   ABDOMEN: Soft, Nontender  EXTREMITIES: no edema  NEUROLOGY: AO x 3 non-focal  SKIN: No rashes or lesions    LABS:                        9.3    4.65  )-----------( 115      ( 25 Apr 2024 06:18 )             28.3     04-25    143  |  111<H>  |  67<H>  ----------------------------<  137<H>  3.5   |  19<L>  |  3.18<H>    Ca    7.7<L>      25 Apr 2024 06:18  Phos  3.5     04-25  Mg     2.10     04-25    TPro  5.4<L>  /  Alb  2.4<L>  /  TBili  0.8  /  DBili  x   /  AST  46<H>  /  ALT  51<H>  /  AlkPhos  140<H>  04-25          Urinalysis Basic - ( 25 Apr 2024 06:18 )    Color: x / Appearance: x / SG: x / pH: x  Gluc: 137 mg/dL / Ketone: x  / Bili: x / Urobili: x   Blood: x / Protein: x / Nitrite: x   Leuk Esterase: x / RBC: x / WBC x   Sq Epi: x / Non Sq Epi: x / Bacteria: x          All consultant(s) notes reviewed and care discussed with other providers        Contact Number, Dr Arroyo [phone]

## 2024-04-25 NOTE — PROGRESS NOTE ADULT - ASSESSMENT
ECHO 3/19/24 : . Left ventricular systolic function is mildly decreased with an ejection fraction visually estimated at 45 to 50%. There are regional wall motion abnormalities present. There is mild (grade 1) left ventricular diastolic dysfunction. Hypokinesis of the lateral and inferolateral walls. trace mitral regurgitation.mild to moderate aortic regurgitation.  ECHO 4/21/24 : Left ventricular systolic function is moderately decreased with an ejection fraction of 37 % Regional wall motion abnormalities present. Multiple segmental abnormalities , mild to moderate tricuspid regurgitation; severe mitral regurgitation    A/P    87yo w/ PMHx CKD, HFrEF, Diabetes, TIA,  CAD s/p stenting, bladder cancer s/p chemo in remission, afib on eliquis, hx of left nephrectomy, recent admission 3/16-3/28 for SOB found to be anemic s/p EGD who presents to the ER complaining of increased weakness, dizziness, sob/rosado, with chest tightness and dark stools.    #Chest Pain/+ Troponin  -likely type II MI (demand ischemia) in setting of CKD, anemia  -GI eval appreciated  -sp egd   -antiplatelet/DOAC on hold  -pt w h/o CAD s/p PCI  -resume asa/ ac  when feasible per GI    #SOB,chronic HFrEF  -does not appear overloaded  -sp lasix ivp  4/22 per renal  -lasix on hold for now   -previous echo w mild LV dysfunction  -repeat TTE w LV dysfunction EF 37% WMA (overall unchanged from prior) however now with  severe eccentric mitral regurgitation    #Sev MR   -echo as above-dw patient and wife (via phone) today - they  want to pursue MR workup  -hamlet with LV systolic fx is moderately decreased. wma present. Hypokinesis of the inferior, lateral, and inferolateral walls. Severe MR , mild to mod AR ;  Mild non-mobile atheromal no JOSÉ MIGUEL thrombus   -pt will need to get medically optimized (given elevated creat) for MR workup- will plan to follow up with structural heart team as outpt     #Atrial Fibrillation  -stable  -ac on hold-- can resume   -monitor cbc   -cont amio  -per family  patient undergoing watchman eval as outpt with dr Pan -- WAITING  EP FOR INPT  EVAL    #CKD  -renal f/u    35 minutes spent on total encounter; more than 50% of the visit was spent counseling and/or coordinating care by the attending physician.

## 2024-04-25 NOTE — PROGRESS NOTE ADULT - SUBJECTIVE AND OBJECTIVE BOX
POST ANESTHESIA EVALUATION    88y Male POSTOP DAY 1     MENTAL STATUS: Patient participation [  x] Awake     [  ] Arousable     [  ] Sedated    AIRWAY PATENCY: [ x ] Satisfactory  [  ] Other:     Vital Signs Last 24 Hrs  T(C): 36.7 (25 Apr 2024 05:57), Max: 36.8 (24 Apr 2024 21:26)  T(F): 98.1 (25 Apr 2024 05:57), Max: 98.3 (24 Apr 2024 21:26)  HR: 68 (25 Apr 2024 05:57) (62 - 71)  BP: 102/54 (25 Apr 2024 05:57) (102/54 - 112/52)  BP(mean): --  RR: 18 (25 Apr 2024 05:57) (17 - 18)  SpO2: 96% (25 Apr 2024 05:57) (95% - 99%)    Parameters below as of 25 Apr 2024 05:57  Patient On (Oxygen Delivery Method): room air      I&O's Summary    24 Apr 2024 07:01  -  25 Apr 2024 07:00  --------------------------------------------------------  IN: 0 mL / OUT: 300 mL / NET: -300 mL          NAUSEA/ VOMITTING:  [ x ] NONE  [  ] CONTROLLED [  ] OTHER     PAIN: [  x] CONTROLLED WITH CURRENT REGIMEN  [  ] OTHER    [ x ] NO APPARENT ANESTHESIA COMPLICATIONS

## 2024-04-25 NOTE — PROGRESS NOTE ADULT - PROBLEM SELECTOR PLAN 5
heart rate controlled   continue amiodarone  cardiology follow up appreciated  MARCELLA positive for severe MR  discussed with wife  she requested to speak to cardiology

## 2024-04-25 NOTE — CONSULT NOTE ADULT - SUBJECTIVE AND OBJECTIVE BOX
Patient is a 88y old  Male who presents with a chief complaint of Blood loss anemia, GI bleeding, NSTEMI (25 Apr 2024 13:41). He has a past medical history Afib on Eliquis, bladder cancer, CHF, CKD, hematuria, SCCA of skin, was recently admitted with GIB of who is admitted again with another GI bleed. Saw patient in the office on April 19, 2024 for evaluation of JOSÉ MIGUEL occlusion.  Thus far during this admission, patient underwent MARCELLA which showed no JOSÉ MIGUEL thrombus. Patient does have severe MR and is being evaluated for possible clip. Dimensions look favorable for JOSÉ MIGUEL occlusion. 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         HISTORY OF PRESENT ILLNESS:    PAST MEDICAL & SURGICAL HISTORY:  CAD (coronary artery disease)  (4 stents,)      Diabetes mellitus, new onset  diet controlled      Single kidney  (hx/o LEFT nephrectomy at age 16; pt states due to a ureter"blockage" causing "infection"; pt denies hx/o renal dysfunction)      Arthritis      Myocardial infarction  (2003)      Hard of hearing      Atrial fibrillation      Hypertension      Hyperlipidemia      History of TIAs  Last 2018      History of bradycardia      Bladder cancer      History of CHF (congestive heart failure)      Left carotid bruit      History of nephrectomy, unilateral  (hx/o LEFT nephrectomy at age 16)      Stented coronary artery  (4 stents)      S/P bilateral cataract extraction      H/O cystoscopy        	    MEDICATIONS:  aMIOdarone    Tablet 200 milliGRAM(s) Oral daily  apixaban 2.5 milliGRAM(s) Oral every 12 hours        acetaminophen     Tablet .. 650 milliGRAM(s) Oral every 6 hours PRN  melatonin 3 milliGRAM(s) Oral at bedtime PRN    aluminum hydroxide/magnesium hydroxide/simethicone Suspension 30 milliLiter(s) Oral every 4 hours PRN  pantoprazole  Injectable 40 milliGRAM(s) IV Push every 12 hours  sucralfate 1 Gram(s) Oral two times a day    atorvastatin 80 milliGRAM(s) Oral at bedtime  dextrose 50% Injectable 12.5 Gram(s) IV Push once  dextrose 50% Injectable 25 Gram(s) IV Push once  dextrose Oral Gel 15 Gram(s) Oral once PRN  glucagon  Injectable 1 milliGRAM(s) IntraMuscular once  insulin lispro (ADMELOG) corrective regimen sliding scale   SubCutaneous at bedtime  insulin lispro (ADMELOG) corrective regimen sliding scale   SubCutaneous three times a day before meals    citric acid/sodium citrate Solution 30 milliLiter(s) Oral two times a day  dextrose 10% Bolus 125 milliLiter(s) IV Bolus once  dextrose 5%. 1000 milliLiter(s) IV Continuous <Continuous>  dextrose 5%. 1000 milliLiter(s) IV Continuous <Continuous>  influenza  Vaccine (HIGH DOSE) 0.7 milliLiter(s) IntraMuscular once  tamsulosin 0.4 milliGRAM(s) Oral at bedtime        Allergies    penicillin (Rash)    Intolerances    Cipro (Joint Pain)      FAMILY HISTORY:  Family history of diabetes mellitus in mother (Mother)  (Pt states his mother developed DM in her 70's)    FH: bladder cancer (Sibling)        SOCIAL HISTORY    Marital Status:  52 years  Occupation:   Lives with:     SUBSTANCE USE  Tobacco Usage:  ( ) None ( ) never smoked   (x ) former smoker  ( ) current smoker; Packs per day:   Alcohol Usage: ( x) none  ( ) occasional ( ) 2-3 times a week ( ) daily; Last drink:   Recreational drugs (x ) None    CONSTITUTIONAL: No fevers, No chills, No fatigue, No weight gain.   EYES: No vision changes   ENT: No congestion, No ear pain, No sore throat.  NECK: No pain, No stiffness  RESPIRATORY: No shortness of breath, No cough, No wheezing, No hemoptysis  CARDIOVASCULAR: No chest pain. No palpitations, No MEJIA, No orthopnea, No paroxysmal nocturnal dyspnea, No pleuritic pain  GASTROINTESTINAL: No abdominal pain, No nausea, No vomiting, No hematemesis, No diarrhea No constipation. No melena  GENITOURINARY: No dysuria, No frequency, No incontinence, No hematuria  NEUROLOGICAL: No dizziness, No lightheadedness, No syncope, No LOC, No headache, No numbness or weakness  MUSCULOSKELETAL: No Edema, No joint pain, No joint swelling.  PSYCHIATRIC: No anxiety, No depression  DERMATOLOGY: No diaphoresis. No itching, No rashes, No pressure ulcers  HEME/LYMPH: Easy bruising.     All other review of systems is negative unless indicated above.    VITAL SIGNS  T(C): 36.7 (04-25-24 @ 20:44), Max: 36.8 (04-24-24 @ 21:26)  HR: 65 (04-25-24 @ 20:44) (65 - 71)  BP: 107/47 (04-25-24 @ 20:44) (102/54 - 116/54)  RR: 20 (04-25-24 @ 20:44) (17 - 20)  SpO2: 97% (04-25-24 @ 20:44) (96% - 99%)  Wt(kg): --    Appearance: Frail appearing, thin.   HEENT: Moist Mucous Membranes, Anicteric, PERRL, EOMI  Cardiovascular: Regular rate and rhythm, Normal S1 S2, No JVD, 2/6 systolic murmur  Respiratory: Lungs clear to auscultation. No rales, No rhonchi, No wheezing. No tenderness to palpation  Gastrointestinal:  Soft, Non-tender, + BS  Neurologic: Non-focal, A&Ox3  Skin: Warm and dry, Ecchymosis  Musculoskeletal: No clubbing, No cyanosis, No edema  Psychiatry: Mood & affect appropriate      	    		        I&O's Summary    24 Apr 2024 07:01  -  25 Apr 2024 07:00  --------------------------------------------------------  IN: 0 mL / OUT: 300 mL / NET: -300 mL        LABORATORY VALUES	 	                          9.3    4.65  )-----------( 115      ( 25 Apr 2024 06:18 )             28.3       04-25    143  |  111<H>  |  67<H>  ----------------------------<  137<H>  3.5   |  19<L>  |  3.18<H>  04-24    140  |  109<H>  |  70<H>  ----------------------------<  107<H>  3.4<L>   |  18<L>  |  3.17<H>    Ca    7.7<L>      25 Apr 2024 06:18  Ca    7.4<L>      24 Apr 2024 05:06  Phos  3.5     04-25  Phos  3.4     04-24  Mg     2.10     04-25  Mg     2.00     04-24    TPro  5.4<L>  /  Alb  2.4<L>  /  TBili  0.8  /  DBili  x   /  AST  46<H>  /  ALT  51<H>  /  AlkPhos  140<H>  04-25  TPro  5.2<L>  /  Alb  2.3<L>  /  TBili  1.0  /  DBili  x   /  AST  40  /  ALT  49<H>  /  AlkPhos  113  04-24    LIVER FUNCTIONS - ( 25 Apr 2024 06:18 )  Alb: 2.4 g/dL / Pro: 5.4 g/dL / ALK PHOS: 140 U/L / ALT: 51 U/L / AST: 46 U/L / GGT: x               CARDIAC MARKERS:                  Thyroid Stimulating Hormone, Serum: 2.46 uIU/mL (04-21 @ 06:23)      Urinalysis Basic - ( 25 Apr 2024 06:18 )    Color: x / Appearance: x / SG: x / pH: x  Gluc: 137 mg/dL / Ketone: x  / Bili: x / Urobili: x   Blood: x / Protein: x / Nitrite: x   Leuk Esterase: x / RBC: x / WBC x   Sq Epi: x / Non Sq Epi: x / Bacteria: x      CAPILLARY BLOOD GLUCOSE      POCT Blood Glucose.: 195 mg/dL (25 Apr 2024 18:03)          TELEMETRY: 	 sinus rhythm   ECG:  	  RADIOLOGY:  OTHER: 	    PREVIOUS DIAGNOSTIC TESTING:    [ ] Echocardiogram: < from: MARCELLA W or WO Ultrasound Enhancing Agent (04.24.24 @ 12:50) >TRANSESOPHAGEAL ECHOCARDIOGRAM REPORT  ________________________________________________________________________________                                      _______       Pt. Name:       RACHEL COMBS Study Date:    4/24/2024  MRN:            [id number]        YOB: 1935  Accession #:    [id number]        Age:           88 years  Account#:       [id number]         Gender:        M  Visit ID#  Heart Rate:                      Height:        67.00 in (170.18 cm)  Rhythm:             Weight:        120.00 lb (54.43 kg)  Blood Pressure: 125/70 mmHg      BSA/BMI:       1.63 m² / 18.79 kg/m²  ________________________________________________________________________________________  Referring Physician:    [id number] Sudheer Arroyo  Interpreting Physician: Bernardino Lauren M.D.  Primary Sonographer:    Fabio Ly MD  Fellow (Performing):    Fabio Ly MD  Fellow (Interpreting):  Fabio Ly MD    CPT:               ECHO TRANSESOPH W/O CON - 77137.m;DOPPLER ECHO COMP WSPECT -                     36143.m;DOPPL ECHO COLOR FLOW - 32646.m;3D RECONST W/O                     WKSTATION - 27879.m  Indication(s):     Nonrheumatic mitral (valve) insufficiency - I34.0;                     Unspecified atrial fibrillation - I48.91  Procedure:         Transesophageal echocardiogram performed with 2D, M-mode and                     complete spectral and color flow Doppler. Real time and full                     volume 3-dimensional imaging performed at the echo machine.  Ordering Location: [facility]  Admission Status:  Inpatient  Agitated Saline:   Injection with agitated saline was performed to evaluate for                     intracardiac shunting.  Study Information: Image quality for this study is good.    _______________________________________________________________________________________     CONCLUSIONS:      1. Left ventricular systolic function is moderately decreased. There are regional wall motion abnormalities present. Hypokinesis of the inferior, lateral, and inferolateral walls.   2. Normal right ventricular cavity size and normal systolic function.   3. Structurally normal mitral valve with normal leaflet excursion. There is calcification of the mitral valve annulus. There is symmetric leaflet tethering. There is severe mitral regurgitation. Vena contracta width ~ 0.7 cm. Estimated effective regurgitant orifice area (EROA) ~ 0.4 cm2 (by the PISA method).   4. The aortic valve appears trileaflet with normal systolic excursion. There is calcification of the aortic valve leaflets. There is mild to moderate aortic regurgitation. Vena contracta width ~ 0.3 cm.   5. No atheroma in the visualized portions of the proximal ascending aorta. Mild non-mobile atheroma in the visualized portions of the transverse aortic arch. Mild non-mobile atheroma in the visualized portions of the descending aorta.   6. The left atrium is normal in size. There is no evidence of left atrial or left atrial appendage thrombus. Measurements of the left atrial appendage (JOSÉ MIGUEL) were carried out as outlined below. Note that the JOSÉ MIGUEL width refers to its width at the ostium. The JOSÉ MIGUEL length refers to the distance from the plane of the JOSÉ MIGUEL ostium to its most distal point. Note that the JOSÉ MIGUEL has a "windsock" appearance.           Zero degrees: width = 1.9 cm, length = 3.0 cm      30 degrees: width = 1.7 cm, length = 2.9 cm      45 degrees: width = 1.7 cm, length = 2.8 cm      60 degrees: width = 1.6 cm, length = 2.7 cm      90 degrees: width = 1.8 cm, length = 2.6 cm      135 degrees: width = 2.0 cm, length = 2.8 cm.   7. Agitated saline injection was negative for intracardiac shunt.    ____________________________________________________________________  MARCELLA Procedure:  After discussion of the risks and benefits of the MARCELLA, an informed consent was obtained. Study was performed with anesthesia (please see anesthesia record).  The adult Charla MARCELLA probe was introduced and passed into the esophagus. The MARCELLA probe was passed without difficulty. Images were obtained with the patient in a left lateral decubitus position. Image quality was good. The patient's vital signs; including heart rate, blood pressure, and oxygen saturation; remained stable throughout the procedure. The patient tolerated the procedure well and without complications.     ________________________________________________________________________________________  FINDINGS:     Left Ventricle:  Left ventricular systolic function is moderately decreased. There are regional wall motion abnormalities present. Hypokinesis of the inferior, lateral, and inferolateral walls.     Right Ventricle:  The right ventricular cavity is normal in size and normal systolic function.     Left Atrium:  The left atrium is normal in size. There is no evidence ofleft atrial or left atrial appendage thrombus. Measurements of the left atrial appendage (JOSÉ MIGUEL) were carried out as outlined below. Note that the JOSÉ MIGUEL width refers to its width at the ostium. The JOSÉ MIGUEL length refers to the distance from the plane of the JOSÉ MIGUEL ostium to its most distal point. Note that the JOSÉ MIGUEL has a "windsock" appearance.    Zero degrees: width = 1.9 cm, length = 3.0 cm  30 degrees: width = 1.7 cm, length = 2.9 cm  45 degrees: width = 1.7 cm, length = 2.8 cm  60 degrees: width = 1.6 cm,length = 2.7 cm  90 degrees: width = 1.8 cm, length = 2.6 cm  135 degrees: width = 2.0 cm, length = 2.8 cm.     Right Atrium:  The right atrium is normal in size.     Interatrial Septum:  Agitated saline injection was negative for intracardiac shunt.     Aortic Valve:  The aortic valve appears trileaflet with normal systolic excursion. There is calcification of the aortic valve leaflets. There is mild to moderate aortic regurgitation. Vena contracta width ~ 0.3 cm.     Mitral Valve:  Structurallynormal mitral valve with normal leaflet excursion. There is calcification of the mitral valve annulus. There is symmetric leaflet tethering. There is severe mitral regurgitation. Vena contracta width ~ 0.7 cm. Estimated effective regurgitant orifice area (EROA) ~ 0.4 cm2 (by the PISA method).     Tricuspid Valve:  Structurally normal tricuspid valve with normal leaflet excursion. There is trace tricuspid regurgitation.     Pulmonic Valve:  Structurally normal pulmonic valve with normal leaflet excursion. There is mild pulmonic regurgitation.     Aorta:  The aortic root at the sinuses of Valsalva is normal in size, measuring 2.90 cm (indexed 1.78 cm/m²). The aortic diameter at the sinotubular junction is normal in size, measuring 2.3 cm. The ascending aorta diameter is normal in size, measuring 3.00 cm (indexed 1.84 cm/m²). There is no atheroma in the visualized portions of the proximal ascending aorta. There is mild non-mobile atheroma in the visualized portions of the transverse aortic arch. There is mild non-mobile atheroma in the visualized portions of the descending aorta.     Pericardium:  No pericardial effusion seen.  ____________________________________________________________________  QUANTITATIVE DATA:  Aorta Measurements: (Normal range) (Indexed to BSA)     Sinuses of Valsalva: 2.90 cm (3.1 - 3.7 cm)  Ao ST Junct:         2.3 cm  Ao Asc prox:         3.00 cm       LVOT / RVOT/ Qp/Qs Data: (Indexed to BSA)  LVOT Diameter: 2.20 cm  LVOT Area:     3.80 cm²    Mitral Valve Measurements:     MV Beatriz d, A4C 3.70 cm      MR Vmax:           5.52 m/s                             MR VTI:            198.50 cm                             MR Mean Gradient:  71.0 mmHg                             MR Peak Gradient:  121.9 mmHg                       MR PISA Radius:    1.00 cm                             MR Aliasing Benjamin:   30.80 cm/s                             MR Inst Flow Rate: 193.52 ml/s    ________________________________________________________________________________________  Electronically signed on 4/24/2024 at 6:42:22 PM by Bernardino Lauren M.D.         *** Final ***        < end of copied text >    [ ] Catheterization:  [ ] Stress Test:

## 2024-04-25 NOTE — PROGRESS NOTE ADULT - ASSESSMENT
88y Male with CAD, HFrEF, Afib, HTN, DM, solitary R kidney and CKD p/w acute on chronic HFrEF and acute on chronic kidney injury.  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  eliquis is off;     EPS consult pending and is structural consult     GI- SP EGD     DW Daughter     Sayed Destineer   [phone]

## 2024-04-25 NOTE — CONSULT NOTE ADULT - REASON FOR ADMISSION
Blood loss anemia, GI bleeding, NSTEMI

## 2024-04-25 NOTE — CONSULT NOTE ADULT - ASSESSMENT
A/P    89yo w/ PMHx CKD, HFrEF, Diabetes, TIA,  CAD s/p stenting, bladder cancer s/p chemo in remission, afib on eliquis, hx of left nephrectomy, recent admission 3/16-3/28 for SOB found to be anemic s/p EGD who presents to the ER complaining of increased weakness, dizziness, sob/rosado, with chest tightness and dark stools.    #Chest Pain/+ Troponin  -likely type II MI (demand ischemia) in setting of CKD, anemia  -GI eval appreciated  -plan for possible repeat EGD next week if aligned w GOC(can proceed from CV perspective)  -cont to hold antiplatelet/DOAC  -pt w h/o CAD s/p PCI  -resume asa when feasible per GI    #SOB,chronic HFrEF  -does not appear overloaded  -no edema on CXR  -cont oral lasix 20mg daILY  -previous echo w mild LV dysfunction  -repeat TTE w mild LV dysfunction    #Atrial Fibrillation  -stable  -ac on hold  -monitor cbc   -cont amio    #CKD  -renal f/u    75 minutes spent on total encounter; more than 50% of the visit was spent counseling and/or coordinating care by the attending physician.  
Patient is a 88y old  Male who presents with a chief complaint of Blood loss anemia, GI bleeding, NSTEMI (25 Apr 2024 13:41). He has a past medical history Afib on Eliquis, bladder cancer, CHF, CKD, hematuria, SCCA of skin, was recently admitted with GIB of who is admitted again with another GI bleed. 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  Saw patient in the office on April 19, 2024 for evaluation of JOSÉ MIGUEL occlusion.  Thus far during this admission, patient underwent MARCELLA which showed no JOSÉ MIGUEL thrombus, but did show severe MR which will be evaluated by structural heart team for possible clip.  Dimensions of JOSÉ MIGUEL look favorable for JOSÉ MIGUEL occlusion. Will need to coordinate JOSÉ MIGUEL with clip. 
87yo w/ PMHx CKD, HFrEF, CAD s/p stenting, bladder cancer s/p chemo, afib on eliquis, recent admission 3/16-3/28 for SOB found to be anemic s/p EGD presenting w/ melena/chest discomfort.    #Anemia  #Melena  #Troponemia  Presenting w/ reports of melena at home w/ Hb 7.7 from b/l 8.7 on 3/28. BUN/Cr 88/3.35 which appears to be at baseline. Given recent EGD which showed red blood in antrum w/ significant mucosal changes, suspect this is the source of patient's melena and anemia. Path was recently neg for malignancy and [place] red. He was already scheduled for outpt EGD in May which can be expedited here in setting melena. Currently not optimized given troponemia. Last dose eliquis evening of 4/19.   Recommendations:  -Cardiology eval for troponin elevation  -IV PPI BID  -Plan for EGD once optimized; likely Monday  -Transfuse to maintain Hb >8  -Active T&S  -2 large bore IVs  -Hold AC  -Please make NPO on Sunday night  -3am labs Monday morning: CBC, CMP, coags; if repleting K, please give IV repletion only    Note incomplete until finalized by attending signature/attestation.    Ora Parr  GI/Hepatology Fellow PGY5    NON-URGENT CONSULTS:  Please email [email or web address]  AT NIGHT AND ON WEEKENDS:  Available on [employer] Teams  [phone] (Long Range Pager)    For urgent consults, please contact on call GI team. See Amion schedule (NUSH) or [employer] system ([employer] [employer])      showing LA grade A esophagitis large amount of red blood in antrum of stomach w/ severe mucosal changes characterized by congestion, erythema friability, hemorrhagic appearance. Treated w/ hemospray. Underlying c/f malignancy. Path showed moderate chronic inactive gastritis and mild foveolar hyperplasia, neg for dypslaia or H. pylori. [place] red stain neg. ASA was held.    On arrival, HD stable w/ /69, HR 70s. Labs showed Hb 7.7 from 8.7 on 3/28 w/ , plts 141, BUN/Cr 88/3.35 (has b/l CKD w/ Cr ~2.7). INR 1.24. Trop is elevated in 700s.Started on PPI.  Ordered for 1u PRBCs.

## 2024-04-26 LAB
BASOPHILS # BLD AUTO: 0.03 K/UL — SIGNIFICANT CHANGE UP (ref 0–0.2)
BASOPHILS NFR BLD AUTO: 0.8 % — SIGNIFICANT CHANGE UP (ref 0–2)
BLD GP AB SCN SERPL QL: NEGATIVE — SIGNIFICANT CHANGE UP
EOSINOPHIL # BLD AUTO: 0.2 K/UL — SIGNIFICANT CHANGE UP (ref 0–0.5)
EOSINOPHIL NFR BLD AUTO: 5.4 % — SIGNIFICANT CHANGE UP (ref 0–6)
GLUCOSE BLDC GLUCOMTR-MCNC: 137 MG/DL — HIGH (ref 70–99)
GLUCOSE BLDC GLUCOMTR-MCNC: 164 MG/DL — HIGH (ref 70–99)
GLUCOSE BLDC GLUCOMTR-MCNC: 193 MG/DL — HIGH (ref 70–99)
GLUCOSE BLDC GLUCOMTR-MCNC: 206 MG/DL — HIGH (ref 70–99)
HCT VFR BLD CALC: 28.6 % — LOW (ref 39–50)
HGB BLD-MCNC: 9.2 G/DL — LOW (ref 13–17)
IANC: 2.34 K/UL — SIGNIFICANT CHANGE UP (ref 1.8–7.4)
IMM GRANULOCYTES NFR BLD AUTO: 0.3 % — SIGNIFICANT CHANGE UP (ref 0–0.9)
LYMPHOCYTES # BLD AUTO: 0.71 K/UL — LOW (ref 1–3.3)
LYMPHOCYTES # BLD AUTO: 19.3 % — SIGNIFICANT CHANGE UP (ref 13–44)
MCHC RBC-ENTMCNC: 31.8 PG — SIGNIFICANT CHANGE UP (ref 27–34)
MCHC RBC-ENTMCNC: 32.2 GM/DL — SIGNIFICANT CHANGE UP (ref 32–36)
MCV RBC AUTO: 99 FL — SIGNIFICANT CHANGE UP (ref 80–100)
MONOCYTES # BLD AUTO: 0.39 K/UL — SIGNIFICANT CHANGE UP (ref 0–0.9)
MONOCYTES NFR BLD AUTO: 10.6 % — SIGNIFICANT CHANGE UP (ref 2–14)
NEUTROPHILS # BLD AUTO: 2.34 K/UL — SIGNIFICANT CHANGE UP (ref 1.8–7.4)
NEUTROPHILS NFR BLD AUTO: 63.6 % — SIGNIFICANT CHANGE UP (ref 43–77)
NRBC # BLD: 0 /100 WBCS — SIGNIFICANT CHANGE UP (ref 0–0)
NRBC # FLD: 0 K/UL — SIGNIFICANT CHANGE UP (ref 0–0)
PLATELET # BLD AUTO: 101 K/UL — LOW (ref 150–400)
RBC # BLD: 2.89 M/UL — LOW (ref 4.2–5.8)
RBC # FLD: 19.4 % — HIGH (ref 10.3–14.5)
RH IG SCN BLD-IMP: POSITIVE — SIGNIFICANT CHANGE UP
WBC # BLD: 3.68 K/UL — LOW (ref 3.8–10.5)
WBC # FLD AUTO: 3.68 K/UL — LOW (ref 3.8–10.5)

## 2024-04-26 PROCEDURE — 99231 SBSQ HOSP IP/OBS SF/LOW 25: CPT

## 2024-04-26 RX ADMIN — AMIODARONE HYDROCHLORIDE 200 MILLIGRAM(S): 400 TABLET ORAL at 14:13

## 2024-04-26 RX ADMIN — Medication 1 GRAM(S): at 18:32

## 2024-04-26 RX ADMIN — Medication 1 GRAM(S): at 06:01

## 2024-04-26 RX ADMIN — Medication 30 MILLILITER(S): at 06:02

## 2024-04-26 RX ADMIN — APIXABAN 2.5 MILLIGRAM(S): 2.5 TABLET, FILM COATED ORAL at 21:07

## 2024-04-26 RX ADMIN — Medication 1: at 17:00

## 2024-04-26 RX ADMIN — ATORVASTATIN CALCIUM 80 MILLIGRAM(S): 80 TABLET, FILM COATED ORAL at 21:07

## 2024-04-26 RX ADMIN — PANTOPRAZOLE SODIUM 40 MILLIGRAM(S): 20 TABLET, DELAYED RELEASE ORAL at 06:02

## 2024-04-26 RX ADMIN — APIXABAN 2.5 MILLIGRAM(S): 2.5 TABLET, FILM COATED ORAL at 10:23

## 2024-04-26 RX ADMIN — PANTOPRAZOLE SODIUM 40 MILLIGRAM(S): 20 TABLET, DELAYED RELEASE ORAL at 18:32

## 2024-04-26 RX ADMIN — Medication 30 MILLILITER(S): at 21:08

## 2024-04-26 RX ADMIN — TAMSULOSIN HYDROCHLORIDE 0.4 MILLIGRAM(S): 0.4 CAPSULE ORAL at 21:07

## 2024-04-26 NOTE — PROGRESS NOTE ADULT - SUBJECTIVE AND OBJECTIVE BOX
CARDIOLOGY FOLLOW UP NOTE - DR. BUSTILLO    Patient Name: RACHEL COMBS    Date of Service: 04-26-24 @ 11:04    Patient seen and examined  no new complaints      Subjective:    cv: denies chest pain, dyspnea, palpitations, dizziness  pulmonary: denies cough  GI: denies abdominal pain, nausea, vomiting  vascular/legs: no edema   skin: no rash  ROS: otherwise negative   overnight events:      PHYSICAL EXAM:  T(C): 36.4 (04-26-24 @ 10:20), Max: 36.7 (04-25-24 @ 12:00)  HR: 64 (04-26-24 @ 10:20) (62 - 68)  BP: 107/50 (04-26-24 @ 10:20) (91/32 - 116/54)  RR: 16 (04-26-24 @ 10:20) (16 - 20)  SpO2: 98% (04-26-24 @ 10:20) (97% - 99%)  Wt(kg): --  I&O's Summary    Daily     Daily     Appearance: Normal	  Cardiovascular: Normal S1 S2,RRR, No JVD, sm  Respiratory: Lungs clear to auscultation	  Gastrointestinal:  Soft, Non-tender, + BS	  Extremities: Normal range of motion, No clubbing, cyanosis or edema      Home Medications:  apixaban 2.5 mg oral tablet: 1 tab(s) orally every 12 hours (20 Apr 2024 23:35)  Crestor 40 mg oral tablet: 1 tab(s) orally once a day (at bedtime) (20 Apr 2024 23:35)  repaglinide 0.5 mg oral tablet: 1 tab(s) orally 3 times a day (before meals) (20 Apr 2024 23:27)  tamsulosin 0.4 mg oral capsule: 1 cap(s) orally once a day (20 Apr 2024 23:27)  Tresiba FlexTouch 100 units/mL subcutaneous solution: 5 solution subcutaneous once a day (at bedtime) (20 Apr 2024 23:27)      MEDICATIONS  (STANDING):  aMIOdarone    Tablet 200 milliGRAM(s) Oral daily  apixaban 2.5 milliGRAM(s) Oral every 12 hours  atorvastatin 80 milliGRAM(s) Oral at bedtime  citric acid/sodium citrate Solution 30 milliLiter(s) Oral two times a day  dextrose 10% Bolus 125 milliLiter(s) IV Bolus once  dextrose 5%. 1000 milliLiter(s) (100 mL/Hr) IV Continuous <Continuous>  dextrose 5%. 1000 milliLiter(s) (50 mL/Hr) IV Continuous <Continuous>  dextrose 50% Injectable 12.5 Gram(s) IV Push once  dextrose 50% Injectable 25 Gram(s) IV Push once  glucagon  Injectable 1 milliGRAM(s) IntraMuscular once  influenza  Vaccine (HIGH DOSE) 0.7 milliLiter(s) IntraMuscular once  insulin lispro (ADMELOG) corrective regimen sliding scale   SubCutaneous three times a day before meals  insulin lispro (ADMELOG) corrective regimen sliding scale   SubCutaneous at bedtime  pantoprazole  Injectable 40 milliGRAM(s) IV Push every 12 hours  sucralfate 1 Gram(s) Oral two times a day  tamsulosin 0.4 milliGRAM(s) Oral at bedtime      TELEMETRY: 	    ECG:  	  RADIOLOGY:   DIAGNOSTIC TESTING:  [ ] Echocardiogram:  [ ] Catheterization:  [ ] Stress Test:    OTHER: 	    LABS:	 	    CARDIAC MARKERS:        Troponin T, High Sensitivity Result: 526 ng/L (04-23 @ 06:45)  Troponin T, High Sensitivity Result: 532 ng/L (04-22 @ 06:50)                                9.2    3.68  )-----------( 101      ( 26 Apr 2024 06:53 )             28.6     04-25    143  |  111<H>  |  67<H>  ----------------------------<  137<H>  3.5   |  19<L>  |  3.18<H>    Ca    7.7<L>      25 Apr 2024 06:18  Phos  3.5     04-25  Mg     2.10     04-25    TPro  5.4<L>  /  Alb  2.4<L>  /  TBili  0.8  /  DBili  x   /  AST  46<H>  /  ALT  51<H>  /  AlkPhos  140<H>  04-25    proBNP:     Lipid Profile:   HgA1c:     Creatinine: 3.18 mg/dL (04-25-24 @ 06:18)  Creatinine: 3.17 mg/dL (04-24-24 @ 05:06)

## 2024-04-26 NOTE — PROGRESS NOTE ADULT - SUBJECTIVE AND OBJECTIVE BOX
NEPHROLOGY-[facility] [phone]        Patient seen and examined in bed.  He was the same         MEDICATIONS  (STANDING):  aMIOdarone    Tablet 200 milliGRAM(s) Oral daily  apixaban 2.5 milliGRAM(s) Oral every 12 hours  atorvastatin 80 milliGRAM(s) Oral at bedtime  citric acid/sodium citrate Solution 30 milliLiter(s) Oral two times a day  dextrose 10% Bolus 125 milliLiter(s) IV Bolus once  dextrose 5%. 1000 milliLiter(s) (100 mL/Hr) IV Continuous <Continuous>  dextrose 5%. 1000 milliLiter(s) (50 mL/Hr) IV Continuous <Continuous>  dextrose 50% Injectable 12.5 Gram(s) IV Push once  dextrose 50% Injectable 25 Gram(s) IV Push once  glucagon  Injectable 1 milliGRAM(s) IntraMuscular once  influenza  Vaccine (HIGH DOSE) 0.7 milliLiter(s) IntraMuscular once  insulin lispro (ADMELOG) corrective regimen sliding scale   SubCutaneous three times a day before meals  insulin lispro (ADMELOG) corrective regimen sliding scale   SubCutaneous at bedtime  pantoprazole  Injectable 40 milliGRAM(s) IV Push every 12 hours  sucralfate 1 Gram(s) Oral two times a day  tamsulosin 0.4 milliGRAM(s) Oral at bedtime      VITAL:  T(C): , Max: 36.7 (04-25-24 @ 12:00)  T(F): , Max: 98.1 (04-25-24 @ 12:00)  HR: 64 (04-26-24 @ 10:20)  BP: 107/50 (04-26-24 @ 10:20)  BP(mean): --  RR: 16 (04-26-24 @ 10:20)  SpO2: 98% (04-26-24 @ 10:20)  Wt(kg): --    I and O's:        PHYSICAL EXAM:    Constitutional: NAD; cachetic   Neck:  No JVD  Respiratory: CTAB/L  Cardiovascular: S1 and S2  Gastrointestinal: BS+, soft, NT/ND  Extremities: No peripheral edema  Neurological: A/O x 3, no focal deficits  Psychiatric: Normal mood, normal affect  : No Temple  Skin: No rashes  Access: Not applicable    LABS:                        9.2    3.68  )-----------( 101      ( 26 Apr 2024 06:53 )             28.6     04-25    143  |  111<H>  |  67<H>  ----------------------------<  137<H>  3.5   |  19<L>  |  3.18<H>    Ca    7.7<L>      25 Apr 2024 06:18  Phos  3.5     04-25  Mg     2.10     04-25    TPro  5.4<L>  /  Alb  2.4<L>  /  TBili  0.8  /  DBili  x   /  AST  46<H>  /  ALT  51<H>  /  AlkPhos  140<H>  04-25          Urine Studies:  Urinalysis Basic - ( 25 Apr 2024 06:18 )    Color: x / Appearance: x / SG: x / pH: x  Gluc: 137 mg/dL / Ketone: x  / Bili: x / Urobili: x   Blood: x / Protein: x / Nitrite: x   Leuk Esterase: x / RBC: x / WBC x   Sq Epi: x / Non Sq Epi: x / Bacteria: x            RADIOLOGY & ADDITIONAL STUDIES:

## 2024-04-26 NOTE — PROGRESS NOTE ADULT - ASSESSMENT
ECHO 3/19/24 : . Left ventricular systolic function is mildly decreased with an ejection fraction visually estimated at 45 to 50%. There are regional wall motion abnormalities present. There is mild (grade 1) left ventricular diastolic dysfunction. Hypokinesis of the lateral and inferolateral walls. trace mitral regurgitation.mild to moderate aortic regurgitation.  ECHO 4/21/24 : Left ventricular systolic function is moderately decreased with an ejection fraction of 37 % Regional wall motion abnormalities present. Multiple segmental abnormalities , mild to moderate tricuspid regurgitation; severe mitral regurgitation    A/P    89yo w/ PMHx CKD, HFrEF, Diabetes, TIA,  CAD s/p stenting, bladder cancer s/p chemo in remission, afib on eliquis, hx of left nephrectomy, recent admission 3/16-3/28 for SOB found to be anemic s/p EGD who presents to the ER complaining of increased weakness, dizziness, sob/rosado, with chest tightness and dark stools.    #Chest Pain/+ Troponin  -type II MI (demand ischemia) in setting of CKD, anemia  -GI g/u  -sp egd     #CAD, s/p PCI  -cv stable, no chest pain  -remains off antiplat given recurrent bleed hx and oral a/c use  -eventual asa 81 if can tolerate  -evnetual [facility] pre guillermina clip    #SOB, chronic HFrEF  -stable vol status  -s/p lasix ivp 4/22  -lasix on hold for now   -previous echo w mild LV dysfunction  -repeat TTE w LV dysfunction EF 37% WMA (overall unchanged from prior) however now with  severe eccentric mitral regurgitation  -low dose lasix prn    #Sev MR   -echo as above  -structural f/u dr rosmery montoya as outpt to arrange mitraclip  -hamlet with Severe MR  -low bp precludes vasodilators     #Atrial Fibrillation  -stable  -cont a/c  -monitor cbc   -cont amio  -eps f/u dr reich for eventual watchman    #CKD  -renal f/u    35 minutes spent on total encounter; more than 50% of the visit was spent counseling and/or coordinating care by the attending physician.      d/w eps  d/w structural     53 minutes spent on total encounter; more than 50% of the visit was spent counseling and/or coordinating care by the attending physician.

## 2024-04-26 NOTE — PROGRESS NOTE ADULT - ASSESSMENT
88y Male with CAD, HFrEF, Afib, HTN, DM, solitary R kidney and CKD p/w acute on chronic HFrEF and acute on chronic kidney injury.  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  eliquis is off;     He will remain at high risk for ELIZA and this will not change with time.  No renal objection to proceed with [facility]     GI- SP EGD      Outpt EPS     [facility]   [facility]   [phone]          88y Male with CAD, HFrEF, Afib, HTN, DM, solitary R kidney and CKD p/w acute on chronic HFrEF and acute on chronic kidney injury.  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     He will remain at high risk for ELIZA and this will not change with time.  No renal objection to proceed with [facility]     GI- SP EGD      Outpt EPS and structural heart disease     [facility]   [facility]   [phone]       Addendum- He does not need a cath   DW Card attd

## 2024-04-26 NOTE — PROGRESS NOTE ADULT - ASSESSMENT
Patient is a 88y old  Male who presents with a chief complaint of Blood loss anemia, GI bleeding, NSTEMI (25 Apr 2024 13:41). He has a past medical history Afib on Eliquis, bladder cancer, CHF, CKD, hematuria, SCCA of skin, was recently admitted with GIB of who is admitted again with another GI bleed. 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  Saw patient in the office on April 19, 2024 for evaluation of JOSÉ MIGUEL occlusion.  Thus far during this admission, patient underwent MARCELLA which showed no JOSÉ MIGUEL thrombus, but did show severe MR which will be evaluated by structural heart team for possible clip.  Dimensions of JOSÉ MIGUEL look favorable for JOSÉ MIGUEL occlusion.    The left atrium is normal in size. There is no evidence of left atrial or left atrial appendage thrombus. Measurements of the left atrial appendage (JOSÉ MIGUEL) were carried out as outlined below. Note that the JOSÉ MIGUEL width refers to its width at the ostium. The JOSÉ MIGUEL length refers to the distance from the plane of the JOSÉ MIGUEL ostium to its most distal point. Note that the JOSÉ MIGUEL has a "windsock" appearance.           Zero degrees: width = 1.9 cm, length = 3.0 cm      30 degrees: width = 1.7 cm, length = 2.9 cm      45 degrees: width = 1.7 cm, length = 2.8 cm      60 degrees: width = 1.6 cm, length = 2.7 cm      90 degrees: width = 1.8 cm, length = 2.6 cm      135 degrees: width = 2.0 cm, length = 2.8 cm.    Patient to follow up with his cardiologist Dr Man in 1 week and will then schedule his JOSÉ MIGUEL occlusion procedure outpatinet  No further EPS intervention at this time

## 2024-04-26 NOTE — PROGRESS NOTE ADULT - PROBLEM SELECTOR PLAN 5
heart rate controlled   continue amiodarone  cardiology follow up appreciated  MARCELLA severe MR  outpatient follow up Structural Heart service [facility]

## 2024-04-26 NOTE — PROGRESS NOTE ADULT - NSPROGADDITIONALINFOA_GEN_ALL_CORE
discharge tomorrow if hemoglobin stable  discussed with patient in detail, expresses understanding of treatment plans.  discussed with covering ACP  discussed with wife over the phone

## 2024-04-26 NOTE — PROGRESS NOTE ADULT - SUBJECTIVE AND OBJECTIVE BOX
Interval History:  No acute events overnight  Telemetry: NSR     MEDICATIONS  (STANDING):  aMIOdarone    Tablet 200 milliGRAM(s) Oral daily  apixaban 2.5 milliGRAM(s) Oral every 12 hours  atorvastatin 80 milliGRAM(s) Oral at bedtime  citric acid/sodium citrate Solution 30 milliLiter(s) Oral two times a day  dextrose 10% Bolus 125 milliLiter(s) IV Bolus once  dextrose 5%. 1000 milliLiter(s) (50 mL/Hr) IV Continuous <Continuous>  dextrose 5%. 1000 milliLiter(s) (100 mL/Hr) IV Continuous <Continuous>  dextrose 50% Injectable 25 Gram(s) IV Push once  dextrose 50% Injectable 12.5 Gram(s) IV Push once  glucagon  Injectable 1 milliGRAM(s) IntraMuscular once  influenza  Vaccine (HIGH DOSE) 0.7 milliLiter(s) IntraMuscular once  insulin lispro (ADMELOG) corrective regimen sliding scale   SubCutaneous at bedtime  insulin lispro (ADMELOG) corrective regimen sliding scale   SubCutaneous three times a day before meals  pantoprazole  Injectable 40 milliGRAM(s) IV Push every 12 hours  sucralfate 1 Gram(s) Oral two times a day  tamsulosin 0.4 milliGRAM(s) Oral at bedtime    MEDICATIONS  (PRN):  acetaminophen     Tablet .. 650 milliGRAM(s) Oral every 6 hours PRN Temp greater or equal to 38C (100.4F), Mild Pain (1 - 3)  aluminum hydroxide/magnesium hydroxide/simethicone Suspension 30 milliLiter(s) Oral every 4 hours PRN Dyspepsia  dextrose Oral Gel 15 Gram(s) Oral once PRN Blood Glucose LESS THAN 70 milliGRAM(s)/deciliter  melatonin 3 milliGRAM(s) Oral at bedtime PRN Insomnia    Vital Signs Last 24 Hrs  T(C): 36.4 (04-26-24 @ 10:20), Max: 36.7 (04-25-24 @ 12:00)  T(F): 97.6 (04-26-24 @ 10:20), Max: 98.1 (04-25-24 @ 12:00)  HR: 64 (04-26-24 @ 10:20) (62 - 68)  BP: 107/50 (04-26-24 @ 10:20) (91/32 - 116/54)  BP(mean): --  RR: 16 (04-26-24 @ 10:20) (16 - 20)  SpO2: 98% (04-26-24 @ 10:20) (97% - 99%)    Appearance: Normal	  HEENT:   Normal oral mucosa, PERRL, EOMI	  Lymphatic: No lymphadenopathy  Cardiovascular: Normal S1 S2, No JVD, No murmurs, No edema  Respiratory: Lungs clear to auscultation	  Psychiatry: A & O x 3, Mood & affect appropriate  Gastrointestinal:  Soft, Non-tender, + BS	  Skin: No rashes, No ecchymoses, No cyanosis	  Neurologic: Non-focal  Extremities: Normal range of motion, No clubbing, cyanosis or edema  Vascular: Peripheral pulses palpable 2+ bilaterally    LABS:	 	    CBC Full  -  ( 26 Apr 2024 06:53 )  WBC Count : 3.68 K/uL  Hemoglobin : 9.2 g/dL  Hematocrit : 28.6 %  Platelet Count - Automated : 101 K/uL  Mean Cell Volume : 99.0 fL  Mean Cell Hemoglobin : 31.8 pg  Mean Cell Hemoglobin Concentration : 32.2 gm/dL  Auto Neutrophil # : 2.34 K/uL  Auto Lymphocyte # : 0.71 K/uL  Auto Monocyte # : 0.39 K/uL  Auto Eosinophil # : 0.20 K/uL  Auto Basophil # : 0.03 K/uL  Auto Neutrophil % : 63.6 %  Auto Lymphocyte % : 19.3 %  Auto Monocyte % : 10.6 %  Auto Eosinophil % : 5.4 %  Auto Basophil % : 0.8 %    04-25    143  |  111<H>  |  67<H>  ----------------------------<  137<H>  3.5   |  19<L>  |  3.18<H>    Ca    7.7<L>      25 Apr 2024 06:18  Phos  3.5     04-25  Mg     2.10     04-25    TPro  5.4<L>  /  Alb  2.4<L>  /  TBili  0.8  /  DBili  x   /  AST  46<H>  /  ALT  51<H>  /  AlkPhos  140<H>  04-25      LIVER FUNCTIONS - ( 25 Apr 2024 06:18 )  Alb: 2.4 g/dL / Pro: 5.4 g/dL / ALK PHOS: 140 U/L / ALT: 51 U/L / AST: 46 U/L / GGT: x

## 2024-04-26 NOTE — PROGRESS NOTE ADULT - NS ATTEND AMEND GEN_ALL_CORE FT
Patient is a 88y old  Male who presents with a chief complaint of Blood loss anemia, GI bleeding, NSTEMI (25 Apr 2024 13:41). He has a past medical history Afib on Eliquis, bladder cancer, CHF, CKD, hematuria, SCCA of skin, was recently admitted with GIB of who is admitted again with another GI bleed. 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  Saw patient in the office on April 19, 2024 for evaluation of JOSÉ MIGUEL occlusion.  Thus far during this admission, patient underwent MARCELLA which showed no JOSÉ MIGUEL thrombus, but did show severe MR which will be evaluated by structural heart team for possible clip.  Dimensions of JOSÉ MIGUEL look favorable for JOSÉ MIGUEL occlusion. The left atrium is normal in size. There is no evidence of left atrial or left atrial appendage thrombus. Measurements of the left atrial appendage (JOSÉ MIGUEL) were carried out as outlined below. Note that the JOSÉ MIGUEL width refers to its width at the ostium. The JOSÉ MIGUEL length refers to the distance from the plane of the JOSÉ MIGUEL ostium to its most distal point. Note that the JOSÉ MIGUEL has a "windsock" appearance. Patient to follow up with his cardiologist Dr Man in 1 week and will then schedule his JOSÉ MIGUEL occlusion procedure outpatient. No further EPS intervention at this time           Zero degrees: width = 1.9 cm, length = 3.0 cm      30 degrees: width = 1.7 cm, length = 2.9 cm      45 degrees: width = 1.7 cm, length = 2.8 cm      60 degrees: width = 1.6 cm, length = 2.7 cm      90 degrees: width = 1.8 cm, length = 2.6 cm      135 degrees: width = 2.0 cm, length = 2.8 cm.

## 2024-04-26 NOTE — PROGRESS NOTE ADULT - SUBJECTIVE AND OBJECTIVE BOX
Patient is a 88y old  Male who presents with a chief complaint of Blood loss anemia, GI bleeding, NSTEMI (26 Apr 2024 11:32)      DATE OF SERVICE: 04-26-24 @ 12:59    SUBJECTIVE / OVERNIGHT EVENTS: overnight events noted    ROS:  Resp: No cough no sputum production  CVS: No chest pain no palpitations no orthopnea  GI: no N/V/D      MEDICATIONS  (STANDING):  aMIOdarone    Tablet 200 milliGRAM(s) Oral daily  apixaban 2.5 milliGRAM(s) Oral every 12 hours  atorvastatin 80 milliGRAM(s) Oral at bedtime  citric acid/sodium citrate Solution 30 milliLiter(s) Oral two times a day  dextrose 10% Bolus 125 milliLiter(s) IV Bolus once  dextrose 5%. 1000 milliLiter(s) (50 mL/Hr) IV Continuous <Continuous>  dextrose 5%. 1000 milliLiter(s) (100 mL/Hr) IV Continuous <Continuous>  dextrose 50% Injectable 12.5 Gram(s) IV Push once  dextrose 50% Injectable 25 Gram(s) IV Push once  glucagon  Injectable 1 milliGRAM(s) IntraMuscular once  influenza  Vaccine (HIGH DOSE) 0.7 milliLiter(s) IntraMuscular once  insulin lispro (ADMELOG) corrective regimen sliding scale   SubCutaneous at bedtime  insulin lispro (ADMELOG) corrective regimen sliding scale   SubCutaneous three times a day before meals  pantoprazole  Injectable 40 milliGRAM(s) IV Push every 12 hours  sucralfate 1 Gram(s) Oral two times a day  tamsulosin 0.4 milliGRAM(s) Oral at bedtime    MEDICATIONS  (PRN):  acetaminophen     Tablet .. 650 milliGRAM(s) Oral every 6 hours PRN Temp greater or equal to 38C (100.4F), Mild Pain (1 - 3)  aluminum hydroxide/magnesium hydroxide/simethicone Suspension 30 milliLiter(s) Oral every 4 hours PRN Dyspepsia  dextrose Oral Gel 15 Gram(s) Oral once PRN Blood Glucose LESS THAN 70 milliGRAM(s)/deciliter  melatonin 3 milliGRAM(s) Oral at bedtime PRN Insomnia        CAPILLARY BLOOD GLUCOSE      POCT Blood Glucose.: 206 mg/dL (26 Apr 2024 11:39)  POCT Blood Glucose.: 137 mg/dL (26 Apr 2024 08:25)  POCT Blood Glucose.: 181 mg/dL (25 Apr 2024 21:38)  POCT Blood Glucose.: 195 mg/dL (25 Apr 2024 18:03)    I&O's Summary      Vital Signs Last 24 Hrs  T(C): 36.4 (26 Apr 2024 10:20), Max: 36.7 (25 Apr 2024 18:32)  T(F): 97.6 (26 Apr 2024 10:20), Max: 98 (25 Apr 2024 18:32)  HR: 64 (26 Apr 2024 10:20) (62 - 68)  BP: 107/50 (26 Apr 2024 10:20) (91/32 - 116/54)  BP(mean): --  RR: 16 (26 Apr 2024 10:20) (16 - 20)  SpO2: 98% (26 Apr 2024 10:20) (97% - 99%)    PHYSICAL EXAM:  CHEST/LUNG: clear  HEART: S1 S2; systolic murmur +   ABDOMEN: Soft, Nontender  EXTREMITIES: no edema  NEUROLOGY: AO x 3 non-focal  SKIN: No rashes or lesions    LABS:                        9.2    3.68  )-----------( 101      ( 26 Apr 2024 06:53 )             28.6     04-25    143  |  111<H>  |  67<H>  ----------------------------<  137<H>  3.5   |  19<L>  |  3.18<H>    Ca    7.7<L>      25 Apr 2024 06:18  Phos  3.5     04-25  Mg     2.10     04-25    TPro  5.4<L>  /  Alb  2.4<L>  /  TBili  0.8  /  DBili  x   /  AST  46<H>  /  ALT  51<H>  /  AlkPhos  140<H>  04-25          Urinalysis Basic - ( 25 Apr 2024 06:18 )    Color: x / Appearance: x / SG: x / pH: x  Gluc: 137 mg/dL / Ketone: x  / Bili: x / Urobili: x   Blood: x / Protein: x / Nitrite: x   Leuk Esterase: x / RBC: x / WBC x   Sq Epi: x / Non Sq Epi: x / Bacteria: x          All consultant(s) notes reviewed and care discussed with other providers        Contact Number, Dr Arroyo [phone]

## 2024-04-26 NOTE — PROGRESS NOTE ADULT - PROBLEM SELECTOR PLAN 3
likely secondary to Type 2 MI from severe anemia GI bleed  NSTEMI ruled out after admission [FreeTextEntry1] :  Result Name Results Units Reference Range \par      PET CT WHOLE BODY TOP OF SKULL TO TI   SEE TEXT       \par     Patient Name: MOR, AUGUST : 1952\par Patient ID: [id number]\par Account: [id number]\par Patient Location: [facility]\par Accession: [id number]\par Procedure: PET CT WHOLE BODY TOP OF SKULL TO TIP OF TOES SUBSEQUENT [id number]\par Date of Exam: 2017 11:09 AM\par Attending Physician: JARVIS LOW\par Requesting Physician: JARVIS LOW MD\par Clinical History / Reason for exam: FDG PET CT STUDY:\par Reason for examination: Tumor imaging - PET with concurrently acquired CT for\par attenuation correction and anatomic localization;  top of the skull  to toes/\par [id number] multiple myeloma, subsequent treatment strategy.\par ICD-10 code:C90.0\par History: 64-year-old male patient with history of multiple myeloma, last\par chemotherapy received was in 2017. Patient had bone biopsy on\par 2017.\par Blood glucose pre injection 102 mg/dL\par Technique:\par Approximately 45 minutes after the intravenous administration of 13.4  mCi\par 18-Fluorine FDG, whole body PET images were acquired from top of  the skull to\par toes. In addition, non-iv and non oral contrast, low dose, non - diagnostic CT\par was acquired for attenuation correction and anatomic correlation only.\par Findings:\par Comparison: Prior PET CT study done on July 3, 2015. Correlation was performed\par with the skeletal survey done on 2016.\par Head /Neck: Physiologic FDG uptake is seen in the visualized region of the\par brain, pharyngeal lymphoid tissue, and salivary glands.\par Chest:\par Physiologic FDG avid PET is seen in the mediastinal blood pool and myocardium.\par Chest wall: Unremarkable\par Lungs: No abnormal uptake.\par Mediastinum/Pleura/pericardium: No abnormal uptake.\par Thoracic/ axillary lymph nodes: No abnormal uptake.\par Hepatobiliary: Unremarkable.\par Gallbladder: Multiple gallstones.\par Spleen: No abnormal uptake\par Pancreas: No abnormal uptake.\par Adrenal glands: No abnormal uptake.\par Kidneys/ureters/bladder: Normal excretory activity is demonstrated.\par Abdominal pelvic lymph nodes: No abnormal uptake.\par Bowel/peritoneum/mesentery: No abnormal uptake.\par Pelvic organs: Unremarkable. No abnormal increased uptake. Prosthetic\par calcifications.\par Bones/soft tissues: Osteoarthritic changes are identified in the bones. \par Patient\par has multiple extensive lytic lesions in the skeletal, seen in the skeletal\par survey  for details see the report  on 2016.\par In the prior examination there was PET positive lytic lesion right fifth rib\par with a max SUV 2.6, now non-FDG avid   and L5 vertebral body on the left side\par with a max SUV 3.8, now non-FDG avid with multiple other scattered lytic\par lesions, non-FDG avid.\par No abnormal increased uptake is seen in the lower extremities.\par Impression:\par 1. No new areas of abnormal increased uptake is seen.\par 2. Previously seen abnormal increased uptake seen in the right fifth rib and \par L5\par vertebral body on the left side, at this time both regions are non-FDG avid.\par 3. Multiple extensive lytic lesions in the skeletal consistent with multiple\par myeloma.\par 4. No other areas of abnormal increased uptake is seen.\par I the attending attest that I have personally reviewed these images and agree\par with this report.\par \par  \par

## 2024-04-27 ENCOUNTER — TRANSCRIPTION ENCOUNTER (OUTPATIENT)
Age: 89
End: 2024-04-27

## 2024-04-27 ENCOUNTER — NON-APPOINTMENT (OUTPATIENT)
Age: 89
End: 2024-04-27

## 2024-04-27 VITALS
OXYGEN SATURATION: 97 % | TEMPERATURE: 98 F | DIASTOLIC BLOOD PRESSURE: 52 MMHG | HEART RATE: 64 BPM | RESPIRATION RATE: 17 BRPM | SYSTOLIC BLOOD PRESSURE: 110 MMHG

## 2024-04-27 LAB
GLUCOSE BLDC GLUCOMTR-MCNC: 135 MG/DL — HIGH (ref 70–99)
GLUCOSE BLDC GLUCOMTR-MCNC: 200 MG/DL — HIGH (ref 70–99)
HCT VFR BLD CALC: 30.2 % — LOW (ref 39–50)
HGB BLD-MCNC: 9.7 G/DL — LOW (ref 13–17)
MCHC RBC-ENTMCNC: 31.9 PG — SIGNIFICANT CHANGE UP (ref 27–34)
MCHC RBC-ENTMCNC: 32.1 GM/DL — SIGNIFICANT CHANGE UP (ref 32–36)
MCV RBC AUTO: 99.3 FL — SIGNIFICANT CHANGE UP (ref 80–100)
NRBC # BLD: 0 /100 WBCS — SIGNIFICANT CHANGE UP (ref 0–0)
NRBC # FLD: 0 K/UL — SIGNIFICANT CHANGE UP (ref 0–0)
PLATELET # BLD AUTO: 110 K/UL — LOW (ref 150–400)
RBC # BLD: 3.04 M/UL — LOW (ref 4.2–5.8)
RBC # FLD: 19 % — HIGH (ref 10.3–14.5)
WBC # BLD: 4.55 K/UL — SIGNIFICANT CHANGE UP (ref 3.8–10.5)
WBC # FLD AUTO: 4.55 K/UL — SIGNIFICANT CHANGE UP (ref 3.8–10.5)

## 2024-04-27 RX ORDER — PANTOPRAZOLE SODIUM 20 MG/1
1 TABLET, DELAYED RELEASE ORAL
Qty: 60 | Refills: 0
Start: 2024-04-27 | End: 2024-05-26

## 2024-04-27 RX ORDER — SUCRALFATE 1 G
1 TABLET ORAL
Qty: 0 | Refills: 0 | DISCHARGE
Start: 2024-04-27

## 2024-04-27 RX ORDER — SUCRALFATE 1 G
1 TABLET ORAL
Qty: 60 | Refills: 0
Start: 2024-04-27 | End: 2024-05-26

## 2024-04-27 RX ADMIN — Medication 1: at 12:22

## 2024-04-27 RX ADMIN — AMIODARONE HYDROCHLORIDE 200 MILLIGRAM(S): 400 TABLET ORAL at 09:02

## 2024-04-27 RX ADMIN — Medication 1 GRAM(S): at 06:22

## 2024-04-27 RX ADMIN — APIXABAN 2.5 MILLIGRAM(S): 2.5 TABLET, FILM COATED ORAL at 09:02

## 2024-04-27 RX ADMIN — Medication 30 MILLILITER(S): at 06:23

## 2024-04-27 RX ADMIN — PANTOPRAZOLE SODIUM 40 MILLIGRAM(S): 20 TABLET, DELAYED RELEASE ORAL at 06:22

## 2024-04-27 NOTE — PROGRESS NOTE ADULT - PROBLEM SELECTOR PROBLEM 2
Anemia due to acute blood loss
Anemia due to acute blood loss
Upper GI bleeding
Anemia due to acute blood loss
Upper GI bleeding

## 2024-04-27 NOTE — DISCHARGE NOTE PROVIDER - CARE PROVIDERS DIRECT ADDRESSES
[email or web address]

## 2024-04-27 NOTE — PROGRESS NOTE ADULT - REASON FOR ADMISSION
Blood loss anemia, GI bleeding, NSTEMI

## 2024-04-27 NOTE — PROGRESS NOTE ADULT - ASSESSMENT
89yo w/ PMHx CKD, HFrEF, Diabetes, TIA,  CAD s/p stenting, bladder cancer s/p chemo in remission, afib on eliquis, hx of left nephrectomy, recent admission 3/16-3/28 for SOB found to be anemic s/p EGD who presents to the ER complaining of increased weakness, dizziness, sob/rosado, with chest tightness and dark stools. Found with anemia s/p 1 unit PRBC. Plan for EGD, holding AC as per GI.  Discharged

## 2024-04-27 NOTE — DISCHARGE NOTE PROVIDER - HOSPITAL COURSE
87yo w/ PMHx CKD, HFrEF, Diabetes, TIA,  CAD s/p stenting, bladder cancer s/p chemo in remission, afib on eliquis, hx of left nephrectomy, recent admission 3/16-3/28 for SOB found to be anemic s/p EGD who presents to the ER complaining of increased weakness, dizziness, sob/rosado, with chest tightness and dark stools. Found with anemia s/p 1 unit PRBC. Plan for EGD, holding AC as per GI.        Problem/Plan - 1:  ·  Problem: Anemia due to acute blood loss.   ·  Plan: EGD report noted  hemoglobin stable on apixaban  if hemoglobin ~ 9 today will discharge home.     Problem/Plan - 2:  ·  Problem: Upper GI bleeding.   ·  Plan: no evidence of further bleeding   continue PPI BID  GI help appreciated  continue to monitor on apixaban.     Problem/Plan - 3:  ·  Problem: Elevated troponin level.   ·  Plan: likely secondary to Type 2 MI from severe anemia GI bleed  NSTEMI ruled out after admission.     Problem/Plan - 4:  ·  Problem: Stage 4 chronic kidney disease.   ·  Plan: renal follow up appreciated  continue to follow recommendations  likely at current baseline   no nephrotoxic meds.     Problem/Plan - 5:  ·  Problem: Paroxysmal atrial fibrillation.   ·  Plan: heart rate controlled   continue amiodarone  cardiology follow up appreciated  MARCELLA severe MR  outpatient follow up Structural Heart service [facility].     Problem/Plan - 6:  ·  Problem: Thrombocytopenia.   ·  Plan: stable   no intervention at this time.     Problem/Plan - 7:  ·  Problem: BPH without urinary obstruction.   ·  Plan: continue tamsulosin.     Problem/Plan - 8:  ·  Problem: Insulin dependent type 2 diabetes mellitus.   ·  Plan: HbA1C normal recently   resume prandin on discharge

## 2024-04-27 NOTE — DISCHARGE NOTE NURSING/CASE MANAGEMENT/SOCIAL WORK - PATIENT PORTAL LINK FT
You can access the FollowMyHealth Patient Portal offered by [facility] by registering at the following website: [email or web address]. By joining [employer]’s FollowMyHealth portal, you will also be able to view your health information using other applications (apps) compatible with our system.

## 2024-04-27 NOTE — PROGRESS NOTE ADULT - NSPROGADDITIONALINFOA_GEN_ALL_CORE
discussed with  wife Katya over the phone discussed with  wife Katya over the phone    encounter 50 min including PE, progress note, medication adjustment and d/w ACP and patient/family

## 2024-04-27 NOTE — DISCHARGE NOTE PROVIDER - NSDCCPCAREPLAN_GEN_ALL_CORE_FT
PRINCIPAL DISCHARGE DIAGNOSIS  Diagnosis: UGIB (upper gastrointestinal bleed)  Assessment and Plan of Treatment:       SECONDARY DISCHARGE DIAGNOSES  Diagnosis: Anemia due to acute blood loss  Assessment and Plan of Treatment:     Diagnosis: Paroxysmal atrial fibrillation  Assessment and Plan of Treatment:     Diagnosis: Insulin dependent type 2 diabetes mellitus  Assessment and Plan of Treatment:     Diagnosis: Stage 4 chronic kidney disease  Assessment and Plan of Treatment:     Diagnosis: Elevated troponin level  Assessment and Plan of Treatment:      PRINCIPAL DISCHARGE DIAGNOSIS  Diagnosis: UGIB (upper gastrointestinal bleed)  Assessment and Plan of Treatment: Continue your Pantoprazole & Carafate medication as prescribed.   Follow up in Gastroenterology Clinic: [phone] (Faculty Practice at [address] [place] [address]) or [phone] ([facility]) or [phone] ([facility] at [address] [place] [address])  or [place] Office: [phone] ([address] [place] [address]. [facility] [place], [address])      SECONDARY DISCHARGE DIAGNOSES  Diagnosis: Anemia due to acute blood loss  Assessment and Plan of Treatment:     Diagnosis: Elevated troponin level  Assessment and Plan of Treatment:     Diagnosis: Stage 4 chronic kidney disease  Assessment and Plan of Treatment:     Diagnosis: Paroxysmal atrial fibrillation  Assessment and Plan of Treatment: Please follow up with Structural Heart at [facility] Dr José Luis Man for JOSÉ MIGUEL closure in 1 week    Diagnosis: Insulin dependent type 2 diabetes mellitus  Assessment and Plan of Treatment:     Diagnosis: Severe mitral regurgitation  Assessment and Plan of Treatment: Please follow up with Structural Heart at [facility] Dr José Luis Man in 1 week for mitraclip

## 2024-04-27 NOTE — PROGRESS NOTE ADULT - ASSESSMENT
ECHO 3/19/24 : . Left ventricular systolic function is mildly decreased with an ejection fraction visually estimated at 45 to 50%. There are regional wall motion abnormalities present. There is mild (grade 1) left ventricular diastolic dysfunction. Hypokinesis of the lateral and inferolateral walls. trace mitral regurgitation.mild to moderate aortic regurgitation.  ECHO 4/21/24 : Left ventricular systolic function is moderately decreased with an ejection fraction of 37 % Regional wall motion abnormalities present. Multiple segmental abnormalities , mild to moderate tricuspid regurgitation; severe mitral regurgitation    A/P    89yo w/ PMHx CKD, HFrEF, Diabetes, TIA,  CAD s/p stenting, bladder cancer s/p chemo in remission, afib on eliquis, hx of left nephrectomy, recent admission 3/16-3/28 for SOB found to be anemic s/p EGD who presents to the ER complaining of increased weakness, dizziness, sob/rosado, with chest tightness and dark stools.    #Chest Pain/+ Troponin  -type II MI (demand ischemia) in setting of CKD, anemia  -GI g/u  -sp egd     #CAD, s/p PCI  -cv stable, no chest pain  -remains off antiplat given recurrent bleed hx and oral a/c use  -eventual asa 81 if can tolerate  -evnetual [facility] pre guillermina clip    #SOB, chronic HFrEF  -stable vol status  -s/p lasix ivp 4/22  -lasix on hold for now   -previous echo w mild LV dysfunction  -repeat TTE w LV dysfunction EF 37% WMA (overall unchanged from prior) however now with  severe eccentric mitral regurgitation  -low dose lasix prn    #Sev MR   -echo as above  -structural f/u dr rosmery montoya as outpt to arrange mitraclip  -hamlet with Severe MR  -low bp precludes vasodilators     #Atrial Fibrillation  -stable  -cont a/c  -monitor cbc   -cont amio  -eps f/u dr reich for eventual watchman    #CKD  -renal f/u    35 minutes spent on total encounter; more than 50% of the visit was spent counseling and/or coordinating care by the attending physician.      d/w eps  d/w structural     53 minutes spent on total encounter; more than 50% of the visit was spent counseling and/or coordinating care by the attending physician.

## 2024-04-27 NOTE — PROGRESS NOTE ADULT - PROVIDER SPECIALTY LIST ADULT
Cardiology
Gastroenterology
Anesthesia
Cardiology
Cardiology
Gastroenterology
Nephrology
Nephrology
Anesthesia
Cardiology
Cardiology
Electrophysiology
Internal Medicine
Cardiology
Nephrology
Internal Medicine

## 2024-04-27 NOTE — PROGRESS NOTE ADULT - SUBJECTIVE AND OBJECTIVE BOX
Patient is a 88y old  Male who presents with a chief complaint of Blood loss anemia, GI bleeding, NSTEMI (26 Apr 2024 12:59)      DATE OF SERVICE: 04-27-24 @ 09:32    SUBJECTIVE / OVERNIGHT EVENTS: overnight events noted    ROS:  Resp: No cough no sputum production  CVS: No chest pain no palpitations no orthopnea  GI: no N/V/D  "I want to go home"         MEDICATIONS  (STANDING):  aMIOdarone    Tablet 200 milliGRAM(s) Oral daily  apixaban 2.5 milliGRAM(s) Oral every 12 hours  atorvastatin 80 milliGRAM(s) Oral at bedtime  citric acid/sodium citrate Solution 30 milliLiter(s) Oral two times a day  dextrose 10% Bolus 125 milliLiter(s) IV Bolus once  dextrose 5%. 1000 milliLiter(s) (100 mL/Hr) IV Continuous <Continuous>  dextrose 5%. 1000 milliLiter(s) (50 mL/Hr) IV Continuous <Continuous>  dextrose 50% Injectable 12.5 Gram(s) IV Push once  dextrose 50% Injectable 25 Gram(s) IV Push once  glucagon  Injectable 1 milliGRAM(s) IntraMuscular once  influenza  Vaccine (HIGH DOSE) 0.7 milliLiter(s) IntraMuscular once  insulin lispro (ADMELOG) corrective regimen sliding scale   SubCutaneous at bedtime  insulin lispro (ADMELOG) corrective regimen sliding scale   SubCutaneous three times a day before meals  pantoprazole  Injectable 40 milliGRAM(s) IV Push every 12 hours  sucralfate 1 Gram(s) Oral two times a day  tamsulosin 0.4 milliGRAM(s) Oral at bedtime    MEDICATIONS  (PRN):  acetaminophen     Tablet .. 650 milliGRAM(s) Oral every 6 hours PRN Temp greater or equal to 38C (100.4F), Mild Pain (1 - 3)  aluminum hydroxide/magnesium hydroxide/simethicone Suspension 30 milliLiter(s) Oral every 4 hours PRN Dyspepsia  dextrose Oral Gel 15 Gram(s) Oral once PRN Blood Glucose LESS THAN 70 milliGRAM(s)/deciliter  melatonin 3 milliGRAM(s) Oral at bedtime PRN Insomnia        CAPILLARY BLOOD GLUCOSE      POCT Blood Glucose.: 135 mg/dL (27 Apr 2024 08:20)  POCT Blood Glucose.: 164 mg/dL (26 Apr 2024 20:22)  POCT Blood Glucose.: 193 mg/dL (26 Apr 2024 16:56)  POCT Blood Glucose.: 206 mg/dL (26 Apr 2024 11:39)    I&O's Summary      Vital Signs Last 24 Hrs  T(C): 36.5 (27 Apr 2024 09:00), Max: 36.8 (27 Apr 2024 06:01)  T(F): 97.7 (27 Apr 2024 09:00), Max: 98.2 (27 Apr 2024 06:01)  HR: 71 (27 Apr 2024 09:00) (63 - 71)  BP: 124/50 (27 Apr 2024 09:00) (105/45 - 124/50)  BP(mean): 66 (27 Apr 2024 06:16) (66 - 66)  RR: 17 (27 Apr 2024 09:00) (16 - 17)  SpO2: 96% (27 Apr 2024 09:00) (95% - 100%)    PHYSICAL EXAM:  CHEST/LUNG: clear  HEART: S1 S2; systolic murmur +   ABDOMEN: Soft, Nontender  EXTREMITIES: no edema  NEUROLOGY: AO x 3 non-focal      LABS:                        9.2    3.68  )-----------( 101      ( 26 Apr 2024 06:53 )             28.6                       All consultant(s) notes reviewed and care discussed with other providers        Contact Number, Dr Arroyo [phone]

## 2024-04-27 NOTE — PROGRESS NOTE ADULT - SUBJECTIVE AND OBJECTIVE BOX
CARDIOLOGY FOLLOW UP NOTE - DR. BUSTILLO    Patient Name: RACHEL COMBS    Date of Service: 04-27-24 @ 13:01    Patient seen and examined    Subjective:    cv: denies chest pain, dyspnea, palpitations, dizziness  pulmonary: denies cough  GI: denies abdominal pain, nausea, vomiting  vascular/legs: no edema   skin: no rash  ROS: otherwise negative   overnight events:      PHYSICAL EXAM:  T(C): 36.5 (04-27-24 @ 09:00), Max: 36.8 (04-27-24 @ 06:01)  HR: 71 (04-27-24 @ 09:00) (63 - 71)  BP: 124/50 (04-27-24 @ 09:00) (105/45 - 124/50)  RR: 17 (04-27-24 @ 09:00) (16 - 17)  SpO2: 96% (04-27-24 @ 09:00) (95% - 100%)  Wt(kg): --  I&O's Summary    Daily     Daily     Appearance: Normal	  Cardiovascular: Normal S1 S2,RRR, No JVD, No murmurs  Respiratory: Lungs clear to auscultation	  Gastrointestinal:  Soft, Non-tender, + BS	  Extremities: Normal range of motion, No clubbing, cyanosis or edema      Home Medications:  apixaban 2.5 mg oral tablet: 1 tab(s) orally every 12 hours (20 Apr 2024 23:35)  Crestor 40 mg oral tablet: 1 tab(s) orally once a day (at bedtime) (20 Apr 2024 23:35)  repaglinide 0.5 mg oral tablet: 1 tab(s) orally 3 times a day (before meals) (20 Apr 2024 23:27)  sodium bicarbonate 650 mg oral tablet: 1 tab(s) orally once a day (27 Apr 2024 10:26)  sucralfate 1 g oral tablet: 1 tab(s) orally 2 times a day (27 Apr 2024 09:36)  tamsulosin 0.4 mg oral capsule: 1 cap(s) orally once a day (20 Apr 2024 23:27)  Tresiba FlexTouch 100 units/mL subcutaneous solution: 5 solution subcutaneous once a day (at bedtime) (20 Apr 2024 23:27)      MEDICATIONS  (STANDING):  aMIOdarone    Tablet 200 milliGRAM(s) Oral daily  apixaban 2.5 milliGRAM(s) Oral every 12 hours  atorvastatin 80 milliGRAM(s) Oral at bedtime  citric acid/sodium citrate Solution 30 milliLiter(s) Oral two times a day  dextrose 10% Bolus 125 milliLiter(s) IV Bolus once  dextrose 5%. 1000 milliLiter(s) (100 mL/Hr) IV Continuous <Continuous>  dextrose 5%. 1000 milliLiter(s) (50 mL/Hr) IV Continuous <Continuous>  dextrose 50% Injectable 12.5 Gram(s) IV Push once  dextrose 50% Injectable 25 Gram(s) IV Push once  glucagon  Injectable 1 milliGRAM(s) IntraMuscular once  influenza  Vaccine (HIGH DOSE) 0.7 milliLiter(s) IntraMuscular once  insulin lispro (ADMELOG) corrective regimen sliding scale   SubCutaneous at bedtime  insulin lispro (ADMELOG) corrective regimen sliding scale   SubCutaneous three times a day before meals  pantoprazole  Injectable 40 milliGRAM(s) IV Push every 12 hours  sucralfate 1 Gram(s) Oral two times a day  tamsulosin 0.4 milliGRAM(s) Oral at bedtime      TELEMETRY: 	    ECG:  	  RADIOLOGY:   DIAGNOSTIC TESTING:  [ ] Echocardiogram:  [ ] Catheterization:  [ ] Stress Test:    OTHER: 	    LABS:	 	    CARDIAC MARKERS:        Troponin T, High Sensitivity Result: 526 ng/L (04-23 @ 06:45)                                9.7    4.55  )-----------( 110      ( 27 Apr 2024 09:19 )             30.2           proBNP:     Lipid Profile:   HgA1c:     Creatinine: 3.18 mg/dL (04-25-24 @ 06:18)

## 2024-04-27 NOTE — DISCHARGE NOTE PROVIDER - NSDCFUSCHEDAPPT_GEN_ALL_CORE_FT
Carline Osei  [place] [facility]  GASTRO HOWE [address]  Scheduled Appointment: 05/14/2024    Carline Osei  [facility]  [facility] Amb Surg Endoscopy  Scheduled Appointment: 05/14/2024    Poornima Lim  [place] [facility]  UROLOGY [address] [facility]  Scheduled Appointment: 06/21/2024

## 2024-04-27 NOTE — PROGRESS NOTE ADULT - PROBLEM SELECTOR PROBLEM 3
Elevated troponin level
Elevated troponin level
Upper GI bleeding
Elevated troponin level
Elevated troponin level

## 2024-04-27 NOTE — PROGRESS NOTE ADULT - PROBLEM SELECTOR PLAN 1
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
cards consult reviewed and appreciated   Type II demand ischemia  - resume aspirin after gi w/u
EGD report noted  hemoglobin stable
cards consult reviewed and appreciated   Type II demand ischemia  - resume aspirin after gi w/u
EGD report noted  hemoglobin stable on apixaban
EGD report noted  hemoglobin stable on apixaban  if hemoglobin ~ 9 today will discharge home
ruled out after admission  likely Type II MI per cardiology and I agree secondary to stress and demand and anemia  will continue to monitor  chest pain free  all anticoagulation on hold

## 2024-04-27 NOTE — DISCHARGE NOTE NURSING/CASE MANAGEMENT/SOCIAL WORK - NSDCPEPTCAREGIVEDUMATLIST _GEN_ALL_CORE
MI/Diabetes/Apixaban/Eliquis Cephalexin Counseling: I counseled the patient regarding use of cephalexin as an antibiotic for prophylactic and/or therapeutic purposes. Cephalexin (commonly prescribed under brand name Keflex) is a cephalosporin antibiotic which is active against numerous classes of bacteria, including most skin bacteria. Side effects may include nausea, diarrhea, gastrointestinal upset, rash, hives, yeast infections, and in rare cases, hepatitis, kidney disease, seizures, fever, confusion, neurologic symptoms, and others. Patients with severe allergies to penicillin medications are cautioned that there is about a 10% incidence of cross-reactivity with cephalosporins. When possible, patients with penicillin allergies should use alternatives to cephalosporins for antibiotic therapy.

## 2024-04-27 NOTE — PROGRESS NOTE ADULT - PROBLEM SELECTOR PLAN 2
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
- Hgb 7.7 on 4/20. previously 8.7.   - Likely from GI bleeding.   - s/p 1 units of PRBC   - repeat cbc ordered.   - Monitoring for bloody bms.
no evidence of further bleeding   continue PPI BID  GI help appreciated  continue to monitor on apixaban
- Hgb 7.7 on 4/20. previously 8.7.   - Likely from GI bleeding.   - s/p 1 units of PRBC   - repeat cbc ordered.   - Monitoring for bloody bms.
no evidence of further bleeding   continue PPI BID  GI help appreciated  continue to monitor on apixaban
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

## 2024-04-27 NOTE — DISCHARGE NOTE PROVIDER - CARE PROVIDER_API CALL
Ryan Vasques  Interventional Cardiology  [address] [place] [address]  Phone: [phone]  Fax: [phone]  Follow Up Time:     Emily Gusman  Nephrology  [address] [facility] [address]  [place] [address]  Phone: [phone]  Fax: [phone]  Follow Up Time:     Reginaldo Syed  Cardiovascular Disease  [address] [place] [address]  Phone: [phone]  Fax: [phone]  Follow Up Time:    Ryan Vasques  Interventional Cardiology  [address] [place] [address]  Phone: [phone]  Fax: [phone]  Follow Up Time:     Emily Gusman  Nephrology  [address] [facility] [address]  [place] [address]  Phone: [phone]  Fax: [phone]  Follow Up Time:     Reginaldo Syed  Cardiovascular Disease  [address] [place] [address]  Phone: [phone]  Fax: [phone]  Follow Up Time:     José Luis Man  Interventional Cardiology  [address], [address] [place] [address]  Phone: [phone]  Fax: [phone]  Follow Up Time:

## 2024-04-27 NOTE — DISCHARGE NOTE PROVIDER - PROVIDER TOKENS
PROVIDER:[TOKEN:[[id number]]],PROVIDER:[TOKEN:[[id number]]],PROVIDER:[TOKEN:[[id number]]] PROVIDER:[TOKEN:[[id number]]],PROVIDER:[TOKEN:[[id number]]],PROVIDER:[TOKEN:[[id number]]],PROVIDER:[TOKEN:[[id number]]]

## 2024-04-27 NOTE — PROGRESS NOTE ADULT - PROBLEM SELECTOR PROBLEM 1
Anemia due to acute blood loss
Anemia due to acute blood loss
NSTEMI (non-ST elevation myocardial infarction)
Anemia due to acute blood loss
NSTEMI (non-ST elevation myocardial infarction)
NSTEMI (non-ST elevation myocardial infarction)
Anemia due to acute blood loss

## 2024-05-05 ENCOUNTER — INPATIENT (INPATIENT)
Facility: HOSPITAL | Age: 89
LOS: 6 days | Discharge: HOME HEALTH | DRG: 293 | End: 2024-05-12
Attending: INTERNAL MEDICINE | Admitting: INTERNAL MEDICINE
Payer: MEDICARE

## 2024-05-05 VITALS
DIASTOLIC BLOOD PRESSURE: 68 MMHG | TEMPERATURE: 98 F | OXYGEN SATURATION: 95 % | RESPIRATION RATE: 17 BRPM | WEIGHT: 126.99 LBS | SYSTOLIC BLOOD PRESSURE: 144 MMHG | HEART RATE: 82 BPM | HEIGHT: 67 IN

## 2024-05-05 DIAGNOSIS — I50.9 HEART FAILURE, UNSPECIFIED: ICD-10-CM

## 2024-05-05 DIAGNOSIS — Z90.5 ACQUIRED ABSENCE OF KIDNEY: Chronic | ICD-10-CM

## 2024-05-05 DIAGNOSIS — Z98.41 CATARACT EXTRACTION STATUS, RIGHT EYE: Chronic | ICD-10-CM

## 2024-05-05 DIAGNOSIS — Z98.890 OTHER SPECIFIED POSTPROCEDURAL STATES: Chronic | ICD-10-CM

## 2024-05-05 DIAGNOSIS — Z95.5 PRESENCE OF CORONARY ANGIOPLASTY IMPLANT AND GRAFT: Chronic | ICD-10-CM

## 2024-05-05 LAB
ALBUMIN SERPL ELPH-MCNC: 2.9 G/DL — LOW (ref 3.3–5)
ALP SERPL-CCNC: 144 U/L — HIGH (ref 40–120)
ALT FLD-CCNC: 68 U/L — HIGH (ref 10–45)
ANION GAP SERPL CALC-SCNC: 13 MMOL/L — SIGNIFICANT CHANGE UP (ref 5–17)
APTT BLD: 42.8 SEC — HIGH (ref 24.5–35.6)
AST SERPL-CCNC: 76 U/L — HIGH (ref 10–40)
BASOPHILS # BLD AUTO: 0.05 K/UL — SIGNIFICANT CHANGE UP (ref 0–0.2)
BASOPHILS NFR BLD AUTO: 1 % — SIGNIFICANT CHANGE UP (ref 0–2)
BILIRUB SERPL-MCNC: 0.9 MG/DL — SIGNIFICANT CHANGE UP (ref 0.2–1.2)
BUN SERPL-MCNC: 71 MG/DL — HIGH (ref 7–23)
CALCIUM SERPL-MCNC: 8.3 MG/DL — LOW (ref 8.4–10.5)
CHLORIDE SERPL-SCNC: 109 MMOL/L — HIGH (ref 96–108)
CK MB CFR SERPL CALC: 4.6 NG/ML — SIGNIFICANT CHANGE UP (ref 0–6.7)
CK SERPL-CCNC: 57 U/L — SIGNIFICANT CHANGE UP (ref 30–200)
CO2 SERPL-SCNC: 20 MMOL/L — LOW (ref 22–31)
CREAT SERPL-MCNC: 3.21 MG/DL — HIGH (ref 0.5–1.3)
EGFR: 18 ML/MIN/1.73M2 — LOW
EOSINOPHIL # BLD AUTO: 0.23 K/UL — SIGNIFICANT CHANGE UP (ref 0–0.5)
EOSINOPHIL NFR BLD AUTO: 4.4 % — SIGNIFICANT CHANGE UP (ref 0–6)
GLUCOSE BLDC GLUCOMTR-MCNC: 134 MG/DL — HIGH (ref 70–99)
GLUCOSE SERPL-MCNC: 142 MG/DL — HIGH (ref 70–99)
HCT VFR BLD CALC: 28.2 % — LOW (ref 39–50)
HGB BLD-MCNC: 8.8 G/DL — LOW (ref 13–17)
IMM GRANULOCYTES NFR BLD AUTO: 0.2 % — SIGNIFICANT CHANGE UP (ref 0–0.9)
INR BLD: 2.17 RATIO — HIGH (ref 0.85–1.18)
LYMPHOCYTES # BLD AUTO: 0.81 K/UL — LOW (ref 1–3.3)
LYMPHOCYTES # BLD AUTO: 15.4 % — SIGNIFICANT CHANGE UP (ref 13–44)
MCHC RBC-ENTMCNC: 31.2 GM/DL — LOW (ref 32–36)
MCHC RBC-ENTMCNC: 31.3 PG — SIGNIFICANT CHANGE UP (ref 27–34)
MCV RBC AUTO: 100.4 FL — HIGH (ref 80–100)
MONOCYTES # BLD AUTO: 0.57 K/UL — SIGNIFICANT CHANGE UP (ref 0–0.9)
MONOCYTES NFR BLD AUTO: 10.9 % — SIGNIFICANT CHANGE UP (ref 2–14)
NEUTROPHILS # BLD AUTO: 3.58 K/UL — SIGNIFICANT CHANGE UP (ref 1.8–7.4)
NEUTROPHILS NFR BLD AUTO: 68.1 % — SIGNIFICANT CHANGE UP (ref 43–77)
NRBC # BLD: 0 /100 WBCS — SIGNIFICANT CHANGE UP (ref 0–0)
NT-PROBNP SERPL-SCNC: 5785 PG/ML — HIGH (ref 0–300)
PLATELET # BLD AUTO: 148 K/UL — LOW (ref 150–400)
POTASSIUM SERPL-MCNC: 3.5 MMOL/L — SIGNIFICANT CHANGE UP (ref 3.5–5.3)
POTASSIUM SERPL-SCNC: 3.5 MMOL/L — SIGNIFICANT CHANGE UP (ref 3.5–5.3)
PROT SERPL-MCNC: 6.1 G/DL — SIGNIFICANT CHANGE UP (ref 6–8.3)
PROTHROM AB SERPL-ACNC: 23.3 SEC — HIGH (ref 9.5–13)
RBC # BLD: 2.81 M/UL — LOW (ref 4.2–5.8)
RBC # FLD: 17.8 % — HIGH (ref 10.3–14.5)
SODIUM SERPL-SCNC: 142 MMOL/L — SIGNIFICANT CHANGE UP (ref 135–145)
TROPONIN T, HIGH SENSITIVITY RESULT: 466 NG/L — HIGH (ref 0–51)
TROPONIN T, HIGH SENSITIVITY RESULT: 475 NG/L — HIGH (ref 0–51)
TROPONIN T, HIGH SENSITIVITY RESULT: 484 NG/L — HIGH (ref 0–51)
TROPONIN T, HIGH SENSITIVITY RESULT: 486 NG/L — HIGH (ref 0–51)
WBC # BLD: 5.25 K/UL — SIGNIFICANT CHANGE UP (ref 3.8–10.5)
WBC # FLD AUTO: 5.25 K/UL — SIGNIFICANT CHANGE UP (ref 3.8–10.5)

## 2024-05-05 PROCEDURE — 99285 EMERGENCY DEPT VISIT HI MDM: CPT

## 2024-05-05 PROCEDURE — 71046 X-RAY EXAM CHEST 2 VIEWS: CPT | Mod: 26

## 2024-05-05 RX ORDER — SUCRALFATE 1 G
1 TABLET ORAL
Refills: 0 | Status: DISCONTINUED | OUTPATIENT
Start: 2024-05-05 | End: 2024-05-12

## 2024-05-05 RX ORDER — DEXTROSE 50 % IN WATER 50 %
15 SYRINGE (ML) INTRAVENOUS ONCE
Refills: 0 | Status: DISCONTINUED | OUTPATIENT
Start: 2024-05-05 | End: 2024-05-12

## 2024-05-05 RX ORDER — GLUCAGON INJECTION, SOLUTION 0.5 MG/.1ML
1 INJECTION, SOLUTION SUBCUTANEOUS ONCE
Refills: 0 | Status: DISCONTINUED | OUTPATIENT
Start: 2024-05-05 | End: 2024-05-12

## 2024-05-05 RX ORDER — ATORVASTATIN CALCIUM 80 MG/1
80 TABLET, FILM COATED ORAL AT BEDTIME
Refills: 0 | Status: DISCONTINUED | OUTPATIENT
Start: 2024-05-05 | End: 2024-05-12

## 2024-05-05 RX ORDER — SODIUM CHLORIDE 9 MG/ML
1000 INJECTION, SOLUTION INTRAVENOUS
Refills: 0 | Status: DISCONTINUED | OUTPATIENT
Start: 2024-05-05 | End: 2024-05-12

## 2024-05-05 RX ORDER — DEXTROSE 50 % IN WATER 50 %
12.5 SYRINGE (ML) INTRAVENOUS ONCE
Refills: 0 | Status: DISCONTINUED | OUTPATIENT
Start: 2024-05-05 | End: 2024-05-12

## 2024-05-05 RX ORDER — PANTOPRAZOLE SODIUM 20 MG/1
40 TABLET, DELAYED RELEASE ORAL
Refills: 0 | Status: DISCONTINUED | OUTPATIENT
Start: 2024-05-05 | End: 2024-05-08

## 2024-05-05 RX ORDER — FUROSEMIDE 40 MG
40 TABLET ORAL ONCE
Refills: 0 | Status: COMPLETED | OUTPATIENT
Start: 2024-05-05 | End: 2024-05-05

## 2024-05-05 RX ORDER — INSULIN LISPRO 100/ML
VIAL (ML) SUBCUTANEOUS
Refills: 0 | Status: DISCONTINUED | OUTPATIENT
Start: 2024-05-05 | End: 2024-05-12

## 2024-05-05 RX ORDER — TAMSULOSIN HYDROCHLORIDE 0.4 MG/1
0.4 CAPSULE ORAL AT BEDTIME
Refills: 0 | Status: DISCONTINUED | OUTPATIENT
Start: 2024-05-05 | End: 2024-05-12

## 2024-05-05 RX ORDER — SODIUM BICARBONATE 1 MEQ/ML
650 SYRINGE (ML) INTRAVENOUS DAILY
Refills: 0 | Status: DISCONTINUED | OUTPATIENT
Start: 2024-05-05 | End: 2024-05-12

## 2024-05-05 RX ORDER — AMIODARONE HYDROCHLORIDE 400 MG/1
200 TABLET ORAL DAILY
Refills: 0 | Status: DISCONTINUED | OUTPATIENT
Start: 2024-05-05 | End: 2024-05-12

## 2024-05-05 RX ORDER — FUROSEMIDE 40 MG
40 TABLET ORAL DAILY
Refills: 0 | Status: DISCONTINUED | OUTPATIENT
Start: 2024-05-05 | End: 2024-05-07

## 2024-05-05 RX ORDER — INSULIN GLARGINE 100 [IU]/ML
5 INJECTION, SOLUTION SUBCUTANEOUS AT BEDTIME
Refills: 0 | Status: DISCONTINUED | OUTPATIENT
Start: 2024-05-05 | End: 2024-05-12

## 2024-05-05 RX ORDER — INSULIN LISPRO 100/ML
VIAL (ML) SUBCUTANEOUS AT BEDTIME
Refills: 0 | Status: DISCONTINUED | OUTPATIENT
Start: 2024-05-05 | End: 2024-05-12

## 2024-05-05 RX ORDER — DEXTROSE 10 % IN WATER 10 %
125 INTRAVENOUS SOLUTION INTRAVENOUS ONCE
Refills: 0 | Status: DISCONTINUED | OUTPATIENT
Start: 2024-05-05 | End: 2024-05-12

## 2024-05-05 RX ORDER — APIXABAN 2.5 MG/1
2.5 TABLET, FILM COATED ORAL
Refills: 0 | Status: DISCONTINUED | OUTPATIENT
Start: 2024-05-05 | End: 2024-05-06

## 2024-05-05 RX ORDER — DEXTROSE 50 % IN WATER 50 %
25 SYRINGE (ML) INTRAVENOUS ONCE
Refills: 0 | Status: DISCONTINUED | OUTPATIENT
Start: 2024-05-05 | End: 2024-05-12

## 2024-05-05 RX ORDER — ACETAMINOPHEN 500 MG
650 TABLET ORAL EVERY 6 HOURS
Refills: 0 | Status: DISCONTINUED | OUTPATIENT
Start: 2024-05-05 | End: 2024-05-12

## 2024-05-05 RX ADMIN — TAMSULOSIN HYDROCHLORIDE 0.4 MILLIGRAM(S): 0.4 CAPSULE ORAL at 21:40

## 2024-05-05 RX ADMIN — INSULIN GLARGINE 5 UNIT(S): 100 INJECTION, SOLUTION SUBCUTANEOUS at 21:40

## 2024-05-05 RX ADMIN — ATORVASTATIN CALCIUM 80 MILLIGRAM(S): 80 TABLET, FILM COATED ORAL at 21:40

## 2024-05-05 RX ADMIN — AMIODARONE HYDROCHLORIDE 200 MILLIGRAM(S): 400 TABLET ORAL at 17:55

## 2024-05-05 RX ADMIN — Medication 40 MILLIGRAM(S): at 17:56

## 2024-05-05 RX ADMIN — Medication 1 GRAM(S): at 17:55

## 2024-05-05 RX ADMIN — APIXABAN 2.5 MILLIGRAM(S): 2.5 TABLET, FILM COATED ORAL at 17:56

## 2024-05-05 RX ADMIN — Medication 40 MILLIGRAM(S): at 11:15

## 2024-05-05 NOTE — ED ADULT NURSE NOTE - SUICIDE SCREENING QUESTION 2
"Physical Therapy Treatment completed.   Bed Mobility:  Supine to Sit: Supervised  Transfers: Sit to Stand: Supervised  Gait: Level Of Assist: Stand by Assist with Front-Wheel Walker       Plan of Care: Will benefit from Physical Therapy 1-2 more sessions prior to DC and Plan to complete next treatment by Friday 6/23  Discharge Recommendations: Equipment: Front-Wheel Walker. Post-acute therapy Discharge to home with outpatient or home health for additional skilled therapy services.    Pt complains of being lightheaded and dizzy with ambulation. Pt ambulated 100 feet total. O2 saturations post ambulation at 90% and BP 98/65. RN notified     See \"Rehab Therapy-Acute\" Patient Summary Report for complete documentation.       " No

## 2024-05-05 NOTE — ED PROVIDER NOTE - PHYSICAL EXAMINATION
General: Appears well and nontoxic  Mentation: AAO x 3  psych: mood appropriate  HEENT: airway patent, conjunctivae clear bilaterally  Resp: crackles in lower lung fields  Cardio: RRR, no m/r/g  GI: soft/nondistended/nontender  Neuro: sensation and motor function grossly intact  Skin: no cyanosis, no jaundice  MSK: normal movement of all extremities  Lymph/Vasc: 3+ LE edema

## 2024-05-05 NOTE — H&P ADULT - NSHPSOCIALHISTORY_GEN_ALL_CORE
Social History:    Marital Status:  ( x  )    (   ) Single    (   )    (  )   Occupation: retired  Lives with: (  ) alone  (  ) children   (x  ) spouse   (  ) parents  (  ) other    Substance Use (street drugs): (x  ) never used  (  ) other:  Tobacco Usage:  (   ) never smoked   ( x  ) former smoker   (   ) current smoker  (     ) pack years  (        ) last cigarette date  Alcohol Usage: denies    (     ) Advanced Directives: (     ) None    (      ) DNR    (     ) DNI    (     ) Health Care Proxy:

## 2024-05-05 NOTE — ED ADULT NURSE NOTE - CHIEF COMPLAINT QUOTE
2 weeks of sob pt was in [facility] for gastric bleeding and d/c Pt is to have Mitral valve surgery here  pt had 4 blood transfusions at [facility]

## 2024-05-05 NOTE — ED ADULT NURSE NOTE - NSFALLHARMRISKINTERV_ED_ALL_ED
Assistance OOB with selected safe patient handling equipment if applicable/Assistance with ambulation/Communicate risk of Fall with Harm to all staff, patient, and family/Monitor gait and stability/Provide visual cue: red socks, yellow wristband, yellow gown, etc/Reinforce activity limits and safety measures with patient and family/Bed in lowest position, wheels locked, appropriate side rails in place/Call bell, personal items and telephone in reach/Instruct patient to call for assistance before getting out of bed/chair/stretcher/Non-slip footwear applied when patient is off stretcher/[place] to call system/Physically safe environment - no spills, clutter or unnecessary equipment/Purposeful Proactive Rounding/Room/bathroom lighting operational, light cord in reach

## 2024-05-05 NOTE — ED PROVIDER NOTE - CLINICAL SUMMARY MEDICAL DECISION MAKING FREE TEXT BOX
88-year-old male with a past medical history of hypertension, hyperlipidemia, CKD, HFrEF, diabetes, CAD, recent admission for upper GI bleed presenting with shortness of breath.  Patient has had 1 week of shortness of breath, worse with exertion.  Denies orthopnea, chest pain, vomiting, fevers, abdominal pain.  Associated lower extremity edema.  Concern for possible heart failure exacerbation.  CBC, CMP, troponin, BNP, coagulation studies, chest x-ray.

## 2024-05-05 NOTE — ED PROVIDER NOTE - OBJECTIVE STATEMENT
See MDM: See MDM:    Attendinyo male presents with shortness of breath for about 1 week.  symptoms worse with exertion.  has increased LE edema.  no cough.  no chest pain.

## 2024-05-05 NOTE — ED PROVIDER NOTE - PATIENT'S PREFERRED PRONOUN
It was explained to the patient that increased risk of complications during cataract surgery may arise due to this condition including but not limited to weakened zonules, increased risk for capsular tearing and/or vitreous prolapse, retinal detachment, glaucoma and loss of vision or loss of eye. Any questions regarding this condition and how it relates to cataract surgery were answered. Him/He

## 2024-05-05 NOTE — H&P ADULT - NSHPPHYSICALEXAM_GEN_ALL_CORE
PHYSICAL EXAMINATION:  Vital Signs Last 24 Hrs  T(C): 36.8 (05 May 2024 14:48), Max: 36.8 (05 May 2024 14:48)  T(F): 98.3 (05 May 2024 14:48), Max: 98.3 (05 May 2024 14:48)  HR: 71 (05 May 2024 14:48) (71 - 82)  BP: 113/60 (05 May 2024 14:48) (113/60 - 144/68)  BP(mean): 70 (05 May 2024 11:02) (70 - 70)  RR: 18 (05 May 2024 14:48) (17 - 19)  SpO2: 93% (05 May 2024 14:48) (93% - 95%)    Parameters below as of 05 May 2024 14:48  Patient On (Oxygen Delivery Method): room air      CAPILLARY BLOOD GLUCOSE          GENERAL: NAD, well-groomed, well-developed  HEAD:  atraumatic, normocephalic  EYES: sclera anicteric  ENMT: mucous membranes moist  NECK: supple, No JVD  CHEST/LUNG: clear to auscultation bilaterally; no rales, rhonchi, or wheezing b/l  HEART: normal S1, S2  ABDOMEN: BS+, soft, ND, NT   EXTREMITIES:  1+ edema b/l LEs  NEURO: awake, alert, interactive; moves all extremities  SKIN: no rashes or lesions

## 2024-05-05 NOTE — ED ADULT TRIAGE NOTE - CHIEF COMPLAINT QUOTE
2 weeks of sob pt was in [facility] for gastric bleeding and d/c Pt is to have Mitral valve surgery here  pt had 4 blood transfusions at [facility]

## 2024-05-05 NOTE — H&P ADULT - ASSESSMENT
88 m with    Acute on Chronic Systolic Congestive Heart Failure  - telemetry  - diurese  - cardiac enzymes  - echo  - cardiology evaluation Dr Vega    Anemia due to acute blood loss.   - s/p EGD   - hemoglobin stable on apixaban    Elevated troponin level.   - trend  - cardiology fevaluation    Stage 4 chronic kidney disease.   - Nephrology evaollow up appreciated  continue to follow recommendations  likely at current baseline   no nephrotoxic meds.    Paroxysmal atrial fibrillation.   - heart rate controlled   - connue amiodarone  - MARCELLA severe MR  -  follow up Structural Heart service.    BPH without urinary obstruction.   - continue tamsulosin.    Insulin dependent type 2 diabetes mellitus.   - HbA1C normal recently   - finger stiks ith short acting insulin sliding scale  -  oral meds  - diabetic diet  - monitor hypoglycemia.    Need for prophylactic measure.   - on apixaban.    Further action as per clinical course     Tristan Ridley MD phone [phone]

## 2024-05-05 NOTE — H&P ADULT - NSHPLABSRESULTS_GEN_ALL_CORE
8.8    5.25  )-----------( 148      ( 05 May 2024 11:15 )             28.2       05-05    142  |  109<H>  |  71<H>  ----------------------------<  142<H>  3.5   |  20<L>  |  3.21<H>    Ca    8.3<L>      05 May 2024 11:15    TPro  6.1  /  Alb  2.9<L>  /  TBili  0.9  /  DBili  x   /  AST  76<H>  /  ALT  68<H>  /  AlkPhos  144<H>  05-05              Urinalysis Basic - ( 05 May 2024 11:15 )    Color: x / Appearance: x / SG: x / pH: x  Gluc: 142 mg/dL / Ketone: x  / Bili: x / Urobili: x   Blood: x / Protein: x / Nitrite: x   Leuk Esterase: x / RBC: x / WBC x   Sq Epi: x / Non Sq Epi: x / Bacteria: x        PT/INR - ( 05 May 2024 11:30 )   PT: 23.3 sec;   INR: 2.17 ratio         PTT - ( 05 May 2024 11:30 )  PTT:42.8 sec    < from: Xray Chest 2 Views PA/Lat (05.05.24 @ 11:33) >    IMPRESSION:  Small bilateral pleural effusions with adjacent atelectasis.    < end of copied text >    EKG A Fib NSST/T

## 2024-05-05 NOTE — ED ADULT NURSE NOTE - OBJECTIVE STATEMENT
89 y/o Axox4 M arrived from home complaining of progressive SOB. PMH AFib (takes Eliquis, last dose this AM), HTN, DM2. Pt recently dc home from [facility] seen for upper GI bleed needing 4 units pRBC, also found to have mitral valve regurgitation. Pt endorses SOB that has worsened over the last week, does not worsen when lying down. Pt ambulates independently however SOB worsens with ambulation. Pt on CM NSR.

## 2024-05-05 NOTE — H&P ADULT - HISTORY OF PRESENT ILLNESS
87yo male presents with shortness of breath for about 1 week.  symptoms worse with exertion.  has increased LE edema.  no cough.  no chest pain.

## 2024-05-06 DIAGNOSIS — I34.0 NONRHEUMATIC MITRAL (VALVE) INSUFFICIENCY: ICD-10-CM

## 2024-05-06 LAB
ANION GAP SERPL CALC-SCNC: 14 MMOL/L — SIGNIFICANT CHANGE UP (ref 5–17)
APTT BLD: 43.1 SEC — HIGH (ref 24.5–35.6)
BUN SERPL-MCNC: 72 MG/DL — HIGH (ref 7–23)
CALCIUM SERPL-MCNC: 8.3 MG/DL — LOW (ref 8.4–10.5)
CHLORIDE SERPL-SCNC: 108 MMOL/L — SIGNIFICANT CHANGE UP (ref 96–108)
CO2 SERPL-SCNC: 22 MMOL/L — SIGNIFICANT CHANGE UP (ref 22–31)
CREAT SERPL-MCNC: 3.52 MG/DL — HIGH (ref 0.5–1.3)
EGFR: 16 ML/MIN/1.73M2 — LOW
GLUCOSE BLDC GLUCOMTR-MCNC: 134 MG/DL — HIGH (ref 70–99)
GLUCOSE BLDC GLUCOMTR-MCNC: 147 MG/DL — HIGH (ref 70–99)
GLUCOSE BLDC GLUCOMTR-MCNC: 164 MG/DL — HIGH (ref 70–99)
GLUCOSE BLDC GLUCOMTR-MCNC: 230 MG/DL — HIGH (ref 70–99)
GLUCOSE SERPL-MCNC: 93 MG/DL — SIGNIFICANT CHANGE UP (ref 70–99)
HCT VFR BLD CALC: 24.8 % — LOW (ref 39–50)
HGB BLD-MCNC: 8.1 G/DL — LOW (ref 13–17)
MCHC RBC-ENTMCNC: 32.3 PG — SIGNIFICANT CHANGE UP (ref 27–34)
MCHC RBC-ENTMCNC: 32.7 GM/DL — SIGNIFICANT CHANGE UP (ref 32–36)
MCV RBC AUTO: 98.8 FL — SIGNIFICANT CHANGE UP (ref 80–100)
NRBC # BLD: 0 /100 WBCS — SIGNIFICANT CHANGE UP (ref 0–0)
PLATELET # BLD AUTO: 125 K/UL — LOW (ref 150–400)
POTASSIUM SERPL-MCNC: 3.2 MMOL/L — LOW (ref 3.5–5.3)
POTASSIUM SERPL-SCNC: 3.2 MMOL/L — LOW (ref 3.5–5.3)
RBC # BLD: 2.51 M/UL — LOW (ref 4.2–5.8)
RBC # FLD: 17.8 % — HIGH (ref 10.3–14.5)
SODIUM SERPL-SCNC: 144 MMOL/L — SIGNIFICANT CHANGE UP (ref 135–145)
TROPONIN T, HIGH SENSITIVITY RESULT: 503 NG/L — HIGH (ref 0–51)
TROPONIN T, HIGH SENSITIVITY RESULT: 534 NG/L — HIGH (ref 0–51)
TSH SERPL-MCNC: 2.65 UIU/ML — SIGNIFICANT CHANGE UP (ref 0.27–4.2)
WBC # BLD: 4.01 K/UL — SIGNIFICANT CHANGE UP (ref 3.8–10.5)
WBC # FLD AUTO: 4.01 K/UL — SIGNIFICANT CHANGE UP (ref 3.8–10.5)

## 2024-05-06 PROCEDURE — 99223 1ST HOSP IP/OBS HIGH 75: CPT

## 2024-05-06 RX ORDER — HEPARIN SODIUM 5000 [USP'U]/ML
2000 INJECTION INTRAVENOUS; SUBCUTANEOUS EVERY 6 HOURS
Refills: 0 | Status: DISCONTINUED | OUTPATIENT
Start: 2024-05-06 | End: 2024-05-11

## 2024-05-06 RX ORDER — HEPARIN SODIUM 5000 [USP'U]/ML
4500 INJECTION INTRAVENOUS; SUBCUTANEOUS EVERY 6 HOURS
Refills: 0 | Status: DISCONTINUED | OUTPATIENT
Start: 2024-05-06 | End: 2024-05-11

## 2024-05-06 RX ORDER — HEPARIN SODIUM 5000 [USP'U]/ML
INJECTION INTRAVENOUS; SUBCUTANEOUS
Qty: 25000 | Refills: 0 | Status: DISCONTINUED | OUTPATIENT
Start: 2024-05-06 | End: 2024-05-08

## 2024-05-06 RX ORDER — ERYTHROPOIETIN 10000 [IU]/ML
10000 INJECTION, SOLUTION INTRAVENOUS; SUBCUTANEOUS ONCE
Refills: 0 | Status: COMPLETED | OUTPATIENT
Start: 2024-05-06 | End: 2024-05-06

## 2024-05-06 RX ORDER — POTASSIUM CHLORIDE 20 MEQ
20 PACKET (EA) ORAL ONCE
Refills: 0 | Status: COMPLETED | OUTPATIENT
Start: 2024-05-06 | End: 2024-05-06

## 2024-05-06 RX ORDER — CHLORHEXIDINE GLUCONATE 213 G/1000ML
1 SOLUTION TOPICAL DAILY
Refills: 0 | Status: DISCONTINUED | OUTPATIENT
Start: 2024-05-06 | End: 2024-05-12

## 2024-05-06 RX ADMIN — TAMSULOSIN HYDROCHLORIDE 0.4 MILLIGRAM(S): 0.4 CAPSULE ORAL at 21:58

## 2024-05-06 RX ADMIN — ERYTHROPOIETIN 10000 UNIT(S): 10000 INJECTION, SOLUTION INTRAVENOUS; SUBCUTANEOUS at 17:46

## 2024-05-06 RX ADMIN — AMIODARONE HYDROCHLORIDE 200 MILLIGRAM(S): 400 TABLET ORAL at 06:00

## 2024-05-06 RX ADMIN — CHLORHEXIDINE GLUCONATE 1 APPLICATION(S): 213 SOLUTION TOPICAL at 13:41

## 2024-05-06 RX ADMIN — Medication 20 MILLIEQUIVALENT(S): at 08:59

## 2024-05-06 RX ADMIN — Medication 1 GRAM(S): at 17:45

## 2024-05-06 RX ADMIN — Medication 1: at 09:03

## 2024-05-06 RX ADMIN — Medication 40 MILLIGRAM(S): at 05:59

## 2024-05-06 RX ADMIN — HEPARIN SODIUM 1100 UNIT(S)/HR: 5000 INJECTION INTRAVENOUS; SUBCUTANEOUS at 19:43

## 2024-05-06 RX ADMIN — INSULIN GLARGINE 5 UNIT(S): 100 INJECTION, SOLUTION SUBCUTANEOUS at 21:58

## 2024-05-06 RX ADMIN — Medication 650 MILLIGRAM(S): at 11:26

## 2024-05-06 RX ADMIN — Medication 1 GRAM(S): at 05:59

## 2024-05-06 RX ADMIN — APIXABAN 2.5 MILLIGRAM(S): 2.5 TABLET, FILM COATED ORAL at 05:59

## 2024-05-06 RX ADMIN — ATORVASTATIN CALCIUM 80 MILLIGRAM(S): 80 TABLET, FILM COATED ORAL at 21:58

## 2024-05-06 RX ADMIN — PANTOPRAZOLE SODIUM 40 MILLIGRAM(S): 20 TABLET, DELAYED RELEASE ORAL at 05:59

## 2024-05-06 RX ADMIN — HEPARIN SODIUM 1100 UNIT(S)/HR: 5000 INJECTION INTRAVENOUS; SUBCUTANEOUS at 18:16

## 2024-05-06 NOTE — CONSULT NOTE ADULT - NS ATTEND AMEND GEN_ALL_CORE FT
Events that transpired leading to the patient's current hospitalization were reviewed along with the patient hospital course.  The patient's past medical history/surgical history/family history/social history was gone over.  Agree with 14 point review of system and physical examination as noted above.    The patient past medical history is significant for coronary artery disease status MI 2003 post PCI (4 stents), diabetes mellitus, single kidney (history of left nephrectomy at age 16), arthritis, atrial fibrillation (Eliquis), hypertension, hyperlipidemia, TIAs, bladder cancer, left nephrectomy, bilateral cataracts extraction, chronic kidney disease stage IV, severe mitral valve regurgitation, mild to moderate aortic regurgitation along with ischemic cardiomyopathy/regional wall motion abnormalities who presents with 1 week of worsening shortness of breath/dyspnea upon exertion/lower extremity edema.  The patient notes that his symptoms started approximately 6 months is gradually getting worse in nature.  He was recently hospitalized for melena and anticoagulation was held and then restarted.  He has been evaluated by electrophysiology for potential left atrial appendage closure.  Baseline creatinine around 3.  With no nephrotic syndrome.    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  The patient was found to have severe stenosis of bioprosthetic mitral valve which was placed on 4/2021 with peak gradient 16.8 and mean gradient 12.  Patient now presents with acute on chronic diastolic heart failure with worsening shortness of breath, dyspnea upon exertion despite taking medications as prescribed.    Assessment/plan  Severe mitral valve regurgitation  Acute on chronic diastolic/systolic heart failure  Acute on chronic kidney disease   --stage IV chronic kidney disease with a right solitary kidney  Ischemic cardiomyopathy  Atrial fibrillation  Recent GI bleed status post discontinuation of anticoagulation therapy    -- Discussion was had concerning his signs/symptoms and recent hospitalizations.  The patient was explained that he has multiple conditions that are likely contributing to his heart failure symptoms.  Discussed with the patient what is mitral valve regurgitation.  The associated signs and symptoms mitral valve regurgitation was gone over.  The natural pathophysiology of untreated severe mitral valve regurgitation was gone over.  The various treatment options were gone over including medical therapy, surgery and potential transcatheter [id number]-[id number] repair.  Due to the patient's age and comorbidities he is felt to be appropriate candidate to be assessed for transcatheter [id number]-pz-ojzs repair.  Indications and details for transcatheter [id number]-[id number] repair were gone over.  Benefits and risks were discussed.  Risk include but not limited to infection, bleeding, arrhythmia, TIA/stroke, hemodynamic instability, vascular injury, need for urgent surgery, device embolization/leaflet detachment, pericardial effusion/tamponade and death.  -- As part of the patient's workup it is recommended due to his ischemic cardiomyopathy and last cardiac catheterization performed over a decade ago that he undergo an angiogram.  It was explained to the patient that due to his kidney function he is at increased risk for worsening kidney function and potential need for dialysis.  At the time of the catheterization only a diagnostic procedure would be performed to minimize the risk of the procedure.  Indications and details of the cardiac catheterization were reviewed.  Benefits and risks were discussed.  Risks include but not limited to infection, bleeding, arrhythmia, TIA/stroke, hemodynamic instability, vascular injury, need for urgent surgery and death.  -- Appreciate assistance from consulting teams.  -- Continue amiodarone 200 mg daily, atorvastatin 80 mg daily and furosemide 40 mg IV daily  -- Recommend Eliquis be discontinued and the patient be started on a heparin drip.  -- Continue telemetry monitoring.    Plan for patient to undergo cardiac catheterization on 5/8/2024.    All questions concerns of the patient were addressed.    Findings and plan were discussed with cardiology/Dr. Vega and nephrology/Dr. Gusman. Events that transpired leading to the patient's current hospitalization were reviewed along with the patient hospital course.  The patient's past medical history/surgical history/family history/social history was gone over.  Agree with 14 point review of system and physical examination as noted above.    The patient past medical history is significant for coronary artery disease status MI 2003 post PCI (4 stents), diabetes mellitus, single kidney (history of left nephrectomy at age 16), arthritis, atrial fibrillation (Eliquis), hypertension, hyperlipidemia, TIAs, bladder cancer, left nephrectomy, bilateral cataracts extraction, chronic kidney disease stage IV, severe mitral valve regurgitation, mild to moderate aortic regurgitation along with ischemic cardiomyopathy/regional wall motion abnormalities who presents with 1 week of worsening shortness of breath/dyspnea upon exertion/lower extremity edema.  The patient notes that his symptoms started approximately 6 months is gradually getting worse in nature.  He was recently hospitalized for melena and anticoagulation was held and then restarted.  He has been evaluated by electrophysiology for potential left atrial appendage closure.  Baseline creatinine around 3.  With no nephrotic syndrome.    Assessment/plan  Severe mitral valve regurgitation  Acute on chronic diastolic/systolic heart failure  Acute on chronic kidney disease   --stage IV chronic kidney disease with a right solitary kidney  Ischemic cardiomyopathy  Atrial fibrillation  Recent GI bleed status post discontinuation of anticoagulation therapy    -- Discussion was had concerning his signs/symptoms and recent hospitalizations.  The patient was explained that he has multiple conditions that are likely contributing to his heart failure symptoms.  Discussed with the patient what is mitral valve regurgitation.  The associated signs and symptoms mitral valve regurgitation was gone over.  The natural pathophysiology of untreated severe mitral valve regurgitation was gone over.  The various treatment options were gone over including medical therapy, surgery and potential transcatheter klzg-ck-frnq repair.  Due to the patient's age and comorbidities he is felt to be appropriate candidate to be assessed for transcatheter teej-[id number]-mxbb repair.  Indications and details for transcatheter whdl-zw-kehw repair were gone over.  Benefits and risks were discussed.  Risk include but not limited to infection, bleeding, arrhythmia, TIA/stroke, hemodynamic instability, vascular injury, need for urgent surgery, device embolization/leaflet detachment, pericardial effusion/tamponade and death.  -- As part of the patient's workup it is recommended due to his ischemic cardiomyopathy and last cardiac catheterization performed over a decade ago that he undergo an angiogram.  It was explained to the patient that due to his kidney function he is at increased risk for worsening kidney function and potential need for dialysis.  At the time of the catheterization only a diagnostic procedure would be performed to minimize the risk of the procedure.  Indications and details of the cardiac catheterization were reviewed.  Benefits and risks were discussed.  Risks include but not limited to infection, bleeding, arrhythmia, TIA/stroke, hemodynamic instability, vascular injury, need for urgent surgery and death.  -- Appreciate assistance from consulting teams.  -- Continue amiodarone 200 mg daily, atorvastatin 80 mg daily and furosemide 40 mg IV daily  -- Recommend Eliquis be discontinued and the patient be started on a heparin drip.  -- Continue telemetry monitoring.    Plan for patient to undergo cardiac catheterization on 5/8/2024.    All questions concerns of the patient were addressed.    Findings and plan were discussed with cardiology/Dr. Vega and nephrology/Dr. Gusman.

## 2024-05-06 NOTE — PROGRESS NOTE ADULT - ASSESSMENT
88 m with    Acute on Chronic Systolic Congestive Heart Failure  - telemetry  - diurese  - echo  - cardiology follow Dr Vega    Anemia due to acute blood loss.   - s/p EGD   - hemoglobin stable on apixaban    Elevated troponin level.   - trend  - cardiology evaluation    Stage 4 chronic kidney disease. / Single kidney  - Nephrology eval up appreciated  continue to follow recommendations  likely at current baseline   no nephrotoxic meds.    Paroxysmal atrial fibrillation.   - heart rate controlled   - continue amiodarone  - MARCELLA severe MR  -  LHC and possible Mitraclip   - AC with Heparin gtt/ Eliquis on hold     BPH without urinary obstruction.   - continue tamsulosin.    Insulin dependent type 2 diabetes mellitus.   - HbA1C normal recently   - finger stiks ith short acting insulin sliding scale  -  oral meds  - diabetic diet  - monitor hypoglycemia.    Need for prophylactic measure.   - on Heparin gtt.    Tristan Ridley MD phone [phone]

## 2024-05-06 NOTE — CONSULT NOTE ADULT - TIME BILLING
Patient seen and examined.  Agree with above ACP note.  A/p  87yo w/ PMHx CKD, HFrEF, Diabetes, TIA,  CAD s/p stenting, bladder cancer s/p chemo in remission, afib on eliquis, hx of left nephrectomy, recent admission for NSTEMI pw SOB.    #NSTEMI  -Recent admission with NSTEMI  -Trops elevated this admission  -Plan for LHC on wednesday prior to eventual mitraclip  -Continue heparin gtt  -Off antiplatelet as below    #CAD, s/p PCI  -stable, no chest pain  -remains off antiplat given recurrent bleed hx and oral a/c use  -LHC on Weds with Dr. Man    #SOB, acute on chronic HFrEF  -Overload on exam  -Continue IV lasix as ordered  -Recent MARCELLA with moderately decreased LVEF, regional WMA, severe MR     #Sev MR   -MARCELLA as above  -Structural following - plan for C weds prior to poss mitraclip    #Atrial Fibrillation  -SR on tele   -hold eliquis   -cont hep gtt  -cont amio  -Eventual outpt f/u for poss watchman
education, assessment and coordination of care.

## 2024-05-06 NOTE — CONSULT NOTE ADULT - SUBJECTIVE AND OBJECTIVE BOX
CARDIOLOGY CONSULT - Dr. Vega         HPI:   89yo male presents with shortness of breath for about 1 week.  symptoms worse with exertion.  has increased LE edema.  no cough.  no chest pain. (05 May 2024 16:10)      PAST MEDICAL & SURGICAL HISTORY:  CAD (coronary artery disease)  (4 stents,)      Diabetes mellitus, new onset  diet controlled      Single kidney  (hx/o LEFT nephrectomy at age 16; pt states due to a ureter"blockage" causing "infection"; pt denies hx/o renal dysfunction)      Arthritis      Myocardial infarction  (2003)      Hard of hearing      Atrial fibrillation      Hypertension      Hyperlipidemia      History of TIAs  Last 2018      History of bradycardia      Bladder cancer      History of CHF (congestive heart failure)      Left carotid bruit      History of nephrectomy, unilateral  (hx/o LEFT nephrectomy at age 16)      Stented coronary artery  (4 stents)      S/P bilateral cataract extraction      H/O cystoscopy              PREVIOUS DIAGNOSTIC TESTING:    [ ] Echocardiogram:  [ ]  Catheterization:  [ ] Stress Test:  	    MEDICATIONS:  Home Medications:  apixaban 2.5 mg oral tablet: 1 tab(s) orally every 12 hours (05 May 2024 13:45)  Crestor 40 mg oral tablet: 1 tab(s) orally once a day (at bedtime) (05 May 2024 13:45)  repaglinide 0.5 mg oral tablet: 1 tab(s) orally 3 times a day (before meals) (05 May 2024 13:45)  sodium bicarbonate 650 mg oral tablet: 1 tab(s) orally once a day (05 May 2024 13:45)  tamsulosin 0.4 mg oral capsule: 1 cap(s) orally once a day (05 May 2024 13:45)  Tresiba FlexTouch 100 units/mL subcutaneous solution: 5 solution subcutaneous once a day (at bedtime) (05 May 2024 13:45)      MEDICATIONS  (STANDING):  aMIOdarone    Tablet 200 milliGRAM(s) Oral daily  atorvastatin 80 milliGRAM(s) Oral at bedtime  chlorhexidine 2% Cloths 1 Application(s) Topical daily  dextrose 10% Bolus 125 milliLiter(s) IV Bolus once  dextrose 5%. 1000 milliLiter(s) (100 mL/Hr) IV Continuous <Continuous>  dextrose 5%. 1000 milliLiter(s) (50 mL/Hr) IV Continuous <Continuous>  dextrose 50% Injectable 25 Gram(s) IV Push once  dextrose 50% Injectable 12.5 Gram(s) IV Push once  epoetin catrachito (EPOGEN) Injectable 43253 Unit(s) SubCutaneous once  furosemide   Injectable 40 milliGRAM(s) IV Push daily  glucagon  Injectable 1 milliGRAM(s) IntraMuscular once  heparin  Infusion.  Unit(s)/Hr (11 mL/Hr) IV Continuous <Continuous>  insulin glargine Injectable (LANTUS) 5 Unit(s) SubCutaneous at bedtime  insulin lispro (ADMELOG) corrective regimen sliding scale   SubCutaneous at bedtime  insulin lispro (ADMELOG) corrective regimen sliding scale   SubCutaneous three times a day before meals  pantoprazole    Tablet 40 milliGRAM(s) Oral before breakfast  sodium bicarbonate 650 milliGRAM(s) Oral daily  sucralfate 1 Gram(s) Oral two times a day  tamsulosin 0.4 milliGRAM(s) Oral at bedtime      FAMILY HISTORY:  Family history of diabetes mellitus in mother (Mother)  (Pt states his mother developed DM in her 70's)    FH: bladder cancer (Sibling)        SOCIAL HISTORY:    [ ] Non-smoker  [ ] Smoker  [ ] Alcohol    Allergies    penicillin (Rash)    Intolerances    Cipro (Joint Pain)  	    REVIEW OF SYSTEMS:  CONSTITUTIONAL: No fever, weight loss, or fatigue  EYES: No eye pain, visual disturbances, or discharge  ENMT:  No difficulty hearing, tinnitus, vertigo; No sinus or throat pain  NECK: No pain or stiffness  RESPIRATORY: No cough, wheezing, chills or hemoptysis; No Shortness of Breath  CARDIOVASCULAR: No chest pain, palpitations, passing out, dizziness, or leg swelling  GASTROINTESTINAL: No abdominal or epigastric pain. No nausea, vomiting, or hematemesis; No diarrhea or constipation. No melena or hematochezia.  GENITOURINARY: No dysuria, frequency, hematuria, or incontinence  NEUROLOGICAL: No headaches, memory loss, loss of strength, numbness, or tremors  SKIN: No itching, burning, rashes, or lesions   	    [ ] All others negative	  [ ] Unable to obtain    PHYSICAL EXAM:  T(C): 36.3 (05-06-24 @ 12:27), Max: 36.8 (05-05-24 @ 14:48)  HR: 63 (05-06-24 @ 12:27) (63 - 71)  BP: 110/62 (05-06-24 @ 12:27) (105/67 - 113/60)  RR: 18 (05-06-24 @ 12:27) (18 - 18)  SpO2: 99% (05-06-24 @ 12:27) (93% - 99%)  Wt(kg): --  I&O's Summary    05 May 2024 07:01  -  06 May 2024 07:00  --------------------------------------------------------  IN: 240 mL / OUT: 1400 mL / NET: -1160 mL    06 May 2024 07:01  -  06 May 2024 13:59  --------------------------------------------------------  IN: 0 mL / OUT: 300 mL / NET: -300 mL        Appearance: Normal	  Psychiatry: A & O x 3, Mood & affect appropriate  HEENT:   Normal oral mucosa, PERRL, EOMI	  Lymphatic: No lymphadenopathy  Cardiovascular: Normal S1 S2,RRR, No JVD, No murmurs  Respiratory: Lungs clear to auscultation	  Gastrointestinal:  Soft, Non-tender, + BS	  Skin: No rashes, No ecchymoses, No cyanosis	  Neurologic: Non-focal  Extremities: Normal range of motion, No clubbing, cyanosis or edema  Vascular: Peripheral pulses palpable 2+ bilaterally    TELEMETRY: 	    ECG:  	  RADIOLOGY:  OTHER: 	  	  LABS:	 	    CARDIAC MARKERS:  Troponin T, High Sensitivity Result: 534 ng/L (05-06 @ 05:27)  Troponin T, High Sensitivity Result: 475 ng/L (05-05 @ 16:56)  Troponin T, High Sensitivity Result: 484 ng/L (05-05 @ 16:56)  Troponin T, High Sensitivity Result: 466 ng/L (05-05 @ 13:54)  Troponin T, High Sensitivity Result: 486 ng/L (05-05 @ 11:15)                                  8.1    4.01  )-----------( 125      ( 06 May 2024 05:27 )             24.8     05-06    144  |  108  |  72<H>  ----------------------------<  93  3.2<L>   |  22  |  3.52<H>    Ca    8.3<L>      06 May 2024 05:27    TPro  6.1  /  Alb  2.9<L>  /  TBili  0.9  /  DBili  x   /  AST  76<H>  /  ALT  68<H>  /  AlkPhos  144<H>  05-05    PT/INR - ( 05 May 2024 11:30 )   PT: 23.3 sec;   INR: 2.17 ratio         PTT - ( 05 May 2024 11:30 )  PTT:42.8 sec  proBNP:   Lipid Profile:   HgA1c:   TSH: Thyroid Stimulating Hormone, Serum: 2.65 uIU/mL (05-06 @ 05:27)         CARDIOLOGY CONSULT - Dr. Vega         HPI:   87yo male presents with shortness of breath for about 1 week.  symptoms worse with exertion.  has increased LE edema.  no cough.  no chest pain. (05 May 2024 16:10)      PAST MEDICAL & SURGICAL HISTORY:  CAD (coronary artery disease)  (4 stents,)      Diabetes mellitus, new onset  diet controlled      Single kidney  (hx/o LEFT nephrectomy at age 16; pt states due to a ureter"blockage" causing "infection"; pt denies hx/o renal dysfunction)      Arthritis      Myocardial infarction  (2003)      Hard of hearing      Atrial fibrillation      Hypertension      Hyperlipidemia      History of TIAs  Last 2018      History of bradycardia      Bladder cancer      History of CHF (congestive heart failure)      Left carotid bruit      History of nephrectomy, unilateral  (hx/o LEFT nephrectomy at age 16)      Stented coronary artery  (4 stents)      S/P bilateral cataract extraction      H/O cystoscopy        PREVIOUS DIAGNOSTIC TESTING:    [x] Echocardiogram:  < from: MARCELLA W or WO Ultrasound Enhancing Agent (04.24.24 @ 12:50) >  CONCLUSIONS:      1. Left ventricular systolic function is moderately decreased. There are regional wall motion abnormalities present. Hypokinesis of the inferior, lateral, and inferolateral walls.   2. Normal right ventricular cavity size and normal systolic function.   3. Structurally normal mitral valve with normal leaflet excursion. There is calcification of the mitral valve annulus. There is symmetric leaflet tethering. There is severe mitral regurgitation. Vena contracta width ~ 0.7 cm. Estimated effective regurgitant orifice area (EROA) ~ 0.4 cm2 (by the PISA method).   4. The aortic valve appears trileaflet with normal systolic excursion. There is calcification of the aortic valve leaflets. There is mild to moderate aortic regurgitation. Vena contracta width ~ 0.3 cm.   5. No atheroma in the visualized portions of the proximal ascending aorta. Mild non-mobile atheroma in the visualized portions of the transverse aortic arch. Mild non-mobile atheroma in the visualized portions of the descending aorta.   6. The left atrium is normal in size. There is no evidence of left atrial or left atrial appendage thrombus. Measurements of the left atrial appendage (JOSÉ MIGUEL) were carried out as outlined below. Note that the JOSÉ MIGUEL width refers to its width at the ostium. The JOSÉ MIGUEL length refers to the distance from the plane of the JOSÉ MIGUEL ostium to its most distal point. Note that the JOSÉ MIGUEL has a "windsock" appearance.    < end of copied text >    [ ]  Catheterization:  [ ] Stress Test:  	    MEDICATIONS:  Home Medications:  apixaban 2.5 mg oral tablet: 1 tab(s) orally every 12 hours (05 May 2024 13:45)  Crestor 40 mg oral tablet: 1 tab(s) orally once a day (at bedtime) (05 May 2024 13:45)  repaglinide 0.5 mg oral tablet: 1 tab(s) orally 3 times a day (before meals) (05 May 2024 13:45)  sodium bicarbonate 650 mg oral tablet: 1 tab(s) orally once a day (05 May 2024 13:45)  tamsulosin 0.4 mg oral capsule: 1 cap(s) orally once a day (05 May 2024 13:45)  Tresiba FlexTouch 100 units/mL subcutaneous solution: 5 solution subcutaneous once a day (at bedtime) (05 May 2024 13:45)      MEDICATIONS  (STANDING):  aMIOdarone    Tablet 200 milliGRAM(s) Oral daily  atorvastatin 80 milliGRAM(s) Oral at bedtime  chlorhexidine 2% Cloths 1 Application(s) Topical daily  dextrose 10% Bolus 125 milliLiter(s) IV Bolus once  dextrose 5%. 1000 milliLiter(s) (100 mL/Hr) IV Continuous <Continuous>  dextrose 5%. 1000 milliLiter(s) (50 mL/Hr) IV Continuous <Continuous>  dextrose 50% Injectable 25 Gram(s) IV Push once  dextrose 50% Injectable 12.5 Gram(s) IV Push once  epoetin catrachito (EPOGEN) Injectable 17312 Unit(s) SubCutaneous once  furosemide   Injectable 40 milliGRAM(s) IV Push daily  glucagon  Injectable 1 milliGRAM(s) IntraMuscular once  heparin  Infusion.  Unit(s)/Hr (11 mL/Hr) IV Continuous <Continuous>  insulin glargine Injectable (LANTUS) 5 Unit(s) SubCutaneous at bedtime  insulin lispro (ADMELOG) corrective regimen sliding scale   SubCutaneous at bedtime  insulin lispro (ADMELOG) corrective regimen sliding scale   SubCutaneous three times a day before meals  pantoprazole    Tablet 40 milliGRAM(s) Oral before breakfast  sodium bicarbonate 650 milliGRAM(s) Oral daily  sucralfate 1 Gram(s) Oral two times a day  tamsulosin 0.4 milliGRAM(s) Oral at bedtime      FAMILY HISTORY:  Family history of diabetes mellitus in mother (Mother)  (Pt states his mother developed DM in her 70's)    FH: bladder cancer (Sibling)        SOCIAL HISTORY:    [x] Non-smoker - former smoker  [ ] Smoker  [ ] Alcohol    Allergies    penicillin (Rash)    Intolerances    Cipro (Joint Pain)  	    REVIEW OF SYSTEMS:  CONSTITUTIONAL: No fever, weight loss, or fatigue  EYES: No eye pain, visual disturbances, or discharge  ENMT:  No difficulty hearing, tinnitus, vertigo; No sinus or throat pain  NECK: No pain or stiffness  RESPIRATORY: No cough, wheezing, chills or hemoptysis; No Shortness of Breath  CARDIOVASCULAR: No chest pain, palpitations, passing out, dizziness, or leg swelling  GASTROINTESTINAL: No abdominal or epigastric pain. No nausea, vomiting, or hematemesis; No diarrhea or constipation. No melena or hematochezia.  GENITOURINARY: No dysuria, frequency, hematuria, or incontinence  NEUROLOGICAL: No headaches, memory loss, loss of strength, numbness, or tremors  SKIN: No itching, burning, rashes, or lesions   	    [x] All others negative	  [ ] Unable to obtain    PHYSICAL EXAM:  T(C): 36.3 (05-06-24 @ 12:27), Max: 36.8 (05-05-24 @ 14:48)  HR: 63 (05-06-24 @ 12:27) (63 - 71)  BP: 110/62 (05-06-24 @ 12:27) (105/67 - 113/60)  RR: 18 (05-06-24 @ 12:27) (18 - 18)  SpO2: 99% (05-06-24 @ 12:27) (93% - 99%)  Wt(kg): --  I&O's Summary    05 May 2024 07:01  -  06 May 2024 07:00  --------------------------------------------------------  IN: 240 mL / OUT: 1400 mL / NET: -1160 mL    06 May 2024 07:01  -  06 May 2024 13:59  --------------------------------------------------------  IN: 0 mL / OUT: 300 mL / NET: -300 mL        Appearance: Elderly male 	  Psychiatry: A & O x 3, Mood & affect appropriate  HEENT:   Normal oral mucosa, PERRL, EOMI	  Lymphatic: No lymphadenopathy  Cardiovascular: Normal S1 S2,RRR, No JVD, No murmurs  Respiratory: Diminished R side, crackles L side   Gastrointestinal:  Soft, Non-tender, + BS	  Skin: No rashes, No ecchymoses, No cyanosis	  Neurologic: Non-focal  Extremities: Normal range of motion, No clubbing, cyanosis or edema  Vascular: Peripheral pulses palpable 2+ bilaterally    TELEMETRY: SR	    ECG:  	  RADIOLOGY:  OTHER: 	  	  LABS:	 	    CARDIAC MARKERS:  Troponin T, High Sensitivity Result: 534 ng/L (05-06 @ 05:27)  Troponin T, High Sensitivity Result: 475 ng/L (05-05 @ 16:56)  Troponin T, High Sensitivity Result: 484 ng/L (05-05 @ 16:56)  Troponin T, High Sensitivity Result: 466 ng/L (05-05 @ 13:54)  Troponin T, High Sensitivity Result: 486 ng/L (05-05 @ 11:15)                                  8.1    4.01  )-----------( 125      ( 06 May 2024 05:27 )             24.8     05-06    144  |  108  |  72<H>  ----------------------------<  93  3.2<L>   |  22  |  3.52<H>    Ca    8.3<L>      06 May 2024 05:27    TPro  6.1  /  Alb  2.9<L>  /  TBili  0.9  /  DBili  x   /  AST  76<H>  /  ALT  68<H>  /  AlkPhos  144<H>  05-05    PT/INR - ( 05 May 2024 11:30 )   PT: 23.3 sec;   INR: 2.17 ratio         PTT - ( 05 May 2024 11:30 )  PTT:42.8 sec  proBNP:   Lipid Profile:   HgA1c:   TSH: Thyroid Stimulating Hormone, Serum: 2.65 uIU/mL (05-06 @ 05:27)

## 2024-05-06 NOTE — CONSULT NOTE ADULT - PROBLEM SELECTOR RECOMMENDATION 9
- mitral SANTA evaluation in progress  - we will review his MARCELLA from 4/24 to assess his anatomic suitability for mitral SANTA  - he will need a LHC, which is planned for Wed 5/8  -- hold Eliquis and bridge with heparin   - after LHC and mitral SANTA we will discuss treatment options

## 2024-05-06 NOTE — CONSULT NOTE ADULT - SUBJECTIVE AND OBJECTIVE BOX
Structural Heart Team    HPI:   89yo male presents with shortness of breath for about 1 week.  symptoms worse with exertion.  has increased LE edema.  no cough.  no chest pain.      Mr. Caballero is an 89y/o male referred for evaluation of severe MR and consideration of mitral SANTA. 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 He reports that, prior to admission, he was experiencing worsening sob and leg edema over about a week. He currently is comfortable on 2L O2 via nasal cannula and says his leg swelling has improved significantly.         Allergies    penicillin (Rash)    Intolerances    Cipro (Joint Pain)    PAST MEDICAL & SURGICAL HISTORY:  CAD (coronary artery disease)  (4 stents,)    Diabetes mellitus, new onset  diet controlled    Single kidney  (hx/o LEFT nephrectomy at age 16; pt states due to a ureter"blockage" causing "infection"; pt denies hx/o renal dysfunction)    Arthritis    Myocardial infarction  (2003)    Hard of hearing    Atrial fibrillation    Hypertension    Hyperlipidemia    History of TIAs  Last 2018    History of bradycardia    Bladder cancer    History of CHF (congestive heart failure)    Left carotid bruit    History of nephrectomy, unilateral  (hx/o LEFT nephrectomy at age 16)    Stented coronary artery  (4 stents)    S/P bilateral cataract extraction    H/O cystoscopy      MEDICATIONS  (STANDING):  aMIOdarone    Tablet 200 milliGRAM(s) Oral daily  atorvastatin 80 milliGRAM(s) Oral at bedtime  chlorhexidine 2% Cloths 1 Application(s) Topical daily  dextrose 10% Bolus 125 milliLiter(s) IV Bolus once  dextrose 5%. 1000 milliLiter(s) (100 mL/Hr) IV Continuous <Continuous>  dextrose 5%. 1000 milliLiter(s) (50 mL/Hr) IV Continuous <Continuous>  dextrose 50% Injectable 25 Gram(s) IV Push once  dextrose 50% Injectable 12.5 Gram(s) IV Push once  epoetin catrachito (EPOGEN) Injectable 34511 Unit(s) SubCutaneous once  furosemide   Injectable 40 milliGRAM(s) IV Push daily  glucagon  Injectable 1 milliGRAM(s) IntraMuscular once  heparin  Infusion.  Unit(s)/Hr (11 mL/Hr) IV Continuous <Continuous>  insulin glargine Injectable (LANTUS) 5 Unit(s) SubCutaneous at bedtime  insulin lispro (ADMELOG) corrective regimen sliding scale   SubCutaneous at bedtime  insulin lispro (ADMELOG) corrective regimen sliding scale   SubCutaneous three times a day before meals  pantoprazole    Tablet 40 milliGRAM(s) Oral before breakfast  sodium bicarbonate 650 milliGRAM(s) Oral daily  sucralfate 1 Gram(s) Oral two times a day  tamsulosin 0.4 milliGRAM(s) Oral at bedtime      REVIEW OF SYSTEMS:    CONSTITUTIONAL: No weakness, fevers or chills  EYES/ENT: No visual changes;  No vertigo or throat pain   NECK: No pain or stiffness  RESPIRATORY: No cough, wheezing, hemoptysis; No shortness of breath  CARDIOVASCULAR: No chest pain or palpitations  GASTROINTESTINAL: No abdominal or epigastric pain. No nausea, vomiting, or hematemesis; No diarrhea or constipation. No melena or hematochezia.  GENITOURINARY: No dysuria, frequency or hematuria  NEUROLOGICAL: No numbness or weakness  SKIN: No itching, rashes      Vital Signs Last 24 Hrs  T(C): 36.3 (06 May 2024 12:27), Max: 36.8 (05 May 2024 14:48)  T(F): 97.4 (06 May 2024 12:27), Max: 98.3 (05 May 2024 14:48)  HR: 63 (06 May 2024 12:27) (63 - 71)  BP: 110/62 (06 May 2024 12:27) (105/67 - 113/60)  BP(mean): --  RR: 18 (06 May 2024 12:27) (18 - 18)  SpO2: 99% (06 May 2024 12:27) (93% - 99%)    Parameters below as of 06 May 2024 12:27  Patient On (Oxygen Delivery Method): nasal cannula  O2 Flow (L/min): 2                            8.1    4.01  )-----------( 125      ( 06 May 2024 05:27 )             24.8   05-06    144  |  108  |  72<H>  ----------------------------<  93  3.2<L>   |  22  |  3.52<H>    Ca    8.3<L>      06 May 2024 05:27    TPro  6.1  /  Alb  2.9<L>  /  TBili  0.9  /  DBili  x   /  AST  76<H>  /  ALT  68<H>  /  AlkPhos  144<H>  05-05    PT/INR - ( 05 May 2024 11:30 )   PT: 23.3 sec;   INR: 2.17 ratio         PTT - ( 05 May 2024 11:30 )  PTT:42.8 sec    I&O's Summary    05 May 2024 07:01  -  06 May 2024 07:00  --------------------------------------------------------  IN: 240 mL / OUT: 1400 mL / NET: -1160 mL    06 May 2024 07:01  -  06 May 2024 13:47  --------------------------------------------------------  IN: 0 mL / OUT: 300 mL / NET: -300 mL          Physical Exam  General: frail, NAD  Cardiac: s1s2, RRR, no murmur noted  Pulmonary: CTA b/l, nonfocal  Gastrointestinal: soft abdomen, nontender, nondistended, + bowel sounds throughout  Extremities: no edema, 2+ DP pulses b/l  Neuro: A&Ox3, nonfocal  EKG: in April SR, RBBB, no recent EKG      < from: MARCELLA W or WO Ultrasound Enhancing Agent (04.24.24 @ 12:50) >  CONCLUSIONS:      1. Left ventricular systolic function is moderately decreased. There are regional wall motion abnormalities present. Hypokinesis of the inferior, lateral, and inferolateral walls.   2. Normal right ventricular cavity size and normal systolic function.   3. Structurally normal mitral valve with normal leaflet excursion. There is calcification of the mitral valve annulus. There is symmetric leaflet tethering. There is severe mitral regurgitation. Vena contracta width ~ 0.7 cm. Estimated effective regurgitant orifice area (EROA) ~ 0.4 cm2 (by the PISA method).   4. The aortic valve appears trileaflet with normal systolic excursion. There is calcification of the aortic valve leaflets. There is mild to moderate aortic regurgitation. Vena contracta width ~ 0.3 cm.   5. No atheroma in the visualized portions of the proximal ascending aorta. Mild non-mobile atheroma in the visualized portions of the transverse aortic arch. Mild non-mobile atheroma in the visualized portions of the descending aorta.   6. The left atrium is normal in size. There is no evidence of left atrial or left atrial appendage thrombus. Measurements of the left atrial appendage (JOSÉ MIGUEL) were carried out as outlined below. Note that the JOSÉ MIGUEL width refers to its width at the ostium. The JOSÉ MIGUEL length refers to the distance from the plane of the JOSÉ MIGUEL ostium to its most distal point. Note that the JOSÉ MIGUEL has a "windsock" appearance.           Zero degrees: width = 1.9 cm, length = 3.0 cm      30 degrees: width = 1.7 cm, length = 2.9 cm      45 degrees: width = 1.7 cm, length = 2.8 cm      60 degrees: width = 1.6 cm, length = 2.7 cm      90 degrees: width = 1.8 cm, length = 2.6 cm      135 degrees: width = 2.0 cm, length = 2.8 cm.   7. Agitated saline injection was negative for intracardiac shunt.    ____________________________________________________________________  MARCELLA Procedure:  After discussion of the risks and benefits of the MARCELLA, an informed consent was obtained. Study was performed with anesthesia (please see anesthesia record).  The adult Charla MARCELLA probe was introduced and passed into the esophagus. The MARCELLA probe was passed without difficulty. Images were obtained with the patient in a left lateral decubitus position. Image quality was good. The patient's vital signs; including heart rate, blood pressure, and oxygen saturation; remained stable throughout the procedure. The patient tolerated the procedure well and without complications.     ________________________________________________________________________________________  FINDINGS:     Left Ventricle:  Left ventricular systolic function is moderately decreased. There are regional wall motion abnormalities present. Hypokinesis of the inferior, lateral, and inferolateral walls.     Right Ventricle:  The right ventricular cavity is normal in size and normal systolic function.     Left Atrium:  The left atrium is normal in size. There is no evidence ofleft atrial or left atrial appendage thrombus. Measurements of the left atrial appendage (JOSÉ MIGUEL) were carried out as outlined below. Note that the JOSÉ MIGUEL width refers to its width at the ostium. The JOSÉ MIGUEL length refers to the distance from the plane of the JOSÉ MIGUEL ostium to its most distal point. Note that the JOSÉ MIGUEL has a "windsock" appearance.    Zero degrees: width = 1.9 cm, length = 3.0 cm  30 degrees: width = 1.7 cm, length = 2.9 cm  45 degrees: width = 1.7 cm, length = 2.8 cm  60 degrees: width = 1.6 cm,length = 2.7 cm  90 degrees: width = 1.8 cm, length = 2.6 cm  135 degrees: width = 2.0 cm, length = 2.8 cm.     Right Atrium:  The right atrium is normal in size.     Interatrial Septum:  Agitated saline injection was negative for intracardiac shunt.     Aortic Valve:  The aortic valve appears trileaflet with normal systolic excursion. There is calcification of the aortic valve leaflets. There is mild to moderate aortic regurgitation. Vena contracta width ~ 0.3 cm.     Mitral Valve:  Structurallynormal mitral valve with normal leaflet excursion. There is calcification of the mitral valve annulus. There is symmetric leaflet tethering. There is severe mitral regurgitation. Vena contracta width ~ 0.7 cm. Estimated effective regurgitant orifice area (EROA) ~ 0.4 cm2 (by the PISA method).     Tricuspid Valve:  Structurally normal tricuspid valve with normal leaflet excursion. There is trace tricuspid regurgitation.     Pulmonic Valve:  Structurally normal pulmonic valve with normal leaflet excursion. There is mild pulmonic regurgitation.     Aorta:  The aortic root at the sinuses of Valsalva is normal in size, measuring 2.90 cm (indexed 1.78 cm/m²). The aortic diameter at the sinotubular junction is normal in size, measuring 2.3 cm. The ascending aorta diameter is normal in size, measuring 3.00 cm (indexed 1.84 cm/m²). There is no atheroma in the visualized portions of the proximal ascending aorta. There is mild non-mobile atheroma in the visualized portions of the transverse aortic arch. There is mild non-mobile atheroma in the visualized portions of the descending aorta.     Pericardium:  No pericardial effusion seen.  ____________________________________________________________________  QUANTITATIVE DATA:  Aorta Measurements: (Normal range) (Indexed to BSA)     Sinuses of Valsalva: 2.90 cm (3.1 - 3.7 cm)  Ao ST Junct:         2.3 cm  Ao Asc prox:         3.00 cm       LVOT / RVOT/ Qp/Qs Data: (Indexed to BSA)  LVOT Diameter: 2.20 cm  LVOT Area:     3.80 cm²    Mitral Valve Measurements:     MV Beatriz d, A4C 3.70 cm      MR Vmax:           5.52 m/s                             MR VTI:            198.50 cm                             MR Mean Gradient:  71.0 mmHg                             MR Peak Gradient:  121.9 mmHg                       MR PISA Radius:    1.00 cm                             MR Aliasing Benjamin:   30.80 cm/s                             MR Inst Flow Rate: 193.52 ml/s    < end of copied text >

## 2024-05-06 NOTE — CONSULT NOTE ADULT - SUBJECTIVE AND OBJECTIVE BOX
NEPHROLOGY - NSN    Patient seen and examined.    HPI:   87yo male presents with shortness of breath for about 1 week.  symptoms worse with exertion.  has increased LE edema.  no cough.  no chest pain. (05 May 2024 16:10)  Recent hospitalization for melana and AC was held and then restarted.  He does have severe MR n TAVR work up was started   Baseline creatinine about 3 and he has nephrotic syndrome  There is no hematuria or bubbles in the urine.  No history of NSAIDS or nephrolithisis.  The patient urinates once or twice in the night and there is no incontinence.  No family hx or renal disease or back pain.    No recent abx use.  No alleviating or aggravating factors with respect to the kidneys.     PAST MEDICAL & SURGICAL HISTORY:  CAD (coronary artery disease)  (4 stents,)      Diabetes mellitus, new onset  diet controlled      Single kidney  (hx/o LEFT nephrectomy at age 16; pt states due to a ureter"blockage" causing "infection"; pt denies hx/o renal dysfunction)      Arthritis      Myocardial infarction  (2003)      Hard of hearing      Atrial fibrillation      Hypertension      Hyperlipidemia      History of TIAs  Last 2018      History of bradycardia      Bladder cancer      History of CHF (congestive heart failure)      Left carotid bruit      History of nephrectomy, unilateral  (hx/o LEFT nephrectomy at age 16)      Stented coronary artery  (4 stents)      S/P bilateral cataract extraction      H/O cystoscopy          MEDICATIONS  (STANDING):  aMIOdarone    Tablet 200 milliGRAM(s) Oral daily  apixaban 2.5 milliGRAM(s) Oral two times a day  atorvastatin 80 milliGRAM(s) Oral at bedtime  dextrose 10% Bolus 125 milliLiter(s) IV Bolus once  dextrose 5%. 1000 milliLiter(s) (100 mL/Hr) IV Continuous <Continuous>  dextrose 5%. 1000 milliLiter(s) (50 mL/Hr) IV Continuous <Continuous>  dextrose 50% Injectable 25 Gram(s) IV Push once  dextrose 50% Injectable 12.5 Gram(s) IV Push once  furosemide   Injectable 40 milliGRAM(s) IV Push daily  glucagon  Injectable 1 milliGRAM(s) IntraMuscular once  insulin glargine Injectable (LANTUS) 5 Unit(s) SubCutaneous at bedtime  insulin lispro (ADMELOG) corrective regimen sliding scale   SubCutaneous three times a day before meals  insulin lispro (ADMELOG) corrective regimen sliding scale   SubCutaneous at bedtime  pantoprazole    Tablet 40 milliGRAM(s) Oral before breakfast  sodium bicarbonate 650 milliGRAM(s) Oral daily  sucralfate 1 Gram(s) Oral two times a day  tamsulosin 0.4 milliGRAM(s) Oral at bedtime      Allergies    penicillin (Rash)    Intolerances    Cipro (Joint Pain)      SOCIAL HISTORY:  Denies alcohol abuse, drug abuse or tobacco usage.     FAMILY HISTORY:  Family history of diabetes mellitus in mother (Mother)  (Pt states his mother developed DM in her 70's)    FH: bladder cancer (Sibling)        VITALS:  T(C): 36.6 (05-06-24 @ 05:03), Max: 36.8 (05-05-24 @ 14:48)  HR: 67 (05-06-24 @ 05:03) (66 - 82)  BP: 109/57 (05-06-24 @ 05:03) (105/67 - 144/68)  RR: 18 (05-06-24 @ 05:03) (17 - 19)  SpO2: 99% (05-06-24 @ 05:03) (93% - 99%)    REVIEW OF SYSTEMS:  Denies any nausea, vomiting, diarrhea, fever or chills. Denies chest pain, SOB, focal weakness, hematuria or dysuria. Good oral intake and denies fatigue or weakness. All other pertinent systems are reviewed and are negative.    PHYSICAL EXAM:  Constitutional: NAD  HEENT: EOMI  Neck:  No JVD, supple   Respiratory: CTA B/L  Cardiovascular: S1 and S2, RRR  Gastrointestinal: + BS, soft, NT, ND  Extremities: No peripheral edema, + peripheral pulses  Neurological: A/O x 3, CN2-12 intact  Psychiatric: Normal mood, normal affect  : No Temple  Skin: No rashes, C/D/I  Access: Not applicable    I and O's:    05-05 @ 07:01  -  05-06 @ 07:00  --------------------------------------------------------  IN: 240 mL / OUT: 1400 mL / NET: -1160 mL      Height (cm): 170.2 (05-05 @ 10:33)  Weight (kg): 57.6 (05-05 @ 10:33)  BMI (kg/m2): 19.9 (05-05 @ 10:33)  BSA (m2): 1.67 (05-05 @ 10:33)    LABS:                        8.1    4.01  )-----------( 125      ( 06 May 2024 05:27 )             24.8     05-06    144  |  108  |  72<H>  ----------------------------<  93  3.2<L>   |  22  |  3.52<H>    Ca    8.3<L>      06 May 2024 05:27    TPro  6.1  /  Alb  2.9<L>  /  TBili  0.9  /  DBili  x   /  AST  76<H>  /  ALT  68<H>  /  AlkPhos  144<H>  05-05      URINE:  Urinalysis Basic - ( 06 May 2024 05:27 )    Color: x / Appearance: x / SG: x / pH: x  Gluc: 93 mg/dL / Ketone: x  / Bili: x / Urobili: x   Blood: x / Protein: x / Nitrite: x   Leuk Esterase: x / RBC: x / WBC x   Sq Epi: x / Non Sq Epi: x / Bacteria: x        RADIOLOGY & ADDITIONAL STUDIES:     NEPHROLOGY - NSN    Patient seen and examined.    HPI:   87yo male presents with shortness of breath for about 1 week.  symptoms worse with exertion.  has increased LE edema.  no cough.  no chest pain. (05 May 2024 16:10)  Recent hospitalization for melana and AC was held and then restarted.  He does have severe MR n TAVR work up was started   Baseline creatinine about 3 and he has nephrotic syndrome  There is no hematuria or bubbles in the urine.  No history of NSAIDS or nephrolithisis.  The patient urinates once or twice in the night and there is no incontinence.  No family hx or renal disease or back pain.    No recent abx use.  No alleviating or aggravating factors with respect to the kidneys.     PAST MEDICAL & SURGICAL HISTORY:  CAD (coronary artery disease)  (4 stents,)      Diabetes mellitus, new onset  diet controlled      Single kidney  (hx/o LEFT nephrectomy at age 16; pt states due to a ureter"blockage" causing "infection"; pt denies hx/o renal dysfunction)      Arthritis      Myocardial infarction  (2003)      Hard of hearing      Atrial fibrillation      Hypertension      Hyperlipidemia      History of TIAs  Last 2018      History of bradycardia      Bladder cancer      History of CHF (congestive heart failure)      Left carotid bruit      History of nephrectomy, unilateral  (hx/o LEFT nephrectomy at age 16)      Stented coronary artery  (4 stents)      S/P bilateral cataract extraction      H/O cystoscopy          MEDICATIONS  (STANDING):  aMIOdarone    Tablet 200 milliGRAM(s) Oral daily  apixaban 2.5 milliGRAM(s) Oral two times a day  atorvastatin 80 milliGRAM(s) Oral at bedtime  dextrose 10% Bolus 125 milliLiter(s) IV Bolus once  dextrose 5%. 1000 milliLiter(s) (100 mL/Hr) IV Continuous <Continuous>  dextrose 5%. 1000 milliLiter(s) (50 mL/Hr) IV Continuous <Continuous>  dextrose 50% Injectable 25 Gram(s) IV Push once  dextrose 50% Injectable 12.5 Gram(s) IV Push once  furosemide   Injectable 40 milliGRAM(s) IV Push daily  glucagon  Injectable 1 milliGRAM(s) IntraMuscular once  insulin glargine Injectable (LANTUS) 5 Unit(s) SubCutaneous at bedtime  insulin lispro (ADMELOG) corrective regimen sliding scale   SubCutaneous three times a day before meals  insulin lispro (ADMELOG) corrective regimen sliding scale   SubCutaneous at bedtime  pantoprazole    Tablet 40 milliGRAM(s) Oral before breakfast  sodium bicarbonate 650 milliGRAM(s) Oral daily  sucralfate 1 Gram(s) Oral two times a day  tamsulosin 0.4 milliGRAM(s) Oral at bedtime      Allergies    penicillin (Rash)    Intolerances    Cipro (Joint Pain)      SOCIAL HISTORY:  Denies alcohol abuse, drug abuse or tobacco usage.     FAMILY HISTORY:  Family history of diabetes mellitus in mother (Mother)  (Pt states his mother developed DM in her 70's)    FH: bladder cancer (Sibling)        VITALS:  T(C): 36.6 (05-06-24 @ 05:03), Max: 36.8 (05-05-24 @ 14:48)  HR: 67 (05-06-24 @ 05:03) (66 - 82)  BP: 109/57 (05-06-24 @ 05:03) (105/67 - 144/68)  RR: 18 (05-06-24 @ 05:03) (17 - 19)  SpO2: 99% (05-06-24 @ 05:03) (93% - 99%)    REVIEW OF SYSTEMS:    Denies chest pain, but +  SOB. Good oral intake and denies fatigue or weakness. All other pertinent systems are reviewed and are negative.    PHYSICAL EXAM:  Constitutional: NAD  HEENT: EOMI  Neck:  +  JVD, supple   Respiratory: CTA B/L  Cardiovascular: S1 and S2, RRR  Gastrointestinal: + BS, soft, NT, ND  Extremities: No peripheral edema, + peripheral pulses  Neurological: A/O x 3, CN2-12 intact  Psychiatric: Normal mood, normal affect  : No Temple  Skin: No rashes, C/D/I  Access: Not applicable    I and O's:    05-05 @ 07:01  -  05-06 @ 07:00  --------------------------------------------------------  IN: 240 mL / OUT: 1400 mL / NET: -1160 mL      Height (cm): 170.2 (05-05 @ 10:33)  Weight (kg): 57.6 (05-05 @ 10:33)  BMI (kg/m2): 19.9 (05-05 @ 10:33)  BSA (m2): 1.67 (05-05 @ 10:33)    LABS:                        8.1    4.01  )-----------( 125      ( 06 May 2024 05:27 )             24.8     05-06    144  |  108  |  72<H>  ----------------------------<  93  3.2<L>   |  22  |  3.52<H>    Ca    8.3<L>      06 May 2024 05:27    TPro  6.1  /  Alb  2.9<L>  /  TBili  0.9  /  DBili  x   /  AST  76<H>  /  ALT  68<H>  /  AlkPhos  144<H>  05-05      URINE:  Urinalysis Basic - ( 06 May 2024 05:27 )    Color: x / Appearance: x / SG: x / pH: x  Gluc: 93 mg/dL / Ketone: x  / Bili: x / Urobili: x   Blood: x / Protein: x / Nitrite: x   Leuk Esterase: x / RBC: x / WBC x   Sq Epi: x / Non Sq Epi: x / Bacteria: x        RADIOLOGY & ADDITIONAL STUDIES:    < from: Xray Chest 2 Views PA/Lat (05.05.24 @ 11:33) >    ACC: [id number] EXAM:  XR CHEST PA LAT 2V   ORDERED BY: CAROLINA MORRIS     PROCEDURE DATE:  05/05/2024          INTERPRETATION:  EXAMINATION: XR CHEST PA AND LATERAL    CLINICAL INDICATION: Shortness of breath    TECHNIQUE: 2 views; Frontal and lateral views of the chest were obtained.    COMPARISON: Chest x-ray 4/20/2024.    FINDINGS:    The heart is normal in size.  Small bilateral pleural effusions with adjacent atelectasis.  There is no pneumothorax.  No acute bony abnormality.    IMPRESSION:  Small bilateral pleural effusions with adjacent atelectasis.    --- End of Report ---       < from: MARCELLA W or WO Ultrasound Enhancing Agent (04.24.24 @ 12:50) >    TRANSESOPHAGEAL ECHOCARDIOGRAM REPORT  ________________________________________________________________________________                                      _______       Pt. Name:       RACHEL COMBS Study Date:    4/24/2024  MRN:            [id number]        YOB: 1935  Accession #:    [id number]        Age:           88 years  Account#:       [id number]         Gender:        M  Visit ID#  Heart Rate:                      Height:        67.00 in (170.18 cm)  Rhythm:             Weight:        120.00 lb (54.43 kg)  Blood Pressure: 125/70 mmHg      BSA/BMI:       1.63 m² / 18.79 kg/m²  ________________________________________________________________________________________  Referring Physician:    [id number] Sudheer Arroyo  Interpreting Physician: Bernardino Lauren M.D.  Primary Sonographer:    Fabio Ly MD  Fellow (Performing):    Fabio Ly MD  Fellow (Interpreting):  Fabio Ly MD    CPT:               ECHO TRANSESOPH W/O CON - 77888.m;DOPPLER ECHO COMP WSPECT -                     56861.m;DOPPL ECHO COLOR FLOW - 10847.m;3D RECONST W/O                     WKSTATION - 62662.m  Indication(s):     Nonrheumatic mitral (valve) insufficiency - I34.0;                     Unspecified atrial fibrillation - I48.91  Procedure:         Transesophageal echocardiogram performed with 2D, M-mode and                     complete spectral and color flow Doppler. Real time and full                     volume 3-dimensional imaging performed at the echo machine.  Ordering Location: [facility]  Admission Status:  Inpatient  Agitated Saline:   Injection with agitated saline was performed to evaluate for                     intracardiac shunting.  Study Information: Image quality for this study is good.    _______________________________________________________________________________________     CONCLUSIONS:      1. Left ventricular systolic function is moderately decreased. There are regional wall motion abnormalities present. Hypokinesis of the inferior, lateral, and inferolateral walls.   2. Normal right ventricular cavity size and normal systolic function.   3. Structurally normal mitral valve with normal leaflet excursion. There is calcification of the mitral valve annulus. There is symmetric leaflet tethering. There is severe mitral regurgitation. Vena contracta width ~ 0.7 cm. Estimated effective regurgitant orifice area (EROA) ~ 0.4 cm2 (by the PISA method).   4. The aortic valve appears trileaflet with normal systolic excursion. There is calcification of the aortic valve leaflets. There is mild to moderate aortic regurgitation. Vena contracta width ~ 0.3 cm.   5. No atheroma in the visualized portions of the proximal ascending aorta. Mild non-mobile atheroma in the visualized portions of the transverse aortic arch. Mild non-mobile atheroma in the visualized portions of the descending aorta.   6. The left atrium is normal in size. There is no evidence of left atrial or left atrial appendage thrombus. Measurements of the left atrial appendage (JOSÉ MIGUEL) were carried out as outlined below. Note that the JOSÉ MIGUEL width refers to its width at the ostium. The JOSÉ MIGUEL length refers to the distance from the plane of the JOSÉ MIGUEL ostium to its most distal point. Note that the JOSÉ MIGUEL has a "windsock" appearance.           Zero degrees: width = 1.9 cm, length = 3.0 cm      30 degrees: width = 1.7 cm, length = 2.9 cm      45 degrees: width = 1.7 cm, length = 2.8 cm      60 degrees: width = 1.6 cm, length = 2.7 cm      90 degrees: width = 1.8 cm, length = 2.6 cm      135 degrees: width = 2.0 cm, length = 2.8 cm.   7. Agitated saline injection was negative for intracardiac shunt.    ____________________________________________________________________  MARCELLA Procedure:  After discussion of the risks and benefits of the MARCELLA, an informed consent was obtained. Study was performed with anesthesia (please see anesthesia record).  The adult Charla MARCELLA probe was introduced and passed into the esophagus. The MARCELLA probe was passed without difficulty. Images were obtained with the patient in a left lateral decubitus position. Image quality was good. The patient's vital signs; including heart rate, blood pressure, and oxygen saturation; remained stable throughout the procedure. The patient tolerated the procedure well and without complications.     ________________________________________________________________________________________  FINDINGS:     Left Ventricle:  Left ventricular systolic function is moderately decreased. There are regional wall motion abnormalities present. Hypokinesis of the inferior, lateral, and inferolateral walls.     Right Ventricle:  The right ventricular cavity is normal in size and normal systolic function.     Left Atrium:  The left atrium is normal in size. There is no evidence ofleft atrial or left atrial appendage thrombus. Measurements of the left atrial appendage (JOSÉ MIGUEL) were carried out as outlined below. Note that the JOSÉ MIGUEL width refers to its width at the ostium. The JOSÉ MIGUEL length refers to the distance from the plane of the JOSÉ MIGUEL ostium to its most distal point. Note that the JOSÉ MIGUEL has a "windsock" appearance.    Zero degrees: width = 1.9 cm, length = 3.0 cm  30 degrees: width = 1.7 cm, length = 2.9 cm  45 degrees: width = 1.7 cm, length = 2.8 cm  60 degrees: width = 1.6 cm,length = 2.7 cm  90 degrees: width = 1.8 cm, length = 2.6 cm  135 degrees: width = 2.0 cm, length = 2.8 cm.     Right Atrium:  The right atrium is normal in size.     Interatrial Septum:  Agitated saline injection was negative for intracardiac shunt.     Aortic Valve:  The aortic valve appears trileaflet with normal systolic excursion. There is calcification of the aortic valve leaflets. There is mild to moderate aortic regurgitation. Vena contracta width ~ 0.3 cm.     Mitral Valve:  Structurallynormal mitral valve with normal leaflet excursion. There is calcification of the mitral valve annulus. There is symmetric leaflet tethering. There is severe mitral regurgitation. Vena contracta width ~ 0.7 cm. Estimated effective regurgitant orifice area (EROA) ~ 0.4 cm2 (by the PISA method).     Tricuspid Valve:  Structurally normal tricuspid valve with normal leaflet excursion. There is trace tricuspid regurgitation.     Pulmonic Valve:  Structurally normal pulmonic valve with normal leaflet excursion. There is mild pulmonic regurgitation.     Aorta:  The aortic root at the sinuses of Valsalva is normal in size, measuring 2.90 cm (indexed 1.78 cm/m²). The aortic diameter at the sinotubular junction is normal in size, measuring 2.3 cm. The ascending aorta diameter is normal in size, measuring 3.00 cm (indexed 1.84 cm/m²). There is no atheroma in the visualized portions of the proximal ascending aorta. There is mild non-mobile atheroma in the visualized portions of the transverse aortic arch. There is mild non-mobile atheroma in the visualized portions of the descending aorta.     Pericardium:  No pericardial effusion seen.  ____________________________________________________________________  QUANTITATIVE DATA:  Aorta Measurements: (Normal range) (Indexed to BSA)     Sinuses of Valsalva: 2.90 cm (3.1 - 3.7 cm)  Ao ST Junct:         2.3 cm  Ao Asc prox:         3.00 cm       LVOT / RV    < end of copied text >

## 2024-05-07 LAB
ANION GAP SERPL CALC-SCNC: 11 MMOL/L — SIGNIFICANT CHANGE UP (ref 5–17)
APTT BLD: 176.9 SEC — CRITICAL HIGH (ref 24.5–35.6)
APTT BLD: 87.2 SEC — HIGH (ref 24.5–35.6)
APTT BLD: >200 SEC — CRITICAL HIGH (ref 24.5–35.6)
BLD GP AB SCN SERPL QL: NEGATIVE — SIGNIFICANT CHANGE UP
BUN SERPL-MCNC: 67 MG/DL — HIGH (ref 7–23)
CALCIUM SERPL-MCNC: 8 MG/DL — LOW (ref 8.4–10.5)
CHLORIDE SERPL-SCNC: 110 MMOL/L — HIGH (ref 96–108)
CO2 SERPL-SCNC: 23 MMOL/L — SIGNIFICANT CHANGE UP (ref 22–31)
CREAT SERPL-MCNC: 3.36 MG/DL — HIGH (ref 0.5–1.3)
EGFR: 17 ML/MIN/1.73M2 — LOW
GLUCOSE BLDC GLUCOMTR-MCNC: 122 MG/DL — HIGH (ref 70–99)
GLUCOSE BLDC GLUCOMTR-MCNC: 125 MG/DL — HIGH (ref 70–99)
GLUCOSE BLDC GLUCOMTR-MCNC: 140 MG/DL — HIGH (ref 70–99)
GLUCOSE BLDC GLUCOMTR-MCNC: 98 MG/DL — SIGNIFICANT CHANGE UP (ref 70–99)
GLUCOSE SERPL-MCNC: 101 MG/DL — HIGH (ref 70–99)
HCT VFR BLD CALC: 23.5 % — LOW (ref 39–50)
HCT VFR BLD CALC: 25.5 % — LOW (ref 39–50)
HGB BLD-MCNC: 7.7 G/DL — LOW (ref 13–17)
HGB BLD-MCNC: 8.3 G/DL — LOW (ref 13–17)
MAGNESIUM SERPL-MCNC: 2 MG/DL — SIGNIFICANT CHANGE UP (ref 1.6–2.6)
MCHC RBC-ENTMCNC: 32.1 PG — SIGNIFICANT CHANGE UP (ref 27–34)
MCHC RBC-ENTMCNC: 32.5 GM/DL — SIGNIFICANT CHANGE UP (ref 32–36)
MCHC RBC-ENTMCNC: 32.5 PG — SIGNIFICANT CHANGE UP (ref 27–34)
MCHC RBC-ENTMCNC: 32.8 GM/DL — SIGNIFICANT CHANGE UP (ref 32–36)
MCV RBC AUTO: 100 FL — SIGNIFICANT CHANGE UP (ref 80–100)
MCV RBC AUTO: 97.9 FL — SIGNIFICANT CHANGE UP (ref 80–100)
MRSA PCR RESULT.: SIGNIFICANT CHANGE UP
NRBC # BLD: 0 /100 WBCS — SIGNIFICANT CHANGE UP (ref 0–0)
NRBC # BLD: 0 /100 WBCS — SIGNIFICANT CHANGE UP (ref 0–0)
PHOSPHATE SERPL-MCNC: 2.5 MG/DL — SIGNIFICANT CHANGE UP (ref 2.5–4.5)
PLATELET # BLD AUTO: 120 K/UL — LOW (ref 150–400)
PLATELET # BLD AUTO: 127 K/UL — LOW (ref 150–400)
POTASSIUM SERPL-MCNC: 3.3 MMOL/L — LOW (ref 3.5–5.3)
POTASSIUM SERPL-SCNC: 3.3 MMOL/L — LOW (ref 3.5–5.3)
RBC # BLD: 2.4 M/UL — LOW (ref 4.2–5.8)
RBC # BLD: 2.55 M/UL — LOW (ref 4.2–5.8)
RBC # FLD: 17.7 % — HIGH (ref 10.3–14.5)
RBC # FLD: 17.7 % — HIGH (ref 10.3–14.5)
RH IG SCN BLD-IMP: POSITIVE — SIGNIFICANT CHANGE UP
S AUREUS DNA NOSE QL NAA+PROBE: SIGNIFICANT CHANGE UP
SODIUM SERPL-SCNC: 144 MMOL/L — SIGNIFICANT CHANGE UP (ref 135–145)
WBC # BLD: 4.16 K/UL — SIGNIFICANT CHANGE UP (ref 3.8–10.5)
WBC # BLD: 4.41 K/UL — SIGNIFICANT CHANGE UP (ref 3.8–10.5)
WBC # FLD AUTO: 4.16 K/UL — SIGNIFICANT CHANGE UP (ref 3.8–10.5)
WBC # FLD AUTO: 4.41 K/UL — SIGNIFICANT CHANGE UP (ref 3.8–10.5)

## 2024-05-07 PROCEDURE — 99222 1ST HOSP IP/OBS MODERATE 55: CPT

## 2024-05-07 RX ORDER — POTASSIUM CHLORIDE 20 MEQ
20 PACKET (EA) ORAL ONCE
Refills: 0 | Status: COMPLETED | OUTPATIENT
Start: 2024-05-07 | End: 2024-05-07

## 2024-05-07 RX ORDER — FUROSEMIDE 40 MG
40 TABLET ORAL DAILY
Refills: 0 | Status: DISCONTINUED | OUTPATIENT
Start: 2024-05-08 | End: 2024-05-12

## 2024-05-07 RX ADMIN — TAMSULOSIN HYDROCHLORIDE 0.4 MILLIGRAM(S): 0.4 CAPSULE ORAL at 21:43

## 2024-05-07 RX ADMIN — Medication 20 MILLIEQUIVALENT(S): at 08:31

## 2024-05-07 RX ADMIN — HEPARIN SODIUM 700 UNIT(S)/HR: 5000 INJECTION INTRAVENOUS; SUBCUTANEOUS at 13:06

## 2024-05-07 RX ADMIN — PANTOPRAZOLE SODIUM 40 MILLIGRAM(S): 20 TABLET, DELAYED RELEASE ORAL at 06:07

## 2024-05-07 RX ADMIN — HEPARIN SODIUM 700 UNIT(S)/HR: 5000 INJECTION INTRAVENOUS; SUBCUTANEOUS at 19:21

## 2024-05-07 RX ADMIN — Medication 20 MILLIEQUIVALENT(S): at 06:32

## 2024-05-07 RX ADMIN — Medication 650 MILLIGRAM(S): at 12:06

## 2024-05-07 RX ADMIN — HEPARIN SODIUM 700 UNIT(S)/HR: 5000 INJECTION INTRAVENOUS; SUBCUTANEOUS at 20:46

## 2024-05-07 RX ADMIN — Medication 40 MILLIGRAM(S): at 06:08

## 2024-05-07 RX ADMIN — HEPARIN SODIUM 0 UNIT(S)/HR: 5000 INJECTION INTRAVENOUS; SUBCUTANEOUS at 12:03

## 2024-05-07 RX ADMIN — HEPARIN SODIUM 900 UNIT(S)/HR: 5000 INJECTION INTRAVENOUS; SUBCUTANEOUS at 04:24

## 2024-05-07 RX ADMIN — INSULIN GLARGINE 5 UNIT(S): 100 INJECTION, SOLUTION SUBCUTANEOUS at 21:44

## 2024-05-07 RX ADMIN — HEPARIN SODIUM 0 UNIT(S)/HR: 5000 INJECTION INTRAVENOUS; SUBCUTANEOUS at 03:15

## 2024-05-07 RX ADMIN — Medication 1 GRAM(S): at 06:08

## 2024-05-07 RX ADMIN — Medication 1 GRAM(S): at 17:33

## 2024-05-07 RX ADMIN — AMIODARONE HYDROCHLORIDE 200 MILLIGRAM(S): 400 TABLET ORAL at 06:08

## 2024-05-07 RX ADMIN — CHLORHEXIDINE GLUCONATE 1 APPLICATION(S): 213 SOLUTION TOPICAL at 14:00

## 2024-05-07 RX ADMIN — HEPARIN SODIUM 700 UNIT(S)/HR: 5000 INJECTION INTRAVENOUS; SUBCUTANEOUS at 20:48

## 2024-05-07 RX ADMIN — ATORVASTATIN CALCIUM 80 MILLIGRAM(S): 80 TABLET, FILM COATED ORAL at 21:43

## 2024-05-07 NOTE — CONSULT NOTE ADULT - SUBJECTIVE AND OBJECTIVE BOX
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 GI consulted given his heightened risk for bleeding. Patient notes that he has been doing well since he had his last EGD with Dr. Kruse. The patient denies NSAID use. He last had c-scope over 20 years ago and it was normal. 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 Some of these were oozing. This was ablated with RFA in the usual fashion at 12J x 3 for each site. 93 ablations were applied.       PAST MEDICAL & SURGICAL HISTORY:  CAD (coronary artery disease)  (4 stents,)      Diabetes mellitus, new onset  diet controlled      Single kidney  (hx/o LEFT nephrectomy at age 16; pt states due to a ureter"blockage" causing "infection"; pt denies hx/o renal dysfunction)      Arthritis      Myocardial infarction  (2003)      Hard of hearing      Atrial fibrillation      Hypertension      Hyperlipidemia      History of TIAs  Last 2018      History of bradycardia      Bladder cancer      History of CHF (congestive heart failure)      Left carotid bruit      History of nephrectomy, unilateral  (hx/o LEFT nephrectomy at age 16)      Stented coronary artery  (4 stents)      S/P bilateral cataract extraction      H/O cystoscopy        FAMILY HISTORY:  Family history of diabetes mellitus in mother (Mother)  (Pt states his mother developed DM in her 70's)    FH: bladder cancer (Sibling)        MEDS:  MEDICATIONS  (STANDING):  aMIOdarone    Tablet 200 milliGRAM(s) Oral daily  atorvastatin 80 milliGRAM(s) Oral at bedtime  chlorhexidine 2% Cloths 1 Application(s) Topical daily  dextrose 10% Bolus 125 milliLiter(s) IV Bolus once  dextrose 5%. 1000 milliLiter(s) (100 mL/Hr) IV Continuous <Continuous>  dextrose 5%. 1000 milliLiter(s) (50 mL/Hr) IV Continuous <Continuous>  dextrose 50% Injectable 25 Gram(s) IV Push once  dextrose 50% Injectable 12.5 Gram(s) IV Push once  glucagon  Injectable 1 milliGRAM(s) IntraMuscular once  heparin  Infusion.  Unit(s)/Hr (11 mL/Hr) IV Continuous <Continuous>  insulin glargine Injectable (LANTUS) 5 Unit(s) SubCutaneous at bedtime  insulin lispro (ADMELOG) corrective regimen sliding scale   SubCutaneous at bedtime  insulin lispro (ADMELOG) corrective regimen sliding scale   SubCutaneous three times a day before meals  pantoprazole    Tablet 40 milliGRAM(s) Oral before breakfast  sodium bicarbonate 650 milliGRAM(s) Oral daily  sucralfate 1 Gram(s) Oral two times a day  tamsulosin 0.4 milliGRAM(s) Oral at bedtime    MEDICATIONS  (PRN):  acetaminophen     Tablet .. 650 milliGRAM(s) Oral every 6 hours PRN Temp greater or equal to 38.5C (101.3F), Mild Pain (1 - 3)  dextrose Oral Gel 15 Gram(s) Oral once PRN Blood Glucose LESS THAN 70 milliGRAM(s)/deciliter  heparin   Injectable 2000 Unit(s) IV Push every 6 hours PRN For aPTT between 40 - 57  heparin   Injectable 4500 Unit(s) IV Push every 6 hours PRN For aPTT less than 40    Allergies    penicillin (Rash)    Intolerances    Cipro (Joint Pain)        CONSTITUTIONAL:  No weight loss, fever, chills, weakness or fatigue.  HEENT:  Eyes:  No visual loss, blurred vision, double vision or yellow sclerae.  SKIN:  No rash or itching.  CARDIOVASCULAR:  No chest pain, chest pressure or chest discomfort.  RESPIRATORY:  No shortness of breath, cough or sputum.  GASTROINTESTINAL:  SEE HPI  GENITOURINARY:  No dysuria, hematuria, urinary frequency  NEUROLOGICAL:  No headache, dizziness, syncope, paralysis, ataxia, numbness or tingling in the extremities.  MUSCULOSKELETAL:  No muscle, back pain, joint pain or stiffness.  ENDOCRINOLOGIC:  No reports of sweating, cold or heat intolerance.     ______________________________________________________________________  PHYSICAL EXAM:  T(C): 36.7 (05-07-24 @ 12:07), Max: 37.1 (05-06-24 @ 21:59)  HR: 58 (05-07-24 @ 12:07)  BP: 105/50 (05-07-24 @ 12:07)  RR: 18 (05-07-24 @ 12:07)  SpO2: 98% (05-07-24 @ 12:07)  Wt(kg): --    05-06 - 05-07  --------------------------------------------------------  IN:    Oral Fluid: 240 mL  Total IN: 240 mL    OUT:    Voided (mL): 575 mL  Total OUT: 575 mL    Total NET: -335 mL      05-07 - 05-07  --------------------------------------------------------  IN:  Total IN: 0 mL    OUT:    Voided (mL): 500 mL  Total OUT: 500 mL    Total NET: -500 mL          GEN: NAD, normocephalic  CVS: S1S2+  CHEST: clear to auscultation  ABD: soft , nontender, nondistended, bowel sounds present  EXTR: no cyanosis, no clubbing, no edema  NEURO: A&OX3  SKIN:  warm;  non icteric  RECTAL: brown stool    ______________________________________________________________________  LABS:                        7.7    4.16  )-----------( 120      ( 07 May 2024 05:45 )             23.5     05-07    144  |  110<H>  |  67<H>  ----------------------------<  101<H>  3.3<L>   |  23  |  3.36<H>    Ca    8.0<L>      07 May 2024 05:45  Phos  2.5     05-07  Mg     2.0     05-07        PTT - ( 07 May 2024 11:25 )  PTT:176.9 sec  ____________________________________________

## 2024-05-07 NOTE — PHYSICAL THERAPY INITIAL EVALUATION ADULT - PERTINENT HX OF CURRENT PROBLEM, REHAB EVAL
89y/o male with pAfib, CAD s/p PCI, DM, HTN, CKD, single kidney due to L nephrectomy 70yrs ago for a "ureteral blockage" admitted with acute on chronic systolic heart failure and known MR.

## 2024-05-07 NOTE — CONSULT NOTE ADULT - ASSESSMENT
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 GI consulted given his heightened risk for bleeding.       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 Some of these were oozing. This was ablated with RFA in the usual fashion at 12J x 3 for each site. 93 ablations were applied.   - Patient on heparin in hospital with stable hbg and brown stool     #Severe MR  #CAD with stents     Recommendations:  - Given patient's recent bleed from GAVE and treatment with RFA, the patient is at risk of rebleed.  - At this time there is no endoscopic intervention to decrease this chance of re-bleed.  - Would continue Carafate TID and PPI BID.  - If patient begins to have signs of GI bleed after cardiac intervention, please reconsult GI for further management.     Note not finalized until signed by attending     Raiza Lima MD  Gastroenterology/Hepatology Fellow, PGY-4  Please contact via TEAMS    NON-URGENT CONSULTS:  Please email [email or web address]  
  88y Male with CAD, HFrEF, Afib, HTN, DM, solitary R kidney and CKD p/w acute on chronic HFrEF and acute on chronic kidney injury.  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     
Mr. 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 
  A/p  87yo w/ PMHx CKD, HFrEF, Diabetes, TIA,  CAD s/p stenting, bladder cancer s/p chemo in remission, afib on eliquis, hx of left nephrectomy, recent admission for NSTEMI pw SOB.    #NSTEMI  -Recent admission with NSTEMI  -Trops elevated this admission  -Plan for LHC on wednesday prior to eventual mitraclip  -Continue heparin gtt  -Off antiplatelet as below    #CAD, s/p PCI  -cv stable, no chest pain  -remains off antiplat given recurrent bleed hx and oral a/c use  -eventual asa 81 if can tolerate  -Plan for LHC on Weds with Dr. Man    #SOB, chronic HFrEF  -Overload on exam  -Continue IV lasix as ordered  -Recent MARCELLA with moderately decreased LVEF, regional WMA, severe MR     #Sev MR   -MARCELLA as above  -Structural following - plan for LHC weds prior to poss mitraclip    #Atrial Fibrillation  -SR on tele   -cont hep gtt  -cont amio  -Eventual outpt f/u for poss watchman    #CKD  -renal f/u

## 2024-05-07 NOTE — PROGRESS NOTE ADULT - ASSESSMENT
A/p  89yo w/ PMHx CKD, HFrEF, Diabetes, TIA,  CAD s/p stenting, bladder cancer s/p chemo in remission, afib on eliquis, hx of left nephrectomy, recent admission for NSTEMI pw SOB.    #NSTEMI  -Recent admission with NSTEMI  -Trops elevated this admission  -Plan for LHC on wednesday prior to eventual mitraclip  -Continue heparin gtt  -Off antiplatelet as below    #CAD, s/p PCI  -cv stable, no chest pain  -remains off antiplat given recurrent bleed hx and oral a/c use  -eventual asa 81 if can tolerate  -Plan for LHC on Weds with Dr. Man    #SOB, chronic HFrEF  -Overload on exam - improved   -sp iv lasix   -Recent MARCELLA with moderately decreased LVEF, regional WMA, severe MR     #Sev MR   -MARCELLA as above  -Structural following - plan for [facility] tomorrow  -F/u further planning regarding poss mitraclip    #Atrial Fibrillation  -SR on tele   -cont hep gtt  -cont amio  -Eventual outpt f/u for poss watchman    #CKD  -renal f/u

## 2024-05-07 NOTE — PHYSICAL THERAPY INITIAL EVALUATION ADULT - PLANNED THERAPY INTERVENTIONS, PT EVAL
GOAL: Pt will negotiate up/down 1 steps with one handrail ascending using LRAD independently in 4 weeks/balance training/bed mobility training/gait training/strengthening/transfer training

## 2024-05-07 NOTE — PROVIDER CONTACT NOTE (CRITICAL VALUE NOTIFICATION) - ASSESSMENT
Pt A&Ox4. No complaints or indicators of chest pain, palpitations, SOB, headache, or dizziness. Patient VSS. no s/s of bleeding
Patient asymptomatic

## 2024-05-07 NOTE — PHYSICAL THERAPY INITIAL EVALUATION ADULT - ADDITIONAL COMMENTS
lives with spouse in single level house with 1 KEELY, IND with rolling walker at baseline, not o2 dependent; reports family support local if needed

## 2024-05-07 NOTE — PROVIDER CONTACT NOTE (CRITICAL VALUE NOTIFICATION) - ACTION/TREATMENT ORDERED:
hold for 1 hour and restart 7ml/hr per protocol
provider notified, heparin gtt paused. Nomogram to be followed

## 2024-05-07 NOTE — PROGRESS NOTE ADULT - ASSESSMENT
88 m with    Acute on Chronic Systolic Congestive Heart Failure   - telemetry  - diurese  - echo  - cardiology follow Dr Vega    Anemia due to acute blood loss.   - s/p EGD   - hemoglobin stable on Heparin gtt/ apixaban on hold    Elevated troponin level.   - trend  - cardiology evaluation    Stage 4 chronic kidney disease. / Single kidney  - Nephrology eval up appreciated  continue to follow recommendations  likely at current baseline   no nephrotoxic meds.    Paroxysmal atrial fibrillation.   - heart rate controlled   - continue amiodarone  - MARCELLA severe MR  -  LHC and possible Mitraclip   - AC with Heparin gtt/ Eliquis on hold   - Gastroenterology evaluation for preop clearance     BPH without urinary obstruction.   - continue tamsulosin.    Insulin dependent type 2 diabetes mellitus.   - HbA1C normal recently   - finger stiks ith short acting insulin sliding scale  -  oral meds  - diabetic diet  - monitor hypoglycemia.    Need for prophylactic measure.   - on Heparin gtt.    Tristan Ridley MD phone [phone]

## 2024-05-07 NOTE — PROGRESS NOTE ADULT - ASSESSMENT
88y Male with CAD, HFrEF, Afib, HTN, DM, solitary R kidney and CKD p/w acute on chronic HFrEF and acute on chronic kidney injury.  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       Sayed [facility]   [phone]

## 2024-05-07 NOTE — CONSULT NOTE ADULT - ATTENDING COMMENTS
Agree w above. 87 yo w hx of CAD s/p stents, Afib, DM, HTN, hx of GI bleed due to GAVE s/p recent RFA admitted for SOB, now getting C with possible guillermina clip. GI consulted for risk statification. Patient reports doing well post EGD without obvious signs of GI bleed. Given recent GI bleed and recent treatment of GAVE by RFA, patient is at risk for re-bleed. Would continue PPI bid and Carafate tid-qid. Would monitor for any evidence of rebleeding as manifested by melena, hematochezia or worsening anemia. Rec consulting GI if with evidence of GI bleed post cardiac intervention.

## 2024-05-08 LAB
APTT BLD: 79.1 SEC — HIGH (ref 24.5–35.6)
GLUCOSE BLDC GLUCOMTR-MCNC: 132 MG/DL — HIGH (ref 70–99)
GLUCOSE BLDC GLUCOMTR-MCNC: 135 MG/DL — HIGH (ref 70–99)
GLUCOSE BLDC GLUCOMTR-MCNC: 94 MG/DL — SIGNIFICANT CHANGE UP (ref 70–99)
HCT VFR BLD CALC: 26.6 % — LOW (ref 39–50)
HGB BLD-MCNC: 8.4 G/DL — LOW (ref 13–17)
MCHC RBC-ENTMCNC: 31.6 GM/DL — LOW (ref 32–36)
MCHC RBC-ENTMCNC: 31.9 PG — SIGNIFICANT CHANGE UP (ref 27–34)
MCV RBC AUTO: 101.1 FL — HIGH (ref 80–100)
NRBC # BLD: 0 /100 WBCS — SIGNIFICANT CHANGE UP (ref 0–0)
PLATELET # BLD AUTO: 117 K/UL — LOW (ref 150–400)
RBC # BLD: 2.63 M/UL — LOW (ref 4.2–5.8)
RBC # FLD: 17.8 % — HIGH (ref 10.3–14.5)
WBC # BLD: 4.34 K/UL — SIGNIFICANT CHANGE UP (ref 3.8–10.5)
WBC # FLD AUTO: 4.34 K/UL — SIGNIFICANT CHANGE UP (ref 3.8–10.5)

## 2024-05-08 PROCEDURE — 99152 MOD SED SAME PHYS/QHP 5/>YRS: CPT

## 2024-05-08 PROCEDURE — 93458 L HRT ARTERY/VENTRICLE ANGIO: CPT | Mod: 26

## 2024-05-08 RX ORDER — PANTOPRAZOLE SODIUM 20 MG/1
40 TABLET, DELAYED RELEASE ORAL
Refills: 0 | Status: DISCONTINUED | OUTPATIENT
Start: 2024-05-08 | End: 2024-05-12

## 2024-05-08 RX ORDER — POTASSIUM CHLORIDE 20 MEQ
40 PACKET (EA) ORAL ONCE
Refills: 0 | Status: COMPLETED | OUTPATIENT
Start: 2024-05-08 | End: 2024-05-08

## 2024-05-08 RX ORDER — HEPARIN SODIUM 5000 [USP'U]/ML
700 INJECTION INTRAVENOUS; SUBCUTANEOUS
Qty: 25000 | Refills: 0 | Status: DISCONTINUED | OUTPATIENT
Start: 2024-05-08 | End: 2024-05-11

## 2024-05-08 RX ORDER — SODIUM CHLORIDE 9 MG/ML
500 INJECTION INTRAMUSCULAR; INTRAVENOUS; SUBCUTANEOUS
Refills: 0 | Status: DISCONTINUED | OUTPATIENT
Start: 2024-05-08 | End: 2024-05-12

## 2024-05-08 RX ADMIN — PANTOPRAZOLE SODIUM 40 MILLIGRAM(S): 20 TABLET, DELAYED RELEASE ORAL at 17:41

## 2024-05-08 RX ADMIN — HEPARIN SODIUM 700 UNIT(S)/HR: 5000 INJECTION INTRAVENOUS; SUBCUTANEOUS at 03:18

## 2024-05-08 RX ADMIN — ATORVASTATIN CALCIUM 80 MILLIGRAM(S): 80 TABLET, FILM COATED ORAL at 22:20

## 2024-05-08 RX ADMIN — INSULIN GLARGINE 5 UNIT(S): 100 INJECTION, SOLUTION SUBCUTANEOUS at 22:27

## 2024-05-08 RX ADMIN — Medication 40 MILLIEQUIVALENT(S): at 11:28

## 2024-05-08 RX ADMIN — Medication 1 GRAM(S): at 05:13

## 2024-05-08 RX ADMIN — AMIODARONE HYDROCHLORIDE 200 MILLIGRAM(S): 400 TABLET ORAL at 05:14

## 2024-05-08 RX ADMIN — Medication 650 MILLIGRAM(S): at 11:29

## 2024-05-08 RX ADMIN — Medication 1 GRAM(S): at 17:41

## 2024-05-08 RX ADMIN — SODIUM CHLORIDE 100 MILLILITER(S): 9 INJECTION INTRAMUSCULAR; INTRAVENOUS; SUBCUTANEOUS at 13:10

## 2024-05-08 RX ADMIN — HEPARIN SODIUM 700 UNIT(S)/HR: 5000 INJECTION INTRAVENOUS; SUBCUTANEOUS at 22:42

## 2024-05-08 RX ADMIN — Medication 40 MILLIGRAM(S): at 05:13

## 2024-05-08 RX ADMIN — TAMSULOSIN HYDROCHLORIDE 0.4 MILLIGRAM(S): 0.4 CAPSULE ORAL at 22:20

## 2024-05-08 RX ADMIN — CHLORHEXIDINE GLUCONATE 1 APPLICATION(S): 213 SOLUTION TOPICAL at 11:37

## 2024-05-08 RX ADMIN — PANTOPRAZOLE SODIUM 40 MILLIGRAM(S): 20 TABLET, DELAYED RELEASE ORAL at 05:13

## 2024-05-08 NOTE — PROGRESS NOTE ADULT - ASSESSMENT
88y Male with CAD, HFrEF, Afib, HTN, DM, solitary R kidney and CKD p/w acute on chronic HFrEF and acute on chronic kidney injury.  Nephrotic syndrome   SOB and severe MR and moderate AR   Hypokalemia     Renal - CKD: stage 4 with solitary R kidney and baseline creatinine ~3     Due to his age we (pt and myself)have decided on no renal bx   Kdur 40meq po x 1   Lasix is  po lasix  He is at high risk for ELIZA and will remain at high risk and medically optimized for the cath.  He understands the risk of LILIAN and wishes to proceed         Anemia in CKD-  Trend CBC which is stable     EPogen 10000 x 1 in am      CV- acute on chronic HFrEF and on IV lasix and his sx have improved   Plan for cath today  in anticipation for Mitral clip and dc the heparin gtt when the timing is known     GI- SP EGD on Heparin drip now and sp Type and cross in case blood is needed .       Sayed [facility]   [phone]

## 2024-05-08 NOTE — PROGRESS NOTE ADULT - ASSESSMENT
A/p  89yo w/ PMHx CKD, HFrEF, Diabetes, TIA,  CAD s/p stenting, bladder cancer s/p chemo in remission, afib on eliquis, hx of left nephrectomy, recent admission for NSTEMI pw SOB.    #NSTEMI  -Recent admission with NSTEMI  -Trops elevated this admission  -Plan for [facility] today prior to eventual mitraclip  -Continue heparin gtt  -Off antiplatelet as below    #CAD, s/p PCI  -cv stable, no chest pain  -remains off antiplat given recurrent bleed hx and oral a/c use  -eventual asa 81 if can tolerate  -Plan for [facility] on Weds with Dr. Man    #SOB, chronic HFrEF  -Overload on exam - improved   -sp iv lasix   -Recent MARCELLA with moderately decreased LVEF, regional WMA, severe MR     #Sev MR   -MARCELLA as above  -Structural following - plan for [facility] today   -F/u further planning regarding poss mitraclip    #Atrial Fibrillation  -SR on tele   -cont hep gtt  -cont amio  -Eventual outpt f/u for poss watchman    #CKD  -renal f/u    #Hx GIB  -Appreciate GI juan

## 2024-05-08 NOTE — PROGRESS NOTE ADULT - ASSESSMENT
88 m with    Acute on Chronic Systolic Congestive Heart Failure   - telemetry  - diurese  - echo  - cardiology follow Dr Vega    Anemia due to acute blood loss.   - s/p EGD   - hemoglobin stable on Heparin gtt/ apixaban on hold  - Gastroenterology evaluation notedf    Elevated troponin level.   - trend  - cardiology evaluation    Stage 4 chronic kidney disease. / Single kidney  - Nephrology eval up appreciated  continue to follow recommendations  likely at current baseline   no nephrotoxic meds.    Paroxysmal atrial fibrillation.   - heart rate controlled   - continue amiodarone  - MARCELLA severe MR  -  LHC with one vessel disease. PCI and possible Mitraclip   - AC with Heparin gtt/ Eliquis on hold     BPH without urinary obstruction.   - continue tamsulosin.    Insulin dependent type 2 diabetes mellitus.   - HbA1C normal recently   - finger stiks ith short acting insulin sliding scale  -  oral meds  - diabetic diet  - monitor hypoglycemia.    Need for prophylactic measure.   - on Heparin gtt.    Tristan Ridley MD phone [phone]

## 2024-05-09 ENCOUNTER — APPOINTMENT (OUTPATIENT)
Dept: CARDIOLOGY | Facility: CLINIC | Age: 89
End: 2024-05-09

## 2024-05-09 LAB
ANION GAP SERPL CALC-SCNC: 11 MMOL/L — SIGNIFICANT CHANGE UP (ref 5–17)
ANION GAP SERPL CALC-SCNC: 11 MMOL/L — SIGNIFICANT CHANGE UP (ref 5–17)
APTT BLD: 66.6 SEC — HIGH (ref 24.5–35.6)
APTT BLD: 72 SEC — HIGH (ref 24.5–35.6)
BUN SERPL-MCNC: 58 MG/DL — HIGH (ref 7–23)
BUN SERPL-MCNC: 61 MG/DL — HIGH (ref 7–23)
CALCIUM SERPL-MCNC: 8.2 MG/DL — LOW (ref 8.4–10.5)
CALCIUM SERPL-MCNC: 8.4 MG/DL — SIGNIFICANT CHANGE UP (ref 8.4–10.5)
CHLORIDE SERPL-SCNC: 108 MMOL/L — SIGNIFICANT CHANGE UP (ref 96–108)
CHLORIDE SERPL-SCNC: 109 MMOL/L — HIGH (ref 96–108)
CO2 SERPL-SCNC: 22 MMOL/L — SIGNIFICANT CHANGE UP (ref 22–31)
CO2 SERPL-SCNC: 23 MMOL/L — SIGNIFICANT CHANGE UP (ref 22–31)
CREAT SERPL-MCNC: 3.31 MG/DL — HIGH (ref 0.5–1.3)
CREAT SERPL-MCNC: 3.34 MG/DL — HIGH (ref 0.5–1.3)
EGFR: 17 ML/MIN/1.73M2 — LOW
EGFR: 17 ML/MIN/1.73M2 — LOW
GLUCOSE BLDC GLUCOMTR-MCNC: 160 MG/DL — HIGH (ref 70–99)
GLUCOSE BLDC GLUCOMTR-MCNC: 210 MG/DL — HIGH (ref 70–99)
GLUCOSE BLDC GLUCOMTR-MCNC: 233 MG/DL — HIGH (ref 70–99)
GLUCOSE BLDC GLUCOMTR-MCNC: 84 MG/DL — SIGNIFICANT CHANGE UP (ref 70–99)
GLUCOSE SERPL-MCNC: 108 MG/DL — HIGH (ref 70–99)
GLUCOSE SERPL-MCNC: 75 MG/DL — SIGNIFICANT CHANGE UP (ref 70–99)
HCT VFR BLD CALC: 27 % — LOW (ref 39–50)
HGB BLD-MCNC: 8.6 G/DL — LOW (ref 13–17)
MAGNESIUM SERPL-MCNC: 2 MG/DL — SIGNIFICANT CHANGE UP (ref 1.6–2.6)
MCHC RBC-ENTMCNC: 31.9 GM/DL — LOW (ref 32–36)
MCHC RBC-ENTMCNC: 31.9 PG — SIGNIFICANT CHANGE UP (ref 27–34)
MCV RBC AUTO: 100 FL — SIGNIFICANT CHANGE UP (ref 80–100)
NRBC # BLD: 0 /100 WBCS — SIGNIFICANT CHANGE UP (ref 0–0)
PHOSPHATE SERPL-MCNC: 3 MG/DL — SIGNIFICANT CHANGE UP (ref 2.5–4.5)
PLATELET # BLD AUTO: 113 K/UL — LOW (ref 150–400)
POTASSIUM SERPL-MCNC: 3.9 MMOL/L — SIGNIFICANT CHANGE UP (ref 3.5–5.3)
POTASSIUM SERPL-MCNC: 4.2 MMOL/L — SIGNIFICANT CHANGE UP (ref 3.5–5.3)
POTASSIUM SERPL-SCNC: 3.9 MMOL/L — SIGNIFICANT CHANGE UP (ref 3.5–5.3)
POTASSIUM SERPL-SCNC: 4.2 MMOL/L — SIGNIFICANT CHANGE UP (ref 3.5–5.3)
RBC # BLD: 2.7 M/UL — LOW (ref 4.2–5.8)
RBC # FLD: 17.6 % — HIGH (ref 10.3–14.5)
SODIUM SERPL-SCNC: 141 MMOL/L — SIGNIFICANT CHANGE UP (ref 135–145)
SODIUM SERPL-SCNC: 143 MMOL/L — SIGNIFICANT CHANGE UP (ref 135–145)
WBC # BLD: 4.37 K/UL — SIGNIFICANT CHANGE UP (ref 3.8–10.5)
WBC # FLD AUTO: 4.37 K/UL — SIGNIFICANT CHANGE UP (ref 3.8–10.5)

## 2024-05-09 RX ORDER — ERYTHROPOIETIN 10000 [IU]/ML
10000 INJECTION, SOLUTION INTRAVENOUS; SUBCUTANEOUS ONCE
Refills: 0 | Status: COMPLETED | OUTPATIENT
Start: 2024-05-09 | End: 2024-05-09

## 2024-05-09 RX ORDER — ERYTHROPOIETIN 10000 [IU]/ML
10000 INJECTION, SOLUTION INTRAVENOUS; SUBCUTANEOUS ONCE
Refills: 0 | Status: DISCONTINUED | OUTPATIENT
Start: 2024-05-09 | End: 2024-05-09

## 2024-05-09 RX ADMIN — Medication 40 MILLIGRAM(S): at 05:48

## 2024-05-09 RX ADMIN — INSULIN GLARGINE 5 UNIT(S): 100 INJECTION, SOLUTION SUBCUTANEOUS at 21:51

## 2024-05-09 RX ADMIN — Medication 1 GRAM(S): at 05:48

## 2024-05-09 RX ADMIN — Medication 1 GRAM(S): at 17:01

## 2024-05-09 RX ADMIN — HEPARIN SODIUM 700 UNIT(S)/HR: 5000 INJECTION INTRAVENOUS; SUBCUTANEOUS at 15:58

## 2024-05-09 RX ADMIN — HEPARIN SODIUM 700 UNIT(S)/HR: 5000 INJECTION INTRAVENOUS; SUBCUTANEOUS at 08:33

## 2024-05-09 RX ADMIN — Medication 2: at 17:00

## 2024-05-09 RX ADMIN — Medication 650 MILLIGRAM(S): at 11:50

## 2024-05-09 RX ADMIN — ERYTHROPOIETIN 10000 UNIT(S): 10000 INJECTION, SOLUTION INTRAVENOUS; SUBCUTANEOUS at 17:08

## 2024-05-09 RX ADMIN — HEPARIN SODIUM 700 UNIT(S)/HR: 5000 INJECTION INTRAVENOUS; SUBCUTANEOUS at 19:09

## 2024-05-09 RX ADMIN — CHLORHEXIDINE GLUCONATE 1 APPLICATION(S): 213 SOLUTION TOPICAL at 11:50

## 2024-05-09 RX ADMIN — TAMSULOSIN HYDROCHLORIDE 0.4 MILLIGRAM(S): 0.4 CAPSULE ORAL at 21:51

## 2024-05-09 RX ADMIN — PANTOPRAZOLE SODIUM 40 MILLIGRAM(S): 20 TABLET, DELAYED RELEASE ORAL at 05:48

## 2024-05-09 RX ADMIN — Medication 1: at 13:01

## 2024-05-09 RX ADMIN — ATORVASTATIN CALCIUM 80 MILLIGRAM(S): 80 TABLET, FILM COATED ORAL at 21:51

## 2024-05-09 RX ADMIN — PANTOPRAZOLE SODIUM 40 MILLIGRAM(S): 20 TABLET, DELAYED RELEASE ORAL at 17:01

## 2024-05-09 RX ADMIN — AMIODARONE HYDROCHLORIDE 200 MILLIGRAM(S): 400 TABLET ORAL at 05:48

## 2024-05-09 NOTE — PROGRESS NOTE ADULT - ASSESSMENT
88y Male with CAD, HFrEF, Afib, HTN, DM, solitary R kidney and CKD p/w acute on chronic HFrEF and acute on chronic kidney injury.  Nephrotic syndrome   SOB and severe MR and moderate AR   LCX disease     Renal - CKD: stage 4 with solitary R kidney and baseline creatinine ~3     Due to his age we (pt and myself)have decided on no renal bx   Lasix is  po lasix  Cath was done and needs stent to LCX.  The issue is contrast load and potential HD and not to mention the issue of A/C vs GI bleed        Anemia in CKD-  Trend CBC which is stable   Epogen 10000 x 1      CV- acute on chronic HFrEF and on IV lasix and his sx have improved     Mitral clip is off the table at present      GI- SP EGD on Heparin drip now    Pt family is requesting second opinion.       Sayed [facility]   [facility]   [phone]

## 2024-05-09 NOTE — PROGRESS NOTE ADULT - ASSESSMENT
88 m with    Acute on Chronic Systolic Congestive Heart Failure   - telemetry  - diurese  - echo   - cardiology follow Dr Vega    Anemia due to acute blood loss.   - s/p EGD   - hemoglobin stable on Heparin gtt/ apixaban on hold  - Gastroenterology evaluation notedf    Elevated troponin level.   - trend  - cardiology evaluation    Stage 4 chronic kidney disease. / Single kidney  - Nephrology eval up appreciated  continue to follow recommendations  likely at current baseline   no nephrotoxic meds.    Paroxysmal atrial fibrillation.   - heart rate controlled   - continue amiodarone  - MARCELLA severe MR  -  LHC with one vessel disease. PCI and possible Mitraclip   - AC with Heparin gtt/ Eliquis on hold . If no intervention will bridge to Eliquis.    BPH without urinary obstruction.   - continue tamsulosin.    Insulin dependent type 2 diabetes mellitus.   - HbA1C normal recently   - finger stiks ith short acting insulin sliding scale  -  oral meds  - diabetic diet  - monitor hypoglycemia.    Need for prophylactic measure.   - on Heparin gtt.    Tristan Ridley MD phone [phone]

## 2024-05-09 NOTE — PROGRESS NOTE ADULT - ASSESSMENT
A/p  87yo w/ PMHx CKD, HFrEF, Diabetes, TIA,  CAD s/p stenting, bladder cancer s/p chemo in remission, afib on eliquis, hx of left nephrectomy, recent admission for NSTEMI pw SOB.    #NSTEMI  -Recent admission with NSTEMI  -Trops elevated this admission  -[facility] severe obstructive CAD to LCx  -Continue heparin gtt  -Off antiplatelet as below    #CAD, s/p PCI  -cv stable, no chest pain  -Recommend starting asa   -Cont statin  -[facility] w/ severe obstructive CAD to LCx and Mild to moderate in-stent restenosis of left anterior descending and right coronary artery stents  -Will manage the above medically for now as pt is high risk for developing ELIZA and requiring HD with staged PCI  -Will need renal input    #Sev MR   -MARCELLA as above  -[facility] as above   -F/u further planning regarding poss mitraclip    #SOB, chronic HFrEF  -Overload on exam - improved   - iv lasix   -Recent MARCELLA with moderately decreased LVEF, regional WMA, severe MR     #Atrial Fibrillation  -SR on tele   -cont hep gtt  -cont amio  -Eventual outpt f/u for poss watchman    #CKD  -renal f/u    #Hx GIB  -Appreciate GI juan           A/p  89yo w/ PMHx CKD, HFrEF, Diabetes, TIA,  CAD s/p stenting, bladder cancer s/p chemo in remission, afib on eliquis, hx of left nephrectomy, recent admission for NSTEMI pw SOB.    #NSTEMI  -Recent admission with NSTEMI  -Trops elevated this admission  -[facility] severe obstructive CAD to LCx  -Continue heparin gtt  -Off antiplatelet as below    #CAD, s/p PCI  -cv stable, no chest pain  -Recommend starting asa   -Cont statin  -[facility] w/ severe obstructive CAD to LCx/om and Mild to moderate in-stent restenosis of left anterior descending and right coronary artery stents  -Will manage the above medically for now as pt is high risk for developing ELIZA and requiring HD with staged PCI  -Will need renal input    #Sev MR   -MARCELLA as above  -[facility] as above   -F/u further planning regarding poss mitraclip    #SOB, chronic HFrEF  -Overload on exam - improved   - iv lasix   -Recent MARCELLA with moderately decreased LVEF, regional WMA, severe MR     #Atrial Fibrillation  -SR on tele   -cont hep gtt  -cont amio  -Eventual outpt f/u for poss watchman    #CKD  -renal f/u    #Hx GIB  -Appreciate GI eval

## 2024-05-09 NOTE — PROVIDER CONTACT NOTE (OTHER) - BACKGROUND
Pt had recent admission for NSTEMI, recent admission for UGIB p/w SOB. Pt admitted for Heart Failure. Pt has PMH of HTN, HLD, CKD, Atrial Fibrillation, CAD, CHF, hx of TIAs, and mitral regurgitation.

## 2024-05-10 LAB
ANION GAP SERPL CALC-SCNC: 13 MMOL/L — SIGNIFICANT CHANGE UP (ref 5–17)
APTT BLD: 59.9 SEC — HIGH (ref 24.5–35.6)
BLD GP AB SCN SERPL QL: NEGATIVE — SIGNIFICANT CHANGE UP
BUN SERPL-MCNC: 58 MG/DL — HIGH (ref 7–23)
CALCIUM SERPL-MCNC: 8 MG/DL — LOW (ref 8.4–10.5)
CHLORIDE SERPL-SCNC: 108 MMOL/L — SIGNIFICANT CHANGE UP (ref 96–108)
CO2 SERPL-SCNC: 20 MMOL/L — LOW (ref 22–31)
CREAT SERPL-MCNC: 3.52 MG/DL — HIGH (ref 0.5–1.3)
EGFR: 16 ML/MIN/1.73M2 — LOW
GLUCOSE BLDC GLUCOMTR-MCNC: 148 MG/DL — HIGH (ref 70–99)
GLUCOSE BLDC GLUCOMTR-MCNC: 163 MG/DL — HIGH (ref 70–99)
GLUCOSE BLDC GLUCOMTR-MCNC: 171 MG/DL — HIGH (ref 70–99)
GLUCOSE BLDC GLUCOMTR-MCNC: 269 MG/DL — HIGH (ref 70–99)
GLUCOSE SERPL-MCNC: 138 MG/DL — HIGH (ref 70–99)
HCT VFR BLD CALC: 23.9 % — LOW (ref 39–50)
HCT VFR BLD CALC: 28.3 % — LOW (ref 39–50)
HGB BLD-MCNC: 7.6 G/DL — LOW (ref 13–17)
HGB BLD-MCNC: 9.4 G/DL — LOW (ref 13–17)
MCHC RBC-ENTMCNC: 31.7 PG — SIGNIFICANT CHANGE UP (ref 27–34)
MCHC RBC-ENTMCNC: 31.8 GM/DL — LOW (ref 32–36)
MCHC RBC-ENTMCNC: 32.2 PG — SIGNIFICANT CHANGE UP (ref 27–34)
MCHC RBC-ENTMCNC: 33.2 GM/DL — SIGNIFICANT CHANGE UP (ref 32–36)
MCV RBC AUTO: 96.9 FL — SIGNIFICANT CHANGE UP (ref 80–100)
MCV RBC AUTO: 99.6 FL — SIGNIFICANT CHANGE UP (ref 80–100)
NRBC # BLD: 0 /100 WBCS — SIGNIFICANT CHANGE UP (ref 0–0)
NRBC # BLD: 0 /100 WBCS — SIGNIFICANT CHANGE UP (ref 0–0)
PLATELET # BLD AUTO: 101 K/UL — LOW (ref 150–400)
PLATELET # BLD AUTO: 98 K/UL — LOW (ref 150–400)
POTASSIUM SERPL-MCNC: 4.2 MMOL/L — SIGNIFICANT CHANGE UP (ref 3.5–5.3)
POTASSIUM SERPL-SCNC: 4.2 MMOL/L — SIGNIFICANT CHANGE UP (ref 3.5–5.3)
RBC # BLD: 2.4 M/UL — LOW (ref 4.2–5.8)
RBC # BLD: 2.92 M/UL — LOW (ref 4.2–5.8)
RBC # FLD: 17.5 % — HIGH (ref 10.3–14.5)
RBC # FLD: 17.7 % — HIGH (ref 10.3–14.5)
RH IG SCN BLD-IMP: POSITIVE — SIGNIFICANT CHANGE UP
SODIUM SERPL-SCNC: 141 MMOL/L — SIGNIFICANT CHANGE UP (ref 135–145)
WBC # BLD: 4.32 K/UL — SIGNIFICANT CHANGE UP (ref 3.8–10.5)
WBC # BLD: 4.63 K/UL — SIGNIFICANT CHANGE UP (ref 3.8–10.5)
WBC # FLD AUTO: 4.32 K/UL — SIGNIFICANT CHANGE UP (ref 3.8–10.5)
WBC # FLD AUTO: 4.63 K/UL — SIGNIFICANT CHANGE UP (ref 3.8–10.5)

## 2024-05-10 RX ORDER — POLYETHYLENE GLYCOL 3350 17 G/17G
17 POWDER, FOR SOLUTION ORAL ONCE
Refills: 0 | Status: DISCONTINUED | OUTPATIENT
Start: 2024-05-10 | End: 2024-05-12

## 2024-05-10 RX ORDER — BUMETANIDE 0.25 MG/ML
1 INJECTION INTRAMUSCULAR; INTRAVENOUS ONCE
Refills: 0 | Status: COMPLETED | OUTPATIENT
Start: 2024-05-10 | End: 2024-05-10

## 2024-05-10 RX ADMIN — PANTOPRAZOLE SODIUM 40 MILLIGRAM(S): 20 TABLET, DELAYED RELEASE ORAL at 16:36

## 2024-05-10 RX ADMIN — Medication 1 GRAM(S): at 05:43

## 2024-05-10 RX ADMIN — BUMETANIDE 1 MILLIGRAM(S): 0.25 INJECTION INTRAMUSCULAR; INTRAVENOUS at 19:47

## 2024-05-10 RX ADMIN — INSULIN GLARGINE 5 UNIT(S): 100 INJECTION, SOLUTION SUBCUTANEOUS at 22:10

## 2024-05-10 RX ADMIN — Medication 40 MILLIGRAM(S): at 05:42

## 2024-05-10 RX ADMIN — AMIODARONE HYDROCHLORIDE 200 MILLIGRAM(S): 400 TABLET ORAL at 05:42

## 2024-05-10 RX ADMIN — ATORVASTATIN CALCIUM 80 MILLIGRAM(S): 80 TABLET, FILM COATED ORAL at 22:09

## 2024-05-10 RX ADMIN — PANTOPRAZOLE SODIUM 40 MILLIGRAM(S): 20 TABLET, DELAYED RELEASE ORAL at 05:43

## 2024-05-10 RX ADMIN — Medication 3: at 16:53

## 2024-05-10 RX ADMIN — Medication 1 GRAM(S): at 16:36

## 2024-05-10 RX ADMIN — HEPARIN SODIUM 700 UNIT(S)/HR: 5000 INJECTION INTRAVENOUS; SUBCUTANEOUS at 22:12

## 2024-05-10 RX ADMIN — Medication 650 MILLIGRAM(S): at 16:36

## 2024-05-10 RX ADMIN — Medication 1: at 12:13

## 2024-05-10 RX ADMIN — HEPARIN SODIUM 700 UNIT(S)/HR: 5000 INJECTION INTRAVENOUS; SUBCUTANEOUS at 07:56

## 2024-05-10 RX ADMIN — TAMSULOSIN HYDROCHLORIDE 0.4 MILLIGRAM(S): 0.4 CAPSULE ORAL at 22:09

## 2024-05-10 NOTE — PROGRESS NOTE ADULT - ASSESSMENT
88y Male with CAD, HFrEF, Afib, HTN, DM, solitary R kidney and CKD p/w acute on chronic HFrEF and acute on chronic kidney injury.  Nephrotic syndrome   SOB and severe MR and moderate AR   LCX disease     Renal - CKD: stage 4 with solitary R kidney and baseline creatinine ~3     Due to his age we (pt and myself)have decided on no renal bx   Lasix is  po lasix  Cath was done and needs stent to LCX.  The issue is contrast load and potential HD and not to mention the issue of A/C vs GI bleed        Anemia in CKD-  Blood xfusion today and give one dose of Bumex please      CV- acute on chronic HFrEF     Mitral clip is off the table at present      GI- SP EGD    Pt family is requesting second GI opinion.       Sayed [facility]   [employer] [place]   [phone]

## 2024-05-10 NOTE — PROGRESS NOTE ADULT - ASSESSMENT
A/p  87yo w/ PMHx CKD, HFrEF, Diabetes, TIA,  CAD s/p stenting, bladder cancer s/p chemo in remission, afib on eliquis, hx of left nephrectomy, recent admission for NSTEMI pw SOB.    #NSTEMI  -Recent admission with NSTEMI  -Trops elevated this admission  -[facility] severe obstructive CAD to LCx  -on heparin gtt  -Off antiplatelet as below    #CAD, s/p PCI  -cv stable, no chest pain  -Recommend starting asa once cleared by GI   -Cont statin  -[facility] w/ severe obstructive CAD to LCx/om and Mild to moderate in-stent restenosis of left anterior descending and right coronary artery stents  -Will manage the above medically for now as pt is high risk for developing ELIZA and requiring HD with staged PCI  -Will need renal input     #Sev MR   -MARCELLA as above  -[facility] as above   -F/u structural heart team regarding poss mitraclip    #chronic HFrEF  -volume status stable   -Recent MARCELLA with moderately decreased LVEF, regional WMA, severe MR   -lasix po   -iv diuretics prn with PRBcs    #Atrial Fibrillation  -SR on tele   -cont hep gtt  -cont amio  -Eventual outpt f/u for poss watchman    #CKD/ gama   -renal f/u    #Hx GIB  -h/h noted- PRBC pending TODAY   -Appreciate GI eval  -med fu

## 2024-05-10 NOTE — PROGRESS NOTE ADULT - ASSESSMENT
88 m with    Acute on Chronic Systolic Congestive Heart Failure   - telemetry  - diurese  - echo noted  - cardiology follow Dr Vega    Anemia due to acute blood loss.   - s/p EGD   - hemoglobin stable on Heparin gtt/ apixaban on hold  - Gastroenterology evaluation noted    Elevated troponin level.   - trend  - cardiology evaluation    Stage 4 chronic kidney disease. / Single kidney  - Nephrology eval up appreciated  continue to follow recommendations  likely at current baseline   no nephrotoxic meds.    Paroxysmal atrial fibrillation.   - heart rate controlled   - continue amiodarone  - MARCELLA severe MR  -  LHC with one vessel disease. no plans for PCI and Mitraclip   - AC with Heparin gtt/ Eliquis on hold . If no intervention will bridge to Eliquis.    BPH without urinary obstruction.   - continue tamsulosin.    Insulin dependent type 2 diabetes mellitus.   - HbA1C normal recently   - finger stiks ith short acting insulin sliding scale  -  oral meds  - diabetic diet  - monitor hypoglycemia.    Need for prophylactic measure.   - AC    DCP home.    Tristan Ridley MD phone [phone]

## 2024-05-11 LAB
ANION GAP SERPL CALC-SCNC: 13 MMOL/L — SIGNIFICANT CHANGE UP (ref 5–17)
APTT BLD: 67 SEC — HIGH (ref 24.5–35.6)
BUN SERPL-MCNC: 57 MG/DL — HIGH (ref 7–23)
CALCIUM SERPL-MCNC: 8.5 MG/DL — SIGNIFICANT CHANGE UP (ref 8.4–10.5)
CHLORIDE SERPL-SCNC: 106 MMOL/L — SIGNIFICANT CHANGE UP (ref 96–108)
CO2 SERPL-SCNC: 23 MMOL/L — SIGNIFICANT CHANGE UP (ref 22–31)
CREAT SERPL-MCNC: 3.5 MG/DL — HIGH (ref 0.5–1.3)
EGFR: 16 ML/MIN/1.73M2 — LOW
GLUCOSE BLDC GLUCOMTR-MCNC: 123 MG/DL — HIGH (ref 70–99)
GLUCOSE BLDC GLUCOMTR-MCNC: 168 MG/DL — HIGH (ref 70–99)
GLUCOSE BLDC GLUCOMTR-MCNC: 175 MG/DL — HIGH (ref 70–99)
GLUCOSE BLDC GLUCOMTR-MCNC: 179 MG/DL — HIGH (ref 70–99)
GLUCOSE SERPL-MCNC: 108 MG/DL — HIGH (ref 70–99)
HCT VFR BLD CALC: 30.3 % — LOW (ref 39–50)
HGB BLD-MCNC: 9.8 G/DL — LOW (ref 13–17)
MCHC RBC-ENTMCNC: 31.8 PG — SIGNIFICANT CHANGE UP (ref 27–34)
MCHC RBC-ENTMCNC: 32.3 GM/DL — SIGNIFICANT CHANGE UP (ref 32–36)
MCV RBC AUTO: 98.4 FL — SIGNIFICANT CHANGE UP (ref 80–100)
NRBC # BLD: 0 /100 WBCS — SIGNIFICANT CHANGE UP (ref 0–0)
PLATELET # BLD AUTO: 107 K/UL — LOW (ref 150–400)
POTASSIUM SERPL-MCNC: 3.5 MMOL/L — SIGNIFICANT CHANGE UP (ref 3.5–5.3)
POTASSIUM SERPL-SCNC: 3.5 MMOL/L — SIGNIFICANT CHANGE UP (ref 3.5–5.3)
RBC # BLD: 3.08 M/UL — LOW (ref 4.2–5.8)
RBC # FLD: 18.3 % — HIGH (ref 10.3–14.5)
SODIUM SERPL-SCNC: 142 MMOL/L — SIGNIFICANT CHANGE UP (ref 135–145)
WBC # BLD: 4.57 K/UL — SIGNIFICANT CHANGE UP (ref 3.8–10.5)
WBC # FLD AUTO: 4.57 K/UL — SIGNIFICANT CHANGE UP (ref 3.8–10.5)

## 2024-05-11 RX ORDER — APIXABAN 2.5 MG/1
2.5 TABLET, FILM COATED ORAL
Refills: 0 | Status: DISCONTINUED | OUTPATIENT
Start: 2024-05-11 | End: 2024-05-12

## 2024-05-11 RX ADMIN — ATORVASTATIN CALCIUM 80 MILLIGRAM(S): 80 TABLET, FILM COATED ORAL at 22:08

## 2024-05-11 RX ADMIN — Medication 40 MILLIGRAM(S): at 05:24

## 2024-05-11 RX ADMIN — Medication 1 GRAM(S): at 17:27

## 2024-05-11 RX ADMIN — TAMSULOSIN HYDROCHLORIDE 0.4 MILLIGRAM(S): 0.4 CAPSULE ORAL at 22:08

## 2024-05-11 RX ADMIN — APIXABAN 2.5 MILLIGRAM(S): 2.5 TABLET, FILM COATED ORAL at 22:08

## 2024-05-11 RX ADMIN — AMIODARONE HYDROCHLORIDE 200 MILLIGRAM(S): 400 TABLET ORAL at 05:24

## 2024-05-11 RX ADMIN — INSULIN GLARGINE 5 UNIT(S): 100 INJECTION, SOLUTION SUBCUTANEOUS at 22:07

## 2024-05-11 RX ADMIN — PANTOPRAZOLE SODIUM 40 MILLIGRAM(S): 20 TABLET, DELAYED RELEASE ORAL at 17:27

## 2024-05-11 RX ADMIN — Medication 1: at 12:21

## 2024-05-11 RX ADMIN — HEPARIN SODIUM 700 UNIT(S)/HR: 5000 INJECTION INTRAVENOUS; SUBCUTANEOUS at 08:41

## 2024-05-11 RX ADMIN — Medication 1: at 17:26

## 2024-05-11 RX ADMIN — PANTOPRAZOLE SODIUM 40 MILLIGRAM(S): 20 TABLET, DELAYED RELEASE ORAL at 05:24

## 2024-05-11 RX ADMIN — APIXABAN 2.5 MILLIGRAM(S): 2.5 TABLET, FILM COATED ORAL at 12:21

## 2024-05-11 RX ADMIN — Medication 650 MILLIGRAM(S): at 11:52

## 2024-05-11 RX ADMIN — CHLORHEXIDINE GLUCONATE 1 APPLICATION(S): 213 SOLUTION TOPICAL at 11:52

## 2024-05-11 RX ADMIN — Medication 1 GRAM(S): at 05:25

## 2024-05-11 NOTE — PROGRESS NOTE ADULT - ASSESSMENT
88 m with    Acute on Chronic Systolic Congestive Heart Failure   - telemetry  - diurese  - echo noted  - cardiology follow Dr Vega    Anemia due to acute blood loss.   - s/p EGD   - hemoglobin stable on Heparin gtt/ Restart apixaban   - Gastroenterology evaluation noted    Elevated troponin level.   - trend  - cardiology evaluation    Stage 4 chronic kidney disease. / Single kidney  - Nephrology eval up appreciated  continue to follow recommendations  likely at current baseline   no nephrotoxic meds.    Paroxysmal atrial fibrillation.   - heart rate controlled   - continue amiodarone  - MARCELLA severe MR  -  LHC with one vessel disease. no plans for PCI and Mitraclip   -  DC AC with Heparin gtt/ Restart  Eliquis     BPH without urinary obstruction.   - continue tamsulosin.    Insulin dependent type 2 diabetes mellitus.   - HbA1C normal recently   - finger stiks ith short acting insulin sliding scale  -  oral meds  - diabetic diet  - monitor hypoglycemia.    Need for prophylactic measure.   - AC    DCP home.    Tristan Ridley MD phone [phone]

## 2024-05-11 NOTE — CHART NOTE - NSCHARTNOTEFT_GEN_A_CORE
GI team was notified of drop in hgb. Patient is being medically managed for CAD with anticoagulation, currently on heparin drip. There are no signs of overt GI bleeding, vitals remain stable and patient's hgb responded appropriately to 1 unit pRBC transfusion from 7.6 to 9.4 g/dL. Low suspicion for active GI bleeding at this time. No immediate plans for GI intervention. Okay to transition to Eliquis from a GI standpoint. Please have patient follow-up with GI in clinic in 6-8 weeks.    Follow up in Gastroenterology Clinic: [phone] ([facility] at [address] [place] [address]) or [phone] ([facility] at [address] [place] [address]) or [phone] ([facility] at [address] [place] [address])  or [place] Office: [phone] ([address] [place] [address]. [address] [place], [address])    Douglas Olea MD  Gastroenterology/Hepatology Fellow
House GI emailed earlier today for clearance for [facility] tentative for Wed 5/8/24 with possible guillermina clip. Awaiting recommendations and formal consult.     [id number]
Informed by cardiology patient pending [facility] tentative for Wednesday. Eliquis d'lashawn - may initiate Heparin gtt.   d/w Dr. Ridley (patient alert for melena on [facility] in EMR) - currently not active diagnosis, previously on Eliquis.   Ok to order heparin gtt.   Will start at 18:00 this evening s/p 12 hours from Eliquis.     [id number]
We have been following Mr. Caballero for possible Mitral SANTA. He has multiple Co-morbidities which need to be addressed and optimized such as his Renal Disease and GI Bleeding concerns. He is currently not a candidate for  Mitral SANTA, so we will sign off. We are available for reconsideration once he is medically optimized. Discussed with Dr. Man.

## 2024-05-11 NOTE — PROGRESS NOTE ADULT - ASSESSMENT
A/p  89yo w/ PMHx CKD, HFrEF, Diabetes, TIA,  CAD s/p stenting, bladder cancer s/p chemo in remission, afib on eliquis, hx of left nephrectomy, recent admission for NSTEMI pw SOB.    #NSTEMI  -Recent admission with NSTEMI  -Trops elevated this admission  -[facility] severe obstructive CAD to LCx  -on heparin gtt  -Off antiplatelet as below    #CAD, s/p PCI  -cv stable, no chest pain  -Recommend starting asa once cleared by GI   -Cont statin  -[facility] w/ severe obstructive CAD to LCx/om and Mild to moderate in-stent restenosis of left anterior descending and right coronary artery stents  -Will manage the above medically for now as pt is high risk for developing ELIZA and requiring HD with staged PCI  -Appreciate renal input     #Sev MR   -[facility] as above  -[facility] as above   -Structural heart f/u noted - not a candidate for MV clip/repair at this time     #chronic HFrEF  -volume status stable   -Recent MARCELLA with moderately decreased LVEF, regional WMA, severe MR   -lasix po   -iv diuretics prn with PRBcs    #Atrial Fibrillation  -SR on tele   -cont hep gtt  -cont amio  -Eventual outpt f/u for poss watchman    #CKD/ gama   -renal f/u    #Hx GIB  -h/h noted- PRBC pending TODAY   -Appreciate GI eval  -med fu            A/p  87yo w/ PMHx CKD, HFrEF, Diabetes, TIA,  CAD s/p stenting, bladder cancer s/p chemo in remission, afib on eliquis, hx of left nephrectomy, recent admission for NSTEMI pw SOB.    #NSTEMI  -Recent admission with NSTEMI  -Trops elevated this admission  -[facility] severe obstructive CAD to LCx  -Stop heparin at 2pm and start eliquis at 6pm - trend h/h  -Will hold off on antiplatelets in attempt to minimize bleed risk    #CAD, s/p PCI  -cv stable, no chest pain  -Recommend starting asa once cleared by GI   -Cont statin  -[facility] w/ severe obstructive CAD to LCx/om and Mild to moderate in-stent restenosis of left anterior descending and right coronary artery stents  -Will manage the above medically for now as pt is high risk for developing ELIZA and requiring HD with staged PCI  -Appreciate renal input     #Sev MR   -[facility] as above  -[facility] as above   -Structural heart f/u noted - not a candidate for MV clip/repair at this time     #chronic HFrEF  -volume status stable   -Recent MARCELLA with moderately decreased LVEF, regional WMA, severe MR   -lasix po     #Atrial Fibrillation  -SR on tele   -switch to eliquis as above   -cont amio  -Eventual outpt f/u for poss watchman    #CKD/ gama   -renal f/u    #Hx GIB  -h/h noted- sp PRBCs 5/10 with appropriate repsonse  -Appreciate GI eval - no CI to starting DOAC   -med fu

## 2024-05-12 ENCOUNTER — NON-APPOINTMENT (OUTPATIENT)
Age: 89
End: 2024-05-12

## 2024-05-12 ENCOUNTER — TRANSCRIPTION ENCOUNTER (OUTPATIENT)
Age: 89
End: 2024-05-12

## 2024-05-12 VITALS
TEMPERATURE: 98 F | SYSTOLIC BLOOD PRESSURE: 114 MMHG | OXYGEN SATURATION: 96 % | DIASTOLIC BLOOD PRESSURE: 64 MMHG | HEART RATE: 60 BPM | RESPIRATION RATE: 18 BRPM

## 2024-05-12 LAB
ANION GAP SERPL CALC-SCNC: 11 MMOL/L — SIGNIFICANT CHANGE UP (ref 5–17)
APTT BLD: 43.8 SEC — HIGH (ref 24.5–35.6)
BUN SERPL-MCNC: 53 MG/DL — HIGH (ref 7–23)
CALCIUM SERPL-MCNC: 8.6 MG/DL — SIGNIFICANT CHANGE UP (ref 8.4–10.5)
CHLORIDE SERPL-SCNC: 106 MMOL/L — SIGNIFICANT CHANGE UP (ref 96–108)
CO2 SERPL-SCNC: 24 MMOL/L — SIGNIFICANT CHANGE UP (ref 22–31)
CREAT SERPL-MCNC: 3.6 MG/DL — HIGH (ref 0.5–1.3)
EGFR: 16 ML/MIN/1.73M2 — LOW
GLUCOSE BLDC GLUCOMTR-MCNC: 170 MG/DL — HIGH (ref 70–99)
GLUCOSE BLDC GLUCOMTR-MCNC: 99 MG/DL — SIGNIFICANT CHANGE UP (ref 70–99)
GLUCOSE SERPL-MCNC: 97 MG/DL — SIGNIFICANT CHANGE UP (ref 70–99)
HCT VFR BLD CALC: 29.7 % — LOW (ref 39–50)
HGB BLD-MCNC: 9.8 G/DL — LOW (ref 13–17)
MAGNESIUM SERPL-MCNC: 1.9 MG/DL — SIGNIFICANT CHANGE UP (ref 1.6–2.6)
MCHC RBC-ENTMCNC: 32.5 PG — SIGNIFICANT CHANGE UP (ref 27–34)
MCHC RBC-ENTMCNC: 33 GM/DL — SIGNIFICANT CHANGE UP (ref 32–36)
MCV RBC AUTO: 98.3 FL — SIGNIFICANT CHANGE UP (ref 80–100)
NRBC # BLD: 0 /100 WBCS — SIGNIFICANT CHANGE UP (ref 0–0)
PLATELET # BLD AUTO: 107 K/UL — LOW (ref 150–400)
POTASSIUM SERPL-MCNC: 3.5 MMOL/L — SIGNIFICANT CHANGE UP (ref 3.5–5.3)
POTASSIUM SERPL-SCNC: 3.5 MMOL/L — SIGNIFICANT CHANGE UP (ref 3.5–5.3)
RBC # BLD: 3.02 M/UL — LOW (ref 4.2–5.8)
RBC # FLD: 17.9 % — HIGH (ref 10.3–14.5)
SODIUM SERPL-SCNC: 141 MMOL/L — SIGNIFICANT CHANGE UP (ref 135–145)
WBC # BLD: 4.37 K/UL — SIGNIFICANT CHANGE UP (ref 3.8–10.5)
WBC # FLD AUTO: 4.37 K/UL — SIGNIFICANT CHANGE UP (ref 3.8–10.5)

## 2024-05-12 PROCEDURE — 36415 COLL VENOUS BLD VENIPUNCTURE: CPT

## 2024-05-12 PROCEDURE — 85730 THROMBOPLASTIN TIME PARTIAL: CPT

## 2024-05-12 PROCEDURE — 96374 THER/PROPH/DIAG INJ IV PUSH: CPT

## 2024-05-12 PROCEDURE — 80048 BASIC METABOLIC PNL TOTAL CA: CPT

## 2024-05-12 PROCEDURE — 84443 ASSAY THYROID STIM HORMONE: CPT

## 2024-05-12 PROCEDURE — 83735 ASSAY OF MAGNESIUM: CPT

## 2024-05-12 PROCEDURE — 86901 BLOOD TYPING SEROLOGIC RH(D): CPT

## 2024-05-12 PROCEDURE — 83880 ASSAY OF NATRIURETIC PEPTIDE: CPT

## 2024-05-12 PROCEDURE — 97161 PT EVAL LOW COMPLEX 20 MIN: CPT

## 2024-05-12 PROCEDURE — 87641 MR-STAPH DNA AMP PROBE: CPT

## 2024-05-12 PROCEDURE — 87640 STAPH A DNA AMP PROBE: CPT

## 2024-05-12 PROCEDURE — 85027 COMPLETE CBC AUTOMATED: CPT

## 2024-05-12 PROCEDURE — 86923 COMPATIBILITY TEST ELECTRIC: CPT

## 2024-05-12 PROCEDURE — 84100 ASSAY OF PHOSPHORUS: CPT

## 2024-05-12 PROCEDURE — C1887: CPT

## 2024-05-12 PROCEDURE — 93458 L HRT ARTERY/VENTRICLE ANGIO: CPT

## 2024-05-12 PROCEDURE — 84484 ASSAY OF TROPONIN QUANT: CPT

## 2024-05-12 PROCEDURE — C1894: CPT

## 2024-05-12 PROCEDURE — 71046 X-RAY EXAM CHEST 2 VIEWS: CPT

## 2024-05-12 PROCEDURE — 82553 CREATINE MB FRACTION: CPT

## 2024-05-12 PROCEDURE — 36430 TRANSFUSION BLD/BLD COMPNT: CPT

## 2024-05-12 PROCEDURE — 99285 EMERGENCY DEPT VISIT HI MDM: CPT | Mod: 25

## 2024-05-12 PROCEDURE — 85610 PROTHROMBIN TIME: CPT

## 2024-05-12 PROCEDURE — 85025 COMPLETE CBC W/AUTO DIFF WBC: CPT

## 2024-05-12 PROCEDURE — P9016: CPT

## 2024-05-12 PROCEDURE — 86850 RBC ANTIBODY SCREEN: CPT

## 2024-05-12 PROCEDURE — 86900 BLOOD TYPING SEROLOGIC ABO: CPT

## 2024-05-12 PROCEDURE — 80053 COMPREHEN METABOLIC PANEL: CPT

## 2024-05-12 PROCEDURE — 97530 THERAPEUTIC ACTIVITIES: CPT

## 2024-05-12 PROCEDURE — 97116 GAIT TRAINING THERAPY: CPT

## 2024-05-12 PROCEDURE — 82550 ASSAY OF CK (CPK): CPT

## 2024-05-12 PROCEDURE — C1769: CPT

## 2024-05-12 PROCEDURE — 82962 GLUCOSE BLOOD TEST: CPT

## 2024-05-12 RX ORDER — FUROSEMIDE 40 MG
1 TABLET ORAL
Qty: 30 | Refills: 0
Start: 2024-05-12 | End: 2024-06-10

## 2024-05-12 RX ADMIN — APIXABAN 2.5 MILLIGRAM(S): 2.5 TABLET, FILM COATED ORAL at 10:35

## 2024-05-12 RX ADMIN — AMIODARONE HYDROCHLORIDE 200 MILLIGRAM(S): 400 TABLET ORAL at 05:26

## 2024-05-12 RX ADMIN — Medication 1 GRAM(S): at 05:26

## 2024-05-12 RX ADMIN — CHLORHEXIDINE GLUCONATE 1 APPLICATION(S): 213 SOLUTION TOPICAL at 11:09

## 2024-05-12 RX ADMIN — PANTOPRAZOLE SODIUM 40 MILLIGRAM(S): 20 TABLET, DELAYED RELEASE ORAL at 05:26

## 2024-05-12 RX ADMIN — Medication 40 MILLIGRAM(S): at 05:27

## 2024-05-12 RX ADMIN — Medication 650 MILLIGRAM(S): at 11:10

## 2024-05-12 RX ADMIN — Medication 1: at 12:16

## 2024-05-12 NOTE — PROGRESS NOTE ADULT - SUBJECTIVE AND OBJECTIVE BOX
CARDIOLOGY FOLLOW UP - Dr. Vega  DATE OF SERVICE: 5/8/24    CC  No cv complaints     REVIEW OF SYSTEMS:  CONSTITUTIONAL: No fever, weight loss, or fatigue  RESPIRATORY: No cough, wheezing, chills or hemoptysis; No Shortness of Breath  CARDIOVASCULAR: No chest pain, palpitations, passing out, dizziness, or leg swelling  GASTROINTESTINAL: No abdominal or epigastric pain. No nausea, vomiting, or hematemesis; No diarrhea or constipation. No melena or hematochezia.  VASCULAR: No edema     PHYSICAL EXAM:  T(C): 36.4 (05-08-24 @ 04:43), Max: 36.7 (05-07-24 @ 12:07)  HR: 63 (05-08-24 @ 04:43) (58 - 63)  BP: 122/66 (05-08-24 @ 04:43) (105/50 - 122/66)  RR: 18 (05-08-24 @ 04:43) (18 - 18)  SpO2: 100% (05-08-24 @ 04:43) (98% - 100%)  Wt(kg): --  I&O's Summary    07 May 2024 07:01  -  08 May 2024 07:00  --------------------------------------------------------  IN: 0 mL / OUT: 825 mL / NET: -825 mL    08 May 2024 07:01  -  08 May 2024 11:06  --------------------------------------------------------  IN: 240 mL / OUT: 400 mL / NET: -160 mL        Appearance: Elderly male 	  Cardiovascular: Normal S1 S2,RRR, No JVD, No murmurs  Respiratory: Lungs clear to auscultation b/l   Gastrointestinal:  Soft, Non-tender, + BS	  Extremities: Normal range of motion, No clubbing, cyanosis or edema      Home Medications:  apixaban 2.5 mg oral tablet: 1 tab(s) orally every 12 hours (05 May 2024 13:45)  Crestor 40 mg oral tablet: 1 tab(s) orally once a day (at bedtime) (05 May 2024 13:45)  repaglinide 0.5 mg oral tablet: 1 tab(s) orally 3 times a day (before meals) (05 May 2024 13:45)  sodium bicarbonate 650 mg oral tablet: 1 tab(s) orally once a day (05 May 2024 13:45)  tamsulosin 0.4 mg oral capsule: 1 cap(s) orally once a day (05 May 2024 13:45)  Tresiba FlexTouch 100 units/mL subcutaneous solution: 5 solution subcutaneous once a day (at bedtime) (05 May 2024 13:45)      MEDICATIONS  (STANDING):  aMIOdarone    Tablet 200 milliGRAM(s) Oral daily  atorvastatin 80 milliGRAM(s) Oral at bedtime  chlorhexidine 2% Cloths 1 Application(s) Topical daily  dextrose 10% Bolus 125 milliLiter(s) IV Bolus once  dextrose 5%. 1000 milliLiter(s) (100 mL/Hr) IV Continuous <Continuous>  dextrose 5%. 1000 milliLiter(s) (50 mL/Hr) IV Continuous <Continuous>  dextrose 50% Injectable 25 Gram(s) IV Push once  dextrose 50% Injectable 12.5 Gram(s) IV Push once  furosemide    Tablet 40 milliGRAM(s) Oral daily  glucagon  Injectable 1 milliGRAM(s) IntraMuscular once  heparin  Infusion.  Unit(s)/Hr (11 mL/Hr) IV Continuous <Continuous>  insulin glargine Injectable (LANTUS) 5 Unit(s) SubCutaneous at bedtime  insulin lispro (ADMELOG) corrective regimen sliding scale   SubCutaneous at bedtime  insulin lispro (ADMELOG) corrective regimen sliding scale   SubCutaneous three times a day before meals  pantoprazole    Tablet 40 milliGRAM(s) Oral two times a day  potassium chloride    Tablet ER 40 milliEquivalent(s) Oral once  sodium bicarbonate 650 milliGRAM(s) Oral daily  sucralfate 1 Gram(s) Oral two times a day  tamsulosin 0.4 milliGRAM(s) Oral at bedtime      TELEMETRY: SR	    ECG:  	  RADIOLOGY:   DIAGNOSTIC TESTING:  [ ] Echocardiogram:  [ ]  Catheterization:  [ ] Stress Test:    OTHER: 	    LABS:	 	    Troponin T, High Sensitivity Result: 503 ng/L [0 - 51] (05-06 @ 15:14)  Troponin T, High Sensitivity Result: 534 ng/L [0 - 51] (05-06 @ 05:27)  Creatine Kinase, Serum: 57 U/L [30 - 200] (05-05 @ 16:56)  CKMB Units: 4.6 ng/mL [0.0 - 6.7] (05-05 @ 16:56)  Troponin T, High Sensitivity Result: 475 ng/L [0 - 51] (05-05 @ 16:56)  Troponin T, High Sensitivity Result: 484 ng/L [0 - 51] (05-05 @ 16:56)  Troponin T, High Sensitivity Result: 466 ng/L [0 - 51] (05-05 @ 13:54)  Troponin T, High Sensitivity Result: 486 ng/L [0 - 51] (05-05 @ 11:15)                          8.4    4.34  )-----------( 117      ( 08 May 2024 07:01 )             26.6     05-07    144  |  110<H>  |  67<H>  ----------------------------<  101<H>  3.3<L>   |  23  |  3.36<H>    Ca    8.0<L>      07 May 2024 05:45  Phos  2.5     05-07  Mg     2.0     05-07      PTT - ( 08 May 2024 02:37 )  PTT:79.1 sec        
[facility] [phone]        Patient seen and examined in bed.   He was the same         MEDICATIONS  (STANDING):  aMIOdarone    Tablet 200 milliGRAM(s) Oral daily  atorvastatin 80 milliGRAM(s) Oral at bedtime  chlorhexidine 2% Cloths 1 Application(s) Topical daily  dextrose 10% Bolus 125 milliLiter(s) IV Bolus once  dextrose 5%. 1000 milliLiter(s) (100 mL/Hr) IV Continuous <Continuous>  dextrose 5%. 1000 milliLiter(s) (50 mL/Hr) IV Continuous <Continuous>  dextrose 50% Injectable 25 Gram(s) IV Push once  dextrose 50% Injectable 12.5 Gram(s) IV Push once  furosemide    Tablet 40 milliGRAM(s) Oral daily  glucagon  Injectable 1 milliGRAM(s) IntraMuscular once  heparin  Infusion. 700 Unit(s)/Hr (7 mL/Hr) IV Continuous <Continuous>  insulin glargine Injectable (LANTUS) 5 Unit(s) SubCutaneous at bedtime  insulin lispro (ADMELOG) corrective regimen sliding scale   SubCutaneous at bedtime  insulin lispro (ADMELOG) corrective regimen sliding scale   SubCutaneous three times a day before meals  pantoprazole    Tablet 40 milliGRAM(s) Oral two times a day  sodium bicarbonate 650 milliGRAM(s) Oral daily  sodium chloride 0.9%. 500 milliLiter(s) (100 mL/Hr) IV Continuous <Continuous>  sucralfate 1 Gram(s) Oral two times a day  tamsulosin 0.4 milliGRAM(s) Oral at bedtime      VITAL:  T(C): , Max: 36.7 (05-08-24 @ 20:42)  T(F): , Max: 98.1 (05-09-24 @ 04:24)  HR: 61 (05-09-24 @ 11:15)  BP: 102/60 (05-09-24 @ 11:15)  BP(mean): --  RR: 18 (05-09-24 @ 11:15)  SpO2: 99% (05-09-24 @ 11:15)  Wt(kg): --    I and O's:    05-08 @ 07:01  -  05-09 @ 07:00  --------------------------------------------------------  IN: 740 mL / OUT: 1200 mL / NET: -460 mL    05-09 @ 07:01  -  05-09 @ 11:33  --------------------------------------------------------  IN: 0 mL / OUT: 575 mL / NET: -575 mL          PHYSICAL EXAM:    Constitutional: NAD  Neck:  No JVD  Respiratory: CTAB/L  Cardiovascular: S1 and S2  Gastrointestinal: BS+, soft, NT/ND  Extremities: No peripheral edema  Neurological: A/O x 3, no focal deficits  Psychiatric: Normal mood, normal affect  : No [place]  Skin: No rashes  Access: Not applicable    LABS:                        8.6    4.37  )-----------( 113      ( 09 May 2024 06:50 )             27.0     05-09    143  |  109<H>  |  58<H>  ----------------------------<  75  3.9   |  23  |  3.34<H>    Ca    8.2<L>      09 May 2024 06:48  Phos  3.0     05-09  Mg     2.0     05-09            Urine Studies:  Urinalysis Basic - ( 09 May 2024 06:48 )    Color: x / Appearance: x / SG: x / pH: x  Gluc: 75 mg/dL / Ketone: x  / Bili: x / Urobili: x   Blood: x / Protein: x / Nitrite: x   Leuk Esterase: x / RBC: x / WBC x   Sq Epi: x / Non Sq Epi: x / Bacteria: x            RADIOLOGY & ADDITIONAL STUDIES:      < from: Xray Chest 2 Views PA/Lat (05.05.24 @ 11:33) >    ACC: [id number] EXAM:  XR CHEST PA LAT 2V   ORDERED BY: CAROLINA MORRIS     PROCEDURE DATE:  05/05/2024          INTERPRETATION:  EXAMINATION: XR CHEST PA AND LATERAL    CLINICAL INDICATION: Shortness of breath    TECHNIQUE: 2 views; Frontal and lateral views of the chest were obtained.    COMPARISON: Chest x-ray 4/20/2024.    FINDINGS:    The heart is normal in size.  Small bilateral pleural effusions with adjacent atelectasis.  There is no pneumothorax.  No acute bony abnormality.    IMPRESSION:  Small bilateral pleural effusions with adjacent atelectasis.    --- End of Report ---          BRENDA MCCAULEY DO; Resident Radiologist  This document has been electronically signed.  ERROL RAGSDALE MD; Attending Radiologist  This document has been electronically signed. May  5 2024  2:35    < end of copied text >        
Patient is a 88y old  Male who presents with a chief complaint of SOB (06 May 2024 07:54)      SUBJECTIVE / OVERNIGHT EVENTS: Comfortable without new complaints.   Review of Systems  chest pain no  palpitations no  sob no  nausea no  headache no    MEDICATIONS  (STANDING):  aMIOdarone    Tablet 200 milliGRAM(s) Oral daily  apixaban 2.5 milliGRAM(s) Oral two times a day  atorvastatin 80 milliGRAM(s) Oral at bedtime  chlorhexidine 2% Cloths 1 Application(s) Topical daily  dextrose 10% Bolus 125 milliLiter(s) IV Bolus once  dextrose 5%. 1000 milliLiter(s) (100 mL/Hr) IV Continuous <Continuous>  dextrose 5%. 1000 milliLiter(s) (50 mL/Hr) IV Continuous <Continuous>  dextrose 50% Injectable 25 Gram(s) IV Push once  dextrose 50% Injectable 12.5 Gram(s) IV Push once  furosemide    Tablet 40 milliGRAM(s) Oral daily  glucagon  Injectable 1 milliGRAM(s) IntraMuscular once  insulin glargine Injectable (LANTUS) 5 Unit(s) SubCutaneous at bedtime  insulin lispro (ADMELOG) corrective regimen sliding scale   SubCutaneous three times a day before meals  insulin lispro (ADMELOG) corrective regimen sliding scale   SubCutaneous at bedtime  pantoprazole    Tablet 40 milliGRAM(s) Oral two times a day  polyethylene glycol 3350 17 Gram(s) Oral once  sodium bicarbonate 650 milliGRAM(s) Oral daily  sodium chloride 0.9%. 500 milliLiter(s) (100 mL/Hr) IV Continuous <Continuous>  sucralfate 1 Gram(s) Oral two times a day  tamsulosin 0.4 milliGRAM(s) Oral at bedtime    MEDICATIONS  (PRN):  acetaminophen     Tablet .. 650 milliGRAM(s) Oral every 6 hours PRN Temp greater or equal to 38.5C (101.3F), Mild Pain (1 - 3)  dextrose Oral Gel 15 Gram(s) Oral once PRN Blood Glucose LESS THAN 70 milliGRAM(s)/deciliter      Vital Signs Last 24 Hrs  T(C): 36.6 (11 May 2024 11:33), Max: 36.6 (11 May 2024 11:33)  T(F): 97.8 (11 May 2024 11:33), Max: 97.8 (11 May 2024 11:33)  HR: 61 (11 May 2024 11:33) (60 - 64)  BP: 116/58 (11 May 2024 11:33) (110/58 - 127/68)  BP(mean): --  RR: 18 (11 May 2024 11:33) (18 - 18)  SpO2: 96% (11 May 2024 11:33) (95% - 100%)    Parameters below as of 11 May 2024 11:33  Patient On (Oxygen Delivery Method): nasal cannula  O2 Flow (L/min): 1      PHYSICAL EXAM:  GENERAL: NAD  HEAD:  Atraumatic, Normocephalic  EYES: EOMI, PERRLA, conjunctiva and sclera clear  NECK: Supple, No JVD  CHEST/LUNG: Clear to auscultation bilaterally; No wheeze  HEART: Regular rate and rhythm; No murmurs, rubs, or gallops  ABDOMEN: Soft, Nontender, Nondistended; Bowel sounds present  EXTREMITIES:  2+bipedal edema  PSYCH: AAOx3  NEUROLOGY: non-focal  SKIN: No rashes or lesions    LABS:                        9.8    4.57  )-----------( 107      ( 11 May 2024 07:45 )             30.3     05-11    142  |  106  |  57<H>  ----------------------------<  108<H>  3.5   |  23  |  3.50<H>    Ca    8.5      11 May 2024 07:45      PTT - ( 11 May 2024 07:45 )  PTT:67.0 sec      Urinalysis Basic - ( 11 May 2024 07:45 )    Color: x / Appearance: x / SG: x / pH: x  Gluc: 108 mg/dL / Ketone: x  / Bili: x / Urobili: x   Blood: x / Protein: x / Nitrite: x   Leuk Esterase: x / RBC: x / WBC x   Sq Epi: x / Non Sq Epi: x / Bacteria: x          RADIOLOGY & ADDITIONAL TESTS:    Imaging Personally Reviewed:    Consultant(s) Notes Reviewed:      Care Discussed with Consultants/Other Providers:  
Patient is a 88y old  Male who presents with a chief complaint of SOB (06 May 2024 07:54)      SUBJECTIVE / OVERNIGHT EVENTS: No new complaints.   Review of Systems  chest pain no  palpitations no  sob no  nausea no  headache no    MEDICATIONS  (STANDING):  aMIOdarone    Tablet 200 milliGRAM(s) Oral daily  atorvastatin 80 milliGRAM(s) Oral at bedtime  chlorhexidine 2% Cloths 1 Application(s) Topical daily  dextrose 10% Bolus 125 milliLiter(s) IV Bolus once  dextrose 5%. 1000 milliLiter(s) (100 mL/Hr) IV Continuous <Continuous>  dextrose 5%. 1000 milliLiter(s) (50 mL/Hr) IV Continuous <Continuous>  dextrose 50% Injectable 25 Gram(s) IV Push once  dextrose 50% Injectable 12.5 Gram(s) IV Push once  furosemide    Tablet 40 milliGRAM(s) Oral daily  glucagon  Injectable 1 milliGRAM(s) IntraMuscular once  heparin  Infusion. 700 Unit(s)/Hr (7 mL/Hr) IV Continuous <Continuous>  insulin glargine Injectable (LANTUS) 5 Unit(s) SubCutaneous at bedtime  insulin lispro (ADMELOG) corrective regimen sliding scale   SubCutaneous three times a day before meals  insulin lispro (ADMELOG) corrective regimen sliding scale   SubCutaneous at bedtime  pantoprazole    Tablet 40 milliGRAM(s) Oral two times a day  polyethylene glycol 3350 17 Gram(s) Oral once  sodium bicarbonate 650 milliGRAM(s) Oral daily  sodium chloride 0.9%. 500 milliLiter(s) (100 mL/Hr) IV Continuous <Continuous>  sucralfate 1 Gram(s) Oral two times a day  tamsulosin 0.4 milliGRAM(s) Oral at bedtime    MEDICATIONS  (PRN):  acetaminophen     Tablet .. 650 milliGRAM(s) Oral every 6 hours PRN Temp greater or equal to 38.5C (101.3F), Mild Pain (1 - 3)  dextrose Oral Gel 15 Gram(s) Oral once PRN Blood Glucose LESS THAN 70 milliGRAM(s)/deciliter  heparin   Injectable 4500 Unit(s) IV Push every 6 hours PRN For aPTT less than 40  heparin   Injectable 2000 Unit(s) IV Push every 6 hours PRN For aPTT between 40 - 57      Vital Signs Last 24 Hrs  T(C): 36.4 (10 May 2024 20:11), Max: 36.8 (10 May 2024 04:28)  T(F): 97.6 (10 May 2024 20:11), Max: 98.2 (10 May 2024 04:28)  HR: 64 (10 May 2024 20:11) (61 - 66)  BP: 127/68 (10 May 2024 20:11) (101/53 - 127/68)  BP(mean): --  RR: 18 (10 May 2024 20:11) (16 - 18)  SpO2: 99% (10 May 2024 20:11) (95% - 100%)    Parameters below as of 10 May 2024 20:11  Patient On (Oxygen Delivery Method): nasal cannula  O2 Flow (L/min): 1      PHYSICAL EXAM:  GENERAL: NAD   HEAD:  Atraumatic, Normocephalic  EYES: EOMI, PERRLA, conjunctiva and sclera clear  NECK: Supple, No JVD  CHEST/LUNG: Clear to auscultation bilaterally; No wheeze  HEART: Regular rate and rhythm; No murmurs, rubs, or gallops  ABDOMEN: Soft, Nontender, Nondistended; Bowel sounds present  EXTREMITIES:  2+ Peripheral Pulses, No clubbing, cyanosis, or edema  PSYCH: AAOx3  NEUROLOGY: non-focal  SKIN: No rashes or lesions    LABS:                        7.6    4.32  )-----------( 98       ( 10 May 2024 07:06 )             23.9     05-10    141  |  108  |  58<H>  ----------------------------<  138<H>  4.2   |  20<L>  |  3.52<H>    Ca    8.0<L>      10 May 2024 07:05  Phos  3.0     05-09  Mg     2.0     05-09      PTT - ( 10 May 2024 07:06 )  PTT:59.9 sec      Urinalysis Basic - ( 10 May 2024 07:05 )    Color: x / Appearance: x / SG: x / pH: x  Gluc: 138 mg/dL / Ketone: x  / Bili: x / Urobili: x   Blood: x / Protein: x / Nitrite: x   Leuk Esterase: x / RBC: x / WBC x   Sq Epi: x / Non Sq Epi: x / Bacteria: x          RADIOLOGY & ADDITIONAL TESTS:    Imaging Personally Reviewed:    Consultant(s) Notes Reviewed:      Care Discussed with Consultants/Other Providers:  
Patient is a 88y old  Male who presents with a chief complaint of SOB (06 May 2024 07:54)      SUBJECTIVE / OVERNIGHT EVENTS: No new complaints.   Review of Systems  chest pain no  palpitations no  sob no  nausea no  headache no    MEDICATIONS  (STANDING):  aMIOdarone    Tablet 200 milliGRAM(s) Oral daily  atorvastatin 80 milliGRAM(s) Oral at bedtime  chlorhexidine 2% Cloths 1 Application(s) Topical daily  dextrose 10% Bolus 125 milliLiter(s) IV Bolus once  dextrose 5%. 1000 milliLiter(s) (100 mL/Hr) IV Continuous <Continuous>  dextrose 5%. 1000 milliLiter(s) (50 mL/Hr) IV Continuous <Continuous>  dextrose 50% Injectable 25 Gram(s) IV Push once  dextrose 50% Injectable 12.5 Gram(s) IV Push once  furosemide   Injectable 40 milliGRAM(s) IV Push daily  glucagon  Injectable 1 milliGRAM(s) IntraMuscular once  heparin  Infusion.  Unit(s)/Hr (11 mL/Hr) IV Continuous <Continuous>  insulin glargine Injectable (LANTUS) 5 Unit(s) SubCutaneous at bedtime  insulin lispro (ADMELOG) corrective regimen sliding scale   SubCutaneous at bedtime  insulin lispro (ADMELOG) corrective regimen sliding scale   SubCutaneous three times a day before meals  pantoprazole    Tablet 40 milliGRAM(s) Oral before breakfast  sodium bicarbonate 650 milliGRAM(s) Oral daily  sucralfate 1 Gram(s) Oral two times a day  tamsulosin 0.4 milliGRAM(s) Oral at bedtime    MEDICATIONS  (PRN):  acetaminophen     Tablet .. 650 milliGRAM(s) Oral every 6 hours PRN Temp greater or equal to 38.5C (101.3F), Mild Pain (1 - 3)  dextrose Oral Gel 15 Gram(s) Oral once PRN Blood Glucose LESS THAN 70 milliGRAM(s)/deciliter  heparin   Injectable 2000 Unit(s) IV Push every 6 hours PRN For aPTT between 40 - 57  heparin   Injectable 4500 Unit(s) IV Push every 6 hours PRN For aPTT less than 40      Vital Signs Last 24 Hrs  T(C): 36.4 (06 May 2024 17:49), Max: 36.6 (05 May 2024 20:14)  T(F): 97.5 (06 May 2024 17:49), Max: 97.9 (05 May 2024 20:14)  HR: 61 (06 May 2024 17:49) (61 - 67)  BP: 110/55 (06 May 2024 17:49) (105/67 - 110/62)  BP(mean): --  RR: 18 (06 May 2024 17:49) (18 - 18)  SpO2: 99% (06 May 2024 17:49) (98% - 99%)    Parameters below as of 06 May 2024 17:49  Patient On (Oxygen Delivery Method): nasal cannula  O2 Flow (L/min): 2      PHYSICAL EXAM:  GENERAL: NAD   HEAD:  Atraumatic, Normocephalic  EYES: EOMI, PERRLA, conjunctiva and sclera clear  NECK: Supple, No JVD  CHEST/LUNG: Clear to auscultation bilaterally; No wheeze  HEART: irregular rate and rhythm; No murmurs, rubs, or gallops  ABDOMEN: Soft, Nontender, Nondistended; Bowel sounds present  EXTREMITIES:  2+bipedal edema  PSYCH: AAOx3  NEUROLOGY: non-focal  SKIN: No rashes or lesions    LABS:                        8.1    4.01  )-----------( 125      ( 06 May 2024 05:27 )             24.8     05-06    144  |  108  |  72<H>  ----------------------------<  93  3.2<L>   |  22  |  3.52<H>    Ca    8.3<L>      06 May 2024 05:27    TPro  6.1  /  Alb  2.9<L>  /  TBili  0.9  /  DBili  x   /  AST  76<H>  /  ALT  68<H>  /  AlkPhos  144<H>  05-05    PT/INR - ( 05 May 2024 11:30 )   PT: 23.3 sec;   INR: 2.17 ratio         PTT - ( 06 May 2024 17:36 )  PTT:43.1 sec  CARDIAC MARKERS ( 05 May 2024 16:56 )  x     / x     / 57 U/L / x     / 4.6 ng/mL      Urinalysis Basic - ( 06 May 2024 05:27 )    Color: x / Appearance: x / SG: x / pH: x  Gluc: 93 mg/dL / Ketone: x  / Bili: x / Urobili: x   Blood: x / Protein: x / Nitrite: x   Leuk Esterase: x / RBC: x / WBC x   Sq Epi: x / Non Sq Epi: x / Bacteria: x          RADIOLOGY & ADDITIONAL TESTS:    Imaging Personally Reviewed:    Consultant(s) Notes Reviewed:      Care Discussed with Consultants/Other Providers:  
CARDIOLOGY FOLLOW UP - Dr. Vega  DATE OF SERVICE: 5/12/24    CC  No cv complaints     REVIEW OF SYSTEMS:  CONSTITUTIONAL: No fever, weight loss, or fatigue  RESPIRATORY: No cough, wheezing, chills or hemoptysis; No Shortness of Breath  CARDIOVASCULAR: No chest pain, palpitations, passing out, dizziness, or leg swelling  GASTROINTESTINAL: No abdominal or epigastric pain. No nausea, vomiting, or hematemesis; No diarrhea or constipation. No melena or hematochezia.  VASCULAR: No edema     PHYSICAL EXAM:  T(C): 36.6 (05-12-24 @ 08:51), Max: 36.6 (05-11-24 @ 11:33)  HR: 63 (05-12-24 @ 08:51) (60 - 69)  BP: 103/55 (05-12-24 @ 08:51) (103/55 - 119/57)  RR: 18 (05-12-24 @ 08:51) (18 - 18)  SpO2: 98% (05-12-24 @ 08:51) (95% - 98%)  Wt(kg): --  I&O's Summary    11 May 2024 07:01  -  12 May 2024 07:00  --------------------------------------------------------  IN: 0 mL / OUT: 500 mL / NET: -500 mL        Appearance: Normal	  Cardiovascular: Normal S1 S2,RRR, No JVD, No murmurs  Respiratory: Lungs clear to auscultation	  Gastrointestinal:  Soft, Non-tender, + BS	  Extremities: Normal range of motion, No clubbing, cyanosis or edema      Home Medications:  apixaban 2.5 mg oral tablet: 1 tab(s) orally every 12 hours (05 May 2024 13:45)  Crestor 40 mg oral tablet: 1 tab(s) orally once a day (at bedtime) (05 May 2024 13:45)  repaglinide 0.5 mg oral tablet: 1 tab(s) orally 3 times a day (before meals) (05 May 2024 13:45)  sodium bicarbonate 650 mg oral tablet: 1 tab(s) orally once a day (05 May 2024 13:45)  tamsulosin 0.4 mg oral capsule: 1 cap(s) orally once a day (05 May 2024 13:45)  Tresiba FlexTouch 100 units/mL subcutaneous solution: 5 solution subcutaneous once a day (at bedtime) (05 May 2024 13:45)      MEDICATIONS  (STANDING):  aMIOdarone    Tablet 200 milliGRAM(s) Oral daily  apixaban 2.5 milliGRAM(s) Oral two times a day  atorvastatin 80 milliGRAM(s) Oral at bedtime  chlorhexidine 2% Cloths 1 Application(s) Topical daily  dextrose 10% Bolus 125 milliLiter(s) IV Bolus once  dextrose 5%. 1000 milliLiter(s) (100 mL/Hr) IV Continuous <Continuous>  dextrose 5%. 1000 milliLiter(s) (50 mL/Hr) IV Continuous <Continuous>  dextrose 50% Injectable 25 Gram(s) IV Push once  dextrose 50% Injectable 12.5 Gram(s) IV Push once  furosemide    Tablet 40 milliGRAM(s) Oral daily  glucagon  Injectable 1 milliGRAM(s) IntraMuscular once  insulin glargine Injectable (LANTUS) 5 Unit(s) SubCutaneous at bedtime  insulin lispro (ADMELOG) corrective regimen sliding scale   SubCutaneous three times a day before meals  insulin lispro (ADMELOG) corrective regimen sliding scale   SubCutaneous at bedtime  pantoprazole    Tablet 40 milliGRAM(s) Oral two times a day  polyethylene glycol 3350 17 Gram(s) Oral once  sodium bicarbonate 650 milliGRAM(s) Oral daily  sodium chloride 0.9%. 500 milliLiter(s) (100 mL/Hr) IV Continuous <Continuous>  sucralfate 1 Gram(s) Oral two times a day  tamsulosin 0.4 milliGRAM(s) Oral at bedtime      TELEMETRY: SR	    ECG:  	  RADIOLOGY:   DIAGNOSTIC TESTING:  [ ] Echocardiogram:  [ ]  Catheterization:  [ ] Stress Test:    OTHER: 	    LABS:	 	    Troponin T, High Sensitivity Result: 503 ng/L [0 - 51] (05-06 @ 15:14)  Troponin T, High Sensitivity Result: 534 ng/L [0 - 51] (05-06 @ 05:27)  Creatine Kinase, Serum: 57 U/L [30 - 200] (05-05 @ 16:56)  CKMB Units: 4.6 ng/mL [0.0 - 6.7] (05-05 @ 16:56)  Troponin T, High Sensitivity Result: 475 ng/L [0 - 51] (05-05 @ 16:56)  Troponin T, High Sensitivity Result: 484 ng/L [0 - 51] (05-05 @ 16:56)  Troponin T, High Sensitivity Result: 466 ng/L [0 - 51] (05-05 @ 13:54)  Troponin T, High Sensitivity Result: 486 ng/L [0 - 51] (05-05 @ 11:15)                          9.8    4.37  )-----------( 107      ( 12 May 2024 06:36 )             29.7     05-12    141  |  106  |  53<H>  ----------------------------<  97  3.5   |  24  |  3.60<H>    Ca    8.6      12 May 2024 06:36  Mg     1.9     05-12      PTT - ( 12 May 2024 06:36 )  PTT:43.8 sec        
CARDIOLOGY FOLLOW UP - Dr. Vega  DATE OF SERVICE: 5/7/24    CC  No cv complaints     REVIEW OF SYSTEMS:  CONSTITUTIONAL: No fever, weight loss, or fatigue  RESPIRATORY: No cough, wheezing, chills or hemoptysis; No Shortness of Breath  CARDIOVASCULAR: No chest pain, palpitations, passing out, dizziness, or leg swelling  GASTROINTESTINAL: No abdominal or epigastric pain. No nausea, vomiting, or hematemesis; No diarrhea or constipation. No melena or hematochezia.  VASCULAR: No edema     PHYSICAL EXAM:  T(C): 36.5 (05-07-24 @ 04:25), Max: 37.1 (05-06-24 @ 21:59)  HR: 69 (05-07-24 @ 09:13) (61 - 69)  BP: 94/52 (05-07-24 @ 09:13) (94/52 - 110/62)  RR: 18 (05-07-24 @ 04:25) (18 - 18)  SpO2: 96% (05-07-24 @ 09:13) (96% - 100%)  Wt(kg): --  I&O's Summary    06 May 2024 07:01  -  07 May 2024 07:00  --------------------------------------------------------  IN: 240 mL / OUT: 575 mL / NET: -335 mL    07 May 2024 07:01  -  07 May 2024 10:54  --------------------------------------------------------  IN: 0 mL / OUT: 500 mL / NET: -500 mL        Appearance: Elderly male 	  Cardiovascular: Normal S1 S2,RRR, No JVD, No murmurs  Respiratory: Diminished R side, otherwise clear  Gastrointestinal:  Soft, Non-tender, + BS	  Extremities: Normal range of motion, No clubbing, cyanosis or edema      Home Medications:  apixaban 2.5 mg oral tablet: 1 tab(s) orally every 12 hours (05 May 2024 13:45)  Crestor 40 mg oral tablet: 1 tab(s) orally once a day (at bedtime) (05 May 2024 13:45)  repaglinide 0.5 mg oral tablet: 1 tab(s) orally 3 times a day (before meals) (05 May 2024 13:45)  sodium bicarbonate 650 mg oral tablet: 1 tab(s) orally once a day (05 May 2024 13:45)  tamsulosin 0.4 mg oral capsule: 1 cap(s) orally once a day (05 May 2024 13:45)  Tresiba FlexTouch 100 units/mL subcutaneous solution: 5 solution subcutaneous once a day (at bedtime) (05 May 2024 13:45)      MEDICATIONS  (STANDING):  aMIOdarone    Tablet 200 milliGRAM(s) Oral daily  atorvastatin 80 milliGRAM(s) Oral at bedtime  chlorhexidine 2% Cloths 1 Application(s) Topical daily  dextrose 10% Bolus 125 milliLiter(s) IV Bolus once  dextrose 5%. 1000 milliLiter(s) (100 mL/Hr) IV Continuous <Continuous>  dextrose 5%. 1000 milliLiter(s) (50 mL/Hr) IV Continuous <Continuous>  dextrose 50% Injectable 25 Gram(s) IV Push once  dextrose 50% Injectable 12.5 Gram(s) IV Push once  glucagon  Injectable 1 milliGRAM(s) IntraMuscular once  heparin  Infusion.  Unit(s)/Hr (11 mL/Hr) IV Continuous <Continuous>  insulin glargine Injectable (LANTUS) 5 Unit(s) SubCutaneous at bedtime  insulin lispro (ADMELOG) corrective regimen sliding scale   SubCutaneous at bedtime  insulin lispro (ADMELOG) corrective regimen sliding scale   SubCutaneous three times a day before meals  pantoprazole    Tablet 40 milliGRAM(s) Oral before breakfast  sodium bicarbonate 650 milliGRAM(s) Oral daily  sucralfate 1 Gram(s) Oral two times a day  tamsulosin 0.4 milliGRAM(s) Oral at bedtime      TELEMETRY: 	    ECG:  	  RADIOLOGY:   DIAGNOSTIC TESTING:  [ ] Echocardiogram:  [ ]  Catheterization:  [ ] Stress Test:    OTHER: 	    LABS:	 	    Troponin T, High Sensitivity Result: 503 ng/L [0 - 51] (05-06 @ 15:14)  Troponin T, High Sensitivity Result: 534 ng/L [0 - 51] (05-06 @ 05:27)  Creatine Kinase, Serum: 57 U/L [30 - 200] (05-05 @ 16:56)  CKMB Units: 4.6 ng/mL [0.0 - 6.7] (05-05 @ 16:56)  Troponin T, High Sensitivity Result: 475 ng/L [0 - 51] (05-05 @ 16:56)  Troponin T, High Sensitivity Result: 484 ng/L [0 - 51] (05-05 @ 16:56)  Troponin T, High Sensitivity Result: 466 ng/L [0 - 51] (05-05 @ 13:54)  Troponin T, High Sensitivity Result: 486 ng/L [0 - 51] (05-05 @ 11:15)                          7.7    4.16  )-----------( 120      ( 07 May 2024 05:45 )             23.5     05-07    144  |  110<H>  |  67<H>  ----------------------------<  101<H>  3.3<L>   |  23  |  3.36<H>    Ca    8.0<L>      07 May 2024 05:45  Phos  2.5     05-07  Mg     2.0     05-07    TPro  6.1  /  Alb  2.9<L>  /  TBili  0.9  /  DBili  x   /  AST  76<H>  /  ALT  68<H>  /  AlkPhos  144<H>  05-05    PT/INR - ( 05 May 2024 11:30 )   PT: 23.3 sec;   INR: 2.17 ratio         PTT - ( 07 May 2024 01:45 )  PTT:>200.0 sec        
[facility] [phone]        Patient seen and examined in bed.   He was the same and felt weak         MEDICATIONS  (STANDING):  aMIOdarone    Tablet 200 milliGRAM(s) Oral daily  atorvastatin 80 milliGRAM(s) Oral at bedtime  buMETAnide Injectable 1 milliGRAM(s) IV Push once  chlorhexidine 2% Cloths 1 Application(s) Topical daily  dextrose 10% Bolus 125 milliLiter(s) IV Bolus once  dextrose 5%. 1000 milliLiter(s) (100 mL/Hr) IV Continuous <Continuous>  dextrose 5%. 1000 milliLiter(s) (50 mL/Hr) IV Continuous <Continuous>  dextrose 50% Injectable 25 Gram(s) IV Push once  dextrose 50% Injectable 12.5 Gram(s) IV Push once  furosemide    Tablet 40 milliGRAM(s) Oral daily  glucagon  Injectable 1 milliGRAM(s) IntraMuscular once  heparin  Infusion. 700 Unit(s)/Hr (7 mL/Hr) IV Continuous <Continuous>  insulin glargine Injectable (LANTUS) 5 Unit(s) SubCutaneous at bedtime  insulin lispro (ADMELOG) corrective regimen sliding scale   SubCutaneous three times a day before meals  insulin lispro (ADMELOG) corrective regimen sliding scale   SubCutaneous at bedtime  pantoprazole    Tablet 40 milliGRAM(s) Oral two times a day  sodium bicarbonate 650 milliGRAM(s) Oral daily  sodium chloride 0.9%. 500 milliLiter(s) (100 mL/Hr) IV Continuous <Continuous>  sucralfate 1 Gram(s) Oral two times a day  tamsulosin 0.4 milliGRAM(s) Oral at bedtime      VITAL:  T(C): , Max: 36.8 (05-10-24 @ 04:28)  T(F): , Max: 98.2 (05-10-24 @ 04:28)  HR: 61 (05-10-24 @ 04:28)  BP: 106/60 (05-10-24 @ 04:28)  BP(mean): --  RR: 18 (05-10-24 @ 04:28)  SpO2: 98% (05-10-24 @ 04:28)  Wt(kg): --    I and O's:    05-09 @ 07:01  -  05-10 @ 07:00  --------------------------------------------------------  IN: 324 mL / OUT: 575 mL / NET: -251 mL    05-10 @ 07:01  -  05-10 @ 11:05  --------------------------------------------------------  IN: 21 mL / OUT: 700 mL / NET: -679 mL          PHYSICAL EXAM:    Constitutional: NAD  Neck:  No JVD  Respiratory: CTAB/L  Cardiovascular: S1 and S2  Gastrointestinal: BS+, soft, NT/ND  Extremities: No peripheral edema  Neurological: A/O x 3, no focal deficits  Psychiatric: Normal mood, normal affect  : No Temple  Skin: No rashes  Access: Not applicable    LABS:                        7.6    4.32  )-----------( 98       ( 10 May 2024 07:06 )             23.9     05-10    141  |  108  |  58<H>  ----------------------------<  138<H>  4.2   |  20<L>  |  3.52<H>    Ca    8.0<L>      10 May 2024 07:05  Phos  3.0     05-09  Mg     2.0     05-09            Urine Studies:  Urinalysis Basic - ( 10 May 2024 07:05 )    Color: x / Appearance: x / SG: x / pH: x  Gluc: 138 mg/dL / Ketone: x  / Bili: x / Urobili: x   Blood: x / Protein: x / Nitrite: x   Leuk Esterase: x / RBC: x / WBC x   Sq Epi: x / Non Sq Epi: x / Bacteria: x            RADIOLOGY & ADDITIONAL STUDIES:      < from: Xray Chest 2 Views PA/Lat (05.05.24 @ 11:33) >    ACC: [id number] EXAM:  XR CHEST PA LAT 2V   ORDERED BY: CAROLINA MORRIS     PROCEDURE DATE:  05/05/2024          INTERPRETATION:  EXAMINATION: XR CHEST PA AND LATERAL    CLINICAL INDICATION: Shortness of breath    TECHNIQUE: 2 views; Frontal and lateral views of the chest were obtained.    COMPARISON: Chest x-ray 4/20/2024.    FINDINGS:    The heart is normal in size.  Small bilateral pleural effusions with adjacent atelectasis.  There is no pneumothorax.  No acute bony abnormality.    IMPRESSION:  Small bilateral pleural effusions with adjacent atelectasis.    --- End of Report ---          BRENDA MCCAULEY DO; Resident Radiologist  This document has been electronically signed.  ERROL RAGSDALE MD; Attending Radiologist  This document has been electronically signed. May  5 2024  2:35    < end of copied text >        
Patient is a 88y old  Male who presents with a chief complaint of SOB (06 May 2024 07:54)      SUBJECTIVE / OVERNIGHT EVENTS: Comfortable without new complaints.   Review of Systems  chest pain no  palpitations no  sob no  nausea no  headache no    MEDICATIONS  (STANDING):  aMIOdarone    Tablet 200 milliGRAM(s) Oral daily  atorvastatin 80 milliGRAM(s) Oral at bedtime  chlorhexidine 2% Cloths 1 Application(s) Topical daily  dextrose 10% Bolus 125 milliLiter(s) IV Bolus once  dextrose 5%. 1000 milliLiter(s) (100 mL/Hr) IV Continuous <Continuous>  dextrose 5%. 1000 milliLiter(s) (50 mL/Hr) IV Continuous <Continuous>  dextrose 50% Injectable 25 Gram(s) IV Push once  dextrose 50% Injectable 12.5 Gram(s) IV Push once  furosemide    Tablet 40 milliGRAM(s) Oral daily  glucagon  Injectable 1 milliGRAM(s) IntraMuscular once  heparin  Infusion. 700 Unit(s)/Hr (7 mL/Hr) IV Continuous <Continuous>  insulin glargine Injectable (LANTUS) 5 Unit(s) SubCutaneous at bedtime  insulin lispro (ADMELOG) corrective regimen sliding scale   SubCutaneous at bedtime  insulin lispro (ADMELOG) corrective regimen sliding scale   SubCutaneous three times a day before meals  pantoprazole    Tablet 40 milliGRAM(s) Oral two times a day  sodium bicarbonate 650 milliGRAM(s) Oral daily  sodium chloride 0.9%. 500 milliLiter(s) (100 mL/Hr) IV Continuous <Continuous>  sucralfate 1 Gram(s) Oral two times a day  tamsulosin 0.4 milliGRAM(s) Oral at bedtime    MEDICATIONS  (PRN):  acetaminophen     Tablet .. 650 milliGRAM(s) Oral every 6 hours PRN Temp greater or equal to 38.5C (101.3F), Mild Pain (1 - 3)  dextrose Oral Gel 15 Gram(s) Oral once PRN Blood Glucose LESS THAN 70 milliGRAM(s)/deciliter  heparin   Injectable 2000 Unit(s) IV Push every 6 hours PRN For aPTT between 40 - 57  heparin   Injectable 4500 Unit(s) IV Push every 6 hours PRN For aPTT less than 40      Vital Signs Last 24 Hrs  T(C): 36.5 (09 May 2024 11:15), Max: 36.7 (08 May 2024 20:42)  T(F): 97.7 (09 May 2024 11:15), Max: 98.1 (09 May 2024 04:24)  HR: 61 (09 May 2024 11:15) (60 - 64)  BP: 102/60 (09 May 2024 11:15) (102/60 - 132/50)  BP(mean): --  RR: 18 (09 May 2024 11:15) (18 - 18)  SpO2: 99% (09 May 2024 11:15) (94% - 99%)    Parameters below as of 09 May 2024 11:15  Patient On (Oxygen Delivery Method): nasal cannula  O2 Flow (L/min): 1      PHYSICAL EXAM:  GENERAL: NAD  HEAD:  Atraumatic, Normocephalic  EYES: EOMI, PERRLA, conjunctiva and sclera clear  NECK: Supple, No JVD  CHEST/LUNG: Clear to auscultation bilaterally; No wheeze  HEART: Regular rate and rhythm; No murmurs, rubs, or gallops  ABDOMEN: Soft, Nontender, Nondistended; Bowel sounds present  EXTREMITIES:  2+ Peripheral Pulses, No clubbing, cyanosis, or edema  PSYCH: AAOx3  NEUROLOGY: non-focal  SKIN: No rashes or lesions    LABS:                        8.6    4.37  )-----------( 113      ( 09 May 2024 06:50 )             27.0     05-09    143  |  109<H>  |  58<H>  ----------------------------<  75  3.9   |  23  |  3.34<H>    Ca    8.2<L>      09 May 2024 06:48  Phos  3.0     05-09  Mg     2.0     05-09      PTT - ( 09 May 2024 14:32 )  PTT:72.0 sec      Urinalysis Basic - ( 09 May 2024 06:48 )    Color: x / Appearance: x / SG: x / pH: x  Gluc: 75 mg/dL / Ketone: x  / Bili: x / Urobili: x   Blood: x / Protein: x / Nitrite: x   Leuk Esterase: x / RBC: x / WBC x   Sq Epi: x / Non Sq Epi: x / Bacteria: x          RADIOLOGY & ADDITIONAL TESTS:    Imaging Personally Reviewed:    Consultant(s) Notes Reviewed:      Care Discussed with Consultants/Other Providers:  
Patient is a 88y old  Male who presents with a chief complaint of SOB (06 May 2024 07:54)      SUBJECTIVE / OVERNIGHT EVENTS: Comfortable without new complaints.   Review of Systems  chest pain no  palpitations no  sob no  nausea no  headache no    MEDICATIONS  (STANDING):  aMIOdarone    Tablet 200 milliGRAM(s) Oral daily  atorvastatin 80 milliGRAM(s) Oral at bedtime  chlorhexidine 2% Cloths 1 Application(s) Topical daily  dextrose 10% Bolus 125 milliLiter(s) IV Bolus once  dextrose 5%. 1000 milliLiter(s) (100 mL/Hr) IV Continuous <Continuous>  dextrose 5%. 1000 milliLiter(s) (50 mL/Hr) IV Continuous <Continuous>  dextrose 50% Injectable 25 Gram(s) IV Push once  dextrose 50% Injectable 12.5 Gram(s) IV Push once  glucagon  Injectable 1 milliGRAM(s) IntraMuscular once  heparin  Infusion.  Unit(s)/Hr (11 mL/Hr) IV Continuous <Continuous>  insulin glargine Injectable (LANTUS) 5 Unit(s) SubCutaneous at bedtime  insulin lispro (ADMELOG) corrective regimen sliding scale   SubCutaneous at bedtime  insulin lispro (ADMELOG) corrective regimen sliding scale   SubCutaneous three times a day before meals  pantoprazole    Tablet 40 milliGRAM(s) Oral before breakfast  sodium bicarbonate 650 milliGRAM(s) Oral daily  sucralfate 1 Gram(s) Oral two times a day  tamsulosin 0.4 milliGRAM(s) Oral at bedtime    MEDICATIONS  (PRN):  acetaminophen     Tablet .. 650 milliGRAM(s) Oral every 6 hours PRN Temp greater or equal to 38.5C (101.3F), Mild Pain (1 - 3)  dextrose Oral Gel 15 Gram(s) Oral once PRN Blood Glucose LESS THAN 70 milliGRAM(s)/deciliter  heparin   Injectable 2000 Unit(s) IV Push every 6 hours PRN For aPTT between 40 - 57  heparin   Injectable 4500 Unit(s) IV Push every 6 hours PRN For aPTT less than 40      Vital Signs Last 24 Hrs  T(C): 36.7 (07 May 2024 12:07), Max: 37.1 (06 May 2024 21:59)  T(F): 98 (07 May 2024 12:07), Max: 98.8 (06 May 2024 21:59)  HR: 58 (07 May 2024 12:07) (58 - 69)  BP: 105/50 (07 May 2024 12:07) (94/52 - 106/60)  BP(mean): --  RR: 18 (07 May 2024 12:07) (18 - 18)  SpO2: 98% (07 May 2024 12:07) (96% - 100%)    Parameters below as of 07 May 2024 12:07  Patient On (Oxygen Delivery Method): nasal cannula  O2 Flow (L/min): 2      PHYSICAL EXAM:  GENERAL: NAD   HEAD:  Atraumatic, Normocephalic  EYES: EOMI, PERRLA, conjunctiva and sclera clear  NECK: Supple, No JVD  CHEST/LUNG: Clear to auscultation bilaterally; No wheeze  HEART: Regular rate and rhythm; No murmurs, rubs, or gallops  ABDOMEN: Soft, Nontender, Nondistended; Bowel sounds present  EXTREMITIES:  2+bipedal edema  PSYCH: AAOx3  NEUROLOGY: non-focal  SKIN: No rashes or lesions    LABS:                        7.7    4.16  )-----------( 120      ( 07 May 2024 05:45 )             23.5     05-07    144  |  110<H>  |  67<H>  ----------------------------<  101<H>  3.3<L>   |  23  |  3.36<H>    Ca    8.0<L>      07 May 2024 05:45  Phos  2.5     05-07  Mg     2.0     05-07      PTT - ( 07 May 2024 11:25 )  PTT:176.9 sec      Urinalysis Basic - ( 07 May 2024 05:45 )    Color: x / Appearance: x / SG: x / pH: x  Gluc: 101 mg/dL / Ketone: x  / Bili: x / Urobili: x   Blood: x / Protein: x / Nitrite: x   Leuk Esterase: x / RBC: x / WBC x   Sq Epi: x / Non Sq Epi: x / Bacteria: x          RADIOLOGY & ADDITIONAL TESTS:    Imaging Personally Reviewed:    Consultant(s) Notes Reviewed:      Care Discussed with Consultants/Other Providers:  
CARDIOLOGY FOLLOW UP - Dr. Vega  DATE OF SERVICE: 5/10/24     CC no cp or sob      REVIEW OF SYSTEMS:  CONSTITUTIONAL: No fever, weight loss, or fatigue  RESPIRATORY: No cough, wheezing, chills or hemoptysis; No Shortness of Breath  CARDIOVASCULAR: No chest pain, palpitations, passing out, dizziness, or leg swelling  GASTROINTESTINAL: No abdominal or epigastric pain. No nausea, vomiting, or hematemesis; No diarrhea or constipation. No melena or hematochezia.  VASCULAR: No edema     PHYSICAL EXAM:  T(C): 36.8 (05-10-24 @ 04:28), Max: 36.8 (05-10-24 @ 04:28)  HR: 61 (05-10-24 @ 04:28) (61 - 61)  BP: 106/60 (05-10-24 @ 04:28) (102/60 - 106/60)  RR: 18 (05-10-24 @ 04:28) (18 - 18)  SpO2: 98% (05-10-24 @ 04:28) (98% - 100%)  Wt(kg): --  I&O's Summary    09 May 2024 07:01  -  10 May 2024 07:00  --------------------------------------------------------  IN: 324 mL / OUT: 575 mL / NET: -251 mL    10 May 2024 07:01  -  10 May 2024 10:44  --------------------------------------------------------  IN: 21 mL / OUT: 700 mL / NET: -679 mL        Appearance: Normal	  Cardiovascular: Normal S1 S2,RRR, No JVD, No murmurs  Respiratory: Lungs clear to auscultation	  Gastrointestinal:  Soft, Non-tender, + BS	  Extremities: Normal range of motion, No clubbing, cyanosis or edema      Home Medications:  apixaban 2.5 mg oral tablet: 1 tab(s) orally every 12 hours (05 May 2024 13:45)  Crestor 40 mg oral tablet: 1 tab(s) orally once a day (at bedtime) (05 May 2024 13:45)  repaglinide 0.5 mg oral tablet: 1 tab(s) orally 3 times a day (before meals) (05 May 2024 13:45)  sodium bicarbonate 650 mg oral tablet: 1 tab(s) orally once a day (05 May 2024 13:45)  tamsulosin 0.4 mg oral capsule: 1 cap(s) orally once a day (05 May 2024 13:45)  Tresiba FlexTouch 100 units/mL subcutaneous solution: 5 solution subcutaneous once a day (at bedtime) (05 May 2024 13:45)      MEDICATIONS  (STANDING):  aMIOdarone    Tablet 200 milliGRAM(s) Oral daily  atorvastatin 80 milliGRAM(s) Oral at bedtime  buMETAnide Injectable 1 milliGRAM(s) IV Push once  chlorhexidine 2% Cloths 1 Application(s) Topical daily  dextrose 10% Bolus 125 milliLiter(s) IV Bolus once  dextrose 5%. 1000 milliLiter(s) (100 mL/Hr) IV Continuous <Continuous>  dextrose 5%. 1000 milliLiter(s) (50 mL/Hr) IV Continuous <Continuous>  dextrose 50% Injectable 25 Gram(s) IV Push once  dextrose 50% Injectable 12.5 Gram(s) IV Push once  furosemide    Tablet 40 milliGRAM(s) Oral daily  glucagon  Injectable 1 milliGRAM(s) IntraMuscular once  heparin  Infusion. 700 Unit(s)/Hr (7 mL/Hr) IV Continuous <Continuous>  insulin glargine Injectable (LANTUS) 5 Unit(s) SubCutaneous at bedtime  insulin lispro (ADMELOG) corrective regimen sliding scale   SubCutaneous three times a day before meals  insulin lispro (ADMELOG) corrective regimen sliding scale   SubCutaneous at bedtime  pantoprazole    Tablet 40 milliGRAM(s) Oral two times a day  sodium bicarbonate 650 milliGRAM(s) Oral daily  sodium chloride 0.9%. 500 milliLiter(s) (100 mL/Hr) IV Continuous <Continuous>  sucralfate 1 Gram(s) Oral two times a day  tamsulosin 0.4 milliGRAM(s) Oral at bedtime      TELEMETRY: nsr	    ECG:  	  RADIOLOGY:   DIAGNOSTIC TESTING:  [ ] Echocardiogram:  [ ]  Catheterization:  [ ] Stress Test:    OTHER: 	    LABS:	 	    Troponin T, High Sensitivity Result: 503 ng/L [0 - 51] (05-06 @ 15:14)  Troponin T, High Sensitivity Result: 534 ng/L [0 - 51] (05-06 @ 05:27)  Creatine Kinase, Serum: 57 U/L [30 - 200] (05-05 @ 16:56)  CKMB Units: 4.6 ng/mL [0.0 - 6.7] (05-05 @ 16:56)  Troponin T, High Sensitivity Result: 475 ng/L [0 - 51] (05-05 @ 16:56)  Troponin T, High Sensitivity Result: 484 ng/L [0 - 51] (05-05 @ 16:56)  Troponin T, High Sensitivity Result: 466 ng/L [0 - 51] (05-05 @ 13:54)  Troponin T, High Sensitivity Result: 486 ng/L [0 - 51] (05-05 @ 11:15)                          7.6    4.32  )-----------( 98       ( 10 May 2024 07:06 )             23.9     05-10    141  |  108  |  58<H>  ----------------------------<  138<H>  4.2   |  20<L>  |  3.52<H>    Ca    8.0<L>      10 May 2024 07:05  Phos  3.0     05-09  Mg     2.0     05-09      PTT - ( 10 May 2024 07:06 )  PTT:59.9 sec        
CARDIOLOGY FOLLOW UP - Dr. Vega  DATE OF SERVICE: 5/11/24    CC  No cv complaints - feeling better today     REVIEW OF SYSTEMS:  CONSTITUTIONAL: No fever, weight loss, or fatigue  RESPIRATORY: No cough, wheezing, chills or hemoptysis; No Shortness of Breath  CARDIOVASCULAR: No chest pain, palpitations, passing out, dizziness, or leg swelling  GASTROINTESTINAL: No abdominal or epigastric pain. No nausea, vomiting, or hematemesis; No diarrhea or constipation. No melena or hematochezia.  VASCULAR: No edema     PHYSICAL EXAM:  T(C): 36.4 (05-11-24 @ 09:37), Max: 36.6 (05-10-24 @ 10:30)  HR: 60 (05-11-24 @ 09:37) (60 - 66)  BP: 110/58 (05-11-24 @ 09:37) (101/53 - 127/68)  RR: 18 (05-11-24 @ 09:37) (16 - 18)  SpO2: 95% (05-11-24 @ 09:37) (95% - 100%)  Wt(kg): --  I&O's Summary    10 May 2024 07:01  -  11 May 2024 07:00  --------------------------------------------------------  IN: 377 mL / OUT: 1300 mL / NET: -923 mL    11 May 2024 07:01  -  11 May 2024 09:53  --------------------------------------------------------  IN: 0 mL / OUT: 250 mL / NET: -250 mL        Appearance: Elderly male 	  Cardiovascular: Normal S1 S2,RRR, No JVD, No murmurs  Respiratory: Lungs clear to auscultation b/l   Gastrointestinal:  Soft, Non-tender, + BS	  Extremities: Normal range of motion, No clubbing, cyanosis or edema      Home Medications:  apixaban 2.5 mg oral tablet: 1 tab(s) orally every 12 hours (05 May 2024 13:45)  Crestor 40 mg oral tablet: 1 tab(s) orally once a day (at bedtime) (05 May 2024 13:45)  repaglinide 0.5 mg oral tablet: 1 tab(s) orally 3 times a day (before meals) (05 May 2024 13:45)  sodium bicarbonate 650 mg oral tablet: 1 tab(s) orally once a day (05 May 2024 13:45)  tamsulosin 0.4 mg oral capsule: 1 cap(s) orally once a day (05 May 2024 13:45)  Tresiba FlexTouch 100 units/mL subcutaneous solution: 5 solution subcutaneous once a day (at bedtime) (05 May 2024 13:45)      MEDICATIONS  (STANDING):  aMIOdarone    Tablet 200 milliGRAM(s) Oral daily  atorvastatin 80 milliGRAM(s) Oral at bedtime  chlorhexidine 2% Cloths 1 Application(s) Topical daily  dextrose 10% Bolus 125 milliLiter(s) IV Bolus once  dextrose 5%. 1000 milliLiter(s) (100 mL/Hr) IV Continuous <Continuous>  dextrose 5%. 1000 milliLiter(s) (50 mL/Hr) IV Continuous <Continuous>  dextrose 50% Injectable 25 Gram(s) IV Push once  dextrose 50% Injectable 12.5 Gram(s) IV Push once  furosemide    Tablet 40 milliGRAM(s) Oral daily  glucagon  Injectable 1 milliGRAM(s) IntraMuscular once  heparin  Infusion. 700 Unit(s)/Hr (7 mL/Hr) IV Continuous <Continuous>  insulin glargine Injectable (LANTUS) 5 Unit(s) SubCutaneous at bedtime  insulin lispro (ADMELOG) corrective regimen sliding scale   SubCutaneous three times a day before meals  insulin lispro (ADMELOG) corrective regimen sliding scale   SubCutaneous at bedtime  pantoprazole    Tablet 40 milliGRAM(s) Oral two times a day  polyethylene glycol 3350 17 Gram(s) Oral once  sodium bicarbonate 650 milliGRAM(s) Oral daily  sodium chloride 0.9%. 500 milliLiter(s) (100 mL/Hr) IV Continuous <Continuous>  sucralfate 1 Gram(s) Oral two times a day  tamsulosin 0.4 milliGRAM(s) Oral at bedtime      TELEMETRY: SR	    ECG:  	  RADIOLOGY:   DIAGNOSTIC TESTING:  [ ] Echocardiogram:  [ ]  Catheterization:  [ ] Stress Test:    OTHER: 	    LABS:	 	    Troponin T, High Sensitivity Result: 503 ng/L [0 - 51] (05-06 @ 15:14)  Troponin T, High Sensitivity Result: 534 ng/L [0 - 51] (05-06 @ 05:27)  Creatine Kinase, Serum: 57 U/L [30 - 200] (05-05 @ 16:56)  CKMB Units: 4.6 ng/mL [0.0 - 6.7] (05-05 @ 16:56)  Troponin T, High Sensitivity Result: 475 ng/L [0 - 51] (05-05 @ 16:56)  Troponin T, High Sensitivity Result: 484 ng/L [0 - 51] (05-05 @ 16:56)  Troponin T, High Sensitivity Result: 466 ng/L [0 - 51] (05-05 @ 13:54)  Troponin T, High Sensitivity Result: 486 ng/L [0 - 51] (05-05 @ 11:15)                          9.8    4.57  )-----------( 107      ( 11 May 2024 07:45 )             30.3     05-11    142  |  106  |  57<H>  ----------------------------<  108<H>  3.5   |  23  |  3.50<H>    Ca    8.5      11 May 2024 07:45      PTT - ( 11 May 2024 07:45 )  PTT:67.0 sec        
CARDIOLOGY FOLLOW UP - Dr. Vega  DATE OF SERVICE: 5/9/24    CC  No cv complaints     REVIEW OF SYSTEMS:  CONSTITUTIONAL: No fever, weight loss, or fatigue  RESPIRATORY: No cough, wheezing, chills or hemoptysis; No Shortness of Breath  CARDIOVASCULAR: No chest pain, palpitations, passing out, dizziness, or leg swelling  GASTROINTESTINAL: No abdominal or epigastric pain. No nausea, vomiting, or hematemesis; No diarrhea or constipation. No melena or hematochezia.  VASCULAR: No edema     PHYSICAL EXAM:  T(C): 36.7 (05-09-24 @ 04:24), Max: 36.7 (05-08-24 @ 20:42)  HR: 64 (05-09-24 @ 04:24) (60 - 65)  BP: 112/52 (05-09-24 @ 04:24) (104/55 - 132/50)  RR: 18 (05-09-24 @ 04:24) (16 - 18)  SpO2: 94% (05-09-24 @ 04:24) (94% - 100%)  Wt(kg): --  I&O's Summary    08 May 2024 07:01  -  09 May 2024 07:00  --------------------------------------------------------  IN: 740 mL / OUT: 1200 mL / NET: -460 mL    09 May 2024 07:01  -  09 May 2024 10:23  --------------------------------------------------------  IN: 0 mL / OUT: 575 mL / NET: -575 mL        Appearance: Elderly female	  Cardiovascular: Normal S1 S2,RRR, No JVD, No murmurs  Respiratory: Lungs clear to auscultation b/l   Gastrointestinal:  Soft, Non-tender, + BS	  Extremities: Normal range of motion, No clubbing, cyanosis or edema      Home Medications:  apixaban 2.5 mg oral tablet: 1 tab(s) orally every 12 hours (05 May 2024 13:45)  Crestor 40 mg oral tablet: 1 tab(s) orally once a day (at bedtime) (05 May 2024 13:45)  repaglinide 0.5 mg oral tablet: 1 tab(s) orally 3 times a day (before meals) (05 May 2024 13:45)  sodium bicarbonate 650 mg oral tablet: 1 tab(s) orally once a day (05 May 2024 13:45)  tamsulosin 0.4 mg oral capsule: 1 cap(s) orally once a day (05 May 2024 13:45)  Tresiba FlexTouch 100 units/mL subcutaneous solution: 5 solution subcutaneous once a day (at bedtime) (05 May 2024 13:45)      MEDICATIONS  (STANDING):  aMIOdarone    Tablet 200 milliGRAM(s) Oral daily  atorvastatin 80 milliGRAM(s) Oral at bedtime  chlorhexidine 2% Cloths 1 Application(s) Topical daily  dextrose 10% Bolus 125 milliLiter(s) IV Bolus once  dextrose 5%. 1000 milliLiter(s) (100 mL/Hr) IV Continuous <Continuous>  dextrose 5%. 1000 milliLiter(s) (50 mL/Hr) IV Continuous <Continuous>  dextrose 50% Injectable 25 Gram(s) IV Push once  dextrose 50% Injectable 12.5 Gram(s) IV Push once  furosemide    Tablet 40 milliGRAM(s) Oral daily  glucagon  Injectable 1 milliGRAM(s) IntraMuscular once  heparin  Infusion. 700 Unit(s)/Hr (7 mL/Hr) IV Continuous <Continuous>  insulin glargine Injectable (LANTUS) 5 Unit(s) SubCutaneous at bedtime  insulin lispro (ADMELOG) corrective regimen sliding scale   SubCutaneous three times a day before meals  insulin lispro (ADMELOG) corrective regimen sliding scale   SubCutaneous at bedtime  pantoprazole    Tablet 40 milliGRAM(s) Oral two times a day  sodium bicarbonate 650 milliGRAM(s) Oral daily  sodium chloride 0.9%. 500 milliLiter(s) (100 mL/Hr) IV Continuous <Continuous>  sucralfate 1 Gram(s) Oral two times a day  tamsulosin 0.4 milliGRAM(s) Oral at bedtime      TELEMETRY: 	    ECG:  	  RADIOLOGY:   DIAGNOSTIC TESTING:  [ ] Echocardiogram:  [x]  Catheterization:  < from: Cardiac Catheterization (05.08.24 @ 15:36) >  Diagnostic Conclusions:     Severe one vessel obstructive coronary artery disease   --Left circumflex artery   Mild left main coronary artery disease   Mild to moderate in-stent restenosis of left anterior descending and  right coronary artery stents  LVEDP = 22mmHg   No gradient across the aortic valve     Recommendations:     Keep right leg straight for 4 hours following removal of sheath   Continue aggressive medical management of coronary artery disease and  associated risk factors  Gentle IV hydration as per protocol   Recommend multidisciplinary conversation to be held among medical  teams to discuss risks and benefits of performing high  risk PCI in setting of multiple increased risk factors including CKD  IV and elevated risk of bleeding    < end of copied text >    [ ] Stress Test:    OTHER: 	    LABS:	 	    Troponin T, High Sensitivity Result: 503 ng/L [0 - 51] (05-06 @ 15:14)  Troponin T, High Sensitivity Result: 534 ng/L [0 - 51] (05-06 @ 05:27)  Creatine Kinase, Serum: 57 U/L [30 - 200] (05-05 @ 16:56)  CKMB Units: 4.6 ng/mL [0.0 - 6.7] (05-05 @ 16:56)  Troponin T, High Sensitivity Result: 475 ng/L [0 - 51] (05-05 @ 16:56)  Troponin T, High Sensitivity Result: 484 ng/L [0 - 51] (05-05 @ 16:56)  Troponin T, High Sensitivity Result: 466 ng/L [0 - 51] (05-05 @ 13:54)  Troponin T, High Sensitivity Result: 486 ng/L [0 - 51] (05-05 @ 11:15)                          8.6    4.37  )-----------( 113      ( 09 May 2024 06:50 )             27.0     05-09    143  |  109<H>  |  58<H>  ----------------------------<  75  3.9   |  23  |  3.34<H>    Ca    8.2<L>      09 May 2024 06:48  Phos  3.0     05-09  Mg     2.0     05-09      PTT - ( 09 May 2024 06:50 )  PTT:66.6 sec        
[facility] [phone]        Patient seen and examined , rest inh in chair no distress.        MEDICATIONS  (STANDING):  aMIOdarone    Tablet 200 milliGRAM(s) Oral daily  atorvastatin 80 milliGRAM(s) Oral at bedtime  chlorhexidine 2% Cloths 1 Application(s) Topical daily  dextrose 10% Bolus 125 milliLiter(s) IV Bolus once  dextrose 5%. 1000 milliLiter(s) (100 mL/Hr) IV Continuous <Continuous>  dextrose 5%. 1000 milliLiter(s) (50 mL/Hr) IV Continuous <Continuous>  dextrose 50% Injectable 25 Gram(s) IV Push once  dextrose 50% Injectable 12.5 Gram(s) IV Push once  furosemide   Injectable 40 milliGRAM(s) IV Push daily  glucagon  Injectable 1 milliGRAM(s) IntraMuscular once  heparin  Infusion.  Unit(s)/Hr (11 mL/Hr) IV Continuous <Continuous>  insulin glargine Injectable (LANTUS) 5 Unit(s) SubCutaneous at bedtime  insulin lispro (ADMELOG) corrective regimen sliding scale   SubCutaneous three times a day before meals  insulin lispro (ADMELOG) corrective regimen sliding scale   SubCutaneous at bedtime  pantoprazole    Tablet 40 milliGRAM(s) Oral before breakfast  sodium bicarbonate 650 milliGRAM(s) Oral daily  sucralfate 1 Gram(s) Oral two times a day  tamsulosin 0.4 milliGRAM(s) Oral at bedtime      VITAL:  T(C): , Max: 37.1 (05-06-24 @ 21:59)  T(F): , Max: 98.8 (05-06-24 @ 21:59)  HR: 69 (05-07-24 @ 09:13)  BP: 94/52 (05-07-24 @ 09:13)  BP(mean): --  RR: 18 (05-07-24 @ 04:25)  SpO2: 96% (05-07-24 @ 09:13)  Wt(kg): --    I and O's:    05-06 @ 07:01  -  05-07 @ 07:00  --------------------------------------------------------  IN: 240 mL / OUT: 575 mL / NET: -335 mL    05-07 @ 07:01  -  05-07 @ 09:55  --------------------------------------------------------  IN: 0 mL / OUT: 500 mL / NET: -500 mL          PHYSICAL EXAM:    Constitutional: NAD  Neck:  No JVD  Respiratory: CTAB/L  Cardiovascular: S1 and S2  Gastrointestinal: BS+, soft, NT/ND  Extremities: No peripheral edema  Neurological: A/O x 3, no focal deficits  Psychiatric: Normal mood, normal affect  : No Temple  Skin: No rashes  Access: Not applicable    LABS:                        7.7    4.16  )-----------( 120      ( 07 May 2024 05:45 )             23.5     05-07    144  |  110<H>  |  67<H>  ----------------------------<  101<H>  3.3<L>   |  23  |  3.36<H>    Ca    8.0<L>      07 May 2024 05:45  Phos  2.5     05-07  Mg     2.0     05-07    TPro  6.1  /  Alb  2.9<L>  /  TBili  0.9  /  DBili  x   /  AST  76<H>  /  ALT  68<H>  /  AlkPhos  144<H>  05-05          Urine Studies:  Urinalysis Basic - ( 07 May 2024 05:45 )    Color: x / Appearance: x / SG: x / pH: x  Gluc: 101 mg/dL / Ketone: x  / Bili: x / Urobili: x   Blood: x / Protein: x / Nitrite: x   Leuk Esterase: x / RBC: x / WBC x   Sq Epi: x / Non Sq Epi: x / Bacteria: x            RADIOLOGY & ADDITIONAL STUDIES:    < from: Xray Chest 2 Views PA/Lat (05.05.24 @ 11:33) >  IMPRESSION:  Small bilateral pleural effusions with adjacent atelectasis.    < end of copied text >  < from: Xray Chest 2 Views PA/Lat (05.05.24 @ 11:33) >  IMPRESSION:  Small bilateral pleural effusions with adjacent atelectasis.    < end of copied text >          
[facility] [phone]        Patient seen and examined in bed.  He was the same and was not SOB         MEDICATIONS  (STANDING):  aMIOdarone    Tablet 200 milliGRAM(s) Oral daily  atorvastatin 80 milliGRAM(s) Oral at bedtime  chlorhexidine 2% Cloths 1 Application(s) Topical daily  dextrose 10% Bolus 125 milliLiter(s) IV Bolus once  dextrose 5%. 1000 milliLiter(s) (100 mL/Hr) IV Continuous <Continuous>  dextrose 5%. 1000 milliLiter(s) (50 mL/Hr) IV Continuous <Continuous>  dextrose 50% Injectable 25 Gram(s) IV Push once  dextrose 50% Injectable 12.5 Gram(s) IV Push once  furosemide    Tablet 40 milliGRAM(s) Oral daily  glucagon  Injectable 1 milliGRAM(s) IntraMuscular once  heparin  Infusion.  Unit(s)/Hr (11 mL/Hr) IV Continuous <Continuous>  insulin glargine Injectable (LANTUS) 5 Unit(s) SubCutaneous at bedtime  insulin lispro (ADMELOG) corrective regimen sliding scale   SubCutaneous at bedtime  insulin lispro (ADMELOG) corrective regimen sliding scale   SubCutaneous three times a day before meals  pantoprazole    Tablet 40 milliGRAM(s) Oral two times a day  sodium bicarbonate 650 milliGRAM(s) Oral daily  sucralfate 1 Gram(s) Oral two times a day  tamsulosin 0.4 milliGRAM(s) Oral at bedtime      VITAL:  T(C): , Max: 36.7 (05-07-24 @ 12:07)  T(F): , Max: 98 (05-07-24 @ 12:07)  HR: 63 (05-08-24 @ 04:43)  BP: 122/66 (05-08-24 @ 04:43)  BP(mean): --  RR: 18 (05-08-24 @ 04:43)  SpO2: 100% (05-08-24 @ 04:43)  Wt(kg): --    I and O's:    05-07 @ 07:01  -  05-08 @ 07:00  --------------------------------------------------------  IN: 0 mL / OUT: 825 mL / NET: -825 mL          PHYSICAL EXAM:    Constitutional: NAD  Neck:  No JVD  Respiratory: CTAB/L  Cardiovascular: S1 and S2  Gastrointestinal: BS+, soft, NT/ND  Extremities: No peripheral edema  Neurological: A/O x 3, no focal deficits  Psychiatric: Normal mood, normal affect  : No Temple  Skin: No rashes  Access: Not applicable    LABS:                        8.4    4.34  )-----------( 117      ( 08 May 2024 07:01 )             26.6     05-07    144  |  110<H>  |  67<H>  ----------------------------<  101<H>  3.3<L>   |  23  |  3.36<H>    Ca    8.0<L>      07 May 2024 05:45  Phos  2.5     05-07  Mg     2.0     05-07            Urine Studies:  Urinalysis Basic - ( 07 May 2024 05:45 )    Color: x / Appearance: x / SG: x / pH: x  Gluc: 101 mg/dL / Ketone: x  / Bili: x / Urobili: x   Blood: x / Protein: x / Nitrite: x   Leuk Esterase: x / RBC: x / WBC x   Sq Epi: x / Non Sq Epi: x / Bacteria: x            RADIOLOGY & ADDITIONAL STUDIES:            
Patient is a 88y old  Male who presents with a chief complaint of NSTEMI (12 May 2024 11:45)      SUBJECTIVE / OVERNIGHT EVENTS: Comfortable without new complaints.   Review of Systems  chest pain no  palpitations no  sob no  nausea no  headache no    MEDICATIONS  (STANDING):  aMIOdarone    Tablet 200 milliGRAM(s) Oral daily  apixaban 2.5 milliGRAM(s) Oral two times a day  atorvastatin 80 milliGRAM(s) Oral at bedtime  chlorhexidine 2% Cloths 1 Application(s) Topical daily  dextrose 10% Bolus 125 milliLiter(s) IV Bolus once  dextrose 5%. 1000 milliLiter(s) (100 mL/Hr) IV Continuous <Continuous>  dextrose 5%. 1000 milliLiter(s) (50 mL/Hr) IV Continuous <Continuous>  dextrose 50% Injectable 25 Gram(s) IV Push once  dextrose 50% Injectable 12.5 Gram(s) IV Push once  furosemide    Tablet 40 milliGRAM(s) Oral daily  glucagon  Injectable 1 milliGRAM(s) IntraMuscular once  insulin glargine Injectable (LANTUS) 5 Unit(s) SubCutaneous at bedtime  insulin lispro (ADMELOG) corrective regimen sliding scale   SubCutaneous three times a day before meals  insulin lispro (ADMELOG) corrective regimen sliding scale   SubCutaneous at bedtime  pantoprazole    Tablet 40 milliGRAM(s) Oral two times a day  polyethylene glycol 3350 17 Gram(s) Oral once  sodium bicarbonate 650 milliGRAM(s) Oral daily  sodium chloride 0.9%. 500 milliLiter(s) (100 mL/Hr) IV Continuous <Continuous>  sucralfate 1 Gram(s) Oral two times a day  tamsulosin 0.4 milliGRAM(s) Oral at bedtime    MEDICATIONS  (PRN):  acetaminophen     Tablet .. 650 milliGRAM(s) Oral every 6 hours PRN Temp greater or equal to 38.5C (101.3F), Mild Pain (1 - 3)  dextrose Oral Gel 15 Gram(s) Oral once PRN Blood Glucose LESS THAN 70 milliGRAM(s)/deciliter      Vital Signs Last 24 Hrs  T(C): 36.6 (12 May 2024 11:33), Max: 36.6 (11 May 2024 20:34)  T(F): 97.8 (12 May 2024 11:33), Max: 97.8 (11 May 2024 20:34)  HR: 70 (12 May 2024 11:33) (62 - 70)  BP: 103/52 (12 May 2024 11:33) (103/52 - 119/57)  BP(mean): --  RR: 18 (12 May 2024 11:33) (18 - 18)  SpO2: 95% (12 May 2024 11:33) (95% - 98%)    Parameters below as of 12 May 2024 11:33  Patient On (Oxygen Delivery Method): room air        PHYSICAL EXAM:  GENERAL: NAD  HEAD:  Atraumatic, Normocephalic  EYES: EOMI, PERRLA, conjunctiva and sclera clear  NECK: Supple, No JVD  CHEST/LUNG: Clear to auscultation bilaterally; No wheeze  HEART: irregular rate and rhythm; No murmurs, rubs, or gallops  ABDOMEN: Soft, Nontender, Nondistended; Bowel sounds present  EXTREMITIES:  2+ Peripheral Pulses, No clubbing, cyanosis, or edema  PSYCH: AAOx3  NEUROLOGY: non-focal  SKIN: No rashes or lesions    LABS:                        9.8    4.37  )-----------( 107      ( 12 May 2024 06:36 )             29.7     05-12    141  |  106  |  53<H>  ----------------------------<  97  3.5   |  24  |  3.60<H>    Ca    8.6      12 May 2024 06:36  Mg     1.9     05-12      PTT - ( 12 May 2024 06:36 )  PTT:43.8 sec      Urinalysis Basic - ( 12 May 2024 06:36 )    Color: x / Appearance: x / SG: x / pH: x  Gluc: 97 mg/dL / Ketone: x  / Bili: x / Urobili: x   Blood: x / Protein: x / Nitrite: x   Leuk Esterase: x / RBC: x / WBC x   Sq Epi: x / Non Sq Epi: x / Bacteria: x          RADIOLOGY & ADDITIONAL TESTS:    Imaging Personally Reviewed:    Consultant(s) Notes Reviewed:      Care Discussed with Consultants/Other Providers:  
Patient is a 88y old  Male who presents with a chief complaint of SOB (06 May 2024 07:54)      SUBJECTIVE / OVERNIGHT EVENTS: Comfortable without new complaints.   Review of Systems  chest pain no  palpitations no  sob no  nausea no  headache no    MEDICATIONS  (STANDING):  aMIOdarone    Tablet 200 milliGRAM(s) Oral daily  atorvastatin 80 milliGRAM(s) Oral at bedtime  chlorhexidine 2% Cloths 1 Application(s) Topical daily  dextrose 10% Bolus 125 milliLiter(s) IV Bolus once  dextrose 5%. 1000 milliLiter(s) (100 mL/Hr) IV Continuous <Continuous>  dextrose 5%. 1000 milliLiter(s) (50 mL/Hr) IV Continuous <Continuous>  dextrose 50% Injectable 25 Gram(s) IV Push once  dextrose 50% Injectable 12.5 Gram(s) IV Push once  furosemide    Tablet 40 milliGRAM(s) Oral daily  glucagon  Injectable 1 milliGRAM(s) IntraMuscular once  heparin  Infusion.  Unit(s)/Hr (11 mL/Hr) IV Continuous <Continuous>  insulin glargine Injectable (LANTUS) 5 Unit(s) SubCutaneous at bedtime  insulin lispro (ADMELOG) corrective regimen sliding scale   SubCutaneous three times a day before meals  insulin lispro (ADMELOG) corrective regimen sliding scale   SubCutaneous at bedtime  pantoprazole    Tablet 40 milliGRAM(s) Oral two times a day  sodium bicarbonate 650 milliGRAM(s) Oral daily  sodium chloride 0.9%. 500 milliLiter(s) (100 mL/Hr) IV Continuous <Continuous>  sucralfate 1 Gram(s) Oral two times a day  tamsulosin 0.4 milliGRAM(s) Oral at bedtime    MEDICATIONS  (PRN):  acetaminophen     Tablet .. 650 milliGRAM(s) Oral every 6 hours PRN Temp greater or equal to 38.5C (101.3F), Mild Pain (1 - 3)  dextrose Oral Gel 15 Gram(s) Oral once PRN Blood Glucose LESS THAN 70 milliGRAM(s)/deciliter  heparin   Injectable 2000 Unit(s) IV Push every 6 hours PRN For aPTT between 40 - 57  heparin   Injectable 4500 Unit(s) IV Push every 6 hours PRN For aPTT less than 40      Vital Signs Last 24 Hrs  T(C): 36.2 (08 May 2024 17:30), Max: 36.4 (08 May 2024 04:43)  T(F): 97.2 (08 May 2024 17:30), Max: 97.6 (08 May 2024 11:19)  HR: 64 (08 May 2024 17:30) (60 - 65)  BP: 110/64 (08 May 2024 17:30) (104/55 - 122/66)  BP(mean): --  RR: 18 (08 May 2024 17:30) (16 - 18)  SpO2: 98% (08 May 2024 17:30) (97% - 100%)    Parameters below as of 08 May 2024 17:30  Patient On (Oxygen Delivery Method): nasal cannula  O2 Flow (L/min): 1      PHYSICAL EXAM:  GENERAL: NAD   HEAD:  Atraumatic, Normocephalic  EYES: EOMI, PERRLA, conjunctiva and sclera clear  NECK: Supple, No JVD  CHEST/LUNG: Clear to auscultation bilaterally; No wheeze  HEART: Regular rate and rhythm; No murmurs, rubs, or gallops  ABDOMEN: Soft, Nontender, Nondistended; Bowel sounds present  EXTREMITIES:  2+ Peripheral Pulses, No clubbing, cyanosis, or edema  PSYCH: AAOx3  NEUROLOGY: non-focal  SKIN: No rashes or lesions    LABS:                        8.4    4.34  )-----------( 117      ( 08 May 2024 07:01 )             26.6     05-07    144  |  110<H>  |  67<H>  ----------------------------<  101<H>  3.3<L>   |  23  |  3.36<H>    Ca    8.0<L>      07 May 2024 05:45  Phos  2.5     05-07  Mg     2.0     05-07      PTT - ( 08 May 2024 02:37 )  PTT:79.1 sec      Urinalysis Basic - ( 07 May 2024 05:45 )    Color: x / Appearance: x / SG: x / pH: x  Gluc: 101 mg/dL / Ketone: x  / Bili: x / Urobili: x   Blood: x / Protein: x / Nitrite: x   Leuk Esterase: x / RBC: x / WBC x   Sq Epi: x / Non Sq Epi: x / Bacteria: x          RADIOLOGY & ADDITIONAL TESTS:    Imaging Personally Reviewed:    Consultant(s) Notes Reviewed:      Care Discussed with Consultants/Other Providers:

## 2024-05-12 NOTE — DISCHARGE NOTE PROVIDER - NSDCCPCAREPLAN_GEN_ALL_CORE_FT
PRINCIPAL DISCHARGE DIAGNOSIS  Diagnosis: NSTEMI (non-ST elevation myocardial infarction)  Assessment and Plan of Treatment: Call your doctor if you have unusual chest pain, pressure, or discomfort, shortness of breath, nausea, vomiting, burping, heartburn, tingling upper body parts, sweating, cold, clammy sking, racing heartbeat  Call 911 if you think you are having a heart attack  Take all cardiac medications as prescribed - notify your doctor if you have any side effects  Follow cardiac diet - avoid fatty & fried foods, don't eat too much red meat, eat lots of fruits & vegetables, dairy products should be low fat  Lose weight if you are overweight  Become more active with walking, gardening, or any other activity that gets you to move        SECONDARY DISCHARGE DIAGNOSES  Diagnosis: CAD (coronary artery disease)  Assessment and Plan of Treatment: Coronary artery disease is a condition where the arteries the supply the heart muscle get clogges with fatty deposits & puts you at risk for a heart attack  Call your doctor if you have any new pain, pressure, or discomfort in the center of your chest, pain, tingling or discomfort in arms, back, neck, jaw, or stomach, shortness of breath, nausea, vomiting, burping or heartburn, sweating, cold and clammy skin, racing or abnormal heartbeat for more than 10 minutes or if they keep coming & going.  Call 911 and do not tr to get to hospital by care  You can help yourself with lefestyle changes (quitting smoking if you smoke), eat lots of fruits & vegetables & low fat dairy products, not a lot of meat & fatty foods, walk or some form of physical activity most days of the week, lose weight if you are overweight  Take your cardiac medication as prescribed to lower cholesterol, to lower blood pressure, aspirin to prevent blood clots, and diabetes control  Make sure to keep appointments with doctor for cardiac follow up care      Diagnosis: MR (mitral regurgitation)  Assessment and Plan of Treatment: Follow up with "Structural Heart Specialist" for recommendations    Diagnosis: PAF (paroxysmal atrial fibrillation)  Assessment and Plan of Treatment: Atrial fibrillation is the most common heart rhythm problem & has the risk of stroke & heart attack  It helps if you control your blood pressure, not drink more than 1-2 alcohol drinks per day, cut down on caffeine, getting treatment for over active thyroid gland, & getting exercise  Call your doctor if you feel your heart racing or beating unusually, chest tightness or pain, lightheaded, faint, shortness of breath especially with exercise  It is important to take your heart medication as prescribed  You may be on anticoagulation which is very important to take as directed - you may need blood work to monitor drug levels

## 2024-05-12 NOTE — DISCHARGE NOTE PROVIDER - NSDCFUSCHEDAPPT_GEN_ALL_CORE_FT
Carline Osei  [place] [facility]  [id number]  Scheduled Appointment: 05/14/2024    Carline Osei  [facility]  [facility] Amb Surg Endoscopy  Scheduled Appointment: 05/14/2024    Poornima Lim  [place] [facility]  UROLOGY [address]  Scheduled Appointment: 06/21/2024     Poornima Lim  [facility]  UROLOGY [address]  Scheduled Appointment: 06/21/2024    Carline Osei  [facility]  [address]  Scheduled Appointment: 08/08/2024

## 2024-05-12 NOTE — PROGRESS NOTE ADULT - ASSESSMENT
A/p  89yo w/ PMHx CKD, HFrEF, Diabetes, TIA,  CAD s/p stenting, bladder cancer s/p chemo in remission, afib on eliquis, hx of left nephrectomy, recent admission for NSTEMI pw SOB.    #NSTEMI  -Recent admission with NSTEMI  -Trops elevated this admission  -[facility] severe obstructive CAD to LCx  -Heparin transitioned to DOAC yesterday - h/h appears stable   -Will hold off on antiplatelets in attempt to minimize bleed risk    #CAD, s/p PCI  -cv stable, no chest pain  -Cont statin  -[facility] w/ severe obstructive CAD to LCx/om and Mild to moderate in-stent restenosis of left anterior descending and right coronary artery stents  -Will manage the above medically for now as pt is high risk for developing ELIZA and requiring HD with staged PCI  -Appreciate renal input   -Will defer antiplatelets as above    #Sev MR   -MARCELLA as above  -[facility] as above   -Structural heart f/u noted - not a candidate for MV clip/repair at this time     #chronic HFrEF  -volume status stable   -Recent MARCELLA with moderately decreased LVEF, regional WMA, severe MR   -lasix po     #Atrial Fibrillation  -SR on tele   -switch to eliquis as above   -cont amio  -Eventual outpt f/u for poss watchman    #CKD/ [facility]   -renal f/u    #Hx GIB  -h/h noted- sp PRBCs 5/10 with appropriate repsonse  -Appreciate GI eval - no CI to starting DOAC   -med fu         no cv ci to d/c

## 2024-05-12 NOTE — PROGRESS NOTE ADULT - ASSESSMENT
on room air  afebrile  states " i am fine and i think i should be going home today"    acetaminophen     Tablet .. 650 milliGRAM(s) Oral every 6 hours PRN  aMIOdarone    Tablet 200 milliGRAM(s) Oral daily  apixaban 2.5 milliGRAM(s) Oral two times a day  atorvastatin 80 milliGRAM(s) Oral at bedtime  chlorhexidine 2% Cloths 1 Application(s) Topical daily  dextrose 10% Bolus 125 milliLiter(s) IV Bolus once  dextrose 5%. 1000 milliLiter(s) IV Continuous <Continuous>  dextrose 5%. 1000 milliLiter(s) IV Continuous <Continuous>  dextrose 50% Injectable 25 Gram(s) IV Push once  dextrose 50% Injectable 12.5 Gram(s) IV Push once  dextrose Oral Gel 15 Gram(s) Oral once PRN  furosemide    Tablet 40 milliGRAM(s) Oral daily  glucagon  Injectable 1 milliGRAM(s) IntraMuscular once  insulin glargine Injectable (LANTUS) 5 Unit(s) SubCutaneous at bedtime  insulin lispro (ADMELOG) corrective regimen sliding scale   SubCutaneous three times a day before meals  insulin lispro (ADMELOG) corrective regimen sliding scale   SubCutaneous at bedtime  pantoprazole    Tablet 40 milliGRAM(s) Oral two times a day  polyethylene glycol 3350 17 Gram(s) Oral once  sodium bicarbonate 650 milliGRAM(s) Oral daily  sodium chloride 0.9%. 500 milliLiter(s) IV Continuous <Continuous>  sucralfate 1 Gram(s) Oral two times a day  tamsulosin 0.4 milliGRAM(s) Oral at bedtime      VITAL:  T(C): , Max: 36.6 (05-11-24 @ 11:33)  T(F): , Max: 97.8 (05-11-24 @ 11:33)  HR: 63 (05-12-24 @ 08:51)  BP: 103/55 (05-12-24 @ 08:51)  BP(mean): --  RR: 18 (05-12-24 @ 08:51)  SpO2: 98% (05-12-24 @ 08:51)  Wt(kg): --    05-11-24 @ 07:01  -  05-12-24 @ 07:00  --------------------------------------------------------  IN: 0 mL / OUT: 500 mL / NET: -500 mL        PHYSICAL EXAM:  Constitutional: NAD  Neck:  No JVD  Respiratory: CTAB/L  Cardiovascular: S1 and S2  Gastrointestinal: BS+, soft, NT/ND  Extremities: No peripheral edema  Neurological: A/O x 3, no focal deficits  Psychiatric: Normal mood, normal affect  : No Temple  Skin: No rashes  Access: Not applicable    LABS:                          9.8    4.37  )-----------( 107      ( 12 May 2024 06:36 )             29.7     Na(141)/K(3.5)/Cl(106)/HCO3(24)/BUN(53)/Cr(3.60)Glu(97)/Ca(8.6)/Mg(1.9)/PO4(--)    05-12 @ 06:36  Na(142)/K(3.5)/Cl(106)/HCO3(23)/BUN(57)/Cr(3.50)Glu(108)/Ca(8.5)/Mg(--)/PO4(--)    05-11 @ 07:45  Na(141)/K(4.2)/Cl(108)/HCO3(20)/BUN(58)/Cr(3.52)Glu(138)/Ca(8.0)/Mg(--)/PO4(--)    05-10 @ 07:05    Urinalysis Basic - ( 12 May 2024 06:36 )    Color: x / Appearance: x / SG: x / pH: x  Gluc: 97 mg/dL / Ketone: x  / Bili: x / Urobili: x   Blood: x / Protein: x / Nitrite: x   Leuk Esterase: x / RBC: x / WBC x   Sq Epi: x / Non Sq Epi: x / Bacteria: x            ASSESSMENT/PLAN  Assessment:  88y Male with CAD, HFrEF, Afib, HTN, DM, solitary R kidney and CKD p/w acute on chronic HFrEF and acute on chronic kidney injury.  Nephrotic syndrome   SOB and severe MR and moderate AR   LCX disease     Renal - CKD: stage 4 with solitary R kidney and baseline creatinine ~3s    Due to his age we (pt and Dr. Gusman )have decided on no renal bx    Please hold on  PO Lasix today  due to scr trending up . Can restart tomorrow.  Lytes- controlled  CVS- BP acceptable at present  Cath was done and needs stent to LCX.  The issue is contrast load and potential HD and not to mention the issue of A/C vs GI bleed        Anemia in CKD- hgb improved and stable s/p 1 unit prbc  5/10/24      CV- acute on chronic HFrEF     Mitral clip is off the table at present      GI- SP EGD    Pt family is requesting second GI opinion.     Edward Amaya, NP-BC  [facility]  [phone]

## 2024-05-12 NOTE — DISCHARGE NOTE PROVIDER - PROVIDER TOKENS
PROVIDER:[TOKEN:[[id number]]],PROVIDER:[TOKEN:[[id number]]],PROVIDER:[TOKEN:[[id number]]],PROVIDER:[TOKEN:[[id number]]]

## 2024-05-12 NOTE — DISCHARGE NOTE PROVIDER - HOSPITAL COURSE
HPI: 87yo w/ PMHx CKD, HFrEF, Diabetes, TIA,  CAD s/p stenting, bladder cancer s/p chemo (in remission), afib on eliquis, hx of left nephrectomy and recent admission for NSTEMI. Presented with SOB.  Hospital Course: Admitted with NSTEMI,   #CAD, s/p PCI - hold asa for now to minimize bleed risk given doac need and prbc's tx  -sp LHC w/ severe obstructive CAD to LCx/om and Mild to moderate in-stent restenosis of left anterior descending and right coronary artery stents  -Plan to manage the above medically for now as pt is high risk for developing ELIZA and requiring HD with staged PCI  - hold off on antiplatelets in attempt to minimize bleed risk  #Severe MR seen by Structural heart - not a candidate for MV clip/repair at this time   #chronic HFrEF - volume status stable -Recent MARCELLA with moderately decreased LVEF, regional WMA, severe MR   	-lasix po   #Atrial Fibrillation-SR on tele - on eliquis and Amio -Eventual outpt f/u for possible watchman  #CKD/ gama   #Hx GIB - sp PRBCs 5/10 with appropriate response, Seen by GI  - no contraindication to starting DOAC     Important Medication Changes and Reason: Lasix (Furosemide) dose INCREASED from 20mg to 40mg - new prescription was sent     Active or Pending Issues Requiring Follow-up:  Out Patient Healthcare Providers    Advanced Directives:   [X] Full code  [ ] DNR  [ ] Hospice    Discharge Diagnoses:  NSTEMI  Obstructive CAD  Severe Mitral Regurgitation   Paroxysmal Atrial Fibrillation  CKD 4  Anemia  Insulin Dependant Diabetes

## 2024-05-12 NOTE — PROGRESS NOTE ADULT - TIME BILLING
agree with above  cv stable  Plan for LHC on wednesday prior to eventual mitraclip  Continue heparin gtt  Off antiplatelet as above
Agree with above ACP note.  cv stable  no chest pain  no decomp HF  -sp LHC w/ severe obstructive CAD to LCx/om and Mild to moderate in-stent restenosis of left anterior descending and right coronary artery stents  -Will manage the above medically for now as pt is high risk for developing ELIZA and requiring HD with staged PCI as well as high risk for dap and single antiplat/doac given recurrent bleed, anemia req prbcs  -will manage cad, mr medically for now given above and stable cv status w/o angina, worsening HF  restart doac, monitor heme  hold asa for now to minimize bleed risk given doac need and prbc's tx
agree with above  cv stable  Plan for LHC today prior to eventual mitraclip  Continue heparin gtt  Off antiplatelet as above
Agree with above ACP note.  cv stable  no chest pain  no decomp HF  -sp LHC w/ severe obstructive CAD to LCx/om and Mild to moderate in-stent restenosis of left anterior descending and right coronary artery stents  -Will manage the above medically for now as pt is high risk for developing ELIZA and requiring HD with staged PCI as well as high risk for dap and single antiplat/doac given recurrent bleed, anemia req prbcs  -will manage cad, mr medically for now given above and stable cv status w/o angina, worsening HF  restart doac, monitor heme  hold asa for now to minimize bleed risk given doac need and prbc's tx
Agree with above ACP note.  s/p cath with severe calcified proximal LCX and proximal OM disease  high risk lesions which will likely require atherectomy and significant contrast use   cad possibly playing a role with ischemic MR  cont med tx for now   patient a high risk for vikki/acute hd need post high risk pci of both vessel   will d/w patient and renal regarding ckd/esrd and consideration of starting hd before attempt of pci and even mitraclip
Patient seen and examined.  Agree with above ACP note.  cv sv stable  no chest pain  no decomp HF  -sp LHC w/ severe obstructive CAD to LCx/om and Mild to moderate in-stent restenosis of left anterior descending and right coronary artery stents  -Will manage the above medically for now as pt is high risk for developing ELIZA and requiring HD with staged PCI as well as high risk for dap and single antiplat/doac given recurrent bleed, anemia req prbcs  -will manage cad, mr medically for now given above and stable cv status w/o angina, worsening HF

## 2024-05-12 NOTE — DISCHARGE NOTE NURSING/CASE MANAGEMENT/SOCIAL WORK - NSDCPEFALRISK_GEN_ALL_CORE
For information on Fall & Injury Prevention, visit: [email or web address] OR  [email or web address] OR  https://www.cdc.gov/steadi/patient.html

## 2024-05-12 NOTE — DISCHARGE NOTE PROVIDER - NSDCMRMEDTOKEN_GEN_ALL_CORE_FT
amiodarone 200 mg oral tablet: 1 tab(s) orally once a day   apixaban 2.5 mg oral tablet: 1 tab(s) orally every 12 hours  Crestor 40 mg oral tablet: 1 tab(s) orally once a day (at bedtime)  furosemide 40 mg oral tablet: 1 tab(s) orally once a day  pantoprazole 40 mg oral delayed release tablet: 1 tab(s) orally 2 times a day  repaglinide 0.5 mg oral tablet: 1 tab(s) orally 3 times a day (before meals)  sodium bicarbonate 650 mg oral tablet: 1 tab(s) orally once a day  sucralfate 1 g oral tablet: 1 tab(s) orally 2 times a day  tamsulosin 0.4 mg oral capsule: 1 cap(s) orally once a day  Tresiba FlexTouch 100 units/mL subcutaneous solution: 5 solution subcutaneous once a day (at bedtime)

## 2024-05-12 NOTE — PROGRESS NOTE ADULT - PROVIDER SPECIALTY LIST ADULT
Cardiology
Cardiology
Internal Medicine
Internal Medicine
Nephrology
Cardiology
Internal Medicine
Internal Medicine
Nephrology
Nephrology
Cardiology
Internal Medicine
Nephrology
Nephrology

## 2024-05-12 NOTE — DISCHARGE NOTE NURSING/CASE MANAGEMENT/SOCIAL WORK - PATIENT PORTAL LINK FT
You can access the FollowMyHealth Patient Portal offered by [facility] by registering at the following website: [email or web address]. By joining [facility]’s FollowMyHealth portal, you will also be able to view your health information using other applications (apps) compatible with our system.

## 2024-05-12 NOTE — PROGRESS NOTE ADULT - ASSESSMENT
88 m with    Acute on Chronic Systolic Congestive Heart Failure   - telemetry  - diurese  - echo noted  - cardiology follow Dr Vega    Anemia due to acute blood loss.   - s/p EGD   - hemoglobin stable on Heparin gtt/ Restart apixaban   - Gastroenterology evaluation noted    Elevated troponin level.   - trend  - cardiology evaluation    Stage 4 chronic kidney disease. / Single kidney  - Nephrology eval up appreciated  continue to follow recommendations  likely at current baseline   no nephrotoxic meds.    Paroxysmal atrial fibrillation.   - heart rate controlled   - continue amiodarone  - MARCELLA severe MR  -  LHC with one vessel disease. no plans for PCI and Mitraclip   -  DC AC with Heparin gtt/ Restart  Eliquis     BPH without urinary obstruction.   - continue tamsulosin.    Insulin dependent type 2 diabetes mellitus.   - HbA1C normal recently   - finger sticks with short acting insulin sliding scale  -  oral meds  - diabetic diet  - monitor hypoglycemia.    Need for prophylactic measure.   - AC    DC home. Follow with PMD/ Cardiology/ Nephrology in 3-4 days.    d/w patient and ACP     Tristan Ridley MD phone [phone]

## 2024-05-12 NOTE — DISCHARGE NOTE PROVIDER - CARE PROVIDERS DIRECT ADDRESSES
[email or web address]

## 2024-05-12 NOTE — DISCHARGE NOTE PROVIDER - NSDCFUADDAPPT_GEN_ALL_CORE_FT
APPTS ARE READY TO BE MADE: [X] YES    Best Family or Patient Contact (if needed):    Additional Information about above appointments (if needed):    1:   2:   3:     Other comments or requests:    APPTS ARE READY TO BE MADE: [X] YES    Best Family or Patient Contact (if needed):    Additional Information about above appointments (if needed):    1:   2:   3:     Other comments or requests:   Patient informed us they already have secured a follow up appointment which is not visible on So[facility]. 05/16 with Dr. Syed and 05/20 with Dr. Gusman    Patient was provided with referral details by phone or email for a [facility] provider and will coordinate the appointment on their own.   APPTS ARE READY TO BE MADE: [X] YES    Best Family or Patient Contact (if needed):    Additional Information about above appointments (if needed):    1:   2:   3:     Other comments or requests:   Patient informed us they already have secured a follow up appointment which is not visible on So[facility]. 05/16 with Dr. Syed and 05/20 with Dr. Gusman 08/08 11:00am with Dr. Osei 06/21 11:10am with Dr. Lim    Patient was provided with referral details by phone or email for a [facility] provider and will coordinate the appointment on their own.

## 2024-05-12 NOTE — DISCHARGE NOTE PROVIDER - CARE PROVIDER_API CALL
Reginaldo Syed  Cardiovascular Disease  [address] [place] [address]  Phone: [phone]  Fax: [phone]  Follow Up Time:     José Luis Man  Interventional Cardiology  [address], [address] [place] [address]  Phone: [phone]  Fax: [phone]  Follow Up Time:     Shahriar Lim  Urology  [address], [address]  [place] [address]  Phone: [phone]  Fax: [phone]  Follow Up Time:     Carline Osei  Gastroenterology  [address] [place] [address]  Phone: [phone]  Fax: [phone]  Follow Up Time:

## 2024-05-14 ENCOUNTER — APPOINTMENT (OUTPATIENT)
Dept: GASTROENTEROLOGY | Facility: HOSPITAL | Age: 89
End: 2024-05-14

## 2024-05-20 ENCOUNTER — INPATIENT (INPATIENT)
Facility: HOSPITAL | Age: 89
LOS: 10 days | Discharge: HOME | End: 2024-05-31
Attending: INTERNAL MEDICINE | Admitting: INTERNAL MEDICINE
Payer: MEDICARE

## 2024-05-20 VITALS
HEIGHT: 67 IN | RESPIRATION RATE: 16 BRPM | SYSTOLIC BLOOD PRESSURE: 116 MMHG | HEART RATE: 78 BPM | TEMPERATURE: 98 F | DIASTOLIC BLOOD PRESSURE: 66 MMHG | OXYGEN SATURATION: 99 %

## 2024-05-20 DIAGNOSIS — E11.9 TYPE 2 DIABETES MELLITUS WITHOUT COMPLICATIONS: ICD-10-CM

## 2024-05-20 DIAGNOSIS — Z98.41 CATARACT EXTRACTION STATUS, RIGHT EYE: Chronic | ICD-10-CM

## 2024-05-20 DIAGNOSIS — I50.20 UNSPECIFIED SYSTOLIC (CONGESTIVE) HEART FAILURE: ICD-10-CM

## 2024-05-20 DIAGNOSIS — I48.0 PAROXYSMAL ATRIAL FIBRILLATION: ICD-10-CM

## 2024-05-20 DIAGNOSIS — Z90.5 ACQUIRED ABSENCE OF KIDNEY: Chronic | ICD-10-CM

## 2024-05-20 DIAGNOSIS — E78.5 HYPERLIPIDEMIA, UNSPECIFIED: ICD-10-CM

## 2024-05-20 DIAGNOSIS — R19.5 OTHER FECAL ABNORMALITIES: ICD-10-CM

## 2024-05-20 DIAGNOSIS — Z95.5 PRESENCE OF CORONARY ANGIOPLASTY IMPLANT AND GRAFT: Chronic | ICD-10-CM

## 2024-05-20 DIAGNOSIS — K92.2 GASTROINTESTINAL HEMORRHAGE, UNSPECIFIED: ICD-10-CM

## 2024-05-20 DIAGNOSIS — Z98.890 OTHER SPECIFIED POSTPROCEDURAL STATES: Chronic | ICD-10-CM

## 2024-05-20 DIAGNOSIS — I10 ESSENTIAL (PRIMARY) HYPERTENSION: ICD-10-CM

## 2024-05-20 DIAGNOSIS — I25.10 ATHEROSCLEROTIC HEART DISEASE OF NATIVE CORONARY ARTERY WITHOUT ANGINA PECTORIS: ICD-10-CM

## 2024-05-20 LAB
ALBUMIN SERPL ELPH-MCNC: 2.9 G/DL — LOW (ref 3.3–5)
ALP SERPL-CCNC: 137 U/L — HIGH (ref 40–120)
ALT FLD-CCNC: 53 U/L — HIGH (ref 4–41)
ANION GAP SERPL CALC-SCNC: 15 MMOL/L — HIGH (ref 7–14)
APTT BLD: 42.3 SEC — HIGH (ref 24.5–35.6)
AST SERPL-CCNC: 71 U/L — HIGH (ref 4–40)
BASE EXCESS BLDV CALC-SCNC: -0.9 MMOL/L — SIGNIFICANT CHANGE UP (ref -2–3)
BASOPHILS # BLD AUTO: 0.04 K/UL — SIGNIFICANT CHANGE UP (ref 0–0.2)
BASOPHILS NFR BLD AUTO: 1 % — SIGNIFICANT CHANGE UP (ref 0–2)
BILIRUB SERPL-MCNC: 0.8 MG/DL — SIGNIFICANT CHANGE UP (ref 0.2–1.2)
BLD GP AB SCN SERPL QL: NEGATIVE — SIGNIFICANT CHANGE UP
BLOOD GAS VENOUS COMPREHENSIVE RESULT: SIGNIFICANT CHANGE UP
BUN SERPL-MCNC: 78 MG/DL — HIGH (ref 7–23)
CALCIUM SERPL-MCNC: 8.2 MG/DL — LOW (ref 8.4–10.5)
CHLORIDE BLDV-SCNC: 110 MMOL/L — HIGH (ref 96–108)
CHLORIDE SERPL-SCNC: 107 MMOL/L — SIGNIFICANT CHANGE UP (ref 98–107)
CO2 BLDV-SCNC: 22.7 MMOL/L — SIGNIFICANT CHANGE UP (ref 22–26)
CO2 SERPL-SCNC: 20 MMOL/L — LOW (ref 22–31)
CREAT SERPL-MCNC: 3.71 MG/DL — HIGH (ref 0.5–1.3)
EGFR: 15 ML/MIN/1.73M2 — LOW
EOSINOPHIL # BLD AUTO: 0.22 K/UL — SIGNIFICANT CHANGE UP (ref 0–0.5)
EOSINOPHIL NFR BLD AUTO: 5.3 % — SIGNIFICANT CHANGE UP (ref 0–6)
GAS PNL BLDV: 137 MMOL/L — SIGNIFICANT CHANGE UP (ref 136–145)
GLUCOSE BLDC GLUCOMTR-MCNC: 124 MG/DL — HIGH (ref 70–99)
GLUCOSE BLDC GLUCOMTR-MCNC: 126 MG/DL — HIGH (ref 70–99)
GLUCOSE BLDC GLUCOMTR-MCNC: 131 MG/DL — HIGH (ref 70–99)
GLUCOSE BLDV-MCNC: 126 MG/DL — HIGH (ref 70–99)
GLUCOSE SERPL-MCNC: 131 MG/DL — HIGH (ref 70–99)
HCO3 BLDV-SCNC: 22 MMOL/L — SIGNIFICANT CHANGE UP (ref 22–29)
HCT VFR BLD CALC: 25.2 % — LOW (ref 39–50)
HCT VFR BLDA CALC: 26 % — LOW (ref 39–51)
HGB BLD CALC-MCNC: 8.6 G/DL — LOW (ref 12.6–17.4)
HGB BLD-MCNC: 8 G/DL — LOW (ref 13–17)
IANC: 2.57 K/UL — SIGNIFICANT CHANGE UP (ref 1.8–7.4)
IMM GRANULOCYTES NFR BLD AUTO: 0.2 % — SIGNIFICANT CHANGE UP (ref 0–0.9)
INR BLD: 1.59 RATIO — HIGH (ref 0.85–1.18)
LACTATE BLDV-MCNC: 1.6 MMOL/L — SIGNIFICANT CHANGE UP (ref 0.5–2)
LYMPHOCYTES # BLD AUTO: 0.72 K/UL — LOW (ref 1–3.3)
LYMPHOCYTES # BLD AUTO: 17.4 % — SIGNIFICANT CHANGE UP (ref 13–44)
MCHC RBC-ENTMCNC: 31 PG — SIGNIFICANT CHANGE UP (ref 27–34)
MCHC RBC-ENTMCNC: 31.7 GM/DL — LOW (ref 32–36)
MCV RBC AUTO: 97.7 FL — SIGNIFICANT CHANGE UP (ref 80–100)
MONOCYTES # BLD AUTO: 0.57 K/UL — SIGNIFICANT CHANGE UP (ref 0–0.9)
MONOCYTES NFR BLD AUTO: 13.8 % — SIGNIFICANT CHANGE UP (ref 2–14)
NEUTROPHILS # BLD AUTO: 2.57 K/UL — SIGNIFICANT CHANGE UP (ref 1.8–7.4)
NEUTROPHILS NFR BLD AUTO: 62.3 % — SIGNIFICANT CHANGE UP (ref 43–77)
NRBC # BLD: 0 /100 WBCS — SIGNIFICANT CHANGE UP (ref 0–0)
NRBC # FLD: 0 K/UL — SIGNIFICANT CHANGE UP (ref 0–0)
PCO2 BLDV: 28 MMHG — LOW (ref 42–55)
PH BLDV: 7.5 — HIGH (ref 7.32–7.43)
PLATELET # BLD AUTO: 128 K/UL — LOW (ref 150–400)
PO2 BLDV: 135 MMHG — HIGH (ref 25–45)
POTASSIUM BLDV-SCNC: 3.6 MMOL/L — SIGNIFICANT CHANGE UP (ref 3.5–5.1)
POTASSIUM SERPL-MCNC: 3.5 MMOL/L — SIGNIFICANT CHANGE UP (ref 3.5–5.3)
POTASSIUM SERPL-SCNC: 3.5 MMOL/L — SIGNIFICANT CHANGE UP (ref 3.5–5.3)
PROT SERPL-MCNC: 6.2 G/DL — SIGNIFICANT CHANGE UP (ref 6–8.3)
PROTHROM AB SERPL-ACNC: 17.7 SEC — HIGH (ref 9.5–13)
RBC # BLD: 2.58 M/UL — LOW (ref 4.2–5.8)
RBC # FLD: 16.4 % — HIGH (ref 10.3–14.5)
RH IG SCN BLD-IMP: POSITIVE — SIGNIFICANT CHANGE UP
SAO2 % BLDV: 98.4 % — HIGH (ref 67–88)
SODIUM SERPL-SCNC: 142 MMOL/L — SIGNIFICANT CHANGE UP (ref 135–145)
WBC # BLD: 4.13 K/UL — SIGNIFICANT CHANGE UP (ref 3.8–10.5)
WBC # FLD AUTO: 4.13 K/UL — SIGNIFICANT CHANGE UP (ref 3.8–10.5)

## 2024-05-20 PROCEDURE — 99497 ADVNCD CARE PLAN 30 MIN: CPT | Mod: 25

## 2024-05-20 PROCEDURE — 99223 1ST HOSP IP/OBS HIGH 75: CPT

## 2024-05-20 PROCEDURE — 99285 EMERGENCY DEPT VISIT HI MDM: CPT

## 2024-05-20 PROCEDURE — 99222 1ST HOSP IP/OBS MODERATE 55: CPT | Mod: GC

## 2024-05-20 RX ORDER — SODIUM BICARBONATE 1 MEQ/ML
650 SYRINGE (ML) INTRAVENOUS DAILY
Refills: 0 | Status: DISCONTINUED | OUTPATIENT
Start: 2024-05-20 | End: 2024-05-31

## 2024-05-20 RX ORDER — DEXTROSE 50 % IN WATER 50 %
12.5 SYRINGE (ML) INTRAVENOUS ONCE
Refills: 0 | Status: DISCONTINUED | OUTPATIENT
Start: 2024-05-20 | End: 2024-05-31

## 2024-05-20 RX ORDER — FUROSEMIDE 40 MG
40 TABLET ORAL DAILY
Refills: 0 | Status: DISCONTINUED | OUTPATIENT
Start: 2024-05-21 | End: 2024-05-22

## 2024-05-20 RX ORDER — SODIUM CHLORIDE 9 MG/ML
1000 INJECTION, SOLUTION INTRAVENOUS
Refills: 0 | Status: DISCONTINUED | OUTPATIENT
Start: 2024-05-20 | End: 2024-05-31

## 2024-05-20 RX ORDER — TAMSULOSIN HYDROCHLORIDE 0.4 MG/1
0.4 CAPSULE ORAL AT BEDTIME
Refills: 0 | Status: DISCONTINUED | OUTPATIENT
Start: 2024-05-20 | End: 2024-05-31

## 2024-05-20 RX ORDER — ROSUVASTATIN CALCIUM 5 MG/1
1 TABLET ORAL
Refills: 0 | DISCHARGE

## 2024-05-20 RX ORDER — INSULIN LISPRO 100/ML
VIAL (ML) SUBCUTANEOUS AT BEDTIME
Refills: 0 | Status: DISCONTINUED | OUTPATIENT
Start: 2024-05-20 | End: 2024-05-31

## 2024-05-20 RX ORDER — REPAGLINIDE 1 MG/1
1 TABLET ORAL
Refills: 0 | DISCHARGE

## 2024-05-20 RX ORDER — PANTOPRAZOLE SODIUM 20 MG/1
80 TABLET, DELAYED RELEASE ORAL ONCE
Refills: 0 | Status: COMPLETED | OUTPATIENT
Start: 2024-05-20 | End: 2024-05-20

## 2024-05-20 RX ORDER — SUCRALFATE 1 G
1 TABLET ORAL
Refills: 0 | Status: DISCONTINUED | OUTPATIENT
Start: 2024-05-20 | End: 2024-05-31

## 2024-05-20 RX ORDER — PANTOPRAZOLE SODIUM 20 MG/1
40 TABLET, DELAYED RELEASE ORAL EVERY 12 HOURS
Refills: 0 | Status: DISCONTINUED | OUTPATIENT
Start: 2024-05-20 | End: 2024-05-31

## 2024-05-20 RX ORDER — CHLORHEXIDINE GLUCONATE 213 G/1000ML
1 SOLUTION TOPICAL DAILY
Refills: 0 | Status: DISCONTINUED | OUTPATIENT
Start: 2024-05-20 | End: 2024-05-31

## 2024-05-20 RX ORDER — ACETAMINOPHEN 500 MG
650 TABLET ORAL EVERY 6 HOURS
Refills: 0 | Status: DISCONTINUED | OUTPATIENT
Start: 2024-05-20 | End: 2024-05-31

## 2024-05-20 RX ORDER — TAMSULOSIN HYDROCHLORIDE 0.4 MG/1
1 CAPSULE ORAL
Qty: 0 | Refills: 0 | DISCHARGE

## 2024-05-20 RX ORDER — DEXTROSE 50 % IN WATER 50 %
25 SYRINGE (ML) INTRAVENOUS ONCE
Refills: 0 | Status: DISCONTINUED | OUTPATIENT
Start: 2024-05-20 | End: 2024-05-31

## 2024-05-20 RX ORDER — LANOLIN ALCOHOL/MO/W.PET/CERES
3 CREAM (GRAM) TOPICAL AT BEDTIME
Refills: 0 | Status: DISCONTINUED | OUTPATIENT
Start: 2024-05-20 | End: 2024-05-31

## 2024-05-20 RX ORDER — DEXTROSE 10 % IN WATER 10 %
125 INTRAVENOUS SOLUTION INTRAVENOUS ONCE
Refills: 0 | Status: DISCONTINUED | OUTPATIENT
Start: 2024-05-20 | End: 2024-05-31

## 2024-05-20 RX ORDER — ONDANSETRON 8 MG/1
4 TABLET, FILM COATED ORAL EVERY 8 HOURS
Refills: 0 | Status: DISCONTINUED | OUTPATIENT
Start: 2024-05-20 | End: 2024-05-31

## 2024-05-20 RX ORDER — SODIUM BICARBONATE 1 MEQ/ML
1 SYRINGE (ML) INTRAVENOUS
Qty: 0 | Refills: 0 | DISCHARGE

## 2024-05-20 RX ORDER — GLUCAGON INJECTION, SOLUTION 0.5 MG/.1ML
1 INJECTION, SOLUTION SUBCUTANEOUS ONCE
Refills: 0 | Status: DISCONTINUED | OUTPATIENT
Start: 2024-05-20 | End: 2024-05-31

## 2024-05-20 RX ORDER — INSULIN DEGLUDEC 100 U/ML
5 INJECTION, SOLUTION SUBCUTANEOUS
Refills: 0 | DISCHARGE

## 2024-05-20 RX ORDER — AMIODARONE HYDROCHLORIDE 400 MG/1
200 TABLET ORAL DAILY
Refills: 0 | Status: DISCONTINUED | OUTPATIENT
Start: 2024-05-20 | End: 2024-05-31

## 2024-05-20 RX ORDER — INSULIN LISPRO 100/ML
VIAL (ML) SUBCUTANEOUS
Refills: 0 | Status: DISCONTINUED | OUTPATIENT
Start: 2024-05-20 | End: 2024-05-31

## 2024-05-20 RX ORDER — INSULIN GLARGINE 100 [IU]/ML
5 INJECTION, SOLUTION SUBCUTANEOUS AT BEDTIME
Refills: 0 | Status: DISCONTINUED | OUTPATIENT
Start: 2024-05-20 | End: 2024-05-31

## 2024-05-20 RX ORDER — DEXTROSE 50 % IN WATER 50 %
15 SYRINGE (ML) INTRAVENOUS ONCE
Refills: 0 | Status: DISCONTINUED | OUTPATIENT
Start: 2024-05-20 | End: 2024-05-31

## 2024-05-20 RX ORDER — ATORVASTATIN CALCIUM 80 MG/1
80 TABLET, FILM COATED ORAL AT BEDTIME
Refills: 0 | Status: DISCONTINUED | OUTPATIENT
Start: 2024-05-20 | End: 2024-05-31

## 2024-05-20 RX ADMIN — PANTOPRAZOLE SODIUM 80 MILLIGRAM(S): 20 TABLET, DELAYED RELEASE ORAL at 11:11

## 2024-05-20 RX ADMIN — PANTOPRAZOLE SODIUM 40 MILLIGRAM(S): 20 TABLET, DELAYED RELEASE ORAL at 17:25

## 2024-05-20 NOTE — H&P ADULT - PROBLEM SELECTOR PLAN 4
Chronic stable  [id number]: 8  Per GI hold Eliquis 5mg BID -> consider Cardiology consult  Continue amiodarone 200mg daily.   Monitor

## 2024-05-20 NOTE — ED PROVIDER NOTE - OBJECTIVE STATEMENT
88-year-old male with pmh of presents to the ED for dark stools.  Patient states over the last 3 days his stools have been dark black.  He was recently admitted to the hospital discharged 1 week ago for CHF exacerbation and was also treated at that time for GI bleed.  He also endorses nausea but no vomiting and lightheadedness.  He takes Eliquis and is compliant with his regimen but states that today he did not take it because of the dark stools.  He denies any chest pain, shortness of breath, hemoptysis, abdominal pain, vomiting. 88-year-old male with PMHx CKD, HFrEF, Diabetes, TIA,  CAD s/p stenting, bladder cancer s/p chemo in remission, afib on eliquis, hx of left nephrectomy of presents to the ED for dark stools.  Patient states over the last 3 days his stools have been dark black.  He was recently admitted to the hospital discharged 1 week ago for CHF exacerbation and was also treated at that time for GI bleed.  He also endorses nausea but no vomiting and lightheadedness.  He takes Eliquis and is compliant with his regimen but states that today he did not take it because of the dark stools.  He denies any chest pain, shortness of breath, hemoptysis, abdominal pain, vomiting.

## 2024-05-20 NOTE — CONSULT NOTE ADULT - SUBJECTIVE AND OBJECTIVE BOX
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    Patient was recently discharged from hospital, and had brown stool x 2 days after discharge. Noticed to have black stool again about 3 days ago, daily BM's without loose stool. Last BM was 3 AM this morning. Last dose of Eliquis was yesterday PM. The patient denies NSAID use. He last had c-scope over 20 years ago and it was normal. 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 Some of these were oozing. This was ablated with RFA in the usual fashion at 12J x 3 for each site. 93 ablations were applied. Previous EGD in 3/2024 with diffuse gastric hemorrhage s/p hemostatic spray. Biopsy was neg for H. pylori, GIM or amyloidosis.     No hematochezia or hematemesis. Tolerating PO, no nausea or vomiting.     PMHX/PSHX:    CAD (coronary artery disease)    Diabetes mellitus, new onset    Single kidney    Arthritis    Myocardial infarction    Hard of hearing    Atrial fibrillation    Hypertension    Hyperlipidemia    History of TIAs    History of bradycardia    Bladder cancer    History of CHF (congestive heart failure)    Left carotid bruit    History of nephrectomy, unilateral    Stented coronary artery    S/P bilateral cataract extraction    H/O cystoscopy      Allergies:  penicillin (Rash)  Cipro (Joint Pain)      Home Medications: reviewed  Hospital Medications:      Social History:   Tobacco: denies  Alcohol: denies  Recreational drugs: denies    Family history:    Family history of diabetes mellitus in mother (Mother)    FH: bladder cancer (Sibling)        PHYSICAL EXAM:   Vital Signs:  Vital Signs Last 24 Hrs  T(C): 36.8 (20 May 2024 10:27), Max: 36.8 (20 May 2024 10:27)  T(F): 98.2 (20 May 2024 10:27), Max: 98.2 (20 May 2024 10:27)  HR: 79 (20 May 2024 10:27) (78 - 79)  BP: 125/50 (20 May 2024 10:27) (116/66 - 125/50)  BP(mean): --  RR: 18 (20 May 2024 10:27) (16 - 18)  SpO2: 100% (20 May 2024 10:27) (99% - 100%)    Parameters below as of 20 May 2024 10:04  Patient On (Oxygen Delivery Method): room air      Daily Height in cm: 170.18 (20 May 2024 10:04)    Daily     GENERAL: no acute distress  NEURO: alert and oriented x 3  HEENT: NCAT, anicteric sclera  CHEST: no respiratory distress, no accessory muscle use  CARDIAC: regular rate, +S1/S2  ABDOMEN: soft, nontender, no rebound or guarding  EXTREMITIES: warm, well perfused  SKIN: no lesions noted    LABS: reviewed                        8.0    4.13  )-----------( 128      ( 20 May 2024 10:53 )             25.2     05-20    142  |  107  |  78<H>  ----------------------------<  131<H>  3.5   |  20<L>  |  3.71<H>    Ca    8.2<L>      20 May 2024 10:53    TPro  6.2  /  Alb  2.9<L>  /  TBili  0.8  /  DBili  x   /  AST  71<H>  /  ALT  53<H>  /  AlkPhos  137<H>  05-20    LIVER FUNCTIONS - ( 20 May 2024 10:53 )  Alb: 2.9 g/dL / Pro: 6.2 g/dL / ALK PHOS: 137 U/L / ALT: 53 U/L / AST: 71 U/L / GGT: x

## 2024-05-20 NOTE — ED PROVIDER NOTE - PHYSICAL EXAMINATION
Physical Exam:  Gen:  awake, alert, non-toxic appearing  Head: NCAT  HEENT: Normal conjunctiva, trachea midline, moist mucous membranes  Lung: CTAB, no respiratory distress, no wheezes/rhonchi/rales B/L, speaking in full sentences  CV: RRR, no murmurs, rubs or gallops  Abd: Soft, non-tender, non-distended, no guarding  MSK: No visible deformities, moves all four extremities  Neuro: No focal motor deficits  Skin: pale in nature, , no leg swelling  Psych: appropriate affect and mood

## 2024-05-20 NOTE — CONSULT NOTE ADULT - SUBJECTIVE AND OBJECTIVE BOX
NEPHROLOGY - NSN    Patient seen and examined.    HPI:  88-year-old male with PMHx CKD, HFrEF, Diabetes, TIA,  CAD s/p stenting, bladder cancer s/p chemo in remission, afib on eliquis, hx of left nephrectomy of presents to the ED for dark stools.  Patient states over the last 3 days his stools have been dark black.  He was recently admitted to the hospital discharged 1 week ago for CHF exacerbation and was also treated at that time for GI bleed.  He also endorses nausea but no vomiting and lightheadedness.  He takes Eliquis and is compliant with his regimen but states that today he did not take it because of the dark stools.  He denies any chest pain, shortness of breath, hemoptysis, abdominal pain, vomiting  On last admission he was found severe LAD and would need mult stents.  He has severe MR and no plans for intervention unless the LAD is fixed   This was not fixed as the issue of AC was as issue     PAST MEDICAL & SURGICAL HISTORY:  CAD (coronary artery disease)  (4 stents,)      Diabetes mellitus, new onset  diet controlled      Single kidney  (hx/o LEFT nephrectomy at age 16; pt states due to a ureter"blockage" causing "infection"; pt denies hx/o renal dysfunction)      Arthritis      Myocardial infarction  (2003)      Hard of hearing      Atrial fibrillation      Hypertension      Hyperlipidemia      History of TIAs  Last 2018      History of bradycardia      Bladder cancer      History of CHF (congestive heart failure)      Left carotid bruit      History of nephrectomy, unilateral  (hx/o LEFT nephrectomy at age 16)      Stented coronary artery  (4 stents)      S/P bilateral cataract extraction      H/O cystoscopy          MEDICATIONS  (STANDING):      Allergies    penicillin (Rash)    Intolerances    Cipro (Joint Pain)      SOCIAL HISTORY:  Denies alcohol abuse, drug abuse or tobacco usage.     FAMILY HISTORY:  Family history of diabetes mellitus in mother (Mother)  (Pt states his mother developed DM in her 70's)    FH: bladder cancer (Sibling)        VITALS:  T(C): 36.8 (05-20-24 @ 13:37), Max: 36.8 (05-20-24 @ 10:27)  HR: 72 (05-20-24 @ 13:37) (72 - 79)  BP: 114/51 (05-20-24 @ 13:37) (114/51 - 125/50)  RR: 18 (05-20-24 @ 13:37) (16 - 18)  SpO2: 100% (05-20-24 @ 13:37) (99% - 100%)    REVIEW OF SYSTEMS:  +  fatigue or weakness. All other pertinent systems are reviewed and are negative.    PHYSICAL EXAM:  Constitutional: NAD  HEENT: EOMI  Neck:  No JVD, supple   Respiratory: CTA B/L  Cardiovascular: S1 and S2, RRR  Gastrointestinal: + BS, soft, NT, ND  Extremities: No peripheral edema, + peripheral pulses  Neurological: A/O x 3, CN2-12 intact  Psychiatric: Normal mood, normal affect  : No Temple  Skin: No rashes, C/D/I  Access: Not applicable    I and O's:    Height (cm): 170.2 (05-20 @ 10:04)    LABS:                        8.0    4.13  )-----------( 128      ( 20 May 2024 10:53 )             25.2     05-20    142  |  107  |  78<H>  ----------------------------<  131<H>  3.5   |  20<L>  |  3.71<H>    Ca    8.2<L>      20 May 2024 10:53    TPro  6.2  /  Alb  2.9<L>  /  TBili  0.8  /  DBili  x   /  AST  71<H>  /  ALT  53<H>  /  AlkPhos  137<H>  05-20      URINE:  Urinalysis Basic - ( 20 May 2024 10:53 )    Color: x / Appearance: x / SG: x / pH: x  Gluc: 131 mg/dL / Ketone: x  / Bili: x / Urobili: x   Blood: x / Protein: x / Nitrite: x   Leuk Esterase: x / RBC: x / WBC x   Sq Epi: x / Non Sq Epi: x / Bacteria: x        RADIOLOGY & ADDITIONAL STUDIES:     NEPHROLOGY - NSN    Patient seen and examined.    HPI:  88-year-old male with PMHx CKD, HFrEF, Diabetes, TIA,  CAD s/p stenting, bladder cancer s/p chemo in remission, afib on eliquis, hx of left nephrectomy of presents to the ED for dark stools.  Patient states over the last 3 days his stools have been dark black.  He was recently admitted to the hospital discharged 1 week ago for CHF exacerbation and was also treated at that time for GI bleed.  He also endorses nausea but no vomiting and lightheadedness.  He takes Eliquis and is compliant with his regimen but states that today he did not take it because of the dark stools.  He denies any chest pain, shortness of breath, hemoptysis, abdominal pain, vomiting  On last admission he was found severe LCX and would need mult stents.  He has severe MR and no plans for intervention unless the LCX is fixed   This was not fixed as the issue of AC was as issue   There is no hematuria or bubbles in the urine.  No history of NSAIDS or nephrolithisis.  The patient urinates once or twice in the night and there is no incontinence.  No family hx or renal disease or back pain.    No recent abx use.  No alleviating or aggravating factors with respect to the kidneys.     PAST MEDICAL & SURGICAL HISTORY:  CAD (coronary artery disease)  (4 stents,)      Diabetes mellitus, new onset  diet controlled      Single kidney  (hx/o LEFT nephrectomy at age 16; pt states due to a ureter"blockage" causing "infection"; pt denies hx/o renal dysfunction)      Arthritis      Myocardial infarction  (2003)      Hard of hearing      Atrial fibrillation      Hypertension      Hyperlipidemia      History of TIAs  Last 2018      History of bradycardia      Bladder cancer      History of CHF (congestive heart failure)      Left carotid bruit      History of nephrectomy, unilateral  (hx/o LEFT nephrectomy at age 16)      Stented coronary artery  (4 stents)      S/P bilateral cataract extraction      H/O cystoscopy          MEDICATIONS  (STANDING):      Allergies    penicillin (Rash)    Intolerances    Cipro (Joint Pain)      SOCIAL HISTORY:  Denies alcohol abuse, drug abuse or tobacco usage.     FAMILY HISTORY:  Family history of diabetes mellitus in mother (Mother)  (Pt states his mother developed DM in her 70's)    FH: bladder cancer (Sibling)        VITALS:  T(C): 36.8 (05-20-24 @ 13:37), Max: 36.8 (05-20-24 @ 10:27)  HR: 72 (05-20-24 @ 13:37) (72 - 79)  BP: 114/51 (05-20-24 @ 13:37) (114/51 - 125/50)  RR: 18 (05-20-24 @ 13:37) (16 - 18)  SpO2: 100% (05-20-24 @ 13:37) (99% - 100%)    REVIEW OF SYSTEMS:  +  fatigue or weakness. All other pertinent systems are reviewed and are negative.    PHYSICAL EXAM:  Constitutional: NAD  HEENT: EOMI  Neck:  No JVD, supple   Respiratory: CTA B/L  Cardiovascular: S1 and S2, RRR  Gastrointestinal: + BS, soft, NT, ND  Extremities: No peripheral edema, + peripheral pulses  Neurological: A/O x 3, CN2-12 intact  Psychiatric: Normal mood, normal affect  : No Temple  Skin: No rashes, C/D/I  Access: Not applicable    I and O's:    Height (cm): 170.2 (05-20 @ 10:04)    LABS:                        8.0    4.13  )-----------( 128      ( 20 May 2024 10:53 )             25.2     05-20    142  |  107  |  78<H>  ----------------------------<  131<H>  3.5   |  20<L>  |  3.71<H>    Ca    8.2<L>      20 May 2024 10:53    TPro  6.2  /  Alb  2.9<L>  /  TBili  0.8  /  DBili  x   /  AST  71<H>  /  ALT  53<H>  /  AlkPhos  137<H>  05-20      URINE:  Urinalysis Basic - ( 20 May 2024 10:53 )    Color: x / Appearance: x / SG: x / pH: x  Gluc: 131 mg/dL / Ketone: x  / Bili: x / Urobili: x   Blood: x / Protein: x / Nitrite: x   Leuk Esterase: x / RBC: x / WBC x   Sq Epi: x / Non Sq Epi: x / Bacteria: x        RADIOLOGY & ADDITIONAL STUDIES:    < from: Xray Chest 2 Views PA/Lat (05.05.24 @ 11:33) >    ACC: [id number] EXAM:  XR CHEST PA LAT 2V   ORDERED BY: CAROLINA MORRIS     PROCEDURE DATE:  05/05/2024          INTERPRETATION:  EXAMINATION: XR CHEST PA AND LATERAL    CLINICAL INDICATION: Shortness of breath    TECHNIQUE: 2 views; Frontal and lateral views of the chest were obtained.    COMPARISON: Chest x-ray 4/20/2024.    FINDINGS:    The heart is normal in size.  Small bilateral pleural effusions with adjacent atelectasis.  There is no pneumothorax.  No acute bony abnormality.    IMPRESSION:  Small bilateral pleural effusions with adjacent atelectasis.    --- End of Report ---          BRENDA MCCAULEY DO; Resident Radiologist  This document has been electronically signed.  ERROL RAGSDALE MD; Attending Radiologist  This document has been electronically signed. May  5 2024  2:35PM    < end of copied text >

## 2024-05-20 NOTE — H&P ADULT - HISTORY OF PRESENT ILLNESS
88yr old male with a pmh of HTN, HFrEF, T2DM on insulin, TIA, CAD s/p stent, bladder cancer s/p chemo, afib on eliquis, hx of left nephrectomy who presents to the ED for a three day history of black stools.  Denies NSAIDs use.   Pt with admission 5/3-5/10 for acute decompensated heart failure and GIB.   Denies  headache, dizziness, chest pain, palpitations, SOB, abdominal pain, joint pain, diarrhea/constipation, urinary symptoms.   Vitals: T 98.2F, HR 72, /51, RR 18 satting 100% RA

## 2024-05-20 NOTE — H&P ADULT - PROBLEM SELECTOR PLAN 5
Chronic moderate exacerbation    Home regimen: repaglinide 0.5mg TID, tresiba 5 units nightly   Continue with basal 5 units daily

## 2024-05-20 NOTE — ED PROVIDER NOTE - PROGRESS NOTE DETAILS
Lyn Bedoya PGY1: COREY performed by me, chaperoned by nurse. dark dry stool around border of anus. dark feces on glove. Lyn Bedoya PGY1: COREY performed by me, chaperoned by nurse Lilo. dark dry stool around border of anus. dark feces on glove. Jesús, PGY-3, EM: pt found to have a decrease in Hb from 9.8 to 8. s/w Dr. Arroyo, given age, known history of UGIB would admit for GI/endoscopy. he accepts to his service.,

## 2024-05-20 NOTE — H&P ADULT - NSICDXPASTMEDICALHX_GEN_ALL_CORE_FT
PAST MEDICAL HISTORY:  Arthritis     Atrial fibrillation     Bladder cancer     CAD (coronary artery disease) (4 stents,)    GAVE (gastric antral vascular ectasia)     Hard of hearing     HFrEF (heart failure with reduced ejection fraction)     History of bradycardia     History of TIAs Last 2018    Hyperlipidemia     Hypertension     Left carotid bruit     Myocardial infarction (2003)    Single kidney (hx/o LEFT nephrectomy at age 16; pt states due to a ureter"blockage" causing "infection"; pt denies hx/o renal dysfunction)    T2DM (type 2 diabetes mellitus)

## 2024-05-20 NOTE — ED PROVIDER NOTE - CLINICAL SUMMARY MEDICAL DECISION MAKING FREE TEXT BOX
88-year-old male presents to the ED for dark stools.  Patient states over the last 3 days his stools have been dark black.  He was recently admitted to the hospital discharged 1 week ago for CHF exacerbation and was also treated at that time for GI bleed.  He also endorses nausea but no vomiting and lightheadedness.  He takes Eliquis and is compliant with his regimen but states that today he did not take it because of the dark stools.  He denies any chest pain, shortness of breath, hemoptysis, abdominal pain, vomiting.  concern for anemia requiring transfusion, will get labs to check H&H, type and screen incase transfusion required. COREY. Given hx of GI bleeds and presenting clinical picture, I suspect pt will need to be admitted 88-year-old male PMHx CKD, HFrEF, Diabetes, TIA,  CAD s/p stenting, bladder cancer s/p chemo in remission, afib on eliquis, hx of left nephrectomy presents to the ED for dark stools.  Patient states over the last 3 days his stools have been dark black.  He was recently admitted to the hospital discharged 1 week ago for CHF exacerbation and was also treated at that time for GI bleed.  He also endorses nausea but no vomiting and lightheadedness.  He takes Eliquis and is compliant with his regimen but states that today he did not take it because of the dark stools.  He denies any chest pain, shortness of breath, hemoptysis, abdominal pain, vomiting.  concern for anemia requiring transfusion, will get labs to check H&H, type and screen incase transfusion required. COREY. Given hx of GI bleeds and presenting clinical picture, I suspect pt will need to be admitted

## 2024-05-20 NOTE — H&P ADULT - NSHPPHYSICALEXAM_GEN_ALL_CORE
PHYSICAL EXAM:  GENERAL: NAD, comfortable at bedside   HEAD:  Atraumatic, Normocephalic  EYES: EOMI, PERRL, conjunctiva and sclera clear  NECK: Supple, No JVD  CHEST/LUNG: Clear to auscultation bilaterally; No wheezes, rales or rhonchi  HEART: Regular rate and rhythm; No murmurs, rubs, or gallops, (+)S1, S2  ABDOMEN: Soft, Nontender, Nondistended; Normal Bowel sounds   Per ED: COREY: chaperoned by nurse Nagy. dark dry stool around border of anus. dark feces on glove.   EXTREMITIES:  2+ Peripheral Pulses, No clubbing, cyanosis, or edema  PSYCH: normal mood and affect  NEUROLOGY: AAOx3, moving all extremities spontaneously   SKIN: No rashes or lesions

## 2024-05-20 NOTE — H&P ADULT - CONVERSATION DETAILS
Pt is NOT agreeable to CPR  Pt IS agreeable to noninvasive mechanical ventilation, intubation with mechanical ventilation, ABX, IVF, blood transfusions, vasopressors  Pt would like to decide if the need were to arise for: a feeding tibe, dialysis     He would like his wife to be his HCP

## 2024-05-20 NOTE — ED ADULT NURSE REASSESSMENT NOTE - NS ED NURSE REASSESS COMMENT FT1
Hand off report given to [facility] RN No signs of acute distress noted. Patient stable for transport Hoda BERUMEN

## 2024-05-20 NOTE — H&P ADULT - PROBLEM SELECTOR PLAN 1
New  recent admission GI bleed 2/2 GAVE s/p RFA  H/H 8/25.2- baseline Hb 8.8-9.8  GI consulted: Clear liquids with NPO at midnight, IV BID PPI, Hold Eliquis if no absolute contraindications, Transfuse to goal Hgb > 8 as needed, Patient will need cardiac clearance prior to the EGD given recent C findings and severe MR  Monitor with CBC

## 2024-05-20 NOTE — ED PROVIDER NOTE - ATTENDING CONTRIBUTION TO CARE
Agree with resident note  88-year-old male with past medical history of CKD, heart failure, diabetes, TIA, CAD, bladder cancer status post chemo in remission, A-fib, history of left nephrectomy presents with dark stools.  Patient states for 3 days her stools have been black.  States compliant with Eliquis.  Denies shortness of breath, fatigue, chest pain.  Patient gives history with son that has been admitted in the past for GI bleeds.  No active source found during workup.  Physical exam  Well-appearing male in no respiratory distress  Vital signs stable  Clear to auscultation bilaterally  S1-S2 no murmurs rubs or gallops  Abdomen soft nontender nondistended  Rectal exam performed by resident dark blackish stool  Impression  Elderly male with multiple comorbidities who presents with likely upper GI bleed  Hemodynamically stable  That being said given comorbidities and GI bleed will admit to hospital will start on PPI, transfuse as necessary, admit

## 2024-05-20 NOTE — H&P ADULT - NSHPREVIEWOFSYSTEMS_GEN_ALL_CORE
REVIEW OF SYSTEMS:    CONSTITUTIONAL: No weakness, fevers or chills  EYES/ENT: No visual changes;  No dysphagia; No sore throat; No rhinorrhea; No sinus pain/pressure  NECK: No pain or stiffness  RESPIRATORY: No cough, wheezing, hemoptysis; No shortness of breath  CARDIOVASCULAR: No chest pain or palpitations; No lower extremity edema  GASTROINTESTINAL: No abdominal or epigastric pain. No nausea, vomiting, or hematemesis; No diarrhea or constipation. + melena no hematochezia.  GENITOURINARY: No dysuria, frequency or hematuria  NEUROLOGICAL: No numbness or weakness  MSK: ambulates with a walker  SKIN: No itching, burning, rashes, or lesions   All other review of systems is negative unless indicated above.

## 2024-05-20 NOTE — CONSULT NOTE ADULT - ASSESSMENT
88y Male with CAD, HFrEF, Afib, HTN, DM, solitary R kidney and CKD p/w acute on chronic HFrEF and acute on chronic kidney injury.  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

## 2024-05-20 NOTE — ED ADULT NURSE NOTE - NSFALLRISKINTERV_ED_ALL_ED

## 2024-05-20 NOTE — H&P ADULT - TIME BILLING
Patient refusing blood pressure because \"it hurts too much\". MD aware.   I have spent a total of 96minutes to prepare to see the patient, obtaining and reviewing history, physical examination, explaining the diagnosis, prognosis and treatment plan with the patient/family/caregiver. I also have spent the time ordering studies and testing, interpreting results, medicine reconciliation, subspecialty consultation and documentation as above.

## 2024-05-20 NOTE — CONSULT NOTE ADULT - ASSESSMENT
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 Some of these were oozing. This was ablated with RFA in the usual fashion at 12J x 3 for each site. 93 ablations were applied.         #Severe MR  #CAD with stents      89yo male with a past medical history of CAD s/p stents, DM, HTN, HLD, Afib on Eliquis, GI bleed 2/2 GAVE s/p RFA on 4/2023 presents with melena x 3 days.  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 Some of these were oozing. This was ablated with RFA in the usual fashion at 12J x 3 for each site. 93 ablations were applied.   - S/p EGD 3/2024 with diffuse gastric hemorrhage, s/p hemospray and biopsy. Path was negative for H. pylori, GIM and congo red staining was negative.   - H&H drop from 9.8 (5/12/24) to 8 on 5/20/24  - Hemodynamically stable currently    #Severe MR  - Seen by Structural heart & not a candidate for MV clip/repair during last admission    #CAD with stents   - S/p [facility] w/ severe obstructive CAD to LCx/om and Mild to moderate in-stent restenosis of left anterior descending and right coronary artery stents  - Medically managed during last admission as pt was high risk for developing ELIZA and requiring HD with staged PCI  - Hold off on antiplatelets in attempt to minimize bleed risk    Recommendations  - Clear liquids today  - IV PPI BID  - Hold Eliquis if no absolute contraindications  - Transfuse to goal Hgb > 8 as needed, keep active T&S and 2 large bore IV  - Patient will need cardiac clearance prior to the EGD given recent [facility] findings and severe MR    Note incomplete until finalized by attending signature/attestation.    Carolyn Solis  GI/Hepatology Fellow    MONDAY-FRIDAY 8AM-5PM:  Pager# [phone] ([facility]) or [phone] ([facility])    NON-URGENT CONSULTS:  Please email [email or web address]  AT NIGHT AND ON WEEKENDS:  Contact on-call GI fellow via AMION by paging or hospital  from 5pm-8am and on weekends/holidays

## 2024-05-20 NOTE — CONSULT NOTE ADULT - ATTENDING COMMENTS
#GAVE  #Melena  #Afib on AC    88M CAD s/p PCI with recent LHC w/ severe obstructive CAD to LCx/om and Mild to moderate in-stent restenosis, Afib on AC, GAVE s/p hemospray 3/2024 and RFA in 4/2024 p/w recurrent melena, acute on chronic anemia hgb 9.8 --> 8.0. BUN 78, usually in the 50s (Cr 3s which is baseline).     EGD for evaluation, tentatively tomorrow pending cardiology clearance, clears today and NPO at MN, transfuse >8, PPI IV BID.

## 2024-05-20 NOTE — ED ADULT NURSE NOTE - OBJECTIVE STATEMENT
This is a 88 y/0 male complaining of 3 day history of tarry stools. Alert and oriented x 4.Past medical history of kidney dse, heart failure, bladder ca, dm, gi problems. Mild abdominal pain 3/10, states loose stools, bowel sounds present x 4, chest is clear bilaterally. moving all limbs, denies light headedness, appears  pale. Vital sign stable, afebrile.  Needs attended, bed in lowest position, call bell in reach, son at bedside.

## 2024-05-21 LAB
ANION GAP SERPL CALC-SCNC: 12 MMOL/L — SIGNIFICANT CHANGE UP (ref 7–14)
BASOPHILS # BLD AUTO: 0.03 K/UL — SIGNIFICANT CHANGE UP (ref 0–0.2)
BASOPHILS NFR BLD AUTO: 1 % — SIGNIFICANT CHANGE UP (ref 0–2)
BUN SERPL-MCNC: 75 MG/DL — HIGH (ref 7–23)
CALCIUM SERPL-MCNC: 8.3 MG/DL — LOW (ref 8.4–10.5)
CHLORIDE SERPL-SCNC: 107 MMOL/L — SIGNIFICANT CHANGE UP (ref 98–107)
CO2 SERPL-SCNC: 22 MMOL/L — SIGNIFICANT CHANGE UP (ref 22–31)
CREAT SERPL-MCNC: 3.6 MG/DL — HIGH (ref 0.5–1.3)
EGFR: 16 ML/MIN/1.73M2 — LOW
EOSINOPHIL # BLD AUTO: 0.2 K/UL — SIGNIFICANT CHANGE UP (ref 0–0.5)
EOSINOPHIL NFR BLD AUTO: 6.8 % — HIGH (ref 0–6)
GLUCOSE BLDC GLUCOMTR-MCNC: 107 MG/DL — HIGH (ref 70–99)
GLUCOSE BLDC GLUCOMTR-MCNC: 139 MG/DL — HIGH (ref 70–99)
GLUCOSE BLDC GLUCOMTR-MCNC: 85 MG/DL — SIGNIFICANT CHANGE UP (ref 70–99)
GLUCOSE SERPL-MCNC: 102 MG/DL — HIGH (ref 70–99)
HCT VFR BLD CALC: 23.8 % — LOW (ref 39–50)
HCT VFR BLD CALC: 27.7 % — LOW (ref 39–50)
HGB BLD-MCNC: 7.6 G/DL — LOW (ref 13–17)
HGB BLD-MCNC: 9.1 G/DL — LOW (ref 13–17)
IANC: 1.75 K/UL — LOW (ref 1.8–7.4)
IMM GRANULOCYTES NFR BLD AUTO: 0 % — SIGNIFICANT CHANGE UP (ref 0–0.9)
LYMPHOCYTES # BLD AUTO: 0.54 K/UL — LOW (ref 1–3.3)
LYMPHOCYTES # BLD AUTO: 18.4 % — SIGNIFICANT CHANGE UP (ref 13–44)
MCHC RBC-ENTMCNC: 31.3 PG — SIGNIFICANT CHANGE UP (ref 27–34)
MCHC RBC-ENTMCNC: 31.5 PG — SIGNIFICANT CHANGE UP (ref 27–34)
MCHC RBC-ENTMCNC: 31.9 GM/DL — LOW (ref 32–36)
MCHC RBC-ENTMCNC: 32.9 GM/DL — SIGNIFICANT CHANGE UP (ref 32–36)
MCV RBC AUTO: 95.2 FL — SIGNIFICANT CHANGE UP (ref 80–100)
MCV RBC AUTO: 98.8 FL — SIGNIFICANT CHANGE UP (ref 80–100)
MONOCYTES # BLD AUTO: 0.41 K/UL — SIGNIFICANT CHANGE UP (ref 0–0.9)
MONOCYTES NFR BLD AUTO: 14 % — SIGNIFICANT CHANGE UP (ref 2–14)
NEUTROPHILS # BLD AUTO: 1.75 K/UL — LOW (ref 1.8–7.4)
NEUTROPHILS NFR BLD AUTO: 59.8 % — SIGNIFICANT CHANGE UP (ref 43–77)
NRBC # BLD: 0 /100 WBCS — SIGNIFICANT CHANGE UP (ref 0–0)
NRBC # BLD: 0 /100 WBCS — SIGNIFICANT CHANGE UP (ref 0–0)
NRBC # FLD: 0 K/UL — SIGNIFICANT CHANGE UP (ref 0–0)
NRBC # FLD: 0 K/UL — SIGNIFICANT CHANGE UP (ref 0–0)
PLATELET # BLD AUTO: 106 K/UL — LOW (ref 150–400)
PLATELET # BLD AUTO: 121 K/UL — LOW (ref 150–400)
POTASSIUM SERPL-MCNC: 3.5 MMOL/L — SIGNIFICANT CHANGE UP (ref 3.5–5.3)
POTASSIUM SERPL-SCNC: 3.5 MMOL/L — SIGNIFICANT CHANGE UP (ref 3.5–5.3)
RBC # BLD: 2.41 M/UL — LOW (ref 4.2–5.8)
RBC # BLD: 2.91 M/UL — LOW (ref 4.2–5.8)
RBC # FLD: 16.6 % — HIGH (ref 10.3–14.5)
RBC # FLD: 17.7 % — HIGH (ref 10.3–14.5)
SODIUM SERPL-SCNC: 141 MMOL/L — SIGNIFICANT CHANGE UP (ref 135–145)
WBC # BLD: 2.93 K/UL — LOW (ref 3.8–10.5)
WBC # BLD: 5.54 K/UL — SIGNIFICANT CHANGE UP (ref 3.8–10.5)
WBC # FLD AUTO: 2.93 K/UL — LOW (ref 3.8–10.5)
WBC # FLD AUTO: 5.54 K/UL — SIGNIFICANT CHANGE UP (ref 3.8–10.5)

## 2024-05-21 PROCEDURE — 99231 SBSQ HOSP IP/OBS SF/LOW 25: CPT | Mod: GC

## 2024-05-21 PROCEDURE — 93010 ELECTROCARDIOGRAM REPORT: CPT

## 2024-05-21 RX ORDER — FUROSEMIDE 40 MG
40 TABLET ORAL ONCE
Refills: 0 | Status: COMPLETED | OUTPATIENT
Start: 2024-05-21 | End: 2024-05-21

## 2024-05-21 RX ADMIN — TAMSULOSIN HYDROCHLORIDE 0.4 MILLIGRAM(S): 0.4 CAPSULE ORAL at 22:36

## 2024-05-21 RX ADMIN — Medication 40 MILLIGRAM(S): at 22:36

## 2024-05-21 RX ADMIN — TAMSULOSIN HYDROCHLORIDE 0.4 MILLIGRAM(S): 0.4 CAPSULE ORAL at 00:22

## 2024-05-21 RX ADMIN — PANTOPRAZOLE SODIUM 40 MILLIGRAM(S): 20 TABLET, DELAYED RELEASE ORAL at 18:02

## 2024-05-21 RX ADMIN — Medication 650 MILLIGRAM(S): at 14:15

## 2024-05-21 RX ADMIN — ATORVASTATIN CALCIUM 80 MILLIGRAM(S): 80 TABLET, FILM COATED ORAL at 22:36

## 2024-05-21 RX ADMIN — INSULIN GLARGINE 5 UNIT(S): 100 INJECTION, SOLUTION SUBCUTANEOUS at 22:37

## 2024-05-21 RX ADMIN — INSULIN GLARGINE 5 UNIT(S): 100 INJECTION, SOLUTION SUBCUTANEOUS at 00:21

## 2024-05-21 RX ADMIN — AMIODARONE HYDROCHLORIDE 200 MILLIGRAM(S): 400 TABLET ORAL at 07:22

## 2024-05-21 RX ADMIN — Medication 1 GRAM(S): at 05:59

## 2024-05-21 RX ADMIN — CHLORHEXIDINE GLUCONATE 1 APPLICATION(S): 213 SOLUTION TOPICAL at 14:15

## 2024-05-21 RX ADMIN — PANTOPRAZOLE SODIUM 40 MILLIGRAM(S): 20 TABLET, DELAYED RELEASE ORAL at 06:01

## 2024-05-21 RX ADMIN — ATORVASTATIN CALCIUM 80 MILLIGRAM(S): 80 TABLET, FILM COATED ORAL at 00:21

## 2024-05-21 RX ADMIN — Medication 1 GRAM(S): at 18:02

## 2024-05-21 NOTE — PROGRESS NOTE ADULT - SUBJECTIVE AND OBJECTIVE BOX
NEPHROLOGY-[facility] [phone]        Patient seen and examined on room air, laying flat denies shortness of breath. S/p 1 unit PRBC today.         MEDICATIONS  (STANDING):  aMIOdarone    Tablet 200 milliGRAM(s) Oral daily  atorvastatin 80 milliGRAM(s) Oral at bedtime  chlorhexidine 2% Cloths 1 Application(s) Topical daily  dextrose 10% Bolus 125 milliLiter(s) IV Bolus once  dextrose 5%. 1000 milliLiter(s) (100 mL/Hr) IV Continuous <Continuous>  dextrose 5%. 1000 milliLiter(s) (50 mL/Hr) IV Continuous <Continuous>  dextrose 50% Injectable 25 Gram(s) IV Push once  dextrose 50% Injectable 12.5 Gram(s) IV Push once  furosemide    Tablet 40 milliGRAM(s) Oral daily  glucagon  Injectable 1 milliGRAM(s) IntraMuscular once  insulin glargine Injectable (LANTUS) 5 Unit(s) SubCutaneous at bedtime  insulin lispro (ADMELOG) corrective regimen sliding scale   SubCutaneous three times a day before meals  insulin lispro (ADMELOG) corrective regimen sliding scale   SubCutaneous at bedtime  pantoprazole  Injectable 40 milliGRAM(s) IV Push every 12 hours  sodium bicarbonate 650 milliGRAM(s) Oral daily  sucralfate 1 Gram(s) Oral two times a day  tamsulosin 0.4 milliGRAM(s) Oral at bedtime      VITAL:  T(C): , Max: 36.8 (05-21-24 @ 05:39)  T(F): , Max: 98.3 (05-21-24 @ 05:39)  HR: 66 (05-21-24 @ 12:31)  BP: 113/60 (05-21-24 @ 12:31)  BP(mean): --  RR: 17 (05-21-24 @ 12:31)  SpO2: 97% (05-21-24 @ 12:31)  Wt(kg): --    I and O's:        PHYSICAL EXAM:    Constitutional: NAD  Neck:  No JVD  Respiratory: CTAB/L  Cardiovascular: S1 and S2  Gastrointestinal: BS+, soft, NT/ND  Extremities: No peripheral edema  Neurological: A/O x 3, no focal deficits  Psychiatric: Normal mood, normal affect  : No Temple  Skin: No rashes  Access: Not applicable    LABS:                        7.6    2.93  )-----------( 106      ( 21 May 2024 05:31 )             23.8     05-21    141  |  107  |  75<H>  ----------------------------<  102<H>  3.5   |  22  |  3.60<H>    Ca    8.3<L>      21 May 2024 05:31    TPro  6.2  /  Alb  2.9<L>  /  TBili  0.8  /  DBili  x   /  AST  71<H>  /  ALT  53<H>  /  AlkPhos  137<H>  05-20          Urine Studies:  Urinalysis Basic - ( 21 May 2024 05:31 )    Color: x / Appearance: x / SG: x / pH: x  Gluc: 102 mg/dL / Ketone: x  / Bili: x / Urobili: x   Blood: x / Protein: x / Nitrite: x   Leuk Esterase: x / RBC: x / WBC x   Sq Epi: x / Non Sq Epi: x / Bacteria: x        Assessment and Recommendation:   · Assessment	    88y Male with CAD, HFrEF, Afib, HTN, DM, solitary R kidney and CKD p/w acute on chronic HFrEF and acute on chronic kidney injury.  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 acute on chronic HFrEF     Mitral clip is off the table at present    LCX disease   GI- Colonoscopy in am;  Clears and NPO in am       Sayed [facility]   [phone]          NEPHROLOGY-[facility] [phone]        Patient seen and examined on room air, laying flat denies shortness of breath. S/p 1 unit PRBC today.         MEDICATIONS  (STANDING):  aMIOdarone    Tablet 200 milliGRAM(s) Oral daily  atorvastatin 80 milliGRAM(s) Oral at bedtime  chlorhexidine 2% Cloths 1 Application(s) Topical daily  dextrose 10% Bolus 125 milliLiter(s) IV Bolus once  dextrose 5%. 1000 milliLiter(s) (100 mL/Hr) IV Continuous <Continuous>  dextrose 5%. 1000 milliLiter(s) (50 mL/Hr) IV Continuous <Continuous>  dextrose 50% Injectable 25 Gram(s) IV Push once  dextrose 50% Injectable 12.5 Gram(s) IV Push once  furosemide    Tablet 40 milliGRAM(s) Oral daily  glucagon  Injectable 1 milliGRAM(s) IntraMuscular once  insulin glargine Injectable (LANTUS) 5 Unit(s) SubCutaneous at bedtime  insulin lispro (ADMELOG) corrective regimen sliding scale   SubCutaneous three times a day before meals  insulin lispro (ADMELOG) corrective regimen sliding scale   SubCutaneous at bedtime  pantoprazole  Injectable 40 milliGRAM(s) IV Push every 12 hours  sodium bicarbonate 650 milliGRAM(s) Oral daily  sucralfate 1 Gram(s) Oral two times a day  tamsulosin 0.4 milliGRAM(s) Oral at bedtime      VITAL:  T(C): , Max: 36.8 (05-21-24 @ 05:39)  T(F): , Max: 98.3 (05-21-24 @ 05:39)  HR: 66 (05-21-24 @ 12:31)  BP: 113/60 (05-21-24 @ 12:31)  BP(mean): --  RR: 17 (05-21-24 @ 12:31)  SpO2: 97% (05-21-24 @ 12:31)  Wt(kg): --    I and O's:        PHYSICAL EXAM:    Constitutional: NAD; cachetic   Neck:  No JVD  Respiratory: CTAB/L  Cardiovascular: S1 and S2  Gastrointestinal: BS+, soft, NT/ND  Extremities: No peripheral edema  Neurological: A/O x 3, no focal deficits  Psychiatric: Normal mood, normal affect  : No Temple  Skin: No rashes  Access: Not applicable    LABS:                        7.6    2.93  )-----------( 106      ( 21 May 2024 05:31 )             23.8     05-21    141  |  107  |  75<H>  ----------------------------<  102<H>  3.5   |  22  |  3.60<H>    Ca    8.3<L>      21 May 2024 05:31    TPro  6.2  /  Alb  2.9<L>  /  TBili  0.8  /  DBili  x   /  AST  71<H>  /  ALT  53<H>  /  AlkPhos  137<H>  05-20          Urine Studies:  Urinalysis Basic - ( 21 May 2024 05:31 )    Color: x / Appearance: x / SG: x / pH: x  Gluc: 102 mg/dL / Ketone: x  / Bili: x / Urobili: x   Blood: x / Protein: x / Nitrite: x   Leuk Esterase: x / RBC: x / WBC x   Sq Epi: x / Non Sq Epi: x / Bacteria: x

## 2024-05-21 NOTE — PHYSICAL THERAPY INITIAL EVALUATION ADULT - GAIT DEVIATIONS NOTED, PT EVAL
decreased foot clearance, decreased right hip/knee flexion and push off/decreased edwina/decreased step length/decreased stride length

## 2024-05-21 NOTE — DIETITIAN INITIAL EVALUATION ADULT - WEIGHT FOR BMI (LBS)
History  Chief Complaint   Patient presents with    Knee Pain     Knee pain without injury for 3 days  No previous injuries  Denies fever  Hx of gout  Patient presents to the emergency department today for evaluation of left knee pain which has been ongoing over the last 2-3 days  She denies any traumatic events however states the more she ambulates on the more she experiences pain  She denies swelling of the knee  Denies calf pain or swelling  Denies chest pain shortness of breath or dyspnea on exertion  She denies prior history of injury of the left knee  Prior to Admission Medications   Prescriptions Last Dose Informant Patient Reported? Taking? ACCU-CHEK FASTCLIX LANCETS MISC   Yes No   Sig: by Does not apply route 2 (two) times a day   albuterol (PROVENTIL HFA,VENTOLIN HFA) 90 mcg/act inhaler   Yes No   Sig: Inhale 2 puffs every 6 (six) hours as needed for wheezing     allopurinol (ZYLOPRIM) 300 mg tablet   No No   Sig: Take 1 tablet (300 mg total) by mouth daily   furosemide (LASIX) 40 mg tablet   No No   Sig: Take 1 tablet (40 mg total) by mouth 2 (two) times a day   gabapentin (NEURONTIN) 400 mg capsule   No No   Sig: TAKE ONE CAPSULE BY MOUTH THREE TIMES DAILY   glucose blood (ACCU-CHEK SMARTVIEW) test strip   Yes No   Sig: by In Vitro route 2 (two) times a day   hydrALAZINE (APRESOLINE) 50 mg tablet   No No   Sig: Take 1 tablet (50 mg total) by mouth 3 (three) times a day for 90 days   levothyroxine 150 mcg tablet   No No   Sig: Take 1 tablet (150 mcg total) by mouth daily   metFORMIN (GLUCOPHAGE) 500 mg tablet   No No   Sig: Take 1 tablet (500 mg total) by mouth 2 (two) times a day with meals   predniSONE 10 mg tablet   No No   Si for 3 days then 3 for 3 days then 2 for 3 days then 1 for 3 days      Facility-Administered Medications: None       Past Medical History:   Diagnosis Date    Acute renal failure (ARF) (HCC)     Anemia     Asthma     Atopic dermatitis     Chronic kidney disease     Stage 3    Colon polyps     Diabetes mellitus ([facility] [address] )     type 2    Disease of thyroid gland     hypothyroidism    Diverticulosis     Edema of both legs     Gout     H/O colonoscopy     H/O mammogram     Hemorrhoids     Hyperlipidemia     Hypertension     Nephrosclerosis     Pain in unspecified foot     Peripheral neuropathy     Phlebitis     Venous stasis dermatitis of both lower extremities     Wrist joint pain     LEFT       Past Surgical History:   Procedure Laterality Date    CERVIX SURGERY      COLONOSCOPY N/A 5/31/2016    Procedure: COLONOSCOPY;  Surgeon: Sushma Lao MD;  Location: [facility]; Service:     VEIN SURGERY         Family History   Problem Relation Age of Onset    Diabetes Mother         Type 2 as per allscripts    Heart attack Father     Diabetes type II Sister     Hypertension Other      I have reviewed and agree with the history as documented  Social History   Substance Use Topics    Smoking status: Current Every Day Smoker     Packs/day: 0 50    Smokeless tobacco: Never Used      Comment: 30    Alcohol use No      Comment: social        Review of Systems   Constitutional: Negative  HENT: Negative  Eyes: Negative  Respiratory: Negative  Cardiovascular: Negative  Gastrointestinal: Negative  Endocrine: Negative  Genitourinary: Negative  Musculoskeletal:        Left knee pain   Skin: Negative  Allergic/Immunologic: Negative  Neurological: Negative  Hematological: Negative  Psychiatric/Behavioral: Negative  All other systems reviewed and are negative  Physical Exam  Physical Exam   Constitutional: She is oriented to person, place, and time  She appears well-developed and well-nourished  No distress  HENT:   Head: Normocephalic and atraumatic  Eyes: Pupils are equal, round, and reactive to light  Cardiovascular: Normal rate      Pulmonary/Chest: Effort normal    Musculoskeletal: She exhibits tenderness  She exhibits no edema or deformity  There is no evidence of soft tissue swelling of the left knee  There is no calf tenderness  Palpable dorsalis pedis pulse distally  Normal sensation range of motion of the toes  Pain elicited with flexion  No specific joint laxity is noted on examination  No proximal femur or hip pain  Neurological: She is alert and oriented to person, place, and time  Skin: Skin is warm  Capillary refill takes less than 2 seconds  She is not diaphoretic  Psychiatric: She has a normal mood and affect  Vitals reviewed  Vital Signs  ED Triage Vitals [07/06/18 1741]   Temperature Pulse Respirations Blood Pressure SpO2   98 3 °F (36 8 °C) 80 16 (!) 175/86 97 %      Temp Source Heart Rate Source Patient Position - Orthostatic VS BP Location FiO2 (%)   Temporal Monitor Sitting Right arm --      Pain Score       Worst Possible Pain           Vitals:    07/06/18 1741   BP: (!) 175/86   Pulse: 80   Patient Position - Orthostatic VS: Sitting       Visual Acuity      ED Medications  Medications - No data to display    Diagnostic Studies  Results Reviewed     None                 XR knee 1 or 2 vw left   ED Interpretation by Rosemary Rios PA-C (07/06 1818)   No evidence of acute osseous abnormalities                 Procedures  Procedures       Phone Contacts  ED Phone Contact    ED Course  ED Course as of Jul 06 1828 Fri Jul 06, 2018   1825 Patient has crutches at home and will utilize with knee immobilizer until she follows up with Orthopedics                                MDM  CritCare Time    Disposition  Final diagnoses:   Left knee sprain     Time reflects when diagnosis was documented in both MDM as applicable and the Disposition within this note     Time User Action Codes Description Comment    7/6/2018  6:24 PM Zeinab DAVID Add Jorden Ramirez  89XA] Left knee sprain       ED Disposition     ED Disposition Condition Comment    Discharge  Lala Bravo discharge to home/self care  Condition at discharge: Good        Follow-up Information     Follow up With Specialties Details Why [address], DO Sports Medicine, Orthopedics Schedule an appointment as soon as possible for a visit  [place] [address]  [phone]            Patient's Medications   Discharge Prescriptions    NAPROXEN (NAPROSYN) 500 MG TABLET    Take 1 tablet (500 mg total) by mouth 2 (two) times a day with meals       Start Date: 7/6/2018  End Date: --       Order Dose: 500 mg       Quantity: 20 tablet    Refills: 0     No discharge procedures on file      ED Provider  Electronically Signed by           Christina Hull PA-C  07/06/18 8833 120

## 2024-05-21 NOTE — ADVANCED PRACTICE NURSE CONSULT - REASON FOR CONSULT
Patient seen on skin care rounds after wound care referral received for assessment of skin impairment and recommendations of topical management. As per H&P, patient is a 88-year-old male with PMHx CKD, HFrEF, Diabetes, TIA,  CAD s/p stenting, bladder cancer s/p chemo in remission, afib on eliquis, hx of left nephrectomy of presents to the ED for dark stools. Chart reviewed: WBC , H/H , Platelets , INR , Serum albumin , A1C , BMI , Rajeev .  Patient seen on skin care rounds after wound care referral received for assessment of skin impairment and recommendations of topical management. As per H&P, patient is a 88-year-old male with PMHx CKD, HFrEF, Diabetes, TIA,  CAD s/p stenting, bladder cancer s/p chemo in remission, afib on eliquis, hx of left nephrectomy of presents to the ED for dark stools. Chart reviewed: WBC 2.93, H/H 7.6/23.8, Platelets 106, INR 1.59, Serum albumin 2.9, A1C 5.3, Rajeev 15.

## 2024-05-21 NOTE — PROGRESS NOTE ADULT - PROBLEM SELECTOR PLAN 1
hemoglobin down to 7 - 8  will continue to monitor  last stool this AM dark per patient  continue to monitor  transfusion if < 7

## 2024-05-21 NOTE — DIETITIAN INITIAL EVALUATION ADULT - ORAL INTAKE PTA/DIET HISTORY
Pt lives with spouse who prepares all his meals, reports good meal consumption. Recent weight change 128 ---> 120 lbs within 1 month.

## 2024-05-21 NOTE — DIETITIAN INITIAL EVALUATION ADULT - PERTINENT MEDS FT
MEDICATIONS  (STANDING):  aMIOdarone    Tablet 200 milliGRAM(s) Oral daily  atorvastatin 80 milliGRAM(s) Oral at bedtime  chlorhexidine 2% Cloths 1 Application(s) Topical daily  dextrose 10% Bolus 125 milliLiter(s) IV Bolus once  dextrose 5%. 1000 milliLiter(s) (100 mL/Hr) IV Continuous <Continuous>  dextrose 5%. 1000 milliLiter(s) (50 mL/Hr) IV Continuous <Continuous>  dextrose 50% Injectable 25 Gram(s) IV Push once  dextrose 50% Injectable 12.5 Gram(s) IV Push once  furosemide    Tablet 40 milliGRAM(s) Oral daily  glucagon  Injectable 1 milliGRAM(s) IntraMuscular once  insulin glargine Injectable (LANTUS) 5 Unit(s) SubCutaneous at bedtime  insulin lispro (ADMELOG) corrective regimen sliding scale   SubCutaneous three times a day before meals  insulin lispro (ADMELOG) corrective regimen sliding scale   SubCutaneous at bedtime  pantoprazole  Injectable 40 milliGRAM(s) IV Push every 12 hours  sodium bicarbonate 650 milliGRAM(s) Oral daily  sucralfate 1 Gram(s) Oral two times a day  tamsulosin 0.4 milliGRAM(s) Oral at bedtime    MEDICATIONS  (PRN):  acetaminophen     Tablet .. 650 milliGRAM(s) Oral every 6 hours PRN Temp greater or equal to 38C (100.4F), Mild Pain (1 - 3)  aluminum hydroxide/magnesium hydroxide/simethicone Suspension 30 milliLiter(s) Oral every 4 hours PRN Dyspepsia  dextrose Oral Gel 15 Gram(s) Oral once PRN Blood Glucose LESS THAN 70 milliGRAM(s)/deciliter  melatonin 3 milliGRAM(s) Oral at bedtime PRN Insomnia  ondansetron Injectable 4 milliGRAM(s) IV Push every 8 hours PRN Nausea and/or Vomiting

## 2024-05-21 NOTE — PATIENT PROFILE ADULT - FALL HARM RISK - HARM RISK INTERVENTIONS

## 2024-05-21 NOTE — DIETITIAN INITIAL EVALUATION ADULT - PERTINENT LABORATORY DATA
05-21    141  |  107  |  75<H>  ----------------------------<  102<H>  3.5   |  22  |  3.60<H>    Ca    8.3<L>      21 May 2024 05:31    TPro  6.2  /  Alb  2.9<L>  /  TBili  0.8  /  DBili  x   /  AST  71<H>  /  ALT  53<H>  /  AlkPhos  137<H>  05-20  POCT Blood Glucose.: 107 mg/dL (05-21-24 @ 12:51)  A1C with Estimated Average Glucose Result: 5.3 % (03-18-24 @ 06:31)  A1C with Estimated Average Glucose Result: 7.1 % (12-15-23 @ 07:00)  CAPILLARY BLOOD GLUCOSE  POCT Blood Glucose.: 107 mg/dL (21 May 2024 12:51)  POCT Blood Glucose.: 126 mg/dL (20 May 2024 23:58)  POCT Blood Glucose.: 124 mg/dL (20 May 2024 22:34)  POCT Blood Glucose.: 131 mg/dL (20 May 2024 17:36)

## 2024-05-21 NOTE — CONSULT NOTE ADULT - SUBJECTIVE AND OBJECTIVE BOX
CARDIOLOGY CONSULT - Dr. Vega         HPI:  88yr old male with a pmh of HTN, HFrEF, T2DM on insulin, TIA, CAD s/p stent, bladder cancer s/p chemo, afib on eliquis, hx of left nephrectomy who presents to the ED for a three day history of black stools.  Denies NSAIDs use.   Pt with admission 5/3-5/10 for acute decompensated heart failure and GIB.   Denies  headache, dizziness, chest pain, palpitations, SOB, abdominal pain, joint pain, diarrhea/constipation, urinary symptoms.   Vitals: T 98.2F, HR 72, /51, RR 18 satting 100% RA (20 May 2024 16:26)      PAST MEDICAL & SURGICAL HISTORY:  CAD (coronary artery disease)  (4 stents,)      Single kidney  (hx/o LEFT nephrectomy at age 16; pt states due to a ureter"blockage" causing "infection"; pt denies hx/o renal dysfunction)      Arthritis      Myocardial infarction  (2003)      Hard of hearing      Atrial fibrillation      Hypertension      Hyperlipidemia      History of TIAs  Last 2018      History of bradycardia      Bladder cancer      Left carotid bruit      T2DM (type 2 diabetes mellitus)      HFrEF (heart failure with reduced ejection fraction)      GAVE (gastric antral vascular ectasia)      History of nephrectomy, unilateral  (hx/o LEFT nephrectomy at age 16)      Stented coronary artery  (4 stents)      S/P bilateral cataract extraction      H/O cystoscopy              PREVIOUS DIAGNOSTIC TESTING:    [ ] Echocardiogram:  [ ]  Catheterization:  [ ] Stress Test:  	    MEDICATIONS:  Home Medications:  apixaban 2.5 mg oral tablet: 1 tab(s) orally every 12 hours (20 May 2024 17:19)  Crestor 40 mg oral tablet: 1 tab(s) orally once a day (at bedtime) (20 May 2024 17:19)  repaglinide 0.5 mg oral tablet: 1 tab(s) orally 3 times a day (before meals) (20 May 2024 17:19)  sodium bicarbonate 650 mg oral tablet: 1 tab(s) orally once a day (20 May 2024 17:19)  tamsulosin 0.4 mg oral capsule: 1 cap(s) orally once a day (20 May 2024 17:19)  Tresiba FlexTouch 100 units/mL subcutaneous solution: 5 unit(s) subcutaneous once a day (at bedtime) (20 May 2024 17:19)      MEDICATIONS  (STANDING):  aMIOdarone    Tablet 200 milliGRAM(s) Oral daily  atorvastatin 80 milliGRAM(s) Oral at bedtime  chlorhexidine 2% Cloths 1 Application(s) Topical daily  dextrose 10% Bolus 125 milliLiter(s) IV Bolus once  dextrose 5%. 1000 milliLiter(s) (100 mL/Hr) IV Continuous <Continuous>  dextrose 5%. 1000 milliLiter(s) (50 mL/Hr) IV Continuous <Continuous>  dextrose 50% Injectable 25 Gram(s) IV Push once  dextrose 50% Injectable 12.5 Gram(s) IV Push once  furosemide    Tablet 40 milliGRAM(s) Oral daily  glucagon  Injectable 1 milliGRAM(s) IntraMuscular once  insulin glargine Injectable (LANTUS) 5 Unit(s) SubCutaneous at bedtime  insulin lispro (ADMELOG) corrective regimen sliding scale   SubCutaneous three times a day before meals  insulin lispro (ADMELOG) corrective regimen sliding scale   SubCutaneous at bedtime  pantoprazole  Injectable 40 milliGRAM(s) IV Push every 12 hours  sodium bicarbonate 650 milliGRAM(s) Oral daily  sucralfate 1 Gram(s) Oral two times a day  tamsulosin 0.4 milliGRAM(s) Oral at bedtime      FAMILY HISTORY:  Family history of diabetes mellitus in mother (Mother)  (Pt states his mother developed DM in her 70's)    FH: bladder cancer (Sibling)        SOCIAL HISTORY:    [ ] Non-smoker  [ ] Smoker  [ ] Alcohol    Allergies    penicillin (Rash)    Intolerances    Cipro (Joint Pain)  	    REVIEW OF SYSTEMS:  CONSTITUTIONAL: No fever, weight loss, or fatigue  EYES: No eye pain, visual disturbances, or discharge  ENMT:  No difficulty hearing, tinnitus, vertigo; No sinus or throat pain  NECK: No pain or stiffness  RESPIRATORY: No cough, wheezing, chills or hemoptysis; No Shortness of Breath  CARDIOVASCULAR: No chest pain, palpitations, passing out, dizziness, or leg swelling  GASTROINTESTINAL: No abdominal or epigastric pain. No nausea, vomiting, or hematemesis; No diarrhea or constipation. No melena or hematochezia.  GENITOURINARY: No dysuria, frequency, hematuria, or incontinence  NEUROLOGICAL: No headaches, memory loss, loss of strength, numbness, or tremors  SKIN: No itching, burning, rashes, or lesions   	    [ ] All others negative	  [ ] Unable to obtain    PHYSICAL EXAM:  T(C): 36.8 (05-21-24 @ 05:39), Max: 36.8 (05-20-24 @ 13:37)  HR: 86 (05-21-24 @ 05:39) (64 - 86)  BP: 120/55 (05-21-24 @ 05:39) (108/61 - 120/55)  RR: 18 (05-21-24 @ 05:39) (17 - 18)  SpO2: 98% (05-21-24 @ 05:39) (96% - 100%)  Wt(kg): --  I&O's Summary      Appearance: Normal	  Psychiatry: A & O x 3, Mood & affect appropriate  HEENT:   Normal oral mucosa, PERRL, EOMI	  Lymphatic: No lymphadenopathy  Cardiovascular: Normal S1 S2,RRR, No JVD, No murmurs  Respiratory: Lungs clear to auscultation	  Gastrointestinal:  Soft, Non-tender, + BS	  Skin: No rashes, No ecchymoses, No cyanosis	  Neurologic: Non-focal  Extremities: Normal range of motion, No clubbing, cyanosis or edema  Vascular: Peripheral pulses palpable 2+ bilaterally    TELEMETRY: 	    ECG:  	  RADIOLOGY:  OTHER: 	  	  LABS:	 	    CARDIAC MARKERS:                                  7.6    2.93  )-----------( 106      ( 21 May 2024 05:31 )             23.8     05-21    141  |  107  |  75<H>  ----------------------------<  102<H>  3.5   |  22  |  3.60<H>    Ca    8.3<L>      21 May 2024 05:31    TPro  6.2  /  Alb  2.9<L>  /  TBili  0.8  /  DBili  x   /  AST  71<H>  /  ALT  53<H>  /  AlkPhos  137<H>  05-20    PT/INR - ( 20 May 2024 10:53 )   PT: 17.7 sec;   INR: 1.59 ratio         PTT - ( 20 May 2024 10:53 )  PTT:42.3 sec  proBNP:   Lipid Profile:   HgA1c:   TSH:        CARDIOLOGY CONSULT - Dr. Vega         HPI:  88yr old male with a pmh of HTN, HFrEF, T2DM on insulin, TIA, CAD s/p stent, bladder cancer s/p chemo, afib on eliquis, hx of left nephrectomy who presents to the ED for a three day history of black stools.  Denies NSAIDs use.   pt is knwon from prior  admission 5/3-5/10 for acute decompensated heart failure and GIB.   Denies  headache, dizziness, chest pain, palpitations, SOB, abdominal pain, joint pain, diarrhea/constipation, urinary symptoms.   -gi planning EGD   -cv stable no cp or sob. no chf on exam, recent   LHC severe obstructive CAD to LCx-- pt deemed  high risk for developing ELIZA and requiring HD with staged PCI-- medical management was recc , he is also being evaluated for watchman procedure as outpt, currently off ac   -pt also with hx of sev MR  - eval by Structural heart - deemed not a candidate for MV clip/repair at this time   ROS Otherwise negative        PAST MEDICAL & SURGICAL HISTORY:  CAD (coronary artery disease)  (4 stents,)      Single kidney  (hx/o LEFT nephrectomy at age 16; pt states due to a ureter"blockage" causing "infection"; pt denies hx/o renal dysfunction)      Arthritis      Myocardial infarction  (2003)      Hard of hearing      Atrial fibrillation      Hypertension      Hyperlipidemia      History of TIAs  Last 2018      History of bradycardia      Bladder cancer      Left carotid bruit      T2DM (type 2 diabetes mellitus)      HFrEF (heart failure with reduced ejection fraction)      GAVE (gastric antral vascular ectasia)      History of nephrectomy, unilateral  (hx/o LEFT nephrectomy at age 16)      Stented coronary artery  (4 stents)      S/P bilateral cataract extraction      H/O cystoscopy              PREVIOUS DIAGNOSTIC TESTING:    [ ] Echocardiogram:  < from: MARCELLA W or WO Ultrasound Enhancing Agent (04.24.24 @ 12:50) >  CONCLUSIONS:      1. Left ventricular systolic function is moderately decreased. There are regional wall motion abnormalities present. Hypokinesis of the inferior, lateral, and inferolateral walls.   2. Normal right ventricular cavity size and normal systolic function.   3. Structurally normal mitral valve with normal leaflet excursion. There is calcification of the mitral valve annulus. There is symmetric leaflet tethering. There is severe mitral regurgitation. Vena contracta width ~ 0.7 cm. Estimated effective regurgitant orifice area (EROA) ~ 0.4 cm2 (by the PISA method).   4. The aortic valve appears trileaflet with normal systolic excursion. There is calcification of the aortic valve leaflets. There is mild to moderate aortic regurgitation. Vena contracta width ~ 0.3 cm.   5. No atheroma in the visualized portions of the proximal ascending aorta. Mild non-mobile atheroma in the visualized portions of the transverse aortic arch. Mild non-mobile atheroma in the visualized portions of the descending aorta.   6. The left atrium is normal in size. There is no evidence of left atrial or left atrial appendage thrombus. Measurements of the left atrial appendage (JOSÉ MIGUEL) were carried out as outlined below. Note that the JOSÉ MIGUEL width refers to its width at the ostium. The JOSÉ MIGUEL length refers to the distance from the plane of the JOSÉ MIGUEL ostium to its most distal point. Note that the JOSÉ MIGUEL has a "windsock" appearance.           Zero degrees: width = 1.9 cm, length = 3.0 cm      30 degrees: width = 1.7 cm, length = 2.9 cm      45 degrees: width = 1.7 cm, length = 2.8 cm      60 degrees: width = 1.6 cm, length = 2.7 cm      90 degrees: width = 1.8 cm, length = 2.6 cm      135 degrees: width = 2.0 cm, length = 2.8 cm.   7. Agitated saline injection was negative for intracardiac shunt.        [ ]  Catheterization:  < from: Cardiac Catheterization (05.08.24 @ 15:36) >    Diagnostic Conclusions:     Severe one vessel obstructive coronary artery disease   --Left circumflex artery   Mild left main coronary artery disease   Mild to moderate in-stent restenosis of left anterior descending and  right coronary artery stents  LVEDP = 22mmHg   No gradient across the aortic valve     Recommendations:     Keep right leg straight for 4 hours following removal of sheath   Continue aggressive medical management of coronary artery disease and  associated risk factors  Gentle IV hydration as per protocol   Recommend multidisciplinary conversation to be held among medical  teams to discuss risks and benefits of performing high  risk PCI in setting of multiple increased risk factors including CKD  IV and elevated risk of bleeding    Findings discussed with cardiology/Dr. Vega     < end of copied text >    [ ] Stress Test:  	    MEDICATIONS:  Home Medications:  apixaban 2.5 mg oral tablet: 1 tab(s) orally every 12 hours (20 May 2024 17:19)  Crestor 40 mg oral tablet: 1 tab(s) orally once a day (at bedtime) (20 May 2024 17:19)  repaglinide 0.5 mg oral tablet: 1 tab(s) orally 3 times a day (before meals) (20 May 2024 17:19)  sodium bicarbonate 650 mg oral tablet: 1 tab(s) orally once a day (20 May 2024 17:19)  tamsulosin 0.4 mg oral capsule: 1 cap(s) orally once a day (20 May 2024 17:19)  Tresiba FlexTouch 100 units/mL subcutaneous solution: 5 unit(s) subcutaneous once a day (at bedtime) (20 May 2024 17:19)      MEDICATIONS  (STANDING):  aMIOdarone    Tablet 200 milliGRAM(s) Oral daily  atorvastatin 80 milliGRAM(s) Oral at bedtime  chlorhexidine 2% Cloths 1 Application(s) Topical daily  dextrose 10% Bolus 125 milliLiter(s) IV Bolus once  dextrose 5%. 1000 milliLiter(s) (100 mL/Hr) IV Continuous <Continuous>  dextrose 5%. 1000 milliLiter(s) (50 mL/Hr) IV Continuous <Continuous>  dextrose 50% Injectable 25 Gram(s) IV Push once  dextrose 50% Injectable 12.5 Gram(s) IV Push once  furosemide    Tablet 40 milliGRAM(s) Oral daily  glucagon  Injectable 1 milliGRAM(s) IntraMuscular once  insulin glargine Injectable (LANTUS) 5 Unit(s) SubCutaneous at bedtime  insulin lispro (ADMELOG) corrective regimen sliding scale   SubCutaneous three times a day before meals  insulin lispro (ADMELOG) corrective regimen sliding scale   SubCutaneous at bedtime  pantoprazole  Injectable 40 milliGRAM(s) IV Push every 12 hours  sodium bicarbonate 650 milliGRAM(s) Oral daily  sucralfate 1 Gram(s) Oral two times a day  tamsulosin 0.4 milliGRAM(s) Oral at bedtime      FAMILY HISTORY:  Family history of diabetes mellitus in mother (Mother)  (Pt states his mother developed DM in her 70's)    FH: bladder cancer (Sibling)        SOCIAL HISTORY:    [ ] Non-smoker  [ ] Smoker  [ ] Alcohol    Allergies    penicillin (Rash)    Intolerances    Cipro (Joint Pain)  	    REVIEW OF SYSTEMS:  CONSTITUTIONAL: No fever, weight loss, or fatigue  EYES: No eye pain, visual disturbances, or discharge  ENMT:  No difficulty hearing, tinnitus, vertigo; No sinus or throat pain  NECK: No pain or stiffness  RESPIRATORY: No cough, wheezing, chills or hemoptysis; No Shortness of Breath  CARDIOVASCULAR: No chest pain, palpitations, passing out, dizziness, or leg swelling  GASTROINTESTINAL: No abdominal or epigastric pain. No nausea, vomiting, or hematemesis; No diarrhea or constipation. No melena or hematochezia.  GENITOURINARY: No dysuria, frequency, hematuria, or incontinence  NEUROLOGICAL: No headaches, memory loss, loss of strength, numbness, or tremors  SKIN: No itching, burning, rashes, or lesions   	    [ ] All others negative	  [ ] Unable to obtain    PHYSICAL EXAM:  T(C): 36.8 (05-21-24 @ 05:39), Max: 36.8 (05-20-24 @ 13:37)  HR: 86 (05-21-24 @ 05:39) (64 - 86)  BP: 120/55 (05-21-24 @ 05:39) (108/61 - 120/55)  RR: 18 (05-21-24 @ 05:39) (17 - 18)  SpO2: 98% (05-21-24 @ 05:39) (96% - 100%)  Wt(kg): --  I&O's Summary      Appearance: Normal	  Psychiatry: A & O x 3, Mood & affect appropriate  HEENT:   Normal oral mucosa, PERRL, EOMI	  Lymphatic: No lymphadenopathy  Cardiovascular: Normal S1 S2,RRR, No JVD, No murmurs  Respiratory: Lungs clear to auscultation	  Gastrointestinal:  Soft, Non-tender, + BS	  Skin: No rashes, No ecchymoses, No cyanosis	  Neurologic: Non-focal  Extremities: Normal range of motion, No clubbing, cyanosis or edema  Vascular: Peripheral pulses palpable 2+ bilaterally    TELEMETRY: 	    ECG:  	  RADIOLOGY:  OTHER: 	  	  LABS:	 	    CARDIAC MARKERS:                                  7.6    2.93  )-----------( 106      ( 21 May 2024 05:31 )             23.8     05-21    141  |  107  |  75<H>  ----------------------------<  102<H>  3.5   |  22  |  3.60<H>    Ca    8.3<L>      21 May 2024 05:31    TPro  6.2  /  Alb  2.9<L>  /  TBili  0.8  /  DBili  x   /  AST  71<H>  /  ALT  53<H>  /  AlkPhos  137<H>  05-20    PT/INR - ( 20 May 2024 10:53 )   PT: 17.7 sec;   INR: 1.59 ratio         PTT - ( 20 May 2024 10:53 )  PTT:42.3 sec  proBNP:   Lipid Profile:   HgA1c:   TSH:

## 2024-05-21 NOTE — ADVANCED PRACTICE NURSE CONSULT - ASSESSMENT
General: A&Ox4, able to ambulate with x1 assistance, patient currently sitting in chair at bedside, continent of urine and stool. Skin warm, dry with increased moisture in intertriginous folds, scattered areas of hyperpigmentation and hypopigmentation, scattered areas of ecchymosis without hematoma on bilateral upper extremities.     Vascular of b/l lower extremities: Bilateral lower extremities with scattered areas of hyper and hypopigmentation. Dry, flaky skin. Thickened-yellow hyperpigmented overgrown toenails. Multiple wounds present. No temperature changes noted. No Edema. Capillary refill less than 3 seconds. +2 DP/PT pulses. Right heel noted on lateral aspect to have reddish discoloration measuring 0.5cmx0.8cm; irregularly bordered, with skin intact. Patient reports that when he was at home he was attempting to sit back down in a chair and mis-stepped, resulting in hitting the back of his heel on the chair. No pressure injury noted.

## 2024-05-21 NOTE — DIETITIAN INITIAL EVALUATION ADULT - OTHER INFO
88 yr old male with multiple medical conditions including CKD 4, CAD, GIB, A fib, HTN, anemia presenting with GIB.    Pt is on clear liquids diet with NPO MN for planned EGD.  Denies chewing, swallowing difficulties or any nausea, vomiting, diarrhea, constipation. Last bowel movement 5/21. Labs notable for elevated BUN/Creat, hx CKD, followed by Nephrology. Hx of Diabetes, last Hga1c 5.3%. Pt endorses weight loss and remains with DTI to r heel. Previous admit (3/2024) weight 132 lbs. No current admit weight available. Pt reports to weighing 120 lbs, indicates 9% weight loss within 2 months.

## 2024-05-21 NOTE — DIETITIAN INITIAL EVALUATION ADULT - NS FNS DIET ORDER
Diet, NPO after Midnight:      NPO Start Date: 21-May-2024,   NPO Start Time: 23:59  Except Medications (05-21-24 @ 13:00)  Diet, Clear Liquid:   Consistent Carbohydrate {Evening Snack} (CSTCHOSN)  DASH/TLC {Sodium & Cholesterol Restricted} (DASH) (05-21-24 @ 10:53)

## 2024-05-21 NOTE — ADVANCED PRACTICE NURSE CONSULT - RECOMMEDATIONS
Topical recommendations:     Apply Sween 24 Moisturizer to b/l UE and LE daily, avoiding in between the toes.     Continue low air loss bed therapy, continue heel elevation, continue to turn & reposition as per protocol, soft pillow between bony prominences, continue moisture management with barrier creams & single breathable pad, continue measures to decrease friction/shear/pressure. Continue with nutritional support as per dietary/orders.     Plan of care discussed with Primary RN Nettie and patient.     Please contact Wound/Ostomy Care Service Line if we can be of further assistance (ext [phone]).

## 2024-05-21 NOTE — CONSULT NOTE ADULT - TIME BILLING
agree w above  87yo w/ PMHx CKD, HFrEF, Diabetes, TIA,  CAD s/p stenting, sev MR,  bladder cancer s/p chemo in remission, afib on eliquis, hx of left nephrectomy, recent admission for NSTEMI pw GIB .    #GIB  -h/h noted- PRBC as needed   -ac on hold   -antiplt on hold   -resume ac vs antiplt once cleared by GI   -pending EGD- no chf on exam - known CAD with sev MR, CHECK ECG- otherwise pt can proceed from a cv standpoint      #CAD, s/p PCI  -cv stable, no chest pain  -Cont statin  -sp C 05.08.24  w/ severe obstructive CAD to LCx/om and Mild to moderate in-stent restenosis of left anterior descending and right coronary artery stents  -Will manage the above medically for now as pt is high risk for developing ELIZA and requiring HD with staged PCI  -resume ac vs antiplt once cleared by GI     #Sev MR   -MARCELLA 05.08.24  Left ventricular systolic function is moderately decreased. There are regional wall motion abnormalities present. Hypokinesis of the inferior, lateral, and inferolateral walls. SEV MR , no JOSÉ MIGUEL thrombus   -[facility] as [facility]   -per Structural heart eval last admission- not a candidate for MV clip/repair     #chronic HFrEF  -volume status stable   -Recent MARCELLA with moderately decreased LVEF, regional WMA, severe MR   -lasix po     #PAtrial Fibrillation  -cont amio  -FU ECG   -Eventual outpt f/u for poss watchman    #CKD/ gama   -renal f/u

## 2024-05-21 NOTE — PROGRESS NOTE ADULT - SUBJECTIVE AND OBJECTIVE BOX
Interval Events:   No acute events overnight.    Last BM was yesterday 3AM.     Hospital Medications:  acetaminophen     Tablet .. 650 milliGRAM(s) Oral every 6 hours PRN  aluminum hydroxide/magnesium hydroxide/simethicone Suspension 30 milliLiter(s) Oral every 4 hours PRN  aMIOdarone    Tablet 200 milliGRAM(s) Oral daily  atorvastatin 80 milliGRAM(s) Oral at bedtime  chlorhexidine 2% Cloths 1 Application(s) Topical daily  dextrose 10% Bolus 125 milliLiter(s) IV Bolus once  dextrose 5%. 1000 milliLiter(s) IV Continuous <Continuous>  dextrose 5%. 1000 milliLiter(s) IV Continuous <Continuous>  dextrose 50% Injectable 25 Gram(s) IV Push once  dextrose 50% Injectable 12.5 Gram(s) IV Push once  dextrose Oral Gel 15 Gram(s) Oral once PRN  furosemide    Tablet 40 milliGRAM(s) Oral daily  glucagon  Injectable 1 milliGRAM(s) IntraMuscular once  insulin glargine Injectable (LANTUS) 5 Unit(s) SubCutaneous at bedtime  insulin lispro (ADMELOG) corrective regimen sliding scale   SubCutaneous three times a day before meals  insulin lispro (ADMELOG) corrective regimen sliding scale   SubCutaneous at bedtime  melatonin 3 milliGRAM(s) Oral at bedtime PRN  ondansetron Injectable 4 milliGRAM(s) IV Push every 8 hours PRN  pantoprazole  Injectable 40 milliGRAM(s) IV Push every 12 hours  sodium bicarbonate 650 milliGRAM(s) Oral daily  sucralfate 1 Gram(s) Oral two times a day  tamsulosin 0.4 milliGRAM(s) Oral at bedtime      PHYSICAL EXAM:   Vital Signs:  Vital Signs Last 24 Hrs  T(C): 36.8 (21 May 2024 05:39), Max: 36.8 (20 May 2024 13:37)  T(F): 98.3 (21 May 2024 05:39), Max: 98.3 (21 May 2024 05:39)  HR: 86 (21 May 2024 05:39) (64 - 86)  BP: 120/55 (21 May 2024 05:39) (108/61 - 120/55)  BP(mean): --  RR: 18 (21 May 2024 05:39) (17 - 18)  SpO2: 98% (21 May 2024 05:39) (96% - 100%)    Parameters below as of 21 May 2024 05:39  Patient On (Oxygen Delivery Method): room air      Daily     Daily     GENERAL: no acute distress  NEURO: alert and oriented x 3  HEENT: NCAT, no conjunctival pallor appreciated; anicteric sclera  CHEST: no respiratory distress, no accessory muscle use  CARDIAC: regular rate, +S1/S2  ABDOMEN: soft, nontender, no rebound or guarding  EXTREMITIES: warm, well perfused  SKIN: no active lesions noted    LABS: reviewed                        7.6    2.93  )-----------( 106      ( 21 May 2024 05:31 )             23.8     05-21    141  |  107  |  75<H>  ----------------------------<  102<H>  3.5   |  22  |  3.60<H>    Ca    8.3<L>      21 May 2024 05:31    TPro  6.2  /  Alb  2.9<L>  /  TBili  0.8  /  DBili  x   /  AST  71<H>  /  ALT  53<H>  /  AlkPhos  137<H>  05-20

## 2024-05-21 NOTE — PROGRESS NOTE ADULT - PROBLEM SELECTOR PLAN 4
Chronic stable  [id number]: 8  Per GI hold Eliquis 5mg BID -> consider Cardiology consult  Continue amiodarone 200mg daily.   Monitor

## 2024-05-21 NOTE — PATIENT PROFILE ADULT - NSFALLSECTIONLABEL_GEN_A_CORE
Noted PCO during the exam. Encouraged patient to analyze night time driving, glare and halos. Can proceed with a YAG in the future should symptoms become more troublesome. .

## 2024-05-21 NOTE — PHYSICAL THERAPY INITIAL EVALUATION ADULT - PERTINENT HX OF CURRENT PROBLEM, REHAB EVAL
88-year-old male with PMHx CKD, HFrEF, Diabetes, TIA,  CAD s/p stenting, bladder cancer s/p chemo in remission, afib on eliquis, hx of left nephrectomy of presented to the ED for dark stools.

## 2024-05-21 NOTE — DIETITIAN INITIAL EVALUATION ADULT - PROBLEM SELECTOR PLAN 4
Chronic stable  [id number]: 8  Per GI hold Eliquis 5mg BID -> consider Cardiology consult  Continue amiodarone 200mg daily.   Monitor

## 2024-05-21 NOTE — CONSULT NOTE ADULT - ASSESSMENT
A/p  87yo w/ PMHx CKD, HFrEF, Diabetes, TIA,  CAD s/p stenting, sev MR,  bladder cancer s/p chemo in remission, afib on eliquis, hx of left nephrectomy, recent admission for NSTEMI pw GIB .    #GIB  -h/h noted- PRBC as needed   -ac on hold   -antiplt on hold   -resume ac vs antiplt once cleared by GI   -pending EGD- no chf on exam - known CAD with sev MR, CHECK ECG- otherwise pt can proceed from a cv standpoint      #CAD, s/p PCI  -cv stable, no chest pain  -Cont statin  -sp C 05.08.24  w/ severe obstructive CAD to LCx/om and Mild to moderate in-stent restenosis of left anterior descending and right coronary artery stents  -Will manage the above medically for now as pt is high risk for developing ELIZA and requiring HD with staged PCI  -resume ac vs antiplt once cleared by GI     #Sev MR   -MARCELLA 05.08.24  Left ventricular systolic function is moderately decreased. There are regional wall motion abnormalities present. Hypokinesis of the inferior, lateral, and inferolateral walls. SEV MR , no JOSÉ MIGUEL thrombus   -[facility] as above   -per Structural heart eval last admission- not a candidate for MV clip/repair     #chronic HFrEF  -volume status stable   -Recent MARCELLA with moderately decreased LVEF, regional WMA, severe MR   -lasix po     #PAtrial Fibrillation  -cont amio  -FU ECG   -Eventual outpt f/u for poss watchman    #CKD/ gama   -renal f/u

## 2024-05-21 NOTE — DIETITIAN INITIAL EVALUATION ADULT - ADD RECOMMEND
1. Advance diet post EGD to clear liquids to -------> Regular as medically able.  2. Consider alternate means of nutrition if unable to advance diet.

## 2024-05-21 NOTE — PROGRESS NOTE ADULT - SUBJECTIVE AND OBJECTIVE BOX
Patient is a 88y old  Male who presents with a chief complaint of GI bleed (21 May 2024 11:08)  patient known to me from prior admission, assigned this AM to assume care  chart reviewed and events thus far noted   admitted overnight by hospitalist colleague     DATE OF SERVICE: 05-21-24 @ 13:21    SUBJECTIVE / OVERNIGHT EVENTS: overnight events noted    ROS:  Resp: No cough no sputum production  CVS: No chest pain no palpitations no orthopnea  GI: no N/V/D    MEDICATIONS  (STANDING):  aMIOdarone    Tablet 200 milliGRAM(s) Oral daily  atorvastatin 80 milliGRAM(s) Oral at bedtime  chlorhexidine 2% Cloths 1 Application(s) Topical daily  dextrose 10% Bolus 125 milliLiter(s) IV Bolus once  dextrose 5%. 1000 milliLiter(s) (100 mL/Hr) IV Continuous <Continuous>  dextrose 5%. 1000 milliLiter(s) (50 mL/Hr) IV Continuous <Continuous>  dextrose 50% Injectable 25 Gram(s) IV Push once  dextrose 50% Injectable 12.5 Gram(s) IV Push once  furosemide    Tablet 40 milliGRAM(s) Oral daily  glucagon  Injectable 1 milliGRAM(s) IntraMuscular once  insulin glargine Injectable (LANTUS) 5 Unit(s) SubCutaneous at bedtime  insulin lispro (ADMELOG) corrective regimen sliding scale   SubCutaneous three times a day before meals  insulin lispro (ADMELOG) corrective regimen sliding scale   SubCutaneous at bedtime  pantoprazole  Injectable 40 milliGRAM(s) IV Push every 12 hours  sodium bicarbonate 650 milliGRAM(s) Oral daily  sucralfate 1 Gram(s) Oral two times a day  tamsulosin 0.4 milliGRAM(s) Oral at bedtime    MEDICATIONS  (PRN):  acetaminophen     Tablet .. 650 milliGRAM(s) Oral every 6 hours PRN Temp greater or equal to 38C (100.4F), Mild Pain (1 - 3)  aluminum hydroxide/magnesium hydroxide/simethicone Suspension 30 milliLiter(s) Oral every 4 hours PRN Dyspepsia  dextrose Oral Gel 15 Gram(s) Oral once PRN Blood Glucose LESS THAN 70 milliGRAM(s)/deciliter  melatonin 3 milliGRAM(s) Oral at bedtime PRN Insomnia  ondansetron Injectable 4 milliGRAM(s) IV Push every 8 hours PRN Nausea and/or Vomiting        CAPILLARY BLOOD GLUCOSE      POCT Blood Glucose.: 107 mg/dL (21 May 2024 12:51)  POCT Blood Glucose.: 126 mg/dL (20 May 2024 23:58)  POCT Blood Glucose.: 124 mg/dL (20 May 2024 22:34)  POCT Blood Glucose.: 131 mg/dL (20 May 2024 17:36)    I&O's Summary      Vital Signs Last 24 Hrs  T(C): 36.6 (21 May 2024 12:31), Max: 36.8 (20 May 2024 13:37)  T(F): 97.9 (21 May 2024 12:31), Max: 98.3 (21 May 2024 05:39)  HR: 66 (21 May 2024 12:31) (64 - 86)  BP: 113/60 (21 May 2024 12:31) (108/61 - 120/55)  BP(mean): --  RR: 17 (21 May 2024 12:31) (17 - 18)  SpO2: 97% (21 May 2024 12:31) (96% - 100%)    PHYSICAL EXAM:  GENERAL: in no apparent distress  HEAD:  Atraumatic, Normocephalic  EYES: EOMI, PERRLA, sclera clear  NECK: Supple, No JVD  CHEST/LUNG: clear b/l, no wheeze  HEART: S1 S2; systolic murmur +   ABDOMEN: Soft, Nontender, Bowel sounds present  EXTREMITIES:  No clubbing or cyanosis,  no edema  NEUROLOGY: AO x 3 non-focal  SKIN: No rashes or lesions    LABS:                        7.6    2.93  )-----------( 106      ( 21 May 2024 05:31 )             23.8     05-21    141  |  107  |  75<H>  ----------------------------<  102<H>  3.5   |  22  |  3.60<H>    Ca    8.3<L>      21 May 2024 05:31    TPro  6.2  /  Alb  2.9<L>  /  TBili  0.8  /  DBili  x   /  AST  71<H>  /  ALT  53<H>  /  AlkPhos  137<H>  05-20    PT/INR - ( 20 May 2024 10:53 )   PT: 17.7 sec;   INR: 1.59 ratio         PTT - ( 20 May 2024 10:53 )  PTT:42.3 sec      Urinalysis Basic - ( 21 May 2024 05:31 )    Color: x / Appearance: x / SG: x / pH: x  Gluc: 102 mg/dL / Ketone: x  / Bili: x / Urobili: x   Blood: x / Protein: x / Nitrite: x   Leuk Esterase: x / RBC: x / WBC x   Sq Epi: x / Non Sq Epi: x / Bacteria: x          All consultant(s) notes reviewed and care discussed with other providers        Contact Number, Dr Arroyo [phone]

## 2024-05-21 NOTE — DIETITIAN NUTRITION RISK NOTIFICATION - TREATMENT: THE FOLLOWING DIET HAS BEEN RECOMMENDED
Diet, NPO after Midnight:      NPO Start Date: 21-May-2024,   NPO Start Time: 23:59  Except Medications (05-21-24 @ 13:00) [Active]  Diet, Clear Liquid:   Consistent Carbohydrate {Evening Snack} (CSTCHOSN)  DASH/TLC {Sodium & Cholesterol Restricted} (DASH) (05-21-24 @ 10:53) [Active]

## 2024-05-22 LAB
ALBUMIN SERPL ELPH-MCNC: 2.6 G/DL — LOW (ref 3.3–5)
ALP SERPL-CCNC: 132 U/L — HIGH (ref 40–120)
ALT FLD-CCNC: 76 U/L — HIGH (ref 4–41)
ANION GAP SERPL CALC-SCNC: 15 MMOL/L — HIGH (ref 7–14)
AST SERPL-CCNC: 121 U/L — HIGH (ref 4–40)
BILIRUB SERPL-MCNC: 1.5 MG/DL — HIGH (ref 0.2–1.2)
BUN SERPL-MCNC: 70 MG/DL — HIGH (ref 7–23)
CALCIUM SERPL-MCNC: 8.1 MG/DL — LOW (ref 8.4–10.5)
CHLORIDE SERPL-SCNC: 106 MMOL/L — SIGNIFICANT CHANGE UP (ref 98–107)
CO2 SERPL-SCNC: 19 MMOL/L — LOW (ref 22–31)
CREAT SERPL-MCNC: 3.64 MG/DL — HIGH (ref 0.5–1.3)
EGFR: 15 ML/MIN/1.73M2 — LOW
GLUCOSE BLDC GLUCOMTR-MCNC: 108 MG/DL — HIGH (ref 70–99)
GLUCOSE BLDC GLUCOMTR-MCNC: 112 MG/DL — HIGH (ref 70–99)
GLUCOSE BLDC GLUCOMTR-MCNC: 114 MG/DL — HIGH (ref 70–99)
GLUCOSE BLDC GLUCOMTR-MCNC: 143 MG/DL — HIGH (ref 70–99)
GLUCOSE BLDC GLUCOMTR-MCNC: 78 MG/DL — SIGNIFICANT CHANGE UP (ref 70–99)
GLUCOSE BLDC GLUCOMTR-MCNC: 93 MG/DL — SIGNIFICANT CHANGE UP (ref 70–99)
GLUCOSE SERPL-MCNC: 84 MG/DL — SIGNIFICANT CHANGE UP (ref 70–99)
HAV IGM SER-ACNC: SIGNIFICANT CHANGE UP
HBV CORE IGM SER-ACNC: SIGNIFICANT CHANGE UP
HBV SURFACE AG SER-ACNC: SIGNIFICANT CHANGE UP
HCT VFR BLD CALC: 25.9 % — LOW (ref 39–50)
HCV AB S/CO SERPL IA: 0.11 S/CO — SIGNIFICANT CHANGE UP (ref 0–0.99)
HCV AB SERPL-IMP: SIGNIFICANT CHANGE UP
HGB BLD-MCNC: 8.9 G/DL — LOW (ref 13–17)
MAGNESIUM SERPL-MCNC: 2.1 MG/DL — SIGNIFICANT CHANGE UP (ref 1.6–2.6)
MCHC RBC-ENTMCNC: 31.9 PG — SIGNIFICANT CHANGE UP (ref 27–34)
MCHC RBC-ENTMCNC: 34.4 GM/DL — SIGNIFICANT CHANGE UP (ref 32–36)
MCV RBC AUTO: 92.8 FL — SIGNIFICANT CHANGE UP (ref 80–100)
NRBC # BLD: 0 /100 WBCS — SIGNIFICANT CHANGE UP (ref 0–0)
NRBC # FLD: 0 K/UL — SIGNIFICANT CHANGE UP (ref 0–0)
PHOSPHATE SERPL-MCNC: 3.6 MG/DL — SIGNIFICANT CHANGE UP (ref 2.5–4.5)
PLATELET # BLD AUTO: 115 K/UL — LOW (ref 150–400)
POTASSIUM SERPL-MCNC: 3.4 MMOL/L — LOW (ref 3.5–5.3)
POTASSIUM SERPL-SCNC: 3.4 MMOL/L — LOW (ref 3.5–5.3)
PROT SERPL-MCNC: 5.6 G/DL — LOW (ref 6–8.3)
RBC # BLD: 2.79 M/UL — LOW (ref 4.2–5.8)
RBC # FLD: 18.2 % — HIGH (ref 10.3–14.5)
SODIUM SERPL-SCNC: 140 MMOL/L — SIGNIFICANT CHANGE UP (ref 135–145)
WBC # BLD: 3.76 K/UL — LOW (ref 3.8–10.5)
WBC # FLD AUTO: 3.76 K/UL — LOW (ref 3.8–10.5)

## 2024-05-22 PROCEDURE — 99233 SBSQ HOSP IP/OBS HIGH 50: CPT

## 2024-05-22 PROCEDURE — 43235 EGD DIAGNOSTIC BRUSH WASH: CPT | Mod: GC

## 2024-05-22 RX ORDER — FUROSEMIDE 40 MG
40 TABLET ORAL EVERY 12 HOURS
Refills: 0 | Status: DISCONTINUED | OUTPATIENT
Start: 2024-05-22 | End: 2024-05-23

## 2024-05-22 RX ORDER — POTASSIUM CHLORIDE 20 MEQ
40 PACKET (EA) ORAL ONCE
Refills: 0 | Status: COMPLETED | OUTPATIENT
Start: 2024-05-22 | End: 2024-05-22

## 2024-05-22 RX ORDER — APIXABAN 2.5 MG/1
2.5 TABLET, FILM COATED ORAL EVERY 12 HOURS
Refills: 0 | Status: DISCONTINUED | OUTPATIENT
Start: 2024-05-22 | End: 2024-05-22

## 2024-05-22 RX ORDER — APIXABAN 2.5 MG/1
2.5 TABLET, FILM COATED ORAL EVERY 12 HOURS
Refills: 0 | Status: DISCONTINUED | OUTPATIENT
Start: 2024-05-22 | End: 2024-05-30

## 2024-05-22 RX ADMIN — TAMSULOSIN HYDROCHLORIDE 0.4 MILLIGRAM(S): 0.4 CAPSULE ORAL at 21:23

## 2024-05-22 RX ADMIN — AMIODARONE HYDROCHLORIDE 200 MILLIGRAM(S): 400 TABLET ORAL at 06:28

## 2024-05-22 RX ADMIN — Medication 1 GRAM(S): at 17:58

## 2024-05-22 RX ADMIN — Medication 40 MILLIGRAM(S): at 06:27

## 2024-05-22 RX ADMIN — PANTOPRAZOLE SODIUM 40 MILLIGRAM(S): 20 TABLET, DELAYED RELEASE ORAL at 17:58

## 2024-05-22 RX ADMIN — PANTOPRAZOLE SODIUM 40 MILLIGRAM(S): 20 TABLET, DELAYED RELEASE ORAL at 06:28

## 2024-05-22 RX ADMIN — Medication 40 MILLIEQUIVALENT(S): at 17:58

## 2024-05-22 RX ADMIN — CHLORHEXIDINE GLUCONATE 1 APPLICATION(S): 213 SOLUTION TOPICAL at 13:41

## 2024-05-22 RX ADMIN — ATORVASTATIN CALCIUM 80 MILLIGRAM(S): 80 TABLET, FILM COATED ORAL at 21:23

## 2024-05-22 RX ADMIN — INSULIN GLARGINE 5 UNIT(S): 100 INJECTION, SOLUTION SUBCUTANEOUS at 22:45

## 2024-05-22 NOTE — CONSULT NOTE ADULT - ASSESSMENT
89yo w/ PMHx CKD, HFrEF, Diabetes, TIA, CAD s/p stenting, severe MR, bladder cancer s/p chemo in remission, afib on Eliquis, hx of left nephrectomy, recent admission for NSTEMI presented with GIB. EGD today showed normal esophagus, erythematous mucosa in the antrum, non-bleeding gastric ulcers with no stigmata of bleeding. GI recommended resuming AC. Patient was seen in EP clinic on April 19, 2024 for evaluation of JOSÉ MIGUEL occlusion. Patient underwent MARCELLA which showed no JOSÉ MIGUEL thrombus. 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 Discussed with patient and son regarding Watchman placement  - Maintain K>4 and Mg>2  - Continue management as per primary team 89yo w/ PMHx CKD, HFrEF, Diabetes, TIA, CAD s/p stenting, severe MR, bladder cancer s/p chemo in remission, afib on Eliquis, hx of left nephrectomy, recent admission for NSTEMI presented with GIB. EGD today showed normal esophagus, erythematous mucosa in the antrum, non-bleeding gastric ulcers with no stigmata of bleeding. GI recommended resuming AC. Patient was seen in EP clinic on April 19, 2024 for evaluation of JOSÉ MIGUEL occlusion. Patient underwent MARCELLA which showed no JOSÉ MIGUEL thrombus. 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 Discussed with patient and son regarding Watchman placement  - Maintain K>4 and Mg>2  - Continue management as per primary team  - Will follow up

## 2024-05-22 NOTE — PROGRESS NOTE ADULT - SUBJECTIVE AND OBJECTIVE BOX
NEPHROLOGY-[facility] [phone]        Patient seen and examined s/p EGD today c/w non-bleeding gastric ulcers.          MEDICATIONS  (STANDING):  aMIOdarone    Tablet 200 milliGRAM(s) Oral daily  atorvastatin 80 milliGRAM(s) Oral at bedtime  chlorhexidine 2% Cloths 1 Application(s) Topical daily  dextrose 10% Bolus 125 milliLiter(s) IV Bolus once  dextrose 5%. 1000 milliLiter(s) (100 mL/Hr) IV Continuous <Continuous>  dextrose 5%. 1000 milliLiter(s) (50 mL/Hr) IV Continuous <Continuous>  dextrose 50% Injectable 25 Gram(s) IV Push once  dextrose 50% Injectable 12.5 Gram(s) IV Push once  furosemide    Tablet 40 milliGRAM(s) Oral daily  glucagon  Injectable 1 milliGRAM(s) IntraMuscular once  insulin glargine Injectable (LANTUS) 5 Unit(s) SubCutaneous at bedtime  insulin lispro (ADMELOG) corrective regimen sliding scale   SubCutaneous three times a day before meals  insulin lispro (ADMELOG) corrective regimen sliding scale   SubCutaneous at bedtime  pantoprazole  Injectable 40 milliGRAM(s) IV Push every 12 hours  sodium bicarbonate 650 milliGRAM(s) Oral daily  sucralfate 1 Gram(s) Oral two times a day  tamsulosin 0.4 milliGRAM(s) Oral at bedtime      VITAL:  T(C): , Max: 37 (05-21-24 @ 20:28)  T(F): , Max: 98.6 (05-21-24 @ 20:28)  HR: 66 (05-22-24 @ 11:14)  BP: 97/47 (05-22-24 @ 11:14)  BP(mean): --  RR: 14 (05-22-24 @ 11:14)  SpO2: 96% (05-22-24 @ 11:14)  Wt(kg): --    I and O's:    Height (cm): 170.2 (05-22 @ 09:44)  Weight (kg): 54.4 (05-22 @ 09:44)  BMI (kg/m2): 18.8 (05-22 @ 09:44)  BSA (m2): 1.63 (05-22 @ 09:44)    PHYSICAL EXAM:    Constitutional: NAD  Neck:  No JVD  Respiratory: CTAB/L  Cardiovascular: S1 and S2  Gastrointestinal: BS+, soft, NT/ND  Extremities: No peripheral edema  Neurological: A/O x 3, no focal deficits  Psychiatric: Normal mood, normal affect  : No Temple  Skin: No rashes  Access: Not applicable    LABS:                        8.9    3.76  )-----------( 115      ( 22 May 2024 05:13 )             25.9     05-22    140  |  106  |  70<H>  ----------------------------<  84  3.4<L>   |  19<L>  |  3.64<H>    Ca    8.1<L>      22 May 2024 05:13  Phos  3.6     05-22  Mg     2.10     05-22    TPro  5.6<L>  /  Alb  2.6<L>  /  TBili  1.5<H>  /  DBili  x   /  AST  121<H>  /  ALT  76<H>  /  AlkPhos  132<H>  05-22          Urine Studies:  Urinalysis Basic - ( 22 May 2024 05:13 )    Color: x / Appearance: x / SG: x / pH: x  Gluc: 84 mg/dL / Ketone: x  / Bili: x / Urobili: x   Blood: x / Protein: x / Nitrite: x   Leuk Esterase: x / RBC: x / WBC x   Sq Epi: x / Non Sq Epi: x / Bacteria: x        Assessment and Plan:   · Assessment	      88y Male with CAD, HFrEF, Afib, HTN, DM, solitary R kidney and CKD p/w acute on chronic HFrEF and acute on chronic kidney injury.  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 acute on chronic HFrEF   Mitral clip is off the table at present    LCX disease   GI- S/p EGD today, on IV protonix      Sayed [id number]   [phone]            NEPHROLOGY-[facility] [phone]        Patient seen and examined s/p EGD today c/w non-bleeding gastric ulcers.          MEDICATIONS  (STANDING):  aMIOdarone    Tablet 200 milliGRAM(s) Oral daily  atorvastatin 80 milliGRAM(s) Oral at bedtime  chlorhexidine 2% Cloths 1 Application(s) Topical daily  dextrose 10% Bolus 125 milliLiter(s) IV Bolus once  dextrose 5%. 1000 milliLiter(s) (100 mL/Hr) IV Continuous <Continuous>  dextrose 5%. 1000 milliLiter(s) (50 mL/Hr) IV Continuous <Continuous>  dextrose 50% Injectable 25 Gram(s) IV Push once  dextrose 50% Injectable 12.5 Gram(s) IV Push once  furosemide    Tablet 40 milliGRAM(s) Oral daily  glucagon  Injectable 1 milliGRAM(s) IntraMuscular once  insulin glargine Injectable (LANTUS) 5 Unit(s) SubCutaneous at bedtime  insulin lispro (ADMELOG) corrective regimen sliding scale   SubCutaneous three times a day before meals  insulin lispro (ADMELOG) corrective regimen sliding scale   SubCutaneous at bedtime  pantoprazole  Injectable 40 milliGRAM(s) IV Push every 12 hours  sodium bicarbonate 650 milliGRAM(s) Oral daily  sucralfate 1 Gram(s) Oral two times a day  tamsulosin 0.4 milliGRAM(s) Oral at bedtime      VITAL:  T(C): , Max: 37 (05-21-24 @ 20:28)  T(F): , Max: 98.6 (05-21-24 @ 20:28)  HR: 66 (05-22-24 @ 11:14)  BP: 97/47 (05-22-24 @ 11:14)  BP(mean): --  RR: 14 (05-22-24 @ 11:14)  SpO2: 96% (05-22-24 @ 11:14)  Wt(kg): --    I and O's:    Height (cm): 170.2 (05-22 @ 09:44)  Weight (kg): 54.4 (05-22 @ 09:44)  BMI (kg/m2): 18.8 (05-22 @ 09:44)  BSA (m2): 1.63 (05-22 @ 09:44)    PHYSICAL EXAM:    Constitutional: NAD  Neck:  No JVD  Respiratory: CTAB/L  Cardiovascular: S1 and S2  Gastrointestinal: BS+, soft, NT/ND  Extremities: No peripheral edema  Neurological: A/O x 3, no focal deficits  Psychiatric: Normal mood, normal affect  : No Temple  Skin: No rashes  Access: Not applicable    LABS:                        8.9    3.76  )-----------( 115      ( 22 May 2024 05:13 )             25.9     05-22    140  |  106  |  70<H>  ----------------------------<  84  3.4<L>   |  19<L>  |  3.64<H>    Ca    8.1<L>      22 May 2024 05:13  Phos  3.6     05-22  Mg     2.10     05-22    TPro  5.6<L>  /  Alb  2.6<L>  /  TBili  1.5<H>  /  DBili  x   /  AST  121<H>  /  ALT  76<H>  /  AlkPhos  132<H>  05-22          Urine Studies:  Urinalysis Basic - ( 22 May 2024 05:13 )    Color: x / Appearance: x / SG: x / pH: x  Gluc: 84 mg/dL / Ketone: x  / Bili: x / Urobili: x   Blood: x / Protein: x / Nitrite: x   Leuk Esterase: x / RBC: x / WBC x   Sq Epi: x / Non Sq Epi: x / Bacteria: x

## 2024-05-22 NOTE — CONSULT NOTE ADULT - SUBJECTIVE AND OBJECTIVE BOX
CHIEF COMPLAINT:  Called to evaluate patient for Watchman procedure    HISTORY OF PRESENT ILLNESS:  Patient is a 88y old  Male with past medical history of Afib on Eliquis, bladder cancer, NSTEMI (25 Apr 2024), CHF, CKD, hematuria, SCCA of skin, was recently admitted with GIB now  presented with for a three day history of black stools. EGD today showed normal esophagus, erythematous mucosa in the antrum, non-bleeding gastric ulcers with no stigmata of bleeding. Patient had recent admission 5/3-5/10 for acute decompensated heart failure and GIB. He was evaluated by EP for possible Watchman procedure as outpatient. He was also admitted for NSTEMI in 4/2024 and had LHC that showed severe obstructive CAD to LCx. Patient is deemed high risk for developing ELIZA and requiring HD with staged PCI. Medical management was recommended. He is also with  history of severe MR and was evaluated by structural heart - deemed not a candidate for MV clip/repair at that time. Patient was seen in EP clinic on April 19, 2024 for evaluation of JOSÉ MIGUEL occlusion. Patient underwent MARCELLA which showed no JOSÉ MIGUEL thrombus. 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 EKG shows atrial fibrillation with V pacing. Abbott CRT_P interrogation shows normal device function and BIV pacing <90%. Currently patient denies any chest pain, SOB, palpitations or dizziness. Echocardiogram with moderately decreased LV function.  PAST MEDICAL & SURGICAL HISTORY:  CAD (coronary artery disease)  (4 stents,)  Single kidney  (hx/o LEFT nephrectomy at age 16; pt states due to a ureter"blockage" causing "infection"; pt denies hx/o renal dysfunction)  Arthritis  Myocardial infarction  (2003)  Hard of hearing  Atrial fibrillation  Hypertension  Hyperlipidemia  History of TIAs  Last 2018  History of bradycardia  Bladder cancer  Left carotid bruit  T2DM (type 2 diabetes mellitus)  HFrEF (heart failure with reduced ejection fraction)  GAVE (gastric antral vascular ectasia)  History of nephrectomy, unilateral  (hx/o LEFT nephrectomy at age 16)  Stented coronary artery  (4 stents)  S/P bilateral cataract extraction  H/O cystoscopy      PREVIOUS DIAGNOSTIC TESTING:    Echocardiogram:   from: MARCELLA W or WO Ultrasound Enhancing Agent (04.24.24 @ 12:50)     CONCLUSIONS:      1. Left ventricular systolic function is moderately decreased. There are regional wall motion abnormalities present. Hypokinesis of the inferior, lateral, and inferolateral walls.   2. Normal right ventricular cavity size and normal systolic function.   3. Structurally normal mitral valve with normal leaflet excursion. There is calcification of the mitral valve annulus. There is symmetric leaflet tethering. There is severe mitral regurgitation. Vena contracta width ~ 0.7 cm. Estimated effective regurgitant orifice area (EROA) ~ 0.4 cm2 (by the PISA method).   4. The aortic valve appears trileaflet with normal systolic excursion. There is calcification of the aortic valve leaflets. There is mild to moderate aortic regurgitation. Vena contracta width ~ 0.3 cm.   5. No atheroma in the visualized portions of the proximal ascending aorta. Mild non-mobile atheroma in the visualized portions of the transverse aortic arch. Mild non-mobile atheroma in the visualized portions of the descending aorta.   6. The left atrium is normal in size. There is no evidence of left atrial or left atrial appendage thrombus. Measurements of the left atrial appendage (JOSÉ MIGUEL) were carried out as outlined below. Note that the JOSÉ MIGUEL width refers to its width at the ostium. The JOSÉ MIGUEL length refers to the distance from the plane of the JOSÉ MIGUEL ostium to its most distal point. Note that the JOSÉ MIGUEL has a "windsock" appearance.           Zero degrees: width = 1.9 cm, length = 3.0 cm      30 degrees: width = 1.7 cm, length = 2.9 cm      45 degrees: width = 1.7 cm, length = 2.8 cm      60 degrees: width = 1.6 cm, length = 2.7 cm      90 degrees: width = 1.8 cm, length = 2.6 cm      135 degrees: width = 2.0 cm, length = 2.8 cm.   7. Agitated saline injection was negative for intracardiac shunt.    ____________________________________________________________________  MARCELLA Procedure:  After discussion of the risks and benefits of the MARCELLA, an informed consent was obtained. Study was performed with anesthesia (please see anesthesia record).  The adult Charla MARCELLA probe was introduced and passed into the esophagus. The MARCELLA probe was passed without difficulty. Images were obtained with the patient in a left lateral decubitus position. Image quality was good. The patient's vital signs; including heart rate, blood pressure, and oxygen saturation; remained stable throughout the procedure. The patient tolerated the procedure well and without complications.     ________________________________________________________________________________________  FINDINGS:     Left Ventricle:  Left ventricular systolic function is moderately decreased. There are regional wall motion abnormalities present. Hypokinesis of the inferior, lateral, and inferolateral walls.     Right Ventricle:  The right ventricular cavity is normal in size and normal systolic function.     Left Atrium:  The left atrium is normal in size. There is no evidence ofleft atrial or left atrial appendage thrombus. Measurements of the left atrial appendage (JOSÉ MIGUEL) were carried out as outlined below. Note that the JOSÉ MIGUEL width refers to its width at the ostium. The JOSÉ MIGUEL length refers to the distance from the plane of the JOSÉ MIGUEL ostium to its most distal point. Note that the JOSÉ MIGUEL has a "windsock" appearance.    Zero degrees: width = 1.9 cm, length = 3.0 cm  30 degrees: width = 1.7 cm, length = 2.9 cm  45 degrees: width = 1.7 cm, length = 2.8 cm  60 degrees: width = 1.6 cm,length = 2.7 cm  90 degrees: width = 1.8 cm, length = 2.6 cm  135 degrees: width = 2.0 cm, length = 2.8 cm.     Right Atrium:  The right atrium is normal in size.     Interatrial Septum:  Agitated saline injection was negative for intracardiac shunt.     Aortic Valve:  The aortic valve appears trileaflet with normal systolic excursion. There is calcification of the aortic valve leaflets. There is mild to moderate aortic regurgitation. Vena contracta width ~ 0.3 cm.     Mitral Valve:  Structurallynormal mitral valve with normal leaflet excursion. There is calcification of the mitral valve annulus. There is symmetric leaflet tethering. There is severe mitral regurgitation. Vena contracta width ~ 0.7 cm. Estimated effective regurgitant orifice area (EROA) ~ 0.4 cm2 (by the PISA method).     Tricuspid Valve:  Structurally normal tricuspid valve with normal leaflet excursion. There is trace tricuspid regurgitation.     Pulmonic Valve:  Structurally normal pulmonic valve with normal leaflet excursion. There is mild pulmonic regurgitation.     Aorta:  The aortic root at the sinuses of Valsalva is normal in size, measuring 2.90 cm (indexed 1.78 cm/m²). The aortic diameter at the sinotubular junction is normal in size, measuring 2.3 cm. The ascending aorta diameter is normal in size, measuring 3.00 cm (indexed 1.84 cm/m²). There is no atheroma in the visualized portions of the proximal ascending aorta. There is mild non-mobile atheroma in the visualized portions of the transverse aortic arch. There is mild non-mobile atheroma in the visualized portions of the descending aorta.     Pericardium:  No pericardial effusion seen.  ____________________________________________________________________  QUANTITATIVE DATA:  Aorta Measurements: (Normal range) (Indexed to BSA)     Sinuses of Valsalva: 2.90 cm (3.1 - 3.7 cm)  Ao ST Junct:         2.3 cm  Ao Asc prox:         3.00 cm       LVOT / RVOT/ Qp/Qs Data: (Indexed to BSA)  LVOT Diameter: 2.20 cm  LVOT Area:     3.80 cm²    Mitral Valve Measurements:     MV Beatriz d, A4C 3.70 cm      MR Vmax:           5.52 m/s                             MR VTI:            198.50 cm                             MR Mean Gradient:  71.0 mmHg                             MR Peak Gradient:  121.9 mmHg                       MR PISA Radius:    1.00 cm                             MR Aliasing Benjamin:   30.80 cm/s                             MR Inst Flow Rate: 193.52 ml/s       Catheterization:   from: Cardiac Catheterization (05.08.24 @ 15:36)    Diagnostic Conclusions:     Severe one vessel obstructive coronary artery disease   --Left circumflex artery   Mild left main coronary artery disease   Mild to moderate in-stent restenosis of left anterior descending and  right coronary artery stents  LVEDP = 22mmHg   No gradient across the aortic valve     Recommendations:     Keep right leg straight for 4 hours following removal of sheath   Continue aggressive medical management of coronary artery disease and  associated risk factors  Gentle IV hydration as per protocol   Recommend multidisciplinary conversation to be held among medical  teams to discuss risks and benefits of performing high  risk PCI in setting of multiple increased risk factors including CKD  IV and elevated risk of bleeding    Findings discussed with cardiology/Dr. Vega     Procedure Narrative:   The risks and alternatives of the procedures and conscious sedation  were explained to the patient and informed consent was  obtained. The patient was brought to the cath lab and placed onthe  exam table.  Access   Right femoral artery:   The puncture site was infiltrated with 1% Lidocaine. Vascular access  was obtained using Vascular Ultrasound.    Coronary Angiography     Patient: RACHEL COMBS         MRN: [id number]  Study Date: 05/08/2024   03:36 PM      Page 1 of 3          Left Coronary System:   A catheter was positioned into the vessel ostia under fluoroscopic  guidance. Angiograms were obtained in multiple views.  Right Coronary System:   A catheter was positioned into the vessel ostia under fluoroscopic  guidance. Angiograms were obtained in multiple views.    Diagnostic Findings:     Coronary Angiography   The coronary circulation is right dominant.      LM   Left main artery: There is a 25 % stenosis.      LAD   Proximal left anterior descending: There is a 20 % stenosis. Mid left  anterior descending: There is a 40 % in-stent restenosis.  Distal left anterior descending: There is a 25 % stenosis.      CX   Proximal circumflex: There is an 85 % calcified stenosis. First obtuse  marginal: There is a 95 % calcified stenosis. Mid  circumflex: There is a 30 % stenosis.      RCA   Proximal right coronary artery: There is a 35 % stenosis. Mid right  coronary artery: There is a 35 % in-stent restenosis. Distal  right coronary artery: There is a 40 % stenosis.      Left Heart Cath   LV to AO pullback was performed. LHC performed: Aortic valve crossed  and left ventricular pressures were obtained.    X-Ray:   Diagnostic Flouro time:       1.7 min.         Exam record  DAP:           2089.75 cGycm2  Interventional Flouro time:                                Exam fluoro  DAP:           286.39 cGycm2  Total Flouro Time:            1.7 min.                     Exam total  DAP:            2376.91aPxkc0    Exam Start Time:   03:36 PM   Exam End Time:     03:45 PM   Exam Duration:     9 min     Contrast:   Description                      Dose          Unit        Serial No.   Omnipaque                            15.000   mL     Medication:   Description                   Dose, Unit, Route, Time   fentaNYL  50 mCg/mL           25.0, mcg, IV, 15:37:13   Injectable (Sublimaze)     Inventory:   Description                    Quantity      Nathan#  Reference No.    Serial No.      Lot No.      CDM    Patient: RACHEL COMBS         MRN: [id number]  Study Date: 05/08/2024   03:36 PM      Page 2 of 3          CATH PACK                 1.000        [id number]   [id number]  [id number]  030     HEPARIN SALINE 500ML  1.000        [id number]   [id number]  397     HEPARIN SALINE 500ML  1.000        [id number]   [id number]  397     .035 X 150 GUIDERIGHT  1.000        [id number]   [id number]  594     MEDRAD PROVIS SYRINGE  1.000        [id number]   [id number]  797     OMNIPAQUE 350 150ML  1.000        [id number]   [id number]  368     6FR PRELUDE KIT  1.000        [id number]   [id number]  904     PRELUDE IDEAL 5FR X  1.000        [id number]   [id number]  11CM  628  DXTERITY 5FR JL4  1.000        [id number]   [id number]    116   DXTERITY 5FR JR4  1.000        [id number]   [id number]    638   5FR SHEATH PINNACLE  1.000        [id number]   [id number]    054     Hemodynamic Pressures:   Phase         Location          [mmHg]               [mmHg]  [mmHg]           HR  Phase 0      LV                  s      110  ed      22         64  Phase 0      Ao                  s      110               d      40  m   58         63    Conscious sedation was administered and monitored by Cardiology  nursing staff,  under my direct supervision when applicable.   	    MEDICATIONS:  aMIOdarone    Tablet 200 milliGRAM(s) Oral daily  furosemide    Tablet 40 milliGRAM(s) Oral daily  acetaminophen     Tablet .. 650 milliGRAM(s) Oral every 6 hours PRN  melatonin 3 milliGRAM(s) Oral at bedtime PRN  ondansetron Injectable 4 milliGRAM(s) IV Push every 8 hours PRN    aluminum hydroxide/magnesium hydroxide/simethicone Suspension 30 milliLiter(s) Oral every 4 hours PRN  pantoprazole  Injectable 40 milliGRAM(s) IV Push every 12 hours  sucralfate 1 Gram(s) Oral two times a day    atorvastatin 80 milliGRAM(s) Oral at bedtime  dextrose 50% Injectable 25 Gram(s) IV Push once  dextrose 50% Injectable 12.5 Gram(s) IV Push once  dextrose Oral Gel 15 Gram(s) Oral once PRN  glucagon  Injectable 1 milliGRAM(s) IntraMuscular once  insulin glargine Injectable (LANTUS) 5 Unit(s) SubCutaneous at bedtime  insulin lispro (ADMELOG) corrective regimen sliding scale   SubCutaneous three times a day before meals  insulin lispro (ADMELOG) corrective regimen sliding scale   SubCutaneous at bedtime    chlorhexidine 2% Cloths 1 Application(s) Topical daily  dextrose 10% Bolus 125 milliLiter(s) IV Bolus once  dextrose 5%. 1000 milliLiter(s) IV Continuous <Continuous>  dextrose 5%. 1000 milliLiter(s) IV Continuous <Continuous>  potassium chloride   Powder 40 milliEquivalent(s) Oral once  sodium bicarbonate 650 milliGRAM(s) Oral daily  tamsulosin 0.4 milliGRAM(s) Oral at bedtime      FAMILY HISTORY:  Family history of diabetes mellitus in mother (Mother)  (Pt states his mother developed DM in her 70's)    FH: bladder cancer (Sibling)        SOCIAL HISTORY:      [- Smoker  [- Alcohol  [- Drugs    Allergies    penicillin (Rash)    Intolerances    Cipro (Joint Pain)  	    REVIEW OF SYSTEMS:  CONSTITUTIONAL: No fever, weight loss, or fatigue  EYES: No eye pain, visual disturbances, or discharge  ENMT:  No difficulty hearing, tinnitus, vertigo; No sinus or throat pain  NECK: No pain or stiffness  RESPIRATORY: No cough, wheezing, chills or hemoptysis; No Shortness of Breath  CARDIOVASCULAR: No chest pain, palpitations, passing out, dizziness, or leg swelling  GASTROINTESTINAL: No abdominal or epigastric pain. No nausea, vomiting, or hematemesis; No diarrhea or constipation. No melena or hematochezia.  GENITOURINARY: No dysuria, frequency, hematuria, or incontinence  NEUROLOGICAL: No headaches, memory loss, loss of strength, numbness, or tremors  SKIN: No itching, burning, rashes, or lesions   LYMPH Nodes: No enlarged glands  ENDOCRINE: No heat or cold intolerance; No hair loss  MUSCULOSKELETAL: No joint pain or swelling; No muscle, back, or extremity pain  PSYCHIATRIC: No depression, anxiety, mood swings, or difficulty sleeping  HEME/LYMPH: No easy bruising, or bleeding gums  ALLERY AND IMMUNOLOGIC: No hives or eczema	    [X All others negative	  [ ] Unable to obtain    PHYSICAL EXAM:  T(C): 36.4 (05-22-24 @ 15:12), Max: 37 (05-21-24 @ 20:28)  HR: 75 (05-22-24 @ 15:12) (55 - 75)  BP: 106/51 (05-22-24 @ 15:12) (96/47 - 118/49)  RR: 16 (05-22-24 @ 15:12) (11 - 19)  SpO2: 96% (05-22-24 @ 15:12) (96% - 98%)  Wt(kg): --  I&O's Summary      Appearance: Normal	  Cardiovascular: Irregular rate and rhythm  Respiratory: Lungs clear to auscultation	  Psychiatry: A & O x 3, Mood & affect appropriate  Gastrointestinal:  Soft, Non-tender, + BS	  Extremities: Normal range of motion, No clubbing, cyanosis or edema      TELEMETRY: Not on telmetry  ECG:  	Atrial fibrillation with V pacing  RADIOLOGY:  OTHER: 	  	  LABS:	 	    CARDIAC MARKERS:                                  8.9    3.76  )-----------( 115      ( 22 May 2024 05:13 )             25.9     05-22    140  |  106  |  70<H>  ----------------------------<  84  3.4<L>   |  19<L>  |  3.64<H>    Ca    8.1<L>      22 May 2024 05:13  Phos  3.6     05-22  Mg     2.10     05-22    TPro  5.6<L>  /  Alb  2.6<L>  /  TBili  1.5<H>  /  DBili  x   /  AST  121<H>  /  ALT  76<H>  /  AlkPhos  132<H>  05-22    proBNP:   Lipid Profile:   HgA1c:   TSH:          CHIEF COMPLAINT:  Called to evaluate patient for Watchman procedure    HISTORY OF PRESENT ILLNESS:  Patient is a 88y old  Male with past medical history of Afib on Eliquis, bladder cancer, NSTEMI (25 Apr 2024), CHF, CKD, hematuria, SCCA of skin, was recently admitted with GIB now  presented with for a three day history of black stools. EGD today showed normal esophagus, erythematous mucosa in the antrum, non-bleeding gastric ulcers with no stigmata of bleeding. Patient had recent admission 5/3-5/10 for acute decompensated heart failure and GIB. He was evaluated by EP for possible Watchman procedure as outpatient. He was also admitted for NSTEMI in 4/2024 and had LHC that showed severe obstructive CAD to LCx. Patient is deemed high risk for developing ELIZA and requiring HD with staged PCI. Medical management was recommended. He is also with  history of severe MR and was evaluated by structural heart - deemed not a candidate for MV clip/repair at that time. Patient was seen in EP clinic on April 19, 2024 for evaluation of JOSÉ MIGUEL occlusion. Patient underwent MARCELLA which showed no JOSÉ MIGUEL thrombus. 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 Currently patient denies any chest pain, SOB, palpitations or dizziness. Echocardiogram with moderately decreased LV function.  PAST MEDICAL & SURGICAL HISTORY:  CAD (coronary artery disease)  (4 stents,)  Single kidney  (hx/o LEFT nephrectomy at age 16; pt states due to a ureter"blockage" causing "infection"; pt denies hx/o renal dysfunction)  Arthritis  Myocardial infarction  (2003)  Hard of hearing  Atrial fibrillation  Hypertension  Hyperlipidemia  History of TIAs  Last 2018  History of bradycardia  Bladder cancer  Left carotid bruit  T2DM (type 2 diabetes mellitus)  HFrEF (heart failure with reduced ejection fraction)  GAVE (gastric antral vascular ectasia)  History of nephrectomy, unilateral  (hx/o LEFT nephrectomy at age 16)  Stented coronary artery  (4 stents)  S/P bilateral cataract extraction  H/O cystoscopy      PREVIOUS DIAGNOSTIC TESTING:    Echocardiogram:   from: MARCELLA W or WO Ultrasound Enhancing Agent (04.24.24 @ 12:50)     CONCLUSIONS:      1. Left ventricular systolic function is moderately decreased. There are regional wall motion abnormalities present. Hypokinesis of the inferior, lateral, and inferolateral walls.   2. Normal right ventricular cavity size and normal systolic function.   3. Structurally normal mitral valve with normal leaflet excursion. There is calcification of the mitral valve annulus. There is symmetric leaflet tethering. There is severe mitral regurgitation. Vena contracta width ~ 0.7 cm. Estimated effective regurgitant orifice area (EROA) ~ 0.4 cm2 (by the PISA method).   4. The aortic valve appears trileaflet with normal systolic excursion. There is calcification of the aortic valve leaflets. There is mild to moderate aortic regurgitation. Vena contracta width ~ 0.3 cm.   5. No atheroma in the visualized portions of the proximal ascending aorta. Mild non-mobile atheroma in the visualized portions of the transverse aortic arch. Mild non-mobile atheroma in the visualized portions of the descending aorta.   6. The left atrium is normal in size. There is no evidence of left atrial or left atrial appendage thrombus. Measurements of the left atrial appendage (JOSÉ MIGUEL) were carried out as outlined below. Note that the JOSÉ MIGUEL width refers to its width at the ostium. The JOSÉ MIGUEL length refers to the distance from the plane of the JOSÉ MIGUEL ostium to its most distal point. Note that the JOSÉ MIGUEL has a "windsock" appearance.           Zero degrees: width = 1.9 cm, length = 3.0 cm      30 degrees: width = 1.7 cm, length = 2.9 cm      45 degrees: width = 1.7 cm, length = 2.8 cm      60 degrees: width = 1.6 cm, length = 2.7 cm      90 degrees: width = 1.8 cm, length = 2.6 cm      135 degrees: width = 2.0 cm, length = 2.8 cm.   7. Agitated saline injection was negative for intracardiac shunt.    ____________________________________________________________________  MARCELLA Procedure:  After discussion of the risks and benefits of the MARCELLA, an informed consent was obtained. Study was performed with anesthesia (please see anesthesia record).  The adult Charla MARCELLA probe was introduced and passed into the esophagus. The MARCELLA probe was passed without difficulty. Images were obtained with the patient in a left lateral decubitus position. Image quality was good. The patient's vital signs; including heart rate, blood pressure, and oxygen saturation; remained stable throughout the procedure. The patient tolerated the procedure well and without complications.     ________________________________________________________________________________________  FINDINGS:     Left Ventricle:  Left ventricular systolic function is moderately decreased. There are regional wall motion abnormalities present. Hypokinesis of the inferior, lateral, and inferolateral walls.     Right Ventricle:  The right ventricular cavity is normal in size and normal systolic function.     Left Atrium:  The left atrium is normal in size. There is no evidence ofleft atrial or left atrial appendage thrombus. Measurements of the left atrial appendage (JOSÉ MIGUEL) were carried out as outlined below. Note that the JOSÉ MIGUEL width refers to its width at the ostium. The JOSÉ MIGUEL length refers to the distance from the plane of the JOSÉ MIGUEL ostium to its most distal point. Note that the JOSÉ MIGUEL has a "windsock" appearance.    Zero degrees: width = 1.9 cm, length = 3.0 cm  30 degrees: width = 1.7 cm, length = 2.9 cm  45 degrees: width = 1.7 cm, length = 2.8 cm  60 degrees: width = 1.6 cm,length = 2.7 cm  90 degrees: width = 1.8 cm, length = 2.6 cm  135 degrees: width = 2.0 cm, length = 2.8 cm.     Right Atrium:  The right atrium is normal in size.     Interatrial Septum:  Agitated saline injection was negative for intracardiac shunt.     Aortic Valve:  The aortic valve appears trileaflet with normal systolic excursion. There is calcification of the aortic valve leaflets. There is mild to moderate aortic regurgitation. Vena contracta width ~ 0.3 cm.     Mitral Valve:  Structurallynormal mitral valve with normal leaflet excursion. There is calcification of the mitral valve annulus. There is symmetric leaflet tethering. There is severe mitral regurgitation. Vena contracta width ~ 0.7 cm. Estimated effective regurgitant orifice area (EROA) ~ 0.4 cm2 (by the PISA method).     Tricuspid Valve:  Structurally normal tricuspid valve with normal leaflet excursion. There is trace tricuspid regurgitation.     Pulmonic Valve:  Structurally normal pulmonic valve with normal leaflet excursion. There is mild pulmonic regurgitation.     Aorta:  The aortic root at the sinuses of Valsalva is normal in size, measuring 2.90 cm (indexed 1.78 cm/m²). The aortic diameter at the sinotubular junction is normal in size, measuring 2.3 cm. The ascending aorta diameter is normal in size, measuring 3.00 cm (indexed 1.84 cm/m²). There is no atheroma in the visualized portions of the proximal ascending aorta. There is mild non-mobile atheroma in the visualized portions of the transverse aortic arch. There is mild non-mobile atheroma in the visualized portions of the descending aorta.     Pericardium:  No pericardial effusion seen.  ____________________________________________________________________  QUANTITATIVE DATA:  Aorta Measurements: (Normal range) (Indexed to BSA)     Sinuses of Valsalva: 2.90 cm (3.1 - 3.7 cm)  Ao ST Junct:         2.3 cm  Ao Asc prox:         3.00 cm       LVOT / RVOT/ Qp/Qs Data: (Indexed to BSA)  LVOT Diameter: 2.20 cm  LVOT Area:     3.80 cm²    Mitral Valve Measurements:     MV Beatriz d, A4C 3.70 cm      MR Vmax:           5.52 m/s                             MR VTI:            198.50 cm                             MR Mean Gradient:  71.0 mmHg                             MR Peak Gradient:  121.9 mmHg                       MR PISA Radius:    1.00 cm                             MR Aliasing Benjamin:   30.80 cm/s                             MR Inst Flow Rate: 193.52 ml/s       Catheterization:   from: Cardiac Catheterization (05.08.24 @ 15:36)    Diagnostic Conclusions:     Severe one vessel obstructive coronary artery disease   --Left circumflex artery   Mild left main coronary artery disease   Mild to moderate in-stent restenosis of left anterior descending and  right coronary artery stents  LVEDP = 22mmHg   No gradient across the aortic valve     Recommendations:     Keep right leg straight for 4 hours following removal of sheath   Continue aggressive medical management of coronary artery disease and  associated risk factors  Gentle IV hydration as per protocol   Recommend multidisciplinary conversation to be held among medical  teams to discuss risks and benefits of performing high  risk PCI in setting of multiple increased risk factors including CKD  IV and elevated risk of bleeding    Findings discussed with cardiology/Dr. Vega     Procedure Narrative:   The risks and alternatives of the procedures and conscious sedation  were explained to the patient and informed consent was  obtained. The patient was brought to the cath lab and placed onthe  exam table.  Access   Right femoral artery:   The puncture site was infiltrated with 1% Lidocaine. Vascular access  was obtained using Vascular Ultrasound.    Coronary Angiography     Patient: RACHEL COMBS         MRN: [id number]  Study Date: 05/08/2024   03:36 PM      Page 1 of 3          Left Coronary System:   A catheter was positioned into the vessel ostia under fluoroscopic  guidance. Angiograms were obtained in multiple views.  Right Coronary System:   A catheter was positioned into the vessel ostia under fluoroscopic  guidance. Angiograms were obtained in multiple views.    Diagnostic Findings:     Coronary Angiography   The coronary circulation is right dominant.      LM   Left main artery: There is a 25 % stenosis.      LAD   Proximal left anterior descending: There is a 20 % stenosis. Mid left  anterior descending: There is a 40 % in-stent restenosis.  Distal left anterior descending: There is a 25 % stenosis.      CX   Proximal circumflex: There is an 85 % calcified stenosis. First obtuse  marginal: There is a 95 % calcified stenosis. Mid  circumflex: There is a 30 % stenosis.      RCA   Proximal right coronary artery: There is a 35 % stenosis. Mid right  coronary artery: There is a 35 % in-stent restenosis. Distal  right coronary artery: There is a 40 % stenosis.      Left Heart Cath   LV to AO pullback was performed. LHC performed: Aortic valve crossed  and left ventricular pressures were obtained.    X-Ray:   Diagnostic Flouro time:       1.7 min.         Exam record  DAP:           2089.75 cGycm2  Interventional Flouro time:                                Exam fluoro  DAP:           286.39 cGycm2  Total Flouro Time:            1.7 min.                     Exam total  DAP:            2376.16zXjin5    Exam Start Time:   03:36 PM   Exam End Time:     03:45 PM   Exam Duration:     9 min     Contrast:   Description                      Dose          Unit        Serial No.   Omnipaque                            15.000   mL     Medication:   Description                   Dose, Unit, Route, Time   fentaNYL  50 mCg/mL           25.0, mcg, IV, 15:37:13   Injectable (Sublimaze)     Inventory:   Description                    Quantity      Peoples[employer]#  Reference No.    Serial No.      Lot No.      CDM    Patient: RACHEL COMBS         MRN: [id number]  Study Date: 05/08/2024   03:36 PM      Page 2 of 3          CATH PACK                 1.000        [id number]   [id number]  [id number]  030     HEPARIN SALINE 500ML  1.000        [id number]   [id number]  397     HEPARIN SALINE 500ML  1.000        [id number]   [id number]  397     .035 X 150 GUIDERIGHT  1.000        [id number]   [id number]  594     MEDRAD PROVIS SYRINGE  1.000        [id number]   [id number] [id number]  797     OMNIPAQUE 350 150ML  1.000        [id number]   [id number]  368     6FR PRELUDE KIT  1.000        [id number]   [id number]  904     PRELUDE IDEAL 5FR X  1.000        [id number]   [id number]  11CM  628  DXTERITY 5FR JL4  1.000        [id number]   [id number]    116   DXTERITY 5FR JR4  1.000        [id number]   [id number]    638   5FR SHEATH PINNACLE  1.000        [id number]   [id number]    054     Hemodynamic Pressures:   Phase         Location          [mmHg]               [mmHg]  [mmHg]           HR  Phase 0      LV                  s      110  ed      22         64  Phase 0      Ao                  s      110               d      40  m   58         63    Conscious sedation was administered and monitored by Cardiology  nursing staff,  under my direct supervision when applicable.   	    MEDICATIONS:  aMIOdarone    Tablet 200 milliGRAM(s) Oral daily  furosemide    Tablet 40 milliGRAM(s) Oral daily  acetaminophen     Tablet .. 650 milliGRAM(s) Oral every 6 hours PRN  melatonin 3 milliGRAM(s) Oral at bedtime PRN  ondansetron Injectable 4 milliGRAM(s) IV Push every 8 hours PRN    aluminum hydroxide/magnesium hydroxide/simethicone Suspension 30 milliLiter(s) Oral every 4 hours PRN  pantoprazole  Injectable 40 milliGRAM(s) IV Push every 12 hours  sucralfate 1 Gram(s) Oral two times a day    atorvastatin 80 milliGRAM(s) Oral at bedtime  dextrose 50% Injectable 25 Gram(s) IV Push once  dextrose 50% Injectable 12.5 Gram(s) IV Push once  dextrose Oral Gel 15 Gram(s) Oral once PRN  glucagon  Injectable 1 milliGRAM(s) IntraMuscular once  insulin glargine Injectable (LANTUS) 5 Unit(s) SubCutaneous at bedtime  insulin lispro (ADMELOG) corrective regimen sliding scale   SubCutaneous three times a day before meals  insulin lispro (ADMELOG) corrective regimen sliding scale   SubCutaneous at bedtime    chlorhexidine 2% Cloths 1 Application(s) Topical daily  dextrose 10% Bolus 125 milliLiter(s) IV Bolus once  dextrose 5%. 1000 milliLiter(s) IV Continuous <Continuous>  dextrose 5%. 1000 milliLiter(s) IV Continuous <Continuous>  potassium chloride   Powder 40 milliEquivalent(s) Oral once  sodium bicarbonate 650 milliGRAM(s) Oral daily  tamsulosin 0.4 milliGRAM(s) Oral at bedtime      FAMILY HISTORY:  Family history of diabetes mellitus in mother (Mother)  (Pt states his mother developed DM in her 70's)    FH: bladder cancer (Sibling)        SOCIAL HISTORY:      [- Smoker  [- Alcohol  [- Drugs    Allergies    penicillin (Rash)    Intolerances    Cipro (Joint Pain)  	    REVIEW OF SYSTEMS:  CONSTITUTIONAL: No fever, weight loss, or fatigue  EYES: No eye pain, visual disturbances, or discharge  ENMT:  No difficulty hearing, tinnitus, vertigo; No sinus or throat pain  NECK: No pain or stiffness  RESPIRATORY: No cough, wheezing, chills or hemoptysis; No Shortness of Breath  CARDIOVASCULAR: No chest pain, palpitations, passing out, dizziness, or leg swelling  GASTROINTESTINAL: No abdominal or epigastric pain. No nausea, vomiting, or hematemesis; No diarrhea or constipation. No melena or hematochezia.  GENITOURINARY: No dysuria, frequency, hematuria, or incontinence  NEUROLOGICAL: No headaches, memory loss, loss of strength, numbness, or tremors  SKIN: No itching, burning, rashes, or lesions   LYMPH Nodes: No enlarged glands  ENDOCRINE: No heat or cold intolerance; No hair loss  MUSCULOSKELETAL: No joint pain or swelling; No muscle, back, or extremity pain  PSYCHIATRIC: No depression, anxiety, mood swings, or difficulty sleeping  HEME/LYMPH: No easy bruising, or bleeding gums  ALLERY AND IMMUNOLOGIC: No hives or eczema	    [X All others negative	  [ ] Unable to obtain    PHYSICAL EXAM:  T(C): 36.4 (05-22-24 @ 15:12), Max: 37 (05-21-24 @ 20:28)  HR: 75 (05-22-24 @ 15:12) (55 - 75)  BP: 106/51 (05-22-24 @ 15:12) (96/47 - 118/49)  RR: 16 (05-22-24 @ 15:12) (11 - 19)  SpO2: 96% (05-22-24 @ 15:12) (96% - 98%)  Wt(kg): --  I&O's Summary      Appearance: Normal	  Cardiovascular: Irregular rate and rhythm  Respiratory: Lungs clear to auscultation	  Psychiatry: A & O x 3, Mood & affect appropriate  Gastrointestinal:  Soft, Non-tender, + BS	  Extremities: Normal range of motion, No clubbing, cyanosis or edema      TELEMETRY: Not on telmetry  ECG:  	Atrial fibrillation with V pacing  RADIOLOGY:  OTHER: 	  	  LABS:	 	    CARDIAC MARKERS:                     8.9    3.76  )-----------( 115      ( 22 May 2024 05:13 )             25.9     05-22    140  |  106  |  70<H>  ----------------------------<  84  3.4<L>   |  19<L>  |  3.64<H>    Ca    8.1<L>      22 May 2024 05:13  Phos  3.6     05-22  Mg     2.10     05-22    TPro  5.6<L>  /  Alb  2.6<L>  /  TBili  1.5<H>  /  DBili  x   /  AST  121<H>  /  ALT  76<H>  /  AlkPhos  132<H>  05-22    TSH: 2.65 uIU/mL; 5/6/24         CHIEF COMPLAINT:  Called to evaluate patient for Watchman procedure    HISTORY OF PRESENT ILLNESS:  Patient is a 88y old  Male with past medical history of Afib on Eliquis, bladder cancer, NSTEMI (25 Apr 2024), CHF, CKD, hematuria, SCCA of skin, was recently admitted with GIB now  presented with for a three day history of black stools. EGD today showed normal esophagus, erythematous mucosa in the antrum, non-bleeding gastric ulcers with no stigmata of bleeding. Patient had recent admission 5/3-5/10 for acute decompensated heart failure and GIB. He was evaluated by EP for possible Watchman procedure as outpatient. He was also admitted for NSTEMI in 4/2024 and had LHC that showed severe obstructive CAD to LCx. Patient is deemed high risk for developing ELIZA and requiring HD with staged PCI. Medical management was recommended. He is also with  history of severe MR and was evaluated by structural heart - deemed not a candidate for MV clip/repair at that time. Patient was seen in EP clinic on April 19, 2024 for evaluation of JOSÉ MIGUEL occlusion. Patient underwent MARCELLA which showed no JOSÉ MIGUEL thrombus. 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 Currently patient denies any chest pain, SOB, palpitations or dizziness. Echocardiogram with moderately decreased LV function.  PAST MEDICAL & SURGICAL HISTORY:  CAD (coronary artery disease)  (4 stents,)  Single kidney  (hx/o LEFT nephrectomy at age 16; pt states due to a ureter"blockage" causing "infection"; pt denies hx/o renal dysfunction)  Arthritis  Myocardial infarction  (2003)  Hard of hearing  Atrial fibrillation  Hypertension  Hyperlipidemia  History of TIAs  Last 2018  History of bradycardia  Bladder cancer  Left carotid bruit  T2DM (type 2 diabetes mellitus)  HFrEF (heart failure with reduced ejection fraction)  GAVE (gastric antral vascular ectasia)  History of nephrectomy, unilateral  (hx/o LEFT nephrectomy at age 16)  Stented coronary artery  (4 stents)  S/P bilateral cataract extraction  H/O cystoscopy      PREVIOUS DIAGNOSTIC TESTING:    Echocardiogram:   from: MARCELLA W or WO Ultrasound Enhancing Agent (04.24.24 @ 12:50)     CONCLUSIONS:      1. Left ventricular systolic function is moderately decreased. There are regional wall motion abnormalities present. Hypokinesis of the inferior, lateral, and inferolateral walls.   2. Normal right ventricular cavity size and normal systolic function.   3. Structurally normal mitral valve with normal leaflet excursion. There is calcification of the mitral valve annulus. There is symmetric leaflet tethering. There is severe mitral regurgitation. Vena contracta width ~ 0.7 cm. Estimated effective regurgitant orifice area (EROA) ~ 0.4 cm2 (by the PISA method).   4. The aortic valve appears trileaflet with normal systolic excursion. There is calcification of the aortic valve leaflets. There is mild to moderate aortic regurgitation. Vena contracta width ~ 0.3 cm.   5. No atheroma in the visualized portions of the proximal ascending aorta. Mild non-mobile atheroma in the visualized portions of the transverse aortic arch. Mild non-mobile atheroma in the visualized portions of the descending aorta.   6. The left atrium is normal in size. There is no evidence of left atrial or left atrial appendage thrombus. Measurements of the left atrial appendage (JOSÉ MIGUEL) were carried out as outlined below. Note that the JOSÉ MIGUEL width refers to its width at the ostium. The JOSÉ MIGUEL length refers to the distance from the plane of the JOSÉ MIGUEL ostium to its most distal point. Note that the JOSÉ MIGUEL has a "windsock" appearance.           Zero degrees: width = 1.9 cm, length = 3.0 cm      30 degrees: width = 1.7 cm, length = 2.9 cm      45 degrees: width = 1.7 cm, length = 2.8 cm      60 degrees: width = 1.6 cm, length = 2.7 cm      90 degrees: width = 1.8 cm, length = 2.6 cm      135 degrees: width = 2.0 cm, length = 2.8 cm.   7. Agitated saline injection was negative for intracardiac shunt.    ____________________________________________________________________  MARCELLA Procedure:  After discussion of the risks and benefits of the MARCELLA, an informed consent was obtained. Study was performed with anesthesia (please see anesthesia record).  The adult Charla MARCELLA probe was introduced and passed into the esophagus. The MARCELLA probe was passed without difficulty. Images were obtained with the patient in a left lateral decubitus position. Image quality was good. The patient's vital signs; including heart rate, blood pressure, and oxygen saturation; remained stable throughout the procedure. The patient tolerated the procedure well and without complications.     ________________________________________________________________________________________  FINDINGS:     Left Ventricle:  Left ventricular systolic function is moderately decreased. There are regional wall motion abnormalities present. Hypokinesis of the inferior, lateral, and inferolateral walls.     Right Ventricle:  The right ventricular cavity is normal in size and normal systolic function.     Left Atrium:  The left atrium is normal in size. There is no evidence ofleft atrial or left atrial appendage thrombus. Measurements of the left atrial appendage (JOSÉ MIGUEL) were carried out as outlined below. Note that the JOSÉ MIGUEL width refers to its width at the ostium. The JOSÉ MIGUEL length refers to the distance from the plane of the JOSÉ MIGUEL ostium to its most distal point. Note that the JOSÉ MIGUEL has a "windsock" appearance.    Zero degrees: width = 1.9 cm, length = 3.0 cm  30 degrees: width = 1.7 cm, length = 2.9 cm  45 degrees: width = 1.7 cm, length = 2.8 cm  60 degrees: width = 1.6 cm,length = 2.7 cm  90 degrees: width = 1.8 cm, length = 2.6 cm  135 degrees: width = 2.0 cm, length = 2.8 cm.     Right Atrium:  The right atrium is normal in size.     Interatrial Septum:  Agitated saline injection was negative for intracardiac shunt.     Aortic Valve:  The aortic valve appears trileaflet with normal systolic excursion. There is calcification of the aortic valve leaflets. There is mild to moderate aortic regurgitation. Vena contracta width ~ 0.3 cm.     Mitral Valve:  Structurallynormal mitral valve with normal leaflet excursion. There is calcification of the mitral valve annulus. There is symmetric leaflet tethering. There is severe mitral regurgitation. Vena contracta width ~ 0.7 cm. Estimated effective regurgitant orifice area (EROA) ~ 0.4 cm2 (by the PISA method).     Tricuspid Valve:  Structurally normal tricuspid valve with normal leaflet excursion. There is trace tricuspid regurgitation.     Pulmonic Valve:  Structurally normal pulmonic valve with normal leaflet excursion. There is mild pulmonic regurgitation.     Aorta:  The aortic root at the sinuses of Valsalva is normal in size, measuring 2.90 cm (indexed 1.78 cm/m²). The aortic diameter at the sinotubular junction is normal in size, measuring 2.3 cm. The ascending aorta diameter is normal in size, measuring 3.00 cm (indexed 1.84 cm/m²). There is no atheroma in the visualized portions of the proximal ascending aorta. There is mild non-mobile atheroma in the visualized portions of the transverse aortic arch. There is mild non-mobile atheroma in the visualized portions of the descending aorta.     Pericardium:  No pericardial effusion seen.  ____________________________________________________________________  QUANTITATIVE DATA:  Aorta Measurements: (Normal range) (Indexed to BSA)     Sinuses of Valsalva: 2.90 cm (3.1 - 3.7 cm)  Ao ST Junct:         2.3 cm  Ao Asc prox:         3.00 cm       LVOT / RVOT/ Qp/Qs Data: (Indexed to BSA)  LVOT Diameter: 2.20 cm  LVOT Area:     3.80 cm²    Mitral Valve Measurements:     MV Beatriz d, A4C 3.70 cm      MR Vmax:           5.52 m/s                             MR VTI:            198.50 cm                             MR Mean Gradient:  71.0 mmHg                             MR Peak Gradient:  121.9 mmHg                       MR PISA Radius:    1.00 cm                             MR Aliasing Benjamin:   30.80 cm/s                             MR Inst Flow Rate: 193.52 ml/s       Catheterization:   from: Cardiac Catheterization (05.08.24 @ 15:36)    Diagnostic Conclusions:     Severe one vessel obstructive coronary artery disease   --Left circumflex artery   Mild left main coronary artery disease   Mild to moderate in-stent restenosis of left anterior descending and  right coronary artery stents  LVEDP = 22mmHg   No gradient across the aortic valve     Recommendations:     Keep right leg straight for 4 hours following removal of sheath   Continue aggressive medical management of coronary artery disease and  associated risk factors  Gentle IV hydration as per protocol   Recommend multidisciplinary conversation to be held among medical  teams to discuss risks and benefits of performing high  risk PCI in setting of multiple increased risk factors including CKD  IV and elevated risk of bleeding    Findings discussed with cardiology/Dr. Vega     Procedure Narrative:   The risks and alternatives of the procedures and conscious sedation  were explained to the patient and informed consent was  obtained. The patient was brought to the cath lab and placed onthe  exam table.  Access   Right femoral artery:   The puncture site was infiltrated with 1% Lidocaine. Vascular access  was obtained using Vascular Ultrasound.    Coronary Angiography     Patient: RACHEL COMBS         MRN: [id number]  Study Date: 05/08/2024   03:36 PM      Page 1 of 3          Left Coronary System:   A catheter was positioned into the vessel ostia under fluoroscopic  guidance. Angiograms were obtained in multiple views.  Right Coronary System:   A catheter was positioned into the vessel ostia under fluoroscopic  guidance. Angiograms were obtained in multiple views.    Diagnostic Findings:     Coronary Angiography   The coronary circulation is right dominant.      LM   Left main artery: There is a 25 % stenosis.      LAD   Proximal left anterior descending: There is a 20 % stenosis. Mid left  anterior descending: There is a 40 % in-stent restenosis.  Distal left anterior descending: There is a 25 % stenosis.      CX   Proximal circumflex: There is an 85 % calcified stenosis. First obtuse  marginal: There is a 95 % calcified stenosis. Mid  circumflex: There is a 30 % stenosis.      RCA   Proximal right coronary artery: There is a 35 % stenosis. Mid right  coronary artery: There is a 35 % in-stent restenosis. Distal  right coronary artery: There is a 40 % stenosis.      Left Heart Cath   LV to AO pullback was performed. LHC performed: Aortic valve crossed  and left ventricular pressures were obtained.    X-Ray:   Diagnostic Flouro time:       1.7 min.         Exam record  DAP:           2089.75 cGycm2  Interventional Flouro time:                                Exam fluoro  DAP:           286.39 cGycm2  Total Flouro Time:            1.7 min.                     Exam total  DAP:            2376.37hRobo9    Exam Start Time:   03:36 PM   Exam End Time:     03:45 PM   Exam Duration:     9 min     Contrast:   Description                      Dose          Unit        Serial No.   Omnipaque                            15.000   mL     Medication:   Description                   Dose, Unit, Route, Time   fentaNYL  50 mCg/mL           25.0, mcg, IV, 15:37:13   Injectable (Sublimaze)     Inventory:   Description                    Quantity      [employer]#  Reference No.    Serial No.      Lot No.      CDM    Patient: RACHEL COMBS         MRN: [id number]  Study Date: 05/08/2024   03:36 PM      Page 2 of 3          CATH PACK                 1.000        [id number]   [id number]  [id number]  030     HEPARIN SALINE 500ML  1.000        [id number]   [id number]  397     HEPARIN SALINE 500ML  1.000        [id number]   [id number]  397     .035 X 150 GUIDERIGHT  1.000        [id number]   [id number]  594     MEDRAD PROVIS SYRINGE  1.000        [id number]   [id number] [id number]  797     OMNIPAQUE 350 150ML  1.000        [id number]   [id number]  368     6FR PRELUDE KIT  1.000        [id number]   [id number]  904     PRELUDE IDEAL 5FR X  1.000        [id number]   [id number]  11CM  628  DXTERITY 5FR JL4  1.000        [id number]   [id number]    116   DXTERITY 5FR JR4  1.000        [id number]   [id number]    638   5FR SHEATH PINNACLE  1.000        [id number]   [id number]    054     Hemodynamic Pressures:   Phase         Location          [mmHg]               [mmHg]  [mmHg]           HR  Phase 0      LV                  s      110  ed      22         64  Phase 0      Ao                  s      110               d      40  m   58         63    Conscious sedation was administered and monitored by Cardiology  nursing staff,  under my direct supervision when applicable.   	    MEDICATIONS:  aMIOdarone    Tablet 200 milliGRAM(s) Oral daily  furosemide    Tablet 40 milliGRAM(s) Oral daily  acetaminophen     Tablet .. 650 milliGRAM(s) Oral every 6 hours PRN  melatonin 3 milliGRAM(s) Oral at bedtime PRN  ondansetron Injectable 4 milliGRAM(s) IV Push every 8 hours PRN    aluminum hydroxide/magnesium hydroxide/simethicone Suspension 30 milliLiter(s) Oral every 4 hours PRN  pantoprazole  Injectable 40 milliGRAM(s) IV Push every 12 hours  sucralfate 1 Gram(s) Oral two times a day    atorvastatin 80 milliGRAM(s) Oral at bedtime  dextrose 50% Injectable 25 Gram(s) IV Push once  dextrose 50% Injectable 12.5 Gram(s) IV Push once  dextrose Oral Gel 15 Gram(s) Oral once PRN  glucagon  Injectable 1 milliGRAM(s) IntraMuscular once  insulin glargine Injectable (LANTUS) 5 Unit(s) SubCutaneous at bedtime  insulin lispro (ADMELOG) corrective regimen sliding scale   SubCutaneous three times a day before meals  insulin lispro (ADMELOG) corrective regimen sliding scale   SubCutaneous at bedtime    chlorhexidine 2% Cloths 1 Application(s) Topical daily  dextrose 10% Bolus 125 milliLiter(s) IV Bolus once  dextrose 5%. 1000 milliLiter(s) IV Continuous <Continuous>  dextrose 5%. 1000 milliLiter(s) IV Continuous <Continuous>  potassium chloride   Powder 40 milliEquivalent(s) Oral once  sodium bicarbonate 650 milliGRAM(s) Oral daily  tamsulosin 0.4 milliGRAM(s) Oral at bedtime      FAMILY HISTORY:  Family history of diabetes mellitus in mother (Mother)  (Pt states his mother developed DM in her 70's)    FH: bladder cancer (Sibling)        SOCIAL HISTORY:      [- Smoker  [- Alcohol  [- Drugs    Allergies    penicillin (Rash)    Intolerances    Cipro (Joint Pain)  	    REVIEW OF SYSTEMS:  CONSTITUTIONAL: No fever, weight loss, or fatigue  EYES: No eye pain, visual disturbances, or discharge  ENMT:  No difficulty hearing, tinnitus, vertigo; No sinus or throat pain  NECK: No pain or stiffness  RESPIRATORY: No cough, wheezing, chills or hemoptysis; No Shortness of Breath  CARDIOVASCULAR: No chest pain, palpitations, passing out, dizziness, or leg swelling  GASTROINTESTINAL: No abdominal or epigastric pain. No nausea, vomiting, or hematemesis; No diarrhea or constipation. No melena or hematochezia.  GENITOURINARY: No dysuria, frequency, hematuria, or incontinence  NEUROLOGICAL: No headaches, memory loss, loss of strength, numbness, or tremors  SKIN: No itching, burning, rashes, or lesions   LYMPH Nodes: No enlarged glands  ENDOCRINE: No heat or cold intolerance; No hair loss  MUSCULOSKELETAL: No joint pain or swelling; No muscle, back, or extremity pain  PSYCHIATRIC: No depression, anxiety, mood swings, or difficulty sleeping  HEME/LYMPH: No easy bruising, or bleeding gums  ALLERY AND IMMUNOLOGIC: No hives or eczema	    [X All others negative	  [ ] Unable to obtain    PHYSICAL EXAM:  T(C): 36.4 (05-22-24 @ 15:12), Max: 37 (05-21-24 @ 20:28)  HR: 75 (05-22-24 @ 15:12) (55 - 75)  BP: 106/51 (05-22-24 @ 15:12) (96/47 - 118/49)  RR: 16 (05-22-24 @ 15:12) (11 - 19)  SpO2: 96% (05-22-24 @ 15:12) (96% - 98%)  Wt(kg): --  I&O's Summary      Appearance: Normal	  Cardiovascular: Irregular rate and rhythm  Respiratory: Lungs clear to auscultation	  Psychiatry: A & O x 3, Mood & affect appropriate  Gastrointestinal:  Soft, Non-tender, + BS	  Extremities: Normal range of motion, No clubbing, cyanosis or edema      TELEMETRY: Not on telmetry  ECG:  Regular rhythm, unable to interpret as EKG with poor quality  RADIOLOGY:  OTHER: 	  	  LABS:	 	    CARDIAC MARKERS:                     8.9    3.76  )-----------( 115      ( 22 May 2024 05:13 )             25.9     05-22    140  |  106  |  70<H>  ----------------------------<  84  3.4<L>   |  19<L>  |  3.64<H>    Ca    8.1<L>      22 May 2024 05:13  Phos  3.6     05-22  Mg     2.10     05-22    TPro  5.6<L>  /  Alb  2.6<L>  /  TBili  1.5<H>  /  DBili  x   /  AST  121<H>  /  ALT  76<H>  /  AlkPhos  132<H>  05-22    TSH: 2.65 uIU/mL; 5/6/24

## 2024-05-22 NOTE — PROGRESS NOTE ADULT - PROBLEM SELECTOR PLAN 4
Chronic stable  [id number]: 8  Continue amiodarone 200mg daily.   Monitor  apixaban on hold till cleared by GI

## 2024-05-22 NOTE — PROGRESS NOTE ADULT - SUBJECTIVE AND OBJECTIVE BOX
Patient is a 88y old  Male who presents with a chief complaint of GI bleed (21 May 2024 14:21)      DATE OF SERVICE: 05-22-24 @ 10:28    SUBJECTIVE / OVERNIGHT EVENTS: overnight events noted    ROS:  Resp: No cough no sputum production  CVS: No chest pain no palpitations no orthopnea  GI: no N/V/D        MEDICATIONS  (STANDING):  aMIOdarone    Tablet 200 milliGRAM(s) Oral daily  atorvastatin 80 milliGRAM(s) Oral at bedtime  chlorhexidine 2% Cloths 1 Application(s) Topical daily  dextrose 10% Bolus 125 milliLiter(s) IV Bolus once  dextrose 5%. 1000 milliLiter(s) (100 mL/Hr) IV Continuous <Continuous>  dextrose 5%. 1000 milliLiter(s) (50 mL/Hr) IV Continuous <Continuous>  dextrose 50% Injectable 25 Gram(s) IV Push once  dextrose 50% Injectable 12.5 Gram(s) IV Push once  furosemide    Tablet 40 milliGRAM(s) Oral daily  glucagon  Injectable 1 milliGRAM(s) IntraMuscular once  insulin glargine Injectable (LANTUS) 5 Unit(s) SubCutaneous at bedtime  insulin lispro (ADMELOG) corrective regimen sliding scale   SubCutaneous three times a day before meals  insulin lispro (ADMELOG) corrective regimen sliding scale   SubCutaneous at bedtime  pantoprazole  Injectable 40 milliGRAM(s) IV Push every 12 hours  sodium bicarbonate 650 milliGRAM(s) Oral daily  sucralfate 1 Gram(s) Oral two times a day  tamsulosin 0.4 milliGRAM(s) Oral at bedtime    MEDICATIONS  (PRN):  acetaminophen     Tablet .. 650 milliGRAM(s) Oral every 6 hours PRN Temp greater or equal to 38C (100.4F), Mild Pain (1 - 3)  aluminum hydroxide/magnesium hydroxide/simethicone Suspension 30 milliLiter(s) Oral every 4 hours PRN Dyspepsia  dextrose Oral Gel 15 Gram(s) Oral once PRN Blood Glucose LESS THAN 70 milliGRAM(s)/deciliter  melatonin 3 milliGRAM(s) Oral at bedtime PRN Insomnia  ondansetron Injectable 4 milliGRAM(s) IV Push every 8 hours PRN Nausea and/or Vomiting        CAPILLARY BLOOD GLUCOSE      POCT Blood Glucose.: 93 mg/dL (22 May 2024 08:33)  POCT Blood Glucose.: 139 mg/dL (21 May 2024 22:22)  POCT Blood Glucose.: 85 mg/dL (21 May 2024 18:03)  POCT Blood Glucose.: 107 mg/dL (21 May 2024 12:51)    I&O's Summary      Vital Signs Last 24 Hrs  T(C): 35.8 (22 May 2024 09:25), Max: 37 (21 May 2024 20:28)  T(F): 96.4 (22 May 2024 09:25), Max: 98.6 (21 May 2024 20:28)  HR: 55 (22 May 2024 09:25) (55 - 70)  BP: 97/48 (22 May 2024 09:25) (97/48 - 118/49)  BP(mean): --  RR: 16 (22 May 2024 09:25) (15 - 19)  SpO2: 97% (22 May 2024 09:25) (97% - 98%)    PHYSICAL EXAM:  NECK: Supple, No JVD  CHEST/LUNG: clear   HEART: S1 S2; systolic murmur +   ABDOMEN: Soft, Nontender,  EXTREMITIES:   no edema  NEUROLOGY: AO x 3 non-focal  SKIN: No rashes or lesions    LABS:                        8.9    3.76  )-----------( 115      ( 22 May 2024 05:13 )             25.9     05-22    140  |  106  |  70<H>  ----------------------------<  84  3.4<L>   |  19<L>  |  3.64<H>    Ca    8.1<L>      22 May 2024 05:13  Phos  3.6     05-22  Mg     2.10     05-22    TPro  5.6<L>  /  Alb  2.6<L>  /  TBili  1.5<H>  /  DBili  x   /  AST  121<H>  /  ALT  76<H>  /  AlkPhos  132<H>  05-22    PT/INR - ( 20 May 2024 10:53 )   PT: 17.7 sec;   INR: 1.59 ratio         PTT - ( 20 May 2024 10:53 )  PTT:42.3 sec      Urinalysis Basic - ( 22 May 2024 05:13 )    Color: x / Appearance: x / SG: x / pH: x  Gluc: 84 mg/dL / Ketone: x  / Bili: x / Urobili: x   Blood: x / Protein: x / Nitrite: x   Leuk Esterase: x / RBC: x / WBC x   Sq Epi: x / Non Sq Epi: x / Bacteria: x          All consultant(s) notes reviewed and care discussed with other providers        Contact Number, Dr Arroyo [phone]

## 2024-05-22 NOTE — PROGRESS NOTE ADULT - SUBJECTIVE AND OBJECTIVE BOX
CARDIOLOGY FOLLOW UP - Dr. Vega  Date of Service: 5/22/2024  CC: no events    Review of Systems:  Constitutional: No fever, weight loss, or fatigue  Respiratory: No cough, wheezing, or hemoptysis, no shortness of breath  Cardiovascular: No chest pain, palpitations, passing out, dizziness, or leg swelling  Gastrointestinal: No abd or epigastric pain. No nausea, vomiting, or hematemesis; no diarrhea or consiptaiton, no melena or hematochezia  Vascular: No edema     TELEMETRY:    PHYSICAL EXAM:  T(C): 35.8 (05-22-24 @ 09:25), Max: 37 (05-21-24 @ 20:28)  HR: 55 (05-22-24 @ 09:25) (55 - 70)  BP: 97/48 (05-22-24 @ 09:25) (97/48 - 118/49)  RR: 16 (05-22-24 @ 09:25) (15 - 19)  SpO2: 97% (05-22-24 @ 09:25) (97% - 98%)  Wt(kg): --  I&O's Summary      Appearance: Normal	  Cardiovascular: Normal S1 S2,RRR, No JVD, No murmurs  Respiratory: Lungs clear to auscultation	  Gastrointestinal:  Soft, Non-tender, + BS	  Extremities: Normal range of motion, No clubbing, cyanosis or edema  Vascular: Peripheral pulses palpable 2+ bilaterally       Home Medications:  apixaban 2.5 mg oral tablet: 1 tab(s) orally every 12 hours (20 May 2024 17:19)  Crestor 40 mg oral tablet: 1 tab(s) orally once a day (at bedtime) (20 May 2024 17:19)  repaglinide 0.5 mg oral tablet: 1 tab(s) orally 3 times a day (before meals) (20 May 2024 17:19)  sodium bicarbonate 650 mg oral tablet: 1 tab(s) orally once a day (20 May 2024 17:19)  tamsulosin 0.4 mg oral capsule: 1 cap(s) orally once a day (20 May 2024 17:19)  Tresiba FlexTouch 100 units/mL subcutaneous solution: 5 unit(s) subcutaneous once a day (at bedtime) (20 May 2024 17:19)        MEDICATIONS  (STANDING):  aMIOdarone    Tablet 200 milliGRAM(s) Oral daily  atorvastatin 80 milliGRAM(s) Oral at bedtime  chlorhexidine 2% Cloths 1 Application(s) Topical daily  dextrose 10% Bolus 125 milliLiter(s) IV Bolus once  dextrose 5%. 1000 milliLiter(s) (100 mL/Hr) IV Continuous <Continuous>  dextrose 5%. 1000 milliLiter(s) (50 mL/Hr) IV Continuous <Continuous>  dextrose 50% Injectable 25 Gram(s) IV Push once  dextrose 50% Injectable 12.5 Gram(s) IV Push once  furosemide    Tablet 40 milliGRAM(s) Oral daily  glucagon  Injectable 1 milliGRAM(s) IntraMuscular once  insulin glargine Injectable (LANTUS) 5 Unit(s) SubCutaneous at bedtime  insulin lispro (ADMELOG) corrective regimen sliding scale   SubCutaneous three times a day before meals  insulin lispro (ADMELOG) corrective regimen sliding scale   SubCutaneous at bedtime  pantoprazole  Injectable 40 milliGRAM(s) IV Push every 12 hours  sodium bicarbonate 650 milliGRAM(s) Oral daily  sucralfate 1 Gram(s) Oral two times a day  tamsulosin 0.4 milliGRAM(s) Oral at bedtime        EKG:  RADIOLOGY:  DIAGNOSTIC TESTING:  [ ] Echocardiogram:  [ ] Catherterization:  [ ] Stress Test:  OTHER:     LABS:	 	                          8.9    3.76  )-----------( 115      ( 22 May 2024 05:13 )             25.9     05-22    140  |  106  |  70<H>  ----------------------------<  84  3.4<L>   |  19<L>  |  3.64<H>    Ca    8.1<L>      22 May 2024 05:13  Phos  3.6     05-22  Mg     2.10     05-22    TPro  5.6<L>  /  Alb  2.6<L>  /  TBili  1.5<H>  /  DBili  x   /  AST  121<H>  /  ALT  76<H>  /  AlkPhos  132<H>  05-22          CARDIAC MARKERS:                   49y M Pt with PMHx of HLD (on Simvastatin), HIV (Atripla), no allergies, works as a  BIBEMS to ED s/p fall and leg injury today while playing soccer at school. Pt states he tried to kick the ball and felt a pulling sensation in his right leg, fell backward and hit his head on a wall, and was then hit in the head by a basketball. Associated symptoms include difficulty bearing weight, pain to lower back and right groin, and lightheadedness. Denies LOC, dizziness, incontinence, numbness, and tingling. 49y M Pt with PMHx of HLD (on Simvastatin), HIV (Atripla), no allergies, works as a  BIBEMS to ED s/p fall and leg injury today while playing soccer at school. Pt states he tried to kick the ball and felt a pulling sensation in his right leg, fell backward and hit his head on a wall, and was then hit in the head by a basketball. Associated symptoms include difficulty bearing weight, pain to lower back and right groin, and lightheadedness. Denies LOC, dizziness, incontinence, numbness, and tingling. No numbness or tingling, no weakness, no fatigue, no bowel or bladder incontinence, no urinary retention.

## 2024-05-23 LAB
ALBUMIN SERPL ELPH-MCNC: 2.6 G/DL — LOW (ref 3.3–5)
ALP SERPL-CCNC: 141 U/L — HIGH (ref 40–120)
ALT FLD-CCNC: 101 U/L — HIGH (ref 4–41)
ANION GAP SERPL CALC-SCNC: 13 MMOL/L — SIGNIFICANT CHANGE UP (ref 7–14)
AST SERPL-CCNC: 144 U/L — HIGH (ref 4–40)
BILIRUB SERPL-MCNC: 1.6 MG/DL — HIGH (ref 0.2–1.2)
BUN SERPL-MCNC: 66 MG/DL — HIGH (ref 7–23)
CALCIUM SERPL-MCNC: 8 MG/DL — LOW (ref 8.4–10.5)
CHLORIDE SERPL-SCNC: 106 MMOL/L — SIGNIFICANT CHANGE UP (ref 98–107)
CO2 SERPL-SCNC: 21 MMOL/L — LOW (ref 22–31)
CREAT SERPL-MCNC: 3.83 MG/DL — HIGH (ref 0.5–1.3)
EGFR: 14 ML/MIN/1.73M2 — LOW
GLUCOSE BLDC GLUCOMTR-MCNC: 138 MG/DL — HIGH (ref 70–99)
GLUCOSE BLDC GLUCOMTR-MCNC: 152 MG/DL — HIGH (ref 70–99)
GLUCOSE BLDC GLUCOMTR-MCNC: 192 MG/DL — HIGH (ref 70–99)
GLUCOSE BLDC GLUCOMTR-MCNC: 86 MG/DL — SIGNIFICANT CHANGE UP (ref 70–99)
GLUCOSE SERPL-MCNC: 90 MG/DL — SIGNIFICANT CHANGE UP (ref 70–99)
HCT VFR BLD CALC: 27.1 % — LOW (ref 39–50)
HGB BLD-MCNC: 8.9 G/DL — LOW (ref 13–17)
MAGNESIUM SERPL-MCNC: 2.1 MG/DL — SIGNIFICANT CHANGE UP (ref 1.6–2.6)
MCHC RBC-ENTMCNC: 31.6 PG — SIGNIFICANT CHANGE UP (ref 27–34)
MCHC RBC-ENTMCNC: 32.8 GM/DL — SIGNIFICANT CHANGE UP (ref 32–36)
MCV RBC AUTO: 96.1 FL — SIGNIFICANT CHANGE UP (ref 80–100)
NRBC # BLD: 0 /100 WBCS — SIGNIFICANT CHANGE UP (ref 0–0)
NRBC # FLD: 0 K/UL — SIGNIFICANT CHANGE UP (ref 0–0)
PHOSPHATE SERPL-MCNC: 3.4 MG/DL — SIGNIFICANT CHANGE UP (ref 2.5–4.5)
PLATELET # BLD AUTO: 110 K/UL — LOW (ref 150–400)
POTASSIUM SERPL-MCNC: 3.7 MMOL/L — SIGNIFICANT CHANGE UP (ref 3.5–5.3)
POTASSIUM SERPL-SCNC: 3.7 MMOL/L — SIGNIFICANT CHANGE UP (ref 3.5–5.3)
PROT SERPL-MCNC: 5.6 G/DL — LOW (ref 6–8.3)
RBC # BLD: 2.82 M/UL — LOW (ref 4.2–5.8)
RBC # FLD: 17.3 % — HIGH (ref 10.3–14.5)
SODIUM SERPL-SCNC: 140 MMOL/L — SIGNIFICANT CHANGE UP (ref 135–145)
WBC # BLD: 7 K/UL — SIGNIFICANT CHANGE UP (ref 3.8–10.5)
WBC # FLD AUTO: 7 K/UL — SIGNIFICANT CHANGE UP (ref 3.8–10.5)

## 2024-05-23 PROCEDURE — 99231 SBSQ HOSP IP/OBS SF/LOW 25: CPT

## 2024-05-23 PROCEDURE — 99231 SBSQ HOSP IP/OBS SF/LOW 25: CPT | Mod: GC

## 2024-05-23 RX ORDER — FUROSEMIDE 40 MG
40 TABLET ORAL DAILY
Refills: 0 | Status: DISCONTINUED | OUTPATIENT
Start: 2024-05-24 | End: 2024-05-28

## 2024-05-23 RX ADMIN — Medication 1 GRAM(S): at 05:35

## 2024-05-23 RX ADMIN — APIXABAN 2.5 MILLIGRAM(S): 2.5 TABLET, FILM COATED ORAL at 05:40

## 2024-05-23 RX ADMIN — Medication 1: at 18:25

## 2024-05-23 RX ADMIN — Medication 1 GRAM(S): at 18:14

## 2024-05-23 RX ADMIN — INSULIN GLARGINE 5 UNIT(S): 100 INJECTION, SOLUTION SUBCUTANEOUS at 22:08

## 2024-05-23 RX ADMIN — Medication 40 MILLIGRAM(S): at 05:35

## 2024-05-23 RX ADMIN — PANTOPRAZOLE SODIUM 40 MILLIGRAM(S): 20 TABLET, DELAYED RELEASE ORAL at 05:35

## 2024-05-23 RX ADMIN — AMIODARONE HYDROCHLORIDE 200 MILLIGRAM(S): 400 TABLET ORAL at 05:35

## 2024-05-23 RX ADMIN — ATORVASTATIN CALCIUM 80 MILLIGRAM(S): 80 TABLET, FILM COATED ORAL at 22:08

## 2024-05-23 RX ADMIN — PANTOPRAZOLE SODIUM 40 MILLIGRAM(S): 20 TABLET, DELAYED RELEASE ORAL at 18:17

## 2024-05-23 RX ADMIN — Medication 650 MILLIGRAM(S): at 12:36

## 2024-05-23 RX ADMIN — APIXABAN 2.5 MILLIGRAM(S): 2.5 TABLET, FILM COATED ORAL at 18:14

## 2024-05-23 RX ADMIN — CHLORHEXIDINE GLUCONATE 1 APPLICATION(S): 213 SOLUTION TOPICAL at 11:58

## 2024-05-23 RX ADMIN — TAMSULOSIN HYDROCHLORIDE 0.4 MILLIGRAM(S): 0.4 CAPSULE ORAL at 22:08

## 2024-05-23 NOTE — PROGRESS NOTE ADULT - SUBJECTIVE AND OBJECTIVE BOX
Gastroenterology/Hepatology Progress Note    Interval Events:   S/p EGD yesterday  Today, denies abdominal pain, fevers, chills. No BMs over the past day.    Allergies:  penicillin (Rash)  Cipro (Joint Pain)    Hospital Medications:  acetaminophen     Tablet .. 650 milliGRAM(s) Oral every 6 hours PRN  aluminum hydroxide/magnesium hydroxide/simethicone Suspension 30 milliLiter(s) Oral every 4 hours PRN  aMIOdarone    Tablet 200 milliGRAM(s) Oral daily  apixaban 2.5 milliGRAM(s) Oral every 12 hours  atorvastatin 80 milliGRAM(s) Oral at bedtime  chlorhexidine 2% Cloths 1 Application(s) Topical daily  dextrose 10% Bolus 125 milliLiter(s) IV Bolus once  dextrose 5%. 1000 milliLiter(s) IV Continuous <Continuous>  dextrose 5%. 1000 milliLiter(s) IV Continuous <Continuous>  dextrose 50% Injectable 25 Gram(s) IV Push once  dextrose 50% Injectable 12.5 Gram(s) IV Push once  dextrose Oral Gel 15 Gram(s) Oral once PRN  furosemide    Tablet 40 milliGRAM(s) Oral every 12 hours  glucagon  Injectable 1 milliGRAM(s) IntraMuscular once  insulin glargine Injectable (LANTUS) 5 Unit(s) SubCutaneous at bedtime  insulin lispro (ADMELOG) corrective regimen sliding scale   SubCutaneous three times a day before meals  insulin lispro (ADMELOG) corrective regimen sliding scale   SubCutaneous at bedtime  melatonin 3 milliGRAM(s) Oral at bedtime PRN  ondansetron Injectable 4 milliGRAM(s) IV Push every 8 hours PRN  pantoprazole  Injectable 40 milliGRAM(s) IV Push every 12 hours  sodium bicarbonate 650 milliGRAM(s) Oral daily  sucralfate 1 Gram(s) Oral two times a day  tamsulosin 0.4 milliGRAM(s) Oral at bedtime      ROS: 14 point ROS negative unless otherwise state in subjective    PHYSICAL EXAM:   Vital Signs:  Vital Signs Last 24 Hrs  T(C): 36.7 (23 May 2024 06:19), Max: 37.1 (22 May 2024 21:31)  T(F): 98 (23 May 2024 06:19), Max: 98.8 (22 May 2024 21:31)  HR: 65 (23 May 2024 06:19) (65 - 75)  BP: 101/57 (23 May 2024 06:19) (96/47 - 106/51)  BP(mean): --  RR: 16 (23 May 2024 06:19) (11 - 16)  SpO2: 95% (23 May 2024 06:19) (95% - 97%)    Parameters below as of 23 May 2024 06:19  Patient On (Oxygen Delivery Method): room air    Daily     Daily     GENERAL:  No acute distress  HEENT:  NCAT, no scleral icterus  CHEST: no resp distress  HEART:  RRR  ABDOMEN:  Soft, non-tender, non-distended   EXTREMITIES:  No cyanosis, clubbing, or edema  SKIN:  No rash/erythema/ecchymoses   NEURO:  Alert and oriented x 3     LABS:                        8.9    7.00  )-----------( 110      ( 23 May 2024 06:10 )             27.1     Mean Cell Volume: 96.1 fL (05-23-24 @ 06:10)    05-23    140  |  106  |  66<H>  ----------------------------<  90  3.7   |  21<L>  |  3.83<H>    Ca    8.0<L>      23 May 2024 06:10  Phos  3.4     05-23  Mg     2.10     05-23    TPro  5.6<L>  /  Alb  2.6<L>  /  TBili  1.6<H>  /  DBili  x   /  AST  144<H>  /  ALT  101<H>  /  AlkPhos  141<H>  05-23    LIVER FUNCTIONS - ( 23 May 2024 06:10 )  Alb: 2.6 g/dL / Pro: 5.6 g/dL / ALK PHOS: 141 U/L / ALT: 101 U/L / AST: 144 U/L / GGT: x             Urinalysis Basic - ( 23 May 2024 06:10 )    Color: x / Appearance: x / SG: x / pH: x  Gluc: 90 mg/dL / Ketone: x  / Bili: x / Urobili: x   Blood: x / Protein: x / Nitrite: x   Leuk Esterase: x / RBC: x / WBC x   Sq Epi: x / Non Sq Epi: x / Bacteria: x    Procedures:     [facility]  _______________________________________________________________________________  Patient Name: Andrea Moulton        Procedure Date: 5/22/2024 10:20 AM  MRN: [id number]                     Account Number: [id number]  YOB: 1935             Admit Type: Inpatient  Room: [place] [address]                         Gender: Male  Attending MD: DARELL SANABRIA MD     _______________________________________________________________________________     Procedure:           Upper GI endoscopy  Indications:         Melena, history of GAVE syndrome  Providers:           DARELL SANABRIA MD, Twyla Walters MD (Fellow)  Medicines:           Monitored Anesthesia Care  Complications:       No immediate complications.  Procedure:           Pre-Anesthesia Assessment:                       - Prior to the procedure, a History and Physical was                        performed, and patient medications and allergies were                        reviewed. The patient is competent. The risks and                        benefits of the procedure and the sedation options and                        risks were discussed with the patient. All questions                        were answered and informed consent was obtained. Patient                        identification and proposed procedure were verified by                        the physician in the pre-procedure area. Prophylactic                        Antibiotics: The patient does not require prophylactic               antibiotics. Prior Anticoagulants: The patient has taken                        Eliquis (apixaban), last dose was 3 days prior to                        procedure. ASA Grade Assessment: III - A patient with                        severe systemic disease. After reviewing the risks and                        benefits, the patient was deemed in satisfactory                        condition to undergo the procedure. The anesthesia plan                        was to use monitored anesthesia care (MAC). Immediately                        prior to administration of medications, the patient was                        re-assessed for adequacy to receive sedatives. The heart                        rate, respiratory rate, oxygen saturations,blood                        pressure, adequacy of pulmonary ventilation, and                        response to care were monitored throughout the                        procedure. The physical status of the patient was                        re-assessed after the procedure.                       After obtaining informed consent, the endoscope was                        passed under direct vision. Throughout the procedure,                        the patient's blood pressure, pulse, and oxygen                       saturations were monitored continuously. The Endoscope                        was introduced through the mouth, and advanced to the                        second part of duodenum. The upper GI endoscopy was                        accomplished without difficulty. The patient tolerated                        the procedure well.                                                                                   Findings:       The examined esophagus was normal.       Diffuse moderately congested mucosa with punctate erythema without        bleeding was found in the gastric antrum. These findings were mild.        While consistent with mild GAVE (significantly improved from prior),        there was no clear area to treat.       Few non-bleeding linear gastric ulcers with no stigmata of bleeding were        found in the gastric antrum. The largest lesion was 3 mm in largest        dimension.       Diffuse moderately congested mucosa without active bleeding and with no        stigmata of bleeding was found in the duodenal bulb and in the first        portion of the duodenum.       The second portion of the duodenum was normal.                                                                                   Impression:     - Normal esophagus.                       - Erythematous mucosa in the antrum.                       - Non-bleeding gastric ulcers with no stigmata of                        bleeding.                       - No specimens collected.  Recommendation:      - [facility]                       - Resume regular diet today.                       - Use Protonix (pantoprazole) 40 mg PO BID indefinitely.                       - Resume Eliquis (apixaban) at prior dose today.                                                                                   Attending Participation:       I was present and participated during the entire procedure, including        non-key portions.                                                   ____________________  DARELL SANABRIA MD  5/22/2024 1:24:02 PM  Number of Addenda: 0    Note Initiated On: 5/22/2024 10:20 AM

## 2024-05-23 NOTE — PROGRESS NOTE ADULT - SUBJECTIVE AND OBJECTIVE BOX
CARDIOLOGY FOLLOW UP - Dr. Vega  DATE OF SERVICE: 5/23/24    CC no acute cv events       REVIEW OF SYSTEMS:  CONSTITUTIONAL: No fever, weight loss, or fatigue  RESPIRATORY: No cough, wheezing, chills or hemoptysis; No Shortness of Breath  CARDIOVASCULAR: No chest pain, palpitations, passing out, dizziness, or leg swelling  GASTROINTESTINAL: No abdominal or epigastric pain. No nausea, vomiting, or hematemesis; No diarrhea or constipation. No melena or hematochezia.  VASCULAR: No edema     PHYSICAL EXAM:  T(C): 36.7 (05-23-24 @ 06:19), Max: 37.1 (05-22-24 @ 21:31)  HR: 65 (05-23-24 @ 06:19) (65 - 75)  BP: 101/57 (05-23-24 @ 06:19) (96/47 - 106/51)  RR: 16 (05-23-24 @ 06:19) (11 - 16)  SpO2: 95% (05-23-24 @ 06:19) (95% - 97%)  Wt(kg): --  I&O's Summary      Appearance: Normal	  Cardiovascular: Normal S1 S2,irreg  Respiratory: Lungs clear to auscultation	  Gastrointestinal:  Soft, Non-tender, + BS	  Extremities: Normal range of motion, No clubbing, cyanosis or edema      Home Medications:  apixaban 2.5 mg oral tablet: 1 tab(s) orally every 12 hours (20 May 2024 17:19)  Crestor 40 mg oral tablet: 1 tab(s) orally once a day (at bedtime) (20 May 2024 17:19)  repaglinide 0.5 mg oral tablet: 1 tab(s) orally 3 times a day (before meals) (20 May 2024 17:19)  sodium bicarbonate 650 mg oral tablet: 1 tab(s) orally once a day (20 May 2024 17:19)  tamsulosin 0.4 mg oral capsule: 1 cap(s) orally once a day (20 May 2024 17:19)  Tresiba FlexTouch 100 units/mL subcutaneous solution: 5 unit(s) subcutaneous once a day (at bedtime) (20 May 2024 17:19)      MEDICATIONS  (STANDING):  aMIOdarone    Tablet 200 milliGRAM(s) Oral daily  apixaban 2.5 milliGRAM(s) Oral every 12 hours  atorvastatin 80 milliGRAM(s) Oral at bedtime  chlorhexidine 2% Cloths 1 Application(s) Topical daily  dextrose 10% Bolus 125 milliLiter(s) IV Bolus once  dextrose 5%. 1000 milliLiter(s) (100 mL/Hr) IV Continuous <Continuous>  dextrose 5%. 1000 milliLiter(s) (50 mL/Hr) IV Continuous <Continuous>  dextrose 50% Injectable 25 Gram(s) IV Push once  dextrose 50% Injectable 12.5 Gram(s) IV Push once  furosemide    Tablet 40 milliGRAM(s) Oral every 12 hours  glucagon  Injectable 1 milliGRAM(s) IntraMuscular once  insulin glargine Injectable (LANTUS) 5 Unit(s) SubCutaneous at bedtime  insulin lispro (ADMELOG) corrective regimen sliding scale   SubCutaneous three times a day before meals  insulin lispro (ADMELOG) corrective regimen sliding scale   SubCutaneous at bedtime  pantoprazole  Injectable 40 milliGRAM(s) IV Push every 12 hours  sodium bicarbonate 650 milliGRAM(s) Oral daily  sucralfate 1 Gram(s) Oral two times a day  tamsulosin 0.4 milliGRAM(s) Oral at bedtime      TELEMETRY: 	    ECG:  	  RADIOLOGY:   DIAGNOSTIC TESTING:  [ ] Echocardiogram:  [ ]  Catheterization:  [ ] Stress Test:    OTHER: 	    LABS:	 	                            8.9    7.00  )-----------( 110      ( 23 May 2024 06:10 )             27.1     05-23    140  |  106  |  66<H>  ----------------------------<  90  3.7   |  21<L>  |  3.83<H>    Ca    8.0<L>      23 May 2024 06:10  Phos  3.4     05-23  Mg     2.10     05-23    TPro  5.6<L>  /  Alb  2.6<L>  /  TBili  1.6<H>  /  DBili  x   /  AST  144<H>  /  ALT  101<H>  /  AlkPhos  141<H>  05-23

## 2024-05-23 NOTE — PROGRESS NOTE ADULT - ATTENDING COMMENTS
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     S/p EGD yesterday which revealed mild GAVE (improved from prior, not intervened upon given concerns about post-treatment ulceration) and small erosions. Today hgb stable without transfusion, VSS, BUN downtrending.    As above, no GI contraindication to AC, PPI BID indefinitely, should follow up as outpatient. GI to sign off but please alert our service if downtrending hgb or recurrent melena.

## 2024-05-23 NOTE — PROGRESS NOTE ADULT - SUBJECTIVE AND OBJECTIVE BOX
[facility] [phone]        Patient seen and examined reports he had a brown BM today.        MEDICATIONS  (STANDING):  aMIOdarone    Tablet 200 milliGRAM(s) Oral daily  apixaban 2.5 milliGRAM(s) Oral every 12 hours  atorvastatin 80 milliGRAM(s) Oral at bedtime  chlorhexidine 2% Cloths 1 Application(s) Topical daily  dextrose 10% Bolus 125 milliLiter(s) IV Bolus once  dextrose 5%. 1000 milliLiter(s) (100 mL/Hr) IV Continuous <Continuous>  dextrose 5%. 1000 milliLiter(s) (50 mL/Hr) IV Continuous <Continuous>  dextrose 50% Injectable 25 Gram(s) IV Push once  dextrose 50% Injectable 12.5 Gram(s) IV Push once  glucagon  Injectable 1 milliGRAM(s) IntraMuscular once  insulin glargine Injectable (LANTUS) 5 Unit(s) SubCutaneous at bedtime  insulin lispro (ADMELOG) corrective regimen sliding scale   SubCutaneous three times a day before meals  insulin lispro (ADMELOG) corrective regimen sliding scale   SubCutaneous at bedtime  pantoprazole  Injectable 40 milliGRAM(s) IV Push every 12 hours  sodium bicarbonate 650 milliGRAM(s) Oral daily  sucralfate 1 Gram(s) Oral two times a day  tamsulosin 0.4 milliGRAM(s) Oral at bedtime      VITAL:  T(C): , Max: 37.1 (05-22-24 @ 21:31)  T(F): , Max: 98.8 (05-22-24 @ 21:31)  HR: 75 (05-23-24 @ 13:08)  BP: 112/51 (05-23-24 @ 13:08)  BP(mean): --  RR: 17 (05-23-24 @ 13:08)  SpO2: 100% (05-23-24 @ 13:08)  Wt(kg): --    I and O's:        PHYSICAL EXAM:    Constitutional: NAD  Neck:  No JVD  Respiratory: CTAB/L  Cardiovascular: S1 and S2  Gastrointestinal: BS+, soft, NT/ND  Extremities: No peripheral edema  Neurological: A/O x 3, no focal deficits  Psychiatric: Normal mood, normal affect  : No Temple  Skin: No rashes  Access: Not applicable    LABS:                        8.9    7.00  )-----------( 110      ( 23 May 2024 06:10 )             27.1     05-23    140  |  106  |  66<H>  ----------------------------<  90  3.7   |  21<L>  |  3.83<H>    Ca    8.0<L>      23 May 2024 06:10  Phos  3.4     05-23  Mg     2.10     05-23    TPro  5.6<L>  /  Alb  2.6<L>  /  TBili  1.6<H>  /  DBili  x   /  AST  144<H>  /  ALT  101<H>  /  AlkPhos  141<H>  05-23          Urine Studies:  Urinalysis Basic - ( 23 May 2024 06:10 )    Color: x / Appearance: x / SG: x / pH: x  Gluc: 90 mg/dL / Ketone: x  / Bili: x / Urobili: x   Blood: x / Protein: x / Nitrite: x   Leuk Esterase: x / RBC: x / WBC x   Sq Epi: x / Non Sq Epi: x / Bacteria: x        Assessment and Plan:   · Assessment	      88y Male with CAD, HFrEF, Afib, HTN, DM, solitary R kidney and CKD p/w acute on chronic HFrEF and acute on chronic kidney injury.  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 acute on chronic HFrEF   Mitral clip is off the table at present    LCX disease   GI- S/p EGD yesterday, on IV protonix, eliquis resumed       Sayed [employer]   [id number]          [facility] [phone]        Patient seen and examined reports he had a brown BM today.        MEDICATIONS  (STANDING):  aMIOdarone    Tablet 200 milliGRAM(s) Oral daily  apixaban 2.5 milliGRAM(s) Oral every 12 hours  atorvastatin 80 milliGRAM(s) Oral at bedtime  chlorhexidine 2% Cloths 1 Application(s) Topical daily  dextrose 10% Bolus 125 milliLiter(s) IV Bolus once  dextrose 5%. 1000 milliLiter(s) (100 mL/Hr) IV Continuous <Continuous>  dextrose 5%. 1000 milliLiter(s) (50 mL/Hr) IV Continuous <Continuous>  dextrose 50% Injectable 25 Gram(s) IV Push once  dextrose 50% Injectable 12.5 Gram(s) IV Push once  glucagon  Injectable 1 milliGRAM(s) IntraMuscular once  insulin glargine Injectable (LANTUS) 5 Unit(s) SubCutaneous at bedtime  insulin lispro (ADMELOG) corrective regimen sliding scale   SubCutaneous three times a day before meals  insulin lispro (ADMELOG) corrective regimen sliding scale   SubCutaneous at bedtime  pantoprazole  Injectable 40 milliGRAM(s) IV Push every 12 hours  sodium bicarbonate 650 milliGRAM(s) Oral daily  sucralfate 1 Gram(s) Oral two times a day  tamsulosin 0.4 milliGRAM(s) Oral at bedtime      VITAL:  T(C): , Max: 37.1 (05-22-24 @ 21:31)  T(F): , Max: 98.8 (05-22-24 @ 21:31)  HR: 75 (05-23-24 @ 13:08)  BP: 112/51 (05-23-24 @ 13:08)  BP(mean): --  RR: 17 (05-23-24 @ 13:08)  SpO2: 100% (05-23-24 @ 13:08)  Wt(kg): --    I and O's:        PHYSICAL EXAM:    Constitutional: NAD  Neck:  No JVD  Respiratory: CTAB/L  Cardiovascular: S1 and S2  Gastrointestinal: BS+, soft, NT/ND  Extremities: No peripheral edema  Neurological: A/O x 3, no focal deficits  Psychiatric: Normal mood, normal affect  : No Temple  Skin: No rashes  Access: Not applicable    LABS:                        8.9    7.00  )-----------( 110      ( 23 May 2024 06:10 )             27.1     05-23    140  |  106  |  66<H>  ----------------------------<  90  3.7   |  21<L>  |  3.83<H>    Ca    8.0<L>      23 May 2024 06:10  Phos  3.4     05-23  Mg     2.10     05-23    TPro  5.6<L>  /  Alb  2.6<L>  /  TBili  1.6<H>  /  DBili  x   /  AST  144<H>  /  ALT  101<H>  /  AlkPhos  141<H>  05-23          Urine Studies:  Urinalysis Basic - ( 23 May 2024 06:10 )    Color: x / Appearance: x / SG: x / pH: x  Gluc: 90 mg/dL / Ketone: x  / Bili: x / Urobili: x   Blood: x / Protein: x / Nitrite: x   Leuk Esterase: x / RBC: x / WBC x   Sq Epi: x / Non Sq Epi: x / Bacteria: x

## 2024-05-23 NOTE — PROGRESS NOTE ADULT - SUBJECTIVE AND OBJECTIVE BOX
Patient is seen and examined. He is resting in bed. Denies any complaints of chest pain, SOB, palpitations or dizziness  He is not on telemetry    PAST MEDICAL & SURGICAL HISTORY:  CAD (coronary artery disease)  (4 stents,)    Diabetes mellitus, new onset  diet controlled    Single kidney  (hx/o LEFT nephrectomy at age 16; pt states due to a ureter"blockage" causing "infection"; pt denies hx/o renal dysfunction)    Arthritis    Myocardial infarction  (2003)    Hard of hearing    Atrial fibrillation    Hypertension    Hyperlipidemia    History of TIAs  Last 2018    History of bradycardia    Bladder cancer    History of CHF (congestive heart failure)    Left carotid bruit    T2DM (type 2 diabetes mellitus)    HFrEF (heart failure with reduced ejection fraction)    GAVE (gastric antral vascular ectasia)    History of nephrectomy, unilateral  (hx/o LEFT nephrectomy at age 16)    Stented coronary artery  (4 stents)    S/P bilateral cataract extraction    H/O cystoscopy        MEDICATIONS  (STANDING):  aMIOdarone    Tablet 200 milliGRAM(s) Oral daily  apixaban 2.5 milliGRAM(s) Oral every 12 hours  atorvastatin 80 milliGRAM(s) Oral at bedtime  chlorhexidine 2% Cloths 1 Application(s) Topical daily  dextrose 10% Bolus 125 milliLiter(s) IV Bolus once  dextrose 5%. 1000 milliLiter(s) (100 mL/Hr) IV Continuous <Continuous>  dextrose 5%. 1000 milliLiter(s) (50 mL/Hr) IV Continuous <Continuous>  dextrose 50% Injectable 25 Gram(s) IV Push once  dextrose 50% Injectable 12.5 Gram(s) IV Push once  furosemide    Tablet 40 milliGRAM(s) Oral every 12 hours  glucagon  Injectable 1 milliGRAM(s) IntraMuscular once  insulin glargine Injectable (LANTUS) 5 Unit(s) SubCutaneous at bedtime  insulin lispro (ADMELOG) corrective regimen sliding scale   SubCutaneous three times a day before meals  insulin lispro (ADMELOG) corrective regimen sliding scale   SubCutaneous at bedtime  pantoprazole  Injectable 40 milliGRAM(s) IV Push every 12 hours  sodium bicarbonate 650 milliGRAM(s) Oral daily  sucralfate 1 Gram(s) Oral two times a day  tamsulosin 0.4 milliGRAM(s) Oral at bedtime    MEDICATIONS  (PRN):  acetaminophen     Tablet .. 650 milliGRAM(s) Oral every 6 hours PRN Temp greater or equal to 38C (100.4F), Mild Pain (1 - 3)  aluminum hydroxide/magnesium hydroxide/simethicone Suspension 30 milliLiter(s) Oral every 4 hours PRN Dyspepsia  dextrose Oral Gel 15 Gram(s) Oral once PRN Blood Glucose LESS THAN 70 milliGRAM(s)/deciliter  melatonin 3 milliGRAM(s) Oral at bedtime PRN Insomnia  ondansetron Injectable 4 milliGRAM(s) IV Push every 8 hours PRN Nausea and/or Vomiting      Vital Signs Last 24 Hrs  T(C): 36.7 (23 May 2024 06:19), Max: 37.1 (22 May 2024 21:31)  T(F): 98 (23 May 2024 06:19), Max: 98.8 (22 May 2024 21:31)  HR: 65 (23 May 2024 06:19) (65 - 75)  BP: 101/57 (23 May 2024 06:19) (96/47 - 106/51)  BP(mean): --  RR: 16 (23 May 2024 06:19) (11 - 16)  SpO2: 95% (23 May 2024 06:19) (95% - 97%)    Parameters below as of 23 May 2024 06:19  Patient On (Oxygen Delivery Method): room air      LABS:                        8.9    7.00  )-----------( 110      ( 23 May 2024 06:10 )             27.1     05-23    140  |  106  |  66<H>  ----------------------------<  90  3.7   |  21<L>  |  3.83<H>    Ca    8.0<L>      23 May 2024 06:10  Phos  3.4     05-23  Mg     2.10     05-23    TPro  5.6<L>  /  Alb  2.6<L>  /  TBili  1.6<H>  /  DBili  x   /  AST  144<H>  /  ALT  101<H>  /  AlkPhos  141<H>  05-23          Urinalysis Basic - ( 23 May 2024 06:10 )    Color: x / Appearance: x / SG: x / pH: x  Gluc: 90 mg/dL / Ketone: x  / Bili: x / Urobili: x   Blood: x / Protein: x / Nitrite: x   Leuk Esterase: x / RBC: x / WBC x   Sq Epi: x / Non Sq Epi: x / Bacteria: x      PHYSICAL EXAM:    GENERAL: In no apparent distress, well nourished, and hydrated.  HEART: Regular rate and rhythm; No murmurs, rubs, or gallops.  PULMONARY: Clear to auscultation and percussion.  No rales, wheezing, or rhonchi bilaterally.  ABDOMEN: Soft, Nontender, Nondistended; Bowel sounds present  EXTREMITIES:  2+ Peripheral Pulses, No clubbing, cyanosis, or edema

## 2024-05-23 NOTE — PROGRESS NOTE ADULT - PROBLEM SELECTOR PLAN 1
hemoglobin stable   will continue to monitor  EGD noted   optimized for same hemoglobin stable   will continue to monitor  EGD noted   continue to monitor

## 2024-05-23 NOTE — PROGRESS NOTE ADULT - PROBLEM SELECTOR PLAN 4
Chronic stable  [id number]: 8  Continue amiodarone 200mg daily.   Monitor  back on apixaban Chronic stable  [id number]: 8  Continue amiodarone 200mg daily.   Monitor  back on apixaban  discussed with EP service  ? Watchman as inpatient

## 2024-05-23 NOTE — PROGRESS NOTE ADULT - SUBJECTIVE AND OBJECTIVE BOX
Patient is a 88y old  Male who presents with a chief complaint of GI bleed (23 May 2024 10:32)      DATE OF SERVICE: 05-23-24 @ 13:01    SUBJECTIVE / OVERNIGHT EVENTS: overnight events noted    ROS:  Resp: No cough no sputum production  CVS: No chest pain no palpitations no orthopnea  GI: no N/V/D      MEDICATIONS  (STANDING):  aMIOdarone    Tablet 200 milliGRAM(s) Oral daily  apixaban 2.5 milliGRAM(s) Oral every 12 hours  atorvastatin 80 milliGRAM(s) Oral at bedtime  chlorhexidine 2% Cloths 1 Application(s) Topical daily  dextrose 10% Bolus 125 milliLiter(s) IV Bolus once  dextrose 5%. 1000 milliLiter(s) (100 mL/Hr) IV Continuous <Continuous>  dextrose 5%. 1000 milliLiter(s) (50 mL/Hr) IV Continuous <Continuous>  dextrose 50% Injectable 25 Gram(s) IV Push once  dextrose 50% Injectable 12.5 Gram(s) IV Push once  furosemide    Tablet 40 milliGRAM(s) Oral every 12 hours  glucagon  Injectable 1 milliGRAM(s) IntraMuscular once  insulin glargine Injectable (LANTUS) 5 Unit(s) SubCutaneous at bedtime  insulin lispro (ADMELOG) corrective regimen sliding scale   SubCutaneous three times a day before meals  insulin lispro (ADMELOG) corrective regimen sliding scale   SubCutaneous at bedtime  pantoprazole  Injectable 40 milliGRAM(s) IV Push every 12 hours  sodium bicarbonate 650 milliGRAM(s) Oral daily  sucralfate 1 Gram(s) Oral two times a day  tamsulosin 0.4 milliGRAM(s) Oral at bedtime    MEDICATIONS  (PRN):  acetaminophen     Tablet .. 650 milliGRAM(s) Oral every 6 hours PRN Temp greater or equal to 38C (100.4F), Mild Pain (1 - 3)  aluminum hydroxide/magnesium hydroxide/simethicone Suspension 30 milliLiter(s) Oral every 4 hours PRN Dyspepsia  dextrose Oral Gel 15 Gram(s) Oral once PRN Blood Glucose LESS THAN 70 milliGRAM(s)/deciliter  melatonin 3 milliGRAM(s) Oral at bedtime PRN Insomnia  ondansetron Injectable 4 milliGRAM(s) IV Push every 8 hours PRN Nausea and/or Vomiting        CAPILLARY BLOOD GLUCOSE      POCT Blood Glucose.: 138 mg/dL (23 May 2024 12:28)  POCT Blood Glucose.: 86 mg/dL (23 May 2024 08:25)  POCT Blood Glucose.: 143 mg/dL (22 May 2024 22:18)  POCT Blood Glucose.: 112 mg/dL (22 May 2024 17:47)  POCT Blood Glucose.: 114 mg/dL (22 May 2024 13:16)    I&O's Summary      Vital Signs Last 24 Hrs  T(C): 36.7 (23 May 2024 06:19), Max: 37.1 (22 May 2024 21:31)  T(F): 98 (23 May 2024 06:19), Max: 98.8 (22 May 2024 21:31)  HR: 65 (23 May 2024 06:19) (65 - 75)  BP: 101/57 (23 May 2024 06:19) (101/57 - 106/51)  BP(mean): --  RR: 16 (23 May 2024 06:19) (16 - 16)  SpO2: 95% (23 May 2024 06:19) (95% - 96%)    PHYSICAL EXAM:  CHEST/LUNG: clear   HEART: S1 S2; systolic murmur +   ABDOMEN: Soft, Nontender,  EXTREMITIES:   no edema  NEUROLOGY: AO x 3 non-focal  SKIN: No rashes or lesions    LABS:                        8.9    7.00  )-----------( 110      ( 23 May 2024 06:10 )             27.1     05-23    140  |  106  |  66<H>  ----------------------------<  90  3.7   |  21<L>  |  3.83<H>    Ca    8.0<L>      23 May 2024 06:10  Phos  3.4     05-23  Mg     2.10     05-23    TPro  5.6<L>  /  Alb  2.6<L>  /  TBili  1.6<H>  /  DBili  x   /  AST  144<H>  /  ALT  101<H>  /  AlkPhos  141<H>  05-23          Urinalysis Basic - ( 23 May 2024 06:10 )    Color: x / Appearance: x / SG: x / pH: x  Gluc: 90 mg/dL / Ketone: x  / Bili: x / Urobili: x   Blood: x / Protein: x / Nitrite: x   Leuk Esterase: x / RBC: x / WBC x   Sq Epi: x / Non Sq Epi: x / Bacteria: x          All consultant(s) notes reviewed and care discussed with other providers        Contact Number, Dr Arroyo [phone]

## 2024-05-24 ENCOUNTER — TRANSCRIPTION ENCOUNTER (OUTPATIENT)
Age: 89
End: 2024-05-24

## 2024-05-24 PROBLEM — K31.819 ANGIODYSPLASIA OF STOMACH AND DUODENUM WITHOUT BLEEDING: Chronic | Status: ACTIVE | Noted: 2024-05-20

## 2024-05-24 PROBLEM — I50.20 UNSPECIFIED SYSTOLIC (CONGESTIVE) HEART FAILURE: Chronic | Status: ACTIVE | Noted: 2024-05-20

## 2024-05-24 PROBLEM — E11.9 TYPE 2 DIABETES MELLITUS WITHOUT COMPLICATIONS: Chronic | Status: ACTIVE | Noted: 2024-05-20

## 2024-05-24 LAB
ALBUMIN SERPL ELPH-MCNC: 2.4 G/DL — LOW (ref 3.3–5)
ALP SERPL-CCNC: 158 U/L — HIGH (ref 40–120)
ALT FLD-CCNC: 99 U/L — HIGH (ref 4–41)
ANION GAP SERPL CALC-SCNC: 12 MMOL/L — SIGNIFICANT CHANGE UP (ref 7–14)
AST SERPL-CCNC: 112 U/L — HIGH (ref 4–40)
BILIRUB SERPL-MCNC: 1.3 MG/DL — HIGH (ref 0.2–1.2)
BUN SERPL-MCNC: 68 MG/DL — HIGH (ref 7–23)
CALCIUM SERPL-MCNC: 7.7 MG/DL — LOW (ref 8.4–10.5)
CHLORIDE SERPL-SCNC: 108 MMOL/L — HIGH (ref 98–107)
CO2 SERPL-SCNC: 19 MMOL/L — LOW (ref 22–31)
CREAT SERPL-MCNC: 3.85 MG/DL — HIGH (ref 0.5–1.3)
EGFR: 14 ML/MIN/1.73M2 — LOW
GLUCOSE BLDC GLUCOMTR-MCNC: 119 MG/DL — HIGH (ref 70–99)
GLUCOSE BLDC GLUCOMTR-MCNC: 166 MG/DL — HIGH (ref 70–99)
GLUCOSE BLDC GLUCOMTR-MCNC: 198 MG/DL — HIGH (ref 70–99)
GLUCOSE BLDC GLUCOMTR-MCNC: 246 MG/DL — HIGH (ref 70–99)
GLUCOSE SERPL-MCNC: 121 MG/DL — HIGH (ref 70–99)
HCT VFR BLD CALC: 26.2 % — LOW (ref 39–50)
HGB BLD-MCNC: 8.5 G/DL — LOW (ref 13–17)
MAGNESIUM SERPL-MCNC: 2 MG/DL — SIGNIFICANT CHANGE UP (ref 1.6–2.6)
MCHC RBC-ENTMCNC: 31.3 PG — SIGNIFICANT CHANGE UP (ref 27–34)
MCHC RBC-ENTMCNC: 32.4 GM/DL — SIGNIFICANT CHANGE UP (ref 32–36)
MCV RBC AUTO: 96.3 FL — SIGNIFICANT CHANGE UP (ref 80–100)
NRBC # BLD: 0 /100 WBCS — SIGNIFICANT CHANGE UP (ref 0–0)
NRBC # FLD: 0 K/UL — SIGNIFICANT CHANGE UP (ref 0–0)
PHOSPHATE SERPL-MCNC: 2.9 MG/DL — SIGNIFICANT CHANGE UP (ref 2.5–4.5)
PLATELET # BLD AUTO: 101 K/UL — LOW (ref 150–400)
POTASSIUM SERPL-MCNC: 3.6 MMOL/L — SIGNIFICANT CHANGE UP (ref 3.5–5.3)
POTASSIUM SERPL-SCNC: 3.6 MMOL/L — SIGNIFICANT CHANGE UP (ref 3.5–5.3)
PROT SERPL-MCNC: 5.3 G/DL — LOW (ref 6–8.3)
RBC # BLD: 2.72 M/UL — LOW (ref 4.2–5.8)
RBC # FLD: 17.1 % — HIGH (ref 10.3–14.5)
SODIUM SERPL-SCNC: 139 MMOL/L — SIGNIFICANT CHANGE UP (ref 135–145)
WBC # BLD: 5.49 K/UL — SIGNIFICANT CHANGE UP (ref 3.8–10.5)
WBC # FLD AUTO: 5.49 K/UL — SIGNIFICANT CHANGE UP (ref 3.8–10.5)

## 2024-05-24 RX ORDER — POTASSIUM CHLORIDE 20 MEQ
20 PACKET (EA) ORAL ONCE
Refills: 0 | Status: COMPLETED | OUTPATIENT
Start: 2024-05-24 | End: 2024-05-24

## 2024-05-24 RX ADMIN — Medication 20 MILLIEQUIVALENT(S): at 17:14

## 2024-05-24 RX ADMIN — Medication 1 GRAM(S): at 17:12

## 2024-05-24 RX ADMIN — Medication 1: at 12:22

## 2024-05-24 RX ADMIN — Medication 2: at 17:52

## 2024-05-24 RX ADMIN — AMIODARONE HYDROCHLORIDE 200 MILLIGRAM(S): 400 TABLET ORAL at 12:22

## 2024-05-24 RX ADMIN — PANTOPRAZOLE SODIUM 40 MILLIGRAM(S): 20 TABLET, DELAYED RELEASE ORAL at 06:51

## 2024-05-24 RX ADMIN — ATORVASTATIN CALCIUM 80 MILLIGRAM(S): 80 TABLET, FILM COATED ORAL at 22:01

## 2024-05-24 RX ADMIN — PANTOPRAZOLE SODIUM 40 MILLIGRAM(S): 20 TABLET, DELAYED RELEASE ORAL at 17:15

## 2024-05-24 RX ADMIN — APIXABAN 2.5 MILLIGRAM(S): 2.5 TABLET, FILM COATED ORAL at 17:13

## 2024-05-24 RX ADMIN — Medication 40 MILLIGRAM(S): at 06:52

## 2024-05-24 RX ADMIN — INSULIN GLARGINE 5 UNIT(S): 100 INJECTION, SOLUTION SUBCUTANEOUS at 22:25

## 2024-05-24 RX ADMIN — CHLORHEXIDINE GLUCONATE 1 APPLICATION(S): 213 SOLUTION TOPICAL at 12:23

## 2024-05-24 RX ADMIN — APIXABAN 2.5 MILLIGRAM(S): 2.5 TABLET, FILM COATED ORAL at 06:53

## 2024-05-24 RX ADMIN — Medication 650 MILLIGRAM(S): at 12:21

## 2024-05-24 RX ADMIN — Medication 1 GRAM(S): at 06:52

## 2024-05-24 NOTE — PROGRESS NOTE ADULT - SUBJECTIVE AND OBJECTIVE BOX
Patient is a 88y old  Male who presents with a chief complaint of GI bleed (24 May 2024 11:52)      DATE OF SERVICE: 05-24-24 @ 12:28    SUBJECTIVE / OVERNIGHT EVENTS: overnight events noted    ROS:  Resp: No cough no sputum production  CVS: No chest pain no palpitations no orthopnea  GI: no N/V/D  "I want to go home"     MEDICATIONS  (STANDING):  aMIOdarone    Tablet 200 milliGRAM(s) Oral daily  apixaban 2.5 milliGRAM(s) Oral every 12 hours  atorvastatin 80 milliGRAM(s) Oral at bedtime  chlorhexidine 2% Cloths 1 Application(s) Topical daily  dextrose 10% Bolus 125 milliLiter(s) IV Bolus once  dextrose 5%. 1000 milliLiter(s) (100 mL/Hr) IV Continuous <Continuous>  dextrose 5%. 1000 milliLiter(s) (50 mL/Hr) IV Continuous <Continuous>  dextrose 50% Injectable 25 Gram(s) IV Push once  dextrose 50% Injectable 12.5 Gram(s) IV Push once  furosemide    Tablet 40 milliGRAM(s) Oral daily  glucagon  Injectable 1 milliGRAM(s) IntraMuscular once  insulin glargine Injectable (LANTUS) 5 Unit(s) SubCutaneous at bedtime  insulin lispro (ADMELOG) corrective regimen sliding scale   SubCutaneous three times a day before meals  insulin lispro (ADMELOG) corrective regimen sliding scale   SubCutaneous at bedtime  pantoprazole  Injectable 40 milliGRAM(s) IV Push every 12 hours  sodium bicarbonate 650 milliGRAM(s) Oral daily  sucralfate 1 Gram(s) Oral two times a day  tamsulosin 0.4 milliGRAM(s) Oral at bedtime    MEDICATIONS  (PRN):  acetaminophen     Tablet .. 650 milliGRAM(s) Oral every 6 hours PRN Temp greater or equal to 38C (100.4F), Mild Pain (1 - 3)  aluminum hydroxide/magnesium hydroxide/simethicone Suspension 30 milliLiter(s) Oral every 4 hours PRN Dyspepsia  dextrose Oral Gel 15 Gram(s) Oral once PRN Blood Glucose LESS THAN 70 milliGRAM(s)/deciliter  melatonin 3 milliGRAM(s) Oral at bedtime PRN Insomnia  ondansetron Injectable 4 milliGRAM(s) IV Push every 8 hours PRN Nausea and/or Vomiting        CAPILLARY BLOOD GLUCOSE      POCT Blood Glucose.: 198 mg/dL (24 May 2024 12:10)  POCT Blood Glucose.: 119 mg/dL (24 May 2024 08:59)  POCT Blood Glucose.: 192 mg/dL (23 May 2024 22:05)  POCT Blood Glucose.: 152 mg/dL (23 May 2024 18:08)    I&O's Summary      Vital Signs Last 24 Hrs  T(C): 36.8 (24 May 2024 05:55), Max: 36.8 (24 May 2024 05:55)  T(F): 98.2 (24 May 2024 05:55), Max: 98.2 (24 May 2024 05:55)  HR: 68 (24 May 2024 05:55) (68 - 75)  BP: 104/43 (24 May 2024 05:55) (104/43 - 112/51)  BP(mean): --  RR: 17 (24 May 2024 05:55) (17 - 18)  SpO2: 99% (24 May 2024 05:55) (96% - 100%)    PHYSICAL EXAM:  CHEST/LUNG: clear   HEART: S1 S2; systolic murmur +   ABDOMEN: Soft, Nontender,  EXTREMITIES:   no edema  NEUROLOGY: AO x 3 non-focal      LABS:                        8.5    5.49  )-----------( 101      ( 24 May 2024 05:50 )             26.2     05-24    139  |  108<H>  |  68<H>  ----------------------------<  121<H>  3.6   |  19<L>  |  3.85<H>    Ca    7.7<L>      24 May 2024 05:50  Phos  2.9     05-24  Mg     2.00     05-24    TPro  5.3<L>  /  Alb  2.4<L>  /  TBili  1.3<H>  /  DBili  x   /  AST  112<H>  /  ALT  99<H>  /  AlkPhos  158<H>  05-24          Urinalysis Basic - ( 24 May 2024 05:50 )    Color: x / Appearance: x / SG: x / pH: x  Gluc: 121 mg/dL / Ketone: x  / Bili: x / Urobili: x   Blood: x / Protein: x / Nitrite: x   Leuk Esterase: x / RBC: x / WBC x   Sq Epi: x / Non Sq Epi: x / Bacteria: x          All consultant(s) notes reviewed and care discussed with other providers        Contact Number, Dr Arroyo [phone]

## 2024-05-24 NOTE — DISCHARGE NOTE PROVIDER - NSDCFUSCHEDAPPT_GEN_ALL_CORE_FT
Poornima Lim  [facility]  UROLOGY [address] [facility] [facility]  Scheduled Appointment: 06/21/2024    Carline Osei  [facility]  [phone]  Scheduled Appointment: 08/08/2024     Stanislaw Francois  [facility]  [address] [id number] [facility]  Scheduled Appointment: 06/04/2024    Poornima Lim  [facility]  UROLOGY [address] [facility]  Scheduled Appointment: 06/21/2024    Ryan Pan  [facility]  [address]H [id number]  Scheduled Appointment: 06/28/2024    Carline Osei  [facility]  [address]  Scheduled Appointment: 08/08/2024

## 2024-05-24 NOTE — DISCHARGE NOTE PROVIDER - DETAILS OF MALNUTRITION DIAGNOSIS/DIAGNOSES
This patient has been assessed with a concern for Malnutrition and was treated during this hospitalization for the following Nutrition diagnosis/diagnoses:     -  05/21/2024: Severe protein-calorie malnutrition   -  05/21/2024: Underweight (BMI < 19)

## 2024-05-24 NOTE — DISCHARGE NOTE PROVIDER - NSDCMRMEDTOKEN_GEN_ALL_CORE_FT
amiodarone 200 mg oral tablet: 1 tab(s) orally once a day   apixaban 2.5 mg oral tablet: 1 tab(s) orally every 12 hours  Crestor 40 mg oral tablet: 1 tab(s) orally once a day (at bedtime)  furosemide 40 mg oral tablet: 1 tab(s) orally once a day  pantoprazole 40 mg oral delayed release tablet: 1 tab(s) orally 2 times a day  repaglinide 0.5 mg oral tablet: 1 tab(s) orally 3 times a day (before meals)  sodium bicarbonate 650 mg oral tablet: 1 tab(s) orally once a day  sucralfate 1 g oral tablet: 1 tab(s) orally 2 times a day  tamsulosin 0.4 mg oral capsule: 1 cap(s) orally once a day  Tresiba FlexTouch 100 units/mL subcutaneous solution: 5 unit(s) subcutaneous once a day (at bedtime)   amiodarone 200 mg oral tablet: 1 tab(s) orally once a day   Crestor 40 mg oral tablet: 1 tab(s) orally once a day (at bedtime)  ferrous sulfate 325 mg (65 mg elemental iron) oral tablet: 1 tab(s) orally once a day  furosemide 40 mg oral tablet: 1 tab(s) orally once a day  pantoprazole 40 mg oral delayed release tablet: 1 tab(s) orally 2 times a day  repaglinide 0.5 mg oral tablet: 1 tab(s) orally 3 times a day (before meals)  sodium bicarbonate 650 mg oral tablet: 1 tab(s) orally once a day  sucralfate 1 g oral tablet: 1 tab(s) orally 2 times a day  tamsulosin 0.4 mg oral capsule: 1 cap(s) orally once a day  Tresiba FlexTouch 100 units/mL subcutaneous solution: 5 unit(s) subcutaneous once a day (at bedtime)

## 2024-05-24 NOTE — DISCHARGE NOTE PROVIDER - NSDCCPCAREPLAN_GEN_ALL_CORE_FT
PRINCIPAL DISCHARGE DIAGNOSIS  Diagnosis: GIB (gastrointestinal bleeding)  Assessment and Plan of Treatment: You came in with dark stools and low blood counts. You were scoped and found to have an upper GI tract bleed. You are to continue your protonix indefinitely and are to hold your eliquis (blood thinner). You blood counts are now stable and youre stool has returned to normal.      SECONDARY DISCHARGE DIAGNOSES  Diagnosis: Benign essential HTN  Assessment and Plan of Treatment: Continue blood pressure medication regimen as directed. Monitor for any visual changes, headaches or dizziness.  Monitor blood pressure regularly.  Follow up with your PCP for further management for high blood pressure.    Diagnosis: T2DM (type 2 diabetes mellitus)  Assessment and Plan of Treatment: Continue diet modification. Avoid complex carbohydrates such as bread, pasta, cereal, white rice, white potatoes, etc. Avoid concentrated sugar as found in desserts, candy, soda, juice, etc. Consume a diet based on lean protein (chicken, fish) and vegetables.    Diagnosis: HFrEF (heart failure with reduced ejection fraction)  Assessment and Plan of Treatment: Continue lasix , you are also being set up with home oxygen . follow up with cardiologist

## 2024-05-24 NOTE — PROGRESS NOTE ADULT - SUBJECTIVE AND OBJECTIVE BOX
NEPHROLOGY-[facility] [phone]        Patient seen and examined on room air no new complaints.         MEDICATIONS  (STANDING):  aMIOdarone    Tablet 200 milliGRAM(s) Oral daily  apixaban 2.5 milliGRAM(s) Oral every 12 hours  atorvastatin 80 milliGRAM(s) Oral at bedtime  chlorhexidine 2% Cloths 1 Application(s) Topical daily  dextrose 10% Bolus 125 milliLiter(s) IV Bolus once  dextrose 5%. 1000 milliLiter(s) (100 mL/Hr) IV Continuous <Continuous>  dextrose 5%. 1000 milliLiter(s) (50 mL/Hr) IV Continuous <Continuous>  dextrose 50% Injectable 25 Gram(s) IV Push once  dextrose 50% Injectable 12.5 Gram(s) IV Push once  furosemide    Tablet 40 milliGRAM(s) Oral daily  glucagon  Injectable 1 milliGRAM(s) IntraMuscular once  insulin glargine Injectable (LANTUS) 5 Unit(s) SubCutaneous at bedtime  insulin lispro (ADMELOG) corrective regimen sliding scale   SubCutaneous three times a day before meals  insulin lispro (ADMELOG) corrective regimen sliding scale   SubCutaneous at bedtime  pantoprazole  Injectable 40 milliGRAM(s) IV Push every 12 hours  sodium bicarbonate 650 milliGRAM(s) Oral daily  sucralfate 1 Gram(s) Oral two times a day  tamsulosin 0.4 milliGRAM(s) Oral at bedtime      VITAL:  T(C): , Max: 36.8 (05-24-24 @ 05:55)  T(F): , Max: 98.2 (05-24-24 @ 05:55)  HR: 68 (05-24-24 @ 05:55)  BP: 104/43 (05-24-24 @ 05:55)  BP(mean): --  RR: 17 (05-24-24 @ 05:55)  SpO2: 99% (05-24-24 @ 05:55)  Wt(kg): --    I and O's:        PHYSICAL EXAM:    Constitutional: NAD  Neck:  No JVD  Respiratory: CTAB/L  Cardiovascular: S1 and S2  Gastrointestinal: BS+, soft, NT/ND  Extremities: No peripheral edema  Neurological: A/O x 3, no focal deficits  Psychiatric: Normal mood, normal affect  : No Temple  Skin: No rashes  Access: Not applicable    LABS:                        8.5    5.49  )-----------( 101      ( 24 May 2024 05:50 )             26.2     05-24    139  |  108<H>  |  68<H>  ----------------------------<  121<H>  3.6   |  19<L>  |  3.85<H>    Ca    7.7<L>      24 May 2024 05:50  Phos  2.9     05-24  Mg     2.00     05-24    TPro  5.3<L>  /  Alb  2.4<L>  /  TBili  1.3<H>  /  DBili  x   /  AST  112<H>  /  ALT  99<H>  /  AlkPhos  158<H>  05-24          Urine Studies:  Urinalysis Basic - ( 24 May 2024 05:50 )    Color: x / Appearance: x / SG: x / pH: x  Gluc: 121 mg/dL / Ketone: x  / Bili: x / Urobili: x   Blood: x / Protein: x / Nitrite: x   Leuk Esterase: x / RBC: x / WBC x   Sq Epi: x / Non Sq Epi: x / Bacteria: x        Assessment and Plan:   · Assessment	      88y Male with CAD, HFrEF, Afib, HTN, DM, solitary R kidney and CKD p/w acute on chronic HFrEF and acute on chronic kidney injury.  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 acute on chronic HFrEF   Mitral clip is off the table at present    LCX disease   GI- S/p EGD, on IV protonix bid , eliquis resumed   EPS-Awaiting on Dr Pan to see if watchmans can be done this admission       Sayed [facility]   [phone]                  NEPHROLOGY-[facility] [phone]        Patient seen and examined on room air no new complaints.         MEDICATIONS  (STANDING):  aMIOdarone    Tablet 200 milliGRAM(s) Oral daily  apixaban 2.5 milliGRAM(s) Oral every 12 hours  atorvastatin 80 milliGRAM(s) Oral at bedtime  chlorhexidine 2% Cloths 1 Application(s) Topical daily  dextrose 10% Bolus 125 milliLiter(s) IV Bolus once  dextrose 5%. 1000 milliLiter(s) (100 mL/Hr) IV Continuous <Continuous>  dextrose 5%. 1000 milliLiter(s) (50 mL/Hr) IV Continuous <Continuous>  dextrose 50% Injectable 25 Gram(s) IV Push once  dextrose 50% Injectable 12.5 Gram(s) IV Push once  furosemide    Tablet 40 milliGRAM(s) Oral daily  glucagon  Injectable 1 milliGRAM(s) IntraMuscular once  insulin glargine Injectable (LANTUS) 5 Unit(s) SubCutaneous at bedtime  insulin lispro (ADMELOG) corrective regimen sliding scale   SubCutaneous three times a day before meals  insulin lispro (ADMELOG) corrective regimen sliding scale   SubCutaneous at bedtime  pantoprazole  Injectable 40 milliGRAM(s) IV Push every 12 hours  sodium bicarbonate 650 milliGRAM(s) Oral daily  sucralfate 1 Gram(s) Oral two times a day  tamsulosin 0.4 milliGRAM(s) Oral at bedtime      VITAL:  T(C): , Max: 36.8 (05-24-24 @ 05:55)  T(F): , Max: 98.2 (05-24-24 @ 05:55)  HR: 68 (05-24-24 @ 05:55)  BP: 104/43 (05-24-24 @ 05:55)  BP(mean): --  RR: 17 (05-24-24 @ 05:55)  SpO2: 99% (05-24-24 @ 05:55)  Wt(kg): --    I and O's:        PHYSICAL EXAM:    Constitutional: NAD  Neck:  No JVD  Respiratory: CTAB/L  Cardiovascular: S1 and S2  Gastrointestinal: BS+, soft, NT/ND  Extremities: No peripheral edema  Neurological: A/O x 3, no focal deficits  Psychiatric: Normal mood, normal affect  : No Temple  Skin: No rashes  Access: Not applicable    LABS:                        8.5    5.49  )-----------( 101      ( 24 May 2024 05:50 )             26.2     05-24    139  |  108<H>  |  68<H>  ----------------------------<  121<H>  3.6   |  19<L>  |  3.85<H>    Ca    7.7<L>      24 May 2024 05:50  Phos  2.9     05-24  Mg     2.00     05-24    TPro  5.3<L>  /  Alb  2.4<L>  /  TBili  1.3<H>  /  DBili  x   /  AST  112<H>  /  ALT  99<H>  /  AlkPhos  158<H>  05-24          Urine Studies:  Urinalysis Basic - ( 24 May 2024 05:50 )    Color: x / Appearance: x / SG: x / pH: x  Gluc: 121 mg/dL / Ketone: x  / Bili: x / Urobili: x   Blood: x / Protein: x / Nitrite: x   Leuk Esterase: x / RBC: x / WBC x   Sq Epi: x / Non Sq Epi: x / Bacteria: x

## 2024-05-24 NOTE — PROGRESS NOTE ADULT - SUBJECTIVE AND OBJECTIVE BOX
CARDIOLOGY FOLLOW UP NOTE - DR. BUSTILLO    Patient Name: RACHEL COMBS    Date of Service: 05-24-24 @ 16:15    Patient seen and examined    Subjective:    cv: denies chest pain, dyspnea, palpitations, dizziness  pulmonary: denies cough  GI: denies abdominal pain, nausea, vomiting  vascular/legs: no edema   skin: no rash  ROS: otherwise negative   overnight events:      PHYSICAL EXAM:  T(C): 36.9 (05-24-24 @ 12:22), Max: 36.9 (05-24-24 @ 12:22)  HR: 77 (05-24-24 @ 12:22) (68 - 77)  BP: 101/52 (05-24-24 @ 12:22) (101/52 - 109/60)  RR: 18 (05-24-24 @ 12:22) (17 - 18)  SpO2: 99% (05-24-24 @ 12:22) (96% - 99%)  Wt(kg): --  I&O's Summary    Daily     Daily     Appearance: Normal	  Cardiovascular: Normal S1 S2,RRR, No JVD, No murmurs  Respiratory: Lungs clear to auscultation	  Gastrointestinal:  Soft, Non-tender, + BS	  Extremities: Normal range of motion, No clubbing, cyanosis or edema      Home Medications:  apixaban 2.5 mg oral tablet: 1 tab(s) orally every 12 hours (20 May 2024 17:19)  Crestor 40 mg oral tablet: 1 tab(s) orally once a day (at bedtime) (20 May 2024 17:19)  repaglinide 0.5 mg oral tablet: 1 tab(s) orally 3 times a day (before meals) (20 May 2024 17:19)  sodium bicarbonate 650 mg oral tablet: 1 tab(s) orally once a day (20 May 2024 17:19)  tamsulosin 0.4 mg oral capsule: 1 cap(s) orally once a day (20 May 2024 17:19)  Tresiba FlexTouch 100 units/mL subcutaneous solution: 5 unit(s) subcutaneous once a day (at bedtime) (20 May 2024 17:19)      MEDICATIONS  (STANDING):  aMIOdarone    Tablet 200 milliGRAM(s) Oral daily  apixaban 2.5 milliGRAM(s) Oral every 12 hours  atorvastatin 80 milliGRAM(s) Oral at bedtime  chlorhexidine 2% Cloths 1 Application(s) Topical daily  dextrose 10% Bolus 125 milliLiter(s) IV Bolus once  dextrose 5%. 1000 milliLiter(s) (100 mL/Hr) IV Continuous <Continuous>  dextrose 5%. 1000 milliLiter(s) (50 mL/Hr) IV Continuous <Continuous>  dextrose 50% Injectable 25 Gram(s) IV Push once  dextrose 50% Injectable 12.5 Gram(s) IV Push once  furosemide    Tablet 40 milliGRAM(s) Oral daily  glucagon  Injectable 1 milliGRAM(s) IntraMuscular once  insulin glargine Injectable (LANTUS) 5 Unit(s) SubCutaneous at bedtime  insulin lispro (ADMELOG) corrective regimen sliding scale   SubCutaneous three times a day before meals  insulin lispro (ADMELOG) corrective regimen sliding scale   SubCutaneous at bedtime  pantoprazole  Injectable 40 milliGRAM(s) IV Push every 12 hours  potassium chloride   Powder 20 milliEquivalent(s) Oral once  sodium bicarbonate 650 milliGRAM(s) Oral daily  sucralfate 1 Gram(s) Oral two times a day  tamsulosin 0.4 milliGRAM(s) Oral at bedtime      TELEMETRY: 	    ECG:  	  RADIOLOGY:   DIAGNOSTIC TESTING:  [ ] Echocardiogram:  [ ] Catheterization:  [ ] Stress Test:    OTHER: 	    LABS:	 	    CARDIAC MARKERS:                                      8.5    5.49  )-----------( 101      ( 24 May 2024 05:50 )             26.2     05-24    139  |  108<H>  |  68<H>  ----------------------------<  121<H>  3.6   |  19<L>  |  3.85<H>    Ca    7.7<L>      24 May 2024 05:50  Phos  2.9     05-24  Mg     2.00     05-24    TPro  5.3<L>  /  Alb  2.4<L>  /  TBili  1.3<H>  /  DBili  x   /  AST  112<H>  /  ALT  99<H>  /  AlkPhos  158<H>  05-24    proBNP:     Lipid Profile:   HgA1c:     Creatinine: 3.85 mg/dL (05-24-24 @ 05:50)  Creatinine: 3.83 mg/dL (05-23-24 @ 06:10)  Creatinine: 3.64 mg/dL (05-22-24 @ 05:13)

## 2024-05-24 NOTE — DISCHARGE NOTE NURSING/CASE MANAGEMENT/SOCIAL WORK - PATIENT PORTAL LINK FT
You can access the FollowMyHealth Patient Portal offered by [facility] by registering at the following website: [email or web address]. By joining [facility]’s FollowMyHealth portal, you will also be able to view your health information using other applications (apps) compatible with our system.

## 2024-05-24 NOTE — DISCHARGE NOTE PROVIDER - CARE PROVIDER_API CALL
Reginaldo Syed  Cardiovascular Disease  [address] [place] [address]  Phone: [phone]  Fax: [phone]  Follow Up Time:

## 2024-05-24 NOTE — DISCHARGE NOTE NURSING/CASE MANAGEMENT/SOCIAL WORK - NSDCFUADDAPPT_GEN_ALL_CORE_FT
Epidural removed intact. Motrin 800 mg tab given per pt request before she takes Rebif Rebidose for MS (home medication). Please follow up with your primary care provider in 1-2 weeks following discharge from the hospital for continued monitoring and management.

## 2024-05-24 NOTE — DISCHARGE NOTE PROVIDER - NSDCFUADDAPPT_GEN_ALL_CORE_FT
Please follow up with your primary care provider in 1-2 weeks following discharge from the hospital for continued monitoring and management.  Please follow up with your primary care provider in 1-2 weeks following discharge from the hospital for continued monitoring and management.     You have an appointment with GI on 6/4/24. Please follow up in regards to continued monitoring and management of GI bleed.

## 2024-05-24 NOTE — DISCHARGE NOTE PROVIDER - HOSPITAL COURSE
88 yr old male with multiple medical conditions including CKD 4, CAD, GIB, A fib, HTN, anemia presenting with GIB    GIB (gastrointestinal bleeding).   - hemoglobin 8.7  today  - off apixaban secondary to persistent GI ooze  - patient and Katya both aware of the high risk of stroke.    Paroxysmal atrial fibrillation.   - Continue amiodarone 200mg daily  - off apixaban   - discussed with patient and wife   - not a candidate for ASA or clopidogrel either as per cardiology   - patient is not a candidate for Watchmen per EP attending.    HFrEF (heart failure with reduced ejection fraction).   - now on po furosemide 40 qd   - received today's dose  - creatinine stable.    T2DM (type 2 diabetes mellitus).   - resume repaglinide 0.5mg TID, Tresiba 5 units nightly  on discharge   - Continue with basal 5 units daily    On_________, discussed with __________, patient is medically cleared and optimized for discharge today. All medications were reviewed with attending, and sent to mutually agreed upon pharmacy.  Reviewed discharge medications with patient; All new medications requiring new prescription sent to pharmacy of patients choice. Reviewed need for prescription for previous home medications and new prescriptions sent if requested. Patient in agreement and understands.   88 yr old male with multiple medical conditions including CKD 4, CAD, GIB, A fib, HTN, anemia presenting with GIB    GIB (gastrointestinal bleeding).   - hemoglobin 8.7  today, off apixaban secondary to persistent GI ooze  - patient and Katya both aware of the high risk of stroke  - s/p EGD 5/22 w/ gastric erythema, non-bleeding ulcer   - s/p 2u PRBC  - HgB stable, no further dark stools    Paroxysmal atrial fibrillation.   - Continue amiodarone 200mg daily, off apixaban   - not a candidate for ASA or clopidogrel either as per cardiology   - patient is not a candidate for Watchmen per EP attending.    HFrEF (heart failure with reduced ejection fraction).   - now on po furosemide 40 qd , resume  - creatinine stable.    T2DM (type 2 diabetes mellitus).   - resume repaglinide 0.5mg TID, Tresiba 5 units nightly  on discharge   - Continue with basal 5 units daily    On 5/31/24, discussed with Dr. Arroyo, patient is medically cleared and optimized for discharge today. All medications were reviewed with attending, and sent to mutually agreed upon pharmacy.  Reviewed discharge medications with patient; All new medications requiring new prescription sent to pharmacy of patients choice. Reviewed need for prescription for previous home medications and new prescriptions sent if requested. Patient in agreement and understands.

## 2024-05-25 LAB
ALBUMIN SERPL ELPH-MCNC: 2.4 G/DL — LOW (ref 3.3–5)
ALP SERPL-CCNC: 153 U/L — HIGH (ref 40–120)
ALT FLD-CCNC: 82 U/L — HIGH (ref 4–41)
ANION GAP SERPL CALC-SCNC: 14 MMOL/L — SIGNIFICANT CHANGE UP (ref 7–14)
AST SERPL-CCNC: 67 U/L — HIGH (ref 4–40)
BILIRUB SERPL-MCNC: 1.1 MG/DL — SIGNIFICANT CHANGE UP (ref 0.2–1.2)
BUN SERPL-MCNC: 73 MG/DL — HIGH (ref 7–23)
CALCIUM SERPL-MCNC: 7.7 MG/DL — LOW (ref 8.4–10.5)
CHLORIDE SERPL-SCNC: 108 MMOL/L — HIGH (ref 98–107)
CO2 SERPL-SCNC: 18 MMOL/L — LOW (ref 22–31)
CREAT SERPL-MCNC: 3.66 MG/DL — HIGH (ref 0.5–1.3)
EGFR: 15 ML/MIN/1.73M2 — LOW
GLUCOSE BLDC GLUCOMTR-MCNC: 131 MG/DL — HIGH (ref 70–99)
GLUCOSE BLDC GLUCOMTR-MCNC: 161 MG/DL — HIGH (ref 70–99)
GLUCOSE BLDC GLUCOMTR-MCNC: 175 MG/DL — HIGH (ref 70–99)
GLUCOSE BLDC GLUCOMTR-MCNC: 224 MG/DL — HIGH (ref 70–99)
GLUCOSE SERPL-MCNC: 120 MG/DL — HIGH (ref 70–99)
HCT VFR BLD CALC: 25.4 % — LOW (ref 39–50)
HGB BLD-MCNC: 8.3 G/DL — LOW (ref 13–17)
MAGNESIUM SERPL-MCNC: 2 MG/DL — SIGNIFICANT CHANGE UP (ref 1.6–2.6)
MCHC RBC-ENTMCNC: 31.9 PG — SIGNIFICANT CHANGE UP (ref 27–34)
MCHC RBC-ENTMCNC: 32.7 GM/DL — SIGNIFICANT CHANGE UP (ref 32–36)
MCV RBC AUTO: 97.7 FL — SIGNIFICANT CHANGE UP (ref 80–100)
NRBC # BLD: 0 /100 WBCS — SIGNIFICANT CHANGE UP (ref 0–0)
NRBC # FLD: 0 K/UL — SIGNIFICANT CHANGE UP (ref 0–0)
PHOSPHATE SERPL-MCNC: 2.8 MG/DL — SIGNIFICANT CHANGE UP (ref 2.5–4.5)
PLATELET # BLD AUTO: 107 K/UL — LOW (ref 150–400)
POTASSIUM SERPL-MCNC: 4 MMOL/L — SIGNIFICANT CHANGE UP (ref 3.5–5.3)
POTASSIUM SERPL-SCNC: 4 MMOL/L — SIGNIFICANT CHANGE UP (ref 3.5–5.3)
PROT SERPL-MCNC: 5.5 G/DL — LOW (ref 6–8.3)
RBC # BLD: 2.6 M/UL — LOW (ref 4.2–5.8)
RBC # FLD: 17 % — HIGH (ref 10.3–14.5)
SODIUM SERPL-SCNC: 140 MMOL/L — SIGNIFICANT CHANGE UP (ref 135–145)
WBC # BLD: 4.77 K/UL — SIGNIFICANT CHANGE UP (ref 3.8–10.5)
WBC # FLD AUTO: 4.77 K/UL — SIGNIFICANT CHANGE UP (ref 3.8–10.5)

## 2024-05-25 RX ADMIN — PANTOPRAZOLE SODIUM 40 MILLIGRAM(S): 20 TABLET, DELAYED RELEASE ORAL at 18:02

## 2024-05-25 RX ADMIN — TAMSULOSIN HYDROCHLORIDE 0.4 MILLIGRAM(S): 0.4 CAPSULE ORAL at 22:34

## 2024-05-25 RX ADMIN — CHLORHEXIDINE GLUCONATE 1 APPLICATION(S): 213 SOLUTION TOPICAL at 13:48

## 2024-05-25 RX ADMIN — APIXABAN 2.5 MILLIGRAM(S): 2.5 TABLET, FILM COATED ORAL at 06:31

## 2024-05-25 RX ADMIN — TAMSULOSIN HYDROCHLORIDE 0.4 MILLIGRAM(S): 0.4 CAPSULE ORAL at 00:53

## 2024-05-25 RX ADMIN — PANTOPRAZOLE SODIUM 40 MILLIGRAM(S): 20 TABLET, DELAYED RELEASE ORAL at 06:30

## 2024-05-25 RX ADMIN — Medication 1 GRAM(S): at 18:01

## 2024-05-25 RX ADMIN — Medication 1: at 18:02

## 2024-05-25 RX ADMIN — Medication 650 MILLIGRAM(S): at 13:46

## 2024-05-25 RX ADMIN — ATORVASTATIN CALCIUM 80 MILLIGRAM(S): 80 TABLET, FILM COATED ORAL at 22:34

## 2024-05-25 RX ADMIN — APIXABAN 2.5 MILLIGRAM(S): 2.5 TABLET, FILM COATED ORAL at 18:01

## 2024-05-25 RX ADMIN — AMIODARONE HYDROCHLORIDE 200 MILLIGRAM(S): 400 TABLET ORAL at 06:31

## 2024-05-25 RX ADMIN — INSULIN GLARGINE 5 UNIT(S): 100 INJECTION, SOLUTION SUBCUTANEOUS at 22:34

## 2024-05-25 RX ADMIN — Medication 1 GRAM(S): at 06:31

## 2024-05-25 RX ADMIN — Medication 1: at 13:01

## 2024-05-25 NOTE — PROGRESS NOTE ADULT - PROBLEM SELECTOR PLAN 4
Continue amiodarone 200mg daily.   Monitor  back on apixaban  discussed with EP attending  for Watchman coming Thu

## 2024-05-25 NOTE — PROGRESS NOTE ADULT - SUBJECTIVE AND OBJECTIVE BOX
CARDIOLOGY FOLLOW UP - Dr. Vega  DATE OF SERVICE: 5/25/24    CC  No cv complaints     REVIEW OF SYSTEMS:  CONSTITUTIONAL: No fever, weight loss, or fatigue  RESPIRATORY: No cough, wheezing, chills or hemoptysis; No Shortness of Breath  CARDIOVASCULAR: No chest pain, palpitations, passing out, dizziness, or leg swelling  GASTROINTESTINAL: No abdominal or epigastric pain. No nausea, vomiting, or hematemesis; No diarrhea or constipation. No melena or hematochezia.  VASCULAR: No edema     PHYSICAL EXAM:  T(C): 36.9 (05-25-24 @ 06:16), Max: 37 (05-24-24 @ 21:39)  HR: 72 (05-25-24 @ 06:16) (68 - 77)  BP: 99/59 (05-25-24 @ 06:16) (96/50 - 105/59)  RR: 18 (05-25-24 @ 06:16) (17 - 18)  SpO2: 98% (05-25-24 @ 06:16) (98% - 100%)  Wt(kg): --  I&O's Summary    24 May 2024 07:01  -  25 May 2024 07:00  --------------------------------------------------------  IN: 600 mL / OUT: 550 mL / NET: 50 mL        Appearance: Elderly male 	  Cardiovascular: Normal S1 S2,RRR, No JVD, +systolic murmur   Respiratory: Lungs clear to auscultation b/l 	  Gastrointestinal:  Soft, Non-tender, + BS	  Extremities: Normal range of motion, No clubbing, cyanosis or edema      Home Medications:  apixaban 2.5 mg oral tablet: 1 tab(s) orally every 12 hours (20 May 2024 17:19)  Crestor 40 mg oral tablet: 1 tab(s) orally once a day (at bedtime) (20 May 2024 17:19)  repaglinide 0.5 mg oral tablet: 1 tab(s) orally 3 times a day (before meals) (20 May 2024 17:19)  sodium bicarbonate 650 mg oral tablet: 1 tab(s) orally once a day (20 May 2024 17:19)  tamsulosin 0.4 mg oral capsule: 1 cap(s) orally once a day (20 May 2024 17:19)  Tresiba FlexTouch 100 units/mL subcutaneous solution: 5 unit(s) subcutaneous once a day (at bedtime) (20 May 2024 17:19)      MEDICATIONS  (STANDING):  aMIOdarone    Tablet 200 milliGRAM(s) Oral daily  apixaban 2.5 milliGRAM(s) Oral every 12 hours  atorvastatin 80 milliGRAM(s) Oral at bedtime  chlorhexidine 2% Cloths 1 Application(s) Topical daily  dextrose 10% Bolus 125 milliLiter(s) IV Bolus once  dextrose 5%. 1000 milliLiter(s) (100 mL/Hr) IV Continuous <Continuous>  dextrose 5%. 1000 milliLiter(s) (50 mL/Hr) IV Continuous <Continuous>  dextrose 50% Injectable 25 Gram(s) IV Push once  dextrose 50% Injectable 12.5 Gram(s) IV Push once  furosemide    Tablet 40 milliGRAM(s) Oral daily  glucagon  Injectable 1 milliGRAM(s) IntraMuscular once  insulin glargine Injectable (LANTUS) 5 Unit(s) SubCutaneous at bedtime  insulin lispro (ADMELOG) corrective regimen sliding scale   SubCutaneous three times a day before meals  insulin lispro (ADMELOG) corrective regimen sliding scale   SubCutaneous at bedtime  pantoprazole  Injectable 40 milliGRAM(s) IV Push every 12 hours  sodium bicarbonate 650 milliGRAM(s) Oral daily  sucralfate 1 Gram(s) Oral two times a day  tamsulosin 0.4 milliGRAM(s) Oral at bedtime      TELEMETRY: 	    ECG:  	  RADIOLOGY:   DIAGNOSTIC TESTING:  [ ] Echocardiogram:  [ ]  Catheterization:  [ ] Stress Test:    OTHER: 	    LABS:	 	                            8.5    5.49  )-----------( 101      ( 24 May 2024 05:50 )             26.2     05-24    139  |  108<H>  |  68<H>  ----------------------------<  121<H>  3.6   |  19<L>  |  3.85<H>    Ca    7.7<L>      24 May 2024 05:50  Phos  2.9     05-24  Mg     2.00     05-24    TPro  5.3<L>  /  Alb  2.4<L>  /  TBili  1.3<H>  /  DBili  x   /  AST  112<H>  /  ALT  99<H>  /  AlkPhos  158<H>  05-24

## 2024-05-25 NOTE — PROGRESS NOTE ADULT - PROBLEM SELECTOR PLAN 1
hemoglobin stable   will continue to monitor  continue to monitor  transfusion if < 8  maintain active Type and screen

## 2024-05-25 NOTE — PROGRESS NOTE ADULT - SUBJECTIVE AND OBJECTIVE BOX
Patient is a 88y old  Male who presents with a chief complaint of GI bleed (25 May 2024 07:21)      DATE OF SERVICE: 05-25-24 @ 12:12    SUBJECTIVE / OVERNIGHT EVENTS: overnight events noted    ROS:  Resp: No cough no sputum production  CVS: No chest pain no palpitations no orthopnea  GI: no N/V/D      MEDICATIONS  (STANDING):  aMIOdarone    Tablet 200 milliGRAM(s) Oral daily  apixaban 2.5 milliGRAM(s) Oral every 12 hours  atorvastatin 80 milliGRAM(s) Oral at bedtime  chlorhexidine 2% Cloths 1 Application(s) Topical daily  dextrose 10% Bolus 125 milliLiter(s) IV Bolus once  dextrose 5%. 1000 milliLiter(s) (100 mL/Hr) IV Continuous <Continuous>  dextrose 5%. 1000 milliLiter(s) (50 mL/Hr) IV Continuous <Continuous>  dextrose 50% Injectable 25 Gram(s) IV Push once  dextrose 50% Injectable 12.5 Gram(s) IV Push once  furosemide    Tablet 40 milliGRAM(s) Oral daily  glucagon  Injectable 1 milliGRAM(s) IntraMuscular once  insulin glargine Injectable (LANTUS) 5 Unit(s) SubCutaneous at bedtime  insulin lispro (ADMELOG) corrective regimen sliding scale   SubCutaneous three times a day before meals  insulin lispro (ADMELOG) corrective regimen sliding scale   SubCutaneous at bedtime  pantoprazole  Injectable 40 milliGRAM(s) IV Push every 12 hours  sodium bicarbonate 650 milliGRAM(s) Oral daily  sucralfate 1 Gram(s) Oral two times a day  tamsulosin 0.4 milliGRAM(s) Oral at bedtime    MEDICATIONS  (PRN):  acetaminophen     Tablet .. 650 milliGRAM(s) Oral every 6 hours PRN Temp greater or equal to 38C (100.4F), Mild Pain (1 - 3)  aluminum hydroxide/magnesium hydroxide/simethicone Suspension 30 milliLiter(s) Oral every 4 hours PRN Dyspepsia  dextrose Oral Gel 15 Gram(s) Oral once PRN Blood Glucose LESS THAN 70 milliGRAM(s)/deciliter  melatonin 3 milliGRAM(s) Oral at bedtime PRN Insomnia  ondansetron Injectable 4 milliGRAM(s) IV Push every 8 hours PRN Nausea and/or Vomiting        CAPILLARY BLOOD GLUCOSE      POCT Blood Glucose.: 131 mg/dL (25 May 2024 08:46)  POCT Blood Glucose.: 166 mg/dL (24 May 2024 22:06)  POCT Blood Glucose.: 246 mg/dL (24 May 2024 17:40)    I&O's Summary    24 May 2024 07:01  -  25 May 2024 07:00  --------------------------------------------------------  IN: 600 mL / OUT: 550 mL / NET: 50 mL        Vital Signs Last 24 Hrs  T(C): 36.9 (25 May 2024 06:16), Max: 37 (24 May 2024 21:39)  T(F): 98.5 (25 May 2024 06:16), Max: 98.6 (24 May 2024 21:39)  HR: 72 (25 May 2024 06:16) (68 - 77)  BP: 99/59 (25 May 2024 06:16) (96/50 - 105/59)  BP(mean): --  RR: 18 (25 May 2024 06:16) (17 - 18)  SpO2: 98% (25 May 2024 06:16) (98% - 100%)      PHYSICAL EXAM:  CHEST/LUNG: clear   HEART: S1 S2; systolic murmur +   ABDOMEN: Soft, Nontender,  EXTREMITIES:   no edema  NEUROLOGY: AO x 3 non-focal    LABS:                        8.3    4.77  )-----------( 107      ( 25 May 2024 05:36 )             25.4     05-25    140  |  108<H>  |  73<H>  ----------------------------<  120<H>  4.0   |  18<L>  |  3.66<H>    Ca    7.7<L>      25 May 2024 05:36  Phos  2.8     05-25  Mg     2.00     05-25    TPro  5.5<L>  /  Alb  2.4<L>  /  TBili  1.1  /  DBili  x   /  AST  67<H>  /  ALT  82<H>  /  AlkPhos  153<H>  05-25          Urinalysis Basic - ( 25 May 2024 05:36 )    Color: x / Appearance: x / SG: x / pH: x  Gluc: 120 mg/dL / Ketone: x  / Bili: x / Urobili: x   Blood: x / Protein: x / Nitrite: x   Leuk Esterase: x / RBC: x / WBC x   Sq Epi: x / Non Sq Epi: x / Bacteria: x          All consultant(s) notes reviewed and care discussed with other providers        Contact Number, Dr Arroyo [phone]

## 2024-05-26 LAB
BLD GP AB SCN SERPL QL: NEGATIVE — SIGNIFICANT CHANGE UP
GLUCOSE BLDC GLUCOMTR-MCNC: 125 MG/DL — HIGH (ref 70–99)
GLUCOSE BLDC GLUCOMTR-MCNC: 145 MG/DL — HIGH (ref 70–99)
GLUCOSE BLDC GLUCOMTR-MCNC: 151 MG/DL — HIGH (ref 70–99)
GLUCOSE BLDC GLUCOMTR-MCNC: 175 MG/DL — HIGH (ref 70–99)
GLUCOSE BLDC GLUCOMTR-MCNC: 192 MG/DL — HIGH (ref 70–99)
HCT VFR BLD CALC: 26.5 % — LOW (ref 39–50)
HGB BLD-MCNC: 8.5 G/DL — LOW (ref 13–17)
MCHC RBC-ENTMCNC: 31.6 PG — SIGNIFICANT CHANGE UP (ref 27–34)
MCHC RBC-ENTMCNC: 32.1 GM/DL — SIGNIFICANT CHANGE UP (ref 32–36)
MCV RBC AUTO: 98.5 FL — SIGNIFICANT CHANGE UP (ref 80–100)
NRBC # BLD: 0 /100 WBCS — SIGNIFICANT CHANGE UP (ref 0–0)
NRBC # FLD: 0 K/UL — SIGNIFICANT CHANGE UP (ref 0–0)
PLATELET # BLD AUTO: 116 K/UL — LOW (ref 150–400)
RBC # BLD: 2.69 M/UL — LOW (ref 4.2–5.8)
RBC # FLD: 17 % — HIGH (ref 10.3–14.5)
RH IG SCN BLD-IMP: POSITIVE — SIGNIFICANT CHANGE UP
WBC # BLD: 4.45 K/UL — SIGNIFICANT CHANGE UP (ref 3.8–10.5)
WBC # FLD AUTO: 4.45 K/UL — SIGNIFICANT CHANGE UP (ref 3.8–10.5)

## 2024-05-26 RX ADMIN — Medication 1: at 13:13

## 2024-05-26 RX ADMIN — Medication 1 GRAM(S): at 06:58

## 2024-05-26 RX ADMIN — Medication 650 MILLIGRAM(S): at 13:12

## 2024-05-26 RX ADMIN — APIXABAN 2.5 MILLIGRAM(S): 2.5 TABLET, FILM COATED ORAL at 06:58

## 2024-05-26 RX ADMIN — Medication 1 GRAM(S): at 18:59

## 2024-05-26 RX ADMIN — INSULIN GLARGINE 5 UNIT(S): 100 INJECTION, SOLUTION SUBCUTANEOUS at 23:01

## 2024-05-26 RX ADMIN — ATORVASTATIN CALCIUM 80 MILLIGRAM(S): 80 TABLET, FILM COATED ORAL at 23:01

## 2024-05-26 RX ADMIN — PANTOPRAZOLE SODIUM 40 MILLIGRAM(S): 20 TABLET, DELAYED RELEASE ORAL at 18:59

## 2024-05-26 RX ADMIN — APIXABAN 2.5 MILLIGRAM(S): 2.5 TABLET, FILM COATED ORAL at 18:59

## 2024-05-26 RX ADMIN — AMIODARONE HYDROCHLORIDE 200 MILLIGRAM(S): 400 TABLET ORAL at 06:58

## 2024-05-26 RX ADMIN — Medication 40 MILLIGRAM(S): at 06:59

## 2024-05-26 RX ADMIN — PANTOPRAZOLE SODIUM 40 MILLIGRAM(S): 20 TABLET, DELAYED RELEASE ORAL at 06:58

## 2024-05-26 RX ADMIN — TAMSULOSIN HYDROCHLORIDE 0.4 MILLIGRAM(S): 0.4 CAPSULE ORAL at 23:01

## 2024-05-26 RX ADMIN — CHLORHEXIDINE GLUCONATE 1 APPLICATION(S): 213 SOLUTION TOPICAL at 13:16

## 2024-05-26 NOTE — PROGRESS NOTE ADULT - SUBJECTIVE AND OBJECTIVE BOX
Patient is a 88y old  Male who presents with a chief complaint of GI bleed (26 May 2024 07:33)      DATE OF SERVICE: 05-26-24 @ 10:58    SUBJECTIVE / OVERNIGHT EVENTS: overnight events noted    ROS:  Resp: No cough no sputum production  CVS: No chest pain no palpitations no orthopnea  GI: no N/V/D      MEDICATIONS  (STANDING):  aMIOdarone    Tablet 200 milliGRAM(s) Oral daily  apixaban 2.5 milliGRAM(s) Oral every 12 hours  atorvastatin 80 milliGRAM(s) Oral at bedtime  chlorhexidine 2% Cloths 1 Application(s) Topical daily  dextrose 10% Bolus 125 milliLiter(s) IV Bolus once  dextrose 5%. 1000 milliLiter(s) (100 mL/Hr) IV Continuous <Continuous>  dextrose 5%. 1000 milliLiter(s) (50 mL/Hr) IV Continuous <Continuous>  dextrose 50% Injectable 25 Gram(s) IV Push once  dextrose 50% Injectable 12.5 Gram(s) IV Push once  furosemide    Tablet 40 milliGRAM(s) Oral daily  glucagon  Injectable 1 milliGRAM(s) IntraMuscular once  insulin glargine Injectable (LANTUS) 5 Unit(s) SubCutaneous at bedtime  insulin lispro (ADMELOG) corrective regimen sliding scale   SubCutaneous three times a day before meals  insulin lispro (ADMELOG) corrective regimen sliding scale   SubCutaneous at bedtime  pantoprazole  Injectable 40 milliGRAM(s) IV Push every 12 hours  sodium bicarbonate 650 milliGRAM(s) Oral daily  sucralfate 1 Gram(s) Oral two times a day  tamsulosin 0.4 milliGRAM(s) Oral at bedtime    MEDICATIONS  (PRN):  acetaminophen     Tablet .. 650 milliGRAM(s) Oral every 6 hours PRN Temp greater or equal to 38C (100.4F), Mild Pain (1 - 3)  aluminum hydroxide/magnesium hydroxide/simethicone Suspension 30 milliLiter(s) Oral every 4 hours PRN Dyspepsia  dextrose Oral Gel 15 Gram(s) Oral once PRN Blood Glucose LESS THAN 70 milliGRAM(s)/deciliter  melatonin 3 milliGRAM(s) Oral at bedtime PRN Insomnia  ondansetron Injectable 4 milliGRAM(s) IV Push every 8 hours PRN Nausea and/or Vomiting        CAPILLARY BLOOD GLUCOSE      POCT Blood Glucose.: 125 mg/dL (26 May 2024 08:48)  POCT Blood Glucose.: 224 mg/dL (25 May 2024 22:10)  POCT Blood Glucose.: 175 mg/dL (25 May 2024 17:42)  POCT Blood Glucose.: 161 mg/dL (25 May 2024 12:49)    I&O's Summary      Vital Signs Last 24 Hrs  T(C): 36.7 (26 May 2024 06:00), Max: 36.7 (26 May 2024 06:00)  T(F): 98 (26 May 2024 06:00), Max: 98 (26 May 2024 06:00)  HR: 64 (26 May 2024 06:00) (62 - 68)  BP: 105/48 (26 May 2024 06:00) (93/46 - 105/48)  BP(mean): --  RR: 17 (26 May 2024 06:00) (16 - 19)  SpO2: 99% (26 May 2024 06:00) (98% - 99%)      PHYSICAL EXAM:  CHEST/LUNG: clear   HEART: S1 S2; systolic murmur +   ABDOMEN: Soft, Nontender,  EXTREMITIES:   no edema  NEUROLOGY: AO x 3 non-focal    LABS:                        8.5    4.45  )-----------( 116      ( 26 May 2024 06:23 )             26.5     05-25    140  |  108<H>  |  73<H>  ----------------------------<  120<H>  4.0   |  18<L>  |  3.66<H>    Ca    7.7<L>      25 May 2024 05:36  Phos  2.8     05-25  Mg     2.00     05-25    TPro  5.5<L>  /  Alb  2.4<L>  /  TBili  1.1  /  DBili  x   /  AST  67<H>  /  ALT  82<H>  /  AlkPhos  153<H>  05-25          Urinalysis Basic - ( 25 May 2024 05:36 )    Color: x / Appearance: x / SG: x / pH: x  Gluc: 120 mg/dL / Ketone: x  / Bili: x / Urobili: x   Blood: x / Protein: x / Nitrite: x   Leuk Esterase: x / RBC: x / WBC x   Sq Epi: x / Non Sq Epi: x / Bacteria: x          All consultant(s) notes reviewed and care discussed with other providers        Contact Number, Dr Aroryo [phone]

## 2024-05-26 NOTE — PROGRESS NOTE ADULT - SUBJECTIVE AND OBJECTIVE BOX
CARDIOLOGY FOLLOW UP - Dr. Vega  DATE OF SERVICE: 5/26/24    CC  No cv complaints     REVIEW OF SYSTEMS:  CONSTITUTIONAL: No fever, weight loss, or fatigue  RESPIRATORY: No cough, wheezing, chills or hemoptysis; No Shortness of Breath  CARDIOVASCULAR: No chest pain, palpitations, passing out, dizziness, or leg swelling  GASTROINTESTINAL: No abdominal or epigastric pain. No nausea, vomiting, or hematemesis; No diarrhea or constipation. No melena or hematochezia.  VASCULAR: No edema     PHYSICAL EXAM:  T(C): 36.7 (05-26-24 @ 06:00), Max: 36.7 (05-26-24 @ 06:00)  HR: 64 (05-26-24 @ 06:00) (62 - 68)  BP: 105/48 (05-26-24 @ 06:00) (93/46 - 105/48)  RR: 17 (05-26-24 @ 06:00) (16 - 19)  SpO2: 99% (05-26-24 @ 06:00) (98% - 99%)  Wt(kg): --  I&O's Summary      Appearance: Elderly male 	  Cardiovascular: Normal S1 S2,RRR, No JVD, +systolic murmur   Respiratory: Lungs clear to auscultation b/l   Gastrointestinal:  Soft, Non-tender, + BS	  Extremities: Normal range of motion, No clubbing, cyanosis or edema      Home Medications:  apixaban 2.5 mg oral tablet: 1 tab(s) orally every 12 hours (20 May 2024 17:19)  Crestor 40 mg oral tablet: 1 tab(s) orally once a day (at bedtime) (20 May 2024 17:19)  repaglinide 0.5 mg oral tablet: 1 tab(s) orally 3 times a day (before meals) (20 May 2024 17:19)  sodium bicarbonate 650 mg oral tablet: 1 tab(s) orally once a day (20 May 2024 17:19)  tamsulosin 0.4 mg oral capsule: 1 cap(s) orally once a day (20 May 2024 17:19)  Tresiba FlexTouch 100 units/mL subcutaneous solution: 5 unit(s) subcutaneous once a day (at bedtime) (20 May 2024 17:19)      MEDICATIONS  (STANDING):  aMIOdarone    Tablet 200 milliGRAM(s) Oral daily  apixaban 2.5 milliGRAM(s) Oral every 12 hours  atorvastatin 80 milliGRAM(s) Oral at bedtime  chlorhexidine 2% Cloths 1 Application(s) Topical daily  dextrose 10% Bolus 125 milliLiter(s) IV Bolus once  dextrose 5%. 1000 milliLiter(s) (100 mL/Hr) IV Continuous <Continuous>  dextrose 5%. 1000 milliLiter(s) (50 mL/Hr) IV Continuous <Continuous>  dextrose 50% Injectable 25 Gram(s) IV Push once  dextrose 50% Injectable 12.5 Gram(s) IV Push once  furosemide    Tablet 40 milliGRAM(s) Oral daily  glucagon  Injectable 1 milliGRAM(s) IntraMuscular once  insulin glargine Injectable (LANTUS) 5 Unit(s) SubCutaneous at bedtime  insulin lispro (ADMELOG) corrective regimen sliding scale   SubCutaneous three times a day before meals  insulin lispro (ADMELOG) corrective regimen sliding scale   SubCutaneous at bedtime  pantoprazole  Injectable 40 milliGRAM(s) IV Push every 12 hours  sodium bicarbonate 650 milliGRAM(s) Oral daily  sucralfate 1 Gram(s) Oral two times a day  tamsulosin 0.4 milliGRAM(s) Oral at bedtime      TELEMETRY: 	    ECG:  	  RADIOLOGY:   DIAGNOSTIC TESTING:  [ ] Echocardiogram:  [ ]  Catheterization:  [ ] Stress Test:    OTHER: 	    LABS:	 	                            8.5    4.45  )-----------( 116      ( 26 May 2024 06:23 )             26.5     05-25    140  |  108<H>  |  73<H>  ----------------------------<  120<H>  4.0   |  18<L>  |  3.66<H>    Ca    7.7<L>      25 May 2024 05:36  Phos  2.8     05-25  Mg     2.00     05-25    TPro  5.5<L>  /  Alb  2.4<L>  /  TBili  1.1  /  DBili  x   /  AST  67<H>  /  ALT  82<H>  /  AlkPhos  153<H>  05-25

## 2024-05-27 LAB
ALBUMIN SERPL ELPH-MCNC: 2.5 G/DL — LOW (ref 3.3–5)
ALP SERPL-CCNC: 141 U/L — HIGH (ref 40–120)
ALT FLD-CCNC: 60 U/L — HIGH (ref 4–41)
ANION GAP SERPL CALC-SCNC: 12 MMOL/L — SIGNIFICANT CHANGE UP (ref 7–14)
AST SERPL-CCNC: 43 U/L — HIGH (ref 4–40)
BILIRUB SERPL-MCNC: 1.1 MG/DL — SIGNIFICANT CHANGE UP (ref 0.2–1.2)
BUN SERPL-MCNC: 65 MG/DL — HIGH (ref 7–23)
CALCIUM SERPL-MCNC: 8 MG/DL — LOW (ref 8.4–10.5)
CHLORIDE SERPL-SCNC: 108 MMOL/L — HIGH (ref 98–107)
CO2 SERPL-SCNC: 20 MMOL/L — LOW (ref 22–31)
CREAT SERPL-MCNC: 3.31 MG/DL — HIGH (ref 0.5–1.3)
EGFR: 17 ML/MIN/1.73M2 — LOW
GLUCOSE BLDC GLUCOMTR-MCNC: 126 MG/DL — HIGH (ref 70–99)
GLUCOSE BLDC GLUCOMTR-MCNC: 129 MG/DL — HIGH (ref 70–99)
GLUCOSE BLDC GLUCOMTR-MCNC: 236 MG/DL — HIGH (ref 70–99)
GLUCOSE BLDC GLUCOMTR-MCNC: 248 MG/DL — HIGH (ref 70–99)
GLUCOSE SERPL-MCNC: 131 MG/DL — HIGH (ref 70–99)
HCT VFR BLD CALC: 25.2 % — LOW (ref 39–50)
HGB BLD-MCNC: 8.5 G/DL — LOW (ref 13–17)
MCHC RBC-ENTMCNC: 32.2 PG — SIGNIFICANT CHANGE UP (ref 27–34)
MCHC RBC-ENTMCNC: 33.7 GM/DL — SIGNIFICANT CHANGE UP (ref 32–36)
MCV RBC AUTO: 95.5 FL — SIGNIFICANT CHANGE UP (ref 80–100)
NRBC # BLD: 0 /100 WBCS — SIGNIFICANT CHANGE UP (ref 0–0)
NRBC # FLD: 0 K/UL — SIGNIFICANT CHANGE UP (ref 0–0)
PLATELET # BLD AUTO: 116 K/UL — LOW (ref 150–400)
POTASSIUM SERPL-MCNC: 3.8 MMOL/L — SIGNIFICANT CHANGE UP (ref 3.5–5.3)
POTASSIUM SERPL-SCNC: 3.8 MMOL/L — SIGNIFICANT CHANGE UP (ref 3.5–5.3)
PROT SERPL-MCNC: 5.6 G/DL — LOW (ref 6–8.3)
RBC # BLD: 2.64 M/UL — LOW (ref 4.2–5.8)
RBC # FLD: 16.5 % — HIGH (ref 10.3–14.5)
SODIUM SERPL-SCNC: 140 MMOL/L — SIGNIFICANT CHANGE UP (ref 135–145)
WBC # BLD: 5.13 K/UL — SIGNIFICANT CHANGE UP (ref 3.8–10.5)
WBC # FLD AUTO: 5.13 K/UL — SIGNIFICANT CHANGE UP (ref 3.8–10.5)

## 2024-05-27 RX ADMIN — TAMSULOSIN HYDROCHLORIDE 0.4 MILLIGRAM(S): 0.4 CAPSULE ORAL at 22:49

## 2024-05-27 RX ADMIN — Medication 650 MILLIGRAM(S): at 17:40

## 2024-05-27 RX ADMIN — PANTOPRAZOLE SODIUM 40 MILLIGRAM(S): 20 TABLET, DELAYED RELEASE ORAL at 06:10

## 2024-05-27 RX ADMIN — INSULIN GLARGINE 5 UNIT(S): 100 INJECTION, SOLUTION SUBCUTANEOUS at 22:49

## 2024-05-27 RX ADMIN — ATORVASTATIN CALCIUM 80 MILLIGRAM(S): 80 TABLET, FILM COATED ORAL at 22:49

## 2024-05-27 RX ADMIN — Medication 1 GRAM(S): at 17:41

## 2024-05-27 RX ADMIN — APIXABAN 2.5 MILLIGRAM(S): 2.5 TABLET, FILM COATED ORAL at 06:10

## 2024-05-27 RX ADMIN — CHLORHEXIDINE GLUCONATE 1 APPLICATION(S): 213 SOLUTION TOPICAL at 12:41

## 2024-05-27 RX ADMIN — Medication 2: at 18:33

## 2024-05-27 RX ADMIN — Medication 2: at 12:37

## 2024-05-27 RX ADMIN — APIXABAN 2.5 MILLIGRAM(S): 2.5 TABLET, FILM COATED ORAL at 17:41

## 2024-05-27 RX ADMIN — Medication 200 MILLIGRAM(S): at 15:15

## 2024-05-27 RX ADMIN — AMIODARONE HYDROCHLORIDE 200 MILLIGRAM(S): 400 TABLET ORAL at 06:09

## 2024-05-27 RX ADMIN — PANTOPRAZOLE SODIUM 40 MILLIGRAM(S): 20 TABLET, DELAYED RELEASE ORAL at 17:41

## 2024-05-27 RX ADMIN — Medication 40 MILLIGRAM(S): at 06:10

## 2024-05-27 RX ADMIN — Medication 1 GRAM(S): at 06:09

## 2024-05-27 NOTE — PROGRESS NOTE ADULT - SUBJECTIVE AND OBJECTIVE BOX
Patient is a 88y old  Male who presents with a chief complaint of GI bleed (27 May 2024 07:28)      DATE OF SERVICE: 05-27-24 @ 14:14    SUBJECTIVE / OVERNIGHT EVENTS: overnight events noted    ROS:  Resp: No cough no sputum production  CVS: No chest pain no palpitations no orthopnea  GI: no N/V/D        MEDICATIONS  (STANDING):  aMIOdarone    Tablet 200 milliGRAM(s) Oral daily  apixaban 2.5 milliGRAM(s) Oral every 12 hours  atorvastatin 80 milliGRAM(s) Oral at bedtime  chlorhexidine 2% Cloths 1 Application(s) Topical daily  dextrose 10% Bolus 125 milliLiter(s) IV Bolus once  dextrose 5%. 1000 milliLiter(s) (100 mL/Hr) IV Continuous <Continuous>  dextrose 5%. 1000 milliLiter(s) (50 mL/Hr) IV Continuous <Continuous>  dextrose 50% Injectable 25 Gram(s) IV Push once  dextrose 50% Injectable 12.5 Gram(s) IV Push once  furosemide    Tablet 40 milliGRAM(s) Oral daily  glucagon  Injectable 1 milliGRAM(s) IntraMuscular once  insulin glargine Injectable (LANTUS) 5 Unit(s) SubCutaneous at bedtime  insulin lispro (ADMELOG) corrective regimen sliding scale   SubCutaneous three times a day before meals  insulin lispro (ADMELOG) corrective regimen sliding scale   SubCutaneous at bedtime  pantoprazole  Injectable 40 milliGRAM(s) IV Push every 12 hours  sodium bicarbonate 650 milliGRAM(s) Oral daily  sucralfate 1 Gram(s) Oral two times a day  tamsulosin 0.4 milliGRAM(s) Oral at bedtime    MEDICATIONS  (PRN):  acetaminophen     Tablet .. 650 milliGRAM(s) Oral every 6 hours PRN Temp greater or equal to 38C (100.4F), Mild Pain (1 - 3)  aluminum hydroxide/magnesium hydroxide/simethicone Suspension 30 milliLiter(s) Oral every 4 hours PRN Dyspepsia  dextrose Oral Gel 15 Gram(s) Oral once PRN Blood Glucose LESS THAN 70 milliGRAM(s)/deciliter  melatonin 3 milliGRAM(s) Oral at bedtime PRN Insomnia  ondansetron Injectable 4 milliGRAM(s) IV Push every 8 hours PRN Nausea and/or Vomiting        CAPILLARY BLOOD GLUCOSE      POCT Blood Glucose.: 236 mg/dL (27 May 2024 12:11)  POCT Blood Glucose.: 129 mg/dL (27 May 2024 08:24)  POCT Blood Glucose.: 151 mg/dL (26 May 2024 22:23)  POCT Blood Glucose.: 145 mg/dL (26 May 2024 19:02)  POCT Blood Glucose.: 192 mg/dL (26 May 2024 17:51)    I&O's Summary      Vital Signs Last 24 Hrs  T(C): 36.4 (27 May 2024 12:54), Max: 36.9 (26 May 2024 21:59)  T(F): 97.6 (27 May 2024 12:54), Max: 98.5 (26 May 2024 21:59)  HR: 69 (27 May 2024 12:54) (69 - 78)  BP: 105/65 (27 May 2024 12:54) (101/50 - 115/60)  BP(mean): --  RR: 19 (27 May 2024 12:54) (18 - 19)  SpO2: 100% (27 May 2024 12:54) (97% - 100%)    PHYSICAL EXAM:  CHEST/LUNG: clear   HEART: S1 S2; systolic murmur +   ABDOMEN: Soft, Nontender,  EXTREMITIES:   no edema  NEUROLOGY: AO x 3 non-focal    LABS:                        8.5    5.13  )-----------( 116      ( 27 May 2024 06:55 )             25.2     05-27    140  |  108<H>  |  65<H>  ----------------------------<  131<H>  3.8   |  20<L>  |  3.31<H>    Ca    8.0<L>      27 May 2024 06:55    TPro  5.6<L>  /  Alb  2.5<L>  /  TBili  1.1  /  DBili  x   /  AST  43<H>  /  ALT  60<H>  /  AlkPhos  141<H>  05-27          Urinalysis Basic - ( 27 May 2024 06:55 )    Color: x / Appearance: x / SG: x / pH: x  Gluc: 131 mg/dL / Ketone: x  / Bili: x / Urobili: x   Blood: x / Protein: x / Nitrite: x   Leuk Esterase: x / RBC: x / WBC x   Sq Epi: x / Non Sq Epi: x / Bacteria: x          All consultant(s) notes reviewed and care discussed with other providers        Contact Number, Dr Arroyo [phone]

## 2024-05-27 NOTE — PROGRESS NOTE ADULT - SUBJECTIVE AND OBJECTIVE BOX
CARDIOLOGY FOLLOW UP - Dr. Vega  DATE OF SERVICE: 5/27/24    CC  No cv complaints     REVIEW OF SYSTEMS:  CONSTITUTIONAL: No fever, weight loss, or fatigue  RESPIRATORY: No cough, wheezing, chills or hemoptysis; No Shortness of Breath  CARDIOVASCULAR: No chest pain, palpitations, passing out, dizziness, or leg swelling  GASTROINTESTINAL: No abdominal or epigastric pain. No nausea, vomiting, or hematemesis; No diarrhea or constipation. No melena or hematochezia.  VASCULAR: No edema     PHYSICAL EXAM:  T(C): 36.6 (05-27-24 @ 06:05), Max: 36.9 (05-26-24 @ 21:59)  HR: 78 (05-27-24 @ 06:05) (61 - 78)  BP: 101/50 (05-27-24 @ 06:05) (101/50 - 115/60)  RR: 18 (05-27-24 @ 06:05) (18 - 18)  SpO2: 98% (05-27-24 @ 06:05) (97% - 100%)  Wt(kg): --  I&O's Summary      Appearance: Elderly male 	  Cardiovascular: Normal S1 S2,RRR, No JVD, No murmurs  Respiratory: Lungs clear to auscultation b/l	  Gastrointestinal:  Soft, Non-tender, + BS	  Extremities: Normal range of motion, No clubbing, cyanosis or edema      Home Medications:  apixaban 2.5 mg oral tablet: 1 tab(s) orally every 12 hours (20 May 2024 17:19)  Crestor 40 mg oral tablet: 1 tab(s) orally once a day (at bedtime) (20 May 2024 17:19)  repaglinide 0.5 mg oral tablet: 1 tab(s) orally 3 times a day (before meals) (20 May 2024 17:19)  sodium bicarbonate 650 mg oral tablet: 1 tab(s) orally once a day (20 May 2024 17:19)  tamsulosin 0.4 mg oral capsule: 1 cap(s) orally once a day (20 May 2024 17:19)  Tresiba FlexTouch 100 units/mL subcutaneous solution: 5 unit(s) subcutaneous once a day (at bedtime) (20 May 2024 17:19)      MEDICATIONS  (STANDING):  aMIOdarone    Tablet 200 milliGRAM(s) Oral daily  apixaban 2.5 milliGRAM(s) Oral every 12 hours  atorvastatin 80 milliGRAM(s) Oral at bedtime  chlorhexidine 2% Cloths 1 Application(s) Topical daily  dextrose 10% Bolus 125 milliLiter(s) IV Bolus once  dextrose 5%. 1000 milliLiter(s) (100 mL/Hr) IV Continuous <Continuous>  dextrose 5%. 1000 milliLiter(s) (50 mL/Hr) IV Continuous <Continuous>  dextrose 50% Injectable 25 Gram(s) IV Push once  dextrose 50% Injectable 12.5 Gram(s) IV Push once  furosemide    Tablet 40 milliGRAM(s) Oral daily  glucagon  Injectable 1 milliGRAM(s) IntraMuscular once  insulin glargine Injectable (LANTUS) 5 Unit(s) SubCutaneous at bedtime  insulin lispro (ADMELOG) corrective regimen sliding scale   SubCutaneous three times a day before meals  insulin lispro (ADMELOG) corrective regimen sliding scale   SubCutaneous at bedtime  pantoprazole  Injectable 40 milliGRAM(s) IV Push every 12 hours  sodium bicarbonate 650 milliGRAM(s) Oral daily  sucralfate 1 Gram(s) Oral two times a day  tamsulosin 0.4 milliGRAM(s) Oral at bedtime      TELEMETRY: afib	    ECG:  	  RADIOLOGY:   DIAGNOSTIC TESTING:  [ ] Echocardiogram:  [ ]  Catheterization:  [ ] Stress Test:    OTHER: 	    LABS:	 	                            8.5    4.45  )-----------( 116      ( 26 May 2024 06:23 )             26.5

## 2024-05-28 LAB
ALBUMIN SERPL ELPH-MCNC: 3.6 G/DL — SIGNIFICANT CHANGE UP (ref 3.3–5)
ALP SERPL-CCNC: 372 U/L — HIGH (ref 40–120)
ALT FLD-CCNC: 60 U/L — HIGH (ref 4–41)
ANION GAP SERPL CALC-SCNC: 14 MMOL/L — SIGNIFICANT CHANGE UP (ref 7–14)
AST SERPL-CCNC: 63 U/L — HIGH (ref 4–40)
BILIRUB SERPL-MCNC: 3.5 MG/DL — HIGH (ref 0.2–1.2)
BUN SERPL-MCNC: 38 MG/DL — HIGH (ref 7–23)
CALCIUM SERPL-MCNC: 10.3 MG/DL — SIGNIFICANT CHANGE UP (ref 8.4–10.5)
CHLORIDE SERPL-SCNC: 94 MMOL/L — LOW (ref 98–107)
CO2 SERPL-SCNC: 24 MMOL/L — SIGNIFICANT CHANGE UP (ref 22–31)
CREAT SERPL-MCNC: 2.47 MG/DL — HIGH (ref 0.5–1.3)
EGFR: 24 ML/MIN/1.73M2 — LOW
GLUCOSE BLDC GLUCOMTR-MCNC: 116 MG/DL — HIGH (ref 70–99)
GLUCOSE BLDC GLUCOMTR-MCNC: 143 MG/DL — HIGH (ref 70–99)
GLUCOSE BLDC GLUCOMTR-MCNC: 213 MG/DL — HIGH (ref 70–99)
GLUCOSE BLDC GLUCOMTR-MCNC: 260 MG/DL — HIGH (ref 70–99)
GLUCOSE BLDC GLUCOMTR-MCNC: 265 MG/DL — HIGH (ref 70–99)
GLUCOSE SERPL-MCNC: 142 MG/DL — HIGH (ref 70–99)
HCT VFR BLD CALC: 24.1 % — LOW (ref 39–50)
HGB BLD-MCNC: 8 G/DL — LOW (ref 13–17)
MAGNESIUM SERPL-MCNC: 2.5 MG/DL — SIGNIFICANT CHANGE UP (ref 1.6–2.6)
MCHC RBC-ENTMCNC: 32.1 PG — SIGNIFICANT CHANGE UP (ref 27–34)
MCHC RBC-ENTMCNC: 33.2 GM/DL — SIGNIFICANT CHANGE UP (ref 32–36)
MCV RBC AUTO: 96.8 FL — SIGNIFICANT CHANGE UP (ref 80–100)
NRBC # BLD: 0 /100 WBCS — SIGNIFICANT CHANGE UP (ref 0–0)
NRBC # FLD: 0 K/UL — SIGNIFICANT CHANGE UP (ref 0–0)
NT-PROBNP SERPL-SCNC: 143 PG/ML — SIGNIFICANT CHANGE UP
PHOSPHATE SERPL-MCNC: 3.7 MG/DL — SIGNIFICANT CHANGE UP (ref 2.5–4.5)
PLATELET # BLD AUTO: 97 K/UL — LOW (ref 150–400)
POTASSIUM SERPL-MCNC: 4.1 MMOL/L — SIGNIFICANT CHANGE UP (ref 3.5–5.3)
POTASSIUM SERPL-SCNC: 4.1 MMOL/L — SIGNIFICANT CHANGE UP (ref 3.5–5.3)
PROT SERPL-MCNC: 8 G/DL — SIGNIFICANT CHANGE UP (ref 6–8.3)
RBC # BLD: 2.49 M/UL — LOW (ref 4.2–5.8)
RBC # FLD: 17.2 % — HIGH (ref 10.3–14.5)
SODIUM SERPL-SCNC: 132 MMOL/L — LOW (ref 135–145)
WBC # BLD: 5.19 K/UL — SIGNIFICANT CHANGE UP (ref 3.8–10.5)
WBC # FLD AUTO: 5.19 K/UL — SIGNIFICANT CHANGE UP (ref 3.8–10.5)

## 2024-05-28 PROCEDURE — 71250 CT THORAX DX C-: CPT | Mod: 26

## 2024-05-28 PROCEDURE — 71045 X-RAY EXAM CHEST 1 VIEW: CPT | Mod: 26

## 2024-05-28 RX ORDER — IPRATROPIUM/ALBUTEROL SULFATE 18-103MCG
3 AEROSOL WITH ADAPTER (GRAM) INHALATION EVERY 6 HOURS
Refills: 0 | Status: DISCONTINUED | OUTPATIENT
Start: 2024-05-28 | End: 2024-05-31

## 2024-05-28 RX ORDER — FUROSEMIDE 40 MG
20 TABLET ORAL ONCE
Refills: 0 | Status: COMPLETED | OUTPATIENT
Start: 2024-05-28 | End: 2024-05-28

## 2024-05-28 RX ADMIN — PANTOPRAZOLE SODIUM 40 MILLIGRAM(S): 20 TABLET, DELAYED RELEASE ORAL at 05:42

## 2024-05-28 RX ADMIN — TAMSULOSIN HYDROCHLORIDE 0.4 MILLIGRAM(S): 0.4 CAPSULE ORAL at 22:01

## 2024-05-28 RX ADMIN — Medication 1 GRAM(S): at 18:05

## 2024-05-28 RX ADMIN — INSULIN GLARGINE 5 UNIT(S): 100 INJECTION, SOLUTION SUBCUTANEOUS at 22:02

## 2024-05-28 RX ADMIN — APIXABAN 2.5 MILLIGRAM(S): 2.5 TABLET, FILM COATED ORAL at 18:05

## 2024-05-28 RX ADMIN — ATORVASTATIN CALCIUM 80 MILLIGRAM(S): 80 TABLET, FILM COATED ORAL at 22:01

## 2024-05-28 RX ADMIN — Medication 650 MILLIGRAM(S): at 12:52

## 2024-05-28 RX ADMIN — Medication 100 MILLIGRAM(S): at 22:02

## 2024-05-28 RX ADMIN — APIXABAN 2.5 MILLIGRAM(S): 2.5 TABLET, FILM COATED ORAL at 05:42

## 2024-05-28 RX ADMIN — Medication 3 MILLILITER(S): at 22:56

## 2024-05-28 RX ADMIN — PANTOPRAZOLE SODIUM 40 MILLIGRAM(S): 20 TABLET, DELAYED RELEASE ORAL at 18:05

## 2024-05-28 RX ADMIN — CHLORHEXIDINE GLUCONATE 1 APPLICATION(S): 213 SOLUTION TOPICAL at 12:46

## 2024-05-28 RX ADMIN — Medication 2: at 12:53

## 2024-05-28 RX ADMIN — Medication 3: at 18:04

## 2024-05-28 RX ADMIN — Medication 1 GRAM(S): at 05:42

## 2024-05-28 RX ADMIN — Medication 100 MILLIGRAM(S): at 14:17

## 2024-05-28 RX ADMIN — AMIODARONE HYDROCHLORIDE 200 MILLIGRAM(S): 400 TABLET ORAL at 06:14

## 2024-05-28 RX ADMIN — Medication 20 MILLIGRAM(S): at 16:16

## 2024-05-28 NOTE — PROGRESS NOTE ADULT - CONVERSATION DETAILS
detailed discussed with above re all of above options  quite clear in and requesting that intubation and/or chest compression be withheld     does want other non-heroic measures including IVF, antibiotics etc  will decide re continuing apixaban or not this admission     [facility]  chart    already has DNR ordered in [place]    30 minutes for advanced care planning discussion separate and in addition to the E&M service provided

## 2024-05-28 NOTE — CONSULT NOTE ADULT - SUBJECTIVE AND OBJECTIVE BOX
05-28-24 @ 11:37    Patient is a 88y old  Male who presents with a chief complaint of GI bleed (28 May 2024 10:24)      HPI:  88yr old male with a pmh of HTN, HFrEF, T2DM on insulin, TIA, CAD s/p stent, bladder cancer s/p chemo, afib on eliquis, hx of left nephrectomy who presents to the ED for a three day history of black stools.  Denies NSAIDs use.   Pt with admission 5/3-5/10 for acute decompensated heart failure and GIB.   Denies  headache, dizziness, chest pain, palpitations, SOB, abdominal pain, joint pain, diarrhea/constipation, urinary symptoms.   Vitals: T 98.2F, HR 72, /51, RR 18 satting 100% RA (20 May 2024 16:26):  now pulm called for increasing cough : and has mucus production ;  no fever:  he is an ex smoker;       ?FOLLOWING PRESENT  [ x] Hx of PE/DVT, [x ] Hx COPD, [x ] Hx of Asthma, [ y] Hx of Hospitalization, [ x]  Hx of BiPAP/CPAP use, [x ] Hx of VIC    Allergies    penicillin (Rash)    Intolerances    Cipro (Joint Pain)      PAST MEDICAL & SURGICAL HISTORY:  CAD (coronary artery disease)  (4 stents,)      Single kidney  (hx/o LEFT nephrectomy at age 16; pt states due to a ureter"blockage" causing "infection"; pt denies hx/o renal dysfunction)      Arthritis      Myocardial infarction  (2003)      Hard of hearing      Atrial fibrillation      Hypertension      Hyperlipidemia      History of TIAs  Last 2018      History of bradycardia      Bladder cancer      Left carotid bruit      T2DM (type 2 diabetes mellitus)      HFrEF (heart failure with reduced ejection fraction)      GAVE (gastric antral vascular ectasia)      History of nephrectomy, unilateral  (hx/o LEFT nephrectomy at age 16)      Stented coronary artery  (4 stents)      S/P bilateral cataract extraction      H/O cystoscopy          FAMILY HISTORY:  Family history of diabetes mellitus in mother (Mother)  (Pt states his mother developed DM in her 70's)    FH: bladder cancer (Sibling)        Social History: [ 25 pk years] TOBACCO                  [x  ] ETOH                                 [x  ] IVDA/DRUGS    REVIEW OF SYSTEMS      General:	x    Skin/Breast:x  	  Ophthalmologic:x  	  ENMT:	x    Respiratory and Thorax:  cough with phlegm    	  Cardiovascular:	x    Gastrointestinal:	x    Genitourinary:	dark stools    Musculoskeletal:	x    Neurological:	x    Psychiatric:x	    Hematology/Lymphatics:	x    Endocrine:	xx    Allergic/Immunologic:	x    MEDICATIONS  (STANDING):  aMIOdarone    Tablet 200 milliGRAM(s) Oral daily  apixaban 2.5 milliGRAM(s) Oral every 12 hours  atorvastatin 80 milliGRAM(s) Oral at bedtime  chlorhexidine 2% Cloths 1 Application(s) Topical daily  dextrose 10% Bolus 125 milliLiter(s) IV Bolus once  dextrose 5%. 1000 milliLiter(s) (100 mL/Hr) IV Continuous <Continuous>  dextrose 5%. 1000 milliLiter(s) (50 mL/Hr) IV Continuous <Continuous>  dextrose 50% Injectable 25 Gram(s) IV Push once  dextrose 50% Injectable 12.5 Gram(s) IV Push once  furosemide    Tablet 40 milliGRAM(s) Oral daily  glucagon  Injectable 1 milliGRAM(s) IntraMuscular once  insulin glargine Injectable (LANTUS) 5 Unit(s) SubCutaneous at bedtime  insulin lispro (ADMELOG) corrective regimen sliding scale   SubCutaneous three times a day before meals  insulin lispro (ADMELOG) corrective regimen sliding scale   SubCutaneous at bedtime  pantoprazole  Injectable 40 milliGRAM(s) IV Push every 12 hours  sodium bicarbonate 650 milliGRAM(s) Oral daily  sucralfate 1 Gram(s) Oral two times a day  tamsulosin 0.4 milliGRAM(s) Oral at bedtime    MEDICATIONS  (PRN):  acetaminophen     Tablet .. 650 milliGRAM(s) Oral every 6 hours PRN Temp greater or equal to 38C (100.4F), Mild Pain (1 - 3)  aluminum hydroxide/magnesium hydroxide/simethicone Suspension 30 milliLiter(s) Oral every 4 hours PRN Dyspepsia  dextrose Oral Gel 15 Gram(s) Oral once PRN Blood Glucose LESS THAN 70 milliGRAM(s)/deciliter  guaiFENesin Oral Liquid (Sugar-Free) 200 milliGRAM(s) Oral every 6 hours PRN Cough  melatonin 3 milliGRAM(s) Oral at bedtime PRN Insomnia  ondansetron Injectable 4 milliGRAM(s) IV Push every 8 hours PRN Nausea and/or Vomiting       Vital Signs Last 24 Hrs  T(C): 36.7 (28 May 2024 05:40), Max: 36.7 (27 May 2024 20:35)  T(F): 98.1 (28 May 2024 05:40), Max: 98.1 (27 May 2024 20:35)  HR: 72 (28 May 2024 05:40) (69 - 80)  BP: 99/49 (28 May 2024 05:40) (99/49 - 118/50)  BP(mean): --  RR: 18 (28 May 2024 05:40) (17 - 19)  SpO2: 98% (28 May 2024 05:40) (98% - 100%)    Parameters below as of 28 May 2024 05:40  Patient On (Oxygen Delivery Method): room air    Orthostatic VS          I&O's Summary      Physical Exam:   GENERAL: NAD, well-groomed, well-developed  HEENT: RAQUEL/   Atraumatic, Normocephalic  ENMT: No tonsillar erythema, exudates, or enlargement; Moist mucous membranes, Good dentition, No lesions  NECK: Supple, No JVD, Normal thyroid  CHEST/LUNG: mild coarse rhonchi +  CVS: Regular rate and rhythm; No murmurs, rubs, or gallops  GI: : Soft, Nontender, Nondistended; Bowel sounds present  NERVOUS SYSTEM:  Alert & Oriented X3  EXTREMITIES:  -edema  LYMPH: No lymphadenopathy noted  SKIN: No rashes or lesions  ENDOCRINOLOGY: No Thyromegaly  PSYCH: Appropriate    Labs:                              8.0    5.19  )-----------( 97       ( 28 May 2024 05:33 )             24.1                         8.5    5.13  )-----------( 116      ( 27 May 2024 06:55 )             25.2                         8.5    4.45  )-----------( 116      ( 26 May 2024 06:23 )             26.5                         8.3    4.77  )-----------( 107      ( 25 May 2024 05:36 )             25.4     05-27    140  |  108<H>  |  65<H>  ----------------------------<  131<H>  3.8   |  20<L>  |  3.31<H>  05-25    140  |  108<H>  |  73<H>  ----------------------------<  120<H>  4.0   |  18<L>  |  3.66<H>    Ca    8.0<L>      27 May 2024 06:55  Phos  3.7     05-28  Mg     2.50     05-28    TPro  5.6<L>  /  Alb  2.5<L>  /  TBili  1.1  /  DBili  x   /  AST  43<H>  /  ALT  60<H>  /  AlkPhos  141<H>  05-27  TPro  5.5<L>  /  Alb  2.4<L>  /  TBili  1.1  /  DBili  x   /  AST  67<H>  /  ALT  82<H>  /  AlkPhos  153<H>  05-25    CAPILLARY BLOOD GLUCOSE      POCT Blood Glucose.: 116 mg/dL (28 May 2024 08:28)  POCT Blood Glucose.: 126 mg/dL (27 May 2024 22:29)  POCT Blood Glucose.: 248 mg/dL (27 May 2024 17:53)  POCT Blood Glucose.: 236 mg/dL (27 May 2024 12:11)    LIVER FUNCTIONS - ( 27 May 2024 06:55 )  Alb: 2.5 g/dL / Pro: 5.6 g/dL / ALK PHOS: 141 U/L / ALT: 60 U/L / AST: 43 U/L / GGT: x             Urinalysis Basic - ( 27 May 2024 06:55 )    Color: x / Appearance: x / SG: x / pH: x  Gluc: 131 mg/dL / Ketone: x  / Bili: x / Urobili: x   Blood: x / Protein: x / Nitrite: x   Leuk Esterase: x / RBC: x / WBC x   Sq Epi: x / Non Sq Epi: x / Bacteria: x      D DImer      Studies  Chest X-RAY  CT SCAN Chest   CT Abdomen  Venous Dopplers: LE:   Others      rad< from: Xray Chest 2 Views PA/Lat (05.05.24 @ 11:33) >  ACC: [id number] EXAM:  XR CHEST PA LAT 2V   ORDERED BY: CAROLINA MORRIS     PROCEDURE DATE:  05/05/2024          INTERPRETATION:  EXAMINATION: XR CHEST PA AND LATERAL    CLINICAL INDICATION: Shortness of breath    TECHNIQUE: 2 views; Frontal and lateral views of the chest were obtained.    COMPARISON: Chest x-ray 4/20/2024.    FINDINGS:    The heart is normal in size.  Small bilateral pleural effusions with adjacent atelectasis.  There is no pneumothorax.  No acute bony abnormality.    IMPRESSION:  Small bilateral pleural effusions with adjacent atelectasis.    --- End of Report ---          BRENDA MCCAULEY DO; Resident Radiologist  This document has been electronically signed.  ERROL RAGSDALE MD; Attending Radiologist  This document has been electronically signed. May  5 2024  2:35PM    < end of copied text >  < from: Xray Chest 1 View AP/PA (04.20.24 @ 12:48) >    ACC: [id number] EXAM:  XR CHEST AP OR PA 1V   ORDERED BY: MANJINDER JACINTO     PROCEDURE DATE:  04/20/2024          INTERPRETATION:  CLINICAL INFORMATION: Chest pain    TECHNIQUE: Frontal radiograph of the chest    COMPARISON: Chest radiograph  3/16/2024    FINDINGS:    No focal consolidation. No pleural effusion. No pneumothorax. Cardiac   silhouette size cannot be accurately assessed on this projection. No   acute osseous findings.    IMPRESSION:    No focal consolidation.    --- End of Report ---          JOSE BASILIO MD; Resident Radiologist  This document has been electronically signed.  SOCRATES RAMIREZ MD; Attending Radiologist  This document has been electronically signed. Apr 20 2024  2:47PM    < end of copied text >        < from: CT Chest No Cont (01.26.22 @ 09:10) >  CT scan of the chest was obtained without intravenous contrast.    FINDINGS:    LYMPH NODES: A few small mediastinal lymph nodes, for example a 0.9 cm   pretracheal and 0.8 cm right paratracheal lymph node.    HEART/VASCULATURE: The heart size is normal. No pericardial effusion.   Aortic valve and coronary artery calcifications.    AIRWAYS/LUNGS/PLEURA: Secretions are seen within the distal trachea.   Centrally predominant bilateral lung opacities, interlobular septal   thickening, and small bilateral pleural effusions, new since 1/22/2022.    UPPER ABDOMEN: Right renal cyst. Left nephrectomy. Punctate pancreatic   calcification.    BONES/SOFT TISSUES: Subcentimeter hypodense left thyroid nodule. The   esophagus is normal. Symmetric bilateral gynecomastia. Degenerative   changes of the spine.    IMPRESSION:    1.  Centrally predominant bilateral lung opacities, interlobular septal   thickening, and small bilateral pleural effusions new from 1/22/2022   likely represent pulmonary edema rather than pneumonia.  2.  Secretions are seen in the distal trachea.    < end of copied text >    < from: MARCELLA W or WO Ultrasound Enhancing Agent (04.24.24 @ 12:50) >   1. Left ventricular systolic function is moderately decreased. There are regional wall motion abnormalities present. Hypokinesis of the inferior, lateral, and inferolateral walls.   2. Normal right ventricular cavity size and normal systolic function.   3. Structurally normal mitral valve with normal leaflet excursion. There is calcification of the mitral valve annulus. There is symmetric leaflet tethering. There is severe mitral regurgitation. Vena contracta width ~ 0.7 cm. Estimated effective regurgitant orifice area (EROA) ~ 0.4 cm2 (by the PISA method).   4. The aortic valve appears trileaflet with normal systolic excursion. There is calcification of the aortic valve leaflets. There is mild to moderate aortic regurgitation. Vena contracta width ~ 0.3 cm.   5. No atheroma in the visualized portions of the proximal ascending aorta. Mild non-mobile atheroma in the visualized portions of the transverse aortic arch. Mild non-mobile atheroma in the visualized portions of the descending aorta.   6. The left atrium is normal in size. There is no evidence of left atrial or left atrial appendage thrombus. Measurements of the left atrial appendage (JOSÉ MIGUEL) were carried out as outlined below. Note that the JOSÉ MIGUEL width refers to its width at the ostium. The JOSÉ MIGUEL length refers to the distance from the plane of the JOSÉ MIGUEL ostium to its most distal point. Note that the JOSÉ MIGUEL has a "windsock" appearance.           Zero degrees: width = 1.9 cm, length = 3.0 cm      30 degrees: width = 1.7 cm, length = 2.9 cm      45 degrees: width = 1.7 cm, length = 2.8 cm      60 degrees: width = 1.6 cm, length = 2.7 cm      90 degrees: width = 1.8 cm, length = 2.6 cm      135 degrees: width = 2.0 cm, length = 2.8 cm.   7. Agitated saline injection was negative for intracardiac shunt.    < end of copied text >

## 2024-05-28 NOTE — PROGRESS NOTE ADULT - SUBJECTIVE AND OBJECTIVE BOX
Patient is a 88y old  Male who presents with a chief complaint of GI bleed (28 May 2024 08:08)      DATE OF SERVICE: 05-28-24 @ 10:24    SUBJECTIVE / OVERNIGHT EVENTS: overnight events noted    ROS:  Resp: + cough with occasionally green sputum  CVS: No chest pain no palpitations no orthopnea  GI: no N/V/D    MEDICATIONS  (STANDING):  aMIOdarone    Tablet 200 milliGRAM(s) Oral daily  apixaban 2.5 milliGRAM(s) Oral every 12 hours  atorvastatin 80 milliGRAM(s) Oral at bedtime  chlorhexidine 2% Cloths 1 Application(s) Topical daily  dextrose 10% Bolus 125 milliLiter(s) IV Bolus once  dextrose 5%. 1000 milliLiter(s) (100 mL/Hr) IV Continuous <Continuous>  dextrose 5%. 1000 milliLiter(s) (50 mL/Hr) IV Continuous <Continuous>  dextrose 50% Injectable 25 Gram(s) IV Push once  dextrose 50% Injectable 12.5 Gram(s) IV Push once  furosemide    Tablet 40 milliGRAM(s) Oral daily  glucagon  Injectable 1 milliGRAM(s) IntraMuscular once  insulin glargine Injectable (LANTUS) 5 Unit(s) SubCutaneous at bedtime  insulin lispro (ADMELOG) corrective regimen sliding scale   SubCutaneous three times a day before meals  insulin lispro (ADMELOG) corrective regimen sliding scale   SubCutaneous at bedtime  pantoprazole  Injectable 40 milliGRAM(s) IV Push every 12 hours  sodium bicarbonate 650 milliGRAM(s) Oral daily  sucralfate 1 Gram(s) Oral two times a day  tamsulosin 0.4 milliGRAM(s) Oral at bedtime    MEDICATIONS  (PRN):  acetaminophen     Tablet .. 650 milliGRAM(s) Oral every 6 hours PRN Temp greater or equal to 38C (100.4F), Mild Pain (1 - 3)  aluminum hydroxide/magnesium hydroxide/simethicone Suspension 30 milliLiter(s) Oral every 4 hours PRN Dyspepsia  dextrose Oral Gel 15 Gram(s) Oral once PRN Blood Glucose LESS THAN 70 milliGRAM(s)/deciliter  guaiFENesin Oral Liquid (Sugar-Free) 200 milliGRAM(s) Oral every 6 hours PRN Cough  melatonin 3 milliGRAM(s) Oral at bedtime PRN Insomnia  ondansetron Injectable 4 milliGRAM(s) IV Push every 8 hours PRN Nausea and/or Vomiting        CAPILLARY BLOOD GLUCOSE      POCT Blood Glucose.: 116 mg/dL (28 May 2024 08:28)  POCT Blood Glucose.: 126 mg/dL (27 May 2024 22:29)  POCT Blood Glucose.: 248 mg/dL (27 May 2024 17:53)  POCT Blood Glucose.: 236 mg/dL (27 May 2024 12:11)    I&O's Summary      Vital Signs Last 24 Hrs  T(C): 36.7 (28 May 2024 05:40), Max: 36.7 (27 May 2024 20:35)  T(F): 98.1 (28 May 2024 05:40), Max: 98.1 (27 May 2024 20:35)  HR: 72 (28 May 2024 05:40) (69 - 80)  BP: 99/49 (28 May 2024 05:40) (99/49 - 118/50)  BP(mean): --  RR: 18 (28 May 2024 05:40) (17 - 19)  SpO2: 98% (28 May 2024 05:40) (98% - 100%)      PHYSICAL EXAM:  CHEST/LUNG: decreased breath sounds bases   HEART: S1 S2; systolic murmur +   ABDOMEN: Soft, Nontender,  EXTREMITIES: negative edema  NEUROLOGY: AO x 3 non-focal    LABS:                        8.0    5.19  )-----------( 97       ( 28 May 2024 05:33 )             24.1     05-27    140  |  108<H>  |  65<H>  ----------------------------<  131<H>  3.8   |  20<L>  |  3.31<H>    Ca    8.0<L>      27 May 2024 06:55    TPro  5.6<L>  /  Alb  2.5<L>  /  TBili  1.1  /  DBili  x   /  AST  43<H>  /  ALT  60<H>  /  AlkPhos  141<H>  05-27          Urinalysis Basic - ( 27 May 2024 06:55 )    Color: x / Appearance: x / SG: x / pH: x  Gluc: 131 mg/dL / Ketone: x  / Bili: x / Urobili: x   Blood: x / Protein: x / Nitrite: x   Leuk Esterase: x / RBC: x / WBC x   Sq Epi: x / Non Sq Epi: x / Bacteria: x          All consultant(s) notes reviewed and care discussed with other providers        Contact Number, Dr Arroyo [phone]

## 2024-05-28 NOTE — PROGRESS NOTE ADULT - SUBJECTIVE AND OBJECTIVE BOX
Subjective  Patient lying in bed.  Appears mildly tachypneic. Also has a productive cough.     Past Medical History    PAST MEDICAL & SURGICAL HISTORY:  CAD (coronary artery disease)  (4 stents,)      Single kidney  (hx/o LEFT nephrectomy at age 16; pt states due to a ureter"blockage" causing "infection"; pt denies hx/o renal dysfunction)      Arthritis      Myocardial infarction  (2003)      Hard of hearing      Atrial fibrillation      Hypertension      Hyperlipidemia      History of TIAs  Last 2018      History of bradycardia      Bladder cancer      Left carotid bruit      T2DM (type 2 diabetes mellitus)      HFrEF (heart failure with reduced ejection fraction)      GAVE (gastric antral vascular ectasia)      History of nephrectomy, unilateral  (hx/o LEFT nephrectomy at age 16)      Stented coronary artery  (4 stents)      S/P bilateral cataract extraction      H/O cystoscopy          MEDICATIONS:  aMIOdarone    Tablet 200 milliGRAM(s) Oral daily  apixaban 2.5 milliGRAM(s) Oral every 12 hours  furosemide    Tablet 40 milliGRAM(s) Oral daily      guaiFENesin Oral Liquid (Sugar-Free) 200 milliGRAM(s) Oral every 6 hours PRN    acetaminophen     Tablet .. 650 milliGRAM(s) Oral every 6 hours PRN  melatonin 3 milliGRAM(s) Oral at bedtime PRN  ondansetron Injectable 4 milliGRAM(s) IV Push every 8 hours PRN    aluminum hydroxide/magnesium hydroxide/simethicone Suspension 30 milliLiter(s) Oral every 4 hours PRN  pantoprazole  Injectable 40 milliGRAM(s) IV Push every 12 hours  sucralfate 1 Gram(s) Oral two times a day    atorvastatin 80 milliGRAM(s) Oral at bedtime  dextrose 50% Injectable 25 Gram(s) IV Push once  dextrose 50% Injectable 12.5 Gram(s) IV Push once  dextrose Oral Gel 15 Gram(s) Oral once PRN  glucagon  Injectable 1 milliGRAM(s) IntraMuscular once  insulin glargine Injectable (LANTUS) 5 Unit(s) SubCutaneous at bedtime  insulin lispro (ADMELOG) corrective regimen sliding scale   SubCutaneous three times a day before meals  insulin lispro (ADMELOG) corrective regimen sliding scale   SubCutaneous at bedtime    chlorhexidine 2% Cloths 1 Application(s) Topical daily  dextrose 10% Bolus 125 milliLiter(s) IV Bolus once  dextrose 5%. 1000 milliLiter(s) IV Continuous <Continuous>  dextrose 5%. 1000 milliLiter(s) IV Continuous <Continuous>  sodium bicarbonate 650 milliGRAM(s) Oral daily  tamsulosin 0.4 milliGRAM(s) Oral at bedtime        Allergies    penicillin (Rash)    Intolerances    Cipro (Joint Pain)      FAMILY HISTORY:  Family history of diabetes mellitus in mother (Mother)  (Pt states his mother developed DM in her 70's)    FH: bladder cancer (Sibling)        SOCIAL HISTORY    Marital Status:   Occupation:   Lives with: wife    All other review of systems is negative unless indicated above.    VITAL SIGNS  T(C): 36.7 (05-28-24 @ 05:40), Max: 36.7 (05-27-24 @ 20:35)  HR: 72 (05-28-24 @ 05:40) (69 - 80)  BP: 99/49 (05-28-24 @ 05:40) (99/49 - 118/50)  RR: 18 (05-28-24 @ 05:40) (17 - 19)  SpO2: 98% (05-28-24 @ 05:40) (98% - 100%)  Wt(kg): --        	    		        I&O's Summary      LABORATORY VALUES	 	                          8.0    5.19  )-----------( 97       ( 28 May 2024 05:33 )             24.1       05-27    140  |  108<H>  |  65<H>  ----------------------------<  131<H>  3.8   |  20<L>  |  3.31<H>    Ca    8.0<L>      27 May 2024 06:55    TPro  5.6<L>  /  Alb  2.5<L>  /  TBili  1.1  /  DBili  x   /  AST  43<H>  /  ALT  60<H>  /  AlkPhos  141<H>  05-27    LIVER FUNCTIONS - ( 27 May 2024 06:55 )  Alb: 2.5 g/dL / Pro: 5.6 g/dL / ALK PHOS: 141 U/L / ALT: 60 U/L / AST: 43 U/L / GGT: x               CARDIAC MARKERS:    1. Left ventricular systolic function is moderately decreased. There are regional wall motion abnormalities present. Hypokinesis of the inferior, lateral, and inferolateral walls.   2. Normal right ventricular cavity size and normal systolic function.   3. Structurally normal mitral valve with normal leaflet excursion. There is calcification of the mitral valve annulus. There is symmetric leaflet tethering. There is severe mitral regurgitation. Vena contracta width ~ 0.7 cm. Estimated effective regurgitant orifice area (EROA) ~ 0.4 cm2 (by the PISA method).   4. The aortic valve appears trileaflet with normal systolic excursion. There is calcification of the aortic valve leaflets. There is mild to moderate aortic regurgitation. Vena contracta width ~ 0.3 cm.   5. No atheroma in the visualized portions of the proximal ascending aorta. Mild non-mobile atheroma in the visualized portions of the transverse aortic arch. Mild non-mobile atheroma in the visualized portions of the descending aorta.   6. The left atrium is normal in size. There is no evidence of left atrial or left atrial appendage thrombus. Measurements of the left atrial appendage (JOSÉ MIGUEL) were carried out as outlined below. Note that the JOSÉ MIGUEL width refers to its width at the ostium. The JOSÉ MIGUEL length refers to the distance from the plane of the JOSÉ MIGUEL ostium to its most distal point. Note that the JOSÉ MIGUEL has a "windsock" appearance.           Zero degrees: width = 1.9 cm, length = 3.0 cm      30 degrees: width = 1.7 cm, length = 2.9 cm      45 degrees: width = 1.7 cm, length = 2.8 cm      60 degrees: width = 1.6 cm, length = 2.7 cm      90 degrees: width = 1.8 cm, length = 2.6 cm      135 degrees: width = 2.0 cm, length = 2.8 cm.   7. Agitated saline injection was negative for intracardiac shunt.    ____________________________________________________________________  MARCELLA Procedure:  After discussion of the risks and benefits of the MARCELLA, an informed consent was obtained. Study was performed with anesthesia (please see anesthesia record).  The adult Charla MARCELLA probe was introduced and passed into the esophagus. The MARCELLA probe was passed without difficulty. Images were obtained with the patient in a left lateral decubitus position. Image quality was good. The patient's vital signs; including heart rate, blood pressure, and oxygen saturation; remained stable throughout the procedure. The patient tolerated the procedure well and without complications.     ________________________________________________________________________________________  FINDINGS:     Left Ventricle:  Left ventricular systolic function is moderately decreased. There are regional wall motion abnormalities present. Hypokinesis of the inferior, lateral, and inferolateral walls.     Right Ventricle:  The right ventricular cavity is normal in size and normal systolic function.     Left Atrium:  The left atrium is normal in size. There is no evidence ofleft atrial or left atrial appendage thrombus. Measurements of the left atrial appendage (JOSÉ MIGUEL) were carried out as outlined below. Note that the JOSÉ MIGUEL width refers to its width at the ostium. The JOSÉ MIGUEL length refers to the distance from the plane of the JOSÉ MIGUEL ostium to its most distal point. Note that the JOSÉ MIGUEL has a "windsock" appearance.    Zero degrees: width = 1.9 cm, length = 3.0 cm  30 degrees: width = 1.7 cm, length = 2.9 cm  45 degrees: width = 1.7 cm, length = 2.8 cm  60 degrees: width = 1.6 cm,length = 2.7 cm  90 degrees: width = 1.8 cm, length = 2.6 cm  135 degrees: width = 2.0 cm, length = 2.8 cm.     Right Atrium:  The right atrium is normal in size.     Interatrial Septum:  Agitated saline injection was negative for intracardiac shunt.     Aortic Valve:  The aortic valve appears trileaflet with normal systolic excursion. There is calcification of the aortic valve leaflets. There is mild to moderate aortic regurgitation. Vena contracta width ~ 0.3 cm.     Mitral Valve:  Structurallynormal mitral valve with normal leaflet excursion. There is calcification of the mitral valve annulus. There is symmetric leaflet tethering. There is severe mitral regurgitation. Vena contracta width ~ 0.7 cm. Estimated effective regurgitant orifice area (EROA) ~ 0.4 cm2 (by the PISA method).     Tricuspid Valve:  Structurally normal tricuspid valve with normal leaflet excursion. There is trace tricuspid regurgitation.     Pulmonic Valve:  Structurally normal pulmonic valve with normal leaflet excursion. There is mild pulmonic regurgitation.     Aorta:  The aortic root at the sinuses of Valsalva is normal in size, measuring 2.90 cm (indexed 1.78 cm/m²). The aortic diameter at the sinotubular junction is normal in size, measuring 2.3 cm. The ascending aorta diameter is normal in size, measuring 3.00 cm (indexed 1.84 cm/m²). There is no atheroma in the visualized portions of the proximal ascending aorta. There is mild non-mobile atheroma in the visualized portions of the transverse aortic arch. There is mild non-mobile atheroma in the visualized portions of the descending aorta.     Pericardium:  No pericardial effusion seen.  ____________________________________________________________________  QUANTITATIVE DATA:  Aorta Measurements: (Normal range) (Indexed to BSA)     Sinuses of Valsalva: 2.90 cm (3.1 - 3.7 cm)  Ao ST Junct:         2.3 cm  Ao Asc prox:         3.00 cm       LVOT / RVOT/ Qp/Qs Data: (Indexed to BSA)  LVOT Diameter: 2.20 cm  LVOT Area:     3.80 cm²    Mitral Valve Measurements:     MV Beatriz d, A4C 3.70 cm      MR Vmax:           5.52 m/s                             MR VTI:            198.50 cm                             MR Mean Gradient:  71.0 mmHg                             MR Peak Gradient:  121.9 mmHg                       MR PISA Radius:    1.00 cm                             MR Aliasing Benjamin:   30.80 cm/s                             MR Inst Flow Rate: 193.52 ml/s    ________________________________________________________________________________________  Electronically signed on 4/24/2024 at 6:42:22 PM by Bernardino Lauren M.D.         *** Final ***                Thyroid Stimulating Hormone, Serum: 2.65 uIU/mL (05-06 @ 05:27)      Urinalysis Basic - ( 27 May 2024 06:55 )    Color: x / Appearance: x / SG: x / pH: x  Gluc: 131 mg/dL / Ketone: x  / Bili: x / Urobili: x   Blood: x / Protein: x / Nitrite: x   Leuk Esterase: x / RBC: x / WBC x   Sq Epi: x / Non Sq Epi: x / Bacteria: x      CAPILLARY BLOOD GLUCOSE      POCT Blood Glucose.: 126 mg/dL (27 May 2024 22:29)

## 2024-05-28 NOTE — CONSULT NOTE ADULT - ASSESSMENT
88yr old male with a pmh of HTN, HFrEF, T2DM on insulin, TIA, CAD s/p stent, bladder cancer s/p chemo, afib on eliquis, hx of left nephrectomy who presents to the ED for a three day history of black stools.  Denies NSAIDs use.   Pt with admission 5/3-5/10 for acute decompensated heart failure and GIB.   Denies  headache, dizziness, chest pain, palpitations, SOB, abdominal pain, joint pain, diarrhea/constipation, urinary symptoms.   Vitals: T 98.2F, HR 72, /51, RR 18 satting 100% RA (20 May 2024 16:26):  now pulm called for increasing cough : and has mucus production ;  no fever:  he is an ex smoker;     Cough with mucus  Black stools/  r/o gi bleed  HTN  DM  CHF/CAD/PCI  A FIBRILLATION    Cough with mucus  -no fever:  no leucocytosis : no clinical features of pneumonia:   -secondary to heart failure-see below:   -cxr today to me seems to be with small concepcion effusions  -Add BD  : cont ppi  and add anti tussives   -no steroids for now:  but may need   it:  will follow closely  Black stools/  r/o gi bleed  -on eliquis:   -would defer to primary team and gi    HTN  -controlled  DM  -monitor and control   CHF/CAD/PCI  -last echo  showed; Left ventricular systolic function is moderately decreased. Severe mitral regurgitation. mild to moderate aortic regurgitation.  A FIBRILLATION  -on eliquis:    mayela doe 88yr old male with a pmh of HTN, HFrEF, T2DM on insulin, TIA, CAD s/p stent, bladder cancer s/p chemo, afib on eliquis, hx of left nephrectomy who presents to the ED for a three day history of black stools.  Denies NSAIDs use.   Pt with admission 5/3-5/10 for acute decompensated heart failure and GIB.   Denies  headache, dizziness, chest pain, palpitations, SOB, abdominal pain, joint pain, diarrhea/constipation, urinary symptoms.   Vitals: T 98.2F, HR 72, /51, RR 18 satting 100% RA (20 May 2024 16:26):  now pulm called for increasing cough : and has mucus production ;  no fever:  he is an ex smoker;     Cough with mucus  Black stools/  r/o gi bleed  HTN  DM  CHF/CAD/PCI  A FIBRILLATION    Cough with mucus  -no fever:  no leucocytosis : no clinical features of pneumonia:   -secondary to heart failure-see below:   -cxr today to me seems to be with small concepcion effusions  -Add BD  : cont ppi  and add anti tussives   -no steroids for now:  but may need   it:  will follow closely  -cough could be due to chf  ; symptomatic rx;   Black stools/  r/o gi bleed  -on eliquis:   -would defer to primary team and gi    HTN  -controlled  DM  -monitor and control   CHF/CAD/PCI  -last echo  showed; Left ventricular systolic function is moderately decreased. Severe mitral regurgitation. mild to moderate aortic regurgitation.  A FIBRILLATION  -on eliquis:    mayela doe

## 2024-05-28 NOTE — PROGRESS NOTE ADULT - SUBJECTIVE AND OBJECTIVE BOX
NEPHROLOGY-[facility] [phone]        Patient seen and examined on room air, reports dyspnea with exertion.         MEDICATIONS  (STANDING):  albuterol/ipratropium for Nebulization 3 milliLiter(s) Nebulizer every 6 hours  aMIOdarone    Tablet 200 milliGRAM(s) Oral daily  apixaban 2.5 milliGRAM(s) Oral every 12 hours  atorvastatin 80 milliGRAM(s) Oral at bedtime  benzonatate 100 milliGRAM(s) Oral every 8 hours  chlorhexidine 2% Cloths 1 Application(s) Topical daily  dextrose 10% Bolus 125 milliLiter(s) IV Bolus once  dextrose 5%. 1000 milliLiter(s) (100 mL/Hr) IV Continuous <Continuous>  dextrose 5%. 1000 milliLiter(s) (50 mL/Hr) IV Continuous <Continuous>  dextrose 50% Injectable 25 Gram(s) IV Push once  dextrose 50% Injectable 12.5 Gram(s) IV Push once  furosemide    Tablet 40 milliGRAM(s) Oral daily  glucagon  Injectable 1 milliGRAM(s) IntraMuscular once  insulin glargine Injectable (LANTUS) 5 Unit(s) SubCutaneous at bedtime  insulin lispro (ADMELOG) corrective regimen sliding scale   SubCutaneous three times a day before meals  insulin lispro (ADMELOG) corrective regimen sliding scale   SubCutaneous at bedtime  pantoprazole  Injectable 40 milliGRAM(s) IV Push every 12 hours  sodium bicarbonate 650 milliGRAM(s) Oral daily  sucralfate 1 Gram(s) Oral two times a day  tamsulosin 0.4 milliGRAM(s) Oral at bedtime      VITAL:  T(C): , Max: 36.7 (05-27-24 @ 20:35)  T(F): , Max: 98.1 (05-27-24 @ 20:35)  HR: 70 (05-28-24 @ 12:09)  BP: 100/52 (05-28-24 @ 12:09)  BP(mean): --  RR: 18 (05-28-24 @ 12:09)  SpO2: 99% (05-28-24 @ 12:09)  Wt(kg): --    I and O's:        PHYSICAL EXAM:    Constitutional: NAD  Neck:  No JVD  Respiratory: CTAB/L  Cardiovascular: S1 and S2  Gastrointestinal: BS+, soft, NT/ND  Extremities: No peripheral edema  Neurological: A/O x 3, no focal deficits  Psychiatric: Normal mood, normal affect  : No Temple  Skin: No rashes  Access: Not applicable    LABS:                        8.0    5.19  )-----------( 97       ( 28 May 2024 05:33 )             24.1     05-28    132<L>  |  94<L>  |  38<H>  ----------------------------<  142<H>  4.1   |  24  |  2.47<H>    Ca    10.3      28 May 2024 10:37  Phos  3.7     05-28  Mg     2.50     05-28    TPro  8.0  /  Alb  3.6  /  TBili  3.5<H>  /  DBili  x   /  AST  63<H>  /  ALT  60<H>  /  AlkPhos  372<H>  05-28          Urine Studies:  Urinalysis Basic - ( 28 May 2024 10:37 )    Color: x / Appearance: x / SG: x / pH: x  Gluc: 142 mg/dL / Ketone: x  / Bili: x / Urobili: x   Blood: x / Protein: x / Nitrite: x   Leuk Esterase: x / RBC: x / WBC x   Sq Epi: x / Non Sq Epi: x / Bacteria: x        Assessment and Plan:   · Assessment	      88y Male with CAD, HFrEF, Afib, HTN, DM, solitary R kidney and CKD p/w acute on chronic HFrEF and acute on chronic kidney injury.  Nephrotic syndrome   SOB and severe MR and moderate AR   LCX disease   Presumed lower GI bleed     Renal - CKD: stage 4 with solitary R kidney and baseline creatinine ~3s  off SGLT-2; continue lasix 40 mg po daily   Anemia - Acute blood loss anemia  s/p PRBC last week, hgb declining ;  Back on eliquis   CVS- BP acceptable at present. acute on chronic HFrEF   Mitral clip is off the table at present    LCX disease   GI- S/p EGD, on IV protonix bid , eliquis resumed   [facility]   [phone]            NEPH[facility]OLOGY-[facility] [phone]        Patient seen and examined on room air, reports dyspnea with exertion.         MEDICATIONS  (STANDING):  albuterol/ipratropium for Nebulization 3 milliLiter(s) Nebulizer every 6 hours  aMIOdarone    Tablet 200 milliGRAM(s) Oral daily  apixaban 2.5 milliGRAM(s) Oral every 12 hours  atorvastatin 80 milliGRAM(s) Oral at bedtime  benzonatate 100 milliGRAM(s) Oral every 8 hours  chlorhexidine 2% Cloths 1 Application(s) Topical daily  dextrose 10% Bolus 125 milliLiter(s) IV Bolus once  dextrose 5%. 1000 milliLiter(s) (100 mL/Hr) IV Continuous <Continuous>  dextrose 5%. 1000 milliLiter(s) (50 mL/Hr) IV Continuous <Continuous>  dextrose 50% Injectable 25 Gram(s) IV Push once  dextrose 50% Injectable 12.5 Gram(s) IV Push once  furosemide    Tablet 40 milliGRAM(s) Oral daily  glucagon  Injectable 1 milliGRAM(s) IntraMuscular once  insulin glargine Injectable (LANTUS) 5 Unit(s) SubCutaneous at bedtime  insulin lispro (ADMELOG) corrective regimen sliding scale   SubCutaneous three times a day before meals  insulin lispro (ADMELOG) corrective regimen sliding scale   SubCutaneous at bedtime  pantoprazole  Injectable 40 milliGRAM(s) IV Push every 12 hours  sodium bicarbonate 650 milliGRAM(s) Oral daily  sucralfate 1 Gram(s) Oral two times a day  tamsulosin 0.4 milliGRAM(s) Oral at bedtime      VITAL:  T(C): , Max: 36.7 (05-27-24 @ 20:35)  T(F): , Max: 98.1 (05-27-24 @ 20:35)  HR: 70 (05-28-24 @ 12:09)  BP: 100/52 (05-28-24 @ 12:09)  BP(mean): --  RR: 18 (05-28-24 @ 12:09)  SpO2: 99% (05-28-24 @ 12:09)  Wt(kg): --    I and O's:        PHYSICAL EXAM:    Constitutional: NAD  Neck:  No JVD  Respiratory: CTAB/L  Cardiovascular: S1 and S2  Gastrointestinal: BS+, soft, NT/ND  Extremities: No peripheral edema  Neurological: A/O x 3, no focal deficits  Psychiatric: Normal mood, normal affect  : No Temple  Skin: No rashes  Access: Not applicable    LABS:                        8.0    5.19  )-----------( 97       ( 28 May 2024 05:33 )             24.1     05-28    132<L>  |  94<L>  |  38<H>  ----------------------------<  142<H>  4.1   |  24  |  2.47<H>    Ca    10.3      28 May 2024 10:37  Phos  3.7     05-28  Mg     2.50     05-28    TPro  8.0  /  Alb  3.6  /  TBili  3.5<H>  /  DBili  x   /  AST  63<H>  /  ALT  60<H>  /  AlkPhos  372<H>  05-28          Urine Studies:  Urinalysis Basic - ( 28 May 2024 10:37 )    Color: x / Appearance: x / SG: x / pH: x  Gluc: 142 mg/dL / Ketone: x  / Bili: x / Urobili: x   Blood: x / Protein: x / Nitrite: x   Leuk Esterase: x / RBC: x / WBC x   Sq Epi: x / Non Sq Epi: x / Bacteria: x        Assessment and Plan:   · Assessment	      88y Male with CAD, HFrEF, Afib, HTN, DM, solitary R kidney and CKD p/w acute on chronic HFrEF and acute on chronic kidney injury.  Nephrotic syndrome   SOB and severe MR and moderate AR   LCX disease   Presumed lower GI bleed     Renal - CKD: stage 4 with solitary R kidney and baseline creatinine ~3s  off SGLT-2; continue lasix 40 mg po daily   Anemia - Acute blood loss anemia  s/p PRBC last week, hgb declining ;  Back on eliquis   CVS- BP acceptable at present. acute on chronic HFrEF   Mitral clip is off the table at present    LCX disease   GI- S/p EGD, on IV protonix bid , eliquis resumed   EPS- not a candidate for [facility] NP  [facility]   [phone]           RENAL ATTENDING NOTE  Patient seen and examined with NP. Agree with assessment and plan as above.    Ronnell Bull MD  [facility]  [phone]

## 2024-05-28 NOTE — PROGRESS NOTE ADULT - SUBJECTIVE AND OBJECTIVE BOX
CARDIOLOGY FOLLOW UP - Dr. Vega  Date of Service: 5/28/24  CC: no events    Review of Systems:  Constitutional: No fever, weight loss, or fatigue  Respiratory: No cough, wheezing, or hemoptysis, no shortness of breath  Cardiovascular: No chest pain, palpitations, passing out, dizziness, or leg swelling  Gastrointestinal: No abd or epigastric pain. No nausea, vomiting, or hematemesis; no diarrhea or consiptaiton, no melena or hematochezia  Vascular: No edema     TELEMETRY:    PHYSICAL EXAM:  T(C): 36.4 (05-28-24 @ 12:09), Max: 36.7 (05-27-24 @ 20:35)  HR: 70 (05-28-24 @ 12:09) (69 - 80)  BP: 100/52 (05-28-24 @ 12:09) (99/49 - 118/50)  RR: 18 (05-28-24 @ 12:09) (17 - 19)  SpO2: 99% (05-28-24 @ 12:09) (98% - 100%)  Wt(kg): --  I&O's Summary      Appearance: Normal	  Cardiovascular: Normal S1 S2,RRR, No JVD, No murmurs  Respiratory: Lungs clear to auscultation	  Gastrointestinal:  Soft, Non-tender, + BS	  Extremities: Normal range of motion, No clubbing, cyanosis or edema  Vascular: Peripheral pulses palpable 2+ bilaterally       Home Medications:  apixaban 2.5 mg oral tablet: 1 tab(s) orally every 12 hours (20 May 2024 17:19)  Crestor 40 mg oral tablet: 1 tab(s) orally once a day (at bedtime) (20 May 2024 17:19)  repaglinide 0.5 mg oral tablet: 1 tab(s) orally 3 times a day (before meals) (20 May 2024 17:19)  sodium bicarbonate 650 mg oral tablet: 1 tab(s) orally once a day (20 May 2024 17:19)  tamsulosin 0.4 mg oral capsule: 1 cap(s) orally once a day (20 May 2024 17:19)  Tresiba FlexTouch 100 units/mL subcutaneous solution: 5 unit(s) subcutaneous once a day (at bedtime) (20 May 2024 17:19)        MEDICATIONS  (STANDING):  albuterol/ipratropium for Nebulization 3 milliLiter(s) Nebulizer every 6 hours  aMIOdarone    Tablet 200 milliGRAM(s) Oral daily  apixaban 2.5 milliGRAM(s) Oral every 12 hours  atorvastatin 80 milliGRAM(s) Oral at bedtime  benzonatate 100 milliGRAM(s) Oral every 8 hours  chlorhexidine 2% Cloths 1 Application(s) Topical daily  dextrose 10% Bolus 125 milliLiter(s) IV Bolus once  dextrose 5%. 1000 milliLiter(s) (100 mL/Hr) IV Continuous <Continuous>  dextrose 5%. 1000 milliLiter(s) (50 mL/Hr) IV Continuous <Continuous>  dextrose 50% Injectable 25 Gram(s) IV Push once  dextrose 50% Injectable 12.5 Gram(s) IV Push once  furosemide    Tablet 40 milliGRAM(s) Oral daily  glucagon  Injectable 1 milliGRAM(s) IntraMuscular once  insulin glargine Injectable (LANTUS) 5 Unit(s) SubCutaneous at bedtime  insulin lispro (ADMELOG) corrective regimen sliding scale   SubCutaneous three times a day before meals  insulin lispro (ADMELOG) corrective regimen sliding scale   SubCutaneous at bedtime  pantoprazole  Injectable 40 milliGRAM(s) IV Push every 12 hours  sodium bicarbonate 650 milliGRAM(s) Oral daily  sucralfate 1 Gram(s) Oral two times a day  tamsulosin 0.4 milliGRAM(s) Oral at bedtime        EKG:  RADIOLOGY:  DIAGNOSTIC TESTING:  [ ] Echocardiogram:  [ ] Catherterization:  [ ] Stress Test:  OTHER:     LABS:	 	                          8.0    5.19  )-----------( 97       ( 28 May 2024 05:33 )             24.1     05-28    132<L>  |  94<L>  |  38<H>  ----------------------------<  142<H>  4.1   |  24  |  2.47<H>    Ca    10.3      28 May 2024 10:37  Phos  3.7     05-28  Mg     2.50     05-28    TPro  8.0  /  Alb  3.6  /  TBili  3.5<H>  /  DBili  x   /  AST  63<H>  /  ALT  60<H>  /  AlkPhos  372<H>  05-28          CARDIAC MARKERS:

## 2024-05-29 LAB
GLUCOSE BLDC GLUCOMTR-MCNC: 146 MG/DL — HIGH (ref 70–99)
GLUCOSE BLDC GLUCOMTR-MCNC: 165 MG/DL — HIGH (ref 70–99)
GLUCOSE BLDC GLUCOMTR-MCNC: 199 MG/DL — HIGH (ref 70–99)
GLUCOSE BLDC GLUCOMTR-MCNC: 261 MG/DL — HIGH (ref 70–99)
HCT VFR BLD CALC: 24.4 % — LOW (ref 39–50)
HGB BLD-MCNC: 7.7 G/DL — LOW (ref 13–17)
MCHC RBC-ENTMCNC: 31.2 PG — SIGNIFICANT CHANGE UP (ref 27–34)
MCHC RBC-ENTMCNC: 31.6 GM/DL — LOW (ref 32–36)
MCV RBC AUTO: 98.8 FL — SIGNIFICANT CHANGE UP (ref 80–100)
NRBC # BLD: 0 /100 WBCS — SIGNIFICANT CHANGE UP (ref 0–0)
NRBC # FLD: 0 K/UL — SIGNIFICANT CHANGE UP (ref 0–0)
PLATELET # BLD AUTO: 104 K/UL — LOW (ref 150–400)
RBC # BLD: 2.47 M/UL — LOW (ref 4.2–5.8)
RBC # FLD: 16.9 % — HIGH (ref 10.3–14.5)
WBC # BLD: 5.78 K/UL — SIGNIFICANT CHANGE UP (ref 3.8–10.5)
WBC # FLD AUTO: 5.78 K/UL — SIGNIFICANT CHANGE UP (ref 3.8–10.5)

## 2024-05-29 RX ORDER — FUROSEMIDE 40 MG
20 TABLET ORAL DAILY
Refills: 0 | Status: DISCONTINUED | OUTPATIENT
Start: 2024-05-29 | End: 2024-05-30

## 2024-05-29 RX ORDER — FUROSEMIDE 40 MG
20 TABLET ORAL DAILY
Refills: 0 | Status: DISCONTINUED | OUTPATIENT
Start: 2024-05-29 | End: 2024-05-29

## 2024-05-29 RX ADMIN — Medication 1: at 13:32

## 2024-05-29 RX ADMIN — Medication 100 MILLIGRAM(S): at 15:11

## 2024-05-29 RX ADMIN — TAMSULOSIN HYDROCHLORIDE 0.4 MILLIGRAM(S): 0.4 CAPSULE ORAL at 21:19

## 2024-05-29 RX ADMIN — Medication 650 MILLIGRAM(S): at 13:31

## 2024-05-29 RX ADMIN — Medication 100 MILLIGRAM(S): at 21:19

## 2024-05-29 RX ADMIN — Medication 3 MILLILITER(S): at 10:30

## 2024-05-29 RX ADMIN — INSULIN GLARGINE 5 UNIT(S): 100 INJECTION, SOLUTION SUBCUTANEOUS at 22:56

## 2024-05-29 RX ADMIN — PANTOPRAZOLE SODIUM 40 MILLIGRAM(S): 20 TABLET, DELAYED RELEASE ORAL at 06:19

## 2024-05-29 RX ADMIN — AMIODARONE HYDROCHLORIDE 200 MILLIGRAM(S): 400 TABLET ORAL at 07:45

## 2024-05-29 RX ADMIN — APIXABAN 2.5 MILLIGRAM(S): 2.5 TABLET, FILM COATED ORAL at 18:05

## 2024-05-29 RX ADMIN — Medication 100 MILLIGRAM(S): at 06:19

## 2024-05-29 RX ADMIN — Medication 1 GRAM(S): at 06:19

## 2024-05-29 RX ADMIN — ATORVASTATIN CALCIUM 80 MILLIGRAM(S): 80 TABLET, FILM COATED ORAL at 21:19

## 2024-05-29 RX ADMIN — Medication 1 GRAM(S): at 18:04

## 2024-05-29 RX ADMIN — Medication 3: at 18:03

## 2024-05-29 RX ADMIN — Medication 3 MILLILITER(S): at 04:03

## 2024-05-29 RX ADMIN — CHLORHEXIDINE GLUCONATE 1 APPLICATION(S): 213 SOLUTION TOPICAL at 12:40

## 2024-05-29 RX ADMIN — PANTOPRAZOLE SODIUM 40 MILLIGRAM(S): 20 TABLET, DELAYED RELEASE ORAL at 18:04

## 2024-05-29 RX ADMIN — Medication 20 MILLIGRAM(S): at 22:57

## 2024-05-29 RX ADMIN — Medication 3 MILLILITER(S): at 21:48

## 2024-05-29 RX ADMIN — Medication 3 MILLILITER(S): at 15:52

## 2024-05-29 RX ADMIN — Medication 200 MILLIGRAM(S): at 06:20

## 2024-05-29 RX ADMIN — APIXABAN 2.5 MILLIGRAM(S): 2.5 TABLET, FILM COATED ORAL at 06:19

## 2024-05-29 NOTE — PROGRESS NOTE ADULT - SUBJECTIVE AND OBJECTIVE BOX
Patient is a 88y old  Male who presents with a chief complaint of GI bleed (29 May 2024 13:09)      DATE OF SERVICE: 05-29-24 @ 14:32    SUBJECTIVE / OVERNIGHT EVENTS: overnight events noted    ROS:  Resp: No cough no sputum production  CVS: No chest pain no palpitations no orthopnea  GI: no N/V/D          MEDICATIONS  (STANDING):  albuterol/ipratropium for Nebulization 3 milliLiter(s) Nebulizer every 6 hours  aMIOdarone    Tablet 200 milliGRAM(s) Oral daily  apixaban 2.5 milliGRAM(s) Oral every 12 hours  atorvastatin 80 milliGRAM(s) Oral at bedtime  benzonatate 100 milliGRAM(s) Oral every 8 hours  chlorhexidine 2% Cloths 1 Application(s) Topical daily  dextrose 10% Bolus 125 milliLiter(s) IV Bolus once  dextrose 5%. 1000 milliLiter(s) (100 mL/Hr) IV Continuous <Continuous>  dextrose 5%. 1000 milliLiter(s) (50 mL/Hr) IV Continuous <Continuous>  dextrose 50% Injectable 25 Gram(s) IV Push once  dextrose 50% Injectable 12.5 Gram(s) IV Push once  furosemide   Injectable 20 milliGRAM(s) IV Push daily  glucagon  Injectable 1 milliGRAM(s) IntraMuscular once  insulin glargine Injectable (LANTUS) 5 Unit(s) SubCutaneous at bedtime  insulin lispro (ADMELOG) corrective regimen sliding scale   SubCutaneous three times a day before meals  insulin lispro (ADMELOG) corrective regimen sliding scale   SubCutaneous at bedtime  pantoprazole  Injectable 40 milliGRAM(s) IV Push every 12 hours  sodium bicarbonate 650 milliGRAM(s) Oral daily  sucralfate 1 Gram(s) Oral two times a day  tamsulosin 0.4 milliGRAM(s) Oral at bedtime    MEDICATIONS  (PRN):  acetaminophen     Tablet .. 650 milliGRAM(s) Oral every 6 hours PRN Temp greater or equal to 38C (100.4F), Mild Pain (1 - 3)  aluminum hydroxide/magnesium hydroxide/simethicone Suspension 30 milliLiter(s) Oral every 4 hours PRN Dyspepsia  dextrose Oral Gel 15 Gram(s) Oral once PRN Blood Glucose LESS THAN 70 milliGRAM(s)/deciliter  guaiFENesin Oral Liquid (Sugar-Free) 200 milliGRAM(s) Oral every 6 hours PRN Cough  melatonin 3 milliGRAM(s) Oral at bedtime PRN Insomnia  ondansetron Injectable 4 milliGRAM(s) IV Push every 8 hours PRN Nausea and/or Vomiting        CAPILLARY BLOOD GLUCOSE      POCT Blood Glucose.: 199 mg/dL (29 May 2024 13:17)  POCT Blood Glucose.: 146 mg/dL (29 May 2024 08:19)  POCT Blood Glucose.: 143 mg/dL (28 May 2024 21:59)  POCT Blood Glucose.: 265 mg/dL (28 May 2024 17:58)    I&O's Summary      Vital Signs Last 24 Hrs  T(C): 36.7 (29 May 2024 05:17), Max: 37.3 (28 May 2024 21:14)  T(F): 98 (29 May 2024 05:17), Max: 99.1 (28 May 2024 21:14)  HR: 73 (29 May 2024 10:35) (69 - 93)  BP: 107/60 (29 May 2024 07:13) (99/50 - 115/60)  BP(mean): --  RR: 18 (29 May 2024 05:17) (18 - 18)  SpO2: 98% (29 May 2024 10:35) (96% - 98%)        PHYSICAL EXAM:  CHEST/LUNG: decreased breath sounds bases   HEART: S1 S2; systolic murmur +   ABDOMEN: Soft, Nontender,  EXTREMITIES: negative edema  NEUROLOGY: AO x 3 non-focal    LABS:                        8.0    5.19  )-----------( 97       ( 28 May 2024 05:33 )             24.1     05-28    132<L>  |  94<L>  |  38<H>  ----------------------------<  142<H>  4.1   |  24  |  2.47<H>    Ca    10.3      28 May 2024 10:37  Phos  3.7     05-28  Mg     2.50     05-28    TPro  8.0  /  Alb  3.6  /  TBili  3.5<H>  /  DBili  x   /  AST  63<H>  /  ALT  60<H>  /  AlkPhos  372<H>  05-28          Urinalysis Basic - ( 28 May 2024 10:37 )    Color: x / Appearance: x / SG: x / pH: x  Gluc: 142 mg/dL / Ketone: x  / Bili: x / Urobili: x   Blood: x / Protein: x / Nitrite: x   Leuk Esterase: x / RBC: x / WBC x   Sq Epi: x / Non Sq Epi: x / Bacteria: x          All consultant(s) notes reviewed and care discussed with other providers        Contact Number, Dr Arroyo [phone]

## 2024-05-29 NOTE — PROGRESS NOTE ADULT - SUBJECTIVE AND OBJECTIVE BOX
NEPHROLOGY-[facility] [phone]        Patient seen and examined on room air, he was given lasix 20 mg IV x1 yesterday, CT chest with increased congestion.         MEDICATIONS  (STANDING):  albuterol/ipratropium for Nebulization 3 milliLiter(s) Nebulizer every 6 hours  aMIOdarone    Tablet 200 milliGRAM(s) Oral daily  apixaban 2.5 milliGRAM(s) Oral every 12 hours  atorvastatin 80 milliGRAM(s) Oral at bedtime  benzonatate 100 milliGRAM(s) Oral every 8 hours  chlorhexidine 2% Cloths 1 Application(s) Topical daily  dextrose 10% Bolus 125 milliLiter(s) IV Bolus once  dextrose 5%. 1000 milliLiter(s) (100 mL/Hr) IV Continuous <Continuous>  dextrose 5%. 1000 milliLiter(s) (50 mL/Hr) IV Continuous <Continuous>  dextrose 50% Injectable 25 Gram(s) IV Push once  dextrose 50% Injectable 12.5 Gram(s) IV Push once  furosemide   Injectable 20 milliGRAM(s) IV Push daily  glucagon  Injectable 1 milliGRAM(s) IntraMuscular once  insulin glargine Injectable (LANTUS) 5 Unit(s) SubCutaneous at bedtime  insulin lispro (ADMELOG) corrective regimen sliding scale   SubCutaneous three times a day before meals  insulin lispro (ADMELOG) corrective regimen sliding scale   SubCutaneous at bedtime  pantoprazole  Injectable 40 milliGRAM(s) IV Push every 12 hours  sodium bicarbonate 650 milliGRAM(s) Oral daily  sucralfate 1 Gram(s) Oral two times a day  tamsulosin 0.4 milliGRAM(s) Oral at bedtime      VITAL:  T(C): , Max: 37.3 (05-28-24 @ 21:14)  T(F): , Max: 99.1 (05-28-24 @ 21:14)  HR: 66 (05-29-24 @ 14:54)  BP: 96/56 (05-29-24 @ 14:54)  BP(mean): --  RR: 18 (05-29-24 @ 14:54)  SpO2: 100% (05-29-24 @ 14:54)  Wt(kg): --    I and O's:        PHYSICAL EXAM:    Constitutional: NAD  Neck:  No JVD  Respiratory: diminished BS  Cardiovascular: S1 and S2  Gastrointestinal: BS+, soft, NT/ND  Extremities: No peripheral edema  Neurological: A/O x 3, no focal deficits  Psychiatric: Normal mood, normal affect  : No Temple  Skin: No rashes  Access: Not applicable    LABS:                        8.0    5.19  )-----------( 97       ( 28 May 2024 05:33 )             24.1     05-28    132<L>  |  94<L>  |  38<H>  ----------------------------<  142<H>  4.1   |  24  |  2.47<H>    Ca    10.3      28 May 2024 10:37  Phos  3.7     05-28  Mg     2.50     05-28    TPro  8.0  /  Alb  3.6  /  TBili  3.5<H>  /  DBili  x   /  AST  63<H>  /  ALT  60<H>  /  AlkPhos  372<H>  05-28          Urine Studies:  Urinalysis Basic - ( 28 May 2024 10:37 )    Color: x / Appearance: x / SG: x / pH: x  Gluc: 142 mg/dL / Ketone: x  / Bili: x / Urobili: x   Blood: x / Protein: x / Nitrite: x   Leuk Esterase: x / RBC: x / WBC x   Sq Epi: x / Non Sq Epi: x / Bacteria: x            RADIOLOGY & ADDITIONAL STUDIES:    < from: CT Chest No Cont (05.28.24 @ 12:34) >  IMPRESSION: Small to moderate-sized bilateral pleural effusionsare   predominantly new since March 26, 2024. Mild interlobular septal   thickening suggestive of pulmonary edema.    Bilateral tiny foci of impacted distal airways as described above may be   due to mucus or may be due to endobronchial spread of infection.    6 mm right lower lobe nodular opacity new since January 26, 2022 may also   be due to an impacted distal airway.    A 3 month follow-up noncontrast chest CT is recommended to ensure   improvement/resolution of the above findings.    Small to moderate-sized ascites with significant interval increase in   size since March 26, 2024.    Emphysema.    --- End of Report ---    < end of copied text >  < from: Xray Chest 1 View- PORTABLE-Urgent (Xray Chest 1 View- PORTABLE-Urgent .) (05.28.24 @ 09:42) >  FINDINGS:    The heart is normal.  Small layering effusions bilaterally with associated atelectasis.  Mild pulmonary vascular congestion.  There is no pneumothorax.  No acute bony abnormality.    IMPRESSION:  Small layering effusions bilaterally with associated atelectasis.  Mild pulmonary vascular congestion.    --- End of Report ---        < end of copied text >        Assessment and Plan:   · Assessment	      88y Male with CAD, HFrEF, Afib, HTN, DM, solitary R kidney and CKD p/w acute on chronic HFrEF and acute on chronic kidney injury.  Nephrotic syndrome   SOB and severe MR and moderate AR   LCX disease   Presumed lower GI bleed     Renal - CKD: stage 4 with solitary R kidney and baseline creatinine ~3s  off SGLT-2   Anemia - Acute blood loss anemia  s/p PRBC last week, hgb declining ;  Back on eliquis   CVS- BP acceptable at present. acute on chronic HFrEF - CT with worsening congestion, a/w lasix 20 mg IV daily   Mitral clip is off the table at present    LCX disease   GI- S/p EGD, on IV protonix bid , eliquis resumed   EPS- not a candidate for watchman     D/w family at bedside     [facility]   [phone]                NEPHROLOGY-[facility] [phone]        Patient seen and examined on room air, he was given lasix 20 mg IV x1 yesterday, CT chest with increased congestion.         MEDICATIONS  (STANDING):  albuterol/ipratropium for Nebulization 3 milliLiter(s) Nebulizer every 6 hours  aMIOdarone    Tablet 200 milliGRAM(s) Oral daily  apixaban 2.5 milliGRAM(s) Oral every 12 hours  atorvastatin 80 milliGRAM(s) Oral at bedtime  benzonatate 100 milliGRAM(s) Oral every 8 hours  chlorhexidine 2% Cloths 1 Application(s) Topical daily  dextrose 10% Bolus 125 milliLiter(s) IV Bolus once  dextrose 5%. 1000 milliLiter(s) (100 mL/Hr) IV Continuous <Continuous>  dextrose 5%. 1000 milliLiter(s) (50 mL/Hr) IV Continuous <Continuous>  dextrose 50% Injectable 25 Gram(s) IV Push once  dextrose 50% Injectable 12.5 Gram(s) IV Push once  furosemide   Injectable 20 milliGRAM(s) IV Push daily  glucagon  Injectable 1 milliGRAM(s) IntraMuscular once  insulin glargine Injectable (LANTUS) 5 Unit(s) SubCutaneous at bedtime  insulin lispro (ADMELOG) corrective regimen sliding scale   SubCutaneous three times a day before meals  insulin lispro (ADMELOG) corrective regimen sliding scale   SubCutaneous at bedtime  pantoprazole  Injectable 40 milliGRAM(s) IV Push every 12 hours  sodium bicarbonate 650 milliGRAM(s) Oral daily  sucralfate 1 Gram(s) Oral two times a day  tamsulosin 0.4 milliGRAM(s) Oral at bedtime      VITAL:  T(C): , Max: 37.3 (05-28-24 @ 21:14)  T(F): , Max: 99.1 (05-28-24 @ 21:14)  HR: 66 (05-29-24 @ 14:54)  BP: 96/56 (05-29-24 @ 14:54)  BP(mean): --  RR: 18 (05-29-24 @ 14:54)  SpO2: 100% (05-29-24 @ 14:54)  Wt(kg): --    I and O's:        PHYSICAL EXAM:    Constitutional: NAD  Neck:  No JVD  Respiratory: diminished BS  Cardiovascular: S1 and S2  Gastrointestinal: BS+, soft, NT/ND  Extremities: No peripheral edema  Neurological: A/O x 3, no focal deficits  Psychiatric: Normal mood, normal affect  : No Temple  Skin: No rashes  Access: Not applicable    LABS:                        8.0    5.19  )-----------( 97       ( 28 May 2024 05:33 )             24.1     05-28    132<L>  |  94<L>  |  38<H>  ----------------------------<  142<H>  4.1   |  24  |  2.47<H>    Ca    10.3      28 May 2024 10:37  Phos  3.7     05-28  Mg     2.50     05-28    TPro  8.0  /  Alb  3.6  /  TBili  3.5<H>  /  DBili  x   /  AST  63<H>  /  ALT  60<H>  /  AlkPhos  372<H>  05-28          Urine Studies:  Urinalysis Basic - ( 28 May 2024 10:37 )    Color: x / Appearance: x / SG: x / pH: x  Gluc: 142 mg/dL / Ketone: x  / Bili: x / Urobili: x   Blood: x / Protein: x / Nitrite: x   Leuk Esterase: x / RBC: x / WBC x   Sq Epi: x / Non Sq Epi: x / Bacteria: x            RADIOLOGY & ADDITIONAL STUDIES:    < from: CT Chest No Cont (05.28.24 @ 12:34) >  IMPRESSION: Small to moderate-sized bilateral pleural effusionsare   predominantly new since March 26, 2024. Mild interlobular septal   thickening suggestive of pulmonary edema.    Bilateral tiny foci of impacted distal airways as described above may be   due to mucus or may be due to endobronchial spread of infection.    6 mm right lower lobe nodular opacity new since January 26, 2022 may also   be due to an impacted distal airway.    A 3 month follow-up noncontrast chest CT is recommended to ensure   improvement/resolution of the above findings.    Small to moderate-sized ascites with significant interval increase in   size since March 26, 2024.    Emphysema.    --- End of Report ---    < end of copied text >  < from: Xray Chest 1 View- PORTABLE-Urgent (Xray Chest 1 View- PORTABLE-Urgent .) (05.28.24 @ 09:42) >  FINDINGS:    The heart is normal.  Small layering effusions bilaterally with associated atelectasis.  Mild pulmonary vascular congestion.  There is no pneumothorax.  No acute bony abnormality.    IMPRESSION:  Small layering effusions bilaterally with associated atelectasis.  Mild pulmonary vascular congestion.    --- End of Report ---        < end of copied text >        Assessment and Plan:   · Assessment	      88y Male with CAD, HFrEF, Afib, HTN, DM, solitary R kidney and CKD p/w acute on chronic HFrEF and acute on chronic kidney injury.  Nephrotic syndrome   SOB and severe MR and moderate AR   LCX disease   Presumed lower GI bleed     Renal - CKD: stage 4 with solitary R kidney and baseline creatinine ~3s  off SGLT-2   Anemia - Acute blood loss anemia  s/p PRBC last week, hgb declining ;  Back on eliquis   CVS- BP acceptable at present. acute on chronic HFrEF - CT with worsening congestion, a/w lasix 20 mg IV daily   Mitral clip is off the table at present    LCX disease   GI- S/p EGD, on IV protonix bid , eliquis resumed   EPS- not a candidate for watchman     D/w family at bedside     [facility] NP  [facility]   [phone]               RENAL ATTENDING NOTE  Patient seen and examined with NP. Agree with assessment and plan as above.    Ronnell Bull MD  [facility]  [phone]

## 2024-05-29 NOTE — PROGRESS NOTE ADULT - SUBJECTIVE AND OBJECTIVE BOX
CARDIOLOGY FOLLOW UP - Dr. Vega  DATE OF SERVICE: 5/29/24    CC no cp or sob       REVIEW OF SYSTEMS:  CONSTITUTIONAL: No fever, weight loss, or fatigue  RESPIRATORY: No cough, wheezing, chills or hemoptysis; No Shortness of Breath  CARDIOVASCULAR: No chest pain, palpitations, passing out, dizziness, or leg swelling  GASTROINTESTINAL: No abdominal or epigastric pain. No nausea, vomiting, or hematemesis; No diarrhea or constipation. No melena or hematochezia.  VASCULAR: No edema     PHYSICAL EXAM:  T(C): 36.7 (05-29-24 @ 05:17), Max: 37.3 (05-28-24 @ 21:14)  HR: 69 (05-29-24 @ 05:17) (69 - 93)  BP: 107/60 (05-29-24 @ 07:13) (99/50 - 115/60)  RR: 18 (05-29-24 @ 05:17) (18 - 18)  SpO2: 96% (05-29-24 @ 05:17) (96% - 99%)  Wt(kg): --  I&O's Summary      Appearance: Normal	  Cardiovascular: Normal S1 S2,RR + murmurs  Respiratory:diminsihed   Gastrointestinal:  Soft, Non-tender, + BS	  Extremities: Normal range of motion, No clubbing, cyanosis or edema      Home Medications:  apixaban 2.5 mg oral tablet: 1 tab(s) orally every 12 hours (20 May 2024 17:19)  Crestor 40 mg oral tablet: 1 tab(s) orally once a day (at bedtime) (20 May 2024 17:19)  repaglinide 0.5 mg oral tablet: 1 tab(s) orally 3 times a day (before meals) (20 May 2024 17:19)  sodium bicarbonate 650 mg oral tablet: 1 tab(s) orally once a day (20 May 2024 17:19)  tamsulosin 0.4 mg oral capsule: 1 cap(s) orally once a day (20 May 2024 17:19)  Tresiba FlexTouch 100 units/mL subcutaneous solution: 5 unit(s) subcutaneous once a day (at bedtime) (20 May 2024 17:19)      MEDICATIONS  (STANDING):  albuterol/ipratropium for Nebulization 3 milliLiter(s) Nebulizer every 6 hours  aMIOdarone    Tablet 200 milliGRAM(s) Oral daily  apixaban 2.5 milliGRAM(s) Oral every 12 hours  atorvastatin 80 milliGRAM(s) Oral at bedtime  benzonatate 100 milliGRAM(s) Oral every 8 hours  chlorhexidine 2% Cloths 1 Application(s) Topical daily  dextrose 10% Bolus 125 milliLiter(s) IV Bolus once  dextrose 5%. 1000 milliLiter(s) (100 mL/Hr) IV Continuous <Continuous>  dextrose 5%. 1000 milliLiter(s) (50 mL/Hr) IV Continuous <Continuous>  dextrose 50% Injectable 25 Gram(s) IV Push once  dextrose 50% Injectable 12.5 Gram(s) IV Push once  glucagon  Injectable 1 milliGRAM(s) IntraMuscular once  insulin glargine Injectable (LANTUS) 5 Unit(s) SubCutaneous at bedtime  insulin lispro (ADMELOG) corrective regimen sliding scale   SubCutaneous three times a day before meals  insulin lispro (ADMELOG) corrective regimen sliding scale   SubCutaneous at bedtime  pantoprazole  Injectable 40 milliGRAM(s) IV Push every 12 hours  sodium bicarbonate 650 milliGRAM(s) Oral daily  sucralfate 1 Gram(s) Oral two times a day  tamsulosin 0.4 milliGRAM(s) Oral at bedtime      TELEMETRY: 	    ECG:  	  RADIOLOGY:   DIAGNOSTIC TESTING:  [ ] Echocardiogram:  [ ]  Catheterization:  [ ] Stress Test:    OTHER: 	< from: CT Chest No Cont (05.28.24 @ 12:34) >  IMPRESSION: Small to moderate-sized bilateral pleural effusionsare   predominantly new since March 26, 2024. Mild interlobular septal   thickening suggestive of pulmonary edema.    Bilateral tiny foci of impacted distal airways as described above may be   due to mucus or may be due to endobronchial spread of infection.    6 mm right lower lobe nodular opacity new since January 26, 2022 may also   be due to an impacted distal airway.    A 3 month follow-up noncontrast chest CT is recommended to ensure   improvement/resolution of the above findings.    Small to moderate-sized ascites with significant interval increase in   size since March 26, 2024.    Emphysema.    --- End of Report ---          < end of copied text >      LABS:	 	                            8.0    5.19  )-----------( 97       ( 28 May 2024 05:33 )             24.1     05-28    132<L>  |  94<L>  |  38<H>  ----------------------------<  142<H>  4.1   |  24  |  2.47<H>    Ca    10.3      28 May 2024 10:37  Phos  3.7     05-28  Mg     2.50     05-28    TPro  8.0  /  Alb  3.6  /  TBili  3.5<H>  /  DBili  x   /  AST  63<H>  /  ALT  60<H>  /  AlkPhos  372<H>  05-28

## 2024-05-29 NOTE — PROGRESS NOTE ADULT - SUBJECTIVE AND OBJECTIVE BOX
Date of Service: 05-29-24 @ 13:09    Patient is a 88y old  Male who presents with a chief complaint of GI bleed (29 May 2024 10:27)      Any change in ROS: Seems OK:  still with mild cough  ; on 2 L o f oxygen     MEDICATIONS  (STANDING):  albuterol/ipratropium for Nebulization 3 milliLiter(s) Nebulizer every 6 hours  aMIOdarone    Tablet 200 milliGRAM(s) Oral daily  apixaban 2.5 milliGRAM(s) Oral every 12 hours  atorvastatin 80 milliGRAM(s) Oral at bedtime  benzonatate 100 milliGRAM(s) Oral every 8 hours  chlorhexidine 2% Cloths 1 Application(s) Topical daily  dextrose 10% Bolus 125 milliLiter(s) IV Bolus once  dextrose 5%. 1000 milliLiter(s) (100 mL/Hr) IV Continuous <Continuous>  dextrose 5%. 1000 milliLiter(s) (50 mL/Hr) IV Continuous <Continuous>  dextrose 50% Injectable 25 Gram(s) IV Push once  dextrose 50% Injectable 12.5 Gram(s) IV Push once  glucagon  Injectable 1 milliGRAM(s) IntraMuscular once  insulin glargine Injectable (LANTUS) 5 Unit(s) SubCutaneous at bedtime  insulin lispro (ADMELOG) corrective regimen sliding scale   SubCutaneous three times a day before meals  insulin lispro (ADMELOG) corrective regimen sliding scale   SubCutaneous at bedtime  pantoprazole  Injectable 40 milliGRAM(s) IV Push every 12 hours  sodium bicarbonate 650 milliGRAM(s) Oral daily  sucralfate 1 Gram(s) Oral two times a day  tamsulosin 0.4 milliGRAM(s) Oral at bedtime    MEDICATIONS  (PRN):  acetaminophen     Tablet .. 650 milliGRAM(s) Oral every 6 hours PRN Temp greater or equal to 38C (100.4F), Mild Pain (1 - 3)  aluminum hydroxide/magnesium hydroxide/simethicone Suspension 30 milliLiter(s) Oral every 4 hours PRN Dyspepsia  dextrose Oral Gel 15 Gram(s) Oral once PRN Blood Glucose LESS THAN 70 milliGRAM(s)/deciliter  guaiFENesin Oral Liquid (Sugar-Free) 200 milliGRAM(s) Oral every 6 hours PRN Cough  melatonin 3 milliGRAM(s) Oral at bedtime PRN Insomnia  ondansetron Injectable 4 milliGRAM(s) IV Push every 8 hours PRN Nausea and/or Vomiting    Vital Signs Last 24 Hrs  T(C): 36.7 (29 May 2024 05:17), Max: 37.3 (28 May 2024 21:14)  T(F): 98 (29 May 2024 05:17), Max: 99.1 (28 May 2024 21:14)  HR: 73 (29 May 2024 10:35) (69 - 93)  BP: 107/60 (29 May 2024 07:13) (99/50 - 115/60)  BP(mean): --  RR: 18 (29 May 2024 05:17) (18 - 18)  SpO2: 98% (29 May 2024 10:35) (96% - 98%)    Parameters below as of 29 May 2024 05:17  Patient On (Oxygen Delivery Method): nasal cannula  O2 Flow (L/min): 2      I&O's Summary        Physical Exam:   GENERAL: NAD, well-groomed, well-developed  HEENT: RAQUEL/   Atraumatic, Normocephalic  ENMT: No tonsillar erythema, exudates, or enlargement; Moist mucous membranes, Good dentition, No lesions  NECK: Supple, No JVD, Normal thyroid  CHEST/LUNG: scattered cracekls  CVS: Regular rate and rhythm; No murmurs, rubs, or gallops  GI: : Soft, Nontender, Nondistended; Bowel sounds present  NERVOUS SYSTEM:  Alert & Oriented X3  EXTREMITIES:  - edema  LYMPH: No lymphadenopathy noted  SKIN: No rashes or lesions  ENDOCRINOLOGY: No Thyromegaly  PSYCH: Appropriate    Labs:                              8.0    5.19  )-----------( 97       ( 28 May 2024 05:33 )             24.1                         8.5    5.13  )-----------( 116      ( 27 May 2024 06:55 )             25.2                         8.5    4.45  )-----------( 116      ( 26 May 2024 06:23 )             26.5     05-28    132<L>  |  94<L>  |  38<H>  ----------------------------<  142<H>  4.1   |  24  |  2.47<H>  05-27    140  |  108<H>  |  65<H>  ----------------------------<  131<H>  3.8   |  20<L>  |  3.31<H>    Ca    10.3      28 May 2024 10:37  Phos  3.7     05-28  Mg     2.50     05-28    TPro  8.0  /  Alb  3.6  /  TBili  3.5<H>  /  DBili  x   /  AST  63<H>  /  ALT  60<H>  /  AlkPhos  372<H>  05-28  TPro  5.6<L>  /  Alb  2.5<L>  /  TBili  1.1  /  DBili  x   /  AST  43<H>  /  ALT  60<H>  /  AlkPhos  141<H>  05-27    CAPILLARY BLOOD GLUCOSE      POCT Blood Glucose.: 146 mg/dL (29 May 2024 08:19)  POCT Blood Glucose.: 143 mg/dL (28 May 2024 21:59)  POCT Blood Glucose.: 265 mg/dL (28 May 2024 17:58)      LIVER FUNCTIONS - ( 28 May 2024 10:37 )  Alb: 3.6 g/dL / Pro: 8.0 g/dL / ALK PHOS: 372 U/L / ALT: 60 U/L / AST: 63 U/L / GGT: x             Urinalysis Basic - ( 28 May 2024 10:37 )    Color: x / Appearance: x / SG: x / pH: x  Gluc: 142 mg/dL / Ketone: x  / Bili: x / Urobili: x   Blood: x / Protein: x / Nitrite: x   Leuk Esterase: x / RBC: x / WBC x   Sq Epi: x / Non Sq Epi: x / Bacteria: x            RECENT CULTURES:    ra< from: CT Chest No Cont (05.28.24 @ 12:34) >  is new since January 26, 2022.    6 mm right lower lobe nodular opacity on image 84 of series 2 is   predominantly new since January 26, 2022. A few other bilateral lower   lung tiny foci of impacted distal airways are noted.    No central endobronchial lesions.    Spinal degenerative changes. Chronic deformity of the left 11th rib.    Subcutaneous edema.    IMPRESSION: Small to moderate-sized bilateral pleural effusionsare   predominantly new since March 26, 2024. Mild interlobular septal   thickening suggestive of pulmonary edema.    Bilateral tiny foci of impacted distal airways as described above may be   due to mucus or may be due to endobronchial spread of infection.    6 mm right lower lobe nodular opacity new since January 26, 2022 may also     < end of copied text >      RESPIRATORY CULTURES:          Studies  Chest X-RAY  CT SCAN Chest   Venous Dopplers: LE:   CT Abdomen  Others

## 2024-05-30 LAB
ALBUMIN SERPL ELPH-MCNC: 2.3 G/DL — LOW (ref 3.3–5)
ALP SERPL-CCNC: 135 U/L — HIGH (ref 40–120)
ALT FLD-CCNC: 54 U/L — HIGH (ref 4–41)
ANION GAP SERPL CALC-SCNC: 14 MMOL/L — SIGNIFICANT CHANGE UP (ref 7–14)
AST SERPL-CCNC: 58 U/L — HIGH (ref 4–40)
BILIRUB SERPL-MCNC: 2.5 MG/DL — HIGH (ref 0.2–1.2)
BLD GP AB SCN SERPL QL: NEGATIVE — SIGNIFICANT CHANGE UP
BUN SERPL-MCNC: 73 MG/DL — HIGH (ref 7–23)
CALCIUM SERPL-MCNC: 7.9 MG/DL — LOW (ref 8.4–10.5)
CHLORIDE SERPL-SCNC: 109 MMOL/L — HIGH (ref 98–107)
CO2 SERPL-SCNC: 18 MMOL/L — LOW (ref 22–31)
CREAT SERPL-MCNC: 3.27 MG/DL — HIGH (ref 0.5–1.3)
EGFR: 17 ML/MIN/1.73M2 — LOW
GLUCOSE BLDC GLUCOMTR-MCNC: 132 MG/DL — HIGH (ref 70–99)
GLUCOSE BLDC GLUCOMTR-MCNC: 142 MG/DL — HIGH (ref 70–99)
GLUCOSE BLDC GLUCOMTR-MCNC: 168 MG/DL — HIGH (ref 70–99)
GLUCOSE BLDC GLUCOMTR-MCNC: 258 MG/DL — HIGH (ref 70–99)
GLUCOSE SERPL-MCNC: 132 MG/DL — HIGH (ref 70–99)
HCT VFR BLD CALC: 26.1 % — LOW (ref 39–50)
HGB BLD-MCNC: 8.9 G/DL — LOW (ref 13–17)
MCHC RBC-ENTMCNC: 31.8 PG — SIGNIFICANT CHANGE UP (ref 27–34)
MCHC RBC-ENTMCNC: 34.1 GM/DL — SIGNIFICANT CHANGE UP (ref 32–36)
MCV RBC AUTO: 93.2 FL — SIGNIFICANT CHANGE UP (ref 80–100)
NRBC # BLD: 0 /100 WBCS — SIGNIFICANT CHANGE UP (ref 0–0)
NRBC # FLD: 0 K/UL — SIGNIFICANT CHANGE UP (ref 0–0)
PLATELET # BLD AUTO: 102 K/UL — LOW (ref 150–400)
POTASSIUM SERPL-MCNC: 3.8 MMOL/L — SIGNIFICANT CHANGE UP (ref 3.5–5.3)
POTASSIUM SERPL-SCNC: 3.8 MMOL/L — SIGNIFICANT CHANGE UP (ref 3.5–5.3)
PROT SERPL-MCNC: 5.3 G/DL — LOW (ref 6–8.3)
RBC # BLD: 2.8 M/UL — LOW (ref 4.2–5.8)
RBC # FLD: 17.2 % — HIGH (ref 10.3–14.5)
RH IG SCN BLD-IMP: POSITIVE — SIGNIFICANT CHANGE UP
SODIUM SERPL-SCNC: 141 MMOL/L — SIGNIFICANT CHANGE UP (ref 135–145)
WBC # BLD: 4.87 K/UL — SIGNIFICANT CHANGE UP (ref 3.8–10.5)
WBC # FLD AUTO: 4.87 K/UL — SIGNIFICANT CHANGE UP (ref 3.8–10.5)

## 2024-05-30 RX ADMIN — ATORVASTATIN CALCIUM 80 MILLIGRAM(S): 80 TABLET, FILM COATED ORAL at 22:19

## 2024-05-30 RX ADMIN — CHLORHEXIDINE GLUCONATE 1 APPLICATION(S): 213 SOLUTION TOPICAL at 17:45

## 2024-05-30 RX ADMIN — PANTOPRAZOLE SODIUM 40 MILLIGRAM(S): 20 TABLET, DELAYED RELEASE ORAL at 05:36

## 2024-05-30 RX ADMIN — Medication 1 GRAM(S): at 17:45

## 2024-05-30 RX ADMIN — APIXABAN 2.5 MILLIGRAM(S): 2.5 TABLET, FILM COATED ORAL at 05:35

## 2024-05-30 RX ADMIN — Medication 100 MILLIGRAM(S): at 13:03

## 2024-05-30 RX ADMIN — Medication 3 MILLILITER(S): at 04:12

## 2024-05-30 RX ADMIN — Medication 100 MILLIGRAM(S): at 05:35

## 2024-05-30 RX ADMIN — PANTOPRAZOLE SODIUM 40 MILLIGRAM(S): 20 TABLET, DELAYED RELEASE ORAL at 17:45

## 2024-05-30 RX ADMIN — Medication 1: at 18:14

## 2024-05-30 RX ADMIN — Medication 1 GRAM(S): at 05:36

## 2024-05-30 RX ADMIN — Medication 3 MILLILITER(S): at 20:38

## 2024-05-30 RX ADMIN — Medication 100 MILLIGRAM(S): at 22:19

## 2024-05-30 RX ADMIN — TAMSULOSIN HYDROCHLORIDE 0.4 MILLIGRAM(S): 0.4 CAPSULE ORAL at 22:20

## 2024-05-30 RX ADMIN — Medication 3 MILLILITER(S): at 14:08

## 2024-05-30 RX ADMIN — Medication 20 MILLIGRAM(S): at 05:37

## 2024-05-30 RX ADMIN — INSULIN GLARGINE 5 UNIT(S): 100 INJECTION, SOLUTION SUBCUTANEOUS at 22:20

## 2024-05-30 RX ADMIN — Medication 650 MILLIGRAM(S): at 13:03

## 2024-05-30 RX ADMIN — Medication 3 MILLILITER(S): at 07:13

## 2024-05-30 RX ADMIN — Medication 3: at 13:03

## 2024-05-30 RX ADMIN — AMIODARONE HYDROCHLORIDE 200 MILLIGRAM(S): 400 TABLET ORAL at 05:36

## 2024-05-30 NOTE — PROGRESS NOTE ADULT - SUBJECTIVE AND OBJECTIVE BOX
Date of Service: 05-30-24 @ 10:37    Patient is a 88y old  Male who presents with a chief complaint of GI bleed (29 May 2024 15:10)      Any change in ROS: seems OK:  cough is better  on room air:     MEDICATIONS  (STANDING):  albuterol/ipratropium for Nebulization 3 milliLiter(s) Nebulizer every 6 hours  aMIOdarone    Tablet 200 milliGRAM(s) Oral daily  atorvastatin 80 milliGRAM(s) Oral at bedtime  benzonatate 100 milliGRAM(s) Oral every 8 hours  chlorhexidine 2% Cloths 1 Application(s) Topical daily  dextrose 10% Bolus 125 milliLiter(s) IV Bolus once  dextrose 5%. 1000 milliLiter(s) (100 mL/Hr) IV Continuous <Continuous>  dextrose 5%. 1000 milliLiter(s) (50 mL/Hr) IV Continuous <Continuous>  dextrose 50% Injectable 25 Gram(s) IV Push once  dextrose 50% Injectable 12.5 Gram(s) IV Push once  glucagon  Injectable 1 milliGRAM(s) IntraMuscular once  insulin glargine Injectable (LANTUS) 5 Unit(s) SubCutaneous at bedtime  insulin lispro (ADMELOG) corrective regimen sliding scale   SubCutaneous three times a day before meals  insulin lispro (ADMELOG) corrective regimen sliding scale   SubCutaneous at bedtime  pantoprazole  Injectable 40 milliGRAM(s) IV Push every 12 hours  sodium bicarbonate 650 milliGRAM(s) Oral daily  sucralfate 1 Gram(s) Oral two times a day  tamsulosin 0.4 milliGRAM(s) Oral at bedtime    MEDICATIONS  (PRN):  acetaminophen     Tablet .. 650 milliGRAM(s) Oral every 6 hours PRN Temp greater or equal to 38C (100.4F), Mild Pain (1 - 3)  aluminum hydroxide/magnesium hydroxide/simethicone Suspension 30 milliLiter(s) Oral every 4 hours PRN Dyspepsia  dextrose Oral Gel 15 Gram(s) Oral once PRN Blood Glucose LESS THAN 70 milliGRAM(s)/deciliter  guaiFENesin Oral Liquid (Sugar-Free) 200 milliGRAM(s) Oral every 6 hours PRN Cough  melatonin 3 milliGRAM(s) Oral at bedtime PRN Insomnia  ondansetron Injectable 4 milliGRAM(s) IV Push every 8 hours PRN Nausea and/or Vomiting    Vital Signs Last 24 Hrs  T(C): 36.3 (30 May 2024 05:30), Max: 37.6 (29 May 2024 21:15)  T(F): 97.4 (30 May 2024 05:30), Max: 99.6 (29 May 2024 21:15)  HR: 84 (30 May 2024 05:30) (66 - 90)  BP: 106/55 (30 May 2024 05:30) (96/56 - 109/56)  BP(mean): --  RR: 18 (30 May 2024 05:30) (18 - 18)  SpO2: 96% (30 May 2024 05:30) (96% - 100%)    Parameters below as of 30 May 2024 05:30  Patient On (Oxygen Delivery Method): room air        I&O's Summary        Physical Exam:   GENERAL: NAD, well-groomed, well-developed  HEENT: RAQUEL/   Atraumatic, Normocephalic  ENMT: No tonsillar erythema, exudates, or enlargement; Moist mucous membranes, Good dentition, No lesions  NECK: Supple, No JVD, Normal thyroid  CHEST/LUNG: mild coarse crackles;   CVS: Regular rate and rhythm; No murmurs, rubs, or gallops  GI: : Soft, Nontender, Nondistended; Bowel sounds present  NERVOUS SYSTEM:  Alert & Oriented X3,  EXTREMITIES:- edema  LYMPH: No lymphadenopathy noted  SKIN: No rashes or lesions  ENDOCRINOLOGY: No Thyromegaly  PSYCH: Appropriate    Labs:                              8.9    4.87  )-----------( 102      ( 30 May 2024 05:30 )             26.1                         7.7    5.78  )-----------( 104      ( 29 May 2024 18:29 )             24.4                         8.0    5.19  )-----------( 97       ( 28 May 2024 05:33 )             24.1                         8.5    5.13  )-----------( 116      ( 27 May 2024 06:55 )             25.2     05-30    141  |  109<H>  |  73<H>  ----------------------------<  132<H>  3.8   |  18<L>  |  3.27<H>  05-28    132<L>  |  94<L>  |  38<H>  ----------------------------<  142<H>  4.1   |  24  |  2.47<H>  05-27    140  |  108<H>  |  65<H>  ----------------------------<  131<H>  3.8   |  20<L>  |  3.31<H>    Ca    7.9<L>      30 May 2024 05:30    TPro  5.3<L>  /  Alb  2.3<L>  /  TBili  2.5<H>  /  DBili  x   /  AST  58<H>  /  ALT  54<H>  /  AlkPhos  135<H>  05-30  TPro  8.0  /  Alb  3.6  /  TBili  3.5<H>  /  DBili  x   /  AST  63<H>  /  ALT  60<H>  /  AlkPhos  372<H>  05-28  TPro  5.6<L>  /  Alb  2.5<L>  /  TBili  1.1  /  DBili  x   /  AST  43<H>  /  ALT  60<H>  /  AlkPhos  141<H>  05-27    CAPILLARY BLOOD GLUCOSE      POCT Blood Glucose.: 142 mg/dL (30 May 2024 08:21)  POCT Blood Glucose.: 165 mg/dL (29 May 2024 22:06)  POCT Blood Glucose.: 261 mg/dL (29 May 2024 17:34)  POCT Blood Glucose.: 199 mg/dL (29 May 2024 13:17)      LIVER FUNCTIONS - ( 30 May 2024 05:30 )  Alb: 2.3 g/dL / Pro: 5.3 g/dL / ALK PHOS: 135 U/L / ALT: 54 U/L / AST: 58 U/L / GGT: x             Urinalysis Basic - ( 30 May 2024 05:30 )    Color: x / Appearance: x / SG: x / pH: x  Gluc: 132 mg/dL / Ketone: x  / Bili: x / Urobili: x   Blood: x / Protein: x / Nitrite: x   Leuk Esterase: x / RBC: x / WBC x   Sq Epi: x / Non Sq Epi: x / Bacteria: x        rad< from: CT Chest No Cont (05.28.24 @ 12:34) >  is new since January 26, 2022.    6 mm right lower lobe nodular opacity on image 84 of series 2 is   predominantly new since January 26, 2022. A few other bilateral lower   lung tiny foci of impacted distal airways are noted.    No central endobronchial lesions.    Spinal degenerative changes. Chronic deformity of the left 11th rib.    Subcutaneous edema.    IMPRESSION: Small to moderate-sized bilateral pleural effusionsare   predominantly new since March 26, 2024. Mild interlobular septal   thickening suggestive of pulmonary edema.    Bilateral tiny foci of impacted distal airways as described above may be   due to mucus or may be due to endobronchial spread of infection.    6 mm right lower lobe nodular opacity new since January 26, 2022 may also     < end of copied text >      RECENT CULTURES:        RESPIRATORY CULTURES:          Studies  Chest X-RAY  CT SCAN Chest   Venous Dopplers: LE:   CT Abdomen  Others  cty< from: CT Chest No Cont (05.28.24 @ 12:34) >    Small cluster of a few small nodules measuring up to about 4 mm within  the right upper lobe suggestive of foci of impacted distal airways which   is new since January 26, 2022.    6 mm right lower lobe nodular opacity on image 84 of series 2 is   predominantly new since January 26, 2022. A few other bilateral lower   lung tiny foci of impacted distal airways are noted.    No central endobronchial lesions.    Spinal degenerative changes. Chronic deformity of the left 11th rib.    Subcutaneous edema.    IMPRESSION: Small to moderate-sized bilateral pleural effusionsare   predominantly new since March 26, 2024. Mild interlobular septal   thickening suggestive of pulmonary edema.    Bilateral tiny foci of impacted distal airways as described above may be   due to mucus or may be due to endobronchial spread of infection.    6 mm right lower lobe nodular opacity new since January 26, 2022 may also     < end of copied text >

## 2024-05-30 NOTE — PROGRESS NOTE ADULT - SUBJECTIVE AND OBJECTIVE BOX
CARDIOLOGY FOLLOW UP - Dr. Vega  DATE OF SERVICE: 5/30/24    CC no cp   co sob       REVIEW OF SYSTEMS:  CONSTITUTIONAL: No fever, weight loss, or fatigue  RESPIRATORY: No cough, wheezing, chills or hemoptysis;   CARDIOVASCULAR: No chest pain, palpitations, passing out, dizziness, or leg swelling  GASTROINTESTINAL: No abdominal or epigastric pain. No nausea, vomiting, or hematemesis; No diarrhea or constipation. No melena or hematochezia.  VASCULAR: No edema     PHYSICAL EXAM:  T(C): 36.5 (05-30-24 @ 14:40), Max: 37.6 (05-29-24 @ 21:15)  HR: 80 (05-30-24 @ 14:40) (77 - 90)  BP: 105/53 (05-30-24 @ 14:40) (105/53 - 109/56)  RR: 18 (05-30-24 @ 14:40) (18 - 18)  SpO2: 100% (05-30-24 @ 14:40) (95% - 100%)  Wt(kg): --  I&O's Summary      Appearance: Normal	  Cardiovascular: Normal S1 S2,RRR, No JVD, No murmurs  Respiratory: Lungs clear to auscultation	  Gastrointestinal:  Soft, Non-tender, + BS	  Extremities: Normal range of motion, No clubbing, cyanosis or edema      Home Medications:  apixaban 2.5 mg oral tablet: 1 tab(s) orally every 12 hours (20 May 2024 17:19)  Crestor 40 mg oral tablet: 1 tab(s) orally once a day (at bedtime) (20 May 2024 17:19)  repaglinide 0.5 mg oral tablet: 1 tab(s) orally 3 times a day (before meals) (20 May 2024 17:19)  sodium bicarbonate 650 mg oral tablet: 1 tab(s) orally once a day (20 May 2024 17:19)  tamsulosin 0.4 mg oral capsule: 1 cap(s) orally once a day (20 May 2024 17:19)  Tresiba FlexTouch 100 units/mL subcutaneous solution: 5 unit(s) subcutaneous once a day (at bedtime) (20 May 2024 17:19)      MEDICATIONS  (STANDING):  albuterol/ipratropium for Nebulization 3 milliLiter(s) Nebulizer every 6 hours  aMIOdarone    Tablet 200 milliGRAM(s) Oral daily  atorvastatin 80 milliGRAM(s) Oral at bedtime  benzonatate 100 milliGRAM(s) Oral every 8 hours  chlorhexidine 2% Cloths 1 Application(s) Topical daily  dextrose 10% Bolus 125 milliLiter(s) IV Bolus once  dextrose 5%. 1000 milliLiter(s) (100 mL/Hr) IV Continuous <Continuous>  dextrose 5%. 1000 milliLiter(s) (50 mL/Hr) IV Continuous <Continuous>  dextrose 50% Injectable 25 Gram(s) IV Push once  dextrose 50% Injectable 12.5 Gram(s) IV Push once  glucagon  Injectable 1 milliGRAM(s) IntraMuscular once  insulin glargine Injectable (LANTUS) 5 Unit(s) SubCutaneous at bedtime  insulin lispro (ADMELOG) corrective regimen sliding scale   SubCutaneous three times a day before meals  insulin lispro (ADMELOG) corrective regimen sliding scale   SubCutaneous at bedtime  pantoprazole  Injectable 40 milliGRAM(s) IV Push every 12 hours  sodium bicarbonate 650 milliGRAM(s) Oral daily  sucralfate 1 Gram(s) Oral two times a day  tamsulosin 0.4 milliGRAM(s) Oral at bedtime      TELEMETRY: 	    ECG:  	  RADIOLOGY:   DIAGNOSTIC TESTING:  [ ] Echocardiogram:  [ ]  Catheterization:  [ ] Stress Test:    OTHER: 	    LABS:	 	                            8.9    4.87  )-----------( 102      ( 30 May 2024 05:30 )             26.1     05-30    141  |  109<H>  |  73<H>  ----------------------------<  132<H>  3.8   |  18<L>  |  3.27<H>    Ca    7.9<L>      30 May 2024 05:30    TPro  5.3<L>  /  Alb  2.3<L>  /  TBili  2.5<H>  /  DBili  x   /  AST  58<H>  /  ALT  54<H>  /  AlkPhos  135<H>  05-30

## 2024-05-30 NOTE — PROGRESS NOTE ADULT - SUBJECTIVE AND OBJECTIVE BOX
Patient is a 88y old  Male who presents with a chief complaint of GI bleed (30 May 2024 10:37)      DATE OF SERVICE: 05-30-24 @ 13:06    SUBJECTIVE / OVERNIGHT EVENTS: overnight events noted    ROS:  Resp: No cough no sputum production  CVS: No chest pain no palpitations no orthopnea  GI: no N/V/D        MEDICATIONS  (STANDING):  albuterol/ipratropium for Nebulization 3 milliLiter(s) Nebulizer every 6 hours  aMIOdarone    Tablet 200 milliGRAM(s) Oral daily  atorvastatin 80 milliGRAM(s) Oral at bedtime  benzonatate 100 milliGRAM(s) Oral every 8 hours  chlorhexidine 2% Cloths 1 Application(s) Topical daily  dextrose 10% Bolus 125 milliLiter(s) IV Bolus once  dextrose 5%. 1000 milliLiter(s) (100 mL/Hr) IV Continuous <Continuous>  dextrose 5%. 1000 milliLiter(s) (50 mL/Hr) IV Continuous <Continuous>  dextrose 50% Injectable 25 Gram(s) IV Push once  dextrose 50% Injectable 12.5 Gram(s) IV Push once  glucagon  Injectable 1 milliGRAM(s) IntraMuscular once  insulin glargine Injectable (LANTUS) 5 Unit(s) SubCutaneous at bedtime  insulin lispro (ADMELOG) corrective regimen sliding scale   SubCutaneous three times a day before meals  insulin lispro (ADMELOG) corrective regimen sliding scale   SubCutaneous at bedtime  pantoprazole  Injectable 40 milliGRAM(s) IV Push every 12 hours  sodium bicarbonate 650 milliGRAM(s) Oral daily  sucralfate 1 Gram(s) Oral two times a day  tamsulosin 0.4 milliGRAM(s) Oral at bedtime    MEDICATIONS  (PRN):  acetaminophen     Tablet .. 650 milliGRAM(s) Oral every 6 hours PRN Temp greater or equal to 38C (100.4F), Mild Pain (1 - 3)  aluminum hydroxide/magnesium hydroxide/simethicone Suspension 30 milliLiter(s) Oral every 4 hours PRN Dyspepsia  dextrose Oral Gel 15 Gram(s) Oral once PRN Blood Glucose LESS THAN 70 milliGRAM(s)/deciliter  guaiFENesin Oral Liquid (Sugar-Free) 200 milliGRAM(s) Oral every 6 hours PRN Cough  melatonin 3 milliGRAM(s) Oral at bedtime PRN Insomnia  ondansetron Injectable 4 milliGRAM(s) IV Push every 8 hours PRN Nausea and/or Vomiting        CAPILLARY BLOOD GLUCOSE      POCT Blood Glucose.: 258 mg/dL (30 May 2024 12:37)  POCT Blood Glucose.: 142 mg/dL (30 May 2024 08:21)  POCT Blood Glucose.: 165 mg/dL (29 May 2024 22:06)  POCT Blood Glucose.: 261 mg/dL (29 May 2024 17:34)  POCT Blood Glucose.: 199 mg/dL (29 May 2024 13:17)    I&O's Summary      Vital Signs Last 24 Hrs  T(C): 36.3 (30 May 2024 05:30), Max: 37.6 (29 May 2024 21:15)  T(F): 97.4 (30 May 2024 05:30), Max: 99.6 (29 May 2024 21:15)  HR: 77 (30 May 2024 07:13) (66 - 90)  BP: 106/55 (30 May 2024 05:30) (96/56 - 109/56)  BP(mean): --  RR: 18 (30 May 2024 05:30) (18 - 18)  SpO2: 95% (30 May 2024 07:13) (95% - 100%)    PHYSICAL EXAM:  CHEST/LUNG: decreased breath sounds bases   HEART: S1 S2; systolic murmur +   ABDOMEN: Soft, Nontender,  EXTREMITIES: negative edema  NEUROLOGY: AO x 3 non-focal    LABS:                        8.9    4.87  )-----------( 102      ( 30 May 2024 05:30 )             26.1     05-30    141  |  109<H>  |  73<H>  ----------------------------<  132<H>  3.8   |  18<L>  |  3.27<H>    Ca    7.9<L>      30 May 2024 05:30    TPro  5.3<L>  /  Alb  2.3<L>  /  TBili  2.5<H>  /  DBili  x   /  AST  58<H>  /  ALT  54<H>  /  AlkPhos  135<H>  05-30          Urinalysis Basic - ( 30 May 2024 05:30 )    Color: x / Appearance: x / SG: x / pH: x  Gluc: 132 mg/dL / Ketone: x  / Bili: x / Urobili: x   Blood: x / Protein: x / Nitrite: x   Leuk Esterase: x / RBC: x / WBC x   Sq Epi: x / Non Sq Epi: x / Bacteria: x          All consultant(s) notes reviewed and care discussed with other providers        Contact Number, Dr Arroyo [phone]

## 2024-05-30 NOTE — PROGRESS NOTE ADULT - SUBJECTIVE AND OBJECTIVE BOX
NEPHROLOGY-[facility] [phone]        Patient seen and examined transfused PRBC last night, reports he feels short of breath today.         MEDICATIONS  (STANDING):  albuterol/ipratropium for Nebulization 3 milliLiter(s) Nebulizer every 6 hours  aMIOdarone    Tablet 200 milliGRAM(s) Oral daily  atorvastatin 80 milliGRAM(s) Oral at bedtime  benzonatate 100 milliGRAM(s) Oral every 8 hours  chlorhexidine 2% Cloths 1 Application(s) Topical daily  dextrose 10% Bolus 125 milliLiter(s) IV Bolus once  dextrose 5%. 1000 milliLiter(s) (100 mL/Hr) IV Continuous <Continuous>  dextrose 5%. 1000 milliLiter(s) (50 mL/Hr) IV Continuous <Continuous>  dextrose 50% Injectable 25 Gram(s) IV Push once  dextrose 50% Injectable 12.5 Gram(s) IV Push once  glucagon  Injectable 1 milliGRAM(s) IntraMuscular once  insulin glargine Injectable (LANTUS) 5 Unit(s) SubCutaneous at bedtime  insulin lispro (ADMELOG) corrective regimen sliding scale   SubCutaneous three times a day before meals  insulin lispro (ADMELOG) corrective regimen sliding scale   SubCutaneous at bedtime  pantoprazole  Injectable 40 milliGRAM(s) IV Push every 12 hours  sodium bicarbonate 650 milliGRAM(s) Oral daily  sucralfate 1 Gram(s) Oral two times a day  tamsulosin 0.4 milliGRAM(s) Oral at bedtime      VITAL:  T(C): , Max: 37.6 (05-29-24 @ 21:15)  T(F): , Max: 99.6 (05-29-24 @ 21:15)  HR: 80 (05-30-24 @ 14:40)  BP: 105/53 (05-30-24 @ 14:40)  BP(mean): --  RR: 18 (05-30-24 @ 14:40)  SpO2: 100% (05-30-24 @ 14:40)  Wt(kg): --    I and O's:        PHYSICAL EXAM:    Constitutional: NAD  Neck:  No JVD  Respiratory: diminished BS  Cardiovascular: S1 and S2  Gastrointestinal: BS+, soft, NT/ND  Extremities: No peripheral edema  Neurological: A/O x 3, no focal deficits  Psychiatric: Normal mood, normal affect  : No Temple  Skin: No rashes  Access: Not applicable    LABS:                        8.9    4.87  )-----------( 102      ( 30 May 2024 05:30 )             26.1     05-30    141  |  109<H>  |  73<H>  ----------------------------<  132<H>  3.8   |  18<L>  |  3.27<H>    Ca    7.9<L>      30 May 2024 05:30    TPro  5.3<L>  /  Alb  2.3<L>  /  TBili  2.5<H>  /  DBili  x   /  AST  58<H>  /  ALT  54<H>  /  AlkPhos  135<H>  05-30          Urine Studies:  Urinalysis Basic - ( 30 May 2024 05:30 )    Color: x / Appearance: x / SG: x / pH: x  Gluc: 132 mg/dL / Ketone: x  / Bili: x / Urobili: x   Blood: x / Protein: x / Nitrite: x   Leuk Esterase: x / RBC: x / WBC x   Sq Epi: x / Non Sq Epi: x / Bacteria: x        Assessment and Plan:   · Assessment	      88y Male with CAD, HFrEF, Afib, HTN, DM, solitary R kidney and CKD p/w acute on chronic HFrEF and acute on chronic kidney injury.  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 acute on chronic HFrEF - CT with worsening congestion, a/w lasix 20 mg IV daily   Mitral clip is off the table at present    LCX disease   GI- S/p EGD, on IV protonix bid , eliquis resumed   EPS- not a candidate for watchman     D/w family at bedside     [facility] LISSY  [facility]   [phone]              NEPHROLOGY-[facility] [phone]        Patient seen and examined transfused PRBC last night, reports he feels short of breath today.         MEDICATIONS  (STANDING):  albuterol/ipratropium for Nebulization 3 milliLiter(s) Nebulizer every 6 hours  aMIOdarone    Tablet 200 milliGRAM(s) Oral daily  atorvastatin 80 milliGRAM(s) Oral at bedtime  benzonatate 100 milliGRAM(s) Oral every 8 hours  chlorhexidine 2% Cloths 1 Application(s) Topical daily  dextrose 10% Bolus 125 milliLiter(s) IV Bolus once  dextrose 5%. 1000 milliLiter(s) (100 mL/Hr) IV Continuous <Continuous>  dextrose 5%. 1000 milliLiter(s) (50 mL/Hr) IV Continuous <Continuous>  dextrose 50% Injectable 25 Gram(s) IV Push once  dextrose 50% Injectable 12.5 Gram(s) IV Push once  glucagon  Injectable 1 milliGRAM(s) IntraMuscular once  insulin glargine Injectable (LANTUS) 5 Unit(s) SubCutaneous at bedtime  insulin lispro (ADMELOG) corrective regimen sliding scale   SubCutaneous three times a day before meals  insulin lispro (ADMELOG) corrective regimen sliding scale   SubCutaneous at bedtime  pantoprazole  Injectable 40 milliGRAM(s) IV Push every 12 hours  sodium bicarbonate 650 milliGRAM(s) Oral daily  sucralfate 1 Gram(s) Oral two times a day  tamsulosin 0.4 milliGRAM(s) Oral at bedtime      VITAL:  T(C): , Max: 37.6 (05-29-24 @ 21:15)  T(F): , Max: 99.6 (05-29-24 @ 21:15)  HR: 80 (05-30-24 @ 14:40)  BP: 105/53 (05-30-24 @ 14:40)  BP(mean): --  RR: 18 (05-30-24 @ 14:40)  SpO2: 100% (05-30-24 @ 14:40)  Wt(kg): --    I and O's:        PHYSICAL EXAM:    Constitutional: NAD  Neck:  No JVD  Respiratory: diminished BS  Cardiovascular: S1 and S2  Gastrointestinal: BS+, soft, NT/ND  Extremities: No peripheral edema  Neurological: A/O x 3, no focal deficits  Psychiatric: Normal mood, normal affect  : No Temple  Skin: No rashes  Access: Not applicable    LABS:                        8.9    4.87  )-----------( 102      ( 30 May 2024 05:30 )             26.1     05-30    141  |  109<H>  |  73<H>  ----------------------------<  132<H>  3.8   |  18<L>  |  3.27<H>    Ca    7.9<L>      30 May 2024 05:30    TPro  5.3<L>  /  Alb  2.3<L>  /  TBili  2.5<H>  /  DBili  x   /  AST  58<H>  /  ALT  54<H>  /  AlkPhos  135<H>  05-30          Urine Studies:  Urinalysis Basic - ( 30 May 2024 05:30 )    Color: x / Appearance: x / SG: x / pH: x  Gluc: 132 mg/dL / Ketone: x  / Bili: x / Urobili: x   Blood: x / Protein: x / Nitrite: x   Leuk Esterase: x / RBC: x / WBC x   Sq Epi: x / Non Sq Epi: x / Bacteria: x        Assessment and Plan:   · Assessment	      88y Male with CAD, HFrEF, Afib, HTN, DM, solitary R kidney and CKD p/w acute on chronic HFrEF and acute on chronic kidney injury.  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 acute on chronic HFrEF - CT with worsening congestion, a/w lasix 20 mg IV daily   Mitral clip is off the table at present    LCX disease   GI- S/p EGD, on IV protonix bid , eliquis resumed   EPS- not a candidate for watchman     D/w family at bedside     [facility] NP  [facility]   [phone]           RENAL ATTENDING NOTE  Patient seen and examined with NP. Agree with assessment and plan as above.    Ronnell Bull MD  [facility]  [phone]

## 2024-05-30 NOTE — PROGRESS NOTE ADULT - PROBLEM SELECTOR PLAN 1
hemoglobin 8.9 today  s/p transfusion x 1 unit yesterday for hemoglobin 7.7  not sure if the slowly decreasing trend really represents GI oozing or not  however after d/w the patient and his wife Katya over the phone we will discontinue the apixaban   patient and Katya both aware of the high risk of stroke  will continue to monitor today   maintain active Type and screen

## 2024-05-31 VITALS
OXYGEN SATURATION: 98 % | DIASTOLIC BLOOD PRESSURE: 50 MMHG | RESPIRATION RATE: 17 BRPM | HEART RATE: 78 BPM | SYSTOLIC BLOOD PRESSURE: 111 MMHG | TEMPERATURE: 98 F

## 2024-05-31 LAB
ALBUMIN SERPL ELPH-MCNC: 2.3 G/DL — LOW (ref 3.3–5)
ALP SERPL-CCNC: 129 U/L — HIGH (ref 40–120)
ALT FLD-CCNC: 56 U/L — HIGH (ref 4–41)
ANION GAP SERPL CALC-SCNC: 12 MMOL/L — SIGNIFICANT CHANGE UP (ref 7–14)
AST SERPL-CCNC: 67 U/L — HIGH (ref 4–40)
BILIRUB SERPL-MCNC: 1.7 MG/DL — HIGH (ref 0.2–1.2)
BUN SERPL-MCNC: 71 MG/DL — HIGH (ref 7–23)
CALCIUM SERPL-MCNC: 7.9 MG/DL — LOW (ref 8.4–10.5)
CHLORIDE SERPL-SCNC: 109 MMOL/L — HIGH (ref 98–107)
CO2 SERPL-SCNC: 19 MMOL/L — LOW (ref 22–31)
CREAT SERPL-MCNC: 3.27 MG/DL — HIGH (ref 0.5–1.3)
EGFR: 17 ML/MIN/1.73M2 — LOW
GLUCOSE BLDC GLUCOMTR-MCNC: 111 MG/DL — HIGH (ref 70–99)
GLUCOSE BLDC GLUCOMTR-MCNC: 183 MG/DL — HIGH (ref 70–99)
GLUCOSE SERPL-MCNC: 97 MG/DL — SIGNIFICANT CHANGE UP (ref 70–99)
HCT VFR BLD CALC: 25.7 % — LOW (ref 39–50)
HGB BLD-MCNC: 8.7 G/DL — LOW (ref 13–17)
MCHC RBC-ENTMCNC: 32.2 PG — SIGNIFICANT CHANGE UP (ref 27–34)
MCHC RBC-ENTMCNC: 33.9 GM/DL — SIGNIFICANT CHANGE UP (ref 32–36)
MCV RBC AUTO: 95.2 FL — SIGNIFICANT CHANGE UP (ref 80–100)
NRBC # BLD: 0 /100 WBCS — SIGNIFICANT CHANGE UP (ref 0–0)
NRBC # FLD: 0 K/UL — SIGNIFICANT CHANGE UP (ref 0–0)
PLATELET # BLD AUTO: 104 K/UL — LOW (ref 150–400)
POTASSIUM SERPL-MCNC: 3.8 MMOL/L — SIGNIFICANT CHANGE UP (ref 3.5–5.3)
POTASSIUM SERPL-SCNC: 3.8 MMOL/L — SIGNIFICANT CHANGE UP (ref 3.5–5.3)
PROT SERPL-MCNC: 5.2 G/DL — LOW (ref 6–8.3)
RBC # BLD: 2.7 M/UL — LOW (ref 4.2–5.8)
RBC # FLD: 16.9 % — HIGH (ref 10.3–14.5)
SODIUM SERPL-SCNC: 140 MMOL/L — SIGNIFICANT CHANGE UP (ref 135–145)
WBC # BLD: 5.55 K/UL — SIGNIFICANT CHANGE UP (ref 3.8–10.5)
WBC # FLD AUTO: 5.55 K/UL — SIGNIFICANT CHANGE UP (ref 3.8–10.5)

## 2024-05-31 RX ORDER — FUROSEMIDE 40 MG
40 TABLET ORAL DAILY
Refills: 0 | Status: DISCONTINUED | OUTPATIENT
Start: 2024-05-31 | End: 2024-05-31

## 2024-05-31 RX ORDER — ALBUTEROL 90 UG/1
2 AEROSOL, METERED ORAL
Qty: 3 | Refills: 0
Start: 2024-05-31 | End: 2024-06-13

## 2024-05-31 RX ORDER — FERROUS SULFATE 325(65) MG
325 TABLET ORAL DAILY
Refills: 0 | Status: DISCONTINUED | OUTPATIENT
Start: 2024-05-31 | End: 2024-05-31

## 2024-05-31 RX ORDER — FERROUS SULFATE 325(65) MG
1 TABLET ORAL
Qty: 30 | Refills: 0
Start: 2024-05-31 | End: 2024-06-29

## 2024-05-31 RX ORDER — APIXABAN 2.5 MG/1
1 TABLET, FILM COATED ORAL
Refills: 0 | DISCHARGE

## 2024-05-31 RX ADMIN — Medication 100 MILLIGRAM(S): at 05:42

## 2024-05-31 RX ADMIN — Medication 100 MILLIGRAM(S): at 13:37

## 2024-05-31 RX ADMIN — Medication 3 MILLILITER(S): at 03:28

## 2024-05-31 RX ADMIN — Medication 650 MILLIGRAM(S): at 13:36

## 2024-05-31 RX ADMIN — Medication 3 MILLILITER(S): at 07:19

## 2024-05-31 RX ADMIN — Medication 1 GRAM(S): at 05:41

## 2024-05-31 RX ADMIN — PANTOPRAZOLE SODIUM 40 MILLIGRAM(S): 20 TABLET, DELAYED RELEASE ORAL at 05:41

## 2024-05-31 RX ADMIN — Medication 3 MILLILITER(S): at 14:34

## 2024-05-31 RX ADMIN — AMIODARONE HYDROCHLORIDE 200 MILLIGRAM(S): 400 TABLET ORAL at 05:41

## 2024-05-31 RX ADMIN — Medication 1: at 13:09

## 2024-05-31 NOTE — PROGRESS NOTE ADULT - NSPROGADDITIONALINFOA_GEN_ALL_CORE
discussed with patient in detail, expresses understanding of treatment plans.  discussed with covering ACP  discussed with cardiology  discussed with wife over the phone   discharge planning next 2 - 3 days
discussed with patient in detail, expresses understanding of treatment plans.  optimized for EGD today
discussed with patient in detail, expresses understanding of treatment plans.   discussed with wife in detail over the phone
possible discharge tomorrow
discussed with patient in detail, expresses understanding of treatment plans.  discussed with wife over the phone
discussed with patient in detail, expresses understanding of treatment plans.
discussed with son Olivier and wife Katya  over the phone over the phone  discussed with ACP  discussed with ep attending
discussed with patient in detail, expresses understanding of treatment plans.

## 2024-05-31 NOTE — PROGRESS NOTE ADULT - PROBLEM SELECTOR PROBLEM 3
HFrEF (heart failure with reduced ejection fraction)
Benign essential HTN
HFrEF (heart failure with reduced ejection fraction)
Benign essential HTN
HFrEF (heart failure with reduced ejection fraction)
Benign essential HTN
HFrEF (heart failure with reduced ejection fraction)
HFrEF (heart failure with reduced ejection fraction)
Benign essential HTN
HFrEF (heart failure with reduced ejection fraction)
Benign essential HTN

## 2024-05-31 NOTE — PROGRESS NOTE ADULT - PROBLEM SELECTOR PLAN 6
Chronic stable  chest pain free
Chronic stable  Continue rosuvastatin 40mg daily formulary  A1c and lipid panel in AM
Chronic stable  chest pain free
Chronic stable  chest pain free
Chronic stable  Continue rosuvastatin 40mg daily formulary
Chronic stable  Continue rosuvastatin 40mg daily formulary  A1c and lipid panel in AM
Chronic stable  chest pain free
Chronic stable  Continue rosuvastatin 40mg daily formulary
Chronic stable  chest pain free
Chronic stable  chest pain free
Chronic stable  Continue rosuvastatin 40mg daily formulary

## 2024-05-31 NOTE — PROGRESS NOTE ADULT - PROBLEM SELECTOR PLAN 1
hemoglobin 8.7  today  off apixaban secondary to persistent GI ooze  patient and Katya both aware of the high risk of stroke

## 2024-05-31 NOTE — PROGRESS NOTE ADULT - PROBLEM SELECTOR PROBLEM 5
T2DM (type 2 diabetes mellitus)

## 2024-05-31 NOTE — PROGRESS NOTE ADULT - TIME BILLING
Agree with above ACP note.  cv stable  cont current tx  continue po lasix   cont a.c   remains off antiplat to minimize bleed risk   eps f.u for eventual watchman
Agree with above ACP note.  cv stable  cont current tx  continue po lasix   cont a.c   remains off antiplat to minimize bleed risk   eps f.u for eventual watchman
Agree with above ACP note.  cv stable  events noted  cont daily lasxi  in light of multiple recurrent gi bleeds requiring hospital admits/PRBCs would defer restarting a/c or antiplatelets  patient at extreme high risk for rebleed on a/c and/or antiplatelet therapy  risks of a/c, single antiplat therapy currently outweight benefit  cont med tx of af, cmp, cad  dcp per med
Patient seen and examined.  Agree with above ACP note.  cv stable  events noted  lasix today   likely restart daily dosing rangel  in light of multiple recurrent gi bleeds requiring hospital admits/PRBCs would defer restarting a/c or antiplatelets  patient at extreme high risk for rebleed on a/c and/or antiplatelet therapy  risks of a/c, single antiplat therapy currently outweight benefit  cont med tx of af, cmp, cad  dcp per med
Agree with above ACP note.  cv stable  cont current tx  cont med tx of cad  remains off antiplat given recurrent gi bleed  cont a/c  trend heme  cont po diuretics, trend renal function  decrease lasix to qd if creat cont to rise  vol status imrpoved
Agree with above ACP note.  cv stable  cont current tx  continue po lasix   cont a.c   remains off antiplat to minimize bleed risk   eps f.u for eventual watchman
Agree with above ACP note.  cv stable  cont current tx  continue po lasix   cont a.c   remains off antiplat to minimize bleed risk   eps f.u for eventual watchman

## 2024-05-31 NOTE — PROGRESS NOTE ADULT - ASSESSMENT
88yr old male with a pmh of HTN, HFrEF, T2DM on insulin, TIA, CAD s/p stent, bladder cancer s/p chemo, afib on eliquis, hx of left nephrectomy who presents to the ED for a three day history of black stools.  Denies NSAIDs use.   Pt with admission 5/3-5/10 for acute decompensated heart failure and GIB.   Denies  headache, dizziness, chest pain, palpitations, SOB, abdominal pain, joint pain, diarrhea/constipation, urinary symptoms.   Vitals: T 98.2F, HR 72, /51, RR 18 satting 100% RA (20 May 2024 16:26):  now pulm called for increasing cough : and has mucus production ;  no fever:  he is an ex smoker;     Cough with mucus  Black stools/  r/o gi bleed  HTN  DM  CHF/CAD/PCI  A FIBRILLATION    Cough with mucus  -no fever:  no leucocytosis : no clinical features of pneumonia:   -secondary to heart failure-see below:   -cxr today to me seems to be with small concepcion effusions  -Add BD  : cont ppi  and add anti tussives   -no steroids for now:  but may need   it:  will follow closely  -cough could be due to chf  ; symptomatic rx;   5/28:  -ct chest reviewed:  showed: Small to moderate-sized bilateral pleural effusions are predominantly new since March 26, 2024. Mild interlobular septal thickening suggestive of pulmonary edema.  Bilateral tiny foci of impacted distal airways as described above may be due to mucus or may be due to endobronchial spread of infection. 6 mm right lower lobe nodular opacity new since January 26, 2022 may also ; I really doubt he has pneumonia:  he has no fever or leucocytosis ; keep off antibiotics for now:   5/30:  -remains afebrile:  no increased WBC count:  cough seems better:  keep off antibiotics  5/31:  -seems to be doing  ok ; no sob:  nocugh ; no phlegm    Black stools/  r/o gi bleed  -on eliquis:   -would defer to primary team and gi    5/30  -off ac now   5/31: off ac   HTN  -controlled  DM  -monitor and control   CHF/CAD/PCI  -last echo  showed; Left ventricular systolic function is moderately decreased. Severe mitral regurgitation. mild to moderate aortic regurgitation.  A FIBRILLATION  -on eliquis:  5/30:  -hr controlled: off eliquis  5/31; off [place] acp
A/p  89yo w/ PMHx CKD, HFrEF, Diabetes, TIA,  CAD s/p stenting, sev MR,  bladder cancer s/p chemo in remission, afib on eliquis, hx of left nephrectomy, recent admission for NSTEMI pw GIB .    #GIB  -h/h noted- PRBC as needed   -S/p EGD  -antiplts remain on hold   -a.c resumed  -trend h/h    #CAD, s/p PCI  -cv stable, no chest pain  -Cont statin  -sp C 05.08.24  w/ severe obstructive CAD to LCx/om and Mild to moderate in-stent restenosis of left anterior descending and right coronary artery stents  -Will manage the above medically for now as pt is high risk for developing ELIZA and requiring HD with staged PCI and unlikely to tolerate DAP well even for short term course  -Hold off on antiplatelets in attempt to minimize bleed risk/ ac resumed   -no further cv carl indicated at this time       #Sev MR   -MARCELLA 05.08.24  Left ventricular systolic function is moderately decreased. There are regional wall motion abnormalities present. Hypokinesis of the inferior, lateral, and inferolateral walls. SEV MR , no JOSÉ MIGUEL thrombus   -[facility] as above   -per Structural heart eval last admission- not a candidate for MV clip/repair     #chronic HFrEF  -volume status stable   -Recent MARCELLA with moderately decreased LVEF, regional WMA, severe MR   -lasix po daily as ordered    #PAtrial Fibrillation  -cont amio  -ep eval noted- - Plan for watchman this Thurs     #CKD/ gama   -renal f/u    
Pt needs an appt
89yo w/ PMHx CKD, HFrEF, Diabetes, TIA, CAD s/p stenting, severe MR, bladder cancer s/p chemo in remission, afib on Eliquis, hx of left nephrectomy, recent admission for NSTEMI presented with GIB. EGD today showed normal esophagus, erythematous mucosa in the antrum, non-bleeding gastric ulcers with no stigmata of bleeding. GI recommended resuming AC. Patient was seen in EP clinic on April 19, 2024 for evaluation of JOSÉ MIGUEL occlusion. Patient underwent MARCELLA which showed no JOSÉ MIGUEL thrombus. 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 Discussed with patient and son regarding Watchman placement  - Maintain K>4 and Mg>2  - Continue management as per primary team  - Possible Watchman procedure as outpatient  - F/u with Dr. Pan in two weeks      Awaiting attending's addnedum
A/p  87yo w/ PMHx CKD, HFrEF, Diabetes, TIA,  CAD s/p stenting, sev MR,  bladder cancer s/p chemo in remission, afib on eliquis, hx of left nephrectomy, recent admission for NSTEMI pw GIB .    #GIB  -h/h noted- PRBC as needed   -S/p EGD  -antiplts remain on hold   -a.c resumed  -trend h/h    #CAD, s/p PCI  -cv stable, no chest pain  -Cont statin  -sp C 05.08.24  w/ severe obstructive CAD to LCx/om and Mild to moderate in-stent restenosis of left anterior descending and right coronary artery stents  -Will manage the above medically for now as pt is high risk for developing ELIZA and requiring HD with staged PCI and unlikely to tolerate DAP well even for short term course  -Hold off on antiplatelets in attempt to minimize bleed risk/ ac resumed   -no further cv carl indicated at this time       #Sev MR   -MARCELLA 05.08.24  Left ventricular systolic function is moderately decreased. There are regional wall motion abnormalities present. Hypokinesis of the inferior, lateral, and inferolateral walls. SEV MR , no JOSÉ MIGUEL thrombus   -[facility] as above   -per Structural heart eval last admission- not a candidate for MV clip/repair     #chronic HFrEF  -volume status stable   -Recent MARCELLA with moderately decreased LVEF, regional WMA, severe MR   -lasix po daily as ordered    #PAtrial Fibrillation  -cont amio  -ep eval noted- - Plan for watchman this Thurs     #CKD/ gama   -renal f/u    
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 Some of these were oozing. This was ablated with RFA in the usual fashion at 12J x 3 for each site. 93 ablations were applied.   - S/p EGD 3/2024 with diffuse gastric hemorrhage, s/p hemospray and biopsy. Path was negative for H. pylori, GIM and congo red staining was negative.   - H&H drop from 9.8 (5/12/24) to 8 on 5/20/24  - Hemodynamically stable currently  - Mild drop in hgb 5/21 without symptoms.     - s/p EGD 5/22: Diffuse moderately congested mucosa with punctate erythema without bleeding was found in the gastric antrum. These findings were mild. While consistent with mild GAVE (significantly improved from prior),   there was no clear area to treat. Few non-bleeding linear gastric ulcers with no stigmata of bleeding were found in the gastric antrum. The largest lesion was 3 mm in largest dimension.    #Severe MR  - Seen by Structural heart & not a candidate for MV clip/repair during last admission    #CAD with stents   - S/p [facility] w/ severe obstructive CAD to LCx/om and Mild to moderate in-stent restenosis of left anterior descending and right coronary artery stents  - Medically managed during last admission as pt was high risk for developing ELIZA and requiring HD with staged PCI  - Hold off on antiplatelets in attempt to minimize bleed risk    Recommendations  - regular diet  - Use Protonix (pantoprazole) 40 mg PO BID indefinitely  - Transfuse to goal Hgb > 8 as needed, keep active T&S and 2 large bore IV  - No GI contraindication to resuming Eliquis (apixaban)   - Please provide patient with Gastroenterology Clinic number to confirm/arrange appointment; [phone] ([facility] at [address] [place] [address]) or [phone] ([facility] at [address] [place] [address]) or [phone] ([facility] at [address] [place] [address]).     GI to sign off. Please call back as needed.    All recommendations are tentative until note is attested by attending.     Twyla Walters, PGY6  Gastroenterology/Hepatology Fellow  Available on Microsoft Teams  [phone] ([place] Short Range Pager)  [phone] (Long Range Pager)    After 5pm, please contact the on-call GI fellow. [phone]
88yr old male with a pmh of HTN, HFrEF, T2DM on insulin, TIA, CAD s/p stent, bladder cancer s/p chemo, afib on eliquis, hx of left nephrectomy who presents to the ED for a three day history of black stools.  Denies NSAIDs use.   Pt with admission 5/3-5/10 for acute decompensated heart failure and GIB.   Denies  headache, dizziness, chest pain, palpitations, SOB, abdominal pain, joint pain, diarrhea/constipation, urinary symptoms.   Vitals: T 98.2F, HR 72, /51, RR 18 satting 100% RA (20 May 2024 16:26):  now pulm called for increasing cough : and has mucus production ;  no fever:  he is an ex smoker;     Cough with mucus  Black stools/  r/o gi bleed  HTN  DM  CHF/CAD/PCI  A FIBRILLATION    Cough with mucus  -no fever:  no leucocytosis : no clinical features of pneumonia:   -secondary to heart failure-see below:   -cxr today to me seems to be with small concepcion effusions  -Add BD  : cont ppi  and add anti tussives   -no steroids for now:  but may need   it:  will follow closely  -cough could be due to chf  ; symptomatic rx;   5/28:  -ct chest reviewed:  showed: Small to moderate-sized bilateral pleural effusions are predominantly new since March 26, 2024. Mild interlobular septal thickening suggestive of pulmonary edema.  Bilateral tiny foci of impacted distal airways as described above may be due to mucus or may be due to endobronchial spread of infection. 6 mm right lower lobe nodular opacity new since January 26, 2022 may also ; I really doubt he has pneumonia:  he has no fever or leucocytosis ; keep off antibiotics for now:   5/30:  -remains afebrile:  no increased WBC count:  cough seems better:  keep off antibtiocs  Black stools/  r/o gi bleed  -on eliquis:   -would defer to primary team and gi    5/30L  -off ac now   HTN  -controlled  DM  -monitor and control   CHF/CAD/PCI  -last echo  showed; Left ventricular systolic function is moderately decreased. Severe mitral regurgitation. mild to moderate aortic regurgitation.  A FIBRILLATION  -on eliquis:  5/30:  -hr controlled: off eliquis    dw acp
88yr old male with a pmh of HTN, HFrEF, T2DM on insulin, TIA, CAD s/p stent, bladder cancer s/p chemo, afib on eliquis, hx of left nephrectomy who presents to the ED for a three day history of black stools.  Denies NSAIDs use.   Pt with admission 5/3-5/10 for acute decompensated heart failure and GIB.   Denies  headache, dizziness, chest pain, palpitations, SOB, abdominal pain, joint pain, diarrhea/constipation, urinary symptoms.   Vitals: T 98.2F, HR 72, /51, RR 18 satting 100% RA (20 May 2024 16:26):  now pulm called for increasing cough : and has mucus production ;  no fever:  he is an ex smoker;     Cough with mucus  Black stools/  r/o gi bleed  HTN  DM  CHF/CAD/PCI  A FIBRILLATION    Cough with mucus  -no fever:  no leucocytosis : no clinical features of pneumonia:   -secondary to heart failure-see below:   -cxr today to me seems to be with small concepcion effusions  -Add BD  : cont ppi  and add anti tussives   -no steroids for now:  but may need   it:  will follow closely  -cough could be due to chf  ; symptomatic rx;   5/28:  -ct chest reviewed:  showed: Small to moderate-sized bilateral pleural effusionsare predominantly new since March 26, 2024. Mild interlobular septal thickening suggestive of pulmonary edema.  Bilateral tiny foci of impacted distal airways as described above may be due to mucus or may be due to endobronchial spread of infection. 6 mm right lower lobe nodular opacity new since January 26, 2022 may also ; I really doubt he has pneumonia:  he has no fever or leucocytosis ; keep off antibiotics for now:   Black stools/  r/o gi bleed  -on eliquis:   -would defer to primary team and gi    HTN  -controlled  DM  -monitor and control   CHF/CAD/PCI  -last echo  showed; Left ventricular systolic function is moderately decreased. Severe mitral regurgitation. mild to moderate aortic regurgitation.  A FIBRILLATION  -on eliquis:    mayela doe
A/p  87yo w/ PMHx CKD, HFrEF, Diabetes, TIA,  CAD s/p stenting, sev MR,  bladder cancer s/p chemo in remission, afib on eliquis, hx of left nephrectomy, recent admission for NSTEMI pw GIB .    #GIB  -h/h noted- PRBC as needed   -S/p EGD  -antiplts remain on hold   -a.c on hold again given recurrent anemia/ gib / requiring prbcs  -trend h/h    #CAD, s/p PCI  -cv stable, no chest pain  -Cont statin  -sp [facility] 05.08.24  w/ severe obstructive CAD to LCx/om and Mild to moderate in-stent restenosis of left anterior descending and right coronary artery stents  -Will manage the above medically for now as pt is high risk for developing ELIZA and requiring HD with staged PCI and unlikely to tolerate DAP well even for short term course  -pt is at this point high risk for rebleed given multiple admissions/ prbcs- antiplt will remains on hold  -no further cv carl indicated at this time     #Sev MR   -MARCELLA 05.08.24  Left ventricular systolic function is moderately decreased. There are regional wall motion abnormalities present. Hypokinesis of the inferior, lateral, and inferolateral walls. SEV MR , no JOSÉ MIGUEL thrombus   -[facility] as above   -per Structural heart eval last admission- not a candidate for MV clip/repair     #chronic HFrEF  -Recent MARCELLA with moderately decreased LVEF, regional WMA, severe MR   -lasix 20 mg IVP x 1 5/28-- creat improved  -CT chest 5/28 ---Small to moderate-sized bilateral pleural effusions are  predominantly new / pulmonary edema.Bilateral tiny foci of impacted distal airways as described above may be  due to mucus or may be due to endobronchial spread of infection. 6 mm right lower lobe nodular opacity new; Small to moderate-sized ascites with significant interval increase   +SOB on exam -- creat elevated -- sp lasix 20 mg IVP  today 5/30--  will reassess lasix dose/ need tomorrow pending sympt/creat level    #PAtrial Fibrillation  -cont amio  -per EP-- patient not a good candidate for left atrial appendage occlusion.   -ac on hold     #CKD/ gama   -renal f/u    
A/p  87yo w/ PMHx CKD, HFrEF, Diabetes, TIA,  CAD s/p stenting, sev MR,  bladder cancer s/p chemo in remission, afib on eliquis, hx of left nephrectomy, recent admission for NSTEMI pw GIB .    #GIB  -h/h noted- PRBC as needed   -S/p EGD  -antiplts remain on hold   -a.c resumed  -trend h/h    #CAD, s/p PCI  -cv stable, no chest pain  -Cont statin  -sp C 05.08.24  w/ severe obstructive CAD to LCx/om and Mild to moderate in-stent restenosis of left anterior descending and right coronary artery stents  -Will manage the above medically for now as pt is high risk for developing ELIZA and requiring HD with staged PCI and unlikely to tolerate DAP well even for short term course  -Hold off on antiplatelets in attempt to minimize bleed risk/ ac resumed   -no further cv carl indicated at this time       #Sev MR   -MARCELLA 05.08.24  Left ventricular systolic function is moderately decreased. There are regional wall motion abnormalities present. Hypokinesis of the inferior, lateral, and inferolateral walls. SEV MR , no JOSÉ MIGUEL thrombus   -[facility] as above   -per Structural heart eval last admission- not a candidate for MV clip/repair     #chronic HFrEF  -Recent MARCELLA with moderately decreased LVEF, regional WMA, severe MR   -s lasix 20 mg IVP x 1 5/28-- creat improving   -CT chest 5/28 ---Small to moderate-sized bilateral pleural effusions are  predominantly new / pulmonary edema.Bilateral tiny foci of impacted distal airways as described above may be   due to mucus or may be due to endobronchial spread of infection. 6 mm right lower lobe nodular opacity new; Small to moderate-sized ascites with significant interval increase   -recc to start lasix 20 mg IVP DAILY       #PAtrial Fibrillation  -cont amio  -per EP-- patient not a good candidate for left atrial appendage occlusion.   -on ac     #CKD/ gama   -renal f/u    
A/p  89yo w/ PMHx CKD, HFrEF, Diabetes, TIA,  CAD s/p stenting, sev MR,  bladder cancer s/p chemo in remission, afib on eliquis, hx of left nephrectomy, recent admission for NSTEMI pw GIB .    #GIB  -h/h noted- PRBC as needed   -S/p EGD  -antiplt on hold   -per GI ok to resume ac    #CAD, s/p PCI  -cv stable, no chest pain  -Cont statin  -sp C 05.08.24  w/ severe obstructive CAD to LCx/om and Mild to moderate in-stent restenosis of left anterior descending and right coronary artery stents  -Will manage the above medically for now as pt is high risk for developing ELIZA and requiring HD with staged PCI  -per GI Hold off on antiplatelets in attempt to minimize bleed risk/ ac resumed       #Sev MR   -MARCELLA 05.08.24  Left ventricular systolic function is moderately decreased. There are regional wall motion abnormalities present. Hypokinesis of the inferior, lateral, and inferolateral walls. SEV MR , no JOSÉ MIGUEL thrombus   -[facility] as above   -per Structural heart eval last admission- not a candidate for MV clip/repair     #chronic HFrEF  -volume status stable   -Recent MARCELLA with moderately decreased LVEF, regional WMA, severe MR   -lasix po --recc to  decreased to daily given elevated creat     #PAtrial Fibrillation  -cont amio  -ep eval noted- - Possible Watchman procedure as outpatient    #CKD/ gama   -renal f/u        
A/p  89yo w/ PMHx CKD, HFrEF, Diabetes, TIA,  CAD s/p stenting, sev MR,  bladder cancer s/p chemo in remission, afib on eliquis, hx of left nephrectomy, recent admission for NSTEMI pw GIB .    #GIB  -h/h noted- PRBC as needed   -S/p EGD  -antiplts remain on hold   -a.c on hold again given recurrent anemia/ gib / requiring prbcs  -trend h/h    #CAD, s/p PCI  -cv stable, no chest pain  -Cont statin  -sp [facility] 05.08.24  w/ severe obstructive CAD to LCx/om and Mild to moderate in-stent restenosis of left anterior descending and right coronary artery stents  -Will manage the above medically for now as pt is high risk for developing ELIZA and requiring HD with staged PCI and unlikely to tolerate DAP well even for short term course  -pt is at this point high risk for rebleed given multiple admissions/ prbcs- antiplt will remains on hold  -no further cv carl indicated at this time     #Sev MR   -MARCELLA 05.08.24  Left ventricular systolic function is moderately decreased. There are regional wall motion abnormalities present. Hypokinesis of the inferior, lateral, and inferolateral walls. SEV MR , no JOSÉ MIGUEL thrombus   -[facility] as above   -per Structural heart eval last admission- not a candidate for MV clip/repair     #chronic HFrEF  -Recent MARCELLA with moderately decreased LVEF, regional WMA, severe MR   -lasix 20 mg IVP x 1 5/28-- creat improved  -CT chest 5/28 ---Small to moderate-sized bilateral pleural effusions are  predominantly new / pulmonary edema.Bilateral tiny foci of impacted distal airways as described above may be  due to mucus or may be due to endobronchial spread of infection. 6 mm right lower lobe nodular opacity new; Small to moderate-sized ascites with significant interval increase   -sp lasix 20 mg IVP  5/30- for volume overload  -now started on lasix 40 mg PO daily- monitor renal fx       #PAtrial Fibrillation  -cont amio  -per EP-- patient not a good candidate for left atrial appendage occlusion.   -ac on hold     #CKD/ gama   -renal f/u    
A/p  89yo w/ PMHx CKD, HFrEF, Diabetes, TIA,  CAD s/p stenting, sev MR,  bladder cancer s/p chemo in remission, afib on eliquis, hx of left nephrectomy, recent admission for NSTEMI pw GIB .    #GIB  -h/h noted- PRBC as needed   -S/p EGD  -antiplts remain on hold   -a.c resumed    #CAD, s/p PCI  -cv stable, no chest pain  -Cont statin  -sp [facility] 05.08.24  w/ severe obstructive CAD to LCx/om and Mild to moderate in-stent restenosis of left anterior descending and right coronary artery stents  -Will manage the above medically for now as pt is high risk for developing ELIZA and requiring HD with staged PCI and unlikely to tolerate DAP well even for short term course  -Hold off on antiplatelets in attempt to minimize bleed risk/ ac resumed       #Sev MR   -MARCELLA 05.08.24  Left ventricular systolic function is moderately decreased. There are regional wall motion abnormalities present. Hypokinesis of the inferior, lateral, and inferolateral walls. SEV MR , no JOSÉ MIGUEL thrombus   -[facility] as above   -per Structural heart eval last admission- not a candidate for MV clip/repair     #chronic HFrEF  -volume status stable   -Recent MARCELLA with moderately decreased LVEF, regional WMA, severe MR   -lasix po daily as ordered    #PAtrial Fibrillation  -cont amio  -ep eval noted- - Possible Watchman procedure as outpatient    #CKD/ gama   -renal f/u    35 minutes spent on total encounter; more than 50% of the visit was spent counseling and/or coordinating care by the attending physician.      
A/p  89yo w/ PMHx CKD, HFrEF, Diabetes, TIA,  CAD s/p stenting, sev MR,  bladder cancer s/p chemo in remission, afib on eliquis, hx of left nephrectomy, recent admission for NSTEMI pw GIB .    #GIB  -h/h noted- PRBC as needed   -S/p EGD  -antiplts remain on hold   -a.c resumed    #CAD, s/p PCI  -cv stable, no chest pain  -Cont statin  -sp [facility] 05.08.24  w/ severe obstructive CAD to LCx/om and Mild to moderate in-stent restenosis of left anterior descending and right coronary artery stents  -Will manage the above medically for now as pt is high risk for developing ELIZA and requiring HD with staged PCI and unlikely to tolerate DAP well even for short term course  -Hold off on antiplatelets in attempt to minimize bleed risk/ ac resumed   -no further cv carl indicated at this time       #Sev MR   -MARCELLA 05.08.24  Left ventricular systolic function is moderately decreased. There are regional wall motion abnormalities present. Hypokinesis of the inferior, lateral, and inferolateral walls. SEV MR , no JOSÉ MIGUEL thrombus   -[facility] as above   -per Structural heart eval last admission- not a candidate for MV clip/repair     #chronic HFrEF  -volume status stable   -Recent MARCELLA with moderately decreased LVEF, regional WMA, severe MR   -lasix po daily as ordered    #PAtrial Fibrillation  -cont amio  -ep eval noted- - Possible Watchman procedure as outpatient    #CKD/ gama   -renal f/u    
87yo w/ PMHx CKD, HFrEF, Diabetes, TIA, CAD s/p stenting, severe MR, bladder cancer s/p chemo in remission, afib on Eliquis, hx of left nephrectomy, recent admission for NSTEMI presented with GIB. EGD showed normal esophagus, erythematous mucosa in the antrum, non-bleeding gastric ulcers with no stigmata of bleeding. GI recommended resuming AC. Patient was seen in EP clinic on April 19, 2024 for evaluation of JOSÉ MIGUEL occlusion. Patient underwent MARCELLA which showed no JOSÉ MIGUEL thrombus. 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 However, patient recently made DNR, he is tachypneic, has a creatinine of 3.3, has severe CAD, moderate LV dysfunction with severe MR and he was deemed not a candidate for mitral valve clip.  Weighing risks and benefits, patient not a good candidate for left atrial appendage occlusion.   If patient continues to bleed, might not be a candidate for anticoagulation. Discussed with patient, his wife, Dr. Arroyo, and Gary. Recommend CXR. 
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 Some of these were oozing. This was ablated with RFA in the usual fashion at 12J x 3 for each site. 93 ablations were applied.   - S/p EGD 3/2024 with diffuse gastric hemorrhage, s/p hemospray and biopsy. Path was negative for H. pylori, GIM and congo red staining was negative.   - H&H drop from 9.8 (5/12/24) to 8 on 5/20/24  - Hemodynamically stable currently  - Mild drop in hgb 5/21 without symptoms.     #Severe MR  - Seen by Structural heart & not a candidate for MV clip/repair during last admission    #CAD with stents   - S/p [facility] w/ severe obstructive CAD to LCx/om and Mild to moderate in-stent restenosis of left anterior descending and right coronary artery stents  - Medically managed during last admission as pt was high risk for developing ELIZA and requiring HD with staged PCI  - Hold off on antiplatelets in attempt to minimize bleed risk    Recommendations  - Diet as tolerated today; Timing of EGD depends on need for cardiac workup/interventions  - IV PPI BID  - Hold Eliquis if no absolute contraindications  - Transfuse to goal Hgb > 8 as needed, keep active T&S and 2 large bore IV  - Patient will need cardiac clearance prior to the EGD given recent [facility] findings and severe MR    Note incomplete until finalized by attending signature/attestation.    Carolyn Solis  GI/Hepatology Fellow    MONDAY-FRIDAY 8AM-5PM:  Pager# [phone] ([facility]) or [phone] ([facility])    NON-URGENT CONSULTS:  Please email [email or web address] OR [email or web address]  AT NIGHT AND ON WEEKENDS:  Contact on-call GI fellow via AMION by paging or hospital  from 5pm-8am and on weekends/holidays      
A/p  87yo w/ PMHx CKD, HFrEF, Diabetes, TIA,  CAD s/p stenting, sev MR,  bladder cancer s/p chemo in remission, afib on eliquis, hx of left nephrectomy, recent admission for NSTEMI pw GIB .    #GIB  -h/h noted- PRBC as needed   -S/p EGD  -antiplts remain on hold   -a.c resumed    #CAD, s/p PCI  -cv stable, no chest pain  -Cont statin  -sp [facility] 05.08.24  w/ severe obstructive CAD to LCx/om and Mild to moderate in-stent restenosis of left anterior descending and right coronary artery stents  -Will manage the above medically for now as pt is high risk for developing ELIZA and requiring HD with staged PCI and unlikely to tolerate DAP well even for short term course  -Hold off on antiplatelets in attempt to minimize bleed risk/ ac resumed   -no further cv carl indicated at this time       #Sev MR   -MARCELLA 05.08.24  Left ventricular systolic function is moderately decreased. There are regional wall motion abnormalities present. Hypokinesis of the inferior, lateral, and inferolateral walls. SEV MR , no JOSÉ MIGUEL thrombus   -[facility] as above   -per Structural heart eval last admission- not a candidate for MV clip/repair     #chronic HFrEF  -volume status stable   -Recent MARCELLA with moderately decreased LVEF, regional WMA, severe MR   -lasix po daily as ordered    #PAtrial Fibrillation  -cont amio  -ep eval noted- - Possible Watchman procedure as outpatient    #CKD/ gama   -renal f/u    
A/p  87yo w/ PMHx CKD, HFrEF, Diabetes, TIA,  CAD s/p stenting, sev MR,  bladder cancer s/p chemo in remission, afib on eliquis, hx of left nephrectomy, recent admission for NSTEMI pw GIB .    #GIB  -h/h noted- PRBC as needed   -ac on hold   -antiplt on hold   -resume ac vs antiplt once cleared by GI   -pending EGD- no chf on exam - known CAD with sev MR, CHECK ECG- otherwise pt can proceed from a cv standpoint      #CAD, s/p PCI  -cv stable, no chest pain  -Cont statin  -sp [facility] 05.08.24  w/ severe obstructive CAD to LCx/om and Mild to moderate in-stent restenosis of left anterior descending and right coronary artery stents  -Will manage the above medically for now as pt is high risk for developing ELIZA and requiring HD with staged PCI  -resume ac vs antiplt once cleared by GI     #Sev MR   -MARCELLA 05.08.24  Left ventricular systolic function is moderately decreased. There are regional wall motion abnormalities present. Hypokinesis of the inferior, lateral, and inferolateral walls. SEV MR , no JOSÉ MIGUEL thrombus   -[facility] as above   -per Structural heart eval last admission- not a candidate for MV clip/repair     #chronic HFrEF  -volume status stable   -Recent MARCELLA with moderately decreased LVEF, regional WMA, severe MR   -lasix po     #PAtrial Fibrillation  -cont amio  -FU ECG   -Eventual outpt f/u for poss watchman    #CKD/ gama   -renal f/u      35 minutes spent on total encounter; more than 50% of the visit was spent counseling and/or coordinating care by the attending physician.  
    88y Male with CAD, HFrEF, Afib, HTN, DM, solitary R kidney and CKD p/w acute on chronic HFrEF and acute on chronic kidney injury.  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 acute on chronic HFrEF     Mitral clip is off the table at present    LCX disease   GI- Colonoscopy in am;  Clears and NPO in am       Sayed [facility]   [phone]           
    88y Male with CAD, HFrEF, Afib, HTN, DM, solitary R kidney and CKD p/w acute on chronic HFrEF and acute on chronic kidney injury.  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 acute on chronic HFrEF   Mitral clip is off the table at present    LCX disease   GI- S/p EGD today, on IV protonix      Sayed [facility]   [phone]           
    88y Male with CAD, HFrEF, Afib, HTN, DM, solitary R kidney and CKD p/w acute on chronic HFrEF and acute on chronic kidney injury.  Nephrotic syndrome   SOB and severe MR and moderate AR   LCX disease   Presumed lower GI bleed     Renal - CKD: stage 4 with solitary R kidney and baseline creatinine ~3s  off SGLT-2; continue lasix 40 mg po daily, give KCl 20 mEq po x1  Anemia - Acute blood loss anemia  s/p PRBC hgb stable ;  Back on eliquis   CVS- BP acceptable at present. acute on chronic HFrEF   Mitral clip is off the table at present    LCX disease   GI- S/p EGD, on IV protonix bid , eliquis resumed   EPS- Outpt watchmans        Sayed [facility]   [phone]             
    88y Male with CAD, HFrEF, Afib, HTN, DM, solitary R kidney and CKD p/w acute on chronic HFrEF and acute on chronic kidney injury.  Nephrotic syndrome   SOB and severe MR and moderate AR   LCX disease   Presumed lower GI bleed     Renal - CKD: stage 4 with solitary R kidney and baseline creatinine ~3s  off SGLT-2; reduce lasix to 40 mg po daily in am (at present it was dc'd)   Anemia - Acute blood loss anemia  s/p PRBC hgb stable   CVS- BP acceptable at present. acute on chronic HFrEF   Mitral clip is off the table at present    LCX disease   GI- S/p EGD yesterday, on IV protonix bid , eliquis resumed   EPS-Awaiting on Dr Pan to see if watchmans can be done this admission       Sayed [facility]   [phone]     
88 yr old male with multiple medical conditions including CKD 4, CAD, GIB, A fib, HTN, anemia presenting with GIB

## 2024-05-31 NOTE — PROGRESS NOTE ADULT - PROBLEM SELECTOR PLAN 5
Chronic moderate exacerbation    Home regimen: repaglinide 0.5mg TID, tresiba 5 units nightly   Continue with basal 5 units daily
resume repaglinide 0.5mg TID, Tresiba 5 units nightly  on discharge   Continue with basal 5 units daily  finger sticks with short acting insulin sliding scale  no oral meds  diabetic diet  monitor for hypoglycemia
Chronic moderate exacerbation    resume repaglinide 0.5mg TID, Tresiba 5 units nightly  on discharge   Continue with basal 5 units daily
Chronic moderate exacerbation    Home regimen: repaglinide 0.5mg TID, tresiba 5 units nightly   Continue with basal 5 units daily
resume repaglinide 0.5mg TID, Tresiba 5 units nightly  on discharge   Continue with basal 5 units daily  finger sticks with short acting insulin sliding scale  no oral meds  diabetic diet  monitor for hypoglycemia
Chronic moderate exacerbation    Home regimen: repaglinide 0.5mg TID, tresiba 5 units nightly   Continue with basal 5 units daily
resume repaglinide 0.5mg TID, Tresiba 5 units nightly  on discharge   Continue with basal 5 units daily  finger sticks with short acting insulin sliding scale  no oral meds  diabetic diet  monitor for hypoglycemia
resume repaglinide 0.5mg TID, Tresiba 5 units nightly  on discharge   Continue with basal 5 units daily  finger sticks with short acting insulin sliding scale  no oral meds  diabetic diet  monitor for hypoglycemia

## 2024-05-31 NOTE — PROGRESS NOTE ADULT - PROBLEM SELECTOR PROBLEM 1
GIB (gastrointestinal bleeding)

## 2024-05-31 NOTE — PROGRESS NOTE ADULT - NUTRITIONAL ASSESSMENT
This patient has been assessed with a concern for Malnutrition and has been determined to have a diagnosis/diagnoses of Severe protein-calorie malnutrition and Underweight (BMI < 19).    This patient is being managed with:   Diet Regular-  Consistent Carbohydrate {No Snacks} (CSTCHO)  DASH/TLC {Sodium & Cholesterol Restricted} (DASH)  Entered: May 21 2024  3:22PM  
This patient has been assessed with a concern for Malnutrition and has been determined to have a diagnosis/diagnoses of Severe protein-calorie malnutrition and Underweight (BMI < 19).    This patient is being managed with:   Diet Regular-  Consistent Carbohydrate {No Snacks} (CSTCHO)  DASH/TLC {Sodium & Cholesterol Restricted} (DASH)  Entered: May 21 2024  3:22PM    Diet NPO after Midnight-     NPO Start Date: 21-May-2024   NPO Start Time: 23:59  Except Medications  Entered: May 21 2024  1:00PM

## 2024-05-31 NOTE — CHART NOTE - NSCHARTNOTEFT_GEN_A_CORE
Ambulatory Pulse Ox trial     Diagnosis: HFrEF (heart failure with reduced ejection fraction)  Patient oxygen saturation on room air at rest is 95 %   Patient oxygen saturation on room air while ambulating was 84 %  Patient oxygen saturation on 2 L O2 while ambulating was 95%    Patient will require home oxygen      Titus Juan PA-C  Department of Medicine  Pager #[phone]
Confirmed with NP Yuliya at bedside, c/w lasix.
Cardiology consulted for cardiac clearance, awaiting evaluation.
ELECTROPHYSIOLOGY      87yo w/ PMHx CKD, HFrEF, DM T2, TIA,  CAD s/p stent, severe MR,  bladder cancer s/p chemo in remission, afib on Eliquis, hx of left nephrectomy, recent admission for NSTEMI pw GI bleed. EGD on 5/22/24 showed normal esophagus, antral erythema, non bleeding gastric ulcers with no stigmata of bleeding. He was started on Protonix and anticoagulation was resumed. H/H have been stable.     [facility] 05.08.24  w/ severe obstructive CAD to LCx/OM and mild to moderate in-stent restenosis of left anterior descending and right coronary artery stents. For medical management.   MARCELLA 05.08.24  Left ventricular systolic function is moderately decreased. There are regional wall motion abnormalities present. Hypokinesis of the inferior, lateral, and inferolateral awad. SEVERE MR , no JOSÉ MIGUEL thrombus       Patient was seen in EP clinic on April 19, 2024 for evaluation for JOSÉ MIGUEL occlusion device. Patient underwent MARCELLA which showed no JOSÉ MIGUEL thrombus and favorable dimensions for the Watchman. A discussion occurred with the patient and his son and they are interested and will follow-up as an outpatient.   - Maintain K>4 and Mg>2  - Continue management as per primary team  - Possible Watchman procedure as outpatient  - F/u with Dr. Pan in June 28 at 11:30am                    .

## 2024-05-31 NOTE — PROGRESS NOTE ADULT - PROBLEM SELECTOR PLAN 3
Chronic stable  Monitor
now on po furosemide 40 qd   received today's dose  creatinine stable
Chronic stable  Monitor
Chronic stable  appears euvolemic  CXR today with likely mild bilateral pleural effusion ? cephalization   CT chest negative pneumonia positive for CHF  s/p lasix yesterday   one more dose today
Chronic stable  /51  Monitor
Chronic stable  appears euvolemic
Chronic stable  Monitor
Chronic stable  /51  Monitor
Chronic stable  appears euvolemic  CXR today with likely mild bilateral pleural effusion ? cephalization   CT chest ordered for green sputum and cough
improved s/p IV furosemide  will discontinue   received today's dose  likely resume 40 po qd tomorrow  I will assess creatinine in AM tomorrow
Chronic stable  appears euvolemic

## 2024-05-31 NOTE — PROGRESS NOTE ADULT - SUBJECTIVE AND OBJECTIVE BOX
Overnight events noted      VITAL:  T(C): , Max: 36.8 (05-30-24 @ 20:38)  T(F): , Max: 98.3 (05-31-24 @ 05:30)  HR: 71 (05-31-24 @ 07:23)  BP: 101/51 (05-31-24 @ 05:30)  RR: 17 (05-31-24 @ 05:30)  SpO2: 99% (05-31-24 @ 07:23)  Wt(kg): --      PHYSICAL EXAM:  Constitutional: NAD  Neck:  No JVD  Respiratory: diminished BS  Cardiovascular: S1 and S2  Gastrointestinal: BS+, soft, NT/ND  Extremities: No peripheral edema  Neurological: A/O x 3, no focal deficits  Psychiatric: Normal mood, normal affect  : No Temple  Skin: No rashes    LABS:                        8.7    5.55  )-----------( 104      ( 31 May 2024 06:00 )             25.7     Na(140)/K(3.8)/Cl(109)/HCO3(19)/BUN(71)/Cr(3.27)Glu(97)/Ca(7.9)/Mg(--)/PO4(--)    05-31 @ 06:00  Na(141)/K(3.8)/Cl(109)/HCO3(18)/BUN(73)/Cr(3.27)Glu(132)/Ca(7.9)/Mg(--)/PO4(--)    05-30 @ 05:30  Na(132)/K(4.1)/Cl(94)/HCO3(24)/BUN(38)/Cr(2.47)Glu(142)/Ca(10.3)/Mg(2.50)/PO4(3.7)    05-28 @ 10:37        IMPRESSION: 88M w/ HTN, DM2, CAD, HFrEF, severe MR, solitary R kidney, and CKD4-5, 5/20/24 p/w acute on chronic HFrEF and LGIB    (1)Renal - CKD4-5 with base creat low 3s - I suspect the creatinine of 2.47mg/dL from 5/28 was lab error. Stable numbers  (2)Lytes - grossly acceptable  (3)CV - HFrEF/severe MR - on Lasix 40mg po qd at home - no renal contraindication to standing diuretics  (4)Anemia - LGIB - very slow drop in H/H of late - required 1U PRBC 5/29 - now off A/C    RECOMMEND:  (1)Reinstitute standing diuretics - Lasix 40mg po qd  (2)Add standing FeSO4 - 325mg po qd  (3)Dose new meds for GFR ~15ml/min        Ronnell Bull MD  [facility]  Office/on call physician: [phone]  Cell (7a-7p): [phone]       (+)mild cough; no sob      VITAL:  T(C): , Max: 36.8 (05-30-24 @ 20:38)  T(F): , Max: 98.3 (05-31-24 @ 05:30)  HR: 71 (05-31-24 @ 07:23)  BP: 101/51 (05-31-24 @ 05:30)  RR: 17 (05-31-24 @ 05:30)  SpO2: 99% (05-31-24 @ 07:23)  Wt(kg): --      PHYSICAL EXAM:  Constitutional: thin/frail, NAD at rest on NCO2  HEENT: NCAT, DMM  Neck:  No JVD  Respiratory: diminished BS b/l  Cardiovascular: S1 and S2  Gastrointestinal: BS+, soft, NT/ND  Extremities: No peripheral edema  Neurological: reduced generalized strength  : No Temple  Skin: (+)scattered ecchymoses    LABS:                        8.7    5.55  )-----------( 104      ( 31 May 2024 06:00 )             25.7     Na(140)/K(3.8)/Cl(109)/HCO3(19)/BUN(71)/Cr(3.27)Glu(97)/Ca(7.9)/Mg(--)/PO4(--)    05-31 @ 06:00  Na(141)/K(3.8)/Cl(109)/HCO3(18)/BUN(73)/Cr(3.27)Glu(132)/Ca(7.9)/Mg(--)/PO4(--)    05-30 @ 05:30  Na(132)/K(4.1)/Cl(94)/HCO3(24)/BUN(38)/Cr(2.47)Glu(142)/Ca(10.3)/Mg(2.50)/PO4(3.7)    05-28 @ 10:37        IMPRESSION: 88M w/ HTN, DM2, CAD, HFrEF, severe MR, solitary R kidney, and CKD4-5, 5/20/24 p/w acute on chronic HFrEF and LGIB    (1)Renal - CKD4-5 with base creat low 3s - I suspect the creatinine of 2.47mg/dL from 5/28 was lab error. Stable numbers  (2)Lytes - grossly acceptable  (3)CV - HFrEF/severe MR - on Lasix 40mg po qd at home - no renal contraindication to standing diuretics  (4)Anemia - LGIB - very slow drop in H/H of late - required 1U PRBC 5/29 - now off A/C    RECOMMEND:  (1)Reinstitute standing diuretics - Lasix 40mg po qd  (2)Add standing FeSO4 - 325mg po qd  (3)Dose new meds for GFR ~15ml/min        Ronnell Bull MD  [facility]  Office/on call physician: [phone]  Cell (7a-7p): [phone]

## 2024-05-31 NOTE — PROGRESS NOTE ADULT - PROBLEM SELECTOR PROBLEM 2
HFrEF (heart failure with reduced ejection fraction)
Paroxysmal atrial fibrillation
HFrEF (heart failure with reduced ejection fraction)
Paroxysmal atrial fibrillation
HFrEF (heart failure with reduced ejection fraction)
Paroxysmal atrial fibrillation

## 2024-05-31 NOTE — PROGRESS NOTE ADULT - PROVIDER SPECIALTY LIST ADULT
Cardiology
Electrophysiology
Electrophysiology
Nephrology
Cardiology
Gastroenterology
Internal Medicine
Nephrology
Nephrology
Pulmonology
Pulmonology
Cardiology
Cardiology
Gastroenterology
Nephrology
Cardiology
Pulmonology
Internal Medicine

## 2024-05-31 NOTE — PROGRESS NOTE ADULT - SUBJECTIVE AND OBJECTIVE BOX
CARDIOLOGY FOLLOW UP - Dr. Vega  DATE OF SERVICE: 5/31/24     CC no cp   sob improving  + cough       REVIEW OF SYSTEMS:  CONSTITUTIONAL: No fever, weight loss, or fatigue  RESPIRATORY: No wheezing, chills or hemoptysis; No Shortness of Breath  CARDIOVASCULAR: No chest pain, palpitations, passing out, dizziness, or leg swelling  GASTROINTESTINAL: No abdominal or epigastric pain. No nausea, vomiting, or hematemesis; No diarrhea or constipation. No melena or hematochezia.  VASCULAR: No edema     PHYSICAL EXAM:  T(C): 36.8 (05-31-24 @ 05:30), Max: 36.8 (05-30-24 @ 20:38)  HR: 71 (05-31-24 @ 07:23) (71 - 82)  BP: 101/51 (05-31-24 @ 05:30) (101/51 - 115/59)  RR: 17 (05-31-24 @ 05:30) (17 - 18)  SpO2: 99% (05-31-24 @ 07:23) (98% - 100%)  Wt(kg): --  I&O's Summary      Appearance: Normal	  Cardiovascular: Normal S1 S2,RRv+ murmurs  Respiratory: Lungs clear to auscultation	  Gastrointestinal:  Soft, Non-tender, + BS	  Extremities: Normal range of motion, No clubbing, cyanosis or edema      Home Medications:  apixaban 2.5 mg oral tablet: 1 tab(s) orally every 12 hours (20 May 2024 17:19)  Crestor 40 mg oral tablet: 1 tab(s) orally once a day (at bedtime) (20 May 2024 17:19)  repaglinide 0.5 mg oral tablet: 1 tab(s) orally 3 times a day (before meals) (20 May 2024 17:19)  sodium bicarbonate 650 mg oral tablet: 1 tab(s) orally once a day (20 May 2024 17:19)  tamsulosin 0.4 mg oral capsule: 1 cap(s) orally once a day (20 May 2024 17:19)  Tresiba FlexTouch 100 units/mL subcutaneous solution: 5 unit(s) subcutaneous once a day (at bedtime) (20 May 2024 17:19)      MEDICATIONS  (STANDING):  albuterol/ipratropium for Nebulization 3 milliLiter(s) Nebulizer every 6 hours  aMIOdarone    Tablet 200 milliGRAM(s) Oral daily  atorvastatin 80 milliGRAM(s) Oral at bedtime  benzonatate 100 milliGRAM(s) Oral every 8 hours  chlorhexidine 2% Cloths 1 Application(s) Topical daily  dextrose 10% Bolus 125 milliLiter(s) IV Bolus once  dextrose 5%. 1000 milliLiter(s) (100 mL/Hr) IV Continuous <Continuous>  dextrose 5%. 1000 milliLiter(s) (50 mL/Hr) IV Continuous <Continuous>  dextrose 50% Injectable 25 Gram(s) IV Push once  dextrose 50% Injectable 12.5 Gram(s) IV Push once  furosemide    Tablet 40 milliGRAM(s) Oral daily  glucagon  Injectable 1 milliGRAM(s) IntraMuscular once  insulin glargine Injectable (LANTUS) 5 Unit(s) SubCutaneous at bedtime  insulin lispro (ADMELOG) corrective regimen sliding scale   SubCutaneous three times a day before meals  insulin lispro (ADMELOG) corrective regimen sliding scale   SubCutaneous at bedtime  pantoprazole  Injectable 40 milliGRAM(s) IV Push every 12 hours  sodium bicarbonate 650 milliGRAM(s) Oral daily  sucralfate 1 Gram(s) Oral two times a day  tamsulosin 0.4 milliGRAM(s) Oral at bedtime      TELEMETRY: 	    ECG:  	  RADIOLOGY:   DIAGNOSTIC TESTING:  [ ] Echocardiogram:  [ ]  Catheterization:  [ ] Stress Test:    OTHER: 	    LABS:	 	                            8.7    5.55  )-----------( 104      ( 31 May 2024 06:00 )             25.7     05-31    140  |  109<H>  |  71<H>  ----------------------------<  97  3.8   |  19<L>  |  3.27<H>    Ca    7.9<L>      31 May 2024 06:00    TPro  5.2<L>  /  Alb  2.3<L>  /  TBili  1.7<H>  /  DBili  x   /  AST  67<H>  /  ALT  56<H>  /  AlkPhos  129<H>  05-31

## 2024-05-31 NOTE — PROGRESS NOTE ADULT - SUBJECTIVE AND OBJECTIVE BOX
Date of Service: 05-31-24 @ 11:23    Patient is a 88y old  Male who presents with a chief complaint of GI bleed (31 May 2024 11:00)      Any change in ROS: seems to be doing ok ; no sob:  3L of oxygen      MEDICATIONS  (STANDING):  albuterol/ipratropium for Nebulization 3 milliLiter(s) Nebulizer every 6 hours  aMIOdarone    Tablet 200 milliGRAM(s) Oral daily  atorvastatin 80 milliGRAM(s) Oral at bedtime  benzonatate 100 milliGRAM(s) Oral every 8 hours  chlorhexidine 2% Cloths 1 Application(s) Topical daily  dextrose 10% Bolus 125 milliLiter(s) IV Bolus once  dextrose 5%. 1000 milliLiter(s) (100 mL/Hr) IV Continuous <Continuous>  dextrose 5%. 1000 milliLiter(s) (50 mL/Hr) IV Continuous <Continuous>  dextrose 50% Injectable 25 Gram(s) IV Push once  dextrose 50% Injectable 12.5 Gram(s) IV Push once  furosemide    Tablet 40 milliGRAM(s) Oral daily  glucagon  Injectable 1 milliGRAM(s) IntraMuscular once  insulin glargine Injectable (LANTUS) 5 Unit(s) SubCutaneous at bedtime  insulin lispro (ADMELOG) corrective regimen sliding scale   SubCutaneous three times a day before meals  insulin lispro (ADMELOG) corrective regimen sliding scale   SubCutaneous at bedtime  pantoprazole  Injectable 40 milliGRAM(s) IV Push every 12 hours  sodium bicarbonate 650 milliGRAM(s) Oral daily  sucralfate 1 Gram(s) Oral two times a day  tamsulosin 0.4 milliGRAM(s) Oral at bedtime    MEDICATIONS  (PRN):  acetaminophen     Tablet .. 650 milliGRAM(s) Oral every 6 hours PRN Temp greater or equal to 38C (100.4F), Mild Pain (1 - 3)  aluminum hydroxide/magnesium hydroxide/simethicone Suspension 30 milliLiter(s) Oral every 4 hours PRN Dyspepsia  dextrose Oral Gel 15 Gram(s) Oral once PRN Blood Glucose LESS THAN 70 milliGRAM(s)/deciliter  guaiFENesin Oral Liquid (Sugar-Free) 200 milliGRAM(s) Oral every 6 hours PRN Cough  melatonin 3 milliGRAM(s) Oral at bedtime PRN Insomnia  ondansetron Injectable 4 milliGRAM(s) IV Push every 8 hours PRN Nausea and/or Vomiting    Vital Signs Last 24 Hrs  T(C): 36.8 (31 May 2024 05:30), Max: 36.8 (30 May 2024 20:38)  T(F): 98.3 (31 May 2024 05:30), Max: 98.3 (31 May 2024 05:30)  HR: 71 (31 May 2024 07:23) (71 - 82)  BP: 101/51 (31 May 2024 05:30) (101/51 - 115/59)  BP(mean): --  RR: 17 (31 May 2024 05:30) (17 - 18)  SpO2: 99% (31 May 2024 07:23) (98% - 100%)    Parameters below as of 31 May 2024 07:23  Patient On (Oxygen Delivery Method): nasal cannula        I&O's Summary        Physical Exam:   GENERAL: NAD, well-groomed, well-developed  HEENT: RAQUEL/   Atraumatic, Normocephalic  ENMT: No tonsillar erythema, exudates, or enlargement; Moist mucous membranes, Good dentition, No lesions  NECK: Supple, No JVD, Normal thyroid  CHEST/LUNG: Clear to auscultaion  CVS: Regular rate and rhythm; No murmurs, rubs, or gallops  GI: : Soft, Nontender, Nondistended; Bowel sounds present  NERVOUS SYSTEM:  Alert & Oriented X3  EXTREMITIES:- edema  LYMPH: No lymphadenopathy noted  SKIN: No rashes or lesions  ENDOCRINOLOGY: No Thyromegaly  PSYCH: Appropriate    Labs:                              8.7    5.55  )-----------( 104      ( 31 May 2024 06:00 )             25.7                         8.9    4.87  )-----------( 102      ( 30 May 2024 05:30 )             26.1                         7.7    5.78  )-----------( 104      ( 29 May 2024 18:29 )             24.4                         8.0    5.19  )-----------( 97       ( 28 May 2024 05:33 )             24.1     05-31    140  |  109<H>  |  71<H>  ----------------------------<  97  3.8   |  19<L>  |  3.27<H>  05-30    141  |  109<H>  |  73<H>  ----------------------------<  132<H>  3.8   |  18<L>  |  3.27<H>  05-28    132<L>  |  94<L>  |  38<H>  ----------------------------<  142<H>  4.1   |  24  |  2.47<H>    Ca    7.9<L>      31 May 2024 06:00  Ca    7.9<L>      30 May 2024 05:30    TPro  5.2<L>  /  Alb  2.3<L>  /  TBili  1.7<H>  /  DBili  x   /  AST  67<H>  /  ALT  56<H>  /  AlkPhos  129<H>  05-31  TPro  5.3<L>  /  Alb  2.3<L>  /  TBili  2.5<H>  /  DBili  x   /  AST  58<H>  /  ALT  54<H>  /  AlkPhos  135<H>  05-30  TPro  8.0  /  Alb  3.6  /  TBili  3.5<H>  /  DBili  x   /  AST  63<H>  /  ALT  60<H>  /  AlkPhos  372<H>  05-28    CAPILLARY BLOOD GLUCOSE      POCT Blood Glucose.: 111 mg/dL (31 May 2024 08:30)  POCT Blood Glucose.: 132 mg/dL (30 May 2024 22:12)  POCT Blood Glucose.: 168 mg/dL (30 May 2024 17:50)  POCT Blood Glucose.: 258 mg/dL (30 May 2024 12:37)      LIVER FUNCTIONS - ( 31 May 2024 06:00 )  Alb: 2.3 g/dL / Pro: 5.2 g/dL / ALK PHOS: 129 U/L / ALT: 56 U/L / AST: 67 U/L / GGT: x             Urinalysis Basic - ( 31 May 2024 06:00 )    Color: x / Appearance: x / SG: x / pH: x  Gluc: 97 mg/dL / Ketone: x  / Bili: x / Urobili: x   Blood: x / Protein: x / Nitrite: x   Leuk Esterase: x / RBC: x / WBC x   Sq Epi: x / Non Sq Epi: x / Bacteria: x      rd< from: CT Chest No Cont (05.28.24 @ 12:34) >  IMPRESSION: Small to moderate-sized bilateral pleural effusionsare   predominantly new since March 26, 2024. Mild interlobular septal   thickening suggestive of pulmonary edema.    Bilateral tiny foci of impacted distal airways as described above may be   due to mucus or may be due to endobronchial spread of infection.    6 mm right lower lobe nodular opacity new since January 26, 2022 may also     < end of copied text >        RECENT CULTURES:        RESPIRATORY CULTURES:          Studies  Chest X-RAY  CT SCAN Chest   Venous Dopplers: LE:   CT Abdomen  Others

## 2024-05-31 NOTE — PROGRESS NOTE ADULT - SUBJECTIVE AND OBJECTIVE BOX
Patient is a 88y old  Male who presents with a chief complaint of GI bleed (31 May 2024 11:23)      DATE OF SERVICE: 05-31-24 @ 11:32    SUBJECTIVE / OVERNIGHT EVENTS: overnight events noted    ROS:  Resp: No cough no sputum production  CVS: No chest pain no palpitations no orthopnea  GI: no N/V/D      MEDICATIONS  (STANDING):  albuterol/ipratropium for Nebulization 3 milliLiter(s) Nebulizer every 6 hours  aMIOdarone    Tablet 200 milliGRAM(s) Oral daily  atorvastatin 80 milliGRAM(s) Oral at bedtime  benzonatate 100 milliGRAM(s) Oral every 8 hours  chlorhexidine 2% Cloths 1 Application(s) Topical daily  dextrose 10% Bolus 125 milliLiter(s) IV Bolus once  dextrose 5%. 1000 milliLiter(s) (100 mL/Hr) IV Continuous <Continuous>  dextrose 5%. 1000 milliLiter(s) (50 mL/Hr) IV Continuous <Continuous>  dextrose 50% Injectable 25 Gram(s) IV Push once  dextrose 50% Injectable 12.5 Gram(s) IV Push once  furosemide    Tablet 40 milliGRAM(s) Oral daily  glucagon  Injectable 1 milliGRAM(s) IntraMuscular once  insulin glargine Injectable (LANTUS) 5 Unit(s) SubCutaneous at bedtime  insulin lispro (ADMELOG) corrective regimen sliding scale   SubCutaneous three times a day before meals  insulin lispro (ADMELOG) corrective regimen sliding scale   SubCutaneous at bedtime  pantoprazole  Injectable 40 milliGRAM(s) IV Push every 12 hours  sodium bicarbonate 650 milliGRAM(s) Oral daily  sucralfate 1 Gram(s) Oral two times a day  tamsulosin 0.4 milliGRAM(s) Oral at bedtime    MEDICATIONS  (PRN):  acetaminophen     Tablet .. 650 milliGRAM(s) Oral every 6 hours PRN Temp greater or equal to 38C (100.4F), Mild Pain (1 - 3)  aluminum hydroxide/magnesium hydroxide/simethicone Suspension 30 milliLiter(s) Oral every 4 hours PRN Dyspepsia  dextrose Oral Gel 15 Gram(s) Oral once PRN Blood Glucose LESS THAN 70 milliGRAM(s)/deciliter  guaiFENesin Oral Liquid (Sugar-Free) 200 milliGRAM(s) Oral every 6 hours PRN Cough  melatonin 3 milliGRAM(s) Oral at bedtime PRN Insomnia  ondansetron Injectable 4 milliGRAM(s) IV Push every 8 hours PRN Nausea and/or Vomiting        CAPILLARY BLOOD GLUCOSE      POCT Blood Glucose.: 111 mg/dL (31 May 2024 08:30)  POCT Blood Glucose.: 132 mg/dL (30 May 2024 22:12)  POCT Blood Glucose.: 168 mg/dL (30 May 2024 17:50)  POCT Blood Glucose.: 258 mg/dL (30 May 2024 12:37)    I&O's Summary      Vital Signs Last 24 Hrs  T(C): 36.8 (31 May 2024 05:30), Max: 36.8 (30 May 2024 20:38)  T(F): 98.3 (31 May 2024 05:30), Max: 98.3 (31 May 2024 05:30)  HR: 71 (31 May 2024 07:23) (71 - 82)  BP: 101/51 (31 May 2024 05:30) (101/51 - 115/59)  BP(mean): --  RR: 17 (31 May 2024 05:30) (17 - 18)  SpO2: 99% (31 May 2024 07:23) (98% - 100%)    PHYSICAL EXAM:  CHEST/LUNG: clear  HEART: S1 S2; systolic murmur +   ABDOMEN: Soft, Nontender,  EXTREMITIES: negative edema  NEUROLOGY: AO x 3 non-focal    LABS:                        8.7    5.55  )-----------( 104      ( 31 May 2024 06:00 )             25.7     05-31    140  |  109<H>  |  71<H>  ----------------------------<  97  3.8   |  19<L>  |  3.27<H>    Ca    7.9<L>      31 May 2024 06:00    TPro  5.2<L>  /  Alb  2.3<L>  /  TBili  1.7<H>  /  DBili  x   /  AST  67<H>  /  ALT  56<H>  /  AlkPhos  129<H>  05-31          Urinalysis Basic - ( 31 May 2024 06:00 )    Color: x / Appearance: x / SG: x / pH: x  Gluc: 97 mg/dL / Ketone: x  / Bili: x / Urobili: x   Blood: x / Protein: x / Nitrite: x   Leuk Esterase: x / RBC: x / WBC x   Sq Epi: x / Non Sq Epi: x / Bacteria: x          All consultant(s) notes reviewed and care discussed with other providers        Contact Number, Dr Arroyo [phone]

## 2024-05-31 NOTE — PROGRESS NOTE ADULT - PROBLEM SELECTOR PROBLEM 4
Paroxysmal atrial fibrillation
Paroxysmal atrial fibrillation
Benign essential HTN
Paroxysmal atrial fibrillation
Benign essential HTN
Paroxysmal atrial fibrillation
Benign essential HTN
Paroxysmal atrial fibrillation

## 2024-05-31 NOTE — PROGRESS NOTE ADULT - PROBLEM SELECTOR PLAN 2
Continue amiodarone 200mg daily  off apixaban   discussed with patient and wife   not a candidate for ASA or clopidogrel either as per cardiology   patient is not a candidate for Watchmen per EP attending
Chronic stable  appears euvolemic
Continue amiodarone 200mg daily  off apixaban for now   discussed with patient and wife   see above  not a candidate for ASA or clopidogrel either as per cardiology   patient is not a candidate for Watchmen per EP attending
Chronic stable  appears euvolemic
Chronic stable  appears euvolemic  hold furosemide per renal
Chronic stable  appears euvolemic  hold furosemide per renal
Continue amiodarone 200mg daily.   Monitor  continue  apixaban  for Watchman coming Thu
Chronic stable  appears euvolemic
Continue amiodarone 200mg daily  continue apixaban for now   the family will d/w PCP re continuation of apixaban on discharge   discussed with EP attending Dr Pan   patient is not a candidate for Watchmen per EP attending
Continue amiodarone 200mg daily.   Monitor  back on apixaban  discussed with EP attending  for Watchman coming Thu
Continue amiodarone 200mg daily  continue apixaban for now   discussed with patient and wife   they have decided to stay on apixaban for now   patient is not a candidate for Watchmen per EP attending

## 2024-06-04 ENCOUNTER — APPOINTMENT (OUTPATIENT)
Dept: GASTROENTEROLOGY | Facility: CLINIC | Age: 89
End: 2024-06-04

## 2024-06-06 NOTE — ED ADULT TRIAGE NOTE - BANDS:
I reviewed the H&P, I examined the patient, and there are no changes in the patient's condition. ASA 2, Mallampati 1. Heart: RRR, Resp: CTA-B. SCS Trial    Jr Song, DO     Fall Risk;

## 2024-06-11 NOTE — ED PROVIDER NOTE - IV ALTEPLASE ADMIN OUTSIDE HIDDEN
[No Acute Distress] : no acute distress [Well Nourished] : well nourished [Well Developed] : well developed [Well-Appearing] : well-appearing [Normal Voice/Communication] : normal voice/communication [Normal Sclera/Conjunctiva] : normal sclera/conjunctiva [PERRL] : pupils equal round and reactive to light [EOMI] : extraocular movements intact [Normal Outer Ear/Nose] : the outer ears and nose were normal in appearance [Normal Oropharynx] : the oropharynx was normal [Normal TMs] : both tympanic membranes were normal [Normal Nasal Mucosa] : the nasal mucosa was normal [No JVD] : no jugular venous distention [No Lymphadenopathy] : no lymphadenopathy [Supple] : supple [Thyroid Normal, No Nodules] : the thyroid was normal and there were no nodules present [No Respiratory Distress] : no respiratory distress  [No Accessory Muscle Use] : no accessory muscle use [Clear to Auscultation] : lungs were clear to auscultation bilaterally [Normal Rate] : normal rate  [Regular Rhythm] : with a regular rhythm [Normal S1, S2] : normal S1 and S2 [No Murmur] : no murmur heard [No Carotid Bruits] : no carotid bruits [No Abdominal Bruit] : a ~M bruit was not heard ~T in the abdomen [No Edema] : there was no peripheral edema show [No Palpable Aorta] : no palpable aorta [Soft] : abdomen soft [Non Tender] : non-tender [Non-distended] : non-distended [No Masses] : no abdominal mass palpated [No HSM] : no HSM [Normal Bowel Sounds] : normal bowel sounds [Normal Supraclavicular Nodes] : no supraclavicular lymphadenopathy [Normal Posterior Cervical Nodes] : no posterior cervical lymphadenopathy [Normal Anterior Cervical Nodes] : no anterior cervical lymphadenopathy [No CVA Tenderness] : no CVA  tenderness [No Spinal Tenderness] : no spinal tenderness [Kyphosis] : no kyphosis [Scoliosis] : no scoliosis [No Joint Swelling] : no joint swelling [Grossly Normal Strength/Tone] : grossly normal strength/tone [Acne] : no acne [Coordination Grossly Intact] : coordination grossly intact [No Focal Deficits] : no focal deficits [Normal Gait] : normal gait [Deep Tendon Reflexes (DTR)] : deep tendon reflexes were 2+ and symmetric [Speech Grossly Normal] : speech grossly normal [Memory Grossly Normal] : memory grossly normal [Normal Affect] : the affect was normal [Alert and Oriented x3] : oriented to person, place, and time [Normal Mood] : the mood was normal [Normal Insight/Judgement] : insight and judgment were intact [de-identified] : confluent maculo-papular rash on right forearm,extensor surface, left forearm extensor surface, left hip and left pretibial area

## 2024-06-21 ENCOUNTER — APPOINTMENT (OUTPATIENT)
Dept: UROLOGY | Facility: CLINIC | Age: 89
End: 2024-06-21

## 2024-06-28 ENCOUNTER — APPOINTMENT (OUTPATIENT)
Dept: ELECTROPHYSIOLOGY | Facility: CLINIC | Age: 89
End: 2024-06-28

## 2024-07-09 NOTE — ED ADULT NURSE NOTE - DRUG PRE-SCREENING (DAST -1)
Wear freestyle carmita 3 continuous glucose monitor  Call us in 2 weeks to download CGM  Continue Glimepiride 4 mg twice daily, Jardiance 25 mg daily, and Metformin 1000 mg with breakfast and 500 mg with dinner  Take Lantus 30 units at the same time every day  Call for blood glucose levels less than 70 mg/dL or persistent patterns over 300 mg/dL   Statement Selected

## 2024-08-08 ENCOUNTER — APPOINTMENT (OUTPATIENT)
Dept: GASTROENTEROLOGY | Facility: HOSPITAL | Age: 89
End: 2024-08-08

## 2024-08-27 NOTE — ED ADULT NURSE NOTE - ISOLATION TYPE:
Patient presents to ED with abdominal pain that began at midnight. Patient states she has sharp, burning pain in the right lower pelvic region. Patient states she took zofran today approx 1500. Patient denies fevers, chills, dizziness, or SOB, or CP. Patient states she had some discomfort with urination prior to arrival.    None

## 2024-09-20 NOTE — PROGRESS NOTE ADULT - PROBLEM SELECTOR PLAN 3
heart rate controlled   will need to hold apixaban and ASA till severe gastritis resolved
likely multifactorial including poor nutrition, ?? chronic blood loss with hematuria and mainly CKD  epogen per renal  continue oral iron
heart rate controlled   will need to hold apixaban and ASA till severe gastritis resolved
heart rate controlled   will need to hold apixaban and ASA for now  till severe gastritis resolved
Imaging Studies
heart rate controlled   on apixaban
heart rate controlled   resume apixaban
heart rate controlled   resume apixaban
likely multifactorial including poor nutrition, chronic blood loss with hematuria and mainly CKD  improved after transfusion   EGD noted  discontinue apixaban secondary to bleeding found on EGD  transfusion 1 unit per GI recommendations  monitor hemoglobin closely
likely multifactorial including poor nutrition, ?? chronic blood loss with hematuria and mainly CKD  improved after transfusion   will continue to monitor
heart rate controlled   will need to hold apixaban and ASA till severe gastritis resolved
likely multifactorial including poor nutrition, ?? chronic blood loss with hematuria and mainly CKD  epogen per renal  continue oral iron  hemoglobin again < 8  will d/w renal re transfusion

## 2024-10-01 NOTE — ASU PREOP CHECKLIST - NS PREOP CHK HIBICLENS NA
Detail Level: Generalized
Price (Do Not Change): 0.00
Instructions: This plan will send the code FBSE to the PM system.  DO NOT or CHANGE the price.
N/A

## 2024-11-05 NOTE — ED PROVIDER NOTE - CROS ED CARDIOVAS ALL NEG
Other (Free Text): Samples opzelura Eucrissa info on [employer] Note Text (......Xxx Chief Complaint.): This diagnosis correlates with the Render Risk Assessment In Note?: no Detail Level: Zone - - -

## 2024-11-08 NOTE — PHYSICAL THERAPY INITIAL EVALUATION ADULT - GAIT DEVIATIONS NOTED, PT EVAL
"Video-Visit Details    Type of service:  Video Visit    Video Start Time: 12:59 PM   Video End Time: 1:28 PM    Originating Location (pt. Location): Home    Distant Location (provider location):  Offsite (providers home) [facility] [place]     Platform used for Video Visit: 800razors      New Weight Management Nutrition Consultation    Jc Lei is a 69 year old male presents today for new weight management nutrition consultation.  Patient referred by Raisa Scott PA-C on 2024.    Patient with Co-morbidities of obesity includin/4/2024    10:54 AM   --   I have the following health issues associated with obesity Pre-Diabetes    Sleep Apnea    Osteoarthritis (joint disease)    Hypothyroidism   I have the following symptoms associated with obesity Knee Pain    Depression    Lower Extremity Swelling    Back Pain    Fatigue     Comorbidities associated with weight gain include RUPESH, type 2 diabetes (A1c of 6.9 1 year ago),  hyperlipidemia, hypothyroidism, osteoarthritis of the knee, history of GERD, history of long term prednisone therapy, spinal stenosis.   Additional health issues include myelodysplastic syndrome (s/p chemo and bone marrow transplant), sarcoidosis,history of pulmonary embolism with acute cor pulmonale (taking eliquis).     Anthropometrics:  Tanita Scale:  Date: 24  BW: 243.2 lbs  Fat%: 36.3% (desirable range: 13-24.9%)  Muscle%: 60.6%  Muscle Mass: 147.4 lbs  Visceral Fat Ratin (optimal less than 13)   BMR: 2106 kcal    Estimated body mass index is 34.44 kg/m  as calculated from the following:    Height as of this encounter: 1.778 m (5' 10\").    Weight as of this encounter: 108.9 kg (240 lb).    Medications for Weight Loss:  None currently     NUTRITION HISTORY  Food allergies: None  Food intolerances: night-shade (eggplant)   Avoids sugar substitutes.   Vitamin/Mineral Supplements: calcium+D3   Previous methods of diet " "modification for weight loss: First structured attempt at weight loss. Reduce portion sizes, increasing activity.   Barriers: depression, taking Wellbutrin but not finding as helpful. Open to a Mental Health referral, placed today. Had worked with a therapist prior to the pandemic and found helpful.   Stress with losing health insurance this month, has spoke with social work at [facility]. Receives SNAP.  Limited budget.     Per PA-C consult - Regarding eating patterns and diet, he typically eats 1 meal a day plus snacks throughout the day. Craves both sweet and salty. Is able to get full. Can stay full until next meal. Does struggle with portion control, feels like he has to eat all that's on his plate as that's how he was raised. Does at times experience food noise. Does experience emotional eating (comfort, nostalgia). Does not feel he experiences a loss of control around eating .     Enjoys cooking. Used to run/manage a restaurant.  After transplant lost 60 lbs (292 -> 230), nothing tasted good. Reports getting taste back from coffee and red wine.  Has not regained strength/endurance from this time. Typically eating 1-2 meals daily.     Recent Diet Recall:  Breakfast: double espresso with cream and sugar; occ will also have eggs or pancakes or cereal or fruit (apple/peach/grapes)  Lunch: skip - if out may pick something up  Snacks: \"happy hour\" cheese curds, sausage  Dinner: making with GF at 6 pm - pot roast, bread/butter, glass of milk; pork lo main; chicken with rice, veggies            11/4/2024    10:54 AM   Diet Recall Review with Patient   If you do eat supper, what types of food do you typically eat? dinner foods!   If you do snack, what types of food do you typically eat? cheese,sausage,Doritos,toom   How many glasses of juice do you drink in a typical day? -1   How many of glasses of milk do you drink in a typical day? 1   If you do drink milk, what type? Whole   How many 8oz glasses of sugar containing " drinks such as Abhishek-Aid/sweet tea do you drink in a day? 0   How many cans/bottles of sugar pop/soda/tea/sports drinks do you drink in a day? 1   How many cans/bottles of diet pop/soda/tea or sports drink do you drink in a day? 0   How often do you have a drink of alcohol? 2-4 Times a Month   If you do drink, how many drinks might you have in a day? 1 or 2           11/4/2024    10:54 AM   Eating Habits   Generally, my meals include foods like these bread, pasta, rice, potatoes, corn, crackers, sweet dessert, pop, or juice Almost Everyday   Generally, my meals include foods like these fried meats, brats, burgers, french fries, pizza, cheese, chips, or ice cream A Few Times a Week   Eat fast food (like McDonalds, Burger Juancho, Taco Bell) Less Than Weekly   Eat at a buffet or sit-down restaurant Less Than Weekly   Eat most of my meals in front of the TV or computer Never   Often skip meals, eat at random times, have no regular eating times A Few Times a Week   Rarely sit down for a meal but snack or graze throughout Less Than Weekly   Eat extra snacks between meals Once a Week   Eat most of my food at the end of the day Almost Everyday   Eat in the middle of the night or wake up at night to eat Never   Eat extra snacks to prevent or correct low blood sugar Never   Eat to prevent acid reflux or stomach pain Never   Worry about not having enough food to eat Never   I eat when I am depressed Less Than Weekly   I eat when I am stressed Less Than Weekly   I eat when I am bored A Few Times a Week   I eat when I am anxious Less Than Weekly   I eat when I am happy or as a reward Once a Week   I feel hungry all the time even if I just have eaten Never   Feeling full is important to me A Few Times a Week   I finish all the food on my plate even if I am already full A Few Times a Week   I can't resist eating delicious food or walk past the good food/smell A Few Times a Week   I eat/snack without noticing that I am eating Never    I eat when I am preparing the meal Never   I eat more than usual when I see others eating Less Than Weekly   I have trouble not eating sweets, ice cream, cookies, or chips if they are around the house Less Than Weekly   I think about food all day A Few Times a Week   What foods, if any, do you crave? Cheese   Please list any other foods you crave? garlicky things  savories,coffee,red wine,pasta           11/4/2024    10:54 AM   Amount of Food   I feel out of control when eating Monthly   I eat a large amount of food, like a loaf of bread, a box of cookies, a pint/quart of ice cream, all at once Never   I eat a large amount of food even when I am not hungry Monthly   I eat rapidly Never   I eat alone because I feel embarrassed and do not want others to see how much I have eaten Never   I eat until I am uncomfortably full Monthly   I feel bad, disgusted, or guilty after I overeat Never       Physical Activity:  Per PA-C consult:  is limited due to hip pain, knee pain, fatigue. Some instability concerns, has seen a few falls in the last year. Is hoping to get a knee replacement soon, this had to be postponed due to issues with his cbc counts. Likes the stair stepper. 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 Currently limited in activity due to fatigue     [facility] [employer] [facility] His goal is to go twice weekly, depression can be a barrier.          11/4/2024    10:54 AM   Activity/Exercise History   How much of a typical 12 hour day do you spend sitting? Half the Day   How much of a typical 12 hour day do you spend lying down? Less Than Half the Day   How much of a typical day do you spend walking/standing? Less Than Half the Day   How many hours (not including work) do you spend on the TV/Video Games/Computer/Tablet/Phone? 2-3 Hours   How many times a week are you active for the purpose of exercise? Once a Week   What keeps you from  "being more active? Pain    Lack of Time    Too tired    Other   How many total minutes do you spend doing some activity for the purpose of exercising when you exercise? More Than 30 Minutes       Nutrition Prescription  Recommended energy/nutrient modification.  1700 calories/day    gm protein daily  60 gm carb or less per meal    Nutrition Diagnosis  Obesity r/t long history of positive energy balance aeb BMI >30.    Nutrition Intervention  Materials/education provided on hypocaloric diet for weight loss and low/moderate carb diet for management of type 2 diabetes. Discussed 1700 calorie/day diet, Volumetric eating to help satiety level on fewer calories; portion control and healthy food choices (Plate Method and Volumetrics handouts), carb sources and recommended portion sizes, protein needs/sources, meal and snack planning tips and resources. Patient demonstrates understanding.  Co-developed goals to work towards.   Provided pt with list of goals and resources below via [employer].     Expected Engagement: good  Follow-Up Plans: pt unsure if he will have health insurance in next coming months, follow-up pending health insurance coverage.      Nutrition Goals  1) Try tracking nutritional intake in an harmony like \"Lose It\"  - 1700 calories/day   - 90+ gm protein daily  - 60 gm carb or less per meal    Example High-Protein, Low-Calorie Meal Plans: https://www.Youcruit.Deed/30-day-high-protein-meal-plan-qce-dkuyrnwlv-[email or web address]    The Plate Method  [email or web address]    Protein Sources   [email or web address]     Carbohydrates  [email or web address]     Carbohydrate Counting  [email or web address]     Summary of Volumetrics Eating Plan  [email or web address]     Eating Well on a Budget: https://www.[email or web address]    Hunger Solutions:   Call the [employer] Help Line at 1-543.169.5080 to talk with a specialist in community and government food assistance programs. " They can help you sign-up for SNAP benefits, find food alexandre, food shelves and free food in your community and help refer you to any other community assistance programs that you qualify for.   https://www.[place].org/find-help/    Market [place] Program: Spend $10 of EBT, get $10 free at [employer].  To find map of farmers markets:  https://www.hive01ions.org/programs/market-bucks/farmersmarkets/    SpendSmart,Eat Smart ([place])  Recipe ideas that are suppose to be more affordable  Calculator that allows you to compare product prices   https://spendsmart.extension.[facility]     Fare For All (38 locations in [place]):  Provides discounted groceries. Up to 40% off retail pricing. You can preorder and  or buy in person, depending on the site.   https://fareforall.theOneAssist Consumer Solutionsmn.org/    InHiro (Online)  Get organic produce and sustainably sourced groceries delivered at up to 40% off grocery store prices.  https://www.Showbucks      [facility] ([place])  Fresh Produce Distribution Events and Free Food Markets in [place].   https://[facility].org/programs/food-support/    [place] Food Shelves ([place]):  https://SupportSpace.org/food-shelves/  [place] Food Shelf  [address] (near [facility], [place] [id number] [phone]    [facility] Food Shelf  [address] [facility] [address] [facility] ([address] [facility], [place] [id number] [phone]    Foodmobile - Mobile Food Shelf  Foodmobiles travel throughout [place] and the [facility], [place] [facility] to bring nutritious food to areas of high need. See list of locations here: https://SupportSpace.org/events/    [facility] - Community Food Distribution ([facility]):  https://the[facility] Centerfoundation.org/programs/nutritional-services/  [facility], Tuesdays 3-5PM  [address], [place] [address]     Antonio Burkdays 12-2PM  [address] [place]  [address]     [facility] [employer] Distribution, Mondays 1-2PM  [address] [place] [address]        Follow-Up:  1/21/25    Time spent with patient: 29 minutes.  Marisol Joseph RD, LD       decreased edwina/decreased step length/decreased stride length

## 2025-01-15 NOTE — DISCHARGE NOTE PROVIDER - NSDCFUADDINST_GEN_ALL_CORE_FT
Patient/Caregiver provided printed discharge information.
Hold losartan until follow up with PCP   You will need IV antibiotics until 8/23

## 2025-02-06 NOTE — PHYSICAL THERAPY INITIAL EVALUATION ADULT - ORIENTATION, REHAB EVAL
Anesthesia Post-op Note    Patient: Milton Tripp JrDarcy  Procedure(s) Performed: COLONOSCOPY  Anesthesia type: MAC    Vitals Value Taken Time   Temp 36.6 °C (97.8 °F) 02/06/25 1616   Pulse 95 02/06/25 1646   Resp 18 02/06/25 1646   SpO2 96 % 02/06/25 1646   /94 02/06/25 1646         Patient Location: Phase II  Post-op Vital Signs:stable  Level of Consciousness: awake and alert  Respiratory Status: spontaneous ventilation  Cardiovascular stable  Hydration: euvolemic  Pain Management: adequately controlled  Handoff: Handoff to receiving clinician was performed and questions were answered  Vomiting: none  Nausea: None  Airway Patency:patent  Post-op Assessment: no complications and patient tolerated procedure well      No notable events documented.                       person/place/situation

## 2025-02-10 NOTE — ED PROVIDER NOTE - CLINICAL SUMMARY MEDICAL DECISION MAKING FREE TEXT BOX
This RN checked OARRS and it is consist with prescribed medications. Patient has been filling Rx appropriately. Prescription request sent to provider Dr. Peña to review.      Patient next follow-up with Dr. Peña is 5/12/2025.  
Veronica Moffett<[email or web address]>    ? Sat 2/8/2025 3:48 PM    Adult Sleep Nurse      Good afternoon,             Please send my prescriptions from now on to [facility] located at [address] [facility] [address] [place] [address]. My prescription is Amphetamine Salts 25 MG, 2 capsules daily. I need it sent over today because I don't have any left. If you have any questions please call me.    Thank you,  Veronica Moffett  [phone]    Social [employer] Mail: Search, Organize, Conquer  
85 yo M with hx of Bladder CA s/p TURBT 12/2020 and intravesicular chemotherapy with Gemcitabine c/b AFIB with RVR and LILIAN, now s/p BCG Instillation (1/12/22) with Dr. 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 ~1.7), and AFIB on Eliquis p/w shortness of breath starting. Discharged yesterday following admission for urosepsis. Moderate respiratory distress satting low 90s on 15L NRB, tachypneic with diffuse crackles, 1 + bilateral pitting edema. Clinical presentation c/f fluid overload vs PNA. Will assess for ACS given abrupt onset but low suspicion. Patient placed on bipap shortly after arrival. Plan for labs, cxr, diuresis and admit.

## 2025-02-27 NOTE — ED CDU PROVIDER INITIAL DAY NOTE - CROS ED RESP ALL NEG
negative...

## 2025-03-03 NOTE — ASU PATIENT PROFILE, ADULT - NSTOBACCONEVERSMOKERY/N_GEN_A
36.5 g/dL    RDW 12.4 11.5 - 14.5 %    Platelets 181 150 - 400 K/uL    MPV 10.0 8.9 - 12.9 FL    Nucleated RBCs 0.0 0  WBC    nRBC 0.00 0.00 - 0.01 K/uL    Neutrophils % 89.3 (H) 32.0 - 75.0 %    Lymphocytes % 3.8 (L) 12.0 - 49.0 %    Monocytes % 6.0 5.0 - 13.0 %    Eosinophils % 0.0 0.0 - 7.0 %    Basophils % 0.2 0.0 - 1.0 %    Immature Granulocytes % 0.7 (H) 0.0 - 0.5 %    Neutrophils Absolute 11.42 (H) 1.80 - 8.00 K/UL    Lymphocytes Absolute 0.49 (L) 0.80 - 3.50 K/UL    Monocytes Absolute 0.77 0.00 - 1.00 K/UL    Eosinophils Absolute 0.00 0.00 - 0.40 K/UL    Basophils Absolute 0.03 0.00 - 0.10 K/UL    Immature Granulocytes Absolute 0.09 (H) 0.00 - 0.04 K/UL    Differential Type SMEAR SCANNED      RBC Comment NORMOCYTIC, NORMOCHROMIC     Comprehensive Metabolic Panel    Collection Time: 03/03/25  9:24 AM   Result Value Ref Range    Sodium 132 (L) 136 - 145 mmol/L    Potassium 3.8 3.5 - 5.1 mmol/L    Chloride 94 (L) 97 - 108 mmol/L    CO2 29 21 - 32 mmol/L    Anion Gap 9 2 - 12 mmol/L    Glucose 459 (H) 65 - 100 mg/dL    BUN 32 (H) 6 - 20 MG/DL    Creatinine 1.52 (H) 0.55 - 1.02 MG/DL    BUN/Creatinine Ratio 21 (H) 12 - 20      Est, Glom Filt Rate 34 (L) >60 ml/min/1.73m2    Calcium 8.7 8.5 - 10.1 MG/DL    Total Bilirubin 0.5 0.2 - 1.0 MG/DL    ALT 90 (H) 12 - 78 U/L    AST 84 (H) 15 - 37 U/L    Alk Phosphatase 124 (H) 45 - 117 U/L    Total Protein 7.6 6.4 - 8.2 g/dL    Albumin 3.3 (L) 3.5 - 5.0 g/dL    Globulin 4.3 (H) 2.0 - 4.0 g/dL    Albumin/Globulin Ratio 0.8 (L) 1.1 - 2.2     Extra Tubes Hold    Collection Time: 03/03/25  9:24 AM   Result Value Ref Range    Specimen HOld PST,RED,MILAGROS     Comment:        Add-on orders for these samples will be processed based on acceptable specimen integrity and analyte stability, which may vary by analyte.   Urinalysis with Reflex to Culture    Collection Time: 03/03/25  9:24 AM    Specimen: Urine   Result Value Ref Range    Color, UA YELLOW/STRAW      Appearance CLEAR  Yes

## 2025-05-21 NOTE — DIETITIAN INITIAL EVALUATION ADULT. - WEIGHT IN KG
"Patient ID: Armando Smith is a 66 y.o. male.    Procedures  Pt took Macrobid 100mg po 3 days prior  Anesthesia: Local 2% Lidocaine  Instruments: 6F flexible disposable Cystoscope     Pt brought to procedure room and placed in dorsal lithotomy position. Pt draped and prepped in normal sterile fashion. 5ml lidocaine instilled into urethral meatus and 5ml instilled into urethra (penis). Pt tolerated well.     I was present as chaperone for the entirety of the procedure   OMEGA  Cystoscopy performed by Dr. Mynor Anderson        Bedside \"Time Out\" Verification   Today's Date:  05/21/2025 I attest that this time out verification took place prior to the procedure.   Procedure: Cysto/dil  RN/LPN/MA: OMEGA   Provider: [facility].   Verified By: OMEGA SLAUGHTER and Provider, Dr. Mynor Anderson.   Prior to the start of the procedure a time out was taken and the following were verified: the identity of the patient using two patient identifiers, the correct procedure, the correct site marked as indicated, the correct positioning for the patient and the correct equipment was obtained.   Cystoscopy - Armando Smith-identified using two (2) forms of identification.   Procedure: diagnostic Cystourethroscopy  Procedure Note: Time Started: 9:30am     Time Completed: 9:54 AM  Indications for procedure: Cysto with Dilatation 24F for urethral stricture.          Discussed with patient: Risks, benefits, and alternative were discussed in detail. Patient appears to understand and agrees to proceed. Patient has signed the procedure consent form.    CYSTOSCOPY:    Cystoscopy today reveals a distal urethral strictures.  The first 2 are within 4 cm of the meatus.  The remainder are closer to the external sphincter.  All dilated to 24 [place] with minimal discomfort and no bleeding.  PVR 66 cc.  Bladder appears normal without any evidence of stones or tumors.  " 63.5

## 2025-05-28 NOTE — H&P PST ADULT - NSICDXFAMILYHX_GEN_ALL_CORE_FT
Pt called for virtual TCM appt,  
FAMILY HISTORY:  Mother  Still living? Unknown  Family history of diabetes mellitus in mother, Age at diagnosis: Age Unknown

## 2025-07-18 NOTE — PROGRESS NOTE ADULT - ATTENDING COMMENTS
Department of Anesthesiology  Preprocedure Note       Name:  Amy Arias   Age:  62 y.o.  :  1963                                          MRN:  [id number]         Date:  2025      Surgeon: Surgeon(s):  Ry Woodson MD    Procedure: Procedure(s):  Bilateral Lumbar 3,4,5 medial branch radiofrequency ablation SEDATION    Medications prior to admission:   Prior to Admission medications    Medication Sig Start Date End Date Taking? Authorizing Provider   benazepril (LOTENSIN) 5 MG tablet Take 1 tablet by mouth daily 25  Yes Triston James MD   FLUoxetine (PROZAC) 20 MG capsule Take 1 capsule by mouth daily 25  Yes Triston James MD   loratadine (CLARITIN) 10 MG tablet Take 1 tablet by mouth daily 25  Yes Triston James MD   simvastatin (ZOCOR) 80 MG tablet Take 1 tablet by mouth daily 25  Yes Triston James MD   pregabalin (LYRICA) 75 MG capsule Take 1 capsule by mouth 2 times daily for 90 days. Max Daily Amount: 150 mg 7/13/25 10/11/25 Yes Ry Woodson MD   metFORMIN (GLUCOPHAGE) 500 MG tablet Take 1 tablet by mouth 2 times daily  Patient taking differently: Take 1 tablet by mouth daily (with breakfast) 24  Yes Triston James MD   acetaminophen (TYLENOL) 500 MG tablet Take 2 tablets by mouth every 6 hours as needed for Pain 24  Yes Sharon Caballero MD   multivitamin (THERAGRAN) per tablet Take 1 tablet by mouth daily   Yes Sharon Caballero MD   prednisoLONE acetate (PRED FORTE) 1 % ophthalmic suspension Place 1 drop into the right eye in the morning and 1 drop in the evening. 3/9/20   Sharon Caballero MD       Current medications:    No current facility-administered medications for this encounter.     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    EGD for evaluation of anemia and melena, tentatively tomorrow pending cardiology clearance, clears today and NPO at [place], transfuse >8, PPI IV BID. Hold AC as able.

## (undated) DEVICE — GOWN LG

## (undated) DEVICE — SOL IRR BAG NS 0.9% 3000ML

## (undated) DEVICE — DRSG CURITY GAUZE SPONGE 4 X 4" 12-PLY NON-STERILE

## (undated) DEVICE — CATH IV SAFE BC 22G X 1" (BLUE)

## (undated) DEVICE — DRSG BANDAID 0.75X3"

## (undated) DEVICE — BIOPSY FORCEP COLD DISP

## (undated) DEVICE — FOLEY HOLDER STATLOCK 2 WAY ADULT

## (undated) DEVICE — ELCTR PLASMA LOOP MEDIUM ANGLED 24FR 12-30 DEG

## (undated) DEVICE — BASIN EMESIS 10IN GRADUATED MAUVE

## (undated) DEVICE — DENTURE CUP PINK

## (undated) DEVICE — BIOPSY FORCEP RADIAL JAW 4 STANDARD WITH NEEDLE

## (undated) DEVICE — CLAMP BX HOT RAD JAW 3

## (undated) DEVICE — TUBING IV SET GRAVITY 3Y 100" MACRO

## (undated) DEVICE — UNDERPAD LINEN SAVER 17 X 24"

## (undated) DEVICE — LUBRICATING JELLY HR ONE SHOT 3G

## (undated) DEVICE — DRSG 2X2

## (undated) DEVICE — CONTAINER FORMALIN 10% 20ML

## (undated) DEVICE — POSITIONER FOAM EGG CRATE ULNAR 2PCS (PINK)

## (undated) DEVICE — TUBING MEDI-VAC W MAXIGRIP CONNECTORS 1/4"X6'

## (undated) DEVICE — GOWN TRIMAX LG

## (undated) DEVICE — PACK CYSTO

## (undated) DEVICE — CONTAINER FORMALIN 80ML YELLOW

## (undated) DEVICE — IRRIGATION TRAY W PISTON SYRINGE 60ML

## (undated) DEVICE — BITE BLOCK ADULT 20 X 27MM (GREEN)

## (undated) DEVICE — TUBING RANGER FLUID IRRIGATION SET DISP

## (undated) DEVICE — GLV 7.5 PROTEXIS (WHITE)

## (undated) DEVICE — ELCTR BUGBEE FULGATING 5FR X 58CM

## (undated) DEVICE — PACK IV START WITH CHG

## (undated) DEVICE — SALIVA EJECTOR (BLUE)

## (undated) DEVICE — ELCTR ECG CONDUCTIVE ADHESIVE

## (undated) DEVICE — VENODYNE/SCD SLEEVE CALF LARGE

## (undated) DEVICE — SOL IRR BAG H2O 3000ML

## (undated) DEVICE — WARMING BLANKET UPPER ADULT

## (undated) DEVICE — ELCTR CUTTING LOOP 24FR 12 DEG 0.35 WIRE

## (undated) DEVICE — BAG URINE W METER 2L

## (undated) DEVICE — SYR LUER LOK 30CC

## (undated) DEVICE — ELCTR PLASMA LOOP MEDIUM 24FR 12-16 DEG